# Patient Record
Sex: FEMALE | Race: BLACK OR AFRICAN AMERICAN | NOT HISPANIC OR LATINO | Employment: FULL TIME | URBAN - METROPOLITAN AREA
[De-identification: names, ages, dates, MRNs, and addresses within clinical notes are randomized per-mention and may not be internally consistent; named-entity substitution may affect disease eponyms.]

---

## 2017-01-06 ENCOUNTER — OFFICE VISIT (OUTPATIENT)
Dept: OBSTETRICS AND GYNECOLOGY | Facility: CLINIC | Age: 37
End: 2017-01-06

## 2017-01-06 VITALS
HEIGHT: 59 IN | DIASTOLIC BLOOD PRESSURE: 70 MMHG | BODY MASS INDEX: 34.27 KG/M2 | WEIGHT: 170 LBS | SYSTOLIC BLOOD PRESSURE: 120 MMHG

## 2017-01-06 DIAGNOSIS — N75.0 BARTHOLIN GLAND CYST: Primary | ICD-10-CM

## 2017-01-06 PROCEDURE — 99203 OFFICE O/P NEW LOW 30 MIN: CPT | Performed by: OBSTETRICS & GYNECOLOGY

## 2017-01-06 RX ORDER — TIZANIDINE 4 MG/1
4 TABLET ORAL NIGHTLY PRN
COMMUNITY
End: 2018-10-26

## 2017-01-06 RX ORDER — LORATADINE 10 MG/1
CAPSULE, LIQUID FILLED ORAL
COMMUNITY
End: 2017-06-16 | Stop reason: SDUPTHER

## 2017-01-06 NOTE — MR AVS SNAPSHOT
Bernardo Dodd   1/6/2017 10:10 AM   Office Visit    Dept Phone:  [phone]   Encounter #:  [id number]    Provider:  Manoj Bryan MD   Department:  [facility] OBSTETRICS AND GYNECOLOGY                Your Full Care Plan              Today's Medication Changes          These changes are accurate as of: 1/6/17 10:48 AM.  If you have any questions, ask your nurse or doctor.               Stop taking medication(s)listed here:     cephalexin 500 MG capsule   Commonly known as:  KEFLEX           HYDROcodone-acetaminophen 5-325 MG per tablet   Commonly known as:  NORCO           sulfamethoxazole-trimethoprim 800-160 MG per tablet   Commonly known as:  BACTRIM DS,SEPTRA DS                      Your Updated Medication List          This list is accurate as of: 1/6/17 10:48 AM.  Always use your most recent med list.                amitriptyline 25 MG tablet   Commonly known as:  ELAVIL       amLODIPine 5 MG tablet   Commonly known as:  NORVASC       beclomethasone 80 MCG/ACT inhaler   Commonly known as:  QVAR       DULERA 200-5 MCG/ACT inhaler   Generic drug:  mometasone-formoterol       eletriptan 40 MG tablet   Commonly known as:  RELPAX       fexofenadine 180 MG tablet   Commonly known as:  ALLEGRA       fluticasone 50 MCG/ACT nasal spray   Commonly known as:  FLONASE       ipratropium-albuterol  MCG/ACT inhaler   Commonly known as:  COMBIVENT RESPIMAT       ketotifen 0.025 % ophthalmic solution   Commonly known as:  ZADITOR       Loratadine 10 MG capsule       predniSONE 20 MG tablet   Commonly known as:  DELTASONE   Take 3 tablets by mouth Daily.       raNITIdine 300 MG tablet   Commonly known as:  ZANTAC   Take 1 tablet by mouth Daily. Take first thing in the morning on an empty stomach.  Wait at least 30 min to 1hr before eating.       SINGULAIR 10 MG tablet   Generic drug:  montelukast       tiZANidine 4 MG tablet   Commonly known as:  ZANAFLEX       topiramate 50 MG tablet   Commonly known as:  TOPAMAX       valACYclovir 1000 MG tablet   Commonly known as:  VALTREX   Take 1 tablet by mouth 3 (Three) Times a Day.       VENTOLIN  (90 BASE) MCG/ACT inhaler   Generic drug:  albuterol               We Performed the Following     External Facility Surgical / Procedural Request       You Were Diagnosed With        Codes Comments    Bartholin gland cyst    -  Primary ICD-10-CM: N75.0  ICD-9-CM: 616.2       Instructions     None    Patient Instructions History      Upcoming Appointments     Visit Type Date Time Department    NEW GYNECOLOGICAL 1/6/2017 10:10 AM [facility] [place]    OUTSIDE FACILITY 1/31/2017  1:00 PM [facility] [place]    NEW PATIENT 3/15/2017  9:20 AM [facility] [place]      MyChart Signup     Our records indicate that you have an active [employer] account.    You can view your After Visit Summary by going to [employer] and logging in with your [employer] username and password.  If you don't have a [employer] username and password but a parent or guardian has access to your record, the parent or guardian should login with their own [employer] username and password and access your record to view the After Visit Summary.    If you have questions, you can email [email or web address] or call [phone] to talk to our [employer] staff.  Remember, [employer] is NOT to be used for urgent needs.  For medical emergencies, dial 911.               Other Info from Your Visit           Your Appointments     Jan 31, 2017  1:00 PM EST   Outside Facility with Satinder Zavala MD   [facility] GASTROENTEROLOGY (--)    [address]  [place] [facility] [id number] [phone]            Mar 15, 2017  9:20 AM EDT   New Patient with Asha Hunt MD   [facility] INTERNAL MEDICINE AND ENDOCRINOLOGY (--)    [address]  [place] [facility] [address] [phone]           Bring all  "previous medical records and films, along with current medications and insurance information.              Allergies     No Known Allergies      Reason for Visit     Gynecologic Exam last pap 12/2016, neg    Bartholin's Cyst Here for Bartholin's cyst      Vital Signs     Blood Pressure Height Weight Last Menstrual Period Body Mass Index Smoking Status    120/70 59\" (149.9 cm) 170 lb (77.1 kg) 12/25/2016 34.34 kg/m2 Former Smoker      Problems and Diagnoses Noted     Bartholin gland cyst    -  Primary        "

## 2017-01-06 NOTE — PROGRESS NOTES
Britta Dodd is a 36 y.o. female.     History of Present Illness    Patient has recurred Bartholin cyst, pt wants definitive treatment  The following portions of the patient's history were reviewed and updated as appropriate: allergies, current medications, past family history, past medical history, past social history, past surgical history and problem list.    Review of Systems   Constitutional: Negative.    Eyes: Positive for visual disturbance.   Respiratory: Positive for cough, chest tightness and wheezing. Negative for apnea, choking, shortness of breath and stridor.         Long history of asthma    Cardiovascular: Negative.    Gastrointestinal: Negative for abdominal distention, abdominal pain, anal bleeding, blood in stool, constipation, diarrhea, nausea, rectal pain and vomiting.        Indigestion   Endocrine:        Pt is being worked up for thyroid problem    Genitourinary: Negative.    Musculoskeletal: Positive for back pain.   Neurological: Positive for seizures, speech difficulty, numbness and headaches. Negative for dizziness, tremors, syncope, facial asymmetry and weakness.   Hematological: Negative.    Psychiatric/Behavioral: The patient is nervous/anxious.        Objective   Physical Exam   Constitutional: She is oriented to person, place, and time. She appears well-developed and well-nourished.   HENT:   Head: Normocephalic.   Eyes: Pupils are equal, round, and reactive to light.   Neck: Normal range of motion. Neck supple.   Cardiovascular: Normal rate, regular rhythm, normal heart sounds and intact distal pulses.    Pulmonary/Chest: Effort normal and breath sounds normal. No respiratory distress. She has no wheezes. She has no rales. She exhibits no tenderness.   Abdominal: Soft. Bowel sounds are normal. She exhibits no distension and no mass. There is no tenderness. There is no rebound and no guarding. No hernia.   Genitourinary: Vagina normal and uterus normal.       Pelvic  exam was performed with patient supine. There is no rash, tenderness, lesion or injury on the right labia. There is tenderness and lesion on the left labia. There is no rash or injury on the left labia. Cervix exhibits no motion tenderness, no discharge and no friability. Right adnexum displays no mass, no tenderness and no fullness. Left adnexum displays no mass, no tenderness and no fullness.   Musculoskeletal: Normal range of motion.   Neurological: She is alert and oriented to person, place, and time. She has normal reflexes.   Skin: Skin is warm and dry.   Psychiatric: She has a normal mood and affect. Her behavior is normal. Judgment and thought content normal.   Nursing note and vitals reviewed.      Assessment/Plan   Bernardo was seen today for gynecologic exam and bartholin's cyst.    Diagnoses and all orders for this visit:    Bartholin gland cyst         Plan; schedule a marsupialization of the cyst    Manoj Bryan MD

## 2017-01-08 ENCOUNTER — HOSPITAL ENCOUNTER (EMERGENCY)
Facility: HOSPITAL | Age: 37
Discharge: HOME | End: 2017-01-08
Attending: EMERGENCY MEDICINE | Admitting: EMERGENCY MEDICINE

## 2017-01-08 VITALS
RESPIRATION RATE: 16 BRPM | OXYGEN SATURATION: 97 % | HEART RATE: 83 BPM | WEIGHT: 170 LBS | DIASTOLIC BLOOD PRESSURE: 71 MMHG | BODY MASS INDEX: 34.27 KG/M2 | HEIGHT: 59 IN | TEMPERATURE: 98.7 F | SYSTOLIC BLOOD PRESSURE: 107 MMHG

## 2017-01-08 DIAGNOSIS — G43.009 NONINTRACTABLE MIGRAINE, UNSPECIFIED MIGRAINE TYPE: Primary | ICD-10-CM

## 2017-01-08 DIAGNOSIS — M62.838 MUSCLE SPASMS OF NECK: ICD-10-CM

## 2017-01-08 LAB
AMPHET+METHAMPHET UR QL: NEGATIVE
AMPHETAMINES UR QL: NEGATIVE
B-HCG UR QL: NEGATIVE
BACTERIA UR QL AUTO: ABNORMAL /HPF
BARBITURATES UR QL SCN: NEGATIVE
BENZODIAZ UR QL SCN: NEGATIVE
BILIRUB UR QL STRIP: NEGATIVE
BUPRENORPHINE SERPL-MCNC: NEGATIVE NG/ML
CANNABINOIDS SERPL QL: NEGATIVE
CLARITY UR: ABNORMAL
COCAINE UR QL: NEGATIVE
COLOR UR: ABNORMAL
GLUCOSE UR STRIP-MCNC: NEGATIVE MG/DL
HGB UR QL STRIP.AUTO: NEGATIVE
HYALINE CASTS UR QL AUTO: ABNORMAL /LPF
INTERNAL NEGATIVE CONTROL: NEGATIVE
INTERNAL POSITIVE CONTROL: POSITIVE
KETONES UR QL STRIP: NEGATIVE
LEUKOCYTE ESTERASE UR QL STRIP.AUTO: NEGATIVE
Lab: NORMAL
METHADONE UR QL SCN: NEGATIVE
NITRITE UR QL STRIP: NEGATIVE
OPIATES UR QL: NEGATIVE
OXYCODONE UR QL SCN: NEGATIVE
PCP UR QL SCN: NEGATIVE
PH UR STRIP.AUTO: 7.5 [PH] (ref 5–8)
PROPOXYPH UR QL: NEGATIVE
PROT UR QL STRIP: NEGATIVE
RBC # UR: ABNORMAL /HPF
REF LAB TEST METHOD: ABNORMAL
SP GR UR STRIP: 1.01 (ref 1–1.03)
SQUAMOUS #/AREA URNS HPF: ABNORMAL /HPF
TRICYCLICS UR QL SCN: NEGATIVE
UROBILINOGEN UR QL STRIP: ABNORMAL
WBC UR QL AUTO: ABNORMAL /HPF

## 2017-01-08 PROCEDURE — 25010000002 DIPHENHYDRAMINE PER 50 MG: Performed by: EMERGENCY MEDICINE

## 2017-01-08 PROCEDURE — 25010000002 METOCLOPRAMIDE PER 10 MG: Performed by: EMERGENCY MEDICINE

## 2017-01-08 PROCEDURE — 99284 EMERGENCY DEPT VISIT MOD MDM: CPT

## 2017-01-08 PROCEDURE — 96375 TX/PRO/DX INJ NEW DRUG ADDON: CPT

## 2017-01-08 PROCEDURE — 80306 DRUG TEST PRSMV INSTRMNT: CPT | Performed by: EMERGENCY MEDICINE

## 2017-01-08 PROCEDURE — 25010000002 KETOROLAC TROMETHAMINE PER 15 MG: Performed by: EMERGENCY MEDICINE

## 2017-01-08 PROCEDURE — 81001 URINALYSIS AUTO W/SCOPE: CPT | Performed by: EMERGENCY MEDICINE

## 2017-01-08 PROCEDURE — 96365 THER/PROPH/DIAG IV INF INIT: CPT

## 2017-01-08 PROCEDURE — 25010000002 DEXAMETHASONE: Performed by: EMERGENCY MEDICINE

## 2017-01-08 RX ORDER — METOCLOPRAMIDE HYDROCHLORIDE 5 MG/ML
10 INJECTION INTRAMUSCULAR; INTRAVENOUS ONCE
Status: COMPLETED | OUTPATIENT
Start: 2017-01-08 | End: 2017-01-08

## 2017-01-08 RX ORDER — KETOROLAC TROMETHAMINE 30 MG/ML
30 INJECTION, SOLUTION INTRAMUSCULAR; INTRAVENOUS ONCE
Status: COMPLETED | OUTPATIENT
Start: 2017-01-08 | End: 2017-01-08

## 2017-01-08 RX ORDER — DIPHENHYDRAMINE HYDROCHLORIDE 50 MG/ML
25 INJECTION INTRAMUSCULAR; INTRAVENOUS ONCE
Status: COMPLETED | OUTPATIENT
Start: 2017-01-08 | End: 2017-01-08

## 2017-01-08 RX ORDER — SODIUM CHLORIDE 0.9 % (FLUSH) 0.9 %
10 SYRINGE (ML) INJECTION AS NEEDED
Status: DISCONTINUED | OUTPATIENT
Start: 2017-01-08 | End: 2017-01-08 | Stop reason: HOSPADM

## 2017-01-08 RX ADMIN — METOCLOPRAMIDE 10 MG: 5 INJECTION, SOLUTION INTRAMUSCULAR; INTRAVENOUS at 15:50

## 2017-01-08 RX ADMIN — KETOROLAC TROMETHAMINE 30 MG: 30 INJECTION, SOLUTION INTRAMUSCULAR at 15:49

## 2017-01-08 RX ADMIN — DIPHENHYDRAMINE HYDROCHLORIDE 25 MG: 50 INJECTION INTRAMUSCULAR; INTRAVENOUS at 15:42

## 2017-01-08 RX ADMIN — SODIUM CHLORIDE 1000 ML: 9 INJECTION, SOLUTION INTRAVENOUS at 15:41

## 2017-01-08 RX ADMIN — Medication 10 MG: at 16:11

## 2017-01-08 NOTE — ED PROVIDER NOTES
Subjective   HPI Comments: Mrs. Dodd is a 36 y.o female who presents to the ED c/o a headache starting two days ago. She complains that her headache started with soreness and pain in the back of her neck that radiated up to her head. She has taken a single dose of naratriptan at 1300 but has not helped her headache. She also complains of asthma associated chest tightness, abdominal pain, and diarrhea. She denies any N/V, fever, urinary problems, or any other acute sx at this time. She does have a hx of similar headaches and asthma.     Patient is a 36 y.o. female presenting with headaches.   History provided by:  Patient  Headache   Pain location:  Generalized  Quality:  Unable to specify  Radiates to:  Does not radiate  Severity currently:  Unable to specify  Severity at highest:  Unable to specify  Onset quality:  Sudden  Duration: Onset 2 days.  Timing:  Constant  Progression:  Unchanged  Chronicity:  Recurrent  Similar to prior headaches: yes    Relieved by:  Nothing  Worsened by:  Nothing  Ineffective treatments: Naratriptan.  Associated symptoms: abdominal pain, diarrhea, myalgias and neck pain    Associated symptoms: no congestion, no cough, no fever, no nausea and no vomiting        Review of Systems   Constitutional: Negative for chills and fever.   HENT: Negative for congestion.    Respiratory: Negative for cough.    Gastrointestinal: Positive for abdominal pain and diarrhea. Negative for nausea and vomiting.   Genitourinary: Negative for difficulty urinating and dysuria.   Musculoskeletal: Positive for myalgias and neck pain.   Neurological: Positive for headaches.   All other systems reviewed and are negative.      Past Medical History   Diagnosis Date   • Allergic rhinitis    • Asthma    • GERD (gastroesophageal reflux disease)    • History of chest x-ray 11/01/2015     no active disease   • History of echocardiogram 11/01/2015     ejection fraction of greater than 65%, mitral and pulmonic  regurgitation an physiological tricuspid regurgitation.   • History of PFTs 2015     spirometry data acceptable and reproducible; pt given 4 puffs of Ventolin; pt gave good effort; no obstruction; no Bd response; MVV reduced    • History of PFTs 2015     pt gave best effort; duoneb given prior and post study; moderate nonspecific proportional reduction of FEV1 and FVC with preserved ratio; FEV1 moderately reduced; cannot rule out restriction   • Hypertension    • Thigh shingles        No Known Allergies    Past Surgical History   Procedure Laterality Date   • Breast surgery       breast reduction   • Orif ankle fracture Right    • Laparoscopic tubal ligation     •  section         Family History   Problem Relation Age of Onset   • Pancreatic cancer Maternal Grandmother    • Heart failure Paternal Grandmother    • MARCIE disease Paternal Aunt        Social History     Social History   • Marital status: Single     Spouse name: N/A   • Number of children: N/A   • Years of education: N/A     Social History Main Topics   • Smoking status: Former Smoker     Packs/day: 1.00     Years: 15.00     Types: Cigarettes     Quit date:    • Smokeless tobacco: None   • Alcohol use Yes      Comment: socially   • Drug use: No      Comment: used recreational drugs in the past   • Sexual activity: Not Asked     Other Topics Concern   • None     Social History Narrative         Objective   Physical Exam   Constitutional: She is oriented to person, place, and time. She appears well-developed and well-nourished. No distress.   HENT:   Head: Normocephalic and atraumatic.   Eyes: Conjunctivae are normal.   Neck: Normal range of motion. Neck supple.   Pain in her head or neck when moving positions   Cardiovascular: Normal rate, regular rhythm, normal heart sounds and intact distal pulses.  Exam reveals no gallop and no friction rub.    No murmur heard.  Pulmonary/Chest: Effort normal and breath sounds normal. No  respiratory distress.   Abdominal: Soft. There is tenderness (Mild suprapubic tenderness).   Musculoskeletal: Normal range of motion.   Neurological: She is alert and oriented to person, place, and time.   Skin: Skin is warm and dry.   Psychiatric: She has a normal mood and affect. Her behavior is normal.   Nursing note and vitals reviewed.      Procedures         ED Course  ED Course       Course of Care      Lab Results (last 24 hours)     Procedure Component Value Units Date/Time    Urinalysis With / Culture If Indicated [[id number]]  (Abnormal) Collected:  01/08/17 1452    Specimen:  Urine from Urine, Clean Catch Updated:  01/08/17 1510     Color, UA Dark Yellow (A)      Appearance, UA Turbid (A)      pH, UA 7.5      Specific Gravity, UA 1.014      Glucose, UA Negative      Ketones, UA Negative      Bilirubin, UA Negative      Blood, UA Negative      Protein, UA Negative      Leuk Esterase, UA Negative      Nitrite, UA Negative      Urobilinogen, UA 0.2 E.U./dL     Urine Drug Screen [[id number]]  (Normal) Collected:  01/08/17 1452    Specimen:  Urine from Urine, Clean Catch Updated:  01/08/17 1511     THC, Screen, Urine Negative      Phencyclidine (PCP), Urine Negative      Cocaine Screen, Urine Negative      Methamphetamine, Urine Negative      Opiate Screen Negative      Amphetamine Screen, Urine Negative      Benzodiazepine Screen, Urine Negative      Tricyclic Antidepressants Screen Negative      Methadone Screen, Urine Negative      Barbiturates Screen, Urine Negative      Oxycodone Screen, Urine Negative      Propoxyphene Screen Negative      Buprenorphine, Screen, Urine Negative     Narrative:       Cutoff For Drugs Screened:    Amphetamines               500 ng/ml  Barbiturates               200 ng/ml  Benzodiazepines            150 ng/ml  Cocaine                    150 ng/ml  Methadone                  200 ng/ml  Opiates                    100 ng/ml  Phencyclidine               25 ng/ml  THC                             50 ng/ml  Methamphetamine            500 ng/ml  Tricyclic Antidepressants  300 ng/ml  Oxycodone                  100 ng/ml  Propoxyphene               300 ng/ml  Buprenorphine               10 ng/ml    The normal value for all drugs tested is negative. This report includes unconfirmed screening results, with the cutoff values listed, to be used for medical treatment purposes only.  Unconfirmed results must not be used for non-medical purposes such as employment or legal testing.  Clinical consideration should be applied to any drug of abuse test, particularly when unconfirmed results are used.      Urinalysis, Microscopic Only [[id number]] Collected:  01/08/17 1452    Specimen:  Urine from Urine, Clean Catch Updated:  01/08/17 1509    POCT Pregnancy, urine [[id number]]  (Normal) Collected:  01/08/17 1555    Specimen:  Urine Updated:  01/08/17 1556     HCG, Urine, QL Negative      Lot Number [id number]      Internal Positive Control Positive      Internal Negative Control Negative           Note: In addition to lab results from this visit, the labs listed above may include labs taken at another facility or during a different encounter within the last 24 hours. Please correlate lab times with ED admission and discharge times for further clarification of the services performed during this visit.    No orders to display       Vitals:    01/08/17 1500 01/08/17 1515 01/08/17 1540 01/08/17 1545   BP: 122/83 121/85 118/85 118/79   BP Location:       Patient Position:       Pulse:  84 80 78   Resp:       Temp:       TempSrc:       SpO2:  94% 97% 94%   Weight:       Height:           Medications   sodium chloride 0.9 % flush 10 mL (not administered)   dexamethasone (DECADRON) injection 10 mg (not administered)   sodium chloride 0.9 % bolus 1,000 mL (1,000 mL Intravenous New Bag 1/8/17 1541)   ketorolac (TORADOL) injection 30 mg (30 mg Intravenous Given 1/8/17 1549)   metoclopramide (REGLAN) injection 10 mg (10 mg  Intravenous Given 1/8/17 1550)   diphenhydrAMINE (BENADRYL) injection 25 mg (25 mg Intravenous Given 1/8/17 1542)       ECG/EMG Results (last 24 hours)     ** No results found for the last 24 hours. **                          MDM  Number of Diagnoses or Management Options  Muscle spasms of neck: new and requires workup  Nonintractable migraine, unspecified migraine type: new and requires workup  Diagnosis management comments: Headache reported to be improved, still mildly present, will give 10 mg of decadron and DC with advise to rest, apply heat to neck and stretch.     Followup with neurologist for further evaluation of chronic headache and neck muscle spasms.        Amount and/or Complexity of Data Reviewed  Clinical lab tests: ordered and reviewed  Tests in the radiology section of CPT®: ordered and reviewed  Review and summarize past medical records: yes  Independent visualization of images, tracings, or specimens: yes    Patient Progress  Patient progress: stable      Final diagnoses:   Nonintractable migraine, unspecified migraine type   Muscle spasms of neck       Documentation assistance provided by jonny VIVEROS.  Information recorded by the jonny was done at my direction and has been verified and validated by me.     Paula Viveros  01/08/17 1442       Paula Viveros  01/08/17 1602       Arya Bates MD  01/08/17 1606

## 2017-01-13 ENCOUNTER — OUTSIDE FACILITY SERVICE (OUTPATIENT)
Dept: OBSTETRICS AND GYNECOLOGY | Facility: CLINIC | Age: 37
End: 2017-01-13

## 2017-01-13 PROCEDURE — S0260 H&P FOR SURGERY: HCPCS | Performed by: OBSTETRICS & GYNECOLOGY

## 2017-01-15 ENCOUNTER — OFFICE VISIT (OUTPATIENT)
Dept: RETAIL CLINIC | Facility: CLINIC | Age: 37
End: 2017-01-15

## 2017-01-15 VITALS
HEART RATE: 68 BPM | DIASTOLIC BLOOD PRESSURE: 67 MMHG | SYSTOLIC BLOOD PRESSURE: 118 MMHG | TEMPERATURE: 98.9 F | OXYGEN SATURATION: 98 % | RESPIRATION RATE: 20 BRPM

## 2017-01-15 DIAGNOSIS — J45.41 MODERATE PERSISTENT ASTHMA WITH ACUTE EXACERBATION: ICD-10-CM

## 2017-01-15 DIAGNOSIS — H65.01 RIGHT ACUTE SEROUS OTITIS MEDIA, RECURRENCE NOT SPECIFIED: Primary | ICD-10-CM

## 2017-01-15 PROCEDURE — 99213 OFFICE O/P EST LOW 20 MIN: CPT | Performed by: NURSE PRACTITIONER

## 2017-01-15 RX ORDER — GUAIFENESIN AND DEXTROMETHORPHAN HYDROBROMIDE 600; 30 MG/1; MG/1
2 TABLET, EXTENDED RELEASE ORAL 2 TIMES DAILY PRN
Qty: 20 TABLET | Refills: 0 | Status: SHIPPED | OUTPATIENT
Start: 2017-01-15 | End: 2017-01-20

## 2017-01-15 RX ORDER — AZITHROMYCIN 250 MG/1
TABLET, FILM COATED ORAL
Qty: 6 TABLET | Refills: 0 | Status: SHIPPED | OUTPATIENT
Start: 2017-01-15 | End: 2017-02-15

## 2017-01-15 RX ORDER — PREDNISONE 20 MG/1
20 TABLET ORAL 2 TIMES DAILY
Qty: 10 TABLET | Refills: 0 | Status: SHIPPED | OUTPATIENT
Start: 2017-01-15 | End: 2017-01-20

## 2017-01-15 NOTE — PROGRESS NOTES
Subjective   Bernardo Dodd is a 36 y.o. female. Presenting with sore throat, PND, watery drainage from eyes, productive cough, SOB with exertion X 3 days.  Feels as if she is running a fever off/on.Has been using rescue inhalers, daily steroid inhalers, ha nebulizer treatment with no improvements. Unknown ill contacts.     History of Present Illness     The following portions of the patient's history were reviewed and updated as appropriate: allergies, current medications, past family history, past medical history, past social history, past surgical history and problem list.    Review of Systems   Constitutional: Positive for appetite change (decreased), fatigue and fever. Negative for chills and diaphoresis.   HENT: Positive for congestion (nasal/chest), ear pain (left), postnasal drip, rhinorrhea, sinus pressure, sneezing and sore throat.    Eyes: Positive for discharge (watery ).   Respiratory: Positive for cough, chest tightness, shortness of breath (at rest and with exertion ) and wheezing.    Gastrointestinal: Negative.        Objective   Physical Exam   Constitutional: She appears well-developed.   HENT:   Head: Normocephalic.   Right Ear: Tympanic membrane is erythematous and bulging.   Left Ear: Tympanic membrane and ear canal normal.   Nose: Mucosal edema, rhinorrhea and nose lacerations present.   Mouth/Throat: Uvula is midline and mucous membranes are normal. Posterior oropharyngeal erythema present. No tonsillar exudate.   Cardiovascular: Normal rate and regular rhythm.    Pulmonary/Chest: Effort normal and breath sounds normal.   Lymphadenopathy:        Head (right side): Tonsillar adenopathy present.        Head (left side): Tonsillar adenopathy present.     She has no cervical adenopathy.   Skin: Skin is warm, dry and intact.       Assessment/Plan   Bernardo was seen today for asthma and sinus problem.    Diagnoses and all orders for this visit:    Right acute serous otitis media, recurrence not  specified    Moderate persistent asthma with acute exacerbation    Other orders  -     azithromycin (ZITHROMAX Z-GUANAKO) 250 MG tablet; Take 2 tablets the first day, then 1 tablet daily for 4 days.  -     guaifenesin-dextromethorphan (MUCINEX DM)  MG tablet sustained-release 12 hour tablet; Take 2 tablets by mouth 2 (Two) Times a Day As Needed (cough/chest congestion) for up to 5 days.  -     predniSONE (DELTASONE) 20 MG tablet; Take 1 tablet by mouth 2 (Two) Times a Day for 5 days.        Visit information reviewed with patient, including medication prescribed and patient instructions. All questions answered and given to patient/and or caregiver.     If symptoms worsen with fever >103, please seek further evaluation in the ER. Please F/U with PCP with concerns/and questions.   Kerri Pappas, APRN

## 2017-01-31 ENCOUNTER — OUTSIDE FACILITY SERVICE (OUTPATIENT)
Dept: GASTROENTEROLOGY | Facility: CLINIC | Age: 37
End: 2017-01-31

## 2017-01-31 ENCOUNTER — LAB REQUISITION (OUTPATIENT)
Dept: LAB | Facility: HOSPITAL | Age: 37
End: 2017-01-31

## 2017-01-31 DIAGNOSIS — K20.90 ESOPHAGITIS: ICD-10-CM

## 2017-01-31 PROCEDURE — 88305 TISSUE EXAM BY PATHOLOGIST: CPT | Performed by: INTERNAL MEDICINE

## 2017-01-31 PROCEDURE — 43239 EGD BIOPSY SINGLE/MULTIPLE: CPT | Performed by: INTERNAL MEDICINE

## 2017-02-01 LAB
LAB AP CASE REPORT: NORMAL
LAB AP CLINICAL INFORMATION: NORMAL
Lab: NORMAL
PATH REPORT.FINAL DX SPEC: NORMAL
PATH REPORT.GROSS SPEC: NORMAL

## 2017-02-02 RX ORDER — FLUTICASONE PROPIONATE 50 MCG
SPRAY, SUSPENSION (ML) NASAL
Qty: 1 BOTTLE | Refills: 10 | Status: SHIPPED | OUTPATIENT
Start: 2017-02-02 | End: 2018-06-01

## 2017-02-08 ENCOUNTER — APPOINTMENT (OUTPATIENT)
Dept: GENERAL RADIOLOGY | Facility: HOSPITAL | Age: 37
End: 2017-02-08

## 2017-02-08 ENCOUNTER — HOSPITAL ENCOUNTER (EMERGENCY)
Facility: HOSPITAL | Age: 37
Discharge: HOME | End: 2017-02-09
Attending: EMERGENCY MEDICINE | Admitting: EMERGENCY MEDICINE

## 2017-02-08 VITALS
TEMPERATURE: 98.4 F | SYSTOLIC BLOOD PRESSURE: 112 MMHG | BODY MASS INDEX: 34.27 KG/M2 | RESPIRATION RATE: 18 BRPM | DIASTOLIC BLOOD PRESSURE: 73 MMHG | OXYGEN SATURATION: 98 % | HEIGHT: 59 IN | HEART RATE: 73 BPM | WEIGHT: 170 LBS

## 2017-02-08 DIAGNOSIS — R07.89 ACUTE CHEST WALL PAIN: Primary | ICD-10-CM

## 2017-02-08 LAB — TROPONIN I SERPL-MCNC: 0 NG/ML (ref 0–0.07)

## 2017-02-08 PROCEDURE — 25010000002 KETOROLAC TROMETHAMINE PER 15 MG: Performed by: PHYSICIAN ASSISTANT

## 2017-02-08 PROCEDURE — 93005 ELECTROCARDIOGRAM TRACING: CPT

## 2017-02-08 PROCEDURE — 99284 EMERGENCY DEPT VISIT MOD MDM: CPT

## 2017-02-08 PROCEDURE — 25010000002 METOCLOPRAMIDE PER 10 MG: Performed by: PHYSICIAN ASSISTANT

## 2017-02-08 PROCEDURE — 96375 TX/PRO/DX INJ NEW DRUG ADDON: CPT

## 2017-02-08 PROCEDURE — 71020 HC CHEST PA AND LATERAL: CPT

## 2017-02-08 PROCEDURE — 96365 THER/PROPH/DIAG IV INF INIT: CPT

## 2017-02-08 PROCEDURE — 25010000002 DEXAMETHASONE: Performed by: EMERGENCY MEDICINE

## 2017-02-08 PROCEDURE — 96361 HYDRATE IV INFUSION ADD-ON: CPT

## 2017-02-08 PROCEDURE — 84484 ASSAY OF TROPONIN QUANT: CPT

## 2017-02-08 PROCEDURE — 25010000002 DIPHENHYDRAMINE PER 50 MG: Performed by: PHYSICIAN ASSISTANT

## 2017-02-08 RX ORDER — METOCLOPRAMIDE HYDROCHLORIDE 5 MG/ML
10 INJECTION INTRAMUSCULAR; INTRAVENOUS ONCE
Status: COMPLETED | OUTPATIENT
Start: 2017-02-08 | End: 2017-02-08

## 2017-02-08 RX ORDER — DEXAMETHASONE SODIUM PHOSPHATE 10 MG/ML
10 INJECTION INTRAMUSCULAR; INTRAVENOUS ONCE
Status: DISCONTINUED | OUTPATIENT
Start: 2017-02-08 | End: 2017-02-08

## 2017-02-08 RX ORDER — DIPHENHYDRAMINE HYDROCHLORIDE 50 MG/ML
25 INJECTION INTRAMUSCULAR; INTRAVENOUS ONCE
Status: COMPLETED | OUTPATIENT
Start: 2017-02-08 | End: 2017-02-08

## 2017-02-08 RX ORDER — HYDROCODONE BITARTRATE AND ACETAMINOPHEN 5; 325 MG/1; MG/1
1 TABLET ORAL EVERY 6 HOURS PRN
Qty: 15 TABLET | Refills: 0 | Status: SHIPPED | OUTPATIENT
Start: 2017-02-08 | End: 2017-02-15

## 2017-02-08 RX ORDER — KETOROLAC TROMETHAMINE 15 MG/ML
15 INJECTION, SOLUTION INTRAMUSCULAR; INTRAVENOUS ONCE
Status: COMPLETED | OUTPATIENT
Start: 2017-02-08 | End: 2017-02-08

## 2017-02-08 RX ADMIN — METOCLOPRAMIDE 10 MG: 5 INJECTION, SOLUTION INTRAMUSCULAR; INTRAVENOUS at 20:44

## 2017-02-08 RX ADMIN — Medication 10 MG: at 20:49

## 2017-02-08 RX ADMIN — KETOROLAC TROMETHAMINE 15 MG: 15 INJECTION, SOLUTION INTRAMUSCULAR; INTRAVENOUS at 20:44

## 2017-02-08 RX ADMIN — SODIUM CHLORIDE 500 ML: 9 INJECTION, SOLUTION INTRAVENOUS at 20:44

## 2017-02-08 RX ADMIN — DIPHENHYDRAMINE HYDROCHLORIDE 25 MG: 50 INJECTION INTRAMUSCULAR; INTRAVENOUS at 20:44

## 2017-02-09 NOTE — ED PROVIDER NOTES
Subjective   Patient is a 36 y.o. female presenting with chest pain.   History provided by:  Patient  Chest Pain   Pain location:  L chest and L lateral chest  Pain quality: burning (Muscle spasms radiating from neck and anterior chest, history of same)    Pain radiates to:  L shoulder  Pain severity:  Moderate  Onset quality:  Gradual  Timing:  Intermittent  Progression:  Waxing and waning  Chronicity:  Recurrent  Context: breathing and movement    Relieved by:  Nothing  Worsened by:  Nothing  Ineffective treatments: Muscle relaxants.  Associated symptoms: back pain (Left rhomboid shoulder spasms per history of present illness, history of same) and cough    Associated symptoms: no abdominal pain, no anorexia, no anxiety, no dizziness, no dysphagia, no fatigue, no fever, no headache, no heartburn, no lower extremity edema and no nausea      36-year-old female with history of rhomboid and trapezius spasm presents with worsening spasms radiating into the left chest for the past 2 weeks.  Symptoms worsen with movement, no shortness of breath but pain upon deep inspiration and movement.  She has had a slight cough with some chest congestion but no fevers chills or sweats.  No sputum or hemoptysis.  She has a past history of trigger point injections for spasms in her back and shoulder, not scheduled again until March.  Prior history of valvular heart disease-mitral and pulmonic valve regurgitation with physiologic tricuspid regurgitation, she does have a history of hypertension, and asthma.  Review of Systems   Constitutional: Negative for fatigue and fever.   HENT: Negative for trouble swallowing.    Respiratory: Positive for cough.    Cardiovascular: Positive for chest pain.   Gastrointestinal: Negative for abdominal pain, anorexia, heartburn and nausea.   Musculoskeletal: Positive for back pain (Left rhomboid shoulder spasms per history of present illness, history of same).   Neurological: Negative for dizziness and  headaches.   All other systems reviewed and are negative.      Past Medical History   Diagnosis Date   • Allergic rhinitis    • Asthma    • GERD (gastroesophageal reflux disease)    • History of chest x-ray 2015     no active disease   • History of echocardiogram 2015     ejection fraction of greater than 65%, mitral and pulmonic regurgitation an physiological tricuspid regurgitation.   • History of PFTs 2015     spirometry data acceptable and reproducible; pt given 4 puffs of Ventolin; pt gave good effort; no obstruction; no Bd response; MVV reduced    • History of PFTs 2015     pt gave best effort; duoneb given prior and post study; moderate nonspecific proportional reduction of FEV1 and FVC with preserved ratio; FEV1 moderately reduced; cannot rule out restriction   • Hypertension    • Thigh shingles        No Known Allergies    Past Surgical History   Procedure Laterality Date   • Breast surgery       breast reduction   • Orif ankle fracture Right    • Laparoscopic tubal ligation     •  section         Family History   Problem Relation Age of Onset   • Pancreatic cancer Maternal Grandmother    • Heart failure Paternal Grandmother    • MARCIE disease Paternal Aunt        Social History     Social History   • Marital status: Single     Spouse name: N/A   • Number of children: N/A   • Years of education: N/A     Social History Main Topics   • Smoking status: Former Smoker     Packs/day: 1.00     Years: 15.00     Types: Cigarettes     Quit date:    • Smokeless tobacco: None   • Alcohol use Yes      Comment: socially   • Drug use: No      Comment: used recreational drugs in the past   • Sexual activity: Defer     Other Topics Concern   • None     Social History Narrative           Objective   Physical Exam   Constitutional: She is oriented to person, place, and time. She appears well-developed and well-nourished. No distress.   HENT:   Head: Normocephalic and atraumatic.   Right  Ear: External ear normal.   Left Ear: External ear normal.   Nose: Nose normal.   Mouth/Throat: Oropharynx is clear and moist. No oropharyngeal exudate.   Eyes: Conjunctivae and EOM are normal. Pupils are equal, round, and reactive to light. Right eye exhibits no discharge. Left eye exhibits no discharge. No scleral icterus.   Neck: Normal range of motion. Neck supple. No JVD present. No tracheal deviation present. No thyromegaly present.   Cardiovascular: Normal rate, regular rhythm and intact distal pulses.  Exam reveals no gallop and no friction rub.    Murmur heard.  Pulmonary/Chest: Effort normal and breath sounds normal. No stridor. No respiratory distress. She has no wheezes. She has no rales. She exhibits no tenderness.   Abdominal: Soft. Bowel sounds are normal. She exhibits no distension and no mass. There is no tenderness. There is no rebound and no guarding. No hernia.   Musculoskeletal: Normal range of motion. She exhibits no edema, tenderness or deformity.   Left chest wall tender to palpation just above the left breast, this reproduces patient's symptoms.  Thoracic expansion is normal.  No external sign of injury.  Lungs are clear to auscultation.  Cardiovascular exam regular rate rhythm.   Lymphadenopathy:     She has no cervical adenopathy.   Neurological: She is alert and oriented to person, place, and time. She has normal reflexes. She displays normal reflexes. No cranial nerve deficit. She exhibits normal muscle tone. Coordination normal.   Skin: Skin is warm and dry. No rash noted. She is not diaphoretic. No erythema. No pallor.   Psychiatric: She has a normal mood and affect. Her behavior is normal. Judgment and thought content normal.   Nursing note and vitals reviewed.      Procedures        Recent Results (from the past 24 hour(s))   POC Troponin, Rapid    Collection Time: 02/08/17  8:51 PM   Result Value Ref Range    Troponin I 0.00 0.00 - 0.07 ng/mL     Note: In addition to lab results  from this visit, the labs listed above may include labs taken at another facility or during a different encounter within the last 24 hours. Please correlate lab times with ED admission and discharge times for further clarification of the services performed during this visit.    XR Chest PA & Lateral   ED Interpretation   Prominence of the right heart border otherwise no acute findings   on 2 views of the chest.  No acute infiltrates noted.  No   pneumothorax or hemothorax.  No effusions noted.  Mediastinum is   within normal limits.        Vitals:    02/08/17 2030 02/08/17 2124 02/08/17 2130 02/08/17 2230   BP: 121/81 109/65 100/63 112/73   BP Location:       Patient Position:       Pulse: 73      Resp:       Temp:       TempSrc:       SpO2: 96%  97% 98%   Weight:       Height:         Medications   sodium chloride 0.9 % bolus 500 mL (0 mL Intravenous Stopped 2/8/17 2115)   ketorolac (TORADOL) injection 15 mg (15 mg Intravenous Given 2/8/17 2044)   metoclopramide (REGLAN) injection 10 mg (10 mg Intravenous Given 2/8/17 2044)   diphenhydrAMINE (BENADRYL) injection 25 mg (25 mg Intravenous Given 2/8/17 2044)   Dexamethasone (decadron) 10mg in NS 50ml IVPB (0 mg Intravenous Stopped 2/8/17 2115)     ECG/EMG Results (last 24 hours)     Procedure Component Value Units Date/Time    ECG 12 Lead [[id number]] Collected:  02/08/17 1951     Updated:  02/08/17 2013    Narrative:       Test Reason : CHEST PAIN  Blood Pressure : **/** mmHG  Vent. Rate : 074 BPM     Atrial Rate : 074 BPM     P-R Int : 118 ms          QRS Dur : 084 ms      QT Int : 382 ms       P-R-T Axes : 033 065 042 degrees     QTc Int : 424 ms    Normal sinus rhythm  Normal ECG  When compared with ECG of 01-AUG-2016 22:10,  No significant change was found  Confirmed by EMILIA CEBALLOS MD ([id number]) on 2/8/2017 8:13:43 PM    Referred By:  ED MD           Confirmed By:EMILIA CEBALLOS MD            ED Course  ED Course                  MDM    Final diagnoses:   Acute chest  wall pain            Christoph Gutierrez PA-C  02/09/17 0411

## 2017-02-09 NOTE — DISCHARGE INSTRUCTIONS
Warm compresses sparingly to chest wall.  Follow-up with your primary care provider for recheck in 1-2 days.  Return if any change or worsening.

## 2017-02-14 ENCOUNTER — OFFICE VISIT (OUTPATIENT)
Dept: OBSTETRICS AND GYNECOLOGY | Facility: CLINIC | Age: 37
End: 2017-02-14

## 2017-02-14 VITALS
SYSTOLIC BLOOD PRESSURE: 122 MMHG | DIASTOLIC BLOOD PRESSURE: 80 MMHG | HEIGHT: 59 IN | WEIGHT: 177 LBS | BODY MASS INDEX: 35.68 KG/M2

## 2017-02-14 DIAGNOSIS — N75.0 BARTHOLIN GLAND CYST: Primary | ICD-10-CM

## 2017-02-14 DIAGNOSIS — Z98.890 POST-OPERATIVE STATE: ICD-10-CM

## 2017-02-14 PROCEDURE — 99024 POSTOP FOLLOW-UP VISIT: CPT | Performed by: OBSTETRICS & GYNECOLOGY

## 2017-02-14 NOTE — PROGRESS NOTES
Subjective   Bernardo Dodd is a 36 y.o. female.     History of Present Illness    Post op marsupialization of left Bartholin cyst  The following portions of the patient's history were reviewed and updated as appropriate: allergies, current medications, past family history, past medical history, past social history, past surgical history and problem list.    Review of Systems   Constitutional: Negative.    Genitourinary: Negative.        Objective   Physical Exam   Genitourinary:       No labial fusion. There is no rash, tenderness, lesion or injury on the right labia. There is no rash, tenderness, lesion or injury on the left labia.       Assessment/Plan   Bernardo was seen today for post-op.    Diagnoses and all orders for this visit:    Bartholin gland cyst    Post-operative state         Return prn    Manoj Bryan MD

## 2017-02-15 ENCOUNTER — OFFICE VISIT (OUTPATIENT)
Dept: RETAIL CLINIC | Facility: CLINIC | Age: 37
End: 2017-02-15

## 2017-02-15 VITALS
TEMPERATURE: 98.6 F | HEIGHT: 60 IN | HEART RATE: 70 BPM | SYSTOLIC BLOOD PRESSURE: 120 MMHG | BODY MASS INDEX: 35.18 KG/M2 | WEIGHT: 179.2 LBS | DIASTOLIC BLOOD PRESSURE: 84 MMHG | RESPIRATION RATE: 18 BRPM | OXYGEN SATURATION: 98 %

## 2017-02-15 DIAGNOSIS — R68.89 FLU-LIKE SYMPTOMS: Primary | ICD-10-CM

## 2017-02-15 LAB
EXPIRATION DATE: NORMAL
FLUAV AG NPH QL: NEGATIVE
FLUBV AG NPH QL: NEGATIVE
INTERNAL CONTROL: NORMAL
Lab: NORMAL

## 2017-02-15 PROCEDURE — 99213 OFFICE O/P EST LOW 20 MIN: CPT | Performed by: NURSE PRACTITIONER

## 2017-02-15 PROCEDURE — 87804 INFLUENZA ASSAY W/OPTIC: CPT | Performed by: NURSE PRACTITIONER

## 2017-02-15 RX ORDER — OSELTAMIVIR PHOSPHATE 75 MG/1
75 CAPSULE ORAL 2 TIMES DAILY
Qty: 10 CAPSULE | Refills: 0 | Status: SHIPPED | OUTPATIENT
Start: 2017-02-15 | End: 2017-02-20

## 2017-02-15 NOTE — PROGRESS NOTES
Britta Dodd is a 36 y.o. female. Presenting  Sudden onset of sore throat, headaches, body aches, nasal congestion and runny nose since yesterday. Has chills. Unsure of fevers. Has taken coricidin with slight improvement. Mother was dx with flu yesterday in which she has been in close contact. See ROS.     History of Present Illness     The following portions of the patient's history were reviewed and updated as appropriate: allergies, current medications, past family history, past medical history, past social history, past surgical history and problem list.    Review of Systems   Constitutional: Positive for appetite change (decreased), chills, diaphoresis and fatigue. Negative for fever.   HENT: Positive for congestion, rhinorrhea, sinus pressure, sneezing and sore throat.    Respiratory: Positive for cough. Negative for chest tightness, shortness of breath and wheezing.    Gastrointestinal: Negative.      Results for orders placed or performed in visit on 02/15/17   POC Influenza A / B   Result Value Ref Range    Rapid Influenza A Ag negative     Rapid Influenza B Ag negative     Internal Control Passed Passed    Lot Number [id number]     Expiration Date 04/30/2017          Objective   Physical Exam   Constitutional: She appears well-developed.   HENT:   Head: Normocephalic.   Right Ear: Ear canal normal. Tympanic membrane is not bulging.   Left Ear: Ear canal normal. Tympanic membrane is bulging.   Nose: Mucosal edema present.   Mouth/Throat: Uvula is midline and mucous membranes are normal. Posterior oropharyngeal erythema present. No tonsillar exudate.   Cardiovascular: Normal rate and regular rhythm.    Pulmonary/Chest: Effort normal and breath sounds normal.   Lymphadenopathy:        Head (right side): No submandibular adenopathy present.        Head (left side): No submandibular adenopathy present.     She has no cervical adenopathy.   Neurological: She is alert.   Skin: Skin is warm and  intact.       Assessment/Plan   Bernardo was seen today for sore throat, nasal congestion, earache, dizziness, cough, headache and generalized body aches.    Diagnoses and all orders for this visit:    Flu-like symptoms    Other orders  -     oseltamivir (TAMIFLU) 75 MG capsule; Take 1 capsule by mouth 2 (Two) Times a Day for 5 days.      Will opt to treat based upon clinical presentation and recent exposure to flu.     Visit information reviewed with patient, including medication prescribed and patient instructions. All questions answered and given to patient/and or caregiver.     If symptoms worsen with fever >103, please seek further evaluation in the ER. Please F/U with PCP with concerns/and questions.     Kerri Pappas, APRN

## 2017-03-15 ENCOUNTER — OFFICE VISIT (OUTPATIENT)
Dept: ENDOCRINOLOGY | Facility: CLINIC | Age: 37
End: 2017-03-15

## 2017-03-15 VITALS
SYSTOLIC BLOOD PRESSURE: 122 MMHG | HEART RATE: 79 BPM | WEIGHT: 176 LBS | HEIGHT: 60 IN | BODY MASS INDEX: 34.55 KG/M2 | OXYGEN SATURATION: 99 % | DIASTOLIC BLOOD PRESSURE: 78 MMHG

## 2017-03-15 DIAGNOSIS — H05.20 EXOPHTHALMOS: Primary | ICD-10-CM

## 2017-03-15 PROBLEM — J30.9 ATOPIC RHINITIS: Status: ACTIVE | Noted: 2017-03-15

## 2017-03-15 PROBLEM — I10 HYPERTENSION: Status: ACTIVE | Noted: 2017-03-15

## 2017-03-15 LAB
T4 FREE SERPL-MCNC: 0.87 NG/DL (ref 0.89–1.76)
TSH SERPL DL<=0.05 MIU/L-ACNC: 1.2 MIU/ML (ref 0.35–5.35)

## 2017-03-15 PROCEDURE — 84443 ASSAY THYROID STIM HORMONE: CPT | Performed by: INTERNAL MEDICINE

## 2017-03-15 PROCEDURE — 83520 IMMUNOASSAY QUANT NOS NONAB: CPT | Performed by: INTERNAL MEDICINE

## 2017-03-15 PROCEDURE — 86376 MICROSOMAL ANTIBODY EACH: CPT | Performed by: INTERNAL MEDICINE

## 2017-03-15 PROCEDURE — 84439 ASSAY OF FREE THYROXINE: CPT | Performed by: INTERNAL MEDICINE

## 2017-03-15 PROCEDURE — 99243 OFF/OP CNSLTJ NEW/EST LOW 30: CPT | Performed by: INTERNAL MEDICINE

## 2017-03-16 LAB — TSH RECEP AB SER-ACNC: <0.5 IU/L (ref 0–1.75)

## 2017-03-17 LAB — THYROPEROXIDASE AB SERPL-ACNC: 11 IU/ML (ref 0–34)

## 2017-03-27 ENCOUNTER — HOSPITAL ENCOUNTER (EMERGENCY)
Facility: HOSPITAL | Age: 37
Discharge: HOME | End: 2017-03-27
Attending: EMERGENCY MEDICINE | Admitting: EMERGENCY MEDICINE

## 2017-03-27 ENCOUNTER — OFFICE VISIT (OUTPATIENT)
Dept: RETAIL CLINIC | Facility: CLINIC | Age: 37
End: 2017-03-27

## 2017-03-27 VITALS
TEMPERATURE: 98.3 F | DIASTOLIC BLOOD PRESSURE: 83 MMHG | RESPIRATION RATE: 18 BRPM | BODY MASS INDEX: 35.08 KG/M2 | HEART RATE: 74 BPM | SYSTOLIC BLOOD PRESSURE: 118 MMHG | HEIGHT: 59 IN | WEIGHT: 174 LBS | OXYGEN SATURATION: 93 %

## 2017-03-27 VITALS
SYSTOLIC BLOOD PRESSURE: 118 MMHG | RESPIRATION RATE: 20 BRPM | HEIGHT: 60 IN | OXYGEN SATURATION: 98 % | BODY MASS INDEX: 34.2 KG/M2 | DIASTOLIC BLOOD PRESSURE: 72 MMHG | HEART RATE: 74 BPM | WEIGHT: 174.2 LBS | TEMPERATURE: 99 F

## 2017-03-27 DIAGNOSIS — R42 DIZZINESS: ICD-10-CM

## 2017-03-27 DIAGNOSIS — B34.9 ACUTE VIRAL SYNDROME: ICD-10-CM

## 2017-03-27 DIAGNOSIS — J02.9 ACUTE VIRAL PHARYNGITIS: Primary | ICD-10-CM

## 2017-03-27 DIAGNOSIS — J06.9 UPPER RESPIRATORY TRACT INFECTION, UNSPECIFIED TYPE: ICD-10-CM

## 2017-03-27 DIAGNOSIS — R51.9 NONINTRACTABLE EPISODIC HEADACHE, UNSPECIFIED HEADACHE TYPE: ICD-10-CM

## 2017-03-27 DIAGNOSIS — R68.89 FLU-LIKE SYMPTOMS: Primary | ICD-10-CM

## 2017-03-27 LAB
EXPIRATION DATE: NORMAL
FLUAV AG NPH QL: NORMAL
FLUBV AG NPH QL: NEGATIVE
HETEROPH AB SER QL LA: NEGATIVE
INTERNAL CONTROL: NORMAL
Lab: NORMAL
S PYO AG THROAT QL: NEGATIVE

## 2017-03-27 PROCEDURE — 25010000002 DEXAMETHASONE PER 1 MG: Performed by: EMERGENCY MEDICINE

## 2017-03-27 PROCEDURE — 87081 CULTURE SCREEN ONLY: CPT | Performed by: EMERGENCY MEDICINE

## 2017-03-27 PROCEDURE — 25010000002 ONDANSETRON PER 1 MG: Performed by: EMERGENCY MEDICINE

## 2017-03-27 PROCEDURE — 99284 EMERGENCY DEPT VISIT MOD MDM: CPT

## 2017-03-27 PROCEDURE — 96375 TX/PRO/DX INJ NEW DRUG ADDON: CPT

## 2017-03-27 PROCEDURE — 25010000002 KETOROLAC TROMETHAMINE PER 15 MG: Performed by: EMERGENCY MEDICINE

## 2017-03-27 PROCEDURE — 87804 INFLUENZA ASSAY W/OPTIC: CPT | Performed by: NURSE PRACTITIONER

## 2017-03-27 PROCEDURE — 87880 STREP A ASSAY W/OPTIC: CPT | Performed by: EMERGENCY MEDICINE

## 2017-03-27 PROCEDURE — 99213 OFFICE O/P EST LOW 20 MIN: CPT | Performed by: NURSE PRACTITIONER

## 2017-03-27 PROCEDURE — 86308 HETEROPHILE ANTIBODY SCREEN: CPT | Performed by: EMERGENCY MEDICINE

## 2017-03-27 PROCEDURE — 96374 THER/PROPH/DIAG INJ IV PUSH: CPT

## 2017-03-27 RX ORDER — NAPROXEN 500 MG/1
500 TABLET ORAL 2 TIMES DAILY WITH MEALS
Qty: 14 TABLET | Refills: 0 | Status: SHIPPED | OUTPATIENT
Start: 2017-03-27 | End: 2017-09-29

## 2017-03-27 RX ORDER — DEXTROMETHORPHAN HYDROBROMIDE AND PROMETHAZINE HYDROCHLORIDE 15; 6.25 MG/5ML; MG/5ML
SYRUP ORAL 4 TIMES DAILY PRN
COMMUNITY
End: 2017-07-19

## 2017-03-27 RX ORDER — ONDANSETRON 8 MG/1
8 TABLET, ORALLY DISINTEGRATING ORAL EVERY 8 HOURS PRN
Qty: 15 TABLET | Refills: 0 | Status: SHIPPED | OUTPATIENT
Start: 2017-03-27 | End: 2017-07-19

## 2017-03-27 RX ORDER — KETOROLAC TROMETHAMINE 30 MG/ML
30 INJECTION, SOLUTION INTRAMUSCULAR; INTRAVENOUS ONCE
Status: COMPLETED | OUTPATIENT
Start: 2017-03-27 | End: 2017-03-27

## 2017-03-27 RX ORDER — SODIUM CHLORIDE 0.9 % (FLUSH) 0.9 %
10 SYRINGE (ML) INJECTION AS NEEDED
Status: DISCONTINUED | OUTPATIENT
Start: 2017-03-27 | End: 2017-03-28 | Stop reason: HOSPADM

## 2017-03-27 RX ORDER — FLUTICASONE PROPIONATE 50 MCG
2 SPRAY, SUSPENSION (ML) NASAL DAILY
Qty: 1 EACH | Refills: 0 | Status: SHIPPED | OUTPATIENT
Start: 2017-03-27 | End: 2017-04-26

## 2017-03-27 RX ORDER — ONDANSETRON 2 MG/ML
4 INJECTION INTRAMUSCULAR; INTRAVENOUS ONCE
Status: COMPLETED | OUTPATIENT
Start: 2017-03-27 | End: 2017-03-27

## 2017-03-27 RX ORDER — MECLIZINE HYDROCHLORIDE 25 MG/1
25 TABLET ORAL 3 TIMES DAILY PRN
Status: DISCONTINUED | OUTPATIENT
Start: 2017-03-27 | End: 2018-06-01

## 2017-03-27 RX ORDER — DEXAMETHASONE SODIUM PHOSPHATE 4 MG/ML
4 INJECTION, SOLUTION INTRA-ARTICULAR; INTRALESIONAL; INTRAMUSCULAR; INTRAVENOUS; SOFT TISSUE ONCE
Status: COMPLETED | OUTPATIENT
Start: 2017-03-27 | End: 2017-03-27

## 2017-03-27 RX ORDER — HYDROCODONE BITARTRATE AND ACETAMINOPHEN 5; 325 MG/1; MG/1
1 TABLET ORAL EVERY 6 HOURS PRN
COMMUNITY
End: 2017-09-29

## 2017-03-27 RX ADMIN — DEXAMETHASONE SODIUM PHOSPHATE 4 MG: 4 INJECTION, SOLUTION INTRAMUSCULAR; INTRAVENOUS at 22:29

## 2017-03-27 RX ADMIN — KETOROLAC TROMETHAMINE 30 MG: 30 INJECTION, SOLUTION INTRAMUSCULAR at 21:20

## 2017-03-27 RX ADMIN — ONDANSETRON 4 MG: 2 INJECTION INTRAMUSCULAR; INTRAVENOUS at 21:19

## 2017-03-27 NOTE — PROGRESS NOTES
Britta Dodd is a 36 y.o. female.   Chief Complaint   Patient presents with   • Flu Symptoms     History of Present Illness 2 days of feeling like might have flu. HAs headache, body aches, chill, dizziness. Has taken OTC analgesic.    The following portions of the patient's history were reviewed and updated as appropriate: allergies, current medications, past medical history, past social history, past surgical history and problem list.    Review of Systems   Constitutional: Positive for chills and fever.   HENT: Positive for congestion, rhinorrhea, sinus pressure, sneezing and sore throat. Negative for ear pain.    Respiratory: Positive for cough. Negative for shortness of breath and wheezing.    Musculoskeletal: Positive for myalgias.   Neurological: Positive for dizziness and headaches.       Objective   Vitals:    03/27/17 0852   BP: 118/72   Pulse: 74   Resp: 20   Temp: 99 °F (37.2 °C)   SpO2: 98%     Physical Exam   Constitutional: She appears well-developed and well-nourished.   HENT:   Right Ear: Tympanic membrane normal.   Left Ear: Tympanic membrane normal.   Mouth/Throat: Oropharynx is clear and moist and mucous membranes are normal.   Eyes: Conjunctivae and EOM are normal. Pupils are equal, round, and reactive to light.   Bilateral exopthalmos   Neck: Neck supple.   Cardiovascular: Normal rate and regular rhythm.    Pulmonary/Chest: Effort normal and breath sounds normal.   Lymphadenopathy:     She has no cervical adenopathy.   Neurological: She has normal strength. No cranial nerve deficit or sensory deficit. She displays a negative Romberg sign.   Reflex Scores:       Bicep reflexes are 1+ on the right side and 1+ on the left side.       Patellar reflexes are 1+ on the right side and 1+ on the left side.  Slightly asymmetrical smile but no drooping. Recites nursery rhyme. Heel toe gait is intact. Rapid finger movements intact.     Psychiatric:   Slowed affect.         Results for  orders placed or performed in visit on 03/27/17   POCT Influenza A/B   Result Value Ref Range    Rapid Influenza A Ag negative`     Rapid Influenza B Ag negative     Internal Control Passed Passed    Lot Number [id number]     Expiration Date 10/2018        Assessment/Plan   Bernardo was seen today for flu symptoms.    Diagnoses and all orders for this visit:    Flu-like symptoms  -     POCT Influenza A/B    Dizziness  -     meclizine (ANTIVERT) tablet 25 mg; Take 1 tablet by mouth 3 (Three) Times a Day As Needed for dizziness.    Nonintractable episodic headache, unspecified headache type                   Home care instruction reviewed with patient and/or caregiver who acknowledges understanding, questions answered.    Christy Viera, APRN

## 2017-03-27 NOTE — ED PROVIDER NOTES
Subjective   HPI Comments: Bernardo Dodd is a 36 y.o.female who presents to the ED with c/o flu sx. She states that for the past week, she has had a headache, chills, fatigue, sore throat, diarrhea and myalgias. Today she was seen at the [facility] clinic and had a negative flu swab and was prescribed a medication but her pharmacy did not receive it. She continued to have sx today and came to the ED for evaluation. Additionally pt is taking hydrocodone for deltal pain.     Patient is a 36 y.o. female presenting with flu symptoms.   History provided by:  Patient  Flu Symptoms   Presenting symptoms: cough, diarrhea, fatigue, headache, myalgias and sore throat    Presenting symptoms: no fever, no nausea, no shortness of breath and no vomiting    Severity:  Mild  Onset quality:  Sudden  Duration:  1 week  Progression:  Unchanged  Chronicity:  New  Relieved by:  Nothing  Worsened by:  Nothing  Associated symptoms: decreased physical activity and nasal congestion    Associated symptoms: no chills, no ear pain, no mental status change, no neck stiffness and no syncope        Review of Systems   Constitutional: Positive for fatigue. Negative for chills and fever.   HENT: Positive for congestion and sore throat. Negative for ear pain.    Respiratory: Positive for cough. Negative for shortness of breath.    Gastrointestinal: Positive for diarrhea. Negative for nausea and vomiting.   Musculoskeletal: Positive for myalgias. Negative for neck stiffness.   Neurological: Positive for headaches.   All other systems reviewed and are negative.      Past Medical History:   Diagnosis Date   • Allergic rhinitis    • Anemia    • Arthritis    • Asthma    • Asthma    • Depression    • FHx: migraine headaches    • Functional ovarian cysts    • GERD (gastroesophageal reflux disease)    • Heart murmur    • History of chest x-ray 11/01/2015    no active disease   • History of echocardiogram 11/01/2015    ejection fraction of greater than 65%,  mitral and pulmonic regurgitation an physiological tricuspid regurgitation.   • History of PFTs 2015    spirometry data acceptable and reproducible; pt given 4 puffs of Ventolin; pt gave good effort; no obstruction; no Bd response; MVV reduced    • History of PFTs 2015    pt gave best effort; duoneb given prior and post study; moderate nonspecific proportional reduction of FEV1 and FVC with preserved ratio; FEV1 moderately reduced; cannot rule out restriction   • Hypertension    • Seizures    • Thigh shingles        No Known Allergies    Past Surgical History:   Procedure Laterality Date   • BILATERAL BREAST REDUCTION     • BREAST SURGERY      breast reduction   •  SECTION     •  SECTION     • LAPAROSCOPIC TUBAL LIGATION     • ORIF ANKLE FRACTURE Right        Family History   Problem Relation Age of Onset   • Pancreatic cancer Maternal Grandmother    • Heart failure Paternal Grandmother    • MARCIE disease Paternal Aunt    • Arthritis Mother    • Cancer Mother    • Arthritis Father    • Diabetes Neg Hx    • Heart attack Neg Hx    • Hypertension Neg Hx    • Hyperlipidemia Neg Hx    • Mental illness Neg Hx    • Obesity Neg Hx    • Stroke Neg Hx        Social History     Social History   • Marital status: Single     Spouse name: N/A   • Number of children: N/A   • Years of education: N/A     Social History Main Topics   • Smoking status: Former Smoker     Packs/day: 1.00     Years: 15.00     Types: Cigarettes     Quit date:    • Smokeless tobacco: None   • Alcohol use Yes      Comment: socially   • Drug use: No      Comment: used recreational drugs in the past   • Sexual activity: Defer     Other Topics Concern   • None     Social History Narrative         Objective   Physical Exam   Constitutional: She is oriented to person, place, and time. She appears well-developed and well-nourished. No distress.   HENT:   Head: Normocephalic and atraumatic.   Posterior erythema.    Eyes:  Conjunctivae are normal. No scleral icterus.   Neck: Normal range of motion. Neck supple.   Cardiovascular: Normal rate, regular rhythm, normal heart sounds and intact distal pulses.    Pulmonary/Chest: Effort normal and breath sounds normal. No respiratory distress.   Abdominal: Soft. There is no tenderness.   Musculoskeletal: Normal range of motion. She exhibits no edema.   Lymphadenopathy:     She has no cervical adenopathy.   Neurological: She is alert and oriented to person, place, and time.   Skin: Skin is warm and dry. No rash noted. She is not diaphoretic.   Psychiatric: She has a normal mood and affect. Her behavior is normal.   Nursing note and vitals reviewed.      Procedures         ED Course  ED Course   Comment By Time   Findings consistent with viral pharyngitis.  We'll discharge the patient home symptomatically management to follow-up with her doctor for ongoing care.  The patient voices understanding and agrees. Alan Messina MD 03/27 2206                     MDM  Number of Diagnoses or Management Options  Acute viral pharyngitis:   Acute viral syndrome:   Upper respiratory tract infection, unspecified type:      Amount and/or Complexity of Data Reviewed  Clinical lab tests: reviewed        Final diagnoses:   Acute viral pharyngitis   Acute viral syndrome   Upper respiratory tract infection, unspecified type       Documentation assistance provided by jonny Ortiz.  Information recorded by the jonny was done at my direction and has been verified and validated by me.     Piter Ortiz  03/27/17 1949       Alan Messina MD  03/27/17 5580

## 2017-03-29 LAB — BACTERIA SPEC AEROBE CULT: NORMAL

## 2017-04-08 ENCOUNTER — OFFICE VISIT (OUTPATIENT)
Dept: RETAIL CLINIC | Facility: CLINIC | Age: 37
End: 2017-04-08

## 2017-04-08 VITALS
SYSTOLIC BLOOD PRESSURE: 120 MMHG | OXYGEN SATURATION: 98 % | RESPIRATION RATE: 20 BRPM | DIASTOLIC BLOOD PRESSURE: 78 MMHG | TEMPERATURE: 98.3 F | HEART RATE: 81 BPM

## 2017-04-08 DIAGNOSIS — J30.2 SEASONAL ALLERGIC RHINITIS, UNSPECIFIED ALLERGIC RHINITIS TRIGGER: ICD-10-CM

## 2017-04-08 DIAGNOSIS — J02.9 SORE THROAT: ICD-10-CM

## 2017-04-08 DIAGNOSIS — J45.901 ASTHMA EXACERBATION: Primary | ICD-10-CM

## 2017-04-08 PROCEDURE — 99213 OFFICE O/P EST LOW 20 MIN: CPT | Performed by: NURSE PRACTITIONER

## 2017-04-08 RX ORDER — PREDNISONE 20 MG/1
20 TABLET ORAL 2 TIMES DAILY
Qty: 10 TABLET | Refills: 0 | Status: SHIPPED | OUTPATIENT
Start: 2017-04-08 | End: 2017-04-13

## 2017-04-08 RX ORDER — CYCLOBENZAPRINE HCL 10 MG
5 TABLET ORAL ONCE AS NEEDED
COMMUNITY
End: 2017-12-30

## 2017-04-08 NOTE — PROGRESS NOTES
Subjective   Bernardo Dodd is a 36 y.o. female. Presenting with what she fees is an an asthma/allergy flare up in the last 2 days. Increased use of rescue inhaler and nebulizer at home with no improvement. Also has a sore throat. Believes that she has ran fevers. Unsure of ill contacts. See ROS.     History of Present Illness     The following portions of the patient's history were reviewed and updated as appropriate: allergies, current medications, past family history, past medical history, past social history, past surgical history and problem list.    Review of Systems   Constitutional: Positive for fatigue. Negative for fever.   HENT: Positive for congestion, ear pain, postnasal drip and sore throat. Negative for rhinorrhea.         Left ear congestion    Respiratory: Positive for cough, chest tightness, shortness of breath and wheezing.    Neurological: Positive for headaches.       Objective   Physical Exam   Constitutional: She appears well-developed.   HENT:   Head: Normocephalic.   Right Ear: A middle ear effusion is present.   Left Ear: A middle ear effusion is present.   Nose: Mucosal edema present.   Mouth/Throat: Uvula is midline and mucous membranes are normal. Posterior oropharyngeal erythema present. No posterior oropharyngeal edema. Tonsils are 1+ on the right. Tonsils are 1+ on the left. No tonsillar exudate.   Cardiovascular: Normal rate and regular rhythm.    Pulmonary/Chest: Effort normal and breath sounds normal.   Lymphadenopathy:        Head (right side): Tonsillar (minimal ) adenopathy present.        Head (left side): Tonsillar (minimal) adenopathy present.     She has no cervical adenopathy.   Skin: Skin is warm, dry and intact.       Assessment/Plan   Diagnoses and all orders for this visit:    Asthma exacerbation    Seasonal allergic rhinitis, unspecified allergic rhinitis trigger    Sore throat    Other orders  -     predniSONE (DELTASONE) 20 MG tablet; Take 1 tablet by mouth 2  (Two) Times a Day for 5 days.          Visit information reviewed with patient, including medication prescribed and patient instructions. All questions answered and given to patient/and or caregiver.     If symptoms worsen with fever >103, please seek further evaluation in the ER. Please F/U with PCP with concerns/and questions.     Kerri Pappas, APRN

## 2017-04-21 ENCOUNTER — HOSPITAL ENCOUNTER (EMERGENCY)
Facility: HOSPITAL | Age: 37
Discharge: HOME | End: 2017-04-21
Attending: EMERGENCY MEDICINE | Admitting: EMERGENCY MEDICINE

## 2017-04-21 ENCOUNTER — APPOINTMENT (OUTPATIENT)
Dept: GENERAL RADIOLOGY | Facility: HOSPITAL | Age: 37
End: 2017-04-21

## 2017-04-21 VITALS
WEIGHT: 168.2 LBS | TEMPERATURE: 98.3 F | BODY MASS INDEX: 33.91 KG/M2 | SYSTOLIC BLOOD PRESSURE: 112 MMHG | RESPIRATION RATE: 16 BRPM | HEART RATE: 97 BPM | OXYGEN SATURATION: 97 % | DIASTOLIC BLOOD PRESSURE: 77 MMHG | HEIGHT: 59 IN

## 2017-04-21 DIAGNOSIS — J45.902 ASTHMA WITH STATUS ASTHMATICUS, UNSPECIFIED ASTHMA SEVERITY: Primary | ICD-10-CM

## 2017-04-21 LAB — S PYO AG THROAT QL: NEGATIVE

## 2017-04-21 PROCEDURE — 71020 HC CHEST PA AND LATERAL: CPT

## 2017-04-21 PROCEDURE — 87880 STREP A ASSAY W/OPTIC: CPT | Performed by: EMERGENCY MEDICINE

## 2017-04-21 PROCEDURE — 99283 EMERGENCY DEPT VISIT LOW MDM: CPT

## 2017-04-21 PROCEDURE — 87081 CULTURE SCREEN ONLY: CPT | Performed by: EMERGENCY MEDICINE

## 2017-04-21 PROCEDURE — 94640 AIRWAY INHALATION TREATMENT: CPT

## 2017-04-21 RX ORDER — BENZONATATE 100 MG/1
100 CAPSULE ORAL 3 TIMES DAILY PRN
Qty: 12 CAPSULE | Refills: 0 | Status: SHIPPED | OUTPATIENT
Start: 2017-04-21 | End: 2017-06-16

## 2017-04-21 RX ORDER — IPRATROPIUM BROMIDE AND ALBUTEROL SULFATE 2.5; .5 MG/3ML; MG/3ML
3 SOLUTION RESPIRATORY (INHALATION) ONCE
Status: COMPLETED | OUTPATIENT
Start: 2017-04-21 | End: 2017-04-21

## 2017-04-21 RX ORDER — PREDNISONE 50 MG/1
50 TABLET ORAL DAILY
Qty: 4 TABLET | Refills: 0 | Status: SHIPPED | OUTPATIENT
Start: 2017-04-21 | End: 2017-04-25

## 2017-04-21 RX ORDER — AZITHROMYCIN 250 MG/1
TABLET, FILM COATED ORAL
Qty: 6 TABLET | Refills: 0 | Status: SHIPPED | OUTPATIENT
Start: 2017-04-21 | End: 2017-06-16

## 2017-04-21 RX ORDER — PREDNISONE 20 MG/1
40 TABLET ORAL ONCE
Status: DISCONTINUED | OUTPATIENT
Start: 2017-04-21 | End: 2017-04-21

## 2017-04-21 RX ADMIN — IPRATROPIUM BROMIDE AND ALBUTEROL SULFATE 3 ML: .5; 3 SOLUTION RESPIRATORY (INHALATION) at 20:34

## 2017-04-22 NOTE — ED PROVIDER NOTES
Subjective   HPI Comments: 36 y.o. female presents to the ED w/ c/o asthma symptoms. She said one month ago, she began having a productive cough, sore throat, and sinus pressure. She describes the sputum as yellow and brown, and that her cough is worse with deep breaths. She has been seen at the [facility] clinic and was prescribed Prednisone, Flonase, and an albuterol inhaler, which she has been using, with only slight improvement in her symptoms. She completed her Prednisone medication this morning, though she was only taking one a day instead of the prescribed two a day. She also c/o chest tightness and lower abdominal pain, exacerbated by coughing.  She denies chest pain, N/V/D, lower extremity pain, or fevers. She has a hx of smoking, though she quit 2 years ago. At work, she is around second hand smoke. Pt has a hx of anxiety, seizures, and depression.     In the ED, she was given a breathing treatment which she says improved her symptoms.    Patient is a 36 y.o. female presenting with cough.   History provided by:  Patient  Cough   Cough characteristics:  Productive  Sputum characteristics:  Brown and yellow  Severity:  Mild  Onset quality:  Sudden  Timing:  Intermittent  Progression:  Unchanged  Chronicity:  Chronic  Smoker: no    Context: smoke exposure and with activity    Relieved by:  Nothing  Worsened by:  Deep breathing  Ineffective treatments:  Cough suppressants, decongestant, home nebulizer and steroid inhaler  Associated symptoms: no chest pain        Review of Systems   Respiratory: Positive for cough.    Cardiovascular: Negative for chest pain.       Past Medical History:   Diagnosis Date   • Allergic rhinitis    • Anemia    • Arthritis    • Asthma    • Asthma    • Depression    • FHx: migraine headaches    • Functional ovarian cysts    • GERD (gastroesophageal reflux disease)    • Heart murmur    • History of chest x-ray 11/01/2015    no active disease   • History of echocardiogram 11/01/2015     ejection fraction of greater than 65%, mitral and pulmonic regurgitation an physiological tricuspid regurgitation.   • History of PFTs 2015    spirometry data acceptable and reproducible; pt given 4 puffs of Ventolin; pt gave good effort; no obstruction; no Bd response; MVV reduced    • History of PFTs 2015    pt gave best effort; duoneb given prior and post study; moderate nonspecific proportional reduction of FEV1 and FVC with preserved ratio; FEV1 moderately reduced; cannot rule out restriction   • Hypertension    • Seizures    • Thigh shingles        No Known Allergies    Past Surgical History:   Procedure Laterality Date   • BILATERAL BREAST REDUCTION     • BREAST SURGERY      breast reduction   •  SECTION     •  SECTION     • LAPAROSCOPIC TUBAL LIGATION     • ORIF ANKLE FRACTURE Right        Family History   Problem Relation Age of Onset   • Pancreatic cancer Maternal Grandmother    • Heart failure Paternal Grandmother    • MARCIE disease Paternal Aunt    • Arthritis Mother    • Cancer Mother    • Arthritis Father    • Diabetes Neg Hx    • Heart attack Neg Hx    • Hypertension Neg Hx    • Hyperlipidemia Neg Hx    • Mental illness Neg Hx    • Obesity Neg Hx    • Stroke Neg Hx        Social History     Social History   • Marital status: Single     Spouse name: N/A   • Number of children: N/A   • Years of education: N/A     Social History Main Topics   • Smoking status: Former Smoker     Packs/day: 1.00     Years: 15.00     Types: Cigarettes     Quit date: 2015   • Smokeless tobacco: None   • Alcohol use No      Comment: socially   • Drug use: No      Comment: used recreational drugs in the past   • Sexual activity: Defer     Other Topics Concern   • None     Social History Narrative   • None         Objective   Physical Exam   Constitutional: She appears well-developed and well-nourished. No distress.   HENT:   Head: Normocephalic and atraumatic.   Mild posterior pharyngeal  "erythema.    Eyes: Conjunctivae are normal. No scleral icterus.   Neck: Normal range of motion. Neck supple.   Cardiovascular: Normal rate, regular rhythm and normal heart sounds.    Pulmonary/Chest: Effort normal and breath sounds normal. No respiratory distress. She has no wheezes.   Abdominal: Soft. Bowel sounds are normal. There is tenderness (Mild lower abdominal tenderness.).   Musculoskeletal: Normal range of motion. She exhibits no edema.   Neurological: She is alert.   Skin: Skin is warm and dry.   Psychiatric: She has a normal mood and affect. Her behavior is normal.   Nursing note and vitals reviewed.      Procedures         ED Course  ED Course                 No results found for this or any previous visit (from the past 24 hour(s)).  Note: In addition to lab results from this visit, the labs listed above may include labs taken at another facility or during a different encounter within the last 24 hours. Please correlate lab times with ED admission and discharge times for further clarification of the services performed during this visit.    XR Chest 2 View    (Results Pending)     Vitals:    04/21/17 1932 04/21/17 2034   BP: 142/83    BP Location: Left arm    Patient Position: Sitting    Pulse: 105 100   Resp: 16 16   Temp: 98.3 °F (36.8 °C)    TempSrc: Oral    SpO2: 96%    Weight: 168 lb 3.2 oz (76.3 kg)    Height: 59\" (149.9 cm)      Medications   ipratropium-albuterol (DUO-NEB) nebulizer solution 3 mL (3 mL Nebulization Given 4/21/17 2034)     ECG/EMG Results (last 24 hours)     ** No results found for the last 24 hours. **            [facility]    Final diagnoses:   Asthma with status asthmaticus, unspecified asthma severity       Documentation assistance provided by jonny Harvey.  Information recorded by the scribe was done at my direction and has been verified and validated by me.     Odalis Harvey  04/21/17 3267       Jonh Olmedo DO  04/23/17 0234    "

## 2017-04-22 NOTE — DISCHARGE INSTRUCTIONS
FOLLOW UP WITH YOUR PULMONOLOGIST AS SOON AS POSSIBLE.  AS DISCUSSED, THIS IS LIKELY REQUIRE THAT YOU MISS PART OF A DAY AT WORK, BUT IS NECESSARY DUE TO THE PERSISTENT NATURE OF YOUR SYMPTOMS.        Follow up with one of the [facility] physician groups below to setup primary care. If you have trouble following up, please call Akua Hinson, our transitional care nurse, at [phone].    (Dr. oGuld, Dr. Lopez, Dr. Trevino, and Dr. Montana.)  [facility], Primary Care, [phone], [address], [place] [address]    [facility], Primary Care, [phone], [address], [facility] [address], [address] [facility], Primary Care, [phone], [address], [address] [place], [address] [facility], Primary Care, [phone], [address], [address] [place], [address]     [place] [address] [facility], Primary Care, [phone], [address], [address] [place], [address]    [place] [address] [facility], Primary Care, [phone], [address]    [place], [address] [facility], Primary Care, [phone], [address], [address] [place], [address] [facility], Primary Care, [phone], [address] [place] [address], [address] [place], [address] [facility]) [facility], Primary Care, [phone], [address], [address] [place], [address] [facility], Primary Care, [phone], [address], [address] [place], [address] [facility], Primary Care, [phone], [address], [place], [address] [place] [address], Primary Care,  [phone], [address] [address], [place] [address]

## 2017-04-23 LAB — BACTERIA SPEC AEROBE CULT: NORMAL

## 2017-06-05 ENCOUNTER — APPOINTMENT (OUTPATIENT)
Dept: ULTRASOUND IMAGING | Facility: HOSPITAL | Age: 37
End: 2017-06-05

## 2017-06-05 ENCOUNTER — HOSPITAL ENCOUNTER (EMERGENCY)
Facility: HOSPITAL | Age: 37
Discharge: HOME | End: 2017-06-06
Attending: EMERGENCY MEDICINE | Admitting: EMERGENCY MEDICINE

## 2017-06-05 DIAGNOSIS — R10.2 PELVIC PAIN: ICD-10-CM

## 2017-06-05 DIAGNOSIS — N76.0 BACTERIAL VAGINOSIS: Primary | ICD-10-CM

## 2017-06-05 DIAGNOSIS — B96.89 BACTERIAL VAGINOSIS: Primary | ICD-10-CM

## 2017-06-05 DIAGNOSIS — B00.9 HERPETIC LESION: ICD-10-CM

## 2017-06-05 LAB
B-HCG UR QL: NEGATIVE
INTERNAL NEGATIVE CONTROL: NEGATIVE
INTERNAL POSITIVE CONTROL: POSITIVE
Lab: NORMAL

## 2017-06-05 PROCEDURE — 87147 CULTURE TYPE IMMUNOLOGIC: CPT | Performed by: PHYSICIAN ASSISTANT

## 2017-06-05 PROCEDURE — 76830 TRANSVAGINAL US NON-OB: CPT

## 2017-06-05 PROCEDURE — 96372 THER/PROPH/DIAG INJ SC/IM: CPT

## 2017-06-05 PROCEDURE — 81001 URINALYSIS AUTO W/SCOPE: CPT | Performed by: PHYSICIAN ASSISTANT

## 2017-06-05 PROCEDURE — 87086 URINE CULTURE/COLONY COUNT: CPT | Performed by: PHYSICIAN ASSISTANT

## 2017-06-05 PROCEDURE — 99284 EMERGENCY DEPT VISIT MOD MDM: CPT

## 2017-06-05 RX ORDER — DULOXETIN HYDROCHLORIDE 20 MG/1
CAPSULE, DELAYED RELEASE ORAL DAILY
COMMUNITY
End: 2017-06-16

## 2017-06-06 VITALS
HEIGHT: 59 IN | WEIGHT: 159 LBS | SYSTOLIC BLOOD PRESSURE: 123 MMHG | BODY MASS INDEX: 32.05 KG/M2 | RESPIRATION RATE: 16 BRPM | TEMPERATURE: 98.8 F | HEART RATE: 80 BPM | OXYGEN SATURATION: 98 % | DIASTOLIC BLOOD PRESSURE: 90 MMHG

## 2017-06-06 LAB
BACTERIA UR QL AUTO: ABNORMAL /HPF
BILIRUB UR QL STRIP: NEGATIVE
CLARITY UR: ABNORMAL
CLUE CELLS SPEC QL WET PREP: ABNORMAL
COD CRY URNS QL: ABNORMAL /HPF
COLOR UR: YELLOW
GLUCOSE UR STRIP-MCNC: NEGATIVE MG/DL
HGB UR QL STRIP.AUTO: NEGATIVE
HYALINE CASTS UR QL AUTO: ABNORMAL /LPF
HYDATID CYST SPEC WET PREP: ABNORMAL
KETONES UR QL STRIP: NEGATIVE
KOH PREP NAIL: NORMAL
LEUKOCYTE ESTERASE UR QL STRIP.AUTO: ABNORMAL
NITRITE UR QL STRIP: NEGATIVE
PH UR STRIP.AUTO: 5.5 [PH] (ref 5–8)
PROT UR QL STRIP: NEGATIVE
RBC # UR: ABNORMAL /HPF
REF LAB TEST METHOD: ABNORMAL
SP GR UR STRIP: 1.02 (ref 1–1.03)
SQUAMOUS #/AREA URNS HPF: ABNORMAL /HPF
T VAGINALIS SPEC QL WET PREP: ABNORMAL
UROBILINOGEN UR QL STRIP: ABNORMAL
WBC SPEC QL WET PREP: ABNORMAL
WBC UR QL AUTO: ABNORMAL /HPF
YEAST GENITAL QL WET PREP: ABNORMAL

## 2017-06-06 PROCEDURE — 87220 TISSUE EXAM FOR FUNGI: CPT | Performed by: PHYSICIAN ASSISTANT

## 2017-06-06 PROCEDURE — 87255 GENET VIRUS ISOLATE HSV: CPT | Performed by: PHYSICIAN ASSISTANT

## 2017-06-06 PROCEDURE — 86696 HERPES SIMPLEX TYPE 2 TEST: CPT | Performed by: PHYSICIAN ASSISTANT

## 2017-06-06 PROCEDURE — 87491 CHLMYD TRACH DNA AMP PROBE: CPT | Performed by: PHYSICIAN ASSISTANT

## 2017-06-06 PROCEDURE — 86695 HERPES SIMPLEX TYPE 1 TEST: CPT | Performed by: PHYSICIAN ASSISTANT

## 2017-06-06 PROCEDURE — 87210 SMEAR WET MOUNT SALINE/INK: CPT | Performed by: PHYSICIAN ASSISTANT

## 2017-06-06 PROCEDURE — 87591 N.GONORRHOEAE DNA AMP PROB: CPT | Performed by: PHYSICIAN ASSISTANT

## 2017-06-06 RX ORDER — AZITHROMYCIN 250 MG/1
1000 TABLET, FILM COATED ORAL ONCE
Status: DISCONTINUED | OUTPATIENT
Start: 2017-06-06 | End: 2017-06-06 | Stop reason: HOSPADM

## 2017-06-06 RX ORDER — CEFTRIAXONE 1 G/1
1 INJECTION, POWDER, FOR SOLUTION INTRAMUSCULAR; INTRAVENOUS ONCE
Status: DISCONTINUED | OUTPATIENT
Start: 2017-06-06 | End: 2017-06-06 | Stop reason: HOSPADM

## 2017-06-06 RX ORDER — METRONIDAZOLE 500 MG/1
500 TABLET ORAL 3 TIMES DAILY
Qty: 21 TABLET | Refills: 0 | Status: SHIPPED | OUTPATIENT
Start: 2017-06-06 | End: 2017-06-16

## 2017-06-06 RX ORDER — ONDANSETRON 4 MG/1
4 TABLET, ORALLY DISINTEGRATING ORAL ONCE
Status: DISCONTINUED | OUTPATIENT
Start: 2017-06-06 | End: 2017-06-06 | Stop reason: HOSPADM

## 2017-06-06 RX ORDER — ACYCLOVIR 400 MG/1
400 TABLET ORAL 3 TIMES DAILY
Qty: 21 TABLET | Refills: 0 | Status: SHIPPED | OUTPATIENT
Start: 2017-06-06 | End: 2017-06-13

## 2017-06-06 RX ORDER — ONDANSETRON 4 MG/1
4 TABLET, FILM COATED ORAL EVERY 8 HOURS PRN
Qty: 20 TABLET | Refills: 0 | Status: SHIPPED | OUTPATIENT
Start: 2017-06-06 | End: 2017-07-19

## 2017-06-06 NOTE — DISCHARGE INSTRUCTIONS
Follow-up with Dr. Tran for establishment of OB/GYN provider and further evaluation.  You will be notified if your cultures taken today are positive.  Return to emergency department immediately if any change or worsening of symptoms.

## 2017-06-06 NOTE — ED PROVIDER NOTES
Subjective   Patient is a 36 y.o. female presenting with abdominal pain.   History provided by:  Patient   used: No    Abdominal Pain   Pain location: pelvic pain aching.  Pain radiates to:  Does not radiate  Onset quality:  Gradual  Duration:  2 days  Timing:  Intermittent  Progression:  Waxing and waning  Chronicity:  New  Context: recent sexual activity    Context: not eating, not laxative use, not recent illness, not sick contacts and not trauma    Worsened by:  Nothing  Ineffective treatments:  None tried  Associated symptoms: dysuria and vaginal discharge    Associated symptoms: no anorexia, no chest pain, no chills, no constipation, no diarrhea, no fatigue, no fever, no hematuria, no nausea, no shortness of breath and no vomiting      28-year-old female presents to emergency department with 2 day history of worsening pelvic pain with a malodorous fishy discharge after unprotected sex 2 days ago.  She also complains of a painful blistered lesion on her right buttock.  Mild dysuria no fevers chills sweats no vaginal bleeding.  She does have a previous history of trichomonas but no prior history of GC chlamydia or HSV.      Review of Systems   Constitutional: Negative for chills, fatigue and fever.   Respiratory: Negative for shortness of breath.    Cardiovascular: Negative for chest pain.   Gastrointestinal: Positive for abdominal pain. Negative for anal bleeding, anorexia, blood in stool, constipation, diarrhea, nausea and vomiting.   Genitourinary: Positive for dysuria and vaginal discharge. Negative for difficulty urinating, flank pain, frequency, hematuria and urgency.   All other systems reviewed and are negative.      Past Medical History:   Diagnosis Date   • Allergic rhinitis    • Anemia    • Arthritis    • Asthma    • Depression    • FHx: migraine headaches    • GERD (gastroesophageal reflux disease)    • Heart murmur    • History of chest x-ray 11/01/2015    no active disease   •  History of echocardiogram 2015    ejection fraction of greater than 65%, mitral and pulmonic regurgitation an physiological tricuspid regurgitation.   • History of PFTs 2015    spirometry data acceptable and reproducible; pt given 4 puffs of Ventolin; pt gave good effort; no obstruction; no Bd response; MVV reduced    • History of PFTs 2015    pt gave best effort; duoneb given prior and post study; moderate nonspecific proportional reduction of FEV1 and FVC with preserved ratio; FEV1 moderately reduced; cannot rule out restriction   • Hypertension    • Ovarian cyst    • Seizures    • Thigh shingles        No Known Allergies    Past Surgical History:   Procedure Laterality Date   • BILATERAL BREAST REDUCTION     • BREAST SURGERY      breast reduction   •  SECTION     • LAPAROSCOPIC TUBAL LIGATION     • ORIF ANKLE FRACTURE Right        Family History   Problem Relation Age of Onset   • Pancreatic cancer Maternal Grandmother    • Heart failure Paternal Grandmother    • MARCIE disease Paternal Aunt    • Arthritis Mother    • Cancer Mother    • Arthritis Father    • Diabetes Neg Hx    • Heart attack Neg Hx    • Hypertension Neg Hx    • Hyperlipidemia Neg Hx    • Mental illness Neg Hx    • Obesity Neg Hx    • Stroke Neg Hx        Social History     Social History   • Marital status: Single     Spouse name: N/A   • Number of children: N/A   • Years of education: N/A     Social History Main Topics   • Smoking status: Former Smoker     Packs/day: 1.00     Years: 15.00     Types: Cigarettes     Quit date: 2015   • Smokeless tobacco: None   • Alcohol use No      Comment: socially   • Drug use: No      Comment: used recreational drugs in the past   • Sexual activity: Defer     Other Topics Concern   • None     Social History Narrative   • None           Objective   Physical Exam   Constitutional: She is oriented to person, place, and time. She appears well-developed and well-nourished. No  distress.   HENT:   Head: Normocephalic and atraumatic.   Right Ear: External ear normal.   Left Ear: External ear normal.   Nose: Nose normal.   Mouth/Throat: Oropharynx is clear and moist. No oropharyngeal exudate.   Eyes: Conjunctivae and EOM are normal. Pupils are equal, round, and reactive to light. Right eye exhibits no discharge. Left eye exhibits no discharge. No scleral icterus.   Neck: Normal range of motion. Neck supple. No JVD present. No tracheal deviation present. No thyromegaly present.   Cardiovascular: Normal rate.    Pulmonary/Chest: Effort normal. No stridor. No respiratory distress.   Abdominal: Soft. She exhibits no distension.   Genitourinary: Uterus normal. Vaginal discharge (Small amount of thin white vaginal discharge in posterior fornix) found.   Genitourinary Comments: Normal external genitalia.  There is a small amount of thin white discharge at the introitus as well as the posterior fornix.  Cultures obtained.  No adnexal tenderness negative cervical motion tenderness.  The right buttock has a 2 cm x 1 cm clustered papulovesicular lesion on slightly erythematous base consistent with a herpetic lesion.  The vesicles were scraped and a small amount of vesicular fluid was obtained and cultures sent.   Musculoskeletal: Normal range of motion. She exhibits no edema, tenderness or deformity.   Neurological: She is alert and oriented to person, place, and time. No cranial nerve deficit. She exhibits normal muscle tone. Coordination normal.   Skin: Skin is warm and dry. No rash noted. She is not diaphoretic. No erythema. No pallor.   Psychiatric: She has a normal mood and affect. Her behavior is normal. Judgment and thought content normal.   Nursing note and vitals reviewed.      Procedures         ED Course  ED Course   Comment By Time   FINDINGS:  Uterus/cervix: Uterus is anteverted and measures 10.0 x 4.8 x 5.2 cm.   Endometrium is homogeneous in echotexture, measuring 9 mm in thickness at  the   uterine fundus. Several cervical nabothian cysts present. Heterogeneous,   slightly hypoechoic nodule within the posterior uterine myometrium, measuring   1.6 x 1.1 x 1.6 cm, most compatible with intramural or submucosal fibroid.  Right ovary: Right ovary measures 3.1 x 2.2 x 1.7 cm. Simple right ovarian   cyst measuring approximately 1.4 cm in diameter. Normal blood flow.  Left ovary: Left ovary is normal in appearance and measures 2.0 x 1.7 x 2.6   cm. Normal blood flow.  Free fluid: No free fluid.  Bladder: Empty bladder which cannot be evaluated with this probe. Christoph Gutierrez PA-C 06/06 0010   She was treated empirically with Rocephin and azithromycin.  Flagyl prescribed 500 mg 3 times a day ×7 days, Zovirax 400mg 3 times a day ×7 days, Zofran 4 mg every 8 hours when necessary nausea.  Follow-up with OB/GYN for further evaluation. Christoph Gutierrez PA-C 06/06 0311   After discharge I was notified by the patient's nurse that she was discharged before she was given Rocephin and azithromycin..  The nurse was instructed to call patient back and offer patient the ability to either come back in tonight for administration of Rocephin and azithromycin, or she may wait for culture results. Christoph Gutierrez PA-C 06/06 0312                  [facility]    Final diagnoses:   Bacterial vaginosis   Herpetic lesion   Pelvic pain            Christoph Gutierrez PA-C  06/06/17 0313

## 2017-06-08 LAB
BACTERIA SPEC AEROBE CULT: ABNORMAL
BACTERIA SPEC AEROBE CULT: ABNORMAL
C TRACH RRNA SPEC DONR QL NAA+PROBE: NEGATIVE
HSV SPEC CULT: POSITIVE
HSV1 IGG SER IA-ACNC: 1.13 INDEX (ref 0–0.9)
HSV2 IGG SER IA-ACNC: 17.1 INDEX (ref 0–0.9)
N GONORRHOEA DNA SPEC QL NAA+PROBE: NEGATIVE
STREP GROUPING: ABNORMAL

## 2017-06-16 ENCOUNTER — OFFICE VISIT (OUTPATIENT)
Dept: INTERNAL MEDICINE | Facility: CLINIC | Age: 37
End: 2017-06-16

## 2017-06-16 VITALS
BODY MASS INDEX: 31.04 KG/M2 | HEIGHT: 59 IN | WEIGHT: 154 LBS | SYSTOLIC BLOOD PRESSURE: 106 MMHG | DIASTOLIC BLOOD PRESSURE: 60 MMHG | OXYGEN SATURATION: 98 % | HEART RATE: 91 BPM

## 2017-06-16 DIAGNOSIS — N92.0 MENORRHAGIA WITH REGULAR CYCLE: ICD-10-CM

## 2017-06-16 DIAGNOSIS — K21.00 GASTROESOPHAGEAL REFLUX DISEASE WITH ESOPHAGITIS: ICD-10-CM

## 2017-06-16 DIAGNOSIS — D50.9 IRON DEFICIENCY ANEMIA, UNSPECIFIED IRON DEFICIENCY ANEMIA TYPE: ICD-10-CM

## 2017-06-16 DIAGNOSIS — R12 HEARTBURN: ICD-10-CM

## 2017-06-16 DIAGNOSIS — I10 ESSENTIAL HYPERTENSION: Primary | ICD-10-CM

## 2017-06-16 PROCEDURE — 99203 OFFICE O/P NEW LOW 30 MIN: CPT | Performed by: PHYSICIAN ASSISTANT

## 2017-06-16 RX ORDER — LORATADINE 10 MG/1
1 TABLET ORAL DAILY
COMMUNITY
Start: 2017-06-12 | End: 2017-09-29

## 2017-06-16 RX ORDER — DULOXETIN HYDROCHLORIDE 30 MG/1
2 CAPSULE, DELAYED RELEASE ORAL DAILY
COMMUNITY
Start: 2017-06-05 | End: 2017-09-29

## 2017-06-16 RX ORDER — FERROUS SULFATE 325(65) MG
1 TABLET ORAL
Qty: 30 TABLET | Refills: 5 | Status: SHIPPED | OUTPATIENT
Start: 2017-06-16 | End: 2018-06-22

## 2017-06-16 RX ORDER — RANITIDINE 300 MG/1
300 TABLET ORAL DAILY
Qty: 30 TABLET | Refills: 2 | Status: SHIPPED | OUTPATIENT
Start: 2017-06-16 | End: 2018-06-22

## 2017-06-16 RX ORDER — FERROUS SULFATE 325(65) MG
1 TABLET ORAL AS NEEDED
COMMUNITY
Start: 2017-04-03 | End: 2017-06-16 | Stop reason: SDUPTHER

## 2017-06-16 RX ORDER — TOPIRAMATE 100 MG/1
1 TABLET, FILM COATED ORAL DAILY
COMMUNITY
Start: 2017-06-02 | End: 2017-12-30

## 2017-06-16 RX ORDER — ONDANSETRON 4 MG/1
1 TABLET, ORALLY DISINTEGRATING ORAL AS NEEDED
COMMUNITY
Start: 2017-06-06 | End: 2017-06-16

## 2017-06-16 RX ORDER — AMLODIPINE BESYLATE 5 MG/1
5 TABLET ORAL DAILY
Qty: 30 TABLET | Refills: 5 | Status: SHIPPED | OUTPATIENT
Start: 2017-06-16 | End: 2018-04-23 | Stop reason: SDUPTHER

## 2017-06-16 RX ORDER — QUETIAPINE FUMARATE 25 MG/1
2 TABLET, FILM COATED ORAL NIGHTLY
COMMUNITY
Start: 2017-06-06 | End: 2018-06-01

## 2017-06-16 NOTE — PROGRESS NOTES
Chief Complaint   Patient presents with   • Establish Care       Subjective   Bernardo Dodd is a 36 y.o. female.       History of Present Illness     Pt is here to establish care. Has previously seen Salena Hare at [facility] with Dr Sexton.    This past year has also seen pulmonology for management on asthma- currently on dulera, qvar, combivent, albuterol and allegra, singulair and flonase for allergies.    Saw Dr Sandoval for treatment of bartholin cyst. Has history of anemia d/t heavy periods. Has not discussed options for menorrhagia with gyn. H/o tubal ligation.    Needs refill of zantac for treatment of GERD, keeps symptoms controlled.    Most meds are prescribed by neurologist for management of migraines.        Current Outpatient Prescriptions:   •  albuterol (VENTOLIN HFA) 108 (90 BASE) MCG/ACT inhaler, Inhale 1-2 puffs as needed. Every 4-6 hrs PRN, Disp: , Rfl:   •  amitriptyline (ELAVIL) 25 MG tablet, Take 25 mg by mouth every night., Disp: , Rfl:   •  amLODIPine (NORVASC) 5 MG tablet, Take 1 tablet by mouth Daily., Disp: 30 tablet, Rfl: 5  •  beclomethasone (QVAR) 80 MCG/ACT inhaler, Inhale 1 puff 2 (two) times a day., Disp: , Rfl:   •  cyclobenzaprine (FLEXERIL) 10 MG tablet, Take 5 mg by mouth 1 (One) Time As Needed for Muscle Spasms., Disp: , Rfl:   •  DULoxetine (CYMBALTA) 30 MG capsule, Take 2 capsules by mouth Daily., Disp: , Rfl:   •  eletriptan (RELPAX) 40 MG tablet, Take 40 mg by mouth 1 (one) time as needed for migraine. may repeat in 2 hours if necessary, Disp: , Rfl:   •  ferrous sulfate 325 (65 FE) MG tablet, Take 1 tablet by mouth Daily With Breakfast., Disp: 30 tablet, Rfl: 5  •  fexofenadine (ALLEGRA) 180 MG tablet, Take 180 mg by mouth daily., Disp: , Rfl:   •  fluticasone (FLONASE) 50 MCG/ACT nasal spray, PLACE TWO SPRAYS IN EACH NOSTRIL ONCE DAILY, Disp: 1 bottle, Rfl: 10  •  HYDROcodone-acetaminophen (NORCO) 5-325 MG per tablet, Take 1 tablet by mouth Every 6 (Six) Hours As  Needed., Disp: , Rfl:   •  ipratropium-albuterol (COMBIVENT RESPIMAT)  MCG/ACT inhaler, Inhale 1 puff 4 (four) times a day as needed for wheezing., Disp: , Rfl:   •  ketotifen (ZADITOR) 0.025 % ophthalmic solution, Administer 2 drops to both eyes 2 (two) times a day., Disp: , Rfl:   •  loratadine (CLARITIN) 10 MG tablet, Take 1 tablet by mouth Daily., Disp: , Rfl:   •  mometasone-formoterol (DULERA) 200-5 MCG/ACT inhaler, Inhale 1 puff 2 (two) times a day., Disp: , Rfl:   •  montelukast (SINGULAIR) 10 MG tablet, Take 10 mg by mouth every night., Disp: , Rfl:   •  naproxen (NAPROSYN) 500 MG tablet, Take 1 tablet by mouth 2 (Two) Times a Day With Meals., Disp: 14 tablet, Rfl: 0  •  ondansetron (ZOFRAN) 4 MG tablet, Take 1 tablet by mouth Every 8 (Eight) Hours As Needed for Nausea or Vomiting., Disp: 20 tablet, Rfl: 0  •  ondansetron ODT (ZOFRAN-ODT) 8 MG disintegrating tablet, Take 1 tablet by mouth Every 8 (Eight) Hours As Needed for Nausea or Vomiting., Disp: 15 tablet, Rfl: 0  •  promethazine-dextromethorphan (PROMETHAZINE-DM) 6.25-15 MG/5ML syrup, Take  by mouth 4 (Four) Times a Day As Needed for Cough., Disp: , Rfl:   •  QUEtiapine (SEROquel) 25 MG tablet, Take 2 tablets by mouth Every Night., Disp: , Rfl:   •  raNITIdine (ZANTAC) 300 MG tablet, Take 1 tablet by mouth Daily. Take first thing in the morning on an empty stomach.  Wait at least 30 min to 1hr before eating., Disp: 30 tablet, Rfl: 2  •  tiZANidine (ZANAFLEX) 4 MG tablet, Take 4 mg by mouth At Night As Needed for muscle spasms., Disp: , Rfl:   •  topiramate (TOPAMAX) 100 MG tablet, Take 1 tablet by mouth Daily., Disp: , Rfl:     Current Facility-Administered Medications:   •  meclizine (ANTIVERT) tablet 25 mg, 25 mg, Oral, TID PRN, Christy Viera, SHANE     [facility]  The following portions of the patient's history were reviewed and updated as appropriate: allergies, current medications, past family history, past medical history, past social history,  "past surgical history and problem list.    Review of Systems   Constitutional: Negative for activity change, appetite change and unexpected weight change.   HENT: Negative for nosebleeds and trouble swallowing.    Eyes: Negative for pain and visual disturbance.   Respiratory: Negative for chest tightness, shortness of breath and wheezing.    Cardiovascular: Negative for chest pain and palpitations.   Gastrointestinal: Negative for abdominal pain and blood in stool.   Endocrine: Negative.    Genitourinary: Negative for difficulty urinating and hematuria.   Musculoskeletal: Negative for joint swelling.   Skin: Negative for color change and rash.   Allergic/Immunologic: Negative.    Neurological: Negative for dizziness, syncope, speech difficulty and light-headedness.   Hematological: Negative for adenopathy.   Psychiatric/Behavioral: Negative for agitation and confusion.   All other systems reviewed and are negative.      Objective   /60  Pulse 91  Ht 59\" (149.9 cm)  Wt 154 lb (69.9 kg)  LMP 05/01/2017  SpO2 98%  BMI 31.1 kg/m2    Physical Exam   Constitutional: She appears well-developed and well-nourished.   HENT:   Head: Normocephalic.   Right Ear: Hearing, tympanic membrane, external ear and ear canal normal.   Left Ear: Hearing, tympanic membrane, external ear and ear canal normal.   Nose: Nose normal.   Mouth/Throat: Oropharynx is clear and moist.   Eyes: Conjunctivae are normal. Pupils are equal, round, and reactive to light.   Neck: Normal range of motion.   Cardiovascular: Normal rate, regular rhythm and normal heart sounds.    Pulmonary/Chest: Effort normal and breath sounds normal. She has no decreased breath sounds. She has no wheezes. She has no rhonchi. She has no rales.   Musculoskeletal: Normal range of motion.   Neurological: She is alert.   Skin: Skin is warm and dry.   Psychiatric: She has a normal mood and affect. Her behavior is normal.   Nursing note and vitals reviewed.       "     ASSESSMENT/PLAN    Problem List Items Addressed This Visit        Cardiovascular and Mediastinum    Hypertension - Primary     Hypertension is unchanged.  Continue current treatment regimen.  Blood pressure will be reassessed at the next regular appointment.         Relevant Medications    amLODIPine (NORVASC) 5 MG tablet       Digestive    GERD (gastroesophageal reflux disease)     Restart ranitidine.         Relevant Medications    raNITIdine (ZANTAC) 300 MG tablet       Genitourinary    Menorrhagia with regular cycle     Pt will schedule follow up with Dr Bryan to discuss.           Other Visit Diagnoses     Heartburn        Relevant Medications    raNITIdine (ZANTAC) 300 MG tablet    Iron deficiency anemia, unspecified iron deficiency anemia type        Relevant Medications    ferrous sulfate 325 (65 FE) MG tablet               Return in about 3 months (around 9/16/2017) for Recheck.

## 2017-06-19 PROBLEM — N92.0 MENORRHAGIA WITH REGULAR CYCLE: Status: ACTIVE | Noted: 2017-06-19

## 2017-07-18 ENCOUNTER — HOSPITAL ENCOUNTER (EMERGENCY)
Facility: HOSPITAL | Age: 37
Discharge: HOME | End: 2017-07-18
Attending: EMERGENCY MEDICINE | Admitting: EMERGENCY MEDICINE

## 2017-07-18 ENCOUNTER — APPOINTMENT (OUTPATIENT)
Dept: CT IMAGING | Facility: HOSPITAL | Age: 37
End: 2017-07-18

## 2017-07-18 VITALS
HEART RATE: 64 BPM | OXYGEN SATURATION: 96 % | BODY MASS INDEX: 32.25 KG/M2 | TEMPERATURE: 98.3 F | HEIGHT: 59 IN | DIASTOLIC BLOOD PRESSURE: 65 MMHG | WEIGHT: 160 LBS | RESPIRATION RATE: 16 BRPM | SYSTOLIC BLOOD PRESSURE: 100 MMHG

## 2017-07-18 DIAGNOSIS — N83.299 FUNCTIONAL OVARIAN CYSTS: ICD-10-CM

## 2017-07-18 DIAGNOSIS — R10.30 LOWER ABDOMINAL PAIN: Primary | ICD-10-CM

## 2017-07-18 LAB
ALBUMIN SERPL-MCNC: 3.9 G/DL (ref 3.2–4.8)
ALBUMIN/GLOB SERPL: 1.3 G/DL (ref 1.5–2.5)
ALP SERPL-CCNC: 52 U/L (ref 25–100)
ALT SERPL W P-5'-P-CCNC: 14 U/L (ref 7–40)
ANION GAP SERPL CALCULATED.3IONS-SCNC: 10 MMOL/L (ref 3–11)
AST SERPL-CCNC: 19 U/L (ref 0–33)
B-HCG UR QL: NEGATIVE
BASOPHILS # BLD AUTO: 0.02 10*3/MM3 (ref 0–0.2)
BASOPHILS NFR BLD AUTO: 0.2 % (ref 0–1)
BILIRUB SERPL-MCNC: 0.3 MG/DL (ref 0.3–1.2)
BILIRUB UR QL STRIP: NEGATIVE
BUN BLD-MCNC: 13 MG/DL (ref 9–23)
BUN/CREAT SERPL: 18.6 (ref 7–25)
CALCIUM SPEC-SCNC: 9.9 MG/DL (ref 8.7–10.4)
CHLORIDE SERPL-SCNC: 103 MMOL/L (ref 99–109)
CLARITY UR: CLEAR
CO2 SERPL-SCNC: 24 MMOL/L (ref 20–31)
COLOR UR: YELLOW
CREAT BLD-MCNC: 0.7 MG/DL (ref 0.6–1.3)
DEPRECATED RDW RBC AUTO: 44.4 FL (ref 37–54)
EOSINOPHIL # BLD AUTO: 0.06 10*3/MM3 (ref 0–0.3)
EOSINOPHIL NFR BLD AUTO: 0.7 % (ref 0–3)
ERYTHROCYTE [DISTWIDTH] IN BLOOD BY AUTOMATED COUNT: 13.5 % (ref 11.3–14.5)
GFR SERPL CREATININE-BSD FRML MDRD: 114 ML/MIN/1.73
GLOBULIN UR ELPH-MCNC: 3.1 GM/DL
GLUCOSE BLD-MCNC: 94 MG/DL (ref 70–100)
GLUCOSE UR STRIP-MCNC: NEGATIVE MG/DL
HCT VFR BLD AUTO: 39.5 % (ref 34.5–44)
HGB BLD-MCNC: 12.6 G/DL (ref 11.5–15.5)
HGB UR QL STRIP.AUTO: NEGATIVE
HOLD SPECIMEN: NORMAL
HOLD SPECIMEN: NORMAL
IMM GRANULOCYTES # BLD: 0.02 10*3/MM3 (ref 0–0.03)
IMM GRANULOCYTES NFR BLD: 0.2 % (ref 0–0.6)
INTERNAL NEGATIVE CONTROL: NEGATIVE
INTERNAL POSITIVE CONTROL: POSITIVE
KETONES UR QL STRIP: ABNORMAL
LEUKOCYTE ESTERASE UR QL STRIP.AUTO: NEGATIVE
LIPASE SERPL-CCNC: 79 U/L (ref 6–51)
LYMPHOCYTES # BLD AUTO: 2.52 10*3/MM3 (ref 0.6–4.8)
LYMPHOCYTES NFR BLD AUTO: 31.1 % (ref 24–44)
Lab: NORMAL
MCH RBC QN AUTO: 29 PG (ref 27–31)
MCHC RBC AUTO-ENTMCNC: 31.9 G/DL (ref 32–36)
MCV RBC AUTO: 91 FL (ref 80–99)
MONOCYTES # BLD AUTO: 0.54 10*3/MM3 (ref 0–1)
MONOCYTES NFR BLD AUTO: 6.7 % (ref 0–12)
NEUTROPHILS # BLD AUTO: 4.94 10*3/MM3 (ref 1.5–8.3)
NEUTROPHILS NFR BLD AUTO: 61.1 % (ref 41–71)
NITRITE UR QL STRIP: NEGATIVE
PH UR STRIP.AUTO: 6 [PH] (ref 5–8)
PLATELET # BLD AUTO: 243 10*3/MM3 (ref 150–450)
PMV BLD AUTO: 9.9 FL (ref 6–12)
POTASSIUM BLD-SCNC: 3 MMOL/L (ref 3.5–5.5)
PROT SERPL-MCNC: 7 G/DL (ref 5.7–8.2)
PROT UR QL STRIP: NEGATIVE
RBC # BLD AUTO: 4.34 10*6/MM3 (ref 3.89–5.14)
S PYO AG THROAT QL: NEGATIVE
SODIUM BLD-SCNC: 137 MMOL/L (ref 132–146)
SP GR UR STRIP: 1.02 (ref 1–1.03)
UROBILINOGEN UR QL STRIP: ABNORMAL
WBC NRBC COR # BLD: 8.1 10*3/MM3 (ref 3.5–10.8)
WHOLE BLOOD HOLD SPECIMEN: NORMAL
WHOLE BLOOD HOLD SPECIMEN: NORMAL

## 2017-07-18 PROCEDURE — 83690 ASSAY OF LIPASE: CPT | Performed by: EMERGENCY MEDICINE

## 2017-07-18 PROCEDURE — 85025 COMPLETE CBC W/AUTO DIFF WBC: CPT | Performed by: EMERGENCY MEDICINE

## 2017-07-18 PROCEDURE — 99283 EMERGENCY DEPT VISIT LOW MDM: CPT

## 2017-07-18 PROCEDURE — 81003 URINALYSIS AUTO W/O SCOPE: CPT | Performed by: EMERGENCY MEDICINE

## 2017-07-18 PROCEDURE — 0 IOPAMIDOL 61 % SOLUTION: Performed by: EMERGENCY MEDICINE

## 2017-07-18 PROCEDURE — 25010000002 HYDROMORPHONE PER 4 MG: Performed by: EMERGENCY MEDICINE

## 2017-07-18 PROCEDURE — 87081 CULTURE SCREEN ONLY: CPT | Performed by: EMERGENCY MEDICINE

## 2017-07-18 PROCEDURE — 87880 STREP A ASSAY W/OPTIC: CPT | Performed by: EMERGENCY MEDICINE

## 2017-07-18 PROCEDURE — 96361 HYDRATE IV INFUSION ADD-ON: CPT

## 2017-07-18 PROCEDURE — 74177 CT ABD & PELVIS W/CONTRAST: CPT

## 2017-07-18 PROCEDURE — 80053 COMPREHEN METABOLIC PANEL: CPT | Performed by: EMERGENCY MEDICINE

## 2017-07-18 PROCEDURE — 96374 THER/PROPH/DIAG INJ IV PUSH: CPT

## 2017-07-18 PROCEDURE — 25010000002 ONDANSETRON PER 1 MG: Performed by: EMERGENCY MEDICINE

## 2017-07-18 PROCEDURE — 96375 TX/PRO/DX INJ NEW DRUG ADDON: CPT

## 2017-07-18 RX ORDER — ONDANSETRON 2 MG/ML
4 INJECTION INTRAMUSCULAR; INTRAVENOUS ONCE
Status: COMPLETED | OUTPATIENT
Start: 2017-07-18 | End: 2017-07-18

## 2017-07-18 RX ORDER — SODIUM CHLORIDE 0.9 % (FLUSH) 0.9 %
10 SYRINGE (ML) INJECTION AS NEEDED
Status: DISCONTINUED | OUTPATIENT
Start: 2017-07-18 | End: 2017-07-18 | Stop reason: HOSPADM

## 2017-07-18 RX ORDER — HYDROCODONE BITARTRATE AND ACETAMINOPHEN 5; 325 MG/1; MG/1
1 TABLET ORAL EVERY 6 HOURS PRN
Qty: 10 TABLET | Refills: 0 | Status: SHIPPED | OUTPATIENT
Start: 2017-07-18 | End: 2017-09-29

## 2017-07-18 RX ORDER — HYDROMORPHONE HYDROCHLORIDE 1 MG/ML
0.5 INJECTION, SOLUTION INTRAMUSCULAR; INTRAVENOUS; SUBCUTANEOUS ONCE
Status: COMPLETED | OUTPATIENT
Start: 2017-07-18 | End: 2017-07-18

## 2017-07-18 RX ADMIN — SODIUM CHLORIDE 1000 ML: 9 INJECTION, SOLUTION INTRAVENOUS at 18:43

## 2017-07-18 RX ADMIN — IOPAMIDOL 95 ML: 612 INJECTION, SOLUTION INTRAVENOUS at 19:16

## 2017-07-18 RX ADMIN — ONDANSETRON 4 MG: 2 INJECTION INTRAMUSCULAR; INTRAVENOUS at 18:44

## 2017-07-18 RX ADMIN — HYDROMORPHONE HYDROCHLORIDE 0.5 MG: 1 INJECTION, SOLUTION INTRAMUSCULAR; INTRAVENOUS; SUBCUTANEOUS at 18:46

## 2017-07-18 NOTE — ED PROVIDER NOTES
Subjective   HPI Comments: 36-year-old black female complaining of lower abdominal pain right greater than left for the past 2 days.  Patient states that she has a history of OVARIAN CYSTS  but states this pain is worse on the right.  Patient states she's also had a sore throat for the past day.  She denies any documented fever, chills, diarrhea, or vomiting.  She states she has had a poor appetite though.  She has no other complaints.    Patient is a 36 y.o. female presenting with abdominal pain.   History provided by:  Patient  Abdominal Pain   Pain location:  RLQ  Pain quality: dull    Pain radiates to:  Does not radiate  Pain severity:  Moderate  Onset quality:  Gradual  Duration:  2 days  Timing:  Constant  Progression:  Worsening  Chronicity:  New  Relieved by:  Nothing  Worsened by:  Movement  Ineffective treatments:  None tried  Associated symptoms: anorexia    Associated symptoms: no chills, no constipation, no dysuria, no fever, no hematemesis, no hematuria, no shortness of breath and no vomiting        Review of Systems   Constitutional: Negative for chills and fever.   Respiratory: Negative for shortness of breath.    Gastrointestinal: Positive for abdominal pain and anorexia. Negative for constipation, hematemesis and vomiting.   Genitourinary: Negative for dysuria and hematuria.   All other systems reviewed and are negative.      Past Medical History:   Diagnosis Date   • Allergic rhinitis    • Anemia    • Arthritis    • Asthma    • Depression    • FHx: migraine headaches    • GERD (gastroesophageal reflux disease)    • Heart murmur    • History of chest x-ray 11/01/2015    no active disease   • History of echocardiogram 11/01/2015    ejection fraction of greater than 65%, mitral and pulmonic regurgitation an physiological tricuspid regurgitation.   • History of PFTs 12/22/2015    spirometry data acceptable and reproducible; pt given 4 puffs of Ventolin; pt gave good effort; no obstruction; no Bd  response; MVV reduced    • History of PFTs 2015    pt gave best effort; duoneb given prior and post study; moderate nonspecific proportional reduction of FEV1 and FVC with preserved ratio; FEV1 moderately reduced; cannot rule out restriction   • Hypertension    • Ovarian cyst    • Seizures    • Thigh shingles        No Known Allergies    Past Surgical History:   Procedure Laterality Date   • BILATERAL BREAST REDUCTION     • BREAST SURGERY      breast reduction   •  SECTION     • LAPAROSCOPIC TUBAL LIGATION     • ORIF ANKLE FRACTURE Right        Family History   Problem Relation Age of Onset   • Pancreatic cancer Maternal Grandmother    • Heart failure Paternal Grandmother    • MARCIE disease Paternal Aunt    • Arthritis Mother    • Cancer Mother    • Arthritis Father    • Diabetes Neg Hx    • Heart attack Neg Hx    • Hypertension Neg Hx    • Hyperlipidemia Neg Hx    • Mental illness Neg Hx    • Obesity Neg Hx    • Stroke Neg Hx        Social History     Social History   • Marital status: Single     Spouse name: N/A   • Number of children: N/A   • Years of education: N/A     Social History Main Topics   • Smoking status: Former Smoker     Packs/day: 1.00     Years: 15.00     Types: Cigarettes     Quit date: 2015   • Smokeless tobacco: None   • Alcohol use No      Comment: socially   • Drug use: No      Comment: used recreational drugs in the past   • Sexual activity: Defer     Other Topics Concern   • None     Social History Narrative           Objective   Physical Exam   Constitutional: She appears well-developed and well-nourished.   HENT:   Head: Normocephalic and atraumatic.   Eyes: Conjunctivae are normal. Pupils are equal, round, and reactive to light.   Neck: Normal range of motion.   Throat clear.   Cardiovascular: Normal rate, regular rhythm and normal heart sounds.    No murmur heard.  Pulmonary/Chest: Effort normal and breath sounds normal. No respiratory distress. She has no wheezes.    Abdominal: Soft. Bowel sounds are normal. She exhibits no distension. There is no rebound and no guarding.   Tender right lower quadrant.   Musculoskeletal: Normal range of motion.   Lymphadenopathy:     She has no cervical adenopathy.   Neurological: She is alert.   Skin: Skin is warm and dry.   Psychiatric: She has a normal mood and affect. Her behavior is normal. Judgment and thought content normal.   Nursing note and vitals reviewed.      Procedures         ED Course  ED Course   Comment By Time   Given the findings this is likely an endometriosis flareup. SUHA Amos 07/18 2124   Patient states that the pain is been constant for several days.  Of note patient is comfortable in room and was actually sleeping prior to my examination. SUHA Amos 07/18 2130                Recent Results (from the past 24 hour(s))   Comprehensive Metabolic Panel    Collection Time: 07/18/17  5:03 PM   Result Value Ref Range    Glucose 94 70 - 100 mg/dL    BUN 13 9 - 23 mg/dL    Creatinine 0.70 0.60 - 1.30 mg/dL    Sodium 137 132 - 146 mmol/L    Potassium 3.0 (L) 3.5 - 5.5 mmol/L    Chloride 103 99 - 109 mmol/L    CO2 24.0 20.0 - 31.0 mmol/L    Calcium 9.9 8.7 - 10.4 mg/dL    Total Protein 7.0 5.7 - 8.2 g/dL    Albumin 3.90 3.20 - 4.80 g/dL    ALT (SGPT) 14 7 - 40 U/L    AST (SGOT) 19 0 - 33 U/L    Alkaline Phosphatase 52 25 - 100 U/L    Total Bilirubin 0.3 0.3 - 1.2 mg/dL    eGFR  African Amer 114 >60 mL/min/1.73    Globulin 3.1 gm/dL    A/G Ratio 1.3 (L) 1.5 - 2.5 g/dL    BUN/Creatinine Ratio 18.6 7.0 - 25.0    Anion Gap 10.0 3.0 - 11.0 mmol/L   Lipase    Collection Time: 07/18/17  5:03 PM   Result Value Ref Range    Lipase 79 (H) 6 - 51 U/L   Light Blue Top    Collection Time: 07/18/17  5:03 PM   Result Value Ref Range    Extra Tube hold for add-on    Green Top (Gel)    Collection Time: 07/18/17  5:03 PM   Result Value Ref Range    Extra Tube Hold for add-ons.    Lavender Top    Collection Time: 07/18/17  5:03 PM    Result Value Ref Range    Extra Tube hold for add-on    Gold Top - SST    Collection Time: 07/18/17  5:03 PM   Result Value Ref Range    Extra Tube Hold for add-ons.    CBC Auto Differential    Collection Time: 07/18/17  5:03 PM   Result Value Ref Range    WBC 8.10 3.50 - 10.80 10*3/mm3    RBC 4.34 3.89 - 5.14 10*6/mm3    Hemoglobin 12.6 11.5 - 15.5 g/dL    Hematocrit 39.5 34.5 - 44.0 %    MCV 91.0 80.0 - 99.0 fL    MCH 29.0 27.0 - 31.0 pg    MCHC 31.9 (L) 32.0 - 36.0 g/dL    RDW 13.5 11.3 - 14.5 %    RDW-SD 44.4 37.0 - 54.0 fl    MPV 9.9 6.0 - 12.0 fL    Platelets 243 150 - 450 10*3/mm3    Neutrophil % 61.1 41.0 - 71.0 %    Lymphocyte % 31.1 24.0 - 44.0 %    Monocyte % 6.7 0.0 - 12.0 %    Eosinophil % 0.7 0.0 - 3.0 %    Basophil % 0.2 0.0 - 1.0 %    Immature Grans % 0.2 0.0 - 0.6 %    Neutrophils, Absolute 4.94 1.50 - 8.30 10*3/mm3    Lymphocytes, Absolute 2.52 0.60 - 4.80 10*3/mm3    Monocytes, Absolute 0.54 0.00 - 1.00 10*3/mm3    Eosinophils, Absolute 0.06 0.00 - 0.30 10*3/mm3    Basophils, Absolute 0.02 0.00 - 0.20 10*3/mm3    Immature Grans, Absolute 0.02 0.00 - 0.03 10*3/mm3   Urinalysis With / Culture If Indicated    Collection Time: 07/18/17  6:52 PM   Result Value Ref Range    Color, UA Yellow Yellow, Straw    Appearance, UA Clear Clear    pH, UA 6.0 5.0 - 8.0    Specific Gravity, UA 1.023 1.001 - 1.030    Glucose, UA Negative Negative    Ketones, UA Trace (A) Negative    Bilirubin, UA Negative Negative    Blood, UA Negative Negative    Protein, UA Negative Negative    Leuk Esterase, UA Negative Negative    Nitrite, UA Negative Negative    Urobilinogen, UA 1.0 E.U./dL 0.2 - 1.0 E.U./dL   Rapid Strep A Screen    Collection Time: 07/18/17  6:52 PM   Result Value Ref Range    Strep A Ag Negative Negative   POCT pregnancy, urine    Collection Time: 07/18/17  7:00 PM   Result Value Ref Range    HCG, Urine, QL Negative Negative    Lot Number [id number]     Internal Positive Control Positive     Internal  "Negative Control Negative      Note: In addition to lab results from this visit, the labs listed above may include labs taken at another facility or during a different encounter within the last 24 hours. Please correlate lab times with ED admission and discharge times for further clarification of the services performed during this visit.    CT Abdomen Pelvis With Contrast   Final Result   Abnormal      1. No acute findings.      2. Non-acute findings are described above.      THIS DOCUMENT HAS BEEN ELECTRONICALLY SIGNED BY NGUYEN GARRETT MD        Vitals:    07/18/17 1640 07/18/17 1847 07/18/17 1930 07/18/17 1945   BP: 116/78 106/68 110/80 107/68   BP Location: Left arm      Patient Position: Sitting      Pulse: 86 64     Resp: 16 16     Temp: 98.3 °F (36.8 °C)      TempSrc: Oral      SpO2: 97% 95%     Weight: 160 lb (72.6 kg)      Height: 59\" (149.9 cm)        Medications   sodium chloride 0.9 % flush 10 mL (not administered)   sodium chloride 0.9 % bolus 1,000 mL (1,000 mL Intravenous New Bag 7/18/17 1843)   HYDROmorphone (DILAUDID) injection 0.5 mg (0.5 mg Intravenous Given 7/18/17 1846)   ondansetron (ZOFRAN) injection 4 mg (4 mg Intravenous Given 7/18/17 1844)   iopamidol (ISOVUE-300) 61 % injection 100 mL (95 mL Intravenous Given 7/18/17 1916)     ECG/EMG Results (last 24 hours)     ** No results found for the last 24 hours. **          [facility]    Final diagnoses:   Lower abdominal pain   Functional ovarian cysts            SUHA Amos  07/18/17 2133    "

## 2017-07-19 ENCOUNTER — OFFICE VISIT (OUTPATIENT)
Dept: INTERNAL MEDICINE | Facility: CLINIC | Age: 37
End: 2017-07-19

## 2017-07-19 VITALS
OXYGEN SATURATION: 98 % | DIASTOLIC BLOOD PRESSURE: 60 MMHG | WEIGHT: 147 LBS | SYSTOLIC BLOOD PRESSURE: 100 MMHG | HEART RATE: 71 BPM | BODY MASS INDEX: 29.69 KG/M2

## 2017-07-19 DIAGNOSIS — H66.92 LEFT OTITIS MEDIA, UNSPECIFIED CHRONICITY, UNSPECIFIED OTITIS MEDIA TYPE: Primary | ICD-10-CM

## 2017-07-19 PROCEDURE — 99213 OFFICE O/P EST LOW 20 MIN: CPT | Performed by: NURSE PRACTITIONER

## 2017-07-19 RX ORDER — CEFDINIR 300 MG/1
300 CAPSULE ORAL 2 TIMES DAILY
Qty: 20 CAPSULE | Refills: 0 | Status: SHIPPED | OUTPATIENT
Start: 2017-07-19 | End: 2017-09-29

## 2017-07-19 NOTE — DISCHARGE INSTRUCTIONS
Follow-up with your GYN doctor.  Return if symptoms worsen.  I also recommend over-the-counter Claritin for allergies.

## 2017-07-19 NOTE — PROGRESS NOTES
Subjective  Cough (chest hurts, congestion down in chest, seen in ER last night ); Sore Throat; and Abdominal Pain (ongoing for 1 week )      Bernardo Dodd is a 36 y.o. female.   No Known Allergies  History of Present Illness      Sore throat , cough, congestion , abd pain x 1 week, thought she had swelling, went to hospital, did blood and ct, all normal  The following portions of the patient's history were reviewed and updated as appropriate: allergies, current medications, past family history, past medical history, past social history, past surgical history and problem list.    Review of Systems   Constitutional: Positive for fatigue. Negative for activity change and unexpected weight change.   HENT: Positive for congestion, postnasal drip, sore throat and voice change. Negative for ear pain.    Eyes: Negative for pain and discharge.   Respiratory: Positive for cough. Negative for chest tightness, shortness of breath and wheezing.    Cardiovascular: Negative for chest pain and palpitations.   Gastrointestinal: Negative for abdominal pain, diarrhea and vomiting.   Endocrine: Negative.    Genitourinary: Negative.    Musculoskeletal: Negative for joint swelling.   Skin: Negative for color change, rash and wound.   Allergic/Immunologic: Negative.    Neurological: Negative for seizures and syncope.   Psychiatric/Behavioral: Negative.        Objective   Physical Exam   Constitutional: She is oriented to person, place, and time. She appears well-developed and well-nourished.  Non-toxic appearance. No distress.   HENT:   Head: Normocephalic and atraumatic. Hair is normal.   Right Ear: External ear normal. There is tenderness. No drainage or swelling. Tympanic membrane is retracted.   Left Ear: No drainage, swelling or tenderness. Tympanic membrane is retracted.   Nose: Mucosal edema present. No epistaxis.   Mouth/Throat: Uvula is midline and mucous membranes are normal. No oral lesions. No uvula swelling. Posterior  oropharyngeal erythema present. No oropharyngeal exudate.   Eyes: Conjunctivae and EOM are normal. Pupils are equal, round, and reactive to light. Right eye exhibits no discharge. Left eye exhibits no discharge. No scleral icterus.   Neck: Normal range of motion. Neck supple.   Cardiovascular: Normal rate, regular rhythm and normal heart sounds.  Exam reveals no gallop.    No murmur heard.  Pulmonary/Chest: Breath sounds normal. No stridor. No respiratory distress. She has no wheezes. She has no rales. She exhibits no tenderness.   Abdominal: Soft. There is no tenderness.   Lymphadenopathy:     She has cervical adenopathy.   Neurological: She is alert and oriented to person, place, and time. She exhibits normal muscle tone.   Skin: Skin is warm and dry. No rash noted. She is not diaphoretic.   Psychiatric: She has a normal mood and affect. Her behavior is normal. Judgment and thought content normal.   Nursing note and vitals reviewed.    /60  Pulse 71  Wt 147 lb (66.7 kg)  SpO2 98%  BMI 29.69 kg/m2    Assessment/Plan     Problem List Items Addressed This Visit     None      Visit Diagnoses     Left otitis media, unspecified chronicity, unspecified otitis media type    -  Primary    Relevant Medications    cefdinir (OMNICEF) 300 MG capsule          Increase fluids. Tylenol/ibuprofen prn comfort, rtc 48h no improvement or worsening of sx.

## 2017-07-20 LAB — BACTERIA SPEC AEROBE CULT: NORMAL

## 2017-09-21 ENCOUNTER — HOSPITAL ENCOUNTER (OUTPATIENT)
Dept: GENERAL RADIOLOGY | Facility: HOSPITAL | Age: 37
Discharge: HOME | End: 2017-09-21
Admitting: NURSE PRACTITIONER

## 2017-09-21 ENCOUNTER — TRANSCRIBE ORDERS (OUTPATIENT)
Dept: ADMINISTRATIVE | Facility: HOSPITAL | Age: 37
End: 2017-09-21

## 2017-09-21 DIAGNOSIS — R05.9 COUGH: Primary | ICD-10-CM

## 2017-09-21 DIAGNOSIS — J06.9 ACUTE UPPER RESPIRATORY INFECTIONS OF OTHER MULTIPLE SITES: ICD-10-CM

## 2017-09-21 DIAGNOSIS — R05.9 COUGH: ICD-10-CM

## 2017-09-21 PROCEDURE — 71020 HC CHEST PA AND LATERAL: CPT

## 2017-09-29 ENCOUNTER — OFFICE VISIT (OUTPATIENT)
Dept: INTERNAL MEDICINE | Facility: CLINIC | Age: 37
End: 2017-09-29

## 2017-09-29 VITALS
HEART RATE: 82 BPM | OXYGEN SATURATION: 98 % | BODY MASS INDEX: 29.49 KG/M2 | WEIGHT: 146 LBS | DIASTOLIC BLOOD PRESSURE: 76 MMHG | SYSTOLIC BLOOD PRESSURE: 132 MMHG

## 2017-09-29 DIAGNOSIS — B00.9 HERPES SIMPLEX INFECTION: Primary | ICD-10-CM

## 2017-09-29 DIAGNOSIS — J02.9 SORE THROAT: ICD-10-CM

## 2017-09-29 PROCEDURE — 99213 OFFICE O/P EST LOW 20 MIN: CPT | Performed by: PHYSICIAN ASSISTANT

## 2017-09-29 PROCEDURE — 90471 IMMUNIZATION ADMIN: CPT | Performed by: PHYSICIAN ASSISTANT

## 2017-09-29 PROCEDURE — 90732 PPSV23 VACC 2 YRS+ SUBQ/IM: CPT | Performed by: PHYSICIAN ASSISTANT

## 2017-09-29 PROCEDURE — 87070 CULTURE OTHR SPECIMN AEROBIC: CPT | Performed by: PHYSICIAN ASSISTANT

## 2017-09-29 RX ORDER — VALACYCLOVIR HYDROCHLORIDE 1 G/1
1000 TABLET, FILM COATED ORAL 2 TIMES DAILY
Qty: 20 TABLET | Refills: 0 | Status: SHIPPED | OUTPATIENT
Start: 2017-09-29 | End: 2017-12-28 | Stop reason: SDUPTHER

## 2017-09-29 RX ORDER — PROMETHAZINE HYDROCHLORIDE 6.25 MG/5ML
400 SYRUP ORAL AS NEEDED
COMMUNITY
Start: 2017-08-24 | End: 2017-12-30

## 2017-09-29 RX ORDER — ALBUTEROL SULFATE 2.5 MG/3ML
2.5 SOLUTION RESPIRATORY (INHALATION) EVERY 12 HOURS SCHEDULED
COMMUNITY
Start: 2017-09-21 | End: 2019-09-28

## 2017-09-29 RX ORDER — VENLAFAXINE HYDROCHLORIDE 37.5 MG/1
1 CAPSULE, EXTENDED RELEASE ORAL 2 TIMES DAILY
COMMUNITY
Start: 2017-08-31 | End: 2018-06-01

## 2017-10-01 PROBLEM — B00.9 HERPES SIMPLEX INFECTION: Status: ACTIVE | Noted: 2017-10-01

## 2017-10-02 LAB
BACTERIA SPEC AEROBE CULT: NORMAL

## 2017-10-02 NOTE — PROGRESS NOTES
Your throat culture was negative. However, we were notified by the lab that we did not use the right swab to check for chlamydia or gonorrhea. If you are still having symptoms, I can do another swab. If your symptoms are improving, we do not need to do another one.

## 2017-10-02 NOTE — ASSESSMENT & PLAN NOTE
Treat acute infection with valtrex and will start daily preventative treatment when acute infection resolves.

## 2017-12-09 ENCOUNTER — APPOINTMENT (OUTPATIENT)
Dept: CT IMAGING | Facility: HOSPITAL | Age: 37
End: 2017-12-09

## 2017-12-09 ENCOUNTER — HOSPITAL ENCOUNTER (EMERGENCY)
Facility: HOSPITAL | Age: 37
Discharge: HOME | End: 2017-12-09
Attending: EMERGENCY MEDICINE | Admitting: EMERGENCY MEDICINE

## 2017-12-09 VITALS
BODY MASS INDEX: 30.24 KG/M2 | RESPIRATION RATE: 16 BRPM | HEART RATE: 85 BPM | TEMPERATURE: 97.7 F | DIASTOLIC BLOOD PRESSURE: 92 MMHG | HEIGHT: 59 IN | SYSTOLIC BLOOD PRESSURE: 139 MMHG | WEIGHT: 150 LBS | OXYGEN SATURATION: 96 %

## 2017-12-09 DIAGNOSIS — N23 RENAL COLIC ON LEFT SIDE: Primary | ICD-10-CM

## 2017-12-09 DIAGNOSIS — N13.2 URETERAL STONE WITH HYDRONEPHROSIS: ICD-10-CM

## 2017-12-09 DIAGNOSIS — I10 ESSENTIAL HYPERTENSION: ICD-10-CM

## 2017-12-09 LAB
ALBUMIN SERPL-MCNC: 4 G/DL (ref 3.2–4.8)
ALBUMIN/GLOB SERPL: 1.5 G/DL (ref 1.5–2.5)
ALP SERPL-CCNC: 51 U/L (ref 25–100)
ALT SERPL W P-5'-P-CCNC: 18 U/L (ref 7–40)
ANION GAP SERPL CALCULATED.3IONS-SCNC: 10 MMOL/L (ref 3–11)
AST SERPL-CCNC: 22 U/L (ref 0–33)
B-HCG UR QL: NEGATIVE
BACTERIA UR QL AUTO: ABNORMAL /HPF
BASOPHILS # BLD AUTO: 0.02 10*3/MM3 (ref 0–0.2)
BASOPHILS NFR BLD AUTO: 0.1 % (ref 0–1)
BILIRUB SERPL-MCNC: 0.3 MG/DL (ref 0.3–1.2)
BILIRUB UR QL STRIP: NEGATIVE
BUN BLD-MCNC: 10 MG/DL (ref 9–23)
BUN/CREAT SERPL: 14.3 (ref 7–25)
CALCIUM SPEC-SCNC: 7.9 MG/DL (ref 8.7–10.4)
CHLORIDE SERPL-SCNC: 110 MMOL/L (ref 99–109)
CLARITY UR: ABNORMAL
CO2 SERPL-SCNC: 23 MMOL/L (ref 20–31)
COLOR UR: YELLOW
CREAT BLD-MCNC: 0.7 MG/DL (ref 0.6–1.3)
DEPRECATED RDW RBC AUTO: 45.9 FL (ref 37–54)
EOSINOPHIL # BLD AUTO: 0 10*3/MM3 (ref 0–0.3)
EOSINOPHIL NFR BLD AUTO: 0 % (ref 0–3)
ERYTHROCYTE [DISTWIDTH] IN BLOOD BY AUTOMATED COUNT: 13.8 % (ref 11.3–14.5)
GFR SERPL CREATININE-BSD FRML MDRD: 113 ML/MIN/1.73
GLOBULIN UR ELPH-MCNC: 2.7 GM/DL
GLUCOSE BLD-MCNC: 122 MG/DL (ref 70–100)
GLUCOSE UR STRIP-MCNC: NEGATIVE MG/DL
HCT VFR BLD AUTO: 41.7 % (ref 34.5–44)
HGB BLD-MCNC: 13.6 G/DL (ref 11.5–15.5)
HGB UR QL STRIP.AUTO: ABNORMAL
HOLD SPECIMEN: NORMAL
HOLD SPECIMEN: NORMAL
HYALINE CASTS UR QL AUTO: ABNORMAL /LPF
IMM GRANULOCYTES # BLD: 0.03 10*3/MM3 (ref 0–0.03)
IMM GRANULOCYTES NFR BLD: 0.2 % (ref 0–0.6)
INTERNAL NEGATIVE CONTROL: NEGATIVE
INTERNAL POSITIVE CONTROL: POSITIVE
KETONES UR QL STRIP: NEGATIVE
LEUKOCYTE ESTERASE UR QL STRIP.AUTO: NEGATIVE
LIPASE SERPL-CCNC: 33 U/L (ref 6–51)
LYMPHOCYTES # BLD AUTO: 1.03 10*3/MM3 (ref 0.6–4.8)
LYMPHOCYTES NFR BLD AUTO: 6.6 % (ref 24–44)
Lab: NORMAL
MCH RBC QN AUTO: 29.8 PG (ref 27–31)
MCHC RBC AUTO-ENTMCNC: 32.6 G/DL (ref 32–36)
MCV RBC AUTO: 91.2 FL (ref 80–99)
MONOCYTES # BLD AUTO: 0.85 10*3/MM3 (ref 0–1)
MONOCYTES NFR BLD AUTO: 5.5 % (ref 0–12)
NEUTROPHILS # BLD AUTO: 13.61 10*3/MM3 (ref 1.5–8.3)
NEUTROPHILS NFR BLD AUTO: 87.6 % (ref 41–71)
NITRITE UR QL STRIP: NEGATIVE
PH UR STRIP.AUTO: 7.5 [PH] (ref 5–8)
PLATELET # BLD AUTO: 289 10*3/MM3 (ref 150–450)
PMV BLD AUTO: 10 FL (ref 6–12)
POTASSIUM BLD-SCNC: 3.5 MMOL/L (ref 3.5–5.5)
PROT SERPL-MCNC: 6.7 G/DL (ref 5.7–8.2)
PROT UR QL STRIP: NEGATIVE
RBC # BLD AUTO: 4.57 10*6/MM3 (ref 3.89–5.14)
RBC # UR: ABNORMAL /HPF
REF LAB TEST METHOD: ABNORMAL
SODIUM BLD-SCNC: 143 MMOL/L (ref 132–146)
SP GR UR STRIP: 1.01 (ref 1–1.03)
SQUAMOUS #/AREA URNS HPF: ABNORMAL /HPF
UROBILINOGEN UR QL STRIP: ABNORMAL
WBC NRBC COR # BLD: 15.54 10*3/MM3 (ref 3.5–10.8)
WBC UR QL AUTO: ABNORMAL /HPF
WHOLE BLOOD HOLD SPECIMEN: NORMAL
WHOLE BLOOD HOLD SPECIMEN: NORMAL

## 2017-12-09 PROCEDURE — 81001 URINALYSIS AUTO W/SCOPE: CPT | Performed by: EMERGENCY MEDICINE

## 2017-12-09 PROCEDURE — 96375 TX/PRO/DX INJ NEW DRUG ADDON: CPT

## 2017-12-09 PROCEDURE — 96376 TX/PRO/DX INJ SAME DRUG ADON: CPT

## 2017-12-09 PROCEDURE — 25010000002 PROMETHAZINE PER 50 MG: Performed by: EMERGENCY MEDICINE

## 2017-12-09 PROCEDURE — 25010000002 ONDANSETRON PER 1 MG: Performed by: EMERGENCY MEDICINE

## 2017-12-09 PROCEDURE — 85025 COMPLETE CBC W/AUTO DIFF WBC: CPT | Performed by: EMERGENCY MEDICINE

## 2017-12-09 PROCEDURE — 99283 EMERGENCY DEPT VISIT LOW MDM: CPT

## 2017-12-09 PROCEDURE — 25010000002 FENTANYL CITRATE (PF) 100 MCG/2ML SOLUTION: Performed by: EMERGENCY MEDICINE

## 2017-12-09 PROCEDURE — 96374 THER/PROPH/DIAG INJ IV PUSH: CPT

## 2017-12-09 PROCEDURE — 83690 ASSAY OF LIPASE: CPT | Performed by: EMERGENCY MEDICINE

## 2017-12-09 PROCEDURE — 80053 COMPREHEN METABOLIC PANEL: CPT | Performed by: EMERGENCY MEDICINE

## 2017-12-09 PROCEDURE — 74176 CT ABD & PELVIS W/O CONTRAST: CPT

## 2017-12-09 RX ORDER — ONDANSETRON 4 MG/1
4 TABLET, ORALLY DISINTEGRATING ORAL EVERY 4 HOURS
Qty: 12 TABLET | Refills: 0 | OUTPATIENT
Start: 2017-12-09 | End: 2017-12-30

## 2017-12-09 RX ORDER — PROMETHAZINE HYDROCHLORIDE 25 MG/ML
12.5 INJECTION, SOLUTION INTRAMUSCULAR; INTRAVENOUS ONCE
Status: COMPLETED | OUTPATIENT
Start: 2017-12-09 | End: 2017-12-09

## 2017-12-09 RX ORDER — FENTANYL CITRATE 50 UG/ML
25 INJECTION, SOLUTION INTRAMUSCULAR; INTRAVENOUS ONCE
Status: COMPLETED | OUTPATIENT
Start: 2017-12-09 | End: 2017-12-09

## 2017-12-09 RX ORDER — OXYCODONE AND ACETAMINOPHEN 7.5; 325 MG/1; MG/1
1 TABLET ORAL EVERY 6 HOURS PRN
Qty: 12 TABLET | Refills: 0 | OUTPATIENT
Start: 2017-12-09 | End: 2017-12-30

## 2017-12-09 RX ORDER — SODIUM CHLORIDE 0.9 % (FLUSH) 0.9 %
10 SYRINGE (ML) INJECTION AS NEEDED
Status: DISCONTINUED | OUTPATIENT
Start: 2017-12-09 | End: 2017-12-09 | Stop reason: HOSPADM

## 2017-12-09 RX ORDER — ONDANSETRON 2 MG/ML
4 INJECTION INTRAMUSCULAR; INTRAVENOUS ONCE
Status: COMPLETED | OUTPATIENT
Start: 2017-12-09 | End: 2017-12-09

## 2017-12-09 RX ADMIN — FENTANYL CITRATE 25 MCG: 50 INJECTION INTRAMUSCULAR; INTRAVENOUS at 11:36

## 2017-12-09 RX ADMIN — FENTANYL CITRATE 25 MCG: 50 INJECTION INTRAMUSCULAR; INTRAVENOUS at 13:24

## 2017-12-09 RX ADMIN — SODIUM CHLORIDE 1000 ML: 9 INJECTION, SOLUTION INTRAVENOUS at 11:37

## 2017-12-09 RX ADMIN — PROMETHAZINE HYDROCHLORIDE 12.5 MG: 25 INJECTION INTRAMUSCULAR; INTRAVENOUS at 11:37

## 2017-12-09 RX ADMIN — ONDANSETRON 4 MG: 2 INJECTION INTRAMUSCULAR; INTRAVENOUS at 13:23

## 2017-12-09 NOTE — DISCHARGE INSTRUCTIONS
Patient has evidence of a 3 x 4 mm left ureteral stone.  She is to follow up closely with urologist, Dr. Heredia on Monday for recheck.  Rx for Percocet 7.5 mg by mouth every 4-6 hours as needed for moderate pain, as well as Zofran 4 mg ODT 1 by mouth every 4-6 hours as needed for nausea with vomiting.  She is to return if any symptoms of intractable nausea with vomiting or uncontrolled left renal colic.

## 2017-12-09 NOTE — ED PROVIDER NOTES
Subjective   HPI Comments: This is a 37-year-old -American female that presents to the ER with sudden onset of left flank and left lower quadrant pain that started at 6:30 AM this morning.  Patient says that she ate spaghetti with meat sauce last evening, that she felt fine when she went to bed.  She says that her son has had recent influenza, but no one else has had any type of GI symptoms.  She awakened this morning with sharp, stabbing left flank pain that radiated to her left lower quadrant.  She also reports left CVA pain.  She reports pressure with urination but denies any particular burning sensation.  She denies any frequency or hematuria.  She is currently on menses.  She had a normal bowel movement at around 7 AM.  She denies any hematochezia, melena, or mucus in her stools.  She has never had a colonoscopy.  She vomited ×2.  She also reports anorexia.  She denies any vaginal discharge.  She denies any headache, dizziness, near-syncope or syncope.  She has not taken anything for the above symptoms.  She says that the abdominal pain is waxing and waning.  She denies any history of pyelonephritis or kidney stones.    Patient is a 37 y.o. female presenting with abdominal pain.   History provided by:  Patient  Abdominal Pain   Pain location:  LLQ  Pain quality: sharp and stabbing    Pain radiates to:  Back  Pain severity:  Moderate  Onset quality:  Sudden  Duration:  6 hours  Timing:  Intermittent  Progression:  Waxing and waning  Chronicity:  New  Context: previous surgery (History of BTL.)    Context: not eating, not sick contacts and not suspicious food intake    Relieved by:  Nothing  Worsened by:  Nothing  Ineffective treatments:  None tried  Associated symptoms: anorexia, nausea and vomiting (Emesis x 2 since 0630.)    Associated symptoms: no chest pain, no chills, no constipation (Normal BM this AM at 0700), no cough, no diarrhea, no dysuria (pressure with urination.), no fever, no hematochezia,  no hematuria, no melena and no shortness of breath    Risk factors: has not had multiple surgeries        Review of Systems   Constitutional: Positive for appetite change (anorexia). Negative for chills and fever.   HENT: Negative.    Respiratory: Negative for cough, chest tightness, shortness of breath and wheezing.    Cardiovascular: Negative for chest pain and palpitations.   Gastrointestinal: Positive for abdominal pain (LLQ abd pain, sharp and cramping.), anorexia, nausea and vomiting (Emesis x 2 since 0630.). Negative for abdominal distention, constipation (Normal BM this AM at 0700), diarrhea, hematochezia and melena.   Genitourinary: Positive for flank pain (left flank pain). Negative for decreased urine volume, difficulty urinating, dysuria (pressure with urination.), hematuria and urgency.   Musculoskeletal: Positive for back pain (left CVA pain).   Neurological: Negative for dizziness, syncope, weakness and light-headedness.   Psychiatric/Behavioral: Negative.        Past Medical History:   Diagnosis Date   • Allergic rhinitis    • Anemia    • Arthritis    • Asthma    • Depression    • FHx: migraine headaches    • GERD (gastroesophageal reflux disease)    • Heart murmur    • History of chest x-ray 11/01/2015    no active disease   • History of echocardiogram 11/01/2015    ejection fraction of greater than 65%, mitral and pulmonic regurgitation an physiological tricuspid regurgitation.   • History of PFTs 12/22/2015    spirometry data acceptable and reproducible; pt given 4 puffs of Ventolin; pt gave good effort; no obstruction; no Bd response; MVV reduced    • History of PFTs 11/02/2015    pt gave best effort; duoneb given prior and post study; moderate nonspecific proportional reduction of FEV1 and FVC with preserved ratio; FEV1 moderately reduced; cannot rule out restriction   • Hypertension    • Ovarian cyst    • Seizures    • Thigh shingles        No Known Allergies    Past Surgical History:    Procedure Laterality Date   • BILATERAL BREAST REDUCTION     • BREAST SURGERY      breast reduction   •  SECTION     • LAPAROSCOPIC TUBAL LIGATION     • ORIF ANKLE FRACTURE Right        Family History   Problem Relation Age of Onset   • Pancreatic cancer Maternal Grandmother    • Heart failure Paternal Grandmother    • MARCIE disease Paternal Aunt    • Arthritis Mother    • Cancer Mother    • Arthritis Father    • Diabetes Neg Hx    • Heart attack Neg Hx    • Hypertension Neg Hx    • Hyperlipidemia Neg Hx    • Mental illness Neg Hx    • Obesity Neg Hx    • Stroke Neg Hx        Social History     Social History   • Marital status: Single     Spouse name: N/A   • Number of children: N/A   • Years of education: N/A     Social History Main Topics   • Smoking status: Former Smoker     Packs/day: 1.00     Years: 15.00     Types: Cigarettes     Quit date: 2015   • Smokeless tobacco: None   • Alcohol use No      Comment: socially   • Drug use: No      Comment: used recreational drugs in the past   • Sexual activity: Defer     Other Topics Concern   • None     Social History Narrative           Objective   Physical Exam   Constitutional: She is oriented to person, place, and time. She appears well-developed and well-nourished. No distress.   Pt appears uncomfortable secondary to abdominal pain   HENT:   Head: Normocephalic and atraumatic.   Mouth/Throat: Oropharynx is clear and moist.   Eyes: Conjunctivae and EOM are normal. Pupils are equal, round, and reactive to light. No scleral icterus.   Neck: Normal range of motion. Neck supple.   Cardiovascular: Normal rate, regular rhythm and normal heart sounds.    Pulmonary/Chest: Effort normal and breath sounds normal. No respiratory distress.   Abdominal: Soft. Bowel sounds are normal. She exhibits no distension. There is no hepatosplenomegaly. There is tenderness in the left lower quadrant. There is CVA tenderness. There is no rigidity, no rebound, no guarding  and no tenderness at McBurney's point. No hernia.       Musculoskeletal: Normal range of motion. She exhibits no edema.   Lymphadenopathy:     She has no cervical adenopathy.   Neurological: She is alert and oriented to person, place, and time.   Skin: Skin is warm and dry. She is not diaphoretic.   Psychiatric: She has a normal mood and affect. Her behavior is normal.   Nursing note and vitals reviewed.      Procedures         ED Course  ED Course   Comment By Time   Awaiting CT scan results.  Pt is sleeping soundly when I just went to check on her.  Vital signs are stable. Seda Somers PA-C 12/09 1238   CT scan showed 3 mm x 4 mm stone to the left proximal ureter with severe left hydronephrosis.  Evidence of pyelocaliectasis. Creatinine is normal at 0.7.  WBC is 15.54.  Will discuss the case with Dr. Mukherjee. Seda Somers PA-C 12/09 1314   There is no evidence of bacteria or significant amount of white blood cells in the urinalysis, so we will not treat with antibiotics.  We will give patient pain medications, as well as anti-emetics.  We also recommend close follow-up with urologist on-call, Dr. Heredia.  Discussed plan of treatment with patient, as well as diagnosis, and she verbalized understanding.  She is resting comfortably.  She is stable for discharge to home at this time. Seda Somers PA-C 12/09 1359        Recent Results (from the past 24 hour(s))   Light Blue Top    Collection Time: 12/09/17 11:22 AM   Result Value Ref Range    Extra Tube hold for add-on    Lavender Top    Collection Time: 12/09/17 11:22 AM   Result Value Ref Range    Extra Tube hold for add-on    CBC Auto Differential    Collection Time: 12/09/17 11:22 AM   Result Value Ref Range    WBC 15.54 (H) 3.50 - 10.80 10*3/mm3    RBC 4.57 3.89 - 5.14 10*6/mm3    Hemoglobin 13.6 11.5 - 15.5 g/dL    Hematocrit 41.7 34.5 - 44.0 %    MCV 91.2 80.0 - 99.0 fL    MCH 29.8 27.0 - 31.0 pg    MCHC 32.6 32.0 - 36.0 g/dL    RDW 13.8 11.3 - 14.5 %     RDW-SD 45.9 37.0 - 54.0 fl    MPV 10.0 6.0 - 12.0 fL    Platelets 289 150 - 450 10*3/mm3    Neutrophil % 87.6 (H) 41.0 - 71.0 %    Lymphocyte % 6.6 (L) 24.0 - 44.0 %    Monocyte % 5.5 0.0 - 12.0 %    Eosinophil % 0.0 0.0 - 3.0 %    Basophil % 0.1 0.0 - 1.0 %    Immature Grans % 0.2 0.0 - 0.6 %    Neutrophils, Absolute 13.61 (H) 1.50 - 8.30 10*3/mm3    Lymphocytes, Absolute 1.03 0.60 - 4.80 10*3/mm3    Monocytes, Absolute 0.85 0.00 - 1.00 10*3/mm3    Eosinophils, Absolute 0.00 0.00 - 0.30 10*3/mm3    Basophils, Absolute 0.02 0.00 - 0.20 10*3/mm3    Immature Grans, Absolute 0.03 0.00 - 0.03 10*3/mm3   Comprehensive Metabolic Panel    Collection Time: 12/09/17 11:54 AM   Result Value Ref Range    Glucose 122 (H) 70 - 100 mg/dL    BUN 10 9 - 23 mg/dL    Creatinine 0.70 0.60 - 1.30 mg/dL    Sodium 143 132 - 146 mmol/L    Potassium 3.5 3.5 - 5.5 mmol/L    Chloride 110 (H) 99 - 109 mmol/L    CO2 23.0 20.0 - 31.0 mmol/L    Calcium 7.9 (L) 8.7 - 10.4 mg/dL    Total Protein 6.7 5.7 - 8.2 g/dL    Albumin 4.00 3.20 - 4.80 g/dL    ALT (SGPT) 18 7 - 40 U/L    AST (SGOT) 22 0 - 33 U/L    Alkaline Phosphatase 51 25 - 100 U/L    Total Bilirubin 0.3 0.3 - 1.2 mg/dL    eGFR  African Amer 113 >60 mL/min/1.73    Globulin 2.7 gm/dL    A/G Ratio 1.5 1.5 - 2.5 g/dL    BUN/Creatinine Ratio 14.3 7.0 - 25.0    Anion Gap 10.0 3.0 - 11.0 mmol/L   Lipase    Collection Time: 12/09/17 11:54 AM   Result Value Ref Range    Lipase 33 6 - 51 U/L   Green Top (Gel)    Collection Time: 12/09/17 11:54 AM   Result Value Ref Range    Extra Tube Hold for add-ons.    Gold Top - SST    Collection Time: 12/09/17 11:54 AM   Result Value Ref Range    Extra Tube Hold for add-ons.    Urinalysis With / Culture If Indicated - Urine, Clean Catch    Collection Time: 12/09/17 12:05 PM   Result Value Ref Range    Color, UA Yellow Yellow, Straw    Appearance, UA Turbid (A) Clear    pH, UA 7.5 5.0 - 8.0    Specific Gravity, UA 1.009 1.001 - 1.030    Glucose, UA Negative  "Negative    Ketones, UA Negative Negative    Bilirubin, UA Negative Negative    Blood, UA Moderate (2+) (A) Negative    Protein, UA Negative Negative    Leuk Esterase, UA Negative Negative    Nitrite, UA Negative Negative    Urobilinogen, UA 0.2 E.U./dL 0.2 - 1.0 E.U./dL   Urinalysis, Microscopic Only - Urine, Clean Catch    Collection Time: 12/09/17 12:05 PM   Result Value Ref Range    RBC, UA 3-6 (A) None Seen, 0-2 /HPF    WBC, UA 0-2 (A) None Seen /HPF    Bacteria, UA None Seen None Seen, Trace /HPF    Squamous Epithelial Cells, UA 0-2 None Seen, 0-2 /HPF    Hyaline Casts, UA 0-6 0 - 6 /LPF    Methodology Automated Microscopy    POCT pregnancy, urine    Collection Time: 12/09/17 12:06 PM   Result Value Ref Range    HCG, Urine, QL Negative Negative    Lot Number [id number]     Internal Positive Control Positive     Internal Negative Control Negative      Note: In addition to lab results from this visit, the labs listed above may include labs taken at another facility or during a different encounter within the last 24 hours. Please correlate lab times with ED admission and discharge times for further clarification of the services performed during this visit.    CT Abdomen Pelvis Without Contrast    (Results Pending)     Vitals:    12/09/17 1054   BP: 139/92   BP Location: Right arm   Patient Position: Sitting   Pulse: 85   Resp: 16   Temp: 97.7 °F (36.5 °C)   TempSrc: Oral   SpO2: 96%   Weight: 68 kg (150 lb)   Height: 149.9 cm (59\")     Medications   sodium chloride 0.9 % flush 10 mL (not administered)   sodium chloride 0.9 % bolus 1,000 mL (0 mL Intravenous Stopped 12/9/17 1205)   fentaNYL citrate (PF) (SUBLIMAZE) injection 25 mcg (25 mcg Intravenous Given 12/9/17 1136)   promethazine (PHENERGAN) injection 12.5 mg (12.5 mg Intravenous Given 12/9/17 1137)   fentaNYL citrate (PF) (SUBLIMAZE) injection 25 mcg (25 mcg Intravenous Given 12/9/17 1324)   ondansetron (ZOFRAN) injection 4 mg (4 mg Intravenous Given 12/9/17 " [id number])     ECG/EMG Results (last 24 hours)     ** No results found for the last 24 hours. **        AGUSTIN query complete. Treatment plan to include limited course of prescribed  controlled substance. Risks including addiction, benefits, and alternatives presented to patient.             MDM    Final diagnoses:   Renal colic on left side   Ureteral stone with hydronephrosis   Essential hypertension            Seda Somers PA-C  12/09/17 6012

## 2017-12-10 ENCOUNTER — HOSPITAL ENCOUNTER (EMERGENCY)
Facility: HOSPITAL | Age: 37
Discharge: HOME | End: 2017-12-10
Attending: EMERGENCY MEDICINE | Admitting: EMERGENCY MEDICINE

## 2017-12-10 VITALS
DIASTOLIC BLOOD PRESSURE: 79 MMHG | TEMPERATURE: 98.2 F | BODY MASS INDEX: 30.24 KG/M2 | RESPIRATION RATE: 16 BRPM | HEART RATE: 95 BPM | WEIGHT: 150 LBS | HEIGHT: 59 IN | SYSTOLIC BLOOD PRESSURE: 127 MMHG | OXYGEN SATURATION: 96 %

## 2017-12-10 DIAGNOSIS — N20.1 URETEROLITHIASIS: Primary | ICD-10-CM

## 2017-12-10 DIAGNOSIS — N23 RENAL COLIC ON LEFT SIDE: ICD-10-CM

## 2017-12-10 LAB
BACTERIA UR QL AUTO: ABNORMAL /HPF
BILIRUB UR QL STRIP: NEGATIVE
CLARITY UR: ABNORMAL
COLOR UR: YELLOW
GLUCOSE UR STRIP-MCNC: NEGATIVE MG/DL
HGB UR QL STRIP.AUTO: ABNORMAL
HOLD SPECIMEN: NORMAL
HOLD SPECIMEN: NORMAL
HYALINE CASTS UR QL AUTO: ABNORMAL /LPF
KETONES UR QL STRIP: ABNORMAL
LEUKOCYTE ESTERASE UR QL STRIP.AUTO: NEGATIVE
NITRITE UR QL STRIP: NEGATIVE
PH UR STRIP.AUTO: 6 [PH] (ref 5–8)
PROT UR QL STRIP: NEGATIVE
RBC # UR: ABNORMAL /HPF
REF LAB TEST METHOD: ABNORMAL
SP GR UR STRIP: 1.01 (ref 1–1.03)
SQUAMOUS #/AREA URNS HPF: ABNORMAL /HPF
UROBILINOGEN UR QL STRIP: ABNORMAL
WBC UR QL AUTO: ABNORMAL /HPF
WHOLE BLOOD HOLD SPECIMEN: NORMAL
WHOLE BLOOD HOLD SPECIMEN: NORMAL

## 2017-12-10 PROCEDURE — 96376 TX/PRO/DX INJ SAME DRUG ADON: CPT

## 2017-12-10 PROCEDURE — 81001 URINALYSIS AUTO W/SCOPE: CPT | Performed by: EMERGENCY MEDICINE

## 2017-12-10 PROCEDURE — 25010000002 FENTANYL CITRATE (PF) 100 MCG/2ML SOLUTION: Performed by: EMERGENCY MEDICINE

## 2017-12-10 PROCEDURE — 99284 EMERGENCY DEPT VISIT MOD MDM: CPT

## 2017-12-10 PROCEDURE — P9612 CATHETERIZE FOR URINE SPEC: HCPCS

## 2017-12-10 PROCEDURE — 25010000002 KETOROLAC TROMETHAMINE PER 15 MG: Performed by: EMERGENCY MEDICINE

## 2017-12-10 PROCEDURE — 96374 THER/PROPH/DIAG INJ IV PUSH: CPT

## 2017-12-10 PROCEDURE — 25010000002 ONDANSETRON PER 1 MG: Performed by: EMERGENCY MEDICINE

## 2017-12-10 PROCEDURE — 96375 TX/PRO/DX INJ NEW DRUG ADDON: CPT

## 2017-12-10 PROCEDURE — 96361 HYDRATE IV INFUSION ADD-ON: CPT

## 2017-12-10 RX ORDER — FENTANYL CITRATE 50 UG/ML
25 INJECTION, SOLUTION INTRAMUSCULAR; INTRAVENOUS ONCE
Status: COMPLETED | OUTPATIENT
Start: 2017-12-10 | End: 2017-12-10

## 2017-12-10 RX ORDER — ONDANSETRON 2 MG/ML
4 INJECTION INTRAMUSCULAR; INTRAVENOUS ONCE
Status: COMPLETED | OUTPATIENT
Start: 2017-12-10 | End: 2017-12-10

## 2017-12-10 RX ORDER — KETOROLAC TROMETHAMINE 10 MG/1
10 TABLET, FILM COATED ORAL EVERY 6 HOURS PRN
Qty: 15 TABLET | Refills: 0 | Status: SHIPPED | OUTPATIENT
Start: 2017-12-10 | End: 2017-12-15

## 2017-12-10 RX ORDER — TAMSULOSIN HYDROCHLORIDE 0.4 MG/1
1 CAPSULE ORAL DAILY
Qty: 30 CAPSULE | Refills: 0 | OUTPATIENT
Start: 2017-12-10 | End: 2017-12-30

## 2017-12-10 RX ORDER — KETOROLAC TROMETHAMINE 15 MG/ML
7.5 INJECTION, SOLUTION INTRAMUSCULAR; INTRAVENOUS ONCE
Status: COMPLETED | OUTPATIENT
Start: 2017-12-10 | End: 2017-12-10

## 2017-12-10 RX ORDER — SODIUM CHLORIDE 0.9 % (FLUSH) 0.9 %
10 SYRINGE (ML) INJECTION AS NEEDED
Status: DISCONTINUED | OUTPATIENT
Start: 2017-12-10 | End: 2017-12-10 | Stop reason: HOSPADM

## 2017-12-10 RX ORDER — KETOROLAC TROMETHAMINE 15 MG/ML
15 INJECTION, SOLUTION INTRAMUSCULAR; INTRAVENOUS ONCE
Status: COMPLETED | OUTPATIENT
Start: 2017-12-10 | End: 2017-12-10

## 2017-12-10 RX ADMIN — KETOROLAC TROMETHAMINE 7.5 MG: 15 INJECTION, SOLUTION INTRAMUSCULAR; INTRAVENOUS at 12:43

## 2017-12-10 RX ADMIN — KETOROLAC TROMETHAMINE 15 MG: 15 INJECTION, SOLUTION INTRAMUSCULAR; INTRAVENOUS at 10:14

## 2017-12-10 RX ADMIN — SODIUM CHLORIDE 1000 ML: 9 INJECTION, SOLUTION INTRAVENOUS at 10:12

## 2017-12-10 RX ADMIN — ONDANSETRON 4 MG: 2 INJECTION INTRAMUSCULAR; INTRAVENOUS at 10:13

## 2017-12-10 RX ADMIN — FENTANYL CITRATE 25 MCG: 50 INJECTION INTRAMUSCULAR; INTRAVENOUS at 10:15

## 2017-12-10 RX ADMIN — ONDANSETRON 4 MG: 2 INJECTION INTRAMUSCULAR; INTRAVENOUS at 12:43

## 2017-12-10 NOTE — ED PROVIDER NOTES
Subjective   HPI Comments: Bernardo Dodd is a 37 y.o.female who presents to the emergency department with c/o left sided flank pain radiating to the left lower quadrant that began yesterday morning around 0600. The patient tells us that she just finished her menstrual cycle and thought that her discomfort was due to residual abdominal cramping. When her pain did not improve after several hours, she came here for further testing.  The patient was diagnosed with renal colic and a kidney stone and was discharged home. When her pain did not get any better, however, she came here for reevaluation. The patient tells us that she tried drinking cranberry juice but was unable to keep this down secondary to nausea and vomiting. She has not tried eating or drinking anything since that time. She has also tried taking Tizanidine and Zofran in addition to Pamprin for her pain and nausea but has had minimal relief. She has no other acute complaints at this time.           Patient is a 37 y.o. female presenting with flank pain.   History provided by:  Patient  Flank Pain   Pain location:  L flank  Pain quality: aching    Pain radiates to:  LLQ  Onset quality:  Sudden  Duration:  2 days  Timing:  Constant  Progression:  Unchanged  Chronicity:  New  Relieved by:  Nothing  Worsened by:  Nothing  Ineffective treatments: Tizanidine, Zofran, Pamprin.  Associated symptoms: nausea and vomiting    Associated symptoms: no chest pain, no chills, no cough, no diarrhea, no dysuria, no fever, no hematuria, no shortness of breath and no sore throat        Review of Systems   Constitutional: Positive for appetite change. Negative for chills and fever.   HENT: Negative for congestion, rhinorrhea, sore throat and trouble swallowing.    Respiratory: Negative for cough and shortness of breath.    Cardiovascular: Negative for chest pain.   Gastrointestinal: Positive for abdominal pain (LLQ), nausea and vomiting. Negative for diarrhea.    Genitourinary: Positive for flank pain. Negative for decreased urine volume, difficulty urinating, dysuria, hematuria and urgency.   Musculoskeletal: Negative for back pain and neck pain.   Neurological: Negative for dizziness, weakness and headaches.   All other systems reviewed and are negative.      Past Medical History:   Diagnosis Date   • Allergic rhinitis    • Anemia    • Arthritis    • Asthma    • Depression    • FHx: migraine headaches    • GERD (gastroesophageal reflux disease)    • Heart murmur    • History of chest x-ray 2015    no active disease   • History of echocardiogram 2015    ejection fraction of greater than 65%, mitral and pulmonic regurgitation an physiological tricuspid regurgitation.   • History of PFTs 2015    spirometry data acceptable and reproducible; pt given 4 puffs of Ventolin; pt gave good effort; no obstruction; no Bd response; MVV reduced    • History of PFTs 2015    pt gave best effort; duoneb given prior and post study; moderate nonspecific proportional reduction of FEV1 and FVC with preserved ratio; FEV1 moderately reduced; cannot rule out restriction   • Hypertension    • Ovarian cyst    • Seizures    • Thigh shingles        No Known Allergies    Past Surgical History:   Procedure Laterality Date   • BILATERAL BREAST REDUCTION     • BREAST SURGERY      breast reduction   •  SECTION     • LAPAROSCOPIC TUBAL LIGATION     • ORIF ANKLE FRACTURE Right        Family History   Problem Relation Age of Onset   • Pancreatic cancer Maternal Grandmother    • Heart failure Paternal Grandmother    • MARCIE disease Paternal Aunt    • Arthritis Mother    • Cancer Mother    • Arthritis Father    • Diabetes Neg Hx    • Heart attack Neg Hx    • Hypertension Neg Hx    • Hyperlipidemia Neg Hx    • Mental illness Neg Hx    • Obesity Neg Hx    • Stroke Neg Hx        Social History     Social History   • Marital status: Single     Spouse name: N/A   • Number of  children: N/A   • Years of education: N/A     Social History Main Topics   • Smoking status: Former Smoker     Packs/day: 1.00     Years: 15.00     Types: Cigarettes     Quit date: 1/17/2015   • Smokeless tobacco: None   • Alcohol use No      Comment: socially   • Drug use: No      Comment: used recreational drugs in the past   • Sexual activity: Defer     Other Topics Concern   • None     Social History Narrative         Objective   Physical Exam   Constitutional: She is oriented to person, place, and time. She appears well-developed and well-nourished. No distress.   HENT:   Head: Normocephalic and atraumatic.   Nose: Nose normal.   Mouth/Throat: Oropharynx is clear and moist.   Eyes: Conjunctivae are normal. No scleral icterus.   Neck: Normal range of motion. Neck supple.   Cardiovascular: Normal rate, regular rhythm and normal heart sounds.    No murmur heard.  Pulmonary/Chest: Effort normal and breath sounds normal. No respiratory distress. She has no wheezes. She has no rales.   Abdominal: Soft. Bowel sounds are normal. She exhibits no mass. There is tenderness (moderate) in the left lower quadrant. There is no rebound and no guarding.   Abdomen is soft diffusely with moderate tenderness in the left flank and left lower quadrant. Right abdominal quadrants are non-tender. Bowel sounds are normal in all four quadrants. No guarding or rebound.   Musculoskeletal: Normal range of motion. She exhibits no edema.   Neurological: She is alert and oriented to person, place, and time.   Skin: Skin is warm and dry. No erythema.   Psychiatric: She has a normal mood and affect. Her behavior is normal.   Nursing note and vitals reviewed.      Procedures         ED Course  ED Course   Comment By Time   Dr. Bates is at the bedside reevaluating the patient. The patient is resting comfortably and feeling well enough to eat. Her pain has improved. Discussed findings and plan for discharged. The patient will be NPO after midnight  for urologic intervention tomorrow afternoon. Information for urology appointment will be in discharge instructions. All are agreeable. Cristal Leonard 12/10 1231     Recent Results (from the past 24 hour(s))   Green Top (Gel)    Collection Time: 12/10/17  9:06 AM   Result Value Ref Range    Extra Tube Hold for add-ons.    Lavender Top    Collection Time: 12/10/17  9:06 AM   Result Value Ref Range    Extra Tube hold for add-on    Light Blue Top    Collection Time: 12/10/17  9:07 AM   Result Value Ref Range    Extra Tube hold for add-on    Gold Top - SST    Collection Time: 12/10/17  9:07 AM   Result Value Ref Range    Extra Tube Hold for add-ons.    Urinalysis With / Culture If Indicated - Urine, Catheter    Collection Time: 12/10/17 12:12 PM   Result Value Ref Range    Color, UA Yellow Yellow, Straw    Appearance, UA Cloudy (A) Clear    pH, UA 6.0 5.0 - 8.0    Specific Gravity, UA 1.014 1.001 - 1.030    Glucose, UA Negative Negative    Ketones, UA Trace (A) Negative    Bilirubin, UA Negative Negative    Blood, UA Large (3+) (A) Negative    Protein, UA Negative Negative    Leuk Esterase, UA Negative Negative    Nitrite, UA Negative Negative    Urobilinogen, UA 1.0 E.U./dL 0.2 - 1.0 E.U./dL   Urinalysis, Microscopic Only - Urine, Clean Catch    Collection Time: 12/10/17 12:12 PM   Result Value Ref Range    RBC, UA 13-20 (A) None Seen, 0-2 /HPF    WBC, UA 3-5 (A) None Seen /HPF    Bacteria, UA None Seen None Seen, Trace /HPF    Squamous Epithelial Cells, UA 3-6 (A) None Seen, 0-2 /HPF    Hyaline Casts, UA 7-12 0 - 6 /LPF    Methodology Automated Microscopy      Note: In addition to lab results from this visit, the labs listed above may include labs taken at another facility or during a different encounter within the last 24 hours. Please correlate lab times with ED admission and discharge times for further clarification of the services performed during this visit.    No orders to display     Vitals:    12/10/17 1115  12/10/17 1130 12/10/17 1145 12/10/17 1200   BP:  129/85  127/79   BP Location:       Patient Position:       Pulse:       Resp:       Temp:       TempSrc:       SpO2: 97% 97% 96% 96%   Weight:       Height:         Medications   sodium chloride 0.9 % flush 10 mL (not administered)   ketorolac (TORADOL) injection 7.5 mg (not administered)   ondansetron (ZOFRAN) injection 4 mg (not administered)   sodium chloride 0.9 % bolus 1,000 mL (1,000 mL Intravenous New Bag 12/10/17 1012)   ketorolac (TORADOL) injection 15 mg (15 mg Intravenous Given 12/10/17 1014)   fentaNYL citrate (PF) (SUBLIMAZE) injection 25 mcg (25 mcg Intravenous Given 12/10/17 1015)   ondansetron (ZOFRAN) injection 4 mg (4 mg Intravenous Given 12/10/17 1013)     ECG/EMG Results (last 24 hours)     ** No results found for the last 24 hours. **                        MDM  Number of Diagnoses or Management Options  Renal colic on left side: new and requires workup  Ureterolithiasis: new and requires workup  Diagnosis management comments: I discussed the patient with the on-call urologist, Dr. Montesinos.  Dr. Montesinos agrees with outpatient follow-up, advises the patient contact his office tomorrow morning to be seen tomorrow morning.    Dr. Montesinos also advised the patient remained nothing by mouth after midnight in case surgical intervention is necessary.    She reports feeling well upon discharge.       Amount and/or Complexity of Data Reviewed  Clinical lab tests: reviewed and ordered  Tests in the radiology section of CPT®: ordered and reviewed  Decide to obtain previous medical records or to obtain history from someone other than the patient: yes  Obtain history from someone other than the patient: yes  Discuss the patient with other providers: yes  Independent visualization of images, tracings, or specimens: yes    Patient Progress  Patient progress: stable      Final diagnoses:   Ureterolithiasis   Renal colic on left side       Documentation  assistance provided by jonny Valle.  Information recorded by the jonny was done at my direction and has been verified and validated by me.     Cristal Valle  12/10/17 0906       Arya Bates MD  12/10/17 4788

## 2017-12-10 NOTE — DISCHARGE INSTRUCTIONS
Chin is advised to contact Dr. Montesinos's office tomorrow morning to be evaluated in clinic tomorrow morning.    Dr. Montesinos advised the patient to remain nothing by mouth after midnight.

## 2017-12-20 ENCOUNTER — APPOINTMENT (OUTPATIENT)
Dept: CT IMAGING | Facility: HOSPITAL | Age: 37
End: 2017-12-20

## 2017-12-20 ENCOUNTER — HOSPITAL ENCOUNTER (EMERGENCY)
Facility: HOSPITAL | Age: 37
Discharge: HOME | End: 2017-12-20
Attending: EMERGENCY MEDICINE | Admitting: EMERGENCY MEDICINE

## 2017-12-20 VITALS
HEART RATE: 100 BPM | HEIGHT: 59 IN | DIASTOLIC BLOOD PRESSURE: 85 MMHG | WEIGHT: 146 LBS | OXYGEN SATURATION: 98 % | RESPIRATION RATE: 16 BRPM | TEMPERATURE: 98.4 F | BODY MASS INDEX: 29.43 KG/M2 | SYSTOLIC BLOOD PRESSURE: 130 MMHG

## 2017-12-20 DIAGNOSIS — R10.31 RIGHT LOWER QUADRANT ABDOMINAL PAIN: Primary | ICD-10-CM

## 2017-12-20 DIAGNOSIS — Z87.448: ICD-10-CM

## 2017-12-20 LAB
ALBUMIN SERPL-MCNC: 4 G/DL (ref 3.2–4.8)
ALBUMIN/GLOB SERPL: 1.7 G/DL (ref 1.5–2.5)
ALP SERPL-CCNC: 51 U/L (ref 25–100)
ALT SERPL W P-5'-P-CCNC: 18 U/L (ref 7–40)
ANION GAP SERPL CALCULATED.3IONS-SCNC: 7 MMOL/L (ref 3–11)
AST SERPL-CCNC: 19 U/L (ref 0–33)
B-HCG UR QL: NEGATIVE
BACTERIA UR QL AUTO: ABNORMAL /HPF
BASOPHILS # BLD AUTO: 0.02 10*3/MM3 (ref 0–0.2)
BASOPHILS NFR BLD AUTO: 0.2 % (ref 0–1)
BILIRUB SERPL-MCNC: 0.2 MG/DL (ref 0.3–1.2)
BILIRUB UR QL STRIP: NEGATIVE
BUN BLD-MCNC: 16 MG/DL (ref 9–23)
BUN/CREAT SERPL: 26.7 (ref 7–25)
CALCIUM SPEC-SCNC: 8.9 MG/DL (ref 8.7–10.4)
CHLORIDE SERPL-SCNC: 103 MMOL/L (ref 99–109)
CLARITY UR: ABNORMAL
CO2 SERPL-SCNC: 29 MMOL/L (ref 20–31)
COLOR UR: YELLOW
CREAT BLD-MCNC: 0.6 MG/DL (ref 0.6–1.3)
DEPRECATED RDW RBC AUTO: 45.9 FL (ref 37–54)
EOSINOPHIL # BLD AUTO: 0.05 10*3/MM3 (ref 0–0.3)
EOSINOPHIL NFR BLD AUTO: 0.6 % (ref 0–3)
ERYTHROCYTE [DISTWIDTH] IN BLOOD BY AUTOMATED COUNT: 13.2 % (ref 11.3–14.5)
GFR SERPL CREATININE-BSD FRML MDRD: 135 ML/MIN/1.73
GLOBULIN UR ELPH-MCNC: 2.4 GM/DL
GLUCOSE BLD-MCNC: 100 MG/DL (ref 70–100)
GLUCOSE UR STRIP-MCNC: NEGATIVE MG/DL
HCT VFR BLD AUTO: 37.7 % (ref 34.5–44)
HGB BLD-MCNC: 11.8 G/DL (ref 11.5–15.5)
HGB UR QL STRIP.AUTO: NEGATIVE
HOLD SPECIMEN: NORMAL
HOLD SPECIMEN: NORMAL
HYALINE CASTS UR QL AUTO: ABNORMAL /LPF
IMM GRANULOCYTES # BLD: 0.04 10*3/MM3 (ref 0–0.03)
IMM GRANULOCYTES NFR BLD: 0.5 % (ref 0–0.6)
INTERNAL NEGATIVE CONTROL: NEGATIVE
INTERNAL POSITIVE CONTROL: POSITIVE
KETONES UR QL STRIP: NEGATIVE
LEUKOCYTE ESTERASE UR QL STRIP.AUTO: NEGATIVE
LIPASE SERPL-CCNC: 102 U/L (ref 6–51)
LYMPHOCYTES # BLD AUTO: 2.64 10*3/MM3 (ref 0.6–4.8)
LYMPHOCYTES NFR BLD AUTO: 31.2 % (ref 24–44)
Lab: NORMAL
MCH RBC QN AUTO: 29.5 PG (ref 27–31)
MCHC RBC AUTO-ENTMCNC: 31.3 G/DL (ref 32–36)
MCV RBC AUTO: 94.3 FL (ref 80–99)
MONOCYTES # BLD AUTO: 0.63 10*3/MM3 (ref 0–1)
MONOCYTES NFR BLD AUTO: 7.4 % (ref 0–12)
NEUTROPHILS # BLD AUTO: 5.08 10*3/MM3 (ref 1.5–8.3)
NEUTROPHILS NFR BLD AUTO: 60.1 % (ref 41–71)
NITRITE UR QL STRIP: NEGATIVE
PH UR STRIP.AUTO: 6 [PH] (ref 5–8)
PLATELET # BLD AUTO: 276 10*3/MM3 (ref 150–450)
PMV BLD AUTO: 10.6 FL (ref 6–12)
POTASSIUM BLD-SCNC: 3.4 MMOL/L (ref 3.5–5.5)
PROT SERPL-MCNC: 6.4 G/DL (ref 5.7–8.2)
PROT UR QL STRIP: NEGATIVE
RBC # BLD AUTO: 4 10*6/MM3 (ref 3.89–5.14)
RBC # UR: ABNORMAL /HPF
REF LAB TEST METHOD: ABNORMAL
SODIUM BLD-SCNC: 139 MMOL/L (ref 132–146)
SP GR UR STRIP: 1.02 (ref 1–1.03)
SQUAMOUS #/AREA URNS HPF: ABNORMAL /HPF
UROBILINOGEN UR QL STRIP: ABNORMAL
WBC NRBC COR # BLD: 8.46 10*3/MM3 (ref 3.5–10.8)
WBC UR QL AUTO: ABNORMAL /HPF
WHOLE BLOOD HOLD SPECIMEN: NORMAL
WHOLE BLOOD HOLD SPECIMEN: NORMAL

## 2017-12-20 PROCEDURE — 96374 THER/PROPH/DIAG INJ IV PUSH: CPT

## 2017-12-20 PROCEDURE — 80053 COMPREHEN METABOLIC PANEL: CPT | Performed by: EMERGENCY MEDICINE

## 2017-12-20 PROCEDURE — 74177 CT ABD & PELVIS W/CONTRAST: CPT

## 2017-12-20 PROCEDURE — 25010000002 KETOROLAC TROMETHAMINE PER 15 MG: Performed by: NURSE PRACTITIONER

## 2017-12-20 PROCEDURE — 99284 EMERGENCY DEPT VISIT MOD MDM: CPT

## 2017-12-20 PROCEDURE — 85025 COMPLETE CBC W/AUTO DIFF WBC: CPT | Performed by: EMERGENCY MEDICINE

## 2017-12-20 PROCEDURE — 0 IOPAMIDOL 61 % SOLUTION: Performed by: EMERGENCY MEDICINE

## 2017-12-20 PROCEDURE — 81001 URINALYSIS AUTO W/SCOPE: CPT | Performed by: EMERGENCY MEDICINE

## 2017-12-20 PROCEDURE — 96361 HYDRATE IV INFUSION ADD-ON: CPT

## 2017-12-20 PROCEDURE — 83690 ASSAY OF LIPASE: CPT | Performed by: EMERGENCY MEDICINE

## 2017-12-20 RX ORDER — SODIUM CHLORIDE 0.9 % (FLUSH) 0.9 %
10 SYRINGE (ML) INJECTION AS NEEDED
Status: DISCONTINUED | OUTPATIENT
Start: 2017-12-20 | End: 2017-12-20 | Stop reason: HOSPADM

## 2017-12-20 RX ORDER — KETOROLAC TROMETHAMINE 30 MG/ML
30 INJECTION, SOLUTION INTRAMUSCULAR; INTRAVENOUS ONCE
Status: COMPLETED | OUTPATIENT
Start: 2017-12-20 | End: 2017-12-20

## 2017-12-20 RX ADMIN — SODIUM CHLORIDE 1000 ML: 9 INJECTION, SOLUTION INTRAVENOUS at 19:53

## 2017-12-20 RX ADMIN — KETOROLAC TROMETHAMINE 30 MG: 30 INJECTION, SOLUTION INTRAMUSCULAR at 19:52

## 2017-12-20 RX ADMIN — IOPAMIDOL 95 ML: 612 INJECTION, SOLUTION INTRAVENOUS at 20:18

## 2017-12-21 NOTE — ED PROVIDER NOTES
"Subjective   HPI Comments: Patient presents with complaint of new abdominal pain, unlike the recent discomforts associated with recent kidney stones: Further history reveals that the patient presented with left flank pain on Dec 9th and was found to have two small stones on the left ureter with severe hydronephrosis.  Outpatient management was initiated and the patient returned on Dec 10th for reevaluation and pain intolerance.  She had outpatient stent placement on Monday, December 11th which she tolerated well and she removed her own left ureteral stent on Thursday as instructed.  She has been doing \"well\" and went to back to work yesterday when she began to experience a new discomfort over the suprapubic area and some mild burning with urination.  No fever or vomiting or hematuria.  Today, her discomfort hindered her ability to work effectively.      Patient is a 37 y.o. female presenting with abdominal pain.   History provided by:  Patient   used: No    Abdominal Pain   Pain location:  Suprapubic  Pain quality: aching and cramping    Pain radiates to:  Does not radiate  Pain severity:  Mild  Onset quality:  Gradual  Duration:  24 hours  Timing:  Constant  Progression:  Unchanged  Chronicity:  New  Context: previous surgery    Context: not alcohol use    Relieved by:  Nothing  Worsened by:  Nothing  Ineffective treatments:  None tried  Associated symptoms: dysuria    Associated symptoms: no chest pain, no constipation, no cough, no diarrhea, no fever, no hematuria, no melena, no nausea, no shortness of breath, no sore throat, no vaginal bleeding and no vomiting    Risk factors: recent hospitalization        Review of Systems   Constitutional: Negative.  Negative for diaphoresis and fever.   HENT: Negative for sore throat.    Eyes: Negative.  Negative for pain and visual disturbance.   Respiratory: Negative for cough, shortness of breath and stridor.    Cardiovascular: Negative.  Negative for " chest pain.   Gastrointestinal: Positive for abdominal pain. Negative for constipation, diarrhea, melena, nausea and vomiting.   Endocrine: Negative.    Genitourinary: Positive for dysuria and pelvic pain. Negative for difficulty urinating, flank pain, hematuria, urgency and vaginal bleeding.   Musculoskeletal: Negative.  Negative for back pain and neck pain.   Skin: Negative.  Negative for pallor and rash.   Allergic/Immunologic: Negative.    Neurological: Negative.  Negative for syncope and headaches.   Hematological: Negative.    Psychiatric/Behavioral: Negative.  Negative for agitation.   All other systems reviewed and are negative.      Past Medical History:   Diagnosis Date   • Allergic rhinitis    • Anemia    • Arthritis    • Asthma    • Depression    • FHx: migraine headaches    • GERD (gastroesophageal reflux disease)    • Heart murmur    • History of chest x-ray 2015    no active disease   • History of echocardiogram 2015    ejection fraction of greater than 65%, mitral and pulmonic regurgitation an physiological tricuspid regurgitation.   • History of PFTs 2015    spirometry data acceptable and reproducible; pt given 4 puffs of Ventolin; pt gave good effort; no obstruction; no Bd response; MVV reduced    • History of PFTs 2015    pt gave best effort; duoneb given prior and post study; moderate nonspecific proportional reduction of FEV1 and FVC with preserved ratio; FEV1 moderately reduced; cannot rule out restriction   • Hypertension    • Ovarian cyst    • Seizures    • Thigh shingles        No Known Allergies    Past Surgical History:   Procedure Laterality Date   • BILATERAL BREAST REDUCTION     • BREAST SURGERY      breast reduction   •  SECTION     • LAPAROSCOPIC TUBAL LIGATION     • ORIF ANKLE FRACTURE Right        Family History   Problem Relation Age of Onset   • Pancreatic cancer Maternal Grandmother    • Heart failure Paternal Grandmother    • MARCIE disease  Paternal Aunt    • Arthritis Mother    • Cancer Mother    • Arthritis Father    • Diabetes Neg Hx    • Heart attack Neg Hx    • Hypertension Neg Hx    • Hyperlipidemia Neg Hx    • Mental illness Neg Hx    • Obesity Neg Hx    • Stroke Neg Hx        Social History     Social History   • Marital status: Single     Spouse name: N/A   • Number of children: N/A   • Years of education: N/A     Social History Main Topics   • Smoking status: Former Smoker     Packs/day: 1.00     Years: 15.00     Types: Cigarettes     Quit date: 1/17/2015   • Smokeless tobacco: None   • Alcohol use No      Comment: socially   • Drug use: No      Comment: used recreational drugs in the past   • Sexual activity: Defer     Other Topics Concern   • None     Social History Narrative           Objective   Physical Exam   Constitutional: She is oriented to person, place, and time. She appears well-developed and well-nourished. No distress.   HENT:   Head: Normocephalic and atraumatic.   Mouth/Throat: Oropharynx is clear and moist.   Eyes: EOM are normal. Pupils are equal, round, and reactive to light. No scleral icterus.   Neck: Normal range of motion. Neck supple.   Cardiovascular: Normal rate and regular rhythm.    Pulmonary/Chest: Effort normal and breath sounds normal. No respiratory distress. She has no wheezes. She has no rales.   Abdominal: Soft. Bowel sounds are normal. She exhibits no distension. There is tenderness (suprapubic tenderness; mild; no guarding ). There is no rebound and no guarding.   Musculoskeletal: Normal range of motion. She exhibits no edema.   No CVA tenderness.      Lymphadenopathy:     She has no cervical adenopathy.   Neurological: She is alert and oriented to person, place, and time. She exhibits normal muscle tone. Coordination normal.   Skin: Skin is warm and dry. No rash noted. She is not diaphoretic. No erythema.   Psychiatric: She has a normal mood and affect. Her behavior is normal.   Nursing note and vitals  reviewed.      Procedures         ED Course  ED Course      Recent Results (from the past 24 hour(s))   Comprehensive Metabolic Panel    Collection Time: 12/20/17  7:07 PM   Result Value Ref Range    Glucose 100 70 - 100 mg/dL    BUN 16 9 - 23 mg/dL    Creatinine 0.60 0.60 - 1.30 mg/dL    Sodium 139 132 - 146 mmol/L    Potassium 3.4 (L) 3.5 - 5.5 mmol/L    Chloride 103 99 - 109 mmol/L    CO2 29.0 20.0 - 31.0 mmol/L    Calcium 8.9 8.7 - 10.4 mg/dL    Total Protein 6.4 5.7 - 8.2 g/dL    Albumin 4.00 3.20 - 4.80 g/dL    ALT (SGPT) 18 7 - 40 U/L    AST (SGOT) 19 0 - 33 U/L    Alkaline Phosphatase 51 25 - 100 U/L    Total Bilirubin 0.2 (L) 0.3 - 1.2 mg/dL    eGFR  African Amer 135 >60 mL/min/1.73    Globulin 2.4 gm/dL    A/G Ratio 1.7 1.5 - 2.5 g/dL    BUN/Creatinine Ratio 26.7 (H) 7.0 - 25.0    Anion Gap 7.0 3.0 - 11.0 mmol/L   Lipase    Collection Time: 12/20/17  7:07 PM   Result Value Ref Range    Lipase 102 (H) 6 - 51 U/L   Urinalysis With / Culture If Indicated - Urine, Clean Catch    Collection Time: 12/20/17  7:07 PM   Result Value Ref Range    Color, UA Yellow Yellow, Straw    Appearance, UA Cloudy (A) Clear    pH, UA 6.0 5.0 - 8.0    Specific Gravity, UA 1.022 1.001 - 1.030    Glucose, UA Negative Negative    Ketones, UA Negative Negative    Bilirubin, UA Negative Negative    Blood, UA Negative Negative    Protein, UA Negative Negative    Leuk Esterase, UA Negative Negative    Nitrite, UA Negative Negative    Urobilinogen, UA 0.2 E.U./dL 0.2 - 1.0 E.U./dL   Light Blue Top    Collection Time: 12/20/17  7:07 PM   Result Value Ref Range    Extra Tube hold for add-on    Green Top (Gel)    Collection Time: 12/20/17  7:07 PM   Result Value Ref Range    Extra Tube Hold for add-ons.    Lavender Top    Collection Time: 12/20/17  7:07 PM   Result Value Ref Range    Extra Tube hold for add-on    Gold Top - SST    Collection Time: 12/20/17  7:07 PM   Result Value Ref Range    Extra Tube Hold for add-ons.    CBC Auto  Differential    Collection Time: 12/20/17  7:07 PM   Result Value Ref Range    WBC 8.46 3.50 - 10.80 10*3/mm3    RBC 4.00 3.89 - 5.14 10*6/mm3    Hemoglobin 11.8 11.5 - 15.5 g/dL    Hematocrit 37.7 34.5 - 44.0 %    MCV 94.3 80.0 - 99.0 fL    MCH 29.5 27.0 - 31.0 pg    MCHC 31.3 (L) 32.0 - 36.0 g/dL    RDW 13.2 11.3 - 14.5 %    RDW-SD 45.9 37.0 - 54.0 fl    MPV 10.6 6.0 - 12.0 fL    Platelets 276 150 - 450 10*3/mm3    Neutrophil % 60.1 41.0 - 71.0 %    Lymphocyte % 31.2 24.0 - 44.0 %    Monocyte % 7.4 0.0 - 12.0 %    Eosinophil % 0.6 0.0 - 3.0 %    Basophil % 0.2 0.0 - 1.0 %    Immature Grans % 0.5 0.0 - 0.6 %    Neutrophils, Absolute 5.08 1.50 - 8.30 10*3/mm3    Lymphocytes, Absolute 2.64 0.60 - 4.80 10*3/mm3    Monocytes, Absolute 0.63 0.00 - 1.00 10*3/mm3    Eosinophils, Absolute 0.05 0.00 - 0.30 10*3/mm3    Basophils, Absolute 0.02 0.00 - 0.20 10*3/mm3    Immature Grans, Absolute 0.04 (H) 0.00 - 0.03 10*3/mm3   Urinalysis, Microscopic Only - Urine, Clean Catch    Collection Time: 12/20/17  7:07 PM   Result Value Ref Range    RBC, UA 0-2 None Seen, 0-2 /HPF    WBC, UA 0-2 (A) None Seen /HPF    Bacteria, UA None Seen None Seen, Trace /HPF    Squamous Epithelial Cells, UA 0-2 None Seen, 0-2 /HPF    Hyaline Casts, UA 0-6 0 - 6 /LPF    Methodology Automated Microscopy    POCT pregnancy, urine    Collection Time: 12/20/17  7:12 PM   Result Value Ref Range    HCG, Urine, QL Negative Negative    Lot Number [id number]     Internal Positive Control Positive     Internal Negative Control Negative      Note: In addition to lab results from this visit, the labs listed above may include labs taken at another facility or during a different encounter within the last 24 hours. Please correlate lab times with ED admission and discharge times for further clarification of the services performed during this visit.    CT Abdomen Pelvis With Contrast   Final Result     No acute findings.      THIS DOCUMENT HAS BEEN ELECTRONICALLY  SIGNED BY REANNA AKERS MD        Vitals:    12/20/17 1930 12/20/17 2000 12/20/17 2001 12/20/17 2030   BP: 138/82 120/82  124/79   Pulse:    106   Resp:       Temp:       TempSrc:       SpO2: 96% 94% 100%    Weight:       Height:         Medications   sodium chloride 0.9 % flush 10 mL (not administered)   sodium chloride 0.9 % bolus 1,000 mL (1,000 mL Intravenous New Bag 12/20/17 1953)   ketorolac (TORADOL) injection 30 mg (30 mg Intravenous Given 12/20/17 1952)   iopamidol (ISOVUE-300) 61 % injection 100 mL (95 mL Intravenous Given 12/20/17 2018)     ECG/EMG Results (last 24 hours)     ** No results found for the last 24 hours. **                    [facility]    Final diagnoses:   Right lower quadrant abdominal pain   Hx of renal colic            Gwen Barron, APRN  12/20/17 2111

## 2017-12-21 NOTE — DISCHARGE INSTRUCTIONS
Home to rest.  Medications as directed.  Follow up with your primary care provider to monitor your recovery.  Return to the ER as needed for worsening symptoms or concerns, including, but not limited to:  Fever, intractable pain or intractable vomiting.  Thank you and Kandi Abebe

## 2017-12-26 ENCOUNTER — HOSPITAL ENCOUNTER (EMERGENCY)
Facility: HOSPITAL | Age: 37
Discharge: HOME | End: 2017-12-26
Attending: EMERGENCY MEDICINE | Admitting: EMERGENCY MEDICINE

## 2017-12-26 VITALS
DIASTOLIC BLOOD PRESSURE: 77 MMHG | HEIGHT: 59 IN | TEMPERATURE: 98.6 F | WEIGHT: 146 LBS | RESPIRATION RATE: 18 BRPM | BODY MASS INDEX: 29.43 KG/M2 | HEART RATE: 75 BPM | OXYGEN SATURATION: 96 % | SYSTOLIC BLOOD PRESSURE: 121 MMHG

## 2017-12-26 DIAGNOSIS — M54.16 LUMBAR RADICULOPATHY, ACUTE: Primary | ICD-10-CM

## 2017-12-26 PROCEDURE — 25010000002 METHYLPREDNISOLONE PER 125 MG: Performed by: PHYSICIAN ASSISTANT

## 2017-12-26 PROCEDURE — 96372 THER/PROPH/DIAG INJ SC/IM: CPT

## 2017-12-26 PROCEDURE — 99283 EMERGENCY DEPT VISIT LOW MDM: CPT

## 2017-12-26 PROCEDURE — 25010000002 KETOROLAC TROMETHAMINE PER 15 MG: Performed by: PHYSICIAN ASSISTANT

## 2017-12-26 PROCEDURE — 63710000001 DIPHENHYDRAMINE PER 50 MG: Performed by: PHYSICIAN ASSISTANT

## 2017-12-26 RX ORDER — METHYLPREDNISOLONE SODIUM SUCCINATE 125 MG/2ML
125 INJECTION, POWDER, LYOPHILIZED, FOR SOLUTION INTRAMUSCULAR; INTRAVENOUS ONCE
Status: COMPLETED | OUTPATIENT
Start: 2017-12-26 | End: 2017-12-26

## 2017-12-26 RX ORDER — KETOROLAC TROMETHAMINE 30 MG/ML
60 INJECTION, SOLUTION INTRAMUSCULAR; INTRAVENOUS ONCE
Status: COMPLETED | OUTPATIENT
Start: 2017-12-26 | End: 2017-12-26

## 2017-12-26 RX ORDER — DIPHENHYDRAMINE HCL 25 MG
25 CAPSULE ORAL ONCE
Status: COMPLETED | OUTPATIENT
Start: 2017-12-26 | End: 2017-12-26

## 2017-12-26 RX ORDER — FAMOTIDINE 20 MG/1
20 TABLET, FILM COATED ORAL ONCE
Status: COMPLETED | OUTPATIENT
Start: 2017-12-26 | End: 2017-12-26

## 2017-12-26 RX ORDER — METHYLPREDNISOLONE 4 MG/1
TABLET ORAL
Qty: 21 TABLET | Refills: 0 | Status: SHIPPED | OUTPATIENT
Start: 2017-12-26 | End: 2018-04-11

## 2017-12-26 RX ADMIN — DIPHENHYDRAMINE HYDROCHLORIDE 25 MG: 25 CAPSULE ORAL at 22:29

## 2017-12-26 RX ADMIN — FAMOTIDINE 20 MG: 20 TABLET, FILM COATED ORAL at 22:28

## 2017-12-26 RX ADMIN — KETOROLAC TROMETHAMINE 60 MG: 30 INJECTION, SOLUTION INTRAMUSCULAR at 21:20

## 2017-12-26 RX ADMIN — METHYLPREDNISOLONE SODIUM SUCCINATE 125 MG: 125 INJECTION, POWDER, FOR SOLUTION INTRAMUSCULAR; INTRAVENOUS at 21:18

## 2017-12-28 ENCOUNTER — OFFICE VISIT (OUTPATIENT)
Dept: INTERNAL MEDICINE | Facility: CLINIC | Age: 37
End: 2017-12-28

## 2017-12-28 VITALS
BODY MASS INDEX: 31.91 KG/M2 | HEART RATE: 92 BPM | TEMPERATURE: 98.1 F | SYSTOLIC BLOOD PRESSURE: 120 MMHG | OXYGEN SATURATION: 97 % | WEIGHT: 158 LBS | DIASTOLIC BLOOD PRESSURE: 64 MMHG

## 2017-12-28 DIAGNOSIS — M79.604 RIGHT LEG PAIN: Primary | ICD-10-CM

## 2017-12-28 DIAGNOSIS — B00.9 HERPES SIMPLEX INFECTION: ICD-10-CM

## 2017-12-28 PROCEDURE — 99213 OFFICE O/P EST LOW 20 MIN: CPT | Performed by: PHYSICIAN ASSISTANT

## 2017-12-28 RX ORDER — TIOTROPIUM BROMIDE INHALATION SPRAY 3.12 UG/1
2 SPRAY, METERED RESPIRATORY (INHALATION) DAILY
COMMUNITY
Start: 2017-09-29 | End: 2022-09-23

## 2017-12-28 RX ORDER — VALACYCLOVIR HYDROCHLORIDE 1 G/1
1000 TABLET, FILM COATED ORAL DAILY
Qty: 30 TABLET | Refills: 5 | Status: SHIPPED | OUTPATIENT
Start: 2017-12-28 | End: 2018-06-01

## 2017-12-28 NOTE — PROGRESS NOTES
Chief Complaint   Patient presents with   • Right leg pain x2 week       Subjective   Bernardo Dodd is a 37 y.o. female.       History of Present Illness     Pt saw the allergist this morning and was swabbed for influenza b/c son and mother have been sick with flu. Test was negative but she was started on z-pack and tamiflu.    Notes that she had kidney stones and treated with stent and then removed by the patient. Saw urologist today and was told she was clear. Has to find another urologist to follow up with in 6 months, was not in insurance network.    The next week she developed right leg pain while she was at work. Was rescanned and told no concern for kidney stone. Treated for lumbar radiculopathy, had toradol shot and had allergic rxn, treated with benadryl and pepcid. She woke up with right leg pain on the day it happened. Pain is better when she is walking. Pain started in her groin and has moved into her thigh and now into her calf.    Notes that the herpes infection on her buttock resolved while she was on the valtrex but have since returned. The bumps itch but do not burn.      Current Outpatient Prescriptions:   •  albuterol (PROVENTIL) (2.5 MG/3ML) 0.083% nebulizer solution, , Disp: , Rfl:   •  albuterol (VENTOLIN HFA) 108 (90 BASE) MCG/ACT inhaler, Inhale 1-2 puffs as needed. Every 4-6 hrs PRN, Disp: , Rfl:   •  amitriptyline (ELAVIL) 25 MG tablet, Take 25 mg by mouth every night., Disp: , Rfl:   •  amLODIPine (NORVASC) 5 MG tablet, Take 1 tablet by mouth Daily., Disp: 30 tablet, Rfl: 5  •  Azithromycin (ZITHROMAX Z-GUANAKO PO), Take  by mouth., Disp: , Rfl:   •  beclomethasone (QVAR) 80 MCG/ACT inhaler, Inhale 1 puff 2 (two) times a day., Disp: , Rfl:   •  diclofenac (VOLTAREN) 50 MG EC tablet, Take 1 tablet by mouth 3 (Three) Times a Day for 30 doses., Disp: 30 tablet, Rfl: 0  •  eletriptan (RELPAX) 40 MG tablet, Take 40 mg by mouth 1 (one) time as needed for migraine. may repeat in 2 hours if  necessary, Disp: , Rfl:   •  ferrous sulfate 325 (65 FE) MG tablet, Take 1 tablet by mouth Daily With Breakfast., Disp: 30 tablet, Rfl: 5  •  fexofenadine (ALLEGRA) 180 MG tablet, Take 180 mg by mouth daily., Disp: , Rfl:   •  fluticasone (FLONASE) 50 MCG/ACT nasal spray, PLACE TWO SPRAYS IN EACH NOSTRIL ONCE DAILY, Disp: 1 bottle, Rfl: 10  •  ketotifen (ZADITOR) 0.025 % ophthalmic solution, Administer 2 drops to both eyes 2 (two) times a day., Disp: , Rfl:   •  MethylPREDNISolone (MEDROL, GUANAKO,) 4 MG tablet, Take as directed on package instructions., Disp: 21 tablet, Rfl: 0  •  mometasone-formoterol (DULERA) 200-5 MCG/ACT inhaler, Inhale 1 puff 2 (two) times a day., Disp: , Rfl:   •  montelukast (SINGULAIR) 10 MG tablet, Take 10 mg by mouth every night., Disp: , Rfl:   •  Oseltamivir Phosphate (TAMIFLU PO), Take  by mouth., Disp: , Rfl:   •  QUEtiapine (SEROquel) 25 MG tablet, Take 2 tablets by mouth Every Night., Disp: , Rfl:   •  raNITIdine (ZANTAC) 300 MG tablet, Take 1 tablet by mouth Daily. Take first thing in the morning on an empty stomach.  Wait at least 30 min to 1hr before eating., Disp: 30 tablet, Rfl: 2  •  SPIRIVA RESPIMAT 2.5 MCG/ACT aerosol solution, Take 2 puffs by mouth Daily., Disp: , Rfl:   •  tiZANidine (ZANAFLEX) 4 MG tablet, Take 4 mg by mouth At Night As Needed for muscle spasms., Disp: , Rfl:   •  valACYclovir (VALTREX) 1000 MG tablet, Take 1 tablet by mouth Daily., Disp: 30 tablet, Rfl: 5  •  venlafaxine XR (EFFEXOR-XR) 37.5 MG 24 hr capsule, Take 1 capsule by mouth 2 (Two) Times a Day., Disp: , Rfl:     Current Facility-Administered Medications:   •  meclizine (ANTIVERT) tablet 25 mg, 25 mg, Oral, TID PRN, SHANE Denis     PMFSH  The following portions of the patient's history were reviewed and updated as appropriate: allergies, current medications, past family history, past medical history, past social history, past surgical history and problem list.    Review of Systems    Constitutional: Positive for fatigue. Negative for activity change and unexpected weight change.   HENT: Positive for congestion, postnasal drip and sore throat. Negative for ear pain.    Eyes: Negative for pain and discharge.   Respiratory: Positive for cough. Negative for chest tightness, shortness of breath and wheezing.    Cardiovascular: Negative for chest pain and palpitations.   Gastrointestinal: Negative for abdominal pain, diarrhea and vomiting.   Endocrine: Negative.    Genitourinary: Negative.    Musculoskeletal: Positive for arthralgias and myalgias. Negative for joint swelling.   Skin: Negative for color change, rash and wound.   Allergic/Immunologic: Negative.    Neurological: Negative for seizures and syncope.   Psychiatric/Behavioral: Negative.        Objective   /64  Pulse 92  Temp 98.1 °F (36.7 °C)  Wt 71.7 kg (158 lb)  SpO2 97%  BMI 31.91 kg/m2    Physical Exam   Constitutional: She is oriented to person, place, and time. She appears well-developed and well-nourished.   HENT:   Head: Normocephalic and atraumatic.   Right Ear: External ear normal.   Left Ear: External ear normal.   Mouth/Throat: Oropharynx is clear and moist.   Neck: Normal range of motion.   Cardiovascular: Normal rate and regular rhythm.    Pulmonary/Chest: Effort normal and breath sounds normal.   Musculoskeletal:        Right hip: She exhibits normal range of motion, normal strength, no tenderness, no swelling and no deformity.        Right lower leg: She exhibits tenderness. She exhibits no bony tenderness, no swelling, no edema and no deformity.   Neurological: She is alert and oriented to person, place, and time.   Skin: Rash noted. Rash is papular (2x2 cm patch on right buttock).       Results for orders placed or performed in visit on 12/28/17   Duplex Venous Lower Extremity - Right CAR   Result Value Ref Range    BSA 1.7 m^2    BH CV ECHO STEW - BZI_BMI 30.9 kilograms/m^2    BH CV ECHO STEW - BSA(HAYCOCK) 1.8  m^2     CV ECHO STEW - BZI_METRIC_WEIGHT 71.7 kg     CV ECHO STEW - BZI_METRIC_HEIGHT 152.4 cm    Right Common Femoral Spont Y     Right Common Femoral Phasic Y     Right Common Femoral Augment Y     Right Common Femoral Compress C     Right Saphenofemoral Junction Spont Y     Right Saphenofemoral Junction Phasic Y     Right Saphenofemoral Junction Augment Y     Right Saphenofemoral Junction Compress C     Right Proximal Femoral Compress C     Right Mid Femoral Spont Y     Right Mid Femoral Phasic Y     Right Mid Femoral Augment Y     Right Mid Femoral Compress C     Right Distal Femoral Compress C     Right Popliteal Spont Y     Right Popliteal Phasic Y     Right Popliteal Augment Y     Right Popliteal Compress C     Right Posterior Tibial Compress C     Right Peroneal Compress C     Right GastronemiusSoleal Compress C     Right Greater Saph AK Compress C     Left Common Femoral Spont Y     Left Common Femoral Phasic Y     Left Common Femoral Augment Y     Left Common Femoral Compress C         ASSESSMENT/PLAN    Problem List Items Addressed This Visit        Other    Herpes simplex infection     Start preventative valtrex of 1000 mg daily.         Relevant Medications    Oseltamivir Phosphate (TAMIFLU PO)    valACYclovir (VALTREX) 1000 MG tablet      Other Visit Diagnoses     Right leg pain    -  Primary    Check venous duplex to r/o clot. If no DVT, discussed checking lumbar spine XR and PT eval and treatment for her ongoing leg pain.    Relevant Orders    XR Spine Lumbar 4+ View    Duplex Venous Lower Extremity - Right CAR (Completed)               Return for Next scheduled follow up.

## 2017-12-29 ENCOUNTER — HOSPITAL ENCOUNTER (OUTPATIENT)
Dept: CARDIOLOGY | Facility: HOSPITAL | Age: 37
Discharge: HOME | End: 2017-12-29
Admitting: PHYSICIAN ASSISTANT

## 2017-12-29 LAB
BH CV ECHO MEAS - BSA(HAYCOCK): 1.8 M^2
BH CV ECHO MEAS - BSA: 1.7 M^2
BH CV ECHO MEAS - BZI_BMI: 30.9 KILOGRAMS/M^2
BH CV ECHO MEAS - BZI_METRIC_HEIGHT: 152.4 CM
BH CV ECHO MEAS - BZI_METRIC_WEIGHT: 71.7 KG
BH CV LOWER VASCULAR LEFT COMMON FEMORAL AUGMENT: NORMAL
BH CV LOWER VASCULAR LEFT COMMON FEMORAL COMPRESS: NORMAL
BH CV LOWER VASCULAR LEFT COMMON FEMORAL PHASIC: NORMAL
BH CV LOWER VASCULAR LEFT COMMON FEMORAL SPONT: NORMAL
BH CV LOWER VASCULAR RIGHT COMMON FEMORAL AUGMENT: NORMAL
BH CV LOWER VASCULAR RIGHT COMMON FEMORAL COMPRESS: NORMAL
BH CV LOWER VASCULAR RIGHT COMMON FEMORAL PHASIC: NORMAL
BH CV LOWER VASCULAR RIGHT COMMON FEMORAL SPONT: NORMAL
BH CV LOWER VASCULAR RIGHT DISTAL FEMORAL COMPRESS: NORMAL
BH CV LOWER VASCULAR RIGHT GASTRONEMIUS COMPRESS: NORMAL
BH CV LOWER VASCULAR RIGHT GREATER SAPH AK COMPRESS: NORMAL
BH CV LOWER VASCULAR RIGHT MID FEMORAL AUGMENT: NORMAL
BH CV LOWER VASCULAR RIGHT MID FEMORAL COMPRESS: NORMAL
BH CV LOWER VASCULAR RIGHT MID FEMORAL PHASIC: NORMAL
BH CV LOWER VASCULAR RIGHT MID FEMORAL SPONT: NORMAL
BH CV LOWER VASCULAR RIGHT PERONEAL COMPRESS: NORMAL
BH CV LOWER VASCULAR RIGHT POPLITEAL AUGMENT: NORMAL
BH CV LOWER VASCULAR RIGHT POPLITEAL COMPRESS: NORMAL
BH CV LOWER VASCULAR RIGHT POPLITEAL PHASIC: NORMAL
BH CV LOWER VASCULAR RIGHT POPLITEAL SPONT: NORMAL
BH CV LOWER VASCULAR RIGHT POSTERIOR TIBIAL COMPRESS: NORMAL
BH CV LOWER VASCULAR RIGHT PROXIMAL FEMORAL COMPRESS: NORMAL
BH CV LOWER VASCULAR RIGHT SAPHENOFEMORAL JUNCTION AUGMENT: NORMAL
BH CV LOWER VASCULAR RIGHT SAPHENOFEMORAL JUNCTION COMPRESS: NORMAL
BH CV LOWER VASCULAR RIGHT SAPHENOFEMORAL JUNCTION PHASIC: NORMAL
BH CV LOWER VASCULAR RIGHT SAPHENOFEMORAL JUNCTION SPONT: NORMAL

## 2017-12-29 PROCEDURE — 93971 EXTREMITY STUDY: CPT

## 2017-12-29 PROCEDURE — 93971 EXTREMITY STUDY: CPT | Performed by: INTERNAL MEDICINE

## 2018-01-05 ENCOUNTER — OFFICE VISIT (OUTPATIENT)
Dept: INTERNAL MEDICINE | Facility: CLINIC | Age: 38
End: 2018-01-05

## 2018-01-05 VITALS
DIASTOLIC BLOOD PRESSURE: 64 MMHG | SYSTOLIC BLOOD PRESSURE: 112 MMHG | OXYGEN SATURATION: 99 % | HEART RATE: 89 BPM | WEIGHT: 153 LBS | TEMPERATURE: 98.3 F | BODY MASS INDEX: 30.9 KG/M2

## 2018-01-05 DIAGNOSIS — K21.00 GASTROESOPHAGEAL REFLUX DISEASE WITH ESOPHAGITIS: ICD-10-CM

## 2018-01-05 DIAGNOSIS — Z87.442 HISTORY OF NEPHROLITHIASIS: Primary | ICD-10-CM

## 2018-01-05 DIAGNOSIS — M54.16 ACUTE RIGHT LUMBAR RADICULOPATHY: ICD-10-CM

## 2018-01-05 PROCEDURE — 99213 OFFICE O/P EST LOW 20 MIN: CPT | Performed by: PHYSICIAN ASSISTANT

## 2018-01-05 RX ORDER — LORATADINE 10 MG/1
1 TABLET ORAL DAILY
COMMUNITY
Start: 2017-12-27 | End: 2018-06-01

## 2018-01-05 RX ORDER — LEVOCETIRIZINE DIHYDROCHLORIDE 5 MG/1
1 TABLET, FILM COATED ORAL DAILY
COMMUNITY
Start: 2017-12-27 | End: 2019-01-21

## 2018-01-05 NOTE — PROGRESS NOTES
Chief Complaint   Patient presents with   • Follow-up     removal of kidney stone, needs referral to urologist       Subjective   Bernardo Dodd is a 37 y.o. female.       History of Present Illness     Pt needs referral to urology because insurance does not cover the person she saw recently.    She continues to feel sick. Has nausea and burning in stomach, finished the z-pack and then started the tamiflu. Has had 3 days of tamiflu, increased nausea since starting it.     Right leg and back pain continues, did not get the lumbar xray yet.        Current Outpatient Prescriptions:   •  albuterol (PROVENTIL) (2.5 MG/3ML) 0.083% nebulizer solution, , Disp: , Rfl:   •  albuterol (VENTOLIN HFA) 108 (90 BASE) MCG/ACT inhaler, Inhale 1-2 puffs as needed. Every 4-6 hrs PRN, Disp: , Rfl:   •  amitriptyline (ELAVIL) 25 MG tablet, Take 25 mg by mouth every night., Disp: , Rfl:   •  amLODIPine (NORVASC) 5 MG tablet, Take 1 tablet by mouth Daily., Disp: 30 tablet, Rfl: 5  •  beclomethasone (QVAR) 80 MCG/ACT inhaler, Inhale 1 puff 2 (two) times a day., Disp: , Rfl:   •  BREO ELLIPTA 200-25 MCG/INH aerosol powder , , Disp: , Rfl:   •  diclofenac (VOLTAREN) 50 MG EC tablet, Take 1 tablet by mouth 3 (Three) Times a Day for 30 doses., Disp: 30 tablet, Rfl: 0  •  eletriptan (RELPAX) 40 MG tablet, Take 40 mg by mouth 1 (one) time as needed for migraine. may repeat in 2 hours if necessary, Disp: , Rfl:   •  ferrous sulfate 325 (65 FE) MG tablet, Take 1 tablet by mouth Daily With Breakfast., Disp: 30 tablet, Rfl: 5  •  fexofenadine (ALLEGRA) 180 MG tablet, Take 180 mg by mouth daily., Disp: , Rfl:   •  fluticasone (FLONASE) 50 MCG/ACT nasal spray, PLACE TWO SPRAYS IN EACH NOSTRIL ONCE DAILY, Disp: 1 bottle, Rfl: 10  •  ketotifen (ZADITOR) 0.025 % ophthalmic solution, Administer 2 drops to both eyes 2 (two) times a day., Disp: , Rfl:   •  levocetirizine (XYZAL) 5 MG tablet, Take 1 tablet by mouth Daily., Disp: , Rfl:   •  loratadine  (CLARITIN) 10 MG tablet, Take 1 tablet by mouth Daily., Disp: , Rfl:   •  MethylPREDNISolone (MEDROL, GUANAKO,) 4 MG tablet, Take as directed on package instructions., Disp: 21 tablet, Rfl: 0  •  mometasone-formoterol (DULERA) 200-5 MCG/ACT inhaler, Inhale 1 puff 2 (two) times a day., Disp: , Rfl:   •  montelukast (SINGULAIR) 10 MG tablet, Take 10 mg by mouth every night., Disp: , Rfl:   •  MUCINEX 600 MG 12 hr tablet, Take 2 tablets by mouth 2 (Two) Times a Day., Disp: , Rfl:   •  Oseltamivir Phosphate (TAMIFLU PO), Take  by mouth., Disp: , Rfl:   •  QUEtiapine (SEROquel) 25 MG tablet, Take 2 tablets by mouth Every Night., Disp: , Rfl:   •  raNITIdine (ZANTAC) 300 MG tablet, Take 1 tablet by mouth Daily. Take first thing in the morning on an empty stomach.  Wait at least 30 min to 1hr before eating., Disp: 30 tablet, Rfl: 2  •  SPIRIVA RESPIMAT 2.5 MCG/ACT aerosol solution, Take 2 puffs by mouth Daily., Disp: , Rfl:   •  tiZANidine (ZANAFLEX) 4 MG tablet, Take 4 mg by mouth At Night As Needed for muscle spasms., Disp: , Rfl:   •  valACYclovir (VALTREX) 1000 MG tablet, Take 1 tablet by mouth Daily., Disp: 30 tablet, Rfl: 5  •  venlafaxine XR (EFFEXOR-XR) 37.5 MG 24 hr capsule, Take 1 capsule by mouth 2 (Two) Times a Day., Disp: , Rfl:     Current Facility-Administered Medications:   •  meclizine (ANTIVERT) tablet 25 mg, 25 mg, Oral, TID PRN, SHANE Denis     [facility]  The following portions of the patient's history were reviewed and updated as appropriate: allergies, current medications, past family history, past medical history, past social history, past surgical history and problem list.    Review of Systems   Constitutional: Positive for appetite change. Negative for activity change, chills and diaphoresis.   HENT: Negative.    Respiratory: Negative for chest tightness, shortness of breath and wheezing.    Cardiovascular: Negative for chest pain.   Gastrointestinal: Positive for abdominal pain and nausea.  Negative for constipation and diarrhea.   Genitourinary: Negative for dysuria.   Musculoskeletal: Negative for arthralgias.   Neurological: Negative for dizziness, syncope and light-headedness.   Psychiatric/Behavioral: Negative.        Objective   /64  Pulse 89  Temp 98.3 °F (36.8 °C)  Wt 69.4 kg (153 lb)  SpO2 99%  BMI 30.9 kg/m2    Physical Exam   Constitutional: She appears well-developed and well-nourished.   HENT:   Head: Normocephalic.   Right Ear: Hearing, tympanic membrane, external ear and ear canal normal.   Left Ear: Hearing, tympanic membrane, external ear and ear canal normal.   Nose: Nose normal.   Mouth/Throat: Oropharynx is clear and moist.   Eyes: Conjunctivae are normal. Pupils are equal, round, and reactive to light.   Neck: Normal range of motion.   Cardiovascular: Normal rate, regular rhythm and normal heart sounds.    Pulmonary/Chest: Effort normal and breath sounds normal. She has no decreased breath sounds. She has no wheezes. She has no rhonchi. She has no rales.   Musculoskeletal: Normal range of motion.   Neurological: She is alert.   Skin: Skin is warm and dry.   Psychiatric: She has a normal mood and affect. Her behavior is normal.   Nursing note and vitals reviewed.      ASSESSMENT/PLAN    Problem List Items Addressed This Visit        Digestive    GERD (gastroesophageal reflux disease)     Start ranitidine BID. Stop tamiflu, pt started it too late for it to be effective.             Nervous and Auditory    Acute right lumbar radiculopathy     Refer for physical therapy.         Relevant Orders    Ambulatory Referral to Physical Therapy Evaluate and treat      Other Visit Diagnoses     History of nephrolithiasis    -  Primary    Relevant Orders    Ambulatory Referral to Urology               Return in about 6 weeks (around 2/16/2018) for Recheck.

## 2018-01-22 ENCOUNTER — HOSPITAL ENCOUNTER (EMERGENCY)
Facility: HOSPITAL | Age: 38
Discharge: HOME | End: 2018-01-22
Attending: EMERGENCY MEDICINE | Admitting: EMERGENCY MEDICINE

## 2018-01-22 VITALS
HEART RATE: 70 BPM | HEIGHT: 59 IN | DIASTOLIC BLOOD PRESSURE: 66 MMHG | OXYGEN SATURATION: 97 % | BODY MASS INDEX: 30.24 KG/M2 | RESPIRATION RATE: 16 BRPM | SYSTOLIC BLOOD PRESSURE: 106 MMHG | WEIGHT: 150 LBS | TEMPERATURE: 97.9 F

## 2018-01-22 DIAGNOSIS — M54.41 CHRONIC RIGHT-SIDED LOW BACK PAIN WITH RIGHT-SIDED SCIATICA: Primary | ICD-10-CM

## 2018-01-22 DIAGNOSIS — G89.29 CHRONIC RIGHT-SIDED LOW BACK PAIN WITH RIGHT-SIDED SCIATICA: Primary | ICD-10-CM

## 2018-01-22 PROCEDURE — 99283 EMERGENCY DEPT VISIT LOW MDM: CPT

## 2018-01-22 RX ORDER — NAPROXEN 500 MG/1
500 TABLET ORAL 2 TIMES DAILY PRN
Qty: 16 TABLET | Refills: 0 | Status: SHIPPED | OUTPATIENT
Start: 2018-01-22 | End: 2018-06-26

## 2018-01-22 RX ORDER — DIVALPROEX SODIUM 250 MG/1
250 TABLET, DELAYED RELEASE ORAL
COMMUNITY
End: 2018-04-11

## 2018-01-22 NOTE — DISCHARGE INSTRUCTIONS
Chronic Back Pain  When back pain lasts longer than 3 months, it is called chronic back pain. The cause of your back pain may not be known. Some common causes include:  · Wear and tear (degenerative disease) of the bones, ligaments, or disks in your back.  · Inflammation and stiffness in your back (arthritis).  People who have chronic back pain often go through certain periods in which the pain is more intense (flare-ups). Many people can learn to manage the pain with home care.  Follow these instructions at home:  Pay attention to any changes in your symptoms. Take these actions to help with your pain:  Activity  · Avoid bending and activities that make the problem worse.  · Do not sit or  one place for long periods of time.  · Take brief periods of rest throughout the day. This will reduce your pain. Resting in a lying or standing position is usually better than sitting to rest.  · When you are resting for longer periods, mix in some mild activity or stretching between periods of rest. This will help to prevent stiffness and pain.  · Get regular exercise. Ask your health care provider what activities are safe for you.  · Do not lift anything that is heavier than 10 lb (4.5 kg). Always use proper lifting technique, which includes:  ¨ Bending your knees.  ¨ Keeping the load close to your body.  ¨ Avoiding twisting.  Managing pain  · If directed, apply ice to the painful area. Your health care provider may recommend applying ice during the first 24-48 hours after a flare-up begins.  ¨ Put ice in a plastic bag.  ¨ Place a towel between your skin and the bag.  ¨ Leave the ice on for 20 minutes, 2-3 times per day.  · After icing, apply heat to the affected area as often as told by your health care provider. Use the heat source that your health care provider recommends, such as a moist heat pack or a heating pad.  ¨ Place a towel between your skin and the heat source.  ¨ Leave the heat on for 20-30  minutes.  ¨ Remove the heat if your skin turns bright red. This is especially important if you are unable to feel pain, heat, or cold. You may have a greater risk of getting burned.  · Try soaking in a warm tub.  · Take over-the-counter and prescription medicines only as told by your health care provider.  · Keep all follow-up visits as told by your health care provider. This is important.  Contact a health care provider if:  · You have pain that is not relieved with rest or medicine.  Get help right away if:  · You have weakness or numbness in one or both of your legs or feet.  · You have trouble controlling your bladder or your bowels.  · You have nausea or vomiting.  · You have pain in your abdomen.  · You have shortness of breath or you faint.  This information is not intended to replace advice given to you by your health care provider. Make sure you discuss any questions you have with your health care provider.  Document Released: 01/25/2006 Document Revised: 04/27/2017 Document Reviewed: 06/06/2016  [employer] Patient Education © 2017 [employer]  Please review the medications you are supposed to be taking according to prior physician recommendations. I have not changed your home medications during this visit. If your discharge instructions indicate that I have changed your home medications, this is not the case, and you should disregard. If you have any questions about the medication you should be taking at home, please call your physician.

## 2018-01-22 NOTE — ED PROVIDER NOTES
Subjective   History of Present Illness  This 37-year-old female presents the emergency department complaints of ongoing back pain as well as some tingling in her right lower extremity.  This is been an issue for quite some time now and she is been seen previously in the emergency department with similar complaints.  She denies acute trauma or injury this evening.  She's had no loss of bowel or bladder control.  She's had no fever or chills.  She is taking zanaflex for her pain.  She states that she has not taken any anti-inflammatories.  The patient notes discomfort that extends from her neck all the way to her lower back.  She states that at times it is sharp.  She states that it is worse with movements or rolling about in bed.  Again she denies acute trauma or injury.    Past medical history is significant for her ongoing back problems other medical issues including history of asthma, depression, gastroesophageal reflux disease and ovarian cysts as well as hypertension    Current medications are as noted on the chart    Social history she does not smoke drink or utilize drugs  Review of Systems   Constitutional: Negative for chills and fever.   Respiratory: Negative for cough and shortness of breath.    Gastrointestinal: Negative for abdominal pain, diarrhea, nausea and vomiting.   Genitourinary: Negative for dysuria, frequency and urgency.   Musculoskeletal: Positive for back pain and neck pain.   All other systems reviewed and are negative.      Past Medical History:   Diagnosis Date   • Allergic rhinitis    • Anemia    • Arthritis    • Asthma    • Depression    • FHx: migraine headaches    • GERD (gastroesophageal reflux disease)    • Heart murmur    • History of chest x-ray 11/01/2015    no active disease   • History of echocardiogram 11/01/2015    ejection fraction of greater than 65%, mitral and pulmonic regurgitation an physiological tricuspid regurgitation.   • History of PFTs 12/22/2015    spirometry  data acceptable and reproducible; pt given 4 puffs of Ventolin; pt gave good effort; no obstruction; no Bd response; MVV reduced    • History of PFTs 2015    pt gave best effort; duoneb given prior and post study; moderate nonspecific proportional reduction of FEV1 and FVC with preserved ratio; FEV1 moderately reduced; cannot rule out restriction   • Hypertension    • Ovarian cyst    • Seizures    • Thigh shingles        No Known Allergies    Past Surgical History:   Procedure Laterality Date   • BILATERAL BREAST REDUCTION     • BREAST SURGERY      breast reduction   •  SECTION     • LAPAROSCOPIC TUBAL LIGATION     • ORIF ANKLE FRACTURE Right        Family History   Problem Relation Age of Onset   • Pancreatic cancer Maternal Grandmother    • Heart failure Paternal Grandmother    • MARCIE disease Paternal Aunt    • Arthritis Mother    • Cancer Mother    • Arthritis Father    • Diabetes Neg Hx    • Heart attack Neg Hx    • Hypertension Neg Hx    • Hyperlipidemia Neg Hx    • Mental illness Neg Hx    • Obesity Neg Hx    • Stroke Neg Hx        Social History     Social History   • Marital status: Single     Spouse name: N/A   • Number of children: N/A   • Years of education: N/A     Social History Main Topics   • Smoking status: Former Smoker     Packs/day: 1.00     Years: 15.00     Types: Cigarettes     Quit date: 2015   • Smokeless tobacco: Never Used   • Alcohol use No      Comment: socially   • Drug use: No   • Sexual activity: Defer     Other Topics Concern   • None     Social History Narrative           Objective   Physical Exam   Constitutional: She is oriented to person, place, and time. She appears well-developed and well-nourished. No distress.   HENT:   Head: Normocephalic and atraumatic.   Eyes: Pupils are equal, round, and reactive to light. No scleral icterus.   Neck: Neck supple. No JVD present.   Cardiovascular: Normal rate, regular rhythm and normal heart sounds.    Pulmonary/Chest:  Effort normal and breath sounds normal. No respiratory distress.   Abdominal: Soft. Bowel sounds are normal. She exhibits no distension. There is no tenderness.   Musculoskeletal: She exhibits no edema.   Lymphadenopathy:     She has no cervical adenopathy.   Neurological: She is alert and oriented to person, place, and time. She displays normal reflexes. No cranial nerve deficit. Coordination normal.   Skin: Skin is warm and dry. No rash noted. She is not diaphoretic.   Psychiatric: She has a normal mood and affect. Her behavior is normal.   Nursing note and vitals reviewed.   the patient's deep tendon reflexes are 1+ and symmetric bilaterally.  Toes are downgoing.  There is no wasting or fasciculation of extremity musculature.  Straight leg is negative for radicular signs.  The extremities are warm pink well-perfused.    Assessment back pain, musculoskeletal  In the absence of any red flag signs such as fever acute trauma or injury or focal neurologic deficits I do not feel that acute imaging is indicated.  We will add a nonsteroidal anti-inflammatories to the patient's current medical regimen and asked her to follow up with her primary physician.  I spoke with her about sciatica and have indicated that the common treatment is physical therapy which will require her primary physician's involvement.  The patient indicates understanding and will comply.    Procedures         ED Course  ED Course                  MDM    Final diagnoses:   Chronic right-sided low back pain with right-sided sciatica            Ian Miller MD  01/23/18 0653

## 2018-02-16 ENCOUNTER — OFFICE VISIT (OUTPATIENT)
Dept: INTERNAL MEDICINE | Facility: CLINIC | Age: 38
End: 2018-02-16

## 2018-02-16 VITALS
WEIGHT: 159 LBS | DIASTOLIC BLOOD PRESSURE: 72 MMHG | BODY MASS INDEX: 32.11 KG/M2 | SYSTOLIC BLOOD PRESSURE: 128 MMHG | OXYGEN SATURATION: 98 % | HEART RATE: 78 BPM

## 2018-02-16 DIAGNOSIS — M54.16 ACUTE RIGHT LUMBAR RADICULOPATHY: Primary | ICD-10-CM

## 2018-02-16 DIAGNOSIS — N76.2 ACUTE VULVITIS: ICD-10-CM

## 2018-02-16 DIAGNOSIS — R30.0 DYSURIA: ICD-10-CM

## 2018-02-16 LAB
BILIRUB BLD-MCNC: ABNORMAL MG/DL
CLARITY, POC: ABNORMAL
COLOR UR: ABNORMAL
GLUCOSE UR STRIP-MCNC: NEGATIVE MG/DL
KETONES UR QL: NEGATIVE
LEUKOCYTE EST, POC: ABNORMAL
NITRITE UR-MCNC: NEGATIVE MG/ML
PH UR: 5 [PH] (ref 5–8)
PROT UR STRIP-MCNC: ABNORMAL MG/DL
RBC # UR STRIP: ABNORMAL /UL
SP GR UR: 1.02 (ref 1–1.03)
UROBILINOGEN UR QL: ABNORMAL

## 2018-02-16 PROCEDURE — 87591 N.GONORRHOEAE DNA AMP PROB: CPT | Performed by: PHYSICIAN ASSISTANT

## 2018-02-16 PROCEDURE — 99214 OFFICE O/P EST MOD 30 MIN: CPT | Performed by: PHYSICIAN ASSISTANT

## 2018-02-16 PROCEDURE — 87491 CHLMYD TRACH DNA AMP PROBE: CPT | Performed by: PHYSICIAN ASSISTANT

## 2018-02-16 RX ORDER — FLUCONAZOLE 150 MG/1
150 TABLET ORAL
Qty: 2 TABLET | Refills: 0 | Status: SHIPPED | OUTPATIENT
Start: 2018-02-16 | End: 2018-04-11

## 2018-02-16 RX ORDER — DIVALPROEX SODIUM 250 MG/1
TABLET, EXTENDED RELEASE ORAL
COMMUNITY
Start: 2018-02-14 | End: 2018-06-22
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
## 2018-02-17 NOTE — ASSESSMENT & PLAN NOTE
Refer for MRI of lumbar spine. Gave pt number and order to schedule physical therapy. Refer to pain management d/t ongoing pain.

## 2018-02-18 LAB
C TRACH RRNA SPEC DONR QL NAA+PROBE: NEGATIVE
N GONORRHOEA DNA SPEC QL NAA+PROBE: NEGATIVE

## 2018-04-11 ENCOUNTER — OFFICE VISIT (OUTPATIENT)
Dept: INTERNAL MEDICINE | Facility: CLINIC | Age: 38
End: 2018-04-11

## 2018-04-11 VITALS
WEIGHT: 164 LBS | OXYGEN SATURATION: 98 % | TEMPERATURE: 99 F | HEART RATE: 97 BPM | SYSTOLIC BLOOD PRESSURE: 126 MMHG | DIASTOLIC BLOOD PRESSURE: 82 MMHG | HEIGHT: 59 IN | BODY MASS INDEX: 33.06 KG/M2

## 2018-04-11 DIAGNOSIS — R68.83 CHILLS: ICD-10-CM

## 2018-04-11 DIAGNOSIS — J06.9 ACUTE URI: ICD-10-CM

## 2018-04-11 DIAGNOSIS — J02.9 SORE THROAT: Primary | ICD-10-CM

## 2018-04-11 LAB
EXPIRATION DATE: NORMAL
EXPIRATION DATE: NORMAL
FLUAV AG NPH QL: NEGATIVE
FLUBV AG NPH QL: NEGATIVE
INTERNAL CONTROL: NORMAL
INTERNAL CONTROL: NORMAL
Lab: NORMAL
Lab: NORMAL
S PYO AG THROAT QL: NEGATIVE

## 2018-04-11 PROCEDURE — 87804 INFLUENZA ASSAY W/OPTIC: CPT | Performed by: NURSE PRACTITIONER

## 2018-04-11 PROCEDURE — 99213 OFFICE O/P EST LOW 20 MIN: CPT | Performed by: NURSE PRACTITIONER

## 2018-04-11 PROCEDURE — 87880 STREP A ASSAY W/OPTIC: CPT | Performed by: NURSE PRACTITIONER

## 2018-04-11 RX ORDER — AMOXICILLIN AND CLAVULANATE POTASSIUM 500; 125 MG/1; MG/1
1 TABLET, FILM COATED ORAL 2 TIMES DAILY
Qty: 14 TABLET | Refills: 0 | Status: SHIPPED | OUTPATIENT
Start: 2018-04-11 | End: 2018-04-18

## 2018-04-11 NOTE — PROGRESS NOTES
"Britta Dodd is a 37 y.o. female.     URI    This is a new problem. The current episode started yesterday. There has been no fever. Associated symptoms include congestion, ear pain, headaches, a plugged ear sensation, rhinorrhea, sinus pain and a sore throat. Pertinent negatives include no abdominal pain, chest pain, coughing, diarrhea, dysuria, joint pain, joint swelling, nausea, neck pain, rash, sneezing, swollen glands, vomiting or wheezing. She has tried decongestant and antihistamine for the symptoms. The treatment provided mild relief.        The following portions of the patient's history were reviewed and updated as appropriate: allergies, current medications, past family history, past medical history, past social history, past surgical history and problem list.    Review of Systems   HENT: Positive for congestion, ear pain, rhinorrhea, sinus pain and sore throat. Negative for sneezing.    Respiratory: Negative for cough and wheezing.    Cardiovascular: Negative for chest pain.   Gastrointestinal: Negative for abdominal pain, diarrhea, nausea and vomiting.   Genitourinary: Negative for dysuria.   Musculoskeletal: Negative for joint pain and neck pain.   Skin: Negative for rash.   Neurological: Positive for headaches.   All other systems reviewed and are negative.    /82   Pulse 97   Temp 99 °F (37.2 °C)   Ht 149.9 cm (59.02\")   Wt 74.4 kg (164 lb)   SpO2 98%   BMI 33.11 kg/m²    Objective   Physical Exam   Constitutional: She appears well-developed and well-nourished.   HENT:   Head: Normocephalic.   Right Ear: External ear normal. Tympanic membrane is erythematous.   Left Ear: External ear normal. Tympanic membrane is erythematous.   Nose: Mucosal edema and rhinorrhea present. Right sinus exhibits maxillary sinus tenderness. Left sinus exhibits maxillary sinus tenderness.   Mouth/Throat: Posterior oropharyngeal erythema present.   Eyes: Conjunctivae are normal. Pupils are " equal, round, and reactive to light.   Neck: Normal range of motion.   Cardiovascular: Normal rate, regular rhythm and normal heart sounds.    Pulmonary/Chest: Effort normal and breath sounds normal.   Nursing note and vitals reviewed.      Results for orders placed or performed in visit on 04/11/18   POC Rapid Strep A   Result Value Ref Range    Rapid Strep A Screen Negative Negative, VALID, INVALID, Not Performed    Internal Control Passed Passed    Lot Number [id number]     Expiration Date 2019-05-31    POCT Influenza A/B   Result Value Ref Range    Rapid Influenza A Ag Negative     Rapid Influenza B Ag Negative     Internal Control Passed Passed    Lot Number [id number]     Expiration Date 2021-01-28       Assessment/Plan   Bernardo was seen today for generalized body aches, fatigue, cough, chills and sore throat.    Diagnoses and all orders for this visit:    Sore throat  -     POC Rapid Strep A  -     POCT Influenza A/B    Chills  -     POCT Influenza A/B    Acute URI    Other orders  -     amoxicillin-clavulanate (AUGMENTIN) 500-125 MG per tablet; Take 1 tablet by mouth 2 (Two) Times a Day for 7 days.        Please review visit summary for additional instructions         Nelly Ahumada, APRN

## 2018-04-11 NOTE — PATIENT INSTRUCTIONS

## 2018-04-12 NOTE — MR AVS SNAPSHOT
Bernardo Dodd   1/15/2017 3:45 PM   Office Visit    Dept Phone:  [phone]   Encounter #:  [id number]    Provider:  BOOGIE RIBERA   Department:  [facility]                Your Full Care Plan              Today's Medication Changes          These changes are accurate as of: 1/15/17  4:23 PM.  If you have any questions, ask your nurse or doctor.               New Medication(s)Ordered:     azithromycin 250 MG tablet   Commonly known as:  ZITHROMAX Z-GUANAKO   Take 2 tablets the first day, then 1 tablet daily for 4 days.       guaifenesin-dextromethorphan  MG tablet sustained-release 12 hour tablet   Take 2 tablets by mouth 2 (Two) Times a Day As Needed (cough/chest congestion) for up to 5 days.         Medication(s)that have changed:     * predniSONE 20 MG tablet   Commonly known as:  DELTASONE   Take 3 tablets by mouth Daily.   What changed:  Another medication with the same name was added. Make sure you understand how and when to take each.       * predniSONE 20 MG tablet   Commonly known as:  DELTASONE   Take 1 tablet by mouth 2 (Two) Times a Day for 5 days.   What changed:  You were already taking a medication with the same name, and this prescription was added. Make sure you understand how and when to take each.       * Notice:  This list has 2 medication(s) that are the same as other medications prescribed for you. Read the directions carefully, and ask your doctor or other care provider to review them with you.         Where to Get Your Medications      These medications were sent to [facility] [address] - [address] [place] [address] [phone] [facility] [phone] [place] [address]     Phone:  [phone]     azithromycin 250 MG tablet    guaifenesin-dextromethorphan  MG tablet sustained-release 12 hour tablet    predniSONE 20 MG tablet                  Your Updated Medication List          This list  is accurate as of: 1/15/17  4:23 PM.  Always use your most recent med list.                amitriptyline 25 MG tablet   Commonly known as:  ELAVIL       amLODIPine 5 MG tablet   Commonly known as:  NORVASC       azithromycin 250 MG tablet   Commonly known as:  ZITHROMAX Z-GUANAKO   Take 2 tablets the first day, then 1 tablet daily for 4 days.       beclomethasone 80 MCG/ACT inhaler   Commonly known as:  QVAR       DULERA 200-5 MCG/ACT inhaler   Generic drug:  mometasone-formoterol       eletriptan 40 MG tablet   Commonly known as:  RELPAX       fexofenadine 180 MG tablet   Commonly known as:  ALLEGRA       fluticasone 50 MCG/ACT nasal spray   Commonly known as:  FLONASE       guaifenesin-dextromethorphan  MG tablet sustained-release 12 hour tablet   Take 2 tablets by mouth 2 (Two) Times a Day As Needed (cough/chest congestion) for up to 5 days.       ipratropium-albuterol  MCG/ACT inhaler   Commonly known as:  COMBIVENT RESPIMAT       ketotifen 0.025 % ophthalmic solution   Commonly known as:  ZADITOR       Loratadine 10 MG capsule       * predniSONE 20 MG tablet   Commonly known as:  DELTASONE   Take 3 tablets by mouth Daily.       * predniSONE 20 MG tablet   Commonly known as:  DELTASONE   Take 1 tablet by mouth 2 (Two) Times a Day for 5 days.       raNITIdine 300 MG tablet   Commonly known as:  ZANTAC   Take 1 tablet by mouth Daily. Take first thing in the morning on an empty stomach.  Wait at least 30 min to 1hr before eating.       SINGULAIR 10 MG tablet   Generic drug:  montelukast       tiZANidine 4 MG tablet   Commonly known as:  ZANAFLEX       topiramate 50 MG tablet   Commonly known as:  TOPAMAX       valACYclovir 1000 MG tablet   Commonly known as:  VALTREX   Take 1 tablet by mouth 3 (Three) Times a Day.       VENTOLIN  (90 BASE) MCG/ACT inhaler   Generic drug:  albuterol       * Notice:  This list has 2 medication(s) that are the same as other medications prescribed for you. Read the  directions carefully, and ask your doctor or other care provider to review them with you.            You Were Diagnosed With        Codes Comments    Right acute serous otitis media, recurrence not specified    -  Primary ICD-10-CM: H65.01  ICD-9-CM: 381.01     Moderate persistent asthma with acute exacerbation     ICD-10-CM: J45.41  ICD-9-CM: 493.92       Instructions     None    Patient Instructions History      Upcoming Appointments     Visit Type Date Time Department    OFFICE VISIT 1/15/2017  3:45 PM [facility] [place]    OUTSIDE FACILITY 1/31/2017  1:00 PM [facility]STR[facility]    POST-OP 1/31/2017 11:40 AM [facility]YN [facility]    NEW PATIENT 3/15/2017  9:20 AM [facility]      [employer] Signup     Our records indicate that you have an active [employer] account.    You can view your After Visit Summary by going to [employer] and logging in with your [employer] username and password.  If you don't have a [employer] username and password but a parent or guardian has access to your record, the parent or guardian should login with their own [employer] username and password and access your record to view the After Visit Summary.    If you have questions, you can email [email or web address] or call [phone] to talk to our [employer] staff.  Remember, [employer] is NOT to be used for urgent needs.  For medical emergencies, dial 911.               Other Info from Your Visit           Your Appointments     Jan 31, 2017 11:40 AM EST   Post-Op with Manoj Bryan MD   [facility] OBSTETRICS AND GYNECOLOGY (--)    [address]  [facility] [address] [phone]            Jan 31, 2017  1:00 PM EST   Outside Facility with Satinder Zavala MD   [facility] GASTROENTEROLOGY (--)    [address]  [facility] [address]   [phone]            Mar 15, 2017  9:20 AM EDT   New Patient with Asha Hunt MD   [facility] MED[facility]INE AND ENDOCRINOLOGY (--)    [address] [address] [phone]           Bring all previous medical records and films, along with current medications and insurance information.              Allergies     No Known Allergies      Reason for Visit     Asthma     Sinus Problem           Vital Signs     Blood Pressure Pulse Temperature Respirations Last Menstrual Period Oxygen Saturation    118/67 (BP Location: Right arm) 68 98.9 °F (37.2 °C) (Oral) 20 12/25/2016 (Exact Date) 98%    Smoking Status                   Former Smoker           Problems and Diagnoses Noted     Right middle ear infection    -  Primary    Moderate persistent asthma with exacerbation             numerical 0-10

## 2018-04-23 RX ORDER — AMLODIPINE BESYLATE 5 MG/1
5 TABLET ORAL DAILY
Qty: 30 TABLET | Refills: 5 | Status: SHIPPED | OUTPATIENT
Start: 2018-04-23 | End: 2018-11-15 | Stop reason: SDUPTHER

## 2018-04-30 ENCOUNTER — OFFICE VISIT (OUTPATIENT)
Dept: INTERNAL MEDICINE | Facility: CLINIC | Age: 38
End: 2018-04-30

## 2018-04-30 VITALS
OXYGEN SATURATION: 96 % | BODY MASS INDEX: 33.71 KG/M2 | WEIGHT: 167 LBS | HEART RATE: 96 BPM | SYSTOLIC BLOOD PRESSURE: 122 MMHG | DIASTOLIC BLOOD PRESSURE: 82 MMHG

## 2018-04-30 DIAGNOSIS — M25.531 BILATERAL WRIST PAIN: ICD-10-CM

## 2018-04-30 DIAGNOSIS — M25.532 BILATERAL WRIST PAIN: ICD-10-CM

## 2018-04-30 DIAGNOSIS — N76.0 ACUTE VAGINITIS: Primary | ICD-10-CM

## 2018-04-30 PROCEDURE — 87798 DETECT AGENT NOS DNA AMP: CPT | Performed by: PHYSICIAN ASSISTANT

## 2018-04-30 PROCEDURE — 87591 N.GONORRHOEAE DNA AMP PROB: CPT | Performed by: PHYSICIAN ASSISTANT

## 2018-04-30 PROCEDURE — 87661 TRICHOMONAS VAGINALIS AMPLIF: CPT | Performed by: PHYSICIAN ASSISTANT

## 2018-04-30 PROCEDURE — 87481 CANDIDA DNA AMP PROBE: CPT | Performed by: PHYSICIAN ASSISTANT

## 2018-04-30 PROCEDURE — 87491 CHLMYD TRACH DNA AMP PROBE: CPT | Performed by: PHYSICIAN ASSISTANT

## 2018-04-30 PROCEDURE — 99213 OFFICE O/P EST LOW 20 MIN: CPT | Performed by: PHYSICIAN ASSISTANT

## 2018-04-30 RX ORDER — FLUCONAZOLE 150 MG/1
150 TABLET ORAL ONCE
Qty: 1 TABLET | Refills: 0 | Status: SHIPPED | OUTPATIENT
Start: 2018-04-30 | End: 2018-04-30

## 2018-04-30 RX ORDER — GABAPENTIN 300 MG/1
300 CAPSULE ORAL 2 TIMES DAILY
COMMUNITY
End: 2018-08-14 | Stop reason: DRUGHIGH

## 2018-05-04 ENCOUNTER — TELEPHONE (OUTPATIENT)
Dept: INTERNAL MEDICINE | Facility: CLINIC | Age: 38
End: 2018-05-04

## 2018-05-04 LAB
A VAGINAE DNA VAG QL NAA+PROBE: ABNORMAL SCORE
BVAB2 DNA VAG QL NAA+PROBE: ABNORMAL SCORE
C ALBICANS DNA VAG QL NAA+PROBE: POSITIVE
C GLABRATA DNA VAG QL NAA+PROBE: NEGATIVE
C TRACH RRNA SPEC DONR QL NAA+PROBE: NEGATIVE
MEGASPHAERA 1: ABNORMAL SCORE
N GONORRHOEA DNA SPEC QL NAA+PROBE: NEGATIVE
T VAGINALIS RRNA GENITAL QL PROBE: POSITIVE

## 2018-05-04 RX ORDER — TINIDAZOLE 500 MG/1
2 TABLET ORAL ONCE
Qty: 4 TABLET | Refills: 0 | Status: SHIPPED | OUTPATIENT
Start: 2018-05-04 | End: 2018-05-04

## 2018-05-04 RX ORDER — METRONIDAZOLE 7.5 MG/G
GEL VAGINAL
Qty: 70 G | Refills: 0 | Status: SHIPPED | OUTPATIENT
Start: 2018-05-04 | End: 2018-05-09

## 2018-05-07 ENCOUNTER — OFFICE VISIT (OUTPATIENT)
Dept: INTERNAL MEDICINE | Facility: CLINIC | Age: 38
End: 2018-05-07

## 2018-05-07 VITALS
SYSTOLIC BLOOD PRESSURE: 120 MMHG | WEIGHT: 174 LBS | OXYGEN SATURATION: 99 % | BODY MASS INDEX: 35.12 KG/M2 | DIASTOLIC BLOOD PRESSURE: 76 MMHG | HEART RATE: 71 BPM

## 2018-05-07 DIAGNOSIS — Z00.00 HEALTH CARE MAINTENANCE: Primary | ICD-10-CM

## 2018-05-07 DIAGNOSIS — Z11.3 SCREENING FOR STD (SEXUALLY TRANSMITTED DISEASE): ICD-10-CM

## 2018-05-07 PROCEDURE — 90471 IMMUNIZATION ADMIN: CPT | Performed by: PHYSICIAN ASSISTANT

## 2018-05-07 PROCEDURE — 99395 PREV VISIT EST AGE 18-39: CPT | Performed by: PHYSICIAN ASSISTANT

## 2018-05-07 PROCEDURE — 90715 TDAP VACCINE 7 YRS/> IM: CPT | Performed by: PHYSICIAN ASSISTANT

## 2018-05-07 NOTE — PROGRESS NOTES
"Chief Complaint   Patient presents with   • Annual Exam     pt started cycle       Subjective   Bernardo Dodd is a 37 y.o. female.       History of Present Illness     The patient is being seen for a health maintenance evaluation.  The last health maintenance was >5 year(s) ago.    Social History  Bernardo  does smoke cigarettes. Smokes 1-2 cigarettes a week.  She drinks frequent alcohol. 1 can of beer or cup of liquor a night.  She does use illicit drugs. Smokes marijuana. Quit cocaine and pills 7 years ago.    General History  Bernardo  does have regular dental visits.  She does complain of vision problems. Wears contacts. Last eye exam was 3/2017.  Immunizations are not up to date. The patient needs the following immunizations: needs TDaP    Lifestyle  Bernardo  consumes in general, a \"healthy\" diet  .  She exercises daily.    Reproductive Health  Bernardo  is premenopausal.  She reports irregular periods.  She is not sexually active. Her contraceptive plan is bilateral tubal ligation.    Screening  Last pap was 2015 by previous PCP.  Last mammogram was 1999 prior to breast reduction. No history of breast cancer.  Last colonoscopy was never. No family history of colon cancer.  Last DEXA was never.                      Current Outpatient Prescriptions:   •  albuterol (PROVENTIL) (2.5 MG/3ML) 0.083% nebulizer solution, , Disp: , Rfl:   •  albuterol (VENTOLIN HFA) 108 (90 BASE) MCG/ACT inhaler, Inhale 1-2 puffs as needed. Every 4-6 hrs PRN, Disp: , Rfl:   •  amitriptyline (ELAVIL) 25 MG tablet, Take 25 mg by mouth every night., Disp: , Rfl:   •  amLODIPine (NORVASC) 5 MG tablet, Take 1 tablet by mouth Daily., Disp: 30 tablet, Rfl: 5  •  beclomethasone (QVAR) 80 MCG/ACT inhaler, Inhale 1 puff 2 (two) times a day., Disp: , Rfl:   •  BREO ELLIPTA 200-25 MCG/INH aerosol powder , , Disp: , Rfl:   •  divalproex (DEPAKOTE) 250 MG 24 hr tablet, , Disp: , Rfl:   •  eletriptan (RELPAX) 40 MG tablet, Take 40 mg by mouth 1 " (one) time as needed for migraine. may repeat in 2 hours if necessary, Disp: , Rfl:   •  ferrous sulfate 325 (65 FE) MG tablet, Take 1 tablet by mouth Daily With Breakfast., Disp: 30 tablet, Rfl: 5  •  fexofenadine (ALLEGRA) 180 MG tablet, Take 180 mg by mouth daily., Disp: , Rfl:   •  fluticasone (FLONASE) 50 MCG/ACT nasal spray, PLACE TWO SPRAYS IN EACH NOSTRIL ONCE DAILY, Disp: 1 bottle, Rfl: 10  •  gabapentin (NEURONTIN) 300 MG capsule, Take 300 mg by mouth 2 (Two) Times a Day., Disp: , Rfl:   •  ketotifen (ZADITOR) 0.025 % ophthalmic solution, Administer 2 drops to both eyes 2 (two) times a day., Disp: , Rfl:   •  levocetirizine (XYZAL) 5 MG tablet, Take 1 tablet by mouth Daily., Disp: , Rfl:   •  loratadine (CLARITIN) 10 MG tablet, Take 1 tablet by mouth Daily., Disp: , Rfl:   •  metroNIDAZOLE (METROGEL VAGINAL) 0.75 % vaginal gel, Insert  into the vagina every night at bedtime for 5 days., Disp: 70 g, Rfl: 0  •  mometasone-formoterol (DULERA) 200-5 MCG/ACT inhaler, Inhale 1 puff 2 (two) times a day., Disp: , Rfl:   •  montelukast (SINGULAIR) 10 MG tablet, Take 10 mg by mouth every night., Disp: , Rfl:   •  MUCINEX 600 MG 12 hr tablet, Take 2 tablets by mouth 2 (Two) Times a Day., Disp: , Rfl:   •  naproxen (EC NAPROSYN) 500 MG EC tablet, Take 1 tablet by mouth 2 (Two) Times a Day As Needed for Mild Pain ., Disp: 16 tablet, Rfl: 0  •  QUEtiapine (SEROquel) 25 MG tablet, Take 2 tablets by mouth Every Night., Disp: , Rfl:   •  raNITIdine (ZANTAC) 300 MG tablet, Take 1 tablet by mouth Daily. Take first thing in the morning on an empty stomach.  Wait at least 30 min to 1hr before eating., Disp: 30 tablet, Rfl: 2  •  SPIRIVA RESPIMAT 2.5 MCG/ACT aerosol solution, Take 2 puffs by mouth Daily., Disp: , Rfl:   •  tiZANidine (ZANAFLEX) 4 MG tablet, Take 4 mg by mouth At Night As Needed for muscle spasms., Disp: , Rfl:   •  valACYclovir (VALTREX) 1000 MG tablet, Take 1 tablet by mouth Daily., Disp: 30 tablet, Rfl: 5  •   venlafaxine XR (EFFEXOR-XR) 37.5 MG 24 hr capsule, Take 1 capsule by mouth 2 (Two) Times a Day., Disp: , Rfl:     Current Facility-Administered Medications:   •  meclizine (ANTIVERT) tablet 25 mg, 25 mg, Oral, TID PRN, SHANE Denis     [facility]  The following portions of the patient's history were reviewed and updated as appropriate: allergies, current medications, past family history, past medical history, past social history, past surgical history and problem list.    Review of Systems   Constitutional: Negative for appetite change, fever and unexpected weight change.   HENT: Negative for ear pain, facial swelling and sore throat.    Eyes: Negative for pain and visual disturbance.   Respiratory: Negative for chest tightness, shortness of breath and wheezing.    Cardiovascular: Negative for chest pain and palpitations.   Gastrointestinal: Negative for abdominal pain and blood in stool.   Endocrine: Negative.    Genitourinary: Negative for difficulty urinating and hematuria.   Musculoskeletal: Negative for joint swelling.   Neurological: Negative for tremors, seizures and syncope.   Hematological: Negative for adenopathy.   Psychiatric/Behavioral: Negative.        Objective   /76   Pulse 71   Wt 78.9 kg (174 lb)   SpO2 99%   BMI 35.12 kg/m²     Physical Exam   Constitutional: She is oriented to person, place, and time. She appears well-developed and well-nourished. No distress.   HENT:   Head: Normocephalic and atraumatic. Hair is normal.   Right Ear: Hearing, tympanic membrane, external ear and ear canal normal. No drainage. No decreased hearing is noted.   Left Ear: Hearing, tympanic membrane, external ear and ear canal normal. No decreased hearing is noted.   Nose: No nasal deformity.   Mouth/Throat: Oropharynx is clear and moist.   Eyes: Conjunctivae, EOM and lids are normal. Pupils are equal, round, and reactive to light. Lids are everted and swept, no foreign bodies found. Right eye exhibits no  discharge. Left eye exhibits no discharge.   Fundoscopic exam:       The right eye shows red reflex.        The left eye shows red reflex.   Neck: Normal range of motion. Neck supple. No JVD present. No tracheal deviation present. No thyromegaly present.   Cardiovascular: Normal rate, regular rhythm, normal heart sounds, intact distal pulses and normal pulses.  Exam reveals no gallop and no friction rub.    No murmur heard.  Pulmonary/Chest: Effort normal and breath sounds normal. No respiratory distress. She has no wheezes. She has no rales. She exhibits no tenderness.   Abdominal: Soft. Bowel sounds are normal. She exhibits no distension and no mass. There is no tenderness. There is no rebound and no guarding. No hernia.   Musculoskeletal: Normal range of motion. She exhibits no edema, tenderness or deformity.   Lymphadenopathy:     She has no cervical adenopathy.        Right: No inguinal adenopathy present.        Left: No inguinal adenopathy present.   Neurological: She is alert and oriented to person, place, and time. She has normal reflexes. She displays normal reflexes. No cranial nerve deficit. She exhibits normal muscle tone. Coordination normal.   Skin: Skin is warm and dry. No rash noted. She is not diaphoretic. No erythema.   Psychiatric: She has a normal mood and affect. Her behavior is normal. Judgment and thought content normal.   Nursing note and vitals reviewed.      Results for orders placed or performed in visit on 04/30/18   Nuab VG+ - Swab, Vagina   Result Value Ref Range    Atopobium Vaginae High - 2 (A) Score    BVAB 2 Low - 0 Score    Megasphaera 1 High - 2 (A) Score    Tawana Albicans, TIFFANY Positive (A) Negative    Tawana Glabrata, TIFFANY Negative Negative    Trichomonas vaginosis Positive (A) Negative    Chlamydia trachomatis, TIFFANY Negative Negative    Neisseria gonorrhoeae, TIFFANY Negative Negative        ASSESSMENT/PLAN    Problem List Items Addressed This Visit        Other    Health care  maintenance - Primary     Immunizations: TDaP done today  Eye exam: esme 3/2017  Pap Smear: pt will return for pap  Mammogram: due age 38  Dexa: due postmenopausal  Colonoscopy: due age 48  Labs: fasting labs ordered               Relevant Orders    CBC & Differential    Lipid Panel    Comprehensive Metabolic Panel    TSH    Vitamin D 25 Hydroxy      Other Visit Diagnoses     Screening for STD (sexually transmitted disease)        Relevant Orders    HIV-1 / O / 2 Ag / Antibody 4th Generation    RPR    Hepatitis Panel, Acute               Return in about 3 months (around 8/7/2018) for pap smear, Annual physical.

## 2018-05-09 PROBLEM — Z00.00 HEALTH CARE MAINTENANCE: Status: ACTIVE | Noted: 2018-05-09

## 2018-05-10 NOTE — ASSESSMENT & PLAN NOTE
Immunizations: TDaP done today  Eye exam: esme 3/2017  Pap Smear: pt will return for pap  Mammogram: due age 38  Dexa: due postmenopausal  Colonoscopy: due age 48  Labs: fasting labs ordered

## 2018-06-01 ENCOUNTER — OFFICE VISIT (OUTPATIENT)
Dept: INTERNAL MEDICINE | Facility: CLINIC | Age: 38
End: 2018-06-01

## 2018-06-01 VITALS
DIASTOLIC BLOOD PRESSURE: 72 MMHG | OXYGEN SATURATION: 99 % | BODY MASS INDEX: 33.67 KG/M2 | SYSTOLIC BLOOD PRESSURE: 110 MMHG | HEIGHT: 59 IN | TEMPERATURE: 98.9 F | WEIGHT: 167 LBS | HEART RATE: 95 BPM

## 2018-06-01 DIAGNOSIS — N76.0 ACUTE VAGINITIS: ICD-10-CM

## 2018-06-01 DIAGNOSIS — R30.0 DYSURIA: Primary | ICD-10-CM

## 2018-06-01 DIAGNOSIS — J30.89 ENVIRONMENTAL AND SEASONAL ALLERGIES: ICD-10-CM

## 2018-06-01 LAB
25(OH)D3 SERPL-MCNC: 38.6 NG/ML
ALBUMIN SERPL-MCNC: 4 G/DL (ref 3.2–4.8)
ALBUMIN/GLOB SERPL: 1.6 G/DL (ref 1.5–2.5)
ALP SERPL-CCNC: 48 U/L (ref 25–100)
ALT SERPL W P-5'-P-CCNC: 17 U/L (ref 7–40)
ANION GAP SERPL CALCULATED.3IONS-SCNC: 9 MMOL/L (ref 3–11)
ARTICHOKE IGE QN: 83 MG/DL (ref 0–130)
AST SERPL-CCNC: 18 U/L (ref 0–33)
BASOPHILS # BLD AUTO: 0.02 10*3/MM3 (ref 0–0.2)
BASOPHILS NFR BLD AUTO: 0.3 % (ref 0–1)
BILIRUB BLD-MCNC: NEGATIVE MG/DL
BILIRUB SERPL-MCNC: 0.4 MG/DL (ref 0.3–1.2)
BUN BLD-MCNC: 11 MG/DL (ref 9–23)
BUN/CREAT SERPL: 15.7 (ref 7–25)
CALCIUM SPEC-SCNC: 8.6 MG/DL (ref 8.7–10.4)
CHLORIDE SERPL-SCNC: 105 MMOL/L (ref 99–109)
CHOLEST SERPL-MCNC: 155 MG/DL (ref 0–200)
CLARITY, POC: CLEAR
CO2 SERPL-SCNC: 27 MMOL/L (ref 20–31)
COLOR UR: YELLOW
CREAT BLD-MCNC: 0.7 MG/DL (ref 0.6–1.3)
DEPRECATED RDW RBC AUTO: 40.6 FL (ref 37–54)
EOSINOPHIL # BLD AUTO: 0.04 10*3/MM3 (ref 0–0.3)
EOSINOPHIL NFR BLD AUTO: 0.5 % (ref 0–3)
ERYTHROCYTE [DISTWIDTH] IN BLOOD BY AUTOMATED COUNT: 12.5 % (ref 11.3–14.5)
GFR SERPL CREATININE-BSD FRML MDRD: 113 ML/MIN/1.73
GLOBULIN UR ELPH-MCNC: 2.5 GM/DL
GLUCOSE BLD-MCNC: 81 MG/DL (ref 70–100)
GLUCOSE UR STRIP-MCNC: NEGATIVE MG/DL
HAV IGM SERPL QL IA: NORMAL
HBV CORE IGM SERPL QL IA: NORMAL
HBV SURFACE AG SERPL QL IA: NORMAL
HCT VFR BLD AUTO: 38.9 % (ref 34.5–44)
HCV AB SER DONR QL: NORMAL
HDLC SERPL-MCNC: 66 MG/DL (ref 40–60)
HGB BLD-MCNC: 12.6 G/DL (ref 11.5–15.5)
HIV1+2 AB SER QL: NORMAL
IMM GRANULOCYTES # BLD: 0.01 10*3/MM3 (ref 0–0.03)
IMM GRANULOCYTES NFR BLD: 0.1 % (ref 0–0.6)
KETONES UR QL: NEGATIVE
LEUKOCYTE EST, POC: ABNORMAL
LYMPHOCYTES # BLD AUTO: 2.33 10*3/MM3 (ref 0.6–4.8)
LYMPHOCYTES NFR BLD AUTO: 29.5 % (ref 24–44)
MCH RBC QN AUTO: 29 PG (ref 27–31)
MCHC RBC AUTO-ENTMCNC: 32.4 G/DL (ref 32–36)
MCV RBC AUTO: 89.4 FL (ref 80–99)
MONOCYTES # BLD AUTO: 0.67 10*3/MM3 (ref 0–1)
MONOCYTES NFR BLD AUTO: 8.5 % (ref 0–12)
NEUTROPHILS # BLD AUTO: 4.85 10*3/MM3 (ref 1.5–8.3)
NEUTROPHILS NFR BLD AUTO: 61.2 % (ref 41–71)
NITRITE UR-MCNC: NEGATIVE MG/ML
PH UR: 6 [PH] (ref 5–8)
PLATELET # BLD AUTO: 250 10*3/MM3 (ref 150–450)
PMV BLD AUTO: 10.2 FL (ref 6–12)
POTASSIUM BLD-SCNC: 3.6 MMOL/L (ref 3.5–5.5)
PROT SERPL-MCNC: 6.5 G/DL (ref 5.7–8.2)
PROT UR STRIP-MCNC: NEGATIVE MG/DL
RBC # BLD AUTO: 4.35 10*6/MM3 (ref 3.89–5.14)
RBC # UR STRIP: NEGATIVE /UL
SODIUM BLD-SCNC: 141 MMOL/L (ref 132–146)
SP GR UR: 1.02 (ref 1–1.03)
TRIGL SERPL-MCNC: 39 MG/DL (ref 0–150)
TSH SERPL DL<=0.05 MIU/L-ACNC: 1.01 MIU/ML (ref 0.35–5.35)
UROBILINOGEN UR QL: NORMAL
WBC NRBC COR # BLD: 7.91 10*3/MM3 (ref 3.5–10.8)

## 2018-06-01 PROCEDURE — 99213 OFFICE O/P EST LOW 20 MIN: CPT | Performed by: NURSE PRACTITIONER

## 2018-06-01 PROCEDURE — 80074 ACUTE HEPATITIS PANEL: CPT | Performed by: PHYSICIAN ASSISTANT

## 2018-06-01 PROCEDURE — 80061 LIPID PANEL: CPT | Performed by: PHYSICIAN ASSISTANT

## 2018-06-01 PROCEDURE — 85025 COMPLETE CBC W/AUTO DIFF WBC: CPT | Performed by: PHYSICIAN ASSISTANT

## 2018-06-01 PROCEDURE — G0432 EIA HIV-1/HIV-2 SCREEN: HCPCS | Performed by: PHYSICIAN ASSISTANT

## 2018-06-01 PROCEDURE — 80053 COMPREHEN METABOLIC PANEL: CPT | Performed by: PHYSICIAN ASSISTANT

## 2018-06-01 PROCEDURE — 82306 VITAMIN D 25 HYDROXY: CPT | Performed by: PHYSICIAN ASSISTANT

## 2018-06-01 PROCEDURE — 86592 SYPHILIS TEST NON-TREP QUAL: CPT | Performed by: PHYSICIAN ASSISTANT

## 2018-06-01 PROCEDURE — 84443 ASSAY THYROID STIM HORMONE: CPT | Performed by: PHYSICIAN ASSISTANT

## 2018-06-01 RX ORDER — PREDNISONE 20 MG/1
20 TABLET ORAL 2 TIMES DAILY
Qty: 10 TABLET | Refills: 0 | Status: SHIPPED | OUTPATIENT
Start: 2018-06-01 | End: 2018-06-19

## 2018-06-01 RX ORDER — SULFAMETHOXAZOLE AND TRIMETHOPRIM 800; 160 MG/1; MG/1
1 TABLET ORAL 2 TIMES DAILY
Qty: 10 TABLET | Refills: 0 | Status: SHIPPED | OUTPATIENT
Start: 2018-06-01 | End: 2018-06-19

## 2018-06-01 RX ORDER — FLUCONAZOLE 150 MG/1
150 TABLET ORAL ONCE
Qty: 1 TABLET | Refills: 0 | Status: SHIPPED | OUTPATIENT
Start: 2018-06-01 | End: 2018-06-01

## 2018-06-01 RX ORDER — METRONIDAZOLE 500 MG/1
500 TABLET ORAL 3 TIMES DAILY
Qty: 21 TABLET | Refills: 0 | Status: SHIPPED | OUTPATIENT
Start: 2018-06-01 | End: 2018-06-19

## 2018-06-01 NOTE — PATIENT INSTRUCTIONS
Medications as discussed.  Recommend treatment of partners.  Drink plenty of fluids.  Return to the clinic in one week for follow up sooner if you're not improving.

## 2018-06-01 NOTE — PROGRESS NOTES
Britta Dodd is a 37 y.o. female.   Chief Complaint   Patient presents with   • URI     cough, runny nose, chest congestion, eye swelling. Sx started yesterday.       History of Present Illness :  As above.  Has fullness in ears.  Has scratchy throat.  Has fullness in head.  Has Clear sputum with cough.  Gets SOA-uses Nebulizer with some relief.  Has Chest discomfort with cough.  No NVD.  Taking regular meds with temp relief.  Having painful urination and vag discomfort.  Having brownish vag D/C No pelvic pain  LMP around 05/16/2018.  Was treated for Trich, Yeast and BV 05/04/281  GC/Chlamydia were negative.  Didn't have  Blood work drawn     The following portions of the patient's history were reviewed and updated as appropriate: allergies, current medications, past family history, past medical history, past social history, past surgical history and problem list.    Current Outpatient Prescriptions:   •  albuterol (PROVENTIL) (2.5 MG/3ML) 0.083% nebulizer solution, , Disp: , Rfl:   •  albuterol (VENTOLIN HFA) 108 (90 BASE) MCG/ACT inhaler, Inhale 1-2 puffs as needed. Every 4-6 hrs PRN, Disp: , Rfl:   •  amitriptyline (ELAVIL) 25 MG tablet, Take 25 mg by mouth every night., Disp: , Rfl:   •  amLODIPine (NORVASC) 5 MG tablet, Take 1 tablet by mouth Daily., Disp: 30 tablet, Rfl: 5  •  BREO ELLIPTA 200-25 MCG/INH aerosol powder , , Disp: , Rfl:   •  divalproex (DEPAKOTE) 250 MG 24 hr tablet, , Disp: , Rfl:   •  ferrous sulfate 325 (65 FE) MG tablet, Take 1 tablet by mouth Daily With Breakfast., Disp: 30 tablet, Rfl: 5  •  gabapentin (NEURONTIN) 300 MG capsule, Take 300 mg by mouth 2 (Two) Times a Day., Disp: , Rfl:   •  levocetirizine (XYZAL) 5 MG tablet, Take 1 tablet by mouth Daily., Disp: , Rfl:   •  montelukast (SINGULAIR) 10 MG tablet, Take 10 mg by mouth every night., Disp: , Rfl:   •  MUCINEX 600 MG 12 hr tablet, Take 2 tablets by mouth 2 (Two) Times a Day., Disp: , Rfl:   •  naproxen (EC  "NAPROSYN) 500 MG EC tablet, Take 1 tablet by mouth 2 (Two) Times a Day As Needed for Mild Pain ., Disp: 16 tablet, Rfl: 0  •  raNITIdine (ZANTAC) 300 MG tablet, Take 1 tablet by mouth Daily. Take first thing in the morning on an empty stomach.  Wait at least 30 min to 1hr before eating., Disp: 30 tablet, Rfl: 2  •  SPIRIVA RESPIMAT 2.5 MCG/ACT aerosol solution, Take 2 puffs by mouth Daily., Disp: , Rfl:   •  tiZANidine (ZANAFLEX) 4 MG tablet, Take 4 mg by mouth At Night As Needed for muscle spasms., Disp: , Rfl:   •  fluconazole (DIFLUCAN) 150 MG tablet, Take 1 tablet by mouth 1 (One) Time for 1 dose., Disp: 1 tablet, Rfl: 0  •  metroNIDAZOLE (FLAGYL) 500 MG tablet, Take 1 tablet by mouth 3 (Three) Times a Day., Disp: 21 tablet, Rfl: 0  •  predniSONE (DELTASONE) 20 MG tablet, Take 1 tablet by mouth 2 (Two) Times a Day., Disp: 10 tablet, Rfl: 0  •  sulfamethoxazole-trimethoprim (BACTRIM DS) 800-160 MG per tablet, Take 1 tablet by mouth 2 (Two) Times a Day., Disp: 10 tablet, Rfl: 0  No current facility-administered medications for this visit.     Review of Systems: Consitutional, HEENT, Respiratory, CV, GI, , Skin, Musculoskeletal, Neuro-mental, Endocrinological, Hematological were reviewed.  Positives were discussed in the HPI, otherwise ROS was negative   /72   Pulse 95   Temp 98.9 °F (37.2 °C) (Oral)   Ht 149.9 cm (59.02\")   Wt 75.8 kg (167 lb)   SpO2 99%   BMI 33.71 kg/m²     Objective   No Known Allergies    Physical Exam   Constitutional: She is oriented to person, place, and time. She appears well-developed and well-nourished. No distress.   HENT:   Head: Normocephalic.   Right Ear: External ear normal.   Left Ear: External ear normal.   Nontender over sinuses  Throat with PND.  TM dull.  Mild protrusion of eyes   Eyes: Right eye exhibits no discharge. Left eye exhibits no discharge.   Neck: Neck supple.   Cardiovascular: Normal rate, regular rhythm, normal heart sounds and intact distal pulses.  " Exam reveals no gallop and no friction rub.    No murmur heard.  Pulmonary/Chest: Effort normal and breath sounds normal. No respiratory distress. She has no wheezes. She has no rales.   Abdominal: Soft. There is no tenderness.   No CVA/flank tenderness   Lymphadenopathy:     She has no cervical adenopathy.   Neurological: She is alert and oriented to person, place, and time.   Skin: Skin is warm and dry.   Color pink, no rash   Nursing note and vitals reviewed.      Procedures    Assessment/Plan   Bernardo was seen today for uri.    Diagnoses and all orders for this visit:    Dysuria  -     POC Urinalysis Dipstick, Automated  -     Urine Culture - Urine, Urine, Clean Catch; Future  -     sulfamethoxazole-trimethoprim (BACTRIM DS) 800-160 MG per tablet; Take 1 tablet by mouth 2 (Two) Times a Day.    Acute vaginitis  -     metroNIDAZOLE (FLAGYL) 500 MG tablet; Take 1 tablet by mouth 3 (Three) Times a Day.  -     fluconazole (DIFLUCAN) 150 MG tablet; Take 1 tablet by mouth 1 (One) Time for 1 dose.    Environmental and seasonal allergies  -     predniSONE (DELTASONE) 20 MG tablet; Take 1 tablet by mouth 2 (Two) Times a Day.          Patient Instructions   Medications as discussed.  Recommend treatment of partners.  Drink plenty of fluids.  Return to the clinic in one week for follow up sooner if you're not improving.      SHANE Maloney

## 2018-06-04 LAB — RPR SER QL: NORMAL

## 2018-06-19 ENCOUNTER — OFFICE VISIT (OUTPATIENT)
Dept: INTERNAL MEDICINE | Facility: CLINIC | Age: 38
End: 2018-06-19

## 2018-06-19 VITALS
OXYGEN SATURATION: 96 % | DIASTOLIC BLOOD PRESSURE: 72 MMHG | BODY MASS INDEX: 33.71 KG/M2 | RESPIRATION RATE: 16 BRPM | HEART RATE: 78 BPM | SYSTOLIC BLOOD PRESSURE: 112 MMHG | WEIGHT: 167 LBS

## 2018-06-19 DIAGNOSIS — J30.9 CHRONIC ALLERGIC RHINITIS, UNSPECIFIED SEASONALITY, UNSPECIFIED TRIGGER: Primary | ICD-10-CM

## 2018-06-19 PROCEDURE — 99213 OFFICE O/P EST LOW 20 MIN: CPT | Performed by: PHYSICIAN ASSISTANT

## 2018-06-19 RX ORDER — FLUCONAZOLE 150 MG/1
150 TABLET ORAL
Qty: 3 TABLET | Refills: 0 | Status: SHIPPED | OUTPATIENT
Start: 2018-06-19 | End: 2018-07-03

## 2018-06-19 RX ORDER — FLUTICASONE PROPIONATE 50 MCG
2 SPRAY, SUSPENSION (ML) NASAL DAILY
Qty: 16 G | Refills: 3 | Status: SHIPPED | OUTPATIENT
Start: 2018-06-19 | End: 2018-08-15 | Stop reason: SDUPTHER

## 2018-06-19 NOTE — PROGRESS NOTES
Chief Complaint   Patient presents with   • Hypertension     Follow Up, Pt is having sinus pressure, headaches, sore throat, shortness of breath. Pt also says her ankles have been swelling       Subjective   Bernardo Dodd is a 37 y.o. female.       History of Present Illness     Pt has had improvement in her urinary symptoms, thinks she may have passed a kidney stone a couple weeks ago. She was having lower abdominal pain and into her back, eventually resolved although she did not see a stone passed. She is having itching and irritation in her vulvar and perianal area from wiping frequently.    Says last week she had URI symptoms with swelling in her nasal passages, congestion, lost her voice. She had to miss work, did 2 breathing treatments and slept most of the day. Has appt with allergist next Monday. She has been taking loratadine and xyzal and singulair. Has tried nasal sprays before but does not like the way it feels. She has some generic flonase.         Current Outpatient Prescriptions:   •  albuterol (PROVENTIL) (2.5 MG/3ML) 0.083% nebulizer solution, , Disp: , Rfl:   •  albuterol (VENTOLIN HFA) 108 (90 BASE) MCG/ACT inhaler, Inhale 1-2 puffs as needed. Every 4-6 hrs PRN, Disp: , Rfl:   •  amitriptyline (ELAVIL) 25 MG tablet, Take 25 mg by mouth every night., Disp: , Rfl:   •  amLODIPine (NORVASC) 5 MG tablet, Take 1 tablet by mouth Daily., Disp: 30 tablet, Rfl: 5  •  BREO ELLIPTA 200-25 MCG/INH aerosol powder , , Disp: , Rfl:   •  divalproex (DEPAKOTE) 250 MG 24 hr tablet, , Disp: , Rfl:   •  ferrous sulfate 325 (65 FE) MG tablet, Take 1 tablet by mouth Daily With Breakfast., Disp: 30 tablet, Rfl: 5  •  gabapentin (NEURONTIN) 300 MG capsule, Take 300 mg by mouth 2 (Two) Times a Day., Disp: , Rfl:   •  levocetirizine (XYZAL) 5 MG tablet, Take 1 tablet by mouth Daily., Disp: , Rfl:   •  montelukast (SINGULAIR) 10 MG tablet, Take 10 mg by mouth every night., Disp: , Rfl:   •  MUCINEX 600 MG 12 hr tablet,  Take 2 tablets by mouth 2 (Two) Times a Day., Disp: , Rfl:   •  naproxen (EC NAPROSYN) 500 MG EC tablet, Take 1 tablet by mouth 2 (Two) Times a Day As Needed for Mild Pain ., Disp: 16 tablet, Rfl: 0  •  raNITIdine (ZANTAC) 300 MG tablet, Take 1 tablet by mouth Daily. Take first thing in the morning on an empty stomach.  Wait at least 30 min to 1hr before eating., Disp: 30 tablet, Rfl: 2  •  SPIRIVA RESPIMAT 2.5 MCG/ACT aerosol solution, Take 2 puffs by mouth Daily., Disp: , Rfl:   •  tiZANidine (ZANAFLEX) 4 MG tablet, Take 4 mg by mouth At Night As Needed for muscle spasms., Disp: , Rfl:   •  fluconazole (DIFLUCAN) 150 MG tablet, Take 1 tablet by mouth Every 3 (Three) Days., Disp: 3 tablet, Rfl: 0  •  fluticasone (FLONASE) 50 MCG/ACT nasal spray, 2 sprays into each nostril Daily., Disp: 16 g, Rfl: 3     PMFSH  The following portions of the patient's history were reviewed and updated as appropriate: allergies, current medications, past family history, past medical history, past social history, past surgical history and problem list.    Review of Systems   Constitutional: Positive for fatigue. Negative for activity change and unexpected weight change.   HENT: Positive for congestion, postnasal drip and sore throat. Negative for ear pain.    Eyes: Negative for pain and discharge.   Respiratory: Positive for cough. Negative for chest tightness, shortness of breath and wheezing.    Cardiovascular: Negative for chest pain and palpitations.   Gastrointestinal: Negative for abdominal pain, diarrhea and vomiting.   Endocrine: Negative.    Genitourinary: Negative.    Musculoskeletal: Negative for joint swelling.   Skin: Negative for color change, rash and wound.   Allergic/Immunologic: Negative.    Neurological: Negative for seizures and syncope.   Psychiatric/Behavioral: Negative.        Objective   /72   Pulse 78   Resp 16   Wt 75.8 kg (167 lb)   SpO2 96%   BMI 33.71 kg/m²     Physical Exam   Constitutional: She  is oriented to person, place, and time. She appears well-developed and well-nourished.  Non-toxic appearance. No distress.   HENT:   Head: Normocephalic and atraumatic. Hair is normal.   Right Ear: External ear normal. No drainage, swelling or tenderness. Tympanic membrane is retracted.   Left Ear: External ear normal. No drainage, swelling or tenderness. Tympanic membrane is retracted.   Nose: Mucosal edema present. No epistaxis.   Mouth/Throat: Uvula is midline and mucous membranes are normal. No oral lesions. No uvula swelling. Posterior oropharyngeal erythema present. No oropharyngeal exudate.   Eyes: Conjunctivae and EOM are normal. Pupils are equal, round, and reactive to light. Right eye exhibits no discharge. Left eye exhibits no discharge. No scleral icterus.   Neck: Normal range of motion. Neck supple.   Cardiovascular: Normal rate, regular rhythm and normal heart sounds.  Exam reveals no gallop.    No murmur heard.  Pulmonary/Chest: Breath sounds normal. No stridor. No respiratory distress. She has no wheezes. She has no rales. She exhibits no tenderness.   Abdominal: Soft. There is no tenderness.   Lymphadenopathy:     She has cervical adenopathy.   Neurological: She is alert and oriented to person, place, and time. She exhibits normal muscle tone.   Skin: Skin is warm and dry. No rash noted. She is not diaphoretic.   Psychiatric: She has a normal mood and affect. Her behavior is normal. Judgment and thought content normal.   Nursing note and vitals reviewed.      Results for orders placed or performed in visit on 06/01/18   POC Urinalysis Dipstick, Automated   Result Value Ref Range    Color Yellow Yellow, Straw, Dark Yellow, Andreina    Clarity, UA Clear Clear    Specific Gravity  1.020 1.005 - 1.030    pH, Urine 6.0 5.0 - 8.0    Leukocytes 25 Vishal/ul (A) Negative    Nitrite, UA Negative Negative    Protein, POC Negative Negative mg/dL    Glucose, UA Negative Negative, 1000 mg/dL (3+) mg/dL    Ketones, UA  Negative Negative    Urobilinogen, UA Normal Normal    Bilirubin Negative Negative    Blood, UA Negative Negative        ASSESSMENT/PLAN    Problem List Items Addressed This Visit        Respiratory    Allergic rhinitis - Primary     Restart flonase as directed. Can increase to BID prn.         Relevant Medications    fluticasone (FLONASE) 50 MCG/ACT nasal spray               Return for Next scheduled follow up.

## 2018-06-22 ENCOUNTER — HOSPITAL ENCOUNTER (EMERGENCY)
Facility: HOSPITAL | Age: 38
Discharge: HOME | End: 2018-06-22
Attending: EMERGENCY MEDICINE | Admitting: EMERGENCY MEDICINE

## 2018-06-22 ENCOUNTER — APPOINTMENT (OUTPATIENT)
Dept: GENERAL RADIOLOGY | Facility: HOSPITAL | Age: 38
End: 2018-06-22

## 2018-06-22 VITALS
TEMPERATURE: 98.3 F | BODY MASS INDEX: 34.07 KG/M2 | HEIGHT: 59 IN | RESPIRATION RATE: 20 BRPM | OXYGEN SATURATION: 96 % | SYSTOLIC BLOOD PRESSURE: 127 MMHG | WEIGHT: 169 LBS | HEART RATE: 90 BPM | DIASTOLIC BLOOD PRESSURE: 91 MMHG

## 2018-06-22 DIAGNOSIS — M72.2 PLANTAR FASCIITIS OF LEFT FOOT: Primary | ICD-10-CM

## 2018-06-22 PROCEDURE — 99283 EMERGENCY DEPT VISIT LOW MDM: CPT

## 2018-06-22 PROCEDURE — 73630 X-RAY EXAM OF FOOT: CPT

## 2018-06-22 RX ORDER — IBUPROFEN 800 MG/1
800 TABLET ORAL ONCE
Status: COMPLETED | OUTPATIENT
Start: 2018-06-22 | End: 2018-06-22

## 2018-06-22 RX ORDER — NAPROXEN 500 MG/1
500 TABLET ORAL 2 TIMES DAILY WITH MEALS
Qty: 20 TABLET | Refills: 0 | Status: SHIPPED | OUTPATIENT
Start: 2018-06-22 | End: 2018-06-26

## 2018-06-22 RX ORDER — LABETALOL HYDROCHLORIDE 5 MG/ML
10 INJECTION, SOLUTION INTRAVENOUS ONCE
Status: DISCONTINUED | OUTPATIENT
Start: 2018-06-22 | End: 2018-06-22

## 2018-06-22 RX ADMIN — IBUPROFEN 800 MG: 800 TABLET ORAL at 20:03

## 2018-06-26 ENCOUNTER — APPOINTMENT (OUTPATIENT)
Dept: GENERAL RADIOLOGY | Facility: HOSPITAL | Age: 38
End: 2018-06-26

## 2018-06-26 ENCOUNTER — HOSPITAL ENCOUNTER (EMERGENCY)
Facility: HOSPITAL | Age: 38
Discharge: HOME | End: 2018-06-26
Attending: EMERGENCY MEDICINE | Admitting: EMERGENCY MEDICINE

## 2018-06-26 ENCOUNTER — HOSPITAL ENCOUNTER (OUTPATIENT)
Dept: NEUROLOGY | Facility: HOSPITAL | Age: 38
Discharge: HOME | End: 2018-06-26
Admitting: PHYSICIAN ASSISTANT

## 2018-06-26 VITALS
OXYGEN SATURATION: 95 % | WEIGHT: 169 LBS | TEMPERATURE: 98.6 F | BODY MASS INDEX: 34.07 KG/M2 | SYSTOLIC BLOOD PRESSURE: 127 MMHG | DIASTOLIC BLOOD PRESSURE: 83 MMHG | HEART RATE: 83 BPM | RESPIRATION RATE: 16 BRPM | HEIGHT: 59 IN

## 2018-06-26 DIAGNOSIS — R07.9 CHEST PAIN, UNSPECIFIED TYPE: Primary | ICD-10-CM

## 2018-06-26 DIAGNOSIS — M25.531 BILATERAL WRIST PAIN: ICD-10-CM

## 2018-06-26 DIAGNOSIS — M25.532 BILATERAL WRIST PAIN: ICD-10-CM

## 2018-06-26 LAB
ALBUMIN SERPL-MCNC: 4 G/DL (ref 3.2–4.8)
ALBUMIN/GLOB SERPL: 1.4 G/DL (ref 1.5–2.5)
ALP SERPL-CCNC: 42 U/L (ref 25–100)
ALT SERPL W P-5'-P-CCNC: 17 U/L (ref 7–40)
ANION GAP SERPL CALCULATED.3IONS-SCNC: 10 MMOL/L (ref 3–11)
AST SERPL-CCNC: 20 U/L (ref 0–33)
BASOPHILS # BLD AUTO: 0.02 10*3/MM3 (ref 0–0.2)
BASOPHILS NFR BLD AUTO: 0.2 % (ref 0–1)
BILIRUB SERPL-MCNC: 0.4 MG/DL (ref 0.3–1.2)
BNP SERPL-MCNC: 13 PG/ML (ref 0–100)
BUN BLD-MCNC: 10 MG/DL (ref 9–23)
BUN/CREAT SERPL: 15.4 (ref 7–25)
CALCIUM SPEC-SCNC: 8.7 MG/DL (ref 8.7–10.4)
CHLORIDE SERPL-SCNC: 108 MMOL/L (ref 99–109)
CO2 SERPL-SCNC: 25 MMOL/L (ref 20–31)
CREAT BLD-MCNC: 0.65 MG/DL (ref 0.6–1.3)
DEPRECATED RDW RBC AUTO: 43.4 FL (ref 37–54)
EOSINOPHIL # BLD AUTO: 0 10*3/MM3 (ref 0–0.3)
EOSINOPHIL NFR BLD AUTO: 0 % (ref 0–3)
ERYTHROCYTE [DISTWIDTH] IN BLOOD BY AUTOMATED COUNT: 13.4 % (ref 11.3–14.5)
GFR SERPL CREATININE-BSD FRML MDRD: 123 ML/MIN/1.73
GLOBULIN UR ELPH-MCNC: 2.9 GM/DL
GLUCOSE BLD-MCNC: 90 MG/DL (ref 70–100)
HCT VFR BLD AUTO: 35.6 % (ref 34.5–44)
HGB BLD-MCNC: 11.6 G/DL (ref 11.5–15.5)
HOLD SPECIMEN: NORMAL
IMM GRANULOCYTES # BLD: 0.07 10*3/MM3 (ref 0–0.03)
IMM GRANULOCYTES NFR BLD: 0.6 % (ref 0–0.6)
LIPASE SERPL-CCNC: 46 U/L (ref 6–51)
LYMPHOCYTES # BLD AUTO: 1.93 10*3/MM3 (ref 0.6–4.8)
LYMPHOCYTES NFR BLD AUTO: 16.2 % (ref 24–44)
MCH RBC QN AUTO: 28.8 PG (ref 27–31)
MCHC RBC AUTO-ENTMCNC: 32.6 G/DL (ref 32–36)
MCV RBC AUTO: 88.3 FL (ref 80–99)
MONOCYTES # BLD AUTO: 0.65 10*3/MM3 (ref 0–1)
MONOCYTES NFR BLD AUTO: 5.4 % (ref 0–12)
NEUTROPHILS # BLD AUTO: 9.33 10*3/MM3 (ref 1.5–8.3)
NEUTROPHILS NFR BLD AUTO: 78.2 % (ref 41–71)
PLAT MORPH BLD: NORMAL
PLATELET # BLD AUTO: 240 10*3/MM3 (ref 150–450)
PMV BLD AUTO: 11.2 FL (ref 6–12)
POTASSIUM BLD-SCNC: 3.5 MMOL/L (ref 3.5–5.5)
PROT SERPL-MCNC: 6.9 G/DL (ref 5.7–8.2)
RBC # BLD AUTO: 4.03 10*6/MM3 (ref 3.89–5.14)
RBC MORPH BLD: NORMAL
SODIUM BLD-SCNC: 143 MMOL/L (ref 132–146)
TROPONIN I SERPL-MCNC: 0 NG/ML (ref 0–0.07)
TROPONIN I SERPL-MCNC: 0 NG/ML (ref 0–0.07)
WBC MORPH BLD: NORMAL
WBC NRBC COR # BLD: 11.93 10*3/MM3 (ref 3.5–10.8)
WHOLE BLOOD HOLD SPECIMEN: NORMAL

## 2018-06-26 PROCEDURE — 85007 BL SMEAR W/DIFF WBC COUNT: CPT | Performed by: EMERGENCY MEDICINE

## 2018-06-26 PROCEDURE — 84484 ASSAY OF TROPONIN QUANT: CPT

## 2018-06-26 PROCEDURE — 85025 COMPLETE CBC W/AUTO DIFF WBC: CPT | Performed by: EMERGENCY MEDICINE

## 2018-06-26 PROCEDURE — 95910 NRV CNDJ TEST 7-8 STUDIES: CPT

## 2018-06-26 PROCEDURE — 83690 ASSAY OF LIPASE: CPT | Performed by: EMERGENCY MEDICINE

## 2018-06-26 PROCEDURE — 80053 COMPREHEN METABOLIC PANEL: CPT | Performed by: EMERGENCY MEDICINE

## 2018-06-26 PROCEDURE — 93005 ELECTROCARDIOGRAM TRACING: CPT | Performed by: EMERGENCY MEDICINE

## 2018-06-26 PROCEDURE — 71045 X-RAY EXAM CHEST 1 VIEW: CPT

## 2018-06-26 PROCEDURE — 83880 ASSAY OF NATRIURETIC PEPTIDE: CPT | Performed by: EMERGENCY MEDICINE

## 2018-06-26 PROCEDURE — 95886 MUSC TEST DONE W/N TEST COMP: CPT

## 2018-06-26 PROCEDURE — 99284 EMERGENCY DEPT VISIT MOD MDM: CPT

## 2018-06-26 RX ORDER — ALUMINA, MAGNESIA, AND SIMETHICONE 2400; 2400; 240 MG/30ML; MG/30ML; MG/30ML
15 SUSPENSION ORAL ONCE
Status: COMPLETED | OUTPATIENT
Start: 2018-06-26 | End: 2018-06-26

## 2018-06-26 RX ORDER — SODIUM CHLORIDE 0.9 % (FLUSH) 0.9 %
10 SYRINGE (ML) INJECTION AS NEEDED
Status: DISCONTINUED | OUTPATIENT
Start: 2018-06-26 | End: 2018-06-26 | Stop reason: HOSPADM

## 2018-06-26 RX ORDER — ASPIRIN 81 MG/1
324 TABLET, CHEWABLE ORAL ONCE
Status: COMPLETED | OUTPATIENT
Start: 2018-06-26 | End: 2018-06-26

## 2018-06-26 RX ADMIN — ASPIRIN 81 MG CHEWABLE TABLET 324 MG: 81 TABLET CHEWABLE at 13:29

## 2018-06-26 RX ADMIN — ALUMINUM HYDROXIDE, MAGNESIUM HYDROXIDE, AND DIMETHICONE 15 ML: 400; 400; 40 SUSPENSION ORAL at 13:30

## 2018-06-26 RX ADMIN — LIDOCAINE HYDROCHLORIDE 15 ML: 20 SOLUTION ORAL; TOPICAL at 13:30

## 2018-06-27 ENCOUNTER — HOSPITAL ENCOUNTER (EMERGENCY)
Facility: HOSPITAL | Age: 38
Discharge: HOME | End: 2018-06-27
Attending: EMERGENCY MEDICINE | Admitting: EMERGENCY MEDICINE

## 2018-06-27 ENCOUNTER — APPOINTMENT (OUTPATIENT)
Dept: CARDIOLOGY | Facility: HOSPITAL | Age: 38
End: 2018-06-27
Attending: EMERGENCY MEDICINE

## 2018-06-27 VITALS
HEART RATE: 75 BPM | BODY MASS INDEX: 34.07 KG/M2 | TEMPERATURE: 98.4 F | HEIGHT: 59 IN | SYSTOLIC BLOOD PRESSURE: 155 MMHG | WEIGHT: 169 LBS | DIASTOLIC BLOOD PRESSURE: 92 MMHG | RESPIRATION RATE: 16 BRPM | OXYGEN SATURATION: 97 %

## 2018-06-27 DIAGNOSIS — S40.022A TRAUMATIC ECCHYMOSIS OF LEFT UPPER ARM, INITIAL ENCOUNTER: Primary | ICD-10-CM

## 2018-06-27 DIAGNOSIS — M25.522 ARTHRALGIA OF LEFT UPPER ARM: ICD-10-CM

## 2018-06-27 LAB
BH CV UPPER VENOUS LEFT AXILLARY AUGMENT: NORMAL
BH CV UPPER VENOUS LEFT AXILLARY COMPRESS: NORMAL
BH CV UPPER VENOUS LEFT AXILLARY PHASIC: NORMAL
BH CV UPPER VENOUS LEFT AXILLARY SPONT: NORMAL
BH CV UPPER VENOUS LEFT BASILIC FOREARM COMPRESS: NORMAL
BH CV UPPER VENOUS LEFT BASILIC UPPER COMPRESS: NORMAL
BH CV UPPER VENOUS LEFT BRACHIAL COMPRESS: NORMAL
BH CV UPPER VENOUS LEFT CEPHALIC FOREARM COMPRESS: NORMAL
BH CV UPPER VENOUS LEFT CEPHALIC UPPER COMPRESS: NORMAL
BH CV UPPER VENOUS LEFT INTERNAL JUGULAR AUGMENT: NORMAL
BH CV UPPER VENOUS LEFT INTERNAL JUGULAR COMPRESS: NORMAL
BH CV UPPER VENOUS LEFT INTERNAL JUGULAR PHASIC: NORMAL
BH CV UPPER VENOUS LEFT INTERNAL JUGULAR SPONT: NORMAL
BH CV UPPER VENOUS LEFT RADIAL COMPRESS: NORMAL
BH CV UPPER VENOUS LEFT SUBCLAVIAN AUGMENT: NORMAL
BH CV UPPER VENOUS LEFT SUBCLAVIAN COMPRESS: NORMAL
BH CV UPPER VENOUS LEFT SUBCLAVIAN PHASIC: NORMAL
BH CV UPPER VENOUS LEFT SUBCLAVIAN SPONT: NORMAL
BH CV UPPER VENOUS LEFT ULNAR COMPRESS: NORMAL
BH CV UPPER VENOUS RIGHT SUBCLAVIAN AUGMENT: NORMAL
BH CV UPPER VENOUS RIGHT SUBCLAVIAN COMPRESS: NORMAL
BH CV UPPER VENOUS RIGHT SUBCLAVIAN PHASIC: NORMAL
BH CV UPPER VENOUS RIGHT SUBCLAVIAN SPONT: NORMAL

## 2018-06-27 PROCEDURE — 99283 EMERGENCY DEPT VISIT LOW MDM: CPT

## 2018-06-27 PROCEDURE — 93971 EXTREMITY STUDY: CPT

## 2018-06-27 PROCEDURE — 93971 EXTREMITY STUDY: CPT | Performed by: INTERNAL MEDICINE

## 2018-06-29 ENCOUNTER — OFFICE VISIT (OUTPATIENT)
Dept: INTERNAL MEDICINE | Facility: CLINIC | Age: 38
End: 2018-06-29

## 2018-06-29 VITALS
HEART RATE: 120 BPM | SYSTOLIC BLOOD PRESSURE: 148 MMHG | OXYGEN SATURATION: 98 % | BODY MASS INDEX: 33.73 KG/M2 | DIASTOLIC BLOOD PRESSURE: 76 MMHG | WEIGHT: 167 LBS

## 2018-06-29 DIAGNOSIS — K21.00 GASTROESOPHAGEAL REFLUX DISEASE WITH ESOPHAGITIS: ICD-10-CM

## 2018-06-29 DIAGNOSIS — R07.9 CHEST PAIN, UNSPECIFIED TYPE: Primary | ICD-10-CM

## 2018-06-29 PROCEDURE — 99213 OFFICE O/P EST LOW 20 MIN: CPT | Performed by: PHYSICIAN ASSISTANT

## 2018-06-29 RX ORDER — RANITIDINE 300 MG/1
300 CAPSULE ORAL EVERY EVENING
Qty: 30 CAPSULE | Refills: 11 | Status: SHIPPED | OUTPATIENT
Start: 2018-06-29 | End: 2019-07-04 | Stop reason: HOSPADM

## 2018-07-03 ENCOUNTER — HOSPITAL ENCOUNTER (OUTPATIENT)
Dept: CARDIOLOGY | Facility: HOSPITAL | Age: 38
Discharge: HOME | End: 2018-07-03

## 2018-07-03 ENCOUNTER — OFFICE VISIT (OUTPATIENT)
Dept: CARDIOLOGY | Facility: HOSPITAL | Age: 38
End: 2018-07-03

## 2018-07-03 VITALS
HEART RATE: 87 BPM | BODY MASS INDEX: 34.47 KG/M2 | RESPIRATION RATE: 16 BRPM | TEMPERATURE: 97.4 F | HEIGHT: 59 IN | WEIGHT: 171 LBS | OXYGEN SATURATION: 97 % | DIASTOLIC BLOOD PRESSURE: 80 MMHG | SYSTOLIC BLOOD PRESSURE: 123 MMHG

## 2018-07-03 DIAGNOSIS — R53.83 OTHER FATIGUE: ICD-10-CM

## 2018-07-03 DIAGNOSIS — I10 ESSENTIAL HYPERTENSION: ICD-10-CM

## 2018-07-03 DIAGNOSIS — R07.2 PRECORDIAL PAIN: Primary | ICD-10-CM

## 2018-07-03 DIAGNOSIS — R06.02 SHORTNESS OF BREATH: ICD-10-CM

## 2018-07-03 DIAGNOSIS — R07.2 PRECORDIAL PAIN: ICD-10-CM

## 2018-07-03 LAB
BH CV STRESS BP STAGE 3: NORMAL
BH CV STRESS BP STAGE 4: NORMAL
BH CV STRESS COMMENTS STAGE 1: NORMAL
BH CV STRESS DOSE REGADENOSON STAGE 1: 0.4
BH CV STRESS DURATION MIN STAGE 1: 1
BH CV STRESS DURATION MIN STAGE 2: 1
BH CV STRESS DURATION MIN STAGE 3: 1
BH CV STRESS DURATION MIN STAGE 4: 1
BH CV STRESS DURATION SEC STAGE 1: 0
BH CV STRESS DURATION SEC STAGE 2: 0
BH CV STRESS DURATION SEC STAGE 3: 0
BH CV STRESS DURATION SEC STAGE 4: 0
BH CV STRESS HR STAGE 1: 88
BH CV STRESS HR STAGE 2: 113
BH CV STRESS HR STAGE 3: 110
BH CV STRESS HR STAGE 4: 112
BH CV STRESS PROTOCOL 1: NORMAL
BH CV STRESS RECOVERY BP: NORMAL MMHG
BH CV STRESS RECOVERY HR: 95 BPM
BH CV STRESS STAGE 1: 1
BH CV STRESS STAGE 2: 2
BH CV STRESS STAGE 3: 3
BH CV STRESS STAGE 4: 4
LV EF NUC BP: 75 %
MAXIMAL PREDICTED HEART RATE: 181 BPM
PERCENT MAX PREDICTED HR: 62.43 %
STRESS BASELINE BP: NORMAL MMHG
STRESS BASELINE HR: 68 BPM
STRESS PERCENT HR: 73 %
STRESS POST PEAK BP: NORMAL MMHG
STRESS POST PEAK HR: 113 BPM
STRESS TARGET HR: 154 BPM

## 2018-07-03 PROCEDURE — 93018 CV STRESS TEST I&R ONLY: CPT | Performed by: INTERNAL MEDICINE

## 2018-07-03 PROCEDURE — 78452 HT MUSCLE IMAGE SPECT MULT: CPT

## 2018-07-03 PROCEDURE — 0 TECHNETIUM SESTAMIBI: Performed by: NURSE PRACTITIONER

## 2018-07-03 PROCEDURE — 99214 OFFICE O/P EST MOD 30 MIN: CPT | Performed by: NURSE PRACTITIONER

## 2018-07-03 PROCEDURE — A9500 TC99M SESTAMIBI: HCPCS | Performed by: NURSE PRACTITIONER

## 2018-07-03 PROCEDURE — 25010000002 REGADENOSON 0.4 MG/5ML SOLUTION: Performed by: NURSE PRACTITIONER

## 2018-07-03 PROCEDURE — 78452 HT MUSCLE IMAGE SPECT MULT: CPT | Performed by: INTERNAL MEDICINE

## 2018-07-03 PROCEDURE — 93017 CV STRESS TEST TRACING ONLY: CPT

## 2018-07-03 RX ADMIN — TECHNETIUM TC 99M SESTAMIBI 1 DOSE: 1 INJECTION INTRAVENOUS at 12:10

## 2018-07-03 RX ADMIN — REGADENOSON 0.4 MG: 0.08 INJECTION, SOLUTION INTRAVENOUS at 14:10

## 2018-07-03 RX ADMIN — TECHNETIUM TC 99M SESTAMIBI 1 DOSE: 1 INJECTION INTRAVENOUS at 14:10

## 2018-07-03 NOTE — PROGRESS NOTES
[facility]  [facility]  Chest Pain Clinic    Encounter Date:07/03/2018     Bernardo Dodd  [address] [facility] [address]      1980    SUHA Leyva    Bernardo Dodd is a 37 y.o. female.      Subjective:     Chief Complaint:  Chest Pain       HPI patient presents to the chest pain clinic today for ongoing evaluation of her chest pain. She presented to [facility] ED on 6/26/18 with complaints of pain in right chest. She notes that the pain is also present in her left chest. She notes that the pain has been present intermittently for years but she notes that she just couldn't take it anymore. She describes the pain as a burning sensation that radiates down both arms Onset was gradual and has been intermittent.   The chest pain has been relieved by nothing and is worsened by nothing. Patient has long history of GERD.        Cardiac risk factors:   hypertension  Remote Tobacco use, sedentary lifestyle, obesity (BMI > 30)      Allergies   Allergen Reactions   • Naproxen Swelling         Current Outpatient Prescriptions:   •  albuterol (PROVENTIL) (2.5 MG/3ML) 0.083% nebulizer solution, Take 2.5 mg by nebulization Every 12 (Twelve) Hours., Disp: , Rfl:   •  albuterol (VENTOLIN HFA) 108 (90 BASE) MCG/ACT inhaler, Inhale 1-2 puffs as needed. Every 4-6 hrs PRN, Disp: , Rfl:   •  amitriptyline (ELAVIL) 25 MG tablet, Take 25 mg by mouth every night., Disp: , Rfl:   •  amLODIPine (NORVASC) 5 MG tablet, Take 1 tablet by mouth Daily., Disp: 30 tablet, Rfl: 5  •  BREO ELLIPTA 200-25 MCG/INH aerosol powder , Inhale 1 puff Daily., Disp: , Rfl:   •  fluticasone (FLONASE) 50 MCG/ACT nasal spray, 2 sprays into each nostril Daily., Disp: 16 g, Rfl: 3  •  gabapentin (NEURONTIN) 300 MG capsule, Take 300 mg by mouth 2 (Two) Times a Day., Disp: , Rfl:   •  levocetirizine (XYZAL) 5 MG tablet, Take 1 tablet by mouth Daily., Disp: , Rfl:   •  montelukast (SINGULAIR) 10 MG tablet, Take 10 mg by mouth every  night., Disp: , Rfl:   •  MUCINEX 600 MG 12 hr tablet, Take 2 tablets by mouth 2 (Two) Times a Day., Disp: , Rfl:   •  ranitidine (ZANTAC) 300 MG capsule, Take 1 capsule by mouth Every Evening., Disp: 30 capsule, Rfl: 11  •  SPIRIVA RESPIMAT 2.5 MCG/ACT aerosol solution, Take 2 puffs by mouth Daily., Disp: , Rfl:   •  tiZANidine (ZANAFLEX) 4 MG tablet, Take 4 mg by mouth At Night As Needed for muscle spasms., Disp: , Rfl:     The following portions of the patient's history were reviewed and updated as appropriate in Epic:  Problem list, allergies, current medications, past medical and surgical history, past social and family history.     Review of Systems   Constitution: Positive for weakness and malaise/fatigue. Negative for chills, decreased appetite, diaphoresis, fever, night sweats, weight gain and weight loss.   HENT: Positive for congestion. Negative for hearing loss, hoarse voice and nosebleeds.    Eyes: Negative for blurred vision, visual disturbance and visual halos.   Cardiovascular: Positive for chest pain, dyspnea on exertion and leg swelling. Negative for claudication, cyanosis, irregular heartbeat, near-syncope, orthopnea, palpitations, paroxysmal nocturnal dyspnea and syncope.   Respiratory: Positive for cough, shortness of breath, snoring and sputum production. Negative for hemoptysis, sleep disturbances due to breathing and wheezing.    Endocrine: Positive for heat intolerance, polydipsia and polyuria.   Hematologic/Lymphatic: Negative for bleeding problem. Bruises/bleeds easily.   Skin: Negative for dry skin, itching and rash.   Musculoskeletal: Positive for joint pain and muscle cramps. Negative for arthritis, joint swelling and myalgias.   Gastrointestinal: Positive for bloating, abdominal pain, diarrhea and heartburn. Negative for constipation, flatus, hematemesis, hematochezia, melena, nausea and vomiting.   Genitourinary: Positive for nocturia. Negative for dysuria, frequency, hematuria and  "urgency.   Neurological: Positive for dizziness, headaches and light-headedness. Negative for excessive daytime sleepiness and loss of balance.   Psychiatric/Behavioral: Positive for depression. The patient has insomnia and is nervous/anxious.    Allergic/Immunologic: Positive for environmental allergies.       Objective:     Vitals:    07/03/18 0921 07/03/18 0922 07/03/18 0923   BP: 126/73 121/77 123/80   BP Location: Right arm Left arm Left arm   Patient Position: Sitting Sitting Standing   Cuff Size: Adult Adult Adult   Pulse: 88 85 87   Resp: 16     Temp: 97.4 °F (36.3 °C)     TempSrc: Temporal Artery      SpO2: 97%     Weight: 77.6 kg (171 lb)     Height: 149.9 cm (59\")           Physical Exam   Constitutional: She is oriented to person, place, and time. She appears well-developed and well-nourished. She is active and cooperative. No distress.   HENT:   Head: Normocephalic and atraumatic.   Mouth/Throat: Oropharynx is clear and moist.   Eyes: Conjunctivae and EOM are normal. Pupils are equal, round, and reactive to light.   Neck: Normal range of motion. Neck supple. No JVD present. No tracheal deviation present. No thyromegaly present.   Cardiovascular: Normal rate, regular rhythm, normal heart sounds and intact distal pulses.    Pulmonary/Chest: Effort normal and breath sounds normal.   Abdominal: Soft. Bowel sounds are normal. She exhibits no distension. There is no tenderness.   Musculoskeletal: Normal range of motion.   Neurological: She is alert and oriented to person, place, and time.   Skin: Skin is warm, dry and intact.   Psychiatric: She has a normal mood and affect. Her behavior is normal.   Nursing note and vitals reviewed.      Lab and Diagnostic Review:  Lab Results   Component Value Date    GLUCOSE 90 06/26/2018    CALCIUM 8.7 06/26/2018     06/26/2018    K 3.5 06/26/2018    CO2 25.0 06/26/2018     06/26/2018    BUN 10 06/26/2018    CREATININE 0.65 06/26/2018    EGFRIFAFRI 123 " 06/26/2018    BCR 15.4 06/26/2018    ANIONGAP 10.0 06/26/2018     Lab Results   Component Value Date    WBC 11.93 (H) 06/26/2018    HGB 11.6 06/26/2018    HCT 35.6 06/26/2018    MCV 88.3 06/26/2018     06/26/2018       Assessment and Plan:     1. Precordial pain  REGGIE score: 0  - Stress Test With Myocardial Perfusion (1 Day); Future    2. Essential hypertension  Well controlled  HTN Education provided today including signs and symptoms, medication management, daily blood pressure monitoring. Patient encouraged to call the Heart and Valve center with any abnormal readings.   - Stress Test With Myocardial Perfusion (1 Day); Future    3. Shortness of breath    - Stress Test With Myocardial Perfusion (1 Day); Future    4. Other fatigue    - Stress Test With Myocardial Perfusion (1 Day); Future    It has been a pleasure to participate in the care of this patient.  Patient was instructed to call the [facility] with any questions, concerns, or worsening symptoms.    *Please note that portions of this note were completed with a voice recognition program. Efforts were made to edit the dictations, but occasionally words are mistranscribed.

## 2018-07-05 ENCOUNTER — OFFICE VISIT (OUTPATIENT)
Dept: INTERNAL MEDICINE | Facility: CLINIC | Age: 38
End: 2018-07-05

## 2018-07-05 VITALS
BODY MASS INDEX: 34.13 KG/M2 | HEART RATE: 101 BPM | WEIGHT: 169 LBS | SYSTOLIC BLOOD PRESSURE: 128 MMHG | DIASTOLIC BLOOD PRESSURE: 76 MMHG | OXYGEN SATURATION: 98 %

## 2018-07-05 DIAGNOSIS — K21.00 GASTROESOPHAGEAL REFLUX DISEASE WITH ESOPHAGITIS: ICD-10-CM

## 2018-07-05 DIAGNOSIS — R07.89 ATYPICAL CHEST PAIN: Primary | ICD-10-CM

## 2018-07-05 RX ORDER — OMEPRAZOLE 40 MG/1
40 CAPSULE, DELAYED RELEASE ORAL DAILY
Qty: 30 CAPSULE | Refills: 3 | Status: SHIPPED | OUTPATIENT
Start: 2018-07-05 | End: 2018-10-09 | Stop reason: SDUPTHER

## 2018-07-05 NOTE — PROGRESS NOTES
Chief Complaint   Patient presents with   • Stress test results     follow up   • Throat still sore       Subjective   Bernardo Dodd is a 37 y.o. female.       History of Present Illness     Pt went to chest pain clinic and had stress test that was normal. Chest pain is determined to be noncardiac. She has since tried drinking mylanta and it helps with her chest pain. She has been taking the zantac daily. Her chest pain is worse when she eats spicy food.    Allergies have been bothering her, sore throat resolved and has now returned.        Current Outpatient Prescriptions:   •  albuterol (PROVENTIL) (2.5 MG/3ML) 0.083% nebulizer solution, Take 2.5 mg by nebulization Every 12 (Twelve) Hours., Disp: , Rfl:   •  albuterol (VENTOLIN HFA) 108 (90 BASE) MCG/ACT inhaler, Inhale 1-2 puffs as needed. Every 4-6 hrs PRN, Disp: , Rfl:   •  amitriptyline (ELAVIL) 25 MG tablet, Take 25 mg by mouth every night., Disp: , Rfl:   •  amLODIPine (NORVASC) 5 MG tablet, Take 1 tablet by mouth Daily., Disp: 30 tablet, Rfl: 5  •  BREO ELLIPTA 200-25 MCG/INH aerosol powder , Inhale 1 puff Daily., Disp: , Rfl:   •  fluticasone (FLONASE) 50 MCG/ACT nasal spray, 2 sprays into each nostril Daily., Disp: 16 g, Rfl: 3  •  gabapentin (NEURONTIN) 300 MG capsule, Take 300 mg by mouth 2 (Two) Times a Day., Disp: , Rfl:   •  levocetirizine (XYZAL) 5 MG tablet, Take 1 tablet by mouth Daily., Disp: , Rfl:   •  montelukast (SINGULAIR) 10 MG tablet, Take 10 mg by mouth every night., Disp: , Rfl:   •  MUCINEX 600 MG 12 hr tablet, Take 2 tablets by mouth 2 (Two) Times a Day., Disp: , Rfl:   •  ranitidine (ZANTAC) 300 MG capsule, Take 1 capsule by mouth Every Evening., Disp: 30 capsule, Rfl: 11  •  SPIRIVA RESPIMAT 2.5 MCG/ACT aerosol solution, Take 2 puffs by mouth Daily., Disp: , Rfl:   •  tiZANidine (ZANAFLEX) 4 MG tablet, Take 4 mg by mouth At Night As Needed for muscle spasms., Disp: , Rfl:   •  omeprazole (priLOSEC) 40 MG capsule, Take 1 capsule  by mouth Daily. Take on an empty stomach and eat 30 minutes- 1 hr after eating., Disp: 30 capsule, Rfl: 3     PMFSH  The following portions of the patient's history were reviewed and updated as appropriate: allergies, current medications, past family history, past medical history, past social history, past surgical history and problem list.    Review of Systems   Constitutional: Negative for activity change, appetite change and fatigue.   HENT: Negative for congestion and rhinorrhea.    Respiratory: Negative for chest tightness and shortness of breath.    Cardiovascular: Positive for chest pain. Negative for palpitations.   Gastrointestinal: Positive for abdominal pain. Negative for constipation, diarrhea and nausea.   Genitourinary: Negative for dysuria.   Musculoskeletal: Negative for arthralgias and myalgias.   Neurological: Negative for dizziness, weakness, light-headedness and headaches.   Psychiatric/Behavioral: Negative for dysphoric mood. The patient is not nervous/anxious.        Objective   /76   Pulse 101   Wt 76.7 kg (169 lb)   SpO2 98%   BMI 34.13 kg/m²     Physical Exam   Constitutional: She appears well-developed and well-nourished.   HENT:   Head: Normocephalic.   Right Ear: Hearing, tympanic membrane, external ear and ear canal normal.   Left Ear: Hearing, tympanic membrane, external ear and ear canal normal.   Nose: Nose normal.   Mouth/Throat: Oropharynx is clear and moist.   Eyes: Conjunctivae are normal. Pupils are equal, round, and reactive to light.   Neck: Normal range of motion.   Cardiovascular: Normal rate, regular rhythm and normal heart sounds.    Pulmonary/Chest: Effort normal and breath sounds normal. She has no decreased breath sounds. She has no wheezes. She has no rhonchi. She has no rales.   Musculoskeletal: Normal range of motion.   Neurological: She is alert.   Skin: Skin is warm and dry.   Psychiatric: She has a normal mood and affect. Her behavior is normal.   Nursing  note and vitals reviewed.      Results for orders placed or performed during the hospital encounter of 07/03/18   Stress Test With Myocardial Perfusion (1 Day)   Result Value Ref Range    Target HR (85%) 154 bpm    Max. Pred. HR (100%) 181 bpm    BH CV STRESS PROTOCOL 1 Pharmacologic     Stage 1 1     Duration Min Stage 1 1     Duration Sec Stage 1 0     Stress Dose Regadenoson Stage 1 0.4     Stress Comments Stage 1 10 sec bolus injection     Stage 2 2     Duration Min Stage 2 1     Duration Sec Stage 2 0     Stage 3 3     Duration Min Stage 3 1     Duration Sec Stage 3 0     Stage 4 4     Duration Min Stage 4 1     Duration Sec Stage 4 0     Baseline HR 68 bpm    Baseline /84 mmHg    HR Stage 1 88     HR Stage 2 113     HR Stage 3 110     BP Stage 3 142/80     HR Stage 4 112     BP Stage 4 140/80     Peak  bpm    Percent Max Pred HR 62.43 %    Percent Target HR 73 %    Peak /80 mmHg    Recovery HR 95 bpm    Recovery /80 mmHg    Nuc Stress EF 75 %        ASSESSMENT/PLAN    Problem List Items Addressed This Visit        Digestive    GERD (gastroesophageal reflux disease)     Add omeprazole 40 mg daily as directed. Avoid trigger foods. Follow up as scheduled. Gave pt return to work note.         Relevant Medications    omeprazole (priLOSEC) 40 MG capsule      Other Visit Diagnoses     Atypical chest pain    -  Primary    Relevant Medications    omeprazole (priLOSEC) 40 MG capsule               Return for Next scheduled follow up.

## 2018-07-06 NOTE — ASSESSMENT & PLAN NOTE
Add omeprazole 40 mg daily as directed. Avoid trigger foods. Follow up as scheduled. Gave pt return to work note.

## 2018-07-18 ENCOUNTER — TELEPHONE (OUTPATIENT)
Dept: INTERNAL MEDICINE | Facility: CLINIC | Age: 38
End: 2018-07-18

## 2018-07-18 NOTE — TELEPHONE ENCOUNTER
MELINDA FROM [employer] WHO IS THE  FOR THE PT IN REGARDS TO LEAVE OF ABSENCE FROM HER WORK WAS CALLING TO LEAVE A MESSAGE FOR [facility]ES[facility] [employer]. MELINDA STATED PAPERWORK WAS FAXED TO HER FOR THE PT BUT THERE WAS NO TREATMENT PLAN ON THE DOCUMENTATION AND SHE WAS CALLING TO SEE IF THE TREATMENT PLAN COULD BE FAXED TO # [phone]. BEST CALL BACK # [phone] OPTION 1 AND THEN OPTION 2

## 2018-07-23 ENCOUNTER — OFFICE VISIT (OUTPATIENT)
Dept: INTERNAL MEDICINE | Facility: CLINIC | Age: 38
End: 2018-07-23

## 2018-07-23 VITALS
DIASTOLIC BLOOD PRESSURE: 74 MMHG | TEMPERATURE: 99 F | OXYGEN SATURATION: 97 % | SYSTOLIC BLOOD PRESSURE: 104 MMHG | HEIGHT: 59 IN | WEIGHT: 166 LBS | HEART RATE: 80 BPM | BODY MASS INDEX: 33.47 KG/M2

## 2018-07-23 DIAGNOSIS — J02.9 PHARYNGITIS, UNSPECIFIED ETIOLOGY: Primary | ICD-10-CM

## 2018-07-23 DIAGNOSIS — G89.29 CHRONIC MIDLINE LOW BACK PAIN WITH RIGHT-SIDED SCIATICA: ICD-10-CM

## 2018-07-23 DIAGNOSIS — H66.001 ACUTE SUPPURATIVE OTITIS MEDIA OF RIGHT EAR WITHOUT SPONTANEOUS RUPTURE OF TYMPANIC MEMBRANE, RECURRENCE NOT SPECIFIED: ICD-10-CM

## 2018-07-23 DIAGNOSIS — M54.41 CHRONIC MIDLINE LOW BACK PAIN WITH RIGHT-SIDED SCIATICA: ICD-10-CM

## 2018-07-23 LAB
EXPIRATION DATE: NORMAL
INTERNAL CONTROL: NORMAL
Lab: NORMAL
S PYO AG THROAT QL: NEGATIVE

## 2018-07-23 PROCEDURE — 99214 OFFICE O/P EST MOD 30 MIN: CPT | Performed by: NURSE PRACTITIONER

## 2018-07-23 PROCEDURE — 87880 STREP A ASSAY W/OPTIC: CPT | Performed by: NURSE PRACTITIONER

## 2018-07-23 RX ORDER — AMOXICILLIN AND CLAVULANATE POTASSIUM 875; 125 MG/1; MG/1
1 TABLET, FILM COATED ORAL EVERY 12 HOURS SCHEDULED
Qty: 14 TABLET | Refills: 0 | Status: SHIPPED | OUTPATIENT
Start: 2018-07-23 | End: 2018-07-30

## 2018-07-23 NOTE — PATIENT INSTRUCTIONS
Heat Therapy  Heat therapy can help ease sore, stiff, injured, and tight muscles and joints. Heat relaxes your muscles, which may help ease your pain. Heat therapy should only be used on old, pre-existing, or long-lasting (chronic) injuries. Do not use heat therapy unless told by your doctor.  How to use heat therapy  There are several different kinds of heat therapy, including:  · Moist heat pack.  · Warm water bath.  · Hot water bottle.  · Electric heating pad.  · Heated gel pack.  · Heated wrap.  · Electric heating pad.    General heat therapy recommendations  · Do not sleep while using heat therapy. Only use heat therapy while you are awake.  · Your skin may turn pink while using heat therapy. Do not use heat therapy if your skin turns red.  · Do not use heat therapy if you have new pain.  · High heat or long exposure to heat can cause burns. Be careful when using heat therapy to avoid burning your skin.  · Do not use heat therapy on areas of your skin that are already irritated, such as with a rash or sunburn.  Get help if:  · You have blisters, redness, swelling (puffiness), or numbness.  · You have new pain.  · Your pain is worse.  This information is not intended to replace advice given to you by your health care provider. Make sure you discuss any questions you have with your health care provider.  Document Released: 03/11/2013 Document Revised: 05/25/2017 Document Reviewed: 02/10/2015  [employer] Interactive Patient Education © 2018 [employer]    Cryotherapy  WHAT IS CRYOTHERAPY?  Cryotherapy, or cold therapy, is a treatment that uses cold temperatures to treat an injury or medical condition. It includes using cold packs or ice packs to reduce pain and swelling. Only use cryotherapy if your doctor says it is okay.  HOW DO I USE CRYOTHERAPY?  · Place a towel between the cold source and your skin.  · Apply the cold source for no more than 20 minutes at a time.  · Check your skin after 5 minutes to make sure  there are no signs of a poor response to cold or skin damage. Check for:  ? White spots on your skin. Your skin may look blotchy or mottled.  ? Skin that looks blue or pale.  ? Skin that feels waxy or hard.  · Repeat these steps as many times each day as told by your doctor.    HOW CAN I MAKE A COLD PACK?  When using a cold pack at home to reduce pain and swelling, you can use:  · A silica gel cold pack that has been left in the freezer. You can buy this online or in stores.  · A plastic bag of frozen vegetables.  · A sealable plastic bag that has been filled with crushed ice.    Always wrap the pack in a dry or damp towel to avoid direct contact with your skin.  WHEN SHOULD I CALL MY DOCTOR?  Call your doctor if:  · You start to have white spots on your skin. This may give your skin a blotchy or mottled look.  · Your skin turns blue or pale.  · Your skin becomes waxy or hard.  · Your swelling gets worse.    This information is not intended to replace advice given to you by your health care provider. Make sure you discuss any questions you have with your health care provider.  Document Released: 06/05/2009 Document Revised: 05/25/2017 Document Reviewed: 08/31/2016  [employer] Interactive Patient Education © 2017 [employer]    Otitis Media, Adult  Otitis media is redness, soreness, and puffiness (swelling) in the space just behind your eardrum (middle ear). It may be caused by allergies or infection. It often happens along with a cold.  Follow these instructions at home:  · Take your medicine as told. Finish it even if you start to feel better.  · Only take over-the-counter or prescription medicines for pain, discomfort, or fever as told by your doctor.  · Follow up with your doctor as told.  Contact a doctor if:  · You have otitis media only in one ear, or bleeding from your nose, or both.  · You notice a lump on your neck.  · You are not getting better in 3-5 days.  · You feel worse instead of better.  Get help  right away if:  · You have pain that is not helped with medicine.  · You have puffiness, redness, or pain around your ear.  · You get a stiff neck.  · You cannot move part of your face (paralysis).  · You notice that the bone behind your ear hurts when you touch it.  This information is not intended to replace advice given to you by your health care provider. Make sure you discuss any questions you have with your health care provider.  Document Released: 06/05/2009 Document Revised: 05/25/2017 Document Reviewed: 07/15/2014  [employer] Patient Education © 2017 [employer]    Pharyngitis  Pharyngitis is a sore throat (pharynx). There is redness, pain, and swelling of your throat.  Follow these instructions at home:  · Drink enough fluids to keep your pee (urine) clear or pale yellow.  · Only take medicine as told by your doctor.  ? You may get sick again if you do not take medicine as told. Finish your medicines, even if you start to feel better.  ? Do not take aspirin.  · Rest.  · Rinse your mouth (gargle) with salt water (½ tsp of salt per 1 qt of water) every 1-2 hours. This will help the pain.  · If you are not at risk for choking, you can suck on hard candy or sore throat lozenges.  Contact a doctor if:  · You have large, tender lumps on your neck.  · You have a rash.  · You cough up green, yellow-brown, or bloody spit.  Get help right away if:  · You have a stiff neck.  · You drool or cannot swallow liquids.  · You throw up (vomit) or are not able to keep medicine or liquids down.  · You have very bad pain that does not go away with medicine.  · You have problems breathing (not from a stuffy nose).  This information is not intended to replace advice given to you by your health care provider. Make sure you discuss any questions you have with your health care provider.  Document Released: 06/05/2009 Document Revised: 05/25/2017 Document Reviewed: 08/25/2014  [employer] Patient Education ©  2017 [employer]    Sciatica  Sciatica is pain, numbness, weakness, or tingling along your sciatic nerve. The sciatic nerve starts in the lower back and goes down the back of each leg. Sciatica happens when this nerve is pinched or has pressure put on it. Sciatica usually goes away on its own or with treatment. Sometimes, sciatica may keep coming back (recur).  Follow these instructions at home:  Medicines  · Take over-the-counter and prescription medicines only as told by your doctor.  · Do not drive or use heavy machinery while taking prescription pain medicine.  Managing pain  · If directed, put ice on the affected area.  ? Put ice in a plastic bag.  ? Place a towel between your skin and the bag.  ? Leave the ice on for 20 minutes, 2-3 times a day.  · After icing, apply heat to the affected area before you exercise or as often as told by your doctor. Use the heat source that your doctor tells you to use, such as a moist heat pack or a heating pad.  ? Place a towel between your skin and the heat source.  ? Leave the heat on for 20-30 minutes.  ? Remove the heat if your skin turns bright red. This is especially important if you are unable to feel pain, heat, or cold. You may have a greater risk of getting burned.  Activity  · Return to your normal activities as told by your doctor. Ask your doctor what activities are safe for you.  ? Avoid activities that make your sciatica worse.  · Take short rests during the day. Rest in a lying or standing position. This is usually better than sitting to rest.  ? When you rest for a long time, do some physical activity or stretching between periods of rest.  ? Avoid sitting for a long time without moving. Get up and move around at least one time each hour.  · Exercise and stretch regularly, as told by your doctor.  · Do not lift anything that is heavier than 10 lb (4.5 kg) while you have symptoms of sciatica.  ? Avoid lifting heavy things even when you do not have  symptoms.  ? Avoid lifting heavy things over and over.  · When you lift objects, always lift in a way that is safe for your body. To do this, you should:  ? Bend your knees.  ? Keep the object close to your body.  ? Avoid twisting.  General instructions  · Use good posture.  ? Avoid leaning forward when you are sitting.  ? Avoid hunching over when you are standing.  · Stay at a healthy weight.  · Wear comfortable shoes that support your feet. Avoid wearing high heels.  · Avoid sleeping on a mattress that is too soft or too hard. You might have less pain if you sleep on a mattress that is firm enough to support your back.  · Keep all follow-up visits as told by your doctor. This is important.  Contact a doctor if:  · You have pain that:  ? Wakes you up when you are sleeping.  ? Gets worse when you lie down.  ? Is worse than the pain you have had in the past.  ? Lasts longer than 4 weeks.  · You lose weight for without trying.  Get help right away if:  · You cannot control when you pee (urinate) or poop (have a bowel movement).  · You have weakness in any of these areas and it gets worse.  ? Lower back.  ? Lower belly (pelvis).  ? Butt (buttocks).  ? Legs.  · You have redness or swelling of your back.  · You have a burning feeling when you pee.  This information is not intended to replace advice given to you by your health care provider. Make sure you discuss any questions you have with your health care provider.  Document Released: 09/26/2009 Document Revised: 05/25/2017 Document Reviewed: 08/26/2016  [employer] Interactive Patient Education © 2018 [employer].

## 2018-07-23 NOTE — PROGRESS NOTES
"Chief Complaint   Patient presents with   • Sore Throat     Pt has had chills   • Back Pain   • Sinus Problem       History of Present Illness  37 y.o.female presents for sore throat couple days; +cough; tinnits \"like a facet in both ears\" + chills, myalgias.  + sick contacts to strep; no fever.  Has tried her regular inhalers and nasal steroids without improvement.    Increased sciatica R side 3 days.  Taking Muscles relaxers, tizinadine without improvement.    Review of Systems   Constitutional: Positive for chills and fatigue. Negative for fever.   HENT: Positive for congestion, ear pain, postnasal drip, rhinorrhea, sore throat and tinnitus. Negative for sinus pressure and sneezing.    Eyes: Negative for pain and discharge.   Respiratory: Negative for cough and shortness of breath.    Cardiovascular: Positive for chest pain.   Gastrointestinal: Negative for abdominal distention, diarrhea, nausea and vomiting.   Genitourinary: Negative for difficulty urinating and dysuria.   Musculoskeletal: Positive for back pain and myalgias.   Neurological: Negative for dizziness and headache.         [facility]  The following portions of the patient's history were reviewed and updated as appropriate: allergies, current medications, past family history, past medical history, past social history, past surgical history and problem list.     Past Medical History:   Diagnosis Date   • Allergic rhinitis    • Anemia    • Arthritis    • Asthma    • Depression    • FHx: migraine headaches    • GERD (gastroesophageal reflux disease)    • Heart murmur    • History of chest x-ray 11/01/2015    no active disease   • History of echocardiogram 11/01/2015    ejection fraction of greater than 65%, mitral and pulmonic regurgitation an physiological tricuspid regurgitation.   • History of PFTs 12/22/2015    spirometry data acceptable and reproducible; pt given 4 puffs of Ventolin; pt gave good effort; no obstruction; no Bd response; MVV reduced    • " History of PFTs 2015    pt gave best effort; duoneb given prior and post study; moderate nonspecific proportional reduction of FEV1 and FVC with preserved ratio; FEV1 moderately reduced; cannot rule out restriction   • Hypertension    • Ovarian cyst    • Seizures (CMS/HCC)    • Thigh shingles       Past Surgical History:   Procedure Laterality Date   • BILATERAL BREAST REDUCTION     • BREAST SURGERY      breast reduction   •  SECTION     • LAPAROSCOPIC TUBAL LIGATION     • ORIF ANKLE FRACTURE Right       Allergies   Allergen Reactions   • Naproxen Swelling      Family History   Problem Relation Age of Onset   • Pancreatic cancer Maternal Grandmother    • Heart failure Paternal Grandmother    • MARICE disease Paternal Aunt    • Arthritis Mother    • Cancer Mother    • Arthritis Father    • Diabetes Neg Hx    • Heart attack Neg Hx    • Hypertension Neg Hx    • Hyperlipidemia Neg Hx    • Mental illness Neg Hx    • Obesity Neg Hx    • Stroke Neg Hx       Social History     Social History   • Marital status: Single     Spouse name: N/A   • Number of children: N/A   • Years of education: N/A     Occupational History   • Not on file.     Social History Main Topics   • Smoking status: Former Smoker     Packs/day: 1.00     Years: 15.00     Types: Cigarettes     Quit date: 2015   • Smokeless tobacco: Never Used   • Alcohol use No      Comment: socially   • Drug use: No   • Sexual activity: Defer     Other Topics Concern   • Not on file     Social History Narrative    Caffeine intake: 16 oz per day          Current Outpatient Prescriptions:   •  albuterol (PROVENTIL) (2.5 MG/3ML) 0.083% nebulizer solution, Take 2.5 mg by nebulization Every 12 (Twelve) Hours., Disp: , Rfl:   •  albuterol (VENTOLIN HFA) 108 (90 BASE) MCG/ACT inhaler, Inhale 1-2 puffs as needed. Every 4-6 hrs PRN, Disp: , Rfl:   •  amitriptyline (ELAVIL) 25 MG tablet, Take 25 mg by mouth every night., Disp: , Rfl:   •  amLODIPine (NORVASC) 5  "MG tablet, Take 1 tablet by mouth Daily., Disp: 30 tablet, Rfl: 5  •  BREO ELLIPTA 200-25 MCG/INH aerosol powder , Inhale 1 puff Daily., Disp: , Rfl:   •  fluticasone (FLONASE) 50 MCG/ACT nasal spray, 2 sprays into each nostril Daily., Disp: 16 g, Rfl: 3  •  gabapentin (NEURONTIN) 300 MG capsule, Take 300 mg by mouth 2 (Two) Times a Day., Disp: , Rfl:   •  levocetirizine (XYZAL) 5 MG tablet, Take 1 tablet by mouth Daily., Disp: , Rfl:   •  montelukast (SINGULAIR) 10 MG tablet, Take 10 mg by mouth every night., Disp: , Rfl:   •  MUCINEX 600 MG 12 hr tablet, Take 2 tablets by mouth 2 (Two) Times a Day., Disp: , Rfl:   •  omeprazole (priLOSEC) 40 MG capsule, Take 1 capsule by mouth Daily. Take on an empty stomach and eat 30 minutes- 1 hr after eating., Disp: 30 capsule, Rfl: 3  •  ranitidine (ZANTAC) 300 MG capsule, Take 1 capsule by mouth Every Evening., Disp: 30 capsule, Rfl: 11  •  SPIRIVA RESPIMAT 2.5 MCG/ACT aerosol solution, Take 2 puffs by mouth Daily., Disp: , Rfl:   •  tiZANidine (ZANAFLEX) 4 MG tablet, Take 4 mg by mouth At Night As Needed for muscle spasms., Disp: , Rfl:     VITALS:  /74   Pulse 80   Temp 99 °F (37.2 °C)   Ht 149.9 cm (59\")   Wt 75.3 kg (166 lb)   SpO2 97%   Breastfeeding? No   BMI 33.53 kg/m²     Physical Exam   Constitutional: She appears well-developed and well-nourished.   HENT:   Head: Normocephalic.   Right Ear: External ear and ear canal normal. Tympanic membrane is injected, erythematous and bulging. Tympanic membrane is not perforated. A middle ear effusion is present.   Left Ear: External ear and ear canal normal. Tympanic membrane is erythematous. Tympanic membrane is not perforated and not bulging. A middle ear effusion is present.   Nose: Mucosal edema, rhinorrhea and congestion present. Right sinus exhibits no maxillary sinus tenderness and no frontal sinus tenderness. Left sinus exhibits no maxillary sinus tenderness and no frontal sinus tenderness. "   Mouth/Throat: Uvula is midline and mucous membranes are normal. Posterior oropharyngeal erythema present. No oropharyngeal exudate or tonsillar abscesses. No tonsillar exudate.   Eyes: Conjunctivae are normal. Right eye exhibits no discharge. Left eye exhibits no discharge.   Neck: Normal range of motion. Neck supple.   Cardiovascular: Normal rate, regular rhythm and normal heart sounds.    Pulmonary/Chest: Effort normal and breath sounds normal. No respiratory distress. She has no wheezes. She has no rhonchi. She has no rales.   Musculoskeletal:        Lumbar back: She exhibits tenderness. She exhibits no swelling, no edema and no spasm.        Back:    Tenderness to palpation to low back without paraspinal spasm.  + tenderness at R sciatic notch.   Lymphadenopathy:        Head (right side): Submandibular and tonsillar adenopathy present.     She has no cervical adenopathy.   Neurological: She has normal strength. She displays normal reflexes. No sensory deficit.   Negative straight leg/crossed leg raises.   Skin: Skin is warm and dry. She is not diaphoretic.   Nursing note and vitals reviewed.      LABS  No labs this visit.    ASSESSMENT/PLAN  Bernardo was seen today for sore throat, back pain and sinus problem.    Diagnoses and all orders for this visit:    Pharyngitis, unspecified etiology  -     Cancel: POC Rapid Strep A  -     POCT rapid strep A    Acute suppurative otitis media of right ear without spontaneous rupture of tympanic membrane, recurrence not specified  -     amoxicillin-clavulanate (AUGMENTIN) 875-125 MG per tablet; Take 1 tablet by mouth Every 12 (Twelve) Hours for 7 days.    Chronic midline low back pain with right-sided sciatica  Comments:  cont muscle relaxant as previously ordered.  Ice/heat therapy education sheet provided.    She will cont her inhalers and flonase as prescribed.    She asked for narcotic pain med for her back.  I do not find any major abnormalities to support narcotic  use at this time.      I discussed the patients findings and my recommendations with patient.     Patient was encouraged to keep me informed of any acute changes, lack of improvement, or any new concerning symptoms.    Patient voiced understanding of all instructions and denied further questions.      FOLLOW-UP  No Follow-up on file.    Electronically signed by:    SHANE Mueller  07/23/2018

## 2018-07-26 NOTE — TELEPHONE ENCOUNTER
Paperwork faxed and scanned in, called and informed pt of Nelly's message via detailed vm, left call back number if anymore questions or concerns.

## 2018-07-31 DIAGNOSIS — D50.9 IRON DEFICIENCY ANEMIA, UNSPECIFIED IRON DEFICIENCY ANEMIA TYPE: ICD-10-CM

## 2018-07-31 RX ORDER — FERROUS SULFATE 325(65) MG
TABLET ORAL
Qty: 30 TABLET | Refills: 4 | Status: SHIPPED | OUTPATIENT
Start: 2018-07-31 | End: 2019-01-11 | Stop reason: SDUPTHER

## 2018-07-31 NOTE — TELEPHONE ENCOUNTER
Not sure if we need to refill this medication . I dont see it on her list could you please assist?

## 2018-08-01 ENCOUNTER — HOSPITAL ENCOUNTER (EMERGENCY)
Facility: HOSPITAL | Age: 38
Discharge: HOME | End: 2018-08-01
Attending: EMERGENCY MEDICINE | Admitting: EMERGENCY MEDICINE

## 2018-08-01 VITALS
WEIGHT: 169 LBS | TEMPERATURE: 98.7 F | RESPIRATION RATE: 16 BRPM | BODY MASS INDEX: 34.07 KG/M2 | DIASTOLIC BLOOD PRESSURE: 91 MMHG | HEIGHT: 59 IN | OXYGEN SATURATION: 97 % | SYSTOLIC BLOOD PRESSURE: 137 MMHG | HEART RATE: 71 BPM

## 2018-08-01 DIAGNOSIS — J45.31 MILD PERSISTENT ASTHMA NOT DEPENDENT ON SYSTEMIC STEROIDS WITH ACUTE EXACERBATION: Primary | ICD-10-CM

## 2018-08-01 DIAGNOSIS — M72.2 PLANTAR FASCIITIS, LEFT: ICD-10-CM

## 2018-08-01 PROCEDURE — 94640 AIRWAY INHALATION TREATMENT: CPT

## 2018-08-01 PROCEDURE — 25010000002 DEXAMETHASONE PER 1 MG: Performed by: EMERGENCY MEDICINE

## 2018-08-01 PROCEDURE — 99283 EMERGENCY DEPT VISIT LOW MDM: CPT

## 2018-08-01 PROCEDURE — 94760 N-INVAS EAR/PLS OXIMETRY 1: CPT

## 2018-08-01 RX ORDER — DEXAMETHASONE SODIUM PHOSPHATE 4 MG/ML
10 VIAL (ML) INJECTION ONCE
Status: COMPLETED | OUTPATIENT
Start: 2018-08-01 | End: 2018-08-01

## 2018-08-01 RX ORDER — IPRATROPIUM BROMIDE AND ALBUTEROL SULFATE 2.5; .5 MG/3ML; MG/3ML
3 SOLUTION RESPIRATORY (INHALATION) ONCE
Status: COMPLETED | OUTPATIENT
Start: 2018-08-01 | End: 2018-08-01

## 2018-08-01 RX ADMIN — DEXAMETHASONE SODIUM PHOSPHATE 10 MG: 4 INJECTION, SOLUTION INTRAMUSCULAR; INTRAVENOUS at 16:20

## 2018-08-01 RX ADMIN — IPRATROPIUM BROMIDE AND ALBUTEROL SULFATE 3 ML: 2.5; .5 SOLUTION RESPIRATORY (INHALATION) at 15:51

## 2018-08-01 NOTE — ED PROVIDER NOTES
Subjective   Ms. Bernardo Dodd is a 37 year old female who presents to the ED after an asthma attack. Patient reports that she was at work this afternoon when she suddenly had an asthma attack. Tried using her Q-ERICK inhaler (which she believed was her rescue inhaler) almost 6 times with minimal relief. Presents to the ED complaining of shortness of breath and chest tightness.    In the emergency room, the patient also notes that the plantar aspect of her left foot has hurt for the past month, despite seeing multiple medical professionals for this and trying an ankle brace. She reports that she has been diagnosed with plantar fasciitis in the past, but laments that her work requires her to stand on her feet for up to 10 hours a day without much respite.         History provided by:  Patient  Shortness of Breath   Severity:  Severe  Onset quality:  Sudden  Timing:  Constant  Progression:  Resolved  Chronicity:  New  Context comment:  Asthma  Relieved by:  Nothing  Worsened by:  Nothing  Ineffective treatments: QVAR inhaler.  Associated symptoms: no abdominal pain, no cough, no fever, no rash, no vomiting and no wheezing        Review of Systems   Constitutional: Negative for fever.   Respiratory: Positive for chest tightness and shortness of breath. Negative for cough and wheezing.    Gastrointestinal: Negative for abdominal pain and vomiting.   Musculoskeletal: Positive for myalgias (L foot pain).   Skin: Negative for rash.   All other systems reviewed and are negative.      Past Medical History:   Diagnosis Date   • Allergic rhinitis    • Anemia    • Arthritis    • Asthma    • Depression    • FHx: migraine headaches    • GERD (gastroesophageal reflux disease)    • Heart murmur    • History of chest x-ray 11/01/2015    no active disease   • History of echocardiogram 11/01/2015    ejection fraction of greater than 65%, mitral and pulmonic regurgitation an physiological tricuspid regurgitation.   • History of PFTs  2015    spirometry data acceptable and reproducible; pt given 4 puffs of Ventolin; pt gave good effort; no obstruction; no Bd response; MVV reduced    • History of PFTs 2015    pt gave best effort; duoneb given prior and post study; moderate nonspecific proportional reduction of FEV1 and FVC with preserved ratio; FEV1 moderately reduced; cannot rule out restriction   • Hypertension    • Ovarian cyst    • Seizures (CMS/HCC)    • Thigh shingles        Allergies   Allergen Reactions   • Naproxen Swelling       Past Surgical History:   Procedure Laterality Date   • BILATERAL BREAST REDUCTION     • BREAST SURGERY      breast reduction   •  SECTION     • LAPAROSCOPIC TUBAL LIGATION     • ORIF ANKLE FRACTURE Right        Family History   Problem Relation Age of Onset   • Pancreatic cancer Maternal Grandmother    • Heart failure Paternal Grandmother    • MARCIE disease Paternal Aunt    • Arthritis Mother    • Cancer Mother    • Arthritis Father    • Diabetes Neg Hx    • Heart attack Neg Hx    • Hypertension Neg Hx    • Hyperlipidemia Neg Hx    • Mental illness Neg Hx    • Obesity Neg Hx    • Stroke Neg Hx        Social History     Social History   • Marital status: Single     Social History Main Topics   • Smoking status: Former Smoker     Packs/day: 1.00     Years: 15.00     Types: Cigarettes     Quit date: 2015   • Smokeless tobacco: Never Used   • Alcohol use No      Comment: socially   • Drug use: No   • Sexual activity: Defer     Other Topics Concern   • Not on file     Social History Narrative    Caffeine intake: 16 oz per day          Objective   Physical Exam   Constitutional: She is oriented to person, place, and time. She appears well-developed and well-nourished. No distress.   HENT:   Head: Normocephalic and atraumatic.   Eyes: Conjunctivae are normal. No scleral icterus.   Neck: Normal range of motion. Neck supple.   Cardiovascular: Normal rate, regular rhythm, normal heart sounds and  intact distal pulses.  Exam reveals no gallop and no friction rub.    No murmur heard.  Pulmonary/Chest: Effort normal and breath sounds normal. No respiratory distress. She has no wheezes. She has no rales.   Clear lungs diffusely.   Abdominal: Soft. Bowel sounds are normal. There is no tenderness. There is no guarding.   Musculoskeletal: Normal range of motion. She exhibits tenderness.   Left foot has no swelling or redness. There is tenderness at the insertion point of the plantar fascia into the calcaneus which reproduces her pain.   Neurological: She is alert and oriented to person, place, and time.   Skin: Skin is warm and dry. She is not diaphoretic.   Psychiatric: She has a normal mood and affect. Her behavior is normal.   Nursing note and vitals reviewed.      Procedures         ED Course     Pt counseled on correct inhaler.  Neb and Decadron given for asthma exacerbation.  Pt counseled on plantar fasciitis.                MDM    Final diagnoses:   Mild persistent asthma not dependent on systemic steroids with acute exacerbation   Plantar fasciitis, left       Documentation assistance provided by jonny Licea.  Information recorded by the jonny was done at my direction and has been verified and validated by me.     Otilio Licea  08/01/18 1721       Otilio Licea  08/01/18 1735       Jason Hunt MD  08/01/18 [id number]

## 2018-08-13 ENCOUNTER — HOSPITAL ENCOUNTER (EMERGENCY)
Facility: HOSPITAL | Age: 38
Discharge: HOME | End: 2018-08-13
Attending: EMERGENCY MEDICINE | Admitting: EMERGENCY MEDICINE

## 2018-08-13 ENCOUNTER — APPOINTMENT (OUTPATIENT)
Dept: GENERAL RADIOLOGY | Facility: HOSPITAL | Age: 38
End: 2018-08-13

## 2018-08-13 VITALS
HEIGHT: 59 IN | OXYGEN SATURATION: 97 % | BODY MASS INDEX: 34.27 KG/M2 | SYSTOLIC BLOOD PRESSURE: 125 MMHG | WEIGHT: 170 LBS | RESPIRATION RATE: 16 BRPM | HEART RATE: 99 BPM | DIASTOLIC BLOOD PRESSURE: 80 MMHG | TEMPERATURE: 98.4 F

## 2018-08-13 DIAGNOSIS — D72.829 LEUKOCYTOSIS, UNSPECIFIED TYPE: ICD-10-CM

## 2018-08-13 DIAGNOSIS — R07.89 ATYPICAL CHEST PAIN: Primary | ICD-10-CM

## 2018-08-13 DIAGNOSIS — M79.10 MYALGIA: ICD-10-CM

## 2018-08-13 DIAGNOSIS — L30.9 DERMATITIS: ICD-10-CM

## 2018-08-13 LAB
ALBUMIN SERPL-MCNC: 3.82 G/DL (ref 3.2–4.8)
ALBUMIN/GLOB SERPL: 1.5 G/DL (ref 1.5–2.5)
ALP SERPL-CCNC: 48 U/L (ref 25–100)
ALT SERPL W P-5'-P-CCNC: 22 U/L (ref 7–40)
ANION GAP SERPL CALCULATED.3IONS-SCNC: 8 MMOL/L (ref 3–11)
AST SERPL-CCNC: 17 U/L (ref 0–33)
BASOPHILS # BLD AUTO: 0.01 10*3/MM3 (ref 0–0.2)
BASOPHILS NFR BLD AUTO: 0.1 % (ref 0–1)
BILIRUB SERPL-MCNC: 0.4 MG/DL (ref 0.3–1.2)
BNP SERPL-MCNC: 16 PG/ML (ref 0–100)
BUN BLD-MCNC: 14 MG/DL (ref 9–23)
BUN/CREAT SERPL: 17.1 (ref 7–25)
CALCIUM SPEC-SCNC: 8.8 MG/DL (ref 8.7–10.4)
CHLORIDE SERPL-SCNC: 107 MMOL/L (ref 99–109)
CO2 SERPL-SCNC: 22 MMOL/L (ref 20–31)
CREAT BLD-MCNC: 0.82 MG/DL (ref 0.6–1.3)
DEPRECATED RDW RBC AUTO: 46.6 FL (ref 37–54)
EOSINOPHIL # BLD AUTO: 0.02 10*3/MM3 (ref 0–0.3)
EOSINOPHIL NFR BLD AUTO: 0.1 % (ref 0–3)
ERYTHROCYTE [DISTWIDTH] IN BLOOD BY AUTOMATED COUNT: 14.7 % (ref 11.3–14.5)
GFR SERPL CREATININE-BSD FRML MDRD: 94 ML/MIN/1.73
GLOBULIN UR ELPH-MCNC: 2.6 GM/DL
GLUCOSE BLD-MCNC: 116 MG/DL (ref 70–100)
HCT VFR BLD AUTO: 39.2 % (ref 34.5–44)
HGB BLD-MCNC: 12.5 G/DL (ref 11.5–15.5)
HOLD SPECIMEN: NORMAL
HOLD SPECIMEN: NORMAL
IMM GRANULOCYTES # BLD: 0.17 10*3/MM3 (ref 0–0.03)
IMM GRANULOCYTES NFR BLD: 1 % (ref 0–0.6)
LIPASE SERPL-CCNC: 76 U/L (ref 6–51)
LYMPHOCYTES # BLD AUTO: 2.3 10*3/MM3 (ref 0.6–4.8)
LYMPHOCYTES NFR BLD AUTO: 14.1 % (ref 24–44)
MCH RBC QN AUTO: 28.2 PG (ref 27–31)
MCHC RBC AUTO-ENTMCNC: 31.9 G/DL (ref 32–36)
MCV RBC AUTO: 88.5 FL (ref 80–99)
MONOCYTES # BLD AUTO: 1.3 10*3/MM3 (ref 0–1)
MONOCYTES NFR BLD AUTO: 8 % (ref 0–12)
NEUTROPHILS # BLD AUTO: 12.67 10*3/MM3 (ref 1.5–8.3)
NEUTROPHILS NFR BLD AUTO: 77.7 % (ref 41–71)
PLATELET # BLD AUTO: 257 10*3/MM3 (ref 150–450)
PMV BLD AUTO: 10 FL (ref 6–12)
POTASSIUM BLD-SCNC: 3.6 MMOL/L (ref 3.5–5.5)
PROT SERPL-MCNC: 6.4 G/DL (ref 5.7–8.2)
RBC # BLD AUTO: 4.43 10*6/MM3 (ref 3.89–5.14)
SODIUM BLD-SCNC: 137 MMOL/L (ref 132–146)
TROPONIN I SERPL-MCNC: 0 NG/ML (ref 0–0.07)
TROPONIN I SERPL-MCNC: 0 NG/ML (ref 0–0.07)
WBC NRBC COR # BLD: 16.3 10*3/MM3 (ref 3.5–10.8)
WHOLE BLOOD HOLD SPECIMEN: NORMAL
WHOLE BLOOD HOLD SPECIMEN: NORMAL

## 2018-08-13 PROCEDURE — 83690 ASSAY OF LIPASE: CPT

## 2018-08-13 PROCEDURE — 80053 COMPREHEN METABOLIC PANEL: CPT

## 2018-08-13 PROCEDURE — 83880 ASSAY OF NATRIURETIC PEPTIDE: CPT

## 2018-08-13 PROCEDURE — 84484 ASSAY OF TROPONIN QUANT: CPT

## 2018-08-13 PROCEDURE — 85025 COMPLETE CBC W/AUTO DIFF WBC: CPT

## 2018-08-13 PROCEDURE — 94799 UNLISTED PULMONARY SVC/PX: CPT

## 2018-08-13 PROCEDURE — 94640 AIRWAY INHALATION TREATMENT: CPT

## 2018-08-13 PROCEDURE — 93005 ELECTROCARDIOGRAM TRACING: CPT | Performed by: EMERGENCY MEDICINE

## 2018-08-13 PROCEDURE — 93005 ELECTROCARDIOGRAM TRACING: CPT

## 2018-08-13 PROCEDURE — 99283 EMERGENCY DEPT VISIT LOW MDM: CPT

## 2018-08-13 PROCEDURE — 71045 X-RAY EXAM CHEST 1 VIEW: CPT

## 2018-08-13 PROCEDURE — 63710000001 PREDNISONE PER 1 MG: Performed by: EMERGENCY MEDICINE

## 2018-08-13 RX ORDER — PREDNISONE 20 MG/1
60 TABLET ORAL ONCE
Status: COMPLETED | OUTPATIENT
Start: 2018-08-13 | End: 2018-08-13

## 2018-08-13 RX ORDER — BACITRACIN, NEOMYCIN, POLYMYXIN B 400; 3.5; 5 [USP'U]/G; MG/G; [USP'U]/G
1 OINTMENT TOPICAL ONCE
Status: COMPLETED | OUTPATIENT
Start: 2018-08-13 | End: 2018-08-13

## 2018-08-13 RX ORDER — IPRATROPIUM BROMIDE AND ALBUTEROL SULFATE 2.5; .5 MG/3ML; MG/3ML
3 SOLUTION RESPIRATORY (INHALATION) ONCE
Status: COMPLETED | OUTPATIENT
Start: 2018-08-13 | End: 2018-08-13

## 2018-08-13 RX ORDER — SODIUM CHLORIDE 0.9 % (FLUSH) 0.9 %
10 SYRINGE (ML) INJECTION AS NEEDED
Status: DISCONTINUED | OUTPATIENT
Start: 2018-08-13 | End: 2018-08-14 | Stop reason: HOSPADM

## 2018-08-13 RX ADMIN — BACITRACIN, NEOMYCIN, POLYMYXIN B 1 G: 400; 3.5; 5 OINTMENT TOPICAL at 22:17

## 2018-08-13 RX ADMIN — IPRATROPIUM BROMIDE AND ALBUTEROL SULFATE 3 ML: 2.5; .5 SOLUTION RESPIRATORY (INHALATION) at 23:00

## 2018-08-13 RX ADMIN — PREDNISONE 60 MG: 20 TABLET ORAL at 22:17

## 2018-08-14 ENCOUNTER — OFFICE VISIT (OUTPATIENT)
Dept: INTERNAL MEDICINE | Facility: CLINIC | Age: 38
End: 2018-08-14

## 2018-08-14 VITALS
HEART RATE: 103 BPM | BODY MASS INDEX: 34.27 KG/M2 | OXYGEN SATURATION: 97 % | DIASTOLIC BLOOD PRESSURE: 80 MMHG | HEIGHT: 59 IN | WEIGHT: 170 LBS | SYSTOLIC BLOOD PRESSURE: 138 MMHG

## 2018-08-14 DIAGNOSIS — Z11.3 SCREENING EXAMINATION FOR STD (SEXUALLY TRANSMITTED DISEASE): ICD-10-CM

## 2018-08-14 DIAGNOSIS — Z01.419 ENCOUNTER FOR GYNECOLOGICAL EXAMINATION WITH PAPANICOLAOU SMEAR OF CERVIX: Primary | ICD-10-CM

## 2018-08-14 DIAGNOSIS — B00.9 HERPES INFECTION: ICD-10-CM

## 2018-08-14 LAB
HAV IGM SERPL QL IA: NORMAL
HBV CORE IGM SERPL QL IA: NORMAL
HBV SURFACE AG SERPL QL IA: NORMAL
HCV AB SER DONR QL: NORMAL
HIV1+2 AB SER QL: NORMAL

## 2018-08-14 PROCEDURE — 99213 OFFICE O/P EST LOW 20 MIN: CPT | Performed by: PHYSICIAN ASSISTANT

## 2018-08-14 PROCEDURE — G0432 EIA HIV-1/HIV-2 SCREEN: HCPCS | Performed by: PHYSICIAN ASSISTANT

## 2018-08-14 PROCEDURE — 80074 ACUTE HEPATITIS PANEL: CPT | Performed by: PHYSICIAN ASSISTANT

## 2018-08-14 PROCEDURE — 86592 SYPHILIS TEST NON-TREP QUAL: CPT | Performed by: PHYSICIAN ASSISTANT

## 2018-08-14 RX ORDER — RANITIDINE 300 MG/1
TABLET ORAL
COMMUNITY
Start: 2018-07-31 | End: 2018-08-14 | Stop reason: SDUPTHER

## 2018-08-14 RX ORDER — GABAPENTIN 400 MG/1
400 CAPSULE ORAL
COMMUNITY
Start: 2018-07-14 | End: 2020-05-30

## 2018-08-14 RX ORDER — LAMOTRIGINE 50 MG/1
1 TABLET, EXTENDED RELEASE ORAL DAILY
COMMUNITY
Start: 2018-07-30 | End: 2019-02-19

## 2018-08-14 RX ORDER — VALACYCLOVIR HYDROCHLORIDE 1 G/1
1000 TABLET, FILM COATED ORAL DAILY
Qty: 30 TABLET | Refills: 5 | Status: SHIPPED | OUTPATIENT
Start: 2018-08-14 | End: 2018-10-09 | Stop reason: HOSPADM

## 2018-08-14 NOTE — DISCHARGE INSTRUCTIONS
Please follow-up with Dr. Sotelo within one week. Call for appointment.    Please follow-up with  [facility] V[facility] specialists as well.    Immediately return to the ED for any acute or progressive symptom presentation.

## 2018-08-14 NOTE — ED PROVIDER NOTES
Subjective   37-year-old female presents with multiple complaints.  Of note, the patient states that she has a history of asthma.  She states that for the past couple of weeks she has been experiencing intermittent chest pain and shortness of breath.  Additionally, she states that she feels fatigued with decreased energy compared to baseline and is experiencing diffuse myalgias.  She has been taking her nebs at home with mild relief of her shortness of breath that she attributes to an asthma exacerbation.  Her only cardiac risk factors are smoking and hypertension.  Additionally, she states that she has had a rash on her buttocks for the past several days that she states is painful in nature.  No drainage.  No new exposures.  No burns.  No triggers.        History provided by:  Patient  Illness   Quality:  Chest pain, diffuse myalgias, SOA, rash  Severity:  Severe (8/10 pain during examination)  Duration:  2 weeks  Timing:  Constant  Context:  Pt presents to the ED with c/o general unwell. States she has been SOA with diffuse myalgias and CP for 2 weeks. Rash on buttocks x 1 week.  Ineffective treatments:  Breathing treatments/inhalers for SOA  Associated symptoms: chest pain, myalgias (Diffuse), rash (Buttocks) and shortness of breath    Risk factors:  PMHx of asthma, HTN      Review of Systems   Respiratory: Positive for shortness of breath.    Cardiovascular: Positive for chest pain.   Musculoskeletal: Positive for myalgias (Diffuse).   Skin: Positive for rash (Buttocks).   All other systems reviewed and are negative.      Past Medical History:   Diagnosis Date   • Allergic rhinitis    • Anemia    • Arthritis    • Asthma    • Depression    • FHx: migraine headaches    • GERD (gastroesophageal reflux disease)    • Heart murmur    • History of chest x-ray 11/01/2015    no active disease   • History of echocardiogram 11/01/2015    ejection fraction of greater than 65%, mitral and pulmonic regurgitation an  physiological tricuspid regurgitation.   • History of PFTs 2015    spirometry data acceptable and reproducible; pt given 4 puffs of Ventolin; pt gave good effort; no obstruction; no Bd response; MVV reduced    • History of PFTs 2015    pt gave best effort; duoneb given prior and post study; moderate nonspecific proportional reduction of FEV1 and FVC with preserved ratio; FEV1 moderately reduced; cannot rule out restriction   • Hypertension    • Ovarian cyst    • Seizures (CMS/HCC)    • Thigh shingles        Allergies   Allergen Reactions   • Naproxen Swelling       Past Surgical History:   Procedure Laterality Date   • BILATERAL BREAST REDUCTION     • BREAST SURGERY      breast reduction   •  SECTION     • LAPAROSCOPIC TUBAL LIGATION     • ORIF ANKLE FRACTURE Right        Family History   Problem Relation Age of Onset   • Pancreatic cancer Maternal Grandmother    • Heart failure Paternal Grandmother    • MARCIE disease Paternal Aunt    • Arthritis Mother    • Cancer Mother    • Arthritis Father    • Diabetes Neg Hx    • Heart attack Neg Hx    • Hypertension Neg Hx    • Hyperlipidemia Neg Hx    • Mental illness Neg Hx    • Obesity Neg Hx    • Stroke Neg Hx        Social History     Social History   • Marital status: Single     Social History Main Topics   • Smoking status: Former Smoker     Packs/day: 1.00     Years: 15.00     Types: Cigarettes     Quit date: 2015   • Smokeless tobacco: Never Used   • Alcohol use No      Comment: socially   • Drug use: No   • Sexual activity: Defer     Other Topics Concern   • Not on file     Social History Narrative    Caffeine intake: 16 oz per day          Objective   Physical Exam   Constitutional: She is oriented to person, place, and time. She appears well-developed and well-nourished. No distress.   Well-appearing female in no acute distress   HENT:   Head: Normocephalic and atraumatic.   Mouth/Throat: Oropharynx is clear and moist.   No mucous  membrane lesions   Neck: Normal range of motion. Neck supple. No JVD present.   Cardiovascular: Normal rate, regular rhythm and normal heart sounds.  Exam reveals no gallop and no friction rub.    No murmur heard.  Pulmonary/Chest: Effort normal. No respiratory distress. She has wheezes. She has no rales.   Minimal and expiratory wheezing noted bilaterally, normal work of breathing, speaking in full sentences   Abdominal: Soft. Bowel sounds are normal. She exhibits no distension and no mass. There is no tenderness. There is no rebound and no guarding.   Musculoskeletal: Normal range of motion. She exhibits no edema.   Neurological: She is alert and oriented to person, place, and time.   Skin: Skin is warm and dry. Rash noted. She is not diaphoretic. No erythema.   Small area of excoriation noted to right buttocks, no surrounding warmth, no surrounding erythema, no drainage, no crepitus, no pain out of proportion to exam, no fluctuance, no induration, negative Nikolsky's sign   Psychiatric: She has a normal mood and affect. Judgment and thought content normal.   Nursing note and vitals reviewed.      Procedures         ED Course  ED Course as of Aug 13 2332   Mon Aug 13, 2018   2208 37-year-old female presents complaining of intermittent chest pain, shortness of breath, and rash for the past several weeks.  Of note, the patient's only cardiac risk factors are hypertension and smoking.  On arrival to the ED, patient very well-appearing.  Exam remarkable only for mild wheezing.  Rash remarkable for a small excoriated lesion to the right buttocks.  Negative Nikolsky's sign.  No accompanying cellulitis.  No mucous membrane lesions.  The patient's initial EKG revealed sinus tachycardia with a heart rate of 104.  Labs remarkable only for mild leukocytosis.  Doubt pulmonary embolism or aortic dissection based on exam, history, clinical presentation, and gestalt.  We will reassess following initial interventions.  [DD]    2320 Repeat troponin/EKG negative/unchanged.  Doubt ACS, PE, dissection, or emergent cardiothoracic process at this time based on exam, history, clinical presentation, and gestalt, and risk stratification.  Patient reassured and counseled regarding symptomatic treatment.  She'll follow-up with her primary care physician within one week.  Additionally, she was referred to our chest pain clinic and will follow-up within 72 hours.  Agreeable with plan and given appropriate strict return precautions.  [DD]      ED Course User Index  [DD] Madi Cates MD     Recent Results (from the past 24 hour(s))   Comprehensive Metabolic Panel    Collection Time: 08/13/18  7:51 PM   Result Value Ref Range    Glucose 116 (H) 70 - 100 mg/dL    BUN 14 9 - 23 mg/dL    Creatinine 0.82 0.60 - 1.30 mg/dL    Sodium 137 132 - 146 mmol/L    Potassium 3.6 3.5 - 5.5 mmol/L    Chloride 107 99 - 109 mmol/L    CO2 22.0 20.0 - 31.0 mmol/L    Calcium 8.8 8.7 - 10.4 mg/dL    Total Protein 6.4 5.7 - 8.2 g/dL    Albumin 3.82 3.20 - 4.80 g/dL    ALT (SGPT) 22 7 - 40 U/L    AST (SGOT) 17 0 - 33 U/L    Alkaline Phosphatase 48 25 - 100 U/L    Total Bilirubin 0.4 0.3 - 1.2 mg/dL    eGFR  African Amer 94 >60 mL/min/1.73    Globulin 2.6 gm/dL    A/G Ratio 1.5 1.5 - 2.5 g/dL    BUN/Creatinine Ratio 17.1 7.0 - 25.0    Anion Gap 8.0 3.0 - 11.0 mmol/L   Lipase    Collection Time: 08/13/18  7:51 PM   Result Value Ref Range    Lipase 76 (H) 6 - 51 U/L   BNP    Collection Time: 08/13/18  7:51 PM   Result Value Ref Range    BNP 16.0 0.0 - 100.0 pg/mL   Light Blue Top    Collection Time: 08/13/18  7:51 PM   Result Value Ref Range    Extra Tube hold for add-on    Green Top (Gel)    Collection Time: 08/13/18  7:51 PM   Result Value Ref Range    Extra Tube Hold for add-ons.    Lavender Top    Collection Time: 08/13/18  7:51 PM   Result Value Ref Range    Extra Tube hold for add-on    Gold Top - SST    Collection Time: 08/13/18  7:51 PM   Result Value Ref Range  "   Extra Tube Hold for add-ons.    CBC Auto Differential    Collection Time: 08/13/18  7:51 PM   Result Value Ref Range    WBC 16.30 (H) 3.50 - 10.80 10*3/mm3    RBC 4.43 3.89 - 5.14 10*6/mm3    Hemoglobin 12.5 11.5 - 15.5 g/dL    Hematocrit 39.2 34.5 - 44.0 %    MCV 88.5 80.0 - 99.0 fL    MCH 28.2 27.0 - 31.0 pg    MCHC 31.9 (L) 32.0 - 36.0 g/dL    RDW 14.7 (H) 11.3 - 14.5 %    RDW-SD 46.6 37.0 - 54.0 fl    MPV 10.0 6.0 - 12.0 fL    Platelets 257 150 - 450 10*3/mm3    Neutrophil % 77.7 (H) 41.0 - 71.0 %    Lymphocyte % 14.1 (L) 24.0 - 44.0 %    Monocyte % 8.0 0.0 - 12.0 %    Eosinophil % 0.1 0.0 - 3.0 %    Basophil % 0.1 0.0 - 1.0 %    Immature Grans % 1.0 (H) 0.0 - 0.6 %    Neutrophils, Absolute 12.67 (H) 1.50 - 8.30 10*3/mm3    Lymphocytes, Absolute 2.30 0.60 - 4.80 10*3/mm3    Monocytes, Absolute 1.30 (H) 0.00 - 1.00 10*3/mm3    Eosinophils, Absolute 0.02 0.00 - 0.30 10*3/mm3    Basophils, Absolute 0.01 0.00 - 0.20 10*3/mm3    Immature Grans, Absolute 0.17 (H) 0.00 - 0.03 10*3/mm3   POC Troponin, Rapid    Collection Time: 08/13/18  7:54 PM   Result Value Ref Range    Troponin I 0.00 0.00 - 0.07 ng/mL     Note: In addition to lab results from this visit, the labs listed above may include labs taken at another facility or during a different encounter within the last 24 hours. Please correlate lab times with ED admission and discharge times for further clarification of the services performed during this visit.    XR Chest 1 View    (Results Pending)     Vitals:    08/13/18 1936   BP: 125/80   BP Location: Left arm   Patient Position: Sitting   Pulse: 99   Resp: 18   Temp: 98.4 °F (36.9 °C)   TempSrc: Oral   SpO2: 97%   Weight: 77.1 kg (170 lb)   Height: 149.9 cm (59\")     Medications   sodium chloride 0.9 % flush 10 mL (not administered)     ECG/EMG Results (last 24 hours)     Procedure Component Value Units Date/Time    ECG 12 Lead [[id number]] Collected:  08/13/18 1948     Updated:  08/13/18 1948                "     Recent Results (from the past 24 hour(s))   Comprehensive Metabolic Panel    Collection Time: 08/13/18  7:51 PM   Result Value Ref Range    Glucose 116 (H) 70 - 100 mg/dL    BUN 14 9 - 23 mg/dL    Creatinine 0.82 0.60 - 1.30 mg/dL    Sodium 137 132 - 146 mmol/L    Potassium 3.6 3.5 - 5.5 mmol/L    Chloride 107 99 - 109 mmol/L    CO2 22.0 20.0 - 31.0 mmol/L    Calcium 8.8 8.7 - 10.4 mg/dL    Total Protein 6.4 5.7 - 8.2 g/dL    Albumin 3.82 3.20 - 4.80 g/dL    ALT (SGPT) 22 7 - 40 U/L    AST (SGOT) 17 0 - 33 U/L    Alkaline Phosphatase 48 25 - 100 U/L    Total Bilirubin 0.4 0.3 - 1.2 mg/dL    eGFR  African Amer 94 >60 mL/min/1.73    Globulin 2.6 gm/dL    A/G Ratio 1.5 1.5 - 2.5 g/dL    BUN/Creatinine Ratio 17.1 7.0 - 25.0    Anion Gap 8.0 3.0 - 11.0 mmol/L   Lipase    Collection Time: 08/13/18  7:51 PM   Result Value Ref Range    Lipase 76 (H) 6 - 51 U/L   BNP    Collection Time: 08/13/18  7:51 PM   Result Value Ref Range    BNP 16.0 0.0 - 100.0 pg/mL   Light Blue Top    Collection Time: 08/13/18  7:51 PM   Result Value Ref Range    Extra Tube hold for add-on    Green Top (Gel)    Collection Time: 08/13/18  7:51 PM   Result Value Ref Range    Extra Tube Hold for add-ons.    Lavender Top    Collection Time: 08/13/18  7:51 PM   Result Value Ref Range    Extra Tube hold for add-on    Gold Top - SST    Collection Time: 08/13/18  7:51 PM   Result Value Ref Range    Extra Tube Hold for add-ons.    CBC Auto Differential    Collection Time: 08/13/18  7:51 PM   Result Value Ref Range    WBC 16.30 (H) 3.50 - 10.80 10*3/mm3    RBC 4.43 3.89 - 5.14 10*6/mm3    Hemoglobin 12.5 11.5 - 15.5 g/dL    Hematocrit 39.2 34.5 - 44.0 %    MCV 88.5 80.0 - 99.0 fL    MCH 28.2 27.0 - 31.0 pg    MCHC 31.9 (L) 32.0 - 36.0 g/dL    RDW 14.7 (H) 11.3 - 14.5 %    RDW-SD 46.6 37.0 - 54.0 fl    MPV 10.0 6.0 - 12.0 fL    Platelets 257 150 - 450 10*3/mm3    Neutrophil % 77.7 (H) 41.0 - 71.0 %    Lymphocyte % 14.1 (L) 24.0 - 44.0 %    Monocyte % 8.0  "0.0 - 12.0 %    Eosinophil % 0.1 0.0 - 3.0 %    Basophil % 0.1 0.0 - 1.0 %    Immature Grans % 1.0 (H) 0.0 - 0.6 %    Neutrophils, Absolute 12.67 (H) 1.50 - 8.30 10*3/mm3    Lymphocytes, Absolute 2.30 0.60 - 4.80 10*3/mm3    Monocytes, Absolute 1.30 (H) 0.00 - 1.00 10*3/mm3    Eosinophils, Absolute 0.02 0.00 - 0.30 10*3/mm3    Basophils, Absolute 0.01 0.00 - 0.20 10*3/mm3    Immature Grans, Absolute 0.17 (H) 0.00 - 0.03 10*3/mm3   POC Troponin, Rapid    Collection Time: 08/13/18  7:54 PM   Result Value Ref Range    Troponin I 0.00 0.00 - 0.07 ng/mL   POC Troponin, Rapid    Collection Time: 08/13/18 10:48 PM   Result Value Ref Range    Troponin I 0.00 0.00 - 0.07 ng/mL     Note: In addition to lab results from this visit, the labs listed above may include labs taken at another facility or during a different encounter within the last 24 hours. Please correlate lab times with ED admission and discharge times for further clarification of the services performed during this visit.    XR Chest 1 View   Final Result     Unremarkable study without an active lung lesion.         THIS DOCUMENT HAS BEEN ELECTRONICALLY SIGNED BY BIB POOLE MD        Vitals:    08/13/18 1936 08/13/18 2300   BP: 125/80    BP Location: Left arm    Patient Position: Sitting    Pulse: 99    Resp: 18 16   Temp: 98.4 °F (36.9 °C)    TempSrc: Oral    SpO2: 97%    Weight: 77.1 kg (170 lb)    Height: 149.9 cm (59\")      Medications   sodium chloride 0.9 % flush 10 mL (not administered)   neomycin-bacitracin-polymyxin (NEOSPORIN) ointment 1 g (1 g Topical Given 8/13/18 2217)   ipratropium-albuterol (DUO-NEB) nebulizer solution 3 mL (3 mL Nebulization Given 8/13/18 2300)   predniSONE (DELTASONE) tablet 60 mg (60 mg Oral Given 8/13/18 2217)     ECG/EMG Results (last 24 hours)     Procedure Component Value Units Date/Time    ECG 12 Lead [[id number]] Collected:  08/13/18 1948     Updated:  08/13/18 1948    ECG 12 Lead [[id number]] Collected:  08/13/18 2214     " Updated:  08/13/18 2214            [facility]    Final diagnoses:   Atypical chest pain   Myalgia   Dermatitis   Leukocytosis, unspecified type       Documentation assistance provided by jonny Wilkinson.  Information recorded by the jonny was done at my direction and has been verified and validated by me.     Dusty Wilkinson  08/13/18 1528       Madi Cates MD  08/13/18 [id number]

## 2018-08-14 NOTE — PROGRESS NOTES
Chief Complaint   Patient presents with   • Gynecologic Exam   • Rash on buttocks     was in hospital last night for asthma episode at [facility]   Bernardo Dodd is a 37 y.o. female.       History of Present Illness     Pt was in the ER yesterday with asthma exacerbation. She was given prednisone and breathing treatment and symptoms resolved. Feeling swollen and has been eating a lot.     She is here to have her pap smear, was on her period at [facility]. She has irregular periods. She has some bloating and pelvic cramping, due to start her period soon. She currently has one sexual partner, does not use condoms, this partner is new since her last STD check.    Has recurrence of the vesicular rash she has on her buttocks. Has been told it is a herpes rash- thinks it is herpes zoster vs herpes simplex.        Current Outpatient Prescriptions:   •  ADVAIR DISKUS 500-50 MCG/DOSE DISKUS, Inhale 1 puff 2 (Two) Times a Day., Disp: , Rfl:   •  albuterol (PROVENTIL) (2.5 MG/3ML) 0.083% nebulizer solution, Take 2.5 mg by nebulization Every 12 (Twelve) Hours., Disp: , Rfl:   •  albuterol (VENTOLIN HFA) 108 (90 BASE) MCG/ACT inhaler, Inhale 1-2 puffs as needed. Every 4-6 hrs PRN, Disp: , Rfl:   •  amitriptyline (ELAVIL) 25 MG tablet, Take 25 mg by mouth every night., Disp: , Rfl:   •  amLODIPine (NORVASC) 5 MG tablet, Take 1 tablet by mouth Daily., Disp: 30 tablet, Rfl: 5  •  ferrous sulfate 325 (65 FE) MG tablet, TAKE ONE TABLET BY MOUTH DAILY WITH BREAKFAST, Disp: 30 tablet, Rfl: 4  •  gabapentin (NEURONTIN) 400 MG capsule, Take 400 mg by mouth 3 (Three) Times a Day., Disp: , Rfl:   •  LamoTRIgine ER 50 MG tablet sustained-release 24 hour, Take 1 tablet by mouth Daily., Disp: , Rfl:   •  levocetirizine (XYZAL) 5 MG tablet, Take 1 tablet by mouth Daily., Disp: , Rfl:   •  montelukast (SINGULAIR) 10 MG tablet, Take 10 mg by mouth every night., Disp: , Rfl:   •  omeprazole (priLOSEC) 40 MG capsule, Take 1  capsule by mouth Daily. Take on an empty stomach and eat 30 minutes- 1 hr after eating., Disp: 30 capsule, Rfl: 3  •  ranitidine (ZANTAC) 300 MG capsule, Take 1 capsule by mouth Every Evening., Disp: 30 capsule, Rfl: 11  •  SPIRIVA RESPIMAT 2.5 MCG/ACT aerosol solution, Take 2 puffs by mouth Daily., Disp: , Rfl:   •  tiZANidine (ZANAFLEX) 4 MG tablet, Take 4 mg by mouth At Night As Needed for muscle spasms., Disp: , Rfl:   •  fluticasone (FLONASE) 50 MCG/ACT nasal spray, 2 sprays into the nostril(s) as directed by provider Daily., Disp: 16 g, Rfl: 3  •  valACYclovir (VALTREX) 1000 MG tablet, Take 1 tablet by mouth Daily., Disp: 30 tablet, Rfl: 5     PMFSH  The following portions of the patient's history were reviewed and updated as appropriate: allergies, current medications, past family history, past medical history, past social history, past surgical history and problem list.    Review of Systems   Constitutional: Negative for activity change, appetite change, fatigue, fever and unexpected weight change.   HENT: Negative.  Negative for facial swelling, mouth sores and trouble swallowing.    Eyes: Negative for pain, discharge, itching and visual disturbance.   Respiratory: Negative for chest tightness, shortness of breath and wheezing.         No nipple discharge or breast mass   Cardiovascular: Negative for chest pain and palpitations.   Gastrointestinal: Negative for abdominal pain, blood in stool, diarrhea, nausea and vomiting.   Endocrine: Negative.    Genitourinary: Negative.  Negative for dyspareunia, dysuria, frequency, hematuria, menstrual problem, pelvic pain, vaginal discharge and vaginal pain.   Musculoskeletal: Negative for joint swelling.   Skin: Positive for rash. Negative for color change and wound.   Allergic/Immunologic: Negative.    Neurological: Negative for dizziness, seizures and syncope.   Hematological: Negative for adenopathy. Does not bruise/bleed easily.   Psychiatric/Behavioral: Negative.  "   All other systems reviewed and are negative.      Objective   /80   Pulse 103   Ht 149.9 cm (59\")   Wt 77.1 kg (170 lb)   SpO2 97%   BMI 34.34 kg/m²     Physical Exam   Constitutional: She is oriented to person, place, and time. She appears well-developed and well-nourished. No distress.   HENT:   Head: Normocephalic and atraumatic.   Right Ear: External ear normal.   Left Ear: External ear normal.   Eyes: Pupils are equal, round, and reactive to light. Conjunctivae and EOM are normal. Right eye exhibits no discharge. Left eye exhibits no discharge. No scleral icterus.   Neck: Normal range of motion. Neck supple. No thyromegaly present.   Cardiovascular: Normal rate, regular rhythm and normal heart sounds.  Exam reveals no gallop.    No murmur heard.  Pulmonary/Chest: Effort normal and breath sounds normal. She has no wheezes. She has no rales. She exhibits no tenderness. Right breast exhibits no mass, no nipple discharge and no skin change. Left breast exhibits no mass, no nipple discharge and no skin change.   Abdominal: Soft. There is no tenderness.   Genitourinary: Vagina normal and uterus normal. Pelvic exam was performed with patient supine. There is no rash or lesion on the right labia. There is no rash or lesion on the left labia. Uterus is not enlarged and not tender. Cervix exhibits no motion tenderness, no discharge and no friability. Right adnexum displays no mass, no tenderness and no fullness. Left adnexum displays no mass, no tenderness and no fullness. No erythema, tenderness or bleeding in the vagina. No vaginal discharge found.   Musculoskeletal: Normal range of motion.   Lymphadenopathy:        Right: No inguinal adenopathy present.        Left: No inguinal adenopathy present.   Neurological: She is alert and oriented to person, place, and time. She exhibits normal muscle tone.   Skin: Skin is warm and dry. Rash noted. She is not diaphoretic.        Psychiatric: She has a normal mood " and affect. Her behavior is normal. Judgment and thought content normal.   Nursing note and vitals reviewed.        ASSESSMENT/PLAN    Problem List Items Addressed This Visit     None      Visit Diagnoses     Encounter for gynecological examination with Papanicolaou smear of cervix    -  Primary    Screening examination for STD (sexually transmitted disease)        Relevant Orders    RPR (Completed)    HIV-1 / O / 2 Ag / Antibody 4th Generation (Completed)    Hepatitis Panel, Acute (Completed)    NuSwab VG+ - Swab, Vagina    Herpes infection        Swab of vesicular fluid obtained, refer for HSV and HZV culture. Treat with valtrex as directed.    Relevant Medications    valACYclovir (VALTREX) 1000 MG tablet               Return in about 1 year (around 8/14/2019), or if symptoms worsen or fail to improve, for Annual.

## 2018-08-15 ENCOUNTER — OFFICE VISIT (OUTPATIENT)
Dept: PULMONOLOGY | Facility: CLINIC | Age: 38
End: 2018-08-15

## 2018-08-15 VITALS
RESPIRATION RATE: 16 BRPM | SYSTOLIC BLOOD PRESSURE: 138 MMHG | OXYGEN SATURATION: 96 % | HEART RATE: 74 BPM | BODY MASS INDEX: 34.47 KG/M2 | HEIGHT: 59 IN | TEMPERATURE: 98.5 F | DIASTOLIC BLOOD PRESSURE: 96 MMHG | WEIGHT: 171 LBS

## 2018-08-15 DIAGNOSIS — J30.9 CHRONIC ALLERGIC RHINITIS, UNSPECIFIED SEASONALITY, UNSPECIFIED TRIGGER: ICD-10-CM

## 2018-08-15 DIAGNOSIS — R06.02 SHORTNESS OF BREATH: Primary | ICD-10-CM

## 2018-08-15 DIAGNOSIS — J45.20 MILD INTERMITTENT ASTHMA WITHOUT COMPLICATION: ICD-10-CM

## 2018-08-15 LAB — RPR SER QL: NORMAL

## 2018-08-15 PROCEDURE — 94729 DIFFUSING CAPACITY: CPT | Performed by: NURSE PRACTITIONER

## 2018-08-15 PROCEDURE — 94618 PULMONARY STRESS TESTING: CPT | Performed by: NURSE PRACTITIONER

## 2018-08-15 PROCEDURE — 94726 PLETHYSMOGRAPHY LUNG VOLUMES: CPT | Performed by: NURSE PRACTITIONER

## 2018-08-15 PROCEDURE — 99214 OFFICE O/P EST MOD 30 MIN: CPT | Performed by: NURSE PRACTITIONER

## 2018-08-15 PROCEDURE — 94375 RESPIRATORY FLOW VOLUME LOOP: CPT | Performed by: NURSE PRACTITIONER

## 2018-08-15 RX ORDER — FLUTICASONE PROPIONATE 50 MCG
2 SPRAY, SUSPENSION (ML) NASAL DAILY
Qty: 16 G | Refills: 3 | Status: SHIPPED | OUTPATIENT
Start: 2018-08-15 | End: 2019-01-11 | Stop reason: SDUPTHER

## 2018-08-15 NOTE — PROGRESS NOTES
[facility] Pulmonary Follow up    CHIEF COMPLAINT    asthma    HISTORY OF PRESENT ILLNESS    Bernardo Dodd is a 37 y.o.female here today for follow-up on her mild intermittent asthma.  She was last seen in October 2016.  She's had multiple ER visits for asthma as well as chest pain.  She can monitor frequent wheezing and chest tightness with frequent chest pain.  She feels like she has a ball in her chest.  She is on full therapy for her out asthma including Advair, Spiriva, albuterol nebulizers twice a day, Flonase Xyzal and Singulair.    She has followed up at allergy and asthma Dr. Avila wellington and received a course of IM steroids and antibiotics and is completing a course of antibiotics and steroid taper currently.      She does have a history of reflux disease with frequent symptoms and hoarseness after eating.  She also has some trouble with dysphagia-like symptoms and difficulty swallowing.during her last visit in 2016 she was recommended she follow-up with gastroenterology.  She did follow-up and now takes a PPI and Zantac at night for chronic reflux. She still has symptoms of dysphagia and feeling like things get caught in her throat.  She doesn't have reflux symptoms especially if she doesn't monitor her diet.      She is ongoing tobacco use.she said she smokes around one cigarette a month.         Patient Active Problem List   Diagnosis   • Asthma   • Obesity   • GERD (gastroesophageal reflux disease)   • Dyspnea   • Allergic rhinitis   • Hypertension   • Menorrhagia with regular cycle   • Herpes simplex infection   • Acute right lumbar radiculopathy   • Health care maintenance       Allergies   Allergen Reactions   • Naproxen Swelling       Current Outpatient Prescriptions:   •  ADVAIR DISKUS 500-50 MCG/DOSE DISKUS, Inhale 1 puff 2 (Two) Times a Day., Disp: , Rfl:   •  albuterol (PROVENTIL) (2.5 MG/3ML) 0.083% nebulizer solution, Take 2.5 mg by nebulization Every 12 (Twelve) Hours., Disp: , Rfl:   •   albuterol (VENTOLIN HFA) 108 (90 BASE) MCG/ACT inhaler, Inhale 1-2 puffs as needed. Every 4-6 hrs PRN, Disp: , Rfl:   •  amitriptyline (ELAVIL) 25 MG tablet, Take 25 mg by mouth every night., Disp: , Rfl:   •  amLODIPine (NORVASC) 5 MG tablet, Take 1 tablet by mouth Daily., Disp: 30 tablet, Rfl: 5  •  ferrous sulfate 325 (65 FE) MG tablet, TAKE ONE TABLET BY MOUTH DAILY WITH BREAKFAST, Disp: 30 tablet, Rfl: 4  •  fluticasone (FLONASE) 50 MCG/ACT nasal spray, 2 sprays into each nostril Daily., Disp: 16 g, Rfl: 3  •  gabapentin (NEURONTIN) 400 MG capsule, Take 400 mg by mouth 3 (Three) Times a Day., Disp: , Rfl:   •  LamoTRIgine ER 50 MG tablet sustained-release 24 hour, Take 1 tablet by mouth Daily., Disp: , Rfl:   •  levocetirizine (XYZAL) 5 MG tablet, Take 1 tablet by mouth Daily., Disp: , Rfl:   •  montelukast (SINGULAIR) 10 MG tablet, Take 10 mg by mouth every night., Disp: , Rfl:   •  omeprazole (priLOSEC) 40 MG capsule, Take 1 capsule by mouth Daily. Take on an empty stomach and eat 30 minutes- 1 hr after eating., Disp: 30 capsule, Rfl: 3  •  ranitidine (ZANTAC) 300 MG capsule, Take 1 capsule by mouth Every Evening., Disp: 30 capsule, Rfl: 11  •  SPIRIVA RESPIMAT 2.5 MCG/ACT aerosol solution, Take 2 puffs by mouth Daily., Disp: , Rfl:   •  tiZANidine (ZANAFLEX) 4 MG tablet, Take 4 mg by mouth At Night As Needed for muscle spasms., Disp: , Rfl:   •  valACYclovir (VALTREX) 1000 MG tablet, Take 1 tablet by mouth Daily., Disp: 30 tablet, Rfl: 5  MEDICATION LIST AND ALLERGIES REVIEWED.    Social History   Substance Use Topics   • Smoking status: Former Smoker     Packs/day: 1.00     Years: 15.00     Types: Cigarettes     Quit date: 1/17/2015   • Smokeless tobacco: Never Used   • Alcohol use No      Comment: socially       FAMILY AND SOCIAL HISTORY REVIEWED.    Review of Systems   Constitutional: Negative for chills, fatigue, fever and unexpected weight change.   HENT: Negative for congestion, nosebleeds, postnasal  "drip, rhinorrhea, sinus pressure and trouble swallowing.    Respiratory: Positive for chest tightness, shortness of breath and wheezing. Negative for cough.    Cardiovascular: Negative for chest pain, palpitations and leg swelling.   Gastrointestinal: Negative for abdominal pain, constipation, diarrhea, nausea and vomiting.   Genitourinary: Negative for dysuria, frequency, hematuria and urgency.   Musculoskeletal: Negative for myalgias.   Neurological: Negative for dizziness, weakness, numbness and headaches.   All other systems reviewed and are negative.  .    /96   Pulse 74   Temp 98.5 °F (36.9 °C)   Resp 16   Ht 149.9 cm (59\")   Wt 77.6 kg (171 lb)   SpO2 96% Comment: rest, RA  BMI 34.54 kg/m²     Immunization History   Administered Date(s) Administered   • Pneumococcal Polysaccharide (PPSV23) 09/29/2017   • Tdap 05/07/2018       Physical Exam   Constitutional: She is oriented to person, place, and time. She appears well-developed and well-nourished.   HENT:   Head: Normocephalic and atraumatic.   Eyes: Pupils are equal, round, and reactive to light. EOM are normal.   Neck: Normal range of motion. Neck supple.   Cardiovascular: Normal rate and regular rhythm.    No murmur heard.  Pulmonary/Chest: Effort normal. No respiratory distress. She has no wheezes. She has no rales.   Expiratory wheezing   Abdominal: Soft. Bowel sounds are normal. She exhibits no distension.   Musculoskeletal: Normal range of motion. She exhibits no edema.   Neurological: She is alert and oriented to person, place, and time.   Skin: Skin is warm and dry. No erythema.   Psychiatric: She has a normal mood and affect. Her behavior is normal.   Vitals reviewed.        RESULTS    PFTS in the office today, read by me:  Normal PFTs or restriction, unreliable total lung capacity.    6 minute walk test in the office today did not have any exertional hypoxemia, however she did get quite tachycardiac and had some chest pain.  She was " only able to ambulate 90 feet.       8/13/18     FINDINGS:    Normal appearing lungs and mediastinum. No effusion or pneumothorax.   Cardiomediastinal silhouette is normal.     IMPRESSION:    Unremarkable study without an active lung lesion.      Stress Test 7/2018  · Left ventricular ejection fraction is hyperdynamic (Calculated EF > 70%).  · Myocardial perfusion imaging indicates a normal myocardial perfusion study with no evidence of ischemia.  · Impressions are consistent with a low risk study.     Eosinophils, Absolute 0.00 - 0.30 10*3/mm3 0.02          PROBLEM LIST    Problem List Items Addressed This Visit        Respiratory    Asthma    Overview     No obstruction noted on PFTs from 12/22/15. FEV1 2.01 L, 93%. FVC 2.47 L, 95%. Ratio 81%. No response to bronchodilator.           Dyspnea - Primary    Relevant Orders    Pulmonary Function Test (Completed)            DISCUSSION    I'm not sure her symptoms of chest tightness and chest pain is related to her asthma.  She has very little wheeze today.  She did complain of chest pain and had some tachycardia on her 6 minute walk test.    She is on full treatment for her asthma at this time. He continues to follow-up with allergy and asthma.    Continue follow-up with cardiology for her complaints of chest pain.    Continue follow-up with gastroenterology for her reflux and difficulty swallowing.    Follow-up in 3 weeks, we discussed the importance of continued follow-up.    I spent 25 minutes with the patient. I spent > 50% percent of this time counseling and discussing diagnostic testing, evaluation, current status, treatment options and management.    Clarisa Garza, SHANE  08/15/05888:14 PM  Electronically signed     Please note that portions of this note were completed with a voice recognition program. Efforts were made to edit the dictations, but occasionally words are mistranscribed.      CC: Nelly Sotelo PA

## 2018-08-20 ENCOUNTER — OFFICE VISIT (OUTPATIENT)
Dept: INTERNAL MEDICINE | Facility: CLINIC | Age: 38
End: 2018-08-20

## 2018-08-20 VITALS
WEIGHT: 174.6 LBS | TEMPERATURE: 98.2 F | DIASTOLIC BLOOD PRESSURE: 84 MMHG | RESPIRATION RATE: 16 BRPM | HEIGHT: 59 IN | BODY MASS INDEX: 35.2 KG/M2 | HEART RATE: 96 BPM | SYSTOLIC BLOOD PRESSURE: 134 MMHG | OXYGEN SATURATION: 98 %

## 2018-08-20 DIAGNOSIS — B00.9 HERPES SIMPLEX INFECTION: ICD-10-CM

## 2018-08-20 DIAGNOSIS — R42 VERTIGO: Primary | ICD-10-CM

## 2018-08-20 PROCEDURE — 99213 OFFICE O/P EST LOW 20 MIN: CPT | Performed by: PHYSICIAN ASSISTANT

## 2018-08-20 RX ORDER — PROMETHAZINE HYDROCHLORIDE 6.25 MG/5ML
SYRUP ORAL
COMMUNITY
Start: 2018-08-15 | End: 2018-09-10

## 2018-08-20 RX ORDER — LEVOFLOXACIN 500 MG/1
TABLET, FILM COATED ORAL
COMMUNITY
Start: 2018-08-14 | End: 2018-09-10

## 2018-08-20 NOTE — ASSESSMENT & PLAN NOTE
Resolving. Continue valtrex. Discussed continuing daily dose for prevention due to recurrent outbreaks.

## 2018-08-20 NOTE — PROGRESS NOTES
Chief Complaint   Patient presents with   • Herpes Zoster     Follow up/ recheck    • Dizziness     While standing & talking - 20X times in the past week        Subjective   Bernardo Dodd is a 37 y.o. female.       History of Present Illness     Pt's rash is improving with valtrex and also using a topical cream that helps with discomfort- has hydrocortisone and lidocaine in it.    She has had continued dizziness, worse with movement and when standing. Had allergy testing a few years ago but could not tolerate the shots. Has upcoming appointment with allergist. She always has some level of dizziness. Saw neurology at [facility], Xiomara Mello, and had EEG recently. Sometimes feels like she cannot stand d/t lack of balance- if she holds on to something or sits down, symptoms will sometimes improve- no near syncope. She has an MRI of her brain scheduled at [facility] for later this month.    Pt has been off work since 8/13/18 because of her asthma attack and herpes outbreak. She is scheduled to work this Wednesday but has follow up appointments every day this week and will not be able to return until 8/29.        Current Outpatient Prescriptions:   •  ADVAIR DISKUS 500-50 MCG/DOSE DISKUS, Inhale 1 puff 2 (Two) Times a Day., Disp: , Rfl:   •  albuterol (PROVENTIL) (2.5 MG/3ML) 0.083% nebulizer solution, Take 2.5 mg by nebulization Every 12 (Twelve) Hours., Disp: , Rfl:   •  albuterol (VENTOLIN HFA) 108 (90 BASE) MCG/ACT inhaler, Inhale 1-2 puffs as needed. Every 4-6 hrs PRN, Disp: , Rfl:   •  amitriptyline (ELAVIL) 25 MG tablet, Take 25 mg by mouth every night., Disp: , Rfl:   •  amLODIPine (NORVASC) 5 MG tablet, Take 1 tablet by mouth Daily., Disp: 30 tablet, Rfl: 5  •  ferrous sulfate 325 (65 FE) MG tablet, TAKE ONE TABLET BY MOUTH DAILY WITH BREAKFAST, Disp: 30 tablet, Rfl: 4  •  fluticasone (FLONASE) 50 MCG/ACT nasal spray, 2 sprays into the nostril(s) as directed by provider Daily., Disp: 16 g, Rfl: 3  •  gabapentin  (NEURONTIN) 400 MG capsule, Take 400 mg by mouth 3 (Three) Times a Day., Disp: , Rfl:   •  LamoTRIgine ER 50 MG tablet sustained-release 24 hour, Take 1 tablet by mouth Daily., Disp: , Rfl:   •  levocetirizine (XYZAL) 5 MG tablet, Take 1 tablet by mouth Daily., Disp: , Rfl:   •  levoFLOXacin (LEVAQUIN) 500 MG tablet, , Disp: , Rfl:   •  montelukast (SINGULAIR) 10 MG tablet, Take 10 mg by mouth every night., Disp: , Rfl:   •  omeprazole (priLOSEC) 40 MG capsule, Take 1 capsule by mouth Daily. Take on an empty stomach and eat 30 minutes- 1 hr after eating., Disp: 30 capsule, Rfl: 3  •  promethazine (PHENERGAN) 6.25 MG/5ML syrup, , Disp: , Rfl:   •  ranitidine (ZANTAC) 300 MG capsule, Take 1 capsule by mouth Every Evening., Disp: 30 capsule, Rfl: 11  •  SPIRIVA RESPIMAT 2.5 MCG/ACT aerosol solution, Take 2 puffs by mouth Daily., Disp: , Rfl:   •  tiZANidine (ZANAFLEX) 4 MG tablet, Take 4 mg by mouth At Night As Needed for muscle spasms., Disp: , Rfl:   •  valACYclovir (VALTREX) 1000 MG tablet, Take 1 tablet by mouth Daily., Disp: 30 tablet, Rfl: 5     PMFSH  The following portions of the patient's history were reviewed and updated as appropriate: allergies, current medications, past family history, past medical history, past social history, past surgical history and problem list.    Review of Systems   Constitutional: Negative for activity change, appetite change, chills, fatigue, fever and unexpected weight change.   HENT: Negative for ear discharge, facial swelling, mouth sores, sinus pressure, tinnitus and trouble swallowing.    Eyes: Negative for photophobia, pain, discharge, itching and visual disturbance.   Respiratory: Negative for chest tightness, shortness of breath and wheezing.    Cardiovascular: Negative for chest pain and palpitations.   Gastrointestinal: Negative for abdominal pain, blood in stool, diarrhea, nausea and vomiting.   Endocrine: Negative.  Negative for cold intolerance, heat intolerance,  "polydipsia and polyphagia.   Genitourinary: Negative.    Musculoskeletal: Negative for joint swelling.   Skin: Positive for rash. Negative for color change and wound.   Allergic/Immunologic: Negative.    Neurological: Positive for dizziness, weakness and light-headedness. Negative for seizures, syncope, facial asymmetry and speech difficulty.   Hematological: Negative for adenopathy. Does not bruise/bleed easily.   Psychiatric/Behavioral: Negative.  Negative for confusion and hallucinations.       Objective   /84   Pulse 96   Temp 98.2 °F (36.8 °C) (Temporal Artery )   Resp 16   Ht 149.9 cm (59.02\")   Wt 79.2 kg (174 lb 9.6 oz)   SpO2 98%   BMI 35.25 kg/m²     Physical Exam   Constitutional: She is oriented to person, place, and time. She appears well-developed and well-nourished. No distress.   HENT:   Head: Normocephalic and atraumatic.   Right Ear: Hearing, tympanic membrane, external ear and ear canal normal.   Left Ear: Hearing, tympanic membrane, external ear and ear canal normal.   Nose: Nose normal.   Mouth/Throat: Oropharynx is clear and moist.   Eyes: Pupils are equal, round, and reactive to light. Conjunctivae, EOM and lids are normal. No scleral icterus.   Neck: Normal range of motion.   Cardiovascular: Normal rate, regular rhythm, normal heart sounds, intact distal pulses and normal pulses.    No murmur heard.  Pulmonary/Chest: Effort normal and breath sounds normal. No respiratory distress. She has no decreased breath sounds. She has no wheezes. She has no rhonchi. She has no rales. She exhibits no tenderness.   Abdominal: Soft.   Musculoskeletal: Normal range of motion. She exhibits no deformity.   Lymphadenopathy:     She has no cervical adenopathy.   Neurological: She is alert and oriented to person, place, and time. She has normal reflexes. She is not disoriented. She displays no atrophy and no tremor. No cranial nerve deficit or sensory deficit. She exhibits normal muscle tone. " Coordination and gait normal.   Skin: Skin is warm and dry. She is not diaphoretic.   Improving rash on left posterior thigh and buttock- no remaining vesicles, now dry and flaking   Psychiatric: She has a normal mood and affect. Her behavior is normal. Judgment and thought content normal.   Nursing note and vitals reviewed.      Results for orders placed or performed in visit on 08/14/18   RPR   Result Value Ref Range    RPR Non-Reactive Non-Reactive   HIV-1 / O / 2 Ag / Antibody 4th Generation   Result Value Ref Range    HIV-1/ HIV-2 Non-Reactive Non-Reactive   Hepatitis Panel, Acute   Result Value Ref Range    Hepatitis B Surface Ag Non-Reactive Non-Reactive    Hep A IgM Non-Reactive Non-Reactive    Hep B C IgM Non-Reactive Non-Reactive    Hepatitis C Ab Non-Reactive Non-Reactive        ASSESSMENT/PLAN    Problem List Items Addressed This Visit        Other    Herpes simplex infection     Resolving. Continue valtrex. Discussed continuing daily dose for prevention due to recurrent outbreaks.           Other Visit Diagnoses     Vertigo    -  Primary    Pending brain MRI through neurology. Refer for vestibular eval and rehab. Increase flonase to 2 sprays BID.    Relevant Orders    Ambulatory Referral to Physical Therapy Evaluate and treat, Vestibular               Return in about 6 weeks (around 10/1/2018) for Recheck.

## 2018-08-22 ENCOUNTER — TRANSCRIBE ORDERS (OUTPATIENT)
Dept: LAB | Facility: HOSPITAL | Age: 38
End: 2018-08-22

## 2018-08-22 ENCOUNTER — LAB (OUTPATIENT)
Dept: LAB | Facility: HOSPITAL | Age: 38
End: 2018-08-22

## 2018-08-22 ENCOUNTER — HOSPITAL ENCOUNTER (OUTPATIENT)
Dept: PHYSICAL THERAPY | Facility: HOSPITAL | Age: 38
Setting detail: THERAPIES SERIES
Discharge: HOME | End: 2018-08-22

## 2018-08-22 DIAGNOSIS — J45.909 ALLERGIC ASTHMA WITH STATED CAUSE: Primary | ICD-10-CM

## 2018-08-22 DIAGNOSIS — J45.909 ALLERGIC ASTHMA WITH STATED CAUSE: ICD-10-CM

## 2018-08-22 DIAGNOSIS — H83.2X1 VESTIBULAR HYPOFUNCTION OF RIGHT EAR: Primary | ICD-10-CM

## 2018-08-22 LAB
25(OH)D3 SERPL-MCNC: 11.3 NG/ML
BASOPHILS # BLD AUTO: 0.02 10*3/MM3 (ref 0–0.2)
BASOPHILS NFR BLD AUTO: 0.2 % (ref 0–1)
DEPRECATED RDW RBC AUTO: 49.8 FL (ref 37–54)
EOSINOPHIL # BLD AUTO: 0.03 10*3/MM3 (ref 0–0.3)
EOSINOPHIL NFR BLD AUTO: 0.3 % (ref 0–3)
ERYTHROCYTE [DISTWIDTH] IN BLOOD BY AUTOMATED COUNT: 15.6 % (ref 11.3–14.5)
HCT VFR BLD AUTO: 38.5 % (ref 34.5–44)
HGB BLD-MCNC: 12.3 G/DL (ref 11.5–15.5)
IMM GRANULOCYTES # BLD: 0.06 10*3/MM3 (ref 0–0.03)
IMM GRANULOCYTES NFR BLD: 0.6 % (ref 0–0.6)
LYMPHOCYTES # BLD AUTO: 2.71 10*3/MM3 (ref 0.6–4.8)
LYMPHOCYTES NFR BLD AUTO: 27.5 % (ref 24–44)
MCH RBC QN AUTO: 28.7 PG (ref 27–31)
MCHC RBC AUTO-ENTMCNC: 31.9 G/DL (ref 32–36)
MCV RBC AUTO: 89.7 FL (ref 80–99)
MONOCYTES # BLD AUTO: 0.63 10*3/MM3 (ref 0–1)
MONOCYTES NFR BLD AUTO: 6.4 % (ref 0–12)
NEUTROPHILS # BLD AUTO: 6.42 10*3/MM3 (ref 1.5–8.3)
NEUTROPHILS NFR BLD AUTO: 65 % (ref 41–71)
PLATELET # BLD AUTO: 227 10*3/MM3 (ref 150–450)
PMV BLD AUTO: 9.8 FL (ref 6–12)
RBC # BLD AUTO: 4.29 10*6/MM3 (ref 3.89–5.14)
WBC NRBC COR # BLD: 9.87 10*3/MM3 (ref 3.5–10.8)

## 2018-08-22 PROCEDURE — 82785 ASSAY OF IGE: CPT

## 2018-08-22 PROCEDURE — 36415 COLL VENOUS BLD VENIPUNCTURE: CPT

## 2018-08-22 PROCEDURE — 85025 COMPLETE CBC W/AUTO DIFF WBC: CPT

## 2018-08-22 PROCEDURE — 82306 VITAMIN D 25 HYDROXY: CPT

## 2018-08-22 PROCEDURE — 82784 ASSAY IGA/IGD/IGG/IGM EACH: CPT

## 2018-08-22 PROCEDURE — 97161 PT EVAL LOW COMPLEX 20 MIN: CPT

## 2018-08-25 LAB
IGA SERPL-MCNC: 215 MG/DL (ref 87–352)
IGG SERPL-MCNC: 898 MG/DL (ref 700–1600)
IGM SERPL-MCNC: 107 MG/DL (ref 26–217)
TOTAL IGE SMQN RAST: 21 IU/ML (ref 0–100)
TOTAL IGE SMQN RAST: 23 IU/ML (ref 0–100)

## 2018-09-04 ENCOUNTER — HOSPITAL ENCOUNTER (OUTPATIENT)
Dept: PHYSICAL THERAPY | Facility: HOSPITAL | Age: 38
Setting detail: THERAPIES SERIES
Discharge: HOME | End: 2018-09-04

## 2018-09-04 DIAGNOSIS — H83.2X1 VESTIBULAR HYPOFUNCTION OF RIGHT EAR: Primary | ICD-10-CM

## 2018-09-04 PROCEDURE — 97112 NEUROMUSCULAR REEDUCATION: CPT

## 2018-09-04 NOTE — THERAPY TREATMENT NOTE
Outpatient Physical Therapy Vestibular Treatment Note  [facility]     Patient Name: Bernardo Dodd  : 1980  MRN: [id number]  Today's Date: 2018      Visit Date: 2018    Visit Dx:     ICD-10-CM ICD-9-CM   1. Vestibular hypofunction of right ear H83.2X1 386.50       Patient Active Problem List   Diagnosis   • Asthma   • Obesity   • GERD (gastroesophageal reflux disease)   • Dyspnea   • Allergic rhinitis   • Hypertension   • Menorrhagia with regular cycle   • Herpes simplex infection   • Acute right lumbar radiculopathy   • Health care maintenance                             PT Assessment/Plan     Row Name 18 0830          PT Assessment    Assessment Comments Client continues with dizziness and needs sometime between exercises to return to baseline.  Overall exercises were tolerated very well and she seems to be improving.  -MM        PT Plan    PT Plan Comments Continue per plan of treatment with exercises to maximize strength and function.  -MM       User Key  (r) = Recorded By, (t) = Taken By, (c) = Cosigned By    Initials Name Provider Type    Otilio Monique, PT Physical Therapist                     Exercises     Row Name 18 [id number]             Subjective Comments    Subjective Comments Client reports mild dizziness overall today.  She has an working on exercises some at home.  -MM         Subjective Pain    Able to rate subjective pain? yes  -MM      Pre-Treatment Pain Level 0  -MM      Post-Treatment Pain Level 0  -MM      Subjective Pain Comment Dizziness rated 2/10.  -MM         Total Minutes    64908 -  PT Neuromuscular Reeducation Minutes 30  -MM         Exercise 1    Exercise Name 1 Vestibular rehabilitation exercises included: VOR training ×1, VOR training ×2, locating targets, heel to toe walking, head circles, standing turns for one rotation, walking with head movement-vertical/horizontal/VOR, stance on foam eyes closed, weight shift eyes closed.  Updated and  reviewed home program.  -MM      Cueing 1 Verbal  -MM      Time 1 30  -MM        User Key  (r) = Recorded By, (t) = Taken By, (c) = Cosigned By    Initials Name Provider Type    Otilio Monique, PT Physical Therapist                                         Time Calculation:   Start Time: 0830   Therapy Suggested Charges     Code   Minutes Charges    69626 (CPT®) Hc Pt Neuromusc Re Education Ea 15 Min 30 2    77399 (CPT®) Hc Pt Ther Proc Ea 15 Min      25575 (CPT®) Hc Gait Training Ea 15 Min      14591 (CPT®) Hc Pt Therapeutic Act Ea 15 Min      36498 (CPT®) Hc Pt Manual Therapy Ea 15 Min      88867 (CPT®) Hc Pt Ther Massage- Per 15 Min      43308 (CPT®) Hc Pt Iontophoresis Ea 15 Min      52269 (CPT®) Hc Pt Elec Stim Ea-Per 15 Min      84277 (CPT®) Hc Pt Ultrasound Ea 15 Min      92047 (CPT®) Hc Pt Self Care/Mgmt/Train Ea 15 Min      25254 (CPT®) Hc Pt Prosthetic (S) Train Initial Encounter, Each 15 Min      37076 (CPT®) Hc Orthotic(S) Mgmt/Train Initial Encounter, Each 15min      25223 (CPT®) Hc Pt Aquatic Therapy Ea 15 Min      14409 (CPT®) Hc Pt Orthotic(S)/Prosthetic(S) Encounter, Each 15 Min      Total  30 2        Therapy Charges for Today     Code Description Service Date Service Provider Modifiers Qty    [id number] HC PT NEUROMUSC RE EDUCATION EA 15 MIN 9/4/2018 Otilio Liu, PT GP 2                   Otilio Liu, PT  9/4/2018

## 2018-09-10 ENCOUNTER — OFFICE VISIT (OUTPATIENT)
Dept: RETAIL CLINIC | Facility: CLINIC | Age: 38
End: 2018-09-10

## 2018-09-10 VITALS
WEIGHT: 172 LBS | DIASTOLIC BLOOD PRESSURE: 80 MMHG | HEART RATE: 91 BPM | OXYGEN SATURATION: 97 % | TEMPERATURE: 98.2 F | RESPIRATION RATE: 16 BRPM | BODY MASS INDEX: 34.68 KG/M2 | SYSTOLIC BLOOD PRESSURE: 124 MMHG | HEIGHT: 59 IN

## 2018-09-10 DIAGNOSIS — J01.20 ACUTE ETHMOIDAL SINUSITIS, RECURRENCE NOT SPECIFIED: Primary | ICD-10-CM

## 2018-09-10 DIAGNOSIS — H65.03 BILATERAL ACUTE SEROUS OTITIS MEDIA, RECURRENCE NOT SPECIFIED: ICD-10-CM

## 2018-09-10 PROCEDURE — 99213 OFFICE O/P EST LOW 20 MIN: CPT | Performed by: NURSE PRACTITIONER

## 2018-09-10 RX ORDER — ECHINACEA PURPUREA EXTRACT 125 MG
TABLET ORAL
Qty: 480 ML | Refills: 1 | Status: SHIPPED | OUTPATIENT
Start: 2018-09-10 | End: 2018-10-09 | Stop reason: HOSPADM

## 2018-09-10 RX ORDER — AMOXICILLIN AND CLAVULANATE POTASSIUM 875; 125 MG/1; MG/1
1 TABLET, FILM COATED ORAL EVERY 12 HOURS SCHEDULED
Qty: 20 TABLET | Refills: 0 | Status: SHIPPED | OUTPATIENT
Start: 2018-09-10 | End: 2018-09-20

## 2018-09-10 NOTE — PROGRESS NOTES
Subjective   Sinus Problem    Bernardo Dodd is a 37 y.o. female with a history of allergies and asthma among multiple other health problems, who presents with sinus pressure/pain and bilateral earache. Patient has been under care of PT for vestibular disease, with no improvement in symptoms thus far. Patient says ear pain is progressing and she is overall not feeling well. She has been using cotton in ear canals to help with discomfort. She has been using nebulizer more frequently due to cough.      Sinus Problem   This is a new problem. The current episode started in the past 7 days. The problem has been gradually worsening since onset. There has been no fever. The pain is moderate. Associated symptoms include chills, congestion, coughing, ear pain (bilateral), headaches, shortness of breath (intermittent, improved with nebulizer), sinus pressure, sneezing and a sore throat. Pertinent negatives include no hoarse voice or neck pain. Past treatments include nothing.   Earache    There is pain in both ears. This is a recurrent problem. The current episode started in the past 7 days. The problem occurs constantly. The problem has been gradually worsening. The pain is moderate. Associated symptoms include coughing, headaches, rhinorrhea and a sore throat. Pertinent negatives include no abdominal pain, diarrhea, ear discharge, hearing loss, neck pain, rash or vomiting. Treatments tried: cotton to both ears. The treatment provided mild relief.        History Obtained from: Patient    Past Medical History:   Diagnosis Date   • Allergic rhinitis    • Anemia    • Arthritis    • Asthma    • Depression    • FHx: migraine headaches    • GERD (gastroesophageal reflux disease)    • Heart murmur    • Herpes simplex    • History of chest x-ray 11/01/2015    no active disease   • History of echocardiogram 11/01/2015    ejection fraction of greater than 65%, mitral and pulmonic regurgitation an physiological tricuspid  regurgitation.   • History of PFTs 2015    spirometry data acceptable and reproducible; pt given 4 puffs of Ventolin; pt gave good effort; no obstruction; no Bd response; MVV reduced    • History of PFTs 2015    pt gave best effort; duoneb given prior and post study; moderate nonspecific proportional reduction of FEV1 and FVC with preserved ratio; FEV1 moderately reduced; cannot rule out restriction   • Hypertension    • Ovarian cyst    • Seizures (CMS/HCC)    • Thigh shingles      Past Surgical History:   Procedure Laterality Date   • BILATERAL BREAST REDUCTION     • BREAST SURGERY      breast reduction   •  SECTION     • LAPAROSCOPIC TUBAL LIGATION     • ORIF ANKLE FRACTURE Right      Social History     Social History   • Marital status: Single     Spouse name: N/A   • Number of children: N/A   • Years of education: N/A     Occupational History   • Not on file.     Social History Main Topics   • Smoking status: Former Smoker     Packs/day: 1.00     Years: 15.00     Types: Cigarettes     Quit date: 2015   • Smokeless tobacco: Never Used   • Alcohol use No      Comment: socially   • Drug use: No   • Sexual activity: Defer     Other Topics Concern   • Not on file     Social History Narrative    Caffeine intake: 16 oz per day      Family History   Problem Relation Age of Onset   • Pancreatic cancer Maternal Grandmother    • Heart failure Paternal Grandmother    • MARCIE disease Paternal Aunt    • Arthritis Mother    • Cancer Mother    • Arthritis Father    • Diabetes Neg Hx    • Heart attack Neg Hx    • Hypertension Neg Hx    • Hyperlipidemia Neg Hx    • Mental illness Neg Hx    • Obesity Neg Hx    • Stroke Neg Hx      Allergies   Allergen Reactions   • Naproxen Swelling     Current Outpatient Prescriptions   Medication Sig Dispense Refill   • ADVAIR DISKUS 500-50 MCG/DOSE DISKUS Inhale 1 puff 2 (Two) Times a Day.     • albuterol (PROVENTIL) (2.5 MG/3ML) 0.083% nebulizer solution Take 2.5 mg  by nebulization Every 12 (Twelve) Hours.     • amitriptyline (ELAVIL) 25 MG tablet Take 25 mg by mouth every night.     • amLODIPine (NORVASC) 5 MG tablet Take 1 tablet by mouth Daily. 30 tablet 5   • ferrous sulfate 325 (65 FE) MG tablet TAKE ONE TABLET BY MOUTH DAILY WITH BREAKFAST 30 tablet 4   • fluticasone (FLONASE) 50 MCG/ACT nasal spray 2 sprays into the nostril(s) as directed by provider Daily. 16 g 3   • gabapentin (NEURONTIN) 400 MG capsule Take 400 mg by mouth 3 (Three) Times a Day.     • LamoTRIgine ER 50 MG tablet sustained-release 24 hour Take 1 tablet by mouth Daily.     • levocetirizine (XYZAL) 5 MG tablet Take 1 tablet by mouth Daily.     • montelukast (SINGULAIR) 10 MG tablet Take 10 mg by mouth every night.     • omeprazole (priLOSEC) 40 MG capsule Take 1 capsule by mouth Daily. Take on an empty stomach and eat 30 minutes- 1 hr after eating. 30 capsule 3   • ranitidine (ZANTAC) 300 MG capsule Take 1 capsule by mouth Every Evening. 30 capsule 11   • SPIRIVA RESPIMAT 2.5 MCG/ACT aerosol solution Take 2 puffs by mouth Daily.     • tiZANidine (ZANAFLEX) 4 MG tablet Take 4 mg by mouth At Night As Needed for muscle spasms.     • valACYclovir (VALTREX) 1000 MG tablet Take 1 tablet by mouth Daily. 30 tablet 5   • albuterol (VENTOLIN HFA) 108 (90 BASE) MCG/ACT inhaler Inhale 1-2 puffs as needed. Every 4-6 hrs PRN     • amoxicillin-clavulanate (AUGMENTIN) 875-125 MG per tablet Take 1 tablet by mouth Every 12 (Twelve) Hours for 10 days. 20 tablet 0   • sodium chloride (OCEAN NASAL SPRAY) 0.65 % nasal spray Use 1 spray liberally to both nostrils x 7 days 480 mL 1     No current facility-administered medications for this visit.         The following portions of the patient's history were reviewed and updated as appropriate: allergies, current medications, past family history, past medical history, past social history and past surgical history.    Review of Systems   Constitutional: Positive for chills and  "fatigue. Negative for fever.   HENT: Positive for congestion, ear pain (bilateral), postnasal drip, rhinorrhea, sinus pressure, sneezing and sore throat. Negative for ear discharge, hearing loss, hoarse voice, trouble swallowing and voice change.    Eyes: Negative for photophobia and visual disturbance.   Respiratory: Positive for cough, shortness of breath (intermittent, improved with nebulizer) and wheezing (ntermittent). Negative for chest tightness.    Cardiovascular: Negative for chest pain and palpitations.   Gastrointestinal: Negative for abdominal pain, diarrhea, nausea and vomiting.   Musculoskeletal: Negative for myalgias, neck pain and neck stiffness.   Skin: Negative for rash and wound.   Neurological: Positive for dizziness and headaches. Negative for seizures, weakness and numbness.   Hematological: Negative.    Psychiatric/Behavioral: Negative for agitation. The patient is not nervous/anxious.        Objective     VITAL SIGNS:   Vitals:    09/10/18 1150   BP: 124/80   Pulse: 91   Resp: 16   Temp: 98.2 °F (36.8 °C)   SpO2: 97%   Weight: 78 kg (172 lb)   Height: 149.9 cm (59.02\")   Body mass index is 34.72 kg/m².    Physical Exam   Constitutional:  Non-toxic appearance. No distress.   Fatigued appearing     HENT:   Head: Normocephalic and atraumatic.   Right Ear: External ear and ear canal normal. Tympanic membrane is erythematous. Tympanic membrane is not perforated. A middle ear effusion is present.   Left Ear: External ear and ear canal normal. Tympanic membrane is erythematous and bulging. Tympanic membrane is not perforated. A middle ear effusion is present.   Nose: Mucosal edema, rhinorrhea and sinus tenderness (ethmoidal) present. No nasal deformity. Right sinus exhibits frontal sinus tenderness. Right sinus exhibits no maxillary sinus tenderness. Left sinus exhibits frontal sinus tenderness. Left sinus exhibits no maxillary sinus tenderness.   Mouth/Throat: Uvula is midline, oropharynx is clear " and moist and mucous membranes are normal. No uvula swelling.   Eyes: Pupils are equal, round, and reactive to light. Conjunctivae are normal. No scleral icterus. Right eye exhibits no nystagmus. Left eye exhibits no nystagmus.   Exophthalmus, bilateral     Neck: Normal range of motion and phonation normal. Neck supple. No tracheal deviation present.   Cardiovascular: Normal rate and regular rhythm.    No murmur heard.  Pulmonary/Chest: No accessory muscle usage. No tachypnea. No respiratory distress. She has no decreased breath sounds. She has no wheezes. She has no rhonchi. She has no rales.   Lymphadenopathy:        Right: No supraclavicular adenopathy present.        Left: No supraclavicular adenopathy present.   Neurological: She is alert. Coordination and gait normal.   Skin: Skin is warm and dry. Capillary refill takes less than 2 seconds. No rash noted. No cyanosis. No pallor. Nails show no clubbing.   Psychiatric: Her behavior is normal. She is attentive.       CLINICAL QUALITY MEASURES:  Tobacco Screening & Intervention Screened & identified as tobacco non-user. Never smoker   WEIGHT SCREENING/BMI  Not eligible, overweight & managed by other physician     Assessment/Plan     Bernardo was seen today for sinus problem.    Diagnoses and all orders for this visit:    Acute ethmoidal sinusitis, recurrence not specified    Bilateral acute serous otitis media, recurrence not specified    Other orders  -     amoxicillin-clavulanate (AUGMENTIN) 875-125 MG per tablet; Take 1 tablet by mouth Every 12 (Twelve) Hours for 10 days.  -     sodium chloride (OCEAN NASAL SPRAY) 0.65 % nasal spray; Use 1 spray liberally to both nostrils x 7 days      PLAN:  Patient should follow up with primary care provider if symptoms fail to improve, worsen or for the development of new symptoms that need attention.    The patient voiced understanding and agreement to the patient treatment plan and instructions     Kaye Reyna,  APRN

## 2018-09-10 NOTE — PATIENT INSTRUCTIONS
Otitis Media, Adult  Otitis media is redness, soreness, and puffiness (swelling) in the space just behind your eardrum (middle ear). It may be caused by allergies or infection. It often happens along with a cold.  Follow these instructions at home:  · Take your medicine as told. Finish it even if you start to feel better.  · Only take over-the-counter or prescription medicines for pain, discomfort, or fever as told by your doctor.  · Follow up with your doctor as told.  Contact a doctor if:  · You have otitis media only in one ear, or bleeding from your nose, or both.  · You notice a lump on your neck.  · You are not getting better in 3-5 days.  · You feel worse instead of better.  Get help right away if:  · You have pain that is not helped with medicine.  · You have puffiness, redness, or pain around your ear.  · You get a stiff neck.  · You cannot move part of your face (paralysis).  · You notice that the bone behind your ear hurts when you touch it.  This information is not intended to replace advice given to you by your health care provider. Make sure you discuss any questions you have with your health care provider.  Document Released: 06/05/2009 Document Revised: 05/25/2017 Document Reviewed: 07/15/2014  [employer] Interactive Patient Education © 2017 [employer]  Sinusitis, Adult  Sinusitis is soreness and inflammation of your sinuses. Sinuses are hollow spaces in the bones around your face. They are located:  · Around your eyes.  · In the middle of your forehead.  · Behind your nose.  · In your cheekbones.    Your sinuses and nasal passages are lined with a stringy fluid (mucus). Mucus normally drains out of your sinuses. When your nasal tissues get inflamed or swollen, the mucus can get trapped or blocked so air cannot flow through your sinuses. This lets bacteria, viruses, and funguses grow, and that leads to infection.  Follow these instructions at home:  Medicines  · Take, use, or apply over-the-counter  and prescription medicines only as told by your doctor. These may include nasal sprays.  · If you were prescribed an antibiotic medicine, take it as told by your doctor. Do not stop taking the antibiotic even if you start to feel better.  Hydrate and Humidify  · Drink enough water to keep your pee (urine) clear or pale yellow.  · Use a cool mist humidifier to keep the humidity level in your home above 50%.  · Breathe in steam for 10-15 minutes, 3-4 times a day or as told by your doctor. You can do this in the bathroom while a hot shower is running.  · Try not to spend time in cool or dry air.  Rest  · Rest as much as possible.  · Sleep with your head raised (elevated).  · Make sure to get enough sleep each night.  General instructions  · Put a warm, moist washcloth on your face 3-4 times a day or as told by your doctor. This will help with discomfort.  · Wash your hands often with soap and water. If there is no soap and water, use hand .  · Do not smoke. Avoid being around people who are smoking (secondhand smoke).  · Keep all follow-up visits as told by your doctor. This is important.  Contact a doctor if:  · You have a fever.  · Your symptoms get worse.  · Your symptoms do not get better within 10 days.  Get help right away if:  · You have a very bad headache.  · You cannot stop throwing up (vomiting).  · You have pain or swelling around your face or eyes.  · You have trouble seeing.  · You feel confused.  · Your neck is stiff.  · You have trouble breathing.  This information is not intended to replace advice given to you by your health care provider. Make sure you discuss any questions you have with your health care provider.  Document Released: 06/05/2009 Document Revised: 08/13/2017 Document Reviewed: 10/12/2016  [employer] Interactive Patient Education © 2018 [employer]    Continue therapy for dysfunction of ears  Drink plenty of water  Use steam heat inhalations and saline nasal spray to moisten  nasal mucous membranes and loosen secretions  Take antibiotics with food, finish course even if you begin to feel better  Avoid placing foreign objects in ears  See your regular doctor if you do not improving within 3 days, you get worse or you develop new symptoms

## 2018-09-11 ENCOUNTER — HOSPITAL ENCOUNTER (OUTPATIENT)
Dept: PHYSICAL THERAPY | Facility: HOSPITAL | Age: 38
Setting detail: THERAPIES SERIES
Discharge: HOME | End: 2018-09-11

## 2018-09-11 DIAGNOSIS — H83.2X1 VESTIBULAR HYPOFUNCTION OF RIGHT EAR: Primary | ICD-10-CM

## 2018-09-11 PROCEDURE — 97112 NEUROMUSCULAR REEDUCATION: CPT

## 2018-09-11 NOTE — THERAPY TREATMENT NOTE
Outpatient Physical Therapy Vestibular Treatment Note  [facility]     Patient Name: Bernardo Dodd  : 1980  MRN: [id number]  Today's Date: 2018      Visit Date: 2018    Visit Dx:     ICD-10-CM ICD-9-CM   1. Vestibular hypofunction of right ear H83.2X1 386.50       Patient Active Problem List   Diagnosis   • Asthma   • Obesity   • GERD (gastroesophageal reflux disease)   • Dyspnea   • Allergic rhinitis   • Hypertension   • Menorrhagia with regular cycle   • Herpes simplex infection   • Acute right lumbar radiculopathy   • Health care maintenance             PT Assessment/Plan     Row Name 18 1015          PT Assessment    Assessment Comments Client tolerated progression of vestibular imbalance exercises today fairly well.  She continues with moderate dizziness and as more recently started having problems due to an ear infection.  -MM        PT Plan    PT Plan Comments Continue per plan of treatment with vestibular balance exercises as tolerated.  -MM       User Key  (r) = Recorded By, (t) = Taken By, (c) = Cosigned By    Initials Name Provider Type    Otilio Monique, PT Physical Therapist                     Exercises     Row Name 18 1015             Subjective Comments    Subjective Comments Client reports moderate dizziness at the time of treatment today.  She reports that she is struggling with an ear infection and sinus problems.  She is on amoxicillin currently due to ear infection.  Client reports she has experienced a lot of drainage from her ears and she continues to notice dizziness with loud noises.  -MM         Subjective Pain    Able to rate subjective pain? yes  -MM      Pre-Treatment Pain Level 0  -MM      Post-Treatment Pain Level 0  -MM      Subjective Pain Comment Dizziness is rated at 5-6/10.  -MM         Total Minutes    57189 -  PT Neuromuscular Reeducation Minutes 45  -MM         Exercise 1    Exercise Name 1 Included vestibular and balance exercises  in clinical setting per flow sheet.  -MM      Cueing 1 Verbal  -MM      Time 1 45  -MM      Additional Comments Neuromuscular reeducation  -MM        User Key  (r) = Recorded By, (t) = Taken By, (c) = Cosigned By    Initials Name Provider Type    Otilio Monique, PT Physical Therapist            Time Calculation:   Start Time: 1015   Therapy Suggested Charges     Code   Minutes Charges    58480 (CPT®) Hc Pt Neuromusc Re Education Ea 15 Min 45 3    87005 (CPT®) Hc Pt Ther Proc Ea 15 Min      02615 (CPT®) Hc Gait Training Ea 15 Min      74077 (CPT®) Hc Pt Therapeutic Act Ea 15 Min      44095 (CPT®) Hc Pt Manual Therapy Ea 15 Min      72791 (CPT®) Hc Pt Ther Massage- Per 15 Min      59533 (CPT®) Hc Pt Iontophoresis Ea 15 Min      95971 (CPT®) Hc Pt Elec Stim Ea-Per 15 Min      31566 (CPT®) Hc Pt Ultrasound Ea 15 Min      32618 (CPT®) Hc Pt Self Care/Mgmt/Train Ea 15 Min      14268 (CPT®) Hc Pt Prosthetic (S) Train Initial Encounter, Each 15 Min      42127 (CPT®) Hc Orthotic(S) Mgmt/Train Initial Encounter, Each 15min      79979 (CPT®) Hc Pt Aquatic Therapy Ea 15 Min      34522 (CPT®) Hc Pt Orthotic(S)/Prosthetic(S) Encounter, Each 15 Min      Total  45 3        Therapy Charges for Today     Code Description Service Date Service Provider Modifiers Qty    87774776995 HC PT NEUROMUSC RE EDUCATION EA 15 MIN 9/11/2018 Otilio Liu, PT GP 3                   Otilio Liu, PT  9/11/2018

## 2018-09-13 ENCOUNTER — TELEPHONE (OUTPATIENT)
Dept: INTERNAL MEDICINE | Facility: CLINIC | Age: 38
End: 2018-09-13

## 2018-09-13 DIAGNOSIS — R42 VERTIGO: Primary | ICD-10-CM

## 2018-09-13 NOTE — TELEPHONE ENCOUNTER
Please let her know that I received a note from her physical therapist who recommended a referral to a specialized ENT to evaluate her dizziness. I am putting in the referral.

## 2018-09-13 NOTE — TELEPHONE ENCOUNTER
Called and informed pt, pt voiced understanding and also requested an appt with Nelly tomorrow for a sinus infection, pt scheduled on 09/14/18.

## 2018-09-17 ENCOUNTER — TELEPHONE (OUTPATIENT)
Dept: INTERNAL MEDICINE | Facility: CLINIC | Age: 38
End: 2018-09-17

## 2018-09-17 NOTE — TELEPHONE ENCOUNTER
ENT CALLED TO INFORM YOU THAT PATIENT IS SCHEDULED   10/31/2018 @ 2 FOR A HEARING TEST THEN 3:00 TO SEE THE

## 2018-09-26 ENCOUNTER — OFFICE VISIT (OUTPATIENT)
Dept: INTERNAL MEDICINE | Facility: CLINIC | Age: 38
End: 2018-09-26

## 2018-09-26 ENCOUNTER — HOSPITAL ENCOUNTER (OUTPATIENT)
Dept: PHYSICAL THERAPY | Facility: HOSPITAL | Age: 38
Setting detail: THERAPIES SERIES
Discharge: HOME | End: 2018-09-26

## 2018-09-26 VITALS
DIASTOLIC BLOOD PRESSURE: 84 MMHG | RESPIRATION RATE: 18 BRPM | BODY MASS INDEX: 35.48 KG/M2 | WEIGHT: 176 LBS | SYSTOLIC BLOOD PRESSURE: 130 MMHG | OXYGEN SATURATION: 96 % | HEIGHT: 59 IN | HEART RATE: 126 BPM

## 2018-09-26 DIAGNOSIS — M79.672 LEFT FOOT PAIN: Primary | ICD-10-CM

## 2018-09-26 DIAGNOSIS — M72.2 PLANTAR FASCIITIS: ICD-10-CM

## 2018-09-26 DIAGNOSIS — H60.502 ACUTE OTITIS EXTERNA OF LEFT EAR, UNSPECIFIED TYPE: ICD-10-CM

## 2018-09-26 DIAGNOSIS — H66.002 ACUTE SUPPURATIVE OTITIS MEDIA OF LEFT EAR WITHOUT SPONTANEOUS RUPTURE OF TYMPANIC MEMBRANE, RECURRENCE NOT SPECIFIED: Primary | ICD-10-CM

## 2018-09-26 DIAGNOSIS — M72.2 PLANTAR FASCIITIS OF LEFT FOOT: ICD-10-CM

## 2018-09-26 DIAGNOSIS — R42 DIZZINESS: ICD-10-CM

## 2018-09-26 PROCEDURE — 97164 PT RE-EVAL EST PLAN CARE: CPT

## 2018-09-26 PROCEDURE — 99214 OFFICE O/P EST MOD 30 MIN: CPT | Performed by: NURSE PRACTITIONER

## 2018-09-26 PROCEDURE — 96372 THER/PROPH/DIAG INJ SC/IM: CPT | Performed by: NURSE PRACTITIONER

## 2018-09-26 RX ORDER — CIPROFLOXACIN AND DEXAMETHASONE 3; 1 MG/ML; MG/ML
4 SUSPENSION/ DROPS AURICULAR (OTIC) 2 TIMES DAILY
Qty: 7.5 ML | Refills: 0 | Status: SHIPPED | OUTPATIENT
Start: 2018-09-26 | End: 2018-10-02

## 2018-09-26 RX ORDER — CEFTRIAXONE 1 G/1
1 INJECTION, POWDER, FOR SOLUTION INTRAMUSCULAR; INTRAVENOUS ONCE
Status: COMPLETED | OUTPATIENT
Start: 2018-09-26 | End: 2018-09-26

## 2018-09-26 RX ORDER — MECLIZINE HCL 12.5 MG/1
12.5 TABLET ORAL 3 TIMES DAILY PRN
Qty: 30 TABLET | Refills: 0 | Status: SHIPPED | OUTPATIENT
Start: 2018-09-26 | End: 2018-11-20

## 2018-09-26 RX ORDER — CEFDINIR 300 MG/1
300 CAPSULE ORAL 2 TIMES DAILY
Qty: 14 CAPSULE | Refills: 0 | Status: SHIPPED | OUTPATIENT
Start: 2018-09-26 | End: 2018-10-02

## 2018-09-26 RX ADMIN — CEFTRIAXONE 1 G: 1 INJECTION, POWDER, FOR SOLUTION INTRAMUSCULAR; INTRAVENOUS at 12:17

## 2018-09-26 NOTE — THERAPY RE-EVALUATION
Outpatient Physical Therapy Ortho Re-Evaluation   [facility]     Patient Name: Bernardo Dodd  : 1980  MRN: [id number]  Today's Date: 2018      Visit Date: 2018    Patient Active Problem List   Diagnosis   • Asthma   • Obesity   • GERD (gastroesophageal reflux disease)   • Dyspnea   • Allergic rhinitis   • Hypertension   • Menorrhagia with regular cycle   • Herpes simplex infection   • Acute right lumbar radiculopathy   • Health care maintenance        Past Medical History:   Diagnosis Date   • Allergic rhinitis    • Anemia    • Arthritis    • Asthma    • Depression    • FHx: migraine headaches    • GERD (gastroesophageal reflux disease)    • Heart murmur    • Herpes simplex    • History of chest x-ray 2015    no active disease   • History of echocardiogram 2015    ejection fraction of greater than 65%, mitral and pulmonic regurgitation an physiological tricuspid regurgitation.   • History of PFTs 2015    spirometry data acceptable and reproducible; pt given 4 puffs of Ventolin; pt gave good effort; no obstruction; no Bd response; MVV reduced    • History of PFTs 2015    pt gave best effort; duoneb given prior and post study; moderate nonspecific proportional reduction of FEV1 and FVC with preserved ratio; FEV1 moderately reduced; cannot rule out restriction   • Hypertension    • Ovarian cyst    • Seizures (CMS/HCC)    • Thigh shingles         Past Surgical History:   Procedure Laterality Date   • BILATERAL BREAST REDUCTION     • BREAST SURGERY      breast reduction   •  SECTION     • LAPAROSCOPIC TUBAL LIGATION     • ORIF ANKLE FRACTURE Right        Visit Dx:     ICD-10-CM ICD-9-CM   1. Left foot pain M79.672 729.5   2. Plantar fasciitis of left foot M72.2 728.71             Patient History     Row Name 18 1300             History    Chief Complaint Pain  -AC      Type of Pain Foot pain  -AC      Date Current Problem(s) Began --   About 2 months  or more  -      Brief Description of Current Complaint Pt states she has had L foot pain for 2+ months and has felt a bump under her foot.  Worked at [employer] previously on 10 hour shifts, and would have a red bump on the inner arch of foot and swelling.  Pt has pain while walking along the arch.  Pain with stretching foot and walking around.  Currently on leave of absence. Occasionally has tingling in heels and toes.  -      Patient/Caregiver Goals Relieve pain  -      Occupation/sports/leisure activities On leave of absence at [employer].  Pt enjoys watching [employer] and taking son to the park. Has difficulty walking on uneven surfaces at the park.  -      Patient seeing anyone else for problem(s)? Nelly Sotelo  -      How has patient tried to help current problem? Stretching foot with resistance bands (helped a little)  -      What clinical tests have you had for this problem? X-ray  -      Results of Clinical Tests Negative  -      History of Previous Related Injuries None to LLE  -AC      Are you or can you be pregnant No  -AC         Pain     Pain Location Foot   Plantar surface, medial/proximal and at heel  -AC      Pain at Present 5  -AC      Pain at Best 2  -AC      Pain at Worst 10   When working at [employer]  -      Pain Frequency Intermittent  -AC      Pain Description Nagging;Sharp  -      What Performance Factors Make the Current Problem(s) WORSE? Standing in one place  -      What Performance Factors Make the Current Problem(s) BETTER? Stretching, active movement  -      Is your sleep disturbed? No  -AC      Difficulties at work? On leave of absence  -      Difficulties with ADL's? Any activity requiring standing in one place  -      Difficulties with recreational activities? Walking on uneven surfaces at the park  -         Fall Risk Assessment    Any falls in the past year: No  -AC         Daily Activities    Primary Language English  -AC      Are you able to read Yes  -AC       Are you able to write Yes  -AC      How does patient learn best? Listening;Reading;Demonstration  -AC      Barriers to learning None  -AC      Pt Participated in POC and Goals Yes  -AC         Safety    Are you being hurt, hit, or frightened by anyone at home or in your life? No  -AC      Are you being neglected by a caregiver No  -AC        User Key  (r) = Recorded By, (t) = Taken By, (c) = Cosigned By    Initials Name Provider Type    AC Caty Carnes, PT Physical Therapist              PT Ortho     Row Name 09/26/18 1300       Quarter Clearing    Quarter Clearing Lower Quarter Clearing  -AC       DTR- Lower Quarter Clearing    Patellar tendon (L2-4) Bilateral:;2- Normal response  -AC    Achilles tendon (S1-2) Bilateral:;2- Normal response  -AC       Sensory Screen for Light Touch- Lower Quarter Clearing    L2 (anterior mid thigh) Bilateral:;Intact  -AC    L3 (distal anterior thigh) Left:;Diminished;Right:;Intact  -AC    L4 (medial lower leg/foot) Bilateral:;Intact  -AC    L5 (lateral lower leg/great toe) Left:;Diminished;Right:;Intact  -AC    S1 (bottom of foot) Left:;Diminished;Right:;Intact  -AC       Myotomal Screen- Lower Quarter Clearing    Hip flexion (L2) Bilateral:;4- (Good -)   Pain in L knee  -AC    Knee extension (L3) Bilateral:;4+ (Good +)   Pain in left knee  -AC    Ankle DF (L4) Right:;4- (Good -);Left:;3+ (Fair +)  -AC    Great toe extension (L5) Right:;5 (Normal);Left:;4+ (Good +)   Pain in L MTP joint  -AC    Ankle PF (S1) Left:;3 (Fair);Right:;4 (Good)   5 reps LLE, 13 reps RLE  -AC    Knee flexion (S2) Right:;4+ (Good +);Left:;4- (Good -)   Pain in L knee  -AC       Special Tests/Palpation    Special Tests/Palpation Ankle/Foot  -AC       Foot/Ankle Palpation    Foot/Ankle Palpation? Yes   TTP along L medial arch up to 1st MPT, PFascia insertion  -AC       General ROM    RT Lower Ext Rt Ankle Dorsiflexion;Rt Ankle Plantarflexion;Rt Ankle Inversion;Rt Ankle Eversion  -AC    LT Lower Ext Lt  Ankle Dorsiflexion;Lt Ankle Plantarflexion;Lt Ankle Inversion;Lt Ankle Eversion  -AC       Right Lower Ext    Rt Ankle Dorsiflexion AROM 9  -AC    Rt Ankle Plantarflexion AROM 29  -AC    Rt Ankle Inversion AROM 16  -AC    Rt Ankle Eversion AROM 30  -AC       Left Lower Ext    Lt Ankle Dorsiflexion AROM 10  -AC    Lt Ankle Plantarflexion AROM 31  -AC    Lt Ankle Inversion AROM 15  -AC    Lt Ankle Eversion AROM 20  -AC       MMT (Manual Muscle Testing)    Rt Lower Ext Rt Ankle Subtalar Inversion;Rt Ankle Subtalar Eversion  -AC    Lt Lower Ext Lt Ankle Subtalar Inversion;Lt Ankle Subtalar Eversion  -AC       MMT Right Lower Ext    Rt Ankle Subtalar Inversion MT, Gross Movement (4/5) good  -AC    Rt Ankle Subtalar Eversion MMT, Gross Movement (4+/5) good plus  -AC       MMT Left Lower Ext    Lt Ankle Subtalar Inversion MMT, Gross Movement (4-/5) good minus  -AC    Lt Ankle Subtalar Eversion MMT, Gross Movement (3+/5) fair plus  -AC       Gait/Stairs Assessment/Training    Bilateral Gait Deviations --   Increased pronation bilaterally, R>L  -AC      User Key  (r) = Recorded By, (t) = Taken By, (c) = Cosigned By    Initials Name Provider Type    Caty Zapata, PT Physical Therapist        Therapy Education  Education Details: HEP provided including: plantar fascia rolling onto frozen water bottle,  Given: HEP  Program: New  How Provided: Verbal, Written  Provided to: Patient  Level of Understanding: Verbalized           PT OP Goals     Row Name 09/26/18 1300          PT Short Term Goals    STG Date to Achieve 10/17/18  -AC     STG 1 Client will report > or = 50% improvement in frequency/intensity of L foot pain.  -AC     STG 1 Progress New  -AC     STG 2 Pt will perform independent HEP to improve flexibility and decrease pain.  -AC     STG 2 Progress New  -AC     STG 3 Improve L plantar flexion MMT to at least 4/5 strength.  -AC     STG 3 Progress New  -AC        Long Term Goals    LTG Date to Achieve 11/07/18   -AC     LTG 1 Client will report > or = 75% improvement in frequency/intensity of symptoms.  -AC     LTG 1 Progress New  -AC     LTG 2 Client will report ability to stand at least 20 minutes before onset of pain to allow performance of ADLs.  -AC     LTG 2 Progress New  -AC     LTG 3 Client will report minimal-no pain (< or = 3/10) when walking on uneven surfaces to allow her to take her son to the park.  -AC     LTG 3 Progress New  -AC     LTG 4 Improve LEFS score to > or = 32/80 to reflect significant improvement in use of L foot for ADLs/functional mobility.  -AC     LTG 4 Progress New  -AC        Time Calculation    PT Goal Re-Cert Due Date 12/25/18  -AC       User Key  (r) = Recorded By, (t) = Taken By, (c) = Cosigned By    Initials Name Provider Type    AC Caty Carnes, PT Physical Therapist              PT Assessment/Plan     Row Name 09/26/18 1300          PT Assessment    Functional Limitations Impaired gait;Limitation in home management;Limitations in community activities;Performance in work activities;Performance in self-care ADL;Performance in leisure activities  -AC     Impairments Pain;Muscle strength;Sensation  -AC     Assessment Comments Pt presents with signs and symptoms consistent with plantar fasciitis.  Pain has limited pt from being able to perform her job duties (currently on a leave of absence from [employer]), stand for any extent of time, and walk on uneven surfaces.  Pt would benefit from PT intervention to decrease pain and improve tolerance to functional mobility for work and ADLs.  -AC     Please refer to paper survey for additional self-reported information Yes  -AC     Rehab Potential Good  -AC     Patient/caregiver participated in establishment of treatment plan and goals Yes  -AC     Patient would benefit from skilled therapy intervention Yes  -AC        PT Plan    PT Frequency 1x/week  -AC     Predicted Duration of Therapy Intervention (Therapy Eval) 12 visits  -AC     Planned  CPT's? PT RE-EVAL: 43939;PT EVAL LOW COMPLEXITY: 97155;PT THER PROC EA 15 MIN: 11380;PT GAIT TRAINING EA 15 MIN: 08746;PT THER SUPP EA 15 MIN;PT THER ACT EA 15 MIN: 92709;PT ELECTRICAL STIM UNATTEND: ;PT SELF CARE/MGMT/TRAIN 15 MIN: 64796;PT MANUAL THERAPY EA 15 MIN: 42908;PT HOT/COLD PACK WC NONMCARE: 25480;PT THER MASS EA 15 MIN: 81985;PT ULTRASOUND EA 15 MIN: 30774;PT NEUROMUSC RE-EDUCATION EA 15 MIN: 94415;PT IONTOPHORESIS EA 15 MIN: 51910  -AC     PT Plan Comments Progress L foot strengthening/flexibility exercises as tolerated. Manual therapy and modalities as needed.  -AC       User Key  (r) = Recorded By, (t) = Taken By, (c) = Cosigned By    Initials Name Provider Type    AC Caty Carnes, PT Physical Therapist        Outcome Measure Options: Lower Extremity Functional Scale (LEFS)  Lower Extremity Functional Index  Any of your usual work, housework or school activities: Extreme difficulty or unable to perform activity  Your usual hobbies, recreational or sporting activities: Extreme difficulty or unable to perform activity  Getting into or out of the bath: Quite a bit of difficulty  Walking between rooms: Quite a bit of difficulty  Putting on your shoes or socks: A little bit of difficulty  Squatting: Extreme difficulty or unable to perform activity  Lifting an object, like a bag of groceries from the floor: Extreme difficulty or unable to perform activity  Performing light activities around your home: A little bit of difficulty  Performing heavy activities around your home: Moderate difficulty  Getting into or out of a car: Extreme difficulty or unable to perform activity  Walking 2 blocks: Quite a bit of difficulty  Walking a mile: Quite a bit of difficulty  Going up or down 10 stairs (about 1 flight of stairs): Extreme difficulty or unable to perform activity  Standing for 1 hour: Extreme difficulty or unable to perform activity  Sitting for 1 hour: Moderate difficulty  Running on even ground:  Quite a bit of difficulty  Running on uneven ground: Extreme difficulty or unable to perform activity  Making sharp turns while running fast: Extreme difficulty or unable to perform activity  Hopping: Quite a bit of difficulty  Rolling over in bed: Extreme difficulty or unable to perform activity  Total: 16      Time Calculation:     Therapy Suggested Charges     Code   Minutes Charges    None             Start Time: 1300     Therapy Charges for Today     Code Description Service Date Service Provider Modifiers Qty    81321170027 HC PT RE-EVAL ESTABLISHED PLAN 2 9/26/2018 Caty Carnes, PT GP 1        PT G-Codes  Outcome Measure Options: Lower Extremity Functional Scale (LEFS)  Total: 16         Caty Carnes, PT  9/26/2018

## 2018-09-26 NOTE — PROGRESS NOTES
Chief Complaint   Patient presents with   • Foot Pain     Left, x one month   • Foreign Body in Ear     Piece of cotton in ear       History of Present Illness  38 y.o.female presents for foot pain and foreign body in left ear.    Left foot pain about a month ago; went to ED had xray told plantar faciitis.  Worked at [employer] standing in place. Has been on a leave of absense.  Using dr bartlett insert and also seeing chiropracter. Cant take NSAIDS due to allergy/swelling. Has a physical therapy appt today for something else; wants to see if PT can help with foot pain.  Gait problem rotating foot when walking due to pain.    C/o left ear pain with cotton stuck in ear.  Had been having dizziness and feeling/sounding like constant bubbling in left ear for several days/weeks.  The only thing that makes her bubbling and dizziness better is to put cotton up against ear drum. Now cotton is stuck and she can't get it out.  No fever or chills.  Seeing PT for vertigo vesibular disfx.  Has received a couple rounds of aumentin over last couple months.    Review of Systems   Constitutional: Negative for chills and fever.   HENT: Positive for ear pain. Negative for congestion, ear discharge, hearing loss, postnasal drip, rhinorrhea, sinus pressure, sneezing and sore throat.         Bubbling in left ear   Musculoskeletal: Positive for gait problem.   Neurological: Positive for dizziness. Negative for headache.         PMSFH  The following portions of the patient's history were reviewed and updated as appropriate: allergies, current medications, past family history, past medical history, past social history, past surgical history and problem list.     Past Medical History:   Diagnosis Date   • Allergic rhinitis    • Anemia    • Arthritis    • Asthma    • Depression    • FHx: migraine headaches    • GERD (gastroesophageal reflux disease)    • Heart murmur    • Herpes simplex    • History of chest x-ray 11/01/2015    no active disease    • History of echocardiogram 2015    ejection fraction of greater than 65%, mitral and pulmonic regurgitation an physiological tricuspid regurgitation.   • History of PFTs 2015    spirometry data acceptable and reproducible; pt given 4 puffs of Ventolin; pt gave good effort; no obstruction; no Bd response; MVV reduced    • History of PFTs 2015    pt gave best effort; duoneb given prior and post study; moderate nonspecific proportional reduction of FEV1 and FVC with preserved ratio; FEV1 moderately reduced; cannot rule out restriction   • Hypertension    • Ovarian cyst    • Seizures (CMS/HCC)    • Thigh shingles       Past Surgical History:   Procedure Laterality Date   • BILATERAL BREAST REDUCTION     • BREAST SURGERY      breast reduction   •  SECTION     • LAPAROSCOPIC TUBAL LIGATION     • ORIF ANKLE FRACTURE Right       Allergies   Allergen Reactions   • Naproxen Swelling      Family History   Problem Relation Age of Onset   • Pancreatic cancer Maternal Grandmother    • Heart failure Paternal Grandmother    • MARCIE disease Paternal Aunt    • Arthritis Mother    • Cancer Mother    • Arthritis Father    • Diabetes Neg Hx    • Heart attack Neg Hx    • Hypertension Neg Hx    • Hyperlipidemia Neg Hx    • Mental illness Neg Hx    • Obesity Neg Hx    • Stroke Neg Hx       Social History     Social History   • Marital status: Single     Spouse name: N/A   • Number of children: N/A   • Years of education: N/A     Occupational History   • Not on file.     Social History Main Topics   • Smoking status: Former Smoker     Packs/day: 1.00     Years: 15.00     Types: Cigarettes     Quit date: 2015   • Smokeless tobacco: Never Used   • Alcohol use No      Comment: socially   • Drug use: No   • Sexual activity: Defer     Other Topics Concern   • Not on file     Social History Narrative    Caffeine intake: 16 oz per day          Current Outpatient Prescriptions:   •  ADVAIR DISKUS 500-50 MCG/DOSE  "DISKUS, Inhale 1 puff 2 (Two) Times a Day., Disp: , Rfl:   •  albuterol (PROVENTIL) (2.5 MG/3ML) 0.083% nebulizer solution, Take 2.5 mg by nebulization Every 12 (Twelve) Hours., Disp: , Rfl:   •  albuterol (VENTOLIN HFA) 108 (90 BASE) MCG/ACT inhaler, Inhale 1-2 puffs as needed. Every 4-6 hrs PRN, Disp: , Rfl:   •  amitriptyline (ELAVIL) 25 MG tablet, Take 25 mg by mouth every night., Disp: , Rfl:   •  amLODIPine (NORVASC) 5 MG tablet, Take 1 tablet by mouth Daily., Disp: 30 tablet, Rfl: 5  •  ferrous sulfate 325 (65 FE) MG tablet, TAKE ONE TABLET BY MOUTH DAILY WITH BREAKFAST, Disp: 30 tablet, Rfl: 4  •  fluticasone (FLONASE) 50 MCG/ACT nasal spray, 2 sprays into the nostril(s) as directed by provider Daily., Disp: 16 g, Rfl: 3  •  gabapentin (NEURONTIN) 400 MG capsule, Take 400 mg by mouth 3 (Three) Times a Day., Disp: , Rfl:   •  LamoTRIgine ER 50 MG tablet sustained-release 24 hour, Take 1 tablet by mouth Daily., Disp: , Rfl:   •  levocetirizine (XYZAL) 5 MG tablet, Take 1 tablet by mouth Daily., Disp: , Rfl:   •  montelukast (SINGULAIR) 10 MG tablet, Take 10 mg by mouth every night., Disp: , Rfl:   •  omeprazole (priLOSEC) 40 MG capsule, Take 1 capsule by mouth Daily. Take on an empty stomach and eat 30 minutes- 1 hr after eating., Disp: 30 capsule, Rfl: 3  •  ranitidine (ZANTAC) 300 MG capsule, Take 1 capsule by mouth Every Evening., Disp: 30 capsule, Rfl: 11  •  sodium chloride (OCEAN NASAL SPRAY) 0.65 % nasal spray, Use 1 spray liberally to both nostrils x 7 days, Disp: 480 mL, Rfl: 1  •  SPIRIVA RESPIMAT 2.5 MCG/ACT aerosol solution, Take 2 puffs by mouth Daily., Disp: , Rfl:   •  tiZANidine (ZANAFLEX) 4 MG tablet, Take 4 mg by mouth At Night As Needed for muscle spasms., Disp: , Rfl:   •  valACYclovir (VALTREX) 1000 MG tablet, Take 1 tablet by mouth Daily., Disp: 30 tablet, Rfl: 5    VITALS:  /84   Pulse (!) 126   Resp 18   Ht 149.9 cm (59\")   Wt 79.8 kg (176 lb)   SpO2 96%   Breastfeeding? No  "  BMI 35.55 kg/m²     Physical Exam   Constitutional: She is oriented to person, place, and time. She appears well-developed and well-nourished. No distress.   HENT:   Head: Normocephalic.   Right Ear: Tympanic membrane, external ear and ear canal normal.   Left Ear: External ear normal. There is swelling and tenderness. Tympanic membrane is injected and erythematous. A middle ear effusion is present.   Left ear canal initially obstructed with cotton.  Was able to remove with currette without difficulties.  Tenderness to palpation of canal associated erythema.  C/o dizziness once cotton removed.   Eyes: Pupils are equal, round, and reactive to light. EOM are normal.   Neck: Normal range of motion. Neck supple.   Cardiovascular: Normal rate, regular rhythm, normal heart sounds and intact distal pulses.    Pulmonary/Chest: Effort normal and breath sounds normal. No respiratory distress.   Abdominal: Soft. Bowel sounds are normal. There is no tenderness.   Musculoskeletal:        Left foot: There is decreased range of motion, tenderness and swelling. There is no deformity.   Limited dorsi and plantar flexion left foot with pain/tenderness palpation of bottom left heel and plantar tendon   Neurological: She is alert and oriented to person, place, and time.   Skin: Skin is warm and dry. Capillary refill takes less than 2 seconds. No rash noted.   Psychiatric: She has a normal mood and affect. Her behavior is normal.       LABS  Results for orders placed or performed in visit on 08/22/18   IgE   Result Value Ref Range    IgE 23 0 - 100 IU/mL   Vitamin D 25 Hydroxy   Result Value Ref Range    25 Hydroxy, Vitamin D 11.3 ng/ml   Immunoglobulins Quant   Result Value Ref Range    IgG 898 700 - 1600 mg/dL    IgA 215 87 - 352 mg/dL    IgM 107 26 - 217 mg/dL    IgE 21 0 - 100 IU/mL   CBC Auto Differential   Result Value Ref Range    WBC 9.87 3.50 - 10.80 10*3/mm3    RBC 4.29 3.89 - 5.14 10*6/mm3    Hemoglobin 12.3 11.5 - 15.5  g/dL    Hematocrit 38.5 34.5 - 44.0 %    MCV 89.7 80.0 - 99.0 fL    MCH 28.7 27.0 - 31.0 pg    MCHC 31.9 (L) 32.0 - 36.0 g/dL    RDW 15.6 (H) 11.3 - 14.5 %    RDW-SD 49.8 37.0 - 54.0 fl    MPV 9.8 6.0 - 12.0 fL    Platelets 227 150 - 450 10*3/mm3    Neutrophil % 65.0 41.0 - 71.0 %    Lymphocyte % 27.5 24.0 - 44.0 %    Monocyte % 6.4 0.0 - 12.0 %    Eosinophil % 0.3 0.0 - 3.0 %    Basophil % 0.2 0.0 - 1.0 %    Immature Grans % 0.6 0.0 - 0.6 %    Neutrophils, Absolute 6.42 1.50 - 8.30 10*3/mm3    Lymphocytes, Absolute 2.71 0.60 - 4.80 10*3/mm3    Monocytes, Absolute 0.63 0.00 - 1.00 10*3/mm3    Eosinophils, Absolute 0.03 0.00 - 0.30 10*3/mm3    Basophils, Absolute 0.02 0.00 - 0.20 10*3/mm3    Immature Grans, Absolute 0.06 (H) 0.00 - 0.03 10*3/mm3       ASSESSMENT/PLAN  Bernardo was seen today for foot pain and foreign body in ear.    Diagnoses and all orders for this visit:    Acute suppurative otitis media of left ear without spontaneous rupture of tympanic membrane, recurrence not specified  -     cefdinir (OMNICEF) 300 MG capsule; Take 1 capsule by mouth 2 (Two) Times a Day for 7 days.  -     cefTRIAXone (ROCEPHIN) injection 1 g; Inject 1 g into the appropriate muscle as directed by prescriber 1 (One) Time.    Acute otitis externa of left ear, unspecified type  -     ciprofloxacin-dexamethasone (CIPRODEX) 0.3-0.1 % otic suspension; Administer 4 drops into the left ear 2 (Two) Times a Day for 7 days.    Dizziness  Comments:  continue vestibular PT  Orders:  -     meclizine (ANTIVERT) 12.5 MG tablet; Take 1 tablet by mouth 3 (Three) Times a Day As Needed for dizziness.    Plantar fasciitis  Comments:  Reviewed home exercises; will try PT; if no improvement would recomend ortho referral  Orders:  -     Ambulatory Referral to Physical Therapy Evaluate and treat (left foot plantar fasciitis)    Cautioned patient to avoid inserting cotton so far back in ear canal;  serious injury to the inner ear could occur.    I  discussed the patients findings and my recommendations with patient.     Patient was encouraged to keep me informed of any acute changes, lack of improvement, or any new concerning symptoms.    Patient voiced understanding of all instructions and denied further questions.      FOLLOW-UP  Return if symptoms worsen or fail to improve.  Keep appt with Nelly Sotelo 10-1  Electronically signed by:    SHANE Mueller  09/26/2018

## 2018-10-02 ENCOUNTER — HOSPITAL ENCOUNTER (OUTPATIENT)
Dept: GENERAL RADIOLOGY | Facility: HOSPITAL | Age: 38
Discharge: HOME | End: 2018-10-02
Admitting: PHYSICIAN ASSISTANT

## 2018-10-02 ENCOUNTER — OFFICE VISIT (OUTPATIENT)
Dept: INTERNAL MEDICINE | Facility: CLINIC | Age: 38
End: 2018-10-02

## 2018-10-02 VITALS
HEIGHT: 59 IN | SYSTOLIC BLOOD PRESSURE: 108 MMHG | DIASTOLIC BLOOD PRESSURE: 70 MMHG | BODY MASS INDEX: 36.49 KG/M2 | OXYGEN SATURATION: 98 % | WEIGHT: 181 LBS | HEART RATE: 102 BPM | TEMPERATURE: 98.6 F

## 2018-10-02 DIAGNOSIS — M54.9 MID BACK PAIN: ICD-10-CM

## 2018-10-02 DIAGNOSIS — M54.9 MID BACK PAIN: Primary | ICD-10-CM

## 2018-10-02 DIAGNOSIS — R05.9 COUGH: ICD-10-CM

## 2018-10-02 DIAGNOSIS — J30.9 ALLERGIC RHINITIS, UNSPECIFIED SEASONALITY, UNSPECIFIED TRIGGER: ICD-10-CM

## 2018-10-02 PROCEDURE — 99213 OFFICE O/P EST LOW 20 MIN: CPT | Performed by: PHYSICIAN ASSISTANT

## 2018-10-02 PROCEDURE — 71046 X-RAY EXAM CHEST 2 VIEWS: CPT

## 2018-10-02 NOTE — PROGRESS NOTES
Chief Complaint   Patient presents with   • Dizziness     follow up   • Earache   • Fatigue       Subjective   Bernardo Dodd is a 38 y.o. female.       History of Present Illness     Pt continues to have dizziness and congestion. Saw her allergist yesterday and is going to be getting monthly injection for her asthma. She will be starting as soon as possible. Was started on prednisone yesterday.    Has been having back pain and feels like it may be related to her lungs, was on the right side, now has moved to the left. She is using her nebulizer as needed, did it twice last night. Took some promethazine cough syrup and that seemed to help. Tried rubbing her chest with vicks.    Has appt with [facility] scheduled for end of October. Dizziness is about the same.      Current Outpatient Prescriptions:   •  ADVAIR DISKUS 500-50 MCG/DOSE DISKUS, Inhale 1 puff 2 (Two) Times a Day., Disp: , Rfl:   •  albuterol (PROVENTIL) (2.5 MG/3ML) 0.083% nebulizer solution, Take 2.5 mg by nebulization Every 12 (Twelve) Hours., Disp: , Rfl:   •  albuterol (VENTOLIN HFA) 108 (90 BASE) MCG/ACT inhaler, Inhale 1-2 puffs as needed. Every 4-6 hrs PRN, Disp: , Rfl:   •  amitriptyline (ELAVIL) 25 MG tablet, Take 25 mg by mouth every night., Disp: , Rfl:   •  amLODIPine (NORVASC) 5 MG tablet, Take 1 tablet by mouth Daily., Disp: 30 tablet, Rfl: 5  •  ferrous sulfate 325 (65 FE) MG tablet, TAKE ONE TABLET BY MOUTH DAILY WITH BREAKFAST, Disp: 30 tablet, Rfl: 4  •  fluticasone (FLONASE) 50 MCG/ACT nasal spray, 2 sprays into the nostril(s) as directed by provider Daily., Disp: 16 g, Rfl: 3  •  gabapentin (NEURONTIN) 400 MG capsule, Take 400 mg by mouth 3 (Three) Times a Day., Disp: , Rfl:   •  LamoTRIgine ER 50 MG tablet sustained-release 24 hour, Take 1 tablet by mouth Daily., Disp: , Rfl:   •  levocetirizine (XYZAL) 5 MG tablet, Take 1 tablet by mouth Daily., Disp: , Rfl:   •  meclizine (ANTIVERT) 12.5 MG tablet, Take 1 tablet by mouth 3 (Three)  Times a Day As Needed for dizziness., Disp: 30 tablet, Rfl: 0  •  montelukast (SINGULAIR) 10 MG tablet, Take 10 mg by mouth every night., Disp: , Rfl:   •  omeprazole (priLOSEC) 40 MG capsule, Take 1 capsule by mouth Daily. Take on an empty stomach and eat 30 minutes- 1 hr after eating., Disp: 30 capsule, Rfl: 3  •  ranitidine (ZANTAC) 300 MG capsule, Take 1 capsule by mouth Every Evening., Disp: 30 capsule, Rfl: 11  •  sodium chloride (OCEAN NASAL SPRAY) 0.65 % nasal spray, Use 1 spray liberally to both nostrils x 7 days, Disp: 480 mL, Rfl: 1  •  SPIRIVA RESPIMAT 2.5 MCG/ACT aerosol solution, Take 2 puffs by mouth Daily., Disp: , Rfl:   •  tiZANidine (ZANAFLEX) 4 MG tablet, Take 4 mg by mouth At Night As Needed for muscle spasms., Disp: , Rfl:   •  valACYclovir (VALTREX) 1000 MG tablet, Take 1 tablet by mouth Daily., Disp: 30 tablet, Rfl: 5     PMFSH  The following portions of the patient's history were reviewed and updated as appropriate: allergies, current medications, past family history, past medical history, past social history, past surgical history and problem list.    Review of Systems   Constitutional: Positive for fatigue. Negative for activity change, appetite change, fever and unexpected weight change.   HENT: Positive for congestion, postnasal drip and sore throat. Negative for ear pain.    Eyes: Negative for pain, discharge and visual disturbance.   Respiratory: Positive for cough. Negative for chest tightness, shortness of breath and wheezing.    Cardiovascular: Negative for chest pain and palpitations.   Gastrointestinal: Negative for abdominal pain, blood in stool, diarrhea, nausea and vomiting.   Endocrine: Negative.    Genitourinary: Negative.  Negative for difficulty urinating, flank pain, frequency and urgency.   Musculoskeletal: Positive for back pain. Negative for joint swelling.   Skin: Negative for color change, rash and wound.   Allergic/Immunologic: Negative.    Neurological: Negative for  "tremors, seizures, syncope and weakness.   Hematological: Negative for adenopathy.   Psychiatric/Behavioral: Negative.  Negative for confusion and decreased concentration.   All other systems reviewed and are negative.      Objective   /70   Pulse 102   Temp 98.6 °F (37 °C)   Ht 149.9 cm (59\")   Wt 82.1 kg (181 lb)   SpO2 98%   BMI 36.56 kg/m²     Physical Exam   Constitutional: She is oriented to person, place, and time. She appears well-developed and well-nourished.  Non-toxic appearance. No distress.   HENT:   Head: Normocephalic and atraumatic. Hair is normal.   Right Ear: External ear normal. No drainage, swelling or tenderness. Tympanic membrane is retracted.   Left Ear: External ear normal. No drainage, swelling or tenderness. Tympanic membrane is retracted.   Nose: Mucosal edema present. No epistaxis.   Mouth/Throat: Uvula is midline and mucous membranes are normal. No oral lesions. No uvula swelling. Posterior oropharyngeal erythema present. No oropharyngeal exudate.   Eyes: Pupils are equal, round, and reactive to light. Conjunctivae and EOM are normal. Right eye exhibits no discharge. Left eye exhibits no discharge. No scleral icterus.   Neck: Normal range of motion. Neck supple.   Cardiovascular: Normal rate, regular rhythm and normal heart sounds.  Exam reveals no gallop.    No murmur heard.  Pulmonary/Chest: Breath sounds normal. No stridor. No respiratory distress. She has no wheezes. She has no rales. She exhibits no tenderness.   Abdominal: Soft. There is no tenderness.   Musculoskeletal:        Thoracic back: She exhibits tenderness. She exhibits normal range of motion, no bony tenderness, no swelling, no edema, no deformity and no pain.   Lymphadenopathy:     She has cervical adenopathy.   Neurological: She is alert and oriented to person, place, and time. She exhibits normal muscle tone.   Skin: Skin is warm and dry. No rash noted. She is not diaphoretic.   Psychiatric: She has a " normal mood and affect. Her behavior is normal. Judgment and thought content normal.   Nursing note and vitals reviewed.      Results for orders placed or performed in visit on 08/22/18   IgE   Result Value Ref Range    IgE 23 0 - 100 IU/mL   Vitamin D 25 Hydroxy   Result Value Ref Range    25 Hydroxy, Vitamin D 11.3 ng/ml   Immunoglobulins Quant   Result Value Ref Range    IgG 898 700 - 1600 mg/dL    IgA 215 87 - 352 mg/dL    IgM 107 26 - 217 mg/dL    IgE 21 0 - 100 IU/mL   CBC Auto Differential   Result Value Ref Range    WBC 9.87 3.50 - 10.80 10*3/mm3    RBC 4.29 3.89 - 5.14 10*6/mm3    Hemoglobin 12.3 11.5 - 15.5 g/dL    Hematocrit 38.5 34.5 - 44.0 %    MCV 89.7 80.0 - 99.0 fL    MCH 28.7 27.0 - 31.0 pg    MCHC 31.9 (L) 32.0 - 36.0 g/dL    RDW 15.6 (H) 11.3 - 14.5 %    RDW-SD 49.8 37.0 - 54.0 fl    MPV 9.8 6.0 - 12.0 fL    Platelets 227 150 - 450 10*3/mm3    Neutrophil % 65.0 41.0 - 71.0 %    Lymphocyte % 27.5 24.0 - 44.0 %    Monocyte % 6.4 0.0 - 12.0 %    Eosinophil % 0.3 0.0 - 3.0 %    Basophil % 0.2 0.0 - 1.0 %    Immature Grans % 0.6 0.0 - 0.6 %    Neutrophils, Absolute 6.42 1.50 - 8.30 10*3/mm3    Lymphocytes, Absolute 2.71 0.60 - 4.80 10*3/mm3    Monocytes, Absolute 0.63 0.00 - 1.00 10*3/mm3    Eosinophils, Absolute 0.03 0.00 - 0.30 10*3/mm3    Basophils, Absolute 0.02 0.00 - 0.20 10*3/mm3    Immature Grans, Absolute 0.06 (H) 0.00 - 0.03 10*3/mm3        ASSESSMENT/PLAN    Problem List Items Addressed This Visit        Respiratory    Allergic rhinitis     Pt has severe seasonal allergies, followed by allergist. Continue maintenance meds as directed and prednisone as prescribed by allergist. Use otc symptomatic care and rtc prn.           Other Visit Diagnoses     Mid back pain    -  Primary    Check CXR to r/o pneumonia as cause of back pain. Continue prednisone as prescribed by allergist. Use gentle stretching and heat.    Relevant Orders    XR Chest PA & Lateral (Completed)               Return in about  4 weeks (around 10/30/2018) for Recheck.

## 2018-10-02 NOTE — ASSESSMENT & PLAN NOTE
Pt has severe seasonal allergies, followed by allergist. Continue maintenance meds as directed and prednisone as prescribed by allergist. Use otc symptomatic care and rtc prn.

## 2018-10-02 NOTE — PROGRESS NOTES
Your chest xray does not show any signs of pneumonia. Please continue the prednisone as prescribed by your allergist as we discussed.

## 2018-10-09 ENCOUNTER — OFFICE VISIT (OUTPATIENT)
Dept: GASTROENTEROLOGY | Facility: CLINIC | Age: 38
End: 2018-10-09

## 2018-10-09 VITALS
SYSTOLIC BLOOD PRESSURE: 132 MMHG | RESPIRATION RATE: 16 BRPM | WEIGHT: 181 LBS | DIASTOLIC BLOOD PRESSURE: 100 MMHG | TEMPERATURE: 97.8 F | HEART RATE: 92 BPM | OXYGEN SATURATION: 98 % | BODY MASS INDEX: 36.49 KG/M2 | HEIGHT: 59 IN

## 2018-10-09 DIAGNOSIS — R07.89 ATYPICAL CHEST PAIN: ICD-10-CM

## 2018-10-09 DIAGNOSIS — K21.00 GASTROESOPHAGEAL REFLUX DISEASE WITH ESOPHAGITIS: ICD-10-CM

## 2018-10-09 PROCEDURE — 99204 OFFICE O/P NEW MOD 45 MIN: CPT | Performed by: INTERNAL MEDICINE

## 2018-10-09 RX ORDER — OMEPRAZOLE 40 MG/1
40 CAPSULE, DELAYED RELEASE ORAL 2 TIMES DAILY
Qty: 90 CAPSULE | Refills: 3 | Status: SHIPPED | OUTPATIENT
Start: 2018-10-09 | End: 2018-11-20 | Stop reason: SDUPTHER

## 2018-10-09 RX ORDER — PREDNISONE 20 MG/1
20 TABLET ORAL 2 TIMES DAILY
COMMUNITY
End: 2019-02-19

## 2018-10-09 NOTE — PROGRESS NOTES
PCP: Nelly Sotelo PA    Chief Complaint   Patient presents with   • Heartburn        History of Present Illness:   Bernardo Dodd is a 38 y.o. female who presents to the GI clinic for assessment of substernal nonradiating postprandial burning. S/p egd  with irregular z line biopsy suggestive of GERD. Takes prilosec 40 mg 30 mins before breakfast and prn zantac with some alleviation. Describes chronic dyspnea related to asthma. Had a workup for thyroid disease .    Past Medical History:   Diagnosis Date   • Allergic rhinitis    • Anemia    • Arthritis    • Asthma    • Depression    • FHx: migraine headaches    • GERD (gastroesophageal reflux disease)    • Heart murmur    • Herpes simplex    • History of chest x-ray 2015    no active disease   • History of echocardiogram 2015    ejection fraction of greater than 65%, mitral and pulmonic regurgitation an physiological tricuspid regurgitation.   • History of PFTs 2015    spirometry data acceptable and reproducible; pt given 4 puffs of Ventolin; pt gave good effort; no obstruction; no Bd response; MVV reduced    • History of PFTs 2015    pt gave best effort; duoneb given prior and post study; moderate nonspecific proportional reduction of FEV1 and FVC with preserved ratio; FEV1 moderately reduced; cannot rule out restriction   • Hypertension    • Ovarian cyst    • Seizures (CMS/HCC)    • Thigh shingles        Past Surgical History:   Procedure Laterality Date   • BILATERAL BREAST REDUCTION     • BREAST SURGERY      breast reduction   •  SECTION     • LAPAROSCOPIC TUBAL LIGATION     • ORIF ANKLE FRACTURE Right          Current Outpatient Prescriptions:   •  ADVAIR DISKUS 500-50 MCG/DOSE DISKUS, Inhale 1 puff 2 (Two) Times a Day., Disp: , Rfl:   •  albuterol (PROVENTIL) (2.5 MG/3ML) 0.083% nebulizer solution, Take 2.5 mg by nebulization Every 12 (Twelve) Hours., Disp: , Rfl:   •  albuterol (VENTOLIN HFA) 108 (90 BASE)  MCG/ACT inhaler, Inhale 1-2 puffs as needed. Every 4-6 hrs PRN, Disp: , Rfl:   •  amitriptyline (ELAVIL) 25 MG tablet, Take 25 mg by mouth every night., Disp: , Rfl:   •  amLODIPine (NORVASC) 5 MG tablet, Take 1 tablet by mouth Daily., Disp: 30 tablet, Rfl: 5  •  ferrous sulfate 325 (65 FE) MG tablet, TAKE ONE TABLET BY MOUTH DAILY WITH BREAKFAST, Disp: 30 tablet, Rfl: 4  •  fluticasone (FLONASE) 50 MCG/ACT nasal spray, 2 sprays into the nostril(s) as directed by provider Daily., Disp: 16 g, Rfl: 3  •  gabapentin (NEURONTIN) 400 MG capsule, Take 400 mg by mouth 3 (Three) Times a Day., Disp: , Rfl:   •  LamoTRIgine ER 50 MG tablet sustained-release 24 hour, Take 1 tablet by mouth Daily., Disp: , Rfl:   •  levocetirizine (XYZAL) 5 MG tablet, Take 1 tablet by mouth Daily., Disp: , Rfl:   •  meclizine (ANTIVERT) 12.5 MG tablet, Take 1 tablet by mouth 3 (Three) Times a Day As Needed for dizziness., Disp: 30 tablet, Rfl: 0  •  montelukast (SINGULAIR) 10 MG tablet, Take 10 mg by mouth every night., Disp: , Rfl:   •  omeprazole (priLOSEC) 40 MG capsule, Take 1 capsule by mouth Daily. Take on an empty stomach and eat 30 minutes- 1 hr after eating., Disp: 30 capsule, Rfl: 3  •  ranitidine (ZANTAC) 300 MG capsule, Take 1 capsule by mouth Every Evening., Disp: 30 capsule, Rfl: 11  •  sodium chloride (OCEAN NASAL SPRAY) 0.65 % nasal spray, Use 1 spray liberally to both nostrils x 7 days, Disp: 480 mL, Rfl: 1  •  SPIRIVA RESPIMAT 2.5 MCG/ACT aerosol solution, Take 2 puffs by mouth Daily., Disp: , Rfl:   •  tiZANidine (ZANAFLEX) 4 MG tablet, Take 4 mg by mouth At Night As Needed for muscle spasms., Disp: , Rfl:   •  valACYclovir (VALTREX) 1000 MG tablet, Take 1 tablet by mouth Daily., Disp: 30 tablet, Rfl: 5    Allergies   Allergen Reactions   • Naproxen Swelling       Family History   Problem Relation Age of Onset   • Pancreatic cancer Maternal Grandmother    • Heart failure Paternal Grandmother    • MARCIE disease Paternal Aunt     • Arthritis Mother    • Cancer Mother    • Arthritis Father    • Diabetes Neg Hx    • Heart attack Neg Hx    • Hypertension Neg Hx    • Hyperlipidemia Neg Hx    • Mental illness Neg Hx    • Obesity Neg Hx    • Stroke Neg Hx        Social History     Social History   • Marital status: Single     Spouse name: N/A   • Number of children: N/A   • Years of education: N/A     Occupational History   • Not on file.     Social History Main Topics   • Smoking status: Former Smoker     Packs/day: 1.00     Years: 15.00     Types: Cigarettes     Quit date: 1/17/2015   • Smokeless tobacco: Never Used   • Alcohol use No      Comment: socially   • Drug use: No   • Sexual activity: Defer     Other Topics Concern   • Not on file     Social History Narrative    Caffeine intake: 16 oz per day        Review of Systems   Constitutional: Positive for appetite change and chills. Negative for fever.   HENT: Positive for ear pain, rhinorrhea, sore throat and trouble swallowing.    Respiratory: Positive for choking (Fluids), shortness of breath and wheezing.    Cardiovascular: Positive for chest pain and leg swelling.   Gastrointestinal: Positive for abdominal pain, diarrhea (Burning) and vomiting.   Musculoskeletal: Positive for neck pain.   Skin: Negative for rash.   Neurological: Positive for headaches.   All other systems reviewed and are negative.    All other systems reviewed and are negative.    Vitals:    10/09/18 0829   Resp: 16   Temp: 97.8 °F (36.6 °C)       Physical Exam  General Appearance:  Vitals as above. no acute distress  Head/face:  Normocephalic, atraumatic  Eyes:   EOMI, + white sclera on upward gaze, mild frontward globus protrusion  Nose/Sinuses:  Nares patent bilaterally without discharge or lesions  Mouth/Throat:  Posterior pharynx is pink without drainage or exudates;  dentition is in good condition and repair  Neck:  trachea is midline, no thyromegaly  Lungs:  Clear to auscultation bilaterally  Heart:  Regular  rate and rhythm without murmur, gallop or rub  Abdomen:  Soft, non-tender to palpation, no obvious masses, bowel sounds positive in four quadrants; no abdominal bruits; no guarding or rebound tenderness  Neurologic:  Alert; no focal deficits; age appropriate behavior and speech  Psychiatric: mood and affect are congruent  Vascular: extremities without edema  Skin: no rash or cyanosis.      Assessment/Plan  1.) Suspect GERD with breakthrough  2.) Asthma  3.) obesity    Recommend continued workup for metabolic syndrome by pcp with thyroid function. Advised wt loss, hob elevation and to increase ppi to bid qac.  Should her symptoms persist beyond 4 weeks, would repeat egd with mid and distal esophageal biopsies to rule out eoe.    rtc in 3 months.    Alf Lin MD  10/9/2018  Answers for HPI/ROS submitted by the patient on 10/7/2018   Shortness of breath  Chronicity: chronic  Onset: more than 1 year ago  Frequency: constantly  Progression since onset: waxing and waning  Episode duration: 24 hours  claudication: Yes  coryza: Yes  hemoptysis: No  leg pain: Yes  orthopnea: Yes  PND: Yes  sputum production: Yes  swollen glands: Yes  syncope: Yes  Aggravating factors: emotional upset, occupational exposure, smoke, animal exposure, exercise, URIs, any activity, fumes, pollens, weather changes, eating, lying flat

## 2018-10-17 ENCOUNTER — APPOINTMENT (OUTPATIENT)
Dept: CT IMAGING | Facility: HOSPITAL | Age: 38
End: 2018-10-17

## 2018-10-17 ENCOUNTER — HOSPITAL ENCOUNTER (EMERGENCY)
Facility: HOSPITAL | Age: 38
Discharge: HOME | End: 2018-10-17
Attending: EMERGENCY MEDICINE | Admitting: EMERGENCY MEDICINE

## 2018-10-17 VITALS
SYSTOLIC BLOOD PRESSURE: 119 MMHG | HEART RATE: 82 BPM | RESPIRATION RATE: 16 BRPM | DIASTOLIC BLOOD PRESSURE: 83 MMHG | BODY MASS INDEX: 36.29 KG/M2 | WEIGHT: 180 LBS | TEMPERATURE: 98.3 F | HEIGHT: 59 IN | OXYGEN SATURATION: 96 %

## 2018-10-17 DIAGNOSIS — R10.32 ABDOMINAL PAIN, LLQ (LEFT LOWER QUADRANT): Primary | ICD-10-CM

## 2018-10-17 DIAGNOSIS — K57.32 DIVERTICULITIS OF COLON: ICD-10-CM

## 2018-10-17 DIAGNOSIS — Z87.442 PERSONAL HISTORY OF KIDNEY STONES: ICD-10-CM

## 2018-10-17 LAB
ALBUMIN SERPL-MCNC: 3.93 G/DL (ref 3.2–4.8)
ALBUMIN/GLOB SERPL: 1.7 G/DL (ref 1.5–2.5)
ALP SERPL-CCNC: 52 U/L (ref 25–100)
ALT SERPL W P-5'-P-CCNC: 14 U/L (ref 7–40)
ANION GAP SERPL CALCULATED.3IONS-SCNC: 7 MMOL/L (ref 3–11)
AST SERPL-CCNC: 16 U/L (ref 0–33)
B-HCG UR QL: NEGATIVE
BASOPHILS # BLD AUTO: 0.03 10*3/MM3 (ref 0–0.2)
BASOPHILS NFR BLD AUTO: 0.3 % (ref 0–1)
BILIRUB SERPL-MCNC: 0.4 MG/DL (ref 0.3–1.2)
BILIRUB UR QL STRIP: NEGATIVE
BUN BLD-MCNC: 9 MG/DL (ref 9–23)
BUN/CREAT SERPL: 15 (ref 7–25)
CALCIUM SPEC-SCNC: 8.8 MG/DL (ref 8.7–10.4)
CHLORIDE SERPL-SCNC: 102 MMOL/L (ref 99–109)
CLARITY UR: CLEAR
CO2 SERPL-SCNC: 27 MMOL/L (ref 20–31)
COLOR UR: YELLOW
CREAT BLD-MCNC: 0.6 MG/DL (ref 0.6–1.3)
DEPRECATED RDW RBC AUTO: 46.3 FL (ref 37–54)
EOSINOPHIL # BLD AUTO: 0.05 10*3/MM3 (ref 0–0.3)
EOSINOPHIL NFR BLD AUTO: 0.6 % (ref 0–3)
ERYTHROCYTE [DISTWIDTH] IN BLOOD BY AUTOMATED COUNT: 14.7 % (ref 11.3–14.5)
GFR SERPL CREATININE-BSD FRML MDRD: 134 ML/MIN/1.73
GLOBULIN UR ELPH-MCNC: 2.3 GM/DL
GLUCOSE BLD-MCNC: 98 MG/DL (ref 70–100)
GLUCOSE UR STRIP-MCNC: NEGATIVE MG/DL
HCT VFR BLD AUTO: 40.3 % (ref 34.5–44)
HGB BLD-MCNC: 13.1 G/DL (ref 11.5–15.5)
HGB UR QL STRIP.AUTO: NEGATIVE
HOLD SPECIMEN: NORMAL
HOLD SPECIMEN: NORMAL
IMM GRANULOCYTES # BLD: 0.03 10*3/MM3 (ref 0–0.03)
IMM GRANULOCYTES NFR BLD: 0.3 % (ref 0–0.6)
INTERNAL NEGATIVE CONTROL: NEGATIVE
INTERNAL POSITIVE CONTROL: POSITIVE
KETONES UR QL STRIP: NEGATIVE
LEUKOCYTE ESTERASE UR QL STRIP.AUTO: NEGATIVE
LIPASE SERPL-CCNC: 40 U/L (ref 6–51)
LYMPHOCYTES # BLD AUTO: 2.56 10*3/MM3 (ref 0.6–4.8)
LYMPHOCYTES NFR BLD AUTO: 29.5 % (ref 24–44)
Lab: NORMAL
MCH RBC QN AUTO: 28.2 PG (ref 27–31)
MCHC RBC AUTO-ENTMCNC: 32.5 G/DL (ref 32–36)
MCV RBC AUTO: 86.7 FL (ref 80–99)
MONOCYTES # BLD AUTO: 0.67 10*3/MM3 (ref 0–1)
MONOCYTES NFR BLD AUTO: 7.7 % (ref 0–12)
NEUTROPHILS # BLD AUTO: 5.36 10*3/MM3 (ref 1.5–8.3)
NEUTROPHILS NFR BLD AUTO: 61.9 % (ref 41–71)
NITRITE UR QL STRIP: NEGATIVE
PH UR STRIP.AUTO: 7 [PH] (ref 5–8)
PLATELET # BLD AUTO: 277 10*3/MM3 (ref 150–450)
PMV BLD AUTO: 10 FL (ref 6–12)
POTASSIUM BLD-SCNC: 3.3 MMOL/L (ref 3.5–5.5)
PROT SERPL-MCNC: 6.2 G/DL (ref 5.7–8.2)
PROT UR QL STRIP: NEGATIVE
RBC # BLD AUTO: 4.65 10*6/MM3 (ref 3.89–5.14)
SODIUM BLD-SCNC: 136 MMOL/L (ref 132–146)
SP GR UR STRIP: 1.02 (ref 1–1.03)
UROBILINOGEN UR QL STRIP: NORMAL
WBC NRBC COR # BLD: 8.67 10*3/MM3 (ref 3.5–10.8)
WHOLE BLOOD HOLD SPECIMEN: NORMAL
WHOLE BLOOD HOLD SPECIMEN: NORMAL

## 2018-10-17 PROCEDURE — 74176 CT ABD & PELVIS W/O CONTRAST: CPT

## 2018-10-17 PROCEDURE — 96374 THER/PROPH/DIAG INJ IV PUSH: CPT

## 2018-10-17 PROCEDURE — 96375 TX/PRO/DX INJ NEW DRUG ADDON: CPT

## 2018-10-17 PROCEDURE — 81025 URINE PREGNANCY TEST: CPT | Performed by: EMERGENCY MEDICINE

## 2018-10-17 PROCEDURE — 80053 COMPREHEN METABOLIC PANEL: CPT | Performed by: EMERGENCY MEDICINE

## 2018-10-17 PROCEDURE — 85025 COMPLETE CBC W/AUTO DIFF WBC: CPT | Performed by: EMERGENCY MEDICINE

## 2018-10-17 PROCEDURE — 25010000002 KETOROLAC TROMETHAMINE PER 15 MG: Performed by: EMERGENCY MEDICINE

## 2018-10-17 PROCEDURE — 83690 ASSAY OF LIPASE: CPT | Performed by: EMERGENCY MEDICINE

## 2018-10-17 PROCEDURE — 99284 EMERGENCY DEPT VISIT MOD MDM: CPT

## 2018-10-17 PROCEDURE — 81003 URINALYSIS AUTO W/O SCOPE: CPT | Performed by: EMERGENCY MEDICINE

## 2018-10-17 PROCEDURE — 36415 COLL VENOUS BLD VENIPUNCTURE: CPT

## 2018-10-17 PROCEDURE — 96361 HYDRATE IV INFUSION ADD-ON: CPT

## 2018-10-17 PROCEDURE — 25010000002 ONDANSETRON PER 1 MG: Performed by: EMERGENCY MEDICINE

## 2018-10-17 RX ORDER — METRONIDAZOLE 500 MG/1
500 TABLET ORAL ONCE
Status: COMPLETED | OUTPATIENT
Start: 2018-10-17 | End: 2018-10-17

## 2018-10-17 RX ORDER — CIPROFLOXACIN 500 MG/1
500 TABLET, FILM COATED ORAL 2 TIMES DAILY
Qty: 14 TABLET | Refills: 0 | Status: SHIPPED | OUTPATIENT
Start: 2018-10-17 | End: 2018-10-26

## 2018-10-17 RX ORDER — KETOROLAC TROMETHAMINE 15 MG/ML
10 INJECTION, SOLUTION INTRAMUSCULAR; INTRAVENOUS ONCE
Status: COMPLETED | OUTPATIENT
Start: 2018-10-17 | End: 2018-10-17

## 2018-10-17 RX ORDER — LEVOFLOXACIN 500 MG/1
500 TABLET, FILM COATED ORAL ONCE
Status: COMPLETED | OUTPATIENT
Start: 2018-10-17 | End: 2018-10-17

## 2018-10-17 RX ORDER — PHENAZOPYRIDINE HYDROCHLORIDE 100 MG/1
200 TABLET, FILM COATED ORAL ONCE
Status: COMPLETED | OUTPATIENT
Start: 2018-10-17 | End: 2018-10-17

## 2018-10-17 RX ORDER — METRONIDAZOLE 500 MG/1
500 TABLET ORAL 2 TIMES DAILY
Qty: 14 TABLET | Refills: 0 | Status: SHIPPED | OUTPATIENT
Start: 2018-10-17 | End: 2018-10-26

## 2018-10-17 RX ORDER — TRAMADOL HYDROCHLORIDE 50 MG/1
50 TABLET ORAL EVERY 4 HOURS PRN
Qty: 18 TABLET | Refills: 0 | Status: SHIPPED | OUTPATIENT
Start: 2018-10-17 | End: 2018-10-26

## 2018-10-17 RX ORDER — ONDANSETRON 2 MG/ML
4 INJECTION INTRAMUSCULAR; INTRAVENOUS ONCE
Status: COMPLETED | OUTPATIENT
Start: 2018-10-17 | End: 2018-10-17

## 2018-10-17 RX ORDER — SODIUM CHLORIDE 0.9 % (FLUSH) 0.9 %
10 SYRINGE (ML) INJECTION AS NEEDED
Status: DISCONTINUED | OUTPATIENT
Start: 2018-10-17 | End: 2018-10-17 | Stop reason: HOSPADM

## 2018-10-17 RX ADMIN — ONDANSETRON 4 MG: 2 INJECTION, SOLUTION INTRAMUSCULAR; INTRAVENOUS at 10:39

## 2018-10-17 RX ADMIN — PHENAZOPYRIDINE HYDROCHLORIDE 200 MG: 100 TABLET ORAL at 10:40

## 2018-10-17 RX ADMIN — METRONIDAZOLE 500 MG: 500 TABLET ORAL at 13:05

## 2018-10-17 RX ADMIN — KETOROLAC TROMETHAMINE 10 MG: 15 INJECTION, SOLUTION INTRAMUSCULAR; INTRAVENOUS at 10:38

## 2018-10-17 RX ADMIN — LEVOFLOXACIN 500 MG: 500 TABLET, FILM COATED ORAL at 13:05

## 2018-10-17 RX ADMIN — SODIUM CHLORIDE 1000 ML: 9 INJECTION, SOLUTION INTRAVENOUS at 10:37

## 2018-10-17 NOTE — ED PROVIDER NOTES
Subjective   Ms. Bernardo Dodd is a 38 y.o. female who presents to the ED with c/o abd pain onset last night. Pt reports last night she had sudden onset of LLQ abdominal pain with an associated sharp sensation. The pain has progressively worsened since initial onset and exacerbates with position changes. She states the pain is consistent with sx experienced during last kidney stone episode 1 year ago. She took Tizanidine which did not provide relief and the nausea has resolved in ED now. She also complains of mild dysuria, however she denies fever, chills, vomiting, STD exposure, diarrhea, blood in stool, and any other acute sx at this time. Pt has hx of recurrent UTI, HTN, GERD, asthma, anemia, arthritis, depression, heart murmur, herpes simplex, ovarian cyst, tubal ligation, .         History provided by:  Patient  Flank Pain   Pain location:  LLQ  Pain quality: sharp    Pain radiates to:  Does not radiate  Pain severity:  Moderate  Onset quality:  Sudden  Timing:  Constant  Progression:  Worsening  Chronicity:  New  Relieved by:  Nothing  Worsened by:  Position changes  Ineffective treatments: Tizanidine   Associated symptoms: dysuria and nausea    Associated symptoms: no chills, no diarrhea, no fever, no hematochezia and no vomiting        Review of Systems   Constitutional: Negative for chills and fever.   Gastrointestinal: Positive for abdominal pain (LLQ) and nausea. Negative for blood in stool, diarrhea, hematochezia and vomiting.   Genitourinary: Positive for dysuria and flank pain.   All other systems reviewed and are negative.      Past Medical History:   Diagnosis Date   • Allergic rhinitis    • Anemia    • Arthritis    • Asthma    • Depression    • FHx: migraine headaches    • GERD (gastroesophageal reflux disease)    • Heart murmur    • Herpes simplex    • History of chest x-ray 2015    no active disease   • History of echocardiogram 2015    ejection fraction of greater than  65%, mitral and pulmonic regurgitation an physiological tricuspid regurgitation.   • History of PFTs 2015    spirometry data acceptable and reproducible; pt given 4 puffs of Ventolin; pt gave good effort; no obstruction; no Bd response; MVV reduced    • History of PFTs 2015    pt gave best effort; duoneb given prior and post study; moderate nonspecific proportional reduction of FEV1 and FVC with preserved ratio; FEV1 moderately reduced; cannot rule out restriction   • Hypertension    • Ovarian cyst    • Seizures (CMS/HCC)    • Thigh shingles        Allergies   Allergen Reactions   • Naproxen Swelling       Past Surgical History:   Procedure Laterality Date   • BILATERAL BREAST REDUCTION     • BREAST SURGERY      breast reduction   •  SECTION     • LAPAROSCOPIC TUBAL LIGATION     • ORIF ANKLE FRACTURE Right        Family History   Problem Relation Age of Onset   • Pancreatic cancer Maternal Grandmother    • Heart failure Paternal Grandmother    • MARCIE disease Paternal Aunt    • Arthritis Mother    • Cancer Mother    • Arthritis Father    • Diabetes Neg Hx    • Heart attack Neg Hx    • Hypertension Neg Hx    • Hyperlipidemia Neg Hx    • Mental illness Neg Hx    • Obesity Neg Hx    • Stroke Neg Hx        Social History     Social History   • Marital status: Single     Social History Main Topics   • Smoking status: Former Smoker     Packs/day: 1.00     Years: 15.00     Types: Cigarettes     Quit date: 2015   • Smokeless tobacco: Never Used   • Alcohol use No      Comment: socially   • Drug use: No   • Sexual activity: Defer     Other Topics Concern   • Not on file     Social History Narrative    Caffeine intake: 16 oz per day          Objective   Physical Exam   Constitutional: She is oriented to person, place, and time. She appears well-developed and well-nourished. No distress.   HENT:   Head: Normocephalic and atraumatic.   Right Ear: External ear normal.   Left Ear: External ear normal.    Nose: Nose normal.   Eyes: Conjunctivae are normal. No scleral icterus.   Neck: Normal range of motion.   Cardiovascular: Normal rate, regular rhythm, normal heart sounds and intact distal pulses.  Exam reveals no gallop and no friction rub.    No murmur heard.  Pulmonary/Chest: Effort normal and breath sounds normal. No respiratory distress. She has no wheezes. She has no rales.   Abdominal: Soft. Bowel sounds are normal. She exhibits no distension and no mass. There is tenderness in the left lower quadrant. There is no rigidity, no rebound, no guarding and negative Gage's sign.   Musculoskeletal: Normal range of motion.   Neurological: She is alert and oriented to person, place, and time.   Skin: Skin is warm and dry. She is not diaphoretic.   Psychiatric: She has a normal mood and affect. Her behavior is normal.   Nursing note and vitals reviewed.      Procedures         ED Course  ED Course as of Oct 17 1345   Wed Oct 17, 2018   1036 Blood, UA: Negative [RS]   1036 Leuk Esterase, UA: Negative [RS]   1036 Nitrite, UA: Negative [RS]   1245 Upon re-evaluation pt has tenderness to LLQ and L flank. There are no clear findings based on current results. Symptomatically concerning for diverticulitis and I will manage as such. Pt agrees with plan. - [place]  [AT]      ED Course User Index  [AT] Ferdinand Joe  [RS] Alan Messina MD     Recent Results (from the past 24 hour(s))   Urinalysis With Microscopic If Indicated (No Culture) - Urine, Clean Catch    Collection Time: 10/17/18 10:10 AM   Result Value Ref Range    Color, UA Yellow Yellow, Straw    Appearance, UA Clear Clear    pH, UA 7.0 5.0 - 8.0    Specific Gravity, UA 1.016 1.001 - 1.030    Glucose, UA Negative Negative    Ketones, UA Negative Negative    Bilirubin, UA Negative Negative    Blood, UA Negative Negative    Protein, UA Negative Negative    Leuk Esterase, UA Negative Negative    Nitrite, UA Negative Negative    Urobilinogen, UA 0.2  E.U./dL 0.2 - 1.0 E.U./dL   Light Blue Top    Collection Time: 10/17/18 10:10 AM   Result Value Ref Range    Extra Tube hold for add-on    Lavender Top    Collection Time: 10/17/18 10:10 AM   Result Value Ref Range    Extra Tube hold for add-on    CBC Auto Differential    Collection Time: 10/17/18 10:10 AM   Result Value Ref Range    WBC 8.67 3.50 - 10.80 10*3/mm3    RBC 4.65 3.89 - 5.14 10*6/mm3    Hemoglobin 13.1 11.5 - 15.5 g/dL    Hematocrit 40.3 34.5 - 44.0 %    MCV 86.7 80.0 - 99.0 fL    MCH 28.2 27.0 - 31.0 pg    MCHC 32.5 32.0 - 36.0 g/dL    RDW 14.7 (H) 11.3 - 14.5 %    RDW-SD 46.3 37.0 - 54.0 fl    MPV 10.0 6.0 - 12.0 fL    Platelets 277 150 - 450 10*3/mm3    Neutrophil % 61.9 41.0 - 71.0 %    Lymphocyte % 29.5 24.0 - 44.0 %    Monocyte % 7.7 0.0 - 12.0 %    Eosinophil % 0.6 0.0 - 3.0 %    Basophil % 0.3 0.0 - 1.0 %    Immature Grans % 0.3 0.0 - 0.6 %    Neutrophils, Absolute 5.36 1.50 - 8.30 10*3/mm3    Lymphocytes, Absolute 2.56 0.60 - 4.80 10*3/mm3    Monocytes, Absolute 0.67 0.00 - 1.00 10*3/mm3    Eosinophils, Absolute 0.05 0.00 - 0.30 10*3/mm3    Basophils, Absolute 0.03 0.00 - 0.20 10*3/mm3    Immature Grans, Absolute 0.03 0.00 - 0.03 10*3/mm3   Comprehensive Metabolic Panel    Collection Time: 10/17/18 10:35 AM   Result Value Ref Range    Glucose 98 70 - 100 mg/dL    BUN 9 9 - 23 mg/dL    Creatinine 0.60 0.60 - 1.30 mg/dL    Sodium 136 132 - 146 mmol/L    Potassium 3.3 (L) 3.5 - 5.5 mmol/L    Chloride 102 99 - 109 mmol/L    CO2 27.0 20.0 - 31.0 mmol/L    Calcium 8.8 8.7 - 10.4 mg/dL    Total Protein 6.2 5.7 - 8.2 g/dL    Albumin 3.93 3.20 - 4.80 g/dL    ALT (SGPT) 14 7 - 40 U/L    AST (SGOT) 16 0 - 33 U/L    Alkaline Phosphatase 52 25 - 100 U/L    Total Bilirubin 0.4 0.3 - 1.2 mg/dL    eGFR  African Amer 134 >60 mL/min/1.73    Globulin 2.3 gm/dL    A/G Ratio 1.7 1.5 - 2.5 g/dL    BUN/Creatinine Ratio 15.0 7.0 - 25.0    Anion Gap 7.0 3.0 - 11.0 mmol/L   Lipase    Collection Time: 10/17/18 10:35 AM  "  Result Value Ref Range    Lipase 40 6 - 51 U/L   Green Top (Gel)    Collection Time: 10/17/18 10:35 AM   Result Value Ref Range    Extra Tube Hold for add-ons.    Gold Top - SST    Collection Time: 10/17/18 10:35 AM   Result Value Ref Range    Extra Tube Hold for add-ons.    POCT pregnancy, urine    Collection Time: 10/17/18 10:40 AM   Result Value Ref Range    HCG, Urine, QL Negative Negative    Lot Number [id number]     Internal Positive Control Positive     Internal Negative Control Negative      Note: In addition to lab results from this visit, the labs listed above may include labs taken at another facility or during a different encounter within the last 24 hours. Please correlate lab times with ED admission and discharge times for further clarification of the services performed during this visit.    CT Abdomen Pelvis Without Contrast   Final Result   There are 2 small stones in the left kidney, the larger of   which measures 5 mm. There is no cortical scarring. There is no ureteral   stone or obstructive uropathy. There has been little change since the   previous examination of 12/20/2017.       D:  10/17/2018   E:  10/17/2018       This report was finalized on 10/17/2018 11:49 AM by Dr. Bartolo Liocna MD.            Vitals:    10/17/18 1002 10/17/18 1100 10/17/18 1200 10/17/18 1300   BP: 141/85 119/83 128/89 119/83   BP Location: Left arm      Patient Position: Sitting      Pulse: 94 97  82   Resp: 18 16  16   Temp: 98.3 °F (36.8 °C)      TempSrc: Oral      SpO2: 96% 97% 95% 96%   Weight: 81.6 kg (180 lb)      Height: 149.9 cm (59\")        Medications   sodium chloride 0.9 % flush 10 mL (not administered)   sodium chloride 0.9 % bolus 1,000 mL (0 mL Intravenous Stopped 10/17/18 1137)   ketorolac (TORADOL) injection 10 mg (10 mg Intravenous Given 10/17/18 1038)   phenazopyridine (PYRIDIUM) tablet 200 mg (200 mg Oral Given 10/17/18 1040)   ondansetron (ZOFRAN) injection 4 mg (4 mg Intravenous Given 10/17/18 " 1039)   levoFLOXacin (LEVAQUIN) tablet 500 mg (500 mg Oral Given 10/17/18 1305)   metroNIDAZOLE (FLAGYL) tablet 500 mg (500 mg Oral Given 10/17/18 1305)     ECG/EMG Results (last 24 hours)     ** No results found for the last 24 hours. **                      MDM  Number of Diagnoses or Management Options  Abdominal pain, LLQ (left lower quadrant):   Diverticulitis of colon:   Personal history of kidney stones:      Amount and/or Complexity of Data Reviewed  Clinical lab tests: reviewed  Tests in the radiology section of CPT®: reviewed  Independent visualization of images, tracings, or specimens: yes        Final diagnoses:   Abdominal pain, LLQ (left lower quadrant)   Diverticulitis of colon   Personal history of kidney stones       Documentation assistance provided by jonny Joe.  Information recorded by the scribe was done at my direction and has been verified and validated by me.     Ferdinand Joe  10/17/18 1104       Alan Messina MD  10/17/18 6333

## 2018-10-22 ENCOUNTER — OFFICE VISIT (OUTPATIENT)
Dept: INTERNAL MEDICINE | Facility: CLINIC | Age: 38
End: 2018-10-22

## 2018-10-22 VITALS
DIASTOLIC BLOOD PRESSURE: 80 MMHG | BODY MASS INDEX: 36.29 KG/M2 | HEIGHT: 59 IN | WEIGHT: 180 LBS | HEART RATE: 101 BPM | OXYGEN SATURATION: 96 % | SYSTOLIC BLOOD PRESSURE: 120 MMHG

## 2018-10-22 DIAGNOSIS — R10.2 ACUTE PELVIC PAIN: Primary | ICD-10-CM

## 2018-10-22 DIAGNOSIS — R19.7 DIARRHEA, UNSPECIFIED TYPE: ICD-10-CM

## 2018-10-22 PROCEDURE — 99213 OFFICE O/P EST LOW 20 MIN: CPT | Performed by: PHYSICIAN ASSISTANT

## 2018-10-22 RX ORDER — SACCHAROMYCES BOULARDII 250 MG
250 CAPSULE ORAL 2 TIMES DAILY
Qty: 60 CAPSULE | Refills: 3 | Status: SHIPPED | OUTPATIENT
Start: 2018-10-22 | End: 2018-11-20

## 2018-10-22 NOTE — PROGRESS NOTES
Chief Complaint   Patient presents with   • Abdominal Pain     ER follow up 10/17/18 at [facility]   Bernardo Dodd is a 38 y.o. female.       History of Present Illness     Pt was seen in ER last week with left LLQ pain. She had CT scan that showed nonobstructive stones in her left kidney, unchanged from previous exams. She was treated for diverticulitis with cipro and flagyl. Her pain is somewhat better but still hurts when she lays down. Pain is closer to her left groin. She is having loose bowel movements, almost every hour. Has been around some people with stomach virus.     She was told to stop the tizanidine by her pharmacist until she completes the antibiotics.      Current Outpatient Prescriptions:   •  ADVAIR DISKUS 500-50 MCG/DOSE DISKUS, Inhale 1 puff 2 (Two) Times a Day., Disp: , Rfl:   •  albuterol (PROVENTIL) (2.5 MG/3ML) 0.083% nebulizer solution, Take 2.5 mg by nebulization Every 12 (Twelve) Hours., Disp: , Rfl:   •  albuterol (VENTOLIN HFA) 108 (90 BASE) MCG/ACT inhaler, Inhale 1-2 puffs as needed. Every 4-6 hrs PRN, Disp: , Rfl:   •  amitriptyline (ELAVIL) 25 MG tablet, Take 25 mg by mouth every night., Disp: , Rfl:   •  amLODIPine (NORVASC) 5 MG tablet, Take 1 tablet by mouth Daily., Disp: 30 tablet, Rfl: 5  •  ciprofloxacin (CIPRO) 500 MG tablet, Take 1 tablet by mouth 2 (Two) Times a Day., Disp: 14 tablet, Rfl: 0  •  diclofenac (VOLTAREN) 50 MG EC tablet, Take 1 tablet by mouth 3 (Three) Times a Day As Needed (pain)., Disp: 15 tablet, Rfl: 0  •  ferrous sulfate 325 (65 FE) MG tablet, TAKE ONE TABLET BY MOUTH DAILY WITH BREAKFAST, Disp: 30 tablet, Rfl: 4  •  fluticasone (FLONASE) 50 MCG/ACT nasal spray, 2 sprays into the nostril(s) as directed by provider Daily., Disp: 16 g, Rfl: 3  •  gabapentin (NEURONTIN) 400 MG capsule, Take 400 mg by mouth 3 (Three) Times a Day., Disp: , Rfl:   •  LamoTRIgine ER 50 MG tablet sustained-release 24 hour, Take 1 tablet by mouth Daily., Disp: ,  Rfl:   •  levocetirizine (XYZAL) 5 MG tablet, Take 1 tablet by mouth Daily., Disp: , Rfl:   •  meclizine (ANTIVERT) 12.5 MG tablet, Take 1 tablet by mouth 3 (Three) Times a Day As Needed for dizziness., Disp: 30 tablet, Rfl: 0  •  metroNIDAZOLE (FLAGYL) 500 MG tablet, Take 1 tablet by mouth 2 (Two) Times a Day., Disp: 14 tablet, Rfl: 0  •  montelukast (SINGULAIR) 10 MG tablet, Take 10 mg by mouth every night., Disp: , Rfl:   •  omeprazole (priLOSEC) 40 MG capsule, Take 1 capsule by mouth 2 (Two) Times a Day. Take on an empty stomach and eat 30 minutes- 1 hr after eating., Disp: 90 capsule, Rfl: 3  •  predniSONE (DELTASONE) 20 MG tablet, Take 20 mg by mouth 2 (Two) Times a Day., Disp: , Rfl:   •  ranitidine (ZANTAC) 300 MG capsule, Take 1 capsule by mouth Every Evening., Disp: 30 capsule, Rfl: 11  •  SPIRIVA RESPIMAT 2.5 MCG/ACT aerosol solution, Take 2 puffs by mouth Daily., Disp: , Rfl:   •  tiZANidine (ZANAFLEX) 4 MG tablet, Take 4 mg by mouth At Night As Needed for muscle spasms., Disp: , Rfl:   •  traMADol (ULTRAM) 50 MG tablet, Take 1 tablet by mouth Every 4 (Four) Hours As Needed for Moderate Pain ., Disp: 18 tablet, Rfl: 0  •  saccharomyces boulardii (FLORASTOR) 250 MG capsule, Take 1 capsule by mouth 2 (Two) Times a Day., Disp: 60 capsule, Rfl: 3     PMFSH  The following portions of the patient's history were reviewed and updated as appropriate: allergies, current medications, past family history, past medical history, past social history, past surgical history and problem list.    Review of Systems   Constitutional: Negative for activity change, appetite change and fatigue.   HENT: Negative for congestion and rhinorrhea.    Respiratory: Negative for chest tightness and shortness of breath.    Cardiovascular: Negative for chest pain and palpitations.   Gastrointestinal: Positive for abdominal pain and diarrhea. Negative for nausea and vomiting.   Genitourinary: Negative for dysuria.   Musculoskeletal:  "Negative for arthralgias and myalgias.   Neurological: Negative for dizziness, weakness, light-headedness and headaches.   Psychiatric/Behavioral: Negative for dysphoric mood. The patient is not nervous/anxious.        Objective   /80   Pulse 101   Ht 149.9 cm (59\")   Wt 81.6 kg (180 lb)   SpO2 96%   BMI 36.36 kg/m²     Physical Exam   Constitutional: She appears well-developed and well-nourished.   HENT:   Head: Normocephalic.   Right Ear: Hearing, tympanic membrane, external ear and ear canal normal.   Left Ear: Hearing, tympanic membrane, external ear and ear canal normal.   Nose: Nose normal.   Mouth/Throat: Oropharynx is clear and moist.   Eyes: Pupils are equal, round, and reactive to light. Conjunctivae are normal.   Neck: Normal range of motion.   Cardiovascular: Normal rate, regular rhythm and normal heart sounds.    Pulmonary/Chest: Effort normal and breath sounds normal. She has no decreased breath sounds. She has no wheezes. She has no rhonchi. She has no rales.   Abdominal: Normal appearance. There is tenderness in the suprapubic area and left lower quadrant. There is no rigidity, no rebound, no guarding and no CVA tenderness.   Musculoskeletal: Normal range of motion.   Neurological: She is alert.   Skin: Skin is warm and dry.   Psychiatric: She has a normal mood and affect. Her behavior is normal.   Nursing note and vitals reviewed.      ASSESSMENT/PLAN    Problem List Items Addressed This Visit     None      Visit Diagnoses     Acute pelvic pain    -  Primary    Check transvaginal u/s to r/o ovarian etiology. Start muscle relaxer when abx is completed. Further recs based on results.    Relevant Orders    US Pelvis Complete    Diarrhea, unspecified type        Start probiotic as ordered. RTC if symptoms are not improved.    Relevant Medications    saccharomyces boulardii (FLORASTOR) 250 MG capsule               Return in about 4 weeks (around 11/19/2018) for Recheck.  "

## 2018-10-24 ENCOUNTER — HOSPITAL ENCOUNTER (OUTPATIENT)
Dept: ULTRASOUND IMAGING | Facility: HOSPITAL | Age: 38
Discharge: HOME | End: 2018-10-24
Admitting: PHYSICIAN ASSISTANT

## 2018-10-24 DIAGNOSIS — R10.2 ACUTE PELVIC PAIN: ICD-10-CM

## 2018-10-24 PROCEDURE — 76830 TRANSVAGINAL US NON-OB: CPT

## 2018-10-26 ENCOUNTER — HOSPITAL ENCOUNTER (EMERGENCY)
Facility: HOSPITAL | Age: 38
Discharge: HOME | End: 2018-10-26
Attending: EMERGENCY MEDICINE | Admitting: EMERGENCY MEDICINE

## 2018-10-26 ENCOUNTER — APPOINTMENT (OUTPATIENT)
Dept: GENERAL RADIOLOGY | Facility: HOSPITAL | Age: 38
End: 2018-10-26

## 2018-10-26 ENCOUNTER — TELEPHONE (OUTPATIENT)
Dept: INTERNAL MEDICINE | Facility: CLINIC | Age: 38
End: 2018-10-26

## 2018-10-26 VITALS
HEIGHT: 59 IN | HEART RATE: 93 BPM | TEMPERATURE: 99.2 F | DIASTOLIC BLOOD PRESSURE: 84 MMHG | WEIGHT: 180 LBS | SYSTOLIC BLOOD PRESSURE: 122 MMHG | OXYGEN SATURATION: 95 % | RESPIRATION RATE: 18 BRPM | BODY MASS INDEX: 36.29 KG/M2

## 2018-10-26 DIAGNOSIS — S76.012A HIP STRAIN, LEFT, INITIAL ENCOUNTER: ICD-10-CM

## 2018-10-26 DIAGNOSIS — N83.202 LEFT OVARIAN CYST: Primary | ICD-10-CM

## 2018-10-26 DIAGNOSIS — S86.912A KNEE STRAIN, LEFT, INITIAL ENCOUNTER: Primary | ICD-10-CM
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
## 2018-10-26 PROCEDURE — 73502 X-RAY EXAM HIP UNI 2-3 VIEWS: CPT

## 2018-10-26 PROCEDURE — 99283 EMERGENCY DEPT VISIT LOW MDM: CPT

## 2018-10-26 PROCEDURE — 73560 X-RAY EXAM OF KNEE 1 OR 2: CPT

## 2018-10-26 NOTE — TELEPHONE ENCOUNTER
----- Message from Olive WHITE MA sent at 10/26/2018  8:48 AM EDT -----  Called and informed pt, pt would like to be referred to GYN because she doesn't have one, pt also wanted you to know that she is the ED at the moment for her knees because she fell out of her bed this morning.

## 2018-10-26 NOTE — DISCHARGE INSTRUCTIONS
Elevate and apply ice bag off/on as needed.  Over the counter Motrin or Aleve as directed for pain.  Call your PCP or Dr. Farley (orthopedist) for follow up if not improving in a week.  Use knee immobilizer as needed.

## 2018-10-26 NOTE — PROGRESS NOTES
Please let her know that the pelvic ultrasound showed a possible small cyst on her left ovary. Does she have a gynecologist to follow up on this? If not, I can refer her to one.

## 2018-10-26 NOTE — ED PROVIDER NOTES
Subjective   38-year-old female presents to the emergency department with complaints of left knee and left hip pain after rolling and falling out of bed 3 days ago.  The patient has been ambulatory with some limping.  The patient states that she did not come in earlier because she had been sleeping a lot secondary to medications that she was prescribed last week for abdominal pain.  She denies any other injury.  Her abdominal pain is resolving.  Her PCP is Nelly Ponce.  Past medical history of kidney stones, asthma, left ovarian cyst.  The patient is a nonsmoker.  She occasionally drinks alcohol.  No drug use.            Review of Systems   Constitutional: Negative for chills and fever.   HENT: Negative for nosebleeds.    Eyes: Negative for visual disturbance.   Respiratory: Negative for cough and shortness of breath.    Cardiovascular: Negative for chest pain and palpitations.   Gastrointestinal: Negative for diarrhea, nausea and vomiting.   Endocrine: Negative for polydipsia, polyphagia and polyuria.   Genitourinary: Negative for dysuria, flank pain and hematuria.   Musculoskeletal: Positive for arthralgias (left knee and left hip pain). Negative for back pain.   Skin: Negative for rash.   Allergic/Immunologic: Negative for immunocompromised state.   Neurological: Negative for weakness, light-headedness and headaches.   Hematological: Negative.    Psychiatric/Behavioral: Negative.        Past Medical History:   Diagnosis Date   • Allergic rhinitis    • Anemia    • Arthritis    • Asthma    • Depression    • FHx: migraine headaches    • GERD (gastroesophageal reflux disease)    • Heart murmur    • Herpes simplex    • History of chest x-ray 11/01/2015    no active disease   • History of echocardiogram 11/01/2015    ejection fraction of greater than 65%, mitral and pulmonic regurgitation an physiological tricuspid regurgitation.   • History of PFTs 12/22/2015    spirometry data acceptable and reproducible; pt given 4  puffs of Ventolin; pt gave good effort; no obstruction; no Bd response; MVV reduced    • History of PFTs 2015    pt gave best effort; duoneb given prior and post study; moderate nonspecific proportional reduction of FEV1 and FVC with preserved ratio; FEV1 moderately reduced; cannot rule out restriction   • Hypertension    • Ovarian cyst    • Seizures (CMS/HCC)    • Thigh shingles        Allergies   Allergen Reactions   • Naproxen Swelling       Past Surgical History:   Procedure Laterality Date   • BILATERAL BREAST REDUCTION     • BREAST SURGERY      breast reduction   •  SECTION     • LAPAROSCOPIC TUBAL LIGATION     • ORIF ANKLE FRACTURE Right        Family History   Problem Relation Age of Onset   • Pancreatic cancer Maternal Grandmother    • Heart failure Paternal Grandmother    • MARCIE disease Paternal Aunt    • Arthritis Mother    • Cancer Mother    • Arthritis Father    • Diabetes Neg Hx    • Heart attack Neg Hx    • Hypertension Neg Hx    • Hyperlipidemia Neg Hx    • Mental illness Neg Hx    • Obesity Neg Hx    • Stroke Neg Hx        Social History     Social History   • Marital status: Single     Social History Main Topics   • Smoking status: Former Smoker     Packs/day: 1.00     Years: 15.00     Types: Cigarettes     Quit date: 2015   • Smokeless tobacco: Never Used   • Alcohol use No      Comment: socially   • Drug use: No   • Sexual activity: Defer     Other Topics Concern   • Not on file     Social History Narrative    Caffeine intake: 16 oz per day            Objective   Physical Exam   Constitutional: She is oriented to person, place, and time. She appears well-developed and well-nourished. No distress.   HENT:   Head: Normocephalic.   Nose: Nose normal.   Mouth/Throat: Oropharynx is clear and moist.   Eyes: Pupils are equal, round, and reactive to light. Conjunctivae are normal.   Neck: Normal range of motion. Neck supple.   Cardiovascular: Normal rate, regular rhythm, normal  "heart sounds and intact distal pulses.    Pulmonary/Chest: Effort normal and breath sounds normal.   Abdominal: Soft. Bowel sounds are normal. There is no tenderness.   Musculoskeletal:   Mild swelling and tenderness left anterior knee.  Some increased knee pain on ROM.  Mild left hip pain on ROM.  No deformity.   Neurological: She is alert and oriented to person, place, and time.   Skin: Skin is warm and dry.       Procedures           ED Course      Xrays of the left knee and left hip show no acute osseous abnormality.  There is trace effusion on the left knee.  Will d/c home with knee immobilizer and have f/u with ortho if pain persists.  No results found for this or any previous visit (from the past 24 hour(s)).  Note: In addition to lab results from this visit, the labs listed above may include labs taken at another facility or during a different encounter within the last 24 hours. Please correlate lab times with ED admission and discharge times for further clarification of the services performed during this visit.    XR Hip With or Without Pelvis 2 - 3 View Left   Preliminary Result   Unremarkable appearance of the bony pelvis and left hip.       D:  10/26/2018   E:  10/26/2018              XR Knee 1 or 2 View Left    (Results Pending)     Vitals:    10/26/18 0825 10/26/18 0900   BP: 129/78 120/89   BP Location: Left arm    Patient Position: Sitting    Pulse: 103 92   Resp: 18 18   Temp: 99.2 °F (37.3 °C)    TempSrc: Oral    SpO2: 95% 95%   Weight: 81.6 kg (180 lb)    Height: 149.9 cm (59\")      Medications - No data to display  ECG/EMG Results (last 24 hours)     ** No results found for the last 24 hours. **                    [facility]      Final diagnoses:   Knee strain, left, initial encounter   Hip strain, left, initial encounter   Fall, initial encounter            Riley Sal, PA  10/26/18 1012    "

## 2018-11-15 RX ORDER — AMLODIPINE BESYLATE 5 MG/1
TABLET ORAL
Qty: 30 TABLET | Refills: 0 | Status: SHIPPED | OUTPATIENT
Start: 2018-11-15 | End: 2018-11-20 | Stop reason: SDUPTHER

## 2018-11-20 ENCOUNTER — OFFICE VISIT (OUTPATIENT)
Dept: INTERNAL MEDICINE | Facility: CLINIC | Age: 38
End: 2018-11-20

## 2018-11-20 VITALS
BODY MASS INDEX: 37.5 KG/M2 | WEIGHT: 186 LBS | HEIGHT: 59 IN | HEART RATE: 108 BPM | DIASTOLIC BLOOD PRESSURE: 86 MMHG | SYSTOLIC BLOOD PRESSURE: 130 MMHG | OXYGEN SATURATION: 98 %

## 2018-11-20 DIAGNOSIS — N83.202 CYST OF LEFT OVARY: ICD-10-CM

## 2018-11-20 DIAGNOSIS — J30.9 ALLERGIC RHINITIS, UNSPECIFIED SEASONALITY, UNSPECIFIED TRIGGER: Primary | ICD-10-CM

## 2018-11-20 DIAGNOSIS — K21.00 GASTROESOPHAGEAL REFLUX DISEASE WITH ESOPHAGITIS: ICD-10-CM

## 2018-11-20 PROCEDURE — 99213 OFFICE O/P EST LOW 20 MIN: CPT | Performed by: PHYSICIAN ASSISTANT

## 2018-11-20 RX ORDER — AMLODIPINE BESYLATE 5 MG/1
5 TABLET ORAL DAILY
Qty: 30 TABLET | Refills: 5 | Status: SHIPPED | OUTPATIENT
Start: 2018-11-20 | End: 2019-01-11 | Stop reason: SDUPTHER

## 2018-11-20 RX ORDER — OMEPRAZOLE 40 MG/1
40 CAPSULE, DELAYED RELEASE ORAL 2 TIMES DAILY
Qty: 60 CAPSULE | Refills: 5 | Status: SHIPPED | OUTPATIENT
Start: 2018-11-20 | End: 2019-03-21 | Stop reason: SDUPTHER

## 2018-11-20 NOTE — PROGRESS NOTES
Chief Complaint   Patient presents with   • Abdominal Pain     4 week follow up       Subjective   Bernardo Dodd is a 38 y.o. female.       History of Present Illness     Pt saw the urologist and gynecologist since last visit. Was told she may have had a kidney stone but it passed. She was put on medication for ovarian cyst but has been unable to afford it. She is going to check with their office after her appointment today.    Pain in her pelvic/abdomen has resolved. She will see the urologist in about a month.    Pt saw the allergist yesterday and had allergy testing. Thinks she is going to start allergy shots. Waiting on insurance to start asthma treatments.    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 Prescribed by neurologist.    Saw ortho for her knee pain and it has resolved.      Current Outpatient Medications:   •  ADVAIR DISKUS 500-50 MCG/DOSE DISKUS, Inhale 1 puff 2 (Two) Times a Day., Disp: , Rfl:   •  albuterol (PROVENTIL) (2.5 MG/3ML) 0.083% nebulizer solution, Take 2.5 mg by nebulization Every 12 (Twelve) Hours., Disp: , Rfl:   •  albuterol (VENTOLIN HFA) 108 (90 BASE) MCG/ACT inhaler, Inhale 1-2 puffs as needed. Every 4-6 hrs PRN, Disp: , Rfl:   •  amitriptyline (ELAVIL) 25 MG tablet, Take 25 mg by mouth every night., Disp: , Rfl:   •  amLODIPine (NORVASC) 5 MG tablet, Take 1 tablet by mouth Daily., Disp: 30 tablet, Rfl: 5  •  ferrous sulfate 325 (65 FE) MG tablet, TAKE ONE TABLET BY MOUTH DAILY WITH BREAKFAST, Disp: 30 tablet, Rfl: 4  •  fluticasone (FLONASE) 50 MCG/ACT nasal spray, 2 sprays into the nostril(s) as directed by provider Daily., Disp: 16 g, Rfl: 3  •  gabapentin (NEURONTIN) 400 MG capsule, Take 400 mg by mouth 3 (Three) Times a Day., Disp: , Rfl:   •  LamoTRIgine ER 50 MG tablet sustained-release 24 hour, Take 1 tablet by mouth Daily., Disp: , Rfl:   •  levocetirizine (XYZAL) 5 MG tablet, Take 1 tablet by mouth Daily., Disp: , Rfl:   •   "montelukast (SINGULAIR) 10 MG tablet, Take 10 mg by mouth every night., Disp: , Rfl:   •  omeprazole (priLOSEC) 40 MG capsule, Take 1 capsule by mouth 2 (Two) Times a Day. Take on an empty stomach and eat 30 minutes- 1 hr after eating., Disp: 60 capsule, Rfl: 5  •  predniSONE (DELTASONE) 20 MG tablet, Take 20 mg by mouth 2 (Two) Times a Day., Disp: , Rfl:   •  ranitidine (ZANTAC) 300 MG capsule, Take 1 capsule by mouth Every Evening., Disp: 30 capsule, Rfl: 11  •  SPIRIVA RESPIMAT 2.5 MCG/ACT aerosol solution, Take 2 puffs by mouth Daily., Disp: , Rfl:      PMFSH  The following portions of the patient's history were reviewed and updated as appropriate: allergies, current medications, past family history, past medical history, past social history, past surgical history and problem list.    Review of Systems   Constitutional: Negative for activity change, appetite change and fatigue.   HENT: Negative for congestion and rhinorrhea.    Respiratory: Negative for chest tightness and shortness of breath.    Cardiovascular: Negative for chest pain and palpitations.   Gastrointestinal: Negative for abdominal pain, constipation and diarrhea.   Genitourinary: Negative for dysuria.   Musculoskeletal: Negative for arthralgias and myalgias.   Neurological: Negative for dizziness, weakness, light-headedness and headaches.   Psychiatric/Behavioral: Negative for dysphoric mood. The patient is not nervous/anxious.        Objective   /86   Pulse 108   Ht 149.9 cm (59\")   Wt 84.4 kg (186 lb)   SpO2 98%   BMI 37.57 kg/m²     Physical Exam   Constitutional: She appears well-developed and well-nourished.   HENT:   Head: Normocephalic.   Right Ear: Hearing, tympanic membrane, external ear and ear canal normal.   Left Ear: Hearing, tympanic membrane, external ear and ear canal normal.   Nose: Nose normal.   Mouth/Throat: Oropharynx is clear and moist.   Eyes: Conjunctivae are normal. Pupils are equal, round, and reactive to " light.   Neck: Normal range of motion.   Cardiovascular: Normal rate, regular rhythm and normal heart sounds.   Pulmonary/Chest: Effort normal and breath sounds normal. She has no decreased breath sounds. She has no wheezes. She has no rhonchi. She has no rales.   Musculoskeletal: Normal range of motion.   Neurological: She is alert.   Skin: Skin is warm and dry.   Psychiatric: She has a normal mood and affect. Her behavior is normal.   Nursing note and vitals reviewed.        ASSESSMENT/PLAN    Problem List Items Addressed This Visit        Respiratory    Allergic rhinitis - Primary     Pending start of allergy shots.            Digestive    GERD (gastroesophageal reflux disease)     Continue current dose of omeprazole.          Relevant Medications    omeprazole (priLOSEC) 40 MG capsule      Other Visit Diagnoses     Cyst of left ovary        Followed by gyn.               Return in about 2 months (around 1/20/2019) for Recheck.

## 2018-12-11 ENCOUNTER — PRIOR AUTHORIZATION (OUTPATIENT)
Dept: INTERNAL MEDICINE | Facility: CLINIC | Age: 38
End: 2018-12-11

## 2018-12-11 NOTE — TELEPHONE ENCOUNTER
Received PA request via fax from [facility] pharmacy for omeprazole, submitted PA via [email or web address], awaiting response.

## 2018-12-17 ENCOUNTER — OFFICE VISIT (OUTPATIENT)
Dept: INTERNAL MEDICINE | Facility: CLINIC | Age: 38
End: 2018-12-17

## 2018-12-17 VITALS
DIASTOLIC BLOOD PRESSURE: 80 MMHG | OXYGEN SATURATION: 96 % | TEMPERATURE: 99 F | BODY MASS INDEX: 36.89 KG/M2 | WEIGHT: 183 LBS | HEART RATE: 107 BPM | SYSTOLIC BLOOD PRESSURE: 140 MMHG | HEIGHT: 59 IN

## 2018-12-17 DIAGNOSIS — R68.89 FLU-LIKE SYMPTOMS: ICD-10-CM

## 2018-12-17 DIAGNOSIS — J45.21 MILD INTERMITTENT ASTHMA WITH ACUTE EXACERBATION: ICD-10-CM

## 2018-12-17 DIAGNOSIS — J01.20 ACUTE ETHMOIDAL SINUSITIS, RECURRENCE NOT SPECIFIED: Primary | ICD-10-CM

## 2018-12-17 PROCEDURE — 87804 INFLUENZA ASSAY W/OPTIC: CPT | Performed by: NURSE PRACTITIONER

## 2018-12-17 PROCEDURE — 99214 OFFICE O/P EST MOD 30 MIN: CPT | Performed by: NURSE PRACTITIONER

## 2018-12-17 RX ORDER — AMOXICILLIN AND CLAVULANATE POTASSIUM 875; 125 MG/1; MG/1
1 TABLET, FILM COATED ORAL 2 TIMES DAILY
Qty: 20 TABLET | Refills: 0 | Status: SHIPPED | OUTPATIENT
Start: 2018-12-17 | End: 2018-12-27

## 2018-12-17 RX ORDER — DEXTROMETHORPHAN HYDROBROMIDE AND PROMETHAZINE HYDROCHLORIDE 15; 6.25 MG/5ML; MG/5ML
5 SYRUP ORAL 4 TIMES DAILY PRN
Qty: 100 ML | Refills: 0 | Status: SHIPPED | OUTPATIENT
Start: 2018-12-17 | End: 2019-03-21

## 2018-12-17 NOTE — PROGRESS NOTES
CHIEF COMPLAINT  Chief Complaint   Patient presents with   • Flu Symptoms     x1 week       HPI  Bernardo Dodd is a 38 y.o. female  presents with complaint of flu-like symptoms    1 week hx of h/a, sinus pressure/pain, left ear pain, runny nose, sore throat, cough all day with brown sputum production, chest tightness, SOA, wheezing, fatigue, decreased appetite; has been taking mucinex, prometh DM, albuterol neb tx at night, spiriva and advair inhalers    Review of Systems   Denies fever, chills, body aches, n/v/d, abdominal pain, urinary symptoms    Pertinent ROS noted in HPI    Past Medical History:   Diagnosis Date   • Allergic rhinitis    • Anemia    • Arthritis    • Asthma    • Depression    • FHx: migraine headaches    • GERD (gastroesophageal reflux disease)    • Heart murmur    • Herpes simplex    • History of chest x-ray 11/01/2015    no active disease   • History of echocardiogram 11/01/2015    ejection fraction of greater than 65%, mitral and pulmonic regurgitation an physiological tricuspid regurgitation.   • History of PFTs 12/22/2015    spirometry data acceptable and reproducible; pt given 4 puffs of Ventolin; pt gave good effort; no obstruction; no Bd response; MVV reduced    • History of PFTs 11/02/2015    pt gave best effort; duoneb given prior and post study; moderate nonspecific proportional reduction of FEV1 and FVC with preserved ratio; FEV1 moderately reduced; cannot rule out restriction   • Hypertension    • Ovarian cyst    • Seizures (CMS/HCC)    • Thigh shingles        Family History   Problem Relation Age of Onset   • Pancreatic cancer Maternal Grandmother    • Heart failure Paternal Grandmother    • MARCIE disease Paternal Aunt    • Arthritis Mother    • Cancer Mother    • Arthritis Father    • Diabetes Neg Hx    • Heart attack Neg Hx    • Hypertension Neg Hx    • Hyperlipidemia Neg Hx    • Mental illness Neg Hx    • Obesity Neg Hx    • Stroke Neg Hx        Social History     Socioeconomic  "History   • Marital status: Single     Spouse name: Not on file   • Number of children: Not on file   • Years of education: Not on file   • Highest education level: Not on file   Social Needs   • Financial resource strain: Not on file   • Food insecurity - worry: Not on file   • Food insecurity - inability: Not on file   • Transportation needs - medical: Not on file   • Transportation needs - non-medical: Not on file   Occupational History   • Not on file   Tobacco Use   • Smoking status: Former Smoker     Packs/day: 1.00     Years: 15.00     Pack years: 15.00     Types: Cigarettes     Last attempt to quit: 1/17/2015     Years since quitting: 3.9   • Smokeless tobacco: Never Used   Substance and Sexual Activity   • Alcohol use: No     Comment: socially   • Drug use: No   • Sexual activity: Defer   Other Topics Concern   • Not on file   Social History Narrative    Caffeine intake: 16 oz per day          /80   Pulse 107   Temp 99 °F (37.2 °C)   Ht 149.9 cm (59\")   Wt 83 kg (183 lb)   SpO2 96%   BMI 36.96 kg/m²     PHYSICAL EXAM  Physical Exam   Constitutional: She is oriented to person, place, and time. She appears well-developed and well-nourished.   HENT:   Head: Normocephalic and atraumatic.   -bilat TMs are bulging, cloudy, dull effusions, no erythema  -Nasal mucosa is erythematous and swollen  -bilateral frontal and ethmoidal sinus tenderness  Oropharynx is erythematous, large amt of PND; no exudate   Eyes: Conjunctivae are normal.   Neck: Trachea normal and normal range of motion. Neck supple. No JVD present. No thyromegaly present.   Cardiovascular: Normal rate, regular rhythm and normal heart sounds.   No murmur heard.  No swelling in BLE   Pulmonary/Chest:   NAD, CTA in all lung fields, good air movement w/o rales, rhonchi, wheezes     Lymphadenopathy:   Left ant cervical node enlargement with tenderness   Neurological: She is alert and oriented to person, place, and time.   Skin: Skin is warm, " dry and intact.   Vitals reviewed.      Results for orders placed or performed in visit on 12/17/18   POCT Influenza A/B   Result Value Ref Range    Rapid Influenza A Ag negative Negative    Rapid Influenza B Ag negative Negative    Internal Control Passed Passed    Lot Number [id number]     Expiration Date 11-        ASSESSMENT/PLAN  1. Acute ethmoidal sinusitis, recurrence not specified  - promethazine-dextromethorphan (PROMETHAZINE-DM) 6.25-15 MG/5ML syrup; Take 5 mL by mouth 4 (Four) Times a Day As Needed for Cough.  Dispense: 100 mL; Refill: 0  - amoxicillin-clavulanate (AUGMENTIN) 875-125 MG per tablet; Take 1 tablet by mouth 2 (Two) Times a Day for 10 days.  Dispense: 20 tablet; Refill: 0    2. Flu-like symptoms  - POCT Influenza A/B    3. Mild intermittent asthma with acute exacerbation      Plan:  Neg for flu;  abx for sinus infection, saline nasal spray; flonase nasal spray daily; continue xyzal, Advair and Spiriva inhalers, neb tx nightly prn chest tightness/wheezing; rest prn, tylenol/ibuprofen prn fever/pain; refill prometh DM for cough; f/u if no improvement in 5-7 days      FOLLOW-UP  Next scheduled f/u with [facility]; sooner as needed if no improvement or worsening of symptoms    Patient verbalizes understanding of medication dosage, comfort measures, instructions for treatment and follow-up.    Penelope Rodriguez, SHANE  12/17/2018  6:20 PM

## 2019-01-04 ENCOUNTER — OFFICE VISIT (OUTPATIENT)
Dept: RETAIL CLINIC | Facility: CLINIC | Age: 39
End: 2019-01-04

## 2019-01-04 VITALS
BODY MASS INDEX: 37.09 KG/M2 | HEART RATE: 100 BPM | TEMPERATURE: 98.7 F | HEIGHT: 59 IN | SYSTOLIC BLOOD PRESSURE: 118 MMHG | WEIGHT: 184 LBS | RESPIRATION RATE: 20 BRPM | DIASTOLIC BLOOD PRESSURE: 70 MMHG

## 2019-01-04 DIAGNOSIS — J06.9 UPPER RESPIRATORY TRACT INFECTION, UNSPECIFIED TYPE: Primary | ICD-10-CM

## 2019-01-04 DIAGNOSIS — T14.8XXA SPLINTER IN SKIN: ICD-10-CM

## 2019-01-04 PROCEDURE — 99213 OFFICE O/P EST LOW 20 MIN: CPT | Performed by: NURSE PRACTITIONER

## 2019-01-04 PROCEDURE — 87804 INFLUENZA ASSAY W/OPTIC: CPT | Performed by: NURSE PRACTITIONER

## 2019-01-04 RX ORDER — CEFDINIR 300 MG/1
300 CAPSULE ORAL 2 TIMES DAILY
Qty: 20 CAPSULE | Refills: 0 | Status: SHIPPED | OUTPATIENT
Start: 2019-01-04 | End: 2019-01-15

## 2019-01-04 RX ORDER — GUAIFENESIN, PSEUDOEPHEDRINE HYDROCHLORIDE 600; 60 MG/1; MG/1
1 TABLET, EXTENDED RELEASE ORAL EVERY 12 HOURS
Qty: 14 TABLET | Refills: 0 | Status: SHIPPED | OUTPATIENT
Start: 2019-01-04 | End: 2019-01-11

## 2019-01-04 NOTE — PROGRESS NOTES
Subjective   URI and Foreign Body in Skin    Bernardo Dodd is a 38 y.o. female who presents with URI complaints.  Patient seen by PCP on 12/17/18, given Augmentin x 7 days for mild exacerbation of asthma. She followed up with allergist, now taking Prednisone by mouth. States symptoms have worsened despite treatment.  Bernardo also requests assistance with removing a splinter from her finger.     URI    This is a new problem. The current episode started 1 to 4 weeks ago. The problem has been gradually worsening. Associated symptoms include ear pain, rhinorrhea, sinus pain, sneezing and wheezing. Pertinent negatives include no diarrhea or rash. Treatments tried: antibiotics, steroids. The treatment provided no relief.   Foreign Body in Skin   This is a new problem. The current episode started today. The problem occurs constantly. The problem has been unchanged. Pertinent negatives include no numbness or rash. The symptoms are aggravated by bending. She has tried nothing for the symptoms.        History Obtained from: Patient    Past Medical History:   Diagnosis Date   • Allergic rhinitis    • Anemia    • Arthritis    • Asthma    • Depression    • FHx: migraine headaches    • GERD (gastroesophageal reflux disease)    • Heart murmur    • Herpes simplex    • History of chest x-ray 11/01/2015    no active disease   • History of echocardiogram 11/01/2015    ejection fraction of greater than 65%, mitral and pulmonic regurgitation an physiological tricuspid regurgitation.   • History of PFTs 12/22/2015    spirometry data acceptable and reproducible; pt given 4 puffs of Ventolin; pt gave good effort; no obstruction; no Bd response; MVV reduced    • History of PFTs 11/02/2015    pt gave best effort; duoneb given prior and post study; moderate nonspecific proportional reduction of FEV1 and FVC with preserved ratio; FEV1 moderately reduced; cannot rule out restriction   • Hypertension    • Ovarian cyst    • Seizures  (CMS/[facility])    • Thigh shingles      Past Surgical History:   Procedure Laterality Date   • BILATERAL BREAST REDUCTION     • BREAST SURGERY      breast reduction   •  SECTION     • LAPAROSCOPIC TUBAL LIGATION     • ORIF ANKLE FRACTURE Right      Social History     Socioeconomic History   • Marital status: Single     Spouse name: Not on file   • Number of children: Not on file   • Years of education: Not on file   • Highest education level: Not on file   Social Needs   • Financial resource strain: Not on file   • Food insecurity - worry: Not on file   • Food insecurity - inability: Not on file   • Transportation needs - medical: Not on file   • Transportation needs - non-medical: Not on file   Occupational History   • Not on file   Tobacco Use   • Smoking status: Former Smoker     Packs/day: 1.00     Years: 15.00     Pack years: 15.00     Types: Cigarettes     Last attempt to quit: 2015     Years since quitting: 3.9   • Smokeless tobacco: Never Used   Substance and Sexual Activity   • Alcohol use: No     Comment: socially   • Drug use: No   • Sexual activity: Defer   Other Topics Concern   • Not on file   Social History Narrative    Caffeine intake: 16 oz per day      Family History   Problem Relation Age of Onset   • Pancreatic cancer Maternal Grandmother    • Heart failure Paternal Grandmother    • MARCIE disease Paternal Aunt    • Arthritis Mother    • Cancer Mother    • Arthritis Father    • Diabetes Neg Hx    • Heart attack Neg Hx    • Hypertension Neg Hx    • Hyperlipidemia Neg Hx    • Mental illness Neg Hx    • Obesity Neg Hx    • Stroke Neg Hx      Allergies   Allergen Reactions   • Naproxen Swelling     Current Outpatient Medications   Medication Sig Dispense Refill   • ADVAIR DISKUS 500-50 MCG/DOSE DISKUS Inhale 1 puff 2 (Two) Times a Day.     • albuterol (PROVENTIL) (2.5 MG/3ML) 0.083% nebulizer solution Take 2.5 mg by nebulization Every 12 (Twelve) Hours.     • albuterol (VENTOLIN HFA) 108 (90  BASE) MCG/ACT inhaler Inhale 1-2 puffs as needed. Every 4-6 hrs PRN     • amitriptyline (ELAVIL) 25 MG tablet Take 25 mg by mouth every night.     • amLODIPine (NORVASC) 5 MG tablet Take 1 tablet by mouth Daily. 30 tablet 5   • ferrous sulfate 325 (65 FE) MG tablet TAKE ONE TABLET BY MOUTH DAILY WITH BREAKFAST 30 tablet 4   • fluticasone (FLONASE) 50 MCG/ACT nasal spray 2 sprays into the nostril(s) as directed by provider Daily. 16 g 3   • gabapentin (NEURONTIN) 400 MG capsule Take 400 mg by mouth 3 (Three) Times a Day.     • LamoTRIgine ER 50 MG tablet sustained-release 24 hour Take 1 tablet by mouth Daily.     • levocetirizine (XYZAL) 5 MG tablet Take 1 tablet by mouth Daily.     • montelukast (SINGULAIR) 10 MG tablet Take 10 mg by mouth every night.     • omeprazole (priLOSEC) 40 MG capsule Take 1 capsule by mouth 2 (Two) Times a Day. Take on an empty stomach and eat 30 minutes- 1 hr after eating. 60 capsule 5   • promethazine-dextromethorphan (PROMETHAZINE-DM) 6.25-15 MG/5ML syrup Take 5 mL by mouth 4 (Four) Times a Day As Needed for Cough. 100 mL 0   • ranitidine (ZANTAC) 300 MG capsule Take 1 capsule by mouth Every Evening. 30 capsule 11   • SPIRIVA RESPIMAT 2.5 MCG/ACT aerosol solution Take 2 puffs by mouth Daily.     • cefdinir (OMNICEF) 300 MG capsule Take 1 capsule by mouth 2 (Two) Times a Day for 10 days. 20 capsule 0   • predniSONE (DELTASONE) 20 MG tablet Take 20 mg by mouth 2 (Two) Times a Day.     • pseudoephedrine-guaifenesin (MUCINEX D)  MG per 12 hr tablet Take 1 tablet by mouth Every 12 (Twelve) Hours for 7 days. 14 tablet 0     No current facility-administered medications for this visit.         The following portions of the patient's history were reviewed and updated as appropriate: allergies, current medications, past family history, past medical history, past social history and past surgical history.    Review of Systems   Constitutional: Positive for appetite change (decreased ).  "  HENT: Positive for ear pain, postnasal drip, rhinorrhea, sinus pressure, sinus pain and sneezing. Negative for ear discharge and voice change. Trouble swallowing: painful swallowing.    Eyes: Negative.    Respiratory: Positive for chest tightness, shortness of breath and wheezing.    Cardiovascular: Negative for palpitations.   Gastrointestinal: Negative for diarrhea.   Genitourinary:        Menstrual irregularities   Musculoskeletal: Positive for back pain (chronic). Negative for neck stiffness.   Skin: Negative for rash and wound.   Allergic/Immunologic: Negative for immunocompromised state.   Neurological: Positive for seizures (last seizure Last week) and light-headedness. Negative for dizziness and numbness.   Hematological: Negative for adenopathy. Does not bruise/bleed easily.   Psychiatric/Behavioral: Negative.        Objective     VITAL SIGNS:   Vitals:    01/04/19 0856   BP: 118/70   Pulse: 100   Resp: 20   Temp: 98.7 °F (37.1 °C)   Weight: 83.5 kg (184 lb)   Height: 149.9 cm (59\")   Body mass index is 37.16 kg/m².    Physical Exam   Constitutional: She is cooperative.  Non-toxic appearance. She appears ill. No distress.   HENT:   Head: Normocephalic and atraumatic.   Right Ear: External ear and ear canal normal. Tympanic membrane is erythematous. Tympanic membrane is not perforated.   Left Ear: External ear and ear canal normal. Tympanic membrane is erythematous and bulging. Tympanic membrane is not perforated.   Nose: Mucosal edema (brisk erythema mucosa. Tissue is friable with some bleeding noted with nose blowing) and rhinorrhea present. Right sinus exhibits maxillary sinus tenderness and frontal sinus tenderness. Left sinus exhibits maxillary sinus tenderness and frontal sinus tenderness.   Mouth/Throat: Uvula is midline and mucous membranes are normal. Posterior oropharyngeal erythema present.   Eyes: Conjunctivae, EOM and lids are normal. Pupils are equal, round, and reactive to light. No scleral " icterus.   Bilateral exopthalamos   Neck: Normal range of motion and phonation normal. Neck supple. No JVD present. No tracheal deviation present.   Cardiovascular: Regular rhythm. Tachycardia present.   No murmur heard.  Pulmonary/Chest: No accessory muscle usage or stridor. No tachypnea. No respiratory distress. She has no decreased breath sounds. She has wheezes (expiratory wheeze noted with cough only). She has no rhonchi. She has no rales.   Lymphadenopathy:     She has no cervical adenopathy.        Right: No supraclavicular adenopathy present.        Left: No supraclavicular adenopathy present.   Neurological: She is alert. Coordination and gait normal.   Skin: Skin is warm and dry. Capillary refill takes less than 2 seconds. No cyanosis. No pallor. Nails show no clubbing.   There is a visible splinter left 4th digit   Psychiatric: Her behavior is normal. She is attentive.     PROCEDURE:  Splinter visualized with magnification lens. Superficial epidermal layer lifted with splinter tweezers. Foreign body successfully removed. Patient tolerated procedure well. Site cleaned with wound cleanser. Blood loss: 0 mL    Assessment/Plan     Bernardo was seen today for uri and foreign body in skin.    Diagnoses and all orders for this visit:    Upper respiratory tract infection, unspecified type  -     POCT Influenza A/B    Splinter in skin    Other orders  -     pseudoephedrine-guaifenesin (MUCINEX D)  MG per 12 hr tablet; Take 1 tablet by mouth Every 12 (Twelve) Hours for 7 days.  -     cefdinir (OMNICEF) 300 MG capsule; Take 1 capsule by mouth 2 (Two) Times a Day for 10 days.        PLAN: Hold Flonase due to nasal bleeding. Saline spray and steam heat advised.  Patient should follow up with primary care provider and allergist as planned. See sooner if symptoms fail to improve, worsen or for the development of new symptoms that need attention. Patient instructed to schedule ENT appt as instructed by other  physicians.   The patient voiced understanding and agreement to the patient treatment plan and instructions       SHANE Dumont

## 2019-01-04 NOTE — PATIENT INSTRUCTIONS
"Upper Respiratory Infection, Adult  Most upper respiratory infections (URIs) are caused by a virus. A URI affects the nose, throat, and upper air passages. The most common type of URI is often called \"the common cold.\"  Follow these instructions at home:  · Take medicines only as told by your doctor.  · Gargle warm saltwater or take cough drops to comfort your throat as told by your doctor.  · Use a warm mist humidifier or inhale steam from a shower to increase air moisture. This may make it easier to breathe.  · Drink enough fluid to keep your pee (urine) clear or pale yellow.  · Eat soups and other clear broths.  · Have a healthy diet.  · Rest as needed.  · Go back to work when your fever is gone or your doctor says it is okay.  ? You may need to stay home longer to avoid giving your URI to others.  ? You can also wear a face mask and wash your hands often to prevent spread of the virus.  · Use your inhaler more if you have asthma.  · Do not use any tobacco products, including cigarettes, chewing tobacco, or electronic cigarettes. If you need help quitting, ask your doctor.  Contact a doctor if:  · You are getting worse, not better.  · Your symptoms are not helped by medicine.  · You have chills.  · You are getting more short of breath.  · You have brown or red mucus.  · You have yellow or brown discharge from your nose.  · You have pain in your face, especially when you bend forward.  · You have a fever.  · You have puffy (swollen) neck glands.  · You have pain while swallowing.  · You have white areas in the back of your throat.  Get help right away if:  · You have very bad or constant:  ? Headache.  ? Ear pain.  ? Pain in your forehead, behind your eyes, and over your cheekbones (sinus pain).  ? Chest pain.  · You have long-lasting (chronic) lung disease and any of the following:  ? Wheezing.  ? Long-lasting cough.  ? Coughing up blood.  ? A change in your usual mucus.  · You have a stiff neck.  · You have " changes in your:  ? Vision.  ? Hearing.  ? Thinking.  ? Mood.  This information is not intended to replace advice given to you by your health care provider. Make sure you discuss any questions you have with your health care provider.  Document Released: 06/05/2009 Document Revised: 08/20/2017 Document Reviewed: 03/25/2015  [employer] Patient Education © 2018 [employer]    Follow up with primary care provider and allergist  Make ENT appointment as suggested  Drink plenty of water and other decaffeinated fluids  Avoid cough triggers  ER if trouble breathing, coughing blood or new acute symptoms that need urgent care

## 2019-01-09 ENCOUNTER — CLINICAL SUPPORT (OUTPATIENT)
Dept: INTERNAL MEDICINE | Facility: CLINIC | Age: 39
End: 2019-01-09

## 2019-01-09 DIAGNOSIS — Z00.00 HEALTHCARE MAINTENANCE: Primary | ICD-10-CM

## 2019-01-09 LAB
INDURATION: 0 MM (ref 0–10)
Lab: NORMAL
Lab: NORMAL
TB SKIN TEST: NEGATIVE

## 2019-01-09 PROCEDURE — 86580 TB INTRADERMAL TEST: CPT | Performed by: PHYSICIAN ASSISTANT

## 2019-01-11 ENCOUNTER — TELEPHONE (OUTPATIENT)
Dept: INTERNAL MEDICINE | Facility: CLINIC | Age: 39
End: 2019-01-11

## 2019-01-11 DIAGNOSIS — D50.9 IRON DEFICIENCY ANEMIA, UNSPECIFIED IRON DEFICIENCY ANEMIA TYPE: ICD-10-CM

## 2019-01-11 DIAGNOSIS — J30.9 CHRONIC ALLERGIC RHINITIS: ICD-10-CM

## 2019-01-11 RX ORDER — FERROUS SULFATE 325(65) MG
1 TABLET ORAL
Qty: 30 TABLET | Refills: 4 | Status: SHIPPED | OUTPATIENT
Start: 2019-01-11 | End: 2019-03-21 | Stop reason: SDUPTHER

## 2019-01-11 RX ORDER — AMLODIPINE BESYLATE 5 MG/1
5 TABLET ORAL DAILY
Qty: 30 TABLET | Refills: 5 | Status: SHIPPED | OUTPATIENT
Start: 2019-01-11 | End: 2019-03-21 | Stop reason: SDUPTHER

## 2019-01-11 RX ORDER — FLUTICASONE PROPIONATE 50 MCG
2 SPRAY, SUSPENSION (ML) NASAL DAILY
Qty: 16 G | Refills: 3 | Status: SHIPPED | OUTPATIENT
Start: 2019-01-11 | End: 2019-03-21 | Stop reason: SDUPTHER

## 2019-01-11 NOTE — TELEPHONE ENCOUNTER
Per pt's request refilled the following medications per protocol.     Pt also request refill on promethazine and vit D, no active prescription on file, pls advise on refills.

## 2019-01-15 ENCOUNTER — OFFICE VISIT (OUTPATIENT)
Dept: GASTROENTEROLOGY | Facility: CLINIC | Age: 39
End: 2019-01-15

## 2019-01-15 VITALS
WEIGHT: 183 LBS | SYSTOLIC BLOOD PRESSURE: 122 MMHG | OXYGEN SATURATION: 99 % | DIASTOLIC BLOOD PRESSURE: 80 MMHG | RESPIRATION RATE: 20 BRPM | BODY MASS INDEX: 36.89 KG/M2 | TEMPERATURE: 98.3 F | HEIGHT: 59 IN | HEART RATE: 101 BPM

## 2019-01-15 DIAGNOSIS — R10.9 ABDOMINAL PAIN, UNSPECIFIED ABDOMINAL LOCATION: Primary | ICD-10-CM

## 2019-01-15 PROCEDURE — 99213 OFFICE O/P EST LOW 20 MIN: CPT | Performed by: INTERNAL MEDICINE

## 2019-01-15 NOTE — PROGRESS NOTES
PCP: Nelly Sotelo PA    Chief Complaint   Patient presents with   • 3 month follow up     patient has hernia   • Diarrhea   • Abdominal Pain       History of Present Illness:   Bernardo Dodd is a 38 y.o. female who presents to GI clinic as a follow up for substernal burning. She feels well if she avoids certain foods. However, if she eats fast food or drinks carbonated beverages she experiences substernal nonradiating burning. She is concerned about esophageal damage. No hematochezia, hematemesis, melena.    Past Medical History:   Diagnosis Date   • Allergic rhinitis    • Anemia    • Arthritis    • Asthma    • Depression    • FHx: migraine headaches    • GERD (gastroesophageal reflux disease)    • Heart murmur    • Herpes simplex    • History of chest x-ray 2015    no active disease   • History of echocardiogram 2015    ejection fraction of greater than 65%, mitral and pulmonic regurgitation an physiological tricuspid regurgitation.   • History of PFTs 2015    spirometry data acceptable and reproducible; pt given 4 puffs of Ventolin; pt gave good effort; no obstruction; no Bd response; MVV reduced    • History of PFTs 2015    pt gave best effort; duoneb given prior and post study; moderate nonspecific proportional reduction of FEV1 and FVC with preserved ratio; FEV1 moderately reduced; cannot rule out restriction   • Hypertension    • Ovarian cyst    • Seizures (CMS/HCC)    • Thigh shingles        Past Surgical History:   Procedure Laterality Date   • BILATERAL BREAST REDUCTION     • BREAST SURGERY      breast reduction   •  SECTION     • LAPAROSCOPIC TUBAL LIGATION     • ORIF ANKLE FRACTURE Right          Current Outpatient Medications:   •  ADVAIR DISKUS 500-50 MCG/DOSE DISKUS, Inhale 1 puff 2 (Two) Times a Day., Disp: , Rfl:   •  albuterol (PROVENTIL) (2.5 MG/3ML) 0.083% nebulizer solution, Take 2.5 mg by nebulization Every 12 (Twelve) Hours., Disp: , Rfl:   •   albuterol (VENTOLIN HFA) 108 (90 BASE) MCG/ACT inhaler, Inhale 1-2 puffs as needed. Every 4-6 hrs PRN, Disp: , Rfl:   •  amitriptyline (ELAVIL) 25 MG tablet, Take 25 mg by mouth every night., Disp: , Rfl:   •  amLODIPine (NORVASC) 5 MG tablet, Take 1 tablet by mouth Daily., Disp: 30 tablet, Rfl: 5  •  ferrous sulfate 325 (65 FE) MG tablet, Take 1 tablet by mouth Daily With Breakfast., Disp: 30 tablet, Rfl: 4  •  fluticasone (FLONASE) 50 MCG/ACT nasal spray, 2 sprays into the nostril(s) as directed by provider Daily., Disp: 16 g, Rfl: 3  •  gabapentin (NEURONTIN) 400 MG capsule, Take 400 mg by mouth 3 (Three) Times a Day., Disp: , Rfl:   •  LamoTRIgine ER 50 MG tablet sustained-release 24 hour, Take 1 tablet by mouth Daily., Disp: , Rfl:   •  montelukast (SINGULAIR) 10 MG tablet, Take 10 mg by mouth every night., Disp: , Rfl:   •  omeprazole (priLOSEC) 40 MG capsule, Take 1 capsule by mouth 2 (Two) Times a Day. Take on an empty stomach and eat 30 minutes- 1 hr after eating., Disp: 60 capsule, Rfl: 5  •  predniSONE (DELTASONE) 20 MG tablet, Take 20 mg by mouth 2 (Two) Times a Day., Disp: , Rfl:   •  promethazine-dextromethorphan (PROMETHAZINE-DM) 6.25-15 MG/5ML syrup, Take 5 mL by mouth 4 (Four) Times a Day As Needed for Cough., Disp: 100 mL, Rfl: 0  •  ranitidine (ZANTAC) 300 MG capsule, Take 1 capsule by mouth Every Evening., Disp: 30 capsule, Rfl: 11  •  SPIRIVA RESPIMAT 2.5 MCG/ACT aerosol solution, Take 2 puffs by mouth Daily., Disp: , Rfl:   •  levocetirizine (XYZAL) 5 MG tablet, Take 1 tablet by mouth Daily., Disp: , Rfl:     Allergies   Allergen Reactions   • Naproxen Swelling       Family History   Problem Relation Age of Onset   • Pancreatic cancer Maternal Grandmother    • Heart failure Paternal Grandmother    • MARCIE disease Paternal Aunt    • Arthritis Mother    • Cancer Mother    • Arthritis Father    • Diabetes Neg Hx    • Heart attack Neg Hx    • Hypertension Neg Hx    • Hyperlipidemia Neg Hx    • Mental  illness Neg Hx    • Obesity Neg Hx    • Stroke Neg Hx        Social History     Socioeconomic History   • Marital status: Single     Spouse name: Not on file   • Number of children: Not on file   • Years of education: Not on file   • Highest education level: Not on file   Social Needs   • Financial resource strain: Not on file   • Food insecurity - worry: Not on file   • Food insecurity - inability: Not on file   • Transportation needs - medical: Not on file   • Transportation needs - non-medical: Not on file   Occupational History   • Not on file   Tobacco Use   • Smoking status: Former Smoker     Packs/day: 1.00     Years: 15.00     Pack years: 15.00     Types: Cigarettes     Last attempt to quit: 1/17/2015     Years since quitting: 3.9   • Smokeless tobacco: Never Used   Substance and Sexual Activity   • Alcohol use: No     Comment: socially   • Drug use: No   • Sexual activity: Defer   Other Topics Concern   • Not on file   Social History Narrative    Caffeine intake: 16 oz per day        Review of Systems      Vitals:    01/15/19 1321   BP: 122/80   Pulse: 101   Resp: 20   Temp: 98.3 °F (36.8 °C)   SpO2: 99%       Physical Exam  General Appearance:  Vitals as above. no acute distress  Head/face:  Normocephalic, atraumatic  Eyes:   EOMI, no conjunctivitis or icterus   Nose/Sinuses:  Nares patent bilaterally without discharge or lesions  Mouth/Throat:  Posterior pharynx is pink without drainage or exudates;  dentition is in good condition and repair  Neck:  trachea is midline, no thyromegaly  Neurologic:  Alert; no focal deficits; age appropriate behavior and speech  Psychiatric: mood and affect are congruent  Skin: no rash or cyanosis.  Abdomen: obese and soft/nt      Assessment/Plan  1.) GERD, uncontrolled  2.) Chronic abdominal pain  Due to patient's concern, will perform egd. Will evaluate  GES to rule out gastroparesis.     rtc in 3 months.      Alf Lin MD  1/15/2019

## 2019-01-16 ENCOUNTER — OUTSIDE FACILITY SERVICE (OUTPATIENT)
Dept: GASTROENTEROLOGY | Facility: CLINIC | Age: 39
End: 2019-01-16

## 2019-01-16 ENCOUNTER — LAB REQUISITION (OUTPATIENT)
Dept: LAB | Facility: HOSPITAL | Age: 39
End: 2019-01-16

## 2019-01-16 DIAGNOSIS — R10.10 UPPER ABDOMINAL PAIN: ICD-10-CM

## 2019-01-16 DIAGNOSIS — R10.84 GENERALIZED ABDOMINAL PAIN: ICD-10-CM

## 2019-01-16 PROCEDURE — 88342 IMHCHEM/IMCYTCHM 1ST ANTB: CPT | Performed by: INTERNAL MEDICINE

## 2019-01-16 PROCEDURE — 88305 TISSUE EXAM BY PATHOLOGIST: CPT | Performed by: INTERNAL MEDICINE

## 2019-01-16 PROCEDURE — 43239 EGD BIOPSY SINGLE/MULTIPLE: CPT | Performed by: INTERNAL MEDICINE

## 2019-01-19 LAB
CYTO UR: NORMAL
LAB AP CASE REPORT: NORMAL
LAB AP CLINICAL INFORMATION: NORMAL
PATH REPORT.FINAL DX SPEC: NORMAL
PATH REPORT.GROSS SPEC: NORMAL

## 2019-01-21 ENCOUNTER — OFFICE VISIT (OUTPATIENT)
Dept: INTERNAL MEDICINE | Facility: CLINIC | Age: 39
End: 2019-01-21

## 2019-01-21 VITALS
DIASTOLIC BLOOD PRESSURE: 82 MMHG | OXYGEN SATURATION: 99 % | HEART RATE: 88 BPM | WEIGHT: 187.2 LBS | SYSTOLIC BLOOD PRESSURE: 124 MMHG | BODY MASS INDEX: 37.81 KG/M2

## 2019-01-21 DIAGNOSIS — J30.9 ALLERGIC RHINITIS, UNSPECIFIED SEASONALITY, UNSPECIFIED TRIGGER: Primary | ICD-10-CM

## 2019-01-21 DIAGNOSIS — K21.00 GASTROESOPHAGEAL REFLUX DISEASE WITH ESOPHAGITIS: ICD-10-CM

## 2019-01-21 PROCEDURE — 99213 OFFICE O/P EST LOW 20 MIN: CPT | Performed by: PHYSICIAN ASSISTANT

## 2019-01-21 NOTE — PROGRESS NOTES
Chief Complaint   Patient presents with   • Follow-up   • Sinusitis   • Allergies       Subjective   Bernardo Dodd is a 38 y.o. female.       History of Present Illness     Pt has appt this coming Thursday to follow up on her allergy testing. She has not started injections, history of possible reaction but not sure what Dr Gramajo's recommendations are in regards to this.    She has had 2 rounds of antibiotics in the past month for treatment of sinus infection, had augmentin and cefdinir. Completed both courses of treatment. Feels like her symptoms improve with the abx but then return. She is currently on prednisone 20 mg daily, started by Dr Gramajo about a month ago. She was given 10 days of prednisone taper but has been stretching it out to take daily.     Still has pain and has pressure in her sinuses, has discomfort in her maxillary sinuses. Has productive cough from mucus build up in her throat. Not currently taking an antihistamine or decongestant. Developed nasal bleeding with nasal spray.    Pt had EGD with Dr Lin and has mild gastritis. Increased omeprazole to BID dosing with prn zantac.    She is going to see urologist and gynecologist to discuss repair of abdominal hernia and treat bladder incontinence.      Current Outpatient Medications:   •  ADVAIR DISKUS 500-50 MCG/DOSE DISKUS, Inhale 1 puff 2 (Two) Times a Day., Disp: , Rfl:   •  albuterol (PROVENTIL) (2.5 MG/3ML) 0.083% nebulizer solution, Take 2.5 mg by nebulization Every 12 (Twelve) Hours., Disp: , Rfl:   •  albuterol (VENTOLIN HFA) 108 (90 BASE) MCG/ACT inhaler, Inhale 1-2 puffs as needed. Every 4-6 hrs PRN, Disp: , Rfl:   •  amitriptyline (ELAVIL) 25 MG tablet, Take 25 mg by mouth every night., Disp: , Rfl:   •  amLODIPine (NORVASC) 5 MG tablet, Take 1 tablet by mouth Daily., Disp: 30 tablet, Rfl: 5  •  Cholecalciferol (VITAMIN D3) 5000 units capsule capsule, Take 1 capsule by mouth Daily., Disp: 30 capsule, Rfl: 3  •  ferrous sulfate  325 (65 FE) MG tablet, Take 1 tablet by mouth Daily With Breakfast., Disp: 30 tablet, Rfl: 4  •  fluticasone (FLONASE) 50 MCG/ACT nasal spray, 2 sprays into the nostril(s) as directed by provider Daily., Disp: 16 g, Rfl: 3  •  gabapentin (NEURONTIN) 400 MG capsule, Take 400 mg by mouth 3 (Three) Times a Day., Disp: , Rfl:   •  LamoTRIgine ER 50 MG tablet sustained-release 24 hour, Take 1 tablet by mouth Daily., Disp: , Rfl:   •  montelukast (SINGULAIR) 10 MG tablet, Take 10 mg by mouth every night., Disp: , Rfl:   •  omeprazole (priLOSEC) 40 MG capsule, Take 1 capsule by mouth 2 (Two) Times a Day. Take on an empty stomach and eat 30 minutes- 1 hr after eating., Disp: 60 capsule, Rfl: 5  •  predniSONE (DELTASONE) 20 MG tablet, Take 20 mg by mouth 2 (Two) Times a Day., Disp: , Rfl:   •  promethazine-dextromethorphan (PROMETHAZINE-DM) 6.25-15 MG/5ML syrup, Take 5 mL by mouth 4 (Four) Times a Day As Needed for Cough., Disp: 100 mL, Rfl: 0  •  ranitidine (ZANTAC) 300 MG capsule, Take 1 capsule by mouth Every Evening., Disp: 30 capsule, Rfl: 11  •  SPIRIVA RESPIMAT 2.5 MCG/ACT aerosol solution, Take 2 puffs by mouth Daily., Disp: , Rfl:   •  loratadine-pseudoephedrine (CLARITIN-D 12 HOUR) 5-120 MG per 12 hr tablet, Take 1 tablet by mouth 2 (Two) Times a Day., Disp: 60 tablet, Rfl: 1     PMFSH  The following portions of the patient's history were reviewed and updated as appropriate: allergies, current medications, past family history, past medical history, past social history, past surgical history and problem list.    Review of Systems   Constitutional: Negative for activity change, appetite change and fatigue.   HENT: Positive for congestion, postnasal drip, rhinorrhea and sinus pressure.    Respiratory: Positive for cough. Negative for chest tightness and shortness of breath.    Cardiovascular: Negative for chest pain and palpitations.   Gastrointestinal: Negative for abdominal pain.   Genitourinary: Negative for  dysuria.   Musculoskeletal: Negative for arthralgias and myalgias.   Neurological: Negative for dizziness, weakness, light-headedness and headaches.   Psychiatric/Behavioral: Negative for dysphoric mood. The patient is not nervous/anxious.        Objective   /82   Pulse 88   Wt 84.9 kg (187 lb 3.2 oz)   LMP 12/25/2018   SpO2 99%   Breastfeeding? No   BMI 37.81 kg/m²     Physical Exam   Constitutional: She appears well-developed and well-nourished.   HENT:   Head: Normocephalic.   Right Ear: Hearing, tympanic membrane, external ear and ear canal normal.   Left Ear: Hearing, tympanic membrane, external ear and ear canal normal.   Nose: Nose normal.   Mouth/Throat: Posterior oropharyngeal erythema present.   Eyes: Conjunctivae are normal. Pupils are equal, round, and reactive to light.   Neck: Normal range of motion.   Cardiovascular: Normal rate, regular rhythm and normal heart sounds.   Pulmonary/Chest: Effort normal and breath sounds normal. She has no decreased breath sounds. She has no wheezes. She has no rhonchi. She has no rales.   Musculoskeletal: Normal range of motion.   Neurological: She is alert.   Skin: Skin is warm and dry.   Psychiatric: She has a normal mood and affect. Her behavior is normal.   Nursing note and vitals reviewed.      Results for orders placed or performed in visit on 01/16/19   Tissue Pathology Exam   Result Value Ref Range    Case Report       Surgical Pathology Report                         Case: [id number]                                  Authorizing Provider:  Alf Lin MD    Collected:           01/16/2019 11:24 AM          Pathologist:           Manoj Dhillon DO      Received:            01/16/2019 12:48 PM          Specimens:   1) - Small Intestine                                                                                2) - Stomach                                                                                        3) - Esophagus, Distal                                                                               4) - Esophagus, Mid                                                                        Clinical Information       The working history is abdominal pain and mild erosive gastropathy.       Final Diagnosis       1. DUODENUM BIOPSY:  Duodenal mucosa with no diagnostic abnormality.   Negative for intraepithelial lymphocytosis, parasites, dysplasia, or malignancy.   2. STOMACH BIOPSY:  Antral mucosa with acute erosive gastritis and reactive gastropathy.   Negative for Helicobacter pylori by IHC.   Negative for intestinal metaplasia, dysplasia, or malignancy.   3. DISTAL ESOPHAGUS BIOPSY:  Squamous mucosa with no diagnostic abnormality.   Negative for intraepithelial eosinophils   Negative for glandular mucosa, intestinal metaplasia, dysplasia, or malignancy.  4. MID ESOPHAGUS BIOPSY:  Squamous mucosa with no diagnostic abnormality.   Negative for intraepithelial eosinophils   Negative for glandular mucosa, intestinal metaplasia, dysplasia, or malignancy.  JTA/klb       Gross Description       Specimen 1 received in formalin labeled as duodenal bulb biopsy are multiple tan soft tissue fragments aggregating 1.0 x 1.0 x 0.2 cm. Submitted in toto in cassette 1.      Specimen 2 received in formalin labeled as stomach biopsy are multiple tan/pink soft tissue fragments aggregating 1.0 x 1.0 x 0.2 cm. Submitted in toto in cassette 2.      Specimen 3 received in formalin labeled as distal esophageal biopsy is a 0.3 x 0.3 x 0.3 cm tan/pink soft tissue fragment.Submitted in toto in cassette 3.      Specimen 4 received in formalin labeled as mid esophageal biopsy are two gray/white soft tissue fragments aggregating 0.4 x 0.4 x 0.2 cm. Submitted in toto in cassette 4.  HBM/klb       Microscopic Description       The slides are reviewed and demonstrate histopathologic features supporting the above rendered diagnosis.              ASSESSMENT/PLAN    Problem  List Items Addressed This Visit        Respiratory    Allergic rhinitis - Primary     Keep upcoming follow up with allergist. Restart claritin-D.         Relevant Medications    loratadine-pseudoephedrine (CLARITIN-D 12 HOUR) 5-120 MG per 12 hr tablet       Digestive    GERD (gastroesophageal reflux disease)     Improved with BID dosing of omeprazole 40 mg.                    Return in about 2 months (around 3/21/2019) for Recheck.

## 2019-02-02 ENCOUNTER — OFFICE VISIT (OUTPATIENT)
Dept: INTERNAL MEDICINE | Facility: CLINIC | Age: 39
End: 2019-02-02

## 2019-02-02 VITALS
WEIGHT: 184 LBS | OXYGEN SATURATION: 96 % | HEART RATE: 94 BPM | HEIGHT: 59 IN | BODY MASS INDEX: 37.09 KG/M2 | TEMPERATURE: 98.5 F

## 2019-02-02 DIAGNOSIS — J01.01 ACUTE RECURRENT MAXILLARY SINUSITIS: ICD-10-CM

## 2019-02-02 DIAGNOSIS — J10.1 INFLUENZA B: ICD-10-CM

## 2019-02-02 DIAGNOSIS — R05.9 COUGH: Primary | ICD-10-CM

## 2019-02-02 LAB
EXPIRATION DATE: ABNORMAL
FLUAV AG NPH QL: NEGATIVE
FLUBV AG NPH QL: POSITIVE
INTERNAL CONTROL: ABNORMAL
Lab: ABNORMAL

## 2019-02-02 PROCEDURE — 87804 INFLUENZA ASSAY W/OPTIC: CPT | Performed by: NURSE PRACTITIONER

## 2019-02-02 PROCEDURE — 99213 OFFICE O/P EST LOW 20 MIN: CPT | Performed by: NURSE PRACTITIONER

## 2019-02-02 RX ORDER — OSELTAMIVIR PHOSPHATE 75 MG/1
75 CAPSULE ORAL 2 TIMES DAILY
Qty: 10 CAPSULE | Refills: 0 | Status: SHIPPED | OUTPATIENT
Start: 2019-02-02 | End: 2019-02-07

## 2019-02-02 RX ORDER — CEFDINIR 300 MG/1
300 CAPSULE ORAL 2 TIMES DAILY
Qty: 20 CAPSULE | Refills: 0 | Status: SHIPPED | OUTPATIENT
Start: 2019-02-02 | End: 2019-02-19

## 2019-02-02 NOTE — PATIENT INSTRUCTIONS
Positive for influenza B.  Also, I believe she has a sinus infection.  We will treat with the Tamiflu with influenza B.  We will use Omnicef for the sinus infection.  We will get a CT scan of her sinuses.  Use Sudafed PE and antihistamine of choice.  Robitussin-DM to help with cough.  Off from work a week.  Return to the clinic if not improving  p t verbalizes understanding and agreement with plan of care.

## 2019-02-02 NOTE — PROGRESS NOTES
Subjective   Bernardo Dodd is a 38 y.o. female.   Chief Complaint   Patient presents with   • Nasal Congestion     Since November   • Sore Throat   • Headache   • Earache   • Cough      History of Present Illness patient states she has been sick for months.  Symptoms include headache,  yellow sinus drainage. fever chills, facial pressure, ear pain, sore throat, cough with clear sputum, occasional wheeze, no chest pain, abdominal pain, nausea vomiting or diarrhea.  She states she is been treated with Augmentin and Claritin-D.  She does not believe she is any better.    The following portions of the patient's history were reviewed and updated as appropriate: allergies, current medications, past family history, past medical history, past social history, past surgical history and problem list.    Current Outpatient Medications:   •  ADVAIR DISKUS 500-50 MCG/DOSE DISKUS, Inhale 1 puff 2 (Two) Times a Day., Disp: , Rfl:   •  albuterol (PROVENTIL) (2.5 MG/3ML) 0.083% nebulizer solution, Take 2.5 mg by nebulization Every 12 (Twelve) Hours., Disp: , Rfl:   •  albuterol (VENTOLIN HFA) 108 (90 BASE) MCG/ACT inhaler, Inhale 1-2 puffs as needed. Every 4-6 hrs PRN, Disp: , Rfl:   •  amitriptyline (ELAVIL) 25 MG tablet, Take 25 mg by mouth every night., Disp: , Rfl:   •  amLODIPine (NORVASC) 5 MG tablet, Take 1 tablet by mouth Daily., Disp: 30 tablet, Rfl: 5  •  Cholecalciferol (VITAMIN D3) 5000 units capsule capsule, Take 1 capsule by mouth Daily., Disp: 30 capsule, Rfl: 3  •  ferrous sulfate 325 (65 FE) MG tablet, Take 1 tablet by mouth Daily With Breakfast., Disp: 30 tablet, Rfl: 4  •  fluticasone (FLONASE) 50 MCG/ACT nasal spray, 2 sprays into the nostril(s) as directed by provider Daily., Disp: 16 g, Rfl: 3  •  gabapentin (NEURONTIN) 400 MG capsule, Take 400 mg by mouth 3 (Three) Times a Day., Disp: , Rfl:   •  LamoTRIgine ER 50 MG tablet sustained-release 24 hour, Take 1 tablet by mouth Daily., Disp: , Rfl:   •   "loratadine-pseudoephedrine (CLARITIN-D 12 HOUR) 5-120 MG per 12 hr tablet, Take 1 tablet by mouth 2 (Two) Times a Day., Disp: 60 tablet, Rfl: 1  •  montelukast (SINGULAIR) 10 MG tablet, Take 10 mg by mouth every night., Disp: , Rfl:   •  omeprazole (priLOSEC) 40 MG capsule, Take 1 capsule by mouth 2 (Two) Times a Day. Take on an empty stomach and eat 30 minutes- 1 hr after eating., Disp: 60 capsule, Rfl: 5  •  predniSONE (DELTASONE) 20 MG tablet, Take 20 mg by mouth 2 (Two) Times a Day., Disp: , Rfl:   •  promethazine-dextromethorphan (PROMETHAZINE-DM) 6.25-15 MG/5ML syrup, Take 5 mL by mouth 4 (Four) Times a Day As Needed for Cough., Disp: 100 mL, Rfl: 0  •  ranitidine (ZANTAC) 300 MG capsule, Take 1 capsule by mouth Every Evening., Disp: 30 capsule, Rfl: 11  •  SPIRIVA RESPIMAT 2.5 MCG/ACT aerosol solution, Take 2 puffs by mouth Daily., Disp: , Rfl:   •  cefdinir (OMNICEF) 300 MG capsule, Take 1 capsule by mouth 2 (Two) Times a Day., Disp: 20 capsule, Rfl: 0  •  oseltamivir (TAMIFLU) 75 MG capsule, Take 1 capsule by mouth 2 (Two) Times a Day for 5 days., Disp: 10 capsule, Rfl: 0    Review of Systems Consitutional, HEENT, Respiratory, CV, GI, , Skin, Musculoskeletal, Neuro-mental, Endocrinological, Hematological were reviewed.  Positives were discussed in the HPI, otherwise ROS was negative   Pulse 94   Temp 98.5 °F (36.9 °C)   Ht 149.9 cm (59\")   Wt 83.5 kg (184 lb)   SpO2 96%   Breastfeeding? No   BMI 37.16 kg/m²     Objective   Allergies   Allergen Reactions   • Naproxen Swelling       Physical Exam   Constitutional: She is oriented to person, place, and time. She appears well-developed and well-nourished. No distress.   HENT:   Head: Normocephalic.   Right Ear: External ear normal.   Left Ear: External ear normal.   TMs are dull with fluid bubbles present.  Tender to the maxillary and ethmoid sinus area.  Throat with PND and redness.  No exudate.  Uvula midline.  Nasal mucosa red and swollen Has " preauricular nodes.   Eyes: Right eye exhibits no discharge. Left eye exhibits no discharge. No scleral icterus.   Neck: Neck supple.   Shotty nodes   Cardiovascular: Normal rate, regular rhythm, normal heart sounds and intact distal pulses. Exam reveals no gallop and no friction rub.   No murmur heard.  Pulmonary/Chest: Effort normal and breath sounds normal. No stridor. No respiratory distress. She has no wheezes. She has no rales.   Neurological: She is alert and oriented to person, place, and time.   Skin: Skin is warm and dry. Capillary refill takes less than 2 seconds.   Is pink, no rash    Nursing note and vitals reviewed.      Procedures        Assessment/Plan   Bernardo was seen today for nasal congestion, sore throat, headache, earache and cough.    Diagnoses and all orders for this visit:    Cough  -     POCT Influenza A/B    Acute recurrent maxillary sinusitis  -     CT Sinus Without Contrast; Future  -     cefdinir (OMNICEF) 300 MG capsule; Take 1 capsule by mouth 2 (Two) Times a Day.    Influenza B  -     oseltamivir (TAMIFLU) 75 MG capsule; Take 1 capsule by mouth 2 (Two) Times a Day for 5 days.          Patient Instructions   Positive for influenza B.  Also, I believe she has a sinus infection.  We will treat with the Tamiflu with influenza B.  We will use Omnicef for the sinus infection.  We will get a CT scan of her sinuses.  Use Sudafed PE and antihistamine of choice.  Robitussin-DM to help with cough.  Off from work a week.  Return to the clinic if not improving  p t verbalizes understanding and agreement with plan of care.       EMR Dragon/transcription disclaimer:  Please note that portions of this note were completed with a voice recognition program.  Electronic transcription of the voice recognition program may permit erroneous words or phrases to be inadvertently transcribed.  Although I have reviewed the note for such errors, some may still exist in this documentation   SHANE Maloney

## 2019-02-07 ENCOUNTER — DOCUMENTATION (OUTPATIENT)
Dept: PHYSICAL THERAPY | Facility: HOSPITAL | Age: 39
End: 2019-02-07

## 2019-02-07 DIAGNOSIS — M79.672 LEFT FOOT PAIN: Primary | ICD-10-CM

## 2019-02-07 DIAGNOSIS — H83.2X1 VESTIBULAR HYPOFUNCTION OF RIGHT EAR: ICD-10-CM

## 2019-02-07 DIAGNOSIS — M72.2 PLANTAR FASCIITIS OF LEFT FOOT: ICD-10-CM

## 2019-02-07 NOTE — THERAPY DISCHARGE NOTE
Outpatient Physical Therapy Discharge Summary         Patient Name: Bernardo Dodd  : 1980  MRN: [id number]    Today's Date: 2019    Visit Dx:    ICD-10-CM ICD-9-CM   1. Left foot pain M79.672 729.5   2. Plantar fasciitis of left foot M72.2 728.71   3. Vestibular hypofunction of right ear H83.2X1 386.50       PT OP Goals     Row Name 19 0946          PT Short Term Goals    STG Date to Achieve  10/17/18  -AC     STG 1  Client will report > or = 50% improvement in frequency/intensity of L foot pain.  -AC     STG 1 Progress  Not Met  -AC     STG 2  Pt will perform independent HEP to improve flexibility and decrease pain.  -AC     STG 2 Progress  Not Met  -AC     STG 3  Improve L plantar flexion MMT to at least 4/5 strength.  -AC     STG 3 Progress  Not Met  -AC        Long Term Goals    LTG Date to Achieve  18  -AC     LTG 1  Client will report > or = 75% improvement in frequency/intensity of symptoms.  -AC     LTG 1 Progress  Not Met  -AC     LTG 2  Client will report ability to stand at least 20 minutes before onset of pain to allow performance of ADLs.  -AC     LTG 2 Progress  Not Met  -AC     LTG 3  Client will report minimal-no pain (< or = 3/10) when walking on uneven surfaces to allow her to take her son to the park.  -AC     LTG 3 Progress  Not Met  -AC     LTG 4  Improve LEFS score to > or = 32/80 to reflect significant improvement in use of L foot for ADLs/functional mobility.  -AC     LTG 4 Progress  Not Met  -AC        Time Calculation    PT Goal Re-Cert Due Date  19  -AC       User Key  (r) = Recorded By, (t) = Taken By, (c) = Cosigned By    Initials Name Provider Type    Caty Zapata, PT Physical Therapist        OP PT Discharge Summary  Date of Discharge: 19  Reason for Discharge: Non-compliant  Outcomes Achieved: Unable to make functional progress toward goals at this time, Patient able to partially acheive established goals  Discharge Destination:  Home with home program  Discharge Instructions/Additional Comments: Pt was seen for three visits to address vertigo, but did not show for two consecutive appts following these visits. Pt then appeared for a re-evaluation to address L foot pain, but also did not appear for further follow-up beyond this date.  As such, pt will be discharged with home programs at this time.    Time Calculation:   Start Time: 0946    Therapy Suggested Charges     Code   Minutes Charges    None           Caty Carnes, PT  2/7/2019

## 2019-02-09 ENCOUNTER — HOSPITAL ENCOUNTER (OUTPATIENT)
Dept: CT IMAGING | Facility: HOSPITAL | Age: 39
Discharge: HOME | End: 2019-02-09
Admitting: NURSE PRACTITIONER

## 2019-02-09 DIAGNOSIS — J01.01 ACUTE RECURRENT MAXILLARY SINUSITIS: ICD-10-CM

## 2019-02-09 PROCEDURE — 70486 CT MAXILLOFACIAL W/O DYE: CPT

## 2019-02-11 ENCOUNTER — TELEPHONE (OUTPATIENT)
Dept: INTERNAL MEDICINE | Facility: CLINIC | Age: 39
End: 2019-02-11

## 2019-02-11 NOTE — TELEPHONE ENCOUNTER
----- Message from SHANE Huber sent at 2/11/2019 11:13 AM EST -----  Please call patient CT scan showed minimal mucosal thickening of the maxillary sinuses bilaterally.  May indicate chronic maxillary sinusitis but does not appear to have an acute infection.  Please follow-up with Amina to develop a plan of care

## 2019-02-19 ENCOUNTER — OFFICE VISIT (OUTPATIENT)
Dept: INTERNAL MEDICINE | Facility: CLINIC | Age: 39
End: 2019-02-19

## 2019-02-19 VITALS
SYSTOLIC BLOOD PRESSURE: 130 MMHG | WEIGHT: 190 LBS | HEIGHT: 59 IN | DIASTOLIC BLOOD PRESSURE: 80 MMHG | HEART RATE: 110 BPM | BODY MASS INDEX: 38.3 KG/M2 | OXYGEN SATURATION: 98 %

## 2019-02-19 DIAGNOSIS — J32.0 CHRONIC MAXILLARY SINUSITIS: Primary | ICD-10-CM

## 2019-02-19 DIAGNOSIS — Z98.890 STATUS POST CREATION OF URETHRAL SLING BY SUPRAPUBIC APPROACH: ICD-10-CM

## 2019-02-19 PROCEDURE — 99213 OFFICE O/P EST LOW 20 MIN: CPT | Performed by: PHYSICIAN ASSISTANT

## 2019-02-19 RX ORDER — HYDROCODONE BITARTRATE AND ACETAMINOPHEN 10; 325 MG/1; MG/1
1 TABLET ORAL EVERY 6 HOURS PRN
COMMUNITY
End: 2019-03-21

## 2019-02-19 NOTE — PROGRESS NOTES
Chief Complaint   Patient presents with   • Sinus Problem     had a ct   • Vaginal Pain     states she has surgery and now she is burning feb 14        Subjective   Bernardo Dodd is a 38 y.o. female.       History of Present Illness     Pt has bladder sling done on 2/14 and has had great results since the surgery. Still has some pelvic soreness.    She was diagnosed with influenza on 2/2 and also treated for sinus infection with Omnicef. She had CT scan that showed mild chronic sinusitis but no acute sinusitis. She is taking loratidine D and an otc sinus med. 3 days ago she developed coughing, sneezing, runny nose. Has nasal congestion and right sided maxillary sinus pressure.    She is still seeing allergist and they are working to get an injection therapy started. Saw her allergist last week.        Current Outpatient Medications:   •  ADVAIR DISKUS 500-50 MCG/DOSE DISKUS, Inhale 1 puff 2 (Two) Times a Day., Disp: , Rfl:   •  albuterol (PROVENTIL) (2.5 MG/3ML) 0.083% nebulizer solution, Take 2.5 mg by nebulization Every 12 (Twelve) Hours., Disp: , Rfl:   •  albuterol (VENTOLIN HFA) 108 (90 BASE) MCG/ACT inhaler, Inhale 1-2 puffs as needed. Every 4-6 hrs PRN, Disp: , Rfl:   •  amLODIPine (NORVASC) 5 MG tablet, Take 1 tablet by mouth Daily., Disp: 30 tablet, Rfl: 5  •  ferrous sulfate 325 (65 FE) MG tablet, Take 1 tablet by mouth Daily With Breakfast., Disp: 30 tablet, Rfl: 4  •  fluticasone (FLONASE) 50 MCG/ACT nasal spray, 2 sprays into the nostril(s) as directed by provider Daily., Disp: 16 g, Rfl: 3  •  gabapentin (NEURONTIN) 400 MG capsule, Take 400 mg by mouth 3 (Three) Times a Day., Disp: , Rfl:   •  HYDROcodone-acetaminophen (NORCO)  MG per tablet, Take 1 tablet by mouth Every 6 (Six) Hours As Needed for Moderate Pain ., Disp: , Rfl:   •  loratadine-pseudoephedrine (CLARITIN-D 12 HOUR) 5-120 MG per 12 hr tablet, Take 1 tablet by mouth 2 (Two) Times a Day., Disp: 60 tablet, Rfl: 1  •  montelukast  (SINGULAIR) 10 MG tablet, Take 10 mg by mouth every night., Disp: , Rfl:   •  omeprazole (priLOSEC) 40 MG capsule, Take 1 capsule by mouth 2 (Two) Times a Day. Take on an empty stomach and eat 30 minutes- 1 hr after eating., Disp: 60 capsule, Rfl: 5  •  promethazine-dextromethorphan (PROMETHAZINE-DM) 6.25-15 MG/5ML syrup, Take 5 mL by mouth 4 (Four) Times a Day As Needed for Cough., Disp: 100 mL, Rfl: 0  •  ranitidine (ZANTAC) 300 MG capsule, Take 1 capsule by mouth Every Evening., Disp: 30 capsule, Rfl: 11  •  SPIRIVA RESPIMAT 2.5 MCG/ACT aerosol solution, Take 2 puffs by mouth Daily., Disp: , Rfl:   •  Cholecalciferol (VITAMIN D3) 5000 units capsule capsule, Take 1 capsule by mouth Daily., Disp: 30 capsule, Rfl: 3     PMFSH  The following portions of the patient's history were reviewed and updated as appropriate: allergies, current medications, past family history, past medical history, past social history, past surgical history and problem list.    Review of Systems   Constitutional: Negative for activity change, appetite change, fatigue and unexpected weight change.   HENT: Positive for postnasal drip, sinus pressure and sore throat. Negative for congestion, ear pain and rhinorrhea.    Eyes: Negative for pain and discharge.   Respiratory: Positive for cough. Negative for chest tightness, shortness of breath and wheezing.    Cardiovascular: Negative for chest pain and palpitations.   Gastrointestinal: Negative for abdominal pain, diarrhea and vomiting.   Endocrine: Negative.    Genitourinary: Negative.  Negative for dysuria.   Musculoskeletal: Negative for arthralgias, joint swelling and myalgias.   Skin: Negative for color change, rash and wound.   Allergic/Immunologic: Negative.    Neurological: Negative for dizziness, seizures, syncope, weakness, light-headedness and headaches.   Psychiatric/Behavioral: Negative.  Negative for dysphoric mood. The patient is not nervous/anxious.        Objective   /80    "Pulse 110   Ht 149.9 cm (59\")   Wt 86.2 kg (190 lb)   SpO2 98%   BMI 38.38 kg/m²     Physical Exam   Constitutional: She appears well-developed and well-nourished.   HENT:   Head: Normocephalic.   Right Ear: Hearing, tympanic membrane, external ear and ear canal normal.   Left Ear: Hearing, tympanic membrane, external ear and ear canal normal.   Nose: Nose normal.   Mouth/Throat: Oropharynx is clear and moist.   Eyes: Conjunctivae are normal. Pupils are equal, round, and reactive to light.   Neck: Normal range of motion.   Cardiovascular: Normal rate, regular rhythm and normal heart sounds.   Pulmonary/Chest: Effort normal and breath sounds normal. She has no decreased breath sounds. She has no wheezes. She has no rhonchi. She has no rales.   Musculoskeletal: Normal range of motion.   Neurological: She is alert.   Skin: Skin is warm and dry.   Psychiatric: She has a normal mood and affect. Her behavior is normal.   Nursing note and vitals reviewed.        ASSESSMENT/PLAN    Problem List Items Addressed This Visit        Respiratory    Chronic maxillary sinusitis - Primary     Discussed CT scan results with patient and no need for another abx due to no sign of acute infection and to avoid excessive abx use. She will follow up with allergist to discuss plan for better control of allergies.           Other Visit Diagnoses     Status post creation of urethral sling by suprapubic approach        Recovering without complication.               Return if symptoms worsen or fail to improve, for Next scheduled follow up.  "

## 2019-02-21 PROBLEM — J32.0 CHRONIC MAXILLARY SINUSITIS: Status: ACTIVE | Noted: 2019-02-21

## 2019-02-21 NOTE — ASSESSMENT & PLAN NOTE
Discussed CT scan results with patient and no need for another abx due to no sign of acute infection and to avoid excessive abx use. She will follow up with allergist to discuss plan for better control of allergies.

## 2019-03-21 ENCOUNTER — OFFICE VISIT (OUTPATIENT)
Dept: INTERNAL MEDICINE | Facility: CLINIC | Age: 39
End: 2019-03-21

## 2019-03-21 VITALS
HEIGHT: 59 IN | DIASTOLIC BLOOD PRESSURE: 78 MMHG | OXYGEN SATURATION: 98 % | WEIGHT: 191 LBS | BODY MASS INDEX: 38.51 KG/M2 | SYSTOLIC BLOOD PRESSURE: 130 MMHG | TEMPERATURE: 98.7 F | HEART RATE: 113 BPM

## 2019-03-21 DIAGNOSIS — E55.9 VITAMIN D DEFICIENCY: ICD-10-CM

## 2019-03-21 DIAGNOSIS — K21.00 GASTROESOPHAGEAL REFLUX DISEASE WITH ESOPHAGITIS: ICD-10-CM

## 2019-03-21 DIAGNOSIS — J30.9 CHRONIC ALLERGIC RHINITIS: ICD-10-CM

## 2019-03-21 DIAGNOSIS — H10.13 ALLERGIC CONJUNCTIVITIS OF BOTH EYES: Primary | ICD-10-CM

## 2019-03-21 DIAGNOSIS — J30.9 ALLERGIC RHINITIS, UNSPECIFIED SEASONALITY, UNSPECIFIED TRIGGER: ICD-10-CM

## 2019-03-21 DIAGNOSIS — D50.9 IRON DEFICIENCY ANEMIA, UNSPECIFIED IRON DEFICIENCY ANEMIA TYPE: ICD-10-CM

## 2019-03-21 LAB
25(OH)D3 SERPL-MCNC: 43 NG/ML
BASOPHILS # BLD AUTO: 0.03 10*3/MM3 (ref 0–0.2)
BASOPHILS NFR BLD AUTO: 0.4 % (ref 0–1)
DEPRECATED RDW RBC AUTO: 46.2 FL (ref 37–54)
EOSINOPHIL # BLD AUTO: 0.07 10*3/MM3 (ref 0–0.3)
EOSINOPHIL NFR BLD AUTO: 0.8 % (ref 0–3)
ERYTHROCYTE [DISTWIDTH] IN BLOOD BY AUTOMATED COUNT: 14.6 % (ref 11.3–14.5)
HCT VFR BLD AUTO: 41.7 % (ref 34.5–44)
HGB BLD-MCNC: 13.5 G/DL (ref 11.5–15.5)
IMM GRANULOCYTES # BLD AUTO: 0.03 10*3/MM3 (ref 0–0.05)
IMM GRANULOCYTES NFR BLD AUTO: 0.4 % (ref 0–0.6)
IRON 24H UR-MRATE: 55 MCG/DL (ref 50–175)
IRON SATN MFR SERPL: 14 % (ref 15–50)
LYMPHOCYTES # BLD AUTO: 2.63 10*3/MM3 (ref 0.6–4.8)
LYMPHOCYTES NFR BLD AUTO: 31.1 % (ref 24–44)
MCH RBC QN AUTO: 28.1 PG (ref 27–31)
MCHC RBC AUTO-ENTMCNC: 32.4 G/DL (ref 32–36)
MCV RBC AUTO: 86.7 FL (ref 80–99)
MONOCYTES # BLD AUTO: 0.56 10*3/MM3 (ref 0–1)
MONOCYTES NFR BLD AUTO: 6.6 % (ref 0–12)
NEUTROPHILS # BLD AUTO: 5.15 10*3/MM3 (ref 1.5–8.3)
NEUTROPHILS NFR BLD AUTO: 60.7 % (ref 41–71)
PLATELET # BLD AUTO: 266 10*3/MM3 (ref 150–450)
PMV BLD AUTO: 9.6 FL (ref 6–12)
RBC # BLD AUTO: 4.81 10*6/MM3 (ref 3.89–5.14)
TIBC SERPL-MCNC: 385 MCG/DL (ref 250–450)
WBC NRBC COR # BLD: 8.47 10*3/MM3 (ref 3.5–10.8)

## 2019-03-21 PROCEDURE — 82306 VITAMIN D 25 HYDROXY: CPT | Performed by: PHYSICIAN ASSISTANT

## 2019-03-21 PROCEDURE — 85025 COMPLETE CBC W/AUTO DIFF WBC: CPT | Performed by: PHYSICIAN ASSISTANT

## 2019-03-21 PROCEDURE — 83540 ASSAY OF IRON: CPT | Performed by: PHYSICIAN ASSISTANT

## 2019-03-21 PROCEDURE — 83550 IRON BINDING TEST: CPT | Performed by: PHYSICIAN ASSISTANT

## 2019-03-21 PROCEDURE — 99214 OFFICE O/P EST MOD 30 MIN: CPT | Performed by: PHYSICIAN ASSISTANT

## 2019-03-21 RX ORDER — OMEPRAZOLE 40 MG/1
40 CAPSULE, DELAYED RELEASE ORAL DAILY
Qty: 30 CAPSULE | Refills: 5 | Status: SHIPPED | OUTPATIENT
Start: 2019-03-21 | End: 2019-04-17 | Stop reason: SDUPTHER

## 2019-03-21 RX ORDER — AZELASTINE HYDROCHLORIDE 0.5 MG/ML
1 SOLUTION/ DROPS OPHTHALMIC 2 TIMES DAILY
Qty: 6 ML | Refills: 12 | Status: SHIPPED | OUTPATIENT
Start: 2019-03-21 | End: 2021-07-23

## 2019-03-21 RX ORDER — FLUTICASONE PROPIONATE 50 MCG
2 SPRAY, SUSPENSION (ML) NASAL DAILY
Qty: 16 G | Refills: 5 | Status: SHIPPED | OUTPATIENT
Start: 2019-03-21 | End: 2019-11-07 | Stop reason: SDUPTHER

## 2019-03-21 RX ORDER — FERROUS SULFATE 325(65) MG
1 TABLET ORAL
Qty: 30 TABLET | Refills: 5 | Status: ON HOLD | OUTPATIENT
Start: 2019-03-21 | End: 2019-07-04 | Stop reason: SDUPTHER

## 2019-03-21 RX ORDER — AMLODIPINE BESYLATE 5 MG/1
5 TABLET ORAL DAILY
Qty: 30 TABLET | Refills: 5 | Status: SHIPPED | OUTPATIENT
Start: 2019-03-21 | End: 2019-11-07 | Stop reason: SDUPTHER

## 2019-03-21 NOTE — PROGRESS NOTES
Chief Complaint   Patient presents with   • Allergic Rhinitis   • Heartburn     follow up       Subjective   Bernardo Dodd is a 38 y.o. female.       History of Present Illness     Pt has continued to recover from her bladder sling and has done well. Notes that she may have passed a kidney stone yesterday, had some upper abdominal pain that moved in to back and pelvic area. She increased her water intake and used a filter to catch her urine. Did see that she passed 2 stones yesterday. No pain this morning.    Her sinuses are more clear for now. She has some throat congestion. She will be seeing Dr Gramajo next week to discuss asthma injections. Her eyes been watery.    She has switched over to ranitidine from prilosec because she ran out of it. Switched on 3/1. Feels like it is not working- her symptoms are improving.        Current Outpatient Medications:   •  ADVAIR DISKUS 500-50 MCG/DOSE DISKUS, Inhale 1 puff 2 (Two) Times a Day., Disp: , Rfl:   •  albuterol (PROVENTIL) (2.5 MG/3ML) 0.083% nebulizer solution, Take 2.5 mg by nebulization Every 12 (Twelve) Hours., Disp: , Rfl:   •  albuterol (VENTOLIN HFA) 108 (90 BASE) MCG/ACT inhaler, Inhale 1-2 puffs as needed. Every 4-6 hrs PRN, Disp: , Rfl:   •  amLODIPine (NORVASC) 5 MG tablet, Take 1 tablet by mouth Daily., Disp: 30 tablet, Rfl: 5  •  Cholecalciferol (VITAMIN D3) 5000 units capsule capsule, Take 1 capsule by mouth Daily., Disp: 30 capsule, Rfl: 3  •  ferrous sulfate 325 (65 FE) MG tablet, Take 1 tablet by mouth Daily With Breakfast., Disp: 30 tablet, Rfl: 5  •  fluticasone (FLONASE) 50 MCG/ACT nasal spray, 2 sprays into the nostril(s) as directed by provider Daily., Disp: 16 g, Rfl: 5  •  gabapentin (NEURONTIN) 400 MG capsule, Take 400 mg by mouth 3 (Three) Times a Day., Disp: , Rfl:   •  loratadine-pseudoephedrine (CLARITIN-D 12 HOUR) 5-120 MG per 12 hr tablet, Take 1 tablet by mouth 2 (Two) Times a Day., Disp: 60 tablet, Rfl: 5  •  montelukast  "(SINGULAIR) 10 MG tablet, Take 10 mg by mouth every night., Disp: , Rfl:   •  omeprazole (priLOSEC) 40 MG capsule, Take 1 capsule by mouth Daily. Take on an empty stomach and eat 30 minutes- 1 hr after eating., Disp: 30 capsule, Rfl: 5  •  ranitidine (ZANTAC) 300 MG capsule, Take 1 capsule by mouth Every Evening., Disp: 30 capsule, Rfl: 11  •  SPIRIVA RESPIMAT 2.5 MCG/ACT aerosol solution, Take 2 puffs by mouth Daily., Disp: , Rfl:   •  azelastine (OPTIVAR) 0.05 % ophthalmic solution, Administer 1 drop to both eyes 2 (Two) Times a Day., Disp: 6 mL, Rfl: 12     PMFSH  The following portions of the patient's history were reviewed and updated as appropriate: allergies, current medications, past family history, past medical history, past social history, past surgical history and problem list.    Review of Systems   Constitutional: Negative for activity change, appetite change and fatigue.   HENT: Negative for congestion and rhinorrhea.    Eyes: Positive for discharge.   Respiratory: Negative for chest tightness and shortness of breath.    Cardiovascular: Negative for chest pain and palpitations.   Gastrointestinal: Negative for abdominal pain.   Genitourinary: Negative for dysuria.   Musculoskeletal: Negative for arthralgias and myalgias.   Neurological: Negative for dizziness, weakness, light-headedness and headaches.   Psychiatric/Behavioral: Negative for dysphoric mood. The patient is not nervous/anxious.        Objective   /78   Pulse 113   Temp 98.7 °F (37.1 °C)   Ht 149.9 cm (59\")   Wt 86.6 kg (191 lb)   SpO2 98%   BMI 38.58 kg/m²     Physical Exam   Constitutional: She appears well-developed and well-nourished.   HENT:   Head: Normocephalic.   Right Ear: Hearing, tympanic membrane, external ear and ear canal normal.   Left Ear: Hearing, tympanic membrane, external ear and ear canal normal.   Nose: Nose normal.   Mouth/Throat: Oropharynx is clear and moist.   Eyes: Conjunctivae are normal. Pupils are " equal, round, and reactive to light.   Neck: Normal range of motion.   Cardiovascular: Normal rate, regular rhythm and normal heart sounds.   Pulmonary/Chest: Effort normal and breath sounds normal. She has no decreased breath sounds. She has no wheezes. She has no rhonchi. She has no rales.   Musculoskeletal: Normal range of motion.   Neurological: She is alert.   Skin: Skin is warm and dry.   Psychiatric: She has a normal mood and affect. Her behavior is normal.   Nursing note and vitals reviewed.      ASSESSMENT/PLAN    Problem List Items Addressed This Visit        Respiratory    Allergic rhinitis    Relevant Medications    loratadine-pseudoephedrine (CLARITIN-D 12 HOUR) 5-120 MG per 12 hr tablet       Digestive    GERD (gastroesophageal reflux disease)     Restart omeprazole as ordered.         Relevant Medications    omeprazole (priLOSEC) 40 MG capsule       Hematopoietic and Hemostatic    Iron deficiency anemia     Refill ferrous sulfate. Check cbc and iron levels.         Relevant Medications    ferrous sulfate 325 (65 FE) MG tablet    Other Relevant Orders    Iron Profile (Completed)    CBC & Differential (Completed)    CBC Auto Differential (Completed)      Other Visit Diagnoses     Allergic conjunctivitis of both eyes    -  Primary    Use optivar drops as directed.    Relevant Medications    azelastine (OPTIVAR) 0.05 % ophthalmic solution    Chronic allergic rhinitis        Relevant Medications    fluticasone (FLONASE) 50 MCG/ACT nasal spray    Vitamin D deficiency        Relevant Orders    Vitamin D 25 Hydroxy (Completed)               Return in about 5 months (around 8/21/2019) for Annual.

## 2019-03-24 PROBLEM — D50.9 IRON DEFICIENCY ANEMIA: Status: ACTIVE | Noted: 2019-03-24

## 2019-04-17 ENCOUNTER — OFFICE VISIT (OUTPATIENT)
Dept: GASTROENTEROLOGY | Facility: CLINIC | Age: 39
End: 2019-04-17

## 2019-04-17 VITALS
HEART RATE: 131 BPM | WEIGHT: 181 LBS | HEIGHT: 59 IN | RESPIRATION RATE: 16 BRPM | OXYGEN SATURATION: 99 % | DIASTOLIC BLOOD PRESSURE: 76 MMHG | BODY MASS INDEX: 36.49 KG/M2 | TEMPERATURE: 97.9 F | SYSTOLIC BLOOD PRESSURE: 130 MMHG

## 2019-04-17 DIAGNOSIS — K21.00 GASTROESOPHAGEAL REFLUX DISEASE WITH ESOPHAGITIS: ICD-10-CM

## 2019-04-17 PROCEDURE — 99213 OFFICE O/P EST LOW 20 MIN: CPT | Performed by: INTERNAL MEDICINE

## 2019-04-17 RX ORDER — OMEPRAZOLE 40 MG/1
40 CAPSULE, DELAYED RELEASE ORAL 2 TIMES DAILY
Qty: 90 CAPSULE | Refills: 5 | Status: SHIPPED | OUTPATIENT
Start: 2019-04-17 | End: 2019-12-12 | Stop reason: SDUPTHER

## 2019-04-17 NOTE — PROGRESS NOTES
PCP: Nelly Sotelo PA    No chief complaint on file.      History of Present Illness:   Bernardo Dodd is a 38 y.o. female who presents to GI clinic as a follow up for abdominal pain. Pain Now epigastric burning improved on bid dosing. She is on once daily dosing.  No wt loss or gib. Complains of back pain and requiring trigger point injections.   Past Medical History:   Diagnosis Date   • Allergic rhinitis    • Anemia    • Arthritis    • Asthma    • Depression    • FHx: migraine headaches    • GERD (gastroesophageal reflux disease)    • Heart murmur    • Herpes simplex    • History of chest x-ray 2015    no active disease   • History of echocardiogram 2015    ejection fraction of greater than 65%, mitral and pulmonic regurgitation an physiological tricuspid regurgitation.   • History of PFTs 2015    spirometry data acceptable and reproducible; pt given 4 puffs of Ventolin; pt gave good effort; no obstruction; no Bd response; MVV reduced    • History of PFTs 2015    pt gave best effort; duoneb given prior and post study; moderate nonspecific proportional reduction of FEV1 and FVC with preserved ratio; FEV1 moderately reduced; cannot rule out restriction   • Hypertension    • Ovarian cyst    • Seizures (CMS/HCC)    • Thigh shingles        Past Surgical History:   Procedure Laterality Date   • BILATERAL BREAST REDUCTION     • BREAST SURGERY      breast reduction   •  SECTION     • LAPAROSCOPIC TUBAL LIGATION     • ORIF ANKLE FRACTURE Right    • TENSION FREE VAGINAL TAPING WITH MINI ARC SLING           Current Outpatient Medications:   •  ADVAIR DISKUS 500-50 MCG/DOSE DISKUS, Inhale 1 puff 2 (Two) Times a Day., Disp: , Rfl:   •  albuterol (PROVENTIL) (2.5 MG/3ML) 0.083% nebulizer solution, Take 2.5 mg by nebulization Every 12 (Twelve) Hours., Disp: , Rfl:   •  albuterol (VENTOLIN HFA) 108 (90 BASE) MCG/ACT inhaler, Inhale 1-2 puffs as needed. Every 4-6 hrs PRN, Disp: , Rfl:   •   amLODIPine (NORVASC) 5 MG tablet, Take 1 tablet by mouth Daily., Disp: 30 tablet, Rfl: 5  •  azelastine (OPTIVAR) 0.05 % ophthalmic solution, Administer 1 drop to both eyes 2 (Two) Times a Day., Disp: 6 mL, Rfl: 12  •  Cholecalciferol (VITAMIN D3) 5000 units capsule capsule, Take 1 capsule by mouth Daily., Disp: 30 capsule, Rfl: 3  •  ferrous sulfate 325 (65 FE) MG tablet, Take 1 tablet by mouth Daily With Breakfast., Disp: 30 tablet, Rfl: 5  •  fluticasone (FLONASE) 50 MCG/ACT nasal spray, 2 sprays into the nostril(s) as directed by provider Daily., Disp: 16 g, Rfl: 5  •  gabapentin (NEURONTIN) 400 MG capsule, Take 400 mg by mouth 3 (Three) Times a Day., Disp: , Rfl:   •  loratadine-pseudoephedrine (CLARITIN-D 12 HOUR) 5-120 MG per 12 hr tablet, Take 1 tablet by mouth 2 (Two) Times a Day., Disp: 60 tablet, Rfl: 5  •  montelukast (SINGULAIR) 10 MG tablet, Take 10 mg by mouth every night., Disp: , Rfl:   •  omeprazole (priLOSEC) 40 MG capsule, Take 1 capsule by mouth Daily. Take on an empty stomach and eat 30 minutes- 1 hr after eating., Disp: 30 capsule, Rfl: 5  •  ranitidine (ZANTAC) 300 MG capsule, Take 1 capsule by mouth Every Evening., Disp: 30 capsule, Rfl: 11  •  SPIRIVA RESPIMAT 2.5 MCG/ACT aerosol solution, Take 2 puffs by mouth Daily., Disp: , Rfl:     Allergies   Allergen Reactions   • Naproxen Swelling       Family History   Problem Relation Age of Onset   • Pancreatic cancer Maternal Grandmother    • Heart failure Paternal Grandmother    • MARCIE disease Paternal Aunt    • Arthritis Mother    • Cancer Mother    • Arthritis Father    • Diabetes Neg Hx    • Heart attack Neg Hx    • Hypertension Neg Hx    • Hyperlipidemia Neg Hx    • Mental illness Neg Hx    • Obesity Neg Hx    • Stroke Neg Hx        Social History     Socioeconomic History   • Marital status: Single     Spouse name: Not on file   • Number of children: Not on file   • Years of education: Not on file   • Highest education level: Not on file    Tobacco Use   • Smoking status: Former Smoker     Packs/day: 1.00     Years: 15.00     Pack years: 15.00     Types: Cigarettes     Last attempt to quit: 2015     Years since quittin.2   • Smokeless tobacco: Never Used   Substance and Sexual Activity   • Alcohol use: No     Comment: socially   • Drug use: No   • Sexual activity: Defer   Social History Narrative    Caffeine intake: 16 oz per day        Review of Systems   Constitutional: Negative.    HENT: Negative.    Eyes: Negative.    Respiratory: Negative.    Cardiovascular: Negative.    Gastrointestinal: Negative.    Endocrine: Negative.    Genitourinary: Negative.    Musculoskeletal: Negative.    Allergic/Immunologic: Negative.    Neurological: Negative.    Hematological: Negative.          There were no vitals filed for this visit.    Physical Exam  General Appearance:  Vitals as above. no acute distress  Head/face:  Normocephalic, atraumatic  Eyes:   EOMI, no conjunctivitis or icterus   Nose/Sinuses:  Nares patent bilaterally without discharge or lesions  Mouth/Throat:  Posterior pharynx is pink without drainage or exudates;  dentition is in good condition and repair  Neck:  trachea is midline, no thyromegaly  Neurologic:  Alert; no focal deficits; age appropriate behavior and speech  Psychiatric: mood and affect are congruent  Skin: no rash or cyanosis.  Abdomen: obese and soft/nt      Assessment/Plan  1.) GERD  2.) Functional abdominal pain    Plan will be to increase ppi back to bid dosing with intermittent zantac usage prn. If pain persists will proceed to carafate slurry.  Provided info on both low reflux diet and low fodmap diet.    rtc in 6 months.      Alf Lin MD  2019

## 2019-04-22 ENCOUNTER — HOSPITAL ENCOUNTER (OUTPATIENT)
Dept: GENERAL RADIOLOGY | Facility: HOSPITAL | Age: 39
Discharge: HOME | End: 2019-04-22
Admitting: PHYSICIAN ASSISTANT

## 2019-04-22 ENCOUNTER — OFFICE VISIT (OUTPATIENT)
Dept: INTERNAL MEDICINE | Facility: CLINIC | Age: 39
End: 2019-04-22

## 2019-04-22 VITALS
TEMPERATURE: 98.3 F | SYSTOLIC BLOOD PRESSURE: 128 MMHG | BODY MASS INDEX: 36.89 KG/M2 | DIASTOLIC BLOOD PRESSURE: 76 MMHG | OXYGEN SATURATION: 94 % | HEIGHT: 59 IN | WEIGHT: 183 LBS | HEART RATE: 95 BPM

## 2019-04-22 DIAGNOSIS — M79.672 LEFT FOOT PAIN: ICD-10-CM

## 2019-04-22 DIAGNOSIS — B37.31 VAGINAL CANDIDIASIS: ICD-10-CM

## 2019-04-22 DIAGNOSIS — B37.2 CUTANEOUS CANDIDIASIS: ICD-10-CM

## 2019-04-22 DIAGNOSIS — M25.572 ACUTE LEFT ANKLE PAIN: Primary | ICD-10-CM

## 2019-04-22 PROCEDURE — 73630 X-RAY EXAM OF FOOT: CPT

## 2019-04-22 PROCEDURE — 87661 TRICHOMONAS VAGINALIS AMPLIF: CPT | Performed by: PHYSICIAN ASSISTANT

## 2019-04-22 PROCEDURE — 99214 OFFICE O/P EST MOD 30 MIN: CPT | Performed by: NURSE PRACTITIONER

## 2019-04-22 PROCEDURE — 87798 DETECT AGENT NOS DNA AMP: CPT | Performed by: PHYSICIAN ASSISTANT

## 2019-04-22 PROCEDURE — 87591 N.GONORRHOEAE DNA AMP PROB: CPT | Performed by: PHYSICIAN ASSISTANT

## 2019-04-22 PROCEDURE — 73610 X-RAY EXAM OF ANKLE: CPT

## 2019-04-22 PROCEDURE — 87491 CHLMYD TRACH DNA AMP PROBE: CPT | Performed by: PHYSICIAN ASSISTANT

## 2019-04-22 PROCEDURE — 87801 DETECT AGNT MULT DNA AMPLI: CPT | Performed by: PHYSICIAN ASSISTANT

## 2019-04-22 RX ORDER — FLUCONAZOLE 200 MG/1
200 TABLET ORAL
Qty: 2 TABLET | Refills: 0 | Status: SHIPPED | OUTPATIENT
Start: 2019-04-22 | End: 2019-07-04 | Stop reason: HOSPADM

## 2019-04-22 RX ORDER — CLOTRIMAZOLE AND BETAMETHASONE DIPROPIONATE 10; .64 MG/G; MG/G
CREAM TOPICAL 2 TIMES DAILY
Qty: 15 G | Refills: 0 | Status: SHIPPED | OUTPATIENT
Start: 2019-04-22 | End: 2019-11-07

## 2019-04-22 NOTE — PROGRESS NOTES
Bernardo Dodd is a 38 y.o. female who presents for vaginal itching and discharge and right ankle pain.    Chief Complaint   Patient presents with   • Vaginal Itching     discharge x1 week   • Ankle Injury     both ankles, has metal in right leg       Patient twisted her both of her ankles on Friday missing a step at home. She had previously had a compound fracture of her right ankle in  with hardware fixation and that ankle is painful and swollen. Patient ended her menses 19 and after it stopped she began having a thick brown discharge, itching, and irritation of the vagina that has continued. She has had one day of cramping but no other abdominal pain or fever. She does not use tampons.           Past Medical History:   Diagnosis Date   • Allergic rhinitis    • Anemia    • Arthritis    • Asthma    • Depression    • FHx: migraine headaches    • GERD (gastroesophageal reflux disease)    • Heart murmur    • Herpes simplex    • History of chest x-ray 2015    no active disease   • History of echocardiogram 2015    ejection fraction of greater than 65%, mitral and pulmonic regurgitation an physiological tricuspid regurgitation.   • History of PFTs 2015    spirometry data acceptable and reproducible; pt given 4 puffs of Ventolin; pt gave good effort; no obstruction; no Bd response; MVV reduced    • History of PFTs 2015    pt gave best effort; duoneb given prior and post study; moderate nonspecific proportional reduction of FEV1 and FVC with preserved ratio; FEV1 moderately reduced; cannot rule out restriction   • Hypertension    • Ovarian cyst    • Seizures (CMS/HCC)    • Thigh shingles        Past Surgical History:   Procedure Laterality Date   • BILATERAL BREAST REDUCTION     • BREAST SURGERY      breast reduction   •  SECTION     • LAPAROSCOPIC TUBAL LIGATION     • ORIF ANKLE FRACTURE Right    • TENSION FREE VAGINAL TAPING WITH MINI ARC SLING         Family History  "  Problem Relation Age of Onset   • Pancreatic cancer Maternal Grandmother    • Heart failure Paternal Grandmother    • MARCIE disease Paternal Aunt    • Arthritis Mother    • Cancer Mother    • Arthritis Father    • Diabetes Neg Hx    • Heart attack Neg Hx    • Hypertension Neg Hx    • Hyperlipidemia Neg Hx    • Mental illness Neg Hx    • Obesity Neg Hx    • Stroke Neg Hx        Social History     Socioeconomic History   • Marital status: Single     Spouse name: Not on file   • Number of children: Not on file   • Years of education: Not on file   • Highest education level: Not on file   Tobacco Use   • Smoking status: Former Smoker     Packs/day: 1.00     Years: 15.00     Pack years: 15.00     Types: Cigarettes     Last attempt to quit: 2015     Years since quittin.2   • Smokeless tobacco: Never Used   Substance and Sexual Activity   • Alcohol use: No     Comment: socially   • Drug use: No   • Sexual activity: Defer   Social History Narrative    Caffeine intake: 16 oz per day        Allergies   Allergen Reactions   • Naproxen Swelling       ROS    Review of Systems   Gastrointestinal: Negative for abdominal pain, diarrhea and nausea.   Genitourinary: Positive for difficulty urinating, dysuria and vaginal discharge.   Musculoskeletal: Positive for arthralgias, back pain, gait problem and joint swelling.   All other systems reviewed and are negative.      Vitals:    19 0917   BP: 128/76   Pulse: 95   Temp: 98.3 °F (36.8 °C)   SpO2: 94%   Weight: 83 kg (183 lb)   Height: 149.9 cm (59\")   PainSc:   6         Current Outpatient Medications:   •  ADVAIR DISKUS 500-50 MCG/DOSE DISKUS, Inhale 1 puff 2 (Two) Times a Day., Disp: , Rfl:   •  albuterol (PROVENTIL) (2.5 MG/3ML) 0.083% nebulizer solution, Take 2.5 mg by nebulization Every 12 (Twelve) Hours., Disp: , Rfl:   •  albuterol (VENTOLIN HFA) 108 (90 BASE) MCG/ACT inhaler, Inhale 1-2 puffs as needed. Every 4-6 hrs PRN, Disp: , Rfl:   •  amLODIPine (NORVASC) 5 " MG tablet, Take 1 tablet by mouth Daily., Disp: 30 tablet, Rfl: 5  •  azelastine (OPTIVAR) 0.05 % ophthalmic solution, Administer 1 drop to both eyes 2 (Two) Times a Day., Disp: 6 mL, Rfl: 12  •  Cholecalciferol (VITAMIN D3) 5000 units capsule capsule, Take 1 capsule by mouth Daily., Disp: 30 capsule, Rfl: 3  •  ferrous sulfate 325 (65 FE) MG tablet, Take 1 tablet by mouth Daily With Breakfast., Disp: 30 tablet, Rfl: 5  •  fluticasone (FLONASE) 50 MCG/ACT nasal spray, 2 sprays into the nostril(s) as directed by provider Daily., Disp: 16 g, Rfl: 5  •  gabapentin (NEURONTIN) 400 MG capsule, Take 400 mg by mouth 5 (Five) Times a Day As Needed., Disp: , Rfl:   •  loratadine-pseudoephedrine (CLARITIN-D 12 HOUR) 5-120 MG per 12 hr tablet, Take 1 tablet by mouth 2 (Two) Times a Day., Disp: 60 tablet, Rfl: 5  •  montelukast (SINGULAIR) 10 MG tablet, Take 10 mg by mouth every night., Disp: , Rfl:   •  omeprazole (priLOSEC) 40 MG capsule, Take 1 capsule by mouth 2 (Two) Times a Day. Take on an empty stomach and eat 30 minutes- 1 hr after eating., Disp: 90 capsule, Rfl: 5  •  ranitidine (ZANTAC) 300 MG capsule, Take 1 capsule by mouth Every Evening., Disp: 30 capsule, Rfl: 11  •  SPIRIVA RESPIMAT 2.5 MCG/ACT aerosol solution, Take 2 puffs by mouth Daily., Disp: , Rfl:   •  clotrimazole-betamethasone (LOTRISONE) 1-0.05 % cream, Apply  topically to the appropriate area as directed 2 (Two) Times a Day., Disp: 15 g, Rfl: 0  •  fluconazole (DIFLUCAN) 200 MG tablet, Take 1 tablet by mouth Every 3 (Three) Days., Disp: 2 tablet, Rfl: 0    PE    Physical Exam   Constitutional: She is oriented to person, place, and time. She appears well-developed and well-nourished.   HENT:   Head: Normocephalic and atraumatic.   Cardiovascular: Normal rate, regular rhythm and normal heart sounds. Exam reveals no gallop and no friction rub.   No murmur heard.  Pulmonary/Chest: Effort normal and breath sounds normal. No stridor. No respiratory distress.  She has no wheezes. She has no rales.   Abdominal: Soft. Bowel sounds are normal. There is tenderness.   Genitourinary: Pelvic exam was performed with patient supine. There is rash and tenderness on the right labia. There is rash and tenderness on the left labia. Cervix exhibits discharge and pinkness. There is tenderness in the vagina. Vaginal discharge found.   Genitourinary Comments: Grayish thin discharge present. Vaginal swab obtained in sterile fashion.   Musculoskeletal:        Right ankle: She exhibits decreased range of motion, swelling and ecchymosis. Tenderness. Lateral malleolus and medial malleolus tenderness found.   Neurological: She is alert and oriented to person, place, and time.   Skin: Skin is warm and dry. Capillary refill takes less than 2 seconds.   Psychiatric: She has a normal mood and affect. Her behavior is normal. Judgment and thought content normal.   Nursing note and vitals reviewed.       A/P    Problem List Items Addressed This Visit     None      Visit Diagnoses     Acute left ankle pain    -  Primary    Relevant Orders    XR Ankle 3+ View Right (Completed)    Left foot pain        Relevant Orders    XR Foot 3+ View Right (Completed)    Vaginal candidiasis        Relevant Medications    fluconazole (DIFLUCAN) 200 MG tablet    Cutaneous candidiasis        Relevant Medications    fluconazole (DIFLUCAN) 200 MG tablet    clotrimazole-betamethasone (LOTRISONE) 1-0.05 % cream          Plan of care reviewed with patient at the conclusion of today's visit. Education was provided regarding diagnosis, management and any prescribed or recommended OTC medications.  Patient verbalizes understanding of and agreement with management plan.    Return if symptoms worsen or fail to improve.     SHANE Rowe

## 2019-04-25 ENCOUNTER — TELEPHONE (OUTPATIENT)
Dept: FAMILY MEDICINE CLINIC | Facility: CLINIC | Age: 39
End: 2019-04-25

## 2019-04-25 NOTE — TELEPHONE ENCOUNTER
----- Message from SHANE Lee sent at 4/23/2019  6:15 PM EDT -----  Please call patient and let her know that her foot and ankle xrays are normal and her joints look good.  The pain she has is probably from sprained ligaments. Rest, ice, elevation and compression if necessary (such as an ace wrap).  Thanks  ----- Message -----  From: Patty Rad [facility] [place] [facility]  Sent: 4/22/2019  10:09 PM  To: SHANE Lee

## 2019-04-26 LAB
A VAGINAE DNA VAG QL NAA+PROBE: ABNORMAL SCORE
BVAB2 DNA VAG QL NAA+PROBE: ABNORMAL SCORE
C ALBICANS DNA VAG QL NAA+PROBE: POSITIVE
C GLABRATA DNA VAG QL NAA+PROBE: NEGATIVE
C TRACH RRNA SPEC DONR QL NAA+PROBE: NEGATIVE
MEGASPHAERA 1: ABNORMAL SCORE
N GONORRHOEA DNA SPEC QL NAA+PROBE: NEGATIVE
T VAGINALIS RRNA GENITAL QL PROBE: NEGATIVE

## 2019-06-28 ENCOUNTER — APPOINTMENT (OUTPATIENT)
Dept: GENERAL RADIOLOGY | Facility: HOSPITAL | Age: 39
End: 2019-06-28

## 2019-06-28 ENCOUNTER — HOSPITAL ENCOUNTER (INPATIENT)
Facility: HOSPITAL | Age: 39
LOS: 5 days | Discharge: HOME | End: 2019-07-04
Attending: EMERGENCY MEDICINE | Admitting: INTERNAL MEDICINE

## 2019-06-28 DIAGNOSIS — N12 PYELONEPHRITIS: ICD-10-CM

## 2019-06-28 DIAGNOSIS — D50.9 IRON DEFICIENCY ANEMIA, UNSPECIFIED IRON DEFICIENCY ANEMIA TYPE: ICD-10-CM

## 2019-06-28 DIAGNOSIS — E87.6 HYPOKALEMIA: ICD-10-CM

## 2019-06-28 DIAGNOSIS — D72.829 LEUKOCYTOSIS, UNSPECIFIED TYPE: ICD-10-CM

## 2019-06-28 DIAGNOSIS — A41.9 SEPSIS, DUE TO UNSPECIFIED ORGANISM: Primary | ICD-10-CM

## 2019-06-28 LAB
ALBUMIN SERPL-MCNC: 4 G/DL (ref 3.5–5.2)
ALBUMIN/GLOB SERPL: 1.1 G/DL
ALP SERPL-CCNC: 78 U/L (ref 39–117)
ALT SERPL W P-5'-P-CCNC: 13 U/L (ref 1–33)
ANION GAP SERPL CALCULATED.3IONS-SCNC: 16 MMOL/L (ref 5–15)
AST SERPL-CCNC: 13 U/L (ref 1–32)
BILIRUB SERPL-MCNC: 0.5 MG/DL (ref 0.2–1.2)
BUN BLD-MCNC: 11 MG/DL (ref 6–20)
BUN/CREAT SERPL: 11.8 (ref 7–25)
CALCIUM SPEC-SCNC: 9.2 MG/DL (ref 8.6–10.5)
CHLORIDE SERPL-SCNC: 94 MMOL/L (ref 98–107)
CO2 SERPL-SCNC: 27 MMOL/L (ref 22–29)
CREAT BLD-MCNC: 0.93 MG/DL (ref 0.57–1)
D-LACTATE SERPL-SCNC: 1.3 MMOL/L (ref 0.5–2)
FLUAV AG NPH QL: NEGATIVE
FLUBV AG NPH QL IA: NEGATIVE
GFR SERPL CREATININE-BSD FRML MDRD: 81 ML/MIN/1.73
GLOBULIN UR ELPH-MCNC: 3.8 GM/DL
GLUCOSE BLD-MCNC: 114 MG/DL (ref 65–99)
POTASSIUM BLD-SCNC: 3 MMOL/L (ref 3.5–5.2)
PROCALCITONIN SERPL-MCNC: 2.69 NG/ML (ref 0.1–0.25)
PROT SERPL-MCNC: 7.8 G/DL (ref 6–8.5)
SODIUM BLD-SCNC: 137 MMOL/L (ref 136–145)

## 2019-06-28 PROCEDURE — P9612 CATHETERIZE FOR URINE SPEC: HCPCS

## 2019-06-28 PROCEDURE — 84145 PROCALCITONIN (PCT): CPT | Performed by: EMERGENCY MEDICINE

## 2019-06-28 PROCEDURE — 71045 X-RAY EXAM CHEST 1 VIEW: CPT

## 2019-06-28 PROCEDURE — 83605 ASSAY OF LACTIC ACID: CPT | Performed by: EMERGENCY MEDICINE

## 2019-06-28 PROCEDURE — 80053 COMPREHEN METABOLIC PANEL: CPT | Performed by: EMERGENCY MEDICINE

## 2019-06-28 PROCEDURE — 85007 BL SMEAR W/DIFF WBC COUNT: CPT | Performed by: EMERGENCY MEDICINE

## 2019-06-28 PROCEDURE — 87804 INFLUENZA ASSAY W/OPTIC: CPT | Performed by: EMERGENCY MEDICINE

## 2019-06-28 PROCEDURE — 93005 ELECTROCARDIOGRAM TRACING: CPT | Performed by: EMERGENCY MEDICINE

## 2019-06-28 PROCEDURE — 85025 COMPLETE CBC W/AUTO DIFF WBC: CPT | Performed by: EMERGENCY MEDICINE

## 2019-06-28 PROCEDURE — 87040 BLOOD CULTURE FOR BACTERIA: CPT | Performed by: EMERGENCY MEDICINE

## 2019-06-28 PROCEDURE — 99285 EMERGENCY DEPT VISIT HI MDM: CPT

## 2019-06-28 RX ORDER — ACETAMINOPHEN 500 MG
1000 TABLET ORAL ONCE
Status: COMPLETED | OUTPATIENT
Start: 2019-06-28 | End: 2019-06-28

## 2019-06-28 RX ORDER — LAMOTRIGINE 100 MG/1
100 TABLET ORAL DAILY
COMMUNITY
End: 2019-12-13 | Stop reason: SDUPTHER

## 2019-06-28 RX ORDER — ACETAMINOPHEN 500 MG
1000 TABLET ORAL ONCE
Status: DISCONTINUED | OUTPATIENT
Start: 2019-06-28 | End: 2019-07-04 | Stop reason: HOSPADM

## 2019-06-28 RX ORDER — SODIUM CHLORIDE 0.9 % (FLUSH) 0.9 %
10 SYRINGE (ML) INJECTION AS NEEDED
Status: DISCONTINUED | OUTPATIENT
Start: 2019-06-28 | End: 2019-07-04 | Stop reason: HOSPADM

## 2019-06-28 RX ADMIN — ACETAMINOPHEN 1000 MG: 500 TABLET, FILM COATED ORAL at 23:22

## 2019-06-28 RX ADMIN — SODIUM CHLORIDE 1000 ML: 9 INJECTION, SOLUTION INTRAVENOUS at 23:53

## 2019-06-29 ENCOUNTER — APPOINTMENT (OUTPATIENT)
Dept: CT IMAGING | Facility: HOSPITAL | Age: 39
End: 2019-06-29

## 2019-06-29 ENCOUNTER — APPOINTMENT (OUTPATIENT)
Dept: ULTRASOUND IMAGING | Facility: HOSPITAL | Age: 39
End: 2019-06-29

## 2019-06-29 PROBLEM — E87.6 HYPOKALEMIA: Status: ACTIVE | Noted: 2019-06-29

## 2019-06-29 PROBLEM — A41.9 SEPSIS (HCC): Status: ACTIVE | Noted: 2019-06-29

## 2019-06-29 PROBLEM — N39.0 UTI (URINARY TRACT INFECTION): Status: ACTIVE | Noted: 2019-06-29

## 2019-06-29 PROBLEM — N12 PYELONEPHRITIS: Status: ACTIVE | Noted: 2019-06-29

## 2019-06-29 LAB
ANION GAP SERPL CALCULATED.3IONS-SCNC: 13 MMOL/L (ref 5–15)
BACTERIA UR QL AUTO: ABNORMAL /HPF
BASOPHILS # BLD AUTO: 0.03 10*3/MM3 (ref 0–0.2)
BASOPHILS NFR BLD AUTO: 0.2 % (ref 0–1.5)
BILIRUB UR QL STRIP: ABNORMAL
BUN BLD-MCNC: 10 MG/DL (ref 6–20)
BUN/CREAT SERPL: 12.8 (ref 7–25)
CALCIUM SPEC-SCNC: 7.9 MG/DL (ref 8.6–10.5)
CHLORIDE SERPL-SCNC: 100 MMOL/L (ref 98–107)
CLARITY UR: ABNORMAL
CO2 SERPL-SCNC: 22 MMOL/L (ref 22–29)
COLOR UR: ABNORMAL
CREAT BLD-MCNC: 0.78 MG/DL (ref 0.57–1)
DEPRECATED RDW RBC AUTO: 42.1 FL (ref 37–54)
DEPRECATED RDW RBC AUTO: 43.3 FL (ref 37–54)
EOSINOPHIL # BLD AUTO: 0.06 10*3/MM3 (ref 0–0.4)
EOSINOPHIL NFR BLD AUTO: 0.4 % (ref 0.3–6.2)
ERYTHROCYTE [DISTWIDTH] IN BLOOD BY AUTOMATED COUNT: 13.5 % (ref 12.3–15.4)
ERYTHROCYTE [DISTWIDTH] IN BLOOD BY AUTOMATED COUNT: 13.7 % (ref 12.3–15.4)
GFR SERPL CREATININE-BSD FRML MDRD: 99 ML/MIN/1.73
GLUCOSE BLD-MCNC: 98 MG/DL (ref 65–99)
GLUCOSE UR STRIP-MCNC: NEGATIVE MG/DL
HCT VFR BLD AUTO: 34.6 % (ref 34–46.6)
HCT VFR BLD AUTO: 40.7 % (ref 34–46.6)
HGB BLD-MCNC: 11 G/DL (ref 12–15.9)
HGB BLD-MCNC: 13.1 G/DL (ref 12–15.9)
HGB UR QL STRIP.AUTO: ABNORMAL
HOLD SPECIMEN: NORMAL
HOLD SPECIMEN: NORMAL
IMM GRANULOCYTES # BLD AUTO: 0.09 10*3/MM3 (ref 0–0.05)
IMM GRANULOCYTES NFR BLD AUTO: 0.5 % (ref 0–0.5)
KETONES UR QL STRIP: ABNORMAL
LEUKOCYTE ESTERASE UR QL STRIP.AUTO: ABNORMAL
LYMPHOCYTES # BLD AUTO: 1.34 10*3/MM3 (ref 0.7–3.1)
LYMPHOCYTES NFR BLD AUTO: 8 % (ref 19.6–45.3)
MAGNESIUM SERPL-MCNC: 2.1 MG/DL (ref 1.6–2.6)
MCH RBC QN AUTO: 27.2 PG (ref 26.6–33)
MCH RBC QN AUTO: 27.4 PG (ref 26.6–33)
MCHC RBC AUTO-ENTMCNC: 31.8 G/DL (ref 31.5–35.7)
MCHC RBC AUTO-ENTMCNC: 32.2 G/DL (ref 31.5–35.7)
MCV RBC AUTO: 84.6 FL (ref 79–97)
MCV RBC AUTO: 86.3 FL (ref 79–97)
MONOCYTES # BLD AUTO: 1.28 10*3/MM3 (ref 0.1–0.9)
MONOCYTES NFR BLD AUTO: 7.7 % (ref 5–12)
NEUTROPHILS # BLD AUTO: 13.85 10*3/MM3 (ref 1.7–7)
NEUTROPHILS NFR BLD AUTO: 83.2 % (ref 42.7–76)
NITRITE UR QL STRIP: NEGATIVE
NRBC BLD AUTO-RTO: 0 /100 WBC (ref 0–0.2)
PH UR STRIP.AUTO: <=5 [PH] (ref 5–8)
PLATELET # BLD AUTO: 229 10*3/MM3 (ref 140–450)
PLATELET # BLD AUTO: 263 10*3/MM3 (ref 140–450)
PMV BLD AUTO: 10.5 FL (ref 6–12)
PMV BLD AUTO: 10.9 FL (ref 6–12)
POTASSIUM BLD-SCNC: 3 MMOL/L (ref 3.5–5.2)
PROT UR QL STRIP: ABNORMAL
RBC # BLD AUTO: 4.01 10*6/MM3 (ref 3.77–5.28)
RBC # BLD AUTO: 4.81 10*6/MM3 (ref 3.77–5.28)
RBC # UR: ABNORMAL /HPF
REF LAB TEST METHOD: ABNORMAL
SODIUM BLD-SCNC: 135 MMOL/L (ref 136–145)
SP GR UR STRIP: 1.02 (ref 1–1.03)
SQUAMOUS #/AREA URNS HPF: ABNORMAL /HPF
UROBILINOGEN UR QL STRIP: ABNORMAL
WBC NRBC COR # BLD: 12.23 10*3/MM3 (ref 3.4–10.8)
WBC NRBC COR # BLD: 16.65 10*3/MM3 (ref 3.4–10.8)
WBC UR QL AUTO: ABNORMAL /HPF
WHOLE BLOOD HOLD SPECIMEN: NORMAL
WHOLE BLOOD HOLD SPECIMEN: NORMAL

## 2019-06-29 PROCEDURE — 81001 URINALYSIS AUTO W/SCOPE: CPT | Performed by: EMERGENCY MEDICINE

## 2019-06-29 PROCEDURE — 71275 CT ANGIOGRAPHY CHEST: CPT

## 2019-06-29 PROCEDURE — 99223 1ST HOSP IP/OBS HIGH 75: CPT | Performed by: INTERNAL MEDICINE

## 2019-06-29 PROCEDURE — 25010000002 ENOXAPARIN PER 10 MG: Performed by: INTERNAL MEDICINE

## 2019-06-29 PROCEDURE — 87086 URINE CULTURE/COLONY COUNT: CPT | Performed by: INTERNAL MEDICINE

## 2019-06-29 PROCEDURE — 76775 US EXAM ABDO BACK WALL LIM: CPT

## 2019-06-29 PROCEDURE — 85027 COMPLETE CBC AUTOMATED: CPT | Performed by: INTERNAL MEDICINE

## 2019-06-29 PROCEDURE — 87186 SC STD MICRODIL/AGAR DIL: CPT | Performed by: INTERNAL MEDICINE

## 2019-06-29 PROCEDURE — 94640 AIRWAY INHALATION TREATMENT: CPT

## 2019-06-29 PROCEDURE — 94799 UNLISTED PULMONARY SVC/PX: CPT

## 2019-06-29 PROCEDURE — 83735 ASSAY OF MAGNESIUM: CPT | Performed by: HOSPITALIST

## 2019-06-29 PROCEDURE — 80048 BASIC METABOLIC PNL TOTAL CA: CPT | Performed by: INTERNAL MEDICINE

## 2019-06-29 PROCEDURE — 25010000003 POTASSIUM CHLORIDE 10 MEQ/100ML SOLUTION: Performed by: EMERGENCY MEDICINE

## 2019-06-29 PROCEDURE — 25010000002 CEFTRIAXONE PER 250 MG: Performed by: EMERGENCY MEDICINE

## 2019-06-29 PROCEDURE — 0 IOPAMIDOL PER 1 ML: Performed by: EMERGENCY MEDICINE

## 2019-06-29 PROCEDURE — 25010000002 ONDANSETRON PER 1 MG: Performed by: INTERNAL MEDICINE

## 2019-06-29 RX ORDER — FERROUS SULFATE 325(65) MG
325 TABLET ORAL
Status: DISCONTINUED | OUTPATIENT
Start: 2019-06-29 | End: 2019-07-04 | Stop reason: HOSPADM

## 2019-06-29 RX ORDER — LAMOTRIGINE 100 MG/1
100 TABLET ORAL DAILY
Status: DISCONTINUED | OUTPATIENT
Start: 2019-06-29 | End: 2019-07-04 | Stop reason: HOSPADM

## 2019-06-29 RX ORDER — SENNA AND DOCUSATE SODIUM 50; 8.6 MG/1; MG/1
2 TABLET, FILM COATED ORAL NIGHTLY PRN
Status: DISCONTINUED | OUTPATIENT
Start: 2019-06-29 | End: 2019-07-04 | Stop reason: HOSPADM

## 2019-06-29 RX ORDER — ONDANSETRON 2 MG/ML
4 INJECTION INTRAMUSCULAR; INTRAVENOUS EVERY 6 HOURS PRN
Status: DISCONTINUED | OUTPATIENT
Start: 2019-06-29 | End: 2019-07-04 | Stop reason: HOSPADM

## 2019-06-29 RX ORDER — MAGNESIUM SULFATE HEPTAHYDRATE 40 MG/ML
4 INJECTION, SOLUTION INTRAVENOUS AS NEEDED
Status: DISCONTINUED | OUTPATIENT
Start: 2019-06-29 | End: 2019-07-02 | Stop reason: SDUPTHER

## 2019-06-29 RX ORDER — FLUTICASONE PROPIONATE 50 MCG
2 SPRAY, SUSPENSION (ML) NASAL DAILY
Status: DISCONTINUED | OUTPATIENT
Start: 2019-06-29 | End: 2019-07-04 | Stop reason: HOSPADM

## 2019-06-29 RX ORDER — AMITRIPTYLINE HYDROCHLORIDE 50 MG/1
25 TABLET, FILM COATED ORAL NIGHTLY
Status: DISCONTINUED | OUTPATIENT
Start: 2019-06-29 | End: 2019-07-04 | Stop reason: HOSPADM

## 2019-06-29 RX ORDER — ALBUTEROL SULFATE 2.5 MG/3ML
2.5 SOLUTION RESPIRATORY (INHALATION) EVERY 6 HOURS PRN
Status: DISCONTINUED | OUTPATIENT
Start: 2019-06-29 | End: 2019-07-04 | Stop reason: HOSPADM

## 2019-06-29 RX ORDER — SODIUM CHLORIDE 0.9 % (FLUSH) 0.9 %
3 SYRINGE (ML) INJECTION EVERY 12 HOURS SCHEDULED
Status: DISCONTINUED | OUTPATIENT
Start: 2019-06-29 | End: 2019-07-04 | Stop reason: HOSPADM

## 2019-06-29 RX ORDER — POTASSIUM CHLORIDE 750 MG/1
40 CAPSULE, EXTENDED RELEASE ORAL AS NEEDED
Status: DISCONTINUED | OUTPATIENT
Start: 2019-06-29 | End: 2019-07-02 | Stop reason: SDUPTHER

## 2019-06-29 RX ORDER — ONDANSETRON 4 MG/1
4 TABLET, FILM COATED ORAL EVERY 6 HOURS PRN
Status: DISCONTINUED | OUTPATIENT
Start: 2019-06-29 | End: 2019-07-04 | Stop reason: HOSPADM

## 2019-06-29 RX ORDER — GABAPENTIN 400 MG/1
400 CAPSULE ORAL EVERY 8 HOURS SCHEDULED
Status: DISCONTINUED | OUTPATIENT
Start: 2019-06-29 | End: 2019-07-04 | Stop reason: HOSPADM

## 2019-06-29 RX ORDER — CHOLECALCIFEROL (VITAMIN D3) 125 MCG
5 CAPSULE ORAL NIGHTLY PRN
Status: DISCONTINUED | OUTPATIENT
Start: 2019-06-29 | End: 2019-07-04 | Stop reason: HOSPADM

## 2019-06-29 RX ORDER — POTASSIUM CHLORIDE 7.45 MG/ML
10 INJECTION INTRAVENOUS
Status: DISCONTINUED | OUTPATIENT
Start: 2019-06-29 | End: 2019-07-02 | Stop reason: SDUPTHER

## 2019-06-29 RX ORDER — HYDROCODONE BITARTRATE AND ACETAMINOPHEN 5; 325 MG/1; MG/1
1 TABLET ORAL EVERY 6 HOURS PRN
Status: DISCONTINUED | OUTPATIENT
Start: 2019-06-29 | End: 2019-07-04 | Stop reason: HOSPADM

## 2019-06-29 RX ORDER — SODIUM CHLORIDE 9 MG/ML
200 INJECTION, SOLUTION INTRAVENOUS CONTINUOUS
Status: DISCONTINUED | OUTPATIENT
Start: 2019-06-29 | End: 2019-07-01

## 2019-06-29 RX ORDER — SODIUM CHLORIDE 0.9 % (FLUSH) 0.9 %
3-10 SYRINGE (ML) INJECTION AS NEEDED
Status: DISCONTINUED | OUTPATIENT
Start: 2019-06-29 | End: 2019-07-04 | Stop reason: HOSPADM

## 2019-06-29 RX ORDER — KETOTIFEN FUMARATE 0.35 MG/ML
1 SOLUTION/ DROPS OPHTHALMIC 2 TIMES DAILY
Status: DISCONTINUED | OUTPATIENT
Start: 2019-06-29 | End: 2019-07-04 | Stop reason: HOSPADM

## 2019-06-29 RX ORDER — PANTOPRAZOLE SODIUM 40 MG/1
40 TABLET, DELAYED RELEASE ORAL EVERY MORNING
Status: DISCONTINUED | OUTPATIENT
Start: 2019-06-29 | End: 2019-07-04 | Stop reason: HOSPADM

## 2019-06-29 RX ORDER — ACETAMINOPHEN 325 MG/1
650 TABLET ORAL EVERY 4 HOURS PRN
Status: DISCONTINUED | OUTPATIENT
Start: 2019-06-29 | End: 2019-07-04 | Stop reason: HOSPADM

## 2019-06-29 RX ORDER — MAGNESIUM SULFATE HEPTAHYDRATE 40 MG/ML
2 INJECTION, SOLUTION INTRAVENOUS AS NEEDED
Status: DISCONTINUED | OUTPATIENT
Start: 2019-06-29 | End: 2019-07-02 | Stop reason: SDUPTHER

## 2019-06-29 RX ORDER — MONTELUKAST SODIUM 10 MG/1
10 TABLET ORAL NIGHTLY
Status: DISCONTINUED | OUTPATIENT
Start: 2019-06-29 | End: 2019-07-04 | Stop reason: HOSPADM

## 2019-06-29 RX ORDER — POTASSIUM CHLORIDE 7.45 MG/ML
10 INJECTION INTRAVENOUS ONCE
Status: COMPLETED | OUTPATIENT
Start: 2019-06-29 | End: 2019-06-29

## 2019-06-29 RX ORDER — POTASSIUM CHLORIDE 1.5 G/1.77G
40 POWDER, FOR SOLUTION ORAL AS NEEDED
Status: DISCONTINUED | OUTPATIENT
Start: 2019-06-29 | End: 2019-07-02 | Stop reason: SDUPTHER

## 2019-06-29 RX ORDER — FAMOTIDINE 20 MG/1
20 TABLET, FILM COATED ORAL
Status: DISCONTINUED | OUTPATIENT
Start: 2019-06-29 | End: 2019-07-04 | Stop reason: HOSPADM

## 2019-06-29 RX ORDER — POTASSIUM CHLORIDE 750 MG/1
20 CAPSULE, EXTENDED RELEASE ORAL ONCE
Status: COMPLETED | OUTPATIENT
Start: 2019-06-29 | End: 2019-06-29

## 2019-06-29 RX ADMIN — CEFTRIAXONE SODIUM 1 G: 1 INJECTION, POWDER, FOR SOLUTION INTRAMUSCULAR; INTRAVENOUS at 23:22

## 2019-06-29 RX ADMIN — GABAPENTIN 400 MG: 400 CAPSULE ORAL at 05:30

## 2019-06-29 RX ADMIN — LAMOTRIGINE 100 MG: 100 TABLET ORAL at 08:54

## 2019-06-29 RX ADMIN — GABAPENTIN 400 MG: 400 CAPSULE ORAL at 20:36

## 2019-06-29 RX ADMIN — CEFTRIAXONE SODIUM 1 G: 1 INJECTION, POWDER, FOR SOLUTION INTRAMUSCULAR; INTRAVENOUS at 00:48

## 2019-06-29 RX ADMIN — IPRATROPIUM BROMIDE 0.5 MG: 0.5 SOLUTION RESPIRATORY (INHALATION) at 08:25

## 2019-06-29 RX ADMIN — FAMOTIDINE 20 MG: 20 TABLET ORAL at 17:44

## 2019-06-29 RX ADMIN — POTASSIUM CHLORIDE 40 MEQ: 750 CAPSULE, EXTENDED RELEASE ORAL at 15:11

## 2019-06-29 RX ADMIN — SODIUM CHLORIDE, PRESERVATIVE FREE 3 ML: 5 INJECTION INTRAVENOUS at 10:34

## 2019-06-29 RX ADMIN — PANTOPRAZOLE SODIUM 40 MG: 40 TABLET, DELAYED RELEASE ORAL at 05:30

## 2019-06-29 RX ADMIN — ACETAMINOPHEN 650 MG: 325 TABLET, FILM COATED ORAL at 23:22

## 2019-06-29 RX ADMIN — GABAPENTIN 400 MG: 400 CAPSULE ORAL at 15:11

## 2019-06-29 RX ADMIN — ENOXAPARIN SODIUM 40 MG: 40 INJECTION SUBCUTANEOUS at 05:30

## 2019-06-29 RX ADMIN — POTASSIUM CHLORIDE 40 MEQ: 750 CAPSULE, EXTENDED RELEASE ORAL at 08:54

## 2019-06-29 RX ADMIN — HYDROCODONE BITARTRATE AND ACETAMINOPHEN 1 TABLET: 5; 325 TABLET ORAL at 17:44

## 2019-06-29 RX ADMIN — POTASSIUM CHLORIDE 20 MEQ: 750 CAPSULE, EXTENDED RELEASE ORAL at 00:47

## 2019-06-29 RX ADMIN — POTASSIUM CHLORIDE 10 MEQ: 7.46 INJECTION, SOLUTION INTRAVENOUS at 01:35

## 2019-06-29 RX ADMIN — IPRATROPIUM BROMIDE 0.5 MG: 0.5 SOLUTION RESPIRATORY (INHALATION) at 19:27

## 2019-06-29 RX ADMIN — SODIUM CHLORIDE 1000 ML: 9 INJECTION, SOLUTION INTRAVENOUS at 01:40

## 2019-06-29 RX ADMIN — HYDROCODONE BITARTRATE AND ACETAMINOPHEN 1 TABLET: 5; 325 TABLET ORAL at 02:24

## 2019-06-29 RX ADMIN — IOPAMIDOL 95 ML: 755 INJECTION, SOLUTION INTRAVENOUS at 01:33

## 2019-06-29 RX ADMIN — SODIUM CHLORIDE, PRESERVATIVE FREE 3 ML: 5 INJECTION INTRAVENOUS at 20:40

## 2019-06-29 RX ADMIN — SODIUM CHLORIDE 125 ML/HR: 9 INJECTION, SOLUTION INTRAVENOUS at 12:41

## 2019-06-29 RX ADMIN — HYDROCODONE BITARTRATE AND ACETAMINOPHEN 1 TABLET: 5; 325 TABLET ORAL at 10:32

## 2019-06-29 RX ADMIN — SODIUM CHLORIDE 200 ML/HR: 9 INJECTION, SOLUTION INTRAVENOUS at 18:49

## 2019-06-29 RX ADMIN — AMITRIPTYLINE HYDROCHLORIDE 25 MG: 50 TABLET, FILM COATED ORAL at 20:37

## 2019-06-29 RX ADMIN — SODIUM CHLORIDE 125 ML/HR: 9 INJECTION, SOLUTION INTRAVENOUS at 04:06

## 2019-06-29 RX ADMIN — MONTELUKAST SODIUM 10 MG: 10 TABLET, COATED ORAL at 20:37

## 2019-06-29 RX ADMIN — IPRATROPIUM BROMIDE 0.5 MG: 0.5 SOLUTION RESPIRATORY (INHALATION) at 13:19

## 2019-06-29 RX ADMIN — FERROUS SULFATE TAB 325 MG (65 MG ELEMENTAL FE) 325 MG: 325 (65 FE) TAB at 08:54

## 2019-06-29 RX ADMIN — ONDANSETRON 4 MG: 2 INJECTION INTRAMUSCULAR; INTRAVENOUS at 10:32

## 2019-06-29 RX ADMIN — FAMOTIDINE 20 MG: 20 TABLET ORAL at 08:54

## 2019-06-29 RX ADMIN — POTASSIUM CHLORIDE 40 MEQ: 750 CAPSULE, EXTENDED RELEASE ORAL at 19:05

## 2019-06-29 RX ADMIN — MELATONIN TAB 5 MG 5 MG: 5 TAB at 20:37

## 2019-06-30 LAB
ANION GAP SERPL CALCULATED.3IONS-SCNC: 10 MMOL/L (ref 5–15)
BACTERIA UR QL AUTO: ABNORMAL /HPF
BILIRUB UR QL STRIP: NEGATIVE
BUN BLD-MCNC: 6 MG/DL (ref 6–20)
BUN/CREAT SERPL: 9.4 (ref 7–25)
CALCIUM SPEC-SCNC: 8 MG/DL (ref 8.6–10.5)
CHLORIDE SERPL-SCNC: 107 MMOL/L (ref 98–107)
CLARITY UR: ABNORMAL
CO2 SERPL-SCNC: 22 MMOL/L (ref 22–29)
COLOR UR: YELLOW
CREAT BLD-MCNC: 0.64 MG/DL (ref 0.57–1)
CRP SERPL-MCNC: 12.26 MG/DL (ref 0–0.5)
DEPRECATED RDW RBC AUTO: 45.7 FL (ref 37–54)
ERYTHROCYTE [DISTWIDTH] IN BLOOD BY AUTOMATED COUNT: 14 % (ref 12.3–15.4)
ERYTHROCYTE [SEDIMENTATION RATE] IN BLOOD: 60 MM/HR (ref 0–20)
GFR SERPL CREATININE-BSD FRML MDRD: 125 ML/MIN/1.73
GLUCOSE BLD-MCNC: 95 MG/DL (ref 65–99)
GLUCOSE UR STRIP-MCNC: NEGATIVE MG/DL
HCT VFR BLD AUTO: 32.8 % (ref 34–46.6)
HGB BLD-MCNC: 10 G/DL (ref 12–15.9)
HGB UR QL STRIP.AUTO: NEGATIVE
HYALINE CASTS UR QL AUTO: ABNORMAL /LPF
KETONES UR QL STRIP: NEGATIVE
LEUKOCYTE ESTERASE UR QL STRIP.AUTO: ABNORMAL
MAGNESIUM SERPL-MCNC: 2.1 MG/DL (ref 1.6–2.6)
MCH RBC QN AUTO: 27.1 PG (ref 26.6–33)
MCHC RBC AUTO-ENTMCNC: 30.5 G/DL (ref 31.5–35.7)
MCV RBC AUTO: 88.9 FL (ref 79–97)
NITRITE UR QL STRIP: NEGATIVE
PH UR STRIP.AUTO: 6 [PH] (ref 5–8)
PHOSPHATE SERPL-MCNC: 1.6 MG/DL (ref 2.5–4.5)
PLATELET # BLD AUTO: 205 10*3/MM3 (ref 140–450)
PMV BLD AUTO: 10.8 FL (ref 6–12)
POTASSIUM BLD-SCNC: 4.1 MMOL/L (ref 3.5–5.2)
PROCALCITONIN SERPL-MCNC: 1.39 NG/ML (ref 0.1–0.25)
PROT UR QL STRIP: NEGATIVE
RBC # BLD AUTO: 3.69 10*6/MM3 (ref 3.77–5.28)
RBC # UR: ABNORMAL /HPF
REF LAB TEST METHOD: ABNORMAL
SODIUM BLD-SCNC: 139 MMOL/L (ref 136–145)
SP GR UR STRIP: 1.01 (ref 1–1.03)
SQUAMOUS #/AREA URNS HPF: ABNORMAL /HPF
UROBILINOGEN UR QL STRIP: ABNORMAL
WBC NRBC COR # BLD: 6.44 10*3/MM3 (ref 3.4–10.8)
WBC UR QL AUTO: ABNORMAL /HPF

## 2019-06-30 PROCEDURE — 25010000002 CEFTRIAXONE PER 250 MG: Performed by: INTERNAL MEDICINE

## 2019-06-30 PROCEDURE — 94799 UNLISTED PULMONARY SVC/PX: CPT

## 2019-06-30 PROCEDURE — 25010000002 ENOXAPARIN PER 10 MG: Performed by: INTERNAL MEDICINE

## 2019-06-30 PROCEDURE — 85652 RBC SED RATE AUTOMATED: CPT | Performed by: HOSPITALIST

## 2019-06-30 PROCEDURE — 84100 ASSAY OF PHOSPHORUS: CPT | Performed by: HOSPITALIST

## 2019-06-30 PROCEDURE — 99233 SBSQ HOSP IP/OBS HIGH 50: CPT | Performed by: INTERNAL MEDICINE

## 2019-06-30 PROCEDURE — 84145 PROCALCITONIN (PCT): CPT | Performed by: HOSPITALIST

## 2019-06-30 PROCEDURE — 83735 ASSAY OF MAGNESIUM: CPT | Performed by: HOSPITALIST

## 2019-06-30 PROCEDURE — 87086 URINE CULTURE/COLONY COUNT: CPT | Performed by: INTERNAL MEDICINE

## 2019-06-30 PROCEDURE — 80048 BASIC METABOLIC PNL TOTAL CA: CPT | Performed by: HOSPITALIST

## 2019-06-30 PROCEDURE — 86140 C-REACTIVE PROTEIN: CPT | Performed by: HOSPITALIST

## 2019-06-30 PROCEDURE — 85027 COMPLETE CBC AUTOMATED: CPT | Performed by: HOSPITALIST

## 2019-06-30 PROCEDURE — 81001 URINALYSIS AUTO W/SCOPE: CPT | Performed by: INTERNAL MEDICINE

## 2019-06-30 PROCEDURE — 84100 ASSAY OF PHOSPHORUS: CPT | Performed by: INTERNAL MEDICINE

## 2019-06-30 RX ORDER — TAMSULOSIN HYDROCHLORIDE 0.4 MG/1
0.4 CAPSULE ORAL DAILY
Status: DISCONTINUED | OUTPATIENT
Start: 2019-06-30 | End: 2019-07-04 | Stop reason: HOSPADM

## 2019-06-30 RX ADMIN — LAMOTRIGINE 100 MG: 100 TABLET ORAL at 08:21

## 2019-06-30 RX ADMIN — TAMSULOSIN HYDROCHLORIDE 0.4 MG: 0.4 CAPSULE ORAL at 13:53

## 2019-06-30 RX ADMIN — SODIUM CHLORIDE 200 ML/HR: 9 INJECTION, SOLUTION INTRAVENOUS at 21:29

## 2019-06-30 RX ADMIN — IPRATROPIUM BROMIDE 0.5 MG: 0.5 SOLUTION RESPIRATORY (INHALATION) at 07:16

## 2019-06-30 RX ADMIN — ENOXAPARIN SODIUM 40 MG: 40 INJECTION SUBCUTANEOUS at 05:47

## 2019-06-30 RX ADMIN — IPRATROPIUM BROMIDE 0.5 MG: 0.5 SOLUTION RESPIRATORY (INHALATION) at 15:06

## 2019-06-30 RX ADMIN — GABAPENTIN 400 MG: 400 CAPSULE ORAL at 21:29

## 2019-06-30 RX ADMIN — GABAPENTIN 400 MG: 400 CAPSULE ORAL at 05:47

## 2019-06-30 RX ADMIN — ACETAMINOPHEN 650 MG: 325 TABLET, FILM COATED ORAL at 19:43

## 2019-06-30 RX ADMIN — PANTOPRAZOLE SODIUM 40 MG: 40 TABLET, DELAYED RELEASE ORAL at 05:47

## 2019-06-30 RX ADMIN — ACETAMINOPHEN 650 MG: 325 TABLET, FILM COATED ORAL at 10:29

## 2019-06-30 RX ADMIN — FERROUS SULFATE TAB 325 MG (65 MG ELEMENTAL FE) 325 MG: 325 (65 FE) TAB at 08:21

## 2019-06-30 RX ADMIN — CEFTRIAXONE SODIUM 1 G: 1 INJECTION, POWDER, FOR SOLUTION INTRAMUSCULAR; INTRAVENOUS at 23:13

## 2019-06-30 RX ADMIN — IPRATROPIUM BROMIDE 0.5 MG: 0.5 SOLUTION RESPIRATORY (INHALATION) at 11:12

## 2019-06-30 RX ADMIN — AMITRIPTYLINE HYDROCHLORIDE 25 MG: 50 TABLET, FILM COATED ORAL at 21:29

## 2019-06-30 RX ADMIN — FAMOTIDINE 20 MG: 20 TABLET ORAL at 17:57

## 2019-06-30 RX ADMIN — GABAPENTIN 400 MG: 400 CAPSULE ORAL at 13:53

## 2019-06-30 RX ADMIN — SODIUM CHLORIDE, PRESERVATIVE FREE 3 ML: 5 INJECTION INTRAVENOUS at 08:21

## 2019-06-30 RX ADMIN — POTASSIUM & SODIUM PHOSPHATES POWDER PACK 280-160-250 MG 2 PACKET: 280-160-250 PACK at 17:56

## 2019-06-30 RX ADMIN — MONTELUKAST SODIUM 10 MG: 10 TABLET, COATED ORAL at 21:29

## 2019-06-30 RX ADMIN — HYDROCODONE BITARTRATE AND ACETAMINOPHEN 1 TABLET: 5; 325 TABLET ORAL at 17:57

## 2019-06-30 RX ADMIN — FAMOTIDINE 20 MG: 20 TABLET ORAL at 05:47

## 2019-06-30 RX ADMIN — IPRATROPIUM BROMIDE 0.5 MG: 0.5 SOLUTION RESPIRATORY (INHALATION) at 19:46

## 2019-07-01 LAB
ANION GAP SERPL CALCULATED.3IONS-SCNC: 13 MMOL/L (ref 5–15)
BACTERIA SPEC AEROBE CULT: NO GROWTH
BUN BLD-MCNC: 4 MG/DL (ref 6–20)
BUN/CREAT SERPL: 6.8 (ref 7–25)
CALCIUM SPEC-SCNC: 8.2 MG/DL (ref 8.6–10.5)
CHLORIDE SERPL-SCNC: 104 MMOL/L (ref 98–107)
CO2 SERPL-SCNC: 23 MMOL/L (ref 22–29)
CREAT BLD-MCNC: 0.59 MG/DL (ref 0.57–1)
DEPRECATED RDW RBC AUTO: 45.9 FL (ref 37–54)
ERYTHROCYTE [DISTWIDTH] IN BLOOD BY AUTOMATED COUNT: 14.2 % (ref 12.3–15.4)
GFR SERPL CREATININE-BSD FRML MDRD: 137 ML/MIN/1.73
GLUCOSE BLD-MCNC: 76 MG/DL (ref 65–99)
HCT VFR BLD AUTO: 33.5 % (ref 34–46.6)
HGB BLD-MCNC: 10.3 G/DL (ref 12–15.9)
MCH RBC QN AUTO: 26.9 PG (ref 26.6–33)
MCHC RBC AUTO-ENTMCNC: 30.7 G/DL (ref 31.5–35.7)
MCV RBC AUTO: 87.5 FL (ref 79–97)
PHOSPHATE SERPL-MCNC: 2 MG/DL (ref 2.5–4.5)
PLATELET # BLD AUTO: 235 10*3/MM3 (ref 140–450)
PMV BLD AUTO: 11 FL (ref 6–12)
POTASSIUM BLD-SCNC: 3.3 MMOL/L (ref 3.5–5.2)
POTASSIUM BLD-SCNC: 4.3 MMOL/L (ref 3.5–5.2)
RBC # BLD AUTO: 3.83 10*6/MM3 (ref 3.77–5.28)
SODIUM BLD-SCNC: 140 MMOL/L (ref 136–145)
WBC NRBC COR # BLD: 7.06 10*3/MM3 (ref 3.4–10.8)

## 2019-07-01 PROCEDURE — 85027 COMPLETE CBC AUTOMATED: CPT | Performed by: INTERNAL MEDICINE

## 2019-07-01 PROCEDURE — 25010000002 ENOXAPARIN PER 10 MG: Performed by: INTERNAL MEDICINE

## 2019-07-01 PROCEDURE — 80048 BASIC METABOLIC PNL TOTAL CA: CPT | Performed by: INTERNAL MEDICINE

## 2019-07-01 PROCEDURE — 84132 ASSAY OF SERUM POTASSIUM: CPT | Performed by: INTERNAL MEDICINE

## 2019-07-01 PROCEDURE — 94799 UNLISTED PULMONARY SVC/PX: CPT

## 2019-07-01 PROCEDURE — 99233 SBSQ HOSP IP/OBS HIGH 50: CPT | Performed by: INTERNAL MEDICINE

## 2019-07-01 RX ORDER — HYDROCODONE BITARTRATE AND ACETAMINOPHEN 5; 325 MG/1; MG/1
1 TABLET ORAL ONCE AS NEEDED
Status: COMPLETED | OUTPATIENT
Start: 2019-07-01 | End: 2019-07-01

## 2019-07-01 RX ORDER — MAGNESIUM SULFATE HEPTAHYDRATE 40 MG/ML
2 INJECTION, SOLUTION INTRAVENOUS AS NEEDED
Status: DISCONTINUED | OUTPATIENT
Start: 2019-07-01 | End: 2019-07-04 | Stop reason: HOSPADM

## 2019-07-01 RX ORDER — POTASSIUM CHLORIDE 1.5 G/1.77G
40 POWDER, FOR SOLUTION ORAL AS NEEDED
Status: DISCONTINUED | OUTPATIENT
Start: 2019-07-01 | End: 2019-07-04 | Stop reason: HOSPADM

## 2019-07-01 RX ORDER — MAGNESIUM SULFATE HEPTAHYDRATE 40 MG/ML
4 INJECTION, SOLUTION INTRAVENOUS AS NEEDED
Status: DISCONTINUED | OUTPATIENT
Start: 2019-07-01 | End: 2019-07-04 | Stop reason: HOSPADM

## 2019-07-01 RX ORDER — ACETAMINOPHEN, ASPIRIN AND CAFFEINE 250; 250; 65 MG/1; MG/1; MG/1
2 TABLET, FILM COATED ORAL EVERY 6 HOURS PRN
Status: DISCONTINUED | OUTPATIENT
Start: 2019-07-01 | End: 2019-07-04 | Stop reason: HOSPADM

## 2019-07-01 RX ORDER — POTASSIUM CHLORIDE 7.45 MG/ML
10 INJECTION INTRAVENOUS
Status: DISCONTINUED | OUTPATIENT
Start: 2019-07-01 | End: 2019-07-04 | Stop reason: HOSPADM

## 2019-07-01 RX ORDER — POTASSIUM CHLORIDE 750 MG/1
40 CAPSULE, EXTENDED RELEASE ORAL AS NEEDED
Status: DISCONTINUED | OUTPATIENT
Start: 2019-07-01 | End: 2019-07-04 | Stop reason: HOSPADM

## 2019-07-01 RX ADMIN — FLUTICASONE PROPIONATE 2 SPRAY: 50 SPRAY, METERED NASAL at 08:15

## 2019-07-01 RX ADMIN — SODIUM CHLORIDE, PRESERVATIVE FREE 10 ML: 5 INJECTION INTRAVENOUS at 08:15

## 2019-07-01 RX ADMIN — POTASSIUM CHLORIDE 40 MEQ: 750 CAPSULE, EXTENDED RELEASE ORAL at 11:28

## 2019-07-01 RX ADMIN — IPRATROPIUM BROMIDE 0.5 MG: 0.5 SOLUTION RESPIRATORY (INHALATION) at 13:05

## 2019-07-01 RX ADMIN — SODIUM CHLORIDE 200 ML/HR: 9 INJECTION, SOLUTION INTRAVENOUS at 03:27

## 2019-07-01 RX ADMIN — HYDROCODONE BITARTRATE AND ACETAMINOPHEN 1 TABLET: 5; 325 TABLET ORAL at 09:00

## 2019-07-01 RX ADMIN — GABAPENTIN 400 MG: 400 CAPSULE ORAL at 15:03

## 2019-07-01 RX ADMIN — FERROUS SULFATE TAB 325 MG (65 MG ELEMENTAL FE) 325 MG: 325 (65 FE) TAB at 08:14

## 2019-07-01 RX ADMIN — POTASSIUM CHLORIDE 40 MEQ: 750 CAPSULE, EXTENDED RELEASE ORAL at 11:54

## 2019-07-01 RX ADMIN — IPRATROPIUM BROMIDE 0.5 MG: 0.5 SOLUTION RESPIRATORY (INHALATION) at 20:51

## 2019-07-01 RX ADMIN — KETOTIFEN FUMARATE 1 DROP: 0.35 SOLUTION/ DROPS OPHTHALMIC at 20:39

## 2019-07-01 RX ADMIN — MONTELUKAST SODIUM 10 MG: 10 TABLET, COATED ORAL at 20:36

## 2019-07-01 RX ADMIN — AMITRIPTYLINE HYDROCHLORIDE 25 MG: 50 TABLET, FILM COATED ORAL at 20:36

## 2019-07-01 RX ADMIN — IPRATROPIUM BROMIDE 0.5 MG: 0.5 SOLUTION RESPIRATORY (INHALATION) at 07:40

## 2019-07-01 RX ADMIN — ACETAMINOPHEN, ASPIRIN AND CAFFEINE 2 TABLET: 250; 250; 65 TABLET, FILM COATED ORAL at 17:53

## 2019-07-01 RX ADMIN — HYDROCODONE BITARTRATE AND ACETAMINOPHEN 1 TABLET: 5; 325 TABLET ORAL at 00:06

## 2019-07-01 RX ADMIN — ENOXAPARIN SODIUM 40 MG: 40 INJECTION SUBCUTANEOUS at 04:46

## 2019-07-01 RX ADMIN — GABAPENTIN 400 MG: 400 CAPSULE ORAL at 04:46

## 2019-07-01 RX ADMIN — IPRATROPIUM BROMIDE 0.5 MG: 0.5 SOLUTION RESPIRATORY (INHALATION) at 16:02

## 2019-07-01 RX ADMIN — POTASSIUM CHLORIDE 40 MEQ: 750 CAPSULE, EXTENDED RELEASE ORAL at 09:21

## 2019-07-01 RX ADMIN — POTASSIUM CHLORIDE 40 MEQ: 750 CAPSULE, EXTENDED RELEASE ORAL at 14:58

## 2019-07-01 RX ADMIN — PANTOPRAZOLE SODIUM 40 MG: 40 TABLET, DELAYED RELEASE ORAL at 04:46

## 2019-07-01 RX ADMIN — FAMOTIDINE 20 MG: 20 TABLET ORAL at 17:53

## 2019-07-01 RX ADMIN — HYDROCODONE BITARTRATE AND ACETAMINOPHEN 1 TABLET: 5; 325 TABLET ORAL at 15:04

## 2019-07-01 RX ADMIN — GABAPENTIN 400 MG: 400 CAPSULE ORAL at 20:36

## 2019-07-01 RX ADMIN — HYDROCODONE BITARTRATE AND ACETAMINOPHEN 1 TABLET: 5; 325 TABLET ORAL at 04:46

## 2019-07-01 RX ADMIN — FAMOTIDINE 20 MG: 20 TABLET ORAL at 08:13

## 2019-07-01 RX ADMIN — HYDROCODONE BITARTRATE AND ACETAMINOPHEN 1 TABLET: 5; 325 TABLET ORAL at 21:48

## 2019-07-01 RX ADMIN — KETOTIFEN FUMARATE 1 DROP: 0.35 SOLUTION/ DROPS OPHTHALMIC at 08:14

## 2019-07-01 RX ADMIN — MELATONIN TAB 5 MG 5 MG: 5 TAB at 00:06

## 2019-07-01 RX ADMIN — LAMOTRIGINE 100 MG: 100 TABLET ORAL at 08:13

## 2019-07-01 RX ADMIN — TAMSULOSIN HYDROCHLORIDE 0.4 MG: 0.4 CAPSULE ORAL at 08:13

## 2019-07-01 NOTE — PROGRESS NOTES
[facility] Medicine Services  PROGRESS NOTE    Patient Name: Bernardo Dodd  : 1980  MRN: [id number]    Date of Admission: 2019  Length of Stay: 2  Primary Care Physician: Nelly Sotelo PA    Subjective   Subjective     CC:  sepsis    HPI:  Doing better this am. Only complains of headache and mild flank pain.     Review of Systems  Gen-no fever, no chills  CV- No chest pain, palpitations  Resp- No cough, dyspnea  GI- No N/V/D, abd pain    Otherwise ROS is negative except as mentioned in the HPI.    Objective   Objective     Vital Signs:   Temp:  [97.7 °F (36.5 °C)-100.5 °F (38.1 °C)] 97.7 °F (36.5 °C)  Heart Rate:  [] 85  Resp:  [16-18] 16  BP: (130-140)/(84-97) 140/84        Physical Exam:  Constitutional: No acute distress, awake, alert  HENT: NCAT, mucous membranes moist  Respiratory: Clear to auscultation bilaterally, respiratory effort normal   Cardiovascular: RRR, no murmurs, rubs, or gallops, palpable pedal pulses bilaterally  Gastrointestinal: Positive bowel sounds, soft, nontender, nondistended  Musculoskeletal: No bilateral ankle edema  Psychiatric: Appropriate affect, cooperative  Neurologic: Oriented x 3, strength symmetric in all extremities, Cranial Nerves grossly intact to confrontation, speech clear  Skin: No rashes  Results Reviewed:  I have personally reviewed current lab, radiology, and data and agree.    Results from last 7 days   Lab Units 19  0406 19  0401 19  0549 19  2313   WBC 10*3/mm3 7.06 6.44 12.23* 16.65*   HEMOGLOBIN g/dL 10.3* 10.0* 11.0* 13.1   HEMATOCRIT % 33.5* 32.8* 34.6 40.7   PLATELETS 10*3/mm3 235 205 229 263   PROCALCITONIN ng/mL  --  1.39*  --  2.69*     Results from last 7 days   Lab Units 19  0406 19  0401 19  0549 19  2313   SODIUM mmol/L 140 139 135* 137   POTASSIUM mmol/L 3.3* 4.1 3.0* 3.0*   CHLORIDE mmol/L 104 107 100 94*   CO2 mmol/L 23.0 22.0 22.0 27.0   BUN mg/dL 4* 6  10 11   CREATININE mg/dL 0.59 0.64 0.78 0.93   GLUCOSE mg/dL 76 95 98 114*   CALCIUM mg/dL 8.2* 8.0* 7.9* 9.2   ALT (SGPT) U/L  --   --   --  13   AST (SGOT) U/L  --   --   --  13     Estimated Creatinine Clearance: 123.5 mL/min (by C-G formula based on SCr of 0.59 mg/dL).    Microbiology Results Abnormal     Procedure Component Value - Date/Time    Urine Culture - Urine, Urine, Clean Catch [[id number]]  (Normal) Collected:  06/30/19 0933    Lab Status:  Final result Specimen:  Urine, Clean Catch Updated:  07/01/19 1207     Urine Culture No growth    Blood Culture - Blood, Hand, Left [[id number]] Collected:  06/28/19 2311    Lab Status:  Preliminary result Specimen:  Blood from Hand, Left Updated:  07/01/19 0303     Blood Culture No growth at 2 days    Blood Culture - Blood, Hand, Right [[id number]] Collected:  06/28/19 2305    Lab Status:  Preliminary result Specimen:  Blood from Hand, Right Updated:  07/01/19 0303     Blood Culture No growth at 2 days    Influenza Antigen, Rapid - Swab, Nasopharynx [[id number]]  (Normal) Collected:  06/28/19 2324    Lab Status:  Final result Specimen:  Swab from Nasopharynx Updated:  06/28/19 2347     Influenza A Ag, EIA Negative     Influenza B Ag, EIA Negative          Imaging Results (last 24 hours)     ** No results found for the last 24 hours. **               I have reviewed the medications:  Scheduled Meds:    acetaminophen 1,000 mg Oral Once   amitriptyline 25 mg Oral Nightly   ceftriaxone 1 g Intravenous Q24H   enoxaparin 40 mg Subcutaneous Q24H   famotidine 20 mg Oral BID AC   ferrous sulfate 325 mg Oral Daily With Breakfast   fluticasone 2 spray Nasal Daily   gabapentin 400 mg Oral Q8H   ipratropium 0.5 mg Nebulization 4x Daily - RT   ketotifen 1 drop Both Eyes BID   lamoTRIgine 100 mg Oral Daily   montelukast 10 mg Oral Nightly   pantoprazole 40 mg Oral QAM   sodium chloride 3 mL Intravenous Q12H   tamsulosin 0.4 mg Oral Daily     Continuous Infusions:    sodium chloride  200 mL/hr Last Rate: 200 mL/hr (07/01/19 8918)     PRN Meds:.acetaminophen  •  albuterol  •  HYDROcodone-acetaminophen  •  magnesium sulfate **OR** magnesium sulfate **OR** magnesium sulfate  •  magnesium sulfate **OR** magnesium sulfate **OR** magnesium sulfate  •  melatonin  •  ondansetron **OR** ondansetron  •  potassium & sodium phosphates **OR** potassium & sodium phosphates  •  potassium & sodium phosphates **OR** potassium & sodium phosphates  •  potassium chloride **OR** potassium chloride **OR** potassium chloride  •  potassium chloride **OR** potassium chloride **OR** potassium chloride  •  sennosides-docusate sodium  •  sodium chloride  •  sodium chloride      Assessment/Plan   Assessment / Plan     Active Hospital Problems    Diagnosis POA   • **Sepsis (CMS/[facility]) [A41.9] Yes   • Pyelonephritis [N12] Yes   • Hypokalemia [E87.6] Yes   • Hypertension [I10] Yes   • Asthma [J45.909] Yes     No obstruction noted on PFTs from 12/22/15. FEV1 2.01 L, 93%. FVC 2.47 L, 95%. Ratio 81%. No response to bronchodilator.       • Obesity [E66.9] Yes     BMI 38.1     • GERD (gastroesophageal reflux disease) [K21.9] Yes          Brief Hospital Course to date:  Bernardo Dodd is a 38 y.o. female with PMH of asthma, GERD, HTN, seizure disorder and nephrolithiasis s/p stenting in the past ([facility]/Dr. Bimal Kwon) who presents with sepsis due to pyelonephritis.  Renal U/S also revealed nephrolithiasis with stone size of 13 mm with hydronephrosis.     Plan:    Sepsis  Pyelonephritis  Left obstructing nephrolithiasis  --continue IV Rocephin. Have added UCx to UA which was sent from ER (discussed with Micro lab 7/1). Blood Cx NGTD  --leukocytosis has resolved    --continue IVFs, pain medications and antiemetics  --consult Urology given stone size and evidence of hydronephrosis. No intervention for now.     Insomnia  --continue home dose Amitriptyline    Seizure d/o  --continue home dose Gabapentin    Anxiety  --on  Lamictal    HTN  --holding home antihypertensives for now due to sepsis on admission. Resume as tolerated    DVT Prophylaxis:  LMWH    Disposition: I expect the patient to be discharged home 1-2 days pending UCx results    CODE STATUS:   Code Status and Medical Interventions:   Ordered at: 06/29/19 0146     Code Status:    CPR     Medical Interventions (Level of Support Prior to Arrest):    Full         Electronically signed by Ayana Lin MD, 07/01/19, 12:35 PM.

## 2019-07-01 NOTE — PROGRESS NOTES
Continued Stay Note  [facility]     Patient Name: Bernardo Dodd  MRN: [id number]  Today's Date: 7/1/2019    Admit Date: 6/28/2019    Discharge Plan     Row Name 07/01/19 1417       Plan    Plan  Home    Patient/Family in Agreement with Plan  yes    Plan Comments  I spoke with patient this afternoon. She continues to anticipate discharge to home. No current discharge planning needs identified.     Final Discharge Disposition Code  01 - home or self-care        Discharge Codes    No documentation.             Nelly Nicholas RN

## 2019-07-01 NOTE — PLAN OF CARE
Problem: Patient Care Overview  Goal: Individualization and Mutuality  Outcome: Ongoing (interventions implemented as appropriate)    Goal: Discharge Needs Assessment  Outcome: Ongoing (interventions implemented as appropriate)    Goal: Interprofessional Rounds/Family Conf  Outcome: Ongoing (interventions implemented as appropriate)      Problem: Pain, Acute (Adult)  Goal: Identify Related Risk Factors and Signs and Symptoms  Outcome: Ongoing (interventions implemented as appropriate)    Goal: Acceptable Pain Control/Comfort Level  Outcome: Ongoing (interventions implemented as appropriate)      Problem: Infection, Risk/Actual (Adult)  Goal: Identify Related Risk Factors and Signs and Symptoms  Outcome: Ongoing (interventions implemented as appropriate)    Goal: Infection Prevention/Resolution  Outcome: Ongoing (interventions implemented as appropriate)

## 2019-07-02 LAB
ANION GAP SERPL CALCULATED.3IONS-SCNC: 12 MMOL/L (ref 5–15)
BUN BLD-MCNC: 6 MG/DL (ref 6–20)
BUN/CREAT SERPL: 8.2 (ref 7–25)
CALCIUM SPEC-SCNC: 9 MG/DL (ref 8.6–10.5)
CHLORIDE SERPL-SCNC: 100 MMOL/L (ref 98–107)
CO2 SERPL-SCNC: 28 MMOL/L (ref 22–29)
CREAT BLD-MCNC: 0.73 MG/DL (ref 0.57–1)
DEPRECATED RDW RBC AUTO: 43.6 FL (ref 37–54)
ERYTHROCYTE [DISTWIDTH] IN BLOOD BY AUTOMATED COUNT: 13.8 % (ref 12.3–15.4)
GFR SERPL CREATININE-BSD FRML MDRD: 107 ML/MIN/1.73
GLUCOSE BLD-MCNC: 123 MG/DL (ref 65–99)
HCT VFR BLD AUTO: 34.7 % (ref 34–46.6)
HGB BLD-MCNC: 10.8 G/DL (ref 12–15.9)
MCH RBC QN AUTO: 26.9 PG (ref 26.6–33)
MCHC RBC AUTO-ENTMCNC: 31.1 G/DL (ref 31.5–35.7)
MCV RBC AUTO: 86.5 FL (ref 79–97)
PLATELET # BLD AUTO: 263 10*3/MM3 (ref 140–450)
PMV BLD AUTO: 10.6 FL (ref 6–12)
POTASSIUM BLD-SCNC: 3.6 MMOL/L (ref 3.5–5.2)
POTASSIUM BLD-SCNC: 5 MMOL/L (ref 3.5–5.2)
RBC # BLD AUTO: 4.01 10*6/MM3 (ref 3.77–5.28)
SODIUM BLD-SCNC: 140 MMOL/L (ref 136–145)
WBC NRBC COR # BLD: 5.71 10*3/MM3 (ref 3.4–10.8)

## 2019-07-02 PROCEDURE — 94799 UNLISTED PULMONARY SVC/PX: CPT

## 2019-07-02 PROCEDURE — 80048 BASIC METABOLIC PNL TOTAL CA: CPT | Performed by: INTERNAL MEDICINE

## 2019-07-02 PROCEDURE — 25010000002 ENOXAPARIN PER 10 MG: Performed by: INTERNAL MEDICINE

## 2019-07-02 PROCEDURE — 99233 SBSQ HOSP IP/OBS HIGH 50: CPT | Performed by: INTERNAL MEDICINE

## 2019-07-02 PROCEDURE — 84132 ASSAY OF SERUM POTASSIUM: CPT | Performed by: INTERNAL MEDICINE

## 2019-07-02 PROCEDURE — 85027 COMPLETE CBC AUTOMATED: CPT | Performed by: INTERNAL MEDICINE

## 2019-07-02 PROCEDURE — 25010000002 CEFTRIAXONE PER 250 MG: Performed by: INTERNAL MEDICINE

## 2019-07-02 RX ADMIN — CEFTRIAXONE SODIUM 1 G: 1 INJECTION, POWDER, FOR SOLUTION INTRAMUSCULAR; INTRAVENOUS at 00:46

## 2019-07-02 RX ADMIN — FLUTICASONE PROPIONATE 2 SPRAY: 50 SPRAY, METERED NASAL at 09:00

## 2019-07-02 RX ADMIN — FAMOTIDINE 20 MG: 20 TABLET ORAL at 17:09

## 2019-07-02 RX ADMIN — PANTOPRAZOLE SODIUM 40 MG: 40 TABLET, DELAYED RELEASE ORAL at 06:13

## 2019-07-02 RX ADMIN — IPRATROPIUM BROMIDE 0.5 MG: 0.5 SOLUTION RESPIRATORY (INHALATION) at 12:45

## 2019-07-02 RX ADMIN — FAMOTIDINE 20 MG: 20 TABLET ORAL at 06:13

## 2019-07-02 RX ADMIN — IPRATROPIUM BROMIDE 0.5 MG: 0.5 SOLUTION RESPIRATORY (INHALATION) at 19:30

## 2019-07-02 RX ADMIN — TAMSULOSIN HYDROCHLORIDE 0.4 MG: 0.4 CAPSULE ORAL at 08:20

## 2019-07-02 RX ADMIN — IPRATROPIUM BROMIDE 0.5 MG: 0.5 SOLUTION RESPIRATORY (INHALATION) at 15:42

## 2019-07-02 RX ADMIN — IPRATROPIUM BROMIDE 0.5 MG: 0.5 SOLUTION RESPIRATORY (INHALATION) at 08:24

## 2019-07-02 RX ADMIN — MONTELUKAST SODIUM 10 MG: 10 TABLET, COATED ORAL at 21:27

## 2019-07-02 RX ADMIN — KETOTIFEN FUMARATE 1 DROP: 0.35 SOLUTION/ DROPS OPHTHALMIC at 09:00

## 2019-07-02 RX ADMIN — POTASSIUM CHLORIDE 40 MEQ: 750 CAPSULE, EXTENDED RELEASE ORAL at 11:35

## 2019-07-02 RX ADMIN — GABAPENTIN 400 MG: 400 CAPSULE ORAL at 06:13

## 2019-07-02 RX ADMIN — ENOXAPARIN SODIUM 40 MG: 40 INJECTION SUBCUTANEOUS at 06:13

## 2019-07-02 RX ADMIN — LAMOTRIGINE 100 MG: 100 TABLET ORAL at 08:20

## 2019-07-02 RX ADMIN — ACETAMINOPHEN, ASPIRIN AND CAFFEINE 2 TABLET: 250; 250; 65 TABLET, FILM COATED ORAL at 22:46

## 2019-07-02 RX ADMIN — CEFTRIAXONE SODIUM 1 G: 1 INJECTION, POWDER, FOR SOLUTION INTRAMUSCULAR; INTRAVENOUS at 23:54

## 2019-07-02 RX ADMIN — FERROUS SULFATE TAB 325 MG (65 MG ELEMENTAL FE) 325 MG: 325 (65 FE) TAB at 08:20

## 2019-07-02 RX ADMIN — AMITRIPTYLINE HYDROCHLORIDE 25 MG: 50 TABLET, FILM COATED ORAL at 21:27

## 2019-07-02 RX ADMIN — POTASSIUM CHLORIDE 40 MEQ: 750 CAPSULE, EXTENDED RELEASE ORAL at 08:21

## 2019-07-02 RX ADMIN — GABAPENTIN 400 MG: 400 CAPSULE ORAL at 13:08

## 2019-07-02 RX ADMIN — KETOTIFEN FUMARATE 1 DROP: 0.35 SOLUTION/ DROPS OPHTHALMIC at 22:16

## 2019-07-02 RX ADMIN — GABAPENTIN 400 MG: 400 CAPSULE ORAL at 21:27

## 2019-07-02 NOTE — PROGRESS NOTES
[facility] Medicine Services  PROGRESS NOTE    Patient Name: Bernardo Dodd  : 1980  MRN: [id number]    Date of Admission: 2019  Length of Stay: 3  Primary Care Physician: Nelly Sotelo PA    Subjective   Subjective     CC:  sepsis    HPI:  Headache got better after Excedrin yesterday but is back again this am. Denies F/C    Review of Systems  Gen-no fever, no chills  CV- No chest pain, palpitations  Resp- No cough, dyspnea  GI- No N/V/D, abd pain    Otherwise ROS is negative except as mentioned in the HPI.    Objective   Objective     Vital Signs:   Temp:  [97.2 °F (36.2 °C)-98.2 °F (36.8 °C)] 97.2 °F (36.2 °C)  Heart Rate:  [76-94] 94  Resp:  [16-20] 20  BP: (111-145)/(82-94) 123/90        Physical Exam:  Constitutional: No acute distress, awake, alert  HENT: NCAT, mucous membranes moist  Respiratory: Clear to auscultation bilaterally, respiratory effort normal   Cardiovascular: RRR, no murmurs, rubs, or gallops, palpable pedal pulses bilaterally  Gastrointestinal: Positive bowel sounds, soft, nontender, nondistended  Musculoskeletal: No bilateral ankle edema  Psychiatric: Appropriate affect, cooperative  Neurologic: Oriented x 3, strength symmetric in all extremities, Cranial Nerves grossly intact to confrontation, speech clear  Skin: No rashes  Results Reviewed:  I have personally reviewed current lab, radiology, and data and agree.    Results from last 7 days   Lab Units 19  04019  2313   WBC 10*3/mm3 5.71 7.06 6.44   < > 16.65*   HEMOGLOBIN g/dL 10.8* 10.3* 10.0*   < > 13.1   HEMATOCRIT % 34.7 33.5* 32.8*   < > 40.7   PLATELETS 10*3/mm3 263 235 205   < > 263   PROCALCITONIN ng/mL  --   --  1.39*  --  2.69*    < > = values in this interval not displayed.     Results from last 7 days   Lab Units 19  1943 19  0406 19  0401  19  2313   SODIUM mmol/L 140  --  140 139   < > 137   POTASSIUM  mmol/L 3.6 4.3 3.3* 4.1   < > 3.0*   CHLORIDE mmol/L 100  --  104 107   < > 94*   CO2 mmol/L 28.0  --  23.0 22.0   < > 27.0   BUN mg/dL 6  --  4* 6   < > 11   CREATININE mg/dL 0.73  --  0.59 0.64   < > 0.93   GLUCOSE mg/dL 123*  --  76 95   < > 114*   CALCIUM mg/dL 9.0  --  8.2* 8.0*   < > 9.2   ALT (SGPT) U/L  --   --   --   --   --  13   AST (SGOT) U/L  --   --   --   --   --  13    < > = values in this interval not displayed.     Estimated Creatinine Clearance: 99.8 mL/min (by C-G formula based on SCr of 0.73 mg/dL).    Microbiology Results Abnormal     Procedure Component Value - Date/Time    Urine Culture - Urine, Urine, Clean Catch [[id number]]  (Abnormal) Collected:  06/29/19 0026    Lab Status:  Preliminary result Specimen:  Urine, Clean Catch Updated:  07/02/19 1013     Urine Culture >100,000 CFU/mL Escherichia coli    Blood Culture - Blood, Hand, Left [[id number]] Collected:  06/28/19 2311    Lab Status:  Preliminary result Specimen:  Blood from Hand, Left Updated:  07/02/19 0303     Blood Culture No growth at 3 days    Blood Culture - Blood, Hand, Right [[id number]] Collected:  06/28/19 2305    Lab Status:  Preliminary result Specimen:  Blood from Hand, Right Updated:  07/02/19 0303     Blood Culture No growth at 3 days    Urine Culture - Urine, Urine, Clean Catch [[id number]]  (Normal) Collected:  06/30/19 0933    Lab Status:  Final result Specimen:  Urine, Clean Catch Updated:  07/01/19 1207     Urine Culture No growth    Influenza Antigen, Rapid - Swab, Nasopharynx [[id number]]  (Normal) Collected:  06/28/19 2324    Lab Status:  Final result Specimen:  Swab from Nasopharynx Updated:  06/28/19 2347     Influenza A Ag, EIA Negative     Influenza B Ag, EIA Negative          Imaging Results (last 24 hours)     Procedure Component Value Units Date/Time    US Renal Limited [[id number]] Collected:  06/29/19 1727     Updated:  07/01/19 1308    Narrative:       EXAMINATION: US RENAL LIMITED - 06/29/2019      INDICATION:  A41.9-Sepsis, unspecified organism; E87.6-Hypokalemia;  D72.829-Elevated white blood cell count, unspecified;  W87-Inacmu-odmtnkxouhvk nephritis, not specified as acute or chronic      TECHNIQUE: Ultrasound kidneys and urinary bladder     COMPARISON: NONE     FINDINGS:      Right kidney measures 12.6 cm in length without evidence of  hydronephrosis, contour-deforming mass or obvious calculi.  Left kidney measures 11.17 m in length containing a 1.3 cm hyperechoic  area with shadowing indicating calculus. Mild prominence of the left  renal renal pelvis suggesting potential hydronephrosis versus  pelvocaliectasis.  Urinary bladder is decompressed and unremarkable.       Impression:       Calculus within left kidney measures 1.3 cm along with mild  prominence of the left renal pelvis suggesting pelvocaliectasis versus  mild hydronephrosis.      DICTATED:   06/29/2019  EDITED/ls :   06/29/2019      This report was finalized on 7/1/2019 1:05 PM by Dr. Reilly Torrez.                  I have reviewed the medications:  Scheduled Meds:    acetaminophen 1,000 mg Oral Once   amitriptyline 25 mg Oral Nightly   ceftriaxone 1 g Intravenous Q24H   enoxaparin 40 mg Subcutaneous Q24H   famotidine 20 mg Oral BID AC   ferrous sulfate 325 mg Oral Daily With Breakfast   fluticasone 2 spray Nasal Daily   gabapentin 400 mg Oral Q8H   ipratropium 0.5 mg Nebulization 4x Daily - RT   ketotifen 1 drop Both Eyes BID   lamoTRIgine 100 mg Oral Daily   montelukast 10 mg Oral Nightly   pantoprazole 40 mg Oral QAM   sodium chloride 3 mL Intravenous Q12H   tamsulosin 0.4 mg Oral Daily     Continuous Infusions:     PRN Meds:.acetaminophen  •  albuterol  •  aspirin-acetaminophen-caffeine  •  HYDROcodone-acetaminophen  •  magnesium sulfate **OR** magnesium sulfate **OR** magnesium sulfate  •  melatonin  •  ondansetron **OR** ondansetron  •  potassium & sodium phosphates **OR** potassium & sodium phosphates  •  potassium & sodium phosphates  **OR** potassium & sodium phosphates  •  potassium chloride **OR** potassium chloride **OR** potassium chloride  •  sennosides-docusate sodium  •  sodium chloride  •  sodium chloride      Assessment/Plan   Assessment / Plan     Active Hospital Problems    Diagnosis POA   • **Sepsis (CMS/HCC) [A41.9] Yes   • Pyelonephritis [N12] Yes   • Hypokalemia [E87.6] Yes   • Hypertension [I10] Yes   • Asthma [J45.909] Yes     No obstruction noted on PFTs from 12/22/15. FEV1 2.01 L, 93%. FVC 2.47 L, 95%. Ratio 81%. No response to bronchodilator.       • Obesity [E66.9] Yes     BMI 38.1     • GERD (gastroesophageal reflux disease) [K21.9] Yes          Brief Hospital Course to date:  Bernardo Dodd is a 38 y.o. female with PMH of asthma, GERD, HTN, seizure disorder and nephrolithiasis s/p stenting in the past ([facility]/Dr. Bimal Kwon) who presents with sepsis due to pyelonephritis.  Renal U/S also revealed nephrolithiasis with stone size of 13 mm with hydronephrosis.     Plan:    Sepsis  Pyelonephritis  Left obstructing nephrolithiasis  --continue IV Rocephin. Have added UCx to UA which was sent from ER (discussed with Micro lab 7/1), culture now growing >100K E coli, awaiting susceptibilities. Blood Cx NGTD  --leukocytosis has resolved    --continue IVFs, pain medications and antiemetics  --consult Urology given stone size and evidence of hydronephrosis. No intervention for now.     Insomnia  --continue home dose Amitriptyline    Seizure d/o  --continue home dose Gabapentin    Anxiety  --on Lamictal    HTN  --holding home antihypertensives for now due to sepsis on admission. Resume as tolerated    DVT Prophylaxis:  LMWH    Disposition: I expect the patient to be discharged home tomorrow once susceptibilities are back on UCx.   CODE STATUS:   Code Status and Medical Interventions:   Ordered at: 06/29/19 0146     Code Status:    CPR     Medical Interventions (Level of Support Prior to Arrest):    Full         Electronically  signed by Ayana Lin MD, 07/02/19, 12:19 PM.

## 2019-07-02 NOTE — PROGRESS NOTES
Clinical Nutrition   Reason For Visit: Follow-up protocol    Patient Name: Bernardo Dodd  YOB: 1980  MRN: [id number]  Date of Encounter: 19 10:42 AM  Admission date: 2019        Nutrition Assessment     Admission Problem List:  Left obstructing nephrolithiasis  Sepsis  ?pyelonephritis  Hypokalemia      PMH: She  has a past medical history of Allergic rhinitis, Anemia, Arthritis, Asthma, Depression, FHx: migraine headaches, GERD (gastroesophageal reflux disease), Heart murmur, Herpes simplex, History of chest x-ray (2015), History of echocardiogram (2015), History of PFTs (2015), History of PFTs (2015), Hypertension, Ovarian cyst, Seizures (CMS/HCC), and Thigh shingles.   PSxH: She  has a past surgical history that includes Breast surgery; ORIF ankle fracture (Right, ); Laparoscopic tubal ligation;  section; Breast Reduction; and tension free vaginal taping with mini arc sling.        Reported/Observed/Food/Nutrition Related History       Anthropometrics   Height: 59 in  Weight: 178 lbs (bedscale wt  per nsg doc)  BMI: 36.1  BMI classification: Obese Class II: 35-39.9kg/m2   UBW: 187 lbs (per pt this is last wt she remembers but is unsure of when this wt was obtained)  IBW: 97 lbs      Weight change: RD unable to determine due to insufficient data      Labs reviewed   Labs reviewed: Yes    Medications reviewed   Medications reviewed: Yes  Pertinent: iron, pepcid, rocephin    Current Nutrition Prescription   PO: Diet Regular    Evaluation of Received Nutrient/Fluid Intake: 75% / 7 meals    Nutrition Diagnosis     Problem No nutrition diagnosis at this time   Etiology    Signs/Symptoms        Intervention   Intervention: Follow treatment progress, Care plan reviewed      Goal:   General: Nutrition support treatment  PO: Maintain intake      Monitoring/Evaluation:       Monitoring/Evaluation: Per protocol, PO intake, Pertinent labs, Weight  Will  Continue to follow per protocol  Sheeba Beckett RD  Time Spent: 20 min

## 2019-07-02 NOTE — PLAN OF CARE
Problem: Patient Care Overview  Goal: Plan of Care Review  Outcome: Ongoing (interventions implemented as appropriate)   07/02/19 0355   Coping/Psychosocial   Plan of Care Reviewed With patient   Plan of Care Review   Progress improving   OTHER   Outcome Summary VSS. complained of headache and left lower back pain; PRN given. no other concerns at this time. urine clear yellow and strained. will continue to monitor.        Problem: Pain, Acute (Adult)  Goal: Identify Related Risk Factors and Signs and Symptoms  Outcome: Outcome(s) achieved Date Met: 07/02/19 07/02/19 0355   Pain, Acute (Adult)   Related Risk Factors (Acute Pain) infection   Signs and Symptoms (Acute Pain) verbalization of pain descriptors     Goal: Acceptable Pain Control/Comfort Level  Outcome: Ongoing (interventions implemented as appropriate)   07/02/19 0355   Pain, Acute (Adult)   Acceptable Pain Control/Comfort Level making progress toward outcome       Problem: Infection, Risk/Actual (Adult)  Goal: Identify Related Risk Factors and Signs and Symptoms  Outcome: Outcome(s) achieved Date Met: 07/02/19 07/02/19 0355   Infection, Risk/Actual (Adult)   Related Risk Factors (Infection, Risk/Actual) tissue perfusion altered   Signs and Symptoms (Infection, Risk/Actual) pain;weakness     Goal: Infection Prevention/Resolution  Outcome: Ongoing (interventions implemented as appropriate)   07/02/19 0355   Infection, Risk/Actual (Adult)   Infection Prevention/Resolution making progress toward outcome

## 2019-07-03 LAB
ANION GAP SERPL CALCULATED.3IONS-SCNC: 12 MMOL/L (ref 5–15)
BACTERIA SPEC AEROBE CULT: ABNORMAL
BUN BLD-MCNC: 10 MG/DL (ref 6–20)
BUN/CREAT SERPL: 16.4 (ref 7–25)
CALCIUM SPEC-SCNC: 9.2 MG/DL (ref 8.6–10.5)
CHLORIDE SERPL-SCNC: 99 MMOL/L (ref 98–107)
CO2 SERPL-SCNC: 27 MMOL/L (ref 22–29)
CREAT BLD-MCNC: 0.61 MG/DL (ref 0.57–1)
DEPRECATED RDW RBC AUTO: 44.7 FL (ref 37–54)
ERYTHROCYTE [DISTWIDTH] IN BLOOD BY AUTOMATED COUNT: 13.8 % (ref 12.3–15.4)
GFR SERPL CREATININE-BSD FRML MDRD: 132 ML/MIN/1.73
GLUCOSE BLD-MCNC: 89 MG/DL (ref 65–99)
HCT VFR BLD AUTO: 39.9 % (ref 34–46.6)
HGB BLD-MCNC: 12.1 G/DL (ref 12–15.9)
MCH RBC QN AUTO: 26.9 PG (ref 26.6–33)
MCHC RBC AUTO-ENTMCNC: 30.3 G/DL (ref 31.5–35.7)
MCV RBC AUTO: 88.9 FL (ref 79–97)
PLATELET # BLD AUTO: 265 10*3/MM3 (ref 140–450)
PMV BLD AUTO: 11.3 FL (ref 6–12)
POTASSIUM BLD-SCNC: 4.4 MMOL/L (ref 3.5–5.2)
RBC # BLD AUTO: 4.49 10*6/MM3 (ref 3.77–5.28)
SODIUM BLD-SCNC: 138 MMOL/L (ref 136–145)
WBC NRBC COR # BLD: 8.49 10*3/MM3 (ref 3.4–10.8)

## 2019-07-03 PROCEDURE — 94799 UNLISTED PULMONARY SVC/PX: CPT

## 2019-07-03 PROCEDURE — 25010000002 ENOXAPARIN PER 10 MG: Performed by: INTERNAL MEDICINE

## 2019-07-03 PROCEDURE — 80048 BASIC METABOLIC PNL TOTAL CA: CPT | Performed by: INTERNAL MEDICINE

## 2019-07-03 PROCEDURE — 85027 COMPLETE CBC AUTOMATED: CPT | Performed by: INTERNAL MEDICINE

## 2019-07-03 PROCEDURE — 99232 SBSQ HOSP IP/OBS MODERATE 35: CPT | Performed by: INTERNAL MEDICINE

## 2019-07-03 RX ADMIN — FERROUS SULFATE TAB 325 MG (65 MG ELEMENTAL FE) 325 MG: 325 (65 FE) TAB at 08:49

## 2019-07-03 RX ADMIN — TAMSULOSIN HYDROCHLORIDE 0.4 MG: 0.4 CAPSULE ORAL at 08:49

## 2019-07-03 RX ADMIN — HYDROCODONE BITARTRATE AND ACETAMINOPHEN 1 TABLET: 5; 325 TABLET ORAL at 09:40

## 2019-07-03 RX ADMIN — PANTOPRAZOLE SODIUM 40 MG: 40 TABLET, DELAYED RELEASE ORAL at 05:43

## 2019-07-03 RX ADMIN — SODIUM CHLORIDE, PRESERVATIVE FREE 3 ML: 5 INJECTION INTRAVENOUS at 08:50

## 2019-07-03 RX ADMIN — IPRATROPIUM BROMIDE 0.5 MG: 0.5 SOLUTION RESPIRATORY (INHALATION) at 08:19

## 2019-07-03 RX ADMIN — FAMOTIDINE 20 MG: 20 TABLET ORAL at 05:43

## 2019-07-03 RX ADMIN — KETOTIFEN FUMARATE 1 DROP: 0.35 SOLUTION/ DROPS OPHTHALMIC at 20:16

## 2019-07-03 RX ADMIN — FLUTICASONE PROPIONATE 2 SPRAY: 50 SPRAY, METERED NASAL at 08:49

## 2019-07-03 RX ADMIN — IPRATROPIUM BROMIDE 0.5 MG: 0.5 SOLUTION RESPIRATORY (INHALATION) at 13:30

## 2019-07-03 RX ADMIN — IPRATROPIUM BROMIDE 0.5 MG: 0.5 SOLUTION RESPIRATORY (INHALATION) at 19:26

## 2019-07-03 RX ADMIN — IPRATROPIUM BROMIDE 0.5 MG: 0.5 SOLUTION RESPIRATORY (INHALATION) at 16:06

## 2019-07-03 RX ADMIN — GABAPENTIN 400 MG: 400 CAPSULE ORAL at 05:43

## 2019-07-03 RX ADMIN — KETOTIFEN FUMARATE 1 DROP: 0.35 SOLUTION/ DROPS OPHTHALMIC at 08:49

## 2019-07-03 RX ADMIN — MONTELUKAST SODIUM 10 MG: 10 TABLET, COATED ORAL at 20:14

## 2019-07-03 RX ADMIN — FAMOTIDINE 20 MG: 20 TABLET ORAL at 17:43

## 2019-07-03 RX ADMIN — ENOXAPARIN SODIUM 40 MG: 40 INJECTION SUBCUTANEOUS at 05:43

## 2019-07-03 RX ADMIN — ACETAMINOPHEN, ASPIRIN AND CAFFEINE 2 TABLET: 250; 250; 65 TABLET, FILM COATED ORAL at 08:53

## 2019-07-03 RX ADMIN — SODIUM CHLORIDE, PRESERVATIVE FREE 3 ML: 5 INJECTION INTRAVENOUS at 20:14

## 2019-07-03 RX ADMIN — GABAPENTIN 400 MG: 400 CAPSULE ORAL at 13:43

## 2019-07-03 RX ADMIN — LAMOTRIGINE 100 MG: 100 TABLET ORAL at 08:49

## 2019-07-03 RX ADMIN — GABAPENTIN 400 MG: 400 CAPSULE ORAL at 20:14

## 2019-07-03 RX ADMIN — AMITRIPTYLINE HYDROCHLORIDE 25 MG: 50 TABLET, FILM COATED ORAL at 20:13

## 2019-07-03 NOTE — PLAN OF CARE
Problem: Patient Care Overview  Goal: Plan of Care Review   07/03/19 0403   Coping/Psychosocial   Plan of Care Reviewed With patient   Plan of Care Review   Progress improving   OTHER   Outcome Summary VSS. complained of headache; PRN given. no other concerns. slept in between care. will continue to monitor.        Problem: Pain, Acute (Adult)  Goal: Acceptable Pain Control/Comfort Level  Outcome: Ongoing (interventions implemented as appropriate)   07/03/19 0403   Pain, Acute (Adult)   Acceptable Pain Control/Comfort Level making progress toward outcome       Problem: Infection, Risk/Actual (Adult)  Goal: Infection Prevention/Resolution  Outcome: Ongoing (interventions implemented as appropriate)   07/03/19 0403   Infection, Risk/Actual (Adult)   Infection Prevention/Resolution making progress toward outcome

## 2019-07-03 NOTE — DISCHARGE SUMMARY
[facility] Medicine Services  PROGRESS NOTE    Patient Name: Bernardo Dodd  : 1980  MRN: [id number]    Date of Admission: 2019  Length of Stay: 4  Primary Care Physician: Nelly Sotelo PA    Subjective   Subjective     CC:  sepsis    HPI:  Still having some headache. Left flank pain better. Still feels somewhat fatigued.    Review of Systems  Gen- No fevers, chills  CV- No chest pain, palpitations  Resp- No cough, dyspnea  GI- No N/V/D, abd pain improved    Otherwise ROS is negative except as mentioned in the HPI.    Objective   Objective     Vital Signs:   Temp:  [98 °F (36.7 °C)] 98 °F (36.7 °C)  Heart Rate:  [] 94  Resp:  [18] 18  BP: (129)/(95) 129/95        Physical Exam:  Constitutional -no acute distress, non toxic, in bed  HEENT-NCAT, mucous membranes moist  CV-mild tachycardia, regular, no murmur  Resp-CTAB, no wheezes, rhonchi or rales  Abd-soft, non-tender, non-distended, normo active bowel sounds, no cva tenderness  Ext-No lower extremity cyanosis, clubbing or edema bilaterally  Neuro-alert and oriented, speech clear, moves all extremities   Psych-normal affect   Skin- No rash on exposed UE or LE bilaterally    Results Reviewed:  I have personally reviewed current lab, radiology, and data and agree.    Results from last 7 days   Lab Units 19  0426 19  0418 19  0406 19  0401  19  2313   WBC 10*3/mm3 8.49 5.71 7.06 6.44   < > 16.65*   HEMOGLOBIN g/dL 12.1 10.8* 10.3* 10.0*   < > 13.1   HEMATOCRIT % 39.9 34.7 33.5* 32.8*   < > 40.7   PLATELETS 10*3/mm3 265 263 235 205   < > 263   PROCALCITONIN ng/mL  --   --   --  1.39*  --  2.69*    < > = values in this interval not displayed.     Results from last 7 days   Lab Units 19  1227 19  1609 19  0418  19  0406  19  4843   SODIUM mmol/L 138  --  140  --  140   < > 137   POTASSIUM mmol/L 4.4 5.0 3.6   < > 3.3*   < > 3.0*   CHLORIDE mmol/L 99  --  100  --   104   < > 94*   CO2 mmol/L 27.0  --  28.0  --  23.0   < > 27.0   BUN mg/dL 10  --  6  --  4*   < > 11   CREATININE mg/dL 0.61  --  0.73  --  0.59   < > 0.93   GLUCOSE mg/dL 89  --  123*  --  76   < > 114*   CALCIUM mg/dL 9.2  --  9.0  --  8.2*   < > 9.2   ALT (SGPT) U/L  --   --   --   --   --   --  13   AST (SGOT) U/L  --   --   --   --   --   --  13    < > = values in this interval not displayed.     Estimated Creatinine Clearance: 119.4 mL/min (by C-G formula based on SCr of 0.61 mg/dL).    Microbiology Results Abnormal     Procedure Component Value - Date/Time    Urine Culture - Urine, Urine, Clean Catch [[id number]]  (Abnormal)  (Susceptibility) Collected:  06/29/19 0026    Lab Status:  Final result Specimen:  Urine, Clean Catch Updated:  07/03/19 0330     Urine Culture >100,000 CFU/mL Escherichia coli    Susceptibility      Escherichia coli     DEVEN     Ampicillin Resistant     Ampicillin + Sulbactam Resistant     Cefazolin Susceptible     Cefepime Susceptible     Ceftazidime Susceptible     Ceftriaxone Susceptible     Gentamicin Susceptible     Levofloxacin Susceptible     Nitrofurantoin Susceptible     Piperacillin + Tazobactam Susceptible     Tetracycline Susceptible     Trimethoprim + Sulfamethoxazole Susceptible                    Blood Culture - Blood, Hand, Left [[id number]] Collected:  06/28/19 2311    Lab Status:  Preliminary result Specimen:  Blood from Hand, Left Updated:  07/03/19 0306     Blood Culture No growth at 4 days    Blood Culture - Blood, Hand, Right [[id number]] Collected:  06/28/19 2305    Lab Status:  Preliminary result Specimen:  Blood from Hand, Right Updated:  07/03/19 0306     Blood Culture No growth at 4 days    Urine Culture - Urine, Urine, Clean Catch [[id number]]  (Normal) Collected:  06/30/19 0933    Lab Status:  Final result Specimen:  Urine, Clean Catch Updated:  07/01/19 1207     Urine Culture No growth    Influenza Antigen, Rapid - Swab, Nasopharynx [[id number]]  (Normal)  Collected:  06/28/19 4467    Lab Status:  Final result Specimen:  Swab from Nasopharynx Updated:  06/28/19 0837     Influenza A Ag, EIA Negative     Influenza B Ag, EIA Negative          Imaging Results (last 24 hours)     ** No results found for the last 24 hours. **               I have reviewed the medications:  Scheduled Meds:    acetaminophen 1,000 mg Oral Once   amitriptyline 25 mg Oral Nightly   ceftriaxone 1 g Intravenous Q24H   enoxaparin 40 mg Subcutaneous Q24H   famotidine 20 mg Oral BID AC   ferrous sulfate 325 mg Oral Daily With Breakfast   fluticasone 2 spray Nasal Daily   gabapentin 400 mg Oral Q8H   ipratropium 0.5 mg Nebulization 4x Daily - RT   ketotifen 1 drop Both Eyes BID   lamoTRIgine 100 mg Oral Daily   montelukast 10 mg Oral Nightly   pantoprazole 40 mg Oral QAM   sodium chloride 3 mL Intravenous Q12H   tamsulosin 0.4 mg Oral Daily     Continuous Infusions:     PRN Meds:.acetaminophen  •  albuterol  •  aspirin-acetaminophen-caffeine  •  HYDROcodone-acetaminophen  •  magnesium sulfate **OR** magnesium sulfate **OR** magnesium sulfate  •  melatonin  •  ondansetron **OR** ondansetron  •  potassium & sodium phosphates **OR** potassium & sodium phosphates  •  potassium & sodium phosphates **OR** potassium & sodium phosphates  •  potassium chloride **OR** potassium chloride **OR** potassium chloride  •  sennosides-docusate sodium  •  sodium chloride  •  sodium chloride      Assessment/Plan   Assessment / Plan     Active Hospital Problems    Diagnosis POA   • **Sepsis (CMS/[facility]) [A41.9] Yes   • Pyelonephritis [N12] Yes   • Hypokalemia [E87.6] Yes   • Hypertension [I10] Yes   • Asthma [J45.909] Yes     No obstruction noted on PFTs from 12/22/15. FEV1 2.01 L, 93%. FVC 2.47 L, 95%. Ratio 81%. No response to bronchodilator.       • Obesity [E66.9] Yes     BMI 38.1     • GERD (gastroesophageal reflux disease) [K21.9] Yes          Brief Hospital Course to date:  Bernardo Dodd is a 38 y.o. female with  PMH of asthma, GERD, HTN, seizure disorder and nephrolithiasis s/p stenting in the past ([facility]/Dr. Bimal Kwon) who presents with sepsis due to pyelonephritis.  Renal U/S also revealed nephrolithiasis with stone size of 13 mm with hydronephrosis.     Plan:    Sepsis  Pyelonephritis  Left obstructing nephrolithiasis  --continue IV Rocephin. Have added UCx to UA which was sent from ER   -- E coli, cephalosporin sensitive  --leukocytosis has resolved    --continue IVFs, pain medications and antiemetics  --Urology consulted. No intervention for now. Follow up with primary urologists in 2 weeks  --PCP follow up early next week  --likely home in am with oral antibiotics    Insomnia  --continue home dose Amitriptyline    Seizure d/o  --continue home dose Gabapentin    Anxiety  --on Lamictal    HTN  --holding home antihypertensives for now due to sepsis on admission. Resume as tolerated    DVT Prophylaxis:  LMWH    Disposition: home am likely  CODE STATUS:   Code Status and Medical Interventions:   Ordered at: 06/29/19 0146     Code Status:    CPR     Medical Interventions (Level of Support Prior to Arrest):    Full         Electronically signed by Darnell Lancaster MD, 07/03/19, 5:37 PM.

## 2019-07-03 NOTE — PLAN OF CARE
Problem: Patient Care Overview  Goal: Plan of Care Review  Outcome: Ongoing (interventions implemented as appropriate)   07/03/19 1659   Coping/Psychosocial   Plan of Care Reviewed With patient   Plan of Care Review   Progress improving   OTHER   Outcome Summary VSS. Patient in bed/sleeping for most of day. Complained of headache, PRN given. Will continue to monitor.        Problem: Pain, Acute (Adult)  Goal: Acceptable Pain Control/Comfort Level  Outcome: Ongoing (interventions implemented as appropriate)   07/03/19 1659   Pain, Acute (Adult)   Acceptable Pain Control/Comfort Level making progress toward outcome       Problem: Infection, Risk/Actual (Adult)  Goal: Infection Prevention/Resolution  Outcome: Ongoing (interventions implemented as appropriate)   07/03/19 1659   Infection, Risk/Actual (Adult)   Infection Prevention/Resolution making progress toward outcome

## 2019-07-03 NOTE — PROGRESS NOTES
[facility] Medicine Services  PROGRESS NOTE    Patient Name: Bernardo Dodd  : 1980  MRN: [id number]    Date of Admission: 2019  Length of Stay: 4  Primary Care Physician: Nelly Sotelo PA    Subjective   Subjective     CC:  sepsis    HPI:  Still some headache, flank pain improved. Eating well    Review of Systems  Gen- No fevers, chills  CV- No chest pain, palpitations  Resp- No cough, dyspnea  GI- as above      Otherwise ROS is negative except as mentioned in the HPI.    Objective   Objective     Vital Signs:   Temp:  [98 °F (36.7 °C)] 98 °F (36.7 °C)  Heart Rate:  [] 94  Resp:  [18] 18  BP: (129)/(95) 129/95        Physical Exam:  Constitutional -no acute distress, non toxic, in bed  HEENT-NCAT, mucous membranes moist  CV-mildly tachycardic, no murmur, regular  Resp-CTAB, no wheezes, rhonchi or rales  Abd-soft, non-tender, non-distended, normo active bowel sounds, no cva tenderness, overweight  Ext-No lower extremity cyanosis, clubbing or edema bilaterally  Neuro-alert and oriented, speech clear, moves all extremities   Psych-normal affect   Skin- No rash on exposed UE or LE bilaterally    Results Reviewed:  I have personally reviewed current lab, radiology, and data and agree.    Results from last 7 days   Lab Units 19  0426 19  0418 19  0406 19  0401  19  2313   WBC 10*3/mm3 8.49 5.71 7.06 6.44   < > 16.65*   HEMOGLOBIN g/dL 12.1 10.8* 10.3* 10.0*   < > 13.1   HEMATOCRIT % 39.9 34.7 33.5* 32.8*   < > 40.7   PLATELETS 10*3/mm3 265 263 235 205   < > 263   PROCALCITONIN ng/mL  --   --   --  1.39*  --  2.69*    < > = values in this interval not displayed.     Results from last 7 days   Lab Units 19  1227 19  1609 19  0418  19  0406  19  2313   SODIUM mmol/L 138  --  140  --  140   < > 137   POTASSIUM mmol/L 4.4 5.0 3.6   < > 3.3*   < > 3.0*   CHLORIDE mmol/L 99  --  100  --  104   < > 94*   CO2 mmol/L 27.0   --  28.0  --  23.0   < > 27.0   BUN mg/dL 10  --  6  --  4*   < > 11   CREATININE mg/dL 0.61  --  0.73  --  0.59   < > 0.93   GLUCOSE mg/dL 89  --  123*  --  76   < > 114*   CALCIUM mg/dL 9.2  --  9.0  --  8.2*   < > 9.2   ALT (SGPT) U/L  --   --   --   --   --   --  13   AST (SGOT) U/L  --   --   --   --   --   --  13    < > = values in this interval not displayed.     Estimated Creatinine Clearance: 119.4 mL/min (by C-G formula based on SCr of 0.61 mg/dL).    Microbiology Results Abnormal     Procedure Component Value - Date/Time    Urine Culture - Urine, Urine, Clean Catch [[id number]]  (Abnormal)  (Susceptibility) Collected:  06/29/19 0026    Lab Status:  Final result Specimen:  Urine, Clean Catch Updated:  07/03/19 0330     Urine Culture >100,000 CFU/mL Escherichia coli    Susceptibility      Escherichia coli     DEVEN     Ampicillin Resistant     Ampicillin + Sulbactam Resistant     Cefazolin Susceptible     Cefepime Susceptible     Ceftazidime Susceptible     Ceftriaxone Susceptible     Gentamicin Susceptible     Levofloxacin Susceptible     Nitrofurantoin Susceptible     Piperacillin + Tazobactam Susceptible     Tetracycline Susceptible     Trimethoprim + Sulfamethoxazole Susceptible                    Blood Culture - Blood, Hand, Left [[id number]] Collected:  06/28/19 2311    Lab Status:  Preliminary result Specimen:  Blood from Hand, Left Updated:  07/03/19 0306     Blood Culture No growth at 4 days    Blood Culture - Blood, Hand, Right [[id number]] Collected:  06/28/19 2305    Lab Status:  Preliminary result Specimen:  Blood from Hand, Right Updated:  07/03/19 0306     Blood Culture No growth at 4 days    Urine Culture - Urine, Urine, Clean Catch [[id number]]  (Normal) Collected:  06/30/19 0933    Lab Status:  Final result Specimen:  Urine, Clean Catch Updated:  07/01/19 1207     Urine Culture No growth    Influenza Antigen, Rapid - Swab, Nasopharynx [[id number]]  (Normal) Collected:  06/28/19 1481    Lab  Status:  Final result Specimen:  Swab from Nasopharynx Updated:  06/28/19 2333     Influenza A Ag, EIA Negative     Influenza B Ag, EIA Negative          Imaging Results (last 24 hours)     ** No results found for the last 24 hours. **               I have reviewed the medications:  Scheduled Meds:    acetaminophen 1,000 mg Oral Once   amitriptyline 25 mg Oral Nightly   ceftriaxone 1 g Intravenous Q24H   enoxaparin 40 mg Subcutaneous Q24H   famotidine 20 mg Oral BID AC   ferrous sulfate 325 mg Oral Daily With Breakfast   fluticasone 2 spray Nasal Daily   gabapentin 400 mg Oral Q8H   ipratropium 0.5 mg Nebulization 4x Daily - RT   ketotifen 1 drop Both Eyes BID   lamoTRIgine 100 mg Oral Daily   montelukast 10 mg Oral Nightly   pantoprazole 40 mg Oral QAM   sodium chloride 3 mL Intravenous Q12H   tamsulosin 0.4 mg Oral Daily     Continuous Infusions:     PRN Meds:.acetaminophen  •  albuterol  •  aspirin-acetaminophen-caffeine  •  HYDROcodone-acetaminophen  •  magnesium sulfate **OR** magnesium sulfate **OR** magnesium sulfate  •  melatonin  •  ondansetron **OR** ondansetron  •  potassium & sodium phosphates **OR** potassium & sodium phosphates  •  potassium & sodium phosphates **OR** potassium & sodium phosphates  •  potassium chloride **OR** potassium chloride **OR** potassium chloride  •  sennosides-docusate sodium  •  sodium chloride  •  sodium chloride      Assessment/Plan   Assessment / Plan     Active Hospital Problems    Diagnosis POA   • **Sepsis (CMS/[facility]) [A41.9] Yes   • Pyelonephritis [N12] Yes   • Hypokalemia [E87.6] Yes   • Hypertension [I10] Yes   • Asthma [J45.909] Yes     No obstruction noted on PFTs from 12/22/15. FEV1 2.01 L, 93%. FVC 2.47 L, 95%. Ratio 81%. No response to bronchodilator.       • Obesity [E66.9] Yes     BMI 38.1     • GERD (gastroesophageal reflux disease) [K21.9] Yes          Brief Hospital Course to date:  Bernardo Dodd is a 38 y.o. female with PMH of asthma, GERD, HTN, seizure  disorder and nephrolithiasis s/p stenting in the past ([facility]/Dr. Bimal Kwon) who presents with sepsis due to pyelonephritis.  Renal U/S also revealed nephrolithiasis with stone size of 13 mm with hydronephrosis.     Plan:    Sepsis  Pyelonephritis  Left obstructing nephrolithiasis  --e coli, rocephin sensitive  --continue IV Rocephin, change to orals at discharge  --leukocytosis has resolved    --continue IVFs, pain medications and antiemetics  --Urology consult, no intervention planned  -- follow up primary care physician early next week  -- follow up primary Urologist in 2 weeks.    Insomnia  --continue home dose Amitriptyline    Seizure d/o  --continue home dose Gabapentin    Anxiety  --on Lamictal    HTN  --holding home antihypertensives for now due to sepsis on admission. Resume as tolerated    DVT Prophylaxis:  LMWH    Disposition: I expect the patient to be discharged home tomorrow     CODE STATUS:   Code Status and Medical Interventions:   Ordered at: 06/29/19 0146     Code Status:    CPR     Medical Interventions (Level of Support Prior to Arrest):    Full         Electronically signed by Darnell Lancaster MD, 07/03/19, 5:45 PM.

## 2019-07-03 NOTE — PROGRESS NOTES
Continued Stay Note  [facility]     Patient Name: Bernardo Dodd  MRN: [id number]  Today's Date: 7/3/2019    Admit Date: 6/28/2019    Discharge Plan     Row Name 07/03/19 1437       Plan    Plan  discharge plan    Patient/Family in Agreement with Plan  yes    Plan Comments  Spoke with pt in room and plan remains  home with family. Pt denies discharge needs.  CM will cont to follow        Discharge Codes    No documentation.       Expected Discharge Date and Time     Expected Discharge Date Expected Discharge Time    Jul 3, 2019             Karla Rodriguez RN

## 2019-07-04 VITALS
HEIGHT: 59 IN | HEART RATE: 94 BPM | BODY MASS INDEX: 36 KG/M2 | DIASTOLIC BLOOD PRESSURE: 80 MMHG | TEMPERATURE: 97.8 F | OXYGEN SATURATION: 99 % | WEIGHT: 178.6 LBS | SYSTOLIC BLOOD PRESSURE: 118 MMHG | RESPIRATION RATE: 16 BRPM

## 2019-07-04 PROBLEM — A41.9 SEPSIS: Status: RESOLVED | Noted: 2019-06-29 | Resolved: 2019-07-04

## 2019-07-04 PROBLEM — N12 PYELONEPHRITIS: Status: RESOLVED | Noted: 2019-06-29 | Resolved: 2019-07-04

## 2019-07-04 PROBLEM — E87.6 HYPOKALEMIA: Status: RESOLVED | Noted: 2019-06-29 | Resolved: 2019-07-04

## 2019-07-04 LAB
BACTERIA SPEC AEROBE CULT: NORMAL
BACTERIA SPEC AEROBE CULT: NORMAL

## 2019-07-04 PROCEDURE — 25010000002 CEFTRIAXONE PER 250 MG: Performed by: INTERNAL MEDICINE

## 2019-07-04 PROCEDURE — 25010000002 ENOXAPARIN PER 10 MG: Performed by: INTERNAL MEDICINE

## 2019-07-04 PROCEDURE — 99239 HOSP IP/OBS DSCHRG MGMT >30: CPT | Performed by: INTERNAL MEDICINE

## 2019-07-04 RX ORDER — LEVOFLOXACIN 750 MG/1
750 TABLET ORAL DAILY
Qty: 5 TABLET | Refills: 0 | Status: SHIPPED | OUTPATIENT
Start: 2019-07-04 | End: 2019-07-04 | Stop reason: HOSPADM

## 2019-07-04 RX ORDER — FERROUS SULFATE 325(65) MG
1 TABLET ORAL
Qty: 30 TABLET | Refills: 5 | Status: SHIPPED | OUTPATIENT
Start: 2019-07-09 | End: 2021-05-03

## 2019-07-04 RX ORDER — CEFDINIR 300 MG/1
300 CAPSULE ORAL 2 TIMES DAILY
Qty: 10 CAPSULE | Refills: 0 | Status: SHIPPED | OUTPATIENT
Start: 2019-07-04 | End: 2019-07-09

## 2019-07-04 RX ADMIN — GABAPENTIN 400 MG: 400 CAPSULE ORAL at 05:35

## 2019-07-04 RX ADMIN — CEFTRIAXONE SODIUM 1 G: 1 INJECTION, POWDER, FOR SOLUTION INTRAMUSCULAR; INTRAVENOUS at 00:22

## 2019-07-04 RX ADMIN — SODIUM CHLORIDE, PRESERVATIVE FREE 3 ML: 5 INJECTION INTRAVENOUS at 09:42

## 2019-07-04 RX ADMIN — FLUTICASONE PROPIONATE 2 SPRAY: 50 SPRAY, METERED NASAL at 09:42

## 2019-07-04 RX ADMIN — ENOXAPARIN SODIUM 40 MG: 40 INJECTION SUBCUTANEOUS at 05:35

## 2019-07-04 RX ADMIN — PANTOPRAZOLE SODIUM 40 MG: 40 TABLET, DELAYED RELEASE ORAL at 05:35

## 2019-07-04 RX ADMIN — TAMSULOSIN HYDROCHLORIDE 0.4 MG: 0.4 CAPSULE ORAL at 09:42

## 2019-07-04 RX ADMIN — KETOTIFEN FUMARATE 1 DROP: 0.35 SOLUTION/ DROPS OPHTHALMIC at 09:42

## 2019-07-04 RX ADMIN — LAMOTRIGINE 100 MG: 100 TABLET ORAL at 09:41

## 2019-07-04 RX ADMIN — FERROUS SULFATE TAB 325 MG (65 MG ELEMENTAL FE) 325 MG: 325 (65 FE) TAB at 09:41

## 2019-07-04 RX ADMIN — FAMOTIDINE 20 MG: 20 TABLET ORAL at 05:35

## 2019-07-04 NOTE — PLAN OF CARE
Problem: Patient Care Overview  Goal: Plan of Care Review  Outcome: Ongoing (interventions implemented as appropriate)   07/04/19 0337   Coping/Psychosocial   Plan of Care Reviewed With patient   Plan of Care Review   Progress improving   OTHER   Outcome Summary VSS. no complaints of pain. slept well throughout the night. has a small quarter sized rash of right gluteal, itchy. will continue to monitor.        Problem: Pain, Acute (Adult)  Goal: Acceptable Pain Control/Comfort Level  Outcome: Ongoing (interventions implemented as appropriate)   07/04/19 0337   Pain, Acute (Adult)   Acceptable Pain Control/Comfort Level making progress toward outcome       Problem: Infection, Risk/Actual (Adult)  Goal: Infection Prevention/Resolution  Outcome: Ongoing (interventions implemented as appropriate)   07/04/19 0337   Infection, Risk/Actual (Adult)   Infection Prevention/Resolution making progress toward outcome

## 2019-07-04 NOTE — PLAN OF CARE
Problem: Patient Care Overview  Goal: Plan of Care Review  Outcome: Outcome(s) achieved Date Met: 07/04/19 07/04/19 1020   Coping/Psychosocial   Plan of Care Reviewed With patient   Plan of Care Review   Progress improving   OTHER   Outcome Summary VSS. Patient slept through night. Eager to return to work and home.        Problem: Pain, Acute (Adult)  Goal: Acceptable Pain Control/Comfort Level  Outcome: Outcome(s) achieved Date Met: 07/04/19 07/04/19 1020   Pain, Acute (Adult)   Acceptable Pain Control/Comfort Level achieves outcome       Problem: Infection, Risk/Actual (Adult)  Goal: Infection Prevention/Resolution  Outcome: Outcome(s) achieved Date Met: 07/04/19 07/04/19 1020   Infection, Risk/Actual (Adult)   Infection Prevention/Resolution achieves outcome

## 2019-07-04 NOTE — DISCHARGE SUMMARY
[facility] Medicine Services  DISCHARGE SUMMARY    Patient Name: Bernardo Dodd  : 1980  MRN: [id number]    Date of Admission: 2019  Date of Discharge:  19  Primary Care Physician: Nelly Sotelo PA    Consults     Date and Time Order Name Status Description    2019 1041 Inpatient Urology Consult      2019 0122 Inpatient Urology Consult            Hospital Course     Presenting Problem:   UTI (urinary tract infection) [N39.0]    Active Hospital Problems    Diagnosis  POA   • Hypertension [I10]  Yes   • Asthma [J45.909]  Yes   • Obesity [E66.9]  Yes   • GERD (gastroesophageal reflux disease) [K21.9]  Yes      Resolved Hospital Problems    Diagnosis Date Resolved POA   • **Sepsis (CMS/HCC) [A41.9] 2019 Yes   • Pyelonephritis [N12] 2019 Yes   • Hypokalemia [E87.6] 2019 Yes          Hospital Course:  Bernardo Dodd is a 38 y.o. female with PMH of asthma, GERD, HTN, seizure disorder and nephrolithiasis s/p stenting in the past ([facility]/Dr. Bimal Kwon) who presents with sepsis due to pyelonephritis.  Renal U/S also revealed nephrolithiasis with stone size of 13 mm with hydronephrosis.      Plan:     Sepsis  Pyelonephritis  Left obstructing nephrolithiasis  --e coli, rocephin sensitive  --now s/p 6 days rocephin therapy, will change to oral antibiotics at discharge (cefdinir 300 mg BID to complete 10 days total therapy). Would have preferred levaquin, however patient's QTc is prolonged (595) and she has history of seizures and a fluoroquinolone could lower the seizure threshold.  -- leukocytosis has resolved    -- Urology consulted and evaluated patient during this admission, no intervention planned  -- follow up primary care physician early next week  -- follow up primary Urologist Dr Mckeon in 2 weeks.    Discharge Follow Up Recommendations for labs/diagnostics:      Day of Discharge     HPI:   Flank pain has resolved, denies abdominal pain,  eating well    Review of Systems  Gen- No fevers, chills  CV- No chest pain, palpitations  Resp- No cough, dyspnea  GI- No N/V/D, abd pain        Otherwise ROS is negative except as mentioned in the HPI.    Vital Signs:   Temp:  [97.8 °F (36.6 °C)-98.6 °F (37 °C)] 97.8 °F (36.6 °C)  Heart Rate:  [] 94  Resp:  [16-18] 16  BP: (118-129)/(80-83) 118/80     Physical Exam:  Constitutional -no acute distress, non toxic, in bed, sitting up  HEENT-NCAT, mucous membranes moist  CV-RRR, S1 S2 normal, no m/r/g  Resp-CTAB, no wheezes, rhonchi or rales  Abd-soft, non-tender, non-distended, normo active bowel sounds, no cva tenderness, overweight  Ext-No lower extremity cyanosis, clubbing or edema bilaterally  Neuro-alert and oriented, speech clear, moves all extremities   Psych-normal affect   Skin- No rash on exposed UE or LE bilaterally      Pertinent  and/or Most Recent Results     Results from last 7 days   Lab Units 07/03/19  1227 07/03/19  0426 07/02/19  1609 07/02/19  0418 07/01/19  1943 07/01/19  0406 06/30/19  0401 06/29/19  0549 06/28/19  2313   WBC 10*3/mm3  --  8.49  --  5.71  --  7.06 6.44 12.23* 16.65*   HEMOGLOBIN g/dL  --  12.1  --  10.8*  --  10.3* 10.0* 11.0* 13.1   HEMATOCRIT %  --  39.9  --  34.7  --  33.5* 32.8* 34.6 40.7   PLATELETS 10*3/mm3  --  265  --  263  --  235 205 229 263   SODIUM mmol/L 138  --   --  140  --  140 139 135* 137   POTASSIUM mmol/L 4.4  --  5.0 3.6 4.3 3.3* 4.1 3.0* 3.0*   CHLORIDE mmol/L 99  --   --  100  --  104 107 100 94*   CO2 mmol/L 27.0  --   --  28.0  --  23.0 22.0 22.0 27.0   BUN mg/dL 10  --   --  6  --  4* 6 10 11   CREATININE mg/dL 0.61  --   --  0.73  --  0.59 0.64 0.78 0.93   GLUCOSE mg/dL 89  --   --  123*  --  76 95 98 114*   CALCIUM mg/dL 9.2  --   --  9.0  --  8.2* 8.0* 7.9* 9.2     Results from last 7 days   Lab Units 06/28/19  2313   BILIRUBIN mg/dL 0.5   ALK PHOS U/L 78   ALT (SGPT) U/L 13   AST (SGOT) U/L 13           Invalid input(s): TG, LDLCALC,  LDLREALC  Results from last 7 days   Lab Units 06/30/19  0401 06/28/19  2313   PROCALCITONIN ng/mL 1.39* 2.69*   LACTATE mmol/L  --  1.3       Brief Urine Lab Results  (Last result in the past 365 days)      Color   Clarity   Blood   Leuk Est   Nitrite   Protein   CREAT   Urine HCG        06/30/19 0933 Yellow Cloudy Negative Small (1+) Negative Negative               Microbiology Results Abnormal     Procedure Component Value - Date/Time    Blood Culture - Blood, Hand, Left [[id number]] Collected:  06/28/19 2311    Lab Status:  Final result Specimen:  Blood from Hand, Left Updated:  07/04/19 0316     Blood Culture No growth at 5 days    Blood Culture - Blood, Hand, Right [[id number]] Collected:  06/28/19 2305    Lab Status:  Final result Specimen:  Blood from Hand, Right Updated:  07/04/19 0316     Blood Culture No growth at 5 days    Urine Culture - Urine, Urine, Clean Catch [[id number]]  (Abnormal)  (Susceptibility) Collected:  06/29/19 0026    Lab Status:  Final result Specimen:  Urine, Clean Catch Updated:  07/03/19 0330     Urine Culture >100,000 CFU/mL Escherichia coli    Susceptibility      Escherichia coli     DEVEN     Ampicillin Resistant     Ampicillin + Sulbactam Resistant     Cefazolin Susceptible     Cefepime Susceptible     Ceftazidime Susceptible     Ceftriaxone Susceptible     Gentamicin Susceptible     Levofloxacin Susceptible     Nitrofurantoin Susceptible     Piperacillin + Tazobactam Susceptible     Tetracycline Susceptible     Trimethoprim + Sulfamethoxazole Susceptible                    Urine Culture - Urine, Urine, Clean Catch [[id number]]  (Normal) Collected:  06/30/19 0933    Lab Status:  Final result Specimen:  Urine, Clean Catch Updated:  07/01/19 1207     Urine Culture No growth    Influenza Antigen, Rapid - Swab, Nasopharynx [[id number]]  (Normal) Collected:  06/28/19 2324    Lab Status:  Final result Specimen:  Swab from Nasopharynx Updated:  06/28/19 2347     Influenza A Ag, EIA Negative      Influenza B Ag, EIA Negative          Imaging Results (all)     Procedure Component Value Units Date/Time    US Renal Limited [[id number]] Collected:  06/29/19 1727     Updated:  07/01/19 1308    Narrative:       EXAMINATION: US RENAL LIMITED - 06/29/2019     INDICATION:  A41.9-Sepsis, unspecified organism; E87.6-Hypokalemia;  D72.829-Elevated white blood cell count, unspecified;  B47-Nroozx-xsgumamzzebu nephritis, not specified as acute or chronic      TECHNIQUE: Ultrasound kidneys and urinary bladder     COMPARISON: NONE     FINDINGS:      Right kidney measures 12.6 cm in length without evidence of  hydronephrosis, contour-deforming mass or obvious calculi.  Left kidney measures 11.17 m in length containing a 1.3 cm hyperechoic  area with shadowing indicating calculus. Mild prominence of the left  renal renal pelvis suggesting potential hydronephrosis versus  pelvocaliectasis.  Urinary bladder is decompressed and unremarkable.       Impression:       Calculus within left kidney measures 1.3 cm along with mild  prominence of the left renal pelvis suggesting pelvocaliectasis versus  mild hydronephrosis.      DICTATED:   06/29/2019  EDITED/ls :   06/29/2019      This report was finalized on 7/1/2019 1:05 PM by Dr. Reilly Torrez.       CT Angiogram Chest With Contrast [[id number]] Collected:  06/29/19 0144     Updated:  06/29/19 0146    Narrative:       CTA Chest    INDICATION:   Fever, chills and headache with shortness of breath for 2 days. Chest pain.    TECHNIQUE:   CT angiogram of the chest with contrast. 3-D postprocessing was performed and reviewed.   Radiation dose reduction techniques included automated exposure control or exposure modulation based on body size. Count of known CT and cardiac nuc med studies  performed in previous 12 months: 2.     COMPARISON:   None available.    FINDINGS:   Minimal lingular atelectasis. No infiltrates or effusions.    No evidence of pulmonary embolus. Heart and aorta appear  normal. Visualized upper abdomen remarkable for enlargement and abnormal enhancement left kidney highly suggestive of pyelonephritis in the appropriate clinical setting. Osseous structures and  thoracic inlet unremarkable.      Impression:       No acute intrathoracic abnormality identified. In particular no evidence of pulmonary embolus.    Abnormal left kidney demonstrating diffuse enlargement with abnormal cortical enhancement. In the appropriate setting this could reflect acute pyelonephritis. Patient does demonstrate a nonobstructing left renal stone measuring about 6 mm but there is  only minimal distention of the renal pelvis. Acute pyelonephritis favored over obstruction.    Signer Name: JOHN Rock MD   Signed: 6/29/2019 1:44 AM   Workstation Name: [email or web address]       XR Chest 1 View [[id number]] Collected:  06/29/19 0000     Updated:  06/29/19 0002    Narrative:       CR Chest 1 Vw    INDICATION:   Cough and fever for 4 days.     COMPARISON:    10/2/2018    FINDINGS:  Single portable AP view of the chest.  Low lung volumes. Lungs are clear. No effusions. Heart and mediastinum unremarkable. No pneumothorax.      Impression:       No acute cardiopulmonary findings.    Signer Name: JOHN Rock MD   Signed: 6/29/2019 12:00 AM   Workstation Name: [email or web address]             Results for orders placed during the hospital encounter of 06/27/18   Duplex Venous Upper Extremity - Left    Narrative · Normal left upper extremity venous duplex scan.          Results for orders placed during the hospital encounter of 06/27/18   Duplex Venous Upper Extremity - Left    Narrative · Normal left upper extremity venous duplex scan.                 Discharge Details        Discharge Medications      New Medications      Instructions Start Date   cefdinir 300 MG capsule  Commonly known as:  OMNICEF   300 mg, Oral, 2 Times Daily      Probiotic capsule   1 capsule, Oral, 2 Times Daily, May take any generic probiotic          Changes to Medications      Instructions Start Date   ferrous sulfate 325 (65 FE) MG tablet  What changed:    · additional instructions  · These instructions start on 7/9/2019. If you are unsure what to do until then, ask your doctor or other care provider.   1 tablet, Oral, Daily With Breakfast, Resume Iron when antibiotics finished.   Start Date:  7/9/2019        Continue These Medications      Instructions Start Date   ADVAIR DISKUS 500-50 MCG/DOSE DISKUS  Generic drug:  fluticasone-salmeterol   1 puff, Inhalation, 2 Times Daily - RT      amLODIPine 5 MG tablet  Commonly known as:  NORVASC   5 mg, Oral, Daily      azelastine 0.05 % ophthalmic solution  Commonly known as:  OPTIVAR   1 drop, Both Eyes, 2 Times Daily      clotrimazole-betamethasone 1-0.05 % cream  Commonly known as:  LOTRISONE   Topical, 2 Times Daily      fluticasone 50 MCG/ACT nasal spray  Commonly known as:  FLONASE   2 sprays, Nasal, Daily      gabapentin 400 MG capsule  Commonly known as:  NEURONTIN   400 mg, Oral, 5 Times Daily PRN      lamoTRIgine 100 MG tablet  Commonly known as:  LaMICtal   100 mg, Oral, Daily      loratadine-pseudoephedrine 5-120 MG per 12 hr tablet  Commonly known as:  CLARITIN-D 12 HOUR   1 tablet, Oral, 2 Times Daily      omeprazole 40 MG capsule  Commonly known as:  priLOSEC   40 mg, Oral, 2 Times Daily, Take on an empty stomach and eat 30 minutes- 1 hr after eating.      SINGULAIR 10 MG tablet  Generic drug:  montelukast   10 mg, Oral, Nightly      SPIRIVA RESPIMAT 2.5 MCG/ACT aerosol solution inhaler  Generic drug:  tiotropium bromide monohydrate   2 puffs, Oral, Daily      VENTOLIN  (90 Base) MCG/ACT inhaler  Generic drug:  albuterol sulfate HFA   1-2 puffs, Inhalation, As Needed, Every 4-6 hrs PRN      albuterol (2.5 MG/3ML) 0.083% nebulizer solution  Commonly known as:  PROVENTIL   2.5 mg, Nebulization, Every 12 Hours Scheduled      vitamin D3 5000 units capsule capsule   5,000 Units, Oral, Daily          Stop These Medications    fluconazole 200 MG tablet  Commonly known as:  DIFLUCAN     ranitidine 300 MG capsule  Commonly known as:  ZANTAC            Allergies   Allergen Reactions   • Naproxen Swelling         Discharge Disposition:  Home or Self Care    Discharge Diet:  Diet Order   Procedures   • Diet Regular         Discharge Activity:         CODE STATUS:    Code Status and Medical Interventions:   Ordered at: 06/29/19 0146     Code Status:    CPR     Medical Interventions (Level of Support Prior to Arrest):    Full         Future Appointments   Date Time Provider Department Center   7/9/2019  2:15 PM Roseanne Mcclendon APRN [facility] None   8/29/2019 10:45 AM Nelly Sotelo PA [facility] None   10/29/2019  2:30 PM Alf Lin MD [facility] None       Additional Instructions for the Follow-ups that You Need to Schedule     Discharge Follow-up with PCP   As directed       Currently Documented PCP:    Nelly Sotelo PA    PCP Phone Number:    [phone]     Follow Up Details:  follow up PCP next week         Discharge Follow-up with Specialty: follow up Urology Dr Mckeon in two weeks   As directed      Specialty:  follow up Urology Dr Mckeon in two weeks               Time Spent on Discharge:  40 minutes    Electronically signed by Darnell Lancaster MD, 07/04/19, 10:03 AM.

## 2019-07-04 NOTE — PAYOR COMM NOTE
"Bernardo Dodd (38 y.o. Female)     Date of Birth Social Security Number Address Home Phone MRN    1980  [address] [address] [phone] [id number]    Yazidi Marital Status          Holiness Single       Admission Date Admission Type Admitting Provider Attending Provider Department, Room/Bed    19 Emergency Darnell Lancaster MD  [address], [address]/1    Discharge Date Discharge Disposition Discharge Destination        2019 Home or Self Care              Attending Provider:  (none)   Allergies:  Naproxen    Isolation:  None   Infection:  None   Code Status:  CPR    Ht:  149.9 cm (59\")   Wt:  81 kg (178 lb 9.6 oz)    Admission Cmt:  None   Principal Problem:  Sepsis (CMS/[facility]) [A41.9]                 Active Insurance as of 2019     Primary Coverage     Payor Plan Insurance Group Employer/Plan Group    ANTHEM MEDICAID ANTHEM MEDICAID [id number]     Payor Plan Address Payor Plan Phone Number Payor Plan Fax Number Effective Dates    [facility] [address] [phone]  2014 - None Entered    [facility] [id number]       Subscriber Name Subscriber Birth Date Member ID       BERNARDO DODD 1980 [id number]                 Emergency Contacts      (Rel.) Home Phone Work Phone Mobile Phone    Keya Dodd (Mother) -- -- [phone]               Discharge Summary      Darnell Lancaster MD at 2019 10:03 AM              [facility]  DISCHARGE SUMMARY    Patient Name: Bernardo Dodd  : 1980  MRN: [id number]    Date of Admission: 2019  Date of Discharge:  19  Primary Care Physician: Nelly Sotelo PA    Consults     Date and Time Order Name Status Description    2019 1041 Inpatient Urology Consult      2019 0122 Inpatient Urology Consult            Hospital Course     Presenting Problem:   UTI (urinary tract infection) [N39.0]    Active Hospital Problems    Diagnosis  " POA   • Hypertension [I10]  Yes   • Asthma [J45.909]  Yes   • Obesity [E66.9]  Yes   • GERD (gastroesophageal reflux disease) [K21.9]  Yes      Resolved Hospital Problems    Diagnosis Date Resolved POA   • **Sepsis (CMS/HCC) [A41.9] 07/04/2019 Yes   • Pyelonephritis [N12] 07/04/2019 Yes   • Hypokalemia [E87.6] 07/04/2019 Yes          Hospital Course:  Bernardo Dodd is a 38 y.o. female with PMH of asthma, GERD, HTN, seizure disorder and nephrolithiasis s/p stenting in the past ([facility]/Dr. Bimal Kwon) who presents with sepsis due to pyelonephritis.  Renal U/S also revealed nephrolithiasis with stone size of 13 mm with hydronephrosis.      Plan:     Sepsis  Pyelonephritis  Left obstructing nephrolithiasis  --e coli, rocephin sensitive  --now s/p 6 days rocephin therapy, will change to oral antibiotics at discharge (cefdinir 300 mg BID to complete 10 days total therapy). Would have preferred levaquin, however patient's QTc is prolonged (595) and she has history of seizures and a fluoroquinolone could lower the seizure threshold.  -- leukocytosis has resolved    -- Urology consulted and evaluated patient during this admission, no intervention planned  -- follow up primary care physician early next week  -- follow up primary Urologist Dr Mckeon in 2 weeks.    Discharge Follow Up Recommendations for labs/diagnostics:      Day of Discharge     HPI:   Flank pain has resolved, denies abdominal pain, eating well    Review of Systems  Gen- No fevers, chills  CV- No chest pain, palpitations  Resp- No cough, dyspnea  GI- No N/V/D, abd pain        Otherwise ROS is negative except as mentioned in the HPI.    Vital Signs:   Temp:  [97.8 °F (36.6 °C)-98.6 °F (37 °C)] 97.8 °F (36.6 °C)  Heart Rate:  [] 94  Resp:  [16-18] 16  BP: (118-129)/(80-83) 118/80     Physical Exam:  Constitutional -no acute distress, non toxic, in bed, sitting up  HEENT-NCAT, mucous membranes moist  CV-RRR, S1 S2 normal, no m/r/g  Resp-CTAB, no  wheezes, rhonchi or rales  Abd-soft, non-tender, non-distended, normo active bowel sounds, no cva tenderness, overweight  Ext-No lower extremity cyanosis, clubbing or edema bilaterally  Neuro-alert and oriented, speech clear, moves all extremities   Psych-normal affect   Skin- No rash on exposed UE or LE bilaterally      Pertinent  and/or Most Recent Results     Results from last 7 days   Lab Units 07/03/19  1227 07/03/19  0426 07/02/19  1609 07/02/19  0418 07/01/19  1943 07/01/19  0406 06/30/19  0401 06/29/19  0549 06/28/19  2313   WBC 10*3/mm3  --  8.49  --  5.71  --  7.06 6.44 12.23* 16.65*   HEMOGLOBIN g/dL  --  12.1  --  10.8*  --  10.3* 10.0* 11.0* 13.1   HEMATOCRIT %  --  39.9  --  34.7  --  33.5* 32.8* 34.6 40.7   PLATELETS 10*3/mm3  --  265  --  263  --  235 205 229 263   SODIUM mmol/L 138  --   --  140  --  140 139 135* 137   POTASSIUM mmol/L 4.4  --  5.0 3.6 4.3 3.3* 4.1 3.0* 3.0*   CHLORIDE mmol/L 99  --   --  100  --  104 107 100 94*   CO2 mmol/L 27.0  --   --  28.0  --  23.0 22.0 22.0 27.0   BUN mg/dL 10  --   --  6  --  4* 6 10 11   CREATININE mg/dL 0.61  --   --  0.73  --  0.59 0.64 0.78 0.93   GLUCOSE mg/dL 89  --   --  123*  --  76 95 98 114*   CALCIUM mg/dL 9.2  --   --  9.0  --  8.2* 8.0* 7.9* 9.2     Results from last 7 days   Lab Units 06/28/19  2313   BILIRUBIN mg/dL 0.5   ALK PHOS U/L 78   ALT (SGPT) U/L 13   AST (SGOT) U/L 13           Invalid input(s): TG, LDLCALC, LDLREALC  Results from last 7 days   Lab Units 06/30/19  0401 06/28/19  2313   PROCALCITONIN ng/mL 1.39* 2.69*   LACTATE mmol/L  --  1.3       Brief Urine Lab Results  (Last result in the past 365 days)      Color   Clarity   Blood   Leuk Est   Nitrite   Protein   CREAT   Urine HCG        06/30/19 0933 Yellow Cloudy Negative Small (1+) Negative Negative               Microbiology Results Abnormal     Procedure Component Value - Date/Time    Blood Culture - Blood, Hand, Left [[id number]] Collected:  06/28/19 2311    Lab Status:   Final result Specimen:  Blood from Hand, Left Updated:  07/04/19 0316     Blood Culture No growth at 5 days    Blood Culture - Blood, Hand, Right [[id number]] Collected:  06/28/19 2305    Lab Status:  Final result Specimen:  Blood from Hand, Right Updated:  07/04/19 0316     Blood Culture No growth at 5 days    Urine Culture - Urine, Urine, Clean Catch [[id number]]  (Abnormal)  (Susceptibility) Collected:  06/29/19 0026    Lab Status:  Final result Specimen:  Urine, Clean Catch Updated:  07/03/19 0330     Urine Culture >100,000 CFU/mL Escherichia coli    Susceptibility      Escherichia coli     DEVEN     Ampicillin Resistant     Ampicillin + Sulbactam Resistant     Cefazolin Susceptible     Cefepime Susceptible     Ceftazidime Susceptible     Ceftriaxone Susceptible     Gentamicin Susceptible     Levofloxacin Susceptible     Nitrofurantoin Susceptible     Piperacillin + Tazobactam Susceptible     Tetracycline Susceptible     Trimethoprim + Sulfamethoxazole Susceptible                    Urine Culture - Urine, Urine, Clean Catch [[id number]]  (Normal) Collected:  06/30/19 0933    Lab Status:  Final result Specimen:  Urine, Clean Catch Updated:  07/01/19 1207     Urine Culture No growth    Influenza Antigen, Rapid - Swab, Nasopharynx [[id number]]  (Normal) Collected:  06/28/19 2324    Lab Status:  Final result Specimen:  Swab from Nasopharynx Updated:  06/28/19 2347     Influenza A Ag, EIA Negative     Influenza B Ag, EIA Negative          Imaging Results (all)     Procedure Component Value Units Date/Time     Renal Limited [[id number]] Collected:  06/29/19 1727     Updated:  07/01/19 1308    Narrative:       EXAMINATION: US RENAL LIMITED - 06/29/2019     INDICATION:  A41.9-Sepsis, unspecified organism; E87.6-Hypokalemia;  D72.829-Elevated white blood cell count, unspecified;  U46-Bprfbc-kmadynjxowvp nephritis, not specified as acute or chronic      TECHNIQUE: Ultrasound kidneys and urinary bladder     COMPARISON: NONE      FINDINGS:      Right kidney measures 12.6 cm in length without evidence of  hydronephrosis, contour-deforming mass or obvious calculi.  Left kidney measures 11.17 m in length containing a 1.3 cm hyperechoic  area with shadowing indicating calculus. Mild prominence of the left  renal renal pelvis suggesting potential hydronephrosis versus  pelvocaliectasis.  Urinary bladder is decompressed and unremarkable.       Impression:       Calculus within left kidney measures 1.3 cm along with mild  prominence of the left renal pelvis suggesting pelvocaliectasis versus  mild hydronephrosis.      DICTATED:   06/29/2019  EDITED/ls :   06/29/2019      This report was finalized on 7/1/2019 1:05 PM by Dr. Reilly Torrez.       CT Angiogram Chest With Contrast [[id number]] Collected:  06/29/19 0144     Updated:  06/29/19 0146    Narrative:       CTA Chest    INDICATION:   Fever, chills and headache with shortness of breath for 2 days. Chest pain.    TECHNIQUE:   CT angiogram of the chest with contrast. 3-D postprocessing was performed and reviewed.   Radiation dose reduction techniques included automated exposure control or exposure modulation based on body size. Count of known CT and cardiac nuc med studies  performed in previous 12 months: 2.     COMPARISON:   None available.    FINDINGS:   Minimal lingular atelectasis. No infiltrates or effusions.    No evidence of pulmonary embolus. Heart and aorta appear normal. Visualized upper abdomen remarkable for enlargement and abnormal enhancement left kidney highly suggestive of pyelonephritis in the appropriate clinical setting. Osseous structures and  thoracic inlet unremarkable.      Impression:       No acute intrathoracic abnormality identified. In particular no evidence of pulmonary embolus.    Abnormal left kidney demonstrating diffuse enlargement with abnormal cortical enhancement. In the appropriate setting this could reflect acute pyelonephritis. Patient does demonstrate a  nonobstructing left renal stone measuring about 6 mm but there is  only minimal distention of the renal pelvis. Acute pyelonephritis favored over obstruction.    Signer Name: JOHN Rock MD   Signed: 6/29/2019 1:44 AM   Workstation Name: [id number]       XR Chest 1 View [[id number]] Collected:  06/29/19 0000     Updated:  06/29/19 0002    Narrative:       CR Chest 1 Vw    INDICATION:   Cough and fever for 4 days.     COMPARISON:    10/2/2018    FINDINGS:  Single portable AP view of the chest.  Low lung volumes. Lungs are clear. No effusions. Heart and mediastinum unremarkable. No pneumothorax.      Impression:       No acute cardiopulmonary findings.    Signer Name: JOHN Rock MD   Signed: 6/29/2019 12:00 AM   Workstation Name: [id number]             Results for orders placed during the hospital encounter of 06/27/18   Duplex Venous Upper Extremity - Left    Narrative · Normal left upper extremity venous duplex scan.          Results for orders placed during the hospital encounter of 06/27/18   Duplex Venous Upper Extremity - Left    Narrative · Normal left upper extremity venous duplex scan.                 Discharge Details        Discharge Medications      New Medications      Instructions Start Date   cefdinir 300 MG capsule  Commonly known as:  OMNICEF   300 mg, Oral, 2 Times Daily      Probiotic capsule   1 capsule, Oral, 2 Times Daily, May take any generic probiotic         Changes to Medications      Instructions Start Date   ferrous sulfate 325 (65 FE) MG tablet  What changed:    · additional instructions  · These instructions start on 7/9/2019. If you are unsure what to do until then, ask your doctor or other care provider.   1 tablet, Oral, Daily With Breakfast, Resume Iron when antibiotics finished.   Start Date:  7/9/2019        Continue These Medications      Instructions Start Date   ADVAIR DISKUS 500-50 MCG/DOSE DISKUS  Generic drug:  fluticasone-salmeterol   1 puff, Inhalation, 2  Times Daily - RT      amLODIPine 5 MG tablet  Commonly known as:  NORVASC   5 mg, Oral, Daily      azelastine 0.05 % ophthalmic solution  Commonly known as:  OPTIVAR   1 drop, Both Eyes, 2 Times Daily      clotrimazole-betamethasone 1-0.05 % cream  Commonly known as:  LOTRISONE   Topical, 2 Times Daily      fluticasone 50 MCG/ACT nasal spray  Commonly known as:  FLONASE   2 sprays, Nasal, Daily      gabapentin 400 MG capsule  Commonly known as:  NEURONTIN   400 mg, Oral, 5 Times Daily PRN      lamoTRIgine 100 MG tablet  Commonly known as:  LaMICtal   100 mg, Oral, Daily      loratadine-pseudoephedrine 5-120 MG per 12 hr tablet  Commonly known as:  CLARITIN-D 12 HOUR   1 tablet, Oral, 2 Times Daily      omeprazole 40 MG capsule  Commonly known as:  priLOSEC   40 mg, Oral, 2 Times Daily, Take on an empty stomach and eat 30 minutes- 1 hr after eating.      SINGULAIR 10 MG tablet  Generic drug:  montelukast   10 mg, Oral, Nightly      SPIRIVA RESPIMAT 2.5 MCG/ACT aerosol solution inhaler  Generic drug:  tiotropium bromide monohydrate   2 puffs, Oral, Daily      VENTOLIN  (90 Base) MCG/ACT inhaler  Generic drug:  albuterol sulfate HFA   1-2 puffs, Inhalation, As Needed, Every 4-6 hrs PRN      albuterol (2.5 MG/3ML) 0.083% nebulizer solution  Commonly known as:  PROVENTIL   2.5 mg, Nebulization, Every 12 Hours Scheduled      vitamin D3 5000 units capsule capsule   5,000 Units, Oral, Daily         Stop These Medications    fluconazole 200 MG tablet  Commonly known as:  DIFLUCAN     ranitidine 300 MG capsule  Commonly known as:  ZANTAC            Allergies   Allergen Reactions   • Naproxen Swelling         Discharge Disposition:  Home or Self Care    Discharge Diet:  Diet Order   Procedures   • Diet Regular         Discharge Activity:         CODE STATUS:    Code Status and Medical Interventions:   Ordered at: 06/29/19 0146     Code Status:    CPR     Medical Interventions (Level of Support Prior to Arrest):    Full          Future Appointments   Date Time Provider Department Center   7/9/2019  2:15 PM Roseanne Mcclendon APRN [facility] None   8/29/2019 10:45 AM Nelly Sotelo PA [facility] None   10/29/2019  2:30 PM Alf Lin MD [facility] None       Additional Instructions for the Follow-ups that You Need to Schedule     Discharge Follow-up with PCP   As directed       Currently Documented PCP:    Nelly Sotelo PA    PCP Phone Number:    [phone]     Follow Up Details:  follow up PCP next week         Discharge Follow-up with Specialty: follow up Urology Dr Mckeon in two weeks   As directed      Specialty:  follow up Urology Dr Mckeon in two weeks               Time Spent on Discharge:  40 minutes    Electronically signed by Darnell Lancaster MD, 07/04/19, 10:03 AM.        Electronically signed by Darnell Lancaster MD at 7/4/2019 10:23 AM

## 2019-07-05 ENCOUNTER — READMISSION MANAGEMENT (OUTPATIENT)
Dept: CALL CENTER | Facility: HOSPITAL | Age: 39
End: 2019-07-05

## 2019-07-05 NOTE — OUTREACH NOTE
Prep Survey      Responses   Facility patient discharged from?  [facility]   Is patient eligible?  Yes   Discharge diagnosis  UTI, HTN, asthma, obesity, GERD, sepsis d/t pyelonephritis   Does the patient have one of the following disease processes/diagnoses(primary or secondary)?  Sepsis   Does the patient have Home health ordered?  No   Is there a DME ordered?  No   Comments regarding appointments  Pt to schedule follow up with PCP   Prep survey completed?  Yes          Eugenia Camejo RN

## 2019-07-09 ENCOUNTER — OFFICE VISIT (OUTPATIENT)
Dept: INTERNAL MEDICINE | Facility: CLINIC | Age: 39
End: 2019-07-09

## 2019-07-09 ENCOUNTER — READMISSION MANAGEMENT (OUTPATIENT)
Dept: CALL CENTER | Facility: HOSPITAL | Age: 39
End: 2019-07-09

## 2019-07-09 VITALS
TEMPERATURE: 98.2 F | DIASTOLIC BLOOD PRESSURE: 80 MMHG | SYSTOLIC BLOOD PRESSURE: 124 MMHG | HEART RATE: 116 BPM | WEIGHT: 175 LBS | HEIGHT: 59 IN | BODY MASS INDEX: 35.28 KG/M2 | OXYGEN SATURATION: 99 %

## 2019-07-09 DIAGNOSIS — H05.20 EXOPHTHALMOS: ICD-10-CM

## 2019-07-09 DIAGNOSIS — N12 PYELONEPHRITIS: Primary | ICD-10-CM

## 2019-07-09 DIAGNOSIS — IMO0001 TRANSITION OF CARE PERFORMED WITH SHARING OF CLINICAL SUMMARY: ICD-10-CM

## 2019-07-09 LAB
BILIRUB BLD-MCNC: NEGATIVE MG/DL
CLARITY, POC: ABNORMAL
COLOR UR: YELLOW
GLUCOSE UR STRIP-MCNC: NEGATIVE MG/DL
KETONES UR QL: NEGATIVE
LEUKOCYTE EST, POC: ABNORMAL
NITRITE UR-MCNC: NEGATIVE MG/ML
PH UR: 5 [PH] (ref 5–8)
PROT UR STRIP-MCNC: NEGATIVE MG/DL
RBC # UR STRIP: NEGATIVE /UL
SP GR UR: 1.02 (ref 1–1.03)
UROBILINOGEN UR QL: NORMAL

## 2019-07-09 PROCEDURE — 84443 ASSAY THYROID STIM HORMONE: CPT | Performed by: NURSE PRACTITIONER

## 2019-07-09 PROCEDURE — 99214 OFFICE O/P EST MOD 30 MIN: CPT | Performed by: NURSE PRACTITIONER

## 2019-07-09 PROCEDURE — 85025 COMPLETE CBC W/AUTO DIFF WBC: CPT | Performed by: NURSE PRACTITIONER

## 2019-07-09 PROCEDURE — 81003 URINALYSIS AUTO W/O SCOPE: CPT | Performed by: NURSE PRACTITIONER

## 2019-07-09 PROCEDURE — 96372 THER/PROPH/DIAG INJ SC/IM: CPT | Performed by: NURSE PRACTITIONER

## 2019-07-09 PROCEDURE — 87086 URINE CULTURE/COLONY COUNT: CPT | Performed by: NURSE PRACTITIONER

## 2019-07-09 PROCEDURE — 80048 BASIC METABOLIC PNL TOTAL CA: CPT | Performed by: NURSE PRACTITIONER

## 2019-07-09 RX ORDER — CEFTRIAXONE 1 G/1
1 INJECTION, POWDER, FOR SOLUTION INTRAMUSCULAR; INTRAVENOUS ONCE
Status: COMPLETED | OUTPATIENT
Start: 2019-07-09 | End: 2019-07-09

## 2019-07-09 RX ORDER — SULFAMETHOXAZOLE AND TRIMETHOPRIM 800; 160 MG/1; MG/1
1 TABLET ORAL 2 TIMES DAILY
Qty: 14 TABLET | Refills: 0 | Status: SHIPPED | OUTPATIENT
Start: 2019-07-09 | End: 2019-07-12 | Stop reason: HOSPADM

## 2019-07-09 RX ADMIN — CEFTRIAXONE 1 G: 1 INJECTION, POWDER, FOR SOLUTION INTRAMUSCULAR; INTRAVENOUS at 15:34

## 2019-07-09 NOTE — OUTREACH NOTE
Sepsis Week 1 Survey      Responses   Facility patient discharged from?  [facility]   Does the patient have one of the following disease processes/diagnoses(primary or secondary)?  Sepsis   Is there a successful TCM telephone encounter documented?  No   Week 1 attempt successful?  Yes   Call start time  1239   Call end time  1254   Discharge diagnosis  UTI, HTN, asthma, obesity, GERD, sepsis d/t pyelonephritis   Is patient permission given to speak with other caregiver?  Yes   Person spoke with today (if not patient) and relationship  Keya / mother   Meds reviewed with patient/caregiver?  Yes   Is the patient having any side effects they believe may be caused by any medication additions or changes?  No   Does the patient have all medications related to this admission filled (includes all antibiotics, inhalers, nebulizers,steroids,etc.)  Yes   Is the patient taking all medications as directed (includes completed medication regime)?  Yes   Does the patient have a primary care provider?   Yes   Comments regarding PCP  Amina Sotelo/ PCP- has a followup tomorrow. 07/10/2019   Does the patient have an appointment with their PCP within 7 days of discharge?  Yes   Has the patient kept scheduled appointments due by today?  Yes   Has home health visited the patient within 72 hours of discharge?  N/A   Psychosocial issues?  No   Did the patient receive a copy of their discharge instructions?  Yes   Nursing interventions  Reviewed instructions with patient   What is the patient's perception of their health status since discharge?  Improving   Nursing interventions  Nurse provided patient education   Is the patient/caregiver able to teach back Sepsis?  S - Shivering,fever or very cold, E - Extreme pain or generalized discomfort (worst ever,especially abdomen), P - Pale or discolored skin, S - Sleepy, difficult to arouse,confused, I -   I feel like I might die-a feeling of hopelessness, S - Short of breath   Nursing interventions   Nurse provided reassurance to patient, Nurse provided patient education   Is patient/caregiver able to teach back steps to recovery at home?  Set small, achievable goals for return to baseline health, Rest and regain strength, Make a list of questions for PCP appoinment   Is the patient/caregiver able to teach back signs and symptoms of worsening condition:  Hyperthermia, Fever, Rapid heart rate (>90), Shortness of breath/rapid respiratory rate, Altered mental status(confusion/coma)   Is the patient/caregiver able to teach back the hierarchy of who to call/visit for symptoms/problems? PCP, Specialist, Home health nurse, Urgent Care, ED, 911  Yes   Additional teach back comments  Encouraged family member if she develops fever, chills, nausea, vomiting, increased heart rate, confusion, or severe pain to seek medical attention immediately.  Family member verbalized understanding.   Week 1 call completed?  Yes   Revoked  No further contact(revokes)-requires comment   Graduated/Revoked comments  Declined to participate in further calls.           Jules Haywood RN

## 2019-07-09 NOTE — PROGRESS NOTES
Chief Complaint   Patient presents with   • Hospital Follow Up     kidney infection        History of Present Illness  38 y.o.female presents for hospital follow up sp hospitalization June 28 thru July 4 for pyelonephritis and sepsis. Urine with ecoli Rocephin sensitive. Rcvd 6 days rocephin and changed to oral abx omnicef at [facility] for 10 days.  Seen by urology in hospital and to fu with Urologist Dr. Mckeon in 2 wks.  Since MT still with dizziness, nausea, low flank pain bilateral, occasional dysuria; no fever. Taking abx as directed. No missed doses.  No hematuria.  Staying hydrated.      Review of Systems   Constitutional: Positive for fatigue. Negative for chills and fever.   Gastrointestinal: Positive for nausea. Negative for vomiting.   Genitourinary: Positive for dysuria and flank pain. Negative for difficulty urinating, frequency, hematuria and urgency.   Musculoskeletal: Negative for myalgias.          PMSFH  The following portions of the patient's history were reviewed and updated as appropriate: allergies, current medications, past family history, past medical history, past social history, past surgical history and problem list.       Past Medical History:   Diagnosis Date   • Allergic rhinitis    • Anemia    • Arthritis    • Asthma    • Depression    • FHx: migraine headaches    • GERD (gastroesophageal reflux disease)    • Heart murmur    • Herpes simplex    • History of chest x-ray 11/01/2015    no active disease   • History of echocardiogram 11/01/2015    ejection fraction of greater than 65%, mitral and pulmonic regurgitation an physiological tricuspid regurgitation.   • History of PFTs 12/22/2015    spirometry data acceptable and reproducible; pt given 4 puffs of Ventolin; pt gave good effort; no obstruction; no Bd response; MVV reduced    • History of PFTs 11/02/2015    pt gave best effort; duoneb given prior and post study; moderate nonspecific proportional reduction of FEV1 and FVC with preserved  ratio; FEV1 moderately reduced; cannot rule out restriction   • Hypertension    • Ovarian cyst    • Seizures (CMS/HCC)    • Thigh shingles       Past Surgical History:   Procedure Laterality Date   • BILATERAL BREAST REDUCTION     • BREAST SURGERY      breast reduction   •  SECTION     • LAPAROSCOPIC TUBAL LIGATION     • ORIF ANKLE FRACTURE Right    • TENSION FREE VAGINAL TAPING WITH MINI ARC SLING        Allergies   Allergen Reactions   • Naproxen Swelling      Family History   Problem Relation Age of Onset   • Pancreatic cancer Maternal Grandmother    • Heart failure Paternal Grandmother    • MARCIE disease Paternal Aunt    • Arthritis Mother    • Cancer Mother    • Arthritis Father    • Diabetes Neg Hx    • Heart attack Neg Hx    • Hypertension Neg Hx    • Hyperlipidemia Neg Hx    • Mental illness Neg Hx    • Obesity Neg Hx    • Stroke Neg Hx             Current Outpatient Medications:   •  ADVAIR DISKUS 500-50 MCG/DOSE DISKUS, Inhale 1 puff 2 (Two) Times a Day., Disp: , Rfl:   •  albuterol (PROVENTIL) (2.5 MG/3ML) 0.083% nebulizer solution, Take 2.5 mg by nebulization Every 12 (Twelve) Hours., Disp: , Rfl:   •  albuterol (VENTOLIN HFA) 108 (90 BASE) MCG/ACT inhaler, Inhale 1-2 puffs as needed. Every 4-6 hrs PRN, Disp: , Rfl:   •  amLODIPine (NORVASC) 5 MG tablet, Take 1 tablet by mouth Daily., Disp: 30 tablet, Rfl: 5  •  azelastine (OPTIVAR) 0.05 % ophthalmic solution, Administer 1 drop to both eyes 2 (Two) Times a Day., Disp: 6 mL, Rfl: 12  •  cefdinir (OMNICEF) 300 MG capsule, Take 1 capsule by mouth 2 (Two) Times a Day., Disp: 10 capsule, Rfl: 0  •  Cholecalciferol (VITAMIN D3) 5000 units capsule capsule, Take 1 capsule by mouth Daily., Disp: 30 capsule, Rfl: 3  •  clotrimazole-betamethasone (LOTRISONE) 1-0.05 % cream, Apply  topically to the appropriate area as directed 2 (Two) Times a Day., Disp: 15 g, Rfl: 0  •  ferrous sulfate 325 (65 FE) MG tablet, Take 1 tablet by mouth Daily With Breakfast.  "Resume Iron when antibiotics finished., Disp: 30 tablet, Rfl: 5  •  fluticasone (FLONASE) 50 MCG/ACT nasal spray, 2 sprays into the nostril(s) as directed by provider Daily., Disp: 16 g, Rfl: 5  •  gabapentin (NEURONTIN) 400 MG capsule, Take 400 mg by mouth 5 (Five) Times a Day As Needed., Disp: , Rfl:   •  lamoTRIgine (LaMICtal) 100 MG tablet, Take 100 mg by mouth Daily., Disp: , Rfl:   •  loratadine-pseudoephedrine (CLARITIN-D 12 HOUR) 5-120 MG per 12 hr tablet, Take 1 tablet by mouth 2 (Two) Times a Day., Disp: 60 tablet, Rfl: 5  •  montelukast (SINGULAIR) 10 MG tablet, Take 10 mg by mouth every night., Disp: , Rfl:   •  omeprazole (priLOSEC) 40 MG capsule, Take 1 capsule by mouth 2 (Two) Times a Day. Take on an empty stomach and eat 30 minutes- 1 hr after eating., Disp: 90 capsule, Rfl: 5  •  Probiotic capsule, Take 1 capsule by mouth 2 (Two) Times a Day. May take any generic probiotic, Disp: 14 capsule, Rfl: 0  •  SPIRIVA RESPIMAT 2.5 MCG/ACT aerosol solution, Take 2 puffs by mouth Daily., Disp: , Rfl:     VITALS:  /80   Pulse 116   Temp 98.2 °F (36.8 °C)   Ht 149.9 cm (59\")   Wt 79.4 kg (175 lb)   SpO2 99%   BMI 35.35 kg/m²     Physical Exam   Constitutional: She is oriented to person, place, and time. She appears well-developed and well-nourished. No distress.   HENT:   Head: Normocephalic.   Eyes: Pupils are equal, round, and reactive to light.   Bilateral exophthalmos   Cardiovascular: Normal rate, regular rhythm and normal heart sounds.   Pulmonary/Chest: Effort normal and breath sounds normal. No respiratory distress.   Abdominal: There is tenderness in the suprapubic area. There is CVA tenderness. There is no rigidity and no guarding.   Bilateral back flank cva tenderness   Neurological: She is alert and oriented to person, place, and time.   Skin: Skin is warm.        LABS     POCT urinalysis dipstick, automated   Result Value Ref Range    Color Yellow Yellow, Straw, Dark Yellow, Andreina    " Clarity, UA Cloudy (A) Clear    Specific Gravity  1.025 1.005 - 1.030    pH, Urine 5.0 5.0 - 8.0    Leukocytes 500 Vishal/ul (A) Negative    Nitrite, UA Negative Negative    Protein, POC Negative Negative mg/dL    Glucose, UA Negative Negative, 1000 mg/dL (3+) mg/dL    Ketones, UA Negative Negative    Urobilinogen, UA Normal Normal    Bilirubin Negative Negative    Blood, UA Negative Negative       ASSESSMENT/PLAN  Bernardo was seen today for hospital follow up.    Diagnoses and all orders for this visit:    Pyelonephritis  -     POCT urinalysis dipstick, automated  -     cefTRIAXone (ROCEPHIN) injection 1 g  -     sulfamethoxazole-trimethoprim (BACTRIM DS,SEPTRA DS) 800-160 MG per tablet; Take 1 tablet by mouth 2 (Two) Times a Day for 7 days.  -     CBC Auto Differential  -     Basic metabolic panel  -     Urine Culture - Urine, Urine, Clean Catch  With her urine in office having high urine leuks and positive cva tenderness with minimal stimuli; concern for continuing pyelo.   Gave rocephin in office and changed to bactrim with ongoing sx.  Will fu urine culture and labs  If worsening of symptoms or no improvement in symptoms or fever > 101.5 patient should contact our office for further evaluation treatment or seek emergency care.    Transition of care performed with sharing of clinical summary  Comments:  reviewed dc meds, plan of care. need to keep fu with urologist.    Exophthalmos  -     TSH      I discussed the patients findings and my recommendations with patient.  Patient was encouraged to keep me informed of any acute changes, lack of improvement, or any new concerning symptoms.    Patient voiced understanding of all instructions and denied further questions.      FOLLOW-UP  Return if symptoms worsen or fail to improve.  FU with PCP in a week.  Electronically signed by:    Roseanne Mcginnis, SHANE  07/09/2019

## 2019-07-10 ENCOUNTER — HOSPITAL ENCOUNTER (INPATIENT)
Facility: HOSPITAL | Age: 39
LOS: 2 days | Discharge: HOME | End: 2019-07-12
Attending: EMERGENCY MEDICINE | Admitting: FAMILY MEDICINE

## 2019-07-10 ENCOUNTER — APPOINTMENT (OUTPATIENT)
Dept: CT IMAGING | Facility: HOSPITAL | Age: 39
End: 2019-07-10

## 2019-07-10 DIAGNOSIS — N13.5 URETERAL OBSTRUCTION, LEFT: Primary | ICD-10-CM

## 2019-07-10 DIAGNOSIS — N39.0 URINARY TRACT INFECTION WITHOUT HEMATURIA, SITE UNSPECIFIED: ICD-10-CM

## 2019-07-10 DIAGNOSIS — N20.0 NEPHROLITHIASIS: ICD-10-CM

## 2019-07-10 PROBLEM — G40.909 SEIZURE DISORDER (HCC): Status: ACTIVE | Noted: 2019-07-10

## 2019-07-10 LAB
ALBUMIN SERPL-MCNC: 4.1 G/DL (ref 3.5–5.2)
ALBUMIN/GLOB SERPL: 1.2 G/DL
ALP SERPL-CCNC: 69 U/L (ref 39–117)
ALT SERPL W P-5'-P-CCNC: 35 U/L (ref 1–33)
ANION GAP SERPL CALCULATED.3IONS-SCNC: 15 MMOL/L (ref 5–15)
ANION GAP SERPL CALCULATED.3IONS-SCNC: 15.8 MMOL/L (ref 5–15)
AST SERPL-CCNC: 23 U/L (ref 1–32)
B-HCG UR QL: NEGATIVE
BACTERIA SPEC AEROBE CULT: NO GROWTH
BACTERIA UR QL AUTO: ABNORMAL /HPF
BASOPHILS # BLD AUTO: 0.03 10*3/MM3 (ref 0–0.2)
BASOPHILS # BLD AUTO: 0.03 10*3/MM3 (ref 0–0.2)
BASOPHILS NFR BLD AUTO: 0.3 % (ref 0–1.5)
BASOPHILS NFR BLD AUTO: 0.3 % (ref 0–1.5)
BILIRUB SERPL-MCNC: 0.4 MG/DL (ref 0.2–1.2)
BILIRUB UR QL STRIP: NEGATIVE
BUN BLD-MCNC: 12 MG/DL (ref 6–20)
BUN BLD-MCNC: 9 MG/DL (ref 6–20)
BUN/CREAT SERPL: 12 (ref 7–25)
BUN/CREAT SERPL: 13.3 (ref 7–25)
CALCIUM SPEC-SCNC: 9.4 MG/DL (ref 8.6–10.5)
CALCIUM SPEC-SCNC: 9.4 MG/DL (ref 8.6–10.5)
CHLORIDE SERPL-SCNC: 100 MMOL/L (ref 98–107)
CHLORIDE SERPL-SCNC: 97 MMOL/L (ref 98–107)
CLARITY UR: ABNORMAL
CO2 SERPL-SCNC: 21.2 MMOL/L (ref 22–29)
CO2 SERPL-SCNC: 23 MMOL/L (ref 22–29)
COLOR UR: YELLOW
CREAT BLD-MCNC: 0.75 MG/DL (ref 0.57–1)
CREAT BLD-MCNC: 0.9 MG/DL (ref 0.57–1)
DEPRECATED RDW RBC AUTO: 45.5 FL (ref 37–54)
DEPRECATED RDW RBC AUTO: 49.1 FL (ref 37–54)
EOSINOPHIL # BLD AUTO: 0.02 10*3/MM3 (ref 0–0.4)
EOSINOPHIL # BLD AUTO: 0.04 10*3/MM3 (ref 0–0.4)
EOSINOPHIL NFR BLD AUTO: 0.2 % (ref 0.3–6.2)
EOSINOPHIL NFR BLD AUTO: 0.4 % (ref 0.3–6.2)
ERYTHROCYTE [DISTWIDTH] IN BLOOD BY AUTOMATED COUNT: 14.3 % (ref 12.3–15.4)
ERYTHROCYTE [DISTWIDTH] IN BLOOD BY AUTOMATED COUNT: 14.7 % (ref 12.3–15.4)
GFR SERPL CREATININE-BSD FRML MDRD: 104 ML/MIN/1.73
GFR SERPL CREATININE-BSD FRML MDRD: 84 ML/MIN/1.73
GLOBULIN UR ELPH-MCNC: 3.3 GM/DL
GLUCOSE BLD-MCNC: 96 MG/DL (ref 65–99)
GLUCOSE BLD-MCNC: 97 MG/DL (ref 65–99)
GLUCOSE UR STRIP-MCNC: NEGATIVE MG/DL
HCT VFR BLD AUTO: 38.9 % (ref 34–46.6)
HCT VFR BLD AUTO: 40.3 % (ref 34–46.6)
HGB BLD-MCNC: 12.1 G/DL (ref 12–15.9)
HGB BLD-MCNC: 12.3 G/DL (ref 12–15.9)
HGB UR QL STRIP.AUTO: NEGATIVE
HOLD SPECIMEN: NORMAL
HOLD SPECIMEN: NORMAL
HYALINE CASTS UR QL AUTO: ABNORMAL /LPF
IMM GRANULOCYTES # BLD AUTO: 0.04 10*3/MM3 (ref 0–0.05)
IMM GRANULOCYTES # BLD AUTO: 0.07 10*3/MM3 (ref 0–0.05)
IMM GRANULOCYTES NFR BLD AUTO: 0.4 % (ref 0–0.5)
IMM GRANULOCYTES NFR BLD AUTO: 0.7 % (ref 0–0.5)
KETONES UR QL STRIP: NEGATIVE
LEUKOCYTE ESTERASE UR QL STRIP.AUTO: ABNORMAL
LIPASE SERPL-CCNC: 43 U/L (ref 13–60)
LYMPHOCYTES # BLD AUTO: 2.56 10*3/MM3 (ref 0.7–3.1)
LYMPHOCYTES # BLD AUTO: 2.83 10*3/MM3 (ref 0.7–3.1)
LYMPHOCYTES NFR BLD AUTO: 25.3 % (ref 19.6–45.3)
LYMPHOCYTES NFR BLD AUTO: 31 % (ref 19.6–45.3)
MCH RBC QN AUTO: 27.3 PG (ref 26.6–33)
MCH RBC QN AUTO: 27.4 PG (ref 26.6–33)
MCHC RBC AUTO-ENTMCNC: 30 G/DL (ref 31.5–35.7)
MCHC RBC AUTO-ENTMCNC: 31.6 G/DL (ref 31.5–35.7)
MCV RBC AUTO: 86.6 FL (ref 79–97)
MCV RBC AUTO: 90.8 FL (ref 79–97)
MONOCYTES # BLD AUTO: 0.57 10*3/MM3 (ref 0.1–0.9)
MONOCYTES # BLD AUTO: 0.59 10*3/MM3 (ref 0.1–0.9)
MONOCYTES NFR BLD AUTO: 5.8 % (ref 5–12)
MONOCYTES NFR BLD AUTO: 6.3 % (ref 5–12)
MUCOUS THREADS URNS QL MICRO: ABNORMAL /HPF
NEUTROPHILS # BLD AUTO: 5.61 10*3/MM3 (ref 1.7–7)
NEUTROPHILS # BLD AUTO: 6.85 10*3/MM3 (ref 1.7–7)
NEUTROPHILS NFR BLD AUTO: 61.6 % (ref 42.7–76)
NEUTROPHILS NFR BLD AUTO: 67.7 % (ref 42.7–76)
NITRITE UR QL STRIP: NEGATIVE
NRBC BLD AUTO-RTO: 0 /100 WBC (ref 0–0.2)
NRBC BLD AUTO-RTO: 0 /100 WBC (ref 0–0.2)
PH UR STRIP.AUTO: <=5 [PH] (ref 5–8)
PLATELET # BLD AUTO: 461 10*3/MM3 (ref 140–450)
PLATELET # BLD AUTO: 462 10*3/MM3 (ref 140–450)
PMV BLD AUTO: 9.2 FL (ref 6–12)
PMV BLD AUTO: 9.9 FL (ref 6–12)
POTASSIUM BLD-SCNC: 3.7 MMOL/L (ref 3.5–5.2)
POTASSIUM BLD-SCNC: 3.9 MMOL/L (ref 3.5–5.2)
PROT SERPL-MCNC: 7.4 G/DL (ref 6–8.5)
PROT UR QL STRIP: NEGATIVE
RBC # BLD AUTO: 4.44 10*6/MM3 (ref 3.77–5.28)
RBC # BLD AUTO: 4.49 10*6/MM3 (ref 3.77–5.28)
RBC # UR: ABNORMAL /HPF
REF LAB TEST METHOD: ABNORMAL
SODIUM BLD-SCNC: 134 MMOL/L (ref 136–145)
SODIUM BLD-SCNC: 138 MMOL/L (ref 136–145)
SP GR UR STRIP: 1.02 (ref 1–1.03)
SQUAMOUS #/AREA URNS HPF: ABNORMAL /HPF
TSH SERPL DL<=0.05 MIU/L-ACNC: 1.13 MIU/ML (ref 0.27–4.2)
UROBILINOGEN UR QL STRIP: ABNORMAL
WBC NRBC COR # BLD: 10.12 10*3/MM3 (ref 3.4–10.8)
WBC NRBC COR # BLD: 9.12 10*3/MM3 (ref 3.4–10.8)
WBC UR QL AUTO: ABNORMAL /HPF
WHOLE BLOOD HOLD SPECIMEN: NORMAL
WHOLE BLOOD HOLD SPECIMEN: NORMAL

## 2019-07-10 PROCEDURE — 81001 URINALYSIS AUTO W/SCOPE: CPT | Performed by: EMERGENCY MEDICINE

## 2019-07-10 PROCEDURE — 74176 CT ABD & PELVIS W/O CONTRAST: CPT

## 2019-07-10 PROCEDURE — 87086 URINE CULTURE/COLONY COUNT: CPT | Performed by: HOSPITALIST

## 2019-07-10 PROCEDURE — 99223 1ST HOSP IP/OBS HIGH 75: CPT | Performed by: FAMILY MEDICINE

## 2019-07-10 PROCEDURE — 99284 EMERGENCY DEPT VISIT MOD MDM: CPT

## 2019-07-10 PROCEDURE — 85025 COMPLETE CBC W/AUTO DIFF WBC: CPT

## 2019-07-10 PROCEDURE — 84145 PROCALCITONIN (PCT): CPT | Performed by: PHYSICIAN ASSISTANT

## 2019-07-10 PROCEDURE — 80053 COMPREHEN METABOLIC PANEL: CPT | Performed by: EMERGENCY MEDICINE

## 2019-07-10 PROCEDURE — 83690 ASSAY OF LIPASE: CPT | Performed by: EMERGENCY MEDICINE

## 2019-07-10 PROCEDURE — 83605 ASSAY OF LACTIC ACID: CPT | Performed by: PHYSICIAN ASSISTANT

## 2019-07-10 PROCEDURE — 25010000002 CEFTRIAXONE PER 250 MG: Performed by: PHYSICIAN ASSISTANT

## 2019-07-10 PROCEDURE — 81025 URINE PREGNANCY TEST: CPT | Performed by: EMERGENCY MEDICINE

## 2019-07-10 PROCEDURE — 87040 BLOOD CULTURE FOR BACTERIA: CPT | Performed by: PHYSICIAN ASSISTANT

## 2019-07-10 PROCEDURE — 25010000002 ONDANSETRON PER 1 MG: Performed by: PHYSICIAN ASSISTANT

## 2019-07-10 RX ORDER — SODIUM CHLORIDE 0.9 % (FLUSH) 0.9 %
10 SYRINGE (ML) INJECTION AS NEEDED
Status: DISCONTINUED | OUTPATIENT
Start: 2019-07-10 | End: 2019-07-12 | Stop reason: HOSPADM

## 2019-07-10 RX ORDER — ONDANSETRON 2 MG/ML
4 INJECTION INTRAMUSCULAR; INTRAVENOUS ONCE
Status: COMPLETED | OUTPATIENT
Start: 2019-07-10 | End: 2019-07-10

## 2019-07-10 RX ORDER — HYDROCODONE BITARTRATE AND ACETAMINOPHEN 7.5; 325 MG/1; MG/1
1 TABLET ORAL ONCE
Status: COMPLETED | OUTPATIENT
Start: 2019-07-10 | End: 2019-07-10

## 2019-07-10 RX ORDER — HYDROCODONE BITARTRATE AND ACETAMINOPHEN 5; 325 MG/1; MG/1
1 TABLET ORAL EVERY 6 HOURS PRN
Status: DISCONTINUED | OUTPATIENT
Start: 2019-07-10 | End: 2019-07-12 | Stop reason: HOSPADM

## 2019-07-10 RX ADMIN — HYDROCODONE BITARTRATE AND ACETAMINOPHEN 1 TABLET: 7.5; 325 TABLET ORAL at 20:27

## 2019-07-10 RX ADMIN — CEFTRIAXONE SODIUM 1 G: 1 INJECTION, POWDER, FOR SOLUTION INTRAMUSCULAR; INTRAVENOUS at 23:39

## 2019-07-10 RX ADMIN — ONDANSETRON 4 MG: 2 INJECTION INTRAMUSCULAR; INTRAVENOUS at 23:39

## 2019-07-10 RX ADMIN — SODIUM CHLORIDE 1000 ML: 9 INJECTION, SOLUTION INTRAVENOUS at 23:39

## 2019-07-11 LAB
ANION GAP SERPL CALCULATED.3IONS-SCNC: 11 MMOL/L (ref 5–15)
BUN BLD-MCNC: 10 MG/DL (ref 6–20)
BUN/CREAT SERPL: 16.7 (ref 7–25)
CALCIUM SPEC-SCNC: 8.4 MG/DL (ref 8.6–10.5)
CHLORIDE SERPL-SCNC: 101 MMOL/L (ref 98–107)
CO2 SERPL-SCNC: 23 MMOL/L (ref 22–29)
CREAT BLD-MCNC: 0.6 MG/DL (ref 0.57–1)
D-LACTATE SERPL-SCNC: 0.8 MMOL/L (ref 0.5–2)
D-LACTATE SERPL-SCNC: 4.2 MMOL/L (ref 0.5–2)
DEPRECATED RDW RBC AUTO: 45.3 FL (ref 37–54)
ERYTHROCYTE [DISTWIDTH] IN BLOOD BY AUTOMATED COUNT: 14.2 % (ref 12.3–15.4)
GFR SERPL CREATININE-BSD FRML MDRD: 134 ML/MIN/1.73
GLUCOSE BLD-MCNC: 88 MG/DL (ref 65–99)
HCT VFR BLD AUTO: 34.2 % (ref 34–46.6)
HGB BLD-MCNC: 10.6 G/DL (ref 12–15.9)
HOLD SPECIMEN: NORMAL
MCH RBC QN AUTO: 27.2 PG (ref 26.6–33)
MCHC RBC AUTO-ENTMCNC: 31 G/DL (ref 31.5–35.7)
MCV RBC AUTO: 87.9 FL (ref 79–97)
PLATELET # BLD AUTO: 386 10*3/MM3 (ref 140–450)
PMV BLD AUTO: 9.2 FL (ref 6–12)
POTASSIUM BLD-SCNC: 3.9 MMOL/L (ref 3.5–5.2)
PROCALCITONIN SERPL-MCNC: 0.05 NG/ML (ref 0.1–0.25)
RBC # BLD AUTO: 3.89 10*6/MM3 (ref 3.77–5.28)
SODIUM BLD-SCNC: 135 MMOL/L (ref 136–145)
TSH SERPL DL<=0.05 MIU/L-ACNC: 1.8 MIU/ML (ref 0.27–4.2)
WBC NRBC COR # BLD: 7.81 10*3/MM3 (ref 3.4–10.8)

## 2019-07-11 PROCEDURE — 83605 ASSAY OF LACTIC ACID: CPT | Performed by: PHYSICIAN ASSISTANT

## 2019-07-11 PROCEDURE — 99233 SBSQ HOSP IP/OBS HIGH 50: CPT | Performed by: HOSPITALIST

## 2019-07-11 PROCEDURE — 84443 ASSAY THYROID STIM HORMONE: CPT | Performed by: FAMILY MEDICINE

## 2019-07-11 PROCEDURE — 25010000002 CEFTRIAXONE PER 250 MG: Performed by: PHYSICIAN ASSISTANT

## 2019-07-11 PROCEDURE — 94640 AIRWAY INHALATION TREATMENT: CPT

## 2019-07-11 PROCEDURE — 80048 BASIC METABOLIC PNL TOTAL CA: CPT | Performed by: PHYSICIAN ASSISTANT

## 2019-07-11 PROCEDURE — 94799 UNLISTED PULMONARY SVC/PX: CPT

## 2019-07-11 PROCEDURE — 85027 COMPLETE CBC AUTOMATED: CPT | Performed by: PHYSICIAN ASSISTANT

## 2019-07-11 RX ORDER — SODIUM CHLORIDE 0.9 % (FLUSH) 0.9 %
3-10 SYRINGE (ML) INJECTION AS NEEDED
Status: DISCONTINUED | OUTPATIENT
Start: 2019-07-11 | End: 2019-07-12 | Stop reason: HOSPADM

## 2019-07-11 RX ORDER — SODIUM CHLORIDE 9 MG/ML
100 INJECTION, SOLUTION INTRAVENOUS CONTINUOUS
Status: DISCONTINUED | OUTPATIENT
Start: 2019-07-11 | End: 2019-07-12 | Stop reason: HOSPADM

## 2019-07-11 RX ORDER — ACETAMINOPHEN 325 MG/1
650 TABLET ORAL EVERY 4 HOURS PRN
Status: DISCONTINUED | OUTPATIENT
Start: 2019-07-11 | End: 2019-07-12 | Stop reason: HOSPADM

## 2019-07-11 RX ORDER — AMLODIPINE BESYLATE 5 MG/1
5 TABLET ORAL DAILY
Status: DISCONTINUED | OUTPATIENT
Start: 2019-07-11 | End: 2019-07-12 | Stop reason: HOSPADM

## 2019-07-11 RX ORDER — AMITRIPTYLINE HYDROCHLORIDE 50 MG/1
50 TABLET, FILM COATED ORAL NIGHTLY
Status: DISCONTINUED | OUTPATIENT
Start: 2019-07-11 | End: 2019-07-12 | Stop reason: HOSPADM

## 2019-07-11 RX ORDER — ALBUTEROL SULFATE 2.5 MG/3ML
2.5 SOLUTION RESPIRATORY (INHALATION)
Status: DISCONTINUED | OUTPATIENT
Start: 2019-07-11 | End: 2019-07-12 | Stop reason: HOSPADM

## 2019-07-11 RX ORDER — CHOLECALCIFEROL (VITAMIN D3) 125 MCG
5 CAPSULE ORAL NIGHTLY PRN
Status: DISCONTINUED | OUTPATIENT
Start: 2019-07-11 | End: 2019-07-12 | Stop reason: HOSPADM

## 2019-07-11 RX ORDER — LAMOTRIGINE 100 MG/1
100 TABLET ORAL DAILY
Status: DISCONTINUED | OUTPATIENT
Start: 2019-07-11 | End: 2019-07-12 | Stop reason: HOSPADM

## 2019-07-11 RX ORDER — SODIUM CHLORIDE 0.9 % (FLUSH) 0.9 %
3 SYRINGE (ML) INJECTION EVERY 12 HOURS SCHEDULED
Status: DISCONTINUED | OUTPATIENT
Start: 2019-07-11 | End: 2019-07-12 | Stop reason: HOSPADM

## 2019-07-11 RX ORDER — MONTELUKAST SODIUM 10 MG/1
10 TABLET ORAL NIGHTLY
Status: DISCONTINUED | OUTPATIENT
Start: 2019-07-11 | End: 2019-07-12 | Stop reason: HOSPADM

## 2019-07-11 RX ORDER — GABAPENTIN 400 MG/1
400 CAPSULE ORAL 4 TIMES DAILY PRN
Status: DISCONTINUED | OUTPATIENT
Start: 2019-07-11 | End: 2019-07-12 | Stop reason: HOSPADM

## 2019-07-11 RX ORDER — PANTOPRAZOLE SODIUM 40 MG/1
40 TABLET, DELAYED RELEASE ORAL
Status: DISCONTINUED | OUTPATIENT
Start: 2019-07-12 | End: 2019-07-12 | Stop reason: HOSPADM

## 2019-07-11 RX ORDER — L.ACID,PARA/B.BIFIDUM/S.THERM 8B CELL
1 CAPSULE ORAL 2 TIMES DAILY
Status: DISCONTINUED | OUTPATIENT
Start: 2019-07-11 | End: 2019-07-12 | Stop reason: HOSPADM

## 2019-07-11 RX ORDER — ONDANSETRON 4 MG/1
4 TABLET, FILM COATED ORAL EVERY 6 HOURS PRN
Status: DISCONTINUED | OUTPATIENT
Start: 2019-07-11 | End: 2019-07-12 | Stop reason: HOSPADM

## 2019-07-11 RX ORDER — PANTOPRAZOLE SODIUM 40 MG/1
40 TABLET, DELAYED RELEASE ORAL
Status: DISCONTINUED | OUTPATIENT
Start: 2019-07-12 | End: 2019-07-11

## 2019-07-11 RX ADMIN — AMLODIPINE BESYLATE 5 MG: 5 TABLET ORAL at 08:35

## 2019-07-11 RX ADMIN — MONTELUKAST SODIUM 10 MG: 10 TABLET, COATED ORAL at 21:58

## 2019-07-11 RX ADMIN — Medication 1 CAPSULE: at 08:35

## 2019-07-11 RX ADMIN — AMITRIPTYLINE HYDROCHLORIDE 50 MG: 50 TABLET, FILM COATED ORAL at 01:46

## 2019-07-11 RX ADMIN — PANTOPRAZOLE SODIUM 40 MG: 40 TABLET, DELAYED RELEASE ORAL at 23:34

## 2019-07-11 RX ADMIN — LAMOTRIGINE 100 MG: 100 TABLET ORAL at 08:35

## 2019-07-11 RX ADMIN — SODIUM CHLORIDE, PRESERVATIVE FREE 3 ML: 5 INJECTION INTRAVENOUS at 01:47

## 2019-07-11 RX ADMIN — SODIUM CHLORIDE 100 ML/HR: 9 INJECTION, SOLUTION INTRAVENOUS at 10:29

## 2019-07-11 RX ADMIN — Medication 1 CAPSULE: at 21:58

## 2019-07-11 RX ADMIN — SODIUM CHLORIDE 100 ML/HR: 9 INJECTION, SOLUTION INTRAVENOUS at 19:47

## 2019-07-11 RX ADMIN — SODIUM CHLORIDE, PRESERVATIVE FREE 3 ML: 5 INJECTION INTRAVENOUS at 21:58

## 2019-07-11 RX ADMIN — SODIUM CHLORIDE 100 ML/HR: 9 INJECTION, SOLUTION INTRAVENOUS at 01:46

## 2019-07-11 RX ADMIN — AMITRIPTYLINE HYDROCHLORIDE 50 MG: 50 TABLET, FILM COATED ORAL at 21:58

## 2019-07-11 RX ADMIN — CEFTRIAXONE SODIUM 1 G: 1 INJECTION, POWDER, FOR SOLUTION INTRAMUSCULAR; INTRAVENOUS at 21:58

## 2019-07-11 RX ADMIN — HYDROCODONE BITARTRATE AND ACETAMINOPHEN 1 TABLET: 5; 325 TABLET ORAL at 13:32

## 2019-07-11 RX ADMIN — ALBUTEROL SULFATE 2.5 MG: 2.5 SOLUTION RESPIRATORY (INHALATION) at 10:30

## 2019-07-11 RX ADMIN — ALBUTEROL SULFATE 2.5 MG: 2.5 SOLUTION RESPIRATORY (INHALATION) at 20:53

## 2019-07-11 NOTE — ED PROVIDER NOTES
Subjective   38-year-old female presents emergency department with worsening left flank pain today radiating into the left lower quadrant associated fever chills nausea and vomiting.  She was discharged from this facility 7/4 for pyelonephritis left obstructing nephrolithiasis sepsis hypokalemia and urinary tract infection.  She was found to have an E. coli Rocephin sensitive UTI, and was discharged after 6 days of IV Rocephin therapy to Omnicef 300 mg twice daily to complete 10 days of total therapy.  Urology was consulted and evaluated the patient.  She was to follow-up with Dr. Mckeon with urology in 2 weeks from discharge.            Review of Systems   Constitutional: Positive for activity change, appetite change and fatigue. Negative for chills and fever.   Respiratory: Negative for shortness of breath.    Cardiovascular: Negative for chest pain.   Gastrointestinal: Negative for abdominal pain, anal bleeding, blood in stool, constipation, diarrhea, nausea and vomiting.   Genitourinary: Positive for decreased urine volume, dysuria, frequency and urgency. Negative for difficulty urinating, flank pain and hematuria.   Musculoskeletal: Negative for neck stiffness.   Neurological: Negative for dizziness, seizures, syncope, speech difficulty, weakness, light-headedness, numbness and headaches.   Psychiatric/Behavioral: Negative for confusion.   All other systems reviewed and are negative.      Past Medical History:   Diagnosis Date   • Allergic rhinitis    • Anemia    • Arthritis    • Asthma    • Depression    • FHx: migraine headaches    • GERD (gastroesophageal reflux disease)    • Heart murmur    • Herpes simplex    • History of chest x-ray 11/01/2015    no active disease   • History of echocardiogram 11/01/2015    ejection fraction of greater than 65%, mitral and pulmonic regurgitation an physiological tricuspid regurgitation.   • History of PFTs 12/22/2015    spirometry data acceptable and reproducible; pt given  4 puffs of Ventolin; pt gave good effort; no obstruction; no Bd response; MVV reduced    • History of PFTs 2015    pt gave best effort; duoneb given prior and post study; moderate nonspecific proportional reduction of FEV1 and FVC with preserved ratio; FEV1 moderately reduced; cannot rule out restriction   • Hypertension    • Ovarian cyst    • Seizures (CMS/HCC)    • Thigh shingles        Allergies   Allergen Reactions   • Naproxen Swelling       Past Surgical History:   Procedure Laterality Date   • BILATERAL BREAST REDUCTION     • BREAST SURGERY      breast reduction   •  SECTION     • LAPAROSCOPIC TUBAL LIGATION     • ORIF ANKLE FRACTURE Right    • TENSION FREE VAGINAL TAPING WITH MINI ARC SLING         Family History   Problem Relation Age of Onset   • Pancreatic cancer Maternal Grandmother    • Heart failure Paternal Grandmother    • MARCIE disease Paternal Aunt    • Arthritis Mother    • Cancer Mother    • Arthritis Father    • Diabetes Neg Hx    • Heart attack Neg Hx    • Hypertension Neg Hx    • Hyperlipidemia Neg Hx    • Mental illness Neg Hx    • Obesity Neg Hx    • Stroke Neg Hx        Social History     Socioeconomic History   • Marital status: Single     Spouse name: Not on file   • Number of children: Not on file   • Years of education: Not on file   • Highest education level: Not on file   Tobacco Use   • Smoking status: Former Smoker     Packs/day: 1.00     Years: 15.00     Pack years: 15.00     Types: Cigarettes     Last attempt to quit: 2015     Years since quittin.4   • Smokeless tobacco: Never Used   Substance and Sexual Activity   • Alcohol use: No     Comment: socially   • Drug use: No   • Sexual activity: Defer   Social History Narrative    Caffeine intake: 16 oz per day            Objective   Physical Exam   Constitutional: She is oriented to person, place, and time. She appears well-developed and well-nourished. No distress.   HENT:   Head: Normocephalic and  atraumatic.   Right Ear: External ear normal.   Left Ear: External ear normal.   Nose: Nose normal.   Mouth/Throat: Oropharynx is clear and moist. No oropharyngeal exudate.   Eyes: Conjunctivae and EOM are normal. Pupils are equal, round, and reactive to light. Right eye exhibits no discharge. Left eye exhibits no discharge. No scleral icterus.   Neck: Normal range of motion. Neck supple. No JVD present. No tracheal deviation present. No thyromegaly present.   Cardiovascular: Normal rate.   Pulmonary/Chest: Effort normal. No stridor. No respiratory distress.   Abdominal: Soft. Bowel sounds are normal. She exhibits no distension. There is tenderness in the left upper quadrant and left lower quadrant. There is guarding. There is no rigidity, no CVA tenderness (Moderate left CVA tenderness), no tenderness at McBurney's point and negative Gage's sign.   Musculoskeletal: Normal range of motion. She exhibits no edema, tenderness or deformity.   Neurological: She is alert and oriented to person, place, and time. No cranial nerve deficit. She exhibits normal muscle tone. Coordination normal.   Skin: Skin is warm and dry. No rash noted. She is not diaphoretic. No erythema. No pallor.   Psychiatric: She has a normal mood and affect. Her behavior is normal. Judgment and thought content normal.   Nursing note and vitals reviewed.      Procedures       Recent Results (from the past 24 hour(s))   Comprehensive Metabolic Panel    Collection Time: 07/10/19  5:30 PM   Result Value Ref Range    Glucose 97 65 - 99 mg/dL    BUN 9 6 - 20 mg/dL    Creatinine 0.75 0.57 - 1.00 mg/dL    Sodium 138 136 - 145 mmol/L    Potassium 3.9 3.5 - 5.2 mmol/L    Chloride 100 98 - 107 mmol/L    CO2 23.0 22.0 - 29.0 mmol/L    Calcium 9.4 8.6 - 10.5 mg/dL    Total Protein 7.4 6.0 - 8.5 g/dL    Albumin 4.10 3.50 - 5.20 g/dL    ALT (SGPT) 35 (H) 1 - 33 U/L    AST (SGOT) 23 1 - 32 U/L    Alkaline Phosphatase 69 39 - 117 U/L    Total Bilirubin 0.4 0.2 - 1.2  mg/dL    eGFR  African Amer 104 >60 mL/min/1.73    Globulin 3.3 gm/dL    A/G Ratio 1.2 g/dL    BUN/Creatinine Ratio 12.0 7.0 - 25.0    Anion Gap 15.0 5.0 - 15.0 mmol/L   Lipase    Collection Time: 07/10/19  5:30 PM   Result Value Ref Range    Lipase 43 13 - 60 U/L   Light Blue Top    Collection Time: 07/10/19  5:30 PM   Result Value Ref Range    Extra Tube hold for add-on    Green Top (Gel)    Collection Time: 07/10/19  5:30 PM   Result Value Ref Range    Extra Tube Hold for add-ons.    Lavender Top    Collection Time: 07/10/19  5:30 PM   Result Value Ref Range    Extra Tube hold for add-on    Gold Top - SST    Collection Time: 07/10/19  5:30 PM   Result Value Ref Range    Extra Tube Hold for add-ons.    CBC Auto Differential    Collection Time: 07/10/19  5:30 PM   Result Value Ref Range    WBC 9.12 3.40 - 10.80 10*3/mm3    RBC 4.49 3.77 - 5.28 10*6/mm3    Hemoglobin 12.3 12.0 - 15.9 g/dL    Hematocrit 38.9 34.0 - 46.6 %    MCV 86.6 79.0 - 97.0 fL    MCH 27.4 26.6 - 33.0 pg    MCHC 31.6 31.5 - 35.7 g/dL    RDW 14.3 12.3 - 15.4 %    RDW-SD 45.5 37.0 - 54.0 fl    MPV 9.2 6.0 - 12.0 fL    Platelets 461 (H) 140 - 450 10*3/mm3    Neutrophil % 61.6 42.7 - 76.0 %    Lymphocyte % 31.0 19.6 - 45.3 %    Monocyte % 6.3 5.0 - 12.0 %    Eosinophil % 0.4 0.3 - 6.2 %    Basophil % 0.3 0.0 - 1.5 %    Immature Grans % 0.4 0.0 - 0.5 %    Neutrophils, Absolute 5.61 1.70 - 7.00 10*3/mm3    Lymphocytes, Absolute 2.83 0.70 - 3.10 10*3/mm3    Monocytes, Absolute 0.57 0.10 - 0.90 10*3/mm3    Eosinophils, Absolute 0.04 0.00 - 0.40 10*3/mm3    Basophils, Absolute 0.03 0.00 - 0.20 10*3/mm3    Immature Grans, Absolute 0.04 0.00 - 0.05 10*3/mm3    nRBC 0.0 0.0 - 0.2 /100 WBC   Urinalysis With Microscopic If Indicated (No Culture) - Urine, Catheter    Collection Time: 07/10/19  6:52 PM   Result Value Ref Range    Color, UA Yellow Yellow, Straw    Appearance, UA Cloudy (A) Clear    pH, UA <=5.0 5.0 - 8.0    Specific Gravity, UA 1.019 1.001 -  1.030    Glucose, UA Negative Negative    Ketones, UA Negative Negative    Bilirubin, UA Negative Negative    Blood, UA Negative Negative    Protein, UA Negative Negative    Leuk Esterase, UA Small (1+) (A) Negative    Nitrite, UA Negative Negative    Urobilinogen, UA 0.2 E.U./dL 0.2 - 1.0 E.U./dL   Pregnancy, Urine - Urine, Catheter    Collection Time: 07/10/19  6:52 PM   Result Value Ref Range    HCG, Urine QL Negative Negative   Urinalysis, Microscopic Only - Urine, Catheter    Collection Time: 07/10/19  6:52 PM   Result Value Ref Range    RBC, UA None Seen None Seen, 0-2 /HPF    WBC, UA 13-20 (A) None Seen, 0-2 /HPF    Bacteria, UA 3+ (A) None Seen, Trace /HPF    Squamous Epithelial Cells, UA 7-12 (A) None Seen, 0-2 /HPF    Hyaline Casts, UA 0-6 0 - 6 /LPF    Mucus, UA Small/1+ (A) None Seen, Trace /HPF    Methodology Manual Light Microscopy      Note: In addition to lab results from this visit, the labs listed above may include labs taken at another facility or during a different encounter within the last 24 hours. Please correlate lab times with ED admission and discharge times for further clarification of the services performed during this visit.    CT Abdomen Pelvis Without Contrast   ED Interpretation      1. There is an 8 to 9 mm calculus at the midpole of left kidney. On comparison to the study from October 2018 this is increased in size and moved in location and is now associated with distention of the left upper pole collecting system. The pelvis is   not distended and there is no hydroureter. This upper pole collecting system distention could reflect some intrarenal obstruction related to the large calculus, possibly related to the patient's worsening left flank pain.   2. There is a moderate amount of colon gas and stool. This is associated with mild gaseous colonic distention best appreciated at the transverse colon. There is however no evidence for bowel obstruction.            Signer Name: Sally  Kyle SMITH    Signed: 7/10/2019 9:32 PM    Workstation Name: [id number]          Final Result      1. There is an 8 to 9 mm calculus at the midpole of left kidney. On comparison to the study from October 2018 this is increased in size and moved in location and is now associated with distention of the left upper pole collecting system. The pelvis is   not distended and there is no hydroureter. This upper pole collecting system distention could reflect some intrarenal obstruction related to the large calculus, possibly related to the patient's worsening left flank pain.   2. There is a moderate amount of colon gas and stool. This is associated with mild gaseous colonic distention best appreciated at the transverse colon. There is however no evidence for bowel obstruction.            Signer Name: Sally Wright MD    Signed: 7/10/2019 9:32 PM    Workstation Name: [email or web address]            Vitals:    07/10/19 2130 07/10/19 2200 07/10/19 2253 07/10/19 2300   BP: 119/81 126/95  138/90   Patient Position:       Pulse:       Resp:       Temp:       TempSrc:       SpO2: 97% 97% 96% 96%   Weight:       Height:         Medications   sodium chloride 0.9 % flush 10 mL (not administered)   cefTRIAXone (ROCEPHIN) 1 g/100 mL 0.9% NS (MBP) (1 g Intravenous New Bag 7/10/19 2339)   sodium chloride 0.9 % bolus 1,000 mL (1,000 mL Intravenous New Bag 7/10/19 2339)   HYDROcodone-acetaminophen (NORCO) 5-325 MG per tablet 1 tablet (not administered)   HYDROcodone-acetaminophen (NORCO) 7.5-325 MG per tablet 1 tablet (1 tablet Oral Given 7/10/19 2027)   ondansetron (ZOFRAN) injection 4 mg (4 mg Intravenous Given 7/10/19 2339)     ECG/EMG Results (last 24 hours)     ** No results found for the last 24 hours. **        No orders to display           ED Course  ED Course as of Jul 10 2347   Wed Jul 10, 2019   2042 Bacteria, UA: (!) 3+ [TG]   2042 WBC, UA: (!) 13-20 [TG]   2042 Nitrite, UA: Negative [TG]   2221 1. There is an 8 to 9 mm calculus at the  midpole of left kidney. On comparison to the study from October 2018 this is increased in size and moved in location and is now associated with distention of the left upper pole collecting system. The pelvis is  not distended and there is no hydroureter. This upper pole collecting system distention could reflect some intrarenal obstruction related to the large calculus, possibly related to the patient's worsening left flank pain.  2. There is a moderate amount of colon gas and stool. This is associated with mild gaseous colonic distention best appreciated at the transverse colon. There is however no evidence for bowel obstruction.     [TG]   2250 D[facility] with Dr. [facility]      [TG]      ED Course User Index  [TG] Christoph Gutierrez PA-C                  [facility]      Final diagnoses:   Ureteral obstruction, left   Nephrolithiasis   Urinary tract infection without hematuria, site unspecified            Christoph Gutierrez PA-C  07/10/19 0610

## 2019-07-11 NOTE — PAYOR COMM NOTE
"Mony Marie, RN Utilization Review [phone]  Fax # [phone]      Notification of admission and initial clinicals.  Status is inpatient.     Facility [id number]  Sergei Wesley MD  (NPI: [id number])   Ureteral obstruction, left ICD-10-CM: N13.5     Complicated urinary tract infection ICD-10-CM: N39.0           Bernardo Willis (38 y.o. Female)     Date of Birth Social Security Number Address Home Phone MRN    1980  [address] [id number] [phone] [id number]    Mosque Marital Status          Zoroastrianism Single       Admission Date Admission Type Admitting Provider Attending Provider Department, Room/Bed    7/10/19 Emergency Krista Horton MD Reddy, Mayuri V, MD [address], [address]/1    Discharge Date Discharge Disposition Discharge Destination                       Attending Provider:  Krista Horton MD    Allergies:  Naproxen    Isolation:  None   Infection:  None   Code Status:  CPR    Ht:  149.9 cm (59\")   Wt:  81.8 kg (180 lb 6.4 oz)    Admission Cmt:  None   Principal Problem:  Ureteral obstruction, left [N13.5]                 Active Insurance as of 7/10/2019     Primary Coverage     Payor Plan Insurance Group Employer/Plan Group    ANTHEM MEDICAID ANTH MEDICAID [id number]     Payor Plan Address Payor Plan Phone Number Payor Plan Fax Number Effective Dates    [address] [address] [phone]  2014 - None Entered    [facility] [id number]       Subscriber Name Subscriber Birth Date Member ID       BERNARDO WILLIS 1980 [id number]                 Emergency Contacts      (Rel.) Home Phone Work Phone Mobile Phone    Keya Willis (Mother) -- -- [phone]               History & Physical      Amber Webster DO at 7/10/2019 11:38 PM              [facility] Medicine Services  HISTORY AND PHYSICAL    Patient Name: Bernardo Willis  : 1980  MRN: [id number]  Primary Care Physician: Deepak" SUHA Jiménez  Date of admission: 7/10/2019      Subjective   Subjective     Chief Complaint:  Left flank pain    HPI:  Bernardo Dodd is a 38 y.o. female with a past medical history significant for asthma, GERD, HTN, and seizure disorder who presents with complaints of left sided flank pain, progressively worsening since discharge. Pain is constant and radiates to right groin. There is associated nausea without vomiting, subjective fever, and dysuria. States she has been unable to urinate since early this morning and feels dehydrated. Patient was admitted to this facility 6/28 to 7/4/2019 with sepsis secondary to pyelonephritis. Urine culture isolated rocephin sensitive E. Coli. Renal U/S at this time demonstrated 13 mm stone with hydronephrosis. Non obstructive. Patient was discharged with 10 days of omnicef and scheduled to followed up with urology ( Dr. Mckeon) as outpatient. When symptoms returned about 2 days ago however, she was seen at [facility] clinic yesterday where omnicef was switched Bactrim. Patient was concerned with unchanged pain and dysuria which prompted visit to ED. Currently without complaints of cough, congestion, chest pain, or diarrhea. Will admit for further evaluation and treatment.    Emergency Department Evaluation; vitals stable. . Chemistry and hematology otherwise favorable. UA positive for acute infection. CT A/P demonstrates; an 8 to 9 mm calculus at the midpole of left kidney. This is increased in size and moved in location and is now associated with distention of the left upper pole collecting system.    Review of Systems   Constitutional: Positive for chills and fever.   HENT: Negative for congestion and trouble swallowing.    Eyes: Negative for photophobia and visual disturbance.   Respiratory: Negative for cough and shortness of breath.    Cardiovascular: Negative for chest pain and leg swelling.   Gastrointestinal: Positive for abdominal pain and nausea. Negative for  diarrhea and vomiting.   Endocrine: Negative for cold intolerance.   Genitourinary: Positive for dysuria and flank pain.   Musculoskeletal: Negative for back pain and gait problem.   Skin: Negative for pallor and rash.   Allergic/Immunologic: Negative for immunocompromised state.   Neurological: Negative for dizziness, weakness and headaches.   Hematological: Negative for adenopathy.   Psychiatric/Behavioral: Negative for agitation and confusion.          Otherwise complete ROS reviewed and is negative except as mentioned in the HPI.    Personal History     Past Medical History:   Diagnosis Date   • Allergic rhinitis    • Anemia    • Arthritis    • Asthma    • Depression    • FHx: migraine headaches    • GERD (gastroesophageal reflux disease)    • Heart murmur    • Herpes simplex    • History of chest x-ray 2015    no active disease   • History of echocardiogram 2015    ejection fraction of greater than 65%, mitral and pulmonic regurgitation an physiological tricuspid regurgitation.   • History of PFTs 2015    spirometry data acceptable and reproducible; pt given 4 puffs of Ventolin; pt gave good effort; no obstruction; no Bd response; MVV reduced    • History of PFTs 2015    pt gave best effort; duoneb given prior and post study; moderate nonspecific proportional reduction of FEV1 and FVC with preserved ratio; FEV1 moderately reduced; cannot rule out restriction   • Hypertension    • Ovarian cyst    • Seizures (CMS/HCC)    • Thigh shingles        Past Surgical History:   Procedure Laterality Date   • BILATERAL BREAST REDUCTION     • BREAST SURGERY      breast reduction   •  SECTION     • LAPAROSCOPIC TUBAL LIGATION     • ORIF ANKLE FRACTURE Right    • TENSION FREE VAGINAL TAPING WITH MINI ARC SLING         Family History: family history includes Arthritis in her father and mother; Cancer in her mother; MARCIE disease in her paternal aunt; Heart failure in her paternal grandmother;  Pancreatic cancer in her maternal grandmother. Otherwise pertinent FHx was reviewed and unremarkable.     Social History:  reports that she quit smoking about 4 years ago. Her smoking use included cigarettes. She has a 15.00 pack-year smoking history. She has never used smokeless tobacco. She reports that she does not drink alcohol or use drugs.  Social History     Social History Narrative    Caffeine intake: 16 oz per day        Medications:    Available home medication information reviewed.    (Not in a hospital admission)    Allergies   Allergen Reactions   • Naproxen Swelling       Objective   Objective     Vital Signs:   Temp:  [97.9 °F (36.6 °C)] 97.9 °F (36.6 °C)  Heart Rate:  [] 92  Resp:  [16] 16  BP: (116-138)/(70-99) 138/90        Physical Exam   Constitutional: Awake, alert  Eyes: PERRLA, sclerae anicteric, no conjunctival injection  HENT: NCAT, mucous membranes moist  Neck: Supple, no thyromegaly, no lymphadenopathy, trachea midline  Respiratory: Clear to auscultation bilaterally, nonlabored respirations   Cardiovascular: RRR, no murmurs, rubs, or gallops, palpable pedal pulses bilaterally  Gastrointestinal: Positive bowel sounds, soft, nontender, nondistended  Left CVA tenderness  Musculoskeletal: No bilateral ankle edema, no clubbing or cyanosis to extremities  Psychiatric: Appropriate affect, cooperative  Neurologic: Oriented x 3, strength symmetric in all extremities, Cranial Nerves grossly intact to confrontation, speech clear  Skin: No rashes      Results Reviewed:  I have personally reviewed current lab, radiology, and data and agree.    Results from last 7 days   Lab Units 07/10/19  1730   WBC 10*3/mm3 9.12   HEMOGLOBIN g/dL 12.3   HEMATOCRIT % 38.9   PLATELETS 10*3/mm3 461*     Results from last 7 days   Lab Units 07/10/19  1730   SODIUM mmol/L 138   POTASSIUM mmol/L 3.9   CHLORIDE mmol/L 100   CO2 mmol/L 23.0   BUN mg/dL 9   CREATININE mg/dL 0.75   GLUCOSE mg/dL 97   CALCIUM mg/dL 9.4    ALT (SGPT) U/L 35*   AST (SGOT) U/L 23     Estimated Creatinine Clearance: 97 mL/min (by C-G formula based on SCr of 0.75 mg/dL).  Brief Urine Lab Results  (Last result in the past 365 days)      Color   Clarity   Blood   Leuk Est   Nitrite   Protein   CREAT   Urine HCG        07/10/19 1852 Yellow Cloudy Negative Small (1+) Negative Negative         07/10/19 1852               Negative         Imaging Results (last 24 hours)     Procedure Component Value Units Date/Time    CT Abdomen Pelvis Without Contrast [[id number]] Collected:  07/10/19 2132     Updated:  07/10/19 2221    Narrative:       INDICATION:   Increasing left flank pain for 2 weeks. Known left kidney stone diagnosed 1 week ago.    TECHNIQUE:   CT of the abdomen and pelvis without contrast. Coronal and sagittal reconstructions were obtained.  Radiation dose reduction techniques included automated exposure control or exposure modulation based on body size. Radiation audit for number of CT and  nuclear cardiology exams performed in the last year: 3.      COMPARISON:   CT abdomen pelvis from 10/17/2018. CTA chest from 6/29/2019.    FINDINGS:  Lung bases: Clear    Abdomen: Lack of IV and oral contrast media limits evaluation for pathology other than urinary tract calculus disease.    Unenhanced liver, spleen, gallbladder, pancreas and adrenal glands are unremarkable.    There is a large  calculus interpolar left kidney about 8 mm to 9 mm dimension. This is noted on the recent CT angiogram of the chest and is unchanged. There is fullness of the left upper pole collecting system by the renal pelvis is not distended..  There is a tiny nonobstructing calculus in the upper pole left kidney. There is no hydroureter.On comparison to the CT abdomen pelvis from October 2018, the calculus at the mid pole left kidney is larger than it was previously located in the upper pole.  The fullness of the upper pole collecting system is new. The right kidney is  unremarkable.    There is no evidence for abdominal aortic aneurysm.      Pelvis: Bladder is distended. No bladder calculus is seen. Uterus is prominent. Suspect the right adnexal cyst and a small amount of physiologic free fluid in the pelvis.    There is a moderate amount of colonic gas and stool with prominence of the transverse colon. There is no bowel obstruction. Visualized appendix is unremarkable. There is no free air or drainable fluid collection suspected.      Impression:         1. There is an 8 to 9 mm calculus at the midpole of left kidney. On comparison to the study from October 2018 this is increased in size and moved in location and is now associated with distention of the left upper pole collecting system. The pelvis is  not distended and there is no hydroureter. This upper pole collecting system distention could reflect some intrarenal obstruction related to the large calculus, possibly related to the patient's worsening left flank pain.  2. There is a moderate amount of colon gas and stool. This is associated with mild gaseous colonic distention best appreciated at the transverse colon. There is however no evidence for bowel obstruction.        Signer Name: Sally Wright MD   Signed: 7/10/2019 9:32 PM   Workstation Name: [email or web address]                Assessment/Plan   Assessment / Plan     Active Hospital Problems    Diagnosis POA   • **Ureteral obstruction, left [N13.5] Yes     Priority: High   • Complicated urinary tract infection [N39.0] Yes     Priority: High   • Hypertension [I10] Yes     Priority: Medium   • Asthma [J45.909] Yes     Priority: Medium     No obstruction noted on PFTs from 12/22/15. FEV1 2.01 L, 93%. FVC 2.47 L, 95%. Ratio 81%. No response to bronchodilator.       • Seizure disorder (CMS/[facility]) [G40.909] Yes     Priority: Low   • Iron deficiency anemia [D50.9] Yes     Priority: Low   • GERD (gastroesophageal reflux disease) [K21.9] Yes     Priority: Low         1. Left ureteral  obstruction with complicated UTI:  - stone now obstructive with associated UTI  - obtain urine and blood culture. Administered rocephin in ED. Continue for now  - urology to see in am. NPO  - IVF  - pain control as needed  - am labs    2. Asthma:  - stable. Continue inhaler and antihistamines  - former smoker    3. Seizure disorder:  - on Lamictal. Continue  - seizure precautions    4. Iron deficiency anemia:  - with thrombocytopenia. Has heavy periods  - on iron supplement  - H/H stable.    DVT prophylaxis: mechanical    CODE STATUS:  Full code  Code Status and Medical Interventions:   Ordered at: 07/10/19 6608     Level Of Support Discussed With:    Patient     Code Status:    CPR     Medical Interventions (Level of Support Prior to Arrest):    Full       Admission Status:  I believe this patient meets INPATIENT status due to the need for care which can only be reasonably provided in an hospital setting.  In such, I feel patient’s risk for adverse outcomes and need for care warrant INPATIENT evaluation and predict the patient’s care encounter to likely last beyond 2 midnights.      Electronically signed by Memo Phillips PA-C, 07/10/19, 11:38 PM.      Brief Attending Admission Attestation     I have seen and examined the patient, performing an independent face-to-face diagnostic evaluation with plan of care reviewed and developed with the advanced practice clinician (APC).      Brief Summary Statement:   Bernardo Dodd is a 38 y.o. female's medical history of hypertension, GERD, asthma, seizure disorder and recent kidney stone with pyelonephritis.  Patient presented to [facility] ED with complaint of left flank tenderness.  She states that the pain has gotten progressively worse since being discharged on 7/4/2019.  Patient had been admitted with sepsis and pyelonephritis.  Urine culture was positive for E. coli.  Patient was treated with Rocephin and then discharged on Omnicef.  She was scheduled to follow-up with  her urologist Dr. Mckeon as outpatient.  Patient saw a different urologist and was changed from Omnicef to Bactrim yesterday.  She states that her symptoms have continued to worsen and she is having more left flank pain radiating into the left groin.  CT of the abdomen and pelvis noted a 8.9 mm calculus in the midpole of the left kidney.  This is increased in size and moved in location in comparison to previous CT.  Patient will be admitted treated with IV fluids and IV antibiotics and urology will be consulted in the a.m.    Remainder of detailed HPI is as noted above and has been reviewed and/or edited by me for completeness.      Attending Physical Exam:  Constitutional: No acute distress, awake, alert  HENT: NCAT, mucous membranes moist  Respiratory: Clear to auscultation bilaterally, respiratory effort normal   Cardiovascular: RRR, no murmurs, rubs, or gallops, palpable pedal pulses bilaterally  Gastrointestinal: Positive bowel sounds, soft, tender in LLQ and Left CVA tenderness, nondistended  Musculoskeletal: No bilateral ankle edema  Psychiatric: Appropriate affect, cooperative  Neurologic: Oriented x 3, strength symmetric in all extremities, Cranial Nerves grossly intact to confrontation, speech clear  Skin: No rashes        Brief Assessment/Plan :  See above for further detailed assessment and plan developed with APC which I have reviewed and/or edited for completeness.      Electronically signed by Amber Webster DO, 07/11/19, 5:00 AM.             Electronically signed by Amber Webster DO at 7/11/2019  5:05 AM          Emergency Department Notes      Christoph Gutierrez PA-C at 7/10/2019 10:23 PM          Subjective   38-year-old female presents emergency department with worsening left flank pain today radiating into the left lower quadrant associated fever chills nausea and vomiting.  She was discharged from this facility 7/4 for pyelonephritis left obstructing nephrolithiasis sepsis hypokalemia and  urinary tract infection.  She was found to have an E. coli Rocephin sensitive UTI, and was discharged after 6 days of IV Rocephin therapy to Omnicef 300 mg twice daily to complete 10 days of total therapy.  Urology was consulted and evaluated the patient.  She was to follow-up with Dr. Mckeon with urology in 2 weeks from discharge.            Review of Systems   Constitutional: Positive for activity change, appetite change and fatigue. Negative for chills and fever.   Respiratory: Negative for shortness of breath.    Cardiovascular: Negative for chest pain.   Gastrointestinal: Negative for abdominal pain, anal bleeding, blood in stool, constipation, diarrhea, nausea and vomiting.   Genitourinary: Positive for decreased urine volume, dysuria, frequency and urgency. Negative for difficulty urinating, flank pain and hematuria.   Musculoskeletal: Negative for neck stiffness.   Neurological: Negative for dizziness, seizures, syncope, speech difficulty, weakness, light-headedness, numbness and headaches.   Psychiatric/Behavioral: Negative for confusion.   All other systems reviewed and are negative.      Past Medical History:   Diagnosis Date   • Allergic rhinitis    • Anemia    • Arthritis    • Asthma    • Depression    • FHx: migraine headaches    • GERD (gastroesophageal reflux disease)    • Heart murmur    • Herpes simplex    • History of chest x-ray 11/01/2015    no active disease   • History of echocardiogram 11/01/2015    ejection fraction of greater than 65%, mitral and pulmonic regurgitation an physiological tricuspid regurgitation.   • History of PFTs 12/22/2015    spirometry data acceptable and reproducible; pt given 4 puffs of Ventolin; pt gave good effort; no obstruction; no Bd response; MVV reduced    • History of PFTs 11/02/2015    pt gave best effort; duoneb given prior and post study; moderate nonspecific proportional reduction of FEV1 and FVC with preserved ratio; FEV1 moderately reduced; cannot rule out  restriction   • Hypertension    • Ovarian cyst    • Seizures (CMS/HCC)    • Thigh shingles        Allergies   Allergen Reactions   • Naproxen Swelling       Past Surgical History:   Procedure Laterality Date   • BILATERAL BREAST REDUCTION     • BREAST SURGERY      breast reduction   •  SECTION     • LAPAROSCOPIC TUBAL LIGATION     • ORIF ANKLE FRACTURE Right    • TENSION FREE VAGINAL TAPING WITH MINI ARC SLING         Family History   Problem Relation Age of Onset   • Pancreatic cancer Maternal Grandmother    • Heart failure Paternal Grandmother    • MARCIE disease Paternal Aunt    • Arthritis Mother    • Cancer Mother    • Arthritis Father    • Diabetes Neg Hx    • Heart attack Neg Hx    • Hypertension Neg Hx    • Hyperlipidemia Neg Hx    • Mental illness Neg Hx    • Obesity Neg Hx    • Stroke Neg Hx        Social History     Socioeconomic History   • Marital status: Single     Spouse name: Not on file   • Number of children: Not on file   • Years of education: Not on file   • Highest education level: Not on file   Tobacco Use   • Smoking status: Former Smoker     Packs/day: 1.00     Years: 15.00     Pack years: 15.00     Types: Cigarettes     Last attempt to quit: 2015     Years since quittin.4   • Smokeless tobacco: Never Used   Substance and Sexual Activity   • Alcohol use: No     Comment: socially   • Drug use: No   • Sexual activity: Defer   Social History Narrative    Caffeine intake: 16 oz per day            Objective   Physical Exam   Constitutional: She is oriented to person, place, and time. She appears well-developed and well-nourished. No distress.   HENT:   Head: Normocephalic and atraumatic.   Right Ear: External ear normal.   Left Ear: External ear normal.   Nose: Nose normal.   Mouth/Throat: Oropharynx is clear and moist. No oropharyngeal exudate.   Eyes: Conjunctivae and EOM are normal. Pupils are equal, round, and reactive to light. Right eye exhibits no discharge. Left eye  exhibits no discharge. No scleral icterus.   Neck: Normal range of motion. Neck supple. No JVD present. No tracheal deviation present. No thyromegaly present.   Cardiovascular: Normal rate.   Pulmonary/Chest: Effort normal. No stridor. No respiratory distress.   Abdominal: Soft. Bowel sounds are normal. She exhibits no distension. There is tenderness in the left upper quadrant and left lower quadrant. There is guarding. There is no rigidity, no CVA tenderness (Moderate left CVA tenderness), no tenderness at McBurney's point and negative Gage's sign.   Musculoskeletal: Normal range of motion. She exhibits no edema, tenderness or deformity.   Neurological: She is alert and oriented to person, place, and time. No cranial nerve deficit. She exhibits normal muscle tone. Coordination normal.   Skin: Skin is warm and dry. No rash noted. She is not diaphoretic. No erythema. No pallor.   Psychiatric: She has a normal mood and affect. Her behavior is normal. Judgment and thought content normal.   Nursing note and vitals reviewed.      Procedures      Recent Results (from the past 24 hour(s))   Comprehensive Metabolic Panel    Collection Time: 07/10/19  5:30 PM   Result Value Ref Range    Glucose 97 65 - 99 mg/dL    BUN 9 6 - 20 mg/dL    Creatinine 0.75 0.57 - 1.00 mg/dL    Sodium 138 136 - 145 mmol/L    Potassium 3.9 3.5 - 5.2 mmol/L    Chloride 100 98 - 107 mmol/L    CO2 23.0 22.0 - 29.0 mmol/L    Calcium 9.4 8.6 - 10.5 mg/dL    Total Protein 7.4 6.0 - 8.5 g/dL    Albumin 4.10 3.50 - 5.20 g/dL    ALT (SGPT) 35 (H) 1 - 33 U/L    AST (SGOT) 23 1 - 32 U/L    Alkaline Phosphatase 69 39 - 117 U/L    Total Bilirubin 0.4 0.2 - 1.2 mg/dL    eGFR  African Amer 104 >60 mL/min/1.73    Globulin 3.3 gm/dL    A/G Ratio 1.2 g/dL    BUN/Creatinine Ratio 12.0 7.0 - 25.0    Anion Gap 15.0 5.0 - 15.0 mmol/L   Lipase    Collection Time: 07/10/19  5:30 PM   Result Value Ref Range    Lipase 43 13 - 60 U/L   Light Blue Top    Collection Time:  07/10/19  5:30 PM   Result Value Ref Range    Extra Tube hold for add-on    Green Top (Gel)    Collection Time: 07/10/19  5:30 PM   Result Value Ref Range    Extra Tube Hold for add-ons.    Lavender Top    Collection Time: 07/10/19  5:30 PM   Result Value Ref Range    Extra Tube hold for add-on    Gold Top - SST    Collection Time: 07/10/19  5:30 PM   Result Value Ref Range    Extra Tube Hold for add-ons.    CBC Auto Differential    Collection Time: 07/10/19  5:30 PM   Result Value Ref Range    WBC 9.12 3.40 - 10.80 10*3/mm3    RBC 4.49 3.77 - 5.28 10*6/mm3    Hemoglobin 12.3 12.0 - 15.9 g/dL    Hematocrit 38.9 34.0 - 46.6 %    MCV 86.6 79.0 - 97.0 fL    MCH 27.4 26.6 - 33.0 pg    MCHC 31.6 31.5 - 35.7 g/dL    RDW 14.3 12.3 - 15.4 %    RDW-SD 45.5 37.0 - 54.0 fl    MPV 9.2 6.0 - 12.0 fL    Platelets 461 (H) 140 - 450 10*3/mm3    Neutrophil % 61.6 42.7 - 76.0 %    Lymphocyte % 31.0 19.6 - 45.3 %    Monocyte % 6.3 5.0 - 12.0 %    Eosinophil % 0.4 0.3 - 6.2 %    Basophil % 0.3 0.0 - 1.5 %    Immature Grans % 0.4 0.0 - 0.5 %    Neutrophils, Absolute 5.61 1.70 - 7.00 10*3/mm3    Lymphocytes, Absolute 2.83 0.70 - 3.10 10*3/mm3    Monocytes, Absolute 0.57 0.10 - 0.90 10*3/mm3    Eosinophils, Absolute 0.04 0.00 - 0.40 10*3/mm3    Basophils, Absolute 0.03 0.00 - 0.20 10*3/mm3    Immature Grans, Absolute 0.04 0.00 - 0.05 10*3/mm3    nRBC 0.0 0.0 - 0.2 /100 WBC   Urinalysis With Microscopic If Indicated (No Culture) - Urine, Catheter    Collection Time: 07/10/19  6:52 PM   Result Value Ref Range    Color, UA Yellow Yellow, Straw    Appearance, UA Cloudy (A) Clear    pH, UA <=5.0 5.0 - 8.0    Specific Gravity, UA 1.019 1.001 - 1.030    Glucose, UA Negative Negative    Ketones, UA Negative Negative    Bilirubin, UA Negative Negative    Blood, UA Negative Negative    Protein, UA Negative Negative    Leuk Esterase, UA Small (1+) (A) Negative    Nitrite, UA Negative Negative    Urobilinogen, UA 0.2 E.U./dL 0.2 - 1.0 E.U./dL    Pregnancy, Urine - Urine, Catheter    Collection Time: 07/10/19  6:52 PM   Result Value Ref Range    HCG, Urine QL Negative Negative   Urinalysis, Microscopic Only - Urine, Catheter    Collection Time: 07/10/19  6:52 PM   Result Value Ref Range    RBC, UA None Seen None Seen, 0-2 /HPF    WBC, UA 13-20 (A) None Seen, 0-2 /HPF    Bacteria, UA 3+ (A) None Seen, Trace /HPF    Squamous Epithelial Cells, UA 7-12 (A) None Seen, 0-2 /HPF    Hyaline Casts, UA 0-6 0 - 6 /LPF    Mucus, UA Small/1+ (A) None Seen, Trace /HPF    Methodology Manual Light Microscopy      Note: In addition to lab results from this visit, the labs listed above may include labs taken at another facility or during a different encounter within the last 24 hours. Please correlate lab times with ED admission and discharge times for further clarification of the services performed during this visit.    CT Abdomen Pelvis Without Contrast   ED Interpretation      1. There is an 8 to 9 mm calculus at the midpole of left kidney. On comparison to the study from October 2018 this is increased in size and moved in location and is now associated with distention of the left upper pole collecting system. The pelvis is   not distended and there is no hydroureter. This upper pole collecting system distention could reflect some intrarenal obstruction related to the large calculus, possibly related to the patient's worsening left flank pain.   2. There is a moderate amount of colon gas and stool. This is associated with mild gaseous colonic distention best appreciated at the transverse colon. There is however no evidence for bowel obstruction.            Signer Name: Sally Wright MD    Signed: 7/10/2019 9:32 PM    Workstation Name: [email or web address]          Final Result      1. There is an 8 to 9 mm calculus at the midpole of left kidney. On comparison to the study from October 2018 this is increased in size and moved in location and is now associated with distention of the  left upper pole collecting system. The pelvis is   not distended and there is no hydroureter. This upper pole collecting system distention could reflect some intrarenal obstruction related to the large calculus, possibly related to the patient's worsening left flank pain.   2. There is a moderate amount of colon gas and stool. This is associated with mild gaseous colonic distention best appreciated at the transverse colon. There is however no evidence for bowel obstruction.            Signer Name: Sally Wright MD    Signed: 7/10/2019 9:32 PM    Workstation Name: [email or web address]            Vitals:    07/10/19 2130 07/10/19 2200 07/10/19 2253 07/10/19 2300   BP: 119/81 126/95  138/90   Patient Position:       Pulse:       Resp:       Temp:       TempSrc:       SpO2: 97% 97% 96% 96%   Weight:       Height:         Medications   sodium chloride 0.9 % flush 10 mL (not administered)   cefTRIAXone (ROCEPHIN) 1 g/100 mL 0.9% NS (MBP) (1 g Intravenous New Bag 7/10/19 2339)   sodium chloride 0.9 % bolus 1,000 mL (1,000 mL Intravenous New Bag 7/10/19 2339)   HYDROcodone-acetaminophen (NORCO) 5-325 MG per tablet 1 tablet (not administered)   HYDROcodone-acetaminophen (NORCO) 7.5-325 MG per tablet 1 tablet (1 tablet Oral Given 7/10/19 2027)   ondansetron (ZOFRAN) injection 4 mg (4 mg Intravenous Given 7/10/19 2339)     ECG/EMG Results (last 24 hours)     ** No results found for the last 24 hours. **        No orders to display           ED Course  ED Course as of Jul 10 2347   Wed Jul 10, 2019   2042 Bacteria, UA: (!) 3+ [TG]   2042 WBC, UA: (!) 13-20 [TG]   2042 Nitrite, UA: Negative [TG]   2221 1. There is an 8 to 9 mm calculus at the midpole of left kidney. On comparison to the study from October 2018 this is increased in size and moved in location and is now associated with distention of the left upper pole collecting system. The pelvis is  not distended and there is no hydroureter. This upper pole collecting system distention  "could reflect some intrarenal obstruction related to the large calculus, possibly related to the patient's worsening left flank pain.  2. There is a moderate amount of colon gas and stool. This is associated with mild gaseous colonic distention best appreciated at the transverse colon. There is however no evidence for bowel obstruction.     [TG]   7056 Discussed with Dr. Wesley, who accepts patient to [facility].      [TG]      ED Course User Index  [TG] Christoph Gutierrez PA-C                  [facility]      Final diagnoses:   Ureteral obstruction, left   Nephrolithiasis   Urinary tract infection without hematuria, site unspecified            Christoph Gutierrez PA-C  07/10/19 3587      Electronically signed by Christoph Gutierrez PA-C at 7/10/2019 11:47 PM       ICU Vital Signs     Row Name 07/11/19 0831 07/11/19 0700 07/11/19 0600 07/11/19 0400 07/11/19 0200       Vitals    Temp  98.3 °F (36.8 °C)  98.5 °F (36.9 °C)  --  --  --    Temp src  Oral  --  --  --  --    Pulse  97  97  --  --  --    Heart Rate Source  Monitor  --  --  --  --    Resp  16  18  --  --  --    Resp Rate Source  Visual  --  --  --  --    BP  114/69  126/79  --  --  --    Noninvasive MAP (mmHg)  --  95  --  --  --    BP Location  Left arm  --  --  --  --    BP Method  Automatic  --  --  --  --    Patient Position  Sitting  --  --  --  --       Oxygen Therapy    SpO2  97 %  98 %  --  --  --    Device (Oxygen Therapy)  room air  --  room air  room air  room air    Row Name 07/11/19 0130 07/11/19 0119 07/10/19 2300 07/10/19 2253 07/10/19 2200       Height and Weight    Height  --  149.9 cm (59\")  --  --  --    Height Method  --  Stated  --  --  --    Weight  --  81.8 kg (180 lb 6.4 oz)  --  --  --    Weight Method  --  Bed scale  --  --  --    Ideal Body Weight (IBW) (kg)  --  43.57  --  --  --    BSA (Calculated - sq m)  --  1.76 sq meters  --  --  --    BMI (Calculated)  --  36.4  --  --  --    Weight in (lb) to have BMI = 25  --  123.5  --  --  -- " "      Vitals    Temp  --  98.7 °F (37.1 °C)  --  --  --    Temp src  --  Oral  --  --  --    Pulse  --  82  --  --  --    Heart Rate Source  --  Monitor  --  --  --    Resp  --  16  --  --  --    Resp Rate Source  --  Visual  --  --  --    BP  --  114/72  138/90  --  126/95    Noninvasive MAP (mmHg)  --  --  106  --  101    BP Location  --  Left arm  --  --  --    BP Method  --  Automatic  --  --  --    Patient Position  --  Lying  --  --  --       Oxygen Therapy    SpO2  --  98 %  96 %  96 %  97 %    Device (Oxygen Therapy)  room air  room air  --  --  --    Row Name 07/10/19 2130 07/10/19 21:21:08 07/10/19 2100 07/10/19 20:31:08 07/10/19 2030       Vitals    Pulse  --  92  --  91  --    Resp  --  16  --  16  --    BP  119/81  --  116/70  --  124/81    Noninvasive MAP (mmHg)  100  --  90  --  93       Oxygen Therapy    SpO2  97 %  --  97 %  --  98 %    Row Name 07/10/19 2029 07/10/19 1900 07/10/19 1723             Height and Weight    Height  --  --  149.9 cm (59\")      Height Method  --  --  Stated      Weight  --  --  80.7 kg (178 lb)      Weight Method  --  --  Stated      Ideal Body Weight (IBW) (kg)  --  --  43.57      BSA (Calculated - sq m)  --  --  1.75 sq meters      BMI (Calculated)  --  --  35.9      Weight in (lb) to have BMI = 25  --  --  123.5         Vitals    Temp  --  --  97.9 °F (36.6 °C)      Temp src  --  --  Oral      Pulse  --  --  103      Heart Rate Source  --  --  Monitor      Resp  --  --  16      Resp Rate Source  --  --  Visual      BP  --  131/99  127/79      Noninvasive MAP (mmHg)  --  110  --      BP Method  --  --  Automatic      Patient Position  --  --  Sitting         Oxygen Therapy    SpO2  98 %  97 %  95 %      Device (Oxygen Therapy)  --  --  room air          Hospital Medications (active)       Dose Frequency Start End    acetaminophen (TYLENOL) tablet 650 mg 650 mg Every 4 Hours PRN 7/11/2019     Sig - Route: Take 2 tablets by mouth Every 4 (Four) Hours As Needed for Mild " Pain . - Oral    albuterol (PROVENTIL) nebulizer solution 0.083% 2.5 mg/3mL 2.5 mg 2 Times Daily - RT 7/11/2019     Sig - Route: Take 2.5 mg by nebulization 2 (Two) Times a Day. - Nebulization    amitriptyline (ELAVIL) tablet 50 mg 50 mg Nightly 7/11/2019     Sig - Route: Take 1 tablet by mouth Every Night. - Oral    amLODIPine (NORVASC) tablet 5 mg 5 mg Daily 7/11/2019     Sig - Route: Take 1 tablet by mouth Daily. - Oral    cefTRIAXone (ROCEPHIN) 1 g/100 mL 0.9% NS (MBP) 1 g Once 7/10/2019 7/11/2019    Sig - Route: Infuse 100 mL into a venous catheter 1 (One) Time. - Intravenous    cefTRIAXone (ROCEPHIN) 1 g/100 mL 0.9% NS (MBP) 1 g Every 24 Hours 7/11/2019 7/18/2019    Sig - Route: Infuse 100 mL into a venous catheter Daily. - Intravenous    gabapentin (NEURONTIN) capsule 400 mg 400 mg 4 Times Daily PRN 7/11/2019     Sig - Route: Take 1 capsule by mouth 4 (Four) Times a Day As Needed (headaches and postherpetic neuralgia). - Oral    HYDROcodone-acetaminophen (NORCO) 5-325 MG per tablet 1 tablet 1 tablet Every 6 Hours PRN 7/10/2019 7/20/2019    Sig - Route: Take 1 tablet by mouth Every 6 (Six) Hours As Needed for Moderate Pain . - Oral    HYDROcodone-acetaminophen (NORCO) 7.5-325 MG per tablet 1 tablet 1 tablet Once 7/10/2019 7/10/2019    Sig - Route: Take 1 tablet by mouth 1 (One) Time. - Oral    lactobacillus acidophilus (RISAQUAD) capsule 1 capsule 1 capsule 2 Times Daily 7/11/2019     Sig - Route: Take 1 capsule by mouth 2 (Two) Times a Day. - Oral    lamoTRIgine (LaMICtal) tablet 100 mg 100 mg Daily 7/11/2019     Sig - Route: Take 1 tablet by mouth Daily. - Oral    melatonin tablet 5 mg 5 mg Nightly PRN 7/11/2019     Sig - Route: Take 1 tablet by mouth At Night As Needed for Sleep. - Oral    montelukast (SINGULAIR) tablet 10 mg 10 mg Nightly 7/11/2019     Sig - Route: Take 1 tablet by mouth Every Night. - Oral    ondansetron (ZOFRAN) injection 4 mg 4 mg Once 7/10/2019 7/10/2019    Sig - Route: Infuse 2 mL  into a venous catheter 1 (One) Time. - Intravenous    ondansetron (ZOFRAN) tablet 4 mg 4 mg Every 6 Hours PRN 7/11/2019     Sig - Route: Take 1 tablet by mouth Every 6 (Six) Hours As Needed for Nausea or Vomiting. - Oral    sodium chloride 0.9 % bolus 1,000 mL 1,000 mL Once 7/10/2019 7/11/2019    Sig - Route: Infuse 1,000 mL into a venous catheter 1 (One) Time. - Intravenous    sodium chloride 0.9 % flush 10 mL 10 mL As Needed 7/10/2019     Sig - Route: Infuse 10 mL into a venous catheter As Needed for Line Care. - Intravenous    Cosign for Ordering: Required by Luis Edwards MD    sodium chloride 0.9 % flush 3 mL 3 mL Every 12 Hours Scheduled 7/11/2019     Sig - Route: Infuse 3 mL into a venous catheter Every 12 (Twelve) Hours. - Intravenous    sodium chloride 0.9 % flush 3-10 mL 3-10 mL As Needed 7/11/2019     Sig - Route: Infuse 3-10 mL into a venous catheter As Needed for Line Care. - Intravenous    sodium chloride 0.9 % infusion 100 mL/hr Continuous 7/11/2019     Sig - Route: Infuse 100 mL/hr into a venous catheter Continuous. - Intravenous          Orders (active)     Start     Ordered    07/11/19 2100  montelukast (SINGULAIR) tablet 10 mg  Nightly      07/11/19 0122 07/11/19 2100  cefTRIAXone (ROCEPHIN) 1 g/100 mL 0.9% NS (MBP)  Every 24 Hours      07/11/19 0122 07/11/19 0930  albuterol (PROVENTIL) nebulizer solution 0.083% 2.5 mg/3mL  2 Times Daily - RT      07/11/19 0122 07/11/19 0900  amLODIPine (NORVASC) tablet 5 mg  Daily      07/11/19 0122 07/11/19 0900  lamoTRIgine (LaMICtal) tablet 100 mg  Daily      07/11/19 0122 07/11/19 0900  lactobacillus acidophilus (RISAQUAD) capsule 1 capsule  2 Times Daily      07/11/19 0122    07/11/19 0400  Vital Signs  Every 4 Hours      07/11/19 0122 07/11/19 0215  sodium chloride 0.9 % flush 3 mL  Every 12 Hours Scheduled      07/11/19 0122    07/11/19 0215  sodium chloride 0.9 % infusion  Continuous      07/11/19 0122    07/11/19 0123  Inpatient  Urology Consult  Once     Specialty:  Urology  Provider:  Vitaly Meeks MD    07/11/19 0122    07/11/19 0122  Intake & Output  Every Shift      07/11/19 0122    07/11/19 0122  Weigh Patient  Once      07/11/19 0122    07/11/19 0122  Oxygen Therapy- Nasal Cannula; Titrate for SPO2: 90% - 95%  Continuous      07/11/19 0122    07/11/19 0122  Insert Peripheral IV  Once      07/11/19 0122    07/11/19 0122  Saline Lock & Maintain IV Access  Continuous      07/11/19 0122    07/11/19 0122  sodium chloride 0.9 % flush 3-10 mL  As Needed      07/11/19 0122    07/11/19 0122  Maintain Sequential Compression Device  Continuous      07/11/19 0122    07/11/19 0122  NPO Diet  Diet Effective Now      07/11/19 0122    07/11/19 0122  acetaminophen (TYLENOL) tablet 650 mg  Every 4 Hours PRN      07/11/19 0122    07/11/19 0122  melatonin tablet 5 mg  Nightly PRN      07/11/19 0122    07/11/19 0122  ondansetron (ZOFRAN) tablet 4 mg  Every 6 Hours PRN      07/11/19 0122    07/11/19 0122  Seizure Precautions  Continuous      07/11/19 0122    07/11/19 0106  amitriptyline (ELAVIL) tablet 50 mg  Nightly      07/11/19 0104    07/11/19 0103  gabapentin (NEURONTIN) capsule 400 mg  4 Times Daily PRN      07/11/19 0104    07/10/19 2337  HYDROcodone-acetaminophen (NORCO) 5-325 MG per tablet 1 tablet  Every 6 Hours PRN      07/10/19 2337    07/10/19 2336  Code Status and Medical Interventions:  Continuous      07/10/19 2336    07/10/19 2232  Blood Culture - Blood, Blood, Venous Line  Once      07/10/19 2232    07/10/19 2232  Blood Culture - Blood, Blood, Venous Line  Once     Comments:  30 minutes after first collection, or from a different site      07/10/19 2232    07/10/19 1731  Insert peripheral IV  Once      07/10/19 1730    07/10/19 1730  sodium chloride 0.9 % flush 10 mL  As Needed      07/10/19 1730          Operative/Procedure Notes (all)     No notes of this type exist for this encounter.        Physician Progress Notes (all)     No  notes of this type exist for this encounter.        Consult Notes (all)     No notes of this type exist for this encounter.

## 2019-07-11 NOTE — PROGRESS NOTES
Discharge Planning Assessment  [facility]     Patient Name: Bernardo Dodd  MRN: [id number]  Today's Date: 7/11/2019    Admit Date: 7/10/2019    Discharge Needs Assessment     Row Name 07/11/19 1347       Living Environment    Lives With  child(alma), dependent    Current Living Arrangements  home/apartment/condo    Primary Care Provided by  self    Provides Primary Care For  child(alma)    Family Caregiver if Needed  parent(s)    Family Caregiver Names  Keya Dodd    Quality of Family Relationships  unable to assess    Able to Return to Prior Arrangements  yes       Resource/Environmental Concerns    Resource/Environmental Concerns  none    Transportation Concerns  car, none       Transition Planning    Patient/Family Anticipates Transition to  home with family    Patient/Family Anticipated Services at Transition  none    Transportation Anticipated  family or friend will provide       Discharge Needs Assessment    Readmission Within the Last 30 Days  no previous admission in last 30 days    Concerns to be Addressed  no discharge needs identified    Equipment Currently Used at Home  nebulizer    Anticipated Changes Related to Illness  none    Equipment Needed After Discharge  none        Discharge Plan     Row Name 07/11/19 1340       Plan    Plan  home with family    Patient/Family in Agreement with Plan  yes    Plan Comments  I spoke with patient this afternoon. No discharge planning needs identified at this time.      Final Discharge Disposition Code  01 - home or self-care        Destination      No service coordination in this encounter.      Durable Medical Equipment      No service coordination in this encounter.      Dialysis/Infusion      No service coordination in this encounter.      Home Medical Care      No service coordination in this encounter.      Therapy      No service coordination in this encounter.      Community Resources      No service coordination in this encounter.          Demographic  Summary     Row Name 07/11/19 1346       General Information    Admission Type  inpatient    Referral Source  admission list    Preferred Language  English    General Information Comments  PCP is Nelly Sotelo       Contact Information    Permission Granted to Share Info With      Contact Information Comments  [phone]        Functional Status     Row Name 07/11/19 1347       Functional Status    Usual Activity Tolerance  good    Current Activity Tolerance  good       Functional Status, IADL    Medications  independent    Meal Preparation  independent    Housekeeping  independent    Laundry  independent    Shopping  independent       Employment/    Employment/ Comments  Has [place] Medicaid, no concerns with coverage        Psychosocial    No documentation.       Abuse/Neglect    No documentation.       Legal    No documentation.       Substance Abuse    No documentation.       Patient Forms    No documentation.           Nlely Nicholas RN

## 2019-07-11 NOTE — PLAN OF CARE
Problem: Patient Care Overview  Goal: Plan of Care Review  Outcome: Ongoing (interventions implemented as appropriate)   07/11/19 0340   Coping/Psychosocial   Plan of Care Reviewed With patient   Plan of Care Review   Progress no change   OTHER   Outcome Summary new admit from the er. VSS. pain controlled. UOP-WNL straining urine. Pt slept off and on during the night. will continue to monitor.      Goal: Individualization and Mutuality  Outcome: Ongoing (interventions implemented as appropriate)    Goal: Discharge Needs Assessment  Outcome: Ongoing (interventions implemented as appropriate)    Goal: Interprofessional Rounds/Family Conf  Outcome: Ongoing (interventions implemented as appropriate)      Problem: Pain, Acute (Adult)  Goal: Identify Related Risk Factors and Signs and Symptoms  Outcome: Ongoing (interventions implemented as appropriate)    Goal: Acceptable Pain Control/Comfort Level  Outcome: Ongoing (interventions implemented as appropriate)      Problem: Urinary Tract Infection (Adult)  Goal: Signs and Symptoms of Listed Potential Problems Will be Absent, Minimized or Managed (Urinary Tract Infection)  Outcome: Ongoing (interventions implemented as appropriate)

## 2019-07-11 NOTE — H&P
[facility] Medicine Services  HISTORY AND PHYSICAL    Patient Name: Bernardo Dodd  : 1980  MRN: [id number]  Primary Care Physician: Nelly Sotelo PA  Date of admission: 7/10/2019      Subjective   Subjective     Chief Complaint:  Left flank pain    HPI:  Bernardo Dodd is a 38 y.o. female with a past medical history significant for asthma, GERD, HTN, and seizure disorder who presents with complaints of left sided flank pain, progressively worsening since discharge. Pain is constant and radiates to right groin. There is associated nausea without vomiting, subjective fever, and dysuria. States she has been unable to urinate since early this morning and feels dehydrated. Patient was admitted to this facility  to 2019 with sepsis secondary to pyelonephritis. Urine culture isolated rocephin sensitive E. Coli. Renal U/S at this time demonstrated 13 mm stone with hydronephrosis. Non obstructive. Patient was discharged with 10 days of omnicef and scheduled to followed up with urology ( Dr. Mckeon) as outpatient. When symptoms returned about 2 days ago however, she was seen at [facility] yesterday where omnicef was switched Bactrim. Patient was concerned with unchanged pain and dysuria which prompted visit to ED. Currently without complaints of cough, congestion, chest pain, or diarrhea. Will admit for further evaluation and treatment.    Emergency Department Evaluation; vitals stable. . Chemistry and hematology otherwise favorable. UA positive for acute infection. CT A/P demonstrates; an 8 to 9 mm calculus at the midpole of left kidney. This is increased in size and moved in location and is now associated with distention of the left upper pole collecting system.    Review of Systems   Constitutional: Positive for chills and fever.   HENT: Negative for congestion and trouble swallowing.    Eyes: Negative for photophobia and visual disturbance.   Respiratory:  Negative for cough and shortness of breath.    Cardiovascular: Negative for chest pain and leg swelling.   Gastrointestinal: Positive for abdominal pain and nausea. Negative for diarrhea and vomiting.   Endocrine: Negative for cold intolerance.   Genitourinary: Positive for dysuria and flank pain.   Musculoskeletal: Negative for back pain and gait problem.   Skin: Negative for pallor and rash.   Allergic/Immunologic: Negative for immunocompromised state.   Neurological: Negative for dizziness, weakness and headaches.   Hematological: Negative for adenopathy.   Psychiatric/Behavioral: Negative for agitation and confusion.          Otherwise complete ROS reviewed and is negative except as mentioned in the HPI.    Personal History     Past Medical History:   Diagnosis Date   • Allergic rhinitis    • Anemia    • Arthritis    • Asthma    • Depression    • FHx: migraine headaches    • GERD (gastroesophageal reflux disease)    • Heart murmur    • Herpes simplex    • History of chest x-ray 2015    no active disease   • History of echocardiogram 2015    ejection fraction of greater than 65%, mitral and pulmonic regurgitation an physiological tricuspid regurgitation.   • History of PFTs 2015    spirometry data acceptable and reproducible; pt given 4 puffs of Ventolin; pt gave good effort; no obstruction; no Bd response; MVV reduced    • History of PFTs 2015    pt gave best effort; duoneb given prior and post study; moderate nonspecific proportional reduction of FEV1 and FVC with preserved ratio; FEV1 moderately reduced; cannot rule out restriction   • Hypertension    • Ovarian cyst    • Seizures (CMS/HCC)    • Thigh shingles        Past Surgical History:   Procedure Laterality Date   • BILATERAL BREAST REDUCTION     • BREAST SURGERY      breast reduction   •  SECTION     • LAPAROSCOPIC TUBAL LIGATION     • ORIF ANKLE FRACTURE Right    • TENSION FREE VAGINAL TAPING WITH MINI ARC SLING          Family History: family history includes Arthritis in her father and mother; Cancer in her mother; MARCIE disease in her paternal aunt; Heart failure in her paternal grandmother; Pancreatic cancer in her maternal grandmother. Otherwise pertinent FHx was reviewed and unremarkable.     Social History:  reports that she quit smoking about 4 years ago. Her smoking use included cigarettes. She has a 15.00 pack-year smoking history. She has never used smokeless tobacco. She reports that she does not drink alcohol or use drugs.  Social History     Social History Narrative    Caffeine intake: 16 oz per day        Medications:    Available home medication information reviewed.    (Not in a hospital admission)    Allergies   Allergen Reactions   • Naproxen Swelling       Objective   Objective     Vital Signs:   Temp:  [97.9 °F (36.6 °C)] 97.9 °F (36.6 °C)  Heart Rate:  [] 92  Resp:  [16] 16  BP: (116-138)/(70-99) 138/90        Physical Exam   Constitutional: Awake, alert  Eyes: PERRLA, sclerae anicteric, no conjunctival injection  HENT: NCAT, mucous membranes moist  Neck: Supple, no thyromegaly, no lymphadenopathy, trachea midline  Respiratory: Clear to auscultation bilaterally, nonlabored respirations   Cardiovascular: RRR, no murmurs, rubs, or gallops, palpable pedal pulses bilaterally  Gastrointestinal: Positive bowel sounds, soft, nontender, nondistended  Left CVA tenderness  Musculoskeletal: No bilateral ankle edema, no clubbing or cyanosis to extremities  Psychiatric: Appropriate affect, cooperative  Neurologic: Oriented x 3, strength symmetric in all extremities, Cranial Nerves grossly intact to confrontation, speech clear  Skin: No rashes      Results Reviewed:  I have personally reviewed current lab, radiology, and data and agree.    Results from last 7 days   Lab Units 07/10/19  1730   WBC 10*3/mm3 9.12   HEMOGLOBIN g/dL 12.3   HEMATOCRIT % 38.9   PLATELETS 10*3/mm3 461*     Results from last 7 days   Lab  Units 07/10/19  1730   SODIUM mmol/L 138   POTASSIUM mmol/L 3.9   CHLORIDE mmol/L 100   CO2 mmol/L 23.0   BUN mg/dL 9   CREATININE mg/dL 0.75   GLUCOSE mg/dL 97   CALCIUM mg/dL 9.4   ALT (SGPT) U/L 35*   AST (SGOT) U/L 23     Estimated Creatinine Clearance: 97 mL/min (by C-G formula based on SCr of 0.75 mg/dL).  Brief Urine Lab Results  (Last result in the past 365 days)      Color   Clarity   Blood   Leuk Est   Nitrite   Protein   CREAT   Urine HCG        07/10/19 1852 Yellow Cloudy Negative Small (1+) Negative Negative         07/10/19 1852               Negative         Imaging Results (last 24 hours)     Procedure Component Value Units Date/Time    CT Abdomen Pelvis Without Contrast [[id number]] Collected:  07/10/19 2132     Updated:  07/10/19 2221    Narrative:       INDICATION:   Increasing left flank pain for 2 weeks. Known left kidney stone diagnosed 1 week ago.    TECHNIQUE:   CT of the abdomen and pelvis without contrast. Coronal and sagittal reconstructions were obtained.  Radiation dose reduction techniques included automated exposure control or exposure modulation based on body size. Radiation audit for number of CT and  nuclear cardiology exams performed in the last year: 3.      COMPARISON:   CT abdomen pelvis from 10/17/2018. CTA chest from 6/29/2019.    FINDINGS:  Lung bases: Clear    Abdomen: Lack of IV and oral contrast media limits evaluation for pathology other than urinary tract calculus disease.    Unenhanced liver, spleen, gallbladder, pancreas and adrenal glands are unremarkable.    There is a large  calculus interpolar left kidney about 8 mm to 9 mm dimension. This is noted on the recent CT angiogram of the chest and is unchanged. There is fullness of the left upper pole collecting system by the renal pelvis is not distended..  There is a tiny nonobstructing calculus in the upper pole left kidney. There is no hydroureter.On comparison to the CT abdomen pelvis from October 2018, the calculus  at the mid pole left kidney is larger than it was previously located in the upper pole.  The fullness of the upper pole collecting system is new. The right kidney is unremarkable.    There is no evidence for abdominal aortic aneurysm.      Pelvis: Bladder is distended. No bladder calculus is seen. Uterus is prominent. Suspect the right adnexal cyst and a small amount of physiologic free fluid in the pelvis.    There is a moderate amount of colonic gas and stool with prominence of the transverse colon. There is no bowel obstruction. Visualized appendix is unremarkable. There is no free air or drainable fluid collection suspected.      Impression:         1. There is an 8 to 9 mm calculus at the midpole of left kidney. On comparison to the study from October 2018 this is increased in size and moved in location and is now associated with distention of the left upper pole collecting system. The pelvis is  not distended and there is no hydroureter. This upper pole collecting system distention could reflect some intrarenal obstruction related to the large calculus, possibly related to the patient's worsening left flank pain.  2. There is a moderate amount of colon gas and stool. This is associated with mild gaseous colonic distention best appreciated at the transverse colon. There is however no evidence for bowel obstruction.        Signer Name: Sally Wright MD   Signed: 7/10/2019 9:32 PM   Workstation Name: [email or web address]                Assessment/Plan   Assessment / Plan     Active Hospital Problems    Diagnosis POA   • **Ureteral obstruction, left [N13.5] Yes     Priority: High   • Complicated urinary tract infection [N39.0] Yes     Priority: High   • Hypertension [I10] Yes     Priority: Medium   • Asthma [J45.909] Yes     Priority: Medium     No obstruction noted on PFTs from 12/22/15. FEV1 2.01 L, 93%. FVC 2.47 L, 95%. Ratio 81%. No response to bronchodilator.       • Seizure disorder (CMS/[facility]) [G40.909] Yes     Priority:  Low   • Iron deficiency anemia [D50.9] Yes     Priority: Low   • GERD (gastroesophageal reflux disease) [K21.9] Yes     Priority: Low         1. Left ureteral obstruction with complicated UTI:  - stone now obstructive with associated UTI  - obtain urine and blood culture. Administered rocephin in ED. Continue for now  - urology to see in am. NPO  - IVF  - pain control as needed  - am labs    2. Asthma:  - stable. Continue inhaler and antihistamines  - former smoker    3. Seizure disorder:  - on Lamictal. Continue  - seizure precautions    4. Iron deficiency anemia:  - with thrombocytopenia. Has heavy periods  - on iron supplement  - H/H stable.    DVT prophylaxis: mechanical    CODE STATUS:  Full code  Code Status and Medical Interventions:   Ordered at: 07/10/19 2695     Level Of Support Discussed With:    Patient     Code Status:    CPR     Medical Interventions (Level of Support Prior to Arrest):    Full       Admission Status:  I believe this patient meets INPATIENT status due to the need for care which can only be reasonably provided in an hospital setting.  In such, I feel patient’s risk for adverse outcomes and need for care warrant INPATIENT evaluation and predict the patient’s care encounter to likely last beyond 2 midnights.      Electronically signed by Memo Phillpis PA-C, 07/10/19, 11:38 PM.      Brief Attending Admission Attestation     I have seen and examined the patient, performing an independent face-to-face diagnostic evaluation with plan of care reviewed and developed with the advanced practice clinician (APC).      Brief Summary Statement:   Bernardo Dodd is a 38 y.o. female's medical history of hypertension, GERD, asthma, seizure disorder and recent kidney stone with pyelonephritis.  Patient presented to [facility] ED with complaint of left flank tenderness.  She states that the pain has gotten progressively worse since being discharged on 7/4/2019.  Patient had been admitted with sepsis and  pyelonephritis.  Urine culture was positive for E. coli.  Patient was treated with Rocephin and then discharged on Omnicef.  She was scheduled to follow-up with her urologist Dr. Mckeon as outpatient.  Patient saw a different urologist and was changed from Omnicef to Bactrim yesterday.  She states that her symptoms have continued to worsen and she is having more left flank pain radiating into the left groin.  CT of the abdomen and pelvis noted a 8.9 mm calculus in the midpole of the left kidney.  This is increased in size and moved in location in comparison to previous CT.  Patient will be admitted treated with IV fluids and IV antibiotics and urology will be consulted in the a.m.    Remainder of detailed HPI is as noted above and has been reviewed and/or edited by me for completeness.      Attending Physical Exam:  Constitutional: No acute distress, awake, alert  HENT: NCAT, mucous membranes moist  Respiratory: Clear to auscultation bilaterally, respiratory effort normal   Cardiovascular: RRR, no murmurs, rubs, or gallops, palpable pedal pulses bilaterally  Gastrointestinal: Positive bowel sounds, soft, tender in LLQ and Left CVA tenderness, nondistended  Musculoskeletal: No bilateral ankle edema  Psychiatric: Appropriate affect, cooperative  Neurologic: Oriented x 3, strength symmetric in all extremities, Cranial Nerves grossly intact to confrontation, speech clear  Skin: No rashes        Brief Assessment/Plan :  See above for further detailed assessment and plan developed with APC which I have reviewed and/or edited for completeness.      Electronically signed by Amber Webster DO, 07/11/19, 5:00 AM.

## 2019-07-12 VITALS
RESPIRATION RATE: 16 BRPM | SYSTOLIC BLOOD PRESSURE: 104 MMHG | BODY MASS INDEX: 36.37 KG/M2 | OXYGEN SATURATION: 95 % | HEIGHT: 59 IN | DIASTOLIC BLOOD PRESSURE: 59 MMHG | WEIGHT: 180.4 LBS | HEART RATE: 80 BPM | TEMPERATURE: 98.4 F

## 2019-07-12 PROBLEM — N39.0 COMPLICATED URINARY TRACT INFECTION: Status: RESOLVED | Noted: 2019-07-10 | Resolved: 2019-07-12

## 2019-07-12 LAB
ANION GAP SERPL CALCULATED.3IONS-SCNC: 10 MMOL/L (ref 5–15)
BACTERIA SPEC AEROBE CULT: NO GROWTH
BUN BLD-MCNC: 8 MG/DL (ref 6–20)
BUN/CREAT SERPL: 12.3 (ref 7–25)
CALCIUM SPEC-SCNC: 8.6 MG/DL (ref 8.6–10.5)
CHLORIDE SERPL-SCNC: 100 MMOL/L (ref 98–107)
CO2 SERPL-SCNC: 26 MMOL/L (ref 22–29)
CREAT BLD-MCNC: 0.65 MG/DL (ref 0.57–1)
DEPRECATED RDW RBC AUTO: 45.2 FL (ref 37–54)
ERYTHROCYTE [DISTWIDTH] IN BLOOD BY AUTOMATED COUNT: 14.2 % (ref 12.3–15.4)
GFR SERPL CREATININE-BSD FRML MDRD: 122 ML/MIN/1.73
GLUCOSE BLD-MCNC: 104 MG/DL (ref 65–99)
HCT VFR BLD AUTO: 34.3 % (ref 34–46.6)
HGB BLD-MCNC: 10.8 G/DL (ref 12–15.9)
MCH RBC QN AUTO: 27.7 PG (ref 26.6–33)
MCHC RBC AUTO-ENTMCNC: 31.5 G/DL (ref 31.5–35.7)
MCV RBC AUTO: 87.9 FL (ref 79–97)
PLATELET # BLD AUTO: 366 10*3/MM3 (ref 140–450)
PMV BLD AUTO: 9.4 FL (ref 6–12)
POTASSIUM BLD-SCNC: 3.7 MMOL/L (ref 3.5–5.2)
RBC # BLD AUTO: 3.9 10*6/MM3 (ref 3.77–5.28)
SODIUM BLD-SCNC: 136 MMOL/L (ref 136–145)
WBC NRBC COR # BLD: 7 10*3/MM3 (ref 3.4–10.8)

## 2019-07-12 PROCEDURE — 94799 UNLISTED PULMONARY SVC/PX: CPT

## 2019-07-12 PROCEDURE — 85027 COMPLETE CBC AUTOMATED: CPT | Performed by: HOSPITALIST

## 2019-07-12 PROCEDURE — 80048 BASIC METABOLIC PNL TOTAL CA: CPT | Performed by: HOSPITALIST

## 2019-07-12 PROCEDURE — 99239 HOSP IP/OBS DSCHRG MGMT >30: CPT | Performed by: FAMILY MEDICINE

## 2019-07-12 RX ORDER — CEFUROXIME AXETIL 500 MG/1
500 TABLET ORAL 2 TIMES DAILY
Qty: 24 TABLET | Refills: 0 | Status: SHIPPED | OUTPATIENT
Start: 2019-07-12 | End: 2019-07-24

## 2019-07-12 RX ORDER — HYDROCODONE BITARTRATE AND ACETAMINOPHEN 5; 325 MG/1; MG/1
1 TABLET ORAL EVERY 6 HOURS PRN
Qty: 30 TABLET | Refills: 0 | Status: SHIPPED | OUTPATIENT
Start: 2019-07-12 | End: 2019-07-20

## 2019-07-12 RX ADMIN — Medication 1 CAPSULE: at 08:38

## 2019-07-12 RX ADMIN — SODIUM CHLORIDE 100 ML/HR: 9 INJECTION, SOLUTION INTRAVENOUS at 05:51

## 2019-07-12 RX ADMIN — ONDANSETRON HYDROCHLORIDE 4 MG: 4 TABLET, FILM COATED ORAL at 04:10

## 2019-07-12 RX ADMIN — HYDROCODONE BITARTRATE AND ACETAMINOPHEN 1 TABLET: 5; 325 TABLET ORAL at 04:09

## 2019-07-12 RX ADMIN — LAMOTRIGINE 100 MG: 100 TABLET ORAL at 08:38

## 2019-07-12 RX ADMIN — PANTOPRAZOLE SODIUM 40 MG: 40 TABLET, DELAYED RELEASE ORAL at 08:39

## 2019-07-12 RX ADMIN — AMLODIPINE BESYLATE 5 MG: 5 TABLET ORAL at 08:40

## 2019-07-12 RX ADMIN — ALBUTEROL SULFATE 2.5 MG: 2.5 SOLUTION RESPIRATORY (INHALATION) at 07:50

## 2019-07-12 NOTE — PROGRESS NOTES
Continued Stay Note  [facility]     Patient Name: Bernardo Dodd  MRN: [id number]  Today's Date: 7/12/2019    Admit Date: 7/10/2019    Discharge Plan     Row Name 07/12/19 0936       Plan    Plan  Home with family    Patient/Family in Agreement with Plan  yes    Plan Comments  No current discharge planning needs identified. Case management is following.     Final Discharge Disposition Code  01 - home or self-care        Discharge Codes    No documentation.             Nelly Nicholas RN

## 2019-07-12 NOTE — CONSULTS
Urology Consult Note    Bernardo Dodd  LOS: 1      ASSESSMENT:    38 y.o. female with a 9 mm L renal pelvic stone with recent L pyelonephritis and sepsis.  CT shows what may represent mild dilation of L upper calyceal system related to this stone which has increased in size from previous imaging.     DISCUSSION/RECOMMENDATIONS/PLAN:  I believe that no urgent surgical intervention is needed and that a completed and thourough course of appropriate antimicrobial treatment for her infection should be carried out.  Later ESWL therapy of this stone will be likely advisable - will arrange this in follow up office visit in 2 weeks.    HPI:  Bernardo Dodd is a 38 y.o. female who was admitted 7/10/2019  for Ureteral obstruction, left [N13.5]  Ureteral obstruction, left [N13.5]. Patient was referred by Memo Phillips PA-C for evaluation of a kidney stone with possible obstruction. Patient has had flank pain on left for 11-12 days days.  Patient has significant prior urologic history of urolithiasis. Patient denies history of  trauma and  cancer. Patient has seen a urologist in the past. She has been followed by DR Kwon at [facility] in recent weeks where she was first admitted with pyelonephritis.    Past Medical History:   Diagnosis Date   • Allergic rhinitis    • Anemia    • Arthritis    • Asthma    • Depression    • FHx: migraine headaches    • GERD (gastroesophageal reflux disease)    • Heart murmur    • Herpes simplex    • History of chest x-ray 11/01/2015    no active disease   • History of echocardiogram 11/01/2015    ejection fraction of greater than 65%, mitral and pulmonic regurgitation an physiological tricuspid regurgitation.   • History of PFTs 12/22/2015    spirometry data acceptable and reproducible; pt given 4 puffs of Ventolin; pt gave good effort; no obstruction; no Bd response; MVV reduced    • History of PFTs 11/02/2015    pt gave best effort; duoneb given prior and post study; moderate nonspecific  proportional reduction of FEV1 and FVC with preserved ratio; FEV1 moderately reduced; cannot rule out restriction   • Hypertension    • Migraine    • Ovarian cyst    • Seizures (CMS/HCC)    • Thigh shingles      Past Surgical History:   Procedure Laterality Date   • BILATERAL BREAST REDUCTION     • BREAST SURGERY      breast reduction   •  SECTION     • CYSTOSCOPY     • LAPAROSCOPIC TUBAL LIGATION     • ORIF ANKLE FRACTURE Right    • TENSION FREE VAGINAL TAPING WITH MINI ARC SLING      Dr Doyle avery      Medications Prior to Admission   Medication Sig Dispense Refill Last Dose   • ADVAIR DISKUS 500-50 MCG/DOSE DISKUS Inhale 1 puff 2 (Two) Times a Day.   7/10/2019 at Unknown time   • amLODIPine (NORVASC) 5 MG tablet Take 1 tablet by mouth Daily. 30 tablet 5 7/10/2019 at Unknown time   • azelastine (OPTIVAR) 0.05 % ophthalmic solution Administer 1 drop to both eyes 2 (Two) Times a Day. 6 mL 12 7/10/2019 at Unknown time   • Cholecalciferol (VITAMIN D3) 5000 units capsule capsule Take 1 capsule by mouth Daily. 30 capsule 3 Past Month at Unknown time   • clotrimazole-betamethasone (LOTRISONE) 1-0.05 % cream Apply  topically to the appropriate area as directed 2 (Two) Times a Day. 15 g 0 7/10/2019 at Unknown time   • ferrous sulfate 325 (65 FE) MG tablet Take 1 tablet by mouth Daily With Breakfast. Resume Iron when antibiotics finished. 30 tablet 5 7/10/2019 at Unknown time   • fluticasone (FLONASE) 50 MCG/ACT nasal spray 2 sprays into the nostril(s) as directed by provider Daily. 16 g 5 7/10/2019 at Unknown time   • gabapentin (NEURONTIN) 400 MG capsule Take 400 mg by mouth 5 (Five) Times a Day As Needed.   7/10/2019 at Unknown time   • lamoTRIgine (LaMICtal) 100 MG tablet Take 100 mg by mouth Daily.   7/10/2019 at Unknown time   • loratadine-pseudoephedrine (CLARITIN-D 12 HOUR) 5-120 MG per 12 hr tablet Take 1 tablet by mouth 2 (Two) Times a Day. 60 tablet 5 7/10/2019 at Unknown time   • montelukast  (SINGULAIR) 10 MG tablet Take 10 mg by mouth every night.   7/10/2019 at Unknown time   • omeprazole (priLOSEC) 40 MG capsule Take 1 capsule by mouth 2 (Two) Times a Day. Take on an empty stomach and eat 30 minutes- 1 hr after eating. 90 capsule 5 7/10/2019 at Unknown time   • Probiotic capsule Take 1 capsule by mouth 2 (Two) Times a Day. May take any generic probiotic 14 capsule 0 7/10/2019 at Unknown time   • SPIRIVA RESPIMAT 2.5 MCG/ACT aerosol solution Take 2 puffs by mouth Daily.   7/10/2019 at Unknown time   • sulfamethoxazole-trimethoprim (BACTRIM DS,SEPTRA DS) 800-160 MG per tablet Take 1 tablet by mouth 2 (Two) Times a Day for 7 days. 14 tablet 0 7/10/2019 at Unknown time   • albuterol (PROVENTIL) (2.5 MG/3ML) 0.083% nebulizer solution Take 2.5 mg by nebulization Every 12 (Twelve) Hours.    at Unknown time   • albuterol (VENTOLIN HFA) 108 (90 BASE) MCG/ACT inhaler Inhale 1-2 puffs as needed. Every 4-6 hrs PRN    at Unknown time     Allergies   Allergen Reactions   • Naproxen Swelling     Family History   Problem Relation Age of Onset   • Pancreatic cancer Maternal Grandmother    • Heart failure Paternal Grandmother    • MARCIE disease Paternal Aunt    • Arthritis Mother    • Cancer Mother    • Arthritis Father    • Diabetes Neg Hx    • Heart attack Neg Hx    • Hypertension Neg Hx    • Hyperlipidemia Neg Hx    • Mental illness Neg Hx    • Obesity Neg Hx    • Stroke Neg Hx      Social History     Socioeconomic History   • Marital status: Single     Spouse name: Not on file   • Number of children: Not on file   • Years of education: Not on file   • Highest education level: Not on file   Tobacco Use   • Smoking status: Former Smoker     Packs/day: 1.00     Years: 15.00     Pack years: 15.00     Types: Cigarettes     Last attempt to quit: 2015     Years since quittin.4   • Smokeless tobacco: Never Used   Substance and Sexual Activity   • Alcohol use: No     Comment: socially   • Drug use: Yes     Types:  "Marijuana     Comment: Once a month    • Sexual activity: Defer   Social History Narrative    Caffeine intake: 16 oz per day          Review of Systems  Constitutional: negative  Respiratory: negative  Cardiovascular: negative  Gastrointestinal: negative  Genitourinary:negative    Objective     Blood pressure 120/73, pulse 85, temperature 98.6 °F (37 °C), temperature source Oral, resp. rate 16, height 149.9 cm (59\"), weight 81.8 kg (180 lb 6.4 oz), SpO2 97 %, not currently breastfeeding.  /73 (BP Location: Left arm, Patient Position: Sitting)   Pulse 85   Temp 98.6 °F (37 °C) (Oral)   Resp 16   Ht 149.9 cm (59\")   Wt 81.8 kg (180 lb 6.4 oz)   SpO2 97%   BMI 36.44 kg/m²     Physicial Exam    General: WDWN female in NAD  Neck: Supple with no masses or adenopathy  Back: No CVAT, spine linear and non-tender   Abdomen: Soft and non-tender with no masses, organomegaly or guarding.  Extremities: FROM with no edema, pulses full  Neuro: Nonfocal        Imaging  CT Abdomen: 9 mm stone in L renal pelvis with very mild dilation of upper pole collecting system    Labs  Urine Microscopy:   Results from last 7 days   Lab Units 07/10/19  1852   RBC UA /HPF None Seen   WBC UA /HPF 13-20*   , Urinalysis:   Results from last 7 days   Lab Units 07/10/19  1852 07/09/19  1505   PH, URINE  <=5.0 5.0   SPECIFIC GRAVITY, URINE   --  1.025   PROTEIN UA  Negative  --    GLUCOSE UA  Negative  --    KETONES UA  Negative Negative   , BMP:   Results from last 7 days   Lab Units 07/11/19  0403 07/10/19  1730   SODIUM mmol/L 135* 138   POTASSIUM mmol/L 3.9 3.9   CALCIUM mg/dL 8.4* 9.4   CHLORIDE mmol/L 101 100   CO2 mmol/L 23.0 23.0   BUN mg/dL 10 9   CREATININE mg/dL 0.60 0.75   ALBUMIN g/dL  --  4.10   BILIRUBIN mg/dL  --  0.4   ALK PHOS U/L  --  69    and CBC:   Results from last 7 days   Lab Units 07/11/19  0403   WBC 10*3/mm3 7.81   RBC 10*6/mm3 3.89   HEMOGLOBIN g/dL 10.6*   HEMATOCRIT % 34.2   PLATELETS 10*3/mm3 386 "       Vitaly Meeks MD - 7/11/2019, 10:19 PM

## 2019-07-12 NOTE — PLAN OF CARE
Problem: Patient Care Overview  Goal: Plan of Care Review  Outcome: Ongoing (interventions implemented as appropriate)   07/12/19 0501   Coping/Psychosocial   Plan of Care Reviewed With patient   Plan of Care Review   Progress improving   OTHER   Outcome Summary VSS. Adequate I&O. Slight sediment noted in strainer, however no stone passed. Medicated for pain & nausea x1. Rested well. Continue IV Abx. Lithotripsy as OP when infection clears up.        Problem: Pain, Acute (Adult)  Goal: Identify Related Risk Factors and Signs and Symptoms  Outcome: Outcome(s) achieved Date Met: 07/12/19    Goal: Acceptable Pain Control/Comfort Level  Outcome: Ongoing (interventions implemented as appropriate)      Problem: Urinary Tract Infection (Adult)  Goal: Signs and Symptoms of Listed Potential Problems Will be Absent, Minimized or Managed (Urinary Tract Infection)  Outcome: Ongoing (interventions implemented as appropriate)

## 2019-07-12 NOTE — DISCHARGE SUMMARY
[facility]  DISCHARGE SUMMARY    Patient Name: Bernardo Dodd  : 1980  MRN: [id number]    Date of Admission: 7/10/2019  Date of Discharge:  19    Primary Care Physician: Nelly Sotelo PA    Consults     Date and Time Order Name Status Description    2019 0122 Inpatient Urology Consult            Hospital Course     Presenting Problem:   Ureteral obstruction, left [N13.5]  Ureteral obstruction, left [N13.5]    Active Hospital Problems    Diagnosis  POA   • **Ureteral obstruction, left [N13.5]  Yes   • Seizure disorder (CMS/HCC) [G40.909]  Yes   • Iron deficiency anemia [D50.9]  Yes   • Hypertension [I10]  Yes   • Asthma [J45.909]  Yes   • GERD (gastroesophageal reflux disease) [K21.9]  Yes      Resolved Hospital Problems    Diagnosis Date Resolved POA   • Complicated urinary tract infection [N39.0] 2019 Yes          Hospital Course:  Bernardo Dodd is a 38 y.o. female with hx of asthma, GERD, HTN, and seizure disorder who presents due to complaints of left sided flank pain with associated nausea, subjective fever, and dysuria. Patient was recently admitted to [facility] - due to sepsis with pyelonephritis, found to have a 13 mm stone within the left kidney. Was discharged on Omnicef x 10 days and outpatient follow up with Urology. However symptoms returned 2 days ago, and despite being switched to Bactrim by her PCP on , she continued to have symptoms prompting ER evaluation on 7/10. Found to have ongoing UTI and CT A/P showed 8-9 mm left kidney stone that had moved in location and now associated with distension of the left upper pole collecting system. Admitted to hospitalist service and Urology was consulted.  Dr. Meeks (Urology) did not believe that urgent surgical intervention was needed, reccomended completion of prolonged abx course for complicated UTI then ESWL therapy of this stone will be likely advisable.        Discharge Follow  Up Recommendations for labs/diagnostics:  Pt to see Dr. Meeks (Urology) in 10-14 days or consideration of lithotripsy once prolonged abx course completed.     Day of Discharge     HPI:   Pain reasonably controlled with PO meds.  No new complaints. Tolerating PO.     ROS:  Otherwise ROS is negative except as mentioned in the HPI.    Vital Signs:   Temp:  [98.2 °F (36.8 °C)-98.6 °F (37 °C)] 98.4 °F (36.9 °C)  Heart Rate:  [80-95] 80  Resp:  [16-18] 16  BP: (104-120)/(59-75) 104/59     Physical Exam:  Constitutional: No acute distress, awake, alert, nontoxic, overweight body habitus  Respiratory: Clear to auscultation bilaterally, good effort, nonlabored respirations   Cardiovascular: RRR, no murmur  Gastrointestinal: Mild TTP LLQ and suprapubic, mild left CVAT  Psychiatric: Appropriate affect, good insight and judgement, cooperative      Pertinent  and/or Most Recent Results     Results from last 7 days   Lab Units 07/12/19  0402 07/11/19  0403 07/10/19  1730 07/09/19  1549   WBC 10*3/mm3 7.00 7.81 9.12 10.12   HEMOGLOBIN g/dL 10.8* 10.6* 12.3 12.1   HEMATOCRIT % 34.3 34.2 38.9 40.3   PLATELETS 10*3/mm3 366 386 461* 462*   SODIUM mmol/L 136 135* 138 134*   POTASSIUM mmol/L 3.7 3.9 3.9 3.7   CHLORIDE mmol/L 100 101 100 97*   CO2 mmol/L 26.0 23.0 23.0 21.2*   BUN mg/dL 8 10 9 12   CREATININE mg/dL 0.65 0.60 0.75 0.90   GLUCOSE mg/dL 104* 88 97 96   CALCIUM mg/dL 8.6 8.4* 9.4 9.4     Results from last 7 days   Lab Units 07/10/19  1730   BILIRUBIN mg/dL 0.4   ALK PHOS U/L 69   ALT (SGPT) U/L 35*   AST (SGOT) U/L 23           Invalid input(s): TG, LDLCALC, LDLREALC  Results from last 7 days   Lab Units 07/11/19  0403 07/09/19  1549   TSH mIU/mL 1.800 1.130     Brief Urine Lab Results  (Last result in the past 365 days)      Color   Clarity   Blood   Leuk Est   Nitrite   Protein   CREAT   Urine HCG        07/10/19 1852 Yellow Cloudy Negative Small (1+) Negative Negative         07/10/19 1852               Negative            Microbiology Results Abnormal     Procedure Component Value - Date/Time    Urine Culture - Urine, Urine, Catheter [[id number]]  (Normal) Collected:  07/10/19 1852    Lab Status:  Preliminary result Specimen:  Urine, Catheter Updated:  07/12/19 1106     Urine Culture No growth    Blood Culture - Blood, Arm, Left [[id number]] Collected:  07/10/19 2310    Lab Status:  Preliminary result Specimen:  Blood from Arm, Left Updated:  07/12/19 0100     Blood Culture No growth at 24 hours    Blood Culture - Blood, Arm, Left [[id number]] Collected:  07/10/19 2330    Lab Status:  Preliminary result Specimen:  Blood from Arm, Left Updated:  07/12/19 0100     Blood Culture No growth at 24 hours          Imaging Results (all)     Procedure Component Value Units Date/Time    CT Abdomen Pelvis Without Contrast [[id number]] Collected:  07/10/19 2132     Updated:  07/10/19 2221    Narrative:       INDICATION:   Increasing left flank pain for 2 weeks. Known left kidney stone diagnosed 1 week ago.    TECHNIQUE:   CT of the abdomen and pelvis without contrast. Coronal and sagittal reconstructions were obtained.  Radiation dose reduction techniques included automated exposure control or exposure modulation based on body size. Radiation audit for number of CT and  nuclear cardiology exams performed in the last year: 3.      COMPARISON:   CT abdomen pelvis from 10/17/2018. CTA chest from 6/29/2019.    FINDINGS:  Lung bases: Clear    Abdomen: Lack of IV and oral contrast media limits evaluation for pathology other than urinary tract calculus disease.    Unenhanced liver, spleen, gallbladder, pancreas and adrenal glands are unremarkable.    There is a large  calculus interpolar left kidney about 8 mm to 9 mm dimension. This is noted on the recent CT angiogram of the chest and is unchanged. There is fullness of the left upper pole collecting system by the renal pelvis is not distended..  There is a tiny nonobstructing calculus in the upper  pole left kidney. There is no hydroureter.On comparison to the CT abdomen pelvis from October 2018, the calculus at the mid pole left kidney is larger than it was previously located in the upper pole.  The fullness of the upper pole collecting system is new. The right kidney is unremarkable.    There is no evidence for abdominal aortic aneurysm.      Pelvis: Bladder is distended. No bladder calculus is seen. Uterus is prominent. Suspect the right adnexal cyst and a small amount of physiologic free fluid in the pelvis.    There is a moderate amount of colonic gas and stool with prominence of the transverse colon. There is no bowel obstruction. Visualized appendix is unremarkable. There is no free air or drainable fluid collection suspected.      Impression:         1. There is an 8 to 9 mm calculus at the midpole of left kidney. On comparison to the study from October 2018 this is increased in size and moved in location and is now associated with distention of the left upper pole collecting system. The pelvis is  not distended and there is no hydroureter. This upper pole collecting system distention could reflect some intrarenal obstruction related to the large calculus, possibly related to the patient's worsening left flank pain.  2. There is a moderate amount of colon gas and stool. This is associated with mild gaseous colonic distention best appreciated at the transverse colon. There is however no evidence for bowel obstruction.        Signer Name: Sally Wright MD   Signed: 7/10/2019 9:32 PM   Workstation Name: [email or web address]             Results for orders placed during the hospital encounter of 06/27/18   Duplex Venous Upper Extremity - Left    Narrative · Normal left upper extremity venous duplex scan.          Results for orders placed during the hospital encounter of 06/27/18   Duplex Venous Upper Extremity - Left    Narrative · Normal left upper extremity venous duplex scan.                Order Current Status     Blood Culture - Blood, Arm, Left Preliminary result    Blood Culture - Blood, Arm, Left Preliminary result    Urine Culture - Urine, Urine, Catheter Preliminary result        Discharge Details        Discharge Medications      New Medications      Instructions Start Date   cefuroxime 500 MG tablet  Commonly known as:  CEFTIN   500 mg, Oral, 2 Times Daily      HYDROcodone-acetaminophen 5-325 MG per tablet  Commonly known as:  NORCO   1 tablet, Oral, Every 6 Hours PRN         Continue These Medications      Instructions Start Date   ADVAIR DISKUS 500-50 MCG/DOSE DISKUS  Generic drug:  fluticasone-salmeterol   1 puff, Inhalation, 2 Times Daily - RT      amLODIPine 5 MG tablet  Commonly known as:  NORVASC   5 mg, Oral, Daily      azelastine 0.05 % ophthalmic solution  Commonly known as:  OPTIVAR   1 drop, Both Eyes, 2 Times Daily      clotrimazole-betamethasone 1-0.05 % cream  Commonly known as:  LOTRISONE   Topical, 2 Times Daily      ferrous sulfate 325 (65 FE) MG tablet   1 tablet, Oral, Daily With Breakfast, Resume Iron when antibiotics finished.      fluticasone 50 MCG/ACT nasal spray  Commonly known as:  FLONASE   2 sprays, Nasal, Daily      gabapentin 400 MG capsule  Commonly known as:  NEURONTIN   400 mg, Oral, 5 Times Daily PRN      lamoTRIgine 100 MG tablet  Commonly known as:  LaMICtal   100 mg, Oral, Daily      loratadine-pseudoephedrine 5-120 MG per 12 hr tablet  Commonly known as:  CLARITIN-D 12 HOUR   1 tablet, Oral, 2 Times Daily      omeprazole 40 MG capsule  Commonly known as:  priLOSEC   40 mg, Oral, 2 Times Daily, Take on an empty stomach and eat 30 minutes- 1 hr after eating.      Probiotic capsule   1 capsule, Oral, 2 Times Daily, May take any generic probiotic      SINGULAIR 10 MG tablet  Generic drug:  montelukast   10 mg, Oral, Nightly      SPIRIVA RESPIMAT 2.5 MCG/ACT aerosol solution inhaler  Generic drug:  tiotropium bromide monohydrate   2 puffs, Oral, Daily      VENTOLIN  (90 Base)  MCG/ACT inhaler  Generic drug:  albuterol sulfate HFA   1-2 puffs, Inhalation, As Needed, Every 4-6 hrs PRN      albuterol (2.5 MG/3ML) 0.083% nebulizer solution  Commonly known as:  PROVENTIL   2.5 mg, Nebulization, Every 12 Hours Scheduled      vitamin D3 5000 units capsule capsule   5,000 Units, Oral, Daily         Stop These Medications    sulfamethoxazole-trimethoprim 800-160 MG per tablet  Commonly known as:  BACTRIM DS,SEPTRA DS            Allergies   Allergen Reactions   • Naproxen Swelling         Discharge Disposition:  Home or Self Care    Discharge Diet:  Diet Order   Procedures   • Diet Regular       Discharge Activity:   Activity Instructions     Activity as Tolerated              CODE STATUS:    Code Status and Medical Interventions:   Ordered at: 07/10/19 8534     Level Of Support Discussed With:    Patient     Code Status:    CPR     Medical Interventions (Level of Support Prior to Arrest):    Full       Future Appointments   Date Time Provider Department Center   7/30/2019 11:30 AM Nelly Sotelo PA [facility] None   8/29/2019 10:45 AM Nelly Sotelo PA [facility] None   10/29/2019  2:30 PM Alf Lin MD [facility] None       Additional Instructions for the Follow-ups that You Need to Schedule     Discharge Follow-up with Specified Provider: Please make patient appt to see Dr. Meeks (Urology) in 10-14 days prior to her d/c from hospital today   As directed      To:  Please make patient appt to see Dr. Meeks (Urology) in 10-14 days prior to her d/c from hospital today                 Time Spent on Discharge:  40 minutes    Electronically signed by Myra Estrada MD, 07/12/19, 11:54 AM.

## 2019-07-12 NOTE — PAYOR COMM NOTE
"Mony Marie, RN Utilization Review [phone]  Fax # [phone]    Notification of admission and initial clinicals faxed on 19, auth still pending.      Please note discharge date.  Please call or fax with inpatient authorization.      Bernardo Willis (38 y.o. Female)     Date of Birth Social Security Number Address Home Phone MRN    1980  [address] [address] [phone] [id number]    Druze Marital Status          Yazidism Single       Admission Date Admission Type Admitting Provider Attending Provider Department, Room/Bed    7/10/19 Emergency Myra Estrada MD  [address], [address]/1    Discharge Date Discharge Disposition Discharge Destination        2019 Home or Self Care              Attending Provider:  (none)   Allergies:  Naproxen    Isolation:  None   Infection:  None   Code Status:  CPR    Ht:  149.9 cm (59\")   Wt:  81.8 kg (180 lb 6.4 oz)    Admission Cmt:  None   Principal Problem:  Ureteral obstruction, left [N13.5]                 Active Insurance as of 7/10/2019     Primary Coverage     Payor Plan Insurance Group Employer/Plan Group    ANTHEM MEDICAID ANTHEM MEDICAID [id number]     Payor Plan Address Payor Plan Phone Number Payor Plan Fax Number Effective Dates    [address] [address] [phone]  2014 - None Entered    [facility] [id number]       Subscriber Name Subscriber Birth Date Member ID       BERNARDO WILLIS 1980 [id number]                 Emergency Contacts      (Rel.) Home Phone Work Phone Mobile Phone    Keya Willis (Mother) -- -- [phone]               Discharge Summary      Myra Estrada MD at 2019 11:54 AM              [facility] Medicine Services  DISCHARGE SUMMARY    Patient Name: Bernardo Willis  : 1980  MRN: [id number]    Date of Admission: 7/10/2019  Date of Discharge:  19    Primary Care Physician: Nelly Sotelo PA    Consults     Date " and Time Order Name Status Description    7/11/2019 0122 Inpatient Urology Consult            Hospital Course     Presenting Problem:   Ureteral obstruction, left [N13.5]  Ureteral obstruction, left [N13.5]    Active Hospital Problems    Diagnosis  POA   • **Ureteral obstruction, left [N13.5]  Yes   • Seizure disorder (CMS/HCC) [G40.909]  Yes   • Iron deficiency anemia [D50.9]  Yes   • Hypertension [I10]  Yes   • Asthma [J45.909]  Yes   • GERD (gastroesophageal reflux disease) [K21.9]  Yes      Resolved Hospital Problems    Diagnosis Date Resolved POA   • Complicated urinary tract infection [N39.0] 07/12/2019 Yes          Hospital Course:  Bernardo Dodd is a 38 y.o. female with hx of asthma, GERD, HTN, and seizure disorder who presents due to complaints of left sided flank pain with associated nausea, subjective fever, and dysuria. Patient was recently admitted to [facility] 6/28-7/4 due to sepsis with pyelonephritis, found to have a 13 mm stone within the left kidney. Was discharged on Omnicef x 10 days and outpatient follow up with Urology. However symptoms returned 2 days ago, and despite being switched to Bactrim by her PCP on 7/9, she continued to have symptoms prompting ER evaluation on 7/10. Found to have ongoing UTI and CT A/P showed 8-9 mm left kidney stone that had moved in location and now associated with distension of the left upper pole collecting system. Admitted to hospitalist service and Urology was consulted.  Dr. Meeks (Urology) did not believe that urgent surgical intervention was needed, reccomended completion of prolonged abx course for complicated UTI then ESWL therapy of this stone will be likely advisable.        Discharge Follow Up Recommendations for labs/diagnostics:  Pt to see Dr. Meeks (Urology) in 10-14 days or consideration of lithotripsy once prolonged abx course completed.     Day of Discharge     HPI:   Pain reasonably controlled with PO meds.  No new complaints. Tolerating PO.      ROS:  Otherwise ROS is negative except as mentioned in the HPI.    Vital Signs:   Temp:  [98.2 °F (36.8 °C)-98.6 °F (37 °C)] 98.4 °F (36.9 °C)  Heart Rate:  [80-95] 80  Resp:  [16-18] 16  BP: (104-120)/(59-75) 104/59     Physical Exam:  Constitutional: No acute distress, awake, alert, nontoxic, overweight body habitus  Respiratory: Clear to auscultation bilaterally, good effort, nonlabored respirations   Cardiovascular: RRR, no murmur  Gastrointestinal: Mild TTP LLQ and suprapubic, mild left CVAT  Psychiatric: Appropriate affect, good insight and judgement, cooperative      Pertinent  and/or Most Recent Results     Results from last 7 days   Lab Units 07/12/19  0402 07/11/19  0403 07/10/19  1730 07/09/19  1549   WBC 10*3/mm3 7.00 7.81 9.12 10.12   HEMOGLOBIN g/dL 10.8* 10.6* 12.3 12.1   HEMATOCRIT % 34.3 34.2 38.9 40.3   PLATELETS 10*3/mm3 366 386 461* 462*   SODIUM mmol/L 136 135* 138 134*   POTASSIUM mmol/L 3.7 3.9 3.9 3.7   CHLORIDE mmol/L 100 101 100 97*   CO2 mmol/L 26.0 23.0 23.0 21.2*   BUN mg/dL 8 10 9 12   CREATININE mg/dL 0.65 0.60 0.75 0.90   GLUCOSE mg/dL 104* 88 97 96   CALCIUM mg/dL 8.6 8.4* 9.4 9.4     Results from last 7 days   Lab Units 07/10/19  1730   BILIRUBIN mg/dL 0.4   ALK PHOS U/L 69   ALT (SGPT) U/L 35*   AST (SGOT) U/L 23           Invalid input(s): TG, LDLCALC, LDLREALC  Results from last 7 days   Lab Units 07/11/19  0403 07/09/19  1549   TSH mIU/mL 1.800 1.130     Brief Urine Lab Results  (Last result in the past 365 days)      Color   Clarity   Blood   Leuk Est   Nitrite   Protein   CREAT   Urine HCG        07/10/19 1852 Yellow Cloudy Negative Small (1+) Negative Negative         07/10/19 1852               Negative           Microbiology Results Abnormal     Procedure Component Value - Date/Time    Urine Culture - Urine, Urine, Catheter [[id number]]  (Normal) Collected:  07/10/19 1852    Lab Status:  Preliminary result Specimen:  Urine, Catheter Updated:  07/12/19 1106     Urine  Culture No growth    Blood Culture - Blood, Arm, Left [[id number]] Collected:  07/10/19 2310    Lab Status:  Preliminary result Specimen:  Blood from Arm, Left Updated:  07/12/19 0100     Blood Culture No growth at 24 hours    Blood Culture - Blood, Arm, Left [[id number]] Collected:  07/10/19 2330    Lab Status:  Preliminary result Specimen:  Blood from Arm, Left Updated:  07/12/19 0100     Blood Culture No growth at 24 hours          Imaging Results (all)     Procedure Component Value Units Date/Time    CT Abdomen Pelvis Without Contrast [[id number]] Collected:  07/10/19 2132     Updated:  07/10/19 2221    Narrative:       INDICATION:   Increasing left flank pain for 2 weeks. Known left kidney stone diagnosed 1 week ago.    TECHNIQUE:   CT of the abdomen and pelvis without contrast. Coronal and sagittal reconstructions were obtained.  Radiation dose reduction techniques included automated exposure control or exposure modulation based on body size. Radiation audit for number of CT and  nuclear cardiology exams performed in the last year: 3.      COMPARISON:   CT abdomen pelvis from 10/17/2018. CTA chest from 6/29/2019.    FINDINGS:  Lung bases: Clear    Abdomen: Lack of IV and oral contrast media limits evaluation for pathology other than urinary tract calculus disease.    Unenhanced liver, spleen, gallbladder, pancreas and adrenal glands are unremarkable.    There is a large  calculus interpolar left kidney about 8 mm to 9 mm dimension. This is noted on the recent CT angiogram of the chest and is unchanged. There is fullness of the left upper pole collecting system by the renal pelvis is not distended..  There is a tiny nonobstructing calculus in the upper pole left kidney. There is no hydroureter.On comparison to the CT abdomen pelvis from October 2018, the calculus at the mid pole left kidney is larger than it was previously located in the upper pole.  The fullness of the upper pole collecting system is new. The  right kidney is unremarkable.    There is no evidence for abdominal aortic aneurysm.      Pelvis: Bladder is distended. No bladder calculus is seen. Uterus is prominent. Suspect the right adnexal cyst and a small amount of physiologic free fluid in the pelvis.    There is a moderate amount of colonic gas and stool with prominence of the transverse colon. There is no bowel obstruction. Visualized appendix is unremarkable. There is no free air or drainable fluid collection suspected.      Impression:         1. There is an 8 to 9 mm calculus at the midpole of left kidney. On comparison to the study from October 2018 this is increased in size and moved in location and is now associated with distention of the left upper pole collecting system. The pelvis is  not distended and there is no hydroureter. This upper pole collecting system distention could reflect some intrarenal obstruction related to the large calculus, possibly related to the patient's worsening left flank pain.  2. There is a moderate amount of colon gas and stool. This is associated with mild gaseous colonic distention best appreciated at the transverse colon. There is however no evidence for bowel obstruction.        Signer Name: Sally Wright MD   Signed: 7/10/2019 9:32 PM   Workstation Name: [email or web address]             Results for orders placed during the hospital encounter of 06/27/18   Duplex Venous Upper Extremity - Left    Narrative · Normal left upper extremity venous duplex scan.          Results for orders placed during the hospital encounter of 06/27/18   Duplex Venous Upper Extremity - Left    Narrative · Normal left upper extremity venous duplex scan.                Order Current Status    Blood Culture - Blood, Arm, Left Preliminary result    Blood Culture - Blood, Arm, Left Preliminary result    Urine Culture - Urine, Urine, Catheter Preliminary result        Discharge Details        Discharge Medications      New Medications      Instructions  Start Date   cefuroxime 500 MG tablet  Commonly known as:  CEFTIN   500 mg, Oral, 2 Times Daily      HYDROcodone-acetaminophen 5-325 MG per tablet  Commonly known as:  NORCO   1 tablet, Oral, Every 6 Hours PRN         Continue These Medications      Instructions Start Date   ADVAIR DISKUS 500-50 MCG/DOSE DISKUS  Generic drug:  fluticasone-salmeterol   1 puff, Inhalation, 2 Times Daily - RT      amLODIPine 5 MG tablet  Commonly known as:  NORVASC   5 mg, Oral, Daily      azelastine 0.05 % ophthalmic solution  Commonly known as:  OPTIVAR   1 drop, Both Eyes, 2 Times Daily      clotrimazole-betamethasone 1-0.05 % cream  Commonly known as:  LOTRISONE   Topical, 2 Times Daily      ferrous sulfate 325 (65 FE) MG tablet   1 tablet, Oral, Daily With Breakfast, Resume Iron when antibiotics finished.      fluticasone 50 MCG/ACT nasal spray  Commonly known as:  FLONASE   2 sprays, Nasal, Daily      gabapentin 400 MG capsule  Commonly known as:  NEURONTIN   400 mg, Oral, 5 Times Daily PRN      lamoTRIgine 100 MG tablet  Commonly known as:  LaMICtal   100 mg, Oral, Daily      loratadine-pseudoephedrine 5-120 MG per 12 hr tablet  Commonly known as:  CLARITIN-D 12 HOUR   1 tablet, Oral, 2 Times Daily      omeprazole 40 MG capsule  Commonly known as:  priLOSEC   40 mg, Oral, 2 Times Daily, Take on an empty stomach and eat 30 minutes- 1 hr after eating.      Probiotic capsule   1 capsule, Oral, 2 Times Daily, May take any generic probiotic      SINGULAIR 10 MG tablet  Generic drug:  montelukast   10 mg, Oral, Nightly      SPIRIVA RESPIMAT 2.5 MCG/ACT aerosol solution inhaler  Generic drug:  tiotropium bromide monohydrate   2 puffs, Oral, Daily      VENTOLIN  (90 Base) MCG/ACT inhaler  Generic drug:  albuterol sulfate HFA   1-2 puffs, Inhalation, As Needed, Every 4-6 hrs PRN      albuterol (2.5 MG/3ML) 0.083% nebulizer solution  Commonly known as:  PROVENTIL   2.5 mg, Nebulization, Every 12 Hours Scheduled      vitamin D3 5000  units capsule capsule   5,000 Units, Oral, Daily         Stop These Medications    sulfamethoxazole-trimethoprim 800-160 MG per tablet  Commonly known as:  BACTRIM DS,SEPTRA DS            Allergies   Allergen Reactions   • Naproxen Swelling         Discharge Disposition:  Home or Self Care    Discharge Diet:  Diet Order   Procedures   • Diet Regular       Discharge Activity:   Activity Instructions     Activity as Tolerated              CODE STATUS:    Code Status and Medical Interventions:   Ordered at: 07/10/19 2336     Level Of Support Discussed With:    Patient     Code Status:    CPR     Medical Interventions (Level of Support Prior to Arrest):    Full       Future Appointments   Date Time Provider Department Center   7/30/2019 11:30 AM Nelly Sotelo PA [facility] None   8/29/2019 10:45 AM Nelly Sotelo PA [facility] None   10/29/2019  2:30 PM Alf Lin MD [facility] None       Additional Instructions for the Follow-ups that You Need to Schedule     Discharge Follow-up with Specified Provider: Please make patient appt to see Dr. Meeks (Urology) in 10-14 days prior to her d/c from hospital today   As directed      To:  Please make patient appt to see Dr. Meeks (Urology) in 10-14 days prior to her d/c from hospital today                 Time Spent on Discharge:  40 minutes    Electronically signed by Shira Estrada MD, 07/12/19, 11:54 AM.      Electronically signed by Shira Estrada MD at 7/12/2019 11:58 AM       Discharge Order (From admission, onward)    Start     Ordered    07/12/19 1145  Discharge patient  Once     Expected Discharge Date:  07/12/19    Discharge Disposition:  Home or Self Care    Physician of Record for Attribution - Please select from Treatment Team:  SHIRA ESTRADA [[id number]]    Review needed by CMO to determine Physician of Record:  No       Question Answer Comment   Physician of Record for Attribution - Please select from Treatment Team SHIRA ESTRADA    Review needed by CMO to determine  Physician of Record No        07/12/19 1155

## 2019-07-12 NOTE — PLAN OF CARE
Problem: Patient Care Overview  Goal: Plan of Care Review  Outcome: Ongoing (interventions implemented as appropriate)   07/12/19 1686   Coping/Psychosocial   Plan of Care Reviewed With patient   Plan of Care Review   Progress improving   OTHER   Outcome Summary Pt being discharged, all instructions reviewed.     Goal: Individualization and Mutuality  Outcome: Ongoing (interventions implemented as appropriate)    Goal: Discharge Needs Assessment  Outcome: Ongoing (interventions implemented as appropriate)    Goal: Interprofessional Rounds/Family Conf  Outcome: Ongoing (interventions implemented as appropriate)

## 2019-07-13 ENCOUNTER — READMISSION MANAGEMENT (OUTPATIENT)
Dept: CALL CENTER | Facility: HOSPITAL | Age: 39
End: 2019-07-13

## 2019-07-13 NOTE — OUTREACH NOTE
Prep Survey      Responses   Facility patient discharged from?  [place]   Is patient eligible?  Yes   Discharge diagnosis  sepsis with pyelonephritis,  ureteral obstruction   Does the patient have one of the following disease processes/diagnoses(primary or secondary)?  Sepsis   Does the patient have Home health ordered?  No   Is there a DME ordered?  No   Prep survey completed?  Yes          Rosa Eaton RN

## 2019-07-15 ENCOUNTER — READMISSION MANAGEMENT (OUTPATIENT)
Dept: CALL CENTER | Facility: HOSPITAL | Age: 39
End: 2019-07-15

## 2019-07-15 NOTE — OUTREACH NOTE
Sepsis Week 1 Survey      Responses   Facility patient discharged from?  [place]   Does the patient have one of the following disease processes/diagnoses(primary or secondary)?  Sepsis   Is there a successful TCM telephone encounter documented?  No   Week 1 attempt successful?  Yes   Call start time  1512   Call end time  1525   Discharge diagnosis  sepsis with pyelonephritis,  ureteral obstruction   Is patient permission given to speak with other caregiver?  Yes   List who call center can speak with  mother   Meds reviewed with patient/caregiver?  Yes   Is the patient having any side effects they believe may be caused by any medication additions or changes?  No   Does the patient have all medications related to this admission filled (includes all antibiotics, inhalers, nebulizers,steroids,etc.)  Yes   Is the patient taking all medications as directed (includes completed medication regime)?  Yes   Does the patient have an appointment with their PCP within 7 days of discharge?  Yes   Has the patient kept scheduled appointments due by today?  N/A   Psychosocial issues?  No   Comments  No issue voiding, urine is not dark/rooney but yellow in color. Still having HA but she has completely cut off her caffeine.    Did the patient receive a copy of their discharge instructions?  Yes   Nursing interventions  Reviewed instructions with patient   What is the patient's perception of their health status since discharge?  Improving   Nursing interventions  Nurse provided patient education   Is the patient/caregiver able to teach back Sepsis?  S - Shivering,fever or very cold, S - Sleepy, difficult to arouse,confused, P - Pale or discolored skin   Is patient/caregiver able to teach back steps to recovery at home?  Set small, achievable goals for return to baseline health, Talk about feelings with family/friends, Learn about sepsis(sepsis.org)   Is the patient/caregiver able to teach back signs and symptoms of worsening  condition:  Fever, Hyperthermia, Rapid heart rate (>90)   Is the patient/caregiver able to teach back the hierarchy of who to call/visit for symptoms/problems? PCP, Specialist, Home health nurse, Urgent Care, ED, 911  Yes   Week 1 call completed?  Yes          Sarika Diggs RN

## 2019-07-16 LAB
BACTERIA SPEC AEROBE CULT: NORMAL
BACTERIA SPEC AEROBE CULT: NORMAL

## 2019-07-22 ENCOUNTER — READMISSION MANAGEMENT (OUTPATIENT)
Dept: CALL CENTER | Facility: HOSPITAL | Age: 39
End: 2019-07-22

## 2019-07-22 NOTE — OUTREACH NOTE
Sepsis Week 2 Survey      Responses   Facility patient discharged from?  [place]   Does the patient have one of the following disease processes/diagnoses(primary or secondary)?  Sepsis   Week 2 attempt successful?  No   Unsuccessful attempts  Attempt 1          Alexandria Walsh RN

## 2019-07-23 ENCOUNTER — READMISSION MANAGEMENT (OUTPATIENT)
Dept: CALL CENTER | Facility: HOSPITAL | Age: 39
End: 2019-07-23

## 2019-07-23 NOTE — OUTREACH NOTE
Sepsis Week 2 Survey      Responses   Facility patient discharged from?  [place]   Does the patient have one of the following disease processes/diagnoses(primary or secondary)?  Sepsis   Week 2 attempt successful?  Yes   Call start time  1724   Call end time  1728   Discharge diagnosis  sepsis with pyelonephritis,  ureteral obstruction   Person spoke with today (if not patient) and relationship  Keya / mother   Meds reviewed with patient/caregiver?  Yes   Is the patient having any side effects they believe may be caused by any medication additions or changes?  No   Does the patient have all medications related to this admission filled (includes all antibiotics, inhalers, nebulizers,steroids,etc.)  Yes   Is the patient taking all medications as directed (includes completed medication regime)?  Yes   Does the patient have a primary care provider?   Yes   Does the patient have an appointment with their PCP within 7 days of discharge?  Yes   Has the patient kept scheduled appointments due by today?  Yes   Has home health visited the patient within 72 hours of discharge?  N/A   Psychosocial issues?  No   Comments  motherKeya states pt having pain in general in lower abdomen and groin, states has kidney stones that will be removed when infection has resolved   Did the patient receive a copy of their discharge instructions?  Yes   Nursing interventions  Reviewed instructions with patient   What is the patient's perception of their health status since discharge?  Improving   Nursing interventions  Nurse provided patient education   Is the patient/caregiver able to teach back Sepsis?  S - Shivering,fever or very cold, E - Extreme pain or generalized discomfort (worst ever,especially abdomen), P - Pale or discolored skin, S - Sleepy, difficult to arouse,confused, I -   I feel like I might die-a feeling of hopelessness, S - Short of breath   Nursing interventions  Nurse provided reassurance to patient, Nurse provided  patient education   Is patient/caregiver able to teach back steps to recovery at home?  Set small, achievable goals for return to baseline health, Rest and regain strength   Is the patient/caregiver able to teach back signs and symptoms of worsening condition:  Fever, Rapid heart rate (>90), Altered mental status(confusion/coma), Hyperthermia, Shortness of breath/rapid respiratory rate, Edema   Week 2 call completed?  Yes          Perla Pate RN

## 2019-07-30 ENCOUNTER — OFFICE VISIT (OUTPATIENT)
Dept: INTERNAL MEDICINE | Facility: CLINIC | Age: 39
End: 2019-07-30

## 2019-07-30 VITALS
WEIGHT: 176.4 LBS | SYSTOLIC BLOOD PRESSURE: 110 MMHG | DIASTOLIC BLOOD PRESSURE: 70 MMHG | BODY MASS INDEX: 35.56 KG/M2 | OXYGEN SATURATION: 98 % | TEMPERATURE: 98.6 F | HEART RATE: 105 BPM | HEIGHT: 59 IN

## 2019-07-30 DIAGNOSIS — N13.5 URETERAL OBSTRUCTION, LEFT: ICD-10-CM

## 2019-07-30 DIAGNOSIS — J32.0 CHRONIC MAXILLARY SINUSITIS: ICD-10-CM

## 2019-07-30 DIAGNOSIS — J01.00 ACUTE NON-RECURRENT MAXILLARY SINUSITIS: Primary | ICD-10-CM

## 2019-07-30 PROCEDURE — 96372 THER/PROPH/DIAG INJ SC/IM: CPT | Performed by: PHYSICIAN ASSISTANT

## 2019-07-30 PROCEDURE — 99213 OFFICE O/P EST LOW 20 MIN: CPT | Performed by: PHYSICIAN ASSISTANT

## 2019-07-30 RX ORDER — METHYLPREDNISOLONE ACETATE 40 MG/ML
40 INJECTION, SUSPENSION INTRA-ARTICULAR; INTRALESIONAL; INTRAMUSCULAR; SOFT TISSUE ONCE
Status: COMPLETED | OUTPATIENT
Start: 2019-07-30 | End: 2019-07-30

## 2019-07-30 RX ORDER — AMITRIPTYLINE HYDROCHLORIDE 25 MG/1
TABLET, FILM COATED ORAL
COMMUNITY
Start: 2019-07-20 | End: 2019-12-13 | Stop reason: SDUPTHER

## 2019-07-30 RX ADMIN — METHYLPREDNISOLONE ACETATE 40 MG: 40 INJECTION, SUSPENSION INTRA-ARTICULAR; INTRALESIONAL; INTRAMUSCULAR; SOFT TISSUE at 14:11

## 2019-07-31 ENCOUNTER — READMISSION MANAGEMENT (OUTPATIENT)
Dept: CALL CENTER | Facility: HOSPITAL | Age: 39
End: 2019-07-31

## 2019-07-31 NOTE — OUTREACH NOTE
Sepsis Week 3 Survey      Responses   Facility patient discharged from?  [place]   Does the patient have one of the following disease processes/diagnoses(primary or secondary)?  Sepsis   Week 3 attempt successful?  Yes   Call start time  0924   Call end time  0927   Discharge diagnosis  sepsis with pyelonephritis,  ureteral obstruction   Meds reviewed with patient/caregiver?  Yes   Is the patient taking all medications as directed (includes completed medication regime)?  Yes   Has the patient kept scheduled appointments due by today?  Yes   Comments  Pt to have stone extraction on Friday   Psychosocial issues?  No   Comments  Still has some abdominal pain but will have stone removal on Friday   What is the patient's perception of their health status since discharge?  Same   Nursing interventions  Nurse provided patient education   Is the patient/caregiver able to teach back Sepsis?  S - Shivering,fever or very cold, E - Extreme pain or generalized discomfort (worst ever,especially abdomen), P - Pale or discolored skin, S - Sleepy, difficult to arouse,confused, I -   I feel like I might die-a feeling of hopelessness, S - Short of breath   Nursing interventions  Nurse provided reassurance to patient   Is patient/caregiver able to teach back steps to recovery at home?  Set small, achievable goals for return to baseline health   Is the patient/caregiver able to teach back signs and symptoms of worsening condition:  Fever, Rapid heart rate (>90), Altered mental status(confusion/coma), Hyperthermia, Shortness of breath/rapid respiratory rate, Edema   Is the patient/caregiver able to teach back the hierarchy of who to call/visit for symptoms/problems? PCP, Specialist, Home health nurse, Urgent Care, ED, 911  Yes   Additional teach back comments  Enc fluids and pt may ask for more pain meds after procedure.   Week 3 call completed?  Yes          Bisi Jimenez RN

## 2019-08-07 ENCOUNTER — READMISSION MANAGEMENT (OUTPATIENT)
Dept: CALL CENTER | Facility: HOSPITAL | Age: 39
End: 2019-08-07

## 2019-08-07 NOTE — OUTREACH NOTE
Sepsis Week 4 Survey      Responses   Facility patient discharged from?  [place]   Does the patient have one of the following disease processes/diagnoses(primary or secondary)?  Sepsis   Week 4 attempt successful?  Yes   Call start time  1706   Call end time  1710   Discharge diagnosis  sepsis with pyelonephritis,  ureteral obstruction   Meds reviewed with patient/caregiver?  Yes   Is the patient having any side effects they believe may be caused by any medication additions or changes?  No   Is the patient taking all medications as directed (includes completed medication regime)?  Yes   Has the patient kept scheduled appointments due by today?  Yes   Is the patient still receiving Home Health Services?  N/A   Psychosocial issues?  No   Comments  Just got her stent out. Received more pain meds and antispasmotics r/t her pain. Getting enough fluids. Spoke to her about pain meds.   What is the patient's perception of their health status since discharge?  Same   Nursing interventions  Nurse provided patient education   Is the patient/caregiver able to teach back Sepsis?  S - Shivering,fever or very cold, E - Extreme pain or generalized discomfort (worst ever,especially abdomen), P - Pale or discolored skin, S - Sleepy, difficult to arouse,confused, I -   I feel like I might die-a feeling of hopelessness, S - Short of breath   Nursing interventions  Nurse provided reassurance to patient   Is patient/caregiver able to teach back steps to recovery at home?  Set small, achievable goals for return to baseline health   Is the patient/caregiver able to teach back signs and symptoms of worsening condition:  Fever, Rapid heart rate (>90), Altered mental status(confusion/coma), Hyperthermia, Shortness of breath/rapid respiratory rate, Edema   Is the patient/caregiver able to teach back the hierarchy of who to call/visit for symptoms/problems? PCP, Specialist, Home health nurse, Urgent Care, ED, 911  Yes   Week 4 call completed?   Yes   Would the patient like one additional call?  Yes   Wrap up additional comments  Would benefit from another follow up call.          Madi Martinez RN

## 2019-08-15 ENCOUNTER — READMISSION MANAGEMENT (OUTPATIENT)
Dept: CALL CENTER | Facility: HOSPITAL | Age: 39
End: 2019-08-15

## 2019-08-15 NOTE — OUTREACH NOTE
Sepsis Week 5 Survey      Responses   Facility patient discharged from?  [facility]   Does the patient have one of the following disease processes/diagnoses(primary or secondary)?  Sepsis   Week 5 attempt successful?  No          Jannet Mcfarland RN

## 2019-09-06 ENCOUNTER — OFFICE VISIT (OUTPATIENT)
Dept: INTERNAL MEDICINE | Facility: CLINIC | Age: 39
End: 2019-09-06

## 2019-09-06 VITALS
HEIGHT: 59 IN | RESPIRATION RATE: 16 BRPM | DIASTOLIC BLOOD PRESSURE: 72 MMHG | OXYGEN SATURATION: 97 % | TEMPERATURE: 98.7 F | BODY MASS INDEX: 33.38 KG/M2 | HEART RATE: 85 BPM | SYSTOLIC BLOOD PRESSURE: 116 MMHG | WEIGHT: 165.6 LBS

## 2019-09-06 DIAGNOSIS — J02.9 SORE THROAT: Primary | ICD-10-CM

## 2019-09-06 DIAGNOSIS — J30.9 ALLERGIC RHINITIS, UNSPECIFIED SEASONALITY, UNSPECIFIED TRIGGER: ICD-10-CM

## 2019-09-06 LAB
EXPIRATION DATE: NORMAL
INTERNAL CONTROL: NORMAL
Lab: NORMAL
S PYO AG THROAT QL: NEGATIVE

## 2019-09-06 PROCEDURE — 87880 STREP A ASSAY W/OPTIC: CPT | Performed by: PHYSICIAN ASSISTANT

## 2019-09-06 PROCEDURE — 99213 OFFICE O/P EST LOW 20 MIN: CPT | Performed by: PHYSICIAN ASSISTANT

## 2019-09-06 RX ORDER — PREDNISONE 20 MG/1
20 TABLET ORAL DAILY
Qty: 5 TABLET | Refills: 0 | Status: SHIPPED | OUTPATIENT
Start: 2019-09-06 | End: 2019-09-28

## 2019-09-06 NOTE — PROGRESS NOTES
Chief Complaint   Patient presents with   • Sore Throat   • Nasal Congestion       Subjective   Bernardo Dodd is a 38 y.o. female.       History of Present Illness     Pt was in her allergist's office last week, was given steroid shot and prednisone taper. Thinks she was also given an abx. Had breathing treatment. Waiting to get approved for an asthma medication, a shot through her allergist.    Now she is having worsening draining in the back of her throat. Gets dizzy at times and feels tight in her chest. Does breathing treatments 3 times a day. Feels like her symptoms worsened since she started        Current Outpatient Medications:   •  ADVAIR DISKUS 500-50 MCG/DOSE DISKUS, Inhale 1 puff 2 (Two) Times a Day., Disp: , Rfl:   •  albuterol (PROVENTIL) (2.5 MG/3ML) 0.083% nebulizer solution, Take 2.5 mg by nebulization Every 12 (Twelve) Hours., Disp: , Rfl:   •  albuterol (VENTOLIN HFA) 108 (90 BASE) MCG/ACT inhaler, Inhale 1-2 puffs as needed. Every 4-6 hrs PRN, Disp: , Rfl:   •  amitriptyline (ELAVIL) 25 MG tablet, Take 3-4 tablets PO Q HS, Disp: , Rfl:   •  amLODIPine (NORVASC) 5 MG tablet, Take 1 tablet by mouth Daily., Disp: 30 tablet, Rfl: 5  •  azelastine (OPTIVAR) 0.05 % ophthalmic solution, Administer 1 drop to both eyes 2 (Two) Times a Day., Disp: 6 mL, Rfl: 12  •  Cholecalciferol (VITAMIN D3) 5000 units capsule capsule, Take 1 capsule by mouth Daily., Disp: 30 capsule, Rfl: 3  •  clotrimazole-betamethasone (LOTRISONE) 1-0.05 % cream, Apply  topically to the appropriate area as directed 2 (Two) Times a Day., Disp: 15 g, Rfl: 0  •  ferrous sulfate 325 (65 FE) MG tablet, Take 1 tablet by mouth Daily With Breakfast. Resume Iron when antibiotics finished., Disp: 30 tablet, Rfl: 5  •  fluticasone (FLONASE) 50 MCG/ACT nasal spray, 2 sprays into the nostril(s) as directed by provider Daily., Disp: 16 g, Rfl: 5  •  gabapentin (NEURONTIN) 400 MG capsule, Take 400 mg by mouth 5 (Five) Times a Day As Needed.,  "Disp: , Rfl:   •  lamoTRIgine (LaMICtal) 100 MG tablet, Take 100 mg by mouth Daily., Disp: , Rfl:   •  loratadine-pseudoephedrine (CLARITIN-D 12 HOUR) 5-120 MG per 12 hr tablet, Take 1 tablet by mouth 2 (Two) Times a Day., Disp: 60 tablet, Rfl: 5  •  montelukast (SINGULAIR) 10 MG tablet, Take 10 mg by mouth every night., Disp: , Rfl:   •  omeprazole (priLOSEC) 40 MG capsule, Take 1 capsule by mouth 2 (Two) Times a Day. Take on an empty stomach and eat 30 minutes- 1 hr after eating., Disp: 90 capsule, Rfl: 5  •  SPIRIVA RESPIMAT 2.5 MCG/ACT aerosol solution, Take 2 puffs by mouth Daily., Disp: , Rfl:   •  predniSONE (DELTASONE) 20 MG tablet, Take 1 tablet by mouth Daily., Disp: 5 tablet, Rfl: 0     PMFSH  The following portions of the patient's history were reviewed and updated as appropriate: allergies, current medications, past family history, past medical history, past social history, past surgical history and problem list.    Review of Systems   Constitutional: Positive for fatigue. Negative for activity change and unexpected weight change.   HENT: Positive for congestion, postnasal drip and sore throat. Negative for ear pain.    Eyes: Negative for pain and discharge.   Respiratory: Positive for cough. Negative for chest tightness, shortness of breath and wheezing.    Cardiovascular: Negative for chest pain and palpitations.   Gastrointestinal: Negative for abdominal pain, diarrhea and vomiting.   Endocrine: Negative.    Genitourinary: Negative.    Musculoskeletal: Negative for joint swelling.   Skin: Negative for color change, rash and wound.   Allergic/Immunologic: Negative.    Neurological: Negative for seizures and syncope.   Psychiatric/Behavioral: Negative.        Objective   /72   Pulse 85   Temp 98.7 °F (37.1 °C) (Oral)   Resp 16   Ht 149.9 cm (59\")   Wt 75.1 kg (165 lb 9.6 oz)   SpO2 97%   BMI 33.45 kg/m²     Physical Exam   Constitutional: She is oriented to person, place, and time. She " appears well-developed and well-nourished.  Non-toxic appearance. No distress.   HENT:   Head: Normocephalic and atraumatic. Hair is normal.   Right Ear: External ear normal. No drainage, swelling or tenderness. Tympanic membrane is retracted.   Left Ear: External ear normal. No drainage, swelling or tenderness. Tympanic membrane is retracted.   Nose: Mucosal edema present. No epistaxis.   Mouth/Throat: Uvula is midline and mucous membranes are normal. No oral lesions. No uvula swelling. Posterior oropharyngeal erythema present. No oropharyngeal exudate.   Eyes: Conjunctivae and EOM are normal. Pupils are equal, round, and reactive to light. Right eye exhibits no discharge. Left eye exhibits no discharge. No scleral icterus.   Neck: Normal range of motion. Neck supple.   Cardiovascular: Normal rate, regular rhythm and normal heart sounds. Exam reveals no gallop.   No murmur heard.  Pulmonary/Chest: Breath sounds normal. No stridor. No respiratory distress. She has no wheezes. She has no rales. She exhibits no tenderness.   Abdominal: Soft. There is no tenderness.   Lymphadenopathy:     She has cervical adenopathy.   Neurological: She is alert and oriented to person, place, and time. She exhibits normal muscle tone.   Skin: Skin is warm and dry. No rash noted. She is not diaphoretic.   Psychiatric: She has a normal mood and affect. Her behavior is normal. Judgment and thought content normal.   Nursing note and vitals reviewed.           ASSESSMENT/PLAN    Problem List Items Addressed This Visit        Respiratory    Allergic rhinitis     Short course of low dose prednisone, 20 mg, for symptom relief. Follow up with allergist if symptoms are not improving.         Relevant Medications    predniSONE (DELTASONE) 20 MG tablet      Other Visit Diagnoses     Sore throat    -  Primary    Relevant Orders    POC Rapid Strep A (Completed)               Return for Next scheduled follow up.

## 2019-09-06 NOTE — ASSESSMENT & PLAN NOTE
Short course of low dose prednisone, 20 mg, for symptom relief. Follow up with allergist if symptoms are not improving.

## 2019-09-13 ENCOUNTER — OFFICE VISIT (OUTPATIENT)
Dept: INTERNAL MEDICINE | Facility: CLINIC | Age: 39
End: 2019-09-13

## 2019-09-13 VITALS
TEMPERATURE: 98.3 F | HEART RATE: 84 BPM | HEIGHT: 59 IN | BODY MASS INDEX: 34.27 KG/M2 | OXYGEN SATURATION: 93 % | WEIGHT: 170 LBS | SYSTOLIC BLOOD PRESSURE: 128 MMHG | DIASTOLIC BLOOD PRESSURE: 64 MMHG

## 2019-09-13 DIAGNOSIS — Z00.00 HEALTH CARE MAINTENANCE: Primary | ICD-10-CM

## 2019-09-13 DIAGNOSIS — E55.9 VITAMIN D DEFICIENCY: ICD-10-CM

## 2019-09-13 DIAGNOSIS — E87.6 HYPOKALEMIA: ICD-10-CM

## 2019-09-13 DIAGNOSIS — Z12.39 BREAST CANCER SCREENING: ICD-10-CM

## 2019-09-13 DIAGNOSIS — J30.9 ALLERGIC RHINITIS, UNSPECIFIED SEASONALITY, UNSPECIFIED TRIGGER: ICD-10-CM

## 2019-09-13 PROCEDURE — 90471 IMMUNIZATION ADMIN: CPT | Performed by: PHYSICIAN ASSISTANT

## 2019-09-13 PROCEDURE — 99396 PREV VISIT EST AGE 40-64: CPT | Performed by: PHYSICIAN ASSISTANT

## 2019-09-13 PROCEDURE — 90674 CCIIV4 VAC NO PRSV 0.5 ML IM: CPT | Performed by: PHYSICIAN ASSISTANT

## 2019-09-13 NOTE — PROGRESS NOTES
"Chief Complaint   Patient presents with   • Annual Exam       Subjective   Bernardo Dodd is a 39 y.o. female.       History of Present Illness     The patient is being seen for a health maintenance evaluation.  The last health maintenance was 1 year(s) ago.    Social History  Bernardo  does not smoke cigarettes.   She drinks occasional alcohol.  She does use illicit drugs. She is working to taper of marijuana, down to once a month.    General History  Bernardo  does have regular dental visits.  She does complain of vision problems. Last eye exam was 2018.  Immunizations are not up to date. The patient needs the following immunizations: needs influenza    Lifestyle  Bernardo  consumes in general, a \"healthy\" diet  .  She exercises intermittently.    Reproductive Health  Bernardo  is premenopausal.  She reports periods are regular every 28-30 days.  She is sexually active. Her contraceptive plan is condoms, bilateral tubal ligation.    Screening  Last pap was 2018 by me. History of abnormal pap smear or family history of gyn cancer: none  Last mammogram was years ago. Personal or family history of abnormal mammograms or breast cancer: none  Last colonoscopy was never. Family history of colon cancer: none  Last DEXA was never.                      Current Outpatient Medications:   •  ADVAIR DISKUS 500-50 MCG/DOSE DISKUS, Inhale 1 puff 2 (Two) Times a Day., Disp: , Rfl:   •  albuterol (PROVENTIL) (2.5 MG/3ML) 0.083% nebulizer solution, Take 2.5 mg by nebulization Every 12 (Twelve) Hours., Disp: , Rfl:   •  albuterol (VENTOLIN HFA) 108 (90 BASE) MCG/ACT inhaler, Inhale 1-2 puffs as needed. Every 4-6 hrs PRN, Disp: , Rfl:   •  amitriptyline (ELAVIL) 25 MG tablet, Take 3-4 tablets PO Q HS, Disp: , Rfl:   •  amLODIPine (NORVASC) 5 MG tablet, Take 1 tablet by mouth Daily., Disp: 30 tablet, Rfl: 5  •  azelastine (OPTIVAR) 0.05 % ophthalmic solution, Administer 1 drop to both eyes 2 (Two) Times a Day., Disp: 6 mL, Rfl: " 12  •  Cholecalciferol (VITAMIN D3) 5000 units capsule capsule, Take 1 capsule by mouth Daily., Disp: 30 capsule, Rfl: 3  •  clotrimazole-betamethasone (LOTRISONE) 1-0.05 % cream, Apply  topically to the appropriate area as directed 2 (Two) Times a Day., Disp: 15 g, Rfl: 0  •  ferrous sulfate 325 (65 FE) MG tablet, Take 1 tablet by mouth Daily With Breakfast. Resume Iron when antibiotics finished., Disp: 30 tablet, Rfl: 5  •  fluticasone (FLONASE) 50 MCG/ACT nasal spray, 2 sprays into the nostril(s) as directed by provider Daily., Disp: 16 g, Rfl: 5  •  gabapentin (NEURONTIN) 400 MG capsule, Take 400 mg by mouth 5 (Five) Times a Day As Needed., Disp: , Rfl:   •  lamoTRIgine (LaMICtal) 100 MG tablet, Take 100 mg by mouth Daily., Disp: , Rfl:   •  loratadine-pseudoephedrine (CLARITIN-D 12 HOUR) 5-120 MG per 12 hr tablet, Take 1 tablet by mouth 2 (Two) Times a Day., Disp: 60 tablet, Rfl: 5  •  montelukast (SINGULAIR) 10 MG tablet, Take 10 mg by mouth every night., Disp: , Rfl:   •  omeprazole (priLOSEC) 40 MG capsule, Take 1 capsule by mouth 2 (Two) Times a Day. Take on an empty stomach and eat 30 minutes- 1 hr after eating., Disp: 90 capsule, Rfl: 5  •  predniSONE (DELTASONE) 20 MG tablet, Take 1 tablet by mouth Daily., Disp: 5 tablet, Rfl: 0  •  SPIRIVA RESPIMAT 2.5 MCG/ACT aerosol solution, Take 2 puffs by mouth Daily., Disp: , Rfl:      PMFSH  The following portions of the patient's history were reviewed and updated as appropriate: allergies, current medications, past family history, past medical history, past social history, past surgical history and problem list.    Review of Systems   Constitutional: Negative for appetite change, fever and unexpected weight change.   HENT: Negative.  Negative for ear pain, facial swelling and sore throat.    Eyes: Negative for pain and visual disturbance.   Respiratory: Negative for chest tightness, shortness of breath and wheezing.         No nipple discharge or breast mass  "  Cardiovascular: Negative for chest pain and palpitations.   Gastrointestinal: Negative for abdominal pain and blood in stool.   Endocrine: Negative.    Genitourinary: Negative for difficulty urinating, dyspareunia, dysuria, frequency, hematuria, menstrual problem, pelvic pain, vaginal discharge and vaginal pain.   Musculoskeletal: Negative for joint swelling.   Skin: Negative for color change, rash and wound.   Allergic/Immunologic: Negative.    Neurological: Negative for dizziness, tremors, seizures and syncope.   Hematological: Negative for adenopathy.   Psychiatric/Behavioral: Negative.    All other systems reviewed and are negative.      Objective   /64   Pulse 84   Temp 98.3 °F (36.8 °C) (Oral)   Ht 149.9 cm (59.02\")   Wt 77.1 kg (170 lb)   LMP 09/04/2019   SpO2 93%   BMI 34.32 kg/m²     Physical Exam   Constitutional: She is oriented to person, place, and time. She appears well-developed and well-nourished. No distress.   HENT:   Head: Normocephalic and atraumatic. Hair is normal.   Right Ear: Hearing, tympanic membrane, external ear and ear canal normal. No drainage. No decreased hearing is noted.   Left Ear: Hearing, tympanic membrane, external ear and ear canal normal. No decreased hearing is noted.   Nose: No nasal deformity.   Mouth/Throat: Oropharynx is clear and moist.   Eyes: Conjunctivae, EOM and lids are normal. Pupils are equal, round, and reactive to light. Lids are everted and swept, no foreign bodies found. Right eye exhibits no discharge. Left eye exhibits no discharge.   Fundoscopic exam:       The right eye shows red reflex.        The left eye shows red reflex.   Neck: Normal range of motion. Neck supple. No JVD present. No tracheal deviation present. No thyromegaly present.   Cardiovascular: Normal rate, regular rhythm, normal heart sounds, intact distal pulses and normal pulses. Exam reveals no gallop and no friction rub.   No murmur heard.  Pulmonary/Chest: Effort normal and " breath sounds normal. No respiratory distress. She has no wheezes. She has no rales. She exhibits no tenderness. Right breast exhibits no inverted nipple, no mass, no nipple discharge, no skin change and no tenderness. Left breast exhibits no inverted nipple, no mass, no nipple discharge, no skin change and no tenderness.   Abdominal: Soft. Bowel sounds are normal. She exhibits no distension and no mass. There is no tenderness. There is no rebound and no guarding. No hernia.   Musculoskeletal: Normal range of motion. She exhibits no edema, tenderness or deformity.   Lymphadenopathy:     She has no cervical adenopathy.        Right: No inguinal adenopathy present.        Left: No inguinal adenopathy present.   Neurological: She is alert and oriented to person, place, and time. She has normal reflexes. She displays normal reflexes. No cranial nerve deficit. She exhibits normal muscle tone. Coordination normal.   Skin: Skin is warm and dry. No rash noted. She is not diaphoretic. No erythema.   Psychiatric: She has a normal mood and affect. Her behavior is normal. Judgment and thought content normal.   Nursing note and vitals reviewed.      ASSESSMENT/PLAN    Problem List Items Addressed This Visit        Respiratory    Allergic rhinitis    Relevant Medications    loratadine-pseudoephedrine (CLARITIN-D 12 HOUR) 5-120 MG per 12 hr tablet       Other    Health care maintenance - Primary     Immunizations: influenza vaccine done today  Eye exam: done 3/2017  Pap Smear: done 2018, repeat 2021  Mammogram: ordered  Dexa: due postmenopausal  Colonoscopy: due age 48  Labs: fasting labs ordered    Counseled patient regarding multimodal approach with healthy nutrition, healthy sleep, regular physical activity, social activities, counseling, safety measures and medications.                  Relevant Medications    loratadine-pseudoephedrine (CLARITIN-D 12 HOUR) 5-120 MG per 12 hr tablet    RESOLVED: Hypokalemia      Other Visit  Diagnoses     Breast cancer screening        Relevant Orders    Mammo Screening Digital Tomosynthesis Bilateral With CAD    Vitamin D deficiency        Check vitamin D level. Further recs based on results.               Return in about 1 year (around 9/13/2020) for Annual.

## 2019-09-16 NOTE — ASSESSMENT & PLAN NOTE
Immunizations: influenza vaccine done today  Eye exam: done 3/2017  Pap Smear: done 2018, repeat 2021  Mammogram: ordered  Dexa: due postmenopausal  Colonoscopy: due age 48  Labs: fasting labs ordered    Counseled patient regarding multimodal approach with healthy nutrition, healthy sleep, regular physical activity, social activities, counseling, safety measures and medications.

## 2019-09-28 ENCOUNTER — OFFICE VISIT (OUTPATIENT)
Dept: INTERNAL MEDICINE | Facility: CLINIC | Age: 39
End: 2019-09-28

## 2019-09-28 VITALS
OXYGEN SATURATION: 96 % | DIASTOLIC BLOOD PRESSURE: 76 MMHG | HEIGHT: 59 IN | TEMPERATURE: 98.6 F | BODY MASS INDEX: 35.08 KG/M2 | WEIGHT: 174 LBS | HEART RATE: 106 BPM | SYSTOLIC BLOOD PRESSURE: 118 MMHG

## 2019-09-28 DIAGNOSIS — J45.901 EXACERBATION OF ASTHMA, UNSPECIFIED ASTHMA SEVERITY, UNSPECIFIED WHETHER PERSISTENT: Primary | ICD-10-CM

## 2019-09-28 PROCEDURE — 99214 OFFICE O/P EST MOD 30 MIN: CPT | Performed by: NURSE PRACTITIONER

## 2019-09-28 RX ORDER — DEXTROMETHORPHAN HYDROBROMIDE AND PROMETHAZINE HYDROCHLORIDE 15; 6.25 MG/5ML; MG/5ML
5 SYRUP ORAL 4 TIMES DAILY PRN
Qty: 240 ML | Refills: 0 | Status: SHIPPED | OUTPATIENT
Start: 2019-09-28 | End: 2019-11-07

## 2019-09-28 RX ORDER — PREDNISONE 10 MG/1
30 TABLET ORAL DAILY
Qty: 12 TABLET | Refills: 0 | Status: SHIPPED | OUTPATIENT
Start: 2019-09-28 | End: 2019-10-02

## 2019-09-28 RX ORDER — ALBUTEROL SULFATE 2.5 MG/3ML
2.5 SOLUTION RESPIRATORY (INHALATION) EVERY 12 HOURS SCHEDULED
Qty: 30 VIAL | Refills: 1 | Status: SHIPPED | OUTPATIENT
Start: 2019-09-28 | End: 2022-03-10

## 2019-10-29 ENCOUNTER — OFFICE VISIT (OUTPATIENT)
Dept: INTERNAL MEDICINE | Facility: CLINIC | Age: 39
End: 2019-10-29

## 2019-10-29 VITALS
BODY MASS INDEX: 35.28 KG/M2 | TEMPERATURE: 98.6 F | DIASTOLIC BLOOD PRESSURE: 80 MMHG | OXYGEN SATURATION: 99 % | HEART RATE: 111 BPM | WEIGHT: 175 LBS | SYSTOLIC BLOOD PRESSURE: 140 MMHG | HEIGHT: 59 IN
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
## 2019-10-29 DIAGNOSIS — J06.9 UPPER RESPIRATORY TRACT INFECTION, UNSPECIFIED TYPE: ICD-10-CM

## 2019-10-29 LAB
EXPIRATION DATE: NORMAL
INTERNAL CONTROL: NORMAL
Lab: NORMAL
S PYO AG THROAT QL: NEGATIVE

## 2019-10-29 PROCEDURE — 99214 OFFICE O/P EST MOD 30 MIN: CPT | Performed by: NURSE PRACTITIONER

## 2019-10-29 PROCEDURE — 87186 SC STD MICRODIL/AGAR DIL: CPT | Performed by: NURSE PRACTITIONER

## 2019-10-29 PROCEDURE — 87880 STREP A ASSAY W/OPTIC: CPT | Performed by: NURSE PRACTITIONER

## 2019-10-29 PROCEDURE — 87070 CULTURE OTHR SPECIMN AEROBIC: CPT | Performed by: NURSE PRACTITIONER

## 2019-10-29 PROCEDURE — 87205 SMEAR GRAM STAIN: CPT | Performed by: NURSE PRACTITIONER

## 2019-10-29 RX ORDER — AMOXICILLIN AND CLAVULANATE POTASSIUM 875; 125 MG/1; MG/1
1 TABLET, FILM COATED ORAL EVERY 12 HOURS SCHEDULED
Qty: 14 TABLET | Refills: 0 | Status: SHIPPED | OUTPATIENT
Start: 2019-10-29 | End: 2019-11-05

## 2019-10-29 NOTE — PROGRESS NOTES
Chief Complaint   Patient presents with   • Animal Bite   • URI       History of Present Illness  39 y.o.female presents for animal bite and URI.  Had made appt for uri sx but her cat bit her this morning as well.  Bite is right index finger with erythema and pain small amt blood drainage. No fever. Last tetanus shot 2018.  Her cat that she has had for about 3 weeks shots current. Applied peroxide to site at home before coming in.  Hurts.    Onset about 3 days with C/o Sore throat, nasal congestion, swollen neck lymph nodes, sinus pressure multi people in household with strep. No better with cold medicine.      Review of Systems   Constitutional: Negative for chills and fever.   HENT: Positive for congestion, postnasal drip, rhinorrhea, sinus pressure and sore throat. Negative for ear pain and sneezing.    Respiratory: Positive for cough. Negative for shortness of breath.    Musculoskeletal: Positive for arthralgias. Negative for myalgias.   Skin:        bite   Neurological: Negative for headache.         [facility]  The following portions of the patient's history were reviewed and updated as appropriate: allergies, current medications, past family history, past medical history, past social history, past surgical history and problem list.       Past Medical History:   Diagnosis Date   • Allergic rhinitis    • Anemia    • Arthritis    • Asthma    • Depression    • FHx: migraine headaches    • GERD (gastroesophageal reflux disease)    • Heart murmur    • Herpes simplex    • History of chest x-ray 11/01/2015    no active disease   • History of echocardiogram 11/01/2015    ejection fraction of greater than 65%, mitral and pulmonic regurgitation an physiological tricuspid regurgitation.   • History of PFTs 12/22/2015    spirometry data acceptable and reproducible; pt given 4 puffs of Ventolin; pt gave good effort; no obstruction; no Bd response; MVV reduced    • History of PFTs 11/02/2015    pt gave best effort; duoneb given  prior and post study; moderate nonspecific proportional reduction of FEV1 and FVC with preserved ratio; FEV1 moderately reduced; cannot rule out restriction   • Hypertension    • Migraine    • Ovarian cyst    • Seizures (CMS/HCC)    • Thigh shingles       Past Surgical History:   Procedure Laterality Date   • BILATERAL BREAST REDUCTION     • BREAST SURGERY      breast reduction   •  SECTION     • CYSTOSCOPY     • LAPAROSCOPIC TUBAL LIGATION     • ORIF ANKLE FRACTURE Right    • TENSION FREE VAGINAL TAPING WITH MINI ARC SLING      Dr Doyle avery       Allergies   Allergen Reactions   • Naproxen Swelling      Family History   Problem Relation Age of Onset   • Pancreatic cancer Maternal Grandmother    • Heart failure Paternal Grandmother    • MARCIE disease Paternal Aunt    • Arthritis Mother    • Cancer Mother    • Arthritis Father    • Diabetes Neg Hx    • Heart attack Neg Hx    • Hypertension Neg Hx    • Hyperlipidemia Neg Hx    • Mental illness Neg Hx    • Obesity Neg Hx    • Stroke Neg Hx             Current Outpatient Medications:   •  ADVAIR DISKUS 500-50 MCG/DOSE DISKUS, Inhale 1 puff 2 (Two) Times a Day., Disp: , Rfl:   •  albuterol (PROVENTIL) (2.5 MG/3ML) 0.083% nebulizer solution, Take 2.5 mg by nebulization Every 12 (Twelve) Hours., Disp: 30 vial, Rfl: 1  •  amitriptyline (ELAVIL) 25 MG tablet, Take 3-4 tablets PO Q HS, Disp: , Rfl:   •  amLODIPine (NORVASC) 5 MG tablet, Take 1 tablet by mouth Daily., Disp: 30 tablet, Rfl: 5  •  azelastine (OPTIVAR) 0.05 % ophthalmic solution, Administer 1 drop to both eyes 2 (Two) Times a Day., Disp: 6 mL, Rfl: 12  •  Cholecalciferol (VITAMIN D3) 5000 units capsule capsule, Take 1 capsule by mouth Daily., Disp: 30 capsule, Rfl: 3  •  clotrimazole-betamethasone (LOTRISONE) 1-0.05 % cream, Apply  topically to the appropriate area as directed 2 (Two) Times a Day., Disp: 15 g, Rfl: 0  •  ferrous sulfate 325 (65 FE) MG tablet, Take 1 tablet by mouth Daily With  "Breakfast. Resume Iron when antibiotics finished., Disp: 30 tablet, Rfl: 5  •  fluticasone (FLONASE) 50 MCG/ACT nasal spray, 2 sprays into the nostril(s) as directed by provider Daily., Disp: 16 g, Rfl: 5  •  gabapentin (NEURONTIN) 400 MG capsule, Take 400 mg by mouth 5 (Five) Times a Day As Needed., Disp: , Rfl:   •  lamoTRIgine (LaMICtal) 100 MG tablet, Take 100 mg by mouth Daily., Disp: , Rfl:   •  loratadine-pseudoephedrine (CLARITIN-D 12 HOUR) 5-120 MG per 12 hr tablet, Take 1 tablet by mouth 2 (Two) Times a Day., Disp: 60 tablet, Rfl: 5  •  montelukast (SINGULAIR) 10 MG tablet, Take 10 mg by mouth every night., Disp: , Rfl:   •  omeprazole (priLOSEC) 40 MG capsule, Take 1 capsule by mouth 2 (Two) Times a Day. Take on an empty stomach and eat 30 minutes- 1 hr after eating., Disp: 90 capsule, Rfl: 5  •  promethazine-dextromethorphan (PROMETHAZINE-DM) 6.25-15 MG/5ML syrup, Take 5 mL by mouth 4 (Four) Times a Day As Needed for Cough., Disp: 240 mL, Rfl: 0  •  SPIRIVA RESPIMAT 2.5 MCG/ACT aerosol solution, Take 2 puffs by mouth Daily., Disp: , Rfl:     VITALS:  /80   Pulse 111   Temp 98.6 °F (37 °C)   Ht 149.9 cm (59\")   Wt 79.4 kg (175 lb)   SpO2 99%   BMI 35.35 kg/m²     Physical Exam   Constitutional: She appears well-developed and well-nourished. No distress.   HENT:   Head: Normocephalic.   Right Ear: Tympanic membrane, external ear and ear canal normal. Tympanic membrane is not erythematous.   Left Ear: Tympanic membrane, external ear and ear canal normal. Tympanic membrane is not erythematous.   Nose: Mucosal edema, rhinorrhea and congestion present. Right sinus exhibits no maxillary sinus tenderness and no frontal sinus tenderness. Left sinus exhibits no maxillary sinus tenderness and no frontal sinus tenderness.   Mouth/Throat: Mucous membranes are normal. Posterior oropharyngeal erythema present. No tonsillar exudate.   Posterior cobblestoning   Eyes: Conjunctivae are normal. Right eye " exhibits no discharge. Left eye exhibits no discharge.   Neck: Normal range of motion. Neck supple.   Cardiovascular: Normal rate, regular rhythm and normal heart sounds.   Pulmonary/Chest: Effort normal and breath sounds normal. No respiratory distress. She has no rhonchi.   Lymphadenopathy:        Head (right side): No submental, no submandibular and no tonsillar adenopathy present.        Head (left side): No submental, no submandibular and no tonsillar adenopathy present.     She has no cervical adenopathy.   Neurological: She is alert.   Skin: Skin is warm and dry.   Right index finger with bite single penetrating lesion medial side between DIP and PIP joint with scab small amt bloody drainage mild surrounding erythema and second lesion on dorsal side right index finger linear fashion non penetrating. Mild swelling of area with decreased ROM of PIP joint.   Psychiatric: She has a normal mood and affect.   Vitals reviewed.      LABS  Results for orders placed or performed in visit on 10/29/19   POCT rapid strep A   Result Value Ref Range    Rapid Strep A Screen Negative Negative, VALID, INVALID, Not Performed    Internal Control Passed Passed    Lot Number [id number]     Expiration Date 10-        ASSESSMENT/PLAN  Bernardo was seen today for animal bite and uri.    Diagnoses and all orders for this visit:    Cat bite, initial encounter  -     mupirocin (BACTROBAN) 2 % ointment; Apply  topically to the appropriate area as directed 3 (Three) Times a Day.  -     amoxicillin-clavulanate (AUGMENTIN) 875-125 MG per tablet; Take 1 tablet by mouth Every 12 (Twelve) Hours for 7 days.  -     Wound Culture - Wound, Finger    Explained importance of treatment as about 80% of cat bites become infected.  Topical abx ointment as above with nonadherent dressing. I did apply abx ointment and dress wound in office today. She had just cleaned with peroxide.  If worsening of symptoms or no improvement in symptoms or fever >  101 patient should contact our office for further evaluation treatment or seek emergency care.    Upper respiratory tract infection, unspecified type  -     POCT rapid strep A  Negative strep. Likely viral URI however the augmentin for cat bite would also cover bacterial URI.  Can try oral antihistamine and nasal steroid spray to help uri sx.  Warm salt water gargles.    I discussed the patients findings and my recommendations with patient.  Patient was encouraged to keep me informed of any acute changes, lack of improvement, or any new concerning symptoms.    Patient voiced understanding of all instructions and denied further questions.      FOLLOW-UP  Return if symptoms worsen or fail to improve.    Electronically signed by:    SHANE Mueller  10/29/2019

## 2019-11-03 LAB
BACTERIA SPEC AEROBE CULT: ABNORMAL
BACTERIA SPEC AEROBE CULT: ABNORMAL
GRAM STN SPEC: ABNORMAL

## 2019-11-04 ENCOUNTER — TELEPHONE (OUTPATIENT)
Dept: INTERNAL MEDICINE | Facility: CLINIC | Age: 39
End: 2019-11-04

## 2019-11-04 NOTE — TELEPHONE ENCOUNTER
----- Message from SHANE Fish sent at 11/4/2019  9:10 AM EST -----  Let pt know wound culture just normal cat bite bacteria.  Is her finger doing better? If not I need to see her

## 2019-11-07 ENCOUNTER — TELEPHONE (OUTPATIENT)
Dept: INTERNAL MEDICINE | Facility: CLINIC | Age: 39
End: 2019-11-07

## 2019-11-07 ENCOUNTER — OFFICE VISIT (OUTPATIENT)
Dept: INTERNAL MEDICINE | Facility: CLINIC | Age: 39
End: 2019-11-07

## 2019-11-07 VITALS
OXYGEN SATURATION: 99 % | BODY MASS INDEX: 36.03 KG/M2 | SYSTOLIC BLOOD PRESSURE: 110 MMHG | HEART RATE: 99 BPM | WEIGHT: 178.4 LBS | DIASTOLIC BLOOD PRESSURE: 70 MMHG
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
## 2019-11-07 DIAGNOSIS — J30.9 CHRONIC ALLERGIC RHINITIS: ICD-10-CM

## 2019-11-07 PROCEDURE — 99213 OFFICE O/P EST LOW 20 MIN: CPT | Performed by: PHYSICIAN ASSISTANT

## 2019-11-07 RX ORDER — AMLODIPINE BESYLATE 5 MG/1
5 TABLET ORAL DAILY
Qty: 30 TABLET | Refills: 5 | Status: SHIPPED | OUTPATIENT
Start: 2019-11-07 | End: 2019-12-12 | Stop reason: SDUPTHER

## 2019-11-07 RX ORDER — FLUTICASONE PROPIONATE 50 MCG
2 SPRAY, SUSPENSION (ML) NASAL DAILY
Qty: 16 G | Refills: 5 | Status: SHIPPED | OUTPATIENT
Start: 2019-11-07 | End: 2019-12-12 | Stop reason: SDUPTHER

## 2019-11-07 NOTE — PROGRESS NOTES
Chief Complaint   Patient presents with   • Finger red, swollen     Acute       Subjective   Tyrshawn Dodd is a 39 y.o. female.       History of Present Illness     Pt was bit by cat she was taking care over a week ago. Was seen and treated with Augmentin. She has completed the augmentin and her bite wounds have improved. No remaining swelling or pain or redness.        Current Outpatient Medications:   •  ADVAIR DISKUS 500-50 MCG/DOSE DISKUS, Inhale 1 puff 2 (Two) Times a Day., Disp: , Rfl:   •  albuterol (PROVENTIL) (2.5 MG/3ML) 0.083% nebulizer solution, Take 2.5 mg by nebulization Every 12 (Twelve) Hours., Disp: 30 vial, Rfl: 1  •  amitriptyline (ELAVIL) 25 MG tablet, Take 3-4 tablets PO Q HS, Disp: , Rfl:   •  amLODIPine (NORVASC) 5 MG tablet, Take 1 tablet by mouth Daily., Disp: 30 tablet, Rfl: 5  •  azelastine (OPTIVAR) 0.05 % ophthalmic solution, Administer 1 drop to both eyes 2 (Two) Times a Day., Disp: 6 mL, Rfl: 12  •  Cholecalciferol (VITAMIN D3) 5000 units capsule capsule, Take 1 capsule by mouth Daily., Disp: 30 capsule, Rfl: 3  •  ferrous sulfate 325 (65 FE) MG tablet, Take 1 tablet by mouth Daily With Breakfast. Resume Iron when antibiotics finished., Disp: 30 tablet, Rfl: 5  •  fluticasone (FLONASE) 50 MCG/ACT nasal spray, 2 sprays into the nostril(s) as directed by provider Daily., Disp: 16 g, Rfl: 5  •  gabapentin (NEURONTIN) 400 MG capsule, Take 400 mg by mouth 5 (Five) Times a Day As Needed., Disp: , Rfl:   •  lamoTRIgine (LaMICtal) 100 MG tablet, Take 100 mg by mouth Daily., Disp: , Rfl:   •  loratadine-pseudoephedrine (CLARITIN-D 12 HOUR) 5-120 MG per 12 hr tablet, Take 1 tablet by mouth 2 (Two) Times a Day., Disp: 60 tablet, Rfl: 5  •  montelukast (SINGULAIR) 10 MG tablet, Take 10 mg by mouth every night., Disp: , Rfl:   •  omeprazole (priLOSEC) 40 MG capsule, Take 1 capsule by mouth 2 (Two) Times a Day. Take on an empty stomach and eat 30 minutes- 1 hr after eating., Disp: 90 capsule,  Rfl: 5  •  SPIRIVA RESPIMAT 2.5 MCG/ACT aerosol solution, Take 2 puffs by mouth Daily., Disp: , Rfl:      PMFSH  The following portions of the patient's history were reviewed and updated as appropriate: allergies, current medications, past family history, past medical history, past social history, past surgical history and problem list.    Review of Systems   Constitutional: Negative for activity change, appetite change, fatigue, fever and unexpected weight change.   HENT: Negative for facial swelling, mouth sores and trouble swallowing.    Eyes: Negative for pain, discharge, itching and visual disturbance.   Respiratory: Negative for chest tightness, shortness of breath and wheezing.    Cardiovascular: Negative for chest pain and palpitations.   Gastrointestinal: Negative for abdominal pain, diarrhea, nausea and vomiting.   Endocrine: Negative.    Genitourinary: Negative.    Musculoskeletal: Negative for joint swelling.   Skin: Positive for wound.   Allergic/Immunologic: Negative.    Neurological: Negative for seizures and syncope.   Hematological: Does not bruise/bleed easily.   Psychiatric/Behavioral: Negative.        Objective   /70   Pulse 99   Wt 80.9 kg (178 lb 6.4 oz)   SpO2 99%   BMI 36.03 kg/m²     Physical Exam   Constitutional: She appears well-developed and well-nourished.   HENT:   Head: Normocephalic.   Right Ear: Hearing, tympanic membrane, external ear and ear canal normal.   Left Ear: Hearing, tympanic membrane, external ear and ear canal normal.   Nose: Nose normal.   Mouth/Throat: Oropharynx is clear and moist.   Eyes: Conjunctivae are normal. Pupils are equal, round, and reactive to light.   Neck: Normal range of motion.   Cardiovascular: Normal rate, regular rhythm and normal heart sounds.   Pulmonary/Chest: Effort normal and breath sounds normal. She has no decreased breath sounds. She has no wheezes. She has no rhonchi. She has no rales.   Musculoskeletal: Normal range of motion.    Neurological: She is alert.   Skin: Skin is warm and dry.   Right index finger with no erythema, warmth or edema; bite is healing well, minimal scabbing   Psychiatric: She has a normal mood and affect. Her behavior is normal.   Nursing note and vitals reviewed.      Results for orders placed or performed in visit on 10/29/19   Wound Culture - Wound, Finger   Result Value Ref Range    Wound Culture Rare Neisseria species (A)     Wound Culture Rare Normal Skin Farzana     Gram Stain No WBCs or organisms seen    POCT rapid strep A   Result Value Ref Range    Rapid Strep A Screen Negative Negative, VALID, INVALID, Not Performed    Internal Control Passed Passed    Lot Number [id number]     Expiration Date 10-         ASSESSMENT/PLAN    Problem List Items Addressed This Visit     None      Visit Diagnoses     Cat bite, subsequent encounter    -  Primary    Healing without complication. Complete augmentin as directed.    Chronic allergic rhinitis        Relevant Medications    fluticasone (FLONASE) 50 MCG/ACT nasal spray               Return for Next scheduled follow up.

## 2019-11-11 ENCOUNTER — OFFICE VISIT (OUTPATIENT)
Dept: GASTROENTEROLOGY | Facility: CLINIC | Age: 39
End: 2019-11-11

## 2019-11-11 VITALS
OXYGEN SATURATION: 98 % | BODY MASS INDEX: 35.95 KG/M2 | TEMPERATURE: 97.5 F | RESPIRATION RATE: 18 BRPM | DIASTOLIC BLOOD PRESSURE: 78 MMHG | SYSTOLIC BLOOD PRESSURE: 122 MMHG | HEART RATE: 106 BPM | WEIGHT: 178 LBS

## 2019-11-11 DIAGNOSIS — K21.9 GASTROESOPHAGEAL REFLUX DISEASE WITHOUT ESOPHAGITIS: Primary | ICD-10-CM

## 2019-11-11 PROCEDURE — 99214 OFFICE O/P EST MOD 30 MIN: CPT | Performed by: INTERNAL MEDICINE

## 2019-11-11 NOTE — PROGRESS NOTES
PCP: Nelly Sotelo PA    Chief Complaint   Patient presents with   • GERD 6 MONTH F/U       History of Present Illness:   Bernardo Dodd is a 39 y.o. female who presents to GI clinic as a follow up for gerd and abdominal pain.  She has had intemittent substernal burning worse with eating that does not radiate. Discomfort may last minutes or hours but is improved on prilosec bid.    Past Medical History:   Diagnosis Date   • Allergic rhinitis    • Anemia    • Arthritis    • Asthma    • Depression    • FHx: migraine headaches    • GERD (gastroesophageal reflux disease)    • Heart murmur    • Herpes simplex    • History of chest x-ray 2015    no active disease   • History of echocardiogram 2015    ejection fraction of greater than 65%, mitral and pulmonic regurgitation an physiological tricuspid regurgitation.   • History of PFTs 2015    spirometry data acceptable and reproducible; pt given 4 puffs of Ventolin; pt gave good effort; no obstruction; no Bd response; MVV reduced    • History of PFTs 2015    pt gave best effort; duoneb given prior and post study; moderate nonspecific proportional reduction of FEV1 and FVC with preserved ratio; FEV1 moderately reduced; cannot rule out restriction   • Hypertension    • Migraine    • Ovarian cyst    • Seizures (CMS/HCC)    • Thigh shingles        Past Surgical History:   Procedure Laterality Date   • BILATERAL BREAST REDUCTION     • BREAST SURGERY      breast reduction   •  SECTION     • CYSTOSCOPY     • LAPAROSCOPIC TUBAL LIGATION     • ORIF ANKLE FRACTURE Right    • TENSION FREE VAGINAL TAPING WITH MINI ARC SLING      Dr Doyle avery          Current Outpatient Medications:   •  ADVAIR DISKUS 500-50 MCG/DOSE DISKUS, Inhale 1 puff 2 (Two) Times a Day., Disp: , Rfl:   •  albuterol (PROVENTIL) (2.5 MG/3ML) 0.083% nebulizer solution, Take 2.5 mg by nebulization Every 12 (Twelve) Hours., Disp: 30 vial, Rfl: 1  •  amitriptyline (ELAVIL) 25  MG tablet, Take 3-4 tablets PO Q HS, Disp: , Rfl:   •  amLODIPine (NORVASC) 5 MG tablet, Take 1 tablet by mouth Daily., Disp: 30 tablet, Rfl: 5  •  azelastine (OPTIVAR) 0.05 % ophthalmic solution, Administer 1 drop to both eyes 2 (Two) Times a Day., Disp: 6 mL, Rfl: 12  •  Cholecalciferol (VITAMIN D3) 5000 units capsule capsule, Take 1 capsule by mouth Daily., Disp: 30 capsule, Rfl: 3  •  ferrous sulfate 325 (65 FE) MG tablet, Take 1 tablet by mouth Daily With Breakfast. Resume Iron when antibiotics finished., Disp: 30 tablet, Rfl: 5  •  fluticasone (FLONASE) 50 MCG/ACT nasal spray, 2 sprays into the nostril(s) as directed by provider Daily., Disp: 16 g, Rfl: 5  •  gabapentin (NEURONTIN) 400 MG capsule, Take 400 mg by mouth 5 (Five) Times a Day As Needed., Disp: , Rfl:   •  lamoTRIgine (LaMICtal) 100 MG tablet, Take 100 mg by mouth Daily., Disp: , Rfl:   •  loratadine-pseudoephedrine (CLARITIN-D 12 HOUR) 5-120 MG per 12 hr tablet, Take 1 tablet by mouth 2 (Two) Times a Day., Disp: 60 tablet, Rfl: 5  •  montelukast (SINGULAIR) 10 MG tablet, Take 10 mg by mouth every night., Disp: , Rfl:   •  omeprazole (priLOSEC) 40 MG capsule, Take 1 capsule by mouth 2 (Two) Times a Day. Take on an empty stomach and eat 30 minutes- 1 hr after eating., Disp: 90 capsule, Rfl: 5  •  SPIRIVA RESPIMAT 2.5 MCG/ACT aerosol solution, Take 2 puffs by mouth Daily., Disp: , Rfl:     Allergies   Allergen Reactions   • Naproxen Swelling       Family History   Problem Relation Age of Onset   • Pancreatic cancer Maternal Grandmother    • Heart failure Paternal Grandmother    • MARCIE disease Paternal Aunt    • Arthritis Mother    • Cancer Mother    • Arthritis Father    • Diabetes Neg Hx    • Heart attack Neg Hx    • Hypertension Neg Hx    • Hyperlipidemia Neg Hx    • Mental illness Neg Hx    • Obesity Neg Hx    • Stroke Neg Hx        Social History     Socioeconomic History   • Marital status: Single     Spouse name: Not on file   • Number of  children: Not on file   • Years of education: Not on file   • Highest education level: Not on file   Tobacco Use   • Smoking status: Former Smoker     Packs/day: 1.00     Years: 15.00     Pack years: 15.00     Types: Cigarettes     Last attempt to quit: 2015     Years since quittin.8   • Smokeless tobacco: Never Used   Substance and Sexual Activity   • Alcohol use: No     Comment: socially   • Drug use: Yes     Types: Marijuana     Comment: Once a month    • Sexual activity: Defer   Social History Narrative    Caffeine intake: 16 oz per day        Review of Systems   Constitutional: Negative.    HENT: Negative.    Eyes: Negative.    Respiratory: Negative.    Cardiovascular: Negative.    Gastrointestinal: Negative.    Endocrine: Negative.    Genitourinary: Negative.    Musculoskeletal: Negative.    Skin: Negative.    Allergic/Immunologic: Negative.    Neurological: Negative.    Hematological: Negative.    Psychiatric/Behavioral: Negative.          There were no vitals filed for this visit.    Physical Exam  General Appearance:  Vitals as above. no acute distress  Head/face:  Normocephalic, atraumatic  Eyes:   EOMI, Exophtlalmos, no conjunctivitis or icterus   Nose/Sinuses:  Nares patent bilaterally without discharge or lesions  Mouth/Throat:  Posterior pharynx is pink without drainage or exudates;  dentition is in good condition and repair  Neck:  trachea is midline, no thyromegaly  Neurologic:  Alert; no focal deficits; age appropriate behavior and speech  Psychiatric: mood and affect are congruent  Skin: no rash or cyanosis.  Abdomen: obese and soft/nt      Assessment/Plan  1.) Chronic abdominal pain  2.) GERD  Workup: EGD with erosive gastropathy    Plan:  Increase ppi to 40 mg po bid   Discussed wt loss plan   The risk of PPI was discussed including the low but possible risk of kidney, bone, infection, cognitive, and electrolyte abnormalities. The benefit of PPI was discussed including quality of life.   We determined periodic risk/benefit assessment and continued prescription was in the patient's best interest.     rtc 6 months        Alf Lin MD  11/11/2019

## 2019-11-18 ENCOUNTER — APPOINTMENT (OUTPATIENT)
Dept: CT IMAGING | Facility: HOSPITAL | Age: 39
End: 2019-11-18

## 2019-11-18 ENCOUNTER — HOSPITAL ENCOUNTER (EMERGENCY)
Facility: HOSPITAL | Age: 39
Discharge: HOME | End: 2019-11-18
Attending: EMERGENCY MEDICINE | Admitting: EMERGENCY MEDICINE

## 2019-11-18 ENCOUNTER — APPOINTMENT (OUTPATIENT)
Dept: ULTRASOUND IMAGING | Facility: HOSPITAL | Age: 39
End: 2019-11-18

## 2019-11-18 VITALS
HEART RATE: 74 BPM | SYSTOLIC BLOOD PRESSURE: 118 MMHG | OXYGEN SATURATION: 95 % | DIASTOLIC BLOOD PRESSURE: 88 MMHG | WEIGHT: 178 LBS | BODY MASS INDEX: 35.88 KG/M2 | HEIGHT: 59 IN | TEMPERATURE: 98.1 F | RESPIRATION RATE: 16 BRPM

## 2019-11-18 DIAGNOSIS — N39.0 URINARY TRACT INFECTION WITHOUT HEMATURIA, SITE UNSPECIFIED: Primary | ICD-10-CM

## 2019-11-18 DIAGNOSIS — R10.31 RLQ ABDOMINAL PAIN: ICD-10-CM

## 2019-11-18 LAB
ALBUMIN SERPL-MCNC: 4.3 G/DL (ref 3.5–5.2)
ALBUMIN/GLOB SERPL: 1.1 G/DL
ALP SERPL-CCNC: 81 U/L (ref 39–117)
ALT SERPL W P-5'-P-CCNC: 6 U/L (ref 1–33)
ANION GAP SERPL CALCULATED.3IONS-SCNC: 11 MMOL/L (ref 5–15)
AST SERPL-CCNC: 16 U/L (ref 1–32)
B-HCG UR QL: NEGATIVE
BACTERIA UR QL AUTO: ABNORMAL /HPF
BASOPHILS # BLD AUTO: 0.04 10*3/MM3 (ref 0–0.2)
BASOPHILS NFR BLD AUTO: 0.4 % (ref 0–1.5)
BILIRUB SERPL-MCNC: 0.4 MG/DL (ref 0.2–1.2)
BILIRUB UR QL STRIP: ABNORMAL
BUN BLD-MCNC: 8 MG/DL (ref 6–20)
BUN/CREAT SERPL: 10 (ref 7–25)
CALCIUM SPEC-SCNC: 9.6 MG/DL (ref 8.6–10.5)
CHLORIDE SERPL-SCNC: 103 MMOL/L (ref 98–107)
CLARITY UR: ABNORMAL
CO2 SERPL-SCNC: 28 MMOL/L (ref 22–29)
COLOR UR: ABNORMAL
CREAT BLD-MCNC: 0.8 MG/DL (ref 0.57–1)
DEPRECATED RDW RBC AUTO: 45.7 FL (ref 37–54)
EOSINOPHIL # BLD AUTO: 0.16 10*3/MM3 (ref 0–0.4)
EOSINOPHIL NFR BLD AUTO: 1.7 % (ref 0.3–6.2)
ERYTHROCYTE [DISTWIDTH] IN BLOOD BY AUTOMATED COUNT: 14.6 % (ref 12.3–15.4)
GFR SERPL CREATININE-BSD FRML MDRD: 96 ML/MIN/1.73
GLOBULIN UR ELPH-MCNC: 3.8 GM/DL
GLUCOSE BLD-MCNC: 107 MG/DL (ref 65–99)
GLUCOSE UR STRIP-MCNC: NEGATIVE MG/DL
HCT VFR BLD AUTO: 42.6 % (ref 34–46.6)
HGB BLD-MCNC: 13.4 G/DL (ref 12–15.9)
HGB UR QL STRIP.AUTO: ABNORMAL
HYALINE CASTS UR QL AUTO: ABNORMAL /LPF
IMM GRANULOCYTES # BLD AUTO: 0.03 10*3/MM3 (ref 0–0.05)
IMM GRANULOCYTES NFR BLD AUTO: 0.3 % (ref 0–0.5)
INTERNAL NEGATIVE CONTROL: NEGATIVE
INTERNAL POSITIVE CONTROL: POSITIVE
KETONES UR QL STRIP: NEGATIVE
LEUKOCYTE ESTERASE UR QL STRIP.AUTO: ABNORMAL
LIPASE SERPL-CCNC: 44 U/L (ref 13–60)
LYMPHOCYTES # BLD AUTO: 2.73 10*3/MM3 (ref 0.7–3.1)
LYMPHOCYTES NFR BLD AUTO: 29 % (ref 19.6–45.3)
Lab: NORMAL
MCH RBC QN AUTO: 26.9 PG (ref 26.6–33)
MCHC RBC AUTO-ENTMCNC: 31.5 G/DL (ref 31.5–35.7)
MCV RBC AUTO: 85.5 FL (ref 79–97)
MONOCYTES # BLD AUTO: 0.59 10*3/MM3 (ref 0.1–0.9)
MONOCYTES NFR BLD AUTO: 6.3 % (ref 5–12)
NEUTROPHILS # BLD AUTO: 5.87 10*3/MM3 (ref 1.7–7)
NEUTROPHILS NFR BLD AUTO: 62.3 % (ref 42.7–76)
NITRITE UR QL STRIP: POSITIVE
NRBC BLD AUTO-RTO: 0 /100 WBC (ref 0–0.2)
PH UR STRIP.AUTO: <=5 [PH] (ref 5–8)
PLATELET # BLD AUTO: 341 10*3/MM3 (ref 140–450)
PMV BLD AUTO: 9.8 FL (ref 6–12)
POTASSIUM BLD-SCNC: 4.1 MMOL/L (ref 3.5–5.2)
PROT SERPL-MCNC: 8.1 G/DL (ref 6–8.5)
PROT UR QL STRIP: ABNORMAL
RBC # BLD AUTO: 4.98 10*6/MM3 (ref 3.77–5.28)
RBC # UR: ABNORMAL /HPF
REF LAB TEST METHOD: ABNORMAL
SODIUM BLD-SCNC: 142 MMOL/L (ref 136–145)
SP GR UR STRIP: 1.03 (ref 1–1.03)
SQUAMOUS #/AREA URNS HPF: ABNORMAL /HPF
UROBILINOGEN UR QL STRIP: ABNORMAL
WBC NRBC COR # BLD: 9.42 10*3/MM3 (ref 3.4–10.8)
WBC UR QL AUTO: ABNORMAL /HPF

## 2019-11-18 PROCEDURE — 25010000002 CEFTRIAXONE PER 250 MG: Performed by: PHYSICIAN ASSISTANT

## 2019-11-18 PROCEDURE — 74177 CT ABD & PELVIS W/CONTRAST: CPT

## 2019-11-18 PROCEDURE — 25010000002 ONDANSETRON PER 1 MG: Performed by: PHYSICIAN ASSISTANT

## 2019-11-18 PROCEDURE — 99283 EMERGENCY DEPT VISIT LOW MDM: CPT

## 2019-11-18 PROCEDURE — 76830 TRANSVAGINAL US NON-OB: CPT

## 2019-11-18 PROCEDURE — 25010000002 IOPAMIDOL 61 % SOLUTION: Performed by: EMERGENCY MEDICINE

## 2019-11-18 PROCEDURE — 96365 THER/PROPH/DIAG IV INF INIT: CPT

## 2019-11-18 PROCEDURE — 93976 VASCULAR STUDY: CPT

## 2019-11-18 PROCEDURE — 96375 TX/PRO/DX INJ NEW DRUG ADDON: CPT

## 2019-11-18 PROCEDURE — 81025 URINE PREGNANCY TEST: CPT | Performed by: PHYSICIAN ASSISTANT

## 2019-11-18 PROCEDURE — 81001 URINALYSIS AUTO W/SCOPE: CPT | Performed by: PHYSICIAN ASSISTANT

## 2019-11-18 PROCEDURE — 85025 COMPLETE CBC W/AUTO DIFF WBC: CPT | Performed by: PHYSICIAN ASSISTANT

## 2019-11-18 PROCEDURE — 83690 ASSAY OF LIPASE: CPT | Performed by: PHYSICIAN ASSISTANT

## 2019-11-18 PROCEDURE — 80053 COMPREHEN METABOLIC PANEL: CPT | Performed by: PHYSICIAN ASSISTANT

## 2019-11-18 RX ORDER — ONDANSETRON 2 MG/ML
4 INJECTION INTRAMUSCULAR; INTRAVENOUS ONCE
Status: COMPLETED | OUTPATIENT
Start: 2019-11-18 | End: 2019-11-18

## 2019-11-18 RX ORDER — CEFDINIR 300 MG/1
300 CAPSULE ORAL 2 TIMES DAILY
Qty: 14 CAPSULE | Refills: 0 | Status: SHIPPED | OUTPATIENT
Start: 2019-11-18 | End: 2019-12-13

## 2019-11-18 RX ORDER — SODIUM CHLORIDE 0.9 % (FLUSH) 0.9 %
10 SYRINGE (ML) INJECTION AS NEEDED
Status: DISCONTINUED | OUTPATIENT
Start: 2019-11-18 | End: 2019-11-18 | Stop reason: HOSPADM

## 2019-11-18 RX ORDER — ONDANSETRON 4 MG/1
4 TABLET, FILM COATED ORAL EVERY 8 HOURS PRN
Qty: 20 TABLET | Refills: 0 | Status: SHIPPED | OUTPATIENT
Start: 2019-11-18 | End: 2019-12-13

## 2019-11-18 RX ORDER — TRAMADOL HYDROCHLORIDE 50 MG/1
50 TABLET ORAL EVERY 6 HOURS PRN
Qty: 15 TABLET | Refills: 0 | Status: SHIPPED | OUTPATIENT
Start: 2019-11-18 | End: 2019-12-13

## 2019-11-18 RX ADMIN — SODIUM CHLORIDE 1000 ML: 9 INJECTION, SOLUTION INTRAVENOUS at 08:52

## 2019-11-18 RX ADMIN — CEFTRIAXONE 1 G: 1 INJECTION, POWDER, FOR SOLUTION INTRAMUSCULAR; INTRAVENOUS at 11:27

## 2019-11-18 RX ADMIN — IOPAMIDOL 95 ML: 612 INJECTION, SOLUTION INTRAVENOUS at 11:50

## 2019-11-18 RX ADMIN — ONDANSETRON 4 MG: 2 INJECTION INTRAMUSCULAR; INTRAVENOUS at 08:52

## 2019-11-18 NOTE — DISCHARGE INSTRUCTIONS
Increase your fluid intake.  Recheck with your primary care provider in 2 days.  Off work for the next 3 days.  Return to the emergency department immediately if any change or worsening of symptoms

## 2019-11-18 NOTE — ED PROVIDER NOTES
Subjective   Pleasant 39-year-old female presents emergency department complaining of intermittent back pain for the past several months, worsening over the past week, with occasional chills and fatigue, but no documented fevers.  She has had some dysuria and burning with urination.  She was diagnosed with a kidney stone last week by another facility.  She was seen here in June 2019 for a kidney stone as well.  She denies of chest congestion sputum hemoptysis vomiting diarrhea melena or hematochezia.  Urine has been discolored and slightly dark with foul odor.  No flank pain or back pain.  No vaginal discharge or bleeding.    Past medical history reviewed.    Surgical history reviewed.  Social history she smokes approximately 1 pack of cigarettes a day for the past 15 years.  Admits to rare alcohol intake, admits to smoking marijuana monthly.     Medication list reviewed.  She is allergic to naproxen              Review of Systems   Constitutional: Negative for chills, diaphoresis and fever.   HENT: Negative for congestion and sore throat.    Respiratory: Negative for cough, choking, chest tightness, shortness of breath and wheezing.    Cardiovascular: Negative for chest pain and leg swelling.   Gastrointestinal: Negative for abdominal distention, abdominal pain, anal bleeding, blood in stool, constipation, diarrhea, nausea and vomiting.   Genitourinary: Negative for difficulty urinating, dysuria, flank pain, frequency, hematuria and urgency.   Musculoskeletal: Negative for neck stiffness.   Neurological: Negative for dizziness, seizures, syncope, speech difficulty, weakness, light-headedness, numbness and headaches.   Psychiatric/Behavioral: Negative for confusion.   All other systems reviewed and are negative.      Past Medical History:   Diagnosis Date   • Allergic rhinitis    • Anemia    • Arthritis    • Asthma    • Depression    • FHx: migraine headaches    • GERD (gastroesophageal reflux disease)    • Heart  murmur    • Herpes simplex    • History of chest x-ray 2015    no active disease   • History of echocardiogram 2015    ejection fraction of greater than 65%, mitral and pulmonic regurgitation an physiological tricuspid regurgitation.   • History of PFTs 2015    spirometry data acceptable and reproducible; pt given 4 puffs of Ventolin; pt gave good effort; no obstruction; no Bd response; MVV reduced    • History of PFTs 2015    pt gave best effort; duoneb given prior and post study; moderate nonspecific proportional reduction of FEV1 and FVC with preserved ratio; FEV1 moderately reduced; cannot rule out restriction   • Hypertension    • Migraine    • Ovarian cyst    • Seizures (CMS/HCC)    • Thigh shingles        Allergies   Allergen Reactions   • Naproxen Swelling       Past Surgical History:   Procedure Laterality Date   • BILATERAL BREAST REDUCTION     • BREAST SURGERY      breast reduction   •  SECTION     • CYSTOSCOPY     • LAPAROSCOPIC TUBAL LIGATION     • ORIF ANKLE FRACTURE Right    • TENSION FREE VAGINAL TAPING WITH MINI ARC SLING      Dr Doyle avery        Family History   Problem Relation Age of Onset   • Pancreatic cancer Maternal Grandmother    • Heart failure Paternal Grandmother    • MARCIE disease Paternal Aunt    • Arthritis Mother    • Cancer Mother    • Arthritis Father    • Diabetes Neg Hx    • Heart attack Neg Hx    • Hypertension Neg Hx    • Hyperlipidemia Neg Hx    • Mental illness Neg Hx    • Obesity Neg Hx    • Stroke Neg Hx        Social History     Socioeconomic History   • Marital status: Single     Spouse name: Not on file   • Number of children: Not on file   • Years of education: Not on file   • Highest education level: Not on file   Tobacco Use   • Smoking status: Former Smoker     Packs/day: 1.00     Years: 15.00     Pack years: 15.00     Types: Cigarettes     Last attempt to quit: 2015     Years since quittin.8   • Smokeless tobacco: Never  Used   Substance and Sexual Activity   • Alcohol use: No     Comment: socially   • Drug use: Yes     Types: Marijuana     Comment: Once a month    • Sexual activity: Defer   Social History Narrative    Caffeine intake: 16 oz per day            Objective   Physical Exam   Constitutional: She is oriented to person, place, and time. She appears well-developed and well-nourished. No distress.   HENT:   Head: Normocephalic and atraumatic.   Right Ear: External ear normal.   Left Ear: External ear normal.   Nose: Nose normal.   Mouth/Throat: Oropharynx is clear and moist. No oropharyngeal exudate.   Eyes: Conjunctivae and EOM are normal. Pupils are equal, round, and reactive to light. Right eye exhibits no discharge. Left eye exhibits no discharge. No scleral icterus.   Neck: Normal range of motion. Neck supple. No JVD present. No tracheal deviation present. No thyromegaly present.   Cardiovascular: Normal rate.   Pulmonary/Chest: Effort normal. No stridor. No respiratory distress.   Abdominal: Soft. Normal appearance. She exhibits no distension and no mass. There is tenderness (Diffuse lower abdominal tenderness as well as right flank tenderness with no guarding or rebound.  Bowel sounds are normal with no palpable organomegaly).   Musculoskeletal: Normal range of motion. She exhibits no edema, tenderness or deformity.   Neurological: She is alert and oriented to person, place, and time. No cranial nerve deficit. She exhibits normal muscle tone. Coordination normal.   Skin: Skin is warm and dry. No rash noted. She is not diaphoretic. No erythema. No pallor.   Psychiatric: She has a normal mood and affect. Her behavior is normal. Judgment and thought content normal.   Nursing note and vitals reviewed.      Procedures           ED Course  ED Course as of Nov 18 1747   Mon Nov 18, 2019   1000 Bacteria, UA: (!) 1+ [TG]   1001 WBC, UA: (!) Too Numerous to Count [TG]   1001 RBC, UA: (!) Too Numerous to Count [TG]      ED  Course User Index  [TG] Christoph Gutierrez PA-C      Recent Results (from the past 24 hour(s))   Comprehensive Metabolic Panel    Collection Time: 11/18/19  8:48 AM   Result Value Ref Range    Glucose 107 (H) 65 - 99 mg/dL    BUN 8 6 - 20 mg/dL    Creatinine 0.80 0.57 - 1.00 mg/dL    Sodium 142 136 - 145 mmol/L    Potassium 4.1 3.5 - 5.2 mmol/L    Chloride 103 98 - 107 mmol/L    CO2 28.0 22.0 - 29.0 mmol/L    Calcium 9.6 8.6 - 10.5 mg/dL    Total Protein 8.1 6.0 - 8.5 g/dL    Albumin 4.30 3.50 - 5.20 g/dL    ALT (SGPT) 6 1 - 33 U/L    AST (SGOT) 16 1 - 32 U/L    Alkaline Phosphatase 81 39 - 117 U/L    Total Bilirubin 0.4 0.2 - 1.2 mg/dL    eGFR  African Amer 96 >60 mL/min/1.73    Globulin 3.8 gm/dL    A/G Ratio 1.1 g/dL    BUN/Creatinine Ratio 10.0 7.0 - 25.0    Anion Gap 11.0 5.0 - 15.0 mmol/L   Lipase    Collection Time: 11/18/19  8:48 AM   Result Value Ref Range    Lipase 44 13 - 60 U/L   CBC Auto Differential    Collection Time: 11/18/19  8:48 AM   Result Value Ref Range    WBC 9.42 3.40 - 10.80 10*3/mm3    RBC 4.98 3.77 - 5.28 10*6/mm3    Hemoglobin 13.4 12.0 - 15.9 g/dL    Hematocrit 42.6 34.0 - 46.6 %    MCV 85.5 79.0 - 97.0 fL    MCH 26.9 26.6 - 33.0 pg    MCHC 31.5 31.5 - 35.7 g/dL    RDW 14.6 12.3 - 15.4 %    RDW-SD 45.7 37.0 - 54.0 fl    MPV 9.8 6.0 - 12.0 fL    Platelets 341 140 - 450 10*3/mm3    Neutrophil % 62.3 42.7 - 76.0 %    Lymphocyte % 29.0 19.6 - 45.3 %    Monocyte % 6.3 5.0 - 12.0 %    Eosinophil % 1.7 0.3 - 6.2 %    Basophil % 0.4 0.0 - 1.5 %    Immature Grans % 0.3 0.0 - 0.5 %    Neutrophils, Absolute 5.87 1.70 - 7.00 10*3/mm3    Lymphocytes, Absolute 2.73 0.70 - 3.10 10*3/mm3    Monocytes, Absolute 0.59 0.10 - 0.90 10*3/mm3    Eosinophils, Absolute 0.16 0.00 - 0.40 10*3/mm3    Basophils, Absolute 0.04 0.00 - 0.20 10*3/mm3    Immature Grans, Absolute 0.03 0.00 - 0.05 10*3/mm3    nRBC 0.0 0.0 - 0.2 /100 WBC   Urinalysis With Microscopic If Indicated (No Culture) - Urine, Clean Catch     Collection Time: 11/18/19  8:54 AM   Result Value Ref Range    Color, UA Red (A) Yellow, Straw    Appearance, UA Turbid (A) Clear    pH, UA <=5.0 5.0 - 8.0    Specific Gravity, UA 1.027 1.001 - 1.030    Glucose, UA Negative Negative    Ketones, UA Negative Negative    Bilirubin, UA Moderate (2+) (A) Negative    Blood, UA Moderate (2+) (A) Negative    Protein,  mg/dL (2+) (A) Negative    Leuk Esterase, UA Moderate (2+) (A) Negative    Nitrite, UA Positive (A) Negative    Urobilinogen, UA 0.2 E.U./dL 0.2 - 1.0 E.U./dL   Urinalysis, Microscopic Only - Urine, Clean Catch    Collection Time: 11/18/19  8:54 AM   Result Value Ref Range    RBC, UA Too Numerous to Count (A) None Seen, 0-2 /HPF    WBC, UA Too Numerous to Count (A) None Seen, 0-2 /HPF    Bacteria, UA 1+ (A) None Seen, Trace /HPF    Squamous Epithelial Cells, UA 7-12 (A) None Seen, 0-2 /HPF    Hyaline Casts, UA None Seen 0 - 6 /LPF    Methodology Manual Light Microscopy    POCT Pregnancy, Urine    Collection Time: 11/18/19  8:56 AM   Result Value Ref Range    HCG, Urine, QL Negative Negative    Lot Number [id number]     Internal Positive Control Positive     Internal Negative Control Negative      Note: In addition to lab results from this visit, the labs listed above may include labs taken at another facility or during a different encounter within the last 24 hours. Please correlate lab times with ED admission and discharge times for further clarification of the services performed during this visit.    CT Abdomen Pelvis With Contrast   Final Result   No CT evidence of acute intra-abdominal or pelvic   abnormality.       D:  11/18/2019   E:  11/18/2019           This report was finalized on 11/18/2019 3:45 PM by Dr. Eve Jhaveri MD.          US Testicular or Ovarian Vascular Limited   Final Result   Good blood flow seen within both ovaries. Several fibroids   identified within the myometrium. The remainder of the ultrasound of the   pelvis is  "unremarkable.       D:  11/18/2019   E:  11/18/2019                This report was finalized on 11/18/2019 3:42 PM by Dr. Eve Jhaveri MD.          US Non-ob Transvaginal   Final Result   Good blood flow seen within both ovaries. Several fibroids   identified within the myometrium. The remainder of the ultrasound of the   pelvis is unremarkable.       D:  11/18/2019   E:  11/18/2019                This report was finalized on 11/18/2019 3:42 PM by Dr. Eve Jhaveri MD.            Vitals:    11/18/19 0747 11/18/19 1251   BP: 130/79 118/88   BP Location: Left arm    Patient Position: Sitting    Pulse: 93 74   Resp: 16 16   Temp: 99 °F (37.2 °C) 98.1 °F (36.7 °C)   TempSrc: Oral Oral   SpO2: 97% 95%   Weight: 80.7 kg (178 lb)    Height: 149.9 cm (59\")      Medications   sodium chloride 0.9 % bolus 1,000 mL (0 mL Intravenous Stopped 11/18/19 1127)   ondansetron (ZOFRAN) injection 4 mg (4 mg Intravenous Given 11/18/19 0852)   cefTRIAXone (ROCEPHIN) 1 g/100 mL 0.9% NS (MBP) (0 g Intravenous Stopped 11/18/19 1205)   iopamidol (ISOVUE-300) 61 % injection 100 mL (95 mL Intravenous Given 11/18/19 1150)     ECG/EMG Results (last 24 hours)     ** No results found for the last 24 hours. **        No orders to display                   MDM    Final diagnoses:   Urinary tract infection without hematuria, site unspecified   RLQ abdominal pain              Christoph Gutierrez PA-C  11/18/19 7033    "

## 2019-11-20 ENCOUNTER — HOSPITAL ENCOUNTER (OUTPATIENT)
Dept: MAMMOGRAPHY | Facility: HOSPITAL | Age: 39
Discharge: HOME | End: 2019-11-20
Admitting: PHYSICIAN ASSISTANT

## 2019-11-20 ENCOUNTER — OFFICE VISIT (OUTPATIENT)
Dept: INTERNAL MEDICINE | Facility: CLINIC | Age: 39
End: 2019-11-20

## 2019-11-20 VITALS
HEART RATE: 96 BPM | OXYGEN SATURATION: 98 % | WEIGHT: 178 LBS | DIASTOLIC BLOOD PRESSURE: 70 MMHG | SYSTOLIC BLOOD PRESSURE: 122 MMHG | HEIGHT: 59 IN | TEMPERATURE: 98.8 F | BODY MASS INDEX: 35.88 KG/M2

## 2019-11-20 DIAGNOSIS — J01.91 ACUTE RECURRENT SINUSITIS, UNSPECIFIED LOCATION: Primary | ICD-10-CM

## 2019-11-20 DIAGNOSIS — Z12.39 BREAST CANCER SCREENING: ICD-10-CM

## 2019-11-20 DIAGNOSIS — R30.0 DYSURIA: ICD-10-CM

## 2019-11-20 PROCEDURE — 99213 OFFICE O/P EST LOW 20 MIN: CPT | Performed by: NURSE PRACTITIONER

## 2019-11-20 PROCEDURE — 77067 SCR MAMMO BI INCL CAD: CPT | Performed by: RADIOLOGY

## 2019-11-20 PROCEDURE — 77063 BREAST TOMOSYNTHESIS BI: CPT | Performed by: RADIOLOGY

## 2019-11-20 PROCEDURE — 77063 BREAST TOMOSYNTHESIS BI: CPT

## 2019-11-20 PROCEDURE — 77067 SCR MAMMO BI INCL CAD: CPT

## 2019-11-20 RX ORDER — AZELASTINE 1 MG/ML
SPRAY, METERED NASAL
Qty: 1 EACH | Refills: 0 | Status: SHIPPED | OUTPATIENT
Start: 2019-11-20 | End: 2019-12-12 | Stop reason: SDUPTHER

## 2019-11-20 NOTE — PROGRESS NOTES
Chief Complaint   Patient presents with   • Urinary Tract Infection     seen ED tx outpt   • Sinusitis       History of Present Illness  39 y.o.female presents for uti and sinusitis.  Patient is here for sinus problems. The patient has jose luis experiencing these symptoms for about one week. She was on Augmentin a few weeks ago for the same symptoms. The symptoms improved but then came back. Patient states she feels like her face is swollen and has yellow mucous. Has tried vics vapoinhaler. Ocean water and flonase daily. Breathing treatments. loratidine daily.  Recently startred on omnicef also with uti. abx not helping much.  ED 11-18 uti rocephin X1 and omnicef. Dysuria and painful BM, last week urologist said it had a stone but ED said it was gone. No pain today. Denies frequency and urgency. Has had runny BM since yesterday. doeesnt take any probiotic.      Review of Systems   Constitutional: Negative for chills, diaphoresis and fever.   HENT: Positive for congestion, ear pain (left), facial swelling, sinus pressure and voice change. Negative for ear discharge, hearing loss and sore throat.    Respiratory: Positive for cough. Negative for chest tightness and shortness of breath.    Cardiovascular: Negative for chest pain.   Gastrointestinal: Positive for diarrhea. Negative for constipation, nausea and vomiting.   Musculoskeletal: Negative for myalgias.   Neurological: Positive for headache.       PMSFH  The following portions of the patient's history were reviewed and updated as appropriate: allergies, current medications, past family history, past medical history, past social history, past surgical history and problem list.       Past Medical History:   Diagnosis Date   • Allergic rhinitis    • Anemia    • Arthritis    • Asthma    • Depression    • FHx: migraine headaches    • GERD (gastroesophageal reflux disease)    • Heart murmur    • Herpes simplex    • History of chest x-ray 11/01/2015    no active disease   •  History of echocardiogram 2015    ejection fraction of greater than 65%, mitral and pulmonic regurgitation an physiological tricuspid regurgitation.   • History of PFTs 2015    spirometry data acceptable and reproducible; pt given 4 puffs of Ventolin; pt gave good effort; no obstruction; no Bd response; MVV reduced    • History of PFTs 2015    pt gave best effort; duoneb given prior and post study; moderate nonspecific proportional reduction of FEV1 and FVC with preserved ratio; FEV1 moderately reduced; cannot rule out restriction   • Hypertension    • Migraine    • Ovarian cyst    • Seizures (CMS/HCC)    • Thigh shingles       Past Surgical History:   Procedure Laterality Date   • BILATERAL BREAST REDUCTION     • BREAST SURGERY      breast reduction   •  SECTION     • CYSTOSCOPY     • LAPAROSCOPIC TUBAL LIGATION     • ORIF ANKLE FRACTURE Right    • REDUCTION MAMMAPLASTY Bilateral    • TENSION FREE VAGINAL TAPING WITH MINI ARC SLING      Dr Doyle avery       Allergies   Allergen Reactions   • Naproxen Swelling      Family History   Problem Relation Age of Onset   • Pancreatic cancer Maternal Grandmother    • Heart failure Paternal Grandmother    • MARCIE disease Paternal Aunt    • Arthritis Mother    • Cancer Mother    • Arthritis Father    • Diabetes Neg Hx    • Heart attack Neg Hx    • Hypertension Neg Hx    • Hyperlipidemia Neg Hx    • Mental illness Neg Hx    • Obesity Neg Hx    • Stroke Neg Hx             Current Outpatient Medications:   •  ADVAIR DISKUS 500-50 MCG/DOSE DISKUS, Inhale 1 puff 2 (Two) Times a Day., Disp: , Rfl:   •  albuterol (PROVENTIL) (2.5 MG/3ML) 0.083% nebulizer solution, Take 2.5 mg by nebulization Every 12 (Twelve) Hours., Disp: 30 vial, Rfl: 1  •  amitriptyline (ELAVIL) 25 MG tablet, Take 3-4 tablets PO Q HS, Disp: , Rfl:   •  amLODIPine (NORVASC) 5 MG tablet, Take 1 tablet by mouth Daily., Disp: 30 tablet, Rfl: 5  •  azelastine (OPTIVAR) 0.05 %  "ophthalmic solution, Administer 1 drop to both eyes 2 (Two) Times a Day., Disp: 6 mL, Rfl: 12  •  cefdinir (OMNICEF) 300 MG capsule, Take 1 capsule by mouth 2 (Two) Times a Day., Disp: 14 capsule, Rfl: 0  •  Cholecalciferol (VITAMIN D3) 5000 units capsule capsule, Take 1 capsule by mouth Daily., Disp: 30 capsule, Rfl: 3  •  ferrous sulfate 325 (65 FE) MG tablet, Take 1 tablet by mouth Daily With Breakfast. Resume Iron when antibiotics finished., Disp: 30 tablet, Rfl: 5  •  fluticasone (FLONASE) 50 MCG/ACT nasal spray, 2 sprays into the nostril(s) as directed by provider Daily., Disp: 16 g, Rfl: 5  •  gabapentin (NEURONTIN) 400 MG capsule, Take 400 mg by mouth 5 (Five) Times a Day As Needed., Disp: , Rfl:   •  lamoTRIgine (LaMICtal) 100 MG tablet, Take 100 mg by mouth Daily., Disp: , Rfl:   •  loratadine-pseudoephedrine (CLARITIN-D 12 HOUR) 5-120 MG per 12 hr tablet, Take 1 tablet by mouth 2 (Two) Times a Day., Disp: 60 tablet, Rfl: 5  •  montelukast (SINGULAIR) 10 MG tablet, Take 10 mg by mouth every night., Disp: , Rfl:   •  omeprazole (priLOSEC) 40 MG capsule, Take 1 capsule by mouth 2 (Two) Times a Day. Take on an empty stomach and eat 30 minutes- 1 hr after eating., Disp: 90 capsule, Rfl: 5  •  ondansetron (ZOFRAN) 4 MG tablet, Take 1 tablet by mouth Every 8 (Eight) Hours As Needed for Nausea or Vomiting., Disp: 20 tablet, Rfl: 0  •  SPIRIVA RESPIMAT 2.5 MCG/ACT aerosol solution, Take 2 puffs by mouth Daily., Disp: , Rfl:   •  traMADol (ULTRAM) 50 MG tablet, Take 1 tablet by mouth Every 6 (Six) Hours As Needed for Severe Pain ., Disp: 15 tablet, Rfl: 0    VITALS:  /70   Pulse 96   Temp 98.8 °F (37.1 °C)   Ht 149.9 cm (59\")   Wt 80.7 kg (178 lb)   LMP 11/17/2019   SpO2 98%   BMI 35.95 kg/m²     Physical Exam   Constitutional: She is oriented to person, place, and time. She appears well-developed and well-nourished. No distress.   HENT:   Head: Normocephalic and atraumatic.   Right Ear: Tympanic " membrane, external ear and ear canal normal.   Left Ear: Tympanic membrane, external ear and ear canal normal.   Nose: Mucosal edema, rhinorrhea and congestion present. No sinus tenderness. Right sinus exhibits no maxillary sinus tenderness and no frontal sinus tenderness. Left sinus exhibits no maxillary sinus tenderness and no frontal sinus tenderness.   Mouth/Throat: Oropharynx is clear and moist. No oropharyngeal exudate.   Purulent yellow nasal secretions   Eyes: Conjunctivae are normal. Pupils are equal, round, and reactive to light. Right conjunctiva is not injected. Left conjunctiva is not injected.   Cardiovascular: Normal rate, regular rhythm and normal heart sounds.   Pulmonary/Chest: Effort normal and breath sounds normal. No respiratory distress.   Abdominal: There is no tenderness. There is no CVA tenderness.   Lymphadenopathy:        Head (right side): No submental, no submandibular and no tonsillar adenopathy present.        Head (left side): No submental, no submandibular and no tonsillar adenopathy present.     She has no cervical adenopathy.   Neurological: She is alert and oriented to person, place, and time.   Skin: Skin is warm and dry. No rash noted.   Nursing note and vitals reviewed.      LABS  No new labs    ASSESSMENT/PLAN  Bernardo was seen today for urinary tract infection and sinusitis.    Diagnoses and all orders for this visit:    Acute recurrent sinusitis, unspecified location  -     azelastine (ASTELIN) 0.1 % nasal spray; 1 spray each nostril two times daily  -     loratadine-pseudoephedrine (CLARITIN-D 12 HOUR) 5-120 MG per 12 hr tablet; Take 1 tablet by mouth 2 (Two) Times a Day.  Cont omnicef.  Cont flonase and antihistamine, needs the decongestant part.  Add astelin    Dysuria  Cont omnicef; drink plenty water.  improving    If worsening of symptoms or no improvement in symptoms or fever > 101.5 patient should contact our office for further evaluation treatment or seek urgent  care.    I discussed the patients findings and my recommendations with patient.  Patient was encouraged to keep me informed of any acute changes, lack of improvement, or any new concerning symptoms.    Patient voiced understanding of all instructions and denied further questions.      FOLLOW-UP  Return if symptoms worsen or fail to improve.    Electronically signed by:    SHANE Mueller  11/20/2019

## 2019-11-23 ENCOUNTER — OFFICE VISIT (OUTPATIENT)
Dept: RETAIL CLINIC | Facility: CLINIC | Age: 39
End: 2019-11-23

## 2019-11-23 VITALS
RESPIRATION RATE: 20 BRPM | TEMPERATURE: 98.5 F | DIASTOLIC BLOOD PRESSURE: 78 MMHG | SYSTOLIC BLOOD PRESSURE: 118 MMHG | OXYGEN SATURATION: 96 % | HEART RATE: 107 BPM

## 2019-11-23 DIAGNOSIS — J01.40 ACUTE NON-RECURRENT PANSINUSITIS: Primary | ICD-10-CM

## 2019-11-23 DIAGNOSIS — J45.21 INTERMITTENT ASTHMA WITH ACUTE EXACERBATION, UNSPECIFIED ASTHMA SEVERITY: ICD-10-CM

## 2019-11-23 PROCEDURE — 99213 OFFICE O/P EST LOW 20 MIN: CPT | Performed by: NURSE PRACTITIONER

## 2019-11-23 RX ORDER — METHYLPREDNISOLONE 4 MG/1
TABLET ORAL
Qty: 1 TABLET | Refills: 0 | Status: SHIPPED | OUTPATIENT
Start: 2019-11-23 | End: 2019-12-13

## 2019-11-23 NOTE — PROGRESS NOTES
Britta Dodd is a 39 y.o. female.     Is currently taking rx for sinusitis and has been using albuterol treatments up to 4 times daily      Cough   This is a new problem. The current episode started 1 to 4 weeks ago. The problem has been waxing and waning. The problem occurs constantly. Associated symptoms include ear congestion, ear pain (left ), nasal congestion, postnasal drip, rhinorrhea, a sore throat, shortness of breath and wheezing. Pertinent negatives include no chills or fever. She has tried a beta-agonist inhaler and OTC inhaler for the symptoms. The treatment provided mild relief. Her past medical history is significant for asthma.   Earache    Associated symptoms include coughing, rhinorrhea and a sore throat.        The following portions of the patient's history were reviewed and updated as appropriate: allergies, current medications, past family history, past medical history, past social history, past surgical history and problem list.    Review of Systems   Constitutional: Positive for diaphoresis. Negative for appetite change, chills, fatigue and fever.   HENT: Positive for congestion (nasal), ear pain (left ), postnasal drip, rhinorrhea, sinus pressure, sinus pain and sore throat.    Respiratory: Positive for cough, chest tightness, shortness of breath and wheezing.    All other systems reviewed and are negative.      Objective   Physical Exam   Constitutional: She appears well-developed and well-nourished.   HENT:   Head: Normocephalic.   Right Ear: Ear canal normal. Tympanic membrane is not erythematous.   Left Ear: Ear canal normal. Tympanic membrane is bulging. Tympanic membrane is not erythematous and not retracted.   Nose: Nose normal. Right sinus exhibits no maxillary sinus tenderness and no frontal sinus tenderness. Left sinus exhibits no maxillary sinus tenderness and no frontal sinus tenderness.   Mouth/Throat: Uvula is midline and mucous membranes are normal. Posterior  oropharyngeal erythema present.   Cardiovascular: Normal rate and regular rhythm.   Pulmonary/Chest: Effort normal and breath sounds normal.   Lymphadenopathy:     She has cervical adenopathy.   Skin: Skin is warm, dry and intact.       Assessment/Plan   Bernardo was seen today for cough, sore throat and sinus problem.    Diagnoses and all orders for this visit:    Acute non-recurrent pansinusitis    Intermittent asthma with acute exacerbation, unspecified asthma severity    Other orders  -     methylPREDNISolone (MEDROL, GUANAKO,) 4 MG tablet; Take as directed on package instructions.      Continue and complete Cefdinir for sinusitis. Take new medications as directed.       Visit information reviewed with patient, including medication prescribed and patient instructions. All questions answered and given to patient/and or caregiver.     If symptoms worsen with fever >103, please seek further evaluation in the ER. Please F/U with PCP with concerns/and questions.     Kerri Pappas, APRN

## 2019-12-12 DIAGNOSIS — J30.9 CHRONIC ALLERGIC RHINITIS: ICD-10-CM

## 2019-12-12 DIAGNOSIS — J01.91 ACUTE RECURRENT SINUSITIS, UNSPECIFIED LOCATION: ICD-10-CM

## 2019-12-12 DIAGNOSIS — K21.00 GASTROESOPHAGEAL REFLUX DISEASE WITH ESOPHAGITIS: ICD-10-CM

## 2019-12-12 RX ORDER — AZELASTINE 1 MG/ML
SPRAY, METERED NASAL
Qty: 1 EACH | Refills: 5 | Status: SHIPPED | OUTPATIENT
Start: 2019-12-12 | End: 2020-05-01

## 2019-12-12 RX ORDER — AMLODIPINE BESYLATE 5 MG/1
5 TABLET ORAL DAILY
Qty: 30 TABLET | Refills: 5 | Status: SHIPPED | OUTPATIENT
Start: 2019-12-12 | End: 2020-06-19 | Stop reason: SDUPTHER

## 2019-12-12 RX ORDER — FLUTICASONE PROPIONATE 50 MCG
2 SPRAY, SUSPENSION (ML) NASAL DAILY
Qty: 16 G | Refills: 5 | Status: SHIPPED | OUTPATIENT
Start: 2019-12-12 | End: 2020-06-19 | Stop reason: SDUPTHER

## 2019-12-13 ENCOUNTER — OFFICE VISIT (OUTPATIENT)
Dept: INTERNAL MEDICINE | Facility: CLINIC | Age: 39
End: 2019-12-13

## 2019-12-13 VITALS
BODY MASS INDEX: 35.28 KG/M2 | DIASTOLIC BLOOD PRESSURE: 78 MMHG | HEART RATE: 76 BPM | SYSTOLIC BLOOD PRESSURE: 124 MMHG | HEIGHT: 59 IN | WEIGHT: 175 LBS | OXYGEN SATURATION: 97 %

## 2019-12-13 DIAGNOSIS — G43.809 OTHER MIGRAINE WITHOUT STATUS MIGRAINOSUS, NOT INTRACTABLE: ICD-10-CM

## 2019-12-13 DIAGNOSIS — G40.909 SEIZURE DISORDER (HCC): Primary | ICD-10-CM

## 2019-12-13 PROCEDURE — 99213 OFFICE O/P EST LOW 20 MIN: CPT | Performed by: PHYSICIAN ASSISTANT

## 2019-12-13 RX ORDER — LAMOTRIGINE 100 MG/1
100 TABLET ORAL DAILY
Qty: 30 TABLET | Refills: 3 | Status: SHIPPED | OUTPATIENT
Start: 2019-12-13 | End: 2020-05-26

## 2019-12-13 RX ORDER — OMEPRAZOLE 40 MG/1
40 CAPSULE, DELAYED RELEASE ORAL 2 TIMES DAILY
Qty: 90 CAPSULE | Refills: 5 | OUTPATIENT
Start: 2019-12-13 | End: 2020-09-27

## 2019-12-13 RX ORDER — PREDNISONE 10 MG/1
10 TABLET ORAL 2 TIMES DAILY
COMMUNITY
End: 2020-01-13

## 2019-12-13 RX ORDER — AMITRIPTYLINE HYDROCHLORIDE 25 MG/1
50 TABLET, FILM COATED ORAL NIGHTLY
Qty: 60 TABLET | Refills: 3 | Status: SHIPPED | OUTPATIENT
Start: 2019-12-13 | End: 2021-08-25 | Stop reason: ALTCHOICE

## 2019-12-13 RX ORDER — AMOXICILLIN AND CLAVULANATE POTASSIUM 875; 125 MG/1; MG/1
1 TABLET, FILM COATED ORAL 2 TIMES DAILY
COMMUNITY
End: 2020-01-27 | Stop reason: ALTCHOICE

## 2019-12-13 NOTE — PROGRESS NOTES
Chief Complaint   Patient presents with   • Med Refill     Pt needs referral for Neuro and discuss mammo results       Subjective   Bernardo Dodd is a 39 y.o. female.       History of Present Illness     Pt is worried about her mammogram results. Has not called to schedule follow up exam.    She has been seeing a Neurology nurse practitioner and been getting her gabapentin, lamictal and tizanidine from that practice. Was also getting trigger point injection. She had cocaine in her urine drug screen- pt did do some cocaine and marijuana at that time. She has been discharged from their clinic. She is requesting referral to another Neurologist. Says she is taking 4 of the 25 mg amitriptyline qhs, lamictal 100 mg and gabapentin 1200 mg BID. She has migraines and history of seizure disorder. Meds were also for anxiety.    She is currently on amoxicillin and prednisone for her sinusitis- prescribed by Dr Gramajo, her allergist.    Notes that she had an episode of sweating while working one day recently. Has not occurred again.      Current Outpatient Medications:   •  ADVAIR DISKUS 500-50 MCG/DOSE DISKUS, Inhale 1 puff 2 (Two) Times a Day., Disp: , Rfl:   •  albuterol (PROVENTIL) (2.5 MG/3ML) 0.083% nebulizer solution, Take 2.5 mg by nebulization Every 12 (Twelve) Hours., Disp: 30 vial, Rfl: 1  •  amitriptyline (ELAVIL) 25 MG tablet, Take 2 tablets by mouth Every Night., Disp: 60 tablet, Rfl: 3  •  amLODIPine (NORVASC) 5 MG tablet, Take 1 tablet by mouth Daily., Disp: 30 tablet, Rfl: 5  •  amoxicillin-clavulanate (AUGMENTIN) 875-125 MG per tablet, Take 1 tablet by mouth 2 (Two) Times a Day., Disp: , Rfl:   •  azelastine (ASTELIN) 0.1 % nasal spray, 1 spray each nostril two times daily, Disp: 1 each, Rfl: 5  •  azelastine (OPTIVAR) 0.05 % ophthalmic solution, Administer 1 drop to both eyes 2 (Two) Times a Day., Disp: 6 mL, Rfl: 12  •  Cholecalciferol (VITAMIN D3) 125 MCG (5000 UT) capsule capsule, Take 1 capsule by  mouth Daily., Disp: 30 capsule, Rfl: 5  •  ferrous sulfate 325 (65 FE) MG tablet, Take 1 tablet by mouth Daily With Breakfast. Resume Iron when antibiotics finished., Disp: 30 tablet, Rfl: 5  •  fluticasone (FLONASE) 50 MCG/ACT nasal spray, 2 sprays into the nostril(s) as directed by provider Daily., Disp: 16 g, Rfl: 5  •  gabapentin (NEURONTIN) 400 MG capsule, Take 400 mg by mouth 5 (Five) Times a Day As Needed., Disp: , Rfl:   •  lamoTRIgine (LaMICtal) 100 MG tablet, Take 1 tablet by mouth Daily., Disp: 30 tablet, Rfl: 3  •  loratadine-pseudoephedrine (CLARITIN-D 12 HOUR) 5-120 MG per 12 hr tablet, Take 1 tablet by mouth 2 (Two) Times a Day., Disp: 60 tablet, Rfl: 5  •  montelukast (SINGULAIR) 10 MG tablet, Take 10 mg by mouth every night., Disp: , Rfl:   •  omeprazole (priLOSEC) 40 MG capsule, Take 1 capsule by mouth 2 (Two) Times a Day. Take on an empty stomach and eat 30 minutes- 1 hr after eating., Disp: 90 capsule, Rfl: 5  •  predniSONE (DELTASONE) 10 MG tablet, Take 10 mg by mouth 2 (Two) Times a Day., Disp: , Rfl:   •  SPIRIVA RESPIMAT 2.5 MCG/ACT aerosol solution, Take 2 puffs by mouth Daily., Disp: , Rfl:      PMFSH  The following portions of the patient's history were reviewed and updated as appropriate: allergies, current medications, past family history, past medical history, past social history, past surgical history and problem list.    Review of Systems   Constitutional: Negative for activity change, appetite change and fatigue.   HENT: Positive for sinus pressure. Negative for congestion and rhinorrhea.    Respiratory: Negative for chest tightness and shortness of breath.    Cardiovascular: Negative for chest pain and palpitations.   Gastrointestinal: Negative for abdominal pain.   Genitourinary: Negative for dysuria.   Musculoskeletal: Negative for arthralgias and myalgias.   Neurological: Negative for dizziness, weakness, light-headedness and headaches.   Psychiatric/Behavioral: Negative for  "dysphoric mood. The patient is not nervous/anxious.        Objective   /78   Pulse 76   Ht 149.9 cm (59\")   Wt 79.4 kg (175 lb)   LMP 11/17/2019   SpO2 97%   Breastfeeding No   BMI 35.35 kg/m²     Physical Exam   Constitutional: She appears well-developed and well-nourished.   HENT:   Head: Normocephalic.   Right Ear: Hearing, tympanic membrane, external ear and ear canal normal.   Left Ear: Hearing, tympanic membrane, external ear and ear canal normal.   Nose: Nose normal.   Mouth/Throat: Oropharynx is clear and moist.   Eyes: Pupils are equal, round, and reactive to light. Conjunctivae are normal.   Neck: Normal range of motion.   Cardiovascular: Normal rate, regular rhythm and normal heart sounds.   Pulmonary/Chest: Effort normal and breath sounds normal. She has no decreased breath sounds. She has no wheezes. She has no rhonchi. She has no rales.   Musculoskeletal: Normal range of motion.   Neurological: She is alert.   Skin: Skin is warm and dry.   Psychiatric: She has a normal mood and affect. Her behavior is normal.   Nursing note and vitals reviewed.        ASSESSMENT/PLAN    Problem List Items Addressed This Visit        Nervous and Auditory    Seizure disorder (CMS/HCC) - Primary     Refer to another Neurologist for eval since she has been dismissed from her previous practice. Discussed no guarantee that future Neurologist will continue her same medications. Refill lamictal that she is currently taking.         Relevant Medications    lamoTRIgine (LaMICtal) 100 MG tablet    Other Relevant Orders    Ambulatory Referral to Neurology      Other Visit Diagnoses     Other migraine without status migrainosus, not intractable        Relevant Medications    amitriptyline (ELAVIL) 25 MG tablet    lamoTRIgine (LaMICtal) 100 MG tablet    Other Relevant Orders    Ambulatory Referral to Neurology               Return for Next scheduled follow up.  "

## 2019-12-17 NOTE — ASSESSMENT & PLAN NOTE
Refer to another Neurologist for eval since she has been dismissed from her previous practice. Discussed no guarantee that future Neurologist will continue her same medications. Refill lamictal that she is currently taking.

## 2019-12-20 ENCOUNTER — TELEPHONE (OUTPATIENT)
Dept: INTERNAL MEDICINE | Facility: CLINIC | Age: 39
End: 2019-12-20

## 2019-12-20 NOTE — TELEPHONE ENCOUNTER
Sarahi from [facility] was calling to let us know they have been unable to contact pt for her imaging, they will now send her a certified letter

## 2019-12-20 NOTE — TELEPHONE ENCOUNTER
PLEASE RETURN CALL TO [facility] REGARDING THIS PATIENT.   [phone]

## 2020-01-13 ENCOUNTER — OFFICE VISIT (OUTPATIENT)
Dept: INTERNAL MEDICINE | Facility: CLINIC | Age: 40
End: 2020-01-13

## 2020-01-13 ENCOUNTER — HOSPITAL ENCOUNTER (OUTPATIENT)
Dept: GENERAL RADIOLOGY | Facility: HOSPITAL | Age: 40
Discharge: HOME | End: 2020-01-13
Admitting: PHYSICIAN ASSISTANT

## 2020-01-13 VITALS
SYSTOLIC BLOOD PRESSURE: 110 MMHG | HEART RATE: 88 BPM | OXYGEN SATURATION: 99 % | WEIGHT: 175.6 LBS | DIASTOLIC BLOOD PRESSURE: 84 MMHG | BODY MASS INDEX: 35.47 KG/M2 | TEMPERATURE: 99.1 F

## 2020-01-13 DIAGNOSIS — M79.671 RIGHT FOOT PAIN: ICD-10-CM

## 2020-01-13 DIAGNOSIS — J02.9 SORE THROAT: ICD-10-CM

## 2020-01-13 DIAGNOSIS — J06.9 ACUTE URI: ICD-10-CM

## 2020-01-13 DIAGNOSIS — R92.8 ABNORMAL MAMMOGRAM: ICD-10-CM

## 2020-01-13 DIAGNOSIS — M79.671 RIGHT FOOT PAIN: Primary | ICD-10-CM

## 2020-01-13 PROCEDURE — 87804 INFLUENZA ASSAY W/OPTIC: CPT | Performed by: PHYSICIAN ASSISTANT

## 2020-01-13 PROCEDURE — 87880 STREP A ASSAY W/OPTIC: CPT | Performed by: PHYSICIAN ASSISTANT

## 2020-01-13 PROCEDURE — 99213 OFFICE O/P EST LOW 20 MIN: CPT | Performed by: PHYSICIAN ASSISTANT

## 2020-01-13 PROCEDURE — 73630 X-RAY EXAM OF FOOT: CPT

## 2020-01-13 NOTE — PROGRESS NOTES
Chief Complaint   Patient presents with   • Generalized Body Aches     Acute       Subjective   Bernardo Dodd is a 39 y.o. female.       History of Present Illness     Pt has not scheduled her mammogram follow up, she says she did not have the number to call back or was confused about which number to call.    She has not scheduled with Neurologist, was not sure about the name to call back.    Started feeling bad with right ankle pain a week ago, progressed to body aches at the end of the week. She stayed in bed over the weekend. Has congestion and cough. Throat feels swollen and itchy. Not feeling any better, thinks she is getting worse. She is taking augmentin that was prescribed by ENT.        Current Outpatient Medications:   •  ADVAIR DISKUS 500-50 MCG/DOSE DISKUS, Inhale 1 puff 2 (Two) Times a Day., Disp: , Rfl:   •  albuterol (PROVENTIL) (2.5 MG/3ML) 0.083% nebulizer solution, Take 2.5 mg by nebulization Every 12 (Twelve) Hours., Disp: 30 vial, Rfl: 1  •  amitriptyline (ELAVIL) 25 MG tablet, Take 2 tablets by mouth Every Night., Disp: 60 tablet, Rfl: 3  •  amLODIPine (NORVASC) 5 MG tablet, Take 1 tablet by mouth Daily., Disp: 30 tablet, Rfl: 5  •  amoxicillin-clavulanate (AUGMENTIN) 875-125 MG per tablet, Take 1 tablet by mouth 2 (Two) Times a Day., Disp: , Rfl:   •  azelastine (ASTELIN) 0.1 % nasal spray, 1 spray each nostril two times daily, Disp: 1 each, Rfl: 5  •  azelastine (OPTIVAR) 0.05 % ophthalmic solution, Administer 1 drop to both eyes 2 (Two) Times a Day., Disp: 6 mL, Rfl: 12  •  Cholecalciferol (VITAMIN D3) 125 MCG (5000 UT) capsule capsule, Take 1 capsule by mouth Daily., Disp: 30 capsule, Rfl: 5  •  ferrous sulfate 325 (65 FE) MG tablet, Take 1 tablet by mouth Daily With Breakfast. Resume Iron when antibiotics finished., Disp: 30 tablet, Rfl: 5  •  fluticasone (FLONASE) 50 MCG/ACT nasal spray, 2 sprays into the nostril(s) as directed by provider Daily., Disp: 16 g, Rfl: 5  •  gabapentin  (NEURONTIN) 400 MG capsule, Take 400 mg by mouth 5 (Five) Times a Day As Needed., Disp: , Rfl:   •  lamoTRIgine (LaMICtal) 100 MG tablet, Take 1 tablet by mouth Daily., Disp: 30 tablet, Rfl: 3  •  loratadine-pseudoephedrine (CLARITIN-D 12 HOUR) 5-120 MG per 12 hr tablet, Take 1 tablet by mouth 2 (Two) Times a Day., Disp: 60 tablet, Rfl: 5  •  montelukast (SINGULAIR) 10 MG tablet, Take 10 mg by mouth every night., Disp: , Rfl:   •  omeprazole (priLOSEC) 40 MG capsule, Take 1 capsule by mouth 2 (Two) Times a Day. Take on an empty stomach and eat 30 minutes- 1 hr after eating., Disp: 90 capsule, Rfl: 5  •  SPIRIVA RESPIMAT 2.5 MCG/ACT aerosol solution, Take 2 puffs by mouth Daily., Disp: , Rfl:      PMFSH  The following portions of the patient's history were reviewed and updated as appropriate: allergies, current medications, past family history, past medical history, past social history, past surgical history and problem list.    Review of Systems   Constitutional: Positive for fatigue. Negative for activity change and unexpected weight change.   HENT: Positive for congestion, postnasal drip and sore throat. Negative for ear pain.    Eyes: Negative for pain and discharge.   Respiratory: Positive for cough. Negative for chest tightness, shortness of breath and wheezing.    Cardiovascular: Negative for chest pain and palpitations.   Gastrointestinal: Negative for abdominal pain, diarrhea and vomiting.   Endocrine: Negative.    Genitourinary: Negative.    Musculoskeletal: Positive for arthralgias. Negative for joint swelling.   Skin: Negative for color change, rash and wound.   Allergic/Immunologic: Negative.    Neurological: Negative for seizures and syncope.   Psychiatric/Behavioral: Negative.        Objective   /84   Pulse 88   Temp 99.1 °F (37.3 °C)   Wt 79.7 kg (175 lb 9.6 oz)   SpO2 99%   BMI 35.47 kg/m²     Physical Exam   Constitutional: She is oriented to person, place, and time. She appears  well-developed and well-nourished.  Non-toxic appearance. No distress.   HENT:   Head: Normocephalic and atraumatic. Hair is normal.   Right Ear: External ear normal. No drainage, swelling or tenderness. Tympanic membrane is retracted.   Left Ear: External ear normal. No drainage, swelling or tenderness. Tympanic membrane is retracted.   Nose: Mucosal edema present. No epistaxis.   Mouth/Throat: Uvula is midline and mucous membranes are normal. No oral lesions. No uvula swelling. Posterior oropharyngeal erythema present. No oropharyngeal exudate.   Eyes: Pupils are equal, round, and reactive to light. Conjunctivae and EOM are normal. Right eye exhibits no discharge. Left eye exhibits no discharge. No scleral icterus.   Neck: Normal range of motion. Neck supple.   Cardiovascular: Normal rate, regular rhythm and normal heart sounds. Exam reveals no gallop.   No murmur heard.  Pulmonary/Chest: Breath sounds normal. No stridor. No respiratory distress. She has no wheezes. She has no rales. She exhibits no tenderness.   Abdominal: Soft. There is no tenderness.   Musculoskeletal:        Right foot: There is tenderness. There is normal range of motion, no bony tenderness and no swelling.   Lymphadenopathy:     She has cervical adenopathy.   Neurological: She is alert and oriented to person, place, and time. She exhibits normal muscle tone.   Skin: Skin is warm and dry. No rash noted. She is not diaphoretic.   Psychiatric: She has a normal mood and affect. Her behavior is normal. Judgment and thought content normal.   Nursing note and vitals reviewed.      Results for orders placed or performed in visit on 01/13/20   POCT rapid strep A   Result Value Ref Range    Rapid Strep A Screen Negative Negative, VALID, INVALID, Not Performed    Internal Control Passed Passed    Lot Number [id number]     Expiration Date 04/30/2021    POCT Influenza A/B   Result Value Ref Range    Rapid Influenza A Ag Negative Negative    Rapid  Influenza B Ag Negative Negative    Internal Control Passed Passed    Lot Number [id number]     Expiration Date 12/11/2021         ASSESSMENT/PLAN    Problem List Items Addressed This Visit     None      Visit Diagnoses     Right foot pain    -  Primary    Check xray of foot. Further recs based on results.    Relevant Orders    XR Foot 3+ View Right (Completed)    Acute URI        Currently on Augmentin. Use otc symptomatic care. Increase rest and fluids.    Relevant Orders    POCT Influenza A/B (Completed)    Sore throat        Relevant Orders    POCT rapid strep A (Completed)    Abnormal mammogram        Gave pt contact number to call and schedule follow up imaging.               Return if symptoms worsen or fail to improve.

## 2020-01-14 ENCOUNTER — TELEPHONE (OUTPATIENT)
Dept: INTERNAL MEDICINE | Facility: CLINIC | Age: 40
End: 2020-01-14

## 2020-01-14 NOTE — PROGRESS NOTES
Your xray does not show any signs of fracture or other bony injury. Try to rest your foot as much as you can and get a soft, stretchy pull on wrap to give your ankle some support. If the pain continues, let me know and I will refer you to physical therapy.

## 2020-01-14 NOTE — TELEPHONE ENCOUNTER
lvm for pt that I have mailed the work excuse out or she can come in the office and we can print it out for her

## 2020-01-14 NOTE — TELEPHONE ENCOUNTER
PATIENT CALLED SHE WAS IN YESTERDAY FOR A 1 MONTH FOLLOW UP. SHE WANTED TO KNOW IF SHE COULD GET A DOCTORS EXCUSE FOR TODAY. I TOLD HER I WOULD NEED TO ASK HER DOCTOR TO SEE IF THAT WOULD BE OKAY.

## 2020-01-15 NOTE — TELEPHONE ENCOUNTER
PT CALLED; WOULD LIKE LETTER FAXED TO [facility] ATTN:  MARINO ADAME ([phone]).    FAXED LETTER.    NCB

## 2020-01-27 ENCOUNTER — OFFICE VISIT (OUTPATIENT)
Dept: INTERNAL MEDICINE | Facility: CLINIC | Age: 40
End: 2020-01-27

## 2020-01-27 VITALS
DIASTOLIC BLOOD PRESSURE: 80 MMHG | OXYGEN SATURATION: 99 % | SYSTOLIC BLOOD PRESSURE: 118 MMHG | BODY MASS INDEX: 35.28 KG/M2 | TEMPERATURE: 99 F | HEART RATE: 73 BPM | HEIGHT: 59 IN | WEIGHT: 175 LBS

## 2020-01-27 DIAGNOSIS — J00 ACUTE NASOPHARYNGITIS: ICD-10-CM

## 2020-01-27 DIAGNOSIS — R68.89 FLU-LIKE SYMPTOMS: Primary | ICD-10-CM

## 2020-01-27 DIAGNOSIS — R21 RASH AND NONSPECIFIC SKIN ERUPTION: ICD-10-CM

## 2020-01-27 PROCEDURE — 87804 INFLUENZA ASSAY W/OPTIC: CPT | Performed by: NURSE PRACTITIONER

## 2020-01-27 PROCEDURE — 99213 OFFICE O/P EST LOW 20 MIN: CPT | Performed by: NURSE PRACTITIONER

## 2020-01-27 RX ORDER — BROMPHENIRAMINE MALEATE, PSEUDOEPHEDRINE HYDROCHLORIDE, AND DEXTROMETHORPHAN HYDROBROMIDE 2; 30; 10 MG/5ML; MG/5ML; MG/5ML
5 SYRUP ORAL 4 TIMES DAILY PRN
Qty: 240 ML | Refills: 0 | Status: SHIPPED | OUTPATIENT
Start: 2020-01-27 | End: 2020-05-01

## 2020-01-27 NOTE — PROGRESS NOTES
Chief Complaint   Patient presents with   • URI     x2 weeks       History of Present Illness    Bernardo Dodd is a 39 y.o. female who presents today for URI.    URI    This is a new problem. Episode onset: 2 weeks. The problem has been gradually worsening. There has been no fever. Associated symptoms include coughing, ear pain, nausea, a rash (diffuse, no bumps, itching), rhinorrhea and a sore throat. Pertinent negatives include no abdominal pain, chest pain, congestion, diarrhea, headaches, joint pain, joint swelling, plugged ear sensation, sinus pain, sneezing, swollen glands, vomiting or wheezing. Treatments tried: Augmentin finished 1 week ago, started on Bactrim by ENT 7 days ago.        [facility]    The following portions of the patient's history were reviewed and updated as appropriate: allergies, current medications, past family history, past medical history, past social history, past surgical history and problem list.     Social History     Tobacco Use   • Smoking status: Former Smoker     Packs/day: 1.00     Years: 15.00     Pack years: 15.00     Types: Cigarettes     Last attempt to quit: 2015     Years since quittin.0   • Smokeless tobacco: Never Used   Substance Use Topics   • Alcohol use: No     Comment: socially       Past Medical History:   Diagnosis Date   • Allergic rhinitis    • Anemia    • Arthritis    • Asthma    • Depression    • FHx: migraine headaches    • GERD (gastroesophageal reflux disease)    • Heart murmur    • Herpes simplex    • History of chest x-ray 2015    no active disease   • History of echocardiogram 2015    ejection fraction of greater than 65%, mitral and pulmonic regurgitation an physiological tricuspid regurgitation.   • History of PFTs 2015    spirometry data acceptable and reproducible; pt given 4 puffs of Ventolin; pt gave good effort; no obstruction; no Bd response; MVV reduced    • History of PFTs 2015    pt gave best effort; duoneb  given prior and post study; moderate nonspecific proportional reduction of FEV1 and FVC with preserved ratio; FEV1 moderately reduced; cannot rule out restriction   • Hypertension    • Migraine    • Ovarian cyst    • Seizures (CMS/HCC)    • Thigh shingles        Past Surgical History:   Procedure Laterality Date   • BILATERAL BREAST REDUCTION     • BREAST SURGERY      breast reduction   •  SECTION     • CYSTOSCOPY     • LAPAROSCOPIC TUBAL LIGATION     • ORIF ANKLE FRACTURE Right    • REDUCTION MAMMAPLASTY Bilateral    • TENSION FREE VAGINAL TAPING WITH MINI ARC SLING      Dr Doyle avery        Family History   Problem Relation Age of Onset   • Pancreatic cancer Maternal Grandmother    • Heart failure Paternal Grandmother    • MARCIE disease Paternal Aunt    • Arthritis Mother    • Cancer Mother    • Arthritis Father    • Diabetes Neg Hx    • Heart attack Neg Hx    • Hypertension Neg Hx    • Hyperlipidemia Neg Hx    • Mental illness Neg Hx    • Obesity Neg Hx    • Stroke Neg Hx        Allergies   Allergen Reactions   • Naproxen Swelling         Current Outpatient Medications:   •  ADVAIR DISKUS 500-50 MCG/DOSE DISKUS, Inhale 1 puff 2 (Two) Times a Day., Disp: , Rfl:   •  albuterol (PROVENTIL) (2.5 MG/3ML) 0.083% nebulizer solution, Take 2.5 mg by nebulization Every 12 (Twelve) Hours., Disp: 30 vial, Rfl: 1  •  amitriptyline (ELAVIL) 25 MG tablet, Take 2 tablets by mouth Every Night., Disp: 60 tablet, Rfl: 3  •  amLODIPine (NORVASC) 5 MG tablet, Take 1 tablet by mouth Daily., Disp: 30 tablet, Rfl: 5  •  azelastine (ASTELIN) 0.1 % nasal spray, 1 spray each nostril two times daily, Disp: 1 each, Rfl: 5  •  azelastine (OPTIVAR) 0.05 % ophthalmic solution, Administer 1 drop to both eyes 2 (Two) Times a Day., Disp: 6 mL, Rfl: 12  •  Cholecalciferol (VITAMIN D3) 125 MCG (5000 UT) capsule capsule, Take 1 capsule by mouth Daily., Disp: 30 capsule, Rfl: 5  •  ferrous sulfate 325 (65 FE) MG tablet, Take 1 tablet  by mouth Daily With Breakfast. Resume Iron when antibiotics finished., Disp: 30 tablet, Rfl: 5  •  fluticasone (FLONASE) 50 MCG/ACT nasal spray, 2 sprays into the nostril(s) as directed by provider Daily., Disp: 16 g, Rfl: 5  •  gabapentin (NEURONTIN) 400 MG capsule, Take 400 mg by mouth 5 (Five) Times a Day As Needed., Disp: , Rfl:   •  lamoTRIgine (LaMICtal) 100 MG tablet, Take 1 tablet by mouth Daily., Disp: 30 tablet, Rfl: 3  •  loratadine-pseudoephedrine (CLARITIN-D 12 HOUR) 5-120 MG per 12 hr tablet, Take 1 tablet by mouth 2 (Two) Times a Day., Disp: 60 tablet, Rfl: 5  •  montelukast (SINGULAIR) 10 MG tablet, Take 10 mg by mouth every night., Disp: , Rfl:   •  omeprazole (priLOSEC) 40 MG capsule, Take 1 capsule by mouth 2 (Two) Times a Day. Take on an empty stomach and eat 30 minutes- 1 hr after eating., Disp: 90 capsule, Rfl: 5  •  SPIRIVA RESPIMAT 2.5 MCG/ACT aerosol solution, Take 2 puffs by mouth Daily., Disp: , Rfl:   •  brompheniramine-pseudoephedrine-DM 30-2-10 MG/5ML syrup, Take 5 mL by mouth 4 (Four) Times a Day As Needed for Congestion or Cough., Disp: 240 mL, Rfl: 0  •  triamcinolone (KENALOG) 0.1 % ointment, Apply  topically to the appropriate area as directed 2 (Two) Times a Day., Disp: 30 g, Rfl: 0    Review of Systems  Review of Systems   Constitutional: Positive for diaphoresis. Negative for chills, fatigue and fever.   HENT: Positive for ear pain, rhinorrhea, sinus pressure and sore throat. Negative for congestion, postnasal drip, sinus pain and sneezing.    Eyes: Negative for visual disturbance.   Respiratory: Positive for cough. Negative for chest tightness, shortness of breath and wheezing.    Cardiovascular: Negative for chest pain and palpitations.   Gastrointestinal: Positive for nausea. Negative for abdominal pain, constipation, diarrhea and vomiting.   Musculoskeletal: Negative for joint pain and myalgias.   Skin: Positive for rash (diffuse, no bumps, itching). Negative for color  "change.   Neurological: Negative for dizziness, light-headedness, numbness and headaches.   Psychiatric/Behavioral: Negative for sleep disturbance.       Vitals:  Vitals:    01/27/20 0751   BP: 118/80   Pulse: 73   Temp: 99 °F (37.2 °C)   SpO2: 99%   Weight: 79.4 kg (175 lb)   Height: 149.9 cm (59\")   PainSc: 0-No pain       Physical Exam  Physical Exam   Constitutional: She is oriented to person, place, and time. Vital signs are normal. She appears well-developed and well-nourished.   HENT:   Head: Normocephalic and atraumatic.   Right Ear: Tympanic membrane, external ear and ear canal normal.   Left Ear: Tympanic membrane, external ear and ear canal normal.   Nose: Mucosal edema present. No rhinorrhea, nasal deformity or congestion. Right sinus exhibits no maxillary sinus tenderness and no frontal sinus tenderness. Left sinus exhibits no maxillary sinus tenderness and no frontal sinus tenderness.   Mouth/Throat: Uvula is midline, oropharynx is clear and moist and mucous membranes are normal. No tonsillar exudate.   Eyes: Pupils are equal, round, and reactive to light. Conjunctivae are normal.   Neck: Trachea normal, normal range of motion and phonation normal. Neck supple.   Cardiovascular: Normal rate, regular rhythm and normal heart sounds.   Pulmonary/Chest: Effort normal and breath sounds normal. No respiratory distress. She has no decreased breath sounds. She has no wheezes. She has no rhonchi. She has no rales.   Lymphadenopathy:        Head (right side): No submental, no submandibular, no tonsillar, no preauricular and no posterior auricular adenopathy present.        Head (left side): No submental, no submandibular, no tonsillar, no preauricular and no posterior auricular adenopathy present.     She has no cervical adenopathy.   Neurological: She is alert and oriented to person, place, and time.   Skin: Skin is warm and dry. No rash noted.   Psychiatric: She has a normal mood and affect. Her behavior is " normal.   Nursing note and vitals reviewed.      Labs  Results for orders placed or performed in visit on 01/27/20   POCT Influenza A/B   Result Value Ref Range    Rapid Influenza A Ag Negative Negative    Rapid Influenza B Ag Negative Negative    Internal Control Passed Passed    Lot Number [id number]     Expiration Date 05-        Assessment/Plan    Bernardo was seen today for uri.    Diagnoses and all orders for this visit:    Flu-like symptoms  -     POCT Influenza A/B    Acute nasopharyngitis  -     brompheniramine-pseudoephedrine-DM 30-2-10 MG/5ML syrup; Take 5 mL by mouth 4 (Four) Times a Day As Needed for Congestion or Cough.    Rash and nonspecific skin eruption  -     triamcinolone (KENALOG) 0.1 % ointment; Apply  topically to the appropriate area as directed 2 (Two) Times a Day.    Discussed Viral vs. Bacterial etiology. Signs and symptoms consistent with viral infection at this time. Advised in symptomatic relief with recommended OTC medications or prescribed medications this visit. Encouraged rest and hydration. Salt water gargles/chloraseptic spray for sore throat prn. Tylenol/Ibuprofen prn for fevers, HA, body aches. Advised in hygiene measures and time frame of 7-10 days for resolution of symptoms. Follow-up with PCP for new, worsening, or persistent symptoms.      Plan of care reviewed with patient at conclusion of today's visit. Patient education was provided regarding diagnosis, management, and prescribed or recommended OTC medications. Patient was informed to notify office of any new, worsening, or persistent symptoms. Patient verbalized understanding and agreement with plan of care.     Follow-Up  Return if symptoms worsen or fail to improve.    Electronically Signed By:  SHANE Mancera

## 2020-01-28 ENCOUNTER — OFFICE VISIT (OUTPATIENT)
Dept: INTERNAL MEDICINE | Facility: CLINIC | Age: 40
End: 2020-01-28

## 2020-01-28 VITALS
OXYGEN SATURATION: 97 % | BODY MASS INDEX: 35.35 KG/M2 | TEMPERATURE: 98.8 F | HEART RATE: 96 BPM | SYSTOLIC BLOOD PRESSURE: 124 MMHG | DIASTOLIC BLOOD PRESSURE: 72 MMHG | WEIGHT: 175 LBS

## 2020-01-28 DIAGNOSIS — L27.0 GENERALIZED SKIN ERUPTION DUE TO DRUGS AND MEDICAMENTS TAKEN INTERNALLY: Primary | ICD-10-CM

## 2020-01-28 PROCEDURE — 96372 THER/PROPH/DIAG INJ SC/IM: CPT | Performed by: NURSE PRACTITIONER

## 2020-01-28 PROCEDURE — 99213 OFFICE O/P EST LOW 20 MIN: CPT | Performed by: NURSE PRACTITIONER

## 2020-01-28 RX ORDER — PREDNISONE 10 MG/1
30 TABLET ORAL DAILY
Qty: 12 TABLET | Refills: 0 | Status: SHIPPED | OUTPATIENT
Start: 2020-01-28 | End: 2020-03-13 | Stop reason: SDUPTHER

## 2020-01-28 RX ORDER — METHYLPREDNISOLONE ACETATE 40 MG/ML
40 INJECTION, SUSPENSION INTRA-ARTICULAR; INTRALESIONAL; INTRAMUSCULAR; SOFT TISSUE ONCE
Status: COMPLETED | OUTPATIENT
Start: 2020-01-28 | End: 2020-01-28

## 2020-01-28 RX ORDER — HYDROXYZINE HYDROCHLORIDE 25 MG/1
25 TABLET, FILM COATED ORAL EVERY 6 HOURS PRN
Qty: 12 TABLET | Refills: 0 | Status: SHIPPED | OUTPATIENT
Start: 2020-01-28 | End: 2020-05-01

## 2020-01-28 RX ADMIN — METHYLPREDNISOLONE ACETATE 40 MG: 40 INJECTION, SUSPENSION INTRA-ARTICULAR; INTRALESIONAL; INTRAMUSCULAR; SOFT TISSUE at 12:45

## 2020-01-28 NOTE — PROGRESS NOTES
Chief Complaint   Patient presents with   • Rash       History of Present Illness    Bernardo Dodd is a 39 y.o. female who presents today for Rash.    Rash   This is a new problem. The current episode started in the past 7 days ( Patient started on Augmentin on  by ENT.  Started Bactrim .  Rash and itching first noticed 6 days ago.). The problem has been gradually worsening since onset. The affected locations include the back, torso, left upper leg, left lower leg, right arm, right buttock, left buttock, left arm, right upper leg and right lower leg. The rash is characterized by burning, itchiness and redness. She was exposed to a new medication. Pertinent negatives include no anorexia, congestion, cough, diarrhea, facial edema, fatigue, fever, rhinorrhea, shortness of breath, sore throat or vomiting. Past treatments include topical steroids. The treatment provided mild relief.       [facility]    The following portions of the patient's history were reviewed and updated as appropriate: allergies, current medications, past family history, past medical history, past social history, past surgical history and problem list.     Social History     Tobacco Use   • Smoking status: Former Smoker     Packs/day: 1.00     Years: 15.00     Pack years: 15.00     Types: Cigarettes     Last attempt to quit: 2015     Years since quittin.0   • Smokeless tobacco: Never Used   Substance Use Topics   • Alcohol use: No     Comment: socially       Past Medical History:   Diagnosis Date   • Allergic rhinitis    • Anemia    • Arthritis    • Asthma    • Depression    • FHx: migraine headaches    • GERD (gastroesophageal reflux disease)    • Heart murmur    • Herpes simplex    • History of chest x-ray 2015    no active disease   • History of echocardiogram 2015    ejection fraction of greater than 65%, mitral and pulmonic regurgitation an physiological tricuspid regurgitation.   • History of PFTs 2015     spirometry data acceptable and reproducible; pt given 4 puffs of Ventolin; pt gave good effort; no obstruction; no Bd response; MVV reduced    • History of PFTs 2015    pt gave best effort; duoneb given prior and post study; moderate nonspecific proportional reduction of FEV1 and FVC with preserved ratio; FEV1 moderately reduced; cannot rule out restriction   • Hypertension    • Migraine    • Ovarian cyst    • Seizures (CMS/HCC)    • Thigh shingles        Past Surgical History:   Procedure Laterality Date   • BILATERAL BREAST REDUCTION     • BREAST SURGERY      breast reduction   •  SECTION     • CYSTOSCOPY     • LAPAROSCOPIC TUBAL LIGATION     • ORIF ANKLE FRACTURE Right    • REDUCTION MAMMAPLASTY Bilateral    • TENSION FREE VAGINAL TAPING WITH MINI ARC SLING      Dr Doyle avery        Family History   Problem Relation Age of Onset   • Pancreatic cancer Maternal Grandmother    • Heart failure Paternal Grandmother    • MARCIE disease Paternal Aunt    • Arthritis Mother    • Cancer Mother    • Arthritis Father    • Diabetes Neg Hx    • Heart attack Neg Hx    • Hypertension Neg Hx    • Hyperlipidemia Neg Hx    • Mental illness Neg Hx    • Obesity Neg Hx    • Stroke Neg Hx        Allergies   Allergen Reactions   • Naproxen Swelling         Current Outpatient Medications:   •  ADVAIR DISKUS 500-50 MCG/DOSE DISKUS, Inhale 1 puff 2 (Two) Times a Day., Disp: , Rfl:   •  albuterol (PROVENTIL) (2.5 MG/3ML) 0.083% nebulizer solution, Take 2.5 mg by nebulization Every 12 (Twelve) Hours., Disp: 30 vial, Rfl: 1  •  amitriptyline (ELAVIL) 25 MG tablet, Take 2 tablets by mouth Every Night., Disp: 60 tablet, Rfl: 3  •  amLODIPine (NORVASC) 5 MG tablet, Take 1 tablet by mouth Daily., Disp: 30 tablet, Rfl: 5  •  azelastine (ASTELIN) 0.1 % nasal spray, 1 spray each nostril two times daily, Disp: 1 each, Rfl: 5  •  azelastine (OPTIVAR) 0.05 % ophthalmic solution, Administer 1 drop to both eyes 2 (Two) Times a Day.,  Disp: 6 mL, Rfl: 12  •  brompheniramine-pseudoephedrine-DM 30-2-10 MG/5ML syrup, Take 5 mL by mouth 4 (Four) Times a Day As Needed for Congestion or Cough., Disp: 240 mL, Rfl: 0  •  Cholecalciferol (VITAMIN D3) 125 MCG (5000 UT) capsule capsule, Take 1 capsule by mouth Daily., Disp: 30 capsule, Rfl: 5  •  ferrous sulfate 325 (65 FE) MG tablet, Take 1 tablet by mouth Daily With Breakfast. Resume Iron when antibiotics finished., Disp: 30 tablet, Rfl: 5  •  fluticasone (FLONASE) 50 MCG/ACT nasal spray, 2 sprays into the nostril(s) as directed by provider Daily., Disp: 16 g, Rfl: 5  •  gabapentin (NEURONTIN) 400 MG capsule, Take 400 mg by mouth 5 (Five) Times a Day As Needed., Disp: , Rfl:   •  hydrOXYzine (ATARAX) 25 MG tablet, Take 1 tablet by mouth Every 6 (Six) Hours As Needed for Itching., Disp: 12 tablet, Rfl: 0  •  lamoTRIgine (LaMICtal) 100 MG tablet, Take 1 tablet by mouth Daily., Disp: 30 tablet, Rfl: 3  •  loratadine-pseudoephedrine (CLARITIN-D 12 HOUR) 5-120 MG per 12 hr tablet, Take 1 tablet by mouth 2 (Two) Times a Day., Disp: 60 tablet, Rfl: 5  •  montelukast (SINGULAIR) 10 MG tablet, Take 10 mg by mouth every night., Disp: , Rfl:   •  omeprazole (priLOSEC) 40 MG capsule, Take 1 capsule by mouth 2 (Two) Times a Day. Take on an empty stomach and eat 30 minutes- 1 hr after eating., Disp: 90 capsule, Rfl: 5  •  predniSONE (DELTASONE) 10 MG tablet, Take 3 tablets by mouth Daily., Disp: 12 tablet, Rfl: 0  •  SPIRIVA RESPIMAT 2.5 MCG/ACT aerosol solution, Take 2 puffs by mouth Daily., Disp: , Rfl:   •  triamcinolone (KENALOG) 0.1 % ointment, Apply  topically to the appropriate area as directed 2 (Two) Times a Day., Disp: 30 g, Rfl: 0    Current Facility-Administered Medications:   •  methylPREDNISolone acetate (DEPO-medrol) injection 40 mg, 40 mg, Intramuscular, Once, Cari White APRN    Review of Systems  Review of Systems   Constitutional: Negative for chills, diaphoresis, fatigue and fever.   HENT:  Negative for congestion, rhinorrhea and sore throat.    Respiratory: Negative for cough and shortness of breath.    Cardiovascular: Negative.  Negative for chest pain and palpitations.   Gastrointestinal: Negative for anorexia, diarrhea, nausea and vomiting.   Skin: Positive for color change and rash.   Allergic/Immunologic: Negative for environmental allergies and food allergies.   Neurological: Negative for dizziness, light-headedness and headaches.   Psychiatric/Behavioral: Negative for sleep disturbance.       Vitals:  Vitals:    01/28/20 1230   BP: 124/72   BP Location: Left arm   Patient Position: Sitting   Cuff Size: Adult   Pulse: 96   Temp: 98.8 °F (37.1 °C)   SpO2: 97%   Weight: 79.4 kg (175 lb)       Physical Exam  Physical Exam   Constitutional: She is oriented to person, place, and time. Vital signs are normal. She appears well-developed and well-nourished.   Cardiovascular: Normal rate, regular rhythm and normal heart sounds.   Pulmonary/Chest: Effort normal and breath sounds normal. She has no decreased breath sounds. She has no wheezes.   Neurological: She is alert and oriented to person, place, and time.   Skin: Skin is warm and dry. Rash noted. Rash is maculopapular.   Generalized maculopapular rash with erythema and excoriation.  No open wounds, sores, or drainage noted.  Patient scratching excessively.     Psychiatric: She has a normal mood and affect. Her behavior is normal.   Nursing note and vitals reviewed.      Labs  None.    Assessment/Plan  Bernardo was seen today for rash.    Diagnoses and all orders for this visit:    Generalized skin eruption due to drugs and medicaments taken internally  -     hydrOXYzine (ATARAX) 25 MG tablet; Take 1 tablet by mouth Every 6 (Six) Hours As Needed for Itching.  -     predniSONE (DELTASONE) 10 MG tablet; Take 3 tablets by mouth Daily.  -     methylPREDNISolone acetate (DEPO-medrol) injection 40 mg    Steroid injection in office today given severity of  symptoms. Start oral prednisone burst therapy tomorrow am. Hydroxyzine prn for itching. Advised patient to stop Bactrim and follow-up with ENT as planned 2/3/20 for further evaluation and management.     Plan of care reviewed with patient at conclusion of today's visit. Patient education was provided regarding diagnosis, management, and prescribed or recommended OTC medications. Patient was informed to notify office of any new, worsening, or persistent symptoms. Patient verbalized understanding and agreement with plan of care.     Follow-Up  Return if symptoms worsen or fail to improve.    Electronically Signed By:  SHANE Mancera

## 2020-01-29 ENCOUNTER — TELEPHONE (OUTPATIENT)
Dept: INTERNAL MEDICINE | Facility: CLINIC | Age: 40
End: 2020-01-29

## 2020-01-29 NOTE — TELEPHONE ENCOUNTER
Patient called in to request referral to ENT. Current referral conflicts with Job, needs to be seen on friday.     Callback Number confirmed     Please Advise

## 2020-01-31 ENCOUNTER — OFFICE VISIT (OUTPATIENT)
Dept: INTERNAL MEDICINE | Facility: CLINIC | Age: 40
End: 2020-01-31

## 2020-01-31 VITALS
HEART RATE: 88 BPM | SYSTOLIC BLOOD PRESSURE: 130 MMHG | DIASTOLIC BLOOD PRESSURE: 84 MMHG | WEIGHT: 175.6 LBS | OXYGEN SATURATION: 94 % | BODY MASS INDEX: 35.47 KG/M2

## 2020-01-31 DIAGNOSIS — H60.501 ACUTE OTITIS EXTERNA OF RIGHT EAR, UNSPECIFIED TYPE: Primary | ICD-10-CM

## 2020-01-31 DIAGNOSIS — H66.001 NON-RECURRENT ACUTE SUPPURATIVE OTITIS MEDIA OF RIGHT EAR WITHOUT SPONTANEOUS RUPTURE OF TYMPANIC MEMBRANE: ICD-10-CM

## 2020-01-31 PROCEDURE — 99213 OFFICE O/P EST LOW 20 MIN: CPT | Performed by: PHYSICIAN ASSISTANT

## 2020-01-31 RX ORDER — CIPROFLOXACIN AND DEXAMETHASONE 3; 1 MG/ML; MG/ML
4 SUSPENSION/ DROPS AURICULAR (OTIC) 2 TIMES DAILY
Qty: 7.5 ML | Refills: 0 | Status: SHIPPED | OUTPATIENT
Start: 2020-01-31 | End: 2020-05-01

## 2020-01-31 RX ORDER — CEFDINIR 300 MG/1
300 CAPSULE ORAL 2 TIMES DAILY
Qty: 20 CAPSULE | Refills: 0 | OUTPATIENT
Start: 2020-01-31 | End: 2020-02-10

## 2020-01-31 NOTE — PROGRESS NOTES
Chief Complaint   Patient presents with   • ENT referral     Acute       Subjective   Bernardo Dodd is a 39 y.o. female.       History of Present Illness     Pt has previously seen Dr Stanley, ENT, who only sees patients on Mondays. She is not able to go on Mondays because of her schedule. Last saw him at the beginning of January. Was started on Augmentin for treatment of infection in her left nose and left ear. Changed to Bactrim and had allergic reaction to it.    Was seen in our office and treated with steroids. Reaction has resolved.    Yesterday pt had drainage from her right ear. She put the tip of a cotton swab in her ear to absorb the drainage, the end of it came off and she has been unable to get it out.    Notes that she used to leave the tip of a cotton swab in her left ear canal- would pull it out at night. Does not do that anymore.      Current Outpatient Medications:   •  ADVAIR DISKUS 500-50 MCG/DOSE DISKUS, Inhale 1 puff 2 (Two) Times a Day., Disp: , Rfl:   •  albuterol (PROVENTIL) (2.5 MG/3ML) 0.083% nebulizer solution, Take 2.5 mg by nebulization Every 12 (Twelve) Hours., Disp: 30 vial, Rfl: 1  •  amitriptyline (ELAVIL) 25 MG tablet, Take 2 tablets by mouth Every Night., Disp: 60 tablet, Rfl: 3  •  amLODIPine (NORVASC) 5 MG tablet, Take 1 tablet by mouth Daily., Disp: 30 tablet, Rfl: 5  •  azelastine (ASTELIN) 0.1 % nasal spray, 1 spray each nostril two times daily, Disp: 1 each, Rfl: 5  •  azelastine (OPTIVAR) 0.05 % ophthalmic solution, Administer 1 drop to both eyes 2 (Two) Times a Day., Disp: 6 mL, Rfl: 12  •  brompheniramine-pseudoephedrine-DM 30-2-10 MG/5ML syrup, Take 5 mL by mouth 4 (Four) Times a Day As Needed for Congestion or Cough., Disp: 240 mL, Rfl: 0  •  Cholecalciferol (VITAMIN D3) 125 MCG (5000 UT) capsule capsule, Take 1 capsule by mouth Daily., Disp: 30 capsule, Rfl: 5  •  ferrous sulfate 325 (65 FE) MG tablet, Take 1 tablet by mouth Daily With Breakfast. Resume Iron when  antibiotics finished., Disp: 30 tablet, Rfl: 5  •  fluticasone (FLONASE) 50 MCG/ACT nasal spray, 2 sprays into the nostril(s) as directed by provider Daily., Disp: 16 g, Rfl: 5  •  gabapentin (NEURONTIN) 400 MG capsule, Take 400 mg by mouth 5 (Five) Times a Day As Needed., Disp: , Rfl:   •  hydrOXYzine (ATARAX) 25 MG tablet, Take 1 tablet by mouth Every 6 (Six) Hours As Needed for Itching., Disp: 12 tablet, Rfl: 0  •  lamoTRIgine (LaMICtal) 100 MG tablet, Take 1 tablet by mouth Daily., Disp: 30 tablet, Rfl: 3  •  loratadine-pseudoephedrine (CLARITIN-D 12 HOUR) 5-120 MG per 12 hr tablet, Take 1 tablet by mouth 2 (Two) Times a Day., Disp: 60 tablet, Rfl: 5  •  montelukast (SINGULAIR) 10 MG tablet, Take 10 mg by mouth every night., Disp: , Rfl:   •  omeprazole (priLOSEC) 40 MG capsule, Take 1 capsule by mouth 2 (Two) Times a Day. Take on an empty stomach and eat 30 minutes- 1 hr after eating., Disp: 90 capsule, Rfl: 5  •  predniSONE (DELTASONE) 10 MG tablet, Take 3 tablets by mouth Daily., Disp: 12 tablet, Rfl: 0  •  SPIRIVA RESPIMAT 2.5 MCG/ACT aerosol solution, Take 2 puffs by mouth Daily., Disp: , Rfl:   •  triamcinolone (KENALOG) 0.1 % ointment, Apply  topically to the appropriate area as directed 2 (Two) Times a Day., Disp: 30 g, Rfl: 0  •  cefdinir (OMNICEF) 300 MG capsule, Take 1 capsule by mouth 2 (Two) Times a Day., Disp: 20 capsule, Rfl: 0  •  ciprofloxacin-dexamethasone (CIPRODEX) 0.3-0.1 % otic suspension, Administer 4 drops to the right ear 2 (Two) Times a Day., Disp: 7.5 mL, Rfl: 0     PMFSH  The following portions of the patient's history were reviewed and updated as appropriate: allergies, current medications, past family history, past medical history, past social history, past surgical history and problem list.    Review of Systems   Constitutional: Negative for activity change, appetite change and fatigue.   HENT: Positive for congestion, ear discharge and rhinorrhea. Negative for ear pain, postnasal  drip and sinus pain.    Respiratory: Negative for chest tightness and shortness of breath.    Cardiovascular: Negative for chest pain and palpitations.   Gastrointestinal: Negative for abdominal pain.   Genitourinary: Negative for dysuria.   Musculoskeletal: Negative for arthralgias and myalgias.   Neurological: Negative for dizziness, weakness, light-headedness and headaches.   Psychiatric/Behavioral: Negative for dysphoric mood. The patient is not nervous/anxious.        Objective   /84   Pulse 88   Wt 79.7 kg (175 lb 9.6 oz)   SpO2 94%   BMI 35.47 kg/m²     Physical Exam   Constitutional: She appears well-developed and well-nourished.   HENT:   Head: Normocephalic.   Right Ear: Hearing, external ear and ear canal normal. There is drainage and swelling. No tenderness. Tympanic membrane is erythematous and retracted.   Left Ear: Hearing, tympanic membrane, external ear and ear canal normal.   Nose: Nose normal.   Mouth/Throat: Oropharynx is clear and moist.   Eyes: Pupils are equal, round, and reactive to light. Conjunctivae are normal.   Neck: Normal range of motion.   Cardiovascular: Normal rate, regular rhythm and normal heart sounds.   Pulmonary/Chest: Effort normal and breath sounds normal. She has no decreased breath sounds. She has no wheezes. She has no rhonchi. She has no rales.   Musculoskeletal: Normal range of motion.   Neurological: She is alert.   Skin: Skin is warm and dry.   Psychiatric: She has a normal mood and affect. Her behavior is normal.   Nursing note and vitals reviewed.        ASSESSMENT/PLAN    Problem List Items Addressed This Visit     None      Visit Diagnoses     Acute otitis externa of right ear, unspecified type    -  Primary    Treat with ciprodex as ordered. Spoke with Dr Dowd's office and they have providers that can see her on a different day. Gave pt the number to schedule appt.    Relevant Medications    ciprofloxacin-dexamethasone (CIPRODEX) 0.3-0.1 % otic  suspension    Non-recurrent acute suppurative otitis media of right ear without spontaneous rupture of tympanic membrane        Take omnicef as directed, with food and probiotic. Call ENT to schedule an appointment.    Relevant Medications    cefdinir (OMNICEF) 300 MG capsule               Return in about 3 months (around 4/30/2020) for Follow up.

## 2020-02-07 NOTE — TELEPHONE ENCOUNTER
[facility], insurance would not pay for the ciprodex

## 2020-02-09 ENCOUNTER — HOSPITAL ENCOUNTER (EMERGENCY)
Facility: HOSPITAL | Age: 40
Discharge: HOME | End: 2020-02-10
Attending: EMERGENCY MEDICINE | Admitting: EMERGENCY MEDICINE

## 2020-02-09 DIAGNOSIS — G89.29 CHRONIC ABDOMINAL PAIN: ICD-10-CM

## 2020-02-09 DIAGNOSIS — R10.9 CHRONIC ABDOMINAL PAIN: ICD-10-CM

## 2020-02-09 DIAGNOSIS — E87.6 HYPOKALEMIA: ICD-10-CM

## 2020-02-09 DIAGNOSIS — H66.91 ACUTE RIGHT OTITIS MEDIA: Primary | ICD-10-CM

## 2020-02-09 PROCEDURE — 81025 URINE PREGNANCY TEST: CPT | Performed by: EMERGENCY MEDICINE

## 2020-02-09 PROCEDURE — 36415 COLL VENOUS BLD VENIPUNCTURE: CPT

## 2020-02-09 PROCEDURE — 99283 EMERGENCY DEPT VISIT LOW MDM: CPT

## 2020-02-09 RX ORDER — DICYCLOMINE HYDROCHLORIDE 10 MG/1
20 CAPSULE ORAL ONCE
Status: COMPLETED | OUTPATIENT
Start: 2020-02-09 | End: 2020-02-10

## 2020-02-09 RX ORDER — SODIUM CHLORIDE 0.9 % (FLUSH) 0.9 %
10 SYRINGE (ML) INJECTION AS NEEDED
Status: DISCONTINUED | OUTPATIENT
Start: 2020-02-09 | End: 2020-02-10 | Stop reason: HOSPADM

## 2020-02-10 VITALS
DIASTOLIC BLOOD PRESSURE: 92 MMHG | BODY MASS INDEX: 35.48 KG/M2 | HEART RATE: 78 BPM | WEIGHT: 176 LBS | HEIGHT: 59 IN | OXYGEN SATURATION: 96 % | SYSTOLIC BLOOD PRESSURE: 136 MMHG | TEMPERATURE: 98 F | RESPIRATION RATE: 18 BRPM

## 2020-02-10 LAB
ALBUMIN SERPL-MCNC: 4 G/DL (ref 3.5–5.2)
ALBUMIN/GLOB SERPL: 1.3 G/DL
ALP SERPL-CCNC: 68 U/L (ref 39–117)
ALT SERPL W P-5'-P-CCNC: 11 U/L (ref 1–33)
ANION GAP SERPL CALCULATED.3IONS-SCNC: 14 MMOL/L (ref 5–15)
AST SERPL-CCNC: 15 U/L (ref 1–32)
B-HCG UR QL: NEGATIVE
BACTERIA UR QL AUTO: ABNORMAL /HPF
BASOPHILS # BLD AUTO: 0.04 10*3/MM3 (ref 0–0.2)
BASOPHILS NFR BLD AUTO: 0.4 % (ref 0–1.5)
BILIRUB SERPL-MCNC: 0.4 MG/DL (ref 0.2–1.2)
BILIRUB UR QL STRIP: NEGATIVE
BUN BLD-MCNC: 10 MG/DL (ref 6–20)
BUN/CREAT SERPL: 16.1 (ref 7–25)
CALCIUM SPEC-SCNC: 8.8 MG/DL (ref 8.6–10.5)
CHLORIDE SERPL-SCNC: 100 MMOL/L (ref 98–107)
CLARITY UR: ABNORMAL
CO2 SERPL-SCNC: 25 MMOL/L (ref 22–29)
COD CRY URNS QL: ABNORMAL /HPF
COLOR UR: YELLOW
CREAT BLD-MCNC: 0.62 MG/DL (ref 0.57–1)
DEPRECATED RDW RBC AUTO: 43.9 FL (ref 37–54)
EOSINOPHIL # BLD AUTO: 0.11 10*3/MM3 (ref 0–0.4)
EOSINOPHIL NFR BLD AUTO: 1 % (ref 0.3–6.2)
ERYTHROCYTE [DISTWIDTH] IN BLOOD BY AUTOMATED COUNT: 14.4 % (ref 12.3–15.4)
GFR SERPL CREATININE-BSD FRML MDRD: 129 ML/MIN/1.73
GLOBULIN UR ELPH-MCNC: 3.2 GM/DL
GLUCOSE BLD-MCNC: 109 MG/DL (ref 65–99)
GLUCOSE UR STRIP-MCNC: NEGATIVE MG/DL
HCT VFR BLD AUTO: 38.6 % (ref 34–46.6)
HGB BLD-MCNC: 12.4 G/DL (ref 12–15.9)
HGB UR QL STRIP.AUTO: NEGATIVE
HYALINE CASTS UR QL AUTO: ABNORMAL /LPF
IMM GRANULOCYTES # BLD AUTO: 0.03 10*3/MM3 (ref 0–0.05)
IMM GRANULOCYTES NFR BLD AUTO: 0.3 % (ref 0–0.5)
INTERNAL NEGATIVE CONTROL: NEGATIVE
INTERNAL POSITIVE CONTROL: POSITIVE
KETONES UR QL STRIP: ABNORMAL
LEUKOCYTE ESTERASE UR QL STRIP.AUTO: NEGATIVE
LIPASE SERPL-CCNC: 52 U/L (ref 13–60)
LYMPHOCYTES # BLD AUTO: 3.7 10*3/MM3 (ref 0.7–3.1)
LYMPHOCYTES NFR BLD AUTO: 34.2 % (ref 19.6–45.3)
Lab: NORMAL
MCH RBC QN AUTO: 27.1 PG (ref 26.6–33)
MCHC RBC AUTO-ENTMCNC: 32.1 G/DL (ref 31.5–35.7)
MCV RBC AUTO: 84.3 FL (ref 79–97)
MONOCYTES # BLD AUTO: 0.69 10*3/MM3 (ref 0.1–0.9)
MONOCYTES NFR BLD AUTO: 6.4 % (ref 5–12)
MUCOUS THREADS URNS QL MICRO: ABNORMAL /HPF
NEUTROPHILS # BLD AUTO: 6.26 10*3/MM3 (ref 1.7–7)
NEUTROPHILS NFR BLD AUTO: 57.7 % (ref 42.7–76)
NITRITE UR QL STRIP: NEGATIVE
NRBC BLD AUTO-RTO: 0 /100 WBC (ref 0–0.2)
PH UR STRIP.AUTO: <=5 [PH] (ref 5–8)
PLATELET # BLD AUTO: 305 10*3/MM3 (ref 140–450)
PMV BLD AUTO: 9.7 FL (ref 6–12)
POTASSIUM BLD-SCNC: 3.1 MMOL/L (ref 3.5–5.2)
PROT SERPL-MCNC: 7.2 G/DL (ref 6–8.5)
PROT UR QL STRIP: NEGATIVE
RBC # BLD AUTO: 4.58 10*6/MM3 (ref 3.77–5.28)
RBC # UR: ABNORMAL /HPF
REF LAB TEST METHOD: ABNORMAL
SODIUM BLD-SCNC: 139 MMOL/L (ref 136–145)
SP GR UR STRIP: 1.02 (ref 1–1.03)
SQUAMOUS #/AREA URNS HPF: ABNORMAL /HPF
UROBILINOGEN UR QL STRIP: ABNORMAL
WBC NRBC COR # BLD: 10.83 10*3/MM3 (ref 3.4–10.8)
WBC UR QL AUTO: ABNORMAL /HPF

## 2020-02-10 PROCEDURE — 80053 COMPREHEN METABOLIC PANEL: CPT | Performed by: EMERGENCY MEDICINE

## 2020-02-10 PROCEDURE — 85025 COMPLETE CBC W/AUTO DIFF WBC: CPT | Performed by: EMERGENCY MEDICINE

## 2020-02-10 PROCEDURE — 81001 URINALYSIS AUTO W/SCOPE: CPT | Performed by: EMERGENCY MEDICINE

## 2020-02-10 PROCEDURE — 83690 ASSAY OF LIPASE: CPT | Performed by: EMERGENCY MEDICINE

## 2020-02-10 PROCEDURE — 36415 COLL VENOUS BLD VENIPUNCTURE: CPT

## 2020-02-10 RX ORDER — CLINDAMYCIN HYDROCHLORIDE 300 MG/1
300 CAPSULE ORAL 3 TIMES DAILY
Qty: 21 CAPSULE | Refills: 0 | Status: SHIPPED | OUTPATIENT
Start: 2020-02-10 | End: 2020-03-23

## 2020-02-10 RX ORDER — POTASSIUM CHLORIDE 750 MG/1
20 CAPSULE, EXTENDED RELEASE ORAL ONCE
Status: COMPLETED | OUTPATIENT
Start: 2020-02-10 | End: 2020-02-10

## 2020-02-10 RX ADMIN — DICYCLOMINE HYDROCHLORIDE 20 MG: 10 CAPSULE ORAL at 02:33

## 2020-02-10 RX ADMIN — POTASSIUM CHLORIDE 20 MEQ: 750 CAPSULE, EXTENDED RELEASE ORAL at 03:44

## 2020-02-10 NOTE — ED PROVIDER NOTES
Subjective   39-year-old female presents with multiple complaints.  Of note, the patient states that she suffers from chronic otalgia as well as from chronic abdominal pain.  She states that she has been experiencing abdominal pain for months and that her current abdominal pain is no worse than her baseline abdominal pain.  The pain seems to be worse in her lower abdomen and is accompanied by nausea.  She denies any vomiting.  No sick contacts or recent diet changes.  However, the patient's main complaint tonight is right ear pain.  She states that she sees an otolaryngologist at [facility] for her chronic ear pain.  She was diagnosed with an inner ear infection last week and started on Omnicef.  However, despite compliance she states that she is continuing to experience pain in her right ear.  She believes that she has a part of a cotton swab stuck within her right ear canal.  She also notes that she has had drainage from her right ear canal over the past few days.  No fevers.  Pain is currently 10 out of 10 in severity.      History provided by:  Patient  Foreign Body in Ear   Location:  Right ear  Quality:  End of cotton swab possibly lodged inside ear. Pain and drainage to right ear.  Severity:  Moderate  Timing:  Constant  Chronicity:  New  Context:  Diagnosed with inner ear infection 6 days ago.  Ineffective treatments:  Ciprofloxicin and Cefdinir.  Associated symptoms: abdominal pain (lower abdomen), ear pain and nausea    Associated symptoms: no fever (resolved) and no vomiting        Review of Systems   Constitutional: Negative for fever (resolved).   HENT: Positive for ear discharge and ear pain.         Sensation of foreign body in right ear.   Gastrointestinal: Positive for abdominal pain (lower abdomen) and nausea. Negative for vomiting.   All other systems reviewed and are negative.      Past Medical History:   Diagnosis Date   • Allergic rhinitis    • Anemia    • Arthritis    • Asthma    • Depression     • FHx: migraine headaches    • GERD (gastroesophageal reflux disease)    • Heart murmur    • Herpes simplex    • History of chest x-ray 2015    no active disease   • History of echocardiogram 2015    ejection fraction of greater than 65%, mitral and pulmonic regurgitation an physiological tricuspid regurgitation.   • History of PFTs 2015    spirometry data acceptable and reproducible; pt given 4 puffs of Ventolin; pt gave good effort; no obstruction; no Bd response; MVV reduced    • History of PFTs 2015    pt gave best effort; duoneb given prior and post study; moderate nonspecific proportional reduction of FEV1 and FVC with preserved ratio; FEV1 moderately reduced; cannot rule out restriction   • Hypertension    • Migraine    • Ovarian cyst    • Seizures (CMS/HCC)    • Thigh shingles        Allergies   Allergen Reactions   • Naproxen Swelling   • Sulfa Antibiotics Rash       Past Surgical History:   Procedure Laterality Date   • BILATERAL BREAST REDUCTION     • BREAST SURGERY      breast reduction   •  SECTION     • CYSTOSCOPY     • LAPAROSCOPIC TUBAL LIGATION     • ORIF ANKLE FRACTURE Right    • REDUCTION MAMMAPLASTY Bilateral    • TENSION FREE VAGINAL TAPING WITH MINI ARC SLING      Dr Doyle avery        Family History   Problem Relation Age of Onset   • Pancreatic cancer Maternal Grandmother    • Heart failure Paternal Grandmother    • MARCIE disease Paternal Aunt    • Arthritis Mother    • Cancer Mother    • Arthritis Father    • Diabetes Neg Hx    • Heart attack Neg Hx    • Hypertension Neg Hx    • Hyperlipidemia Neg Hx    • Mental illness Neg Hx    • Obesity Neg Hx    • Stroke Neg Hx        Social History     Socioeconomic History   • Marital status: Single     Spouse name: Not on file   • Number of children: Not on file   • Years of education: Not on file   • Highest education level: Not on file   Tobacco Use   • Smoking status: Former Smoker     Packs/day: 1.00      Years: 15.00     Pack years: 15.00     Types: Cigarettes     Last attempt to quit: 2015     Years since quittin.0   • Smokeless tobacco: Never Used   Substance and Sexual Activity   • Alcohol use: No     Comment: socially   • Drug use: Yes     Types: Marijuana     Comment: Once a month    • Sexual activity: Defer   Social History Narrative    Caffeine intake: 16 oz per day          Objective   Physical Exam   Constitutional: She is oriented to person, place, and time. She appears well-developed and well-nourished. No distress.   Well-appearing female   HENT:   Head: Normocephalic and atraumatic.   Mouth/Throat: Oropharynx is clear and moist.   Right ear mildly erythematous and bulging when compared to the left, no otorrhea present, no mastoid tenderness noted, no visible foreign body   Eyes: Pupils are equal, round, and reactive to light. EOM are normal.   Mild proptosis noted   Neck: Normal range of motion. Neck supple.   Cardiovascular: Normal rate and normal heart sounds. Exam reveals no gallop and no friction rub.   No murmur heard.  Pulmonary/Chest: Effort normal and breath sounds normal. No respiratory distress. She has no wheezes. She has no rales.   Abdominal: Soft. Normal appearance and bowel sounds are normal. She exhibits no distension and no mass. There is tenderness. There is no rebound and no guarding.   Mild suprapubic abdominal tenderness, no peritoneal signs, no pain out of proportion to exam   Genitourinary:   Genitourinary Comments: No CVA tenderness present   Musculoskeletal: Normal range of motion.   Neurological: She is alert and oriented to person, place, and time.   Skin: Skin is warm and dry. No rash noted. She is not diaphoretic. No erythema.   Psychiatric: She has a normal mood and affect. Judgment and thought content normal.   Nursing note and vitals reviewed.      Procedures         ED Course  ED Course as of Feb 10 0500   Mon Feb 10, 2020   0207 39-year-old female presents  "complaining of acute on chronic lower abdominal pain and right ear pain.  Of note, the patient sees an otolaryngologist at [facility] and is currently taking Omnicef for a \"inner ear infection.\"  On arrival to the ED, patient nontoxic-appearing.  Vital signs reassuring.  Nonsurgical abdomen.  Exam remarkable for mild right acute otitis media.  No otorrhea.  No mastoid tenderness noted.  We will obtain labs and will change the patient's antibiotics from Omnicef to clindamycin.    [DD]   0304 Labs remarkable only for mild hypokalemia.  Potassium replaced orally in the ED.    [DD]   0424 Patient reassured.  No advanced imaging indicated at this time.  We will change her antibiotic to clindamycin and she will follow-up with her otolaryngologist as previously scheduled later this week.  She was given a referral to gastroenterology for her chronic abdominal pain and will follow-up as soon as possible.  Agreeable with plan and given appropriate strict return precautions.    [DD]      ED Course User Index  [DD] Madi Cates MD     Recent Results (from the past 24 hour(s))   Comprehensive Metabolic Panel    Collection Time: 02/10/20  2:29 AM   Result Value Ref Range    Glucose 109 (H) 65 - 99 mg/dL    BUN 10 6 - 20 mg/dL    Creatinine 0.62 0.57 - 1.00 mg/dL    Sodium 139 136 - 145 mmol/L    Potassium 3.1 (L) 3.5 - 5.2 mmol/L    Chloride 100 98 - 107 mmol/L    CO2 25.0 22.0 - 29.0 mmol/L    Calcium 8.8 8.6 - 10.5 mg/dL    Total Protein 7.2 6.0 - 8.5 g/dL    Albumin 4.00 3.50 - 5.20 g/dL    ALT (SGPT) 11 1 - 33 U/L    AST (SGOT) 15 1 - 32 U/L    Alkaline Phosphatase 68 39 - 117 U/L    Total Bilirubin 0.4 0.2 - 1.2 mg/dL    eGFR  African Amer 129 >60 mL/min/1.73    Globulin 3.2 gm/dL    A/G Ratio 1.3 g/dL    BUN/Creatinine Ratio 16.1 7.0 - 25.0    Anion Gap 14.0 5.0 - 15.0 mmol/L   Lipase    Collection Time: 02/10/20  2:29 AM   Result Value Ref Range    Lipase 52 13 - 60 U/L   CBC Auto Differential    Collection Time: " "02/10/20  2:29 AM   Result Value Ref Range    WBC 10.83 (H) 3.40 - 10.80 10*3/mm3    RBC 4.58 3.77 - 5.28 10*6/mm3    Hemoglobin 12.4 12.0 - 15.9 g/dL    Hematocrit 38.6 34.0 - 46.6 %    MCV 84.3 79.0 - 97.0 fL    MCH 27.1 26.6 - 33.0 pg    MCHC 32.1 31.5 - 35.7 g/dL    RDW 14.4 12.3 - 15.4 %    RDW-SD 43.9 37.0 - 54.0 fl    MPV 9.7 6.0 - 12.0 fL    Platelets 305 140 - 450 10*3/mm3    Neutrophil % 57.7 42.7 - 76.0 %    Lymphocyte % 34.2 19.6 - 45.3 %    Monocyte % 6.4 5.0 - 12.0 %    Eosinophil % 1.0 0.3 - 6.2 %    Basophil % 0.4 0.0 - 1.5 %    Immature Grans % 0.3 0.0 - 0.5 %    Neutrophils, Absolute 6.26 1.70 - 7.00 10*3/mm3    Lymphocytes, Absolute 3.70 (H) 0.70 - 3.10 10*3/mm3    Monocytes, Absolute 0.69 0.10 - 0.90 10*3/mm3    Eosinophils, Absolute 0.11 0.00 - 0.40 10*3/mm3    Basophils, Absolute 0.04 0.00 - 0.20 10*3/mm3    Immature Grans, Absolute 0.03 0.00 - 0.05 10*3/mm3    nRBC 0.0 0.0 - 0.2 /100 WBC     Note: In addition to lab results from this visit, the labs listed above may include labs taken at another facility or during a different encounter within the last 24 hours. Please correlate lab times with ED admission and discharge times for further clarification of the services performed during this visit.    No orders to display     Vitals:    02/09/20 2254   BP: 136/92   BP Location: Left arm   Patient Position: Sitting   Pulse: 78   Resp: 18   SpO2: 96%   Weight: 79.8 kg (176 lb)   Height: 149.9 cm (59\")     Medications   sodium chloride 0.9 % flush 10 mL (has no administration in time range)   potassium chloride (MICRO-K) CR capsule 20 mEq (has no administration in time range)   dicyclomine (BENTYL) capsule 20 mg (20 mg Oral Given 2/10/20 0233)     ECG/EMG Results (last 24 hours)     ** No results found for the last 24 hours. **        No orders to display                                               Recent Results (from the past 24 hour(s))   Comprehensive Metabolic Panel    Collection Time: " 02/10/20  2:29 AM   Result Value Ref Range    Glucose 109 (H) 65 - 99 mg/dL    BUN 10 6 - 20 mg/dL    Creatinine 0.62 0.57 - 1.00 mg/dL    Sodium 139 136 - 145 mmol/L    Potassium 3.1 (L) 3.5 - 5.2 mmol/L    Chloride 100 98 - 107 mmol/L    CO2 25.0 22.0 - 29.0 mmol/L    Calcium 8.8 8.6 - 10.5 mg/dL    Total Protein 7.2 6.0 - 8.5 g/dL    Albumin 4.00 3.50 - 5.20 g/dL    ALT (SGPT) 11 1 - 33 U/L    AST (SGOT) 15 1 - 32 U/L    Alkaline Phosphatase 68 39 - 117 U/L    Total Bilirubin 0.4 0.2 - 1.2 mg/dL    eGFR  African Amer 129 >60 mL/min/1.73    Globulin 3.2 gm/dL    A/G Ratio 1.3 g/dL    BUN/Creatinine Ratio 16.1 7.0 - 25.0    Anion Gap 14.0 5.0 - 15.0 mmol/L   Lipase    Collection Time: 02/10/20  2:29 AM   Result Value Ref Range    Lipase 52 13 - 60 U/L   CBC Auto Differential    Collection Time: 02/10/20  2:29 AM   Result Value Ref Range    WBC 10.83 (H) 3.40 - 10.80 10*3/mm3    RBC 4.58 3.77 - 5.28 10*6/mm3    Hemoglobin 12.4 12.0 - 15.9 g/dL    Hematocrit 38.6 34.0 - 46.6 %    MCV 84.3 79.0 - 97.0 fL    MCH 27.1 26.6 - 33.0 pg    MCHC 32.1 31.5 - 35.7 g/dL    RDW 14.4 12.3 - 15.4 %    RDW-SD 43.9 37.0 - 54.0 fl    MPV 9.7 6.0 - 12.0 fL    Platelets 305 140 - 450 10*3/mm3    Neutrophil % 57.7 42.7 - 76.0 %    Lymphocyte % 34.2 19.6 - 45.3 %    Monocyte % 6.4 5.0 - 12.0 %    Eosinophil % 1.0 0.3 - 6.2 %    Basophil % 0.4 0.0 - 1.5 %    Immature Grans % 0.3 0.0 - 0.5 %    Neutrophils, Absolute 6.26 1.70 - 7.00 10*3/mm3    Lymphocytes, Absolute 3.70 (H) 0.70 - 3.10 10*3/mm3    Monocytes, Absolute 0.69 0.10 - 0.90 10*3/mm3    Eosinophils, Absolute 0.11 0.00 - 0.40 10*3/mm3    Basophils, Absolute 0.04 0.00 - 0.20 10*3/mm3    Immature Grans, Absolute 0.03 0.00 - 0.05 10*3/mm3    nRBC 0.0 0.0 - 0.2 /100 WBC   Urinalysis With Microscopic If Indicated (No Culture) - Urine, Clean Catch    Collection Time: 02/10/20  3:44 AM   Result Value Ref Range    Color, UA Yellow Yellow, Straw    Appearance, UA Cloudy (A) Clear    pH, UA  "<=5.0 5.0 - 8.0    Specific Gravity, UA 1.025 1.001 - 1.030    Glucose, UA Negative Negative    Ketones, UA 15 mg/dL (1+) (A) Negative    Bilirubin, UA Negative Negative    Blood, UA Negative Negative    Protein, UA Negative Negative    Leuk Esterase, UA Negative Negative    Nitrite, UA Negative Negative    Urobilinogen, UA 0.2 E.U./dL 0.2 - 1.0 E.U./dL   Urinalysis, Microscopic Only - Urine, Clean Catch    Collection Time: 02/10/20  3:44 AM   Result Value Ref Range    RBC, UA 3-6 (A) None Seen, 0-2 /HPF    WBC, UA 0-2 None Seen, 0-2 /HPF    Bacteria, UA None Seen None Seen, Trace /HPF    Squamous Epithelial Cells, UA 3-6 (A) None Seen, 0-2 /HPF    Hyaline Casts, UA 0-6 0 - 6 /LPF    Calcium Oxalate Crystals, UA Large/3+ None Seen /HPF    Mucus, UA Small/1+ (A) None Seen, Trace /HPF    Methodology Manual Light Microscopy    POCT Pregnancy, Urine    Collection Time: 02/10/20  3:50 AM   Result Value Ref Range    HCG, Urine, QL Negative Negative    Lot Number [id number]     Internal Positive Control Positive     Internal Negative Control Negative      Note: In addition to lab results from this visit, the labs listed above may include labs taken at another facility or during a different encounter within the last 24 hours. Please correlate lab times with ED admission and discharge times for further clarification of the services performed during this visit.    No orders to display     Vitals:    02/09/20 2254 02/10/20 0350   BP: 136/92    BP Location: Left arm    Patient Position: Sitting    Pulse: 78    Resp: 18    Temp:  98 °F (36.7 °C)   TempSrc:  Oral   SpO2: 96%    Weight: 79.8 kg (176 lb)    Height: 149.9 cm (59\")      Medications   sodium chloride 0.9 % flush 10 mL (has no administration in time range)   dicyclomine (BENTYL) capsule 20 mg (20 mg Oral Given 2/10/20 6023)   potassium chloride (MICRO-K) CR capsule 20 mEq (20 mEq Oral Given 2/10/20 8724)     ECG/EMG Results (last 24 hours)     ** No results found for " the last 24 hours. **        No orders to display           MDM    Final diagnoses:   Acute right otitis media   Chronic abdominal pain   Hypokalemia       Documentation assistance provided by jonny Acosta.  Information recorded by the scribe was done at my direction and has been verified and validated by me.     Amber Acosta  02/09/20 3549       Amber Acosta  02/10/20 0876       Amber Acosta  02/10/20 5396       Madi Cates MD  02/10/20 1916

## 2020-02-10 NOTE — DISCHARGE INSTRUCTIONS
Follow up with your otolaryngologist in one week. Return to the ER with any new or worsening symptoms.

## 2020-03-03 ENCOUNTER — OFFICE VISIT (OUTPATIENT)
Dept: INTERNAL MEDICINE | Facility: CLINIC | Age: 40
End: 2020-03-03

## 2020-03-03 VITALS
OXYGEN SATURATION: 99 % | BODY MASS INDEX: 35.48 KG/M2 | HEIGHT: 59 IN | WEIGHT: 176 LBS | SYSTOLIC BLOOD PRESSURE: 128 MMHG | TEMPERATURE: 96.9 F | HEART RATE: 89 BPM | DIASTOLIC BLOOD PRESSURE: 80 MMHG

## 2020-03-03 DIAGNOSIS — J10.1 INFLUENZA B: Primary | ICD-10-CM

## 2020-03-03 DIAGNOSIS — N30.00 ACUTE CYSTITIS WITHOUT HEMATURIA: ICD-10-CM

## 2020-03-03 LAB
BILIRUB BLD-MCNC: ABNORMAL MG/DL
CLARITY, POC: ABNORMAL
COLOR UR: ABNORMAL
EXPIRATION DATE: ABNORMAL
FLUAV AG NPH QL: NEGATIVE
FLUBV AG NPH QL: POSITIVE
GLUCOSE UR STRIP-MCNC: NEGATIVE MG/DL
INTERNAL CONTROL: ABNORMAL
KETONES UR QL: ABNORMAL
LEUKOCYTE EST, POC: ABNORMAL
Lab: ABNORMAL
NITRITE UR-MCNC: NEGATIVE MG/ML
PH UR: 6 [PH] (ref 5–8)
PROT UR STRIP-MCNC: ABNORMAL MG/DL
RBC # UR STRIP: ABNORMAL /UL
SP GR UR: 1.03 (ref 1–1.03)
UROBILINOGEN UR QL: NORMAL

## 2020-03-03 PROCEDURE — 87804 INFLUENZA ASSAY W/OPTIC: CPT | Performed by: NURSE PRACTITIONER

## 2020-03-03 PROCEDURE — 99214 OFFICE O/P EST MOD 30 MIN: CPT | Performed by: NURSE PRACTITIONER

## 2020-03-03 RX ORDER — CEFUROXIME AXETIL 500 MG/1
500 TABLET ORAL 2 TIMES DAILY
Qty: 14 TABLET | Refills: 0 | Status: SHIPPED | OUTPATIENT
Start: 2020-03-03 | End: 2020-03-10

## 2020-03-03 RX ORDER — OSELTAMIVIR PHOSPHATE 75 MG/1
75 CAPSULE ORAL 2 TIMES DAILY
Qty: 10 CAPSULE | Refills: 0 | Status: SHIPPED | OUTPATIENT
Start: 2020-03-03 | End: 2020-03-08

## 2020-03-03 NOTE — PROGRESS NOTES
Chief Complaint   Patient presents with   • Flu Symptoms       History of Present Illness  39 y.o.female presents for flu sx.  Has been sick for months with recurrent sinus infection, sinus pressure, congestion. tx with clindamycin and omnicef. Had sinus surgery; still not much better with sinus sx gets frequent purulent brown nasal drainage.  Started having more URI sx on Friday; went to [facility]. Negative for flu. Sunday started having fever, myalgis diffuse, cough. Family member dx with flu.  It seems to be not improving with home care.    Co dysuria burning onset today. No hematuria or flank pain. Some frequency.    Review of Systems   Constitutional: Positive for chills and fever. Negative for fatigue.   HENT: Positive for congestion, postnasal drip, rhinorrhea, sinus pressure and sore throat. Negative for ear pain and sneezing.    Respiratory: Positive for cough. Negative for shortness of breath.    Cardiovascular: Negative for chest pain.   Gastrointestinal: Negative for abdominal pain, constipation, diarrhea, nausea and vomiting.   Genitourinary: Positive for dysuria. Negative for difficulty urinating, flank pain, frequency, hematuria and urgency.   Musculoskeletal: Positive for myalgias.   Neurological: Positive for headache.         [facility]  The following portions of the patient's history were reviewed and updated as appropriate: allergies, current medications, past family history, past medical history, past social history, past surgical history and problem list.     Past Medical History:   Diagnosis Date   • Allergic rhinitis    • Anemia    • Arthritis    • Asthma    • Depression    • FHx: migraine headaches    • GERD (gastroesophageal reflux disease)    • Heart murmur    • Herpes simplex    • History of chest x-ray 11/01/2015    no active disease   • History of echocardiogram 11/01/2015    ejection fraction of greater than 65%, mitral and pulmonic regurgitation an physiological tricuspid regurgitation.   •  History of PFTs 2015    spirometry data acceptable and reproducible; pt given 4 puffs of Ventolin; pt gave good effort; no obstruction; no Bd response; MVV reduced    • History of PFTs 2015    pt gave best effort; duoneb given prior and post study; moderate nonspecific proportional reduction of FEV1 and FVC with preserved ratio; FEV1 moderately reduced; cannot rule out restriction   • Hypertension    • Migraine    • Ovarian cyst    • Seizures (CMS/HCC)    • Thigh shingles       Past Surgical History:   Procedure Laterality Date   • BILATERAL BREAST REDUCTION     • BREAST SURGERY      breast reduction   •  SECTION     • CYSTOSCOPY     • LAPAROSCOPIC TUBAL LIGATION     • ORIF ANKLE FRACTURE Right    • REDUCTION MAMMAPLASTY Bilateral    • TENSION FREE VAGINAL TAPING WITH MINI ARC SLING      Dr Doyle avery       Allergies   Allergen Reactions   • Naproxen Swelling   • Sulfa Antibiotics Rash      Social History     Socioeconomic History   • Marital status: Single     Spouse name: Not on file   • Number of children: Not on file   • Years of education: Not on file   • Highest education level: Not on file   Tobacco Use   • Smoking status: Former Smoker     Packs/day: 1.00     Years: 15.00     Pack years: 15.00     Types: Cigarettes     Last attempt to quit: 2015     Years since quittin.1   • Smokeless tobacco: Never Used   Substance and Sexual Activity   • Alcohol use: No     Comment: socially   • Drug use: Yes     Types: Marijuana     Comment: Once a month    • Sexual activity: Defer   Social History Narrative    Caffeine intake: 16 oz per day      Family History   Problem Relation Age of Onset   • Pancreatic cancer Maternal Grandmother    • Heart failure Paternal Grandmother    • MARCIE disease Paternal Aunt    • Arthritis Mother    • Cancer Mother    • Arthritis Father    • Diabetes Neg Hx    • Heart attack Neg Hx    • Hypertension Neg Hx    • Hyperlipidemia Neg Hx    • Mental illness  Neg Hx    • Obesity Neg Hx    • Stroke Neg Hx             Current Outpatient Medications:   •  ADVAIR DISKUS 500-50 MCG/DOSE DISKUS, Inhale 1 puff 2 (Two) Times a Day., Disp: , Rfl:   •  albuterol (PROVENTIL) (2.5 MG/3ML) 0.083% nebulizer solution, Take 2.5 mg by nebulization Every 12 (Twelve) Hours., Disp: 30 vial, Rfl: 1  •  amitriptyline (ELAVIL) 25 MG tablet, Take 2 tablets by mouth Every Night., Disp: 60 tablet, Rfl: 3  •  amLODIPine (NORVASC) 5 MG tablet, Take 1 tablet by mouth Daily., Disp: 30 tablet, Rfl: 5  •  azelastine (ASTELIN) 0.1 % nasal spray, 1 spray each nostril two times daily, Disp: 1 each, Rfl: 5  •  azelastine (OPTIVAR) 0.05 % ophthalmic solution, Administer 1 drop to both eyes 2 (Two) Times a Day., Disp: 6 mL, Rfl: 12  •  brompheniramine-pseudoephedrine-DM 30-2-10 MG/5ML syrup, Take 5 mL by mouth 4 (Four) Times a Day As Needed for Congestion or Cough., Disp: 240 mL, Rfl: 0  •  Cholecalciferol (VITAMIN D3) 125 MCG (5000 UT) capsule capsule, Take 1 capsule by mouth Daily., Disp: 30 capsule, Rfl: 5  •  ciprofloxacin-dexamethasone (CIPRODEX) 0.3-0.1 % otic suspension, Administer 4 drops to the right ear 2 (Two) Times a Day., Disp: 7.5 mL, Rfl: 0  •  clindamycin (CLEOCIN) 300 MG capsule, Take 1 capsule by mouth 3 (Three) Times a Day., Disp: 21 capsule, Rfl: 0  •  ferrous sulfate 325 (65 FE) MG tablet, Take 1 tablet by mouth Daily With Breakfast. Resume Iron when antibiotics finished., Disp: 30 tablet, Rfl: 5  •  fluticasone (FLONASE) 50 MCG/ACT nasal spray, 2 sprays into the nostril(s) as directed by provider Daily., Disp: 16 g, Rfl: 5  •  gabapentin (NEURONTIN) 400 MG capsule, Take 400 mg by mouth 5 (Five) Times a Day As Needed., Disp: , Rfl:   •  hydrOXYzine (ATARAX) 25 MG tablet, Take 1 tablet by mouth Every 6 (Six) Hours As Needed for Itching., Disp: 12 tablet, Rfl: 0  •  lamoTRIgine (LaMICtal) 100 MG tablet, Take 1 tablet by mouth Daily., Disp: 30 tablet, Rfl: 3  •  loratadine-pseudoephedrine  "(CLARITIN-D 12 HOUR) 5-120 MG per 12 hr tablet, Take 1 tablet by mouth 2 (Two) Times a Day., Disp: 60 tablet, Rfl: 5  •  montelukast (SINGULAIR) 10 MG tablet, Take 10 mg by mouth every night., Disp: , Rfl:   •  neomycin-colistin-hydrocortisone-thonzonium (CORTISPORIN-TC) 3.3-3-10-0.5 MG/ML otic suspension, Administer 4 drops to the right ear 2 (Two) Times a Day., Disp: 10 mL, Rfl: 0  •  omeprazole (priLOSEC) 40 MG capsule, Take 1 capsule by mouth 2 (Two) Times a Day. Take on an empty stomach and eat 30 minutes- 1 hr after eating., Disp: 90 capsule, Rfl: 5  •  predniSONE (DELTASONE) 10 MG tablet, Take 3 tablets by mouth Daily., Disp: 12 tablet, Rfl: 0  •  SPIRIVA RESPIMAT 2.5 MCG/ACT aerosol solution, Take 2 puffs by mouth Daily., Disp: , Rfl:   •  triamcinolone (KENALOG) 0.1 % ointment, Apply  topically to the appropriate area as directed 2 (Two) Times a Day., Disp: 30 g, Rfl: 0    VITALS:  Pulse 89   Temp 96.9 °F (36.1 °C)   Ht 149.9 cm (59\")   Wt 79.8 kg (176 lb)   SpO2 99%   BMI 35.55 kg/m²     Physical Exam   Constitutional: She is oriented to person, place, and time. She appears well-developed and well-nourished.  Non-toxic appearance. She has a sickly appearance. No distress.   HENT:   Head: Normocephalic and atraumatic.   Right Ear: Tympanic membrane, external ear and ear canal normal.   Left Ear: Tympanic membrane, external ear and ear canal normal.   Nose: Mucosal edema, rhinorrhea and congestion present. Right sinus exhibits no maxillary sinus tenderness and no frontal sinus tenderness. Left sinus exhibits no maxillary sinus tenderness and no frontal sinus tenderness.   Mouth/Throat: Mucous membranes are normal. Posterior oropharyngeal erythema present. No oropharyngeal exudate, posterior oropharyngeal edema or tonsillar abscesses.   Posterior cobblestoning; clear rhinorrhea   Eyes: Conjunctivae are normal. Right conjunctiva is not injected. Left conjunctiva is not injected. No scleral icterus. "   Neck: Normal range of motion. Neck supple. Normal range of motion present.   Cardiovascular: Normal rate, regular rhythm and normal heart sounds.   Pulmonary/Chest: Effort normal and breath sounds normal.   Abdominal: Soft. There is no tenderness. There is no CVA tenderness.   Lymphadenopathy:        Head (right side): No submental, no submandibular and no tonsillar adenopathy present.        Head (left side): No submental, no submandibular and no tonsillar adenopathy present.     She has no cervical adenopathy.   Neurological: She is alert and oriented to person, place, and time.   Skin: Skin is warm and dry. No rash noted.   Psychiatric: She has a normal mood and affect.   Nursing note and vitals reviewed.      LABS  Results for orders placed or performed in visit on 03/03/20   Urine Culture - Urine, Urine, Clean Catch   Result Value Ref Range    Urine Culture >100,000 CFU/mL Escherichia coli (A)    POCT Influenza A/B   Result Value Ref Range    Rapid Influenza A Ag Negative Negative    Rapid Influenza B Ag Positive (A) Negative    Internal Control Passed Passed    Lot Number [id number]     Expiration Date 12/11/2021    POCT urinalysis dipstick, automated   Result Value Ref Range    Color Dark Yellow Yellow, Straw, Dark Yellow, Andreina    Clarity, UA Cloudy (A) Clear    Specific Gravity  1.030 1.005 - 1.030    pH, Urine 6.0 5.0 - 8.0    Leukocytes Large (3+) (A) Negative    Nitrite, UA Negative Negative    Protein, POC 30 mg/dL (A) Negative mg/dL    Glucose, UA Negative Negative, 1000 mg/dL (3+) mg/dL    Ketones, UA 3+ (A) Negative    Urobilinogen, UA Normal Normal    Bilirubin 1 mg/dL (A) Negative    Blood,  Junaid/ul (A) Negative       ASSESSMENT/PLAN  Tyrshawn was seen today for flu symptoms.    Diagnoses and all orders for this visit:    Influenza B  Advised pt may be outside 48 hr window for medication to be of benefit; hard to determine onset with her ongoing sinus issues. With positive family member with  flu will tx.  -     POCT Influenza A/B  -     oseltamivir (TAMIFLU) 75 MG capsule; Take 1 capsule by mouth 2 (Two) Times a Day for 5 days.  Minimize contact with others to help decrease spread.  Saline nasal nasal spray to help with nasal congestion  Increase fluids  Rest  Good handwashing  An over-the-counter cold and flu medicine to help with symptom control.    Acute cystitis without hematuria  -     POCT urinalysis dipstick, automated  -     Urine Culture - Urine, Urine, Clean Catch  -     cefuroxime (CEFTIN) 500 MG tablet; Take 1 tablet by mouth 2 (Two) Times a Day for 7 days.   Drink plenty water.  Will follow up on urine culture and notify patient if antibiotic prescribed does not cover identified bacteria. If patient has worsening of symptoms or no improvement of fever >101.5 or nikik flank pain, pt should notify our office for reeval or seek emergency care.    I discussed the patients findings and my recommendations with patient.  Patient was encouraged to keep me informed of any acute changes, lack of improvement, or any new concerning symptoms.  Patient voiced understanding of all instructions and denied further questions.      FOLLOW-UP  Return if symptoms worsen or fail to improve.    Electronically signed by:    SHANE Mueller  03/03/2020    EMR Dragon/Transcription Disclaimer:  Much of this encounter note is an electronic transcription/translation of spoken language to printed text.  The electronic translation of spoken language may permit erroneous, or at times, nonsensical words or phrases to be inadvertently transcribed.  Although I have reviewed the note for such errors, some may still exist

## 2020-03-04 ENCOUNTER — APPOINTMENT (OUTPATIENT)
Dept: LAB | Facility: HOSPITAL | Age: 40
End: 2020-03-04

## 2020-03-04 PROCEDURE — 87088 URINE BACTERIA CULTURE: CPT | Performed by: NURSE PRACTITIONER

## 2020-03-04 PROCEDURE — 87086 URINE CULTURE/COLONY COUNT: CPT | Performed by: NURSE PRACTITIONER

## 2020-03-04 PROCEDURE — 87186 SC STD MICRODIL/AGAR DIL: CPT | Performed by: NURSE PRACTITIONER

## 2020-03-06 LAB — BACTERIA SPEC AEROBE CULT: ABNORMAL

## 2020-03-07 ENCOUNTER — TELEPHONE (OUTPATIENT)
Dept: INTERNAL MEDICINE | Facility: CLINIC | Age: 40
End: 2020-03-07

## 2020-03-07 NOTE — TELEPHONE ENCOUNTER
----- Message from SHANE Fish sent at 3/6/2020 10:02 AM EST -----  Let pt know urine culture E coli; susceptible to the abx I gave her.

## 2020-03-09 ENCOUNTER — OFFICE VISIT (OUTPATIENT)
Dept: INTERNAL MEDICINE | Facility: CLINIC | Age: 40
End: 2020-03-09

## 2020-03-09 VITALS
WEIGHT: 176.3 LBS | HEART RATE: 84 BPM | DIASTOLIC BLOOD PRESSURE: 84 MMHG | BODY MASS INDEX: 35.61 KG/M2 | SYSTOLIC BLOOD PRESSURE: 126 MMHG | OXYGEN SATURATION: 99 %

## 2020-03-09 DIAGNOSIS — N30.00 ACUTE CYSTITIS WITHOUT HEMATURIA: Primary | ICD-10-CM

## 2020-03-09 LAB
BILIRUB BLD-MCNC: NEGATIVE MG/DL
CLARITY, POC: ABNORMAL
COLOR UR: ABNORMAL
GLUCOSE UR STRIP-MCNC: NEGATIVE MG/DL
KETONES UR QL: NEGATIVE
LEUKOCYTE EST, POC: NEGATIVE
NITRITE UR-MCNC: NEGATIVE MG/ML
PH UR: 6 [PH] (ref 5–8)
PROT UR STRIP-MCNC: ABNORMAL MG/DL
RBC # UR STRIP: NEGATIVE /UL
SP GR UR: 1.03 (ref 1–1.03)
UROBILINOGEN UR QL: NORMAL

## 2020-03-09 PROCEDURE — 99213 OFFICE O/P EST LOW 20 MIN: CPT | Performed by: NURSE PRACTITIONER

## 2020-03-09 NOTE — PROGRESS NOTES
Chief Complaint   Patient presents with   • UTI FU       History of Present Illness    Bernardo Dodd is a 39 y.o. female who presents today for UTI.      Urinary Tract Infection    This is a recurrent problem. The current episode started in the past 7 days. The problem occurs intermittently. The problem has been unchanged. The quality of the pain is described as aching. The pain is at a severity of 5/10. The pain is moderate. There has been no fever. She is sexually active. There is a history of pyelonephritis. Associated symptoms include flank pain. Pertinent negatives include no chills, frequency, hematuria, hesitancy, nausea, urgency or vomiting. She has tried antibiotics for the symptoms. The treatment provided mild relief. Her past medical history is significant for kidney stones and recurrent UTIs.          [facility]    The following portions of the patient's history were reviewed and updated as appropriate: allergies, current medications, past family history, past medical history, past social history, past surgical history and problem list.     Social History     Tobacco Use   • Smoking status: Former Smoker     Packs/day: 1.00     Years: 15.00     Pack years: 15.00     Types: Cigarettes     Last attempt to quit: 2015     Years since quittin.1   • Smokeless tobacco: Never Used   Substance Use Topics   • Alcohol use: No     Comment: socially       Past Medical History:   Diagnosis Date   • Allergic rhinitis    • Anemia    • Arthritis    • Asthma    • Depression    • FHx: migraine headaches    • GERD (gastroesophageal reflux disease)    • Heart murmur    • Herpes simplex    • History of chest x-ray 2015    no active disease   • History of echocardiogram 2015    ejection fraction of greater than 65%, mitral and pulmonic regurgitation an physiological tricuspid regurgitation.   • History of PFTs 2015    spirometry data acceptable and reproducible; pt given 4 puffs of Ventolin; pt gave  good effort; no obstruction; no Bd response; MVV reduced    • History of PFTs 2015    pt gave best effort; duoneb given prior and post study; moderate nonspecific proportional reduction of FEV1 and FVC with preserved ratio; FEV1 moderately reduced; cannot rule out restriction   • Hypertension    • Migraine    • Ovarian cyst    • Seizures (CMS/HCC)    • Thigh shingles        Past Surgical History:   Procedure Laterality Date   • BILATERAL BREAST REDUCTION     • BREAST SURGERY      breast reduction   •  SECTION     • CYSTOSCOPY     • LAPAROSCOPIC TUBAL LIGATION     • ORIF ANKLE FRACTURE Right    • REDUCTION MAMMAPLASTY Bilateral    • TENSION FREE VAGINAL TAPING WITH MINI ARC SLING      Dr Doyle avery        Family History   Problem Relation Age of Onset   • Pancreatic cancer Maternal Grandmother    • Heart failure Paternal Grandmother    • MARCIE disease Paternal Aunt    • Arthritis Mother    • Cancer Mother    • Arthritis Father    • Diabetes Neg Hx    • Heart attack Neg Hx    • Hypertension Neg Hx    • Hyperlipidemia Neg Hx    • Mental illness Neg Hx    • Obesity Neg Hx    • Stroke Neg Hx        Allergies   Allergen Reactions   • Naproxen Swelling   • Sulfa Antibiotics Rash         Current Outpatient Medications:   •  ADVAIR DISKUS 500-50 MCG/DOSE DISKUS, Inhale 1 puff 2 (Two) Times a Day., Disp: , Rfl:   •  albuterol (PROVENTIL) (2.5 MG/3ML) 0.083% nebulizer solution, Take 2.5 mg by nebulization Every 12 (Twelve) Hours., Disp: 30 vial, Rfl: 1  •  amitriptyline (ELAVIL) 25 MG tablet, Take 2 tablets by mouth Every Night., Disp: 60 tablet, Rfl: 3  •  amLODIPine (NORVASC) 5 MG tablet, Take 1 tablet by mouth Daily., Disp: 30 tablet, Rfl: 5  •  azelastine (ASTELIN) 0.1 % nasal spray, 1 spray each nostril two times daily, Disp: 1 each, Rfl: 5  •  azelastine (OPTIVAR) 0.05 % ophthalmic solution, Administer 1 drop to both eyes 2 (Two) Times a Day., Disp: 6 mL, Rfl: 12  •   brompheniramine-pseudoephedrine-DM 30-2-10 MG/5ML syrup, Take 5 mL by mouth 4 (Four) Times a Day As Needed for Congestion or Cough., Disp: 240 mL, Rfl: 0  •  cefuroxime (CEFTIN) 500 MG tablet, Take 1 tablet by mouth 2 (Two) Times a Day for 7 days., Disp: 14 tablet, Rfl: 0  •  Cholecalciferol (VITAMIN D3) 125 MCG (5000 UT) capsule capsule, Take 1 capsule by mouth Daily., Disp: 30 capsule, Rfl: 5  •  ciprofloxacin-dexamethasone (CIPRODEX) 0.3-0.1 % otic suspension, Administer 4 drops to the right ear 2 (Two) Times a Day., Disp: 7.5 mL, Rfl: 0  •  clindamycin (CLEOCIN) 300 MG capsule, Take 1 capsule by mouth 3 (Three) Times a Day., Disp: 21 capsule, Rfl: 0  •  ferrous sulfate 325 (65 FE) MG tablet, Take 1 tablet by mouth Daily With Breakfast. Resume Iron when antibiotics finished., Disp: 30 tablet, Rfl: 5  •  fluticasone (FLONASE) 50 MCG/ACT nasal spray, 2 sprays into the nostril(s) as directed by provider Daily., Disp: 16 g, Rfl: 5  •  gabapentin (NEURONTIN) 400 MG capsule, Take 400 mg by mouth 5 (Five) Times a Day As Needed., Disp: , Rfl:   •  hydrOXYzine (ATARAX) 25 MG tablet, Take 1 tablet by mouth Every 6 (Six) Hours As Needed for Itching., Disp: 12 tablet, Rfl: 0  •  lamoTRIgine (LaMICtal) 100 MG tablet, Take 1 tablet by mouth Daily., Disp: 30 tablet, Rfl: 3  •  loratadine-pseudoephedrine (CLARITIN-D 12 HOUR) 5-120 MG per 12 hr tablet, Take 1 tablet by mouth 2 (Two) Times a Day., Disp: 60 tablet, Rfl: 5  •  montelukast (SINGULAIR) 10 MG tablet, Take 10 mg by mouth every night., Disp: , Rfl:   •  neomycin-colistin-hydrocortisone-thonzonium (CORTISPORIN-TC) 3.3-3-10-0.5 MG/ML otic suspension, Administer 4 drops to the right ear 2 (Two) Times a Day., Disp: 10 mL, Rfl: 0  •  omeprazole (priLOSEC) 40 MG capsule, Take 1 capsule by mouth 2 (Two) Times a Day. Take on an empty stomach and eat 30 minutes- 1 hr after eating., Disp: 90 capsule, Rfl: 5  •  predniSONE (DELTASONE) 10 MG tablet, Take 3 tablets by mouth Daily.,  Disp: 12 tablet, Rfl: 0  •  SPIRIVA RESPIMAT 2.5 MCG/ACT aerosol solution, Take 2 puffs by mouth Daily., Disp: , Rfl:   •  triamcinolone (KENALOG) 0.1 % ointment, Apply  topically to the appropriate area as directed 2 (Two) Times a Day., Disp: 30 g, Rfl: 0    Review of Systems  Review of Systems   Constitutional: Negative for appetite change, chills, diaphoresis, fatigue and fever.   Respiratory: Negative for cough and shortness of breath.    Cardiovascular: Negative for chest pain and palpitations.   Gastrointestinal: Negative for abdominal distention, abdominal pain, diarrhea, nausea and vomiting.   Genitourinary: Positive for dysuria and flank pain. Negative for decreased urine volume, difficulty urinating, frequency, hematuria, hesitancy and urgency.   Musculoskeletal: Negative for myalgias.   Skin: Negative for color change and rash.   Neurological: Positive for dizziness and headaches.   Psychiatric/Behavioral: Negative for confusion and sleep disturbance.       Vitals:  Vitals:    03/09/20 1622   BP: 126/84   Pulse: 84   SpO2: 99%   Weight: 80 kg (176 lb 4.8 oz)   PainSc: 0-No pain       Physical Exam  Physical Exam   Constitutional: She is oriented to person, place, and time. Vital signs are normal. She appears well-developed and well-nourished.   Cardiovascular: Normal rate, regular rhythm and normal heart sounds.   Pulmonary/Chest: Effort normal and breath sounds normal. She has no decreased breath sounds. She has no wheezes. She has no rhonchi. She has no rales.   Abdominal: There is CVA tenderness (left).   Neurological: She is alert and oriented to person, place, and time.   Skin: Skin is warm and dry.   Psychiatric: She has a normal mood and affect. Her speech is normal and behavior is normal.   Nursing note and vitals reviewed.      Labs  Results for orders placed or performed in visit on 03/09/20   POC Urinalysis Dipstick, Automated   Result Value Ref Range    Color Dark Yellow Yellow, Straw, Dark  Yellow, Andreina    Clarity, UA Cloudy (A) Clear    Specific Gravity  1.030 1.005 - 1.030    pH, Urine 6.0 5.0 - 8.0    Leukocytes Negative Negative    Nitrite, UA Negative Negative    Protein, POC Trace (A) Negative mg/dL    Glucose, UA Negative Negative, 1000 mg/dL (3+) mg/dL    Ketones, UA Negative Negative    Urobilinogen, UA Normal Normal    Bilirubin Negative Negative    Blood, UA Negative Negative       Assessment/Plan    Bernardo was seen today for uti fu.    Diagnoses and all orders for this visit:    Acute cystitis without hematuria  -     POC Urinalysis Dipstick, Automated    Encouraged patient to drink plenty of water and continue antibiotic therapy until complete.   Patient advised in adequate water intake, avoidance of excessive caffeine, and use of OTC Azo and Tylenol/ibuprofen as needed.  Advised patient to follow-up for new, worsening, or persistent symptoms for repeat urinalysis.    Plan of care reviewed with patient at conclusion of today's visit. Patient education was provided regarding diagnosis, management, and prescribed or recommended OTC medications. Patient was informed to notify office of any new, worsening, or persistent symptoms. Patient verbalized understanding and agreement with plan of care.     Follow-Up  Return if symptoms worsen or fail to improve, for F/U with PCP.      Electronically Signed By:  SHANE Mancera/Transcription Disclaimer:  Much of this encounter note is an electronic transcription/translation of spoken language to printed text.  The electronic translation of spoken language may permit erroneous, or at times, nonsensical words or phrases to be inadvertently transcribed.  Although I have reviewed the note for such errors, some may still exist.

## 2020-03-13 ENCOUNTER — OFFICE VISIT (OUTPATIENT)
Dept: INTERNAL MEDICINE | Facility: CLINIC | Age: 40
End: 2020-03-13

## 2020-03-13 VITALS
OXYGEN SATURATION: 98 % | HEART RATE: 91 BPM | SYSTOLIC BLOOD PRESSURE: 126 MMHG | TEMPERATURE: 97.8 F | DIASTOLIC BLOOD PRESSURE: 92 MMHG | WEIGHT: 176 LBS | BODY MASS INDEX: 35.55 KG/M2

## 2020-03-13 DIAGNOSIS — H69.83 DYSFUNCTION OF BOTH EUSTACHIAN TUBES: ICD-10-CM

## 2020-03-13 DIAGNOSIS — H60.501 ACUTE OTITIS EXTERNA OF RIGHT EAR, UNSPECIFIED TYPE: Primary | ICD-10-CM

## 2020-03-13 PROCEDURE — 99213 OFFICE O/P EST LOW 20 MIN: CPT | Performed by: PHYSICIAN ASSISTANT

## 2020-03-13 RX ORDER — CLOTRIMAZOLE 1 G/ML
SOLUTION TOPICAL
Qty: 10 ML | Refills: 0 | Status: SHIPPED | OUTPATIENT
Start: 2020-03-13 | End: 2021-09-17 | Stop reason: SDUPTHER

## 2020-03-13 RX ORDER — PREDNISONE 10 MG/1
TABLET ORAL
Qty: 30 TABLET | Refills: 0 | Status: SHIPPED | OUTPATIENT
Start: 2020-03-13 | End: 2020-04-10 | Stop reason: SDUPTHER

## 2020-03-13 NOTE — PROGRESS NOTES
Chief Complaint   Patient presents with   • Diarrhea     Acute   • Nasal Congestion   • Shortness of Breath   • Scratchy Throat       Subjective   Bernardo Dodd is a 39 y.o. female.       History of Present Illness     Pt was diagnosed with influenza B on 3/3 and treated with tamiflu. She has completed the course of tamiflu and does feel better in her chest. Now has more sinus congestion and drainage. She has prednisone left over from a previous infection and took some 2 and 3 days ago.That did seem to help her chest tightness and congestion. No fever, she has been monitoring her temperature.        Current Outpatient Medications:   •  ADVAIR DISKUS 500-50 MCG/DOSE DISKUS, Inhale 1 puff 2 (Two) Times a Day., Disp: , Rfl:   •  albuterol (PROVENTIL) (2.5 MG/3ML) 0.083% nebulizer solution, Take 2.5 mg by nebulization Every 12 (Twelve) Hours., Disp: 30 vial, Rfl: 1  •  amitriptyline (ELAVIL) 25 MG tablet, Take 2 tablets by mouth Every Night., Disp: 60 tablet, Rfl: 3  •  amLODIPine (NORVASC) 5 MG tablet, Take 1 tablet by mouth Daily., Disp: 30 tablet, Rfl: 5  •  azelastine (ASTELIN) 0.1 % nasal spray, 1 spray each nostril two times daily, Disp: 1 each, Rfl: 5  •  azelastine (OPTIVAR) 0.05 % ophthalmic solution, Administer 1 drop to both eyes 2 (Two) Times a Day., Disp: 6 mL, Rfl: 12  •  brompheniramine-pseudoephedrine-DM 30-2-10 MG/5ML syrup, Take 5 mL by mouth 4 (Four) Times a Day As Needed for Congestion or Cough., Disp: 240 mL, Rfl: 0  •  Cholecalciferol (VITAMIN D3) 125 MCG (5000 UT) capsule capsule, Take 1 capsule by mouth Daily., Disp: 30 capsule, Rfl: 5  •  ciprofloxacin-dexamethasone (CIPRODEX) 0.3-0.1 % otic suspension, Administer 4 drops to the right ear 2 (Two) Times a Day., Disp: 7.5 mL, Rfl: 0  •  clindamycin (CLEOCIN) 300 MG capsule, Take 1 capsule by mouth 3 (Three) Times a Day., Disp: 21 capsule, Rfl: 0  •  ferrous sulfate 325 (65 FE) MG tablet, Take 1 tablet by mouth Daily With Breakfast. Resume Iron  when antibiotics finished., Disp: 30 tablet, Rfl: 5  •  fluticasone (FLONASE) 50 MCG/ACT nasal spray, 2 sprays into the nostril(s) as directed by provider Daily., Disp: 16 g, Rfl: 5  •  gabapentin (NEURONTIN) 400 MG capsule, Take 400 mg by mouth 5 (Five) Times a Day As Needed., Disp: , Rfl:   •  hydrOXYzine (ATARAX) 25 MG tablet, Take 1 tablet by mouth Every 6 (Six) Hours As Needed for Itching., Disp: 12 tablet, Rfl: 0  •  lamoTRIgine (LaMICtal) 100 MG tablet, Take 1 tablet by mouth Daily., Disp: 30 tablet, Rfl: 3  •  loratadine-pseudoephedrine (CLARITIN-D 12 HOUR) 5-120 MG per 12 hr tablet, Take 1 tablet by mouth 2 (Two) Times a Day., Disp: 60 tablet, Rfl: 5  •  montelukast (SINGULAIR) 10 MG tablet, Take 10 mg by mouth every night., Disp: , Rfl:   •  neomycin-colistin-hydrocortisone-thonzonium (CORTISPORIN-TC) 3.3-3-10-0.5 MG/ML otic suspension, Administer 4 drops to the right ear 2 (Two) Times a Day., Disp: 10 mL, Rfl: 0  •  omeprazole (priLOSEC) 40 MG capsule, Take 1 capsule by mouth 2 (Two) Times a Day. Take on an empty stomach and eat 30 minutes- 1 hr after eating., Disp: 90 capsule, Rfl: 5  •  predniSONE (DELTASONE) 10 MG tablet, Take 4 tablets for 3 days, then 3 tablets for 3 days, then 2 tablets for 3 days, then 1 tablet for 3 days, Disp: 30 tablet, Rfl: 0  •  SPIRIVA RESPIMAT 2.5 MCG/ACT aerosol solution, Take 2 puffs by mouth Daily., Disp: , Rfl:   •  triamcinolone (KENALOG) 0.1 % ointment, Apply  topically to the appropriate area as directed 2 (Two) Times a Day., Disp: 30 g, Rfl: 0  •  clotrimazole (LOTRIMIN) 1 % external solution, Apply 2-3 drops to right ear canal twice daily, Disp: 10 mL, Rfl: 0     PMFSH  The following portions of the patient's history were reviewed and updated as appropriate: allergies, current medications, past family history, past medical history, past social history, past surgical history and problem list.    Review of Systems   Constitutional: Positive for fatigue. Negative for  activity change and unexpected weight change.   HENT: Positive for congestion, postnasal drip and sore throat. Negative for ear pain.    Eyes: Negative for pain and discharge.   Respiratory: Positive for cough. Negative for chest tightness, shortness of breath and wheezing.    Cardiovascular: Negative for chest pain and palpitations.   Gastrointestinal: Negative for abdominal pain, diarrhea and vomiting.   Endocrine: Negative.    Genitourinary: Negative.    Musculoskeletal: Negative for joint swelling.   Skin: Negative for color change, rash and wound.   Allergic/Immunologic: Negative.    Neurological: Negative for seizures and syncope.   Psychiatric/Behavioral: Negative.        Objective   /92   Pulse 91   Temp 97.8 °F (36.6 °C)   Wt 79.8 kg (176 lb)   SpO2 98%   BMI 35.55 kg/m²     Physical Exam   Constitutional: She is oriented to person, place, and time. She appears well-developed and well-nourished.  Non-toxic appearance. No distress.   HENT:   Head: Normocephalic and atraumatic. Hair is normal.   Right Ear: External ear normal. No swelling or tenderness. Tympanic membrane is retracted.   Left Ear: External ear normal. No drainage, swelling or tenderness. Tympanic membrane is retracted.   Nose: Mucosal edema present. No epistaxis.   Mouth/Throat: Uvula is midline and mucous membranes are normal. No oral lesions. No uvula swelling. Posterior oropharyngeal erythema present. No oropharyngeal exudate.   Right EC- scattered white exudate with pinpoint yellow dots scattered on top   Eyes: Pupils are equal, round, and reactive to light. Conjunctivae and EOM are normal. Right eye exhibits no discharge. Left eye exhibits no discharge. No scleral icterus.   Neck: Normal range of motion. Neck supple.   Cardiovascular: Normal rate, regular rhythm and normal heart sounds. Exam reveals no gallop.   No murmur heard.  Pulmonary/Chest: Breath sounds normal. No stridor. No respiratory distress. She has no wheezes. She  has no rales. She exhibits no tenderness.   Abdominal: Soft. There is no tenderness.   Lymphadenopathy:     She has cervical adenopathy.   Neurological: She is alert and oriented to person, place, and time. She exhibits normal muscle tone.   Skin: Skin is warm and dry. No rash noted. She is not diaphoretic.   Psychiatric: She has a normal mood and affect. Her behavior is normal. Judgment and thought content normal.   Nursing note and vitals reviewed.      Results for orders placed or performed in visit on 03/09/20   POC Urinalysis Dipstick, Automated   Result Value Ref Range    Color Dark Yellow Yellow, Straw, Dark Yellow, Andreina    Clarity, UA Cloudy (A) Clear    Specific Gravity  1.030 1.005 - 1.030    pH, Urine 6.0 5.0 - 8.0    Leukocytes Negative Negative    Nitrite, UA Negative Negative    Protein, POC Trace (A) Negative mg/dL    Glucose, UA Negative Negative, 1000 mg/dL (3+) mg/dL    Ketones, UA Negative Negative    Urobilinogen, UA Normal Normal    Bilirubin Negative Negative    Blood, UA Negative Negative        ASSESSMENT/PLAN    Problem List Items Addressed This Visit     None      Visit Diagnoses     Acute otitis externa of right ear, unspecified type    -  Primary    Appears fungal, use clotrimazole drops as directed. Pt will schedule f/u with ENT.    Relevant Medications    clotrimazole (LOTRIMIN) 1 % external solution    Dysfunction of both eustachian tubes        Relevant Medications    predniSONE (DELTASONE) 10 MG tablet               Return if symptoms worsen or fail to improve.

## 2020-03-19 ENCOUNTER — NURSE TRIAGE (OUTPATIENT)
Dept: CALL CENTER | Facility: HOSPITAL | Age: 40
End: 2020-03-19

## 2020-03-19 ENCOUNTER — TELEPHONE (OUTPATIENT)
Dept: INTERNAL MEDICINE | Facility: CLINIC | Age: 40
End: 2020-03-19

## 2020-03-19 NOTE — TELEPHONE ENCOUNTER
PT has been scheduled with Deepak on Monday 03/23 via the hub.   PT having cough, runny nose, shortness of breath - nurse triage note under encounters.     Please contact PT to clarify and advise if she needs to go to [facility] or our office on Monday.

## 2020-03-19 NOTE — TELEPHONE ENCOUNTER
"Sore throat, cough and  Left ear pain, also has shortness of air. Patient has an appointment  On Monday, feels like she can not wait . Will be trying to get her an earlier appointment. Called the HUB.    Reason for Disposition  • Earache also present    Additional Information  • Negative: Severe difficulty breathing (e.g., struggling for each breath, speaks in single words, stridor)  • Negative: Sounds like a life-threatening emergency to the triager  • Negative: [1] Diagnosed strep throat AND [2] taking antibiotic AND [3] symptoms continue  • Negative: Throat culture results, call about  • Negative: Productive cough is main symptom  • Negative: Non-productive cough is main symptom  • Negative: Hoarseness is main symptom  • Negative: Runny nose is main symptom  • Negative: [1] Drooling or spitting out saliva (because can't swallow) AND [2] normal breathing  • Negative: Unable to open mouth completely  • Negative: [1] Difficulty breathing AND [2] not severe  • Negative: Fever > 104 F (40 C)  • Negative: [1] Refuses to drink anything AND [2] for > 12 hours  • Negative: [1] Drinking very little AND [2] dehydration suspected (e.g., no urine > 12 hours, very dry mouth, very lightheaded)  • Negative: Patient sounds very sick or weak to the triager  • Negative: SEVERE (e.g., excruciating) throat pain  • Negative: [1] Pus on tonsils (back of throat) AND [2]  fever AND [3] swollen neck lymph nodes (\"glands\")  • Negative: [1] Rash AND [2] widespread (especially chest and abdomen)  • Negative: Fever present > 3 days (72 hours)  • Negative: Diabetes mellitus or weak immune system (e.g., HIV positive, cancer chemo, splenectomy, organ transplant, chronic steroids)  • Negative: History of rheumatic fever  • Negative: [1] Adult is leaving on a trip AND [2] requests an antibiotic NOW  • Negative: [1] Positive throat culture or rapid strep test (according to lab, PCP, caller, etc.) AND [2] NO  standing order to call in prescription " "for antibiotic  • Negative: [1] Exposure to family member (or spouse or boyfriend/girlfriend) with test-proven strep AND [2] within last 10 days    Answer Assessment - Initial Assessment Questions  1. ONSET: \"When did the throat start hurting?\" (Hours or days ago)       Soreness off and on , recently had the flu  2. SEVERITY: \"How bad is the sore throat?\" (Scale 1-10; mild, moderate or severe)    - MILD (1-3):  doesn't interfere with eating or normal activities    - MODERATE (4-7): interferes with eating some solids and normal activities    - SEVERE (8-10):  excruciating pain, interferes with most normal activities    - SEVERE DYSPHAGIA: can't swallow liquids, drooling      moderate  3. STREP EXPOSURE: \"Has there been any exposure to strep within the past week?\" If so, ask: \"What type of contact occurred?\"      Yes, son    And son has flu  4.  VIRAL SYMPTOMS: \"Are there any symptoms of a cold, such as a runny nose, cough, hoarse voice or red eyes?\"       Cough with productive of yellow sputum  5. FEVER: \"Do you have a fever?\" If so, ask: \"What is your temperature, how was it measured, and when did it start?\"      no  6. PUS ON THE TONSILS: \"Is there pus on the tonsils in the back of your throat?\"      unsure  7. OTHER SYMPTOMS: \"Do you have any other symptoms?\" (e.g., difficulty breathing, headache, rash)      asthma  8. PREGNANCY: \"Is there any chance you are pregnant?\" \"When was your last menstrual period?\"      na    Protocols used: SORE THROAT-ADULT-AH      "

## 2020-03-20 ENCOUNTER — TELEPHONE (OUTPATIENT)
Dept: GASTROENTEROLOGY | Facility: CLINIC | Age: 40
End: 2020-03-20

## 2020-03-20 NOTE — TELEPHONE ENCOUNTER
CALLED PT TO ADVISE PER DR. JOHN, SHE WILL NEED TO RESCHEDULE APPT. NO ANSWER. [facility]   JUAN NATHANAEL WAS NOTIFIED BY DR. JOHN THAT PT WILL NEED TO BE RESCHEDULE. WE HAVE BOTH TRIED TO CALL PT AND WAS UNSUCCESSFUL.

## 2020-03-20 NOTE — TELEPHONE ENCOUNTER
CALLED PT TO ADVISE PER DR. JOHN, SHE WILL NEED TO RESCHEDULE APPT. NO ANSWER. [facility]   JUAN NATHANAEL WAS NOTIFIED BY DR. JOHN THAT PT WILL NEED TO BE RESCHEDULE. WE HAVE BOTH TRIED TO CALL PT AND WAS UNSUCCESSFUL.

## 2020-03-23 ENCOUNTER — OFFICE VISIT (OUTPATIENT)
Dept: INTERNAL MEDICINE | Facility: CLINIC | Age: 40
End: 2020-03-23

## 2020-03-23 VITALS
OXYGEN SATURATION: 98 % | SYSTOLIC BLOOD PRESSURE: 116 MMHG | DIASTOLIC BLOOD PRESSURE: 86 MMHG | TEMPERATURE: 98.9 F | HEART RATE: 99 BPM | BODY MASS INDEX: 35.55 KG/M2 | WEIGHT: 176 LBS

## 2020-03-23 DIAGNOSIS — J32.0 CHRONIC MAXILLARY SINUSITIS: Primary | ICD-10-CM

## 2020-03-23 DIAGNOSIS — R82.90 ABNORMAL URINE ODOR: ICD-10-CM

## 2020-03-23 LAB
BILIRUB BLD-MCNC: NEGATIVE MG/DL
CLARITY, POC: ABNORMAL
COLOR UR: YELLOW
GLUCOSE UR STRIP-MCNC: NEGATIVE MG/DL
KETONES UR QL: NEGATIVE
LEUKOCYTE EST, POC: NEGATIVE
NITRITE UR-MCNC: NEGATIVE MG/ML
PH UR: 1 [PH] (ref 5–8)
PROT UR STRIP-MCNC: NEGATIVE MG/DL
RBC # UR STRIP: NEGATIVE /UL
SP GR UR: 1.03 (ref 1–1.03)
UROBILINOGEN UR QL: NORMAL

## 2020-03-23 PROCEDURE — 99213 OFFICE O/P EST LOW 20 MIN: CPT | Performed by: PHYSICIAN ASSISTANT

## 2020-03-23 RX ORDER — AMOXICILLIN AND CLAVULANATE POTASSIUM 875; 125 MG/1; MG/1
1 TABLET, FILM COATED ORAL 2 TIMES DAILY
Qty: 20 TABLET | Refills: 0 | Status: SHIPPED | OUTPATIENT
Start: 2020-03-23 | End: 2020-05-01

## 2020-03-23 NOTE — ASSESSMENT & PLAN NOTE
Treat acute on chronic sinusitis with augmentin as ordered. Will also cover for any underlying strep bacteria. Cont ot [facility] care[facility] rest and fluids.

## 2020-03-23 NOTE — PROGRESS NOTES
Chief Complaint   Patient presents with   • Cough     Acute    • Runy Nose   • Sore Throat   • Fatigue   • Bump on side of neck       Britta Dodd is a 39 y.o. female.       History of Present Illness     Pt has continued to have upper respiratory symptoms with worsening sinus pressure and congestion. No fever or shortness of breath. Does have cough from post nasal drainage. Son had strep recently and her throat is hurting.     Has a small bump on the right side of her neck, squeezed some pus out of it last night. Now tender to touch.        Current Outpatient Medications:   •  ADVAIR DISKUS 500-50 MCG/DOSE DISKUS, Inhale 1 puff 2 (Two) Times a Day., Disp: , Rfl:   •  albuterol (PROVENTIL) (2.5 MG/3ML) 0.083% nebulizer solution, Take 2.5 mg by nebulization Every 12 (Twelve) Hours., Disp: 30 vial, Rfl: 1  •  amitriptyline (ELAVIL) 25 MG tablet, Take 2 tablets by mouth Every Night., Disp: 60 tablet, Rfl: 3  •  amLODIPine (NORVASC) 5 MG tablet, Take 1 tablet by mouth Daily., Disp: 30 tablet, Rfl: 5  •  azelastine (ASTELIN) 0.1 % nasal spray, 1 spray each nostril two times daily, Disp: 1 each, Rfl: 5  •  azelastine (OPTIVAR) 0.05 % ophthalmic solution, Administer 1 drop to both eyes 2 (Two) Times a Day., Disp: 6 mL, Rfl: 12  •  brompheniramine-pseudoephedrine-DM 30-2-10 MG/5ML syrup, Take 5 mL by mouth 4 (Four) Times a Day As Needed for Congestion or Cough., Disp: 240 mL, Rfl: 0  •  Cholecalciferol (VITAMIN D3) 125 MCG (5000 UT) capsule capsule, Take 1 capsule by mouth Daily., Disp: 30 capsule, Rfl: 5  •  ciprofloxacin-dexamethasone (CIPRODEX) 0.3-0.1 % otic suspension, Administer 4 drops to the right ear 2 (Two) Times a Day., Disp: 7.5 mL, Rfl: 0  •  clotrimazole (LOTRIMIN) 1 % external solution, Apply 2-3 drops to right ear canal twice daily, Disp: 10 mL, Rfl: 0  •  ferrous sulfate 325 (65 FE) MG tablet, Take 1 tablet by mouth Daily With Breakfast. Resume Iron when antibiotics finished., Disp: 30  tablet, Rfl: 5  •  fluticasone (FLONASE) 50 MCG/ACT nasal spray, 2 sprays into the nostril(s) as directed by provider Daily., Disp: 16 g, Rfl: 5  •  gabapentin (NEURONTIN) 400 MG capsule, Take 400 mg by mouth 5 (Five) Times a Day As Needed., Disp: , Rfl:   •  hydrOXYzine (ATARAX) 25 MG tablet, Take 1 tablet by mouth Every 6 (Six) Hours As Needed for Itching., Disp: 12 tablet, Rfl: 0  •  lamoTRIgine (LaMICtal) 100 MG tablet, Take 1 tablet by mouth Daily., Disp: 30 tablet, Rfl: 3  •  loratadine-pseudoephedrine (CLARITIN-D 12 HOUR) 5-120 MG per 12 hr tablet, Take 1 tablet by mouth 2 (Two) Times a Day., Disp: 60 tablet, Rfl: 5  •  montelukast (SINGULAIR) 10 MG tablet, Take 10 mg by mouth every night., Disp: , Rfl:   •  neomycin-colistin-hydrocortisone-thonzonium (CORTISPORIN-TC) 3.3-3-10-0.5 MG/ML otic suspension, Administer 4 drops to the right ear 2 (Two) Times a Day., Disp: 10 mL, Rfl: 0  •  omeprazole (priLOSEC) 40 MG capsule, Take 1 capsule by mouth 2 (Two) Times a Day. Take on an empty stomach and eat 30 minutes- 1 hr after eating., Disp: 90 capsule, Rfl: 5  •  predniSONE (DELTASONE) 10 MG tablet, Take 4 tablets for 3 days, then 3 tablets for 3 days, then 2 tablets for 3 days, then 1 tablet for 3 days, Disp: 30 tablet, Rfl: 0  •  SPIRIVA RESPIMAT 2.5 MCG/ACT aerosol solution, Take 2 puffs by mouth Daily., Disp: , Rfl:   •  triamcinolone (KENALOG) 0.1 % ointment, Apply  topically to the appropriate area as directed 2 (Two) Times a Day., Disp: 30 g, Rfl: 0  •  amoxicillin-clavulanate (Augmentin) 875-125 MG per tablet, Take 1 tablet by mouth 2 (Two) Times a Day., Disp: 20 tablet, Rfl: 0     PMFSH  The following portions of the patient's history were reviewed and updated as appropriate: allergies, current medications, past family history, past medical history, past social history, past surgical history and problem list.    Review of Systems   Constitutional: Positive for fatigue. Negative for activity change and  unexpected weight change.   HENT: Positive for congestion, ear discharge, ear pain, postnasal drip, sore throat and trouble swallowing. Negative for drooling.    Eyes: Negative for pain and discharge.   Respiratory: Positive for cough. Negative for chest tightness, shortness of breath, wheezing and stridor.    Cardiovascular: Negative for chest pain and palpitations.   Gastrointestinal: Positive for diarrhea. Negative for abdominal pain and vomiting.   Endocrine: Negative.    Genitourinary: Negative.    Musculoskeletal: Positive for neck pain. Negative for joint swelling.   Skin: Negative for color change, rash and wound.   Allergic/Immunologic: Negative.    Neurological: Positive for headaches. Negative for seizures and syncope.   Psychiatric/Behavioral: Negative.        Objective   /86   Pulse 99   Temp 98.9 °F (37.2 °C)   Wt 79.8 kg (176 lb)   SpO2 98%   BMI 35.55 kg/m²     Physical Exam   Constitutional: She is oriented to person, place, and time. She appears well-developed and well-nourished.  Non-toxic appearance. No distress.   HENT:   Head: Normocephalic and atraumatic. Hair is normal.   Right Ear: External ear normal. No drainage, swelling or tenderness. Tympanic membrane is retracted.   Left Ear: External ear normal. No drainage, swelling or tenderness. Tympanic membrane is retracted.   Nose: Mucosal edema present. No epistaxis.   Mouth/Throat: Uvula is midline and mucous membranes are normal. No oral lesions. No uvula swelling. Posterior oropharyngeal erythema present. No oropharyngeal exudate.   Eyes: Pupils are equal, round, and reactive to light. Conjunctivae and EOM are normal. Right eye exhibits no discharge. Left eye exhibits no discharge. No scleral icterus.   Neck: Normal range of motion. Neck supple.   Cardiovascular: Normal rate, regular rhythm and normal heart sounds. Exam reveals no gallop.   No murmur heard.  Pulmonary/Chest: Breath sounds normal. No stridor. No respiratory  distress. She has no wheezes. She has no rales. She exhibits no tenderness.   Abdominal: Soft. There is no tenderness.   Lymphadenopathy:     She has cervical adenopathy.   Neurological: She is alert and oriented to person, place, and time. She exhibits normal muscle tone.   Skin: Skin is warm and dry. No rash noted. She is not diaphoretic.        Psychiatric: She has a normal mood and affect. Her behavior is normal. Judgment and thought content normal.   Nursing note and vitals reviewed.      Results for orders placed or performed in visit on 03/23/20   POC Urinalysis Dipstick, Automated   Result Value Ref Range    Color Yellow Yellow, Straw, Dark Yellow, Andreina    Clarity, UA Cloudy (A) Clear    Specific Gravity  1.030 1.005 - 1.030    pH, Urine 1.0 (A) 5.0 - 8.0    Leukocytes Negative Negative    Nitrite, UA Negative Negative    Protein, POC Negative Negative mg/dL    Glucose, UA Negative Negative, 1000 mg/dL (3+) mg/dL    Ketones, UA Negative Negative    Urobilinogen, UA Normal Normal    Bilirubin Negative Negative    Blood, UA Negative Negative        ASSESSMENT/PLAN    Problem List Items Addressed This Visit        Respiratory    Chronic maxillary sinusitis - Primary     Treat acute on chronic sinusitis with augmentin as ordered. Will also cover for any underlying strep bacteria. Cont [facility] rest and fluids.         Relevant Medications    amoxicillin-clavulanate (Augmentin) 875-125 MG per tablet      Other Visit Diagnoses     Abnormal urine odor        Normal in office u/a. Increase water intake.    Relevant Orders    POC Urinalysis Dipstick, Automated (Completed)               Return if symptoms worsen or fail to improve.  Answers for HPI/ROS submitted by the patient on 3/21/2020   Sore throat  What is the primary reason for your visit?: Sore Throat

## 2020-04-08 PROCEDURE — U0003 INFECTIOUS AGENT DETECTION BY NUCLEIC ACID (DNA OR RNA); SEVERE ACUTE RESPIRATORY SYNDROME CORONAVIRUS 2 (SARS-COV-2) (CORONAVIRUS DISEASE [COVID-19]), AMPLIFIED PROBE TECHNIQUE, MAKING USE OF HIGH THROUGHPUT TECHNOLOGIES AS DESCRIBED BY CMS-2020-01-R: HCPCS | Performed by: NURSE PRACTITIONER

## 2020-04-08 PROCEDURE — 87635 SARS-COV-2 COVID-19 AMP PRB: CPT | Performed by: NURSE PRACTITIONER

## 2020-04-10 ENCOUNTER — TELEMEDICINE (OUTPATIENT)
Dept: FAMILY MEDICINE CLINIC | Facility: TELEHEALTH | Age: 40
End: 2020-04-10

## 2020-04-10 DIAGNOSIS — J01.40 ACUTE PANSINUSITIS, RECURRENCE NOT SPECIFIED: ICD-10-CM

## 2020-04-10 DIAGNOSIS — J45.51 SEVERE PERSISTENT ASTHMA WITH EXACERBATION: Primary | ICD-10-CM

## 2020-04-10 PROCEDURE — 99213 OFFICE O/P EST LOW 20 MIN: CPT | Performed by: NURSE PRACTITIONER

## 2020-04-10 RX ORDER — DOXYCYCLINE HYCLATE 100 MG/1
100 CAPSULE ORAL 2 TIMES DAILY
Qty: 20 CAPSULE | Refills: 0 | Status: SHIPPED | OUTPATIENT
Start: 2020-04-10 | End: 2020-04-20

## 2020-04-10 RX ORDER — DEXTROMETHORPHAN HYDROBROMIDE AND PROMETHAZINE HYDROCHLORIDE 15; 6.25 MG/5ML; MG/5ML
5 SYRUP ORAL 4 TIMES DAILY PRN
Qty: 120 ML | Refills: 0 | Status: SHIPPED | OUTPATIENT
Start: 2020-04-10 | End: 2020-05-01

## 2020-04-10 RX ORDER — PREDNISONE 10 MG/1
TABLET ORAL
Qty: 30 TABLET | Refills: 0 | Status: SHIPPED | OUTPATIENT
Start: 2020-04-10 | End: 2020-05-01 | Stop reason: SDUPTHER

## 2020-04-10 NOTE — PATIENT INSTRUCTIONS
Asthma, Adult    Asthma is a long-term (chronic) condition that causes recurrent episodes in which the airways become tight and narrow. The airways are the passages that lead from the nose and mouth down into the lungs. Asthma episodes, also called asthma attacks, can cause coughing, wheezing, shortness of breath, and chest pain. The airways can also fill with mucus. During an attack, it can be difficult to breathe. Asthma attacks can range from minor to life threatening.  Asthma cannot be cured, but medicines and lifestyle changes can help control it and treat acute attacks.  What are the causes?  This condition is believed to be caused by inherited (genetic) and environmental factors, but its exact cause is not known.  There are many things that can bring on an asthma attack or make asthma symptoms worse (triggers). Asthma triggers are different for each person. Common triggers include:  · Mold.  · Dust.  · Cigarette smoke.  · Cockroaches.  · Things that can cause allergy symptoms (allergens), such as animal dander or pollen from trees or grass.  · Air pollutants such as household , wood smoke, smog, or chemical odors.  · Cold air, weather changes, and winds (which increase molds and pollen in the air).  · Strong emotional expressions such as crying or laughing hard.  · Stress.  · Certain medicines (such as aspirin) or types of medicines (such as beta-blockers).  · Sulfites in foods and drinks. Foods and drinks that may contain sulfites include dried fruit, potato chips, and sparkling grape juice.  · Infections or inflammatory conditions such as the flu, a cold, or inflammation of the nasal membranes (rhinitis).  · Gastroesophageal reflux disease (GERD).  · Exercise or strenuous activity.  What are the signs or symptoms?  Symptoms of this condition may occur right after asthma is triggered or many hours later. Symptoms include:  · Wheezing. This can sound like whistling when you breathe.  · Excessive  nighttime or early morning coughing.  · Frequent or severe coughing with a common cold.  · Chest tightness.  · Shortness of breath.  · Tiredness (fatigue) with minimal activity.  How is this diagnosed?  This condition is diagnosed based on:  · Your medical history.  · A physical exam.  · Tests, which may include:  ? Lung function studies and pulmonary studies (spirometry). These tests can evaluate the flow of air in your lungs.  ? Allergy tests.  ? Imaging tests, such as X-rays.  How is this treated?  There is no cure for this condition, but treatment can help control your symptoms. Treatment for asthma usually involves:  · Identifying and avoiding your asthma triggers.  · Using medicines to control your symptoms. Generally, two types of medicines are used to treat asthma:  ? Controller medicines. These help prevent asthma symptoms from occurring. They are usually taken every day.  ? Fast-acting reliever or rescue medicines. These quickly relieve asthma symptoms by widening the narrow and tight airways. They are used as needed and provide short-term relief.  · Using supplemental oxygen. This may be needed during a severe episode.  · Using other medicines, such as:  ? Allergy medicines, such as antihistamines, if your asthma attacks are triggered by allergens.  ? Immune medicines (immunomodulators). These are medicines that help control the immune system.  · Creating an asthma action plan. An asthma action plan is a written plan for managing and treating your asthma attacks. This plan includes:  ? A list of your asthma triggers and how to avoid them.  ? Information about when medicines should be taken and when their dosage should be changed.  ? Instructions about using a device called a peak flow meter. A peak flow meter measures how well the lungs are working and the severity of your asthma. It helps you monitor your condition.  Follow these instructions at home:  Controlling your home environment  Control your home  environment in the following ways to help avoid triggers and prevent asthma attacks:  · Change your heating and air conditioning filter regularly.  · Limit your use of fireplaces and wood stoves.  · Get rid of pests (such as roaches and mice) and their droppings.  · Throw away plants if you see mold on them.  · Clean floors and dust surfaces regularly. Use unscented cleaning products.  · Try to have someone else vacuum for you regularly. Stay out of rooms while they are being vacuumed and for a short while afterward. If you vacuum, use a dust mask from a hardware store, a double-layered or microfilter vacuum  bag, or a vacuum  with a HEPA filter.  · Replace carpet with wood, tile, or vinyl avinash. Carpet can trap dander and dust.  · Use allergy-proof pillows, mattress covers, and box spring covers.  · Keep your bedroom a trigger-free room.  · Avoid pets and keep windows closed when allergens are in the air.  · Wash beddings every week in hot water and dry them in a dryer.  · Use blankets that are made of polyester or cotton.  · Clean bathrooms and selvin with bleach. If possible, have someone repaint the walls in these rooms with mold-resistant paint. Stay out of the rooms that are being cleaned and painted.  · Wash your hands often with soap and water. If soap and water are not available, use hand .  · Do not allow anyone to smoke in your home.  General instructions  · Take over-the-counter and prescription medicines only as told by your health care provider.  ? Speak with your health care provider if you have questions about how or when to take the medicines.  ? Make note if you are requiring more frequent dosages.  · Do not use any products that contain nicotine or tobacco, such as cigarettes and e-cigarettes. If you need help quitting, ask your health care provider. Also, avoid being exposed to secondhand smoke.  · Use a peak flow meter as told by your health care provider. Record and  keep track of the readings.  · Understand and use the asthma action plan to help minimize, or stop an asthma attack, without needing to seek medical care.  · Make sure you stay up to date on your yearly vaccinations as told by your health care provider. This may include vaccines for the flu and pneumonia.  · Avoid outdoor activities when allergen counts are high and when air quality is low.  · Wear a ski mask that covers your nose and mouth during outdoor winter activities. Exercise indoors on cold days if you can.  · Warm up before exercising, and take time for a cool-down period after exercise.  · Keep all follow-up visits as told by your health care provider. This is important.  Where to find more information  · For information about asthma, turn to the Centers for Disease Control and Prevention at www.cdc.gov/asthma/faqs.htm  · For air quality information, turn to [employer]Now at https://airnow.gov/  Contact a health care provider if:  · You have wheezing, shortness of breath, or a cough even while you are taking medicine to prevent attacks.  · The mucus you cough up (sputum) is thicker than usual.  · Your sputum changes from clear or white to yellow, green, gray, or bloody.  · Your medicines are causing side effects, such as a rash, itching, swelling, or trouble breathing.  · You need to use a reliever medicine more than 2-3 times a week.  · Your peak flow reading is still at 50-79% of your personal best after following your action plan for 1 hour.  · You have a fever.  Get help right away if:  · You are getting worse and do not respond to treatment during an asthma attack.  · You are short of breath when at rest or when doing very little physical activity.  · You have difficulty eating, drinking, or talking.  · You have chest pain or tightness.  · You develop a fast heartbeat or palpitations.  · You have a bluish color to your lips or fingernails.  · You are light-headed or dizzy, or you faint.  · Your peak flow  reading is less than 50% of your personal best.  · You feel too tired to breathe normally.  Summary  · Asthma is a long-term (chronic) condition that causes recurrent episodes in which the airways become tight and narrow. These episodes can cause coughing, wheezing, shortness of breath, and chest pain.  · Asthma cannot be cured, but medicines and lifestyle changes can help control it and treat acute attacks.  · Make sure you understand how to avoid triggers and how and when to use your medicines.  · Asthma attacks can range from minor to life threatening. Get help right away if you have an asthma attack and do not respond to treatment with your usual rescue medicines.  This information is not intended to replace advice given to you by your health care provider. Make sure you discuss any questions you have with your health care provider.  Document Released: 12/18/2006 Document Revised: 01/22/2018 Document Reviewed: 01/22/2018  [employer] Interactive Patient Education © 2020 [employer]    Cough, Adult    Coughing is a reflex that clears your throat and your airways. Coughing helps to heal and protect your lungs. It is normal to cough occasionally, but a cough that happens with other symptoms or lasts a long time may be a sign of a condition that needs treatment. A cough may last only 2-3 weeks (acute), or it may last longer than 8 weeks (chronic).  What are the causes?  Coughing is commonly caused by:  · Breathing in substances that irritate your lungs.  · A viral or bacterial respiratory infection.  · Allergies.  · Asthma.  · Postnasal drip.  · Smoking.  · Acid backing up from the stomach into the esophagus (gastroesophageal reflux).  · Certain medicines.  · Chronic lung problems, including COPD (or rarely, lung cancer).  · Other medical conditions such as heart failure.  Follow these instructions at home:  Pay attention to any changes in your symptoms. Take these actions to help with your discomfort:  · Take  medicines only as told by your health care provider.  ? If you were prescribed an antibiotic medicine, take it as told by your health care provider. Do not stop taking the antibiotic even if you start to feel better.  ? Talk with your health care provider before you take a cough suppressant medicine.  · Drink enough fluid to keep your urine clear or pale yellow.  · If the air is dry, use a cold steam vaporizer or humidifier in your bedroom or your home to help loosen secretions.  · Avoid anything that causes you to cough at work or at home.  · If your cough is worse at night, try sleeping in a semi-upright position.  · Avoid cigarette smoke. If you smoke, quit smoking. If you need help quitting, ask your health care provider.  · Avoid caffeine.  · Avoid alcohol.  · Rest as needed.  Contact a health care provider if:  · You have new symptoms.  · You cough up pus.  · Your cough does not get better after 2-3 weeks, or your cough gets worse.  · You cannot control your cough with suppressant medicines and you are losing sleep.  · You develop pain that is getting worse or pain that is not controlled with pain medicines.  · You have a fever.  · You have unexplained weight loss.  · You have night sweats.  Get help right away if:  · You cough up blood.  · You have difficulty breathing.  · Your heartbeat is very fast.  This information is not intended to replace advice given to you by your health care provider. Make sure you discuss any questions you have with your health care provider.  Document Released: 06/15/2012 Document Revised: 05/25/2017 Document Reviewed: 02/24/2016  [employer] Patient Education © 2019 [employer]

## 2020-04-10 NOTE — PROGRESS NOTES
CHIEF COMPLAINT  No chief complaint on file.      HPI  Bernardo Dodd is a 39 y.o. female  presents with complaint of since weekend severe asthma issues; persistent cough, shortness of breath, wheezing, no fever at this time--97.1, headache, facial pain/pressure, PND, pressure in ears, sore throat from cough and drainage; already taking Claritin D BID; dayquil, also has Advair, Qvar    Went to  for COVID-19 test--waiting for results    Review of Systems   Constitutional: Positive for activity change and fatigue. Negative for fever.   HENT: Positive for congestion, ear pain, postnasal drip, sinus pressure, sinus pain, sneezing and sore throat.    Respiratory: Positive for cough, chest tightness, shortness of breath and wheezing.    Neurological: Positive for headaches.   All other systems reviewed and are negative.      Past Medical History:   Diagnosis Date   • Allergic rhinitis    • Anemia    • Arthritis    • Asthma    • Depression    • FHx: migraine headaches    • GERD (gastroesophageal reflux disease)    • Heart murmur    • Herpes simplex    • History of chest x-ray 11/01/2015    no active disease   • History of echocardiogram 11/01/2015    ejection fraction of greater than 65%, mitral and pulmonic regurgitation an physiological tricuspid regurgitation.   • History of PFTs 12/22/2015    spirometry data acceptable and reproducible; pt given 4 puffs of Ventolin; pt gave good effort; no obstruction; no Bd response; MVV reduced    • History of PFTs 11/02/2015    pt gave best effort; duoneb given prior and post study; moderate nonspecific proportional reduction of FEV1 and FVC with preserved ratio; FEV1 moderately reduced; cannot rule out restriction   • Hypertension    • Migraine    • Ovarian cyst    • Seizures (CMS/HCC)    • Thigh shingles        Family History   Problem Relation Age of Onset   • Pancreatic cancer Maternal Grandmother    • Heart failure Paternal Grandmother    • MARCIE disease Paternal Aunt    •  Arthritis Mother    • Cancer Mother    • Arthritis Father    • Diabetes Neg Hx    • Heart attack Neg Hx    • Hypertension Neg Hx    • Hyperlipidemia Neg Hx    • Mental illness Neg Hx    • Obesity Neg Hx    • Stroke Neg Hx        Social History     Socioeconomic History   • Marital status: Single     Spouse name: Not on file   • Number of children: Not on file   • Years of education: Not on file   • Highest education level: Not on file   Tobacco Use   • Smoking status: Former Smoker     Packs/day: 1.00     Years: 15.00     Pack years: 15.00     Types: Cigarettes     Last attempt to quit: 2015     Years since quittin.2   • Smokeless tobacco: Never Used   Substance and Sexual Activity   • Alcohol use: No     Comment: socially   • Drug use: Yes     Types: Marijuana     Comment: Once a month    • Sexual activity: Defer   Social History Narrative    Caffeine intake: 16 oz per day          LMP 2020 Comment: spotting    PHYSICAL EXAM  Physical Exam   Constitutional: She is oriented to person, place, and time. She appears well-developed and well-nourished. She is cooperative.   HENT:   Head: Normocephalic and atraumatic.   Right Ear: Hearing normal.   Left Ear: Hearing normal.   Directed pt exam--bilateral maxillary sinus tenderness   Pulmonary/Chest:   Observation--persistent dry cough with audible wheezing and noted shortness of breath while talking    Neurological: She is alert and oriented to person, place, and time.       Diagnoses and all orders for this visit:    Severe persistent asthma with exacerbation  -     predniSONE (DELTASONE) 10 MG tablet; Take 4 tablets for 3 days, then 3 tablets for 3 days, then 2 tablets for 3 days, then 1 tablet for 3 days  -     promethazine-dextromethorphan (PROMETHAZINE-DM) 6.25-15 MG/5ML syrup; Take 5 mL by mouth 4 (Four) Times a Day As Needed for Cough.  --medications as prescribed  --increase intake of clear, decaffeinated fluids  --continue Claritin D, Qvar, and  Advair inhalers    Acute pansinusitis, recurrence not specified  -     doxycycline (VIBRAMYCIN) 100 MG capsule; Take 1 capsule by mouth 2 (Two) Times a Day for 10 days.  --complete antibiotic as prescribed  --increase intake of clear, decaffeinated fluids  --continue Claritin D, Qvar, and Advair    **if at any time, experiences fever AND/OR worsening cough AND/OR shortness of breath, has been advised to go to nearest urgent care or emergency department for evaluation AND/OR testing    **if no improvement, but not worsening, may schedule a f/u virtual visit or E-visit      FOLLOW-UP  As discussed with PCP if no improvement or Urgent Care/Emergency Department if worsening of symptoms    Patient verbalizes understanding of medication dosage, comfort measures, instructions for treatment and follow-up.    SHANE Garza  04/10/2020  1:59 PM    This visit was performed via Telehealth.  This patient has been instructed to follow-up with their primary care provider if their symptoms worsen or the treatment provided does not resolve their illness.

## 2020-04-12 ENCOUNTER — TELEPHONE (OUTPATIENT)
Dept: URGENT CARE | Facility: CLINIC | Age: 40
End: 2020-04-12

## 2020-05-01 ENCOUNTER — TELEMEDICINE (OUTPATIENT)
Dept: FAMILY MEDICINE CLINIC | Facility: TELEHEALTH | Age: 40
End: 2020-05-01

## 2020-05-01 DIAGNOSIS — R05.9 COUGH: Primary | ICD-10-CM

## 2020-05-01 DIAGNOSIS — J45.51 SEVERE PERSISTENT ASTHMA WITH EXACERBATION: ICD-10-CM

## 2020-05-01 PROCEDURE — 99214 OFFICE O/P EST MOD 30 MIN: CPT

## 2020-05-01 RX ORDER — DEXTROMETHORPHAN HYDROBROMIDE AND PROMETHAZINE HYDROCHLORIDE 15; 6.25 MG/5ML; MG/5ML
5 SYRUP ORAL 4 TIMES DAILY PRN
Qty: 240 ML | Refills: 0 | Status: SHIPPED | OUTPATIENT
Start: 2020-05-01 | End: 2020-05-11

## 2020-05-01 RX ORDER — PREDNISONE 10 MG/1
TABLET ORAL
Qty: 30 TABLET | Refills: 0 | OUTPATIENT
Start: 2020-05-01 | End: 2020-05-30

## 2020-05-01 RX ORDER — MONTELUKAST SODIUM 10 MG/1
10 TABLET ORAL NIGHTLY
Qty: 30 TABLET | Refills: 0 | Status: SHIPPED | OUTPATIENT
Start: 2020-05-01 | End: 2020-05-31

## 2020-05-01 NOTE — PATIENT INSTRUCTIONS
CORONAVIRUS (COVID-19) SYMPTOMS CAN MIMIC THE FLU, AND SOMETIMES THE COMMON COLD.  We cannot rule out infection or exposure without additional testing and treatment.  IF YOU ARE CONCERNED ABOUT INFECTION OR EXPOSURE TO CORONAVIRUS, please call your state's Health Department (contact information below) if you experience symptoms such as fever, cough and shortness of breath AND have a recent history of travel or contact with someone suspected of having COVID-19 please contact your state's health department.   [place] [facility] ([facility]) Epidemiology Resource Center at [phone] (after hours and weekends call [phone])   [place] [facility] ([facility]) Epidemiology Resource Center at  [phone]     ** ANY CONDITION CAN CHANGE, despite proper treatment.  ** IF YOUR SYMPTOMS WORSEN, CHANGE, or PERSIST,   then get to the nearest ER, call your doctor, or go to urgent care clinic.  ** FOLLOW-UP CARE IS YOUR RESPONSIBILITY;    Urgent Care is NOT a substitute for your doctor.    ** ALWAYS FOLLOW-UP WITH YOUR OWN DOCTOR   to review this [facility] visit, and for possible repeat blood or urine tests, x-rays, or additional testing.    ** ALL MEDICATIONS HAVE SIDE EFFECTS.    Please review these, and ask your pharmacist or contact your primary care provider for questions or concerns.     Take any prescribed medication as directed.  Rest  Hydrate  Tylenol (acetaminophen) as needed for pain/fever every 4-6 hours.  Avoid contact with infants, elderly, pregnant women, and immunocompromised.   Home quarantine as directed.   At least 3 days (72 hours) have passed since recovery defined as resolution of fever  without the use of fever-reducing medications AND   improvement in respiratory symptoms (e.g., cough, shortness of breath); AND,   At least 7 days have passed since symptoms first appeared.        Asthma, Adult    Asthma is a long-term (chronic) condition in which the airways get tight and narrow.  The airways are the breathing passages that lead from the nose and mouth down into the lungs. A person with asthma will have times when symptoms get worse. These are called asthma attacks. They can cause coughing, whistling sounds when you breathe (wheezing), shortness of breath, and chest pain. They can make it hard to breathe. There is no cure for asthma, but medicines and lifestyle changes can help control it.  There are many things that can bring on an asthma attack or make asthma symptoms worse (triggers). Common triggers include:  · Mold.  · Dust.  · Cigarette smoke.  · Cockroaches.  · Things that can cause allergy symptoms (allergens). These include animal skin flakes (dander) and pollen from trees or grass.  · Things that pollute the air. These may include household , wood smoke, smog, or chemical odors.  · Cold air, weather changes, and wind.  · Crying or laughing hard.  · Stress.  · Certain medicines or drugs.  · Certain foods such as dried fruit, potato chips, and grape juice.  · Infections, such as a cold or the flu.  · Certain medical conditions or diseases.  · Exercise or tiring activities.  Asthma may be treated with medicines and by staying away from the things that cause asthma attacks. Types of medicines may include:  · Controller medicines. These help prevent asthma symptoms. They are usually taken every day.  · Fast-acting reliever or rescue medicines. These quickly relieve asthma symptoms. They are used as needed and provide short-term relief.  · Allergy medicines if your attacks are brought on by allergens.  · Medicines to help control the body's defense (immune) system.  Follow these instructions at home:  Avoiding triggers in your home  · Change your heating and air conditioning filter often.  · Limit your use of fireplaces and wood stoves.  · Get rid of pests (such as roaches and mice) and their droppings.  · Throw away plants if you see mold on them.  · Clean your floors. Dust  regularly. Use cleaning products that do not smell.  · Have someone vacuum when you are not home. Use a vacuum  with a HEPA filter if possible.  · Replace carpet with wood, tile, or vinyl avinash. Carpet can trap animal skin flakes and dust.  · Use allergy-proof pillows, mattress covers, and box spring covers.  · Wash bed sheets and blankets every week in hot water. Dry them in a dryer.  · Keep your bedroom free of any triggers.   · Avoid pets and keep windows closed when things that cause allergy symptoms are in the air.  · Use blankets that are made of polyester or cotton.  · Clean bathrooms and selvin with bleach. If possible, have someone repaint the walls in these rooms with mold-resistant paint. Keep out of the rooms that are being cleaned and painted.  · Wash your hands often with soap and water. If soap and water are not available, use hand .  · Do not allow anyone to smoke in your home.  General instructions  · Take over-the-counter and prescription medicines only as told by your doctor.  ? Talk with your doctor if you have questions about how or when to take your medicines.  ? Make note if you need to use your medicines more often than usual.  · Do not use any products that contain nicotine or tobacco, such as cigarettes and e-cigarettes. If you need help quitting, ask your doctor.  · Stay away from secondhand smoke.  · Avoid doing things outdoors when allergen counts are high and when air quality is low.  · Wear a ski mask when doing outdoor activities in the winter. The mask should cover your nose and mouth. Exercise indoors on cold days if you can.  · Warm up before you exercise. Take time to cool down after exercise.  · Use a peak flow meter as told by your doctor. A peak flow meter is a tool that measures how well the lungs are working.  · Keep track of the peak flow meter's readings. Write them down.  · Follow your asthma action plan. This is a written plan for taking care of your  asthma and treating your attacks.  · Make sure you get all the shots (vaccines) that your doctor recommends. Ask your doctor about a flu shot and a pneumonia shot.  · Keep all follow-up visits as told by your doctor. This is important.  Contact a doctor if:  · You have wheezing, shortness of breath, or a cough even while taking medicine to prevent attacks.  · The mucus you cough up (sputum) is thicker than usual.  · The mucus you cough up changes from clear or white to yellow, green, gray, or bloody.  · You have problems from the medicine you are taking, such as:  ? A rash.  ? Itching.  ? Swelling.  ? Trouble breathing.  · You need reliever medicines more than 2-3 times a week.  · Your peak flow reading is still at 50-79% of your personal best after following the action plan for 1 hour.  · You have a fever.  Get help right away if:  · You seem to be worse and are not responding to medicine during an asthma attack.  · You are short of breath even at rest.  · You get short of breath when doing very little activity.  · You have trouble eating, drinking, or talking.  · You have chest pain or tightness.  · You have a fast heartbeat.  · Your lips or fingernails start to turn blue.  · You are light-headed or dizzy, or you faint.  · Your peak flow is less than 50% of your personal best.  · You feel too tired to breathe normally.  Summary  · Asthma is a long-term (chronic) condition in which the airways get tight and narrow. An asthma attack can make it hard to breathe.  · Asthma cannot be cured, but medicines and lifestyle changes can help control it.  · Make sure you understand how to avoid triggers and how and when to use your medicines.  This information is not intended to replace advice given to you by your health care provider. Make sure you discuss any questions you have with your health care provider.  Document Released: 06/05/2009 Document Revised: 01/22/2018 Document Reviewed: 01/22/2018  [employer]  Patient Education © 2020 [employer]  How to Quarantine at Home  Information for Patients and Families    These instructions are for people with confirmed or suspected COVID-19 who do not need to be hospitalized and those with confirmed COVID-19 who were hospitalized and discharged to care for themselves at home.    If you were tested through the Health Department  The Health Department will monitor your wellbeing.  If it is determined that you do not need to be hospitalized and can be isolated at home, you will be monitored by staff from your local or state health department.     If you were tested through a Commercial Lab  You will need to monitor yourself and report changes in your symptoms to your doctor.  See the section below called Monitor Your Symptoms.    Follow these steps until a healthcare provider or local or state health department says you can return to your normal activities.    Stay home except to get medical care  • Restrict activities outside your home, except for getting medical care.   • Do not go to work, school, or public areas.   • Avoid using public transportation, ride-sharing, or taxis.    Separate yourself from other people and animals in your home  People  As much as possible, you should stay in a specific room and away from other people in your home. Also, you should use a separate bathroom, if available.    Animals  You should restrict contact with pets and other animals while you are sick with COVID-19, just like you would around other people. When possible, have another member of your household care for your animals while you are sick. If you are sick with COVID-19, avoid contact with your pet, including petting, snuggling, being kissed or licked, and sharing food. If you must care for your pet or be around animals while you are sick, wash your hands before and after you interact with pets and wear a facemask. See COVID-19 and Animals for more information.    Call ahead before  visiting your doctor  If you have a medical appointment, call the healthcare provider and tell them that you have or may have COVID-19. This information will help the healthcare provider’s office take steps to keep other people from getting infected or exposed.    Wear a facemask  You should wear a facemask when you are around other people (e.g., sharing a room or vehicle) or pets and before you enter a healthcare provider’s office.     If you are not able to wear a facemask (for example, because it causes trouble breathing), then people who live with you should not stay in the same room with you, or they should wear a facemask if they enter your room.    Cover your coughs and sneezes  • Cover your mouth and nose with a tissue when you cough or sneeze.   • Throw used tissues in a lined trash can.   • Immediately wash your hands with soap and water for at least 20 seconds or, if soap and water are not available, clean your hands with an alcohol-based hand  that contains at least 60% alcohol.    Clean your hands often  • Wash your hands often with soap and water for at least 20 seconds, especially after blowing your nose, coughing, or sneezing; going to the bathroom; and before eating or preparing food.     • If soap and water are not readily available, use an alcohol-based hand  with at least 60% alcohol, covering all surfaces of your hands and rubbing them together until they feel dry.    • Soap and water are the best option if hands are visibly dirty. Avoid touching your eyes, nose, and mouth with unwashed hands.    Avoid sharing personal household items  • You should not share dishes, drinking glasses, cups, eating utensils, towels, or bedding with other people or pets in your home.   • After using these items, they should be washed thoroughly with soap and water.    Clean all “high-touch” surfaces everyday  • High touch surfaces include counters, tabletops, doorknobs, bathroom fixtures, toilets,  phones, keyboards, tablets, and bedside tables.   • Also, clean any surfaces that may have blood, stool, or body fluids on them.   • Use a household cleaning spray or wipe, according to the label instructions. Labels contain instructions for safe and effective use of the cleaning product, including precautions you should take when applying the product, such as wearing gloves and making sure you have good ventilation during use of the product.    Monitor your symptoms  • Seek prompt medical attention if your illness is worsening (e.g., difficulty breathing).   • Before seeking care, call your healthcare provider and tell them that you have, or are being evaluated for, COVID-19.   • Put on a facemask before you enter the facility.     • These steps will help the healthcare provider’s office to keep other people in the office or waiting room from getting infected or exposed.   • Persons who are placed under active monitoring or facilitated self-monitoring should follow instructions provided by their local health department or occupational health professionals, as appropriate.  • If you have a medical emergency and need to call 911, notify the dispatch personnel that you have, or are being evaluated for COVID-19. If possible, put on a facemask before emergency medical services arrive.    Discontinuing home isolation  Patients with confirmed COVID-19 should remain under home isolation precautions until the risk of secondary transmission to others is thought to be low. The decision to discontinue home isolation precautions should be made on a case-by-case basis, in consultation with healthcare providers and state and local health departments.    The below content are for household members, intimate partners, and caregivers of a patient with symptomatic laboratory-confirmed COVID-19 or a patient under investigation:    Household members, intimate partners, and caregivers may have close contact with a person with  symptomatic, laboratory-confirmed COVID-19 or a person under investigation.     Close contacts should monitor their health; they should call their healthcare provider right away if they develop symptoms suggestive of COVID-19 (e.g., fever, cough, shortness of breath)     Close contacts should also follow these recommendations:  • Make sure that you understand and can help the patient follow their healthcare provider’s instructions for medication(s) and care. You should help the patient with basic needs in the home and provide support for getting groceries, prescriptions, and other personal needs.  • Monitor the patient’s symptoms. If the patient is getting sicker, call his or her healthcare provider and tell them that the patient has laboratory-confirmed COVID-19. This will help the healthcare provider’s office take steps to keep other people in the office or waiting room from getting infected. Ask the healthcare provider to call the local or Counts include 234 beds at the [facility] health department for additional guidance. If the patient has a medical emergency and you need to call 911, notify the dispatch personnel that the patient has, or is being evaluated for COVID-19.  • Household members should stay in another room or be  from the patient as much as possible. Household members should use a separate bedroom and bathroom, if available.  • Prohibit visitors who do not have an essential need to be in the home.  • Household members should care for any pets in the home. Do not handle pets or other animals while sick.  For more information, see COVID-19 and Animals.  • Make sure that shared spaces in the home have good air flow, such as by an air conditioner or an opened window, weather permitting.  • Perform hand hygiene frequently. Wash your hands often with soap and water for at least 20 seconds or use an alcohol-based hand  that contains 60 to 95% alcohol, covering all surfaces of your hands and rubbing them together until they  feel dry. Soap and water should be used preferentially if hands are visibly dirty.  • Avoid touching your eyes, nose, and mouth with unwashed hands.  • The patient should wear a facemask when you are around other people. If the patient is not able to wear a facemask (for example, because it causes trouble breathing), you, as the caregiver, should wear a mask when you are in the same room as the patient.  • Wear a disposable facemask and gloves when you touch or have contact with the patient’s blood, stool, or body fluids, such as saliva, sputum, nasal mucus, vomit, or urine.   o Throw out disposable facemasks and gloves after using them. Do not reuse.  o When removing personal protective equipment, first remove and dispose of gloves. Then, immediately clean your hands with soap and water or alcohol-based hand . Next, remove and dispose of facemask, and immediately clean your hands again with soap and water or alcohol-based hand .  • Avoid sharing household items with the patient. You should not share dishes, drinking glasses, cups, eating utensils, towels, bedding, or other items. After the patient uses these items, you should wash them thoroughly (see below “Wash laundry thoroughly”).  • Clean all “high-touch” surfaces, such as counters, tabletops, doorknobs, bathroom fixtures, toilets, phones, keyboards, tablets, and bedside tables, every day. Also, clean any surfaces that may have blood, stool, or body fluids on them.   o Use a household cleaning spray or wipe, according to the label instructions. Labels contain instructions for safe and effective use of the cleaning product including precautions you should take when applying the product, such as wearing gloves and making sure you have good ventilation during use of the product.  • Wash laundry thoroughly.   o Immediately remove and wash clothes or bedding that have blood, stool, or body fluids on them.  o Wear disposable gloves while handling  soiled items and keep soiled items away from your body. Clean your hands (with soap and water or an alcohol-based hand ) immediately after removing your gloves.  o Read and follow directions on labels of laundry or clothing items and detergent. In general, using a normal laundry detergent according to washing machine instructions and dry thoroughly using the warmest temperatures recommended on the clothing label.  • Place all used disposable gloves, facemasks, and other contaminated items in a lined container before disposing of them with other household waste. Clean your hands (with soap and water or an alcohol-based hand ) immediately after handling these items. Soap and water should be used preferentially if hands are visibly dirty.  • Discuss any additional questions with your state or local health department or healthcare provider.    Adapted from information provided by the Centers for Disease Control and Prevention.  For more information, visit https://www.cdc.gov/coronavirus/2019-ncov/hcp/guidance-prevent-spread.html

## 2020-05-01 NOTE — PROGRESS NOTES
CHIEF COMPLAINT  Chief Complaint   Patient presents with   • Asthma   • Cough     HPI  Bernardo Dodd is a 39 y.o. female  presents with complaint of sinus congestion, sore throat, cough (soemtimes productive, sometimes not), feeling wheezy and SOA x 1 week. Pt has severe asthma with history of persistent flares. She tried to contact her ENT but was unable to get through and needs medication refills. She requests refill on Singulair, Prednisone and phenerganDM for flare. She states that these typically do well. Pt reports having been on multiple antibiotics over the past couple months. She has been working with an ENT on her chronic symptoms. She has only been off of antibiotic for a little over a week.      Review of Systems   Constitutional: Positive for fatigue. Negative for activity change, appetite change, chills, diaphoresis and fever.   HENT: Positive for congestion, postnasal drip, rhinorrhea and sore throat. Negative for ear discharge, ear pain, hearing loss, sinus pressure, sinus pain, sneezing, tinnitus, trouble swallowing and voice change.    Eyes: Negative.    Respiratory: Positive for cough, shortness of breath and wheezing. Negative for apnea, choking, chest tightness and stridor.    Cardiovascular: Negative.    Gastrointestinal: Negative for abdominal distention, abdominal pain, diarrhea, nausea and vomiting.   Genitourinary: Negative for decreased urine volume.   Musculoskeletal: Negative.    Skin: Negative.    Allergic/Immunologic: Positive for environmental allergies.   Neurological: Negative.    Hematological: Negative for adenopathy.       Past Medical History:   Diagnosis Date   • Allergic rhinitis    • Anemia    • Arthritis    • Asthma    • Depression    • FHx: migraine headaches    • GERD (gastroesophageal reflux disease)    • Heart murmur    • Herpes simplex    • History of chest x-ray 11/01/2015    no active disease   • History of echocardiogram 11/01/2015    ejection fraction of  greater than 65%, mitral and pulmonic regurgitation an physiological tricuspid regurgitation.   • History of PFTs 2015    spirometry data acceptable and reproducible; pt given 4 puffs of Ventolin; pt gave good effort; no obstruction; no Bd response; MVV reduced    • History of PFTs 2015    pt gave best effort; duoneb given prior and post study; moderate nonspecific proportional reduction of FEV1 and FVC with preserved ratio; FEV1 moderately reduced; cannot rule out restriction   • Hypertension    • Migraine    • Ovarian cyst    • Seizures (CMS/HCC)    • Thigh shingles        Family History   Problem Relation Age of Onset   • Pancreatic cancer Maternal Grandmother    • Heart failure Paternal Grandmother    • MARCIE disease Paternal Aunt    • Arthritis Mother    • Cancer Mother    • Arthritis Father    • Diabetes Neg Hx    • Heart attack Neg Hx    • Hypertension Neg Hx    • Hyperlipidemia Neg Hx    • Mental illness Neg Hx    • Obesity Neg Hx    • Stroke Neg Hx        Social History     Socioeconomic History   • Marital status: Single     Spouse name: Not on file   • Number of children: Not on file   • Years of education: Not on file   • Highest education level: Not on file   Tobacco Use   • Smoking status: Former Smoker     Packs/day: 1.00     Years: 15.00     Pack years: 15.00     Types: Cigarettes     Last attempt to quit: 2015     Years since quittin.2   • Smokeless tobacco: Never Used   Substance and Sexual Activity   • Alcohol use: No     Comment: socially   • Drug use: Yes     Types: Marijuana     Comment: Once a month    • Sexual activity: Defer   Social History Narrative    Caffeine intake: 16 oz per day          LMP 2020 Comment: spotting    PHYSICAL EXAM  Physical Exam   Constitutional: She appears well-developed and well-nourished. No distress.   Fatigued, coughing   HENT:   Head: Normocephalic and atraumatic.   Right Ear: External ear normal.   Left Ear: External ear normal.    Mouth/Throat: Oropharynx is clear and moist.   Eyes: Pupils are equal, round, and reactive to light. Conjunctivae are normal. No scleral icterus.   Neck: Neck normal appearance.  Pulmonary/Chest: No stridor. She is in respiratory distress. She no audible wheeze...She exhibits no tenderness.   Mild distress noted when patient attempted long winded conversation or got excited. Constant hacking cough noted.   Neurological: She is alert.   And oriented   Skin: No rash noted. No nail bed cyanosis. Nails show no clubbing.   Psychiatric: She has a normal mood and affect.          Bernardo was seen today for asthma and cough.    Diagnoses and all orders for this visit:    Cough  -     QUESTIONNAIRE SERIES    Severe persistent asthma with exacerbation  -     montelukast (Singulair) 10 MG tablet; Take 1 tablet by mouth Every Night for 30 days.  -     promethazine-dextromethorphan (PROMETHAZINE-DM) 6.25-15 MG/5ML syrup; Take 5 mL by mouth 4 (Four) Times a Day As Needed for Cough for up to 10 days.  -     predniSONE (DELTASONE) 10 MG tablet; Take 4 tablets for 3 days, then 3 tablets for 3 days, then 2 tablets for 3 days, then 1 tablet for 3 days  -     QUESTIONNAIRE SERIES          FOLLOW-UP  Follow up with your primary health care provider if no improvement or Urgent Care/Emergency Department if worsening of symptoms    Patient verbalizes understanding of medication dosage, comfort measures, instructions for treatment and follow-up.    Gali Oakes PA-C  05/01/2020  5:17 PM  Total time 30 minutes    This visit was performed via Telehealth.  This patient has been instructed to follow-up with their primary care provider if their symptoms worsen or the treatment provided does not resolve their illness.

## 2020-05-04 ENCOUNTER — TELEPHONE (OUTPATIENT)
Dept: INTERNAL MEDICINE | Facility: CLINIC | Age: 40
End: 2020-05-04

## 2020-05-04 ENCOUNTER — TELEPHONE (OUTPATIENT)
Dept: GASTROENTEROLOGY | Facility: CLINIC | Age: 40
End: 2020-05-04

## 2020-05-04 NOTE — TELEPHONE ENCOUNTER
I TRIED TO CALL PATIENT REGARDING NO SHOW APPOINTMENT ON 5/1/2020 WITH DR JOHN. NO ANSWER; OR VOICE MAIL SET UP.

## 2020-05-04 NOTE — TELEPHONE ENCOUNTER
----- Message from SHANE Holland sent at 5/4/2020  7:46 AM EDT -----  Regarding: monitoring  Ms. Dodd reported that she is still having difficulty breathing and gets short of breath with any activity and even talking.  Also beginning to have diarrhea.  Thought you might want to have Nava give her a call.    Penelope

## 2020-05-04 NOTE — TELEPHONE ENCOUNTER
Please call patient to see how she is feeling. If having increasing shortness of breath, she needs to go to Respiratory urgent care on [facility] or to ER.

## 2020-05-05 ENCOUNTER — TELEPHONE (OUTPATIENT)
Dept: GASTROENTEROLOGY | Facility: CLINIC | Age: 40
End: 2020-05-05

## 2020-05-05 NOTE — TELEPHONE ENCOUNTER
Reminded patient to get Gastric emptying study done. Gave patient Central scheduling contact information.

## 2020-05-05 NOTE — TELEPHONE ENCOUNTER
Patient states she is better and will seek care at [facility] if symptoms worsen.

## 2020-05-06 ENCOUNTER — TELEMEDICINE (OUTPATIENT)
Dept: FAMILY MEDICINE CLINIC | Facility: TELEHEALTH | Age: 40
End: 2020-05-06

## 2020-05-06 DIAGNOSIS — B00.9 HERPES SIMPLEX: Primary | ICD-10-CM

## 2020-05-06 PROCEDURE — 99213 OFFICE O/P EST LOW 20 MIN: CPT | Performed by: NURSE PRACTITIONER

## 2020-05-06 RX ORDER — ACYCLOVIR 800 MG/1
800 TABLET ORAL 2 TIMES DAILY
Qty: 10 TABLET | Refills: 0 | Status: SHIPPED | OUTPATIENT
Start: 2020-05-06 | End: 2020-05-11

## 2020-05-06 NOTE — PATIENT INSTRUCTIONS
Cold Sore    A cold sore, also called a fever blister, is a small, fluid-filled sore that forms inside of the mouth or on the lips, gums, nose, chin, or cheeks. Cold sores can spread to other parts of the body, such as the eyes or fingers.  Cold sores can spread from person to person (are contagious) until the sores crust over completely. Most cold sores go away within 2 weeks.  What are the causes?  Cold sores are caused by a virus (herpes simplex virus type 1, HSV-1). The virus can spread from person to person through close contact, such as through:  · Kissing.  · Touching the affected area.  · Sharing personal items such as lip balm, razors, a drinking glass, or eating utensils.  What increases the risk?  You are more likely to develop this condition if you:  · Are tired, stressed, or sick.  · Are having your period (menstruating).  · Are pregnant.  · Take certain medicines.  · Are out in cold weather or get too much sun.  What are the signs or symptoms?  Symptoms of a cold sore outbreak go through different stages. These are the stages of a cold sore:  · Tingling, itching, or burning is felt 1-2 days before the outbreak.  · Fluid-filled blisters appear on the lips, inside the mouth, on the nose, or on the cheeks.  · The blisters start to ooze clear fluid.  · The blisters dry up, and a yellow crust appears in their place.  · The crust falls off.  In some cases, other symptoms can develop during a cold sore outbreak. These can include:  · Fever.  · Sore throat.  · Headache.  · Muscle aches.  · Swollen neck glands.  How is this treated?  There is no cure for cold sores or the virus that causes them. There is also no vaccine to prevent the virus. Most cold sores go away on their own without treatment within 2 weeks. Your doctor may prescribe medicines to:  · Help with pain.  · Keep the virus from growing.  · Help you heal faster.  Medicines may be in the form of creams, gels, pills, or a shot.  Follow these  instructions at home:  Medicines  · Take or apply over-the-counter and prescription medicines only as told by your doctor.  · Use a cotton-tip swab to apply creams or gels to your sores.  · Ask your doctor if you can take lysine supplements. These may help with healing.  Sore care    · Do not touch the sores or pick the scabs.  · Wash your hands often. Do not touch your eyes without washing your hands first.  · Keep the sores clean and dry.  · If told, put ice on the sores:  ? Put ice in a plastic bag.  ? Place a towel between your skin and the bag.  ? Leave the ice on for 20 minutes, 2-3 times a day.  Eating and drinking  · Eat a soft, bland diet. Avoid eating hot, cold, or salty foods. These can hurt your mouth.  · Use a straw if it hurts to drink out of a glass.  · Eat foods that have a lot of lysine in them. These include meat, fish, and dairy products.  · Avoid sugary foods, chocolates, nuts, and grains. These foods have a high amount of a substance (arginine) that can cause the virus to grow.  Lifestyle  · Do not kiss, have oral sex, or share personal items until your sores heal.  · Stress, poor sleep, and being out in the sun can trigger outbreaks. Make sure you:  ? Do activities that help you relax, such as deep breathing exercises or meditation.  ? Get enough sleep.  ? Apply sunscreen on your lips before you go out in the sun.  Contact a doctor if:  · You have symptoms for more than 2 weeks.  · You have pus coming from the sores.  · You have redness that is spreading.  · You have pain or irritation in your eye.  · You get sores on your genitals.  · Your sores do not heal within 2 weeks.  · You get cold sores often.  Get help right away if:  · You have a fever and your symptoms suddenly get worse.  · You have a headache and confusion.  · You have tiredness (fatigue).  · You do not want to eat as much as normal (loss of appetite).  · You have a stiff neck or are sensitive to light.  Summary  · A cold sore is  a small, fluid-filled sore that forms inside of the mouth or on the lips, gums, nose, chin, or cheeks.  · Cold sores can spread from person to person (are contagious) until the sores crust over completely. Most cold sores go away within 2 weeks.  · Wash your hands often. Do not touch your eyes without washing your hands first.  · Do not kiss, have oral sex, or share personal items until your sores heal.  · Contact a doctor if your sores do not heal within 2 weeks.  This information is not intended to replace advice given to you by your health care provider. Make sure you discuss any questions you have with your health care provider.  Document Released: 06/18/2013 Document Revised: 05/20/2019 Document Reviewed: 05/20/2019  [employer] Interactive Patient Education © 2020 [employer]      Take medicine as prescribed. Increase fluids and rest. Re-check with primary provider is not improving over the next week or sooner for new/increasing symptoms.
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
## 2020-05-08 ENCOUNTER — TELEPHONE (OUTPATIENT)
Dept: GASTROENTEROLOGY | Facility: CLINIC | Age: 40
End: 2020-05-08

## 2020-05-08 NOTE — TELEPHONE ENCOUNTER
----- Message from Gali Jacobs sent at 5/8/2020  9:54 AM EDT -----  Regarding: FW: Two No Shows  Lisha,    When you call this patient to let her know about her no show from yesterday and ask if she wants to reschedule, will you let whoever is scheduling her know to schedule her next appointment with Riley in 6 months, or with Caroline if she doesn't want to wait that long? I put a message in the chart but it may be missed when scheduling.    ThanksGali  ----- Message -----  From: Alf Lin MD  Sent: 5/8/2020   9:35 AM EDT  To: Gali Jacobs  Subject: RE: Two No Shows                                 Yes, or schedule her with me in 6 months  ----- Message -----  From: Gali Jacobs  Sent: 5/8/2020   9:30 AM EDT  To: Alf Lin MD  Subject: Two No Shows                                     Dr. Lin,    I looked at this patient. I believe its the one you called me about yesterday. She's only no showed 2 times. She has to no show 3 before we can dismiss her. It looks like she's seen Caroline in the past, if she calls to reschedule, do you want us to set an appointment up with Caroline instead?    Gali Troy

## 2020-05-08 NOTE — TELEPHONE ENCOUNTER
I called patient regarding no show with Dr Lin on 5/7/2020 at 11:30AM. No answer; left voice message.

## 2020-05-08 NOTE — TELEPHONE ENCOUNTER
When rescheduling patient from her no show appointment on 5/7, please schedule patient with Dr. Lin is 6 months, or with Caroline if patient isn't able to wait that long.

## 2020-05-26 RX ORDER — LAMOTRIGINE 100 MG/1
TABLET ORAL
Qty: 30 TABLET | Refills: 0 | Status: SHIPPED | OUTPATIENT
Start: 2020-05-26 | End: 2020-06-22

## 2020-05-26 NOTE — TELEPHONE ENCOUNTER
Requested drug refills are authorized, however, the patient needs further evaluation and/or laboratory testing before further refills are given. Ask her to make an appointment for this.

## 2020-05-28 ENCOUNTER — TELEPHONE (OUTPATIENT)
Dept: INTERNAL MEDICINE | Facility: CLINIC | Age: 40
End: 2020-05-28

## 2020-05-28 NOTE — TELEPHONE ENCOUNTER
PT CALLED TO REQUEST AN APPT FOR TOMORROW BUT DURING SCREENING SHE STATED THAT SHE HAS SHORTNESS OF BREATH. PT STATED THAT THE SHORTNESS OF BREATH WAS DUE TO HER HAVING ASTHMA.    PLEASE ADVISE.  CALL BACK:[phone]

## 2020-05-28 NOTE — TELEPHONE ENCOUNTER
PT CALLED AND STATED THAT SHE HAS A COUGH, EARS ARE RINGING WITH EAR PAIN, STOMACH ACHE.  SHE DOES HAVE ASTHMA WITH SOB.  NO FEVER. AFTER HER VIDEO VISIT SHE WAS ASKED TO CALL HER PCP.      PT CALL BACK [phone]    IVETH OSBORNE

## 2020-05-29 ENCOUNTER — OFFICE VISIT (OUTPATIENT)
Dept: INTERNAL MEDICINE | Facility: CLINIC | Age: 40
End: 2020-05-29

## 2020-05-29 VITALS
DIASTOLIC BLOOD PRESSURE: 82 MMHG | HEART RATE: 80 BPM | WEIGHT: 170 LBS | TEMPERATURE: 96.9 F | RESPIRATION RATE: 16 BRPM | BODY MASS INDEX: 34.34 KG/M2 | SYSTOLIC BLOOD PRESSURE: 126 MMHG | OXYGEN SATURATION: 98 %

## 2020-05-29 DIAGNOSIS — S93.601A SPRAIN OF RIGHT FOOT, INITIAL ENCOUNTER: ICD-10-CM

## 2020-05-29 DIAGNOSIS — J45.21 MILD INTERMITTENT ASTHMA WITH ACUTE EXACERBATION: Primary | ICD-10-CM

## 2020-05-29 DIAGNOSIS — N76.1 SUBACUTE VAGINITIS: ICD-10-CM

## 2020-05-29 PROCEDURE — 99214 OFFICE O/P EST MOD 30 MIN: CPT | Performed by: PHYSICIAN ASSISTANT

## 2020-05-29 RX ORDER — METHYLPREDNISOLONE 4 MG/1
TABLET ORAL
Qty: 21 TABLET | Refills: 0 | Status: SHIPPED | OUTPATIENT
Start: 2020-05-29 | End: 2020-06-19

## 2020-05-29 RX ORDER — FLUCONAZOLE 150 MG/1
150 TABLET ORAL
Qty: 2 TABLET | Refills: 0 | Status: SHIPPED | OUTPATIENT
Start: 2020-05-29 | End: 2020-06-19

## 2020-05-29 RX ORDER — DOXYCYCLINE 100 MG/1
100 CAPSULE ORAL 2 TIMES DAILY
Qty: 20 CAPSULE | Refills: 0 | Status: SHIPPED | OUTPATIENT
Start: 2020-05-29 | End: 2020-06-19

## 2020-05-29 NOTE — PROGRESS NOTES
Chief Complaint   Patient presents with   • Asthma     Pt was wanting prednisone for asthma       Subjective   Bernardo Dodd is a 39 y.o. female.       History of Present Illness     Pt has been trying to stay in the house to avoid allergen exposure but she has developed a cough that has progressed to colored sputum. Having some shortness of breath. She is doing her nebulizer once a day. Using Advair, QVAR and spiriva.    Had some abdominal cramping but is improving with the start of her period.     She never scheduled with Neurologist, she is not sure what happened. Has previously seen Neuro for management of seizures, migraines and neuropathy. Was prescribed gabapentin in the past and is now running out of it.    She notes that she twisted her right ankle when walking yesterday, she fell off the sidewalk. Was hurting more last night, better today.       No current facility-administered medications for this visit.     Current Outpatient Medications:   •  ADVAIR DISKUS 500-50 MCG/DOSE DISKUS, Inhale 1 puff 2 (Two) Times a Day., Disp: , Rfl:   •  albuterol (PROVENTIL) (2.5 MG/3ML) 0.083% nebulizer solution, Take 2.5 mg by nebulization Every 12 (Twelve) Hours., Disp: 30 vial, Rfl: 1  •  amitriptyline (ELAVIL) 25 MG tablet, Take 2 tablets by mouth Every Night. (Patient taking differently: Take 100 mg by mouth Every Night.), Disp: 60 tablet, Rfl: 3  •  amLODIPine (NORVASC) 5 MG tablet, Take 1 tablet by mouth Daily., Disp: 30 tablet, Rfl: 5  •  azelastine (OPTIVAR) 0.05 % ophthalmic solution, Administer 1 drop to both eyes 2 (Two) Times a Day., Disp: 6 mL, Rfl: 12  •  beclomethasone (QVAR) 80 MCG/ACT inhaler, Inhale 1 puff 2 (Two) Times a Day., Disp: , Rfl:   •  Cholecalciferol (VITAMIN D3) 125 MCG (5000 UT) capsule capsule, Take 1 capsule by mouth Daily., Disp: 30 capsule, Rfl: 5  •  clotrimazole (LOTRIMIN) 1 % external solution, Apply 2-3 drops to right ear canal twice daily, Disp: 10 mL, Rfl: 0  •  ferrous sulfate  325 (65 FE) MG tablet, Take 1 tablet by mouth Daily With Breakfast. Resume Iron when antibiotics finished., Disp: 30 tablet, Rfl: 5  •  fluticasone (FLONASE) 50 MCG/ACT nasal spray, 2 sprays into the nostril(s) as directed by provider Daily., Disp: 16 g, Rfl: 5  •  lamoTRIgine (LaMICtal) 100 MG tablet, TAKE ONE TABLET BY MOUTH DAILY, Disp: 30 tablet, Rfl: 0  •  montelukast (Singulair) 10 MG tablet, Take 1 tablet by mouth Every Night for 30 days., Disp: 30 tablet, Rfl: 0  •  omeprazole (priLOSEC) 40 MG capsule, Take 1 capsule by mouth 2 (Two) Times a Day. Take on an empty stomach and eat 30 minutes- 1 hr after eating. (Patient taking differently: Take 40 mg by mouth Daily. Take on an empty stomach and eat 30 minutes- 1 hr after eating.), Disp: 90 capsule, Rfl: 5  •  SPIRIVA RESPIMAT 2.5 MCG/ACT aerosol solution, Take 2 puffs by mouth Daily., Disp: , Rfl:   •  doxycycline (MONODOX) 100 MG capsule, Take 1 capsule by mouth 2 (Two) Times a Day., Disp: 20 capsule, Rfl: 0  •  fluconazole (Diflucan) 150 MG tablet, Take 1 tablet by mouth Every 3 (Three) Days., Disp: 2 tablet, Rfl: 0  •  loratadine-pseudoephedrine (CLARITIN-D 12 HOUR) 5-120 MG per 12 hr tablet, Take 1 tablet by mouth 2 (Two) Times a Day., Disp: 60 tablet, Rfl: 5  •  methylPREDNISolone (Medrol) 4 MG tablet, follow package directions, Disp: 21 tablet, Rfl: 0    Facility-Administered Medications Ordered in Other Visits:   •  metroNIDAZOLE (FLAGYL) tablet 2,000 mg, 2,000 mg, Oral, Once, Van Gregg PA  •  sodium chloride 0.9 % flush 10 mL, 10 mL, Intravenous, PRN, Arya Bates MD     PMFSH  The following portions of the patient's history were reviewed and updated as appropriate: allergies, current medications, past family history, past medical history, past social history, past surgical history and problem list.    Review of Systems   Constitutional: Positive for fatigue. Negative for activity change and unexpected weight change.   HENT:  Positive for congestion, postnasal drip and sore throat. Negative for ear pain.    Eyes: Negative for pain and discharge.   Respiratory: Positive for cough. Negative for chest tightness, shortness of breath and wheezing.    Cardiovascular: Negative for chest pain and palpitations.   Gastrointestinal: Negative for abdominal pain, diarrhea and vomiting.   Endocrine: Negative.    Genitourinary: Negative.    Musculoskeletal: Negative for joint swelling.   Skin: Negative for color change, rash and wound.   Allergic/Immunologic: Negative.    Neurological: Negative for dizziness, seizures, syncope and headaches.   Psychiatric/Behavioral: Negative.        Objective   /82   Pulse 80   Temp 96.9 °F (36.1 °C)   Resp 16   Wt 77.1 kg (170 lb)   LMP 05/06/2020 Comment: spotting  SpO2 98%   Breastfeeding No   BMI 34.34 kg/m²     Physical Exam   Constitutional: She appears well-developed and well-nourished.   HENT:   Head: Normocephalic.   Right Ear: Hearing, tympanic membrane, external ear and ear canal normal.   Left Ear: Hearing, tympanic membrane, external ear and ear canal normal.   Nose: Nose normal.   Mouth/Throat: Oropharynx is clear and moist.   Eyes: Pupils are equal, round, and reactive to light. Conjunctivae are normal.   Neck: Normal range of motion.   Cardiovascular: Normal rate, regular rhythm and normal heart sounds.   Pulmonary/Chest: Effort normal and breath sounds normal. She has no decreased breath sounds. She has no wheezes. She has no rhonchi. She has no rales.   Musculoskeletal:        Right ankle: She exhibits decreased range of motion. She exhibits no swelling.        Right foot: There is tenderness. There is normal range of motion, no bony tenderness and no swelling.        Neurological: She is alert.   Skin: Skin is warm and dry.   Psychiatric: She has a normal mood and affect. Her behavior is normal.   Nursing note and vitals reviewed.      ASSESSMENT/PLAN    Problem List Items Addressed  This Visit        Respiratory    Asthma - Primary     Mild exacerbation secondary to allergies. Start medrol dose pack and doxycycline as directed.           Relevant Medications    methylPREDNISolone (Medrol) 4 MG tablet    doxycycline (MONODOX) 100 MG capsule      Other Visit Diagnoses     Subacute vaginitis        Use diflucan prn if vaginitis symptoms develop with the antibiotic.    Relevant Medications    fluconazole (Diflucan) 150 MG tablet    Sprain of right foot, initial encounter        Continue to rest and use gentle ROM exercises.               Return in about 3 months (around 8/29/2020) for Follow up.

## 2020-05-30 ENCOUNTER — HOSPITAL ENCOUNTER (EMERGENCY)
Facility: HOSPITAL | Age: 40
Discharge: HOME | End: 2020-05-30
Attending: EMERGENCY MEDICINE | Admitting: EMERGENCY MEDICINE

## 2020-05-30 ENCOUNTER — APPOINTMENT (OUTPATIENT)
Dept: CT IMAGING | Facility: HOSPITAL | Age: 40
End: 2020-05-30

## 2020-05-30 VITALS
SYSTOLIC BLOOD PRESSURE: 127 MMHG | HEART RATE: 86 BPM | TEMPERATURE: 98.2 F | RESPIRATION RATE: 18 BRPM | WEIGHT: 173 LBS | OXYGEN SATURATION: 97 % | DIASTOLIC BLOOD PRESSURE: 81 MMHG | BODY MASS INDEX: 34.88 KG/M2 | HEIGHT: 59 IN

## 2020-05-30 DIAGNOSIS — A59.01 TRICHOMONAS VAGINITIS: ICD-10-CM

## 2020-05-30 DIAGNOSIS — N72 CERVICITIS: ICD-10-CM

## 2020-05-30 DIAGNOSIS — R10.30 LOWER ABDOMINAL PAIN: Primary | ICD-10-CM

## 2020-05-30 DIAGNOSIS — N39.0 ACUTE UTI: ICD-10-CM

## 2020-05-30 LAB
ALBUMIN SERPL-MCNC: 4.1 G/DL (ref 3.5–5.2)
ALBUMIN/GLOB SERPL: 1.5 G/DL
ALP SERPL-CCNC: 61 U/L (ref 39–117)
ALT SERPL W P-5'-P-CCNC: 12 U/L (ref 1–33)
ANION GAP SERPL CALCULATED.3IONS-SCNC: 12 MMOL/L (ref 5–15)
AST SERPL-CCNC: 17 U/L (ref 1–32)
B-HCG UR QL: NEGATIVE
BACTERIA UR QL AUTO: ABNORMAL /HPF
BASOPHILS # BLD AUTO: 0.03 10*3/MM3 (ref 0–0.2)
BASOPHILS NFR BLD AUTO: 0.2 % (ref 0–1.5)
BILIRUB SERPL-MCNC: 0.3 MG/DL (ref 0.2–1.2)
BILIRUB UR QL STRIP: NEGATIVE
BUN BLD-MCNC: 12 MG/DL (ref 6–20)
BUN/CREAT SERPL: 15.2 (ref 7–25)
CALCIUM SPEC-SCNC: 9.1 MG/DL (ref 8.6–10.5)
CHLORIDE SERPL-SCNC: 100 MMOL/L (ref 98–107)
CLARITY UR: ABNORMAL
CLUE CELLS SPEC QL WET PREP: ABNORMAL
CO2 SERPL-SCNC: 28 MMOL/L (ref 22–29)
COLOR UR: YELLOW
CREAT BLD-MCNC: 0.79 MG/DL (ref 0.57–1)
DEPRECATED RDW RBC AUTO: 49.2 FL (ref 37–54)
EOSINOPHIL # BLD AUTO: 0.01 10*3/MM3 (ref 0–0.4)
EOSINOPHIL NFR BLD AUTO: 0.1 % (ref 0.3–6.2)
ERYTHROCYTE [DISTWIDTH] IN BLOOD BY AUTOMATED COUNT: 15.7 % (ref 12.3–15.4)
GFR SERPL CREATININE-BSD FRML MDRD: 97 ML/MIN/1.73
GLOBULIN UR ELPH-MCNC: 2.8 GM/DL
GLUCOSE BLD-MCNC: 210 MG/DL (ref 65–99)
GLUCOSE UR STRIP-MCNC: ABNORMAL MG/DL
HCT VFR BLD AUTO: 41.1 % (ref 34–46.6)
HGB BLD-MCNC: 13.3 G/DL (ref 12–15.9)
HGB UR QL STRIP.AUTO: ABNORMAL
HOLD SPECIMEN: NORMAL
HOLD SPECIMEN: NORMAL
HYALINE CASTS UR QL AUTO: ABNORMAL /LPF
HYDATID CYST SPEC WET PREP: ABNORMAL
IMM GRANULOCYTES # BLD AUTO: 0.06 10*3/MM3 (ref 0–0.05)
IMM GRANULOCYTES NFR BLD AUTO: 0.4 % (ref 0–0.5)
INTERNAL NEGATIVE CONTROL: NEGATIVE
INTERNAL POSITIVE CONTROL: POSITIVE
KETONES UR QL STRIP: ABNORMAL
KOH PREP NAIL: NORMAL
LEUKOCYTE ESTERASE UR QL STRIP.AUTO: ABNORMAL
LIPASE SERPL-CCNC: 54 U/L (ref 13–60)
LYMPHOCYTES # BLD AUTO: 1.84 10*3/MM3 (ref 0.7–3.1)
LYMPHOCYTES NFR BLD AUTO: 13.2 % (ref 19.6–45.3)
Lab: NORMAL
MCH RBC QN AUTO: 28 PG (ref 26.6–33)
MCHC RBC AUTO-ENTMCNC: 32.4 G/DL (ref 31.5–35.7)
MCV RBC AUTO: 86.5 FL (ref 79–97)
MONOCYTES # BLD AUTO: 0.44 10*3/MM3 (ref 0.1–0.9)
MONOCYTES NFR BLD AUTO: 3.2 % (ref 5–12)
MUCOUS THREADS URNS QL MICRO: ABNORMAL /HPF
NEUTROPHILS # BLD AUTO: 11.52 10*3/MM3 (ref 1.7–7)
NEUTROPHILS NFR BLD AUTO: 82.9 % (ref 42.7–76)
NITRITE UR QL STRIP: NEGATIVE
NRBC BLD AUTO-RTO: 0 /100 WBC (ref 0–0.2)
PH UR STRIP.AUTO: 5.5 [PH] (ref 5–8)
PLATELET # BLD AUTO: 324 10*3/MM3 (ref 140–450)
PMV BLD AUTO: 10.2 FL (ref 6–12)
POTASSIUM BLD-SCNC: 3.6 MMOL/L (ref 3.5–5.2)
PROT SERPL-MCNC: 6.9 G/DL (ref 6–8.5)
PROT UR QL STRIP: ABNORMAL
RBC # BLD AUTO: 4.75 10*6/MM3 (ref 3.77–5.28)
RBC # UR: ABNORMAL /HPF
REF LAB TEST METHOD: ABNORMAL
SODIUM BLD-SCNC: 140 MMOL/L (ref 136–145)
SP GR UR STRIP: 1.03 (ref 1–1.03)
SQUAMOUS #/AREA URNS HPF: ABNORMAL /HPF
T VAGINALIS SPEC QL WET PREP: ABNORMAL
UROBILINOGEN UR QL STRIP: ABNORMAL
WBC NRBC COR # BLD: 13.9 10*3/MM3 (ref 3.4–10.8)
WBC SPEC QL WET PREP: ABNORMAL
WBC UR QL AUTO: ABNORMAL /HPF
WHOLE BLOOD HOLD SPECIMEN: NORMAL
WHOLE BLOOD HOLD SPECIMEN: NORMAL
YEAST GENITAL QL WET PREP: ABNORMAL

## 2020-05-30 PROCEDURE — 87210 SMEAR WET MOUNT SALINE/INK: CPT | Performed by: PHYSICIAN ASSISTANT

## 2020-05-30 PROCEDURE — 87591 N.GONORRHOEAE DNA AMP PROB: CPT | Performed by: PHYSICIAN ASSISTANT

## 2020-05-30 PROCEDURE — 83690 ASSAY OF LIPASE: CPT | Performed by: EMERGENCY MEDICINE

## 2020-05-30 PROCEDURE — 74176 CT ABD & PELVIS W/O CONTRAST: CPT

## 2020-05-30 PROCEDURE — 81001 URINALYSIS AUTO W/SCOPE: CPT | Performed by: EMERGENCY MEDICINE

## 2020-05-30 PROCEDURE — 96372 THER/PROPH/DIAG INJ SC/IM: CPT

## 2020-05-30 PROCEDURE — 99284 EMERGENCY DEPT VISIT MOD MDM: CPT

## 2020-05-30 PROCEDURE — 87220 TISSUE EXAM FOR FUNGI: CPT | Performed by: PHYSICIAN ASSISTANT

## 2020-05-30 PROCEDURE — 87086 URINE CULTURE/COLONY COUNT: CPT | Performed by: PHYSICIAN ASSISTANT

## 2020-05-30 PROCEDURE — 80053 COMPREHEN METABOLIC PANEL: CPT | Performed by: EMERGENCY MEDICINE

## 2020-05-30 PROCEDURE — 85025 COMPLETE CBC W/AUTO DIFF WBC: CPT | Performed by: EMERGENCY MEDICINE

## 2020-05-30 PROCEDURE — 25010000002 CEFTRIAXONE PER 250 MG: Performed by: PHYSICIAN ASSISTANT

## 2020-05-30 PROCEDURE — 87491 CHLMYD TRACH DNA AMP PROBE: CPT | Performed by: PHYSICIAN ASSISTANT

## 2020-05-30 PROCEDURE — 81025 URINE PREGNANCY TEST: CPT | Performed by: EMERGENCY MEDICINE

## 2020-05-30 PROCEDURE — 71250 CT THORAX DX C-: CPT

## 2020-05-30 RX ORDER — METRONIDAZOLE 500 MG/1
2000 TABLET ORAL ONCE
Status: COMPLETED | OUTPATIENT
Start: 2020-05-30 | End: 2020-05-30

## 2020-05-30 RX ORDER — SODIUM CHLORIDE 0.9 % (FLUSH) 0.9 %
10 SYRINGE (ML) INJECTION AS NEEDED
Status: DISCONTINUED | OUTPATIENT
Start: 2020-05-30 | End: 2020-05-31 | Stop reason: HOSPADM

## 2020-05-30 RX ADMIN — METRONIDAZOLE 2000 MG: 500 TABLET ORAL at 23:19

## 2020-05-30 RX ADMIN — LIDOCAINE HYDROCHLORIDE 1 G: 10 INJECTION, SOLUTION EPIDURAL; INFILTRATION; INTRACAUDAL; PERINEURAL at 22:36

## 2020-06-01 LAB — BACTERIA SPEC AEROBE CULT: NORMAL

## 2020-06-02 LAB
C TRACH RRNA SPEC DONR QL NAA+PROBE: NEGATIVE
N GONORRHOEA DNA SPEC QL NAA+PROBE: NEGATIVE

## 2020-06-19 ENCOUNTER — LAB (OUTPATIENT)
Dept: LAB | Facility: HOSPITAL | Age: 40
End: 2020-06-19

## 2020-06-19 ENCOUNTER — OFFICE VISIT (OUTPATIENT)
Dept: INTERNAL MEDICINE | Facility: CLINIC | Age: 40
End: 2020-06-19

## 2020-06-19 VITALS
BODY MASS INDEX: 33.26 KG/M2 | HEIGHT: 59 IN | OXYGEN SATURATION: 98 % | DIASTOLIC BLOOD PRESSURE: 78 MMHG | HEART RATE: 92 BPM | SYSTOLIC BLOOD PRESSURE: 120 MMHG | RESPIRATION RATE: 16 BRPM | WEIGHT: 165 LBS | TEMPERATURE: 97.6 F

## 2020-06-19 DIAGNOSIS — N76.0 ACUTE VAGINITIS: Primary | ICD-10-CM

## 2020-06-19 DIAGNOSIS — J30.9 CHRONIC ALLERGIC RHINITIS: ICD-10-CM

## 2020-06-19 PROCEDURE — 99213 OFFICE O/P EST LOW 20 MIN: CPT | Performed by: PHYSICIAN ASSISTANT

## 2020-06-19 PROCEDURE — 87661 TRICHOMONAS VAGINALIS AMPLIF: CPT | Performed by: PHYSICIAN ASSISTANT

## 2020-06-19 PROCEDURE — 87798 DETECT AGENT NOS DNA AMP: CPT | Performed by: PHYSICIAN ASSISTANT

## 2020-06-19 PROCEDURE — 87591 N.GONORRHOEAE DNA AMP PROB: CPT | Performed by: PHYSICIAN ASSISTANT

## 2020-06-19 PROCEDURE — 87801 DETECT AGNT MULT DNA AMPLI: CPT | Performed by: PHYSICIAN ASSISTANT

## 2020-06-19 PROCEDURE — 87491 CHLMYD TRACH DNA AMP PROBE: CPT | Performed by: PHYSICIAN ASSISTANT

## 2020-06-19 RX ORDER — BORIC ACID
600 POWDER (GRAM) MISCELLANEOUS
COMMUNITY
End: 2021-05-03

## 2020-06-19 RX ORDER — FLUCONAZOLE 150 MG/1
150 TABLET ORAL ONCE
Qty: 1 TABLET | Refills: 0 | Status: SHIPPED | OUTPATIENT
Start: 2020-06-19 | End: 2020-06-19

## 2020-06-19 NOTE — PROGRESS NOTES
Chief Complaint   Patient presents with   • Swollen Glands   • Menstrual Problem       Subjective   Bernardo Dodd is a 39 y.o. female.       History of Present Illness     Pt has recently noticed some vaginal discharge and itchy irritation on labia. She notes that she has had 3 episodes of bleeding throughout the months of May and June. Usually has periods once a month. Had a regular period at the end of May and then 2 weeks later had another short episode of bleeding/spotting. Last week she had the same again. She has 2 sexual partners, does not know if they have other partners. Was diagnosed with trichomonas at the end of May, says one of her partners was treated, not sure if the other was treated.    Had some swollen glands in her left neck, better now. She has been putting ear drops in and feeling better.      Current Outpatient Medications:   •  ADVAIR DISKUS 500-50 MCG/DOSE DISKUS, Inhale 1 puff 2 (Two) Times a Day., Disp: , Rfl:   •  albuterol (PROVENTIL) (2.5 MG/3ML) 0.083% nebulizer solution, Take 2.5 mg by nebulization Every 12 (Twelve) Hours., Disp: 30 vial, Rfl: 1  •  amitriptyline (ELAVIL) 25 MG tablet, Take 2 tablets by mouth Every Night. (Patient taking differently: Take 100 mg by mouth Every Night.), Disp: 60 tablet, Rfl: 3  •  amLODIPine (NORVASC) 5 MG tablet, Take 1 tablet by mouth Daily., Disp: 30 tablet, Rfl: 5  •  azelastine (OPTIVAR) 0.05 % ophthalmic solution, Administer 1 drop to both eyes 2 (Two) Times a Day., Disp: 6 mL, Rfl: 12  •  beclomethasone (QVAR) 80 MCG/ACT inhaler, Inhale 1 puff 2 (Two) Times a Day., Disp: , Rfl:   •  Boric Acid suppository Suppository, Insert 600 mg into the vagina., Disp: , Rfl:   •  Cholecalciferol (VITAMIN D3) 125 MCG (5000 UT) capsule capsule, Take 1 capsule by mouth Daily., Disp: 30 capsule, Rfl: 5  •  clotrimazole (LOTRIMIN) 1 % external solution, Apply 2-3 drops to right ear canal twice daily, Disp: 10 mL, Rfl: 0  •  ferrous sulfate 325 (65 FE) MG  tablet, Take 1 tablet by mouth Daily With Breakfast. Resume Iron when antibiotics finished., Disp: 30 tablet, Rfl: 5  •  fluticasone (FLONASE) 50 MCG/ACT nasal spray, 2 sprays into the nostril(s) as directed by provider Daily., Disp: 16 g, Rfl: 5  •  lamoTRIgine (LaMICtal) 100 MG tablet, TAKE ONE TABLET BY MOUTH DAILY, Disp: 30 tablet, Rfl: 0  •  loratadine-pseudoephedrine (Claritin-D 24 Hour)  MG per 24 hr tablet, Take 1 tablet by mouth Daily., Disp: 30 tablet, Rfl: 11  •  omeprazole (priLOSEC) 40 MG capsule, Take 1 capsule by mouth 2 (Two) Times a Day. Take on an empty stomach and eat 30 minutes- 1 hr after eating. (Patient taking differently: Take 40 mg by mouth Daily. Take on an empty stomach and eat 30 minutes- 1 hr after eating.), Disp: 90 capsule, Rfl: 5  •  SPIRIVA RESPIMAT 2.5 MCG/ACT aerosol solution, Take 2 puffs by mouth Daily., Disp: , Rfl:   •  fluconazole (Diflucan) 150 MG tablet, Take 1 tablet by mouth 1 (One) Time for 1 dose., Disp: 1 tablet, Rfl: 0     PMFSH  The following portions of the patient's history were reviewed and updated as appropriate: allergies, current medications, past family history, past medical history, past social history, past surgical history and problem list.    Review of Systems   Constitutional: Negative for activity change, appetite change, chills and fatigue.   HENT: Negative for congestion, rhinorrhea and sore throat.    Respiratory: Negative for chest tightness and shortness of breath.    Cardiovascular: Negative for chest pain and palpitations.   Gastrointestinal: Positive for abdominal pain and nausea. Negative for constipation, diarrhea and vomiting.   Genitourinary: Positive for pelvic pain and vaginal discharge. Negative for dysuria, flank pain, frequency, hematuria and urgency.   Musculoskeletal: Positive for back pain. Negative for arthralgias and myalgias.   Skin: Negative for rash.   Neurological: Negative for dizziness, weakness, light-headedness and  "headaches.   Psychiatric/Behavioral: Negative for dysphoric mood. The patient is not nervous/anxious.        Objective   /78   Pulse 92   Temp 97.6 °F (36.4 °C)   Resp 16   Ht 149.9 cm (59\")   Wt 74.8 kg (165 lb)   SpO2 98%   Breastfeeding No   BMI 33.33 kg/m²     Physical Exam   Constitutional: She appears well-developed and well-nourished.   HENT:   Head: Normocephalic.   Right Ear: Hearing, tympanic membrane, external ear and ear canal normal.   Left Ear: Hearing, tympanic membrane, external ear and ear canal normal.   Nose: Nose normal.   Mouth/Throat: Oropharynx is clear and moist.   Eyes: Pupils are equal, round, and reactive to light. Conjunctivae are normal.   Neck: Normal range of motion.   Cardiovascular: Normal rate, regular rhythm and normal heart sounds.   Pulmonary/Chest: Effort normal and breath sounds normal. She has no decreased breath sounds. She has no wheezes. She has no rhonchi. She has no rales.   Genitourinary: There is no rash, tenderness, lesion or injury on the right labia. There is no rash, tenderness, lesion or injury on the left labia. No erythema, tenderness or bleeding in the vagina.   There is a foreign body (powdered substance- pt notes that it is a boric acid tablet) in the vagina. No signs of injury around the vagina. No vaginal discharge found.   Musculoskeletal: Normal range of motion.   Neurological: She is alert.   Skin: Skin is warm and dry.   Psychiatric: She has a normal mood and affect. Her behavior is normal.   Nursing note and vitals reviewed.           ASSESSMENT/PLAN    Problem List Items Addressed This Visit     None      Visit Diagnoses     Acute vaginitis    -  Primary    Send [facility] vaginosis panel. Treat with diflucan due to symptoms of itching. Further recs based on swab results.    Relevant Medications    fluconazole (Diflucan) 150 MG tablet    Other Relevant Orders    [facility]G+ - Swab, Vagina               Return if symptoms worsen or fail to " improve, for Next scheduled follow up.

## 2020-06-22 RX ORDER — FLUTICASONE PROPIONATE 50 MCG
2 SPRAY, SUSPENSION (ML) NASAL DAILY
Qty: 16 G | Refills: 2 | Status: SHIPPED | OUTPATIENT
Start: 2020-06-22 | End: 2021-09-17 | Stop reason: SDUPTHER

## 2020-06-22 RX ORDER — AMLODIPINE BESYLATE 5 MG/1
5 TABLET ORAL DAILY
Qty: 30 TABLET | Refills: 2 | Status: SHIPPED | OUTPATIENT
Start: 2020-06-22 | End: 2020-12-14

## 2020-06-22 RX ORDER — LAMOTRIGINE 100 MG/1
TABLET ORAL
Qty: 30 TABLET | Refills: 5 | Status: SHIPPED | OUTPATIENT
Start: 2020-06-22 | End: 2021-03-04

## 2020-06-24 LAB
A VAGINAE DNA VAG QL NAA+PROBE: NORMAL SCORE
BVAB2 DNA VAG QL NAA+PROBE: NORMAL SCORE
C ALBICANS DNA VAG QL NAA+PROBE: NEGATIVE
C GLABRATA DNA VAG QL NAA+PROBE: NEGATIVE
C TRACH RRNA SPEC DONR QL NAA+PROBE: NEGATIVE
MEGASPHAERA 1: NORMAL SCORE
N GONORRHOEA DNA SPEC QL NAA+PROBE: NEGATIVE
T VAGINALIS RRNA GENITAL QL PROBE: NEGATIVE

## 2020-07-13 ENCOUNTER — TELEPHONE (OUTPATIENT)
Dept: INTERNAL MEDICINE | Facility: CLINIC | Age: 40
End: 2020-07-13

## 2020-07-13 NOTE — TELEPHONE ENCOUNTER
PT CALLED AND SAID SHE FEELS VERY WEAK, BODY ACHES, VERY SORE THROAT--ALSO A BAD COUGH; SHE SAID ALSO HER VAGINA HURTS;     [facility]: [phone]

## 2020-07-16 ENCOUNTER — TELEMEDICINE (OUTPATIENT)
Dept: INTERNAL MEDICINE | Facility: CLINIC | Age: 40
End: 2020-07-16

## 2020-07-16 DIAGNOSIS — N76.0 ACUTE VAGINITIS: Primary | ICD-10-CM

## 2020-07-16 PROCEDURE — 99213 OFFICE O/P EST LOW 20 MIN: CPT | Performed by: NURSE PRACTITIONER

## 2020-07-16 RX ORDER — METRONIDAZOLE 500 MG/1
500 TABLET ORAL 2 TIMES DAILY
Qty: 14 TABLET | Refills: 0 | Status: SHIPPED | OUTPATIENT
Start: 2020-07-16 | End: 2020-07-23

## 2020-07-16 RX ORDER — FLUCONAZOLE 150 MG/1
150 TABLET ORAL ONCE
Qty: 2 TABLET | Refills: 0 | Status: SHIPPED | OUTPATIENT
Start: 2020-07-16 | End: 2020-07-16

## 2020-07-16 NOTE — PROGRESS NOTES
Subjective     Bernardo Dodd is a 39 y.o. female here today for Vaginitis (discharge, dysuria. Hx: of trich.)        I have reviewed the following portions of the patient's history and confirmed they are accurate: allergies, current medications, past family history, past medical history, past social history, past surgical history and problem list    I have personally completed the patient's review of systems.  You have chosen to receive care through a telehealth visit.  Do you consent to use a video/audio connection for your medical care today? Yes    39 female presents via video visit to discuss vaginal discharge.  She has had the symptoms in the past and reports that the discharge and smell is similar to when she has bacterial vaginosis and trichomonas.  No abnormal bleeding, slight abdominal pressure suprapubic, but no fever reported.      Review of Systems   Constitutional: Negative for chills and fever.   Eyes: Negative for blurred vision and visual disturbance.   Respiratory: Negative for cough.    Cardiovascular: Negative for chest pain, palpitations and leg swelling.   Gastrointestinal: Negative for abdominal pain and nausea.   Genitourinary: Positive for dysuria, vaginal discharge and vaginal pain.   Skin: Negative for rash.   Allergic/Immunologic: Negative for immunocompromised state.   Neurological: Negative for headache.   Hematological: Negative for adenopathy.       Current Outpatient Medications on File Prior to Visit   Medication Sig   • ADVAIR DISKUS 500-50 MCG/DOSE DISKUS Inhale 1 puff 2 (Two) Times a Day.   • albuterol (PROVENTIL) (2.5 MG/3ML) 0.083% nebulizer solution Take 2.5 mg by nebulization Every 12 (Twelve) Hours.   • amitriptyline (ELAVIL) 25 MG tablet Take 2 tablets by mouth Every Night. (Patient taking differently: Take 100 mg by mouth Every Night.)   • amLODIPine (NORVASC) 5 MG tablet Take 1 tablet by mouth Daily.   • azelastine (OPTIVAR) 0.05 % ophthalmic solution Administer 1 drop  to both eyes 2 (Two) Times a Day.   • beclomethasone (QVAR) 80 MCG/ACT inhaler Inhale 1 puff 2 (Two) Times a Day.   • Boric Acid suppository Suppository Insert 600 mg into the vagina.   • Cholecalciferol (VITAMIN D3) 125 MCG (5000 UT) capsule capsule Take 1 capsule by mouth Daily.   • clotrimazole (LOTRIMIN) 1 % external solution Apply 2-3 drops to right ear canal twice daily   • ferrous sulfate 325 (65 FE) MG tablet Take 1 tablet by mouth Daily With Breakfast. Resume Iron when antibiotics finished.   • fluticasone (Flonase) 50 MCG/ACT nasal spray 2 sprays into the nostril(s) as directed by provider Daily.   • lamoTRIgine (LaMICtal) 100 MG tablet TAKE ONE TABLET BY MOUTH DAILY   • loratadine-pseudoephedrine (Claritin-D 24 Hour)  MG per 24 hr tablet Take 1 tablet by mouth Daily.   • omeprazole (priLOSEC) 40 MG capsule Take 1 capsule by mouth 2 (Two) Times a Day. Take on an empty stomach and eat 30 minutes- 1 hr after eating. (Patient taking differently: Take 40 mg by mouth Daily. Take on an empty stomach and eat 30 minutes- 1 hr after eating.)   • SPIRIVA RESPIMAT 2.5 MCG/ACT aerosol solution Take 2 puffs by mouth Daily.     No current facility-administered medications on file prior to visit.        Objective   There were no vitals filed for this visit.  There is no height or weight on file to calculate BMI.    Physical Exam   Constitutional: She appears well-developed and well-nourished. No distress.   HENT:   Head: Normocephalic and atraumatic.   Eyes: Conjunctivae and EOM are normal.   Neck: Neck normal appearance.  Pulmonary/Chest: Effort normal.   Abdominal: There is tenderness in the suprapubic area. There is no CVA tenderness.   Musculoskeletal: Normal range of motion.   Neurological: She is alert.   Skin: She is not diaphoretic.   Psychiatric: She has a normal mood and affect. She mood appears normal. Her affect is normal. Her behavior is normal. Thought content is normal. She does not express abnormal  judgement.   Vitals reviewed.      Assessment/Plan   Problems Addressed this Visit     None      Visit Diagnoses     Acute vaginitis    -  Primary    Medications as prescribed.  Follow-up if symptoms worsen and do not improve.    Relevant Medications    metroNIDAZOLE (Flagyl) 500 MG tablet    miconazole (MICOTIN) 2 % cream             Current Outpatient Medications:   •  ADVAIR DISKUS 500-50 MCG/DOSE DISKUS, Inhale 1 puff 2 (Two) Times a Day., Disp: , Rfl:   •  albuterol (PROVENTIL) (2.5 MG/3ML) 0.083% nebulizer solution, Take 2.5 mg by nebulization Every 12 (Twelve) Hours., Disp: 30 vial, Rfl: 1  •  amitriptyline (ELAVIL) 25 MG tablet, Take 2 tablets by mouth Every Night. (Patient taking differently: Take 100 mg by mouth Every Night.), Disp: 60 tablet, Rfl: 3  •  amLODIPine (NORVASC) 5 MG tablet, Take 1 tablet by mouth Daily., Disp: 30 tablet, Rfl: 2  •  azelastine (OPTIVAR) 0.05 % ophthalmic solution, Administer 1 drop to both eyes 2 (Two) Times a Day., Disp: 6 mL, Rfl: 12  •  beclomethasone (QVAR) 80 MCG/ACT inhaler, Inhale 1 puff 2 (Two) Times a Day., Disp: , Rfl:   •  Boric Acid suppository Suppository, Insert 600 mg into the vagina., Disp: , Rfl:   •  Cholecalciferol (VITAMIN D3) 125 MCG (5000 UT) capsule capsule, Take 1 capsule by mouth Daily., Disp: 30 capsule, Rfl: 2  •  clotrimazole (LOTRIMIN) 1 % external solution, Apply 2-3 drops to right ear canal twice daily, Disp: 10 mL, Rfl: 0  •  ferrous sulfate 325 (65 FE) MG tablet, Take 1 tablet by mouth Daily With Breakfast. Resume Iron when antibiotics finished., Disp: 30 tablet, Rfl: 5  •  fluticasone (Flonase) 50 MCG/ACT nasal spray, 2 sprays into the nostril(s) as directed by provider Daily., Disp: 16 g, Rfl: 2  •  lamoTRIgine (LaMICtal) 100 MG tablet, TAKE ONE TABLET BY MOUTH DAILY, Disp: 30 tablet, Rfl: 5  •  loratadine-pseudoephedrine (Claritin-D 24 Hour)  MG per 24 hr tablet, Take 1 tablet by mouth Daily., Disp: 30 tablet, Rfl: 11  •  metroNIDAZOLE  (Flagyl) 500 MG tablet, Take 1 tablet by mouth 2 (Two) Times a Day for 7 days., Disp: 14 tablet, Rfl: 0  •  miconazole (MICOTIN) 2 % cream, Apply  topically to the appropriate area as directed 2 (Two) Times a Day., Disp: 14 g, Rfl: 0  •  omeprazole (priLOSEC) 40 MG capsule, Take 1 capsule by mouth 2 (Two) Times a Day. Take on an empty stomach and eat 30 minutes- 1 hr after eating. (Patient taking differently: Take 40 mg by mouth Daily. Take on an empty stomach and eat 30 minutes- 1 hr after eating.), Disp: 90 capsule, Rfl: 5  •  SPIRIVA RESPIMAT 2.5 MCG/ACT aerosol solution, Take 2 puffs by mouth Daily., Disp: , Rfl:        Plan of care reviewed with the patient at the conclusion of today's visit.  Education was provided regarding diagnosis, management, and any prescribed or recommended OTC medications.  Patient verbalized understanding of and agreement with management plan.    This was an audio and video enabled telemedicine encounter.     Return if symptoms worsen or fail to improve.      SHANE Alfonso

## 2020-07-22 ENCOUNTER — TELEPHONE (OUTPATIENT)
Dept: INTERNAL MEDICINE | Facility: CLINIC | Age: 40
End: 2020-07-22

## 2020-07-22 NOTE — TELEPHONE ENCOUNTER
PT CALLED STATED THAT SHE IS ON HER WAY TO [facility], TO GET TESTED FOR COVID AND WOULD LIKE A REFERRAL SENT OVER IN ORDER FOR HER TO GET TESTED, BECAUSE HER JOB IS REQUIRING HER TO GET TESTED FOR COVID.    PLEASE ADVISE.  CALL BACK:[phone]

## 2020-07-22 NOTE — TELEPHONE ENCOUNTER
Called and advised patient to go to [facility] and they can order COVID test.

## 2020-07-24 ENCOUNTER — HOSPITAL ENCOUNTER (EMERGENCY)
Facility: HOSPITAL | Age: 40
Discharge: HOME | End: 2020-07-24
Attending: EMERGENCY MEDICINE | Admitting: EMERGENCY MEDICINE

## 2020-07-24 ENCOUNTER — APPOINTMENT (OUTPATIENT)
Dept: CT IMAGING | Facility: HOSPITAL | Age: 40
End: 2020-07-24

## 2020-07-24 VITALS
DIASTOLIC BLOOD PRESSURE: 80 MMHG | BODY MASS INDEX: 34.27 KG/M2 | SYSTOLIC BLOOD PRESSURE: 127 MMHG | OXYGEN SATURATION: 96 % | RESPIRATION RATE: 18 BRPM | HEIGHT: 59 IN | HEART RATE: 96 BPM | WEIGHT: 170 LBS | TEMPERATURE: 98.2 F

## 2020-07-24 DIAGNOSIS — N39.0 ACUTE UTI: ICD-10-CM

## 2020-07-24 DIAGNOSIS — M54.32 LEFT SCIATIC NERVE PAIN: Primary | ICD-10-CM

## 2020-07-24 DIAGNOSIS — D25.9 UTERINE LEIOMYOMA, UNSPECIFIED LOCATION: ICD-10-CM

## 2020-07-24 LAB
ALBUMIN SERPL-MCNC: 3.7 G/DL (ref 3.5–5.2)
ALBUMIN/GLOB SERPL: 1.3 G/DL
ALP SERPL-CCNC: 56 U/L (ref 39–117)
ALT SERPL W P-5'-P-CCNC: 11 U/L (ref 1–33)
AMPHET+METHAMPHET UR QL: NEGATIVE
AMPHETAMINES UR QL: NEGATIVE
ANION GAP SERPL CALCULATED.3IONS-SCNC: 11 MMOL/L (ref 5–15)
AST SERPL-CCNC: 23 U/L (ref 1–32)
B-HCG UR QL: NEGATIVE
BACTERIA UR QL AUTO: ABNORMAL /HPF
BARBITURATES UR QL SCN: NEGATIVE
BASOPHILS # BLD AUTO: 0.06 10*3/MM3 (ref 0–0.2)
BASOPHILS NFR BLD AUTO: 0.5 % (ref 0–1.5)
BENZODIAZ UR QL SCN: NEGATIVE
BILIRUB SERPL-MCNC: 0.2 MG/DL (ref 0–1.2)
BILIRUB UR QL STRIP: NEGATIVE
BUN SERPL-MCNC: 12 MG/DL (ref 6–20)
BUN/CREAT SERPL: 14.6 (ref 7–25)
BUPRENORPHINE SERPL-MCNC: NEGATIVE NG/ML
CALCIUM SPEC-SCNC: 8.9 MG/DL (ref 8.6–10.5)
CANNABINOIDS SERPL QL: POSITIVE
CHLORIDE SERPL-SCNC: 101 MMOL/L (ref 98–107)
CLARITY UR: ABNORMAL
CO2 SERPL-SCNC: 26 MMOL/L (ref 22–29)
COCAINE UR QL: NEGATIVE
COLOR UR: ABNORMAL
CREAT SERPL-MCNC: 0.82 MG/DL (ref 0.57–1)
DEPRECATED RDW RBC AUTO: 44.5 FL (ref 37–54)
EOSINOPHIL # BLD AUTO: 0.07 10*3/MM3 (ref 0–0.4)
EOSINOPHIL NFR BLD AUTO: 0.6 % (ref 0.3–6.2)
ERYTHROCYTE [DISTWIDTH] IN BLOOD BY AUTOMATED COUNT: 14.2 % (ref 12.3–15.4)
GFR SERPL CREATININE-BSD FRML MDRD: 93 ML/MIN/1.73
GLOBULIN UR ELPH-MCNC: 2.8 GM/DL
GLUCOSE SERPL-MCNC: 109 MG/DL (ref 65–99)
GLUCOSE UR STRIP-MCNC: NEGATIVE MG/DL
HCT VFR BLD AUTO: 35.2 % (ref 34–46.6)
HGB BLD-MCNC: 11.1 G/DL (ref 12–15.9)
HGB UR QL STRIP.AUTO: ABNORMAL
HYALINE CASTS UR QL AUTO: ABNORMAL /LPF
IMM GRANULOCYTES # BLD AUTO: 0.02 10*3/MM3 (ref 0–0.05)
IMM GRANULOCYTES NFR BLD AUTO: 0.2 % (ref 0–0.5)
INTERNAL NEGATIVE CONTROL: NEGATIVE
INTERNAL POSITIVE CONTROL: POSITIVE
KETONES UR QL STRIP: NEGATIVE
LEUKOCYTE ESTERASE UR QL STRIP.AUTO: ABNORMAL
LYMPHOCYTES # BLD AUTO: 3.9 10*3/MM3 (ref 0.7–3.1)
LYMPHOCYTES NFR BLD AUTO: 35.3 % (ref 19.6–45.3)
Lab: NORMAL
MCH RBC QN AUTO: 27.3 PG (ref 26.6–33)
MCHC RBC AUTO-ENTMCNC: 31.5 G/DL (ref 31.5–35.7)
MCV RBC AUTO: 86.5 FL (ref 79–97)
METHADONE UR QL SCN: NEGATIVE
MONOCYTES # BLD AUTO: 0.81 10*3/MM3 (ref 0.1–0.9)
MONOCYTES NFR BLD AUTO: 7.3 % (ref 5–12)
NEUTROPHILS NFR BLD AUTO: 56.1 % (ref 42.7–76)
NEUTROPHILS NFR BLD AUTO: 6.18 10*3/MM3 (ref 1.7–7)
NITRITE UR QL STRIP: NEGATIVE
NRBC BLD AUTO-RTO: 0 /100 WBC (ref 0–0.2)
OPIATES UR QL: NEGATIVE
OXYCODONE UR QL SCN: NEGATIVE
PCP UR QL SCN: NEGATIVE
PH UR STRIP.AUTO: 5.5 [PH] (ref 5–8)
PLATELET # BLD AUTO: 280 10*3/MM3 (ref 140–450)
PMV BLD AUTO: 10.6 FL (ref 6–12)
POTASSIUM SERPL-SCNC: 3.8 MMOL/L (ref 3.5–5.2)
PROPOXYPH UR QL: NEGATIVE
PROT SERPL-MCNC: 6.5 G/DL (ref 6–8.5)
PROT UR QL STRIP: ABNORMAL
RBC # BLD AUTO: 4.07 10*6/MM3 (ref 3.77–5.28)
RBC # UR: ABNORMAL /HPF
REF LAB TEST METHOD: ABNORMAL
SODIUM SERPL-SCNC: 138 MMOL/L (ref 136–145)
SP GR UR STRIP: 1.02 (ref 1–1.03)
SQUAMOUS #/AREA URNS HPF: ABNORMAL /HPF
TRICYCLICS UR QL SCN: POSITIVE
TSH SERPL DL<=0.05 MIU/L-ACNC: 2.28 UIU/ML (ref 0.27–4.2)
UROBILINOGEN UR QL STRIP: ABNORMAL
WBC # BLD AUTO: 11.04 10*3/MM3 (ref 3.4–10.8)
WBC UR QL AUTO: ABNORMAL /HPF

## 2020-07-24 PROCEDURE — 25010000002 HYDROMORPHONE PER 4 MG: Performed by: EMERGENCY MEDICINE

## 2020-07-24 PROCEDURE — 99284 EMERGENCY DEPT VISIT MOD MDM: CPT

## 2020-07-24 PROCEDURE — 81025 URINE PREGNANCY TEST: CPT | Performed by: EMERGENCY MEDICINE

## 2020-07-24 PROCEDURE — 96375 TX/PRO/DX INJ NEW DRUG ADDON: CPT

## 2020-07-24 PROCEDURE — 80306 DRUG TEST PRSMV INSTRMNT: CPT | Performed by: EMERGENCY MEDICINE

## 2020-07-24 PROCEDURE — 74176 CT ABD & PELVIS W/O CONTRAST: CPT

## 2020-07-24 PROCEDURE — 25010000002 ONDANSETRON PER 1 MG: Performed by: EMERGENCY MEDICINE

## 2020-07-24 PROCEDURE — 81001 URINALYSIS AUTO W/SCOPE: CPT | Performed by: EMERGENCY MEDICINE

## 2020-07-24 PROCEDURE — 85025 COMPLETE CBC W/AUTO DIFF WBC: CPT | Performed by: EMERGENCY MEDICINE

## 2020-07-24 PROCEDURE — 80053 COMPREHEN METABOLIC PANEL: CPT | Performed by: EMERGENCY MEDICINE

## 2020-07-24 PROCEDURE — 96374 THER/PROPH/DIAG INJ IV PUSH: CPT

## 2020-07-24 PROCEDURE — 84443 ASSAY THYROID STIM HORMONE: CPT | Performed by: EMERGENCY MEDICINE

## 2020-07-24 RX ORDER — HYDROMORPHONE HYDROCHLORIDE 1 MG/ML
0.5 INJECTION, SOLUTION INTRAMUSCULAR; INTRAVENOUS; SUBCUTANEOUS
Status: DISCONTINUED | OUTPATIENT
Start: 2020-07-24 | End: 2020-07-24 | Stop reason: HOSPADM

## 2020-07-24 RX ORDER — SODIUM CHLORIDE 0.9 % (FLUSH) 0.9 %
10 SYRINGE (ML) INJECTION AS NEEDED
Status: DISCONTINUED | OUTPATIENT
Start: 2020-07-24 | End: 2020-07-24 | Stop reason: HOSPADM

## 2020-07-24 RX ORDER — ONDANSETRON 2 MG/ML
4 INJECTION INTRAMUSCULAR; INTRAVENOUS ONCE
Status: COMPLETED | OUTPATIENT
Start: 2020-07-24 | End: 2020-07-24

## 2020-07-24 RX ORDER — CYCLOBENZAPRINE HCL 10 MG
10 TABLET ORAL 3 TIMES DAILY PRN
Qty: 12 TABLET | Refills: 0 | OUTPATIENT
Start: 2020-07-24 | End: 2020-09-27

## 2020-07-24 RX ORDER — PREDNISONE 50 MG/1
50 TABLET ORAL DAILY
Qty: 5 TABLET | Refills: 0 | Status: SHIPPED | OUTPATIENT
Start: 2020-07-24 | End: 2020-07-29

## 2020-07-24 RX ADMIN — SODIUM CHLORIDE 1000 ML: 9 INJECTION, SOLUTION INTRAVENOUS at 04:22

## 2020-07-24 RX ADMIN — ONDANSETRON 4 MG: 2 INJECTION INTRAMUSCULAR; INTRAVENOUS at 04:22

## 2020-07-24 RX ADMIN — HYDROMORPHONE HYDROCHLORIDE 0.5 MG: 1 INJECTION, SOLUTION INTRAMUSCULAR; INTRAVENOUS; SUBCUTANEOUS at 04:22

## 2020-08-04 ENCOUNTER — PATIENT MESSAGE (OUTPATIENT)
Dept: INTERNAL MEDICINE | Facility: CLINIC | Age: 40
End: 2020-08-04

## 2020-08-04 DIAGNOSIS — B00.9 HERPES INFECTION: ICD-10-CM

## 2020-08-04 RX ORDER — VALACYCLOVIR HYDROCHLORIDE 1 G/1
1000 TABLET, FILM COATED ORAL DAILY
Qty: 10 TABLET | Refills: 1 | Status: SHIPPED | OUTPATIENT
Start: 2020-08-04 | End: 2020-08-09

## 2020-08-11 PROCEDURE — U0003 INFECTIOUS AGENT DETECTION BY NUCLEIC ACID (DNA OR RNA); SEVERE ACUTE RESPIRATORY SYNDROME CORONAVIRUS 2 (SARS-COV-2) (CORONAVIRUS DISEASE [COVID-19]), AMPLIFIED PROBE TECHNIQUE, MAKING USE OF HIGH THROUGHPUT TECHNOLOGIES AS DESCRIBED BY CMS-2020-01-R: HCPCS | Performed by: NURSE PRACTITIONER

## 2020-08-13 ENCOUNTER — TELEPHONE (OUTPATIENT)
Dept: URGENT CARE | Facility: CLINIC | Age: 40
End: 2020-08-13

## 2020-08-13 NOTE — TELEPHONE ENCOUNTER
Pt notified COVID Negative. Pt advised to do the 10 day quarantine. Pt said she still has mild symptoms she thinks is allergies. Pt said she is coming to clinic to  results. Results up front.     ----- Message from Nico Puckett MD sent at 8/13/2020  8:41 AM EDT -----  COVID is not detected. Recommend current (10day/24hr) protocol.  Please notify the patient.-Nico Puckett M.D.      ----- Message -----  From: Lab, Background User  Sent: 8/12/2020   8:08 PM EDT  To: [facility] Covid Results

## 2020-08-21 ENCOUNTER — PATIENT MESSAGE (OUTPATIENT)
Dept: INTERNAL MEDICINE | Facility: CLINIC | Age: 40
End: 2020-08-21

## 2020-08-31 ENCOUNTER — OFFICE VISIT (OUTPATIENT)
Dept: INTERNAL MEDICINE | Facility: CLINIC | Age: 40
End: 2020-08-31

## 2020-08-31 VITALS
SYSTOLIC BLOOD PRESSURE: 124 MMHG | OXYGEN SATURATION: 98 % | DIASTOLIC BLOOD PRESSURE: 78 MMHG | BODY MASS INDEX: 33.77 KG/M2 | WEIGHT: 167.2 LBS | HEART RATE: 105 BPM | TEMPERATURE: 98.7 F

## 2020-08-31 DIAGNOSIS — J45.21 MILD INTERMITTENT ASTHMA WITH ACUTE EXACERBATION: Primary | ICD-10-CM

## 2020-08-31 DIAGNOSIS — B00.9 HERPES SIMPLEX INFECTION: ICD-10-CM

## 2020-08-31 DIAGNOSIS — J20.9 ACUTE BRONCHITIS, UNSPECIFIED ORGANISM: ICD-10-CM

## 2020-08-31 PROCEDURE — 99213 OFFICE O/P EST LOW 20 MIN: CPT | Performed by: PHYSICIAN ASSISTANT

## 2020-08-31 RX ORDER — VALACYCLOVIR HYDROCHLORIDE 500 MG/1
1000 TABLET, FILM COATED ORAL DAILY
Qty: 5 TABLET | Refills: 1 | Status: SHIPPED | OUTPATIENT
Start: 2020-08-31 | End: 2020-09-05

## 2020-08-31 RX ORDER — ALBUTEROL SULFATE 90 UG/1
2 AEROSOL, METERED RESPIRATORY (INHALATION) EVERY 4 HOURS PRN
Qty: 18 G | Refills: 2 | Status: SHIPPED | OUTPATIENT
Start: 2020-08-31 | End: 2022-03-10

## 2020-08-31 RX ORDER — PREDNISONE 10 MG/1
TABLET ORAL
Qty: 30 TABLET | Refills: 0 | OUTPATIENT
Start: 2020-08-31 | End: 2020-09-27

## 2020-09-04 ENCOUNTER — HOSPITAL ENCOUNTER (EMERGENCY)
Facility: HOSPITAL | Age: 40
Discharge: HOME | End: 2020-09-04
Attending: EMERGENCY MEDICINE | Admitting: EMERGENCY MEDICINE

## 2020-09-04 VITALS
DIASTOLIC BLOOD PRESSURE: 78 MMHG | OXYGEN SATURATION: 98 % | BODY MASS INDEX: 32.2 KG/M2 | HEIGHT: 60 IN | HEART RATE: 90 BPM | RESPIRATION RATE: 20 BRPM | SYSTOLIC BLOOD PRESSURE: 127 MMHG | WEIGHT: 164 LBS | TEMPERATURE: 98 F

## 2020-09-04 DIAGNOSIS — B96.89 ACUTE BACTERIAL SINUSITIS: ICD-10-CM

## 2020-09-04 DIAGNOSIS — J01.90 ACUTE BACTERIAL SINUSITIS: ICD-10-CM

## 2020-09-04 DIAGNOSIS — J02.9 ACUTE PHARYNGITIS, UNSPECIFIED ETIOLOGY: Primary | ICD-10-CM

## 2020-09-04 LAB — SARS-COV-2 RNA NOSE QL NAA+PROBE: NOT DETECTED

## 2020-09-04 PROCEDURE — C9803 HOPD COVID-19 SPEC COLLECT: HCPCS

## 2020-09-04 PROCEDURE — U0004 COV-19 TEST NON-CDC HGH THRU: HCPCS | Performed by: NURSE PRACTITIONER

## 2020-09-04 PROCEDURE — 99283 EMERGENCY DEPT VISIT LOW MDM: CPT

## 2020-09-04 RX ORDER — FLUTICASONE PROPIONATE 50 MCG
2 SPRAY, SUSPENSION (ML) NASAL DAILY
Qty: 1 BOTTLE | Refills: 0 | Status: SHIPPED | OUTPATIENT
Start: 2020-09-04 | End: 2020-10-08

## 2020-09-04 RX ORDER — AMOXICILLIN AND CLAVULANATE POTASSIUM 875; 125 MG/1; MG/1
1 TABLET, FILM COATED ORAL 2 TIMES DAILY
Qty: 20 TABLET | Refills: 0 | OUTPATIENT
Start: 2020-09-04 | End: 2020-09-27

## 2020-09-04 NOTE — ED PROVIDER NOTES
Subjective   Patient is a pleasant 39-year-old -American female who presents to the ER today with complaints of sinus pressure, sore throat, and cough that is been present for 4 days and is worsening.  Patient indicates frontal sinus pressure, thick sinus drainage, postnasal drip, and sore throat.  Sore throat is worse with swallowing.  Cough is occasionally productive with clear mucus.  Denies fever, chills, or headache.  Patient was previously tested for COVID-19 approximately 2 weeks ago and was negative.  Patient denies any possible COVID-19 exposure.  Denies any travel.  Was seen by her primary care provider 3 days ago and was prescribed steroids, and Mucinex as well as Zyrtec.  Has been taking these medications without relief.      History provided by:  Patient  Sore Throat   Location:  Generalized  Severity:  Moderate  Onset quality:  Gradual  Duration:  4 days  Timing:  Constant  Progression:  Worsening  Chronicity:  New  Relieved by:  Nothing  Worsened by:  Nothing  Ineffective treatments: [id number], Mucinex, Claritin.  Associated symptoms: cough, postnasal drip and sinus congestion    Associated symptoms: no abdominal pain, no adenopathy, no chills, no ear pain, no fever, no rash, no rhinorrhea and no shortness of breath    Risk factors: no exposure to strep, no exposure to mono and no sick contacts        Review of Systems   Constitutional: Negative for chills and fever.   HENT: Positive for postnasal drip, sinus pressure, sinus pain and sore throat. Negative for ear pain and rhinorrhea.    Respiratory: Positive for cough. Negative for shortness of breath.    Gastrointestinal: Negative for abdominal pain.   Skin: Negative for rash.   Neurological: Negative for dizziness and light-headedness.   Hematological: Negative for adenopathy.   All other systems reviewed and are negative.      Past Medical History:   Diagnosis Date   • Allergic rhinitis    • Anemia    • Arthritis    • Asthma    • Depression     • FHx: migraine headaches    • GERD (gastroesophageal reflux disease)    • Heart murmur    • Herpes simplex    • History of chest x-ray 2015    no active disease   • History of echocardiogram 2015    ejection fraction of greater than 65%, mitral and pulmonic regurgitation an physiological tricuspid regurgitation.   • History of PFTs 2015    spirometry data acceptable and reproducible; pt given 4 puffs of Ventolin; pt gave good effort; no obstruction; no Bd response; MVV reduced    • History of PFTs 2015    pt gave best effort; duoneb given prior and post study; moderate nonspecific proportional reduction of FEV1 and FVC with preserved ratio; FEV1 moderately reduced; cannot rule out restriction   • Hypertension    • Migraine    • Ovarian cyst    • Seizures (CMS/HCC)    • Thigh shingles        Allergies   Allergen Reactions   • Naproxen Swelling   • Sulfa Antibiotics Rash       Past Surgical History:   Procedure Laterality Date   • BILATERAL BREAST REDUCTION     • BREAST SURGERY      breast reduction   •  SECTION     • CYSTOSCOPY     • LAPAROSCOPIC TUBAL LIGATION     • ORIF ANKLE FRACTURE Right    • REDUCTION MAMMAPLASTY Bilateral    • TENSION FREE VAGINAL TAPING WITH MINI ARC SLING      Dr Doyle avery        Family History   Problem Relation Age of Onset   • Pancreatic cancer Maternal Grandmother    • Heart failure Paternal Grandmother    • MARCIE disease Paternal Aunt    • Arthritis Mother    • Cancer Mother    • Arthritis Father    • Diabetes Neg Hx    • Heart attack Neg Hx    • Hypertension Neg Hx    • Hyperlipidemia Neg Hx    • Mental illness Neg Hx    • Obesity Neg Hx    • Stroke Neg Hx        Social History     Socioeconomic History   • Marital status: Single     Spouse name: Not on file   • Number of children: Not on file   • Years of education: Not on file   • Highest education level: Not on file   Tobacco Use   • Smoking status: Former Smoker     Packs/day: 1.00      Years: 15.00     Pack years: 15.00     Types: Cigarettes     Last attempt to quit: 2015     Years since quittin.6   • Smokeless tobacco: Never Used   Substance and Sexual Activity   • Alcohol use: No     Comment: socially   • Drug use: Yes     Types: Marijuana     Comment: Once a month    • Sexual activity: Defer   Social History Narrative    Caffeine intake: 16 oz per day            Objective   Physical Exam   Constitutional: She is oriented to person, place, and time. She appears well-developed and well-nourished.   HENT:   Head: Normocephalic and atraumatic.   Right Ear: Hearing normal. A middle ear effusion (Slight) is present.   Left Ear: Hearing normal. A middle ear effusion (Slight) is present.   Nose: Right sinus exhibits maxillary sinus tenderness and frontal sinus tenderness. Left sinus exhibits maxillary sinus tenderness and frontal sinus tenderness.   Mouth/Throat: Uvula is midline, oropharynx is clear and moist and mucous membranes are normal. No oropharyngeal exudate, posterior oropharyngeal edema or posterior oropharyngeal erythema.   Eyes: Pupils are equal, round, and reactive to light.   Neck: Neck supple.   Cardiovascular: Normal rate, regular rhythm and intact distal pulses.   Pulmonary/Chest: Effort normal and breath sounds normal. She has no wheezes. She has no rhonchi. She has no rales.   Lymphadenopathy:     She has no cervical adenopathy.   Neurological: She is alert and oriented to person, place, and time.   Skin: Capillary refill takes less than 2 seconds.   Psychiatric: She has a normal mood and affect. Her behavior is normal.   Vitals reviewed.      Procedures           ED Course      No results found for this or any previous visit (from the past 24 hour(s)).  Note: In addition to lab results from this visit, the labs listed above may include labs taken at another facility or during a different encounter within the last 24 hours. Please correlate lab times with ED admission and  "discharge times for further clarification of the services performed during this visit.    No orders to display     Vitals:    09/04/20 0731 09/04/20 0740   BP: 134/93 136/84   BP Location: Right arm    Patient Position: Sitting    Pulse: 84 92   Resp: 18 20   Temp: 98 °F (36.7 °C)    TempSrc: Oral    SpO2: 97% 97%   Weight: 74.4 kg (164 lb) 74.4 kg (164 lb)   Height: 149.9 cm (59\") 152.4 cm (60\")     Medications - No data to display  ECG/EMG Results (last 24 hours)     ** No results found for the last 24 hours. **        No orders to display       Discussed diagnosis, and treatment plan to include antibiotics, and corticosteroid nasal sprays.  We have COVID swabbed the patient, and advised her that we will contact her with results within 24 to 36 hours.  Until then, patient was instructed to home quarantine until she receives those results and further instructions.  Instructed to continue the Mucinex and may take over-the-counter DayQuil as directed.  Advised to follow-up with primary care provider in the next 2 to 3 days, or return to the ER with worsening of symptoms or new symptoms.  Patient verbalized understanding of all discussed.                                     MDM    Final diagnoses:   Acute pharyngitis, unspecified etiology   Acute bacterial sinusitis            Shante Dodd, SHANE  09/04/20 0811    "

## 2020-09-11 NOTE — ED PROVIDER NOTES
Subjective   Patient is a pleasant 39-year-old female who presents with low back pain.  She states yesterday evening she began experiencing right flank pain that radiated down to her right buttock and into her proximal right lower extremity.  She has a history of kidney stones and sciatica and differentiating between the 2 is difficult for her.  She rates the pain is severe and sharp.  She denies fever, chills, chest pain, shortness of breath, abdominal pain.  She admits to nausea associated with the pain.  She denies any recent fever.      Back Pain   Location:  Lumbar spine  Quality:  Stabbing  Radiates to:  R posterior upper leg  Pain severity:  Moderate  Pain is:  Unable to specify  Onset quality:  Gradual  Timing:  Constant  Progression:  Waxing and waning  Chronicity:  Recurrent  Context: not emotional stress, not falling, not jumping from heights, not lifting heavy objects, not recent illness and not recent injury    Relieved by:  Nothing  Worsened by:  Nothing  Ineffective treatments:  None tried  Associated symptoms: no abdominal pain and no abdominal swelling        Review of Systems   Gastrointestinal: Negative for abdominal pain.   Musculoskeletal: Positive for back pain.   All other systems reviewed and are negative.      Past Medical History:   Diagnosis Date   • Allergic rhinitis    • Anemia    • Arthritis    • Asthma    • Depression    • FHx: migraine headaches    • GERD (gastroesophageal reflux disease)    • Heart murmur    • Herpes simplex    • History of chest x-ray 11/01/2015    no active disease   • History of echocardiogram 11/01/2015    ejection fraction of greater than 65%, mitral and pulmonic regurgitation an physiological tricuspid regurgitation.   • History of PFTs 12/22/2015    spirometry data acceptable and reproducible; pt given 4 puffs of Ventolin; pt gave good effort; no obstruction; no Bd response; MVV reduced    • History of PFTs 11/02/2015    pt gave best effort; duoneb given prior  and post study; moderate nonspecific proportional reduction of FEV1 and FVC with preserved ratio; FEV1 moderately reduced; cannot rule out restriction   • Hypertension    • Migraine    • Ovarian cyst    • Seizures (CMS/HCC)    • Thigh shingles        Allergies   Allergen Reactions   • Naproxen Swelling   • Sulfa Antibiotics Rash       Past Surgical History:   Procedure Laterality Date   • BILATERAL BREAST REDUCTION     • BREAST SURGERY      breast reduction   •  SECTION     • CYSTOSCOPY     • LAPAROSCOPIC TUBAL LIGATION     • ORIF ANKLE FRACTURE Right    • REDUCTION MAMMAPLASTY Bilateral    • TENSION FREE VAGINAL TAPING WITH MINI ARC SLING      Dr Doyle avery        Family History   Problem Relation Age of Onset   • Pancreatic cancer Maternal Grandmother    • Heart failure Paternal Grandmother    • MARCIE disease Paternal Aunt    • Arthritis Mother    • Cancer Mother    • Arthritis Father    • Diabetes Neg Hx    • Heart attack Neg Hx    • Hypertension Neg Hx    • Hyperlipidemia Neg Hx    • Mental illness Neg Hx    • Obesity Neg Hx    • Stroke Neg Hx        Social History     Socioeconomic History   • Marital status: Single     Spouse name: Not on file   • Number of children: Not on file   • Years of education: Not on file   • Highest education level: Not on file   Tobacco Use   • Smoking status: Former Smoker     Packs/day: 1.00     Years: 15.00     Pack years: 15.00     Types: Cigarettes     Last attempt to quit: 2015     Years since quittin.5   • Smokeless tobacco: Never Used   Substance and Sexual Activity   • Alcohol use: No     Comment: socially   • Drug use: Yes     Types: Marijuana     Comment: Once a month    • Sexual activity: Defer   Social History Narrative    Caffeine intake: 16 oz per day            Objective   Physical Exam   Constitutional: She is oriented to person, place, and time. She appears well-developed and well-nourished.   HENT:   Head: Normocephalic and atraumatic.    Eyes: Pupils are equal, round, and reactive to light. EOM are normal.   Neck: Normal range of motion.   Cardiovascular: Normal rate, regular rhythm, normal heart sounds and intact distal pulses.   Pulmonary/Chest: Effort normal and breath sounds normal. No respiratory distress.   Abdominal: Soft. Bowel sounds are normal. She exhibits no distension and no mass. There is no tenderness. There is no guarding.   Musculoskeletal:   TTP right lower lumbar spine and superior right buttock   Neurological: She is alert and oriented to person, place, and time.   Nursing note and vitals reviewed.      Procedures           ED Course  ED Course as of Jul 24 2201 Fri Jul 24, 2020   0610 [employer] login is not working.      Patient states that she is currently on her period which would be the reason for the significant hematuria.    [CP]   0614 Patient is been resting comfortably for couple hours at this point.  She is understanding of the discharge instructions and comfortable with discharge at this time.    [CP]      ED Course User Index  [CP] Jonh Olmedo DO      Results for PETR WILLIS (MRN [id number]) as of 7/26/2020 22:27   Ref. Range 7/24/2020 04:18   Glucose Latest Ref Range: 65 - 99 mg/dL 109 (H)   Sodium Latest Ref Range: 136 - 145 mmol/L 138   Potassium Latest Ref Range: 3.5 - 5.2 mmol/L 3.8   CO2 Latest Ref Range: 22.0 - 29.0 mmol/L 26.0   Chloride Latest Ref Range: 98 - 107 mmol/L 101   Anion Gap Latest Ref Range: 5.0 - 15.0 mmol/L 11.0   Creatinine Latest Ref Range: 0.57 - 1.00 mg/dL 0.82   BUN Latest Ref Range: 6 - 20 mg/dL 12   BUN/Creatinine Ratio Latest Ref Range: 7.0 - 25.0  14.6   Calcium Latest Ref Range: 8.6 - 10.5 mg/dL 8.9   eGFR African Am Latest Ref Range: >60 mL/min/1.73 93   Alkaline Phosphatase Latest Ref Range: 39 - 117 U/L 56   Total Protein Latest Ref Range: 6.0 - 8.5 g/dL 6.5   ALT (SGPT) Latest Ref Range: 1 - 33 U/L 11   AST (SGOT) Latest Ref Range: 1 - 32 U/L 23   Total Bilirubin Latest  Ref Range: 0.0 - 1.2 mg/dL 0.2   Albumin Latest Ref Range: 3.50 - 5.20 g/dL 3.70   Globulin Latest Units: gm/dL 2.8   A/G Ratio Latest Units: g/dL 1.3   TSH Baseline Latest Ref Range: 0.270 - 4.200 uIU/mL 2.280   WBC Latest Ref Range: 3.40 - 10.80 10*3/mm3 11.04 (H)   RBC Latest Ref Range: 3.77 - 5.28 10*6/mm3 4.07   Hemoglobin Latest Ref Range: 12.0 - 15.9 g/dL 11.1 (L)   Hematocrit Latest Ref Range: 34.0 - 46.6 % 35.2   RDW Latest Ref Range: 12.3 - 15.4 % 14.2   MCV Latest Ref Range: 79.0 - 97.0 fL 86.5   MCH Latest Ref Range: 26.6 - 33.0 pg 27.3   MCHC Latest Ref Range: 31.5 - 35.7 g/dL 31.5   MPV Latest Ref Range: 6.0 - 12.0 fL 10.6   Platelets Latest Ref Range: 140 - 450 10*3/mm3 280   RDW-SD Latest Ref Range: 37.0 - 54.0 fl 44.5   Neutrophil Rel % Latest Ref Range: 42.7 - 76.0 % 56.1   Lymphocyte Rel % Latest Ref Range: 19.6 - 45.3 % 35.3   Monocyte Rel % Latest Ref Range: 5.0 - 12.0 % 7.3   Eosinophil Rel % Latest Ref Range: 0.3 - 6.2 % 0.6   Basophil Rel % Latest Ref Range: 0.0 - 1.5 % 0.5   Immature Granulocyte Rel % Latest Ref Range: 0.0 - 0.5 % 0.2   Neutrophils Absolute Latest Ref Range: 1.70 - 7.00 10*3/mm3 6.18   Lymphocytes Absolute Latest Ref Range: 0.70 - 3.10 10*3/mm3 3.90 (H)   Monocytes Absolute Latest Ref Range: 0.10 - 0.90 10*3/mm3 0.81   Eosinophils Absolute Latest Ref Range: 0.00 - 0.40 10*3/mm3 0.07   Basophils Absolute Latest Ref Range: 0.00 - 0.20 10*3/mm3 0.06   Immature Grans, Absolute Latest Ref Range: 0.00 - 0.05 10*3/mm3 0.02   nRBC Latest Ref Range: 0.0 - 0.2 /100 WBC 0.0     Results for PETR WILLIS (MRN [id number]) as of 7/26/2020 22:27   Ref. Range 7/24/2020 03:10   Color, UA Latest Ref Range: Yellow, Straw  Orange (A)   Appearance, UA Latest Ref Range: Clear  Cloudy (A)   Specific [place], UA Latest Ref Range: 1.001 - 1.030  1.021   pH, UA Latest Ref Range: 5.0 - 8.0  5.5   Glucose Latest Ref Range: Negative  Negative   Ketones, UA Latest Ref Range: Negative  Negative    Blood, UA Latest Ref Range: Negative  Large (3+) (A)   Nitrite, UA Latest Ref Range: Negative  Negative   Leukocytes, UA Latest Ref Range: Negative  Small (1+) (A)   Protein, UA Latest Ref Range: Negative  30 mg/dL (1+) (A)   Bilirubin, UA Latest Ref Range: Negative  Negative   Urobilinogen, UA Latest Ref Range: 0.2 - 1.0 E.U./dL  1.0 E.U./dL   RBC, UA Latest Ref Range: None Seen, 0-2 /HPF Too Numerous to Count (A)   WBC, UA Latest Ref Range: None Seen, 0-2 /HPF 6-12 (A)   Bacteria, UA Latest Ref Range: None Seen, Trace /HPF Trace   Squamous Epithelial Cells, UA Latest Ref Range: None Seen, 0-2 /HPF 0-2   Hyaline Casts, UA Latest Ref Range: 0 - 6 /LPF 0-6   Methodology: Unknown Manual Light Microscopy       Pt felt better following treatment in the ED.  She is comfortable with discharge at this time.          MDM    Final diagnoses:   Left sciatic nerve pain   Acute UTI   Uterine leiomyoma, unspecified location            Jonh Olmedo,   07/26/20 2712     no

## 2020-09-27 ENCOUNTER — HOSPITAL ENCOUNTER (EMERGENCY)
Facility: HOSPITAL | Age: 40
Discharge: HOME | End: 2020-09-27
Attending: EMERGENCY MEDICINE | Admitting: EMERGENCY MEDICINE

## 2020-09-27 VITALS
HEART RATE: 94 BPM | WEIGHT: 164 LBS | HEIGHT: 59 IN | BODY MASS INDEX: 33.06 KG/M2 | SYSTOLIC BLOOD PRESSURE: 127 MMHG | TEMPERATURE: 98 F | OXYGEN SATURATION: 96 % | RESPIRATION RATE: 18 BRPM | DIASTOLIC BLOOD PRESSURE: 82 MMHG

## 2020-09-27 DIAGNOSIS — N75.1 BARTHOLIN'S GLAND ABSCESS: Primary | ICD-10-CM

## 2020-09-27 PROCEDURE — 99282 EMERGENCY DEPT VISIT SF MDM: CPT

## 2020-09-27 RX ORDER — HYDROCODONE BITARTRATE AND ACETAMINOPHEN 5; 325 MG/1; MG/1
1 TABLET ORAL EVERY 6 HOURS PRN
Qty: 6 TABLET | Refills: 0 | Status: SHIPPED | OUTPATIENT
Start: 2020-09-27 | End: 2020-12-08

## 2020-09-27 RX ORDER — LIDOCAINE HYDROCHLORIDE AND EPINEPHRINE 10; 10 MG/ML; UG/ML
10 INJECTION, SOLUTION INFILTRATION; PERINEURAL ONCE
Status: COMPLETED | OUTPATIENT
Start: 2020-09-27 | End: 2020-09-27

## 2020-09-27 RX ORDER — DOXYCYCLINE 100 MG/1
100 CAPSULE ORAL 2 TIMES DAILY
Qty: 20 CAPSULE | Refills: 0 | OUTPATIENT
Start: 2020-09-27 | End: 2020-11-10

## 2020-09-27 RX ADMIN — LIDOCAINE HYDROCHLORIDE,EPINEPHRINE BITARTRATE 10 ML: 10; .01 INJECTION, SOLUTION INFILTRATION; PERINEURAL at 17:04

## 2020-09-28 ENCOUNTER — OFFICE VISIT (OUTPATIENT)
Dept: INTERNAL MEDICINE | Facility: CLINIC | Age: 40
End: 2020-09-28

## 2020-09-28 VITALS
OXYGEN SATURATION: 97 % | WEIGHT: 167.6 LBS | HEART RATE: 107 BPM | DIASTOLIC BLOOD PRESSURE: 90 MMHG | BODY MASS INDEX: 33.85 KG/M2 | SYSTOLIC BLOOD PRESSURE: 122 MMHG

## 2020-09-28 DIAGNOSIS — N75.1 BARTHOLIN'S GLAND ABSCESS: Primary | ICD-10-CM

## 2020-09-28 DIAGNOSIS — Z00.00 HEALTH CARE MAINTENANCE: ICD-10-CM

## 2020-09-28 DIAGNOSIS — Z87.898 HISTORY OF ABNORMAL MAMMOGRAM: ICD-10-CM

## 2020-09-28 PROCEDURE — 99396 PREV VISIT EST AGE 40-64: CPT | Performed by: PHYSICIAN ASSISTANT

## 2020-09-28 NOTE — PROGRESS NOTES
"Chief Complaint   Patient presents with   • Annual Exam       Subjective     History of Present Illness    Bernardo Dodd is a 40 y.o. female.     The patient is being seen for a health maintenance evaluation.  The last health maintenance was 1 year(s) ago.    Social History  Bernardo  does not smoke cigarettes regularly, smokes a cigarette every 2 weeks.   She drinks occasional alcohol. Beer 2-3 times a week.  She does use illicit drugs. Smokes THC once a week.    General History  Bernardo  does not have regular dental visits.  She does complain of vision problems. Last eye exam was 2018.  Immunizations are up to date. The patient needs the following immunizations: none    Lifestyle  Bernardo  consumes in general, a \"healthy\" diet  .  She exercises three times a week.    Reproductive Health  Bernardo  is premenopausal.  She reports periods are regular every 28-30 days.  She is sexually active. Her contraceptive plan is condoms and bilateral tubal ligation.    Screening  Last pap was 2018 by me, due 2021. History of abnormal pap smear or family history of gyn cancer: none  Last mammogram was  11/2019, due 11/202. Personal or family history of abnormal mammograms or breast cancer: abnormal mammogram last year, never went back to repeat  Last colonoscopy was unknown. Family history of colon cancer: none  Last DEXA was never.     Pt was in the ER with Bartholin gland abscess, she had the abscess drained, continued doxycycline. 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    She has taken all of the diflucan she keeps on hand for yeast infections. Needs to have another rx to keep on hand.                 Current Outpatient Medications:   •  ADVAIR DISKUS 500-50 MCG/DOSE DISKUS, Inhale 1 puff 2 (Two) Times a Day., Disp: , Rfl:   •  albuterol (PROVENTIL) (2.5 MG/3ML) 0.083% nebulizer solution, Take 2.5 mg by nebulization Every 12 (Twelve) Hours., Disp: 30 vial, " Rfl: 1  •  albuterol sulfate  (90 Base) MCG/ACT inhaler, Inhale 2 puffs Every 4 (Four) Hours As Needed for Wheezing or Shortness of Air., Disp: 18 g, Rfl: 2  •  amitriptyline (ELAVIL) 25 MG tablet, Take 2 tablets by mouth Every Night. (Patient taking differently: Take 100 mg by mouth At Night As Needed.), Disp: 60 tablet, Rfl: 3  •  amLODIPine (NORVASC) 5 MG tablet, Take 1 tablet by mouth Daily., Disp: 30 tablet, Rfl: 2  •  azelastine (OPTIVAR) 0.05 % ophthalmic solution, Administer 1 drop to both eyes 2 (Two) Times a Day., Disp: 6 mL, Rfl: 12  •  beclomethasone (QVAR) 80 MCG/ACT inhaler, Inhale 1 puff 2 (Two) Times a Day., Disp: , Rfl:   •  Boric Acid suppository Suppository, Insert 600 mg into the vagina., Disp: , Rfl:   •  Cholecalciferol (VITAMIN D3) 125 MCG (5000 UT) capsule capsule, Take 1 capsule by mouth Daily., Disp: 30 capsule, Rfl: 2  •  clotrimazole (LOTRIMIN) 1 % external solution, Apply 2-3 drops to right ear canal twice daily, Disp: 10 mL, Rfl: 0  •  doxycycline (MONODOX) 100 MG capsule, Take 1 capsule by mouth 2 (Two) Times a Day., Disp: 20 capsule, Rfl: 0  •  ferrous sulfate 325 (65 FE) MG tablet, Take 1 tablet by mouth Daily With Breakfast. Resume Iron when antibiotics finished., Disp: 30 tablet, Rfl: 5  •  fluticasone (Flonase) 50 MCG/ACT nasal spray, 2 sprays into the nostril(s) as directed by provider Daily., Disp: 16 g, Rfl: 2  •  fluticasone (FLONASE) 50 MCG/ACT nasal spray, 2 sprays into the nostril(s) as directed by provider Daily., Disp: 1 bottle, Rfl: 0  •  HYDROcodone-acetaminophen (NORCO) 5-325 MG per tablet, Take 1 tablet by mouth Every 6 (Six) Hours As Needed for Severe Pain ., Disp: 6 tablet, Rfl: 0  •  lamoTRIgine (LaMICtal) 100 MG tablet, TAKE ONE TABLET BY MOUTH DAILY, Disp: 30 tablet, Rfl: 5  •  loratadine-pseudoephedrine (Claritin-D 24 Hour)  MG per 24 hr tablet, Take 1 tablet by mouth Daily for 30 days., Disp: 30 tablet, Rfl: 5  •  miconazole (MICOTIN) 2 % cream,  Apply  topically to the appropriate area as directed 2 (Two) Times a Day., Disp: 14 g, Rfl: 0  •  SPIRIVA RESPIMAT 2.5 MCG/ACT aerosol solution, Take 2 puffs by mouth Daily., Disp: , Rfl:      PMFSH  The following portions of the patient's history were reviewed and updated as appropriate: allergies, current medications, past family history, past medical history, past social history, past surgical history and problem list.    Review of Systems   Constitutional: Negative for appetite change, fever and unexpected weight change.   HENT: Negative for ear pain, facial swelling and sore throat.    Eyes: Negative for pain and visual disturbance.   Respiratory: Negative for chest tightness, shortness of breath and wheezing.    Cardiovascular: Negative for chest pain and palpitations.   Gastrointestinal: Negative for abdominal pain and blood in stool.   Endocrine: Negative.    Genitourinary: Negative for difficulty urinating and hematuria.   Musculoskeletal: Negative for joint swelling.   Neurological: Negative for tremors, seizures and syncope.   Hematological: Negative for adenopathy.   Psychiatric/Behavioral: Negative.        Objective   /90   Pulse 107   Wt 76 kg (167 lb 9.6 oz)   SpO2 97%   BMI 33.85 kg/m²     Physical Exam  Vitals signs and nursing note reviewed.   Constitutional:       General: She is not in acute distress.     Appearance: She is well-developed. She is not diaphoretic.   HENT:      Head: Normocephalic and atraumatic. Hair is normal.      Right Ear: Hearing, tympanic membrane, ear canal and external ear normal. No decreased hearing noted. No drainage.      Left Ear: Hearing, tympanic membrane, ear canal and external ear normal. No decreased hearing noted.      Nose: No nasal deformity.   Eyes:      General: Lids are normal. Lids are everted, no foreign bodies appreciated.         Right eye: No discharge.         Left eye: No discharge.      Conjunctiva/sclera: Conjunctivae normal.      Pupils:  Pupils are equal, round, and reactive to light.   Neck:      Musculoskeletal: Normal range of motion and neck supple.      Thyroid: No thyromegaly.      Vascular: No JVD.      Trachea: No tracheal deviation.   Cardiovascular:      Rate and Rhythm: Normal rate and regular rhythm.      Pulses: Normal pulses.      Heart sounds: Normal heart sounds. No murmur. No friction rub. No gallop.    Pulmonary:      Effort: Pulmonary effort is normal. No respiratory distress.      Breath sounds: Normal breath sounds. No wheezing or rales.   Chest:      Chest wall: No tenderness.   Abdominal:      General: Bowel sounds are normal. There is no distension.      Palpations: Abdomen is soft. There is no mass.      Tenderness: There is no abdominal tenderness. There is no guarding or rebound.      Hernia: No hernia is present.   Musculoskeletal: Normal range of motion.         General: No tenderness or deformity.   Lymphadenopathy:      Cervical: No cervical adenopathy.   Skin:     General: Skin is warm and dry.      Findings: No erythema or rash.   Neurological:      Mental Status: She is alert and oriented to person, place, and time.      Cranial Nerves: No cranial nerve deficit.      Motor: No abnormal muscle tone.      Coordination: Coordination normal.      Deep Tendon Reflexes: Reflexes are normal and symmetric. Reflexes normal.   Psychiatric:         Behavior: Behavior normal.         Thought Content: Thought content normal.         Judgment: Judgment normal.              ASSESSMENT/PLAN    Problem List Items Addressed This Visit        Other    Health care maintenance     Immunizations: up to date  Eye exam: done 2018  Pap Smear: done 2018, repeat 2021  Mammogram: done 11/2019, due 11/2020  Dexa: due postmenopausal  Colonoscopy: due age 43  Labs: fasting labs ordered    Counseled patient regarding multimodal approach with healthy nutrition, healthy sleep, regular physical activity, social activities, counseling, safety measures  and medications.                  Relevant Orders    CBC & Differential    Comprehensive Metabolic Panel    Lipid Panel    TSH      Other Visit Diagnoses     Bartholin's gland abscess    -  Primary    Relevant Orders    Ambulatory Referral to Obstetrics / Gynecology    History of abnormal mammogram        Relevant Orders    Mammo diagnostic digital tomosynthesis bilateral w CAD               Return in about 1 year (around 9/28/2021) for Annual with fasting labs.

## 2020-09-29 ENCOUNTER — HOSPITAL ENCOUNTER (EMERGENCY)
Facility: HOSPITAL | Age: 40
Discharge: HOME | End: 2020-09-29
Attending: EMERGENCY MEDICINE | Admitting: EMERGENCY MEDICINE

## 2020-09-29 ENCOUNTER — APPOINTMENT (OUTPATIENT)
Dept: CT IMAGING | Facility: HOSPITAL | Age: 40
End: 2020-09-29

## 2020-09-29 VITALS
HEART RATE: 91 BPM | HEIGHT: 59 IN | OXYGEN SATURATION: 95 % | BODY MASS INDEX: 33.26 KG/M2 | SYSTOLIC BLOOD PRESSURE: 125 MMHG | TEMPERATURE: 98.2 F | WEIGHT: 165 LBS | RESPIRATION RATE: 18 BRPM | DIASTOLIC BLOOD PRESSURE: 86 MMHG

## 2020-09-29 DIAGNOSIS — N75.0 BARTHOLIN CYST: Primary | ICD-10-CM

## 2020-09-29 DIAGNOSIS — R10.9 RIGHT-SIDED ABDOMINAL PAIN OF UNKNOWN CAUSE: ICD-10-CM

## 2020-09-29 LAB
ALBUMIN SERPL-MCNC: 3.9 G/DL (ref 3.5–5.2)
ALBUMIN/GLOB SERPL: 1.4 G/DL
ALP SERPL-CCNC: 63 U/L (ref 39–117)
ALT SERPL W P-5'-P-CCNC: 13 U/L (ref 1–33)
ANION GAP SERPL CALCULATED.3IONS-SCNC: 16 MMOL/L (ref 5–15)
AST SERPL-CCNC: 17 U/L (ref 1–32)
BACTERIA UR QL AUTO: ABNORMAL /HPF
BASOPHILS # BLD AUTO: 0.03 10*3/MM3 (ref 0–0.2)
BASOPHILS NFR BLD AUTO: 0.4 % (ref 0–1.5)
BILIRUB SERPL-MCNC: 0.5 MG/DL (ref 0–1.2)
BILIRUB UR QL STRIP: NEGATIVE
BUN SERPL-MCNC: 8 MG/DL (ref 6–20)
BUN/CREAT SERPL: 8.9 (ref 7–25)
CALCIUM SPEC-SCNC: 8.8 MG/DL (ref 8.6–10.5)
CHLORIDE SERPL-SCNC: 101 MMOL/L (ref 98–107)
CLARITY UR: ABNORMAL
CO2 SERPL-SCNC: 23 MMOL/L (ref 22–29)
COLOR UR: YELLOW
CREAT SERPL-MCNC: 0.9 MG/DL (ref 0.57–1)
DEPRECATED RDW RBC AUTO: 48.5 FL (ref 37–54)
EOSINOPHIL # BLD AUTO: 0.07 10*3/MM3 (ref 0–0.4)
EOSINOPHIL NFR BLD AUTO: 0.9 % (ref 0.3–6.2)
ERYTHROCYTE [DISTWIDTH] IN BLOOD BY AUTOMATED COUNT: 15.8 % (ref 12.3–15.4)
GFR SERPL CREATININE-BSD FRML MDRD: 83 ML/MIN/1.73
GLOBULIN UR ELPH-MCNC: 2.8 GM/DL
GLUCOSE SERPL-MCNC: 167 MG/DL (ref 65–99)
GLUCOSE UR STRIP-MCNC: NEGATIVE MG/DL
HCT VFR BLD AUTO: 38.8 % (ref 34–46.6)
HGB BLD-MCNC: 12.3 G/DL (ref 12–15.9)
HGB UR QL STRIP.AUTO: ABNORMAL
HOLD SPECIMEN: NORMAL
HOLD SPECIMEN: NORMAL
HYALINE CASTS UR QL AUTO: ABNORMAL /LPF
IMM GRANULOCYTES # BLD AUTO: 0.01 10*3/MM3 (ref 0–0.05)
IMM GRANULOCYTES NFR BLD AUTO: 0.1 % (ref 0–0.5)
KETONES UR QL STRIP: NEGATIVE
LEUKOCYTE ESTERASE UR QL STRIP.AUTO: NEGATIVE
LIPASE SERPL-CCNC: 41 U/L (ref 13–60)
LYMPHOCYTES # BLD AUTO: 2.62 10*3/MM3 (ref 0.7–3.1)
LYMPHOCYTES NFR BLD AUTO: 32.6 % (ref 19.6–45.3)
MCH RBC QN AUTO: 26.9 PG (ref 26.6–33)
MCHC RBC AUTO-ENTMCNC: 31.7 G/DL (ref 31.5–35.7)
MCV RBC AUTO: 84.9 FL (ref 79–97)
MONOCYTES # BLD AUTO: 0.53 10*3/MM3 (ref 0.1–0.9)
MONOCYTES NFR BLD AUTO: 6.6 % (ref 5–12)
NEUTROPHILS NFR BLD AUTO: 4.77 10*3/MM3 (ref 1.7–7)
NEUTROPHILS NFR BLD AUTO: 59.4 % (ref 42.7–76)
NITRITE UR QL STRIP: NEGATIVE
NRBC BLD AUTO-RTO: 0 /100 WBC (ref 0–0.2)
PH UR STRIP.AUTO: <=5 [PH] (ref 5–8)
PLATELET # BLD AUTO: 264 10*3/MM3 (ref 140–450)
PMV BLD AUTO: 10.1 FL (ref 6–12)
POTASSIUM SERPL-SCNC: 3.2 MMOL/L (ref 3.5–5.2)
PROT SERPL-MCNC: 6.7 G/DL (ref 6–8.5)
PROT UR QL STRIP: NEGATIVE
RBC # BLD AUTO: 4.57 10*6/MM3 (ref 3.77–5.28)
RBC # UR: ABNORMAL /HPF
REF LAB TEST METHOD: ABNORMAL
SODIUM SERPL-SCNC: 140 MMOL/L (ref 136–145)
SP GR UR STRIP: 1.02 (ref 1–1.03)
SQUAMOUS #/AREA URNS HPF: ABNORMAL /HPF
UROBILINOGEN UR QL STRIP: ABNORMAL
WBC # BLD AUTO: 8.03 10*3/MM3 (ref 3.4–10.8)
WBC UR QL AUTO: ABNORMAL /HPF
WHOLE BLOOD HOLD SPECIMEN: NORMAL
WHOLE BLOOD HOLD SPECIMEN: NORMAL

## 2020-09-29 PROCEDURE — 96375 TX/PRO/DX INJ NEW DRUG ADDON: CPT

## 2020-09-29 PROCEDURE — 25010000002 ONDANSETRON PER 1 MG: Performed by: EMERGENCY MEDICINE

## 2020-09-29 PROCEDURE — 36415 COLL VENOUS BLD VENIPUNCTURE: CPT

## 2020-09-29 PROCEDURE — 83690 ASSAY OF LIPASE: CPT

## 2020-09-29 PROCEDURE — 81001 URINALYSIS AUTO W/SCOPE: CPT

## 2020-09-29 PROCEDURE — 99284 EMERGENCY DEPT VISIT MOD MDM: CPT

## 2020-09-29 PROCEDURE — 80053 COMPREHEN METABOLIC PANEL: CPT

## 2020-09-29 PROCEDURE — 96374 THER/PROPH/DIAG INJ IV PUSH: CPT

## 2020-09-29 PROCEDURE — 74176 CT ABD & PELVIS W/O CONTRAST: CPT

## 2020-09-29 PROCEDURE — 25010000002 MORPHINE PER 10 MG: Performed by: EMERGENCY MEDICINE

## 2020-09-29 PROCEDURE — 85025 COMPLETE CBC W/AUTO DIFF WBC: CPT

## 2020-09-29 RX ORDER — POTASSIUM CHLORIDE 750 MG/1
40 CAPSULE, EXTENDED RELEASE ORAL ONCE
Status: COMPLETED | OUTPATIENT
Start: 2020-09-29 | End: 2020-09-29

## 2020-09-29 RX ORDER — ONDANSETRON 2 MG/ML
4 INJECTION INTRAMUSCULAR; INTRAVENOUS ONCE
Status: COMPLETED | OUTPATIENT
Start: 2020-09-29 | End: 2020-09-29

## 2020-09-29 RX ORDER — SODIUM CHLORIDE 0.9 % (FLUSH) 0.9 %
10 SYRINGE (ML) INJECTION AS NEEDED
Status: DISCONTINUED | OUTPATIENT
Start: 2020-09-29 | End: 2020-09-29 | Stop reason: HOSPADM

## 2020-09-29 RX ORDER — MORPHINE SULFATE 2 MG/ML
2 INJECTION, SOLUTION INTRAMUSCULAR; INTRAVENOUS ONCE
Status: COMPLETED | OUTPATIENT
Start: 2020-09-29 | End: 2020-09-29

## 2020-09-29 RX ADMIN — MORPHINE SULFATE 2 MG: 2 INJECTION, SOLUTION INTRAMUSCULAR; INTRAVENOUS at 18:37

## 2020-09-29 RX ADMIN — ONDANSETRON 4 MG: 2 INJECTION INTRAMUSCULAR; INTRAVENOUS at 18:36

## 2020-09-29 RX ADMIN — SODIUM CHLORIDE 1000 ML: 9 INJECTION, SOLUTION INTRAVENOUS at 18:36

## 2020-09-29 RX ADMIN — POTASSIUM CHLORIDE 40 MEQ: 10 CAPSULE, COATED, EXTENDED RELEASE ORAL at 18:46

## 2020-10-08 ENCOUNTER — OFFICE VISIT (OUTPATIENT)
Dept: OBSTETRICS AND GYNECOLOGY | Facility: CLINIC | Age: 40
End: 2020-10-08

## 2020-10-08 VITALS
SYSTOLIC BLOOD PRESSURE: 122 MMHG | DIASTOLIC BLOOD PRESSURE: 70 MMHG | WEIGHT: 166 LBS | BODY MASS INDEX: 33.47 KG/M2 | HEIGHT: 59 IN

## 2020-10-08 DIAGNOSIS — Z01.419 PAP TEST, AS PART OF ROUTINE GYNECOLOGICAL EXAMINATION: Primary | ICD-10-CM

## 2020-10-08 PROCEDURE — 99386 PREV VISIT NEW AGE 40-64: CPT | Performed by: OBSTETRICS & GYNECOLOGY

## 2020-10-08 NOTE — PROGRESS NOTES
GYN Annual Exam     CC - Here for annual exam.        MARA  Bernardo Dodd is a 40 y.o. female, , who presents for annual well woman exam. Patient's last menstrual period was 10/03/2020..  Periods are regular every 25-35 days, lasting 7 days. .  Dysmenorrhea:none.  Patient reports problems with: none.  Partner Status: Marital Status: single.  New Partners since last visit: no.  Desires STD Screening: no.    Patient c/o pelvic pain x2 weeks. States lmp last weekend-c/o heavy bleeding and increased cramping with that period. She is c/o continued brown d/c with some itching. Pt in ER  CT scan [facility] per pt, bartholin gland cyst drained in ER. Pt taking antibiotics currently.     Pt c/o nausea r/t pelvic pain.     Additional OB/GYN History   Current contraception: contraceptive methods: Tubal ligation  Desires to: do not start contraception  Last Pap :   Last Completed Pap Smear       Status Date      PAP SMEAR Done 2018         History of abnormal Pap smear: no  Family history of uterine, colon, breast, or ovarian cancer: no  Performs monthly Self-Breast Exam: no  Last mammogram:   Last Completed Mammogram     Patient has no health maintenance due at this time         Exercises Regularly: no  Feelings of Anxiety or Depression: no  Tobacco Usage?: No   OB History        2    Para   2    Term   2            AB        Living           SAB        TAB        Ectopic        Molar        Multiple        Live Births                    Health Maintenance   Topic Date Due   • Annual Gynecologic Pelvic and Breast Exam  1980   • PAP SMEAR  2021   • ANNUAL PHYSICAL  2021   • TDAP/TD VACCINES (3 - Td) 2028   • HEPATITIS C SCREENING  Completed   • Pneumococcal Vaccine 0-64  Completed   • INFLUENZA VACCINE  Completed       The additional following portions of the patient's history were reviewed and updated as appropriate: allergies, current medications, past family history, past  "medical history, past social history, past surgical history and problem list.    Review of Systems   Constitutional: Negative.    HENT: Negative.    Respiratory: Negative.    Cardiovascular: Negative.    Gastrointestinal: Negative.    Genitourinary: Positive for menstrual problem (menorrhagia) and pelvic pain.   Musculoskeletal: Negative.    Skin: Negative.    Allergic/Immunologic: Negative.    Neurological: Negative.    Hematological: Negative.    Psychiatric/Behavioral: Negative.      All other systems reviewed and are negative.     I have reviewed and agree with the HPI, ROS, and historical information as entered above. Kylee Corona MD    Objective   /70   Ht 149.9 cm (59\")   Wt 75.3 kg (166 lb)   LMP 10/03/2020   Breastfeeding No   BMI 33.53 kg/m²     Physical Exam  Vitals signs and nursing note reviewed. Exam conducted with a chaperone present.   Constitutional:       Appearance: She is well-developed.   HENT:      Head: Normocephalic and atraumatic.   Neck:      Musculoskeletal: Normal range of motion. No muscular tenderness.      Thyroid: No thyroid mass or thyromegaly.   Cardiovascular:      Rate and Rhythm: Normal rate and regular rhythm.      Heart sounds: No murmur.   Pulmonary:      Effort: Pulmonary effort is normal. No retractions.      Breath sounds: Normal breath sounds. No wheezing, rhonchi or rales.   Chest:      Chest wall: No mass or tenderness.      Breasts:         Right: Normal. No mass, nipple discharge, skin change or tenderness.         Left: Normal. No mass, nipple discharge, skin change or tenderness.   Abdominal:      General: Bowel sounds are normal.      Palpations: Abdomen is soft. Abdomen is not rigid. There is no mass.      Tenderness: There is no abdominal tenderness. There is no guarding.      Hernia: No hernia is present. There is no hernia in the left inguinal area.   Genitourinary:     Labia:         Right: No rash, tenderness or lesion.         Left: No rash, " tenderness or lesion.       Vagina: Normal. No vaginal discharge or lesions.      Cervix: No cervical motion tenderness, discharge, lesion or cervical bleeding.      Uterus: Normal. Tender. Not enlarged and not fixed.       Adnexa:         Right: No mass or tenderness.          Left: No mass or tenderness.        Rectum: No external hemorrhoid.      Comments: Uterus is tender to motion.  Neurological:      Mental Status: She is alert and oriented to person, place, and time.   Psychiatric:         Behavior: Behavior normal.            Assessment and Plan  Patient had had a CT scan in the ER this weekend which was negative of her pelvis.  Her pain suggests endometriosis or infection.  She is already on doxycycline from the ER.  She has no fever.  I counseled her about IUD or birth control pills and she is elected Mirena IUD so we can order it.  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  Has had negative co-test 1 month ago.  Problem List Items Addressed This Visit     None      Visit Diagnoses     Pap test, as part of routine gynecological examination    -  Primary    Relevant Orders    Pap IG, Rfx HPV ASCU          1. GYN annual well woman exam.   2. Encouraged use of condoms for STD prevention.  3. Reviewed monthly self breast exams.  Instructed to call with lumps, pain, or breast discharge.    4. Other: Recommend either bur control pills or an IUD.   5. Will order a Mirena for now and she will continue antibiotics    Kylee Corona MD  10/08/2020

## 2020-10-19 ENCOUNTER — OFFICE VISIT (OUTPATIENT)
Dept: INTERNAL MEDICINE | Facility: CLINIC | Age: 40
End: 2020-10-19

## 2020-10-19 DIAGNOSIS — M79.10 MYALGIA: Primary | ICD-10-CM

## 2020-10-19 DIAGNOSIS — J06.9 UPPER RESPIRATORY TRACT INFECTION, UNSPECIFIED TYPE: ICD-10-CM

## 2020-10-19 PROCEDURE — 99213 OFFICE O/P EST LOW 20 MIN: CPT | Performed by: PHYSICIAN ASSISTANT

## 2020-10-19 PROCEDURE — U0003 INFECTIOUS AGENT DETECTION BY NUCLEIC ACID (DNA OR RNA); SEVERE ACUTE RESPIRATORY SYNDROME CORONAVIRUS 2 (SARS-COV-2) (CORONAVIRUS DISEASE [COVID-19]), AMPLIFIED PROBE TECHNIQUE, MAKING USE OF HIGH THROUGHPUT TECHNOLOGIES AS DESCRIBED BY CMS-2020-01-R: HCPCS | Performed by: PHYSICIAN ASSISTANT

## 2020-10-19 NOTE — PROGRESS NOTES
Chief Complaint   Patient presents with   • Generalized Body Aches       Subjective   Bernardo Dodd is a 40 y.o. female.       History of Present Illness     This was an audio and video enabled telemedicine encounter.    Pt has developed itchy eyes and nasal congestion, sneezing and nasal drainage. She has a sore inside her nose, was able to get the pimple to drain and it feels better. Has some body aches, no fever- has checked daily. Some mild cough, her usual asthma cough. 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 Interacts with her family.          Current Outpatient Medications:   •  ADVAIR DISKUS 500-50 MCG/DOSE DISKUS, Inhale 1 puff 2 (Two) Times a Day., Disp: , Rfl:   •  albuterol (PROVENTIL) (2.5 MG/3ML) 0.083% nebulizer solution, Take 2.5 mg by nebulization Every 12 (Twelve) Hours., Disp: 30 vial, Rfl: 1  •  albuterol sulfate  (90 Base) MCG/ACT inhaler, Inhale 2 puffs Every 4 (Four) Hours As Needed for Wheezing or Shortness of Air., Disp: 18 g, Rfl: 2  •  amitriptyline (ELAVIL) 25 MG tablet, Take 2 tablets by mouth Every Night. (Patient taking differently: Take 100 mg by mouth At Night As Needed.), Disp: 60 tablet, Rfl: 3  •  amLODIPine (NORVASC) 5 MG tablet, Take 1 tablet by mouth Daily., Disp: 30 tablet, Rfl: 2  •  azelastine (OPTIVAR) 0.05 % ophthalmic solution, Administer 1 drop to both eyes 2 (Two) Times a Day., Disp: 6 mL, Rfl: 12  •  beclomethasone (QVAR) 80 MCG/ACT inhaler, Inhale 1 puff 2 (Two) Times a Day., Disp: , Rfl:   •  Boric Acid suppository Suppository, Insert 600 mg into the vagina., Disp: , Rfl:   •  Cholecalciferol (VITAMIN D3) 125 MCG (5000 UT) capsule capsule, Take 1 capsule by mouth Daily., Disp: 30 capsule, Rfl: 2  •  clotrimazole (LOTRIMIN) 1 % external solution, Apply 2-3 drops to right ear canal twice daily, Disp: 10 mL, Rfl: 0  •  doxycycline (MONODOX) 100 MG capsule, Take 1 capsule by mouth 2 (Two) Times a Day., Disp: 20 capsule, Rfl: 0  •   ferrous sulfate 325 (65 FE) MG tablet, Take 1 tablet by mouth Daily With Breakfast. Resume Iron when antibiotics finished., Disp: 30 tablet, Rfl: 5  •  fluticasone (Flonase) 50 MCG/ACT nasal spray, 2 sprays into the nostril(s) as directed by provider Daily., Disp: 16 g, Rfl: 2  •  HYDROcodone-acetaminophen (NORCO) 5-325 MG per tablet, Take 1 tablet by mouth Every 6 (Six) Hours As Needed for Severe Pain ., Disp: 6 tablet, Rfl: 0  •  lamoTRIgine (LaMICtal) 100 MG tablet, TAKE ONE TABLET BY MOUTH DAILY, Disp: 30 tablet, Rfl: 5  •  miconazole (MICOTIN) 2 % cream, Apply  topically to the appropriate area as directed 2 (Two) Times a Day., Disp: 14 g, Rfl: 0  •  SPIRIVA RESPIMAT 2.5 MCG/ACT aerosol solution, Take 2 puffs by mouth Daily., Disp: , Rfl:      PMFSH  The following portions of the patient's history were reviewed and updated as appropriate: allergies, current medications, past family history, past medical history, past social history, past surgical history and problem list.    Review of Systems   Constitutional: Positive for fatigue. Negative for activity change and unexpected weight change.   HENT: Positive for congestion, postnasal drip and sore throat. Negative for ear pain.    Eyes: Negative for pain and discharge.   Respiratory: Positive for cough. Negative for chest tightness, shortness of breath and wheezing.    Cardiovascular: Negative for chest pain and palpitations.   Gastrointestinal: Negative for abdominal pain, diarrhea and vomiting.   Endocrine: Negative.    Genitourinary: Negative.    Musculoskeletal: Negative for joint swelling.   Skin: Negative for color change, rash and wound.   Allergic/Immunologic: Negative.    Neurological: Negative for seizures and syncope.   Psychiatric/Behavioral: Negative.        Objective   LMP 10/03/2020     Physical Exam  Constitutional:       Appearance: She is well-developed.   HENT:      Head: Normocephalic and atraumatic.      Right Ear: External ear normal.       Left Ear: External ear normal.      Nose: Nose normal.   Eyes:      Conjunctiva/sclera: Conjunctivae normal.   Neck:      Musculoskeletal: Normal range of motion.   Cardiovascular:      Rate and Rhythm: Normal rate.   Pulmonary:      Effort: Pulmonary effort is normal.   Musculoskeletal: Normal range of motion.   Neurological:      Mental Status: She is alert and oriented to person, place, and time.   Psychiatric:         Behavior: Behavior normal.         Thought Content: Thought content normal.         Judgment: Judgment normal.              ASSESSMENT/PLAN    Problems Addressed this Visit     None      Visit Diagnoses     Myalgia    -  Primary    Pt works with elderly clients. Check for Covid and work excuse until Covid testing returns.    Relevant Orders    COVID-19,[facility] ROUTINE, NP/OP SWAB IN TRANSPORT MEDIA OR ESWAB 72 HR TAT - Swab, Nasopharynx    Upper respiratory tract infection, unspecified type        Continue maintenance allergy meds and symptomatic care. Call if symptoms turn into sinus pressure and pain or bloody/brown drainage.    Relevant Orders    COVID-19,[facility] ROUTINE, NP/OP SWAB IN TRANSPORT MEDIA OR ESWAB 72 HR TAT - Swab, Nasopharynx      Diagnoses       Codes Comments    Myalgia    -  Primary ICD-10-CM: M79.10  ICD-9-CM: 729.1 Pt works with elderly clients. Check for Covid and work excuse until Covid testing returns.    Upper respiratory tract infection, unspecified type     ICD-10-CM: J06.9  ICD-9-CM: 465.9 Continue maintenance allergy meds and symptomatic care. Call if symptoms turn into sinus pressure and pain or bloody/brown drainage.               Return if symptoms worsen or fail to improve.

## 2020-11-04 NOTE — PROGRESS NOTES
Reviewed at office visit. Congestive heart failure, unspecified HF chronicity, unspecified heart failure type

## 2020-11-10 ENCOUNTER — TELEPHONE (OUTPATIENT)
Dept: INTERNAL MEDICINE | Facility: CLINIC | Age: 40
End: 2020-11-10

## 2020-11-10 PROCEDURE — U0003 INFECTIOUS AGENT DETECTION BY NUCLEIC ACID (DNA OR RNA); SEVERE ACUTE RESPIRATORY SYNDROME CORONAVIRUS 2 (SARS-COV-2) (CORONAVIRUS DISEASE [COVID-19]), AMPLIFIED PROBE TECHNIQUE, MAKING USE OF HIGH THROUGHPUT TECHNOLOGIES AS DESCRIBED BY CMS-2020-01-R: HCPCS | Performed by: NURSE PRACTITIONER

## 2020-11-10 NOTE — TELEPHONE ENCOUNTER
Called and spoke with patient and advised that we do not schedule appointments for the uc.  She can walk into any uc and tell them that she is wanting covid testing.   Patient voiced understanding

## 2020-11-10 NOTE — TELEPHONE ENCOUNTER
PATIENT WANTS APPOINTMENT AT URGENT CARE FOR A COVID TEST.  WENT OUT OF TOWN AND WORK WANTS TESTING.     BEST CALL NUMBER [phone]

## 2020-12-08 PROCEDURE — U0004 COV-19 TEST NON-CDC HGH THRU: HCPCS | Performed by: NURSE PRACTITIONER

## 2020-12-14 RX ORDER — AMLODIPINE BESYLATE 5 MG/1
TABLET ORAL
Qty: 90 TABLET | Refills: 2 | Status: SHIPPED | OUTPATIENT
Start: 2020-12-14 | End: 2021-09-17 | Stop reason: SDUPTHER

## 2020-12-22 PROCEDURE — U0004 COV-19 TEST NON-CDC HGH THRU: HCPCS | Performed by: NURSE PRACTITIONER

## 2020-12-27 ENCOUNTER — APPOINTMENT (OUTPATIENT)
Dept: CT IMAGING | Facility: HOSPITAL | Age: 40
End: 2020-12-27

## 2020-12-27 ENCOUNTER — HOSPITAL ENCOUNTER (EMERGENCY)
Facility: HOSPITAL | Age: 40
Discharge: HOME | End: 2020-12-27
Attending: EMERGENCY MEDICINE | Admitting: EMERGENCY MEDICINE

## 2020-12-27 ENCOUNTER — APPOINTMENT (OUTPATIENT)
Dept: GENERAL RADIOLOGY | Facility: HOSPITAL | Age: 40
End: 2020-12-27

## 2020-12-27 VITALS
OXYGEN SATURATION: 96 % | HEIGHT: 59 IN | TEMPERATURE: 98.4 F | RESPIRATION RATE: 18 BRPM | BODY MASS INDEX: 29.84 KG/M2 | SYSTOLIC BLOOD PRESSURE: 129 MMHG | HEART RATE: 95 BPM | DIASTOLIC BLOOD PRESSURE: 82 MMHG | WEIGHT: 148 LBS

## 2020-12-27 DIAGNOSIS — M62.838 TRAPEZIUS MUSCLE SPASM: ICD-10-CM

## 2020-12-27 DIAGNOSIS — J02.0 STREP THROAT: ICD-10-CM

## 2020-12-27 DIAGNOSIS — J45.40 MODERATE PERSISTENT ASTHMA WITHOUT COMPLICATION: ICD-10-CM

## 2020-12-27 DIAGNOSIS — N39.0 URINARY TRACT INFECTION WITHOUT HEMATURIA, SITE UNSPECIFIED: Primary | ICD-10-CM

## 2020-12-27 LAB
ALBUMIN SERPL-MCNC: 3.7 G/DL (ref 3.5–5.2)
ALBUMIN/GLOB SERPL: 1.3 G/DL
ALP SERPL-CCNC: 58 U/L (ref 39–117)
ALT SERPL W P-5'-P-CCNC: 10 U/L (ref 1–33)
ANION GAP SERPL CALCULATED.3IONS-SCNC: 12 MMOL/L (ref 5–15)
AST SERPL-CCNC: 16 U/L (ref 1–32)
B PARAPERT DNA SPEC QL NAA+PROBE: NOT DETECTED
B PERT DNA SPEC QL NAA+PROBE: NOT DETECTED
BACTERIA UR QL AUTO: ABNORMAL /HPF
BASOPHILS # BLD AUTO: 0.03 10*3/MM3 (ref 0–0.2)
BASOPHILS # BLD AUTO: 0.03 10*3/MM3 (ref 0–0.2)
BASOPHILS NFR BLD AUTO: 0.3 % (ref 0–1.5)
BASOPHILS NFR BLD AUTO: 0.4 % (ref 0–1.5)
BILIRUB SERPL-MCNC: 0.3 MG/DL (ref 0–1.2)
BILIRUB UR QL STRIP: NEGATIVE
BUN SERPL-MCNC: 5 MG/DL (ref 6–20)
BUN/CREAT SERPL: 8.3 (ref 7–25)
C PNEUM DNA NPH QL NAA+NON-PROBE: NOT DETECTED
CALCIUM SPEC-SCNC: 8.7 MG/DL (ref 8.6–10.5)
CHLORIDE SERPL-SCNC: 104 MMOL/L (ref 98–107)
CLARITY UR: ABNORMAL
CO2 SERPL-SCNC: 26 MMOL/L (ref 22–29)
COLOR UR: YELLOW
CREAT SERPL-MCNC: 0.6 MG/DL (ref 0.57–1)
DEPRECATED RDW RBC AUTO: 43.6 FL (ref 37–54)
DEPRECATED RDW RBC AUTO: 44 FL (ref 37–54)
EOSINOPHIL # BLD AUTO: 0.05 10*3/MM3 (ref 0–0.4)
EOSINOPHIL # BLD AUTO: 0.08 10*3/MM3 (ref 0–0.4)
EOSINOPHIL NFR BLD AUTO: 0.6 % (ref 0.3–6.2)
EOSINOPHIL NFR BLD AUTO: 0.9 % (ref 0.3–6.2)
ERYTHROCYTE [DISTWIDTH] IN BLOOD BY AUTOMATED COUNT: 13.8 % (ref 12.3–15.4)
ERYTHROCYTE [DISTWIDTH] IN BLOOD BY AUTOMATED COUNT: 14.1 % (ref 12.3–15.4)
FLUAV H1 2009 PAND RNA NPH QL NAA+PROBE: NOT DETECTED
FLUAV H1 HA GENE NPH QL NAA+PROBE: NOT DETECTED
FLUAV H3 RNA NPH QL NAA+PROBE: NOT DETECTED
FLUAV SUBTYP SPEC NAA+PROBE: NOT DETECTED
FLUBV RNA ISLT QL NAA+PROBE: NOT DETECTED
GFR SERPL CREATININE-BSD FRML MDRD: 133 ML/MIN/1.73
GLOBULIN UR ELPH-MCNC: 2.8 GM/DL
GLUCOSE SERPL-MCNC: 126 MG/DL (ref 65–99)
GLUCOSE UR STRIP-MCNC: NEGATIVE MG/DL
HADV DNA SPEC NAA+PROBE: NOT DETECTED
HCOV 229E RNA SPEC QL NAA+PROBE: NOT DETECTED
HCOV HKU1 RNA SPEC QL NAA+PROBE: NOT DETECTED
HCOV NL63 RNA SPEC QL NAA+PROBE: NOT DETECTED
HCOV OC43 RNA SPEC QL NAA+PROBE: NOT DETECTED
HCT VFR BLD AUTO: 39.2 % (ref 34–46.6)
HCT VFR BLD AUTO: 41.6 % (ref 34–46.6)
HGB BLD-MCNC: 12.4 G/DL (ref 12–15.9)
HGB BLD-MCNC: 12.9 G/DL (ref 12–15.9)
HGB UR QL STRIP.AUTO: NEGATIVE
HMPV RNA NPH QL NAA+NON-PROBE: NOT DETECTED
HOLD SPECIMEN: NORMAL
HOLD SPECIMEN: NORMAL
HPIV1 RNA SPEC QL NAA+PROBE: NOT DETECTED
HPIV2 RNA SPEC QL NAA+PROBE: NOT DETECTED
HPIV3 RNA NPH QL NAA+PROBE: NOT DETECTED
HPIV4 P GENE NPH QL NAA+PROBE: NOT DETECTED
HYALINE CASTS UR QL AUTO: ABNORMAL /LPF
IMM GRANULOCYTES # BLD AUTO: 0.01 10*3/MM3 (ref 0–0.05)
IMM GRANULOCYTES # BLD AUTO: 0.02 10*3/MM3 (ref 0–0.05)
IMM GRANULOCYTES NFR BLD AUTO: 0.1 % (ref 0–0.5)
IMM GRANULOCYTES NFR BLD AUTO: 0.2 % (ref 0–0.5)
KETONES UR QL STRIP: NEGATIVE
LEUKOCYTE ESTERASE UR QL STRIP.AUTO: NEGATIVE
LIPASE SERPL-CCNC: 66 U/L (ref 13–60)
LYMPHOCYTES # BLD AUTO: 2.33 10*3/MM3 (ref 0.7–3.1)
LYMPHOCYTES # BLD AUTO: 2.51 10*3/MM3 (ref 0.7–3.1)
LYMPHOCYTES NFR BLD AUTO: 27.4 % (ref 19.6–45.3)
LYMPHOCYTES NFR BLD AUTO: 29 % (ref 19.6–45.3)
M PNEUMO IGG SER IA-ACNC: NOT DETECTED
MCH RBC QN AUTO: 26.5 PG (ref 26.6–33)
MCH RBC QN AUTO: 27.3 PG (ref 26.6–33)
MCHC RBC AUTO-ENTMCNC: 31 G/DL (ref 31.5–35.7)
MCHC RBC AUTO-ENTMCNC: 31.6 G/DL (ref 31.5–35.7)
MCV RBC AUTO: 85.6 FL (ref 79–97)
MCV RBC AUTO: 86.3 FL (ref 79–97)
MONOCYTES # BLD AUTO: 0.5 10*3/MM3 (ref 0.1–0.9)
MONOCYTES # BLD AUTO: 0.55 10*3/MM3 (ref 0.1–0.9)
MONOCYTES NFR BLD AUTO: 5.8 % (ref 5–12)
MONOCYTES NFR BLD AUTO: 6.5 % (ref 5–12)
NEUTROPHILS NFR BLD AUTO: 5.52 10*3/MM3 (ref 1.7–7)
NEUTROPHILS NFR BLD AUTO: 5.52 10*3/MM3 (ref 1.7–7)
NEUTROPHILS NFR BLD AUTO: 63.9 % (ref 42.7–76)
NEUTROPHILS NFR BLD AUTO: 64.9 % (ref 42.7–76)
NITRITE UR QL STRIP: NEGATIVE
NRBC BLD AUTO-RTO: 0 /100 WBC (ref 0–0.2)
NRBC BLD AUTO-RTO: 0 /100 WBC (ref 0–0.2)
NT-PROBNP SERPL-MCNC: 14.3 PG/ML (ref 0–450)
PH UR STRIP.AUTO: 7.5 [PH] (ref 5–8)
PLATELET # BLD AUTO: 275 10*3/MM3 (ref 140–450)
PLATELET # BLD AUTO: 283 10*3/MM3 (ref 140–450)
PMV BLD AUTO: 9.5 FL (ref 6–12)
PMV BLD AUTO: 9.9 FL (ref 6–12)
POTASSIUM SERPL-SCNC: 3.7 MMOL/L (ref 3.5–5.2)
PROT SERPL-MCNC: 6.5 G/DL (ref 6–8.5)
PROT UR QL STRIP: NEGATIVE
QT INTERVAL: 388 MS
QT INTERVAL: 434 MS
QTC INTERVAL: 461 MS
QTC INTERVAL: 481 MS
RBC # BLD AUTO: 4.54 10*6/MM3 (ref 3.77–5.28)
RBC # BLD AUTO: 4.86 10*6/MM3 (ref 3.77–5.28)
RBC # UR: ABNORMAL /HPF
REF LAB TEST METHOD: ABNORMAL
RHINOVIRUS RNA SPEC NAA+PROBE: NOT DETECTED
RSV RNA NPH QL NAA+NON-PROBE: NOT DETECTED
SARS-COV-2 RNA NPH QL NAA+NON-PROBE: NOT DETECTED
SODIUM SERPL-SCNC: 142 MMOL/L (ref 136–145)
SP GR UR STRIP: 1.02 (ref 1–1.03)
SQUAMOUS #/AREA URNS HPF: ABNORMAL /HPF
TROPONIN T SERPL-MCNC: <0.01 NG/ML (ref 0–0.03)
TROPONIN T SERPL-MCNC: <0.01 NG/ML (ref 0–0.03)
UROBILINOGEN UR QL STRIP: ABNORMAL
WBC # BLD AUTO: 8.5 10*3/MM3 (ref 3.4–10.8)
WBC # BLD AUTO: 8.65 10*3/MM3 (ref 3.4–10.8)
WBC UR QL AUTO: ABNORMAL /HPF
WHOLE BLOOD HOLD SPECIMEN: NORMAL
WHOLE BLOOD HOLD SPECIMEN: NORMAL

## 2020-12-27 PROCEDURE — 94640 AIRWAY INHALATION TREATMENT: CPT

## 2020-12-27 PROCEDURE — 0202U NFCT DS 22 TRGT SARS-COV-2: CPT | Performed by: PHYSICIAN ASSISTANT

## 2020-12-27 PROCEDURE — 85025 COMPLETE CBC W/AUTO DIFF WBC: CPT | Performed by: EMERGENCY MEDICINE

## 2020-12-27 PROCEDURE — 81001 URINALYSIS AUTO W/SCOPE: CPT | Performed by: EMERGENCY MEDICINE

## 2020-12-27 PROCEDURE — 71275 CT ANGIOGRAPHY CHEST: CPT

## 2020-12-27 PROCEDURE — 83880 ASSAY OF NATRIURETIC PEPTIDE: CPT | Performed by: EMERGENCY MEDICINE

## 2020-12-27 PROCEDURE — 63710000001 PREDNISONE PER 1 MG: Performed by: PHYSICIAN ASSISTANT

## 2020-12-27 PROCEDURE — 80053 COMPREHEN METABOLIC PANEL: CPT | Performed by: EMERGENCY MEDICINE

## 2020-12-27 PROCEDURE — 99284 EMERGENCY DEPT VISIT MOD MDM: CPT

## 2020-12-27 PROCEDURE — 85025 COMPLETE CBC W/AUTO DIFF WBC: CPT | Performed by: PHYSICIAN ASSISTANT

## 2020-12-27 PROCEDURE — 71045 X-RAY EXAM CHEST 1 VIEW: CPT

## 2020-12-27 PROCEDURE — 87086 URINE CULTURE/COLONY COUNT: CPT | Performed by: PHYSICIAN ASSISTANT

## 2020-12-27 PROCEDURE — 93005 ELECTROCARDIOGRAM TRACING: CPT | Performed by: EMERGENCY MEDICINE

## 2020-12-27 PROCEDURE — 84484 ASSAY OF TROPONIN QUANT: CPT | Performed by: EMERGENCY MEDICINE

## 2020-12-27 PROCEDURE — 0 IOPAMIDOL PER 1 ML: Performed by: EMERGENCY MEDICINE

## 2020-12-27 PROCEDURE — 83690 ASSAY OF LIPASE: CPT | Performed by: EMERGENCY MEDICINE

## 2020-12-27 RX ORDER — LIDOCAINE 50 MG/G
1 PATCH TOPICAL EVERY 24 HOURS
Qty: 30 PATCH | Refills: 0 | Status: SHIPPED | OUTPATIENT
Start: 2020-12-27 | End: 2021-05-03

## 2020-12-27 RX ORDER — SODIUM CHLORIDE 0.9 % (FLUSH) 0.9 %
10 SYRINGE (ML) INJECTION AS NEEDED
Status: DISCONTINUED | OUTPATIENT
Start: 2020-12-27 | End: 2020-12-27 | Stop reason: HOSPADM

## 2020-12-27 RX ORDER — PREDNISONE 20 MG/1
60 TABLET ORAL ONCE
Status: COMPLETED | OUTPATIENT
Start: 2020-12-27 | End: 2020-12-27

## 2020-12-27 RX ORDER — CEFDINIR 300 MG/1
300 CAPSULE ORAL DAILY
Qty: 14 CAPSULE | Refills: 0 | OUTPATIENT
Start: 2020-12-27 | End: 2021-01-23

## 2020-12-27 RX ORDER — CYCLOBENZAPRINE HCL 10 MG
10 TABLET ORAL ONCE
Status: COMPLETED | OUTPATIENT
Start: 2020-12-27 | End: 2020-12-27

## 2020-12-27 RX ORDER — CYCLOBENZAPRINE HCL 10 MG
10 TABLET ORAL 3 TIMES DAILY PRN
Qty: 20 TABLET | Refills: 0 | Status: SHIPPED | OUTPATIENT
Start: 2020-12-27 | End: 2020-12-29

## 2020-12-27 RX ORDER — PREDNISONE 20 MG/1
20 TABLET ORAL 2 TIMES DAILY
Qty: 10 TABLET | Refills: 0 | OUTPATIENT
Start: 2020-12-27 | End: 2021-03-14

## 2020-12-27 RX ORDER — HYDROCODONE BITARTRATE AND ACETAMINOPHEN 5; 325 MG/1; MG/1
1 TABLET ORAL ONCE
Status: COMPLETED | OUTPATIENT
Start: 2020-12-27 | End: 2020-12-27

## 2020-12-27 RX ORDER — ALBUTEROL SULFATE 90 UG/1
2 AEROSOL, METERED RESPIRATORY (INHALATION)
Status: DISCONTINUED | OUTPATIENT
Start: 2020-12-27 | End: 2020-12-27 | Stop reason: HOSPADM

## 2020-12-27 RX ADMIN — HYDROCODONE BITARTRATE AND ACETAMINOPHEN 1 TABLET: 5; 325 TABLET ORAL at 14:22

## 2020-12-27 RX ADMIN — CYCLOBENZAPRINE HYDROCHLORIDE 10 MG: 10 TABLET, FILM COATED ORAL at 16:59

## 2020-12-27 RX ADMIN — PREDNISONE 60 MG: 20 TABLET ORAL at 14:22

## 2020-12-27 RX ADMIN — ALBUTEROL SULFATE 2 PUFF: 90 AEROSOL, METERED RESPIRATORY (INHALATION) at 20:57

## 2020-12-27 RX ADMIN — IOPAMIDOL 75 ML: 755 INJECTION, SOLUTION INTRAVENOUS at 18:16

## 2020-12-28 ENCOUNTER — TELEPHONE (OUTPATIENT)
Dept: INTERNAL MEDICINE | Facility: CLINIC | Age: 40
End: 2020-12-28

## 2020-12-28 NOTE — TELEPHONE ENCOUNTER
PATIENT STATES THAT SHE WENT TO [facility] URGENT [facility]REATMENT ON 12/22/2020 AND 12/27/2020. THE PATIENT HAD COVID TEST DONE FOR SO SHE CAN RETURN TO WORK PATIENT NEEDS THE RESULTS OF THE COVID TESTS. PLEASE CALL PATIENT TO ADVISE.    CALL [phone]

## 2020-12-29 ENCOUNTER — PATIENT OUTREACH (OUTPATIENT)
Dept: CASE MANAGEMENT | Facility: OTHER | Age: 40
End: 2020-12-29

## 2020-12-29 ENCOUNTER — TELEMEDICINE (OUTPATIENT)
Dept: GASTROENTEROLOGY | Facility: CLINIC | Age: 40
End: 2020-12-29

## 2020-12-29 DIAGNOSIS — K21.9 GASTROESOPHAGEAL REFLUX DISEASE WITHOUT ESOPHAGITIS: Primary | ICD-10-CM

## 2020-12-29 DIAGNOSIS — M54.30 SCIATICA, UNSPECIFIED LATERALITY: ICD-10-CM

## 2020-12-29 DIAGNOSIS — R10.9 ABDOMINAL PAIN, UNSPECIFIED ABDOMINAL LOCATION: ICD-10-CM

## 2020-12-29 LAB — BACTERIA SPEC AEROBE CULT: NORMAL

## 2020-12-29 PROCEDURE — 99214 OFFICE O/P EST MOD 30 MIN: CPT | Performed by: NURSE PRACTITIONER

## 2020-12-29 RX ORDER — BACLOFEN 5 MG/1
5 TABLET ORAL 3 TIMES DAILY PRN
Qty: 90 TABLET | Refills: 1 | Status: SHIPPED | OUTPATIENT
Start: 2020-12-29 | End: 2021-05-03

## 2020-12-29 RX ORDER — OMEPRAZOLE 20 MG/1
20 TABLET, DELAYED RELEASE ORAL DAILY
Qty: 90 TABLET | Refills: 1 | OUTPATIENT
Start: 2020-12-29 | End: 2021-01-23

## 2020-12-29 NOTE — OUTREACH NOTE
"Patient Outreach Note    Pt contacted regarding ED visit 12/27/2020 with chief c/o chest pain.  States she is \"a little sore, but otherwise better. I saw GI today and she is putting me on baclofen and omeprazole.  I am at work right now, so I have not picked them up.\"  States she still has scratchy throat and only has 3 more doses of Cefdinir left.  Wanted to know if she needed more.  Since active on [employer], encouraged to send [employer] message to her PCP, Amina Sotelo for her recommendation, which she says she will do. She states she is compliant with her medicines.  She lives alone at present, as her son is living with her mother right now b/c Covid, however they are in frequent communication.  She denies any transportation issues, she drives and states has reliable car.  Also, denies any food insecurities.  States she is good right now and if ever needs anything, her mother would help and also is familiar with [employer] and other community resources if needed.  Her bp was elevated at ED, which she was aware.  She does not have bp monitor to check it at home.  Encouraged to contact her [employer] to see if they could provide her a bp monitor.  Role of  explained and contact number given.  [facility] Nurse line explained and contact number provided. She denies any needs at time of call and did voice her appreciation for the call.       Roseanne Patricia RN  Ambulatory     12/29/2020, 16:21 EST      "

## 2020-12-29 NOTE — PROGRESS NOTES
Follow Up      Patient Name: Bernardo Dodd  : 1980   MRN: [id number]     Chief Complaint:    Chief Complaint   Patient presents with   • Heartburn       History of Present Illness: Bernardo Dodd is a 40 y.o. female who is here today for follow up of GERD.  Bernardo has been without PPI for greater than 6 months and has had return of symptoms.  She is having sternal burning and regurgitation.  Having symptoms about 5 days out of the week.  Sometimes lasts all day and other times only temporarily.  It is occasionally associated with nausea and hypersalivation.  She has successfully lost over 40 pounds.  She does use tobacco but only 1 cigarette socially on occasion.  There are times when her reflux worsens her asthma.  She does report control of symptoms when she was on omeprazole.  She has had EGD which showed gastritis.  She does have normal bowel movements daily.  She does take Flexeril for pain.    Subjective      Review of Systems:   Review of Systems   Constitutional: Negative for activity change, appetite change, diaphoresis, fatigue and unexpected weight loss.   HENT: Negative for dental problem, mouth sores, sore throat, trouble swallowing and voice change.    Respiratory: Positive for cough and shortness of breath. Negative for choking and wheezing.    Cardiovascular: Negative for chest pain.   Gastrointestinal: Positive for nausea, vomiting, GERD and indigestion. Negative for abdominal distention, abdominal pain, anal bleeding, blood in stool, constipation and diarrhea.   Musculoskeletal: Positive for back pain.   Skin: Negative for color change, rash and bruise.   Neurological: Negative for syncope and light-headedness.   Hematological: Negative for adenopathy. Does not bruise/bleed easily.           Medications:     Current Outpatient Medications:   •  ADVAIR DISKUS 500-50 MCG/DOSE DISKUS, Inhale 1 puff 2 (Two) Times a Day., Disp: , Rfl:   •  albuterol (PROVENTIL) (2.5 MG/3ML) 0.083%  nebulizer solution, Take 2.5 mg by nebulization Every 12 (Twelve) Hours., Disp: 30 vial, Rfl: 1  •  albuterol sulfate  (90 Base) MCG/ACT inhaler, Inhale 2 puffs Every 4 (Four) Hours As Needed for Wheezing or Shortness of Air., Disp: 18 g, Rfl: 2  •  amitriptyline (ELAVIL) 25 MG tablet, Take 2 tablets by mouth Every Night. (Patient taking differently: Take 100 mg by mouth At Night As Needed.), Disp: 60 tablet, Rfl: 3  •  amLODIPine (NORVASC) 5 MG tablet, TAKE ONE TABLET BY MOUTH DAILY, Disp: 90 tablet, Rfl: 2  •  azelastine (OPTIVAR) 0.05 % ophthalmic solution, Administer 1 drop to both eyes 2 (Two) Times a Day., Disp: 6 mL, Rfl: 12  •  Baclofen 5 MG tablet, Take 5 mg by mouth 3 (Three) Times a Day As Needed (back pain)., Disp: 90 tablet, Rfl: 1  •  beclomethasone (QVAR) 80 MCG/ACT inhaler, Inhale 1 puff 2 (Two) Times a Day., Disp: , Rfl:   •  Boric Acid suppository Suppository, Insert 600 mg into the vagina., Disp: , Rfl:   •  cefdinir (OMNICEF) 300 MG capsule, Take 1 capsule by mouth Daily., Disp: 14 capsule, Rfl: 0  •  Cholecalciferol (VITAMIN D3) 125 MCG (5000 UT) capsule capsule, Take 1 capsule by mouth Daily., Disp: 30 capsule, Rfl: 2  •  clotrimazole (LOTRIMIN) 1 % external solution, Apply 2-3 drops to right ear canal twice daily, Disp: 10 mL, Rfl: 0  •  ferrous sulfate 325 (65 FE) MG tablet, Take 1 tablet by mouth Daily With Breakfast. Resume Iron when antibiotics finished., Disp: 30 tablet, Rfl: 5  •  fluticasone (Flonase) 50 MCG/ACT nasal spray, 2 sprays into the nostril(s) as directed by provider Daily., Disp: 16 g, Rfl: 2  •  lamoTRIgine (LaMICtal) 100 MG tablet, TAKE ONE TABLET BY MOUTH DAILY, Disp: 30 tablet, Rfl: 5  •  lidocaine (Lidoderm) 5 %, Place 1 patch on the skin as directed by provider Daily. Remove & Discard patch within 12 hours or as directed by MD, Disp: 30 patch, Rfl: 0  •  miconazole (MICOTIN) 2 % cream, Apply  topically to the appropriate area as directed 2 (Two) Times a Day.,  Disp: 14 g, Rfl: 0  •  neomycin-polymyxin-hydrocortisone (CORTISPORIN) 3.5-17472-9 otic solution, Administer 3 drops into both ears 4 (Four) Times a Day for 7 days., Disp: 10 mL, Rfl: 0  •  omeprazole OTC (PrilOSEC OTC) 20 MG EC tablet, Take 1 tablet by mouth Daily., Disp: 90 tablet, Rfl: 1  •  predniSONE (DELTASONE) 20 MG tablet, Take 1 tablet by mouth 2 (Two) Times a Day., Disp: 10 tablet, Rfl: 0  •  SPIRIVA RESPIMAT 2.5 MCG/ACT aerosol solution, Take 2 puffs by mouth Daily., Disp: , Rfl:     Allergies:   Allergies   Allergen Reactions   • Naproxen Swelling   • Sulfa Antibiotics Rash       Gastro Testing:   Labs:   Labs reviewed from 12/27/2020  Radiology:   CT abd reviewed from 9/29/2020, UGI reviewed from 10/7/2016  Procedures:   EGD reviewed from 1/16/2019     Objective     Physical Exam:  Vital Signs: There were no vitals filed for this visit.  There is no height or weight on file to calculate BMI.     Physical Exam  Constitutional:       General: She is not in acute distress.     Appearance: She is well-developed.   Eyes:      General: No scleral icterus.  Pulmonary:      Effort: Pulmonary effort is normal. No accessory muscle usage or respiratory distress.   Skin:     Coloration: Skin is not pale.      Findings: No erythema.   Neurological:      Mental Status: She is alert and oriented to person, place, and time.   Psychiatric:         Speech: Speech normal.         Behavior: Behavior normal.         Thought Content: Thought content normal.         Judgment: Judgment normal.         Assessment / Plan      Assessment/Plan:   Diagnoses and all orders for this visit:    1. Gastroesophageal reflux disease without esophagitis (Primary)  -     omeprazole OTC (PrilOSEC OTC) 20 MG EC tablet; Take 1 tablet by mouth Daily.  Dispense: 90 tablet; Refill: 1  Resume omeprazole once daily. I advised the patient of the risks in continuing to use tobacco and recommended complete cessation.  Continue to work on weight loss.   Discussed foods that may trigger or worsen her reflux.  Recommend elevating the head of bed.  Taking medication 30 to 60 minutes before eating.  Recommend avoiding lying down for 60 minutes after eating.  As she already takes Flexeril for her back pain, consider trial of baclofen as this may also help reduce LES relaxation.  May return to Flexeril if this does not help control her symptoms with back pain.    2. Abdominal pain, unspecified abdominal location  -     omeprazole OTC (PrilOSEC OTC) 20 MG EC tablet; Take 1 tablet by mouth Daily.  Dispense: 90 tablet; Refill: 1    3. Sciatica, unspecified laterality  -     Baclofen 5 MG tablet; Take 5 mg by mouth 3 (Three) Times a Day As Needed (back pain).  Dispense: 90 tablet; Refill: 1         Follow Up:   Return in about 6 months (around 6/29/2021), or if symptoms worsen or fail to improve. Consider weaning PPI at that time if able    Plan of care reviewed with the patient at the conclusion of today's visit.  Education was provided regarding diagnosis, management, and any prescribed or recommended OTC medications.  Patient verbalized understanding of and agreement with management plan.     SHANE Womack  [facility]     Please note that portions of this note may have been completed with a voice recognition program. Efforts were made to edit the dictations, but occasionally words are mistranscribed.

## 2020-12-29 NOTE — PATIENT INSTRUCTIONS
Gastroesophageal Reflux Disease, Adult  Gastroesophageal reflux (MARCIE) happens when acid from the stomach flows up into the tube that connects the mouth and the stomach (esophagus). Normally, food travels down the esophagus and stays in the stomach to be digested. With MARCIE, food and stomach acid sometimes move back up into the esophagus. You may have a disease called gastroesophageal reflux disease (GERD) if the reflux:  · Happens often.  · Causes frequent or very bad symptoms.  · Causes problems such as damage to the esophagus.  When this happens, the esophagus becomes sore and swollen (inflamed). Over time, GERD can make small holes (ulcers) in the lining of the esophagus.  What are the causes?  This condition is caused by a problem with the muscle between the esophagus and the stomach. When this muscle is weak or not normal, it does not close properly to keep food and acid from coming back up from the stomach. The muscle can be weak because of:  · Tobacco use.  · Pregnancy.  · Having a certain type of hernia (hiatal hernia).  · Alcohol use.  · Certain foods and drinks, such as coffee, chocolate, onions, and peppermint.  What increases the risk?  You are more likely to develop this condition if you:  · Are overweight.  · Have a disease that affects your connective tissue.  · Use NSAID medicines.  What are the signs or symptoms?  Symptoms of this condition include:  · Heartburn.  · Difficult or painful swallowing.  · The feeling of having a lump in the throat.  · A bitter taste in the mouth.  · Bad breath.  · Having a lot of saliva.  · Having an upset or bloated stomach.  · Belching.  · Chest pain. Different conditions can cause chest pain. Make sure you see your doctor if you have chest pain.  · Shortness of breath or noisy breathing (wheezing).  · Ongoing (chronic) cough or a cough at night.  · Wearing away of the surface of teeth (tooth enamel).  · Weight loss.  How is this treated?  Treatment will depend on how  bad your symptoms are. Your doctor may suggest:  · Changes to your diet.  · Medicine.  · Surgery.  Follow these instructions at home:  Eating and drinking    · Follow a diet as told by your doctor. You may need to avoid foods and drinks such as:  ? Coffee and tea (with or without caffeine).  ? Drinks that contain alcohol.  ? Energy drinks and sports drinks.  ? Bubbly (carbonated) drinks or sodas.  ? Chocolate and cocoa.  ? Peppermint and mint flavorings.  ? Garlic and onions.  ? Horseradish.  ? Spicy and acidic foods. These include peppers, chili powder, hernandez powder, vinegar, hot sauces, and BBQ sauce.  ? Citrus fruit juices and citrus fruits, such as oranges, loco, and limes.  ? Tomato-based foods. These include red sauce, chili, salsa, and pizza with red sauce.  ? Fried and fatty foods. These include donuts, french fries, potato chips, and high-fat dressings.  ? High-fat meats. These include hot dogs, rib eye steak, sausage, ham, and gan.  ? High-fat dairy items, such as whole milk, butter, and cream cheese.  · Eat small meals often. Avoid eating large meals.  · Avoid drinking large amounts of liquid with your meals.  · Avoid eating meals during the 2-3 hours before bedtime.  · Avoid lying down right after you eat.  · Do not exercise right after you eat.  Lifestyle    · Do not use any products that contain nicotine or tobacco. These include cigarettes, e-cigarettes, and chewing tobacco. If you need help quitting, ask your doctor.  · Try to lower your stress. If you need help doing this, ask your doctor.  · If you are overweight, lose an amount of weight that is healthy for you. Ask your doctor about a safe weight loss goal.  General instructions  · Pay attention to any changes in your symptoms.  · Take over-the-counter and prescription medicines only as told by your doctor. Do not take aspirin, ibuprofen, or other NSAIDs unless your doctor says it is okay.  · Wear loose clothes. Do not wear anything tight  around your waist.  · Raise (elevate) the head of your bed about 6 inches (15 cm).  · Avoid bending over if this makes your symptoms worse.  · Keep all follow-up visits as told by your doctor. This is important.  Contact a doctor if:  · You have new symptoms.  · You lose weight and you do not know why.  · You have trouble swallowing or it hurts to swallow.  · You have wheezing or a cough that keeps happening.  · Your symptoms do not get better with treatment.  · You have a hoarse voice.  Get help right away if:  · You have pain in your arms, neck, jaw, teeth, or back.  · You feel sweaty, dizzy, or light-headed.  · You have chest pain or shortness of breath.  · You throw up (vomit) and your throw-up looks like blood or coffee grounds.  · You pass out (faint).  · Your poop (stool) is bloody or black.  · You cannot swallow, drink, or eat.  Summary  · If a person has gastroesophageal reflux disease (GERD), food and stomach acid move back up into the esophagus and cause symptoms or problems such as damage to the esophagus.  · Treatment will depend on how bad your symptoms are.  · Follow a diet as told by your doctor.  · Take all medicines only as told by your doctor.  This information is not intended to replace advice given to you by your health care provider. Make sure you discuss any questions you have with your health care provider.  Document Revised: 06/26/2019 Document Reviewed: 06/26/2019  [employer] Patient Education © 2020 [employer]

## 2021-01-05 ENCOUNTER — TELEMEDICINE (OUTPATIENT)
Dept: INTERNAL MEDICINE | Facility: CLINIC | Age: 41
End: 2021-01-05

## 2021-01-05 DIAGNOSIS — J30.9 ALLERGIC RHINITIS, UNSPECIFIED SEASONALITY, UNSPECIFIED TRIGGER: ICD-10-CM

## 2021-01-05 DIAGNOSIS — F41.9 ANXIETY: Primary | ICD-10-CM

## 2021-01-05 PROCEDURE — 99213 OFFICE O/P EST LOW 20 MIN: CPT | Performed by: PHYSICIAN ASSISTANT
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
## 2021-01-09 NOTE — ASSESSMENT & PLAN NOTE
Pt is back to her baseline of allergy symptoms. Continue current treatments. Gave her work note to allow her to return to work.

## 2021-01-23 ENCOUNTER — HOSPITAL ENCOUNTER (EMERGENCY)
Facility: HOSPITAL | Age: 41
Discharge: HOME | End: 2021-01-23
Attending: EMERGENCY MEDICINE | Admitting: EMERGENCY MEDICINE

## 2021-01-23 VITALS
TEMPERATURE: 96.9 F | RESPIRATION RATE: 18 BRPM | DIASTOLIC BLOOD PRESSURE: 91 MMHG | SYSTOLIC BLOOD PRESSURE: 139 MMHG | HEART RATE: 100 BPM | OXYGEN SATURATION: 98 % | BODY MASS INDEX: 29.23 KG/M2 | WEIGHT: 145 LBS | HEIGHT: 59 IN

## 2021-01-23 DIAGNOSIS — Z13.9 ENCOUNTER FOR MEDICAL SCREENING EXAMINATION: ICD-10-CM

## 2021-01-23 DIAGNOSIS — Z86.59 HISTORY OF ATTENTION DEFICIT DISORDER: ICD-10-CM

## 2021-01-23 DIAGNOSIS — F15.10 METHAMPHETAMINE USE (HCC): ICD-10-CM

## 2021-01-23 DIAGNOSIS — Z86.79 HISTORY OF HYPERTENSION: Primary | ICD-10-CM

## 2021-01-23 DIAGNOSIS — Z87.19 HISTORY OF GASTROESOPHAGEAL REFLUX (GERD): ICD-10-CM

## 2021-01-23 DIAGNOSIS — Z87.09 HISTORY OF ASTHMA: ICD-10-CM

## 2021-01-23 LAB
AMPHET+METHAMPHET UR QL: NEGATIVE
AMPHETAMINES UR QL: POSITIVE
BARBITURATES UR QL SCN: NEGATIVE
BENZODIAZ UR QL SCN: NEGATIVE
BUPRENORPHINE SERPL-MCNC: NEGATIVE NG/ML
CANNABINOIDS SERPL QL: NEGATIVE
COCAINE UR QL: NEGATIVE
METHADONE UR QL SCN: NEGATIVE
OPIATES UR QL: NEGATIVE
OXYCODONE UR QL SCN: NEGATIVE
PCP UR QL SCN: NEGATIVE
PROPOXYPH UR QL: NEGATIVE
TRICYCLICS UR QL SCN: NEGATIVE

## 2021-01-23 PROCEDURE — 80306 DRUG TEST PRSMV INSTRMNT: CPT | Performed by: EMERGENCY MEDICINE

## 2021-01-23 PROCEDURE — 99283 EMERGENCY DEPT VISIT LOW MDM: CPT

## 2021-01-23 PROCEDURE — 63710000001 ONDANSETRON ODT 4 MG TABLET DISPERSIBLE: Performed by: PHYSICIAN ASSISTANT

## 2021-01-23 RX ORDER — ONDANSETRON 4 MG/1
4 TABLET, ORALLY DISINTEGRATING ORAL ONCE
Status: COMPLETED | OUTPATIENT
Start: 2021-01-23 | End: 2021-01-23

## 2021-01-23 RX ADMIN — ONDANSETRON 4 MG: 4 TABLET, ORALLY DISINTEGRATING ORAL at 23:01

## 2021-01-25 ENCOUNTER — PATIENT OUTREACH (OUTPATIENT)
Dept: CASE MANAGEMENT | Facility: OTHER | Age: 41
End: 2021-01-25

## 2021-01-25 NOTE — OUTREACH NOTE
Care Coordination Assessment    Documented/Reviewed By: Roseanne Patricia RN Date/time: 1/25/2021 12:30 PM   Assessment completed with: patient  Enrolled in care management program: No  Living arrangement: alone  Support system: friends, parent  Type of residence: private residence (Comment: project based housing )  Home care services: No  Equipment used at home: oxygen/respiratory treatment  Communication device: No  Other issues:  (Comment: none)  Bed or wheelchair confined: No  Inadequate nutrition: No  Medication adherence problem: No  Experiencing side effects from current medications: No  History of fall(s) in last 6 months: No  Difficulty keeping appointments: No  Family aware of the patient's advance care planning wishes: Yes  Bahai or spiritual beliefs that impact treatment: No  Chronic pain: No
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
## 2021-03-04 RX ORDER — LAMOTRIGINE 100 MG/1
TABLET ORAL
Qty: 30 TABLET | Refills: 4 | Status: SHIPPED | OUTPATIENT
Start: 2021-03-04 | End: 2021-06-24 | Stop reason: SDUPTHER

## 2021-03-04 NOTE — TELEPHONE ENCOUNTER
Last Office Visit: 1/5/21  Next Office Visit:9/30/21    Labs completed in past 6 months? yes  Labs completed in past year? yes    Last Refill Date:6/22/20  Quantity:30  Refills:5    Pharmacy:

## 2021-03-13 ENCOUNTER — APPOINTMENT (OUTPATIENT)
Dept: CT IMAGING | Facility: HOSPITAL | Age: 41
End: 2021-03-13

## 2021-03-13 ENCOUNTER — HOSPITAL ENCOUNTER (EMERGENCY)
Facility: HOSPITAL | Age: 41
Discharge: HOME | End: 2021-03-14
Attending: EMERGENCY MEDICINE | Admitting: EMERGENCY MEDICINE

## 2021-03-13 DIAGNOSIS — N39.0 ACUTE UTI: Primary | ICD-10-CM

## 2021-03-13 DIAGNOSIS — R30.0 DYSURIA: ICD-10-CM

## 2021-03-13 DIAGNOSIS — R11.0 NAUSEA: ICD-10-CM

## 2021-03-13 DIAGNOSIS — Z87.09 HISTORY OF ASTHMA: ICD-10-CM

## 2021-03-13 DIAGNOSIS — R53.81 MALAISE: ICD-10-CM

## 2021-03-13 DIAGNOSIS — Z86.79 HISTORY OF HYPERTENSION: ICD-10-CM

## 2021-03-13 DIAGNOSIS — N23 RENAL COLIC ON LEFT SIDE: ICD-10-CM

## 2021-03-13 LAB
ALBUMIN SERPL-MCNC: 4.1 G/DL (ref 3.5–5.2)
ALBUMIN/GLOB SERPL: 1.5 G/DL
ALP SERPL-CCNC: 61 U/L (ref 39–117)
ALT SERPL W P-5'-P-CCNC: 9 U/L (ref 1–33)
ANION GAP SERPL CALCULATED.3IONS-SCNC: 9 MMOL/L (ref 5–15)
AST SERPL-CCNC: 16 U/L (ref 1–32)
B-HCG UR QL: NEGATIVE
BACTERIA UR QL AUTO: ABNORMAL /HPF
BASOPHILS # BLD AUTO: 0.04 10*3/MM3 (ref 0–0.2)
BASOPHILS NFR BLD AUTO: 0.5 % (ref 0–1.5)
BILIRUB SERPL-MCNC: 0.2 MG/DL (ref 0–1.2)
BILIRUB UR QL STRIP: NEGATIVE
BUN SERPL-MCNC: 10 MG/DL (ref 6–20)
BUN/CREAT SERPL: 15.4 (ref 7–25)
CALCIUM SPEC-SCNC: 9 MG/DL (ref 8.6–10.5)
CHLORIDE SERPL-SCNC: 101 MMOL/L (ref 98–107)
CLARITY UR: CLEAR
CO2 SERPL-SCNC: 27 MMOL/L (ref 22–29)
COLOR UR: YELLOW
CREAT SERPL-MCNC: 0.65 MG/DL (ref 0.57–1)
DEPRECATED RDW RBC AUTO: 46 FL (ref 37–54)
EOSINOPHIL # BLD AUTO: 0.06 10*3/MM3 (ref 0–0.4)
EOSINOPHIL NFR BLD AUTO: 0.7 % (ref 0.3–6.2)
ERYTHROCYTE [DISTWIDTH] IN BLOOD BY AUTOMATED COUNT: 14.5 % (ref 12.3–15.4)
GFR SERPL CREATININE-BSD FRML MDRD: 121 ML/MIN/1.73
GLOBULIN UR ELPH-MCNC: 2.8 GM/DL
GLUCOSE SERPL-MCNC: 84 MG/DL (ref 65–99)
GLUCOSE UR STRIP-MCNC: NEGATIVE MG/DL
HCT VFR BLD AUTO: 39 % (ref 34–46.6)
HGB BLD-MCNC: 12.6 G/DL (ref 12–15.9)
HGB UR QL STRIP.AUTO: ABNORMAL
HOLD SPECIMEN: NORMAL
IMM GRANULOCYTES # BLD AUTO: 0.01 10*3/MM3 (ref 0–0.05)
IMM GRANULOCYTES NFR BLD AUTO: 0.1 % (ref 0–0.5)
INTERNAL NEGATIVE CONTROL: NEGATIVE
INTERNAL POSITIVE CONTROL: POSITIVE
KETONES UR QL STRIP: NEGATIVE
LEUKOCYTE ESTERASE UR QL STRIP.AUTO: ABNORMAL
LIPASE SERPL-CCNC: 92 U/L (ref 13–60)
LYMPHOCYTES # BLD AUTO: 3.37 10*3/MM3 (ref 0.7–3.1)
LYMPHOCYTES NFR BLD AUTO: 40.5 % (ref 19.6–45.3)
Lab: NORMAL
MCH RBC QN AUTO: 27.9 PG (ref 26.6–33)
MCHC RBC AUTO-ENTMCNC: 32.3 G/DL (ref 31.5–35.7)
MCV RBC AUTO: 86.3 FL (ref 79–97)
MONOCYTES # BLD AUTO: 0.55 10*3/MM3 (ref 0.1–0.9)
MONOCYTES NFR BLD AUTO: 6.6 % (ref 5–12)
NEUTROPHILS NFR BLD AUTO: 4.3 10*3/MM3 (ref 1.7–7)
NEUTROPHILS NFR BLD AUTO: 51.6 % (ref 42.7–76)
NITRITE UR QL STRIP: POSITIVE
NRBC BLD AUTO-RTO: 0 /100 WBC (ref 0–0.2)
PH UR STRIP.AUTO: 6.5 [PH] (ref 5–8)
PLATELET # BLD AUTO: 303 10*3/MM3 (ref 140–450)
PMV BLD AUTO: 9.5 FL (ref 6–12)
POTASSIUM SERPL-SCNC: 3.9 MMOL/L (ref 3.5–5.2)
PROT SERPL-MCNC: 6.9 G/DL (ref 6–8.5)
PROT UR QL STRIP: NEGATIVE
RBC # BLD AUTO: 4.52 10*6/MM3 (ref 3.77–5.28)
RBC # UR: ABNORMAL /HPF
REF LAB TEST METHOD: ABNORMAL
SODIUM SERPL-SCNC: 137 MMOL/L (ref 136–145)
SP GR UR STRIP: 1.02 (ref 1–1.03)
SQUAMOUS #/AREA URNS HPF: ABNORMAL /HPF
UROBILINOGEN UR QL STRIP: ABNORMAL
WBC # BLD AUTO: 8.33 10*3/MM3 (ref 3.4–10.8)
WBC UR QL AUTO: ABNORMAL /HPF
WHOLE BLOOD HOLD SPECIMEN: NORMAL
WHOLE BLOOD HOLD SPECIMEN: NORMAL

## 2021-03-13 PROCEDURE — 25010000002 CEFTRIAXONE PER 250 MG: Performed by: PHYSICIAN ASSISTANT

## 2021-03-13 PROCEDURE — 87186 SC STD MICRODIL/AGAR DIL: CPT | Performed by: PHYSICIAN ASSISTANT

## 2021-03-13 PROCEDURE — 87636 SARSCOV2 & INF A&B AMP PRB: CPT | Performed by: PHYSICIAN ASSISTANT

## 2021-03-13 PROCEDURE — 99284 EMERGENCY DEPT VISIT MOD MDM: CPT

## 2021-03-13 PROCEDURE — 96375 TX/PRO/DX INJ NEW DRUG ADDON: CPT

## 2021-03-13 PROCEDURE — 96365 THER/PROPH/DIAG IV INF INIT: CPT

## 2021-03-13 PROCEDURE — 87077 CULTURE AEROBIC IDENTIFY: CPT | Performed by: PHYSICIAN ASSISTANT

## 2021-03-13 PROCEDURE — 85025 COMPLETE CBC W/AUTO DIFF WBC: CPT | Performed by: EMERGENCY MEDICINE

## 2021-03-13 PROCEDURE — 83690 ASSAY OF LIPASE: CPT | Performed by: EMERGENCY MEDICINE

## 2021-03-13 PROCEDURE — 80053 COMPREHEN METABOLIC PANEL: CPT | Performed by: EMERGENCY MEDICINE

## 2021-03-13 PROCEDURE — 81001 URINALYSIS AUTO W/SCOPE: CPT | Performed by: EMERGENCY MEDICINE

## 2021-03-13 PROCEDURE — 25010000002 IOPAMIDOL 61 % SOLUTION: Performed by: EMERGENCY MEDICINE

## 2021-03-13 PROCEDURE — 25010000002 ONDANSETRON PER 1 MG: Performed by: PHYSICIAN ASSISTANT

## 2021-03-13 PROCEDURE — 74177 CT ABD & PELVIS W/CONTRAST: CPT

## 2021-03-13 PROCEDURE — 87086 URINE CULTURE/COLONY COUNT: CPT | Performed by: PHYSICIAN ASSISTANT

## 2021-03-13 PROCEDURE — 81025 URINE PREGNANCY TEST: CPT | Performed by: EMERGENCY MEDICINE

## 2021-03-13 RX ORDER — ACETAMINOPHEN 500 MG
1000 TABLET ORAL ONCE
Status: COMPLETED | OUTPATIENT
Start: 2021-03-13 | End: 2021-03-14

## 2021-03-13 RX ORDER — ONDANSETRON 2 MG/ML
4 INJECTION INTRAMUSCULAR; INTRAVENOUS ONCE
Status: COMPLETED | OUTPATIENT
Start: 2021-03-13 | End: 2021-03-13

## 2021-03-13 RX ORDER — SODIUM CHLORIDE 9 MG/ML
10 INJECTION INTRAVENOUS AS NEEDED
Status: DISCONTINUED | OUTPATIENT
Start: 2021-03-13 | End: 2021-03-14 | Stop reason: HOSPADM

## 2021-03-13 RX ADMIN — IOPAMIDOL 85 ML: 612 INJECTION, SOLUTION INTRAVENOUS at 22:05

## 2021-03-13 RX ADMIN — SODIUM CHLORIDE 1 G: 900 INJECTION INTRAVENOUS at 23:55

## 2021-03-13 RX ADMIN — ONDANSETRON 4 MG: 2 INJECTION INTRAMUSCULAR; INTRAVENOUS at 21:41

## 2021-03-13 RX ADMIN — SODIUM CHLORIDE 1000 ML: 9 INJECTION, SOLUTION INTRAVENOUS at 21:41

## 2021-03-14 VITALS
BODY MASS INDEX: 30.24 KG/M2 | SYSTOLIC BLOOD PRESSURE: 138 MMHG | TEMPERATURE: 98.7 F | RESPIRATION RATE: 18 BRPM | WEIGHT: 150 LBS | HEART RATE: 79 BPM | DIASTOLIC BLOOD PRESSURE: 90 MMHG | OXYGEN SATURATION: 98 % | HEIGHT: 59 IN

## 2021-03-14 LAB
FLUAV RNA RESP QL NAA+PROBE: NOT DETECTED
FLUBV RNA RESP QL NAA+PROBE: NOT DETECTED
SARS-COV-2 RNA RESP QL NAA+PROBE: NOT DETECTED

## 2021-03-14 RX ORDER — CEFDINIR 300 MG/1
300 CAPSULE ORAL 2 TIMES DAILY
Qty: 14 CAPSULE | Refills: 0 | Status: SHIPPED | OUTPATIENT
Start: 2021-03-14 | End: 2021-05-03

## 2021-03-14 RX ORDER — PHENAZOPYRIDINE HYDROCHLORIDE 200 MG/1
200 TABLET, FILM COATED ORAL 3 TIMES DAILY PRN
Qty: 6 TABLET | Refills: 0 | Status: SHIPPED | OUTPATIENT
Start: 2021-03-14 | End: 2021-07-23

## 2021-03-14 RX ADMIN — ACETAMINOPHEN 1000 MG: 500 TABLET ORAL at 00:11

## 2021-03-14 NOTE — ED PROVIDER NOTES
Subjective   This is a 40-year-old female that presents to the ER with left flank pain intermittent for the last 5 days.  Patient has history of kidney stones and says that current pain feels similar, but she is also having dysuria and nausea.  Patient has history of UTI and kidney infections, as well.  She denies any hematuria.  She reports nausea but denies any vomiting.  She reports fatigue and chills as well as some body aches.  Previous abdominal surgeries include bilateral tubal ligation.  Last menstrual period was 2/20/2021.  Patient denies any irregular vaginal bleeding or vaginal discharge.  Patient is a private caregiver.  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  Patient has past medical history significant for GERD, anemia, ovarian cyst, asthma, depression, seizures, and osteoarthritis.  She denies any URI symptoms or cough, congestion.  She denies any chest pain or shortness of breath.  She denies any loss of taste or smell or known Covid exposures.      History provided by:  Patient  Flank Pain  Pain location:  L flank  Pain quality: sharp    Pain radiates to:  LLQ  Duration:  5 days  Timing:  Intermittent  Progression:  Waxing and waning  Chronicity:  New  Context: previous surgery (History of bilateral tubal ligation)    Context: not alcohol use    Context comment:  Patient reports 5-day history of intermittent left renal colic.  History of kidney stones and urinary tract infections.  Patient also reports dysuria, fatigue, and chills.  Relieved by:  Nothing  Worsened by:  Nothing  Ineffective treatments:  None tried  Associated symptoms: anorexia, chills, dysuria, fatigue and nausea    Associated symptoms: no chest pain, no constipation, no cough, no diarrhea, no fever, no hematuria, no shortness of breath, no sore throat, no vaginal bleeding, no vaginal discharge and no vomiting        Review of Systems   Constitutional: Positive for  appetite change, chills and fatigue. Negative for diaphoresis and fever.   HENT: Negative.  Negative for congestion, rhinorrhea, sinus pressure, sinus pain and sore throat.         No loss of taste or smell   Respiratory: Negative.  Negative for cough, chest tightness and shortness of breath.    Cardiovascular: Negative.  Negative for chest pain, palpitations and leg swelling.   Gastrointestinal: Positive for abdominal pain (Left flank pain radiates to left lower abdomen), anorexia and nausea. Negative for constipation, diarrhea and vomiting.   Genitourinary: Positive for dysuria and flank pain. Negative for hematuria, urgency, vaginal bleeding and vaginal discharge.   Musculoskeletal: Positive for back pain.   Neurological: Negative.    All other systems reviewed and are negative.      Past Medical History:   Diagnosis Date   • Allergic rhinitis    • Anemia    • Arthritis    • Asthma    • Bartholin gland cyst    • Chlamydia    • Depression    • FHx: migraine headaches    • GERD (gastroesophageal reflux disease)    • Heart murmur    • Herpes simplex    • History of chest x-ray 11/01/2015    no active disease   • History of echocardiogram 11/01/2015    ejection fraction of greater than 65%, mitral and pulmonic regurgitation an physiological tricuspid regurgitation.   • History of PFTs 12/22/2015    spirometry data acceptable and reproducible; pt given 4 puffs of Ventolin; pt gave good effort; no obstruction; no Bd response; MVV reduced    • History of PFTs 11/02/2015    pt gave best effort; duoneb given prior and post study; moderate nonspecific proportional reduction of FEV1 and FVC with preserved ratio; FEV1 moderately reduced; cannot rule out restriction   • Hypertension    • Migraine    • Ovarian cyst    • Pelvic pain    • Screening breast examination     self;admits   • Seizures (CMS/HCC)    • STD (female)    • Thigh shingles    • Trichomonas infection        Allergies   Allergen Reactions   • Naproxen Swelling    • Sulfa Antibiotics Rash       Past Surgical History:   Procedure Laterality Date   • BILATERAL BREAST REDUCTION     • BREAST BIOPSY     • BREAST SURGERY      breast reduction   •  SECTION     • CYSTOSCOPY     • DIAGNOSTIC LAPAROSCOPY     • INCISION AND DRAINAGE ABSCESS      bartholin's   • LAPAROSCOPIC TUBAL LIGATION     • ORIF ANKLE FRACTURE Right    • REDUCTION MAMMAPLASTY Bilateral    • TENSION FREE VAGINAL TAPING WITH MINI ARC SLING      Dr Doyle avery        Family History   Problem Relation Age of Onset   • Pancreatic cancer Maternal Grandmother    • Heart failure Paternal Grandmother    • MARCIE disease Paternal Aunt    • Arthritis Mother    • Cancer Mother    • Arthritis Father    • Pancreatic cancer Other    • Diabetes Other    • Heart disease Other    • Hypertension Other    • Other Other         RESPIRATORY DISEASE   • Heart attack Neg Hx    • Hyperlipidemia Neg Hx    • Mental illness Neg Hx    • Obesity Neg Hx    • Stroke Neg Hx        Social History     Socioeconomic History   • Marital status: Single     Spouse name: Not on file   • Number of children: Not on file   • Years of education: Not on file   • Highest education level: Not on file   Tobacco Use   • Smoking status: Former Smoker     Packs/day: 1.00     Years: 15.00     Pack years: 15.00     Types: Cigarettes     Quit date: 2015     Years since quittin.1   • Smokeless tobacco: Never Used   Substance and Sexual Activity   • Alcohol use: No     Comment: socially   • Drug use: Yes     Types: Marijuana     Comment: Once a month    • Sexual activity: Yes     Birth control/protection: Surgical           Objective   Physical Exam  Vitals and nursing note reviewed.   Constitutional:       Appearance: Normal appearance.      Comments: Patient resting comfortably.  No acute sign of pain or distress.   HENT:      Head: Normocephalic and atraumatic.      Right Ear: Tympanic membrane normal.      Left Ear: Tympanic membrane normal.       Nose: Nose normal.      Mouth/Throat:      Mouth: Mucous membranes are moist.      Pharynx: Oropharynx is clear.   Eyes:      Extraocular Movements: Extraocular movements intact.      Conjunctiva/sclera: Conjunctivae normal.      Pupils: Pupils are equal, round, and reactive to light.   Cardiovascular:      Rate and Rhythm: Normal rate and regular rhythm.      Pulses: Normal pulses.      Heart sounds: Normal heart sounds.      Comments: Regular rate and rhythm.  No ectopy.  No pedal edema to lower extremities.  Pulmonary:      Effort: Pulmonary effort is normal.      Breath sounds: Normal breath sounds.      Comments: Lungs are clear to auscultation bilaterally  Abdominal:      General: Bowel sounds are normal. There is no distension.      Palpations: Abdomen is soft.      Tenderness: There is abdominal tenderness. There is no right CVA tenderness, left CVA tenderness, guarding or rebound.      Comments: Active bowel sounds.  Soft without distention.  Mild tenderness to left flank and left lower quadrant.  No CVA tenderness.  Abdominal exam is benign and nonsurgical.   Musculoskeletal:         General: Normal range of motion.      Cervical back: Normal range of motion and neck supple.   Skin:     General: Skin is warm and dry.   Neurological:      General: No focal deficit present.      Mental Status: She is alert.         Procedures           ED Course  ED Course as of Mar 14 0111   Sat Mar 13, 2021   2259 CBC and chemistries were completely normal.  Lipase was mildly elevated at 92.  Urine hCG is negative and awaiting urinalysis results.  CT of the abdomen and pelvis with contrast reveals no acute abnormality within the abdomen or pelvis.  There was no evidence of kidney stone or ureteral stone and no evidence of pyelonephritis.  No CT evidence of acute pancreatitis.  No evidence of bowel obstruction or active inflammation.  Enlarged fibroid uterus.  Patient resting comfortably and receiving IV fluid bolus.     [FC]   2314 Urinalysis reveals moderate leukocytes, positive nitrite.  Awaiting microscopic exam.    [FC]   Sun Mar 14, 2021   0042 Urine culture from March, 2020 revealed growth of E. coli which had resistance to penicillin, Levaquin, Bactrim, and tetracycline.  There was sensitivity to all cephalosporins.    [FC]   0044 COVID-19 and influenza testing were negative.  Patient is ready for discharge to home.    [FC]      ED Course User Index  [FC] Seda Somers, SIMRAN      Recent Results (from the past 24 hour(s))   Comprehensive Metabolic Panel    Collection Time: 03/13/21  7:51 PM    Specimen: Blood   Result Value Ref Range    Glucose 84 65 - 99 mg/dL    BUN 10 6 - 20 mg/dL    Creatinine 0.65 0.57 - 1.00 mg/dL    Sodium 137 136 - 145 mmol/L    Potassium 3.9 3.5 - 5.2 mmol/L    Chloride 101 98 - 107 mmol/L    CO2 27.0 22.0 - 29.0 mmol/L    Calcium 9.0 8.6 - 10.5 mg/dL    Total Protein 6.9 6.0 - 8.5 g/dL    Albumin 4.10 3.50 - 5.20 g/dL    ALT (SGPT) 9 1 - 33 U/L    AST (SGOT) 16 1 - 32 U/L    Alkaline Phosphatase 61 39 - 117 U/L    Total Bilirubin 0.2 0.0 - 1.2 mg/dL    eGFR  African Amer 121 >60 mL/min/1.73    Globulin 2.8 gm/dL    A/G Ratio 1.5 g/dL    BUN/Creatinine Ratio 15.4 7.0 - 25.0    Anion Gap 9.0 5.0 - 15.0 mmol/L   Lipase    Collection Time: 03/13/21  7:51 PM    Specimen: Blood   Result Value Ref Range    Lipase 92 (H) 13 - 60 U/L   Light Blue Top    Collection Time: 03/13/21  7:51 PM   Result Value Ref Range    Extra Tube hold for add-on    Green Top (Gel)    Collection Time: 03/13/21  7:51 PM   Result Value Ref Range    Extra Tube Hold for add-ons.    Lavender Top    Collection Time: 03/13/21  7:51 PM   Result Value Ref Range    Extra Tube hold for add-on    Gold Top - SST    Collection Time: 03/13/21  7:51 PM   Result Value Ref Range    Extra Tube Hold for add-ons.    Gray Top - Ice    Collection Time: 03/13/21  7:51 PM   Result Value Ref Range    Extra Tube Hold for add-ons.    CBC Auto  Differential    Collection Time: 03/13/21  7:51 PM    Specimen: Blood   Result Value Ref Range    WBC 8.33 3.40 - 10.80 10*3/mm3    RBC 4.52 3.77 - 5.28 10*6/mm3    Hemoglobin 12.6 12.0 - 15.9 g/dL    Hematocrit 39.0 34.0 - 46.6 %    MCV 86.3 79.0 - 97.0 fL    MCH 27.9 26.6 - 33.0 pg    MCHC 32.3 31.5 - 35.7 g/dL    RDW 14.5 12.3 - 15.4 %    RDW-SD 46.0 37.0 - 54.0 fl    MPV 9.5 6.0 - 12.0 fL    Platelets 303 140 - 450 10*3/mm3    Neutrophil % 51.6 42.7 - 76.0 %    Lymphocyte % 40.5 19.6 - 45.3 %    Monocyte % 6.6 5.0 - 12.0 %    Eosinophil % 0.7 0.3 - 6.2 %    Basophil % 0.5 0.0 - 1.5 %    Immature Grans % 0.1 0.0 - 0.5 %    Neutrophils, Absolute 4.30 1.70 - 7.00 10*3/mm3    Lymphocytes, Absolute 3.37 (H) 0.70 - 3.10 10*3/mm3    Monocytes, Absolute 0.55 0.10 - 0.90 10*3/mm3    Eosinophils, Absolute 0.06 0.00 - 0.40 10*3/mm3    Basophils, Absolute 0.04 0.00 - 0.20 10*3/mm3    Immature Grans, Absolute 0.01 0.00 - 0.05 10*3/mm3    nRBC 0.0 0.0 - 0.2 /100 WBC   COVID-19 and FLU A/B PCR - Swab, Nasopharynx    Collection Time: 03/13/21  9:52 PM    Specimen: Nasopharynx; Swab   Result Value Ref Range    COVID19 Not Detected Not Detected - Ref. Range    Influenza A PCR Not Detected Not Detected    Influenza B PCR Not Detected Not Detected   Urinalysis With Microscopic If Indicated (No Culture) - Urine, Clean Catch    Collection Time: 03/13/21  9:59 PM    Specimen: Urine, Clean Catch   Result Value Ref Range    Color, UA Yellow Yellow, Straw    Appearance, UA Clear Clear    pH, UA 6.5 5.0 - 8.0    Specific Gravity, UA 1.020 1.005 - 1.030    Glucose, UA Negative Negative    Ketones, UA Negative Negative    Bilirubin, UA Negative Negative    Blood, UA Trace (A) Negative    Protein, UA Negative Negative    Leuk Esterase, UA Moderate (2+) (A) Negative    Nitrite, UA Positive (A) Negative    Urobilinogen, UA 0.2 E.U./dL 0.2 - 1.0 E.U./dL   Urinalysis, Microscopic Only - Urine, Clean Catch    Collection Time: 03/13/21  9:59 PM     Specimen: Urine, Clean Catch   Result Value Ref Range    RBC, UA 3-6 (A) None Seen, 0-2 /HPF    WBC, UA 21-30 (A) None Seen, 0-2 /HPF    Bacteria, UA 2+ (A) None Seen, Trace /HPF    Squamous Epithelial Cells, UA 3-6 (A) None Seen, 0-2 /HPF    Methodology Automated Microscopy    POC Pregnancy, Urine    Collection Time: 03/13/21 10:03 PM    Specimen: Urine   Result Value Ref Range    HCG, Urine, QL Negative Negative    Lot Number [id number]     Internal Positive Control Positive     Internal Negative Control Negative      Note: In addition to lab results from this visit, the labs listed above may include labs taken at another facility or during a different encounter within the last 24 hours. Please correlate lab times with ED admission and discharge times for further clarification of the services performed during this visit.    CT Abdomen Pelvis With Contrast   Final Result   1.  No acute abnormality within the abdomen or pelvis.   2.  No CT evidence of acute pancreatitis. No gallbladder distention. No bile duct or pancreatic duct dilatation.   3.  Negative kidneys. No evidence of obstruction or active inflammation.   4.  Enlarged fibroid uterus.      Signer Name: Sumanth Nelson MD    Signed: 3/13/2021 10:30 PM    Workstation Name: [id number]     [facility]        Vitals:    03/13/21 2315 03/13/21 2330 03/13/21 2345 03/14/21 0000   BP:  133/87  (!) 141/118   BP Location:       Patient Position:       Pulse: 78 78 77 84   Resp:       Temp:       TempSrc:       SpO2: 96% 100% 96% (!) 83%   Weight:       Height:         Medications   Sodium Chloride (PF) 0.9 % 10 mL (has no administration in time range)   sodium chloride 0.9 % bolus 1,000 mL (0 mL Intravenous Stopped 3/14/21 0011)   ondansetron (ZOFRAN) injection 4 mg (4 mg Intravenous Given 3/13/21 2141)   iopamidol (ISOVUE-300) 61 % injection 100 mL (85 mL Intravenous Given 3/13/21 2205)   cefTRIAXone (ROCEPHIN) 1 g/100 mL 0.9% NS (MBP)  (0 g Intravenous Stopped 3/14/21 0011)   acetaminophen (TYLENOL) tablet 1,000 mg (1,000 mg Oral Given 3/14/21 0011)     ECG/EMG Results (last 24 hours)     ** No results found for the last 24 hours. **        No orders to display                                            MDM    Final diagnoses:   Acute UTI   Renal colic on left side   Nausea   Malaise   Dysuria   History of hypertension   History of asthma            Seda Somers PA-C  03/14/21 0111

## 2021-03-14 NOTE — DISCHARGE INSTRUCTIONS
ER evaluation reveals acute urinary tract infection.  Urine culture is in process.  CT scan of the abdomen and pelvis with contrast revealed no evidence of kidney stones or pyelonephritis/infection.  Patient needs to increase water intake and avoid caffeine.  Patient given IV Rocephin and Rx for Omnicef and Pyridium on discharge.  Covid-19 and Influenza testing are negative. Recommend close PCP follow-up for recheck and follow-up on urine culture results.  Return if any worsening symptoms.

## 2021-03-15 ENCOUNTER — PATIENT OUTREACH (OUTPATIENT)
Dept: CASE MANAGEMENT | Facility: OTHER | Age: 41
End: 2021-03-15

## 2021-03-15 NOTE — OUTREACH NOTE
"  The main concerns and/or symptoms the patient would like to address are: Pt contacted regarding ED visit 3/13/21 with chief c/o flank pain and to assess for any case management needs.  Pt states \"I'm still a little sore, but I am back at work.\"  She has not picked up antibiotic yet.  States she is in [place] at work and will  after work.  Knows to complete course.  Reviewed AVS and is trying to drink plenty of water.  She usually lives by herself, however her daughter, bf, 2 dogs and a cat have been living with her and the pets have caused asthma to flare up at times, but doing OK at present.  Using her inhalers.  States they will be moving out shortly.  States she did miss appt for her asthma b/c of weather, but will call to schedule.  Also states she will call and schedule appt with PCP, (probably as soon as we are off the phone).  She drives herself and denies any transportation issues.  She also denies any food insecurities at present, however does report she is trying to apply for food stamps.  She is also aware of [employer] if needed.  She does not have living will, however states both her parents know her wishes.  She is active on "[employer].  Reminded of [facility] and role of Case Management.  She voiced her appreciation for the call.      Follow Up Outreach Due:  As needed     Roseanne Patricia RN  Ambulatory     3/15/2021, 12:32 EDT    "

## 2021-03-15 NOTE — OUTREACH NOTE
Care Coordination Assessment    Documented/Reviewed By: Roseanne Patricia RN Date/time: 3/15/2021 12:32 PM   Assessment completed with: patient  Enrolled in care management program: No  Living arrangement: alone  Support system: friends, parent  Type of residence: private residence (Comment: project based housing )  Home care services: No  Equipment used at home: oxygen/respiratory treatment  Communication device: No  Other issues:  (Comment: none)  Bed or wheelchair confined: No  Inadequate nutrition: No  Medication adherence problem: No  Experiencing side effects from current medications: No  History of fall(s) in last 6 months: No  Difficulty keeping appointments: No  Family aware of the patient's advance care planning wishes: Yes  Mormonism or spiritual beliefs that impact treatment: No  Chronic pain: No

## 2021-03-16 LAB — BACTERIA SPEC AEROBE CULT: ABNORMAL

## 2021-04-03 PROCEDURE — U0004 COV-19 TEST NON-CDC HGH THRU: HCPCS | Performed by: NURSE PRACTITIONER

## 2021-05-03 ENCOUNTER — HOSPITAL ENCOUNTER (EMERGENCY)
Facility: HOSPITAL | Age: 41
Discharge: HOME | End: 2021-05-03
Attending: EMERGENCY MEDICINE | Admitting: EMERGENCY MEDICINE

## 2021-05-03 VITALS
TEMPERATURE: 97.8 F | DIASTOLIC BLOOD PRESSURE: 82 MMHG | HEART RATE: 90 BPM | WEIGHT: 143 LBS | BODY MASS INDEX: 28.83 KG/M2 | HEIGHT: 59 IN | OXYGEN SATURATION: 99 % | SYSTOLIC BLOOD PRESSURE: 126 MMHG | RESPIRATION RATE: 18 BRPM

## 2021-05-03 DIAGNOSIS — G56.12 LEFT MEDIAN NERVE NEUROPATHY: Primary | ICD-10-CM

## 2021-05-03 PROCEDURE — 99283 EMERGENCY DEPT VISIT LOW MDM: CPT

## 2021-05-03 PROCEDURE — C9803 HOPD COVID-19 SPEC COLLECT: HCPCS

## 2021-05-03 PROCEDURE — 87636 SARSCOV2 & INF A&B AMP PRB: CPT | Performed by: PHYSICIAN ASSISTANT

## 2021-05-03 RX ORDER — LIDOCAINE 50 MG/G
1 PATCH TOPICAL EVERY 24 HOURS
Qty: 30 PATCH | Refills: 0 | Status: SHIPPED | OUTPATIENT
Start: 2021-05-03 | End: 2021-11-22 | Stop reason: SDUPTHER

## 2021-05-05 ENCOUNTER — CLINICAL SUPPORT (OUTPATIENT)
Dept: INTERNAL MEDICINE | Facility: CLINIC | Age: 41
End: 2021-05-05

## 2021-05-05 ENCOUNTER — PATIENT OUTREACH (OUTPATIENT)
Dept: CASE MANAGEMENT | Facility: OTHER | Age: 41
End: 2021-05-05

## 2021-05-05 DIAGNOSIS — Z11.1 SCREENING-PULMONARY TB: Primary | ICD-10-CM

## 2021-05-05 LAB
INDURATION: NORMAL
Lab: NORMAL
TB SKIN TEST: NORMAL

## 2021-05-05 PROCEDURE — 86580 TB INTRADERMAL TEST: CPT | Performed by: PHYSICIAN ASSISTANT

## 2021-05-05 NOTE — OUTREACH NOTE
"Patient Outreach Note    See previous outreach note    Contacted pt regarding ED visit 5/3/21 with chief c/o listed as hand pain and to assess for any case management needs.  States they did give her brace to wear, which she is doing.  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  She has not contacted the orthopedic office yet,but states she will.  She also reports that she is now having allergies at present, but states daughter, bf and the dogs have moved out and now will have a roommate.  They have purchased an air filter.  Discussed calling her PCP or [facility] prior to going to ED that may save her a trip to ED. V/u.  She reports that she is having trouble with her [employer] emerson.  She does not have pen and paper available, but offered to leave help desk number on her voicemail, which she states that would be great.  Asked about her food stamps applications, which she is having problem.  Offered number for food stamps application, which she wanted.  Will leave this number on her voicemail also.  RN-[facility] number, [employer] help desk, Food stamps application number and [facility] numbers all left on her voicemail. Also, reminded her to call Dr. Brown, orthopedic for appt regarding her hand.  Encouraged to call for any future needs.      Roseanne Patricia RN  Ambulatory     5/5/2021, 11:54 EDT      "

## 2021-05-10 ENCOUNTER — CLINICAL SUPPORT (OUTPATIENT)
Dept: INTERNAL MEDICINE | Facility: CLINIC | Age: 41
End: 2021-05-10

## 2021-05-10 DIAGNOSIS — Z11.1 SCREENING-PULMONARY TB: Primary | ICD-10-CM

## 2021-05-10 LAB
INDURATION: 0 MM (ref 0–10)
Lab: NORMAL
TB SKIN TEST: NEGATIVE

## 2021-05-10 PROCEDURE — 86580 TB INTRADERMAL TEST: CPT | Performed by: PHYSICIAN ASSISTANT

## 2021-05-11 ENCOUNTER — OFFICE VISIT (OUTPATIENT)
Dept: INTERNAL MEDICINE | Facility: CLINIC | Age: 41
End: 2021-05-11

## 2021-05-11 VITALS
WEIGHT: 143.2 LBS | DIASTOLIC BLOOD PRESSURE: 80 MMHG | HEIGHT: 59 IN | OXYGEN SATURATION: 95 % | RESPIRATION RATE: 16 BRPM | HEART RATE: 107 BPM | SYSTOLIC BLOOD PRESSURE: 130 MMHG | BODY MASS INDEX: 28.87 KG/M2 | TEMPERATURE: 97.3 F

## 2021-05-11 DIAGNOSIS — G56.12 LEFT MEDIAN NERVE NEUROPATHY: Primary | ICD-10-CM

## 2021-05-11 PROCEDURE — 99213 OFFICE O/P EST LOW 20 MIN: CPT | Performed by: NURSE PRACTITIONER

## 2021-05-11 NOTE — PROGRESS NOTES
Chief Complaint   Patient presents with   • Arm Pain     LEFT ARM AND HAND PAIN,       History of Present Illness    40 y.o.female presents for left arm pain hand pain.  Has been having Left arm pain 2 weeks thinks r/t pulling on pts; tingling numb sensation started in low neck goes down left outer arm and forearm  Hand gets inflammed red feels like fire shooting out fingertips. Has some arm/hand weakness. tx at ER 5-3; same symptoms. Started on lidoderm patch and elavil. Not helping much.    Review of Systems   Constitutional: Positive for fatigue. Negative for diaphoresis and fever.   Respiratory: Negative for shortness of breath.    Cardiovascular: Negative for chest pain and palpitations.   Gastrointestinal: Negative for abdominal pain, nausea and vomiting.   Genitourinary: Negative for urinary incontinence.   Musculoskeletal: Positive for back pain and neck pain.   Neurological: Positive for weakness and numbness. Negative for dizziness, syncope, light-headedness and confusion.       PMSFH  The following portions of the patient's history were reviewed and updated as appropriate: allergies, current medications, past family history, past medical history, past social history, past surgical history and problem list.     Past Medical History:   Diagnosis Date   • Allergic rhinitis    • Anemia    • Arthritis    • Asthma    • Bartholin gland cyst    • Chlamydia    • Depression    • FHx: migraine headaches    • GERD (gastroesophageal reflux disease)    • Heart murmur    • Herpes simplex    • History of chest x-ray 11/01/2015    no active disease   • History of echocardiogram 11/01/2015    ejection fraction of greater than 65%, mitral and pulmonic regurgitation an physiological tricuspid regurgitation.   • History of PFTs 12/22/2015    spirometry data acceptable and reproducible; pt given 4 puffs of Ventolin; pt gave good effort; no obstruction; no Bd response; MVV reduced    • History of PFTs 11/02/2015    pt gave  best effort; duoneb given prior and post study; moderate nonspecific proportional reduction of FEV1 and FVC with preserved ratio; FEV1 moderately reduced; cannot rule out restriction   • Hypertension    • Migraine    • Ovarian cyst    • Pelvic pain    • Screening breast examination     self;admits   • Seizures (CMS/HCC)    • STD (female)    • Thigh shingles    • Trichomonas infection       Allergies   Allergen Reactions   • Naproxen Swelling   • Sulfa Antibiotics Rash      Social History     Tobacco Use   • Smoking status: Former Smoker     Packs/day: 1.00     Years: 15.00     Pack years: 15.00     Types: Cigarettes     Quit date: 2015     Years since quittin.3   • Smokeless tobacco: Never Used   Vaping Use   • Vaping Use: Some days   • Devices: Disposable   • Passive vaping exposure Yes   Substance Use Topics   • Alcohol use: No     Comment: socially   • Drug use: Yes     Types: Marijuana     Comment: Once a month          Current Outpatient Medications:   •  albuterol (PROVENTIL) (2.5 MG/3ML) 0.083% nebulizer solution, Take 2.5 mg by nebulization Every 12 (Twelve) Hours., Disp: 30 vial, Rfl: 1  •  albuterol sulfate  (90 Base) MCG/ACT inhaler, Inhale 2 puffs Every 4 (Four) Hours As Needed for Wheezing or Shortness of Air., Disp: 18 g, Rfl: 2  •  amitriptyline (ELAVIL) 25 MG tablet, Take 2 tablets by mouth Every Night. (Patient taking differently: Take 100 mg by mouth At Night As Needed.), Disp: 60 tablet, Rfl: 3  •  amLODIPine (NORVASC) 5 MG tablet, TAKE ONE TABLET BY MOUTH DAILY, Disp: 90 tablet, Rfl: 2  •  azelastine (OPTIVAR) 0.05 % ophthalmic solution, Administer 1 drop to both eyes 2 (Two) Times a Day., Disp: 6 mL, Rfl: 12  •  beclomethasone (QVAR) 80 MCG/ACT inhaler, Inhale 1 puff 2 (Two) Times a Day., Disp: , Rfl:   •  clotrimazole (LOTRIMIN) 1 % external solution, Apply 2-3 drops to right ear canal twice daily, Disp: 10 mL, Rfl: 0  •  fluticasone (Flonase) 50 MCG/ACT nasal spray, 2 sprays  "into the nostril(s) as directed by provider Daily., Disp: 16 g, Rfl: 2  •  lamoTRIgine (LaMICtal) 100 MG tablet, TAKE ONE TABLET BY MOUTH DAILY, Disp: 30 tablet, Rfl: 4  •  lidocaine (LIDODERM) 5 %, Place 1 patch on the skin as directed by provider Daily. Remove & Discard patch within 12 hours or as directed by MD, Disp: 30 patch, Rfl: 0  •  phenazopyridine (PYRIDIUM) 200 MG tablet, Take 1 tablet by mouth 3 (Three) Times a Day As Needed for Bladder Spasms., Disp: 6 tablet, Rfl: 0  •  SPIRIVA RESPIMAT 2.5 MCG/ACT aerosol solution, Take 2 puffs by mouth Daily., Disp: , Rfl:   •  vitamin D3 125 MCG (5000 UT) capsule capsule, TAKE ONE CAPSULE BY MOUTH DAILY, Disp: 30 capsule, Rfl: 1  •  ADVAIR DISKUS 500-50 MCG/DOSE DISKUS, Inhale 1 puff 2 (Two) Times a Day., Disp: , Rfl:     VITALS:  /80   Pulse 107   Temp 97.3 °F (36.3 °C)   Resp 16   Ht 149.9 cm (59.02\")   Wt 65 kg (143 lb 3.2 oz)   SpO2 95%   BMI 28.91 kg/m²     Physical Exam  HENT:      Head: Normocephalic.   Musculoskeletal:      Left shoulder: No bony tenderness. Decreased range of motion. Normal strength.      Left elbow: No swelling. Decreased range of motion.      Left wrist: No bony tenderness. Normal range of motion.      Left hand: No bony tenderness. Normal range of motion.   Neurological:      General: No focal deficit present.      Mental Status: She is alert and oriented to person, place, and time.   Psychiatric:         Mood and Affect: Mood normal.         Result Review :       Data reviewed: Recent hospitalization notes 5-3-21 ER notes     Assessment and Plan    Diagnoses and all orders for this visit:    1. Left median nerve neuropathy (Primary)  -     Ambulatory Referral to Orthopedic Surgery    ortho referral noted on ER records, but pt has not scheduled. Placed new referral. Need eval with upper ext specialist if no improvement.  Could consider sleeping in brace to keep left arm extended.      Follow Up   No follow-ups on " file.      I discussed the patients findings and my recommendations with patient.  Patient was encouraged to keep me informed of any acute changes, lack of improvement, or any new concerning symptoms.  Patient voiced understanding of all instructions and denied further questions.    Electronically signed by:    SHANE Mueller  05/11/2021    EMR Dragon/Transcription Disclaimer:  Much of this encounter note is an electronic transcription/translation of spoken language to printed text.  The electronic translation of spoken language may permit erroneous, or at times, nonsensical words or phrases to be inadvertently transcribed.  Although I have reviewed the note for such errors, some may still exist

## 2021-06-17 ENCOUNTER — TELEPHONE (OUTPATIENT)
Dept: INTERNAL MEDICINE | Facility: CLINIC | Age: 41
End: 2021-06-17

## 2021-06-17 NOTE — TELEPHONE ENCOUNTER
PT ARRIVED AT 1:40 FOR HER 1:00 APPOINTMENT ON 6-.    DUE TO BEING 40 MINS LATE PROVIDER TREVOR ESCUDERO WAS NOT ABLE TO SEE HER.  PT WAS ADVISED OF NO SHOW/LATE CANCEL POLICY    PT WAS RESCHEDULED WITH HER PCP NEMESIO LONG FOR 6-

## 2021-06-24 ENCOUNTER — OFFICE VISIT (OUTPATIENT)
Dept: INTERNAL MEDICINE | Facility: CLINIC | Age: 41
End: 2021-06-24

## 2021-06-24 VITALS
HEART RATE: 122 BPM | OXYGEN SATURATION: 96 % | DIASTOLIC BLOOD PRESSURE: 90 MMHG | WEIGHT: 145.4 LBS | SYSTOLIC BLOOD PRESSURE: 138 MMHG | BODY MASS INDEX: 29.35 KG/M2

## 2021-06-24 DIAGNOSIS — F43.9 SITUATIONAL STRESS: Primary | ICD-10-CM

## 2021-06-24 DIAGNOSIS — M25.519 NECK AND SHOULDER PAIN: ICD-10-CM

## 2021-06-24 DIAGNOSIS — M54.2 NECK AND SHOULDER PAIN: ICD-10-CM

## 2021-06-24 PROCEDURE — 99213 OFFICE O/P EST LOW 20 MIN: CPT | Performed by: PHYSICIAN ASSISTANT

## 2021-06-24 RX ORDER — PREDNISONE 5 MG/1
TABLET ORAL
COMMUNITY
Start: 2021-05-23 | End: 2021-07-23

## 2021-06-24 RX ORDER — LAMOTRIGINE 100 MG/1
100 TABLET ORAL DAILY
Qty: 30 TABLET | Refills: 4 | Status: SHIPPED | OUTPATIENT
Start: 2021-06-24 | End: 2021-09-17 | Stop reason: SDUPTHER

## 2021-06-24 NOTE — PROGRESS NOTES
Chief Complaint   Patient presents with   • Sharp pain in shoulders, neck     Acute        Subjective     Bernardo Dodd is a 40 y.o. female.        History of Present Illness     Pt has had increased stress with feeling like she is being stalked by a former partner she kicked out of her house. She says she can see images of people in her car before she goes out to her car. She calls the police daily when she is going to her car but they have not witnessed anyone watching her. There was a time when she saw 3 people get out of her car and into their car. No one else has seen them. Does wonder if her mind is tricking her.    She feels this increased stress is contributing to her feelings of pain in her upper back and neck. Went to [facility] 4 days ago and was given steroid pack and muscle relaxer. Her symptoms are much better. She is tapering off the steroids currently. She is doing some PT stretching she learned previously.    She is taking amitriptyline for sleep at night. It does work for her but makes her sleepy in the morning if she has to wake up early. Otherwise it works well.     Her work has let her go temporarily so that she could straighten out her mental health. She was working at [employer] but was having a hard time getting too attached.      Current Outpatient Medications:   •  ADVAIR DISKUS 500-50 MCG/DOSE DISKUS, Inhale 1 puff 2 (Two) Times a Day., Disp: , Rfl:   •  albuterol (PROVENTIL) (2.5 MG/3ML) 0.083% nebulizer solution, Take 2.5 mg by nebulization Every 12 (Twelve) Hours., Disp: 30 vial, Rfl: 1  •  albuterol sulfate  (90 Base) MCG/ACT inhaler, Inhale 2 puffs Every 4 (Four) Hours As Needed for Wheezing or Shortness of Air., Disp: 18 g, Rfl: 2  •  amitriptyline (ELAVIL) 25 MG tablet, Take 2 tablets by mouth Every Night. (Patient taking differently: Take 100 mg by mouth At Night As Needed.), Disp: 60 tablet, Rfl: 3  •  amLODIPine (NORVASC) 5 MG tablet, TAKE ONE TABLET BY MOUTH  DAILY, Disp: 90 tablet, Rfl: 2  •  azelastine (OPTIVAR) 0.05 % ophthalmic solution, Administer 1 drop to both eyes 2 (Two) Times a Day., Disp: 6 mL, Rfl: 12  •  beclomethasone (QVAR) 80 MCG/ACT inhaler, Inhale 1 puff 2 (Two) Times a Day., Disp: , Rfl:   •  clotrimazole (LOTRIMIN) 1 % external solution, Apply 2-3 drops to right ear canal twice daily, Disp: 10 mL, Rfl: 0  •  fluticasone (Flonase) 50 MCG/ACT nasal spray, 2 sprays into the nostril(s) as directed by provider Daily., Disp: 16 g, Rfl: 2  •  lamoTRIgine (LaMICtal) 100 MG tablet, Take 1 tablet by mouth Daily., Disp: 30 tablet, Rfl: 4  •  lidocaine (LIDODERM) 5 %, Place 1 patch on the skin as directed by provider Daily. Remove & Discard patch within 12 hours or as directed by MD, Disp: 30 patch, Rfl: 0  •  phenazopyridine (PYRIDIUM) 200 MG tablet, Take 1 tablet by mouth 3 (Three) Times a Day As Needed for Bladder Spasms., Disp: 6 tablet, Rfl: 0  •  SPIRIVA RESPIMAT 2.5 MCG/ACT aerosol solution, Take 2 puffs by mouth Daily., Disp: , Rfl:   •  vitamin D3 125 MCG (5000 UT) capsule capsule, TAKE ONE CAPSULE BY MOUTH DAILY, Disp: 30 capsule, Rfl: 1  •  predniSONE 5 MG (21) tablet therapy pack dose pack, , Disp: , Rfl:      PMFSH  The following portions of the patient's history were reviewed and updated as appropriate: allergies, current medications, past family history, past medical history, past social history, past surgical history and problem list.    Review of Systems   Constitutional: Negative for activity change, appetite change and fatigue.   HENT: Negative for congestion and rhinorrhea.    Respiratory: Negative for chest tightness and shortness of breath.    Cardiovascular: Positive for chest pain. Negative for palpitations.   Gastrointestinal: Negative for abdominal pain.   Genitourinary: Negative for dysuria.   Musculoskeletal: Positive for back pain. Negative for arthralgias and myalgias.   Neurological: Positive for weakness and numbness. Negative for  dizziness, light-headedness and headaches.   Psychiatric/Behavioral: Negative for dysphoric mood. The patient is not nervous/anxious.        Objective   /90   Pulse (!) 122   Wt 66 kg (145 lb 6.4 oz)   SpO2 96%   BMI 29.35 kg/m²     Physical Exam  Vitals and nursing note reviewed.   Constitutional:       Appearance: She is well-developed.   HENT:      Head: Normocephalic and atraumatic.      Right Ear: External ear normal.      Left Ear: External ear normal.   Eyes:      Conjunctiva/sclera: Conjunctivae normal.   Cardiovascular:      Rate and Rhythm: Normal rate and regular rhythm.   Pulmonary:      Effort: Pulmonary effort is normal.      Breath sounds: Normal breath sounds.   Musculoskeletal:         General: Normal range of motion.      Cervical back: Normal range of motion.   Skin:     General: Skin is warm and dry.   Psychiatric:         Behavior: Behavior normal.              ASSESSMENT/PLAN    Diagnoses and all orders for this visit:    1. Situational stress (Primary)  Comments:  Refer to Behavioral Health to discuss recent stress and help with management.  Orders:  -     Ambulatory Referral to Behavioral Health    2. Neck and shoulder pain  Comments:  Improving with steroids and muscle relaxer. Refer to physical therapy.  Orders:  -     Ambulatory Referral to Physical Therapy Evaluate and treat    Other orders  -     lamoTRIgine (LaMICtal) 100 MG tablet; Take 1 tablet by mouth Daily.  Dispense: 30 tablet; Refill: 4             Return in about 4 weeks (around 7/22/2021) for Follow up.  Answers for HPI/ROS submitted by the patient on 6/22/2021  What is the primary reason for your visit?: Back Pain

## 2021-07-15 ENCOUNTER — TREATMENT (OUTPATIENT)
Dept: PHYSICAL THERAPY | Facility: CLINIC | Age: 41
End: 2021-07-15

## 2021-07-15 DIAGNOSIS — M79.602 PAIN IN BOTH UPPER EXTREMITIES: ICD-10-CM

## 2021-07-15 DIAGNOSIS — M54.2 PAIN, NECK: Primary | ICD-10-CM

## 2021-07-15 DIAGNOSIS — M79.601 PAIN IN BOTH UPPER EXTREMITIES: ICD-10-CM

## 2021-07-15 PROCEDURE — 97162 PT EVAL MOD COMPLEX 30 MIN: CPT | Performed by: PHYSICAL THERAPIST

## 2021-07-15 PROCEDURE — 97110 THERAPEUTIC EXERCISES: CPT | Performed by: PHYSICAL THERAPIST

## 2021-07-15 NOTE — PROGRESS NOTES
"  Physical Therapy Initial Evaluation and Plan of Care      Patient: Bernardo Dodd   : 1980  Diagnosis/ICD-10 Code:  Pain, neck [M54.2]  Referring practitioner: SUHA Gibbs  Date of Initial Visit: 7/15/2021  Today's Date: 2021  Patient seen for 1 sessions           Subjective Questionnaire: NDI:    Subjective Evaluation    History of Present Illness  Mechanism of injury: Reports B neck pn, \"pinched nerve\" of 10+ years duration. Pn radiates into shoulders and arms down to her fingertips, associated w/ N/T that is most prominent in her 1st-3rd digits but can involve all fingers. Sometimes hands turn red/purple, feel swollen. Has difficulty sensing hot/cold, has to be careful when cooking. Neck pn is constant, arm symptoms intermittent. Pn worsened w/ holding her arms up to style her hair, looking at her phone for long periods, reading/writing, driving, sitting in any position. Hand pn, N/T worse at night. Uses wrist splints, help sometimes. Has not had imaging/NCS/EMG for a few years.    Subjective comment: Neck and BUE pain  Patient Occupation: caregiver; on medical leave since 21 Quality of life: fair    Pain  Current pain ratin  At best pain rating: 3  At worst pain rating: 10  Quality: radiating and throbbing  Relieving factors: rest, medications and change in position  Aggravating factors: lifting, movement, overhead activity, prolonged positioning and repetitive movement  Progression: no change    Hand dominance: right    Diagnostic Tests  No diagnostic tests performed    Treatments  Previous treatment: medication and chiropractic  Current treatment: medication  Patient Goals  Patient goals for therapy: decreased pain, increased motion, increased strength, independence with ADLs/IADLs and return to sport/leisure activities           Treatment  Exercise 1  Exercise Name 1: review/practice HEP       Objective          Static Posture "     Head  Forward.    Shoulders  Rounded.    Scapulae  Left protracted and right protracted.    Palpation   Left   Tenderness of the cervical paraspinals, middle trapezius, pectoralis major, pectoralis minor, scalenes, suboccipitals and upper trapezius.     Right Tenderness of the cervical paraspinals, middle trapezius, pectoralis major, pectoralis minor, scalenes, suboccipitals and upper trapezius.     Tenderness   Cervical Spine   Tenderness in the spinous process, left 1st rib and right 1st rib.     Neurological Testing     Sensation   Cervical/Thoracic   Left   Diminished: light touch    Right   Intact: light touch    Comments   Left light touch: L lateral upper arm, no dermatomal pattern.     Reflexes   Left   Biceps (C5/C6): trace (1+)  Brachioradialis (C6): normal (2+)  Triceps (C7): normal (2+)    Right   Biceps (C5/C6): trace (1+)  Brachioradialis (C6): normal (2+)  Triceps (C7): normal (2+)    Active Range of Motion   Cervical/Thoracic Spine   Cervical    Flexion: 50 degrees with pain  Extension: 65 degrees with pain  Left lateral flexion: 38 degrees with pain  Right lateral flexion: 35 degrees with pain  Left rotation: 60 degrees with pain  Right rotation: 60 degrees with pain    Additional Active Range of Motion Details  pn B cervical, B shoulders w/ all cervical mobility; no reproduction of UE pn/paresthesias w/ repeated or sustained cervical movements      Strength/Myotome Testing     Left Shoulder     Planes of Motion   Flexion: 4-   Abduction: 3   External rotation at 0°: 3+     Right Shoulder     Planes of Motion   Flexion: 4-   Abduction: 3   External rotation at 0°: 3+     Left Elbow   Flexion: 4-  Extension: 4-    Right Elbow   Flexion: 4-  Extension: 4-    Left Wrist/Hand   Wrist extension: 3+  Wrist flexion: 3+     (2nd hand position)    Trial 1: 55 lbs    Right Wrist/Hand   Wrist extension: 3+  Wrist flexion: 3+     (2nd hand position)     Trial 1: 60 lbs    Additional Strength  Details  finger abd/add 3/5    Tests   Cervical     Left   Negative Spurling's sign.     Right   Negative Spurling's sign.     Left Shoulder   Negative , Oscar and Haywood's.     Right Shoulder   Negative , Oscar and Haywood's.     Left Elbow   Negative elbow flexion and Tinel's sign (cubital tunnel).     Right Elbow   Negative elbow flexion and Tinel's sign (cubital tunnel).     Left Wrist/Hand   Positive Phalen's sign and Tinel's sign (medial nerve).     Right Wrist/Hand   Positive Phalen's sign and Tinel's sign (medial nerve).     Additional Tests Details  Compression/distraction (-)  Cyriax release (-)  Cervical rotation lateral flxn test (-)  Oscar (-)  Scalene/pec minor compression (-)      Cervical Flexibility Comments:   Upper trap: mild impairment  Levator Scapula: mild impairment  Pec Major: no impairment  Pec Minor: moderate impairment            Assessment & Plan     Assessment  Impairments: abnormal or restricted ROM, activity intolerance, impaired physical strength, lacks appropriate home exercise program and pain with function  Assessment details: Pt is a 40 YOF who presents to PT w/ evolving symptoms of moderate complexity. She c/o neck and BUE pn associated w/ N/T most prominent in the 1st-3rd digits. Signs/symptoms most consistent w/ B CTS. Cervical and TOS screen grossly unremarkable. Tinels over the carpal tunnel, phalen (+) and pt reports (+) nocturnal paresthesia that responds at least somewhat to night splints. She exhibits profound weakness but is generalized, poor posture w/ rounding and depression of B shoulders, forward head posture. Her pn and deficits limit her tolerance to daily, work, and recreational activities. Pt would benefit from PT services to address her deficits, decrease pn, and maximize function.  Barriers to therapy: chronicity of condition  Prognosis: fair  Functional Limitations: carrying objects, lifting, sleeping, pulling, uncomfortable because of pain,  reaching behind back, reaching overhead and unable to perform repetitive tasks  Goals  Plan Goals: STG 4 wks  1) Pt to be compliant w/ initial HEP for ROM, strength and symptom mgmt.  2) Pt to report pn w/ daily activities to be no greater than 7/10.  3) Pt to demonstrate improved postural awareness.  4) Pt to perform single rep chin tuck/head lift w/o compensation.  5) Pt to improve NDI score to 18/50 or better to reflect improved pn and function.    LTG 8 wks  1) Pt to be independent w/ long term HEP and self mgmt.  2) Pt to report pn w/ daily activities to be no greater than 4/10.  3) Pt to improve B UE strength to 4-/5 or better.  4) Pt to improve NDI score to 13/50 or better to reflect improved pn and function.    Plan  Therapy options: will be seen for skilled physical therapy services  Planned modality interventions: cryotherapy, thermotherapy (hydrocollator packs), TENS, traction, ultrasound and dry needling  Planned therapy interventions: flexibility, functional ROM exercises, home exercise program, manual therapy, joint mobilization, neuromuscular re-education, postural training, soft tissue mobilization, spinal/joint mobilization, strengthening, stretching and therapeutic activities  Frequency: 1x week  Duration in visits: 10  Treatment plan discussed with: patient  Plan details: PT POC to include progressive cervical/scapular strengthening, stretching program, neurodynamics, manual therapy techniques, modalities as indicated for pn control        Timed:  Manual Therapy:    0     mins  57662;  Therapeutic Exercise:    8     mins  44309;     Neuromuscular Elli:    0    mins  22001;    Therapeutic Activity:     0     mins  35661;     Gait Trainin     mins  69289;     Ultrasound:     0     mins  74053;    Electrical Stimulation:    0     mins  66965 ( );    Untimed:  Electrical Stimulation:    0     mins  74385 ( );  Mechanical Traction:    0     mins  79227;     Timed Treatment:   8    mins   Total Treatment:     45   mins    PT SIGNATURE: Amber Hayward, PT   DATE TREATMENT INITIATED: 7/16/2021    Initial Certification  Certification Period: 10/14/2021  I certify that the therapy services are furnished while this patient is under my care.  The services outlined above are required by this patient, and will be reviewed every 90 days.     PHYSICIAN: Nelly Sotelo PA      DATE:     Please sign and return via fax to [phone]. Thank you, [facility].

## 2021-07-15 NOTE — PATIENT INSTRUCTIONS
Access Code: [email or web address]  URL: [email or web address]  Date: 07/15/2021  Prepared by: Amber Hayward    Exercises  Seated Scapular Retraction - 2 x daily - 15-20 reps  Shoulder External Rotation and Scapular Retraction - 2 x daily - 15-20 reps  Supine Chest Stretch with Elbows Bent - 2 x daily - 3 reps - 15 sec hold     2021 09:17

## 2021-07-23 ENCOUNTER — OFFICE VISIT (OUTPATIENT)
Dept: INTERNAL MEDICINE | Facility: CLINIC | Age: 41
End: 2021-07-23

## 2021-07-23 VITALS
HEART RATE: 92 BPM | WEIGHT: 144 LBS | DIASTOLIC BLOOD PRESSURE: 76 MMHG | OXYGEN SATURATION: 98 % | SYSTOLIC BLOOD PRESSURE: 122 MMHG | BODY MASS INDEX: 29.07 KG/M2

## 2021-07-23 DIAGNOSIS — F43.0 ACUTE REACTION TO SITUATIONAL STRESS: ICD-10-CM

## 2021-07-23 DIAGNOSIS — M54.2 NECK PAIN: Primary | ICD-10-CM

## 2021-07-23 PROCEDURE — 99213 OFFICE O/P EST LOW 20 MIN: CPT | Performed by: PHYSICIAN ASSISTANT

## 2021-07-23 NOTE — PROGRESS NOTES
Chief Complaint   Patient presents with   • Situational Stress     Follow Up   • Neck/Shoulder Pain       Subjective     Bernardo Dodd is a 40 y.o. female.        History of Present Illness     Pt has seen PT once and will be starting weekly visits in August. She has been doing exercises at home.    She thought she had scheduled an appointment with Behavioral Health but does not have one in her chart. Her job is asking that she have an appointment with behavioral health before coming back to work. Pt is staying in [place] to be away from her situation of stalking. She is working on exercising and getting regular sleep. Has some ongoing police reports.         Current Outpatient Medications:   •  albuterol (PROVENTIL) (2.5 MG/3ML) 0.083% nebulizer solution, Take 2.5 mg by nebulization Every 12 (Twelve) Hours., Disp: 30 vial, Rfl: 1  •  albuterol sulfate  (90 Base) MCG/ACT inhaler, Inhale 2 puffs Every 4 (Four) Hours As Needed for Wheezing or Shortness of Air., Disp: 18 g, Rfl: 2  •  amitriptyline (ELAVIL) 25 MG tablet, Take 2 tablets by mouth Every Night. (Patient taking differently: Take 100 mg by mouth At Night As Needed.), Disp: 60 tablet, Rfl: 3  •  amLODIPine (NORVASC) 5 MG tablet, TAKE ONE TABLET BY MOUTH DAILY, Disp: 90 tablet, Rfl: 2  •  beclomethasone (QVAR) 80 MCG/ACT inhaler, Inhale 1 puff 2 (Two) Times a Day., Disp: , Rfl:   •  clotrimazole (LOTRIMIN) 1 % external solution, Apply 2-3 drops to right ear canal twice daily, Disp: 10 mL, Rfl: 0  •  fluticasone (Flonase) 50 MCG/ACT nasal spray, 2 sprays into the nostril(s) as directed by provider Daily., Disp: 16 g, Rfl: 2  •  lamoTRIgine (LaMICtal) 100 MG tablet, Take 1 tablet by mouth Daily., Disp: 30 tablet, Rfl: 4  •  lidocaine (LIDODERM) 5 %, Place 1 patch on the skin as directed by provider Daily. Remove & Discard patch within 12 hours or as directed by MD, Disp: 30 patch, Rfl: 0  •  SPIRIVA RESPIMAT 2.5 MCG/ACT aerosol solution, Take 2  puffs by mouth Daily., Disp: , Rfl:      PMFSH  The following portions of the patient's history were reviewed and updated as appropriate: allergies, current medications, past family history, past medical history, past social history, past surgical history and problem list.    Review of Systems   Constitutional: Negative for appetite change, fever and unexpected weight change.   HENT: Negative.    Eyes: Negative for pain and visual disturbance.   Respiratory: Negative for chest tightness, shortness of breath and wheezing.    Cardiovascular: Negative for chest pain and palpitations.   Gastrointestinal: Negative for abdominal pain, blood in stool, diarrhea, nausea and vomiting.   Endocrine: Negative.    Genitourinary: Negative for difficulty urinating, flank pain, frequency and urgency.   Musculoskeletal: Positive for back pain and neck pain. Negative for joint swelling.   Skin: Negative for color change and rash.   Neurological: Negative for tremors and weakness.   Hematological: Negative for adenopathy.   Psychiatric/Behavioral: Negative for confusion and decreased concentration.   All other systems reviewed and are negative.      Objective   /76   Pulse 92   Wt 65.3 kg (144 lb)   SpO2 98%   BMI 29.07 kg/m²     Physical Exam  Vitals and nursing note reviewed.   Constitutional:       Appearance: She is well-developed.   HENT:      Head: Normocephalic and atraumatic.      Right Ear: External ear normal.      Left Ear: External ear normal.   Eyes:      Conjunctiva/sclera: Conjunctivae normal.   Cardiovascular:      Rate and Rhythm: Normal rate and regular rhythm.   Pulmonary:      Effort: Pulmonary effort is normal.      Breath sounds: Normal breath sounds.   Musculoskeletal:         General: Normal range of motion.      Cervical back: Normal range of motion.   Skin:     General: Skin is warm and dry.   Psychiatric:         Behavior: Behavior normal.              ASSESSMENT/PLAN    Diagnoses and all orders for  this visit:    1. Neck pain (Primary)  Comments:  Improving with stress relief strategies and PT. Continue current plan.    2. Acute reaction to situational stress  Comments:  Gave pt letter with contact info for behavioral health. She will call to schedule appointment.             Return for Next scheduled follow up.  Answers for HPI/ROS submitted by the patient on 7/23/2021  What is the primary reason for your visit?: Back Pain

## 2021-08-10 ENCOUNTER — TREATMENT (OUTPATIENT)
Dept: PHYSICAL THERAPY | Facility: CLINIC | Age: 41
End: 2021-08-10

## 2021-08-10 DIAGNOSIS — M54.2 PAIN, NECK: Primary | ICD-10-CM

## 2021-08-10 DIAGNOSIS — M79.601 PAIN IN BOTH UPPER EXTREMITIES: ICD-10-CM

## 2021-08-10 DIAGNOSIS — M79.602 PAIN IN BOTH UPPER EXTREMITIES: ICD-10-CM

## 2021-08-10 PROCEDURE — 97110 THERAPEUTIC EXERCISES: CPT | Performed by: PHYSICAL THERAPIST

## 2021-08-10 PROCEDURE — 97530 THERAPEUTIC ACTIVITIES: CPT | Performed by: PHYSICAL THERAPIST

## 2021-08-10 NOTE — PROGRESS NOTES
Re-Assessment / Re-Certification      Patient: Bernardo Dodd   : 1980  Diagnosis/ICD-10 Code:  Pain, neck [M54.2]  Referring practitioner: SUHA Gibbs  Date of Initial Visit: Type: THERAPY  Noted: 7/15/2021  Today's Date: 2021  Patient seen for 2 sessions      Subjective:   Subjective Questionnaire: not administered  Clinical Progress: unchanged  Home Program Compliance: No  Treatment has included: therapeutic exercise    Subjective    Bernardo Dodd reports: Pt states that numbness in her hands went away for about a week and a half but returned. Has not worked on her exercises. Lost them after her initial visit. Neck pn remains constant. N/T, burning in fingers comes and goes, still most prominent in thumb and index fingers. Worsened w/ any repetitive activity. Still off work.    Pre tx pn score: 0  Post tx pn score: 0      Treatment  Exercise 1  Exercise Name 1: reassessment  Exercise 2  Exercise Name 2: seated scap retractions  Sets/Reps 2: 20  Exercise 3  Exercise Name 3: seated chin tucks  Sets/Reps 3: 20  Exercise 4  Exercise Name 4: no money ER  Sets/Reps 4: 20  Exercise 5  Exercise Name 5: supine pec major stretch  Sets/Reps 5: 3  Time 5: 15 sec                   Objective          Static Posture     Head  Forward.    Shoulders  Rounded.    Scapulae  Left protracted and right protracted.    Postural Observations  Seated posture: poor  Standing posture: poor        Palpation   Left   Tenderness of the cervical paraspinals, middle trapezius, pectoralis major, pectoralis minor, scalenes, suboccipitals and upper trapezius.     Right Tenderness of the cervical paraspinals, middle trapezius, pectoralis major, pectoralis minor, scalenes, suboccipitals and upper trapezius.     Tenderness   Cervical Spine   Tenderness in the spinous process (all cervical/thoracic).   No tenderness in the left 1st rib and right 1st rib.     Neurological Testing     Sensation   Cervical/Thoracic   Left   Intact:  light touch    Right   Intact: light touch    Reflexes   Left   Biceps (C5/C6): trace (1+)  Brachioradialis (C6): normal (2+)  Triceps (C7): normal (2+)    Right   Biceps (C5/C6): trace (1+)  Brachioradialis (C6): normal (2+)  Triceps (C7): normal (2+)    Active Range of Motion   Cervical/Thoracic Spine   Cervical    Flexion: 30 degrees with pain  Extension: 60 degrees with pain  Left lateral flexion: 45 degrees with pain  Right lateral flexion: 40 degrees with pain  Left rotation: 60 degrees with pain  Right rotation: 60 degrees with pain    Additional Active Range of Motion Details  pn B cervical, B shoulders w/ all cervical mobility; no reproduction of UE pn/paresthesias w/ repeated or sustained cervical movements      Strength/Myotome Testing     Left Shoulder     Planes of Motion   Flexion: 4-   Abduction: 3+   External rotation at 0°: 3+     Right Shoulder     Planes of Motion   Flexion: 3+   Abduction: 3+   External rotation at 0°: 4-     Left Elbow   Flexion: 4  Extension: 4    Right Elbow   Flexion: 4  Extension: 4-    Left Wrist/Hand   Wrist extension: 4+  Wrist flexion: 4+     (2nd hand position)    Trial 1: 60 lbs    Right Wrist/Hand   Wrist extension: 4  Wrist flexion: 4     (2nd hand position)     Trial 1: 65 lbs    Additional Strength Details  finger abd/add 4-/5    Tests   Cervical     Left   Negative Spurling's sign.     Right   Negative Spurling's sign.     Left Shoulder   Negative , Oscar and Haywood's.     Right Shoulder   Negative , Oscar and Haywood's.     Left Elbow   Negative elbow flexion and Tinel's sign (cubital tunnel).     Right Elbow   Negative elbow flexion and Tinel's sign (cubital tunnel).     Left Wrist/Hand   Negative Phalen's sign and Tinel's sign (medial nerve).     Right Wrist/Hand   Positive Tinel's sign (medial nerve).   Negative Phalen's sign.     Additional Tests Details  Compression/distraction (-)  Cyriax release (-)  Cervical rotation lateral flxn test  (-)  Oscar (-)  Scalene/pec minor compression (-)      Cervical Flexibility Comments:   Upper trap: mild impairment  Levator Scapula: mild impairment  Pec Major: no impairment  Pec Minor: mild impairment            Assessment & Plan     Assessment  Assessment details: Pt arrived 20 min late for today's appt, reports non compliance w/ HEP. She denies any significant changes in her symptoms. Continues to c/o constant neck and arm pn that is worsened w/ repetitive activity, intermittent BUE paresthesias that are most often experienced upon waking in the mornings and are isolated to 1st-3rd digits. Cervical/neuro screen again grossly unremarkable, though she remains somewhat reactive to Tinel's over the R carpal tunnel. Cont to demonstrate gross UE weakness in no apparent radicular pattern, though effort w/ testing is somewhat questionable. Sitting/standing posture very poor. Pt has quite a bit of difficulty attending to tasks, requires frequent redirection. Given late arrival and non compliance w/ initial HEP, deferred exercise progression and reviewed/practiced previous exercises. Discussed importance of HEP compliance for optimal outcomes. Pt verbalized understanding.      Barriers to therapy: poor compliance    Goals  Plan Goals: STG 4 wks  1) Pt to be compliant w/ initial HEP for ROM, strength and symptom mgmt.-NOT MET  2) Pt to report pn w/ daily activities to be no greater than 7/10.-NOT MET  3) Pt to demonstrate improved postural awareness.-NOT MET  4) Pt to perform single rep chin tuck/head lift w/o compensation.-NOT MET  5) Pt to improve NDI score to 18/50 or better to reflect improved pn and function.-NOT MET    LTG 8 wks  1) Pt to be independent w/ long term HEP and self mgmt.-NOT MET  2) Pt to report pn w/ daily activities to be no greater than 4/10.-NOT MET  3) Pt to improve B UE strength to 4-/5 or better.-NOT MET  4) Pt to improve NDI score to 13/50 or better to reflect improved pn and function.-NOT  MET    Plan  Treatment plan discussed with: patient  Plan details: Cervical/scapular strengthening, neurodynamics, postural education      Progress toward previous goals: Not Met        PT Signature: Amber Hayward, PT        Timed:  Manual Therapy:    0     mins  53905;  Therapeutic Exercise:    10     mins  [id number];     Neuromuscular Elli:    0    mins  85432;    Therapeutic Activity:     20     mins  45161;     Gait Trainin     mins  44958;     Ultrasound:     0     mins  30401;    Electrical Stimulation:    0     mins  36807 ( );    Untimed:  Electrical Stimulation:    0     mins  31303 ( );  Mechanical Traction:    0     mins  99643;     Timed Treatment:   30   mins   Total Treatment:     30   mins

## 2021-08-25 ENCOUNTER — TELEMEDICINE (OUTPATIENT)
Dept: PSYCHIATRY | Facility: CLINIC | Age: 41
End: 2021-08-25

## 2021-08-25 ENCOUNTER — PRIOR AUTHORIZATION (OUTPATIENT)
Dept: PSYCHIATRY | Facility: CLINIC | Age: 41
End: 2021-08-25

## 2021-08-25 DIAGNOSIS — F19.11 HISTORY OF SUBSTANCE ABUSE (HCC): ICD-10-CM

## 2021-08-25 DIAGNOSIS — F41.1 GENERALIZED ANXIETY DISORDER: Chronic | ICD-10-CM

## 2021-08-25 DIAGNOSIS — F19.950: Primary | Chronic | ICD-10-CM

## 2021-08-25 PROCEDURE — 90792 PSYCH DIAG EVAL W/MED SRVCS: CPT | Performed by: NURSE PRACTITIONER

## 2021-08-25 RX ORDER — QUETIAPINE FUMARATE 100 MG/1
TABLET, FILM COATED ORAL
Qty: 30 TABLET | Refills: 0 | Status: SHIPPED | OUTPATIENT
Start: 2021-08-25 | End: 2021-12-20

## 2021-08-25 NOTE — TREATMENT PLAN
Multi-Disciplinary Problems (from Behavioral Health Treatment Plan)    Active Problems     Problem: Anxiety  Start Date: 08/25/21    Problem Details: The patient self-scales this problem as a 10 with 10 being the worst.        Goal Priority Start Date Expected End Date End Date    Patient will develop and implement behavioral and cognitive strategies to reduce anxiety and irrational fears. -- 08/25/21 -- --    Goal Details: Progress toward goal:  Not appropriate to rate progress toward goal since this is the initial treatment plan.        Goal Intervention Frequency Start Date End Date    Help patient explore past emotional issues in relation to present anxiety. Q Month 08/25/21 --    Intervention Details: Duration of treatment until until remission of symptoms.        Goal Intervention Frequency Start Date End Date    Help patient develop an awareness of their cognitive and physical responses to anxiety. Q Month 08/25/21 --    Intervention Details: Duration of treatment until until remission of symptoms.              Problem: Paranoia  Start Date: 08/25/21    Problem Details: The patient self-scales this problem as a 10 with 10 being the worst.        Goal Priority Start Date Expected End Date End Date    Patient will demonstrate a more balanced view of the world. -- 08/25/21 -- --    Goal Details: Progress toward goal:  Not appropriate to rate progress toward goal since this is the initial treatment plan.        Goal Intervention Frequency Start Date End Date    Assist patient in restructuring irrational beliefs by reviewing reality based evidence and misinterpretations. Q Month 08/25/21 --    Intervention Details: Duration of treatment until until remission of symptoms.                           I have discussed and reviewed this treatment plan with the patient and/or guardian.  The patient has verbally agreed with this treatment plan (no signatures are obtained at today's visit as the patient is a telehealth  patient and is unable to print and sign this document, therefore verbal agreement is obtained).  .
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
## 2021-09-17 ENCOUNTER — OFFICE VISIT (OUTPATIENT)
Dept: INTERNAL MEDICINE | Facility: CLINIC | Age: 41
End: 2021-09-17

## 2021-09-17 ENCOUNTER — HOSPITAL ENCOUNTER (OUTPATIENT)
Dept: GENERAL RADIOLOGY | Facility: HOSPITAL | Age: 41
Discharge: HOME | End: 2021-09-17

## 2021-09-17 ENCOUNTER — LAB (OUTPATIENT)
Dept: LAB | Facility: HOSPITAL | Age: 41
End: 2021-09-17

## 2021-09-17 VITALS
DIASTOLIC BLOOD PRESSURE: 90 MMHG | TEMPERATURE: 98.9 F | BODY MASS INDEX: 28.06 KG/M2 | WEIGHT: 139 LBS | OXYGEN SATURATION: 94 % | HEART RATE: 106 BPM | SYSTOLIC BLOOD PRESSURE: 118 MMHG

## 2021-09-17 DIAGNOSIS — H60.501 ACUTE OTITIS EXTERNA OF RIGHT EAR, UNSPECIFIED TYPE: ICD-10-CM

## 2021-09-17 DIAGNOSIS — S09.92XA INJURY OF NOSE, INITIAL ENCOUNTER: ICD-10-CM

## 2021-09-17 DIAGNOSIS — J30.9 CHRONIC ALLERGIC RHINITIS: ICD-10-CM

## 2021-09-17 DIAGNOSIS — R30.0 BURNING WITH URINATION: ICD-10-CM

## 2021-09-17 DIAGNOSIS — R30.0 BURNING WITH URINATION: Primary | ICD-10-CM

## 2021-09-17 LAB
BILIRUB BLD-MCNC: ABNORMAL MG/DL
CLARITY, POC: ABNORMAL
COLOR UR: YELLOW
GLUCOSE UR STRIP-MCNC: NEGATIVE MG/DL
KETONES UR QL: NEGATIVE
LEUKOCYTE EST, POC: ABNORMAL
NITRITE UR-MCNC: POSITIVE MG/ML
PH UR: 6 [PH] (ref 5–8)
PROT UR STRIP-MCNC: ABNORMAL MG/DL
RBC # UR STRIP: ABNORMAL /UL
SP GR UR: 1.03 (ref 1–1.03)
UROBILINOGEN UR QL: NORMAL

## 2021-09-17 PROCEDURE — 87086 URINE CULTURE/COLONY COUNT: CPT

## 2021-09-17 PROCEDURE — 87491 CHLMYD TRACH DNA AMP PROBE: CPT

## 2021-09-17 PROCEDURE — 87186 SC STD MICRODIL/AGAR DIL: CPT

## 2021-09-17 PROCEDURE — 87798 DETECT AGENT NOS DNA AMP: CPT

## 2021-09-17 PROCEDURE — 87661 TRICHOMONAS VAGINALIS AMPLIF: CPT

## 2021-09-17 PROCEDURE — 87591 N.GONORRHOEAE DNA AMP PROB: CPT

## 2021-09-17 PROCEDURE — 87801 DETECT AGNT MULT DNA AMPLI: CPT

## 2021-09-17 PROCEDURE — 87077 CULTURE AEROBIC IDENTIFY: CPT

## 2021-09-17 PROCEDURE — 70160 X-RAY EXAM OF NASAL BONES: CPT

## 2021-09-17 PROCEDURE — 99213 OFFICE O/P EST LOW 20 MIN: CPT | Performed by: PHYSICIAN ASSISTANT

## 2021-09-17 RX ORDER — FLUTICASONE PROPIONATE 50 MCG
2 SPRAY, SUSPENSION (ML) NASAL DAILY
Qty: 16 G | Refills: 2 | Status: SHIPPED | OUTPATIENT
Start: 2021-09-17 | End: 2022-05-04 | Stop reason: SDUPTHER

## 2021-09-17 RX ORDER — AMLODIPINE BESYLATE 5 MG/1
5 TABLET ORAL DAILY
Qty: 90 TABLET | Refills: 1 | Status: SHIPPED | OUTPATIENT
Start: 2021-09-17 | End: 2022-03-10

## 2021-09-17 RX ORDER — LORATADINE 10 MG/1
TABLET ORAL
COMMUNITY
Start: 2021-07-30 | End: 2021-12-20

## 2021-09-17 RX ORDER — FLUCONAZOLE 150 MG/1
150 TABLET ORAL
Qty: 2 TABLET | Refills: 0 | Status: SHIPPED | OUTPATIENT
Start: 2021-09-17 | End: 2021-10-02

## 2021-09-17 RX ORDER — LAMOTRIGINE 100 MG/1
100 TABLET ORAL DAILY
Qty: 30 TABLET | Refills: 4 | Status: SHIPPED | OUTPATIENT
Start: 2021-09-17 | End: 2022-01-21 | Stop reason: SDUPTHER

## 2021-09-17 RX ORDER — NITROFURANTOIN 25; 75 MG/1; MG/1
100 CAPSULE ORAL 2 TIMES DAILY
Qty: 10 CAPSULE | Refills: 0 | Status: SHIPPED | OUTPATIENT
Start: 2021-09-17 | End: 2021-10-02

## 2021-09-17 RX ORDER — IPRATROPIUM BROMIDE 42 UG/1
SPRAY, METERED NASAL
COMMUNITY
Start: 2021-07-30 | End: 2021-12-20

## 2021-09-17 RX ORDER — CLOTRIMAZOLE 1 G/ML
SOLUTION TOPICAL
Qty: 10 ML | Refills: 0 | Status: SHIPPED | OUTPATIENT
Start: 2021-09-17 | End: 2022-02-23 | Stop reason: SDUPTHER

## 2021-09-17 NOTE — PROGRESS NOTES
Chief Complaint   Patient presents with   • Burning with urination     Acute    • Back Pain       Subjective     Bernardo Dodd is a 41 y.o. female.        History of Present Illness     Pt is having some vaginal burning and some feelings of pelvic discomfort. Has some pain after urination. She has been putting yeast infection cream on for the past day or so. She is sexually active without protection. No discharge or external irritation.    Pt fell about 2 weeks ago, was jumping on a bed. Injured her nose, laceration has healed but still has discomfort on the bridge of her nose.        Current Outpatient Medications:   •  albuterol (PROVENTIL) (2.5 MG/3ML) 0.083% nebulizer solution, Take 2.5 mg by nebulization Every 12 (Twelve) Hours., Disp: 30 vial, Rfl: 1  •  albuterol sulfate  (90 Base) MCG/ACT inhaler, Inhale 2 puffs Every 4 (Four) Hours As Needed for Wheezing or Shortness of Air., Disp: 18 g, Rfl: 2  •  beclomethasone (QVAR) 80 MCG/ACT inhaler, Inhale 1 puff 2 (Two) Times a Day., Disp: , Rfl:   •  lidocaine (LIDODERM) 5 %, Place 1 patch on the skin as directed by provider Daily. Remove & Discard patch within 12 hours or as directed by MD, Disp: 30 patch, Rfl: 0  •  QUEtiapine (SEROquel) 100 MG tablet, Take 1/2-1 tablet PO QHS, Disp: 30 tablet, Rfl: 0  •  SPIRIVA RESPIMAT 2.5 MCG/ACT aerosol solution, Take 2 puffs by mouth Daily., Disp: , Rfl:   •  amLODIPine (NORVASC) 5 MG tablet, Take 1 tablet by mouth Daily., Disp: 90 tablet, Rfl: 1  •  clotrimazole (LOTRIMIN) 1 % external solution, Apply 2-3 drops to right ear canal twice daily, Disp: 10 mL, Rfl: 0  •  fluconazole (Diflucan) 150 MG tablet, Take 1 tablet by mouth Every 3 (Three) Days., Disp: 2 tablet, Rfl: 0  •  fluticasone (Flonase) 50 MCG/ACT nasal spray, 2 sprays into the nostril(s) as directed by provider Daily., Disp: 16 g, Rfl: 2  •  ipratropium (ATROVENT) 0.06 % nasal spray, , Disp: , Rfl:   •  lamoTRIgine (LaMICtal) 100 MG tablet, Take 1  tablet by mouth Daily., Disp: 30 tablet, Rfl: 4  •  loratadine (CLARITIN) 10 MG tablet, , Disp: , Rfl:   •  nitrofurantoin, macrocrystal-monohydrate, (Macrobid) 100 MG capsule, Take 1 capsule by mouth 2 (Two) Times a Day., Disp: 10 capsule, Rfl: 0     PMFSH  The following portions of the patient's history were reviewed and updated as appropriate: allergies, current medications, past family history, past medical history, past social history, past surgical history and problem list.    Review of Systems   Constitutional: Negative for chills and fever.   HENT: Negative for sore throat.    Gastrointestinal: Positive for abdominal pain and constipation. Negative for diarrhea, nausea and vomiting.   Genitourinary: Positive for dysuria, flank pain, frequency, pelvic pain, urgency and vaginal discharge. Negative for hematuria.   Musculoskeletal: Positive for back pain.   Skin: Negative for rash.       Objective   /90   Pulse 106   Temp 98.9 °F (37.2 °C)   Wt 63 kg (139 lb)   SpO2 94%   BMI 28.06 kg/m²     Physical Exam  Vitals and nursing note reviewed.   Constitutional:       Appearance: She is well-developed.   HENT:      Head: Normocephalic and atraumatic.      Right Ear: External ear normal.      Left Ear: External ear normal.   Eyes:      Conjunctiva/sclera: Conjunctivae normal.   Cardiovascular:      Rate and Rhythm: Normal rate and regular rhythm.   Pulmonary:      Effort: Pulmonary effort is normal.      Breath sounds: Normal breath sounds.   Genitourinary:     Labia:         Right: No rash, tenderness or lesion.         Left: No rash, tenderness or lesion.       Vagina: No vaginal discharge, erythema, tenderness or bleeding.      Cervix: No discharge, lesion or erythema.   Musculoskeletal:         General: Normal range of motion.      Cervical back: Normal range of motion.   Skin:     General: Skin is warm and dry.   Psychiatric:         Behavior: Behavior normal.         Results for orders placed or  performed in visit on 09/17/21   POC Urinalysis Dipstick, Automated    Specimen: Urine   Result Value Ref Range    Color Yellow Yellow, Straw, Dark Yellow, Andreina    Clarity, UA Cloudy (A) Clear    Specific Gravity  1.030 1.005 - 1.030    pH, Urine 6.0 5.0 - 8.0    Leukocytes Trace (A) Negative    Nitrite, UA Positive (A) Negative    Protein, POC 1+ (A) Negative mg/dL    Glucose, UA Negative Negative, 1000 mg/dL (3+) mg/dL    Ketones, UA Negative Negative    Urobilinogen, UA Normal Normal    Bilirubin Small (1+) (A) Negative    Blood, UA 3+ (A) Negative        ASSESSMENT/PLAN    Diagnoses and all orders for this visit:    1. Burning with urination (Primary)  Comments:  Send urine for culture and vaginal swab for testing. Treat with diflucan and macrobid as ordered. Further recs based on results.  Orders:  -     POC Urinalysis Dipstick, Automated  -     Urine Culture - Urine, Urine, Clean Catch; Future  -     fluconazole (Diflucan) 150 MG tablet; Take 1 tablet by mouth Every 3 (Three) Days.  Dispense: 2 tablet; Refill: 0  -     NuSwab VG+ - Swab, Vagina; Future  -     nitrofurantoin, macrocrystal-monohydrate, (Macrobid) 100 MG capsule; Take 1 capsule by mouth 2 (Two) Times a Day.  Dispense: 10 capsule; Refill: 0    2. Injury of nose, initial encounter  Comments:  Check xray of nasal bones.  Orders:  -     XR Nasal Bones (In Office); Future             Return if symptoms worsen or fail to improve.  Answers for HPI/ROS submitted by the patient on 9/17/2021  What is the primary reason for your visit?: Vaginal Discharge/Irritation

## 2021-09-19 LAB — BACTERIA SPEC AEROBE CULT: ABNORMAL

## 2021-09-20 PROBLEM — B49 FUNGAL INFECTION: Status: ACTIVE | Noted: 2021-09-20

## 2021-09-20 NOTE — PROGRESS NOTES
Your urine grew bacteria that is susceptible to the antibiotic, Macrobid, that was prescribed for you.

## 2021-09-21 ENCOUNTER — PRIOR AUTHORIZATION (OUTPATIENT)
Dept: INTERNAL MEDICINE | Facility: CLINIC | Age: 41
End: 2021-09-21

## 2021-09-21 LAB
A VAGINAE DNA VAG QL NAA+PROBE: ABNORMAL SCORE
BVAB2 DNA VAG QL NAA+PROBE: ABNORMAL SCORE
C ALBICANS DNA VAG QL NAA+PROBE: NEGATIVE
C GLABRATA DNA VAG QL NAA+PROBE: NEGATIVE
C TRACH DNA VAG QL NAA+PROBE: NEGATIVE
MEGA1 DNA VAG QL NAA+PROBE: ABNORMAL SCORE
N GONORRHOEA DNA VAG QL NAA+PROBE: NEGATIVE
T VAGINALIS DNA VAG QL NAA+PROBE: NEGATIVE

## 2021-09-21 RX ORDER — METRONIDAZOLE 500 MG/1
500 TABLET ORAL 2 TIMES DAILY
Qty: 14 TABLET | Refills: 0 | Status: SHIPPED | OUTPATIENT
Start: 2021-09-21 | End: 2021-10-02

## 2021-09-21 NOTE — PROGRESS NOTES
Please let her know that the xray of her nose does show evidence of a nondisplaced nasal fracture. This means the bone is still in alignment. I would like her to follow up with ENT. She has seen an ENT in the past, let me know if she needs a new referral.

## 2021-09-21 NOTE — PROGRESS NOTES
Your vaginosis panel does not show signs of a yeast infection or other STD. However, it does show bacterial vaginosis. I will send in Flagyl to treat this.

## 2021-09-30 ENCOUNTER — TELEPHONE (OUTPATIENT)
Dept: INTERNAL MEDICINE | Facility: CLINIC | Age: 41
End: 2021-09-30

## 2021-09-30 NOTE — TELEPHONE ENCOUNTER
PATIENT WAS RUNNING LATE FOR HER APPT AND SHE RESCHEDULED FOR 10/1/2021 WITH BRENDA BARDALES @ 4:30PM. NEMESIO LONG NEXT AVAILABLE FOR A PHYSICAL ISN'T UNTIL THE 1ST OF THE YEAR

## 2021-10-02 ENCOUNTER — OFFICE VISIT (OUTPATIENT)
Dept: INTERNAL MEDICINE | Facility: CLINIC | Age: 41
End: 2021-10-02

## 2021-10-02 VITALS
TEMPERATURE: 97.9 F | DIASTOLIC BLOOD PRESSURE: 66 MMHG | OXYGEN SATURATION: 97 % | WEIGHT: 133.8 LBS | HEART RATE: 103 BPM | BODY MASS INDEX: 27.01 KG/M2 | SYSTOLIC BLOOD PRESSURE: 112 MMHG

## 2021-10-02 DIAGNOSIS — R39.9 UTI SYMPTOMS: ICD-10-CM

## 2021-10-02 DIAGNOSIS — H66.002 NON-RECURRENT ACUTE SUPPURATIVE OTITIS MEDIA OF LEFT EAR WITHOUT SPONTANEOUS RUPTURE OF TYMPANIC MEMBRANE: Primary | ICD-10-CM

## 2021-10-02 DIAGNOSIS — T16.2XXA FOREIGN BODY OF LEFT EAR, INITIAL ENCOUNTER: ICD-10-CM

## 2021-10-02 DIAGNOSIS — Z23 NEED FOR INFLUENZA VACCINATION: ICD-10-CM

## 2021-10-02 DIAGNOSIS — H60.312 ACUTE DIFFUSE OTITIS EXTERNA OF LEFT EAR: ICD-10-CM

## 2021-10-02 PROCEDURE — 69200 CLEAR OUTER EAR CANAL: CPT | Performed by: NURSE PRACTITIONER

## 2021-10-02 PROCEDURE — 90686 IIV4 VACC NO PRSV 0.5 ML IM: CPT | Performed by: NURSE PRACTITIONER

## 2021-10-02 PROCEDURE — 90471 IMMUNIZATION ADMIN: CPT | Performed by: NURSE PRACTITIONER

## 2021-10-02 PROCEDURE — 99214 OFFICE O/P EST MOD 30 MIN: CPT | Performed by: NURSE PRACTITIONER

## 2021-10-02 RX ORDER — CEFUROXIME AXETIL 500 MG/1
500 TABLET ORAL 2 TIMES DAILY
Qty: 14 TABLET | Refills: 0 | Status: SHIPPED | OUTPATIENT
Start: 2021-10-02 | End: 2021-10-09

## 2021-10-02 NOTE — PROGRESS NOTES
Chief Complaint   Patient presents with   • Urinary Tract Infection   • Earache       History of Present Illness    41 y.o.female presents for uti earache.  burning after urination, more volume of urine, thinks passed a kidney stone recently, urine dark in color.  Allergies have been bothering her; taking zyrtec. Congested; left ear ache. Drains itches. Keeps cotton stuffed in ear canal or has bad tinnitus. Pushes cotton all the way in and digs it out with go pin. No fever.   Wants flu shot; reports a possible reaction in 2016. But has had flu vaccine every year since and done fine.  Review of Systems   Constitutional: Negative for chills and fever.   HENT: Positive for congestion, ear discharge and ear pain.    Gastrointestinal: Negative for nausea and vomiting.   Genitourinary: Positive for dysuria, flank pain, frequency and urgency. Negative for hematuria.       PMSFH  The following portions of the patient's history were reviewed and updated as appropriate: allergies, current medications, past family history, past medical history, past social history, past surgical history and problem list.     Past Medical History:   Diagnosis Date   • Allergic rhinitis    • Anemia    • Arthritis    • Asthma    • Bartholin gland cyst    • Chlamydia    • Depression    • FHx: migraine headaches    • GERD (gastroesophageal reflux disease)    • Heart murmur    • Herpes simplex    • History of chest x-ray 11/01/2015    no active disease   • History of echocardiogram 11/01/2015    ejection fraction of greater than 65%, mitral and pulmonic regurgitation an physiological tricuspid regurgitation.   • History of PFTs 12/22/2015    spirometry data acceptable and reproducible; pt given 4 puffs of Ventolin; pt gave good effort; no obstruction; no Bd response; MVV reduced    • History of PFTs 11/02/2015    pt gave best effort; duoneb given prior and post study; moderate nonspecific proportional reduction of FEV1 and FVC with preserved  ratio; FEV1 moderately reduced; cannot rule out restriction   • Hypertension    • Migraine    • Ovarian cyst    • Pelvic pain    • Screening breast examination     self;admits   • Seizures (HCC)    • STD (female)    • Thigh shingles    • Trichomonas infection       Allergies   Allergen Reactions   • Naproxen Swelling   • Sulfa Antibiotics Rash      Social History     Tobacco Use   • Smoking status: Current Every Day Smoker     Packs/day: 0.25     Years: 15.00     Pack years: 3.75     Types: Cigarettes     Last attempt to quit: 2015     Years since quittin.7   • Smokeless tobacco: Never Used   Vaping Use   • Vaping Use: Some days   • Devices: Disposable   • Passive vaping exposure Yes   Substance Use Topics   • Alcohol use: No   • Drug use: Yes     Types: Marijuana     Comment: Marijuana once a week. Tried cocaine and pain pills int the past     Past Surgical History:   Procedure Laterality Date   • BILATERAL BREAST REDUCTION     • BREAST BIOPSY     • BREAST SURGERY      breast reduction   •  SECTION     • CYSTOSCOPY     • DIAGNOSTIC LAPAROSCOPY     • INCISION AND DRAINAGE ABSCESS      bartholin's   • LAPAROSCOPIC TUBAL LIGATION     • ORIF ANKLE FRACTURE Right    • REDUCTION MAMMAPLASTY Bilateral    • TENSION FREE VAGINAL TAPING WITH MINI ARC SLING      Dr Doyle avery       Family History   Problem Relation Age of Onset   • Pancreatic cancer Maternal Grandmother    • Heart failure Paternal Grandmother    • MARCIE disease Paternal Aunt    • Arthritis Mother    • Cancer Mother    • Bipolar disorder Mother    • Arthritis Father    • Dementia Father    • Pancreatic cancer Other    • Diabetes Other    • Heart disease Other    • Hypertension Other    • Other Other         RESPIRATORY DISEASE   • Heart attack Neg Hx    • Hyperlipidemia Neg Hx    • Mental illness Neg Hx    • Obesity Neg Hx    • Stroke Neg Hx            Current Outpatient Medications:   •  albuterol (PROVENTIL) (2.5 MG/3ML) 0.083%  nebulizer solution, Take 2.5 mg by nebulization Every 12 (Twelve) Hours., Disp: 30 vial, Rfl: 1  •  albuterol sulfate  (90 Base) MCG/ACT inhaler, Inhale 2 puffs Every 4 (Four) Hours As Needed for Wheezing or Shortness of Air., Disp: 18 g, Rfl: 2  •  amLODIPine (NORVASC) 5 MG tablet, Take 1 tablet by mouth Daily., Disp: 90 tablet, Rfl: 1  •  beclomethasone (QVAR) 80 MCG/ACT inhaler, Inhale 1 puff 2 (Two) Times a Day., Disp: , Rfl:   •  clotrimazole (LOTRIMIN) 1 % external solution, Apply 2-3 drops to right ear canal twice daily, Disp: 10 mL, Rfl: 0  •  fluticasone (Flonase) 50 MCG/ACT nasal spray, 2 sprays into the nostril(s) as directed by provider Daily., Disp: 16 g, Rfl: 2  •  ipratropium (ATROVENT) 0.06 % nasal spray, , Disp: , Rfl:   •  lamoTRIgine (LaMICtal) 100 MG tablet, Take 1 tablet by mouth Daily., Disp: 30 tablet, Rfl: 4  •  lidocaine (LIDODERM) 5 %, Place 1 patch on the skin as directed by provider Daily. Remove & Discard patch within 12 hours or as directed by MD, Disp: 30 patch, Rfl: 0  •  loratadine (CLARITIN) 10 MG tablet, , Disp: , Rfl:   •  QUEtiapine (SEROquel) 100 MG tablet, Take 1/2-1 tablet PO QHS, Disp: 30 tablet, Rfl: 0  •  SPIRIVA RESPIMAT 2.5 MCG/ACT aerosol solution, Take 2 puffs by mouth Daily., Disp: , Rfl:     VITALS:  /66   Pulse 103   Temp 97.9 °F (36.6 °C)   Wt 60.7 kg (133 lb 12.8 oz)   SpO2 97%   BMI 27.01 kg/m²       Physical Exam  Vitals reviewed.   HENT:      Right Ear: Tympanic membrane, ear canal and external ear normal.      Left Ear: External ear normal. Swelling and tenderness present. A foreign body is present.      Ears:      Comments: 2 small pieces of cotton stuffed back in ear canal.     Nose: Congestion present.      Mouth/Throat:      Mouth: Mucous membranes are moist.   Eyes:      Pupils: Pupils are equal, round, and reactive to light.      Comments: exophthalmus brent   Pulmonary:      Effort: Pulmonary effort is normal. No respiratory distress.  "  Neurological:      Mental Status: She is alert.   Psychiatric:         Mood and Affect: Mood normal.       Foreign Body Removal    Date/Time: 10/6/2021 5:05 PM  Performed by: Roseanne Mcclendon APRN  Authorized by: Roseanne Mcclendon APRN   Risks and benefits: risks, benefits and alternatives were discussed  Consent given by: patient  Patient understanding: patient states understanding of the procedure being performed  Patient identity confirmed: verbally with patient  Time out: Immediately prior to procedure a \"time out\" was called to verify the correct patient, procedure, equipment, support staff and site/side marked as required.  Body area: ear  Location details: left ear  Localization method: ENT speculum, probed and visualized  Removal mechanism: ear scoop and forceps  Complexity: simple  2 objects recovered.  Objects recovered: small pieces of cotton  Post-procedure assessment: foreign body removed  Patient tolerance: patient tolerated the procedure well with no immediate complications      Reassess left TM dull cloudy; left ear canal red swollen.    Result Review :            Assessment and Plan    Diagnoses and all orders for this visit:    1. Non-recurrent acute suppurative otitis media of left ear without spontaneous rupture of tympanic membrane (Primary)  -     cefuroxime (CEFTIN) 500 MG tablet; Take 1 tablet by mouth 2 (Two) Times a Day for 7 days.  Dispense: 14 tablet; Refill: 0    2. Acute diffuse otitis externa of left ear  -     neomycin-polymyxin-hydrocortisone (CORTISPORIN) 3.5-93929-1 otic solution; Administer 3 drops into the left ear 4 (Four) Times a Day.  Dispense: 10 mL; Refill: 0    3. UTI symptoms  -     cefuroxime (CEFTIN) 500 MG tablet; Take 1 tablet by mouth 2 (Two) Times a Day for 7 days.  Dispense: 14 tablet; Refill: 0  Pt was unable to void; since also treating ear infection will go with an abx that will cover ears and urine.  4. Need for influenza vaccination  -     " FluLaval/Fluarix (VFC) >6 Months    5. Foreign body of left ear, initial encounter  Counseled pt do not push cotton so far back in canal; not safe to dig in canal with foreign objects could damage TM.      I discussed the patients findings and my recommendations with patient.  Patient was encouraged to keep me informed of any acute changes, lack of improvement, or any new concerning symptoms.  Patient voiced understanding of all instructions and denied further questions.      Follow Up   Return if symptoms worsen or fail to improve.      Electronically signed by:    SHANE Mueller  10/02/2021

## 2021-10-21 ENCOUNTER — OFFICE VISIT (OUTPATIENT)
Dept: INTERNAL MEDICINE | Facility: CLINIC | Age: 41
End: 2021-10-21

## 2021-10-21 VITALS
BODY MASS INDEX: 28.38 KG/M2 | DIASTOLIC BLOOD PRESSURE: 84 MMHG | OXYGEN SATURATION: 97 % | SYSTOLIC BLOOD PRESSURE: 122 MMHG | HEART RATE: 95 BPM | WEIGHT: 140.6 LBS

## 2021-10-21 DIAGNOSIS — M25.519 NECK AND SHOULDER PAIN: ICD-10-CM

## 2021-10-21 DIAGNOSIS — Z00.00 HEALTH CARE MAINTENANCE: Primary | ICD-10-CM

## 2021-10-21 DIAGNOSIS — Z12.31 ENCOUNTER FOR SCREENING MAMMOGRAM FOR MALIGNANT NEOPLASM OF BREAST: ICD-10-CM

## 2021-10-21 DIAGNOSIS — M54.2 NECK AND SHOULDER PAIN: ICD-10-CM

## 2021-10-21 PROCEDURE — 99396 PREV VISIT EST AGE 40-64: CPT | Performed by: PHYSICIAN ASSISTANT

## 2021-10-21 NOTE — ASSESSMENT & PLAN NOTE
Immunizations: up to date  Eye exam: done 2018  Pap Smear: done 2018, repeat 2021  Mammogram: done 11/2019, due 11/2020  Dexa: due postmenopausal  Colonoscopy: due age 43  Labs: fasting labs ordered    Counseled patient regarding multimodal approach with healthy nutrition, healthy sleep, regular physical activity, social activities, counseling, safety measures and medications.

## 2021-10-21 NOTE — PROGRESS NOTES
"Chief Complaint   Patient presents with   • Annual Exam   • Asthma   • Heartburn       Subjective     History of Present Illness    Bernardo Dodd is a 41 y.o. female.     The patient is being seen for a health maintenance evaluation.  The last health maintenance was 1 year(s) ago.    Social History  Bernardo  does smoke cigarettes. Smoking about 4 cigarettes a day  She drinks no alcohol.  She does use illicit drugs. Uses marijuana daily.    General History  Bernardo  does not have regular dental visits.  She does complain of vision problems. Wears glasses and contacts. Last eye exam was unknown.  Immunizations are up to date. The patient needs the following immunizations: none    Lifestyle  Bernardo  consumes in general, a \"healthy\" diet  .  She exercises intermittently.    Reproductive Health  Bernardo  is premenopausal.  She reports periods are regular every 28-30 days.  She is sexually active. Her contraceptive plan is bilateral tubal ligation.    Screening  Last pap was 2020 by Dr. Melchor. History of abnormal pap smear or family history of gyn cancer: none  Last mammogram was  2019. Personal or family history of abnormal mammograms or breast cancer: none  Last colonoscopy was age 16, repeat at age 43. Family history of colon cancer: none  Last DEXA was never.     Pt has had some right shoulder discomfort with movement. Wonders if it may have started due to sleeping on it wrong. She is using lidoderm patches with some success.                 Current Outpatient Medications:   •  albuterol (PROVENTIL) (2.5 MG/3ML) 0.083% nebulizer solution, Take 2.5 mg by nebulization Every 12 (Twelve) Hours., Disp: 30 vial, Rfl: 1  •  albuterol sulfate  (90 Base) MCG/ACT inhaler, Inhale 2 puffs Every 4 (Four) Hours As Needed for Wheezing or Shortness of Air., Disp: 18 g, Rfl: 2  •  amLODIPine (NORVASC) 5 MG tablet, Take 1 tablet by mouth Daily., Disp: 90 tablet, Rfl: 1  •  beclomethasone (QVAR) 80 MCG/ACT inhaler, " Inhale 1 puff 2 (Two) Times a Day., Disp: , Rfl:   •  clotrimazole (LOTRIMIN) 1 % external solution, Apply 2-3 drops to right ear canal twice daily, Disp: 10 mL, Rfl: 0  •  fluticasone (Flonase) 50 MCG/ACT nasal spray, 2 sprays into the nostril(s) as directed by provider Daily., Disp: 16 g, Rfl: 2  •  ipratropium (ATROVENT) 0.06 % nasal spray, , Disp: , Rfl:   •  lamoTRIgine (LaMICtal) 100 MG tablet, Take 1 tablet by mouth Daily., Disp: 30 tablet, Rfl: 4  •  lidocaine (LIDODERM) 5 %, Place 1 patch on the skin as directed by provider Daily. Remove & Discard patch within 12 hours or as directed by MD, Disp: 30 patch, Rfl: 0  •  loratadine (CLARITIN) 10 MG tablet, , Disp: , Rfl:   •  neomycin-polymyxin-hydrocortisone (CORTISPORIN) 3.5-18237-6 otic solution, Administer 3 drops into the left ear 4 (Four) Times a Day., Disp: 10 mL, Rfl: 0  •  QUEtiapine (SEROquel) 100 MG tablet, Take 1/2-1 tablet PO QHS, Disp: 30 tablet, Rfl: 0  •  SPIRIVA RESPIMAT 2.5 MCG/ACT aerosol solution, Take 2 puffs by mouth Daily., Disp: , Rfl:      PMFSH  The following portions of the patient's history were reviewed and updated as appropriate: allergies, current medications, past family history, past medical history, past social history, past surgical history and problem list.    Review of Systems   Constitutional: Negative for appetite change, fever and unexpected weight change.   HENT: Negative for ear pain, facial swelling and sore throat.    Eyes: Negative for pain and visual disturbance.   Respiratory: Negative for chest tightness, shortness of breath and wheezing.    Cardiovascular: Negative for chest pain and palpitations.   Gastrointestinal: Negative for abdominal pain and blood in stool.   Endocrine: Negative.    Genitourinary: Negative for difficulty urinating and hematuria.   Musculoskeletal: Positive for arthralgias and myalgias. Negative for joint swelling.   Neurological: Negative for dizziness, tremors, seizures, syncope and  headaches.   Hematological: Negative for adenopathy.   Psychiatric/Behavioral: Negative.        Objective   /84   Pulse 95   Wt 63.8 kg (140 lb 9.6 oz)   SpO2 97%   BMI 28.38 kg/m²     Physical Exam  Vitals and nursing note reviewed.   Constitutional:       General: She is not in acute distress.     Appearance: She is well-developed. She is not diaphoretic.   HENT:      Head: Normocephalic and atraumatic. Hair is normal.      Right Ear: Hearing, tympanic membrane, ear canal and external ear normal. No decreased hearing noted. No drainage.      Left Ear: Hearing, tympanic membrane, ear canal and external ear normal. No decreased hearing noted.      Nose: No nasal deformity.   Eyes:      General: Lids are normal. Lids are everted, no foreign bodies appreciated.         Right eye: No discharge.         Left eye: No discharge.      Conjunctiva/sclera: Conjunctivae normal.      Pupils: Pupils are equal, round, and reactive to light.   Neck:      Thyroid: No thyromegaly.      Vascular: No JVD.      Trachea: No tracheal deviation.   Cardiovascular:      Rate and Rhythm: Normal rate and regular rhythm.      Pulses: Normal pulses.      Heart sounds: Normal heart sounds. No murmur heard.  No friction rub. No gallop.    Pulmonary:      Effort: Pulmonary effort is normal. No respiratory distress.      Breath sounds: Normal breath sounds. No wheezing or rales.   Chest:      Chest wall: No tenderness.   Abdominal:      General: Bowel sounds are normal. There is no distension.      Palpations: Abdomen is soft. There is no mass.      Tenderness: There is no abdominal tenderness. There is no guarding or rebound.      Hernia: No hernia is present.   Musculoskeletal:         General: No deformity. Normal range of motion.      Right shoulder: Tenderness present. No swelling, deformity, effusion or bony tenderness.      Cervical back: Normal range of motion and neck supple.   Lymphadenopathy:      Cervical: No cervical  adenopathy.   Skin:     General: Skin is warm and dry.      Findings: No erythema or rash.   Neurological:      Mental Status: She is alert and oriented to person, place, and time.      Cranial Nerves: No cranial nerve deficit.      Motor: No abnormal muscle tone.      Coordination: Coordination normal.      Deep Tendon Reflexes: Reflexes are normal and symmetric. Reflexes normal.   Psychiatric:         Behavior: Behavior normal.         Thought Content: Thought content normal.         Judgment: Judgment normal.              ASSESSMENT/PLAN    Diagnoses and all orders for this visit:    1. Health care maintenance (Primary)  Assessment & Plan:  Immunizations: up to date  Eye exam: done 2018  Pap Smear: done 2018, repeat 2021  Mammogram: done 11/2019, due 11/2020  Dexa: due postmenopausal  Colonoscopy: due age 43  Labs: fasting labs ordered    Counseled patient regarding multimodal approach with healthy nutrition, healthy sleep, regular physical activity, social activities, counseling, safety measures and medications.             Orders:  -     CBC & Differential; Future  -     Comprehensive Metabolic Panel; Future  -     Lipid Panel; Future  -     TSH; Future    2. Neck and shoulder pain  Comments:  Pt will schedule PT at a location to her home.  Orders:  -     Ambulatory Referral to Physical Therapy Evaluate and treat    3. Encounter for screening mammogram for malignant neoplasm of breast  -     Mammo Screening Digital Tomosynthesis Bilateral With CAD; Future           Return in about 1 year (around 10/21/2022), or if symptoms worsen or fail to improve, for Annual with fasting labs.

## 2021-10-22 ENCOUNTER — LAB (OUTPATIENT)
Dept: LAB | Facility: HOSPITAL | Age: 41
End: 2021-10-22

## 2021-10-22 DIAGNOSIS — Z00.00 HEALTH CARE MAINTENANCE: ICD-10-CM

## 2021-10-22 LAB
ALBUMIN SERPL-MCNC: 4.1 G/DL (ref 3.5–5.2)
ALBUMIN/GLOB SERPL: 1.5 G/DL
ALP SERPL-CCNC: 60 U/L (ref 39–117)
ALT SERPL W P-5'-P-CCNC: 12 U/L (ref 1–33)
ANION GAP SERPL CALCULATED.3IONS-SCNC: 8 MMOL/L (ref 5–15)
AST SERPL-CCNC: 15 U/L (ref 1–32)
BASOPHILS # BLD AUTO: 0.03 10*3/MM3 (ref 0–0.2)
BASOPHILS NFR BLD AUTO: 0.3 % (ref 0–1.5)
BILIRUB SERPL-MCNC: 0.2 MG/DL (ref 0–1.2)
BUN SERPL-MCNC: 11 MG/DL (ref 6–20)
BUN/CREAT SERPL: 14.5 (ref 7–25)
CALCIUM SPEC-SCNC: 9 MG/DL (ref 8.6–10.5)
CHLORIDE SERPL-SCNC: 100 MMOL/L (ref 98–107)
CHOLEST SERPL-MCNC: 178 MG/DL (ref 0–200)
CO2 SERPL-SCNC: 30 MMOL/L (ref 22–29)
CREAT SERPL-MCNC: 0.76 MG/DL (ref 0.57–1)
DEPRECATED RDW RBC AUTO: 42.1 FL (ref 37–54)
EOSINOPHIL # BLD AUTO: 0.07 10*3/MM3 (ref 0–0.4)
EOSINOPHIL NFR BLD AUTO: 0.8 % (ref 0.3–6.2)
ERYTHROCYTE [DISTWIDTH] IN BLOOD BY AUTOMATED COUNT: 13.9 % (ref 12.3–15.4)
GFR SERPL CREATININE-BSD FRML MDRD: 101 ML/MIN/1.73
GLOBULIN UR ELPH-MCNC: 2.7 GM/DL
GLUCOSE SERPL-MCNC: 87 MG/DL (ref 65–99)
HCT VFR BLD AUTO: 36.9 % (ref 34–46.6)
HDLC SERPL-MCNC: 79 MG/DL (ref 40–60)
HGB BLD-MCNC: 11.8 G/DL (ref 12–15.9)
IMM GRANULOCYTES # BLD AUTO: 0.03 10*3/MM3 (ref 0–0.05)
IMM GRANULOCYTES NFR BLD AUTO: 0.3 % (ref 0–0.5)
LDLC SERPL CALC-MCNC: 86 MG/DL (ref 0–100)
LDLC/HDLC SERPL: 1.07 {RATIO}
LYMPHOCYTES # BLD AUTO: 3.08 10*3/MM3 (ref 0.7–3.1)
LYMPHOCYTES NFR BLD AUTO: 33.6 % (ref 19.6–45.3)
MCH RBC QN AUTO: 26.9 PG (ref 26.6–33)
MCHC RBC AUTO-ENTMCNC: 32 G/DL (ref 31.5–35.7)
MCV RBC AUTO: 84.1 FL (ref 79–97)
MONOCYTES # BLD AUTO: 0.68 10*3/MM3 (ref 0.1–0.9)
MONOCYTES NFR BLD AUTO: 7.4 % (ref 5–12)
NEUTROPHILS NFR BLD AUTO: 5.29 10*3/MM3 (ref 1.7–7)
NEUTROPHILS NFR BLD AUTO: 57.6 % (ref 42.7–76)
NRBC BLD AUTO-RTO: 0 /100 WBC (ref 0–0.2)
PLATELET # BLD AUTO: 349 10*3/MM3 (ref 140–450)
PMV BLD AUTO: 10 FL (ref 6–12)
POTASSIUM SERPL-SCNC: 4 MMOL/L (ref 3.5–5.2)
PROT SERPL-MCNC: 6.8 G/DL (ref 6–8.5)
RBC # BLD AUTO: 4.39 10*6/MM3 (ref 3.77–5.28)
SODIUM SERPL-SCNC: 138 MMOL/L (ref 136–145)
TRIGL SERPL-MCNC: 72 MG/DL (ref 0–150)
VLDLC SERPL-MCNC: 13 MG/DL (ref 5–40)
WBC # BLD AUTO: 9.18 10*3/MM3 (ref 3.4–10.8)

## 2021-10-22 PROCEDURE — 80050 GENERAL HEALTH PANEL: CPT

## 2021-10-22 PROCEDURE — 80061 LIPID PANEL: CPT

## 2021-10-23 LAB — TSH SERPL DL<=0.05 MIU/L-ACNC: 1.08 UIU/ML (ref 0.27–4.2)

## 2021-10-24 NOTE — PROGRESS NOTES
Your labs are all normal except for some mild anemia. This is not far off from your baseline so it is not worrisome. We will continue to monitor these levels in the future.

## 2021-11-22 RX ORDER — LIDOCAINE 50 MG/G
1 PATCH TOPICAL EVERY 24 HOURS
Qty: 30 PATCH | Refills: 0 | Status: SHIPPED | OUTPATIENT
Start: 2021-11-22 | End: 2021-12-20

## 2021-11-22 NOTE — TELEPHONE ENCOUNTER
Caller: Bernardo Dodd    Relationship: Self    Best call back number:[phone]    Requested Prescriptions:   Requested Prescriptions     Pending Prescriptions Disp Refills   • lidocaine (LIDODERM) 5 % 30 patch 0     Sig: Place 1 patch on the skin as directed by provider Daily. Remove & Discard patch within 12 hours or as directed by MD        [facility] [place] [place] [facility] [address] [facility] [facility] [address] - [phone]  - [phone] FX      Additional details provided by patient: PATIENT STATED SHE IS IN A LOT OF PAIN.  STATED THE LIDOCAINE PATCHES WORK.    Does the patient have less than a 3 day supply:  [x] Yes  [] No    Laron Mcdonald Rep   11/22/21 14:56 EST

## 2021-12-13 ENCOUNTER — TREATMENT (OUTPATIENT)
Dept: PHYSICAL THERAPY | Facility: CLINIC | Age: 41
End: 2021-12-13

## 2021-12-13 DIAGNOSIS — M79.602 PAIN IN BOTH UPPER EXTREMITIES: ICD-10-CM

## 2021-12-13 DIAGNOSIS — M54.2 PAIN, NECK: Primary | ICD-10-CM

## 2021-12-13 DIAGNOSIS — M79.601 PAIN IN BOTH UPPER EXTREMITIES: ICD-10-CM

## 2021-12-13 PROCEDURE — 97162 PT EVAL MOD COMPLEX 30 MIN: CPT | Performed by: PHYSICAL THERAPIST

## 2021-12-13 NOTE — PROGRESS NOTES
Physical Therapy Initial Evaluation and Plan of Care      Subjective Evaluation    History of Present Illness  Mechanism of injury: Pt is a 41 year old female presenting to the clinic with neck and shoulder pain, with bilateral hand paraesthesias. This began years ago, but she has been treating it on her own with over the counter pain relief creams and medications. She has carpal tunnel braces that she wears occasionally, but usually she just wraps them in ace bandages. She gets swelling, redness, and burning in the hands (spefically her thumb index and middle finger). She previously went to PT for this but was noncompliant with HEP and coming to her appointments. She has a lot of neck pain with looking down. Her arms feel heavy. She reports she drops things in her left hand a lot. She used to go to a chiropractor which really helped but she has not been in the last year. 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 The pain in her hands is constant. The pain in her neck is intermittent. She takes muscle relaxers. She wants to get stronger and avoid having surgery.      Patient Occupation: Caregiver Quality of life: good    Pain  Current pain ratin  At best pain ratin  At worst pain rating: 10  Quality: burning and sharp  Relieving factors: medications and ice  Aggravating factors: repetitive movement, movement, lifting and sleeping    Hand dominance: left    Diagnostic Tests  No diagnostic tests performed    Patient Goals  Patient goals for therapy: decreased pain, increased motion, increased strength, independence with ADLs/IADLs and return to sport/leisure activities             Objective          Postural Observations  Seated posture: poor  Standing posture: poor        Palpation   Left   Tenderness of the levator scapulae, scalenes, suboccipitals and upper trapezius.     Right Tenderness of the levator scapulae, scalenes, suboccipitals and upper trapezius.     Additional Palpation  Details  Pt demonstrates normal mobility with cervical side glides and reports they felt good.    Neurological Testing     Sensation   Cervical/Thoracic   Left   Diminished: light touch    Right   Intact: light touch    Comments   Left light touch: C5, C6, C7.     Reflexes   Left   Johnson's reflex: negative    Right   Johnson's reflex: negative    Active Range of Motion   Cervical/Thoracic Spine   Cervical    Flexion: 50 degrees with pain  Extension: 28 degrees with pain  Left rotation: 45 degrees with pain  Right rotation: 60 degrees with pain    Additional Active Range of Motion Details  Cervical ROM reproduces pain in the neck and shoulders, but not the hands.    Strength/Myotome Testing     Left Shoulder     Planes of Motion   Flexion: 3+   Abduction: 3+   External rotation at 0°: 4   Internal rotation at 0°: 4-     Right Shoulder     Planes of Motion   Flexion: 4   Abduction: 4   External rotation at 0°: 4+   Internal rotation at 0°: 4+     Left Elbow   Flexion: 4-  Extension: 4-    Right Elbow   Flexion: 4+  Extension: 4    Left Wrist/Hand      (2nd hand position)   lbs: 4    Right Wrist/Hand      (2nd hand position)   lbs: 20    Tests     Left Wrist/Hand   Positive Tinel's sign (medial nerve).   Negative Phalen's sign.     Right Wrist/Hand   Positive Tinel's sign (medial nerve).   Negative Phalen's sign.     Additional Tests Details  Unable to get into position for Spurlings due to pain.          Assessment & Plan     Assessment  Impairments: abnormal coordination, abnormal muscle tone, abnormal or restricted ROM, activity intolerance, impaired physical strength, lacks appropriate home exercise program and pain with function  Functional Limitations: carrying objects, lifting, sleeping, uncomfortable because of pain, moving in bed and unable to perform repetitive tasks  Assessment details: Pt is a 41 year old female presenting to the clinic with neck pain and pain in both upper extremities, with  paraesthesias in the hand. No movements in the neck appear to reproduce the symptoms in her hands. She likely has bilateral carpal tunnel syndrome. Her pain was so irritable that her neck pain cannot be diagnosed specifically at this time. She has decreased cervical ROM, decreased strength, diffuse tenderness of the neck and upper extremities, and positive Tinels of the carpal tunnel. Her impairments are limiting her ability to lift patients at work. Pt would benefit from skilled PT to address her impairments so she can reach her long term goals.   Prognosis: fair  Prognosis details: Previous noncompliance with PT may hinder progress.    Goals  Plan Goals: SHORT TERM GOALS:     2 weeks  1. Pt independent with HEP  2. Pt to demonstrate cervical AROM 25-50% of expected norms to allow for improved ability to perform ADL's  3. Pt to demonstrate  strength >= 30 lbs bilaterally in order to improve function at work.    LONG TERM GOALS:   6 weeks  1. Pt to demonstrate cervical AROM 50-75% of expected norms to allow for improved safety when driving  2. Pt to demonstrate ability to lift 10# [place] with bilateral arm(s) without increase in pain in the neck   3. Pt to report being able to work full shift or work in the home without increase in pain in the neck    Plan  Therapy options: will be seen for skilled therapy services  Planned modality interventions: cryotherapy, electrical stimulation/Russian stimulation, high voltage pulsed current (pain management), TENS, thermotherapy (hydrocollator packs) and microcurrent electrical stimulation  Planned therapy interventions: ADL retraining, body mechanics training, fine motor coordination training, flexibility, functional ROM exercises, home exercise program, joint mobilization, manual therapy, neuromuscular re-education, postural training, soft tissue mobilization, spinal/joint mobilization, strengthening, stretching and therapeutic activities  Duration in visits: 2  Duration in  weeks: 8  Treatment plan discussed with: patient        Evaluation only    PT SIGNATURE: Gali Latif, PT   DATE TREATMENT INITIATED: 12/13/2021    Initial Certification  Certification Period: 12/13/2021 thru 3/12/2022  I certify that the therapy services are furnished while this patient is under my care.  The services outlined above are required by this patient, and will be reviewed every 90 days.     PHYSICIAN: Nelly Sotelo PA      DATE:     Please sign and return via fax to [phone].. Thank you, [facility].

## 2021-12-20 ENCOUNTER — TREATMENT (OUTPATIENT)
Dept: PHYSICAL THERAPY | Facility: CLINIC | Age: 41
End: 2021-12-20

## 2021-12-20 ENCOUNTER — OFFICE VISIT (OUTPATIENT)
Dept: INTERNAL MEDICINE | Facility: CLINIC | Age: 41
End: 2021-12-20

## 2021-12-20 ENCOUNTER — LAB (OUTPATIENT)
Dept: LAB | Facility: HOSPITAL | Age: 41
End: 2021-12-20

## 2021-12-20 VITALS
HEIGHT: 63 IN | SYSTOLIC BLOOD PRESSURE: 130 MMHG | DIASTOLIC BLOOD PRESSURE: 74 MMHG | WEIGHT: 155 LBS | OXYGEN SATURATION: 99 % | HEART RATE: 82 BPM | BODY MASS INDEX: 27.46 KG/M2

## 2021-12-20 DIAGNOSIS — M54.2 PAIN, NECK: Primary | ICD-10-CM

## 2021-12-20 DIAGNOSIS — R39.9 UTI SYMPTOMS: ICD-10-CM

## 2021-12-20 DIAGNOSIS — L24.89 IRRITANT CONTACT DERMATITIS DUE TO OTHER AGENTS: Primary | ICD-10-CM

## 2021-12-20 DIAGNOSIS — M54.16 ACUTE RIGHT LUMBAR RADICULOPATHY: ICD-10-CM

## 2021-12-20 DIAGNOSIS — I10 PRIMARY HYPERTENSION: ICD-10-CM

## 2021-12-20 LAB
BILIRUB BLD-MCNC: NEGATIVE MG/DL
CLARITY, POC: CLEAR
COLOR UR: YELLOW
EXPIRATION DATE: NORMAL
GLUCOSE UR STRIP-MCNC: NEGATIVE MG/DL
KETONES UR QL: NEGATIVE
LEUKOCYTE EST, POC: NEGATIVE
Lab: NORMAL
NITRITE UR-MCNC: NEGATIVE MG/ML
PH UR: 6 [PH] (ref 5–8)
PROT UR STRIP-MCNC: NEGATIVE MG/DL
RBC # UR STRIP: NEGATIVE /UL
SP GR UR: 1.03 (ref 1–1.03)
UROBILINOGEN UR QL: NORMAL

## 2021-12-20 PROCEDURE — 87086 URINE CULTURE/COLONY COUNT: CPT | Performed by: STUDENT IN AN ORGANIZED HEALTH CARE EDUCATION/TRAINING PROGRAM

## 2021-12-20 PROCEDURE — 97140 MANUAL THERAPY 1/> REGIONS: CPT | Performed by: PHYSICAL THERAPIST

## 2021-12-20 PROCEDURE — 97110 THERAPEUTIC EXERCISES: CPT | Performed by: PHYSICAL THERAPIST

## 2021-12-20 PROCEDURE — 99214 OFFICE O/P EST MOD 30 MIN: CPT | Performed by: STUDENT IN AN ORGANIZED HEALTH CARE EDUCATION/TRAINING PROGRAM

## 2021-12-20 RX ORDER — TIOTROPIUM BROMIDE INHALATION SPRAY 3.12 UG/1
2 SPRAY, METERED RESPIRATORY (INHALATION) DAILY
Status: CANCELLED | OUTPATIENT
Start: 2021-12-20

## 2021-12-20 NOTE — PROGRESS NOTES
"  Internal Medicine Acute Visit     Patient Name: Bernardo Dodd  : 1980   MRN: [id number]     Chief Complaint:    Chief Complaint   Patient presents with   • Dysuria   • Urinary Tract Infection   • Back Pain       History of Present Illness: Bernardo Dodd is a 41 y.o. female who presents for dysuria.  She reports about one month of pain and burning AFTER urination. Pain does not occur during urination. She denies increased frequency and urgency of urination and hematuria.  She denies fevers and chills. She has chronic back pain but no flank pain.  She follows with physical therapy for this but notes that it has not helped. She denies vaginal discharge. She reports vulvar pruritis. She notes that everything \"looks the same down there.\" She has recently changed soap from dial to Dove. No painful intercourse.     Subjective     I have reviewed and the following portions of the patient's history were updated as appropriate: past family history, past medical history, past social history, past surgical history and problem list.    Allergies:   Allergies   Allergen Reactions   • Naproxen Swelling   • Sulfa Antibiotics Rash       Objective     Physical Exam:  Vital Signs:   Vitals:    21 1009   BP: 130/74   Pulse: 82   SpO2: 99%   Weight: 70.3 kg (155 lb)   Height: 160 cm (63\")   PainSc:   4     Body mass index is 27.46 kg/m².    Physical Exam  Vitals and nursing note reviewed.   Constitutional:       General: She is not in acute distress.     Appearance: Normal appearance. She is not ill-appearing or toxic-appearing.   HENT:      Head: Normocephalic and atraumatic.   Cardiovascular:      Rate and Rhythm: Normal rate.   Pulmonary:      Effort: Pulmonary effort is normal.   Genitourinary:     Comments: Patient declines pelvic exam.   Neurological:      Mental Status: She is alert.   Psychiatric:         Mood and Affect: Mood normal.         Behavior: Behavior normal.       Assessment / Plan  "     Assessment/Plan:   Diagnoses and all orders for this visit:    1. Irritant contact dermatitis due to other agents (Primary)  May be due to new soap (changed from Dial to Dove). Symptoms more consistent with this diagnosis rather than UTI. Burning occurs after uriantion (likely when urine comes in contact with irritated skin).  Recommended pelvic exam which patient declines.  Recommended OTC hydrocortisone cream. Apply a small amount just to the areas of irritation BID for no more than 2 weeks. Counseled patient to stop medication when symptoms resolve if sooner than 2 weeks. Discussed that patient should not use for longer than 2 weeks due to side effects of skin atrophy, particularly in a sensitive area.     2. UTI symptoms  POC Urinalysis Dipstick - no concerning findings for UTI.  Will send for Urine Culture.     3. HTN   Chronic, stable. Continue amlodipine 5 mg daily    4. Right Lumbar Radiculopathy    Not improving.  Continue physical therapy. Follow up with Nelly Sotelo at next scheduled appointment.     Follow Up:   Return for Next scheduled follow up.    Time:   I spent approximately 15 minutes providing clinical care for this patient; including review of patient's chart and provider documentation, face to face time spent with patient in examination room (obtaining history, performing physical exam, discussing diagnosis and management options), placing orders, and completing patient documentation.     Indu Montes MD  [facility] [place]

## 2021-12-20 NOTE — PROGRESS NOTES
"   Physical Therapy Daily Progress Note        Patient: Bernardo Dodd   : 1980  Diagnosis/ICD-10 Code:  Pain, neck [M54.2]  Referring practitioner: SUHA Gibbs  Date of Initial Visit: Type: THERAPY  Noted: 7/15/2021  Today's Date: 2021  Patient seen for 4 sessions         Bernardo Dodd       Subjective pt curled up in front office crying c/o mm spasms.  Asked pt if she need to see MD and not PT this date based on c/o pain and crying. \"My Dr sent me here for help.  I am having spams in my back and I feel it in my shoulders. I get pain in my hands too. 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 I have trouble grabbing the sheets at times b/c of my  problems.\"    Objective   See Exercise, Manual, and Modality Logs for complete treatment.       Assessment/Plan gentle palpation of cervical spine and gentle manual traction, decreased cervical pain and c/o lower thoracic pain with cervical traction.  No TPs noted upon palpation of cervical region. Supine and sitting exs working on posture and  strength.  Putty provided for HEP.      Progress per Plan of Care           Manual Therapy:    12     mins  33312;  Therapeutic Exercise:    18     mins  81985;     Neuromuscular Elli:        mins  27771;    Therapeutic Activity:          mins  08192;     Gait Training:           mins  81879;     Ultrasound:          mins  98115;    Electrical Stimulation:         mins  97422 ( );  Dry Needling          mins self-pay    Timed Treatment:   30   mins   Total Treatment:     30   mins    Eve Mackey PTA  Physical Therapist Assistant                    "

## 2021-12-22 ENCOUNTER — TREATMENT (OUTPATIENT)
Dept: PHYSICAL THERAPY | Facility: CLINIC | Age: 41
End: 2021-12-22

## 2021-12-22 DIAGNOSIS — M79.601 PAIN IN BOTH UPPER EXTREMITIES: ICD-10-CM

## 2021-12-22 DIAGNOSIS — M54.2 PAIN, NECK: Primary | ICD-10-CM

## 2021-12-22 DIAGNOSIS — M79.602 PAIN IN BOTH UPPER EXTREMITIES: ICD-10-CM

## 2021-12-22 PROCEDURE — 97140 MANUAL THERAPY 1/> REGIONS: CPT | Performed by: PHYSICAL THERAPIST

## 2021-12-22 PROCEDURE — 97112 NEUROMUSCULAR REEDUCATION: CPT | Performed by: PHYSICAL THERAPIST

## 2021-12-22 PROCEDURE — 97110 THERAPEUTIC EXERCISES: CPT | Performed by: PHYSICAL THERAPIST

## 2021-12-22 NOTE — PROGRESS NOTES
Physical Therapy Daily Progress Note    Subjective   Bernardo Dodd reports: she feels about the same overall since starting PT.      Objective   See Exercise, Manual, and Modality Logs for complete treatment.       Assessment/Plan   Pt tolerated treatment well. She complains of pain with every exercise. Traction relieves symptoms. Pt would benefit from continued skilled PT.    Progress per Plan of Care           Manual Therapy:    10     mins  57999;  Therapeutic Exercise:    18     mins  38552;     Neuromuscular Elli:    10    mins  03813;    Therapeutic Activity:          mins  53675;     Gait Training:           mins  54563;     Ultrasound:          mins  69231;    Electrical Stimulation:         mins  37104 ( );  E-Stim Attended:         mins  20179  Iontophoresis          mins 25232   Traction          mins  84166  Fluidotherapy          mins  08808  Dry Needling         mins self-pay - No Charge  Paraffin          mins  67670    Timed Treatment:   38   mins   Total Treatment:     38   mins    Gali Latif, PT, DPT  Physical Therapist

## 2021-12-23 LAB
BACTERIA UR CULT: NO GROWTH
BACTERIA UR CULT: NORMAL

## 2021-12-29 ENCOUNTER — CLINICAL SUPPORT (OUTPATIENT)
Dept: INTERNAL MEDICINE | Facility: CLINIC | Age: 41
End: 2021-12-29

## 2021-12-29 DIAGNOSIS — Z11.52 ENCOUNTER FOR SCREENING FOR COVID-19: Primary | ICD-10-CM

## 2021-12-29 PROCEDURE — U0004 COV-19 TEST NON-CDC HGH THRU: HCPCS | Performed by: PHYSICIAN ASSISTANT

## 2021-12-30 LAB — SARS-COV-2 RNA NOSE QL NAA+PROBE: NOT DETECTED

## 2022-01-03 ENCOUNTER — HOSPITAL ENCOUNTER (OUTPATIENT)
Dept: MAMMOGRAPHY | Facility: HOSPITAL | Age: 42
Discharge: HOME | End: 2022-01-03
Admitting: PHYSICIAN ASSISTANT

## 2022-01-03 DIAGNOSIS — Z12.31 ENCOUNTER FOR SCREENING MAMMOGRAM FOR MALIGNANT NEOPLASM OF BREAST: ICD-10-CM

## 2022-01-03 PROCEDURE — 77067 SCR MAMMO BI INCL CAD: CPT

## 2022-01-03 PROCEDURE — 77063 BREAST TOMOSYNTHESIS BI: CPT | Performed by: RADIOLOGY

## 2022-01-03 PROCEDURE — 77063 BREAST TOMOSYNTHESIS BI: CPT

## 2022-01-03 PROCEDURE — 77067 SCR MAMMO BI INCL CAD: CPT | Performed by: RADIOLOGY

## 2022-01-10 ENCOUNTER — TREATMENT (OUTPATIENT)
Dept: PHYSICAL THERAPY | Facility: CLINIC | Age: 42
End: 2022-01-10

## 2022-01-10 DIAGNOSIS — M54.2 PAIN, NECK: Primary | ICD-10-CM

## 2022-01-10 DIAGNOSIS — M79.601 PAIN IN BOTH UPPER EXTREMITIES: ICD-10-CM

## 2022-01-10 DIAGNOSIS — M79.602 PAIN IN BOTH UPPER EXTREMITIES: ICD-10-CM

## 2022-01-10 PROCEDURE — 97110 THERAPEUTIC EXERCISES: CPT | Performed by: PHYSICAL THERAPIST

## 2022-01-10 PROCEDURE — 97140 MANUAL THERAPY 1/> REGIONS: CPT | Performed by: PHYSICAL THERAPIST

## 2022-01-10 NOTE — PROGRESS NOTES
Physical Therapy Daily Progress Note    Subjective   Bernardo Dodd reports: she has been taping her left hand and wearing a soft brace overtop of it in order to support the hand and decrease tingling. She she also puts a lidocaine patch on the inside to decrease pain. She is 15 min late today.        Objective   See Exercise, Manual, and Modality Logs for complete treatment.       Assessment/Plan  Pt tolerated treatment well. She was instructed that it is fine to wear the tape and brace at night if it helps her sleep, but to try and use her hand more throughout the day so she does not lose more function. Pt would benefit from continued skilled PT.    Progress per Plan of Care           Manual Therapy:    14     mins  10218;  Therapeutic Exercise:    10     mins  97634;     Neuromuscular Elli:        mins  68266;    Therapeutic Activity:          mins  72283;     Gait Training:           mins  11634;     Ultrasound:         mins  63498;    Electrical Stimulation:         mins  11498 ( );  E-Stim Attended:         mins  37818  Iontophoresis          mins 57177   Traction          mins  20442  Fluidotherapy          mins  80185  Dry Needling          mins self-pay - No Charge  Paraffin          mins  50095    Timed Treatment:   24   mins   Total Treatment:     45   mins    Gali Latif, PT, DPT  Physical Therapist

## 2022-01-11 ENCOUNTER — TELEMEDICINE (OUTPATIENT)
Dept: PSYCHIATRY | Facility: CLINIC | Age: 42
End: 2022-01-11

## 2022-01-11 DIAGNOSIS — G47.9 SLEEP DIFFICULTIES: Chronic | ICD-10-CM

## 2022-01-11 DIAGNOSIS — F19.950: Primary | Chronic | ICD-10-CM

## 2022-01-11 DIAGNOSIS — F19.11 HISTORY OF SUBSTANCE ABUSE: ICD-10-CM

## 2022-01-11 DIAGNOSIS — M25.519 NECK AND SHOULDER PAIN: Primary | ICD-10-CM

## 2022-01-11 DIAGNOSIS — M54.2 NECK AND SHOULDER PAIN: Primary | ICD-10-CM

## 2022-01-11 PROCEDURE — 99214 OFFICE O/P EST MOD 30 MIN: CPT | Performed by: NURSE PRACTITIONER

## 2022-01-11 RX ORDER — AMITRIPTYLINE HYDROCHLORIDE 25 MG/1
25 TABLET, FILM COATED ORAL NIGHTLY PRN
COMMUNITY
End: 2022-01-11 | Stop reason: SDUPTHER

## 2022-01-11 RX ORDER — AMITRIPTYLINE HYDROCHLORIDE 25 MG/1
TABLET, FILM COATED ORAL
Qty: 180 TABLET | Refills: 0 | Status: SHIPPED | OUTPATIENT
Start: 2022-01-11 | End: 2022-08-23

## 2022-01-11 NOTE — PROGRESS NOTES
"This provider is located at the [facility] (through [facility]), [address], [facility], [address] using a secure [employer] Video Visit through [employer]. Patient is being seen remotely via telehealth at their home address in [place], and stated they are in a secure environment for this session. The patient's condition being diagnosed/treated is appropriate for telemedicine. The provider identified herself as well as her credentials.   The patient, and/or patients guardian, consent to be seen remotely, and when consent is given they understand that the consent allows for patient identifiable information to be sent to a third party as needed.   They may refuse to be seen remotely at any time. The electronic data is encrypted and password protected, and the patient and/or guardian has been advised of the potential risks to privacy not withstanding such measures.    You have chosen to receive care through a telehealth visit.  Do you consent to use a video/audio connection for your medical care today? Yes        Subjective   Bernardo Dodd is a 41 y.o. female who presents today for follow up    Chief Complaint:  Psychosis, anxiety, sleep disruption    Accompanied by:Pt was alone for duration of appointment    History of Present Illness:   Pt was last seen by this APRN on 8/25/21.  Pt states that she has been staying to herself and has been busy working \"under the tablet\". 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 Pt continues to be in a relationship with her boyfriend. Her 12 YO is still staying with her mother; pt would like to see her son home soon. Pt and her mother are trying their best to get along. The delusions continue with her stalker. She reports she is about to lose her job over it. She reports no one around her believes it and she is \"very stressed out\". 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 Pt denies " depressed. Pt has been sleeping with an old prescription of Elavil. Pt would like for this APRN to send in a prescription for it. Pt made today's appointment because [facility] wants a statement stating pt is stable and is no longer delusional. This APRN explained to pt that she is not able to make such a statement because psychosis is still present. Pt doesn't wish to be on medication for it at this time. The patient denies any new medical problems or changes in medications since last appointment with this facility. The patient denies any abnormal muscle movements or tics. The patient denies any suicidal or homicidal ideations, plans, or intent at today's encounter and is convincing. The patient does not endorse any significant symptoms consistent with roque at today's encounter.          Prior Psychiatric Medications:  Elavil  Lamictal - for seizures  Zoloft  Celexa  Wellbutrin   Seroquel - agitated            The following portions of the patient's history were reviewed and updated as appropriate: allergies, current medications, past family history, past medical history, past social history, past surgical history and problem list.          Past Medical History:  Past Medical History:   Diagnosis Date   • Allergic rhinitis    • Anemia    • Arthritis    • Asthma    • Bartholin gland cyst    • Chlamydia    • Depression    • FHx: migraine headaches    • GERD (gastroesophageal reflux disease)    • Heart murmur    • Herpes simplex    • History of chest x-ray 11/01/2015    no active disease   • History of echocardiogram 11/01/2015    ejection fraction of greater than 65%, mitral and pulmonic regurgitation an physiological tricuspid regurgitation.   • History of PFTs 12/22/2015    spirometry data acceptable and reproducible; pt given 4 puffs of Ventolin; pt gave good effort; no obstruction; no Bd response; MVV reduced    • History of PFTs 11/02/2015    pt gave best effort; duoneb given prior and post study;  moderate nonspecific proportional reduction of FEV1 and FVC with preserved ratio; FEV1 moderately reduced; cannot rule out restriction   • Hypertension    • Migraine    • Ovarian cyst    • Pelvic pain    • Screening breast examination     self;admits   • Seizures (HCC)    • STD (female)    • Thigh shingles    • Trichomonas infection        Social History:  Social History     Socioeconomic History   • Marital status: Single   Tobacco Use   • Smoking status: Current Every Day Smoker     Packs/day: 0.25     Years: 15.00     Pack years: 3.75     Types: Cigarettes     Last attempt to quit: 2015     Years since quittin.9   • Smokeless tobacco: Never Used   Vaping Use   • Vaping Use: Some days   • Devices: Disposable   • Passive vaping exposure: Yes   Substance and Sexual Activity   • Alcohol use: No   • Drug use: Yes     Types: Marijuana     Comment: Marijuana once a week. Tried cocaine and pain pills int the past   • Sexual activity: Yes     Birth control/protection: Surgical       Family History:  Family History   Problem Relation Age of Onset   • Pancreatic cancer Maternal Grandmother    • Heart failure Paternal Grandmother    • MARCIE disease Paternal Aunt    • Arthritis Mother    • Cancer Mother    • Bipolar disorder Mother    • Arthritis Father    • Dementia Father    • Pancreatic cancer Other    • Diabetes Other    • Heart disease Other    • Hypertension Other    • Other Other         RESPIRATORY DISEASE   • Heart attack Neg Hx    • Hyperlipidemia Neg Hx    • Mental illness Neg Hx    • Obesity Neg Hx    • Stroke Neg Hx    • Breast cancer Neg Hx    • Ovarian cancer Neg Hx        Past Surgical History:  Past Surgical History:   Procedure Laterality Date   • BILATERAL BREAST REDUCTION     • BREAST BIOPSY     • BREAST SURGERY      breast reduction   •  SECTION     • CYSTOSCOPY     • DIAGNOSTIC LAPAROSCOPY     • INCISION AND DRAINAGE ABSCESS      bartholin's   • LAPAROSCOPIC TUBAL LIGATION     • ORIF ANKLE  FRACTURE Right 2005   • REDUCTION MAMMAPLASTY Bilateral 1998   • TENSION FREE VAGINAL TAPING WITH MINI ARC SLING      Dr Doyle avrey        Problem List:  Patient Active Problem List   Diagnosis   • Asthma   • Obesity   • GERD (gastroesophageal reflux disease)   • Dyspnea   • Allergic rhinitis   • Hypertension   • Menorrhagia with regular cycle   • Herpes simplex infection   • Acute right lumbar radiculopathy   • Health care maintenance   • Chronic maxillary sinusitis   • Iron deficiency anemia   • Ureteral obstruction, left   • Seizure disorder (HCC)   • Fungal infection       Allergy:   Allergies   Allergen Reactions   • Naproxen Swelling   • Sulfa Antibiotics Rash        Current Medications:   Current Outpatient Medications   Medication Sig Dispense Refill   • amitriptyline (ELAVIL) 25 MG tablet Take 1-2 tablets PO QHS PRN for sleep 180 tablet 0   • albuterol (PROVENTIL) (2.5 MG/3ML) 0.083% nebulizer solution Take 2.5 mg by nebulization Every 12 (Twelve) Hours. 30 vial 1   • albuterol sulfate  (90 Base) MCG/ACT inhaler Inhale 2 puffs Every 4 (Four) Hours As Needed for Wheezing or Shortness of Air. 18 g 2   • amLODIPine (NORVASC) 5 MG tablet Take 1 tablet by mouth Daily. 90 tablet 1   • clotrimazole (LOTRIMIN) 1 % external solution Apply 2-3 drops to right ear canal twice daily 10 mL 0   • fluticasone (Flonase) 50 MCG/ACT nasal spray 2 sprays into the nostril(s) as directed by provider Daily. 16 g 2   • lamoTRIgine (LaMICtal) 100 MG tablet Take 1 tablet by mouth Daily. 30 tablet 4   • neomycin-polymyxin-hydrocortisone (CORTISPORIN) 3.5-36798-7 otic solution Administer 3 drops into the left ear 4 (Four) Times a Day. 10 mL 0   • SPIRIVA RESPIMAT 2.5 MCG/ACT aerosol solution Take 2 puffs by mouth Daily.       No current facility-administered medications for this visit.       Review of Symptoms:    Review of Systems   Constitutional: Positive for activity change.   Psychiatric/Behavioral: Positive for  hallucinations, sleep disturbance and stress.         Physical Exam:   Due to the remote nature of this encounter (virtual encounter), vitals were unable to be obtained.  Height stated at 59 inches.  Weight stated at 155 pounds.      Physical Exam  Neurological:      Mental Status: She is alert.   Psychiatric:         Attention and Perception: Attention normal.         Mood and Affect: Mood is anxious.         Speech: Speech normal.         Behavior: Behavior is cooperative.         Thought Content: Thought content is paranoid and delusional. Thought content does not include homicidal or suicidal ideation. Thought content does not include homicidal or suicidal plan.         Cognition and Memory: Cognition and memory normal.         Judgment: Judgment is impulsive.           Mental Status Exam:   Hygiene:   good  Cooperation:  Cooperative  Eye Contact:  Fair  Psychomotor Behavior:  Restless  Affect:  Appropriate  Mood: normal  Speech:  Normal  Thought Process:  Goal directed  Thought Content:  Bizarre  Suicidal:  None  Homicidal:  None  Hallucinations:  None  Delusion:  Paranoid  Memory:  Intact  Orientation:  Person, Place, Time and Situation  Reliability:  poor  Insight:  Poor  Judgement:  Poor  Impulse Control:  Poor        PHQ-9 Depression Screening  Little interest or pleasure in doing things? 3   Feeling down, depressed, or hopeless? 3   Trouble falling or staying asleep, or sleeping too much? 3   Feeling tired or having little energy? 1   Poor appetite or overeating? 3   Feeling bad about yourself - or that you are a failure or have let yourself or your family down? 3   Trouble concentrating on things, such as reading the newspaper or watching television? 0   Moving or speaking so slowly that other people could have noticed? Or the opposite - being so fidgety or restless that you have been moving around a lot more than usual? 1   Thoughts that you would be better off dead, or of hurting yourself in some way? 0    PHQ-9 Total Score 17   If you checked off any problems, how difficult have these problems made it for you to do your work, take care of things at home, or get along with other people? Not difficult at all     PHQ-9 Total Score: 17        RADHAMES 7 anxiety screening tool that patient filled out virtually reviewed by this APRN at today's encounter.    PROMIS scale screening tool that patient filled out virtually reviewed by this APRN at today's encounter.    Previous Provider notes and available records reviewed by this APRN at today's encounter.          Lab Results:   Clinical Support on 12/29/2021   Component Date Value Ref Range Status   • SARS-CoV-2 TIFFANY 12/29/2021 Not Detected  Not Detected Final   Office Visit on 12/20/2021   Component Date Value Ref Range Status   • Color 12/20/2021 Yellow  Yellow, Straw, Dark Yellow, Andreina Final   • Clarity, UA 12/20/2021 Clear  Clear Final   • Specific Gravity  12/20/2021 1.030  1.005 - 1.030 Final   • pH, Urine 12/20/2021 6.0  5.0 - 8.0 Final   • Leukocytes 12/20/2021 Negative  Negative Final   • Nitrite, UA 12/20/2021 Negative  Negative Final   • Protein, POC 12/20/2021 Negative  Negative mg/dL Final   • Glucose, UA 12/20/2021 Negative  Negative, 1000 mg/dL (3+) mg/dL Final   • Ketones, UA 12/20/2021 Negative  Negative Final   • Urobilinogen, UA 12/20/2021 Normal  Normal Final   • Bilirubin 12/20/2021 Negative  Negative Final   • Blood, UA 12/20/2021 Negative  Negative Final   • Lot Number 12/20/2021 [id number]   Final   • Expiration Date 12/20/2021 07/25/23   Final   • Urine Culture 12/20/2021 Final report   Final   • Result 1 12/20/2021 No growth   Final         Assessment/Plan   Problems Addressed this Visit     None      Visit Diagnoses     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 Substance or medication-induced psychotic disorder with delusions (HCC)  F19.950 292.11     305.90   2. Sleep difficulties  G47.9 780.50   3. History of substance abuse (HCC)  F19.11 305.93          GOALS:  Short Term Goals: Patient will be compliant with medication, and patient will have no significant medication related side effects.  Patient will be engaged in psychotherapy as indicated.  Patient will report subjective improvement of symptoms.  Long term goals: To stabilize mood and treat/improve subjective symptoms, the patient will stay out of the hospital, the patient will be at an optimal level of functioning, and the patient will take all medications as prescribed.  The patient verbalized understanding and agreement with goals that were mutually set.      TREATMENT PLAN:   Continue supportive psychotherapy efforts and medications as indicated.   -Discontinue Seroquel  -Continue Elavil 25-50 mg PO QHS PRN for sleep (pt has been taking an old prescription)  -Pt made an appointment today to obtain a statement from this APRN that she can give to work stating she is no longer experiencing psychosis. This APRN explained that she is not able to give a statement because the delusions are still present. Pt declined antipsychotics at this time.     Medication and treatment options, both pharmacological and non-pharmacological treatment options, discussed during today's visit, including any off label use of medication. Patient acknowledged and verbally consented with current treatment  plan and was educated on the importance of compliance with treatment and follow-up appointments.        MEDICATION ISSUES:    Discussed treatment plan and medication options of prescribed medication as well as the risks, benefits, any black box warnings, and side effects including potential falls, possible impaired driving, and metabolic adversities among others, including any off label use of medication. Patient is agreeable to call the office with any worsening of symptoms or onset of side effects, or if any concerns or questions arise.  The contact information for the office is made available to the patient. Patient is agreeable to call 911 or go to the nearest ER should they begin having any SI/HI, or if any urgent concerns arise. No medication side effects or related complaints today.       MEDS ORDERED DURING VISIT:  New Medications Ordered This Visit   Medications   • amitriptyline (ELAVIL) 25 MG tablet     Sig: Take 1-2 tablets PO QHS PRN for sleep     Dispense:  180 tablet     Refill:  0       Return in about 12 weeks (around 4/5/2022), or if symptoms worsen or fail to improve, for Recheck.     Treatment plan completed: 8/25/21    Progress toward goal: Not at goal    Functional Status: Severe impairment    Prognosis: Fair with Ongoing Treatment         This document has been electronically signed by SHANE Lopez  January 11, 2022 10:02 EST     Some of the data in this electronic note has been brought forward from a previous encounter, any necessary changes have been made, it has been reviewed by this APRN, and it is accurate.    Please note that portions of this note were completed with a voice recognition program. Efforts were made to edit dictation, but occasionally words are mistranscribed.

## 2022-01-12 ENCOUNTER — TREATMENT (OUTPATIENT)
Dept: PHYSICAL THERAPY | Facility: CLINIC | Age: 42
End: 2022-01-12

## 2022-01-12 DIAGNOSIS — M79.601 PAIN IN BOTH UPPER EXTREMITIES: ICD-10-CM

## 2022-01-12 DIAGNOSIS — M79.602 PAIN IN BOTH UPPER EXTREMITIES: ICD-10-CM

## 2022-01-12 DIAGNOSIS — M54.2 PAIN, NECK: Primary | ICD-10-CM

## 2022-01-12 PROCEDURE — 97110 THERAPEUTIC EXERCISES: CPT | Performed by: PHYSICAL THERAPIST

## 2022-01-12 PROCEDURE — 97140 MANUAL THERAPY 1/> REGIONS: CPT | Performed by: PHYSICAL THERAPIST

## 2022-01-12 NOTE — PROGRESS NOTES
Physical Therapy Daily Progress Note    Subjective   Bernardo Dodd reports: she feels like her hand has more function today than it did on Monday.      Objective   See Exercise, Manual, and Modality Logs for complete treatment.       Assessment/Plan   Pt tolerated treatment well. She required cues to squeeze her shoulders down and back with rows. Pt would benefit from continued skilled PT.    Progress per Plan of Care           Manual Therapy:    15     mins  63682;  Therapeutic Exercise:    12     mins  16939;     Neuromuscular Elli:        mins  85157;    Therapeutic Activity:          mins  40127;     Gait Training:           mins  60524;     Ultrasound:          mins  25053;    Electrical Stimulation:         mins  74868 ( );  E-Stim Attended:         mins  98072  Iontophoresis          mins 23087   Traction          mins  91610  Fluidotherapy          mins  50038  Dry Needling          mins self-pay - No Charge  Paraffin          mins  42516    Timed Treatment:   27   mins   Total Treatment:     54   mins    Gali Latif, PT, DPT  Physical Therapist

## 2022-01-14 PROCEDURE — U0004 COV-19 TEST NON-CDC HGH THRU: HCPCS | Performed by: NURSE PRACTITIONER

## 2022-01-18 ENCOUNTER — TREATMENT (OUTPATIENT)
Dept: PHYSICAL THERAPY | Facility: CLINIC | Age: 42
End: 2022-01-18

## 2022-01-18 DIAGNOSIS — M54.2 PAIN, NECK: Primary | ICD-10-CM

## 2022-01-18 DIAGNOSIS — M79.602 PAIN IN BOTH UPPER EXTREMITIES: ICD-10-CM

## 2022-01-18 DIAGNOSIS — M79.601 PAIN IN BOTH UPPER EXTREMITIES: ICD-10-CM

## 2022-01-18 PROCEDURE — 97110 THERAPEUTIC EXERCISES: CPT | Performed by: PHYSICAL THERAPIST

## 2022-01-18 PROCEDURE — 97140 MANUAL THERAPY 1/> REGIONS: CPT | Performed by: PHYSICAL THERAPIST

## 2022-01-18 NOTE — PROGRESS NOTES
Physical Therapy Daily Progress Note    Subjective   Bernardo Dodd reports: her right hand started hurting when she was sleeping. The pain started shooting up the arm, and has hurt every since. She usually has more pain in the left arm, so she is unsure of why this is happening.      Objective   See Exercise, Manual, and Modality Logs for complete treatment.       Assessment/Plan   Pt tolerated treatment with mod complaints of pain. She had increased tightness in the scalenes on the right side of her neck. She will be re assessed next visit.     Progress per Plan of Care           Manual Therapy:    13     mins  19598;  Therapeutic Exercise:    10     mins  43891;     Neuromuscular Elli:        mins  45991;    Therapeutic Activity:          mins  52851;     Gait Training:           mins  22232;     Ultrasound:          mins  15137;    Electrical Stimulation:         mins  42743 ( );  E-Stim Attended:         mins  55144  Iontophoresis         mins 24097   Traction          mins  72375  Fluidotherapy          mins  11356  Dry Needling          mins self-pay - No Charge  Paraffin          mins  81826    Timed Treatment:   23   mins   Total Treatment:     42   mins    Gali Latif, PT, DPT  Physical Therapist

## 2022-01-20 ENCOUNTER — TREATMENT (OUTPATIENT)
Dept: PHYSICAL THERAPY | Facility: CLINIC | Age: 42
End: 2022-01-20

## 2022-01-20 DIAGNOSIS — M79.602 PAIN IN BOTH UPPER EXTREMITIES: ICD-10-CM

## 2022-01-20 DIAGNOSIS — M54.2 PAIN, NECK: Primary | ICD-10-CM

## 2022-01-20 DIAGNOSIS — M79.601 PAIN IN BOTH UPPER EXTREMITIES: ICD-10-CM

## 2022-01-20 PROCEDURE — 97110 THERAPEUTIC EXERCISES: CPT | Performed by: PHYSICAL THERAPIST

## 2022-01-20 PROCEDURE — 97140 MANUAL THERAPY 1/> REGIONS: CPT | Performed by: PHYSICAL THERAPIST

## 2022-01-20 NOTE — PROGRESS NOTES
Physical Therapy Daily Progress Note    Subjective   Bernardo Dodd reports: she is still in a lot of pain. Overall, PT has not changed her symptoms. She reports she has a lot of pain in her left hand, and sometimes has it in the right as well. Her neck pain also remains unchanged. She cannot feel her fingertips and has a hard time picking up small objects like go pins.     Today's Pain ratin/10  Worst: 8/10    Objective          Active Range of Motion   Cervical/Thoracic Spine   Cervical    Flexion: 53 degrees   Extension: 60 degrees   Left rotation: 60 degrees   Right rotation: 54 degrees     Strength/Myotome Testing     Left Shoulder     Planes of Motion   Flexion: 3+   Abduction: 3+   External rotation at 0°: 4+   Internal rotation at 0°: 4+     Right Shoulder     Planes of Motion   Flexion: 4-   Abduction: 3+   External rotation at 0°: 4+   Internal rotation at 0°: 4+     Left Elbow   Flexion: 5  Extension: 4+    Right Elbow   Flexion: 5  Extension: 4+    Left Wrist/Hand   Wrist extension: 4+  Wrist flexion: 4+     (2nd hand position)   lbs: 15    Right Wrist/Hand   Wrist extension: 4+  Wrist flexion: 4+     (2nd hand position)   lbs: 15      See Exercise, Manual, and Modality Logs for complete treatment.       Assessment/Plan   Pt is a 41 year old female who has been coming to PT for neck and bilateral upper extremity pain. Her overall strength has improved since starting PT. Her cervical extension has improved. However her right  strength and cervical rotation have gotten worse. She was encouraged to discuss her concerns with her PCP. We will continue PT to keep building strength and managing symptoms.     Progress per Plan of Care           Manual Therapy:    15     mins  93862;  Therapeutic Exercise:    10     mins  52967;     Neuromuscular Elli:        mins  18039;    Therapeutic Activity:          mins  66049;     Gait Training:           mins  43534;     Ultrasound:          mins   80324;    Electrical Stimulation:         mins  07632 ( );  E-Stim Attended:         mins  15259  Iontophoresis          mins 27340   Traction          mins  50129  Fluidotherapy          mins  17905  Dry Needling         mins self-pay - No Charge  Paraffin          mins  38072    Timed Treatment:   25   mins   Total Treatment:     54   mins    Gali Latif, PT, DPT  Physical Therapist

## 2022-01-21 ENCOUNTER — HOSPITAL ENCOUNTER (OUTPATIENT)
Dept: GENERAL RADIOLOGY | Facility: HOSPITAL | Age: 42
Discharge: HOME | End: 2022-01-21
Admitting: PHYSICIAN ASSISTANT

## 2022-01-21 ENCOUNTER — TELEMEDICINE (OUTPATIENT)
Dept: INTERNAL MEDICINE | Facility: CLINIC | Age: 42
End: 2022-01-21

## 2022-01-21 DIAGNOSIS — G56.03 BILATERAL CARPAL TUNNEL SYNDROME: ICD-10-CM

## 2022-01-21 DIAGNOSIS — M54.2 NECK PAIN: Primary | ICD-10-CM

## 2022-01-21 DIAGNOSIS — F22 DELUSION: ICD-10-CM

## 2022-01-21 DIAGNOSIS — F41.9 ANXIETY: ICD-10-CM

## 2022-01-21 DIAGNOSIS — M54.2 NECK PAIN: ICD-10-CM

## 2022-01-21 PROCEDURE — 99214 OFFICE O/P EST MOD 30 MIN: CPT | Performed by: PHYSICIAN ASSISTANT

## 2022-01-21 PROCEDURE — 72040 X-RAY EXAM NECK SPINE 2-3 VW: CPT

## 2022-01-21 RX ORDER — LAMOTRIGINE 100 MG/1
100 TABLET ORAL DAILY
Qty: 30 TABLET | Refills: 4 | Status: SHIPPED | OUTPATIENT
Start: 2022-01-21 | End: 2022-05-04 | Stop reason: SDUPTHER

## 2022-01-21 NOTE — PROGRESS NOTES
Chief Complaint   Patient presents with   • Back Pain       Subjective   Bernardo Dodd is a 41 y.o. female.       History of Present Illness     This was an audio and video enabled telemedicine encounter.    Pt has been doing PT for neck, shoulder and arm pain. She is having pain and numbness in her left hand. She has burning  In her hands when it flares up. 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     Her neck and shoulder are also worse when she is working and have not improved with PT.     Pt has been seeing psychiatry to manage her delusions and ongoing stress.           Current Outpatient Medications:   •  albuterol (PROVENTIL) (2.5 MG/3ML) 0.083% nebulizer solution, Take 2.5 mg by nebulization Every 12 (Twelve) Hours., Disp: 30 vial, Rfl: 1  •  albuterol sulfate  (90 Base) MCG/ACT inhaler, Inhale 2 puffs Every 4 (Four) Hours As Needed for Wheezing or Shortness of Air., Disp: 18 g, Rfl: 2  •  amitriptyline (ELAVIL) 25 MG tablet, Take 1-2 tablets PO QHS PRN for sleep, Disp: 180 tablet, Rfl: 0  •  amLODIPine (NORVASC) 5 MG tablet, Take 1 tablet by mouth Daily., Disp: 90 tablet, Rfl: 1  •  clotrimazole (LOTRIMIN) 1 % external solution, Apply 2-3 drops to right ear canal twice daily, Disp: 10 mL, Rfl: 0  •  fluticasone (Flonase) 50 MCG/ACT nasal spray, 2 sprays into the nostril(s) as directed by provider Daily., Disp: 16 g, Rfl: 2  •  lamoTRIgine (LaMICtal) 100 MG tablet, Take 1 tablet by mouth Daily., Disp: 30 tablet, Rfl: 4  •  neomycin-polymyxin-hydrocortisone (CORTISPORIN) 3.5-26237-5 otic solution, Administer 3 drops into the left ear 4 (Four) Times a Day., Disp: 10 mL, Rfl: 0  •  SPIRIVA RESPIMAT 2.5 MCG/ACT aerosol solution, Take 2 puffs by mouth Daily., Disp: , Rfl:      PMFSH  The following portions of the patient's history were reviewed and updated as appropriate: allergies, current medications, past family history, past medical history, past social history, past surgical  history and problem list.    Review of Systems   Constitutional: Negative for activity change, appetite change and fatigue.   HENT: Negative for congestion and rhinorrhea.    Respiratory: Negative for chest tightness and shortness of breath.    Cardiovascular: Negative for chest pain and palpitations.   Gastrointestinal: Negative for abdominal pain.   Genitourinary: Negative for dysuria.   Musculoskeletal: Negative for arthralgias and myalgias.   Neurological: Negative for dizziness, weakness, light-headedness and headaches.   Psychiatric/Behavioral: Negative for dysphoric mood. The patient is not nervous/anxious.        Objective   There were no vitals taken for this visit.    Physical Exam  Vitals and nursing note reviewed.   Constitutional:       General: She is not in acute distress.     Appearance: She is well-developed. She is not diaphoretic.   HENT:      Head: Normocephalic and atraumatic.   Eyes:      General: No scleral icterus.     Conjunctiva/sclera: Conjunctivae normal.      Pupils: Pupils are equal, round, and reactive to light.   Cardiovascular:      Rate and Rhythm: Normal rate and regular rhythm.      Heart sounds: Normal heart sounds. No murmur heard.  No gallop.    Pulmonary:      Effort: Pulmonary effort is normal. No respiratory distress.      Breath sounds: Normal breath sounds. No wheezing or rales.   Chest:      Chest wall: No tenderness.   Abdominal:      Palpations: Abdomen is soft.      Tenderness: There is no abdominal tenderness.   Musculoskeletal:         General: Tenderness present. No deformity.      Cervical back: Normal range of motion and neck supple.   Skin:     General: Skin is warm and dry.      Findings: No rash.   Neurological:      Mental Status: She is alert and oriented to person, place, and time.      Sensory: No sensory deficit.      Motor: No tremor, atrophy or abnormal muscle tone.      Coordination: Coordination normal.      Deep Tendon Reflexes: Reflexes are normal  and symmetric. Reflexes normal.      Reflex Scores:       Patellar reflexes are 2+ on the right side and 2+ on the left side.       Achilles reflexes are 2+ on the right side and 2+ on the left side.  Psychiatric:         Behavior: Behavior normal.         Thought Content: Thought content normal.         Judgment: Judgment normal.              ASSESSMENT/PLAN    Diagnoses and all orders for this visit:    1. Neck pain (Primary)  Comments:  Pt has tried and failed PT. Check cervical spine xray. If nondiagnostic, will refer for MRI.  Orders:  -     XR Spine Cervical 2 or 3 View; Future    2. Bilateral carpal tunnel syndrome  Comments:  Persistent numbness and pain even with PT. NCS in 2018 showed carpal tunnel. Refer to hand specialist for eval.  Orders:  -     Ambulatory Referral to Hand Surgery    3. Delusion (HCC)  Comments:  Refer to therapist at patient request.  Orders:  -     Ambulatory Referral to Behavioral Health    4. Anxiety  -     Ambulatory Referral to Behavioral Health    Other orders  -     lamoTRIgine (LaMICtal) 100 MG tablet; Take 1 tablet by mouth Daily.  Dispense: 30 tablet; Refill: 4             No follow-ups on file.

## 2022-01-23 DIAGNOSIS — M54.2 NECK AND SHOULDER PAIN: Primary | ICD-10-CM

## 2022-01-23 DIAGNOSIS — M25.519 NECK AND SHOULDER PAIN: Primary | ICD-10-CM

## 2022-01-24 NOTE — PROGRESS NOTES
Your xray does not show any abnormalities other than some small bone spurs. I will order the neck MRI as we discussed.

## 2022-01-28 ENCOUNTER — TELEPHONE (OUTPATIENT)
Dept: PHYSICAL THERAPY | Facility: CLINIC | Age: 42
End: 2022-01-28

## 2022-02-04 ENCOUNTER — APPOINTMENT (OUTPATIENT)
Dept: MRI IMAGING | Facility: HOSPITAL | Age: 42
End: 2022-02-04

## 2022-02-08 ENCOUNTER — TREATMENT (OUTPATIENT)
Dept: PHYSICAL THERAPY | Facility: CLINIC | Age: 42
End: 2022-02-08

## 2022-02-08 DIAGNOSIS — M54.2 PAIN, NECK: Primary | ICD-10-CM

## 2022-02-08 DIAGNOSIS — M79.601 PAIN IN BOTH UPPER EXTREMITIES: ICD-10-CM

## 2022-02-08 DIAGNOSIS — M79.602 PAIN IN BOTH UPPER EXTREMITIES: ICD-10-CM

## 2022-02-08 PROCEDURE — 97140 MANUAL THERAPY 1/> REGIONS: CPT | Performed by: PHYSICAL THERAPIST

## 2022-02-08 PROCEDURE — 97112 NEUROMUSCULAR REEDUCATION: CPT | Performed by: PHYSICAL THERAPIST

## 2022-02-08 PROCEDURE — 97110 THERAPEUTIC EXERCISES: CPT | Performed by: PHYSICAL THERAPIST

## 2022-02-08 NOTE — PROGRESS NOTES
Physical Therapy Daily Progress Note    Subjective   Bernardo Dodd reports: she woke up with her right wrist really hurting and swollen. She does not remember doing anything specific to it.       Objective          Tests     Right Wrist/Hand   Positive Finkelstein's.       See Exercise, Manual, and Modality Logs for complete treatment.       Assessment/Plan   Pt tolerated treatment well. Soft tissue work to the radial side of the wrist caused min relief of pain. She is going to a hand specialist tomorrow. Exercises mostly focused on the neck and mid back today. Pt would benefit from continued skilled PT.    Progress per Plan of Care           Manual Therapy:    23     mins  09750;  Therapeutic Exercise:    10     mins  58573;     Neuromuscular Elli:    10    mins  48858;    Therapeutic Activity:          mins  66807;     Gait Training:           mins  50848;     Ultrasound:          mins  69469;    Electrical Stimulation:         mins  82589 ( );  E-Stim Attended:         mins  75032  Iontophoresis          mins 22086   Traction          mins  16871  Fluidotherapy          mins  73899  Dry Needling          mins self-pay - No Charge  Paraffin          mins  55845    Timed Treatment:   43   mins   Total Treatment:     43   mins    Gali Latif, PT, DPT  Physical Therapist

## 2022-02-14 ENCOUNTER — HOSPITAL ENCOUNTER (OUTPATIENT)
Dept: ULTRASOUND IMAGING | Facility: HOSPITAL | Age: 42
Discharge: HOME | End: 2022-02-14

## 2022-02-14 ENCOUNTER — HOSPITAL ENCOUNTER (OUTPATIENT)
Dept: MAMMOGRAPHY | Facility: HOSPITAL | Age: 42
Discharge: HOME | End: 2022-02-14

## 2022-02-14 DIAGNOSIS — R92.8 ABNORMAL MAMMOGRAM: ICD-10-CM

## 2022-02-14 PROCEDURE — 77061 BREAST TOMOSYNTHESIS UNI: CPT | Performed by: RADIOLOGY

## 2022-02-14 PROCEDURE — 77065 DX MAMMO INCL CAD UNI: CPT

## 2022-02-14 PROCEDURE — 77065 DX MAMMO INCL CAD UNI: CPT | Performed by: RADIOLOGY

## 2022-02-14 PROCEDURE — G0279 TOMOSYNTHESIS, MAMMO: HCPCS

## 2022-02-14 PROCEDURE — 76642 ULTRASOUND BREAST LIMITED: CPT | Performed by: RADIOLOGY

## 2022-02-14 PROCEDURE — 76642 ULTRASOUND BREAST LIMITED: CPT

## 2022-02-15 ENCOUNTER — OFFICE VISIT (OUTPATIENT)
Dept: BEHAVIORAL HEALTH | Facility: CLINIC | Age: 42
End: 2022-02-15

## 2022-02-15 DIAGNOSIS — F19.10 SUBSTANCE ABUSE: Primary | ICD-10-CM

## 2022-02-15 PROCEDURE — 90791 PSYCH DIAGNOSTIC EVALUATION: CPT | Performed by: COUNSELOR
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
## 2022-02-20 ENCOUNTER — HOSPITAL ENCOUNTER (OUTPATIENT)
Dept: MRI IMAGING | Facility: HOSPITAL | Age: 42
Discharge: HOME | End: 2022-02-20
Admitting: PHYSICIAN ASSISTANT

## 2022-02-20 ENCOUNTER — APPOINTMENT (OUTPATIENT)
Dept: GENERAL RADIOLOGY | Facility: HOSPITAL | Age: 42
End: 2022-02-20

## 2022-02-20 ENCOUNTER — HOSPITAL ENCOUNTER (EMERGENCY)
Facility: HOSPITAL | Age: 42
Discharge: HOME | End: 2022-02-20
Attending: EMERGENCY MEDICINE | Admitting: EMERGENCY MEDICINE

## 2022-02-20 VITALS
DIASTOLIC BLOOD PRESSURE: 94 MMHG | TEMPERATURE: 98 F | HEART RATE: 81 BPM | SYSTOLIC BLOOD PRESSURE: 155 MMHG | RESPIRATION RATE: 20 BRPM | BODY MASS INDEX: 32.59 KG/M2 | OXYGEN SATURATION: 98 % | HEIGHT: 60 IN | WEIGHT: 166 LBS

## 2022-02-20 DIAGNOSIS — M54.2 NECK AND SHOULDER PAIN: ICD-10-CM

## 2022-02-20 DIAGNOSIS — S63.91XA SPRAIN OF HAND, RIGHT, INITIAL ENCOUNTER: Primary | ICD-10-CM

## 2022-02-20 DIAGNOSIS — S46.911A ELBOW STRAIN, RIGHT, INITIAL ENCOUNTER: ICD-10-CM

## 2022-02-20 DIAGNOSIS — M25.519 NECK AND SHOULDER PAIN: ICD-10-CM

## 2022-02-20 PROCEDURE — 73090 X-RAY EXAM OF FOREARM: CPT

## 2022-02-20 PROCEDURE — 99283 EMERGENCY DEPT VISIT LOW MDM: CPT

## 2022-02-20 PROCEDURE — 73130 X-RAY EXAM OF HAND: CPT

## 2022-02-20 PROCEDURE — 72141 MRI NECK SPINE W/O DYE: CPT

## 2022-02-20 RX ORDER — TRAMADOL HYDROCHLORIDE 50 MG/1
50 TABLET ORAL ONCE
Status: COMPLETED | OUTPATIENT
Start: 2022-02-20 | End: 2022-02-20

## 2022-02-20 RX ORDER — HYDROCODONE BITARTRATE AND ACETAMINOPHEN 5; 325 MG/1; MG/1
1 TABLET ORAL EVERY 6 HOURS PRN
Qty: 12 TABLET | Refills: 0 | Status: SHIPPED | OUTPATIENT
Start: 2022-02-20 | End: 2022-03-10

## 2022-02-20 RX ADMIN — TRAMADOL HYDROCHLORIDE 50 MG: 50 TABLET, COATED ORAL at 09:47

## 2022-02-20 NOTE — ED PROVIDER NOTES
Subjective   41-year-old female presents with complaint of right elbow pain, right wrist pain, and right hand pain.  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  She reports that for the least the last 3 weeks she has had pain in her right wrist.  She has a history of carpal tunnel and had contributed this pain to carpal tunnel.  Yesterday she was attempting to roll individual felt a pop in her right hand.  Since that time she has had increase swelling and increased pain that radiates from the right hand into the right elbow.  She is still able to move those joints.  She denies direct trauma to that area.  No pain to the shoulder neck or back.  No chest pain or abdominal pain.  No fever or systemic symptoms of infection.  She denies cough or shortness of breath.  She has been vaccinated against COVID-19.          Review of Systems   Constitutional: Negative for chills, fatigue and fever.   HENT: Negative for congestion, ear pain, postnasal drip, sinus pressure and sore throat.    Eyes: Negative for pain, redness and visual disturbance.   Respiratory: Negative for cough, chest tightness and shortness of breath.    Cardiovascular: Negative for chest pain, palpitations and leg swelling.   Gastrointestinal: Negative for abdominal pain, anal bleeding, blood in stool, diarrhea, nausea and vomiting.   Endocrine: Negative for polydipsia and polyuria.   Genitourinary: Negative for difficulty urinating, dysuria, frequency and urgency.   Musculoskeletal: Positive for arthralgias and joint swelling. Negative for back pain and neck pain.   Skin: Negative for pallor and rash.   Allergic/Immunologic: Negative for environmental allergies and immunocompromised state.   Neurological: Negative for dizziness, weakness and headaches.   Hematological: Negative for adenopathy.   Psychiatric/Behavioral: Negative for confusion, self-injury and suicidal ideas. The patient is not  nervous/anxious.    All other systems reviewed and are negative.      Past Medical History:   Diagnosis Date   • Allergic rhinitis    • Anemia    • Arthritis    • Asthma    • Bartholin gland cyst    • Chlamydia    • Depression    • FHx: migraine headaches    • GERD (gastroesophageal reflux disease)    • Heart murmur    • Herpes simplex    • History of chest x-ray 2015    no active disease   • History of echocardiogram 2015    ejection fraction of greater than 65%, mitral and pulmonic regurgitation an physiological tricuspid regurgitation.   • History of PFTs 2015    spirometry data acceptable and reproducible; pt given 4 puffs of Ventolin; pt gave good effort; no obstruction; no Bd response; MVV reduced    • History of PFTs 2015    pt gave best effort; duoneb given prior and post study; moderate nonspecific proportional reduction of FEV1 and FVC with preserved ratio; FEV1 moderately reduced; cannot rule out restriction   • Hypertension    • Migraine    • Ovarian cyst    • Pelvic pain    • Screening breast examination     self;admits   • Seizures (HCC)    • STD (female)    • Thigh shingles    • Trichomonas infection        Allergies   Allergen Reactions   • Naproxen Swelling   • Sulfa Antibiotics Rash       Past Surgical History:   Procedure Laterality Date   • BILATERAL BREAST REDUCTION     • BREAST BIOPSY     • BREAST SURGERY      breast reduction   •  SECTION     • CYSTOSCOPY     • DIAGNOSTIC LAPAROSCOPY     • INCISION AND DRAINAGE ABSCESS      bartholin's   • LAPAROSCOPIC TUBAL LIGATION     • ORIF ANKLE FRACTURE Right    • REDUCTION MAMMAPLASTY Bilateral    • TENSION FREE VAGINAL TAPING WITH MINI ARC SLING      Dr Doyle avery        Family History   Problem Relation Age of Onset   • Pancreatic cancer Maternal Grandmother    • Heart failure Paternal Grandmother    • MARCIE disease Paternal Aunt    • Arthritis Mother    • Cancer Mother    • Bipolar disorder Mother    •  Arthritis Father    • Dementia Father    • Pancreatic cancer Other    • Diabetes Other    • Heart disease Other    • Hypertension Other    • Other Other         RESPIRATORY DISEASE   • Heart attack Neg Hx    • Hyperlipidemia Neg Hx    • Mental illness Neg Hx    • Obesity Neg Hx    • Stroke Neg Hx    • Breast cancer Neg Hx    • Ovarian cancer Neg Hx        Social History     Socioeconomic History   • Marital status: Single   Tobacco Use   • Smoking status: Current Every Day Smoker     Packs/day: 0.25     Years: 15.00     Pack years: 3.75     Types: Cigarettes     Last attempt to quit: 2015     Years since quittin.0   • Smokeless tobacco: Never Used   Vaping Use   • Vaping Use: Some days   • Devices: Disposable   • Passive vaping exposure: Yes   Substance and Sexual Activity   • Alcohol use: No   • Drug use: Yes     Types: Marijuana     Comment: Marijuana once a week. Tried cocaine and pain pills int the past   • Sexual activity: Yes     Birth control/protection: Surgical           Objective   Physical Exam  Vitals and nursing note reviewed.   Constitutional:       General: She is not in acute distress.     Appearance: Normal appearance. She is well-developed. She is not toxic-appearing or diaphoretic.   HENT:      Head: Normocephalic and atraumatic.      Right Ear: External ear normal.      Left Ear: External ear normal.      Nose: Nose normal.   Eyes:      General: Lids are normal.      Pupils: Pupils are equal, round, and reactive to light.   Neck:      Trachea: No tracheal deviation.   Cardiovascular:      Rate and Rhythm: Normal rate and regular rhythm.      Pulses: No decreased pulses.      Heart sounds: Normal heart sounds. No murmur heard.  No friction rub. No gallop.    Pulmonary:      Effort: Pulmonary effort is normal. No respiratory distress.      Breath sounds: Normal breath sounds. No decreased breath sounds, wheezing, rhonchi or rales.   Abdominal:      General: Bowel sounds are normal.       Palpations: Abdomen is soft.      Tenderness: There is no abdominal tenderness. There is no guarding or rebound.   Musculoskeletal:         General: No deformity. Normal range of motion.      Right upper arm: Swelling, tenderness and bony tenderness present.        Arms:       Cervical back: Normal range of motion and neck supple.   Lymphadenopathy:      Cervical: No cervical adenopathy.   Skin:     General: Skin is warm and dry.      Findings: No rash.   Neurological:      Mental Status: She is alert and oriented to person, place, and time.      Cranial Nerves: No cranial nerve deficit.      Sensory: No sensory deficit.   Psychiatric:         Speech: Speech normal.         Behavior: Behavior normal.         Thought Content: Thought content normal.         Judgment: Judgment normal.         Procedures           ED Course                                   AGUSTIN reviewed by Arya Bates MD             MDM  Number of Diagnoses or Management Options  Elbow strain, right, initial encounter: new and requires workup  Sprain of hand, right, initial encounter: new and requires workup  Diagnosis management comments: No acute imaging abnormalities identified on x-ray of the right hand, forearm, and elbow.    There is swelling identified over the knuckles of the right hand.    I suspect patient has a sprain of the right hand and strain of the tendons throughout the forearm and elbow.    The patient will be advised to rest the right extremity.  Discharged with Lortab to help with pain not controlled by Tylenol or ibuprofen.       Amount and/or Complexity of Data Reviewed  Tests in the radiology section of CPT®: ordered and reviewed  Review and summarize past medical records: yes  Independent visualization of images, tracings, or specimens: yes        Final diagnoses:   Sprain of hand, right, initial encounter   Elbow strain, right, initial encounter       ED Disposition  ED Disposition     ED Disposition Condition  Comment    Discharge Stable           No follow-up provider specified.       Medication List      New Prescriptions    HYDROcodone-acetaminophen 5-325 MG per tablet  Commonly known as: NORCO  Take 1 tablet by mouth Every 6 (Six) Hours As Needed for Severe Pain  for up to 12 doses.           Where to Get Your Medications      These medications were sent to [facility] - [place], [place] - [address] - [phone]  - [phone]   [address] [address]    Phone: [phone]   · HYDROcodone-acetaminophen 5-325 MG per tablet          Arya Bates MD  02/20/22 0964

## 2022-02-21 ENCOUNTER — PATIENT OUTREACH (OUTPATIENT)
Dept: CASE MANAGEMENT | Facility: OTHER | Age: 42
End: 2022-02-21

## 2022-02-21 NOTE — OUTREACH NOTE
"Ambulatory Case Management Note    Patient Outreach    Contacted pt regarding [facility] ED visit 2/20/22 with chief c/o listed as wrist pain.  Role of Ambulatory Nurse  explained and number provided.  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  Has to have clearance from  [facility] before can get job back, which she has upcoming apt.  She does have a car, but was unable to afford insurance for her car.  She also is unable to go to grocery store for food. States at present she has 4 nieces and their mother living with her.  Med adherence discussed; , pt states \"I do not have any of my medicines.\"  Asked if she had thoughts of hurting herself or others, which she stated \"no.\"  Educated pt about [employer] and food stamps resources.  Pt declined assistance regarding med adherence and SDOH, declined ACM program and assistance.  Discussed referral to  multiple times; pt also declined.  Encouraged to contact RN-ACM for any needs.      General & Health Literacy Assessment    Questions/Answers      Most Recent Value   Assessment Completed With Patient   Living Arrangement Family Members  [sates 4 neices and their mother living with her at present 2/21/22]   Type of Residence Private Residence   Bed or Wheelchair Confined No   Difficulty Keeping Appointments Yes        SDOH updated and reviewed with the patient during this program:     Financial Resource Strain: High Risk   • Difficulty of Paying Living Expenses: Hard       Food Insecurity: Food Insecurity Present   • Worried About Running Out of Food in the Last Year: Sometimes true   • Ran Out of Food in the Last Year: Sometimes true       Transportation Needs: Unmet Transportation Needs   • Lack of Transportation (Medical): Yes   • Lack of Transportation (Non-Medical): Yes        Roseanne Patricia RN  Ambulatory Case Management    2/21/2022, 14:46 " EST

## 2022-02-22 NOTE — PROGRESS NOTES
The MRI of your neck was incomplete due to you not being able to tolerate the procedure.    It shows degenerative changes but no evidence of spinal narrowing or nerve compression.    If you would like to, we can go ahead with a referral to an orthopedist spinal specialist. Let me know if you would like to do this!

## 2022-02-23 ENCOUNTER — TREATMENT (OUTPATIENT)
Dept: PHYSICAL THERAPY | Facility: CLINIC | Age: 42
End: 2022-02-23

## 2022-02-23 ENCOUNTER — OFFICE VISIT (OUTPATIENT)
Dept: INTERNAL MEDICINE | Facility: CLINIC | Age: 42
End: 2022-02-23

## 2022-02-23 VITALS
HEART RATE: 75 BPM | BODY MASS INDEX: 31.64 KG/M2 | RESPIRATION RATE: 14 BRPM | OXYGEN SATURATION: 97 % | TEMPERATURE: 98 F | SYSTOLIC BLOOD PRESSURE: 130 MMHG | DIASTOLIC BLOOD PRESSURE: 80 MMHG | WEIGHT: 162 LBS

## 2022-02-23 DIAGNOSIS — M79.602 PAIN IN BOTH UPPER EXTREMITIES: ICD-10-CM

## 2022-02-23 DIAGNOSIS — M79.601 PAIN IN BOTH UPPER EXTREMITIES: ICD-10-CM

## 2022-02-23 DIAGNOSIS — M54.2 NECK AND SHOULDER PAIN: ICD-10-CM

## 2022-02-23 DIAGNOSIS — G89.29 CHRONIC BILATERAL LOW BACK PAIN WITH BILATERAL SCIATICA: Primary | ICD-10-CM

## 2022-02-23 DIAGNOSIS — M54.42 CHRONIC BILATERAL LOW BACK PAIN WITH BILATERAL SCIATICA: Primary | ICD-10-CM

## 2022-02-23 DIAGNOSIS — M54.2 PAIN, NECK: Primary | ICD-10-CM

## 2022-02-23 DIAGNOSIS — M25.519 NECK AND SHOULDER PAIN: ICD-10-CM

## 2022-02-23 DIAGNOSIS — M54.41 CHRONIC BILATERAL LOW BACK PAIN WITH BILATERAL SCIATICA: Primary | ICD-10-CM

## 2022-02-23 DIAGNOSIS — H60.501 ACUTE OTITIS EXTERNA OF RIGHT EAR, UNSPECIFIED TYPE: ICD-10-CM

## 2022-02-23 PROCEDURE — 99214 OFFICE O/P EST MOD 30 MIN: CPT | Performed by: PHYSICIAN ASSISTANT

## 2022-02-23 PROCEDURE — 97110 THERAPEUTIC EXERCISES: CPT | Performed by: PHYSICAL THERAPIST

## 2022-02-23 PROCEDURE — 97140 MANUAL THERAPY 1/> REGIONS: CPT | Performed by: PHYSICAL THERAPIST

## 2022-02-23 RX ORDER — BACLOFEN 10 MG/1
10 TABLET ORAL 3 TIMES DAILY
Qty: 30 TABLET | Refills: 2 | Status: SHIPPED | OUTPATIENT
Start: 2022-02-23 | End: 2022-03-10

## 2022-02-23 RX ORDER — AMLODIPINE BESYLATE 10 MG/1
TABLET ORAL
COMMUNITY
End: 2022-02-23

## 2022-02-23 RX ORDER — CLOTRIMAZOLE 1 G/ML
SOLUTION TOPICAL
Qty: 10 ML | Refills: 0 | Status: SHIPPED | OUTPATIENT
Start: 2022-02-23 | End: 2022-03-10

## 2022-02-23 NOTE — PROGRESS NOTES
Physical Therapy Daily Progress Note    Subjective   Bernardo Dodd reports: she had an appointment with a hand specialist and they are wanting her to have an EMG done. She is wearing a brace on her right hand today that they gave her at urgent care. They did an X ray at urgent care and told her her wrist was likely sprained. She was supposed to have an MRI on her neck done, but could not stay in the position to complete it due to back pain.      Objective   See Exercise, Manual, and Modality Logs for complete treatment.       Assessment/Plan   Pt tolerated treatment well. She gets relief from manual therapy and gentle exercises and was encouraged to do more movement at home. She will be re assessed next visit. Pt would benefit from continued skilled PT.    Progress per Plan of Care           Manual Therapy:    15     mins  10387;  Therapeutic Exercise:    10     mins  39839;     Neuromuscular Elli:        mins  68797;    Therapeutic Activity:          mins  99795;     Gait Training:           mins  73959;     Ultrasound:          mins  55844;    Electrical Stimulation:         mins  89400 ( );  E-Stim Attended:         mins  67067  Iontophoresis          mins 12571   Traction          mins  24654  Fluidotherapy          mins  87638  Dry Needling          mins self-pay - No Charge  Paraffin          mins  86529    Timed Treatment:   25   mins   Total Treatment:     50   mins    Gali Latif, PT, DPT  Physical Therapist

## 2022-02-23 NOTE — PROGRESS NOTES
Chief Complaint   Patient presents with   • Pain     having a lot of pain in her head neck and back        Subjective     Bernardo Dodd is a 41 y.o. female.        History of Present Illness     Pt had cervical MRI 3 days ago but struggled to lay still for the exam. Unable to get complete images but no nerve compression identified. She developed some low back pain while getting the MRI done.    Pt has been doing physical therapy for her neck and hands. Still will be continuing her treatment there once a week. Pain has continued without much improvement.    She has seen counselor for evaluation of psychosis so that she can return to her regular job. 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    Saw Dr. Judd for her carpal tunnel eval. She has ordered the nerve conduction study. They are supposed to call her about this. She was instructed to keep wearing the wrist brace that she was given by the ER when she went on Sunday for wrist pain.          Current Outpatient Medications:   •  albuterol (PROVENTIL) (2.5 MG/3ML) 0.083% nebulizer solution, Take 2.5 mg by nebulization Every 12 (Twelve) Hours., Disp: 30 vial, Rfl: 1  •  albuterol sulfate  (90 Base) MCG/ACT inhaler, Inhale 2 puffs Every 4 (Four) Hours As Needed for Wheezing or Shortness of Air., Disp: 18 g, Rfl: 2  •  amitriptyline (ELAVIL) 25 MG tablet, Take 1-2 tablets PO QHS PRN for sleep, Disp: 180 tablet, Rfl: 0  •  amLODIPine (NORVASC) 5 MG tablet, Take 1 tablet by mouth Daily., Disp: 90 tablet, Rfl: 1  •  clotrimazole (LOTRIMIN) 1 % external solution, Apply 2-3 drops to right ear canal twice daily, Disp: 10 mL, Rfl: 0  •  fluticasone (Flonase) 50 MCG/ACT nasal spray, 2 sprays into the nostril(s) as directed by provider Daily., Disp: 16 g, Rfl: 2  •  HYDROcodone-acetaminophen (NORCO) 5-325 MG per tablet, Take 1 tablet by mouth Every 6 (Six) Hours As Needed for Severe Pain  for up to 12  doses., Disp: 12 tablet, Rfl: 0  •  lamoTRIgine (LaMICtal) 100 MG tablet, Take 1 tablet by mouth Daily., Disp: 30 tablet, Rfl: 4  •  SPIRIVA RESPIMAT 2.5 MCG/ACT aerosol solution, Take 2 puffs by mouth Daily., Disp: , Rfl:   •  baclofen (LIORESAL) 10 MG tablet, Take 1 tablet by mouth 3 (Three) Times a Day., Disp: 30 tablet, Rfl: 2     PMFSH  The following portions of the patient's history were reviewed and updated as appropriate: allergies, current medications, past family history, past medical history, past social history, past surgical history and problem list.    Review of Systems   Constitutional: Negative for appetite change, fever and unexpected weight change.   Eyes: Negative for pain and visual disturbance.   Respiratory: Negative for chest tightness, shortness of breath and wheezing.    Cardiovascular: Negative for chest pain and palpitations.   Gastrointestinal: Negative for abdominal pain, blood in stool, diarrhea, nausea and vomiting.   Endocrine: Negative.    Genitourinary: Positive for pelvic pain. Negative for difficulty urinating, dysuria, flank pain, frequency and urgency.   Musculoskeletal: Positive for back pain and neck pain. Negative for joint swelling.   Skin: Negative for color change and rash.   Neurological: Positive for weakness, numbness and headaches. Negative for tremors.   Hematological: Negative for adenopathy.   Psychiatric/Behavioral: Negative for confusion and decreased concentration.   All other systems reviewed and are negative.      Objective   /80 (BP Location: Left arm, Patient Position: Sitting, Cuff Size: Adult)   Pulse 75   Temp 98 °F (36.7 °C) (Tympanic)   Resp 14   Wt 73.5 kg (162 lb)   SpO2 97%   BMI 31.64 kg/m²     Physical Exam  Vitals and nursing note reviewed.   Constitutional:       Appearance: She is well-developed.   HENT:      Head: Normocephalic and atraumatic.      Right Ear: External ear normal.      Left Ear: External ear normal.   Eyes:       Conjunctiva/sclera: Conjunctivae normal.   Cardiovascular:      Rate and Rhythm: Normal rate and regular rhythm.   Pulmonary:      Effort: Pulmonary effort is normal.      Breath sounds: Normal breath sounds.   Musculoskeletal:         General: Normal range of motion.      Cervical back: Normal range of motion. Pain with movement and muscular tenderness present.      Lumbar back: Spasms and tenderness present. No swelling or edema. Normal range of motion.   Skin:     General: Skin is warm and dry.   Psychiatric:         Behavior: Behavior normal.         Results for orders placed or performed during the hospital encounter of 01/14/22   COVID-19 PCR, [facility], NP SWAB IN [facility]SP[facility]/ORAL SWISH 24-30 HR TAT - Swab, Nasopharynx    Specimen: Nasopharynx; Swab   Result Value Ref Range    SARS-CoV-2 TIFFANY Not Detected Not Detected   POCT Rapid Strep A    Specimen: Swab   Result Value Ref Range    Rapid Strep A Screen Negative Negative, VALID, INVALID, Not Performed    Internal Control Passed Passed    Lot Number [id number]     Expiration Date 04/20/2023         ASSESSMENT/PLAN    Diagnoses and all orders for this visit:    1. Chronic bilateral low back pain with bilateral sciatica (Primary)  Comments:  Continue prn use of Baclofen. Refer to pain management.  Orders:  -     Cancel: Ambulatory Referral to Pain Management  -     Ambulatory Referral to Pain Management  -     baclofen (LIORESAL) 10 MG tablet; Take 1 tablet by mouth 3 (Three) Times a Day.  Dispense: 30 tablet; Refill: 2    2. Neck and shoulder pain  Comments:  Continue prn use of baclofen. Refer to pain management d/t persistent neck and shoulder pain, nondiagnostic MRI.  Orders:  -     Cancel: Ambulatory Referral to Pain Management  -     Ambulatory Referral to Pain Management  -     baclofen (LIORESAL) 10 MG tablet; Take 1 tablet by mouth 3 (Three) Times a Day.  Dispense: 30 tablet; Refill: 2    3. Acute otitis externa of right ear,  unspecified type  Comments:  Appears fungal, use clotrimazole drops as directed. Pt will schedule f/u with ENT.  Orders:  -     clotrimazole (LOTRIMIN) 1 % external solution; Apply 2-3 drops to right ear canal twice daily  Dispense: 10 mL; Refill: 0             Return if symptoms worsen or fail to improve, for Next scheduled follow up.  Answers for HPI/ROS submitted by the patient on 2/23/2022  What is the primary reason for your visit?: Back Pain

## 2022-03-02 ENCOUNTER — TREATMENT (OUTPATIENT)
Dept: PHYSICAL THERAPY | Facility: CLINIC | Age: 42
End: 2022-03-02

## 2022-03-02 ENCOUNTER — OFFICE VISIT (OUTPATIENT)
Dept: BEHAVIORAL HEALTH | Facility: CLINIC | Age: 42
End: 2022-03-02

## 2022-03-02 DIAGNOSIS — M79.601 PAIN IN BOTH UPPER EXTREMITIES: ICD-10-CM

## 2022-03-02 DIAGNOSIS — F41.1 GENERALIZED ANXIETY DISORDER: ICD-10-CM

## 2022-03-02 DIAGNOSIS — M79.602 PAIN IN BOTH UPPER EXTREMITIES: ICD-10-CM

## 2022-03-02 DIAGNOSIS — F19.10 SUBSTANCE ABUSE: Primary | ICD-10-CM

## 2022-03-02 DIAGNOSIS — M54.2 PAIN, NECK: Primary | ICD-10-CM

## 2022-03-02 PROCEDURE — 97110 THERAPEUTIC EXERCISES: CPT | Performed by: PHYSICAL THERAPIST

## 2022-03-02 PROCEDURE — 90832 PSYTX W PT 30 MINUTES: CPT | Performed by: COUNSELOR

## 2022-03-02 PROCEDURE — 97140 MANUAL THERAPY 1/> REGIONS: CPT | Performed by: PHYSICAL THERAPIST

## 2022-03-02 NOTE — PROGRESS NOTES
[facility] [place]                 Follow Up Adult      Follow Up Adult Note     Date:2022   Patient Name: Bernardo Dodd  : 1980   MRN: [id number]   Time IN: 1:43pm    Time OUT: 2:18pm     Referring Provider: Nelly Sotelo PA    Chief Complaint:      ICD-10-CM ICD-9-CM   1. Substance abuse (HCC)  F19.10 305.90   2. Generalized anxiety disorder  F41.1 300.02        History of Present Illness:   Bernardo Dodd is a 41 y.o. female who is being seen today for follow up counseling for anxiety symptoms      Subjective     PHQ-9 Score Total:  PHQ-9 Total Score: 2    Patient's Support Network Includes:  significant other, mother and Friend    Functional Status: Mild impairment     Medications:     Current Outpatient Medications:   •  albuterol (PROVENTIL) (2.5 MG/3ML) 0.083% nebulizer solution, Take 2.5 mg by nebulization Every 12 (Twelve) Hours., Disp: 30 vial, Rfl: 1  •  albuterol sulfate  (90 Base) MCG/ACT inhaler, Inhale 2 puffs Every 4 (Four) Hours As Needed for Wheezing or Shortness of Air., Disp: 18 g, Rfl: 2  •  amitriptyline (ELAVIL) 25 MG tablet, Take 1-2 tablets PO QHS PRN for sleep, Disp: 180 tablet, Rfl: 0  •  amLODIPine (NORVASC) 5 MG tablet, Take 1 tablet by mouth Daily., Disp: 90 tablet, Rfl: 1  •  baclofen (LIORESAL) 10 MG tablet, Take 1 tablet by mouth 3 (Three) Times a Day., Disp: 30 tablet, Rfl: 2  •  clotrimazole (LOTRIMIN) 1 % external solution, Apply 2-3 drops to right ear canal twice daily, Disp: 10 mL, Rfl: 0  •  fluticasone (Flonase) 50 MCG/ACT nasal spray, 2 sprays into the nostril(s) as directed by provider Daily., Disp: 16 g, Rfl: 2  •  HYDROcodone-acetaminophen (NORCO) 5-325 MG per tablet, Take 1 tablet by mouth Every 6 (Six) Hours As Needed for Severe Pain  for up to 12 doses., Disp: 12 tablet, Rfl: 0  •  lamoTRIgine (LaMICtal) 100 MG tablet, Take 1 tablet by mouth Daily., Disp: 30 tablet, Rfl: 4  •  SPIRIVA RESPIMAT 2.5  "MCG/ACT aerosol solution, Take 2 puffs by mouth Daily., Disp: , Rfl:     Allergies:   Allergies   Allergen Reactions   • Naproxen Swelling   • Sulfa Antibiotics Rash       Objective     Physical Exam:  Vital Signs: There were no vitals filed for this visit.  There is no height or weight on file to calculate BMI.     Mental Status Exam:   Hygiene:   good  Cooperation:  Cooperative  Eye Contact:  Good  Psychomotor Behavior:  Appropriate  Affect:  Full range  Mood: euphoric  Speech:  Normal  Thought Process:  Linear  Thought Content:  Mood congruent  Suicidal:  None  Homicidal:  None  Hallucinations:  None  Delusion:  None  Memory:  Intact  Orientation:  Person, Place, Time and Situation  Reliability:  good  Insight:  Good  Judgement:  Good  Impulse Control:  Good  Physical/Medical Issues:  No      Assessment / Plan      Visit Diagnosis/Orders Placed This Visit:    ICD-10-CM ICD-9-CM   1. Substance abuse (HCC)  F19.10 305.90   2. Generalized anxiety disorder  F41.1 300.02      Patient presents in office for follow-up.  Patient reports that she is doing well and reports that she is making all of her medical appointments and was able to pay insurance on her car.  Patient continues to report minimal symptoms, but did discuss anxiety and her mind \"spinning\" at night.  Patient denied a referral to APRN.  Patient reports that she greatly desires to return to work.  Patient reports that she is doing better now then when she was working previously.  Patient reports that she is high on life.  Patient reports no current drug use and asked if I would need to see a drug test.  Patient reports that she took a drug test recently.  Patient discussed missing her son who she has not spent time with since the beginning of the pandemic.  Patient reports that her mother does not allow her into home due to COVID.  Patient reports that when son returns to in person schooling, patient would like him to live with her.  Patient and I continue " to build rapport.  I assisted patient in discussing and processing current life stressors.  I provided empathy and support.    PLAN:  1. Safety: No acute safety concerns  2. Risk Assessment: Risk of self-harm acutely is low. Risk of self-harm chronically is also low, but could be further elevated in the event of treatment noncompliance and/or AODA.    Treatment Plan/Goals: Continue supportive psychotherapy efforts and medications as indicated. Treatment and medication options discussed during today's visit. Patient ackowledged and verbally consented to continue with current treatment plan and was educated on the importance of compliance with treatment and follow-up appointments. Patient seems reasonably able to adhere to treatment plan.      Assisted Patient in processing above session content; acknowledged and normalized patient’s thoughts, feelings, and concerns.  Rationalized patient thought process regarding anxiety symptoms and current stressors..      Allowed Patient to freely discuss issues  without interruption or judgement with unconditional positive regard, active listening skills, and empathy. Therapist provided a safe, confidential environment to facilitate the development of a positive therapeutic relationship and encouraged open, honest communication. Assisted Patient in identifying risk factors which would indicate the need for higher level of care including thoughts to harm self or others and/or self-harming behavior and encouraged Patient to contact this office, call 911, or present to the nearest emergency room should any of these events occur. Discussed crisis intervention services and means to access. Patient adamantly and convincingly denies current suicidal or homicidal ideation or perceptual disturbance. Assisted Patient in processing session content; acknowledged and normalized Patient’s thoughts, feelings, and concerns by utilizing a person-centered approach in efforts to build appropriate  rapport and a positive therapeutic relationship with open and honest communication. .     Quality Measures:     TOBACCO USE:  Current every day smoker less than 3 minutes spent counseling .    I advised Tyralita of the risks of tobacco use.     Follow Up:   Return in about 2 weeks (around 3/16/2022) for Counseling.      Blank Garcia, KUNW

## 2022-03-02 NOTE — PROGRESS NOTES
Physical Therapy Daily Progress Note        Patient: Bernardo Dodd   : 1980  Diagnosis/ICD-10 Code:  Pain, neck [M54.2]  Referring practitioner: SUHA Gibbs  Date of Initial Visit: Type: THERAPY  Noted: 7/15/2021  Today's Date: 3/2/2022  Patient seen for 12 sessions             Subjective   Bernardo Dodd reports: She will be getting the EMG and neck injections within the next week. She feels that she has gotten to a place where therapy isn't doing much for her anymore. She notes she does her exercises once a day. She does seem to get more pain when looking up. She has been walking a lot more now that it is nice out but that seems to have made her more stiff.     Objective          Palpation   Left   Tenderness of the suboccipitals.   Trigger point to suboccipitals.     Right Tenderness of the suboccipitals.   Trigger point to suboccipitals.         See Exercise, Manual, and Modality Logs for complete treatment.       Assessment/Plan  Able to complete band exercises pain free in the hand with her brace on. She does have a hard time lying supine for extended periods of time due to low back. Hopeful injections will further her needs as she has seems to have plateau in her progress.   Progress per Plan of Care and Progress strengthening /stabilization /functional activity           Timed:  Manual Therapy:    25     mins  02330;  Therapeutic Exercise:   24      mins  70540;     Neuromuscular Elli:        mins  44749;    Therapeutic Activity:          mins  90585;     Gait Training:           mins  02478;     Ultrasound:          mins  56106;    Electrical Stimulation:         mins  01992;  Iontophoresis          mins  39186    Untimed:  Electrical Stimulation:         mins  79721 ( );  Mechanical Traction:         mins  15662;   Fluidotherapy          mins  96633    Timed Treatment:   49   mins   Total Treatment:     49   mins        Amber Ybarra PTA  Physical Therapist Assistant

## 2022-03-09 ENCOUNTER — TREATMENT (OUTPATIENT)
Dept: PHYSICAL THERAPY | Facility: CLINIC | Age: 42
End: 2022-03-09

## 2022-03-09 DIAGNOSIS — M54.2 PAIN, NECK: Primary | ICD-10-CM

## 2022-03-09 DIAGNOSIS — M79.601 PAIN IN BOTH UPPER EXTREMITIES: ICD-10-CM

## 2022-03-09 DIAGNOSIS — M79.602 PAIN IN BOTH UPPER EXTREMITIES: ICD-10-CM

## 2022-03-09 PROCEDURE — 97110 THERAPEUTIC EXERCISES: CPT | Performed by: PHYSICAL THERAPIST

## 2022-03-09 PROCEDURE — 97140 MANUAL THERAPY 1/> REGIONS: CPT | Performed by: PHYSICAL THERAPIST

## 2022-03-09 PROCEDURE — 97530 THERAPEUTIC ACTIVITIES: CPT | Performed by: PHYSICAL THERAPIST

## 2022-03-09 NOTE — PROGRESS NOTES
Physical Therapy Daily Progress Note    Subjective   Kylahshawn Dodd reports: she can't tell if PT is helping or not. Sometimes she feels better than others. She is scheduled to have an injection in her neck and an MRI within the next week. She is still awaiting her EMG results.    Today's Pain ratin/10  Worst Pain: 10/10    Objective          Active Range of Motion   Cervical/Thoracic Spine   Cervical    Flexion: 40 degrees with pain  Extension: 35 degrees   Left rotation: 60 degrees   Right rotation: 53 degrees with pain    Strength/Myotome Testing     Left Shoulder     Planes of Motion   Flexion: 4-   Abduction: 3+   External rotation at 0°: 4   Internal rotation at 0°: 4+     Right Shoulder     Planes of Motion   Flexion: 4-   Abduction: 3+   External rotation at 0°: 4   Internal rotation at 0°: 4+     Left Elbow   Flexion: 4+  Extension: 4+    Right Elbow   Flexion: 4+  Extension: 4+    Left Wrist/Hand      (2nd hand position)   Left  strength (2nd hand position) 14 lbs    Right Wrist/Hand      (2nd hand position)   Right  strength (2nd hand position) 12 lbs      See Exercise, Manual, and Modality Logs for complete treatment.       Assessment/Plan  Pt is a 41 year old female who has been coming to PT for neck pain and bilateral UE pain. Her cervical ROM has worsened and her strength is mostly unchanged. At this time, she has reached a plateau in progress. She was educated on exercises to continue in order to maintain her mobility and strength. She is safe to be discharged.     Other  DC due to plateau in progress         Manual Therapy:    15     mins  84797;  Therapeutic Exercise:    15     mins  97376;     Neuromuscular Elli:        mins  00409;    Therapeutic Activity:     23     mins  00996;     Gait Training:           mins  87868;     Ultrasound:          mins  88898;    Electrical Stimulation:         mins  71564 ( );  E-Stim Attended:         mins  84909  Iontophoresis           mins 92350   Traction          mins  67079  Fluidotherapy          mins  88804  Dry Needling          mins self-pay - No Charge  Paraffin          mins  27992    Timed Treatment:   53   mins   Total Treatment:     53   mins    Gali Latif, PT, DPT  Physical Therapist

## 2022-03-10 ENCOUNTER — APPOINTMENT (OUTPATIENT)
Dept: GENERAL RADIOLOGY | Facility: HOSPITAL | Age: 42
End: 2022-03-10

## 2022-03-10 ENCOUNTER — HOSPITAL ENCOUNTER (EMERGENCY)
Facility: HOSPITAL | Age: 42
Discharge: HOME | End: 2022-03-10
Attending: EMERGENCY MEDICINE | Admitting: EMERGENCY MEDICINE

## 2022-03-10 ENCOUNTER — APPOINTMENT (OUTPATIENT)
Dept: CT IMAGING | Facility: HOSPITAL | Age: 42
End: 2022-03-10

## 2022-03-10 VITALS
TEMPERATURE: 98.2 F | WEIGHT: 166 LBS | RESPIRATION RATE: 20 BRPM | OXYGEN SATURATION: 98 % | HEIGHT: 59 IN | SYSTOLIC BLOOD PRESSURE: 131 MMHG | DIASTOLIC BLOOD PRESSURE: 88 MMHG | BODY MASS INDEX: 33.47 KG/M2 | HEART RATE: 93 BPM

## 2022-03-10 DIAGNOSIS — K52.9 ENTERITIS: ICD-10-CM

## 2022-03-10 DIAGNOSIS — N39.0 ACUTE UTI: Primary | ICD-10-CM

## 2022-03-10 LAB
ALBUMIN SERPL-MCNC: 4.8 G/DL (ref 3.5–5.2)
ALBUMIN/GLOB SERPL: 1.4 G/DL
ALP SERPL-CCNC: 72 U/L (ref 39–117)
ALT SERPL W P-5'-P-CCNC: 22 U/L (ref 1–33)
ANION GAP SERPL CALCULATED.3IONS-SCNC: 11 MMOL/L (ref 5–15)
AST SERPL-CCNC: 25 U/L (ref 1–32)
B-HCG UR QL: NEGATIVE
BACTERIA UR QL AUTO: ABNORMAL /HPF
BASOPHILS # BLD AUTO: 0.03 10*3/MM3 (ref 0–0.2)
BASOPHILS NFR BLD AUTO: 0.3 % (ref 0–1.5)
BILIRUB SERPL-MCNC: 0.4 MG/DL (ref 0–1.2)
BILIRUB UR QL STRIP: NEGATIVE
BUN SERPL-MCNC: 10 MG/DL (ref 6–20)
BUN/CREAT SERPL: 13.7 (ref 7–25)
CALCIUM SPEC-SCNC: 9.1 MG/DL (ref 8.6–10.5)
CHLORIDE SERPL-SCNC: 103 MMOL/L (ref 98–107)
CLARITY UR: ABNORMAL
CO2 SERPL-SCNC: 23 MMOL/L (ref 22–29)
COLOR UR: ABNORMAL
CREAT SERPL-MCNC: 0.73 MG/DL (ref 0.57–1)
DEPRECATED RDW RBC AUTO: 48.4 FL (ref 37–54)
EGFRCR SERPLBLD CKD-EPI 2021: 104.8 ML/MIN/1.73
EOSINOPHIL # BLD AUTO: 0.03 10*3/MM3 (ref 0–0.4)
EOSINOPHIL NFR BLD AUTO: 0.3 % (ref 0.3–6.2)
ERYTHROCYTE [DISTWIDTH] IN BLOOD BY AUTOMATED COUNT: 18.5 % (ref 12.3–15.4)
EXPIRATION DATE: NORMAL
GLOBULIN UR ELPH-MCNC: 3.5 GM/DL
GLUCOSE SERPL-MCNC: 114 MG/DL (ref 65–99)
GLUCOSE UR STRIP-MCNC: NEGATIVE MG/DL
HCT VFR BLD AUTO: 38.7 % (ref 34–46.6)
HGB BLD-MCNC: 12.4 G/DL (ref 12–15.9)
HGB UR QL STRIP.AUTO: NEGATIVE
HOLD SPECIMEN: NORMAL
HYALINE CASTS UR QL AUTO: ABNORMAL /LPF
IMM GRANULOCYTES # BLD AUTO: 0.02 10*3/MM3 (ref 0–0.05)
IMM GRANULOCYTES NFR BLD AUTO: 0.2 % (ref 0–0.5)
INTERNAL NEGATIVE CONTROL: NEGATIVE
INTERNAL POSITIVE CONTROL: POSITIVE
KETONES UR QL STRIP: NEGATIVE
LEUKOCYTE ESTERASE UR QL STRIP.AUTO: ABNORMAL
LIPASE SERPL-CCNC: 37 U/L (ref 13–60)
LYMPHOCYTES # BLD AUTO: 1.65 10*3/MM3 (ref 0.7–3.1)
LYMPHOCYTES NFR BLD AUTO: 18 % (ref 19.6–45.3)
Lab: NORMAL
MCH RBC QN AUTO: 24.1 PG (ref 26.6–33)
MCHC RBC AUTO-ENTMCNC: 32 G/DL (ref 31.5–35.7)
MCV RBC AUTO: 75.3 FL (ref 79–97)
MONOCYTES # BLD AUTO: 0.49 10*3/MM3 (ref 0.1–0.9)
MONOCYTES NFR BLD AUTO: 5.3 % (ref 5–12)
NEUTROPHILS NFR BLD AUTO: 6.96 10*3/MM3 (ref 1.7–7)
NEUTROPHILS NFR BLD AUTO: 75.9 % (ref 42.7–76)
NITRITE UR QL STRIP: NEGATIVE
NRBC BLD AUTO-RTO: 0 /100 WBC (ref 0–0.2)
PH UR STRIP.AUTO: <=5 [PH] (ref 5–8)
PLATELET # BLD AUTO: 374 10*3/MM3 (ref 140–450)
PMV BLD AUTO: 10.2 FL (ref 6–12)
POTASSIUM SERPL-SCNC: 3.6 MMOL/L (ref 3.5–5.2)
PROT SERPL-MCNC: 8.3 G/DL (ref 6–8.5)
PROT UR QL STRIP: NEGATIVE
RBC # BLD AUTO: 5.14 10*6/MM3 (ref 3.77–5.28)
RBC # UR STRIP: ABNORMAL /HPF
REF LAB TEST METHOD: ABNORMAL
SODIUM SERPL-SCNC: 137 MMOL/L (ref 136–145)
SP GR UR STRIP: 1.02 (ref 1–1.03)
SQUAMOUS #/AREA URNS HPF: ABNORMAL /HPF
UROBILINOGEN UR QL STRIP: ABNORMAL
WBC # UR STRIP: ABNORMAL /HPF
WBC NRBC COR # BLD: 9.18 10*3/MM3 (ref 3.4–10.8)
WHOLE BLOOD HOLD SPECIMEN: NORMAL
WHOLE BLOOD HOLD SPECIMEN: NORMAL

## 2022-03-10 PROCEDURE — 99284 EMERGENCY DEPT VISIT MOD MDM: CPT

## 2022-03-10 PROCEDURE — 81025 URINE PREGNANCY TEST: CPT | Performed by: EMERGENCY MEDICINE

## 2022-03-10 PROCEDURE — 80053 COMPREHEN METABOLIC PANEL: CPT | Performed by: EMERGENCY MEDICINE

## 2022-03-10 PROCEDURE — 81001 URINALYSIS AUTO W/SCOPE: CPT | Performed by: EMERGENCY MEDICINE

## 2022-03-10 PROCEDURE — 25010000002 IOPAMIDOL 61 % SOLUTION: Performed by: EMERGENCY MEDICINE

## 2022-03-10 PROCEDURE — 83690 ASSAY OF LIPASE: CPT | Performed by: EMERGENCY MEDICINE

## 2022-03-10 PROCEDURE — 73110 X-RAY EXAM OF WRIST: CPT

## 2022-03-10 PROCEDURE — 85025 COMPLETE CBC W/AUTO DIFF WBC: CPT | Performed by: EMERGENCY MEDICINE

## 2022-03-10 PROCEDURE — 99283 EMERGENCY DEPT VISIT LOW MDM: CPT

## 2022-03-10 PROCEDURE — 87086 URINE CULTURE/COLONY COUNT: CPT | Performed by: NURSE PRACTITIONER

## 2022-03-10 PROCEDURE — 74177 CT ABD & PELVIS W/CONTRAST: CPT

## 2022-03-10 RX ORDER — HYDROCODONE BITARTRATE AND ACETAMINOPHEN 7.5; 325 MG/1; MG/1
1 TABLET ORAL ONCE
Status: COMPLETED | OUTPATIENT
Start: 2022-03-10 | End: 2022-03-10

## 2022-03-10 RX ORDER — AMOXICILLIN AND CLAVULANATE POTASSIUM 875; 125 MG/1; MG/1
1 TABLET, FILM COATED ORAL EVERY 12 HOURS
Qty: 20 TABLET | Refills: 0 | Status: SHIPPED | OUTPATIENT
Start: 2022-03-10 | End: 2022-04-05

## 2022-03-10 RX ORDER — SODIUM CHLORIDE 9 MG/ML
10 INJECTION INTRAVENOUS AS NEEDED
Status: DISCONTINUED | OUTPATIENT
Start: 2022-03-10 | End: 2022-03-10 | Stop reason: HOSPADM

## 2022-03-10 RX ADMIN — HYDROCODONE BITARTRATE AND ACETAMINOPHEN 1 TABLET: 7.5; 325 TABLET ORAL at 14:57

## 2022-03-10 RX ADMIN — IOPAMIDOL 100 ML: 612 INJECTION, SOLUTION INTRAVENOUS at 13:05

## 2022-03-10 NOTE — ED PROVIDER NOTES
Subjective   Patient presents the ER with right lower quadrant abdominal pain.  Started shortly before arrival.  She denies any chest pain difficulty breathing fever.  She tells me she does have a history of kidney stones and this feels similar.  She is on her way to  for an appointment for possible carpal tunnel.  She would also like an x-ray of her right wrist.  She denies any trauma.      Abdominal Pain  Pain location:  RLQ and R flank  Pain quality: aching and cramping    Pain radiates to:  Does not radiate  Pain severity:  Moderate  Onset quality:  Sudden  Duration:  3 hours  Timing:  Constant  Progression:  Waxing and waning  Chronicity:  New  Relieved by:  Nothing  Worsened by:  Movement  Associated symptoms: nausea    Associated symptoms: no chest pain, no chills, no constipation, no cough, no diarrhea, no dysuria, no fever, no hematuria, no shortness of breath, no sore throat and no vomiting        Review of Systems   Constitutional: Negative for chills, diaphoresis and fever.   HENT: Negative for congestion and sore throat.    Respiratory: Negative for cough, choking, chest tightness, shortness of breath and wheezing.    Cardiovascular: Negative for chest pain and leg swelling.   Gastrointestinal: Positive for abdominal pain and nausea. Negative for abdominal distention, anal bleeding, blood in stool, constipation, diarrhea and vomiting.   Genitourinary: Negative for difficulty urinating, dysuria, flank pain, frequency, hematuria and urgency.   All other systems reviewed and are negative.      Past Medical History:   Diagnosis Date   • Allergic rhinitis    • Anemia    • Arthritis    • Asthma    • Bartholin gland cyst    • Chlamydia    • Depression    • FHx: migraine headaches    • GERD (gastroesophageal reflux disease)    • Heart murmur    • Herpes simplex    • History of chest x-ray 11/01/2015    no active disease   • History of echocardiogram 11/01/2015    ejection fraction of greater than 65%, mitral  and pulmonic regurgitation an physiological tricuspid regurgitation.   • History of PFTs 2015    spirometry data acceptable and reproducible; pt given 4 puffs of Ventolin; pt gave good effort; no obstruction; no Bd response; MVV reduced    • History of PFTs 2015    pt gave best effort; duoneb given prior and post study; moderate nonspecific proportional reduction of FEV1 and FVC with preserved ratio; FEV1 moderately reduced; cannot rule out restriction   • Hypertension    • Kidney stones    • Migraine    • Ovarian cyst    • Pelvic pain    • Screening breast examination     self;admits   • Seizures (HCC)    • STD (female)    • Thigh shingles    • Trichomonas infection        Allergies   Allergen Reactions   • Naproxen Swelling   • Sulfa Antibiotics Rash       Past Surgical History:   Procedure Laterality Date   • BILATERAL BREAST REDUCTION     • BREAST BIOPSY     • BREAST SURGERY      breast reduction   •  SECTION     • CYSTOSCOPY     • DIAGNOSTIC LAPAROSCOPY     • INCISION AND DRAINAGE ABSCESS      bartholin's   • LAPAROSCOPIC TUBAL LIGATION     • ORIF ANKLE FRACTURE Right    • REDUCTION MAMMAPLASTY Bilateral    • TENSION FREE VAGINAL TAPING WITH MINI ARC SLING      Dr Doyle avery        Family History   Problem Relation Age of Onset   • Pancreatic cancer Maternal Grandmother    • Heart failure Paternal Grandmother    • MARCIE disease Paternal Aunt    • Arthritis Mother    • Cancer Mother    • Bipolar disorder Mother    • Arthritis Father    • Dementia Father    • Pancreatic cancer Other    • Diabetes Other    • Heart disease Other    • Hypertension Other    • Other Other         RESPIRATORY DISEASE   • Heart attack Neg Hx    • Hyperlipidemia Neg Hx    • Mental illness Neg Hx    • Obesity Neg Hx    • Stroke Neg Hx    • Breast cancer Neg Hx    • Ovarian cancer Neg Hx        Social History     Socioeconomic History   • Marital status: Single   Tobacco Use   • Smoking status: Current Every  Day Smoker     Packs/day: 0.25     Years: 15.00     Pack years: 3.75     Types: Cigarettes     Last attempt to quit: 2015     Years since quittin.1   • Smokeless tobacco: Never Used   Vaping Use   • Vaping Use: Some days   • Devices: Disposable   • Passive vaping exposure: Yes   Substance and Sexual Activity   • Alcohol use: No   • Drug use: Yes     Types: Marijuana     Comment: Marijuana once a week. Tried cocaine and pain pills int the past   • Sexual activity: Yes     Birth control/protection: Surgical           Objective   Physical Exam  Constitutional:       Appearance: She is well-developed.   HENT:      Head: Normocephalic and atraumatic.      Right Ear: External ear normal.      Left Ear: External ear normal.      Nose: Nose normal.   Eyes:      Conjunctiva/sclera: Conjunctivae normal.      Pupils: Pupils are equal, round, and reactive to light.   Cardiovascular:      Rate and Rhythm: Normal rate and regular rhythm.      Heart sounds: Normal heart sounds.   Pulmonary:      Effort: Pulmonary effort is normal.      Breath sounds: Normal breath sounds.   Abdominal:      General: Bowel sounds are normal.      Palpations: Abdomen is soft.      Tenderness: There is abdominal tenderness in the right upper quadrant and right lower quadrant.   Musculoskeletal:         General: Normal range of motion.      Cervical back: Normal range of motion and neck supple.   Skin:     General: Skin is warm and dry.   Neurological:      Mental Status: She is alert and oriented to person, place, and time.   Psychiatric:         Behavior: Behavior normal.         Thought Content: Thought content normal.         Judgment: Judgment normal.         Procedures       CT Abdomen Pelvis With Contrast   Final Result   Diffuse wall thickening and prominent enhancement of small bowel in the   majority of the colon as well as portions of the stomach suggests an   infectious or inflammatory enteritis. Mild enlargement of the appendix    is favored to be related to this generalized process.       Enlarged fibroid uterus.               This report was finalized on 3/10/2022 1:48 PM by Niles Herring.          XR Wrist 3+ View Right   Final Result   No visible fracture. Consider follow-up imaging if the patient's   symptoms persist or worsen..       This report was finalized on 3/10/2022 12:45 PM by Dr. Charlie Foss MD.                ED Course  ED Course as of 03/10/22 1454   Thu Mar 10, 2022   1435 POC with Dr. Suarez. Augmentin for UTI and mandatory fu with us or pcp for continued or worsening RLQ pain. Pt thankful and agreeable. [JM]   1453 Case DW Dr. Edwards.  Had a very long conversation with the patient.  I did discuss with her the findings on the CAT scan.  Mandatory follow-up with the ER or primary care doctor tomorrow for worsening or continued pain and obviously if her pain worsens after that as well.  Particular if she gets a fever.  She does have enteritis on the CT scan which is thought that is making her appendix mildly enlarged.  She does not have a fever here.  There is no elevated white count.  There is no shift.  However I did give her strict warning signs symptoms particularly worsening right lower quadrant pain fever etc. and to return at once.  Particularly mandatory follow-up tomorrow for continued or worsening symptoms.  Patient thankful and agreeable. [JM]      ED Course User Index  [JM] Van Dodd APRN KASPER reviewed by Luis Edwards MD             [facility]    Final diagnoses:   Acute UTI   Enteritis       ED Disposition  ED Disposition     ED Disposition   Discharge    Condition   Stable    Comment   --             Nelly Sotelo PA  [address] [place] [id number] [phone]    Schedule an appointment as soon as possible for a visit   You must see your doctor tomorrow or return to ER for continued or worsening right lower abdominal pain.    [facility]  Emergency Department  [address] [facility] [id number] [phone]    Return to the ER or see your regular doctor if you are continuing or have worsening pain in your right lower abdomen tomorrow or anytime after.         Medication List      New Prescriptions    amoxicillin-clavulanate 875-125 MG per tablet  Commonly known as: AUGMENTIN  Take 1 tablet by mouth Every 12 (Twelve) Hours.        Changed    amitriptyline 25 MG tablet  Commonly known as: ELAVIL  Take 1-2 tablets PO QHS PRN for sleep  What changed:   · when to take this  · reasons to take this           Where to Get Your Medications      These medications were sent to [facility] - [address] [facility] - [phone]  - [phone]   [address] [place] [address]    Phone: [phone]   · amoxicillin-clavulanate 875-125 MG per tablet          Van Dodd APRN  03/10/22 1440       Van Dodd APRN  03/10/22 1458

## 2022-03-11 LAB — BACTERIA SPEC AEROBE CULT: NORMAL

## 2022-03-16 ENCOUNTER — TELEMEDICINE (OUTPATIENT)
Dept: INTERNAL MEDICINE | Facility: CLINIC | Age: 42
End: 2022-03-16

## 2022-03-16 DIAGNOSIS — R19.7 DIARRHEA, UNSPECIFIED TYPE: Primary | ICD-10-CM

## 2022-03-16 PROCEDURE — 99214 OFFICE O/P EST MOD 30 MIN: CPT | Performed by: PHYSICIAN ASSISTANT

## 2022-03-16 RX ORDER — SACCHAROMYCES BOULARDII 250 MG
250 CAPSULE ORAL 2 TIMES DAILY
Qty: 30 CAPSULE | Refills: 0 | Status: SHIPPED | OUTPATIENT
Start: 2022-03-16 | End: 2022-04-05

## 2022-03-16 NOTE — PROGRESS NOTES
Chief Complaint   Patient presents with   • Diarrhea   • Abdominal Pain       Subjective   Bernardo Dodd is a 41 y.o. female.       History of Present Illness     This was an audio and video enabled telemedicine encounter.    Pt was seen in ER on 3/10/22 with RLL pain and diarrhea. She had CT of abdomen and pelvis that showed enteritis. She was prescribed Augmentin. Commerce like her symptoms improved slightly but then abdominal pain returned 2-3 days ago. Having liquid BMs every 3-4 hours. She is drinking water and herbal tea. Eating junk food- chips, oatmeal pie, pork rind.       Current Outpatient Medications:   •  amitriptyline (ELAVIL) 25 MG tablet, Take 1-2 tablets PO QHS PRN for sleep (Patient taking differently: At Night As Needed. Take 1-2 tablets PO QHS PRN for sleep), Disp: 180 tablet, Rfl: 0  •  amoxicillin-clavulanate (AUGMENTIN) 875-125 MG per tablet, Take 1 tablet by mouth Every 12 (Twelve) Hours., Disp: 20 tablet, Rfl: 0  •  fluticasone (Flonase) 50 MCG/ACT nasal spray, 2 sprays into the nostril(s) as directed by provider Daily., Disp: 16 g, Rfl: 2  •  lamoTRIgine (LaMICtal) 100 MG tablet, Take 1 tablet by mouth Daily., Disp: 30 tablet, Rfl: 4  •  saccharomyces boulardii (Florastor) 250 MG capsule, Take 1 capsule by mouth 2 (Two) Times a Day., Disp: 30 capsule, Rfl: 0  •  SPIRIVA RESPIMAT 2.5 MCG/ACT aerosol solution, Take 2 puffs by mouth Daily., Disp: , Rfl:      PMFSH  The following portions of the patient's history were reviewed and updated as appropriate: allergies, current medications, past family history, past medical history, past social history, past surgical history and problem list.    Review of Systems   Constitutional: Negative for activity change, appetite change and fatigue.   HENT: Negative for congestion and rhinorrhea.    Respiratory: Negative for chest tightness and shortness of breath.    Cardiovascular: Negative for chest pain and palpitations.   Gastrointestinal: Positive for  diarrhea. Negative for abdominal pain, nausea and vomiting.   Genitourinary: Negative for dysuria.   Musculoskeletal: Negative for arthralgias and myalgias.   Neurological: Negative for dizziness, weakness, light-headedness and headaches.   Psychiatric/Behavioral: Negative for dysphoric mood. The patient is not nervous/anxious.        Objective   There were no vitals taken for this visit.    Physical Exam  Constitutional:       Appearance: She is well-developed.   HENT:      Head: Normocephalic and atraumatic.      Right Ear: External ear normal.      Left Ear: External ear normal.      Nose: Nose normal.   Eyes:      Conjunctiva/sclera: Conjunctivae normal.   Cardiovascular:      Rate and Rhythm: Normal rate.   Pulmonary:      Effort: Pulmonary effort is normal.   Musculoskeletal:         General: Normal range of motion.      Cervical back: Normal range of motion.   Neurological:      Mental Status: She is alert and oriented to person, place, and time.   Psychiatric:         Behavior: Behavior normal.         Thought Content: Thought content normal.         Judgment: Judgment normal.              ASSESSMENT/PLAN    Diagnoses and all orders for this visit:    1. Diarrhea, unspecified type (Primary)  Comments:  Check labs and stool studies as ordered. Further recs based on results.  Orders:  -     Clostridium Difficile Toxin - , Per Rectum; Future  -     Ova & Parasite Examination - , Per Rectum; Future  -     Fecal Leukocytes - , Per Rectum; Future  -     Giardia Antigen - , Per Rectum; Future  -     stool culture with yersinia; Future  -     saccharomyces boulardii (Florastor) 250 MG capsule; Take 1 capsule by mouth 2 (Two) Times a Day.  Dispense: 30 capsule; Refill: 0  -     Comprehensive Metabolic Panel; Future  -     CBC & Differential; Future             Return if symptoms worsen or fail to improve, for Next scheduled follow up.

## 2022-03-17 ENCOUNTER — LAB (OUTPATIENT)
Dept: LAB | Facility: HOSPITAL | Age: 42
End: 2022-03-17

## 2022-03-17 DIAGNOSIS — R19.7 DIARRHEA, UNSPECIFIED TYPE: ICD-10-CM

## 2022-03-17 LAB
ALBUMIN SERPL-MCNC: 4.5 G/DL (ref 3.5–5.2)
ALBUMIN/GLOB SERPL: 1.5 G/DL
ALP SERPL-CCNC: 66 U/L (ref 39–117)
ALT SERPL W P-5'-P-CCNC: 12 U/L (ref 1–33)
ANION GAP SERPL CALCULATED.3IONS-SCNC: 10.1 MMOL/L (ref 5–15)
AST SERPL-CCNC: 17 U/L (ref 1–32)
BASOPHILS # BLD AUTO: 0.03 10*3/MM3 (ref 0–0.2)
BASOPHILS NFR BLD AUTO: 0.5 % (ref 0–1.5)
BILIRUB SERPL-MCNC: 0.3 MG/DL (ref 0–1.2)
BUN SERPL-MCNC: 7 MG/DL (ref 6–20)
BUN/CREAT SERPL: 7.3 (ref 7–25)
CALCIUM SPEC-SCNC: 9.4 MG/DL (ref 8.6–10.5)
CHLORIDE SERPL-SCNC: 105 MMOL/L (ref 98–107)
CO2 SERPL-SCNC: 25.9 MMOL/L (ref 22–29)
CREAT SERPL-MCNC: 0.96 MG/DL (ref 0.57–1)
DEPRECATED RDW RBC AUTO: 50.2 FL (ref 37–54)
EGFRCR SERPLBLD CKD-EPI 2021: 75.4 ML/MIN/1.73
EOSINOPHIL # BLD AUTO: 0.13 10*3/MM3 (ref 0–0.4)
EOSINOPHIL NFR BLD AUTO: 2 % (ref 0.3–6.2)
ERYTHROCYTE [DISTWIDTH] IN BLOOD BY AUTOMATED COUNT: 17.5 % (ref 12.3–15.4)
GLOBULIN UR ELPH-MCNC: 3 GM/DL
GLUCOSE SERPL-MCNC: 80 MG/DL (ref 65–99)
HCT VFR BLD AUTO: 35.2 % (ref 34–46.6)
HGB BLD-MCNC: 10.8 G/DL (ref 12–15.9)
IMM GRANULOCYTES # BLD AUTO: 0.01 10*3/MM3 (ref 0–0.05)
IMM GRANULOCYTES NFR BLD AUTO: 0.2 % (ref 0–0.5)
LYMPHOCYTES # BLD AUTO: 2.57 10*3/MM3 (ref 0.7–3.1)
LYMPHOCYTES NFR BLD AUTO: 40.2 % (ref 19.6–45.3)
MCH RBC QN AUTO: 24.2 PG (ref 26.6–33)
MCHC RBC AUTO-ENTMCNC: 30.7 G/DL (ref 31.5–35.7)
MCV RBC AUTO: 78.7 FL (ref 79–97)
MONOCYTES # BLD AUTO: 0.42 10*3/MM3 (ref 0.1–0.9)
MONOCYTES NFR BLD AUTO: 6.6 % (ref 5–12)
NEUTROPHILS NFR BLD AUTO: 3.23 10*3/MM3 (ref 1.7–7)
NEUTROPHILS NFR BLD AUTO: 50.5 % (ref 42.7–76)
NRBC BLD AUTO-RTO: 0 /100 WBC (ref 0–0.2)
PLATELET # BLD AUTO: 388 10*3/MM3 (ref 140–450)
PMV BLD AUTO: 10.3 FL (ref 6–12)
POTASSIUM SERPL-SCNC: 4.2 MMOL/L (ref 3.5–5.2)
PROT SERPL-MCNC: 7.5 G/DL (ref 6–8.5)
RBC # BLD AUTO: 4.47 10*6/MM3 (ref 3.77–5.28)
SODIUM SERPL-SCNC: 141 MMOL/L (ref 136–145)
WBC NRBC COR # BLD: 6.39 10*3/MM3 (ref 3.4–10.8)

## 2022-03-17 PROCEDURE — 80053 COMPREHEN METABOLIC PANEL: CPT

## 2022-03-17 PROCEDURE — 85025 COMPLETE CBC W/AUTO DIFF WBC: CPT

## 2022-03-18 ENCOUNTER — LAB (OUTPATIENT)
Dept: LAB | Facility: HOSPITAL | Age: 42
End: 2022-03-18

## 2022-03-18 DIAGNOSIS — R19.7 DIARRHEA, UNSPECIFIED TYPE: ICD-10-CM

## 2022-03-18 LAB
C DIFF TOX GENS STL QL NAA+PROBE: NOT DETECTED
WBC STL QL MICRO: NORMAL

## 2022-03-18 PROCEDURE — 87329 GIARDIA AG IA: CPT

## 2022-03-18 PROCEDURE — 87045 FECES CULTURE AEROBIC BACT: CPT

## 2022-03-18 PROCEDURE — 87493 C DIFF AMPLIFIED PROBE: CPT

## 2022-03-18 PROCEDURE — 87427 SHIGA-LIKE TOXIN AG IA: CPT

## 2022-03-18 PROCEDURE — 87177 OVA AND PARASITES SMEARS: CPT

## 2022-03-18 PROCEDURE — 87046 STOOL CULTR AEROBIC BACT EA: CPT

## 2022-03-18 PROCEDURE — 87209 SMEAR COMPLEX STAIN: CPT

## 2022-03-18 PROCEDURE — 87205 SMEAR GRAM STAIN: CPT

## 2022-03-21 LAB — G LAMBLIA AG STL QL IA: NEGATIVE

## 2022-03-21 NOTE — PROGRESS NOTES
Your blood work shows some new anemia. Let me know if you are having heavy periods that would explain the anemia.    Also, please follow up in 4 weeks so we can recheck your levels.

## 2022-03-23 ENCOUNTER — PATIENT OUTREACH (OUTPATIENT)
Dept: CASE MANAGEMENT | Facility: OTHER | Age: 42
End: 2022-03-23

## 2022-03-23 LAB
BACTERIA SPEC CULT: NORMAL
CAMPYLOBACTER STL CULT: NORMAL
E COLI SXT STL QL IA: NEGATIVE
O+P SPEC MICRO: NORMAL
O+P STL TRI STN: NORMAL
SALM + SHIG STL CULT: NORMAL
YERSINIA SPEC CULT: NORMAL

## 2022-03-23 NOTE — OUTREACH NOTE
AMBULATORY CASE MANAGEMENT NOTE    Name and Relationship of Patient/Support Person: Bernardo Dodd O - Self    Patient Outreach    F/u with pt.  States she is not able to talk at present and states she will call RN-ACM back.      3:15   Pt returned call, however RN-ACM was unable to answer.  Pt left message.  Attempted to call her back, but went to her voicemail.  Will try again in 4-6 days, however knows to contact RN-ACM for needs in the interim.     BRENDA CLIFFORD  Ambulatory Case Management    3/23/2022, 10:25 EDT

## 2022-03-29 ENCOUNTER — OFFICE VISIT (OUTPATIENT)
Dept: BEHAVIORAL HEALTH | Facility: CLINIC | Age: 42
End: 2022-03-29

## 2022-03-29 DIAGNOSIS — F41.1 GENERALIZED ANXIETY DISORDER: ICD-10-CM

## 2022-03-29 DIAGNOSIS — F19.10 SUBSTANCE ABUSE: Primary | ICD-10-CM

## 2022-03-29 PROCEDURE — 90837 PSYTX W PT 60 MINUTES: CPT | Performed by: COUNSELOR

## 2022-03-29 NOTE — PROGRESS NOTES
[facility]                  Follow Up Adult      Follow Up Adult Note     Date:2022   Patient Name: Bernardo Dodd  : 1980   MRN: [id number]   Time IN: 10:05am    Time OUT: 11:06am     Referring Provider: Nelly Sotelo PA    Chief Complaint:      ICD-10-CM ICD-9-CM   1. Substance abuse (HCC)  F19.10 305.90   2. Generalized anxiety disorder  F41.1 300.02        History of Present Illness:   Bernardo Dodd is a 41 y.o. female who is being seen today for follow up counseling for  Anxiety symptoms and past substance use.        Subjective     PHQ-9 Score Total:  PHQ-9 Total Score: 2    Patient's Support Network Includes:  significant other and parents    Functional Status: Mild impairment     Medications:     Current Outpatient Medications:   •  amitriptyline (ELAVIL) 25 MG tablet, Take 1-2 tablets PO QHS PRN for sleep (Patient taking differently: At Night As Needed. Take 1-2 tablets PO QHS PRN for sleep), Disp: 180 tablet, Rfl: 0  •  amoxicillin-clavulanate (AUGMENTIN) 875-125 MG per tablet, Take 1 tablet by mouth Every 12 (Twelve) Hours., Disp: 20 tablet, Rfl: 0  •  fluticasone (Flonase) 50 MCG/ACT nasal spray, 2 sprays into the nostril(s) as directed by provider Daily., Disp: 16 g, Rfl: 2  •  lamoTRIgine (LaMICtal) 100 MG tablet, Take 1 tablet by mouth Daily., Disp: 30 tablet, Rfl: 4  •  saccharomyces boulardii (Florastor) 250 MG capsule, Take 1 capsule by mouth 2 (Two) Times a Day., Disp: 30 capsule, Rfl: 0  •  SPIRIVA RESPIMAT 2.5 MCG/ACT aerosol solution, Take 2 puffs by mouth Daily., Disp: , Rfl:     Allergies:   Allergies   Allergen Reactions   • Naproxen Swelling   • Sulfa Antibiotics Rash       Objective     Physical Exam:  Vital Signs: There were no vitals filed for this visit.  There is no height or weight on file to calculate BMI.     Mental Status Exam:   Hygiene:   good  Cooperation:  Cooperative  Eye Contact:  Good  Psychomotor  Behavior:  Appropriate  Affect:  Full range  Mood: normal  Speech:  Normal  Thought Process:  slightly tangential   Thought Content:  Mood congruent  Suicidal:  None  Homicidal:  None  Hallucinations:  None  Delusion:  None  Memory:  Intact  Orientation:  Person, Place, Time and Situation  Reliability:  good  Insight:  Good  Judgement:  Good  Impulse Control:  Good  Physical/Medical Issues:  No      Assessment / Plan      Visit Diagnosis/Orders Placed This Visit:    ICD-10-CM ICD-9-CM   1. Substance abuse (HCC)  F19.10 305.90   2. Generalized anxiety disorder  F41.1 300.02      Patient presents in office for follow-up.  Patient reports a continued desire to return to work.  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  Patient reports that it is a 16-week program.  Patient reports no drug use outside of marijuana.  Patient reports multiple stressors including being behind on rent and her [employer] bill as well as needing to pay for car insurance, obtain a license, and complete an assessment due to completion of DUI classes.  Patient reports that if she does not do these things she may go to longterm.  Additionally, if patient does not pay rent by the end of April, she will likely be evicted.  I will make a referral to case management for possible assistance.  Patient reports current coping skills include music, talking to her boyfriend, and petting her cat.  I assisted patient in processing current stressors and related emotions.  I provided empathy and support    PLAN:  1. Safety: No acute safety concerns  2. Risk Assessment: Risk of self-harm acutely is low. Risk of self-harm chronically is also low, but could be further elevated in the event of treatment noncompliance and/or AODA.    Treatment Plan/Goals: Continue supportive psychotherapy efforts and medications as indicated. Treatment and medication options discussed during today's visit. Patient ackowledged and verbally consented to  continue with current treatment plan and was educated on the importance of compliance with treatment and follow-up appointments. Patient seems reasonably able to adhere to treatment plan.      Assisted Patient in processing above session content; acknowledged and normalized patient’s thoughts, feelings, and concerns.  Rationalized patient thought process regarding current symptoms and stressors.      Allowed Patient to freely discuss issues  without interruption or judgement with unconditional positive regard, active listening skills, and empathy. Therapist provided a safe, confidential environment to facilitate the development of a positive therapeutic relationship and encouraged open, honest communication. Assisted Patient in identifying risk factors which would indicate the need for higher level of care including thoughts to harm self or others and/or self-harming behavior and encouraged Patient to contact this office, call 911, or present to the nearest emergency room should any of these events occur. Discussed crisis intervention services and means to access. Patient adamantly and convincingly denies current suicidal or homicidal ideation or perceptual disturbance. Assisted Patient in processing session content; acknowledged and normalized Patient’s thoughts, feelings, and concerns by utilizing a person-centered approach in efforts to build appropriate rapport and a positive therapeutic relationship with open and honest communication. .     Quality Measures:     TOBACCO USE:  Current every day smoker less than 3 minutes spent counseling .    I advised Tyralita of the risks of tobacco use.     Follow Up:   Return in about 2 weeks (around 4/12/2022) for Counseling.      Blank Garcia LCSW

## 2022-04-01 ENCOUNTER — REFERRAL TRIAGE (OUTPATIENT)
Dept: CASE MANAGEMENT | Facility: OTHER | Age: 42
End: 2022-04-01

## 2022-04-05 ENCOUNTER — TELEMEDICINE (OUTPATIENT)
Dept: PSYCHIATRY | Facility: CLINIC | Age: 42
End: 2022-04-05

## 2022-04-05 ENCOUNTER — OFFICE VISIT (OUTPATIENT)
Dept: BEHAVIORAL HEALTH | Facility: CLINIC | Age: 42
End: 2022-04-05

## 2022-04-05 DIAGNOSIS — F19.11 HISTORY OF SUBSTANCE ABUSE: ICD-10-CM

## 2022-04-05 DIAGNOSIS — F19.10 SUBSTANCE ABUSE: Primary | ICD-10-CM

## 2022-04-05 DIAGNOSIS — F41.1 GENERALIZED ANXIETY DISORDER: ICD-10-CM

## 2022-04-05 DIAGNOSIS — G47.9 SLEEP DIFFICULTIES: Primary | Chronic | ICD-10-CM

## 2022-04-05 PROCEDURE — 90834 PSYTX W PT 45 MINUTES: CPT | Performed by: COUNSELOR

## 2022-04-05 PROCEDURE — 99213 OFFICE O/P EST LOW 20 MIN: CPT | Performed by: NURSE PRACTITIONER

## 2022-04-05 NOTE — PROGRESS NOTES
[facility]                  Follow Up Adult      Follow Up Adult Note     Date:2022   Patient Name: Bernardo Dodd  : 1980   MRN: [id number]   Time IN: 2:00pm    Time OUT: 2:40pm     Referring Provider: Nelly Sotelo PA    Chief Complaint:      ICD-10-CM ICD-9-CM   1. Substance abuse (HCC)  F19.10 305.90   2. Generalized anxiety disorder  F41.1 300.02        History of Present Illness:   Bernardo Dodd is a 41 y.o. female who is being seen today for follow up counseling for issues related to substance use and a request to return to work letter.        Subjective     PHQ-9 Score Total:  PHQ-9 Total Score:      Patient's Support Network Includes:  mother    Functional Status: Mild impairment     Medications:     Current Outpatient Medications:   •  amitriptyline (ELAVIL) 25 MG tablet, Take 1-2 tablets PO QHS PRN for sleep (Patient taking differently: At Night As Needed. Take 1-2 tablets PO QHS PRN for sleep), Disp: 180 tablet, Rfl: 0  •  fluticasone (Flonase) 50 MCG/ACT nasal spray, 2 sprays into the nostril(s) as directed by provider Daily., Disp: 16 g, Rfl: 2  •  lamoTRIgine (LaMICtal) 100 MG tablet, Take 1 tablet by mouth Daily., Disp: 30 tablet, Rfl: 4  •  SPIRIVA RESPIMAT 2.5 MCG/ACT aerosol solution, Take 2 puffs by mouth Daily., Disp: , Rfl:     Allergies:   Allergies   Allergen Reactions   • Naproxen Swelling   • Sulfa Antibiotics Rash       Objective     Physical Exam:  Vital Signs: There were no vitals filed for this visit.  There is no height or weight on file to calculate BMI.     Mental Status Exam:   Hygiene:   good  Cooperation:  Cooperative  Eye Contact:  Good  Psychomotor Behavior:  Appropriate  Affect:  Full range  Mood: normal  Speech:  Normal  Thought Process:  Goal directed  Thought Content:  Mood congruent  Suicidal:  None  Homicidal:  None  Hallucinations:  None  Delusion:  None  Memory:  Intact  Orientation:  Person,  Place, Time and Situation  Reliability:  good  Insight:  Good  Judgement:  Good  Impulse Control:  Good  Physical/Medical Issues:  No      Assessment / Plan      Visit Diagnosis/Orders Placed This Visit:    ICD-10-CM ICD-9-CM   1. Substance abuse (HCC)  F19.10 305.90   2. Generalized anxiety disorder  F41.1 300.02      Patient presents in office for follow-up.  Patient reports that she continues to do well, but reports a desire to return to previous job.  Patient again requests letter to employer.  Patient and I discussed the possibility of APRN writing letter due to APRN knowing patient for a longer period of time.  I will reach out to APRN to discuss this and will follow-up with patient.  Patient reports that she was able to complete assessment for DUI classes, but reports that she still needs to obtain her license.  Patient reports that her friend remains in her home due to recently having surgery.  Patient reports that she needs to go back to work in order to pay rent and bills.  Patient also reports a desire for son to return to her care sometime this summer.  Patient reports continued marijuana use, but reports that it is not daily and reports that she does not use marijuana before working or going to doctors appointments.    PLAN:  1. Safety: No acute safety concerns  2. Risk Assessment: Risk of self-harm acutely is low. Risk of self-harm chronically is also low, but could be further elevated in the event of treatment noncompliance and/or AODA.    Treatment Plan/Goals: Continue supportive psychotherapy efforts and medications as indicated. Treatment and medication options discussed during today's visit. Patient ackowledged and verbally consented to continue with current treatment plan and was educated on the importance of compliance with treatment and follow-up appointments. Patient seems reasonably able to adhere to treatment plan.      Assisted Patient in processing above session content; acknowledged and  normalized patient’s thoughts, feelings, and concerns.  Rationalized patient thought process regarding current symptoms and stressors.      Allowed Patient to freely discuss issues  without interruption or judgement with unconditional positive regard, active listening skills, and empathy. Therapist provided a safe, confidential environment to facilitate the development of a positive therapeutic relationship and encouraged open, honest communication. Assisted Patient in identifying risk factors which would indicate the need for higher level of care including thoughts to harm self or others and/or self-harming behavior and encouraged Patient to contact this office, call 911, or present to the nearest emergency room should any of these events occur. Discussed crisis intervention services and means to access. Patient adamantly and convincingly denies current suicidal or homicidal ideation or perceptual disturbance. Assisted Patient in processing session content; acknowledged and normalized Patient’s thoughts, feelings, and concerns by utilizing a person-centered approach in efforts to build appropriate rapport and a positive therapeutic relationship with open and honest communication. .     Quality Measures:     TOBACCO USE:  Current every day smoker less than 3 minutes spent counseling Not agreeable to stopping    I advised Tyralita of the risks of tobacco use.     Follow Up:   Return in about 1 week (around 4/12/2022) for Counseling.      Blank Garcia LCSW

## 2022-04-05 NOTE — PROGRESS NOTES
This provider is located at the [facility] (through [facility]), [address], [place], [address] using a secure paymiohart Video Visit through [employer]. Patient is being seen remotely via telehealth at their home address in [place], and stated they are in a secure environment for this session. The patient's condition being diagnosed/treated is appropriate for telemedicine. The provider identified herself as well as her credentials.   The patient, and/or patients guardian, consent to be seen remotely, and when consent is given they understand that the consent allows for patient identifiable information to be sent to a third party as needed.   They may refuse to be seen remotely at any time. The electronic data is encrypted and password protected, and the patient and/or guardian has been advised of the potential risks to privacy not withstanding such measures.    You have chosen to receive care through a telehealth visit.  Do you consent to use a video/audio connection for your medical care today? Yes        Subjective   Bernardo Dodd is a 41 y.o. female who presents today for follow up    Chief Complaint:  Psychosis, anxiety, sleep disruption    Accompanied by:Pt was alone for duration of appointment    History of Present Illness:   Pt states that she has been doing well. She has only taken the amitriptyline a few times. She tends to oversleep when she takes it. Pt stays up late at night watching television. She is averaging 6-8 hours. Pt would like to keep the amitriptyline on as needed basis. 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 Pt has been seeing a therapist. Pt is going to a pain clinic for injections in her neck and back. Pt has an upcoming surgery on her right hand due to carpal tunnel. Pt has been getting along with others. Pt reports mood is stable. Pt denies hallucinations and delusions. Pt no longer feels as if anyone is watching or following  her. Denies paranoid delusions. Pt's son is still staying at her parent's house. Pt is smoking marijuana for pain. Denies all other recreational drug use. The patient reports compliance with current medication regimen. The patient denies any current side effects from her current medication regimen. The patient denies any abnormal muscle movements or tics. The patient would like to not adjust or change their medications at this visit. The patient denies any suicidal or homicidal ideations, plans, or intent at today's encounter and is convincing.  The patient denies any auditory hallucinations or visual hallucinations. The patient does not endorse any significant symptoms consistent with roque or psychosis at today's encounter.            Prior Psychiatric Medications:  Elavil  Lamictal - for seizures  Zoloft  Celexa  Wellbutrin   Seroquel - agitated            The following portions of the patient's history were reviewed and updated as appropriate: allergies, current medications, past family history, past medical history, past social history, past surgical history and problem list.          Past Medical History:  Past Medical History:   Diagnosis Date   • Allergic rhinitis    • Anemia    • Arthritis    • Asthma    • Bartholin gland cyst    • Chlamydia    • Depression    • FHx: migraine headaches    • GERD (gastroesophageal reflux disease)    • Heart murmur    • Herpes simplex    • History of chest x-ray 11/01/2015    no active disease   • History of echocardiogram 11/01/2015    ejection fraction of greater than 65%, mitral and pulmonic regurgitation an physiological tricuspid regurgitation.   • History of PFTs 12/22/2015    spirometry data acceptable and reproducible; pt given 4 puffs of Ventolin; pt gave good effort; no obstruction; no Bd response; MVV reduced    • History of PFTs 11/02/2015    pt gave best effort; duoneb given prior and post study; moderate nonspecific proportional reduction of FEV1 and FVC with  preserved ratio; FEV1 moderately reduced; cannot rule out restriction   • Hypertension    • Kidney stones    • Migraine    • Ovarian cyst    • Pelvic pain    • Screening breast examination     self;admits   • Seizures (HCC)    • STD (female)    • Thigh shingles    • Trichomonas infection        Social History:  Social History     Socioeconomic History   • Marital status: Single   Tobacco Use   • Smoking status: Current Every Day Smoker     Packs/day: 0.25     Years: 15.00     Pack years: 3.75     Types: Cigarettes     Last attempt to quit: 2015     Years since quittin.2   • Smokeless tobacco: Never Used   Vaping Use   • Vaping Use: Some days   • Devices: Disposable   • Passive vaping exposure: Yes   Substance and Sexual Activity   • Alcohol use: No   • Drug use: Yes     Types: Marijuana     Comment: Marijuana once a week. Tried cocaine, meth, pain pills in the past   • Sexual activity: Yes     Birth control/protection: Surgical       Family History:  Family History   Problem Relation Age of Onset   • Pancreatic cancer Maternal Grandmother    • Heart failure Paternal Grandmother    • MARCIE disease Paternal Aunt    • Arthritis Mother    • Cancer Mother    • Bipolar disorder Mother    • Arthritis Father    • Dementia Father    • Pancreatic cancer Other    • Diabetes Other    • Heart disease Other    • Hypertension Other    • Other Other         RESPIRATORY DISEASE   • Heart attack Neg Hx    • Hyperlipidemia Neg Hx    • Mental illness Neg Hx    • Obesity Neg Hx    • Stroke Neg Hx    • Breast cancer Neg Hx    • Ovarian cancer Neg Hx        Past Surgical History:  Past Surgical History:   Procedure Laterality Date   • BILATERAL BREAST REDUCTION     • BREAST BIOPSY     • BREAST SURGERY      breast reduction   •  SECTION     • CYSTOSCOPY     • DIAGNOSTIC LAPAROSCOPY     • INCISION AND DRAINAGE ABSCESS      bartholin's   • LAPAROSCOPIC TUBAL LIGATION     • ORIF ANKLE FRACTURE Right    • REDUCTION  MAMMAPLASTY Bilateral 1998   • TENSION FREE VAGINAL TAPING WITH MINI ARC SLING      Dr Doyle avery        Problem List:  Patient Active Problem List   Diagnosis   • Asthma   • Obesity   • GERD (gastroesophageal reflux disease)   • Dyspnea   • Allergic rhinitis   • Hypertension   • Menorrhagia with regular cycle   • Herpes simplex infection   • Acute right lumbar radiculopathy   • Health care maintenance   • Chronic maxillary sinusitis   • Iron deficiency anemia   • Ureteral obstruction, left   • Seizure disorder (HCC)   • Fungal infection       Allergy:   Allergies   Allergen Reactions   • Naproxen Swelling   • Sulfa Antibiotics Rash        Current Medications:   Current Outpatient Medications   Medication Sig Dispense Refill   • amitriptyline (ELAVIL) 25 MG tablet Take 1-2 tablets PO QHS PRN for sleep (Patient taking differently: At Night As Needed. Take 1-2 tablets PO QHS PRN for sleep) 180 tablet 0   • fluticasone (Flonase) 50 MCG/ACT nasal spray 2 sprays into the nostril(s) as directed by provider Daily. 16 g 2   • lamoTRIgine (LaMICtal) 100 MG tablet Take 1 tablet by mouth Daily. 30 tablet 4   • SPIRIVA RESPIMAT 2.5 MCG/ACT aerosol solution Take 2 puffs by mouth Daily.       No current facility-administered medications for this visit.       Review of Symptoms:    Review of Systems   Constitutional: Negative.    Psychiatric/Behavioral: Negative.          Physical Exam:   Due to the remote nature of this encounter (virtual encounter), vitals were unable to be obtained.  Height stated at 59 inches.  Weight stated at 166 pounds.      Physical Exam  Neurological:      Mental Status: She is alert.   Psychiatric:         Attention and Perception: Attention and perception normal. She does not perceive auditory or visual hallucinations.         Mood and Affect: Mood and affect normal.         Speech: Speech normal.         Behavior: Behavior normal. Behavior is cooperative.         Thought Content: Thought content  normal. Thought content is not paranoid or delusional. Thought content does not include homicidal or suicidal ideation. Thought content does not include homicidal or suicidal plan.         Cognition and Memory: Cognition and memory normal.           Mental Status Exam:   Hygiene:   good  Cooperation:  Cooperative  Eye Contact:  Good  Psychomotor Behavior:  Appropriate  Affect:  Appropriate  Mood: normal  Speech:  Normal  Thought Process:  Goal directed  Thought Content:  Normal  Suicidal:  None  Homicidal:  None  Hallucinations:  None  Delusion:  None  Memory:  Intact  Orientation:  Person, Place, Time and Situation  Reliability:  fair  Insight:  Fair  Judgement:  Fair  Impulse Control:  Fair        PHQ-9 Depression Screening  Little interest or pleasure in doing things? (P) 0   Feeling down, depressed, or hopeless? (P) 0   Trouble falling or staying asleep, or sleeping too much? (P) 0   Feeling tired or having little energy? (P) 0   Poor appetite or overeating? (P) 0   Feeling bad about yourself - or that you are a failure or have let yourself or your family down? (P) 0   Trouble concentrating on things, such as reading the newspaper or watching television? (P) 0   Moving or speaking so slowly that other people could have noticed? Or the opposite - being so fidgety or restless that you have been moving around a lot more than usual? (P) 0   Thoughts that you would be better off dead, or of hurting yourself in some way? (P) 0   PHQ-9 Total Score (P) 0   If you checked off any problems, how difficult have these problems made it for you to do your work, take care of things at home, or get along with other people? (P) Not difficult at all     PHQ-9 Total Score: (P) 0        RADHAMES 7 anxiety screening tool that patient filled out virtually reviewed by this APRN at today's encounter.    PROMIS scale screening tool that patient filled out virtually reviewed by this APRN at today's encounter.    Previous Provider notes and  available records reviewed by this APRN at today's encounter.          Lab Results:   Lab on 03/18/2022   Component Date Value Ref Range Status   • Ova + Parasite Exam 03/18/2022 No ova or parasites seen on concentrated smear  No Ova or Parasites Seen Final   • Trichrome Stain 03/18/2022 No ova or parasites seen on trichrome stain   Final   • Fecal Leukocytes 03/18/2022 No WBC's Seen  No WBC's Seen Final   • Giardia Ag, Stl 03/18/2022 Negative  Negative Final   • C. Difficile Toxins by PCR 03/18/2022 Not Detected  Not Detected Final   • Salmonella/Shigella Screen 03/18/2022 Final report   Final   • Result 1 03/18/2022 Comment   Final    No Salmonella or Shigella recovered.   • Campylobacter Culture 03/18/2022 Final report   Final   • Result 1 03/18/2022 Comment   Final    No Campylobacter species isolated.   • E coli, Shiga toxin Assay 03/18/2022 Negative  Negative Final   • Yersinia enterocolitica 03/18/2022 Final report   Final   • Yersinia Stool, Result 1 03/18/2022 No Yersinia isolated   Final   Lab on 03/17/2022   Component Date Value Ref Range Status   • Glucose 03/17/2022 80  65 - 99 mg/dL Final   • BUN 03/17/2022 7  6 - 20 mg/dL Final   • Creatinine 03/17/2022 0.96  0.57 - 1.00 mg/dL Final   • Sodium 03/17/2022 141  136 - 145 mmol/L Final   • Potassium 03/17/2022 4.2  3.5 - 5.2 mmol/L Final   • Chloride 03/17/2022 105  98 - 107 mmol/L Final   • CO2 03/17/2022 25.9  22.0 - 29.0 mmol/L Final   • Calcium 03/17/2022 9.4  8.6 - 10.5 mg/dL Final   • Total Protein 03/17/2022 7.5  6.0 - 8.5 g/dL Final   • Albumin 03/17/2022 4.50  3.50 - 5.20 g/dL Final   • ALT (SGPT) 03/17/2022 12  1 - 33 U/L Final   • AST (SGOT) 03/17/2022 17  1 - 32 U/L Final   • Alkaline Phosphatase 03/17/2022 66  39 - 117 U/L Final   • Total Bilirubin 03/17/2022 0.3  0.0 - 1.2 mg/dL Final   • Globulin 03/17/2022 3.0  gm/dL Final   • A/G Ratio 03/17/2022 1.5  g/dL Final   • BUN/Creatinine Ratio 03/17/2022 7.3  7.0 - 25.0 Final   • Anion Gap  03/17/2022 10.1  5.0 - 15.0 mmol/L Final   • eGFR 03/17/2022 75.4  >60.0 mL/min/1.73 Final    National Kidney Foundation and American Society of Nephrology (ASN) Task Force recommended calculation based on the Chronic Kidney Disease Epidemiology Collaboration (CKD-EPI) equation refit without adjustment for race.   • WBC 03/17/2022 6.39  3.40 - 10.80 10*3/mm3 Final   • RBC 03/17/2022 4.47  3.77 - 5.28 10*6/mm3 Final   • Hemoglobin 03/17/2022 10.8 (A) 12.0 - 15.9 g/dL Final   • Hematocrit 03/17/2022 35.2  34.0 - 46.6 % Final   • MCV 03/17/2022 78.7 (A) 79.0 - 97.0 fL Final   • MCH 03/17/2022 24.2 (A) 26.6 - 33.0 pg Final   • MCHC 03/17/2022 30.7 (A) 31.5 - 35.7 g/dL Final   • RDW 03/17/2022 17.5 (A) 12.3 - 15.4 % Final   • RDW-SD 03/17/2022 50.2  37.0 - 54.0 fl Final   • MPV 03/17/2022 10.3  6.0 - 12.0 fL Final   • Platelets 03/17/2022 388  140 - 450 10*3/mm3 Final   • Neutrophil % 03/17/2022 50.5  42.7 - 76.0 % Final   • Lymphocyte % 03/17/2022 40.2  19.6 - 45.3 % Final   • Monocyte % 03/17/2022 6.6  5.0 - 12.0 % Final   • Eosinophil % 03/17/2022 2.0  0.3 - 6.2 % Final   • Basophil % 03/17/2022 0.5  0.0 - 1.5 % Final   • Immature Grans % 03/17/2022 0.2  0.0 - 0.5 % Final   • Neutrophils, Absolute 03/17/2022 3.23  1.70 - 7.00 10*3/mm3 Final   • Lymphocytes, Absolute 03/17/2022 2.57  0.70 - 3.10 10*3/mm3 Final   • Monocytes, Absolute 03/17/2022 0.42  0.10 - 0.90 10*3/mm3 Final   • Eosinophils, Absolute 03/17/2022 0.13  0.00 - 0.40 10*3/mm3 Final   • Basophils, Absolute 03/17/2022 0.03  0.00 - 0.20 10*3/mm3 Final   • Immature Grans, Absolute 03/17/2022 0.01  0.00 - 0.05 10*3/mm3 Final   • nRBC 03/17/2022 0.0  0.0 - 0.2 /100 WBC Final   Admission on 03/10/2022, Discharged on 03/10/2022   Component Date Value Ref Range Status   • Glucose 03/10/2022 114 (A) 65 - 99 mg/dL Final   • BUN 03/10/2022 10  6 - 20 mg/dL Final   • Creatinine 03/10/2022 0.73  0.57 - 1.00 mg/dL Final   • Sodium 03/10/2022 137  136 - 145 mmol/L Final    • Potassium 03/10/2022 3.6  3.5 - 5.2 mmol/L Final   • Chloride 03/10/2022 103  98 - 107 mmol/L Final   • CO2 03/10/2022 23.0  22.0 - 29.0 mmol/L Final   • Calcium 03/10/2022 9.1  8.6 - 10.5 mg/dL Final   • Total Protein 03/10/2022 8.3  6.0 - 8.5 g/dL Final   • Albumin 03/10/2022 4.80  3.50 - 5.20 g/dL Final   • ALT (SGPT) 03/10/2022 22  1 - 33 U/L Final   • AST (SGOT) 03/10/2022 25  1 - 32 U/L Final   • Alkaline Phosphatase 03/10/2022 72  39 - 117 U/L Final   • Total Bilirubin 03/10/2022 0.4  0.0 - 1.2 mg/dL Final   • Globulin 03/10/2022 3.5  gm/dL Final    Calculated Result   • A/G Ratio 03/10/2022 1.4  g/dL Final   • BUN/Creatinine Ratio 03/10/2022 13.7  7.0 - 25.0 Final   • Anion Gap 03/10/2022 11.0  5.0 - 15.0 mmol/L Final   • eGFR 03/10/2022 104.8  >60.0 mL/min/1.73 Final    National Kidney Foundation and American Society of Nephrology (ASN) Task Force recommended calculation based on the Chronic Kidney Disease Epidemiology Collaboration (CKD-EPI) equation refit without adjustment for race.   • Lipase 03/10/2022 37  13 - 60 U/L Final   • Color, UA 03/10/2022 Dark Yellow (A) Yellow, Straw Final   • Appearance, UA 03/10/2022 Cloudy (A) Clear Final   • pH, UA 03/10/2022 <=5.0  5.0 - 8.0 Final   • Specific Gravity, UA 03/10/2022 1.025  1.001 - 1.030 Final   • Glucose, UA 03/10/2022 Negative  Negative Final   • Ketones, UA 03/10/2022 Negative  Negative Final   • Bilirubin, UA 03/10/2022 Negative  Negative Final   • Blood, UA 03/10/2022 Negative  Negative Final   • Protein, UA 03/10/2022 Negative  Negative Final   • Leuk Esterase, UA 03/10/2022 Small (1+) (A) Negative Final   • Nitrite, UA 03/10/2022 Negative  Negative Final   • Urobilinogen, UA 03/10/2022 0.2 E.U./dL  0.2 - 1.0 E.U./dL Final   • HCG, Urine, QL 03/10/2022 Negative  Negative Final   • Lot Number 03/10/2022 [id number]   Final   • Internal Positive Control 03/10/2022 Positive  Positive, Passed Final   • Internal Negative Control 03/10/2022 Negative   Negative, Passed Final   • Expiration Date 03/10/2022 2023-03-31   Final   • Extra Tube 03/10/2022 Hold for add-ons.   Final    Auto resulted.   • Extra Tube 03/10/2022 hold for add-on   Final    Auto resulted   • Extra Tube 03/10/2022 Hold for add-ons.   Final    Auto resulted.   • Extra Tube 03/10/2022 Hold for add-ons.   Final    Auto resulted.   • Extra Tube 03/10/2022 hold for add-on   Final    Auto resulted   • WBC 03/10/2022 9.18  3.40 - 10.80 10*3/mm3 Final   • RBC 03/10/2022 5.14  3.77 - 5.28 10*6/mm3 Final   • Hemoglobin 03/10/2022 12.4  12.0 - 15.9 g/dL Final   • Hematocrit 03/10/2022 38.7  34.0 - 46.6 % Final   • MCV 03/10/2022 75.3 (A) 79.0 - 97.0 fL Final   • MCH 03/10/2022 24.1 (A) 26.6 - 33.0 pg Final   • MCHC 03/10/2022 32.0  31.5 - 35.7 g/dL Final   • RDW 03/10/2022 18.5 (A) 12.3 - 15.4 % Final   • RDW-SD 03/10/2022 48.4  37.0 - 54.0 fl Final   • MPV 03/10/2022 10.2  6.0 - 12.0 fL Final   • Platelets 03/10/2022 374  140 - 450 10*3/mm3 Final   • Neutrophil % 03/10/2022 75.9  42.7 - 76.0 % Final   • Lymphocyte % 03/10/2022 18.0 (A) 19.6 - 45.3 % Final   • Monocyte % 03/10/2022 5.3  5.0 - 12.0 % Final   • Eosinophil % 03/10/2022 0.3  0.3 - 6.2 % Final   • Basophil % 03/10/2022 0.3  0.0 - 1.5 % Final   • Immature Grans % 03/10/2022 0.2  0.0 - 0.5 % Final   • Neutrophils, Absolute 03/10/2022 6.96  1.70 - 7.00 10*3/mm3 Final   • Lymphocytes, Absolute 03/10/2022 1.65  0.70 - 3.10 10*3/mm3 Final   • Monocytes, Absolute 03/10/2022 0.49  0.10 - 0.90 10*3/mm3 Final   • Eosinophils, Absolute 03/10/2022 0.03  0.00 - 0.40 10*3/mm3 Final   • Basophils, Absolute 03/10/2022 0.03  0.00 - 0.20 10*3/mm3 Final   • Immature Grans, Absolute 03/10/2022 0.02  0.00 - 0.05 10*3/mm3 Final   • nRBC 03/10/2022 0.0  0.0 - 0.2 /100 WBC Final   • RBC, UA 03/10/2022 0-2  None Seen, 0-2 /HPF Final   • WBC, UA 03/10/2022 6-12 (A) None Seen, 0-2 /HPF Final   • Bacteria, UA 03/10/2022 Trace  None Seen, Trace /HPF Final   • Squamous  Epithelial Cells, UA 03/10/2022 7-12 (A) None Seen, 0-2 /HPF Final   • Hyaline Casts, UA 03/10/2022 None Seen  0 - 6 /LPF Final   • Methodology 03/10/2022 Manual Light Microscopy   Final   • Urine Culture 03/10/2022 <25,000 CFU/mL Mixed Farzana Isolated   Final         Assessment/Plan   Problems Addressed this Visit    None     Visit Diagnoses     Sleep difficulties  (Chronic)   -  Primary    History of substance abuse (HCC)          Diagnoses       Codes Comments    Sleep difficulties    -  Primary ICD-10-CM: G47.9  ICD-9-CM: 780.50     History of substance abuse (HCC)     ICD-10-CM: F19.11  ICD-9-CM: 305.93           Visit Diagnoses:    ICD-10-CM ICD-9-CM   1. Sleep difficulties  G47.9 780.50   2. History of substance abuse (HCC)  F19.11 305.93          GOALS:  Short Term Goals: Patient will be compliant with medication, and patient will have no significant medication related side effects.  Patient will be engaged in psychotherapy as indicated.  Patient will report subjective improvement of symptoms.  Long term goals: To stabilize mood and treat/improve subjective symptoms, the patient will stay out of the hospital, the patient will be at an optimal level of functioning, and the patient will take all medications as prescribed.  The patient verbalized understanding and agreement with goals that were mutually set.      TREATMENT PLAN:   Continue supportive psychotherapy efforts and medications as indicated.   -Continue Elavil 25-50 mg PO QHS PRN for sleep     Medication and treatment options, both pharmacological and non-pharmacological treatment options, discussed during today's visit, including any off label use of medication. Patient acknowledged and verbally consented with current treatment plan and was educated on the importance of compliance with treatment and follow-up appointments.        MEDICATION ISSUES:    Discussed treatment plan and medication options of prescribed medication as well as the risks,  benefits, any black box warnings, and side effects including potential falls, possible impaired driving, and metabolic adversities among others, including any off label use of medication. Patient is agreeable to call the office with any worsening of symptoms or onset of side effects, or if any concerns or questions arise.  The contact information for the office is made available to the patient. Patient is agreeable to call 911 or go to the nearest ER should they begin having any SI/HI, or if any urgent concerns arise. No medication side effects or related complaints today.       MEDS ORDERED DURING VISIT:  No orders of the defined types were placed in this encounter.      Return in about 12 weeks (around 6/28/2022), or if symptoms worsen or fail to improve, for Recheck.     Treatment plan completed: 8/25/21    Progress toward goal: Not at goal    Functional Status: Severe impairment    Prognosis: Fair with Ongoing Treatment         This document has been electronically signed by SHANE Lopez  April 5, 2022 09:55 EDT     Some of the data in this electronic note has been brought forward from a previous encounter, any necessary changes have been made, it has been reviewed by this APRN, and it is accurate.    Please note that portions of this note were completed with a voice recognition program. Efforts were made to edit dictation, but occasionally words are mistranscribed.

## 2022-04-07 ENCOUNTER — TELEPHONE (OUTPATIENT)
Dept: BEHAVIORAL HEALTH | Facility: CLINIC | Age: 42
End: 2022-04-07

## 2022-04-12 ENCOUNTER — OFFICE VISIT (OUTPATIENT)
Dept: INTERNAL MEDICINE | Facility: CLINIC | Age: 42
End: 2022-04-12

## 2022-04-12 ENCOUNTER — OFFICE VISIT (OUTPATIENT)
Dept: BEHAVIORAL HEALTH | Facility: CLINIC | Age: 42
End: 2022-04-12

## 2022-04-12 VITALS
SYSTOLIC BLOOD PRESSURE: 138 MMHG | BODY MASS INDEX: 32.66 KG/M2 | OXYGEN SATURATION: 97 % | HEART RATE: 95 BPM | HEIGHT: 59 IN | WEIGHT: 162 LBS | DIASTOLIC BLOOD PRESSURE: 80 MMHG | RESPIRATION RATE: 18 BRPM

## 2022-04-12 DIAGNOSIS — F41.1 GENERALIZED ANXIETY DISORDER: ICD-10-CM

## 2022-04-12 DIAGNOSIS — S69.91XA INJURY OF FINGER OF RIGHT HAND, INITIAL ENCOUNTER: Primary | ICD-10-CM

## 2022-04-12 DIAGNOSIS — F19.10 SUBSTANCE ABUSE: Primary | ICD-10-CM

## 2022-04-12 PROBLEM — G56.01 CARPAL TUNNEL SYNDROME, RIGHT: Status: ACTIVE | Noted: 2022-03-17

## 2022-04-12 PROBLEM — M47.816 LUMBAR SPONDYLOSIS: Status: ACTIVE | Noted: 2018-01-05

## 2022-04-12 PROCEDURE — 99213 OFFICE O/P EST LOW 20 MIN: CPT | Performed by: NURSE PRACTITIONER

## 2022-04-12 PROCEDURE — 90834 PSYTX W PT 45 MINUTES: CPT | Performed by: COUNSELOR

## 2022-04-12 NOTE — PROGRESS NOTES
[facility] [place]                 Follow Up Adult      Follow Up Adult Note     Date:2022   Patient Name: Bernardo Dodd  : 1980   MRN: [id number]   Time IN: 2:03pm    Time OUT: 2:50pm     Referring Provider: Nelly Sotelo PA    Chief Complaint:      ICD-10-CM ICD-9-CM   1. Substance abuse (HCC)  F19.10 305.90   2. Generalized anxiety disorder  F41.1 300.02        History of Present Illness:   Bernardo Dodd is a 41 y.o. female who is being seen today for follow up counseling for  Anxiety symptoms.        Subjective     Patient's Support Network Includes:  mother    Functional Status: Moderate impairment     Medications:     Current Outpatient Medications:   •  amitriptyline (ELAVIL) 25 MG tablet, Take 1-2 tablets PO QHS PRN for sleep (Patient taking differently: At Night As Needed. Take 1-2 tablets PO QHS PRN for sleep), Disp: 180 tablet, Rfl: 0  •  fluticasone (Flonase) 50 MCG/ACT nasal spray, 2 sprays into the nostril(s) as directed by provider Daily., Disp: 16 g, Rfl: 2  •  lamoTRIgine (LaMICtal) 100 MG tablet, Take 1 tablet by mouth Daily., Disp: 30 tablet, Rfl: 4  •  SPIRIVA RESPIMAT 2.5 MCG/ACT aerosol solution, Take 2 puffs by mouth Daily., Disp: , Rfl:     Allergies:   Allergies   Allergen Reactions   • Naproxen Swelling   • Sulfa Antibiotics Rash       Objective     Physical Exam:  Vital Signs: There were no vitals filed for this visit.  There is no height or weight on file to calculate BMI.     Mental Status Exam:   Hygiene:   good  Cooperation:  Cooperative  Eye Contact:  Good  Psychomotor Behavior:  Appropriate  Affect:  Full range  Mood: normal  Speech:  Normal  Thought Process:  Goal directed  Thought Content:  Mood congruent  Suicidal:  None  Homicidal:  None  Hallucinations:  None  Delusion:  None  Memory:  Intact  Orientation:  Person, Place, Time and Situation  Reliability:  good  Insight:  Good  Judgement:  Good  Impulse Control:   Good  Physical/Medical Issues:  No      Assessment / Plan      Visit Diagnosis/Orders Placed This Visit:    ICD-10-CM ICD-9-CM   1. Substance abuse (HCC)  F19.10 305.90   2. Generalized anxiety disorder  F41.1 300.02      Patient presents in office for follow-up.  Patient reports she has not heard from case management and reports continued concerns related to her rent and other bills.  Patient and I discussed APRN being willing to see patient and patient was agreeable.  Later on in the appointment, patient called previous employer and found out that she is not eligible for rehire.  Because of this, patient reports that she will no longer need to see APRN.  Patient reports that she does not want to take medication.  I assisted patient in processing thoughts related to lack of opportunity for rehire.  Patient was able to alter negative thoughts and shift focus.  Patient reports that she would like to continue therapy.  Patient reports having surgery scheduled for next week.    PLAN:  1. Safety: No acute safety concerns  2. Risk Assessment: Risk of self-harm acutely is low. Risk of self-harm chronically is also low, but could be further elevated in the event of treatment noncompliance and/or AODA.    Treatment Plan/Goals: Continue supportive psychotherapy efforts and medications as indicated. Treatment and medication options discussed during today's visit. Patient ackowledged and verbally consented to continue with current treatment plan and was educated on the importance of compliance with treatment and follow-up appointments. Patient seems reasonably able to adhere to treatment plan.      Assisted Patient in processing above session content; acknowledged and normalized patient’s thoughts, feelings, and concerns.  Rationalized patient thought process regarding current symptoms and stressors.      Allowed Patient to freely discuss issues  without interruption or judgement with unconditional positive regard, active  listening skills, and empathy. Therapist provided a safe, confidential environment to facilitate the development of a positive therapeutic relationship and encouraged open, honest communication. Assisted Patient in identifying risk factors which would indicate the need for higher level of care including thoughts to harm self or others and/or self-harming behavior and encouraged Patient to contact this office, call 911, or present to the nearest emergency room should any of these events occur. Discussed crisis intervention services and means to access. Patient adamantly and convincingly denies current suicidal or homicidal ideation or perceptual disturbance. Assisted Patient in processing session content; acknowledged and normalized Patient’s thoughts, feelings, and concerns by utilizing a person-centered approach in efforts to build appropriate rapport and a positive therapeutic relationship with open and honest communication. .     Quality Measures:     TOBACCO USE:  Current everyday smoker     I advised Tyralita of the risks of tobacco use.     Follow Up:   Return in about 2 weeks (around 4/26/2022) for Counseling.      Blank Garcia LCSW

## 2022-04-12 NOTE — PROGRESS NOTES
Chief Complaint   Patient presents with   • Finger Injury       History of Present Illness    41 y.o.female presents for finger injury  She was at work today and got finger stuck inbetween bed rail. Has abrasion. Hurts some but able to move finger ok.   Also had a spinal injection Friday, Saturday morning was itching at area and has a scab there that she wanted looked at.  No fever.    Review of Systems   Constitutional: Negative for chills and fever.   Musculoskeletal: Positive for arthralgias.   Skin: Positive for wound. Negative for color change.         PMSFH  The following portions of the patient's history were reviewed and updated as appropriate: allergies, current medications, past family history, past medical history, past social history, past surgical history and problem list.     Past Medical History:   Diagnosis Date   • Allergic rhinitis    • Anemia    • Arthritis    • Asthma    • Bartholin gland cyst    • Chlamydia    • Depression    • FHx: migraine headaches    • GERD (gastroesophageal reflux disease)    • Heart murmur    • Herpes simplex    • History of chest x-ray 11/01/2015    no active disease   • History of echocardiogram 11/01/2015    ejection fraction of greater than 65%, mitral and pulmonic regurgitation an physiological tricuspid regurgitation.   • History of PFTs 12/22/2015    spirometry data acceptable and reproducible; pt given 4 puffs of Ventolin; pt gave good effort; no obstruction; no Bd response; MVV reduced    • History of PFTs 11/02/2015    pt gave best effort; duoneb given prior and post study; moderate nonspecific proportional reduction of FEV1 and FVC with preserved ratio; FEV1 moderately reduced; cannot rule out restriction   • Hypertension    • Kidney stones    • Migraine    • Ovarian cyst    • Pelvic pain    • Screening breast examination     self;admits   • Seizures (HCC)    • STD (female)    • Thigh shingles    • Trichomonas infection       Allergies   Allergen Reactions   •  "Naproxen Swelling   • Sulfa Antibiotics Rash      Social History     Tobacco Use   • Smoking status: Current Every Day Smoker     Packs/day: 0.25     Years: 15.00     Pack years: 3.75     Types: Cigarettes     Last attempt to quit: 2015     Years since quittin.2   • Smokeless tobacco: Never Used   Vaping Use   • Vaping Use: Some days   • Devices: Disposable   • Passive vaping exposure: Yes   Substance Use Topics   • Alcohol use: No   • Drug use: Yes     Types: Marijuana     Comment: Marijuana once a week. Tried cocaine, meth, pain pills in the past         Current Outpatient Medications:   •  amitriptyline (ELAVIL) 25 MG tablet, Take 1-2 tablets PO QHS PRN for sleep (Patient taking differently: At Night As Needed. Take 1-2 tablets PO QHS PRN for sleep), Disp: 180 tablet, Rfl: 0  •  fluticasone (Flonase) 50 MCG/ACT nasal spray, 2 sprays into the nostril(s) as directed by provider Daily., Disp: 16 g, Rfl: 2  •  lamoTRIgine (LaMICtal) 100 MG tablet, Take 1 tablet by mouth Daily., Disp: 30 tablet, Rfl: 4  •  SPIRIVA RESPIMAT 2.5 MCG/ACT aerosol solution, Take 2 puffs by mouth Daily., Disp: , Rfl:     VITALS:  /80   Pulse 95   Resp 18   Ht 149.9 cm (59\")   Wt 73.5 kg (162 lb)   SpO2 97%   Breastfeeding No   BMI 32.72 kg/m²     Physical Exam  Vitals reviewed.   Pulmonary:      Effort: Pulmonary effort is normal. No respiratory distress.   Musculoskeletal:      Right hand: Tenderness present. No bony tenderness. Normal range of motion. Normal capillary refill. Normal pulse.        Hands:    Skin:     General: Skin is warm and dry.          Neurological:      General: No focal deficit present.      Mental Status: She is alert and oriented to person, place, and time.      Motor: No weakness.      Coordination: Coordination normal.      Gait: Gait normal.   Psychiatric:         Mood and Affect: Mood normal.         Result Review :            Assessment and Plan    Diagnoses and all orders for this " visit:    1. Injury of finger of right hand, initial encounter (Primary)  -     mupirocin (BACTROBAN) 2 % ointment; Apply 1 application topically to the appropriate area as directed 2 (Two) Times a Day for 7 days.  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 Can keep covered; apply bactroban cream. Can also apply cream to her abrasion on back. Tetanus is current 2018.    I discussed the patients findings and my recommendations with patient.  Patient was encouraged to keep me informed of any acute changes, lack of improvement, or any new concerning symptoms.  Patient voiced understanding of all instructions and denied further questions.      Follow Up   Return if symptoms worsen or fail to improve.      Electronically signed by:    SHANE Mueller  04/12/2022

## 2022-04-18 ENCOUNTER — OFFICE VISIT (OUTPATIENT)
Dept: BEHAVIORAL HEALTH | Facility: CLINIC | Age: 42
End: 2022-04-18

## 2022-04-18 VITALS
SYSTOLIC BLOOD PRESSURE: 142 MMHG | DIASTOLIC BLOOD PRESSURE: 82 MMHG | BODY MASS INDEX: 32.86 KG/M2 | HEIGHT: 59 IN | WEIGHT: 163 LBS | RESPIRATION RATE: 16 BRPM | OXYGEN SATURATION: 98 % | HEART RATE: 72 BPM

## 2022-04-18 DIAGNOSIS — F15.21 AMPHETAMINE SUBSTANCE USE DISORDER, MODERATE, IN EARLY REMISSION: ICD-10-CM

## 2022-04-18 DIAGNOSIS — F39 UNSPECIFIED MOOD (AFFECTIVE) DISORDER: ICD-10-CM

## 2022-04-18 DIAGNOSIS — F12.90 MARIJUANA USE: ICD-10-CM

## 2022-04-18 DIAGNOSIS — F15.21 AMPHETAMINE SUBSTANCE USE DISORDER, MODERATE, IN EARLY REMISSION: Primary | ICD-10-CM

## 2022-04-18 DIAGNOSIS — F39 UNSPECIFIED MOOD (AFFECTIVE) DISORDER: Primary | ICD-10-CM

## 2022-04-18 PROCEDURE — 90792 PSYCH DIAG EVAL W/MED SRVCS: CPT

## 2022-04-18 NOTE — PROGRESS NOTES
"     New Patient Office Visit      Patient Name: Bernardo Dodd  : 1980   MRN: [id number]     Referring Provider: Nelly Sotelo PA    Chief Complaint:  Psychiatric evaluation related to:     ICD-10-CM ICD-9-CM   1. Amphetamine substance use disorder, moderate, in early remission (HCC)  F15.21 304.43   2. Unspecified mood (affective) disorder (HCC)  F39 296.90   3. Marijuana use  F12.90 305.20        History of Present Illness:   Bernardo Dodd is a 41 y.o. female who is here today for psychiatric evaluation on referral from psychotherapist.  The patient reports that she is coming today because her therapist recommended it, she also states that she is \"on the verge of losing everything\".  The patient reports a history of substance use disorder, beginning approximately 17 years ago.  She reports she was sober from substances for several years but began abusing marijuana, alcohol and methamphetamine in .  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  She stopped using methamphetamine in 2021, and alcohol in January of this year.  She is still using marijuana approximately 3-4 times weekly. She reports a history of paranoia/hallucinations while intoxicated with methamphetamine.  She has a history of depression, years ago when her grandparents .  She has a history of suicidal ideation and 1 suicide attempt in .  She has a history of outpatient psychiatric treatment on and off for years. She was trialed on Celexa, Wellbutrin and Seroquel.  She reports with each of these medications she became \"not in her right mind\" and reports she was not sleeping and felt like she was in a trance.  She reports she has limited memory of these events, but people around her explained these incidents to her.  She reports 1 incident in which her neighbor found her in her home after the patient had not left for several days.    Currently, the patient is abstinent from both methamphetamine and " "alcohol.  She is currently using marijuana 3-4 times weekly.  She denies cravings for methamphetamine or alcohol.  She denies current symptoms of depression.  No evidence of roque or hypomania.  Denies suicidal ideation.    Subjective      Review of Systems:   Review of Systems   Psychiatric/Behavioral: Negative for hallucinations, self-injury, suicidal ideas and depressed mood.       PHQ-9 Depression Screening Score:       Past Psychiatric History:   Denies history of inpatient admissions.  1 suicide attempt in 2007, cut her wrists intending to die.  Boyfriend at the time called EMS and she was taken to the hospital.  Denies current suicidal ideation.  History of outpatient psychiatric treatment \"on and off\" for \"years\".  Previous medications: Celexa, Zoloft, Wellbutrin, Seroquel.  Patient reports negative interactions with antidepressants and Seroquel stating she was \"not in my right mind\", was not sleeping and was not caring for herself.    Social History:   Living in [place] alone however she may be evicted from her apartment this month.  She may move home with her mother.  Her mother currently cares for her 11-year-old son.  She also has a 20-year-old son in college.  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  For support she endorses having her parents.  History of substance use disorder, opioids, methamphetamine, marijuana, alcohol.  Current marijuana use 3-4 times weekly.  Recent DUI.    Past Medical History:   Past Medical History:   Diagnosis Date   • Allergic rhinitis    • Anemia    • Arthritis    • Asthma    • Bartholin gland cyst    • Chlamydia    • Depression    • FHx: migraine headaches    • GERD (gastroesophageal reflux disease)    • Heart murmur    • Herpes simplex    • History of chest x-ray 11/01/2015    no active disease   • History of echocardiogram 11/01/2015    ejection fraction of greater than 65%, mitral and pulmonic regurgitation an physiological tricuspid regurgitation. " "  • History of PFTs 2015    spirometry data acceptable and reproducible; pt given 4 puffs of Ventolin; pt gave good effort; no obstruction; no Bd response; MVV reduced    • History of PFTs 2015    pt gave best effort; duoneb given prior and post study; moderate nonspecific proportional reduction of FEV1 and FVC with preserved ratio; FEV1 moderately reduced; cannot rule out restriction   • Hypertension    • Kidney stones    • Migraine    • Ovarian cyst    • Pelvic pain    • Screening breast examination     self;admits   • Seizures (HCC)    • STD (female)    • Thigh shingles    • Trichomonas infection        Past Surgical History:   Past Surgical History:   Procedure Laterality Date   • BILATERAL BREAST REDUCTION     • BREAST BIOPSY     • BREAST SURGERY      breast reduction   •  SECTION     • CYSTOSCOPY     • DIAGNOSTIC LAPAROSCOPY     • INCISION AND DRAINAGE ABSCESS      bartholin's   • LAPAROSCOPIC TUBAL LIGATION     • ORIF ANKLE FRACTURE Right    • REDUCTION MAMMAPLASTY Bilateral    • TENSION FREE VAGINAL TAPING WITH MINI ARC SLING      Dr Doyle avery        Family History:   Family History   Problem Relation Age of Onset   • Pancreatic cancer Maternal Grandmother    • Heart failure Paternal Grandmother    • MARCIE disease Paternal Aunt    • Arthritis Mother    • Cancer Mother    • Bipolar disorder Mother    • Arthritis Father    • Dementia Father    • Pancreatic cancer Other    • Diabetes Other    • Heart disease Other    • Hypertension Other    • Other Other         RESPIRATORY DISEASE   • Heart attack Neg Hx    • Hyperlipidemia Neg Hx    • Mental illness Neg Hx    • Obesity Neg Hx    • Stroke Neg Hx    • Breast cancer Neg Hx    • Ovarian cancer Neg Hx        Family Psychiatric History:  Father: Alzheimer's disease  Brother: Autism spectrum disorder, Tourette's  \"Mother's side\", unknown relatives, bipolar disorder, schizophrenia    Medications:     Current Outpatient Medications:   •  " "amitriptyline (ELAVIL) 25 MG tablet, Take 1-2 tablets PO QHS PRN for sleep (Patient taking differently: At Night As Needed. Take 1-2 tablets PO QHS PRN for sleep), Disp: 180 tablet, Rfl: 0  •  fluticasone (Flonase) 50 MCG/ACT nasal spray, 2 sprays into the nostril(s) as directed by provider Daily., Disp: 16 g, Rfl: 2  •  lamoTRIgine (LaMICtal) 100 MG tablet, Take 1 tablet by mouth Daily., Disp: 30 tablet, Rfl: 4  •  SPIRIVA RESPIMAT 2.5 MCG/ACT aerosol solution, Take 2 puffs by mouth Daily., Disp: , Rfl:   •  mupirocin (BACTROBAN) 2 % ointment, Apply 1 application topically to the appropriate area as directed 2 (Two) Times a Day for 7 days., Disp: 14 g, Rfl: 0    Allergies:   Allergies   Allergen Reactions   • Naproxen Swelling   • Sulfa Antibiotics Rash         Objective     Physical Exam:  Vital Signs:   Vitals:    04/18/22 1045   BP: 142/82   Pulse: 72   Resp: 16   SpO2: 98%   Weight: 73.9 kg (163 lb)   Height: 149.9 cm (59.02\")     Body mass index is 32.9 kg/m².     Physical Exam    Mental Status Exam:   Appearance: Overweight middle-aged -American female, appears stated age, dressed appropriately to situation and weather  Hygiene: Good  Cooperation: Good  Eye Contact: Fair  Psychomotor Behavior: Within normal limits  Mood: Euthymic  Affect: Congruent  Speech: Normal rate, tone and prosody  Thought Process: Linear, goal-directed  Thought Content: Within normal limits  Suicidal: Denies  Homicidal: Denies  Hallucinations: Denies  Delusion: Denies  Memory: Intact  Orientation: X4  Reliability: Good  Insight: Good  Judgement: Poor, recent history of substance use, DUI  Impulse Control: Concern for recent substance use    Assessment / Plan      Visit Diagnosis/Orders Placed This Visit:  Diagnoses and all orders for this visit:    1. Amphetamine substance use disorder, moderate, in early remission (HCC) (Primary)    2. Unspecified mood (affective) disorder (HCC)    3. Marijuana use         Differential:   Rule " out bipolar disorder: History of depression, suicide attempts, family history    Plan:   1. Continue lamotrigine 100 mg daily  2. Continue Elavil 25 to 50 mg nightly as needed for sleep      Patient declines pharmacological management of alcohol use disorder including naltrexone.  Patient declines resources for peer to peer support groups stating that she does not want to get involved with the wrong people and pulled back into use.      Continue supportive psychotherapy efforts and medications as indicated. Treatment and medication options discussed during today's visit. Patient ackowledged and verbally consented to continue with current treatment plan and was educated on the importance of compliance with treatment and follow-up appointments. Patient seems reasonably able to adhere to treatment plan.      Medication Considerations:  Discussed medication options and treatment plan of prescribed medication(s) as well as the risks, benefits, and potential side effects. Patient is agreeable to call the office with any worsening of symptoms or onset of side effects. Patient is agreeable to call 911 or go to the nearest ER should he/she begin having SI/HI.    Treatment Goals:    Amphetamine use disorder, in early remission: Patient will remain abstinent from methamphetamine  Mood disorder, unspecified: Patient will continue to be free of depressive symptoms with lamotrigine  Marijuana use: Discussed risk of marijuana use including mood lability, patient declines to stop marijuana use at this time    Quality Measures:   Current every day smoker less than 3 minutes spent counseling Not agreeable to stopping    I advised Bernardo Dodd of the risks of tobacco use.     Follow Up:   Return in about 3 months (around 7/18/2022) for Medication Mangement Follow Up.      SHANE Au, PMHNP-BC

## 2022-04-19 ENCOUNTER — PATIENT OUTREACH (OUTPATIENT)
Dept: CASE MANAGEMENT | Facility: OTHER | Age: 42
End: 2022-04-19

## 2022-04-19 ENCOUNTER — REFERRAL TRIAGE (OUTPATIENT)
Dept: CASE MANAGEMENT | Facility: OTHER | Age: 42
End: 2022-04-19

## 2022-04-19 NOTE — OUTREACH NOTE
AMBULATORY CASE MANAGEMENT NOTE    Name and Relationship of Patient/Support Person: Bernardo Dodd O - Self    Patient Outreach    Attempted to reach patient as a follow up to a provider referral. Left VM for return call.     JERALD JURADO  Ambulatory Case Management    4/19/2022, 11:58 EDT

## 2022-04-21 ENCOUNTER — PATIENT OUTREACH (OUTPATIENT)
Dept: CASE MANAGEMENT | Facility: OTHER | Age: 42
End: 2022-04-21

## 2022-04-21 NOTE — OUTREACH NOTE
AMBULATORY CASE MANAGEMENT NOTE    Name and Relationship of Patient/Support Person: Bernardo Dodd    Spoke with patient over the phone as a follow up to a provider referral. Patient stated she has a court date on 4/28 regarding eviction. She has lived at her current residence provided by the [employer] authority since 2012, but is behind $1,200 in rent. This spans from Oct. 2021 through today. She is actively seeking employment. Patient reports she receives $150/month in child support for her 11 year old son. Patient reports her son is staying with her parents and is home schooled due to Covid.     Patient stated she is able to obtain food and medications, she has her own transportation. Her friend has offered to allow her to stay with them if needed. Otherwise, patient does not have a plan as to where she will go if evicted. She wants to try to stay in her current residence. She reports she is in drug recovery, her last use was Oct 2021. Explained I would reach out to our SW and one of us will follow up with her next week. Discussed with ACM manager who will review case with SW. Outreach planned for next week.       Adult Patient Profile  Questions/Answers    Flowsheet Row Most Recent Value   Techniques to [place] with Loss/Stress/Change exercise, counseling   People in Home alone   Family Caregiver if Needed parent(s)   Family Caregiver Names Keya Dodd   Current Living Arrangements home        Social Work Assessment  Questions/Answers    Flowsheet Row Most Recent Value   People in Home alone   Current Living Arrangements home   Duration at Residence Since 2012   Primary Care Provided by self   Provides Primary Care For no one   Family Caregiver if Needed parent(s)   Family Caregiver Names Keya Dodd   Quality of Family Relationships helpful, involved   Employment Status employment seeking   Source of Income other (see comments)  [Child support $150 per month]   Financial/Environmental Concerns eviction  pending, unable to afford rent/mortgage, unemployed   Substance Use Status past street drug/inhalant/medication abuse   Last Street Drug/Medication/Inhalant Use 10/21/21   Major Change/Loss/Stressor employment concerns, financial, housing concerns, job change/loss, legal concerns   Patient Personal Strengths gregg/spirituality   Sources of Support parent(s)   Techniques to [place] with Loss/Stress/Change exercise, counseling          JERALD JURADO  Ambulatory Case Management    4/21/2022, 14:20 EDT

## 2022-04-25 ENCOUNTER — PATIENT OUTREACH (OUTPATIENT)
Dept: CASE MANAGEMENT | Facility: OTHER | Age: 42
End: 2022-04-25

## 2022-04-25 NOTE — OUTREACH NOTE
AMBULATORY CASE MANAGEMENT NOTE    Name and Relationship of Patient/Support Person:  -     Patient Outreach    Attempted to speak with patient regarding housing resources. Left VM for a return call.     JERALD JURADO  Ambulatory Case Management    4/25/2022, 15:20 EDT

## 2022-04-26 ENCOUNTER — PATIENT OUTREACH (OUTPATIENT)
Dept: CASE MANAGEMENT | Facility: OTHER | Age: 42
End: 2022-04-26

## 2022-04-26 ENCOUNTER — TELEMEDICINE (OUTPATIENT)
Dept: BEHAVIORAL HEALTH | Facility: CLINIC | Age: 42
End: 2022-04-26

## 2022-04-26 DIAGNOSIS — F39 UNSPECIFIED MOOD (AFFECTIVE) DISORDER: Primary | ICD-10-CM

## 2022-04-26 DIAGNOSIS — F15.21 AMPHETAMINE SUBSTANCE USE DISORDER, MODERATE, IN EARLY REMISSION: ICD-10-CM

## 2022-04-26 DIAGNOSIS — F12.90 MARIJUANA USE: ICD-10-CM

## 2022-04-26 PROCEDURE — 90832 PSYTX W PT 30 MINUTES: CPT | Performed by: COUNSELOR

## 2022-04-26 NOTE — OUTREACH NOTE
"AMBULATORY CASE MANAGEMENT NOTE    Name and Relationship of Patient/Support Person: Bernardo Dodd O - Self    Patient Outreach    Spoke with patient as a follow up to housing resources. She has been approved by [employer] for $800 towards the $1250 she owes for rent. Patient needs $450 to remain in her apartment. Provided her with contact info for the [employer], [employer]  and [employer]. She is concerned and tearful as she may be evicted, her court date is Thursday. She plans to make an appointment to speak with our behavioral health APRN.     Provided patient with encouragement. She denied drug/alcohol relapse, stating \"not yet\". Provided encouragement to stay sober and helped her find reasons to focus on to help her sobriety. She is agreeable for continued outreach. Next outreach planned for Tuesday at 1030.    JERALD JURADO  Ambulatory Case Management    4/26/2022, 10:50 EDT  "

## 2022-04-26 NOTE — PROGRESS NOTES
[facility]                Telehealth (Video) Visit     Telehealth  (Video) Visit      Patient Name: Bernardo Dodd  : 1980   MRN: [id number]   Time In: 1:14pm      Time Out: 1:42pm     Referring Physician: Nelly Sotelo PA    Chief Complaint:      ICD-10-CM ICD-9-CM   1. Unspecified mood (affective) disorder (HCC)  F39 296.90   2. Marijuana use  F12.90 305.20   3. Amphetamine substance use disorder, moderate, in early remission (HCC)  F15.21 304.43        This provider is located at home address on file. This visit is being done on behalf of [facility] using a [employer] platform for a video visit in a secure and private environment. The Patient is seen remotely at their home address in [place], using a private computer, phone or tablet.  The patient is able to be seen through a MyChart Video Visit through [employer] at today's encounter. Patient is being evaluated/treated via telehealth by Zoom, and stated they are in a secure environment for this session. The patient's condition being diagnosed/treated is appropriate for telemedicine. The provider identified herself as well as her credentials.   The patient, and/or patient's guardian, consent to being seen remotely, and when consent is given they understand that the consent allows for patient identifiable information to be sent to a third party as needed.   They may refuse to be seen remotely at any time. The electronic data is encrypted and password protected, and the patient and/or guardian has been advised of the potential risks to privacy not withstanding such measures.    You have chosen to receive care through a video visit today. Do you consent to use a video visit for your medical care today? yes  This visit has been scheduled as a video visit to comply with patient safety concerns in accordance with CDC recommendations.    History of Present Illness: Bernardo Dodd is a  41 y.o. female who I saw today thru a video visit for anxiety and depressive symptoms.        Subjective      Review of Systems:   The following portions of the patient's history were reviewed and updated as appropriate: allergies, current medications, past family history, past medical history, past social history, past surgical history and problem list.     Interval History:   Deteriorated    Medications:     Current Outpatient Medications:   •  amitriptyline (ELAVIL) 25 MG tablet, Take 1-2 tablets PO QHS PRN for sleep (Patient taking differently: At Night As Needed. Take 1-2 tablets PO QHS PRN for sleep), Disp: 180 tablet, Rfl: 0  •  fluticasone (Flonase) 50 MCG/ACT nasal spray, 2 sprays into the nostril(s) as directed by provider Daily., Disp: 16 g, Rfl: 2  •  lamoTRIgine (LaMICtal) 100 MG tablet, Take 1 tablet by mouth Daily., Disp: 30 tablet, Rfl: 4  •  SPIRIVA RESPIMAT 2.5 MCG/ACT aerosol solution, Take 2 puffs by mouth Daily., Disp: , Rfl:         Objective     Physical Exam:  Vital Signs:   Due to extenuating circumstances and possible current health risks associated with the patient being present in a clinical setting (with current health restrictions in place in regards to possible COVID 19 transmission/exposure), the patient was seen remotely today via a video visit. Unable to obtain vital signs due to nature of remote visit.  Height stated at 59 inches.  Weight stated at 163 pounds.     Mental Status Exam:   Hygiene:   good  Cooperation:  Cooperative  Eye Contact:  Good  Psychomotor Behavior:  Appropriate  Affect:  Full range  Mood: normal and panicky  Speech:  Normal  Thought Process:  Goal directed  Thought Content:  Mood congruent  Suicidal:  None  Homicidal:  None  Hallucinations:  None  Delusion:  None  Memory:  Intact  Orientation:  Person, Place, Time and Situation  Reliability:  good  Insight:  Good  Judgement:  Good  Impulse Control:  Good  Physical/Medical Issues:  No      Assessment / Plan       Assessment/Plan:   Diagnoses and all orders for this visit:    1. Unspecified mood (affective) disorder (HCC) (Primary)    2. Marijuana use    3. Amphetamine substance use disorder, moderate, in early remission (HCC)       Patient presents via telehealth for follow-up.  Patient was scheduled for a last minute appointment due to calling the office and crying.  Patient reports a great deal of stress related to her living situation.  Patient reports that she is able to obtain $800 of assistance for rent, but reports that she needs $450 in order to obtain this $800.  Patient reports she has contacted different resources and will continue to do so.  Patient reports that her friend continues to cause issues in the home.  Patient reports that she is giving friend 24 hours to leave the home and reports that friend was agreeable.  Patient reports that she did have a job interview and was hired for part-time work.  Patient reports that she begins orientation tomorrow.  Patient also reports an additional interview today.  Patient reports she decided to not move forward with carpal tunnel surgery due to fear that I will cause increased issues.  I assisted patient in processing current stressors and related emotions.  I provided support, empathy, and encouragement.  Patient will follow-up in regard to resources.  TREATMENT PLAN/GOALS: Continue supportive psychotherapy efforts and medications as indicated. Treatment and medication options discussed during today's visit. Patient ackowledged and verbally consented to continue with current treatment plan and was educated on the importance of compliance with treatment and follow-up appointments. Patient seems reasonably able to adhere to treatment plan.      Allowed Patient to freely discuss issues  without interruption or judgement with unconditional positive regard, active listening skills, and empathy. Therapist provided a safe, confidential environment to facilitate the  development of a positive therapeutic relationship and encouraged open, honest communication. Assisted Patient in identifying risk factors which would indicate the need for higher level of care including thoughts to harm self or others and/or self-harming behavior and encouraged Patient to contact this office, call 911, or present to the nearest emergency room should any of these events occur. Discussed crisis intervention services and means to access. Patient adamantly and convincingly denies current suicidal or homicidal ideation or perceptual disturbance. Assisted Patient in processing session content; acknowledged and normalized Patient’s thoughts, feelings, and concerns by utilizing a person-centered approach in efforts to build appropriate rapport and a positive therapeutic relationship with open and honest communication.     Quality Measures:     TOBACCO USE:  Current every day smoker less than 3 minutes spent counseling Not agreeable to stopping    I advised Bernardo of the risks of tobacco use    Follow Up:   Return in about 2 weeks (around 5/10/2022) for Counseling.    Blank Garcia LCSW

## 2022-05-03 ENCOUNTER — PATIENT OUTREACH (OUTPATIENT)
Dept: CASE MANAGEMENT | Facility: OTHER | Age: 42
End: 2022-05-03

## 2022-05-03 NOTE — OUTREACH NOTE
AMBULATORY CASE MANAGEMENT NOTE    Name and Relationship of Patient/Support Person: Bernardo Ddod O - Self    Patient Outreach    Attempted to contact patient as a follow up call regarding housing. Left VM for return call.     JERALD JURADO  Ambulatory Case Management    5/3/2022, 10:35 EDT

## 2022-05-04 ENCOUNTER — OFFICE VISIT (OUTPATIENT)
Dept: INTERNAL MEDICINE | Facility: CLINIC | Age: 42
End: 2022-05-04

## 2022-05-04 VITALS
BODY MASS INDEX: 32.86 KG/M2 | SYSTOLIC BLOOD PRESSURE: 152 MMHG | OXYGEN SATURATION: 97 % | HEART RATE: 65 BPM | TEMPERATURE: 98 F | WEIGHT: 163 LBS | DIASTOLIC BLOOD PRESSURE: 90 MMHG | HEIGHT: 59 IN

## 2022-05-04 DIAGNOSIS — J30.9 CHRONIC ALLERGIC RHINITIS: ICD-10-CM

## 2022-05-04 DIAGNOSIS — M25.511 ACUTE PAIN OF RIGHT SHOULDER: ICD-10-CM

## 2022-05-04 DIAGNOSIS — B00.9 HERPES SIMPLEX INFECTION: ICD-10-CM

## 2022-05-04 DIAGNOSIS — I10 PRIMARY HYPERTENSION: Primary | ICD-10-CM

## 2022-05-04 DIAGNOSIS — R30.0 DYSURIA: ICD-10-CM

## 2022-05-04 DIAGNOSIS — G40.909 SEIZURE DISORDER: ICD-10-CM

## 2022-05-04 LAB
BILIRUB BLD-MCNC: ABNORMAL MG/DL
CLARITY, POC: ABNORMAL
COLOR UR: ABNORMAL
EXPIRATION DATE: ABNORMAL
GLUCOSE UR STRIP-MCNC: NEGATIVE MG/DL
KETONES UR QL: NEGATIVE
LEUKOCYTE EST, POC: ABNORMAL
Lab: ABNORMAL
NITRITE UR-MCNC: NEGATIVE MG/ML
PH UR: 6 [PH] (ref 5–8)
PROT UR STRIP-MCNC: ABNORMAL MG/DL
RBC # UR STRIP: ABNORMAL /UL
SP GR UR: 1.03 (ref 1–1.03)
UROBILINOGEN UR QL: NORMAL

## 2022-05-04 PROCEDURE — 87086 URINE CULTURE/COLONY COUNT: CPT | Performed by: PHYSICIAN ASSISTANT

## 2022-05-04 PROCEDURE — 99214 OFFICE O/P EST MOD 30 MIN: CPT | Performed by: PHYSICIAN ASSISTANT

## 2022-05-04 RX ORDER — METHYLPREDNISOLONE 4 MG/1
TABLET ORAL
Qty: 21 TABLET | Refills: 0 | Status: SHIPPED | OUTPATIENT
Start: 2022-05-04 | End: 2022-05-17

## 2022-05-04 RX ORDER — AMLODIPINE BESYLATE 5 MG/1
5 TABLET ORAL DAILY
Qty: 90 TABLET | Refills: 1 | Status: SHIPPED | OUTPATIENT
Start: 2022-05-04 | End: 2022-08-24 | Stop reason: SDUPTHER

## 2022-05-04 RX ORDER — LAMOTRIGINE 200 MG/1
200 TABLET ORAL DAILY
Qty: 30 TABLET | Refills: 2 | Status: SHIPPED | OUTPATIENT
Start: 2022-05-04 | End: 2022-08-24 | Stop reason: SDUPTHER

## 2022-05-04 RX ORDER — CETIRIZINE HYDROCHLORIDE 10 MG/1
10 TABLET ORAL DAILY
COMMUNITY

## 2022-05-04 RX ORDER — NITROFURANTOIN 25; 75 MG/1; MG/1
100 CAPSULE ORAL 2 TIMES DAILY
Qty: 10 CAPSULE | Refills: 0 | Status: SHIPPED | OUTPATIENT
Start: 2022-05-04 | End: 2022-05-17

## 2022-05-04 RX ORDER — FLUTICASONE PROPIONATE 50 MCG
2 SPRAY, SUSPENSION (ML) NASAL DAILY
Qty: 16 G | Refills: 5 | Status: SHIPPED | OUTPATIENT
Start: 2022-05-04

## 2022-05-04 RX ORDER — RIBOFLAVIN (VITAMIN B2) 100 MG
2 TABLET ORAL DAILY
COMMUNITY
End: 2022-09-23

## 2022-05-04 NOTE — PROGRESS NOTES
Chief Complaint   Patient presents with   • Abdominal Pain     Shoulder pain       • Nasal Congestion   • Cough       Subjective     Bernardo Dodd is a 41 y.o. female.        History of Present Illness     Pt has had increased situational stress from dealing with housing issues. She developed an outbreak of her herpes in the last week. Has been more emotional.     Also thinks she was having some absence seizures so she doubled her dose of lamictal. Her seizures have improved.     Has not checked her BP at home. Has been having some flushing that usually goes along with elevated BP. Was previously on amlodipine 5 mg.    Pt is having pain in her right shoulder, feels like a pulling pain. Has been moving things in the house.    Having progression of nasal congestion and sinus pressure and pain.      Current Outpatient Medications:   •  amitriptyline (ELAVIL) 25 MG tablet, Take 1-2 tablets PO QHS PRN for sleep (Patient taking differently: At Night As Needed. Take 1-2 tablets PO QHS PRN for sleep), Disp: 180 tablet, Rfl: 0  •  amLODIPine (NORVASC) 5 MG tablet, Take 1 tablet by mouth Daily., Disp: 90 tablet, Rfl: 1  •  ascorbic acid (VITAMIN C) 100 MG tablet, Take 2 tablets by mouth Daily., Disp: , Rfl:   •  cetirizine (zyrTEC) 10 MG tablet, Take 10 mg by mouth Daily., Disp: , Rfl:   •  fluticasone (Flonase) 50 MCG/ACT nasal spray, 2 sprays into the nostril(s) as directed by provider Daily., Disp: 16 g, Rfl: 5  •  lamoTRIgine (LaMICtal) 200 MG tablet, Take 1 tablet by mouth Daily., Disp: 30 tablet, Rfl: 2  •  SPIRIVA RESPIMAT 2.5 MCG/ACT aerosol solution, Take 2 puffs by mouth Daily., Disp: , Rfl:   •  methylPREDNISolone (Medrol) 4 MG dose pack, follow package directions, Disp: 21 tablet, Rfl: 0  •  nitrofurantoin, macrocrystal-monohydrate, (Macrobid) 100 MG capsule, Take 1 capsule by mouth 2 (Two) Times a Day., Disp: 10 capsule, Rfl: 0     PMFSH  The following portions of the patient's history were reviewed and  "updated as appropriate: allergies, current medications, past family history, past medical history, past social history, past surgical history and problem list.    Review of Systems   Constitutional: Positive for fatigue. Negative for activity change, fever and unexpected weight change.   HENT: Positive for congestion, postnasal drip and sore throat. Negative for ear pain.    Eyes: Negative.  Negative for pain and discharge.   Respiratory: Positive for cough. Negative for chest tightness, shortness of breath and wheezing.    Cardiovascular: Negative.  Negative for chest pain and palpitations.   Gastrointestinal: Negative for abdominal pain, diarrhea and vomiting.   Endocrine: Negative.    Genitourinary: Negative.    Musculoskeletal: Positive for arthralgias. Negative for joint swelling.   Skin: Negative for color change, rash and wound.   Allergic/Immunologic: Negative.    Neurological: Positive for numbness. Negative for seizures and syncope.   Hematological: Negative for adenopathy. Does not bruise/bleed easily.   Psychiatric/Behavioral: Positive for dysphoric mood. Negative for confusion. The patient is nervous/anxious.        Objective   /90   Pulse 65   Temp 98 °F (36.7 °C)   Ht 149.9 cm (59\")   Wt 73.9 kg (163 lb)   SpO2 97%   BMI 32.92 kg/m²     Physical Exam  Vitals and nursing note reviewed.   Constitutional:       Appearance: She is well-developed.   HENT:      Head: Normocephalic.      Right Ear: Hearing, ear canal and external ear normal. Tympanic membrane is bulging.      Left Ear: Hearing, ear canal and external ear normal. Tympanic membrane is bulging.      Nose: Nose normal.   Eyes:      Conjunctiva/sclera: Conjunctivae normal.      Pupils: Pupils are equal, round, and reactive to light.   Cardiovascular:      Rate and Rhythm: Normal rate and regular rhythm.      Heart sounds: Normal heart sounds.   Pulmonary:      Effort: Pulmonary effort is normal.      Breath sounds: Normal breath " sounds. No decreased breath sounds, wheezing, rhonchi or rales.   Musculoskeletal:         General: Normal range of motion.      Right shoulder: Tenderness present. No swelling, deformity or bony tenderness. Normal range of motion. Normal strength.      Left shoulder: Normal.      Cervical back: Normal range of motion.   Skin:     General: Skin is warm and dry.   Neurological:      Mental Status: She is alert.   Psychiatric:         Behavior: Behavior normal.         Results for orders placed or performed in visit on 05/04/22   Urine Culture - Urine, Urine, Clean Catch    Specimen: Urine, Clean Catch   Result Value Ref Range    Urine Culture No growth    POCT urinalysis dipstick, automated    Specimen: Urine   Result Value Ref Range    Color Red (A) Yellow, Straw, Dark Yellow, Andreina    Clarity, UA Turbid (A) Clear    Specific Gravity  1.030 1.005 - 1.030    pH, Urine 6.0 5.0 - 8.0    Leukocytes Small (1+) (A) Negative    Nitrite, UA Negative Negative    Protein, POC 1+ (A) Negative mg/dL    Glucose, UA Negative Negative, 1000 mg/dL (3+) mg/dL    Ketones, UA Negative Negative    Urobilinogen, UA Normal Normal    Bilirubin Small (1+) (A) Negative    Blood, UA 3+ (A) Negative    Lot Number [id number]     Expiration Date 7,252,023         ASSESSMENT/PLAN    Diagnoses and all orders for this visit:    1. Primary hypertension (Primary)  Assessment & Plan:  Hypertension is worsening.  Dietary sodium restriction.  Weight loss.  Regular aerobic exercise.  Medication changes per orders.  Ambulatory blood pressure monitoring. Restart amlodipine 5 mg daily.  Blood pressure will be reassessed at the next regular appointment.    Orders:  -     amLODIPine (NORVASC) 5 MG tablet; Take 1 tablet by mouth Daily.  Dispense: 90 tablet; Refill: 1    2. Seizure disorder (HCC)  -     lamoTRIgine (LaMICtal) 200 MG tablet; Take 1 tablet by mouth Daily.  Dispense: 30 tablet; Refill: 2    3. Chronic allergic rhinitis  -     fluticasone  (Flonase) 50 MCG/ACT nasal spray; 2 sprays into the nostril(s) as directed by provider Daily.  Dispense: 16 g; Refill: 5    4. Acute pain of right shoulder  Comments:  Avoid overuse. Start medrol dose pack as ordered.  Orders:  -     methylPREDNISolone (Medrol) 4 MG dose pack; follow package directions  Dispense: 21 tablet; Refill: 0    5. Dysuria  Comments:  Send urine for culture. Treat with macrobid as ordered.  Orders:  -     nitrofurantoin, macrocrystal-monohydrate, (Macrobid) 100 MG capsule; Take 1 capsule by mouth 2 (Two) Times a Day.  Dispense: 10 capsule; Refill: 0  -     Urine Culture - Urine, Urine, Clean Catch  -     POCT urinalysis dipstick, automated    6. Herpes simplex infection  Assessment & Plan:  Outbreak has been ongoing for over a week, decided not to start antiviral because outside of window of treatment.             Return in about 8 weeks (around 6/29/2022) for Follow up.

## 2022-05-05 ENCOUNTER — PATIENT OUTREACH (OUTPATIENT)
Dept: CASE MANAGEMENT | Facility: OTHER | Age: 42
End: 2022-05-05

## 2022-05-05 LAB — BACTERIA SPEC AEROBE CULT: NO GROWTH

## 2022-05-05 NOTE — OUTREACH NOTE
AMBULATORY CASE MANAGEMENT NOTE    Name and Relationship of Patient/Support Person: Bernardo Dodd O - Self    Patient Outreach    Attempted to contact patient as a follow up regarding housing. Left  for a return call.     JERALD JURADO  Ambulatory Case Management    5/5/2022, 10:14 EDT

## 2022-05-06 NOTE — PROGRESS NOTES
Call immediately to report any decreased vision or visual distortion. Your urine did not grow any bacteria. You can stop the antibiotic.

## 2022-05-08 NOTE — ASSESSMENT & PLAN NOTE
Hypertension is worsening.  Dietary sodium restriction.  Weight loss.  Regular aerobic exercise.  Medication changes per orders.  Ambulatory blood pressure monitoring. Restart amlodipine 5 mg daily.  Blood pressure will be reassessed at the next regular appointment.

## 2022-05-08 NOTE — ASSESSMENT & PLAN NOTE
Outbreak has been ongoing for over a week, decided not to start antiviral because outside of window of treatment.

## 2022-05-09 ENCOUNTER — PATIENT OUTREACH (OUTPATIENT)
Dept: CASE MANAGEMENT | Facility: OTHER | Age: 42
End: 2022-05-09

## 2022-05-09 NOTE — OUTREACH NOTE
AMBULATORY CASE MANAGEMENT NOTE    Name and Relationship of Patient/Support Person:  -     Patient Outreach    Attempted to contact patient as a follow up regarding housing. Received , 3rd attempt.     JERALD JURADO  Ambulatory Case Management    5/9/2022, 16:31 EDT

## 2022-05-17 ENCOUNTER — OFFICE VISIT (OUTPATIENT)
Dept: INTERNAL MEDICINE | Facility: CLINIC | Age: 42
End: 2022-05-17

## 2022-05-17 VITALS
DIASTOLIC BLOOD PRESSURE: 88 MMHG | RESPIRATION RATE: 16 BRPM | OXYGEN SATURATION: 95 % | TEMPERATURE: 98.3 F | SYSTOLIC BLOOD PRESSURE: 120 MMHG | HEIGHT: 59 IN | WEIGHT: 162.6 LBS | BODY MASS INDEX: 32.78 KG/M2 | HEART RATE: 71 BPM

## 2022-05-17 DIAGNOSIS — M25.511 ACUTE PAIN OF BOTH SHOULDERS: Primary | ICD-10-CM

## 2022-05-17 DIAGNOSIS — M25.512 ACUTE PAIN OF BOTH SHOULDERS: Primary | ICD-10-CM

## 2022-05-17 PROCEDURE — 99213 OFFICE O/P EST LOW 20 MIN: CPT | Performed by: PHYSICIAN ASSISTANT

## 2022-05-23 ENCOUNTER — PATIENT OUTREACH (OUTPATIENT)
Dept: CASE MANAGEMENT | Facility: OTHER | Age: 42
End: 2022-05-23

## 2022-05-23 NOTE — OUTREACH NOTE
AMBULATORY CASE MANAGEMENT NOTE    Name and Relationship of Patient/Support Person: Bernardo Dodd O - Self    Patient Outreach    Unable to reach patient today. [facility] has left s to follow up with patient regarding housing. Unable to reach patient and she has not returned my calls. AC available at [phone] should patient need assistance.     JERALD JURADO  Ambulatory Case Management    5/23/2022, 10:37 EDT

## 2022-06-02 ENCOUNTER — HOSPITAL ENCOUNTER (OUTPATIENT)
Dept: PHYSICAL THERAPY | Facility: HOSPITAL | Age: 42
Setting detail: THERAPIES SERIES
Discharge: HOME | End: 2022-06-02

## 2022-06-02 DIAGNOSIS — M25.511 ACUTE PAIN OF BOTH SHOULDERS: Primary | ICD-10-CM

## 2022-06-02 DIAGNOSIS — M25.512 ACUTE PAIN OF BOTH SHOULDERS: Primary | ICD-10-CM

## 2022-06-02 PROCEDURE — 97161 PT EVAL LOW COMPLEX 20 MIN: CPT

## 2022-06-02 NOTE — THERAPY EVALUATION
Outpatient Physical Therapy Ortho Initial Evaluation  [facility]     Patient Name: Bernardo Dodd  : 1980  MRN: [id number]  Today's Date: 2022      Visit Date: 2022    Patient Active Problem List   Diagnosis   • Asthma   • Obesity   • GERD (gastroesophageal reflux disease)   • Dyspnea   • Allergic rhinitis   • Hypertension   • Menorrhagia with regular cycle   • Herpes simplex infection   • Lumbar spondylosis   • Health care maintenance   • Chronic maxillary sinusitis   • Iron deficiency anemia   • Ureteral obstruction, left   • Seizure disorder (HCC)   • Fungal infection   • Carpal tunnel syndrome, right        Past Medical History:   Diagnosis Date   • Allergic rhinitis    • Anemia    • Arthritis    • Asthma    • Bartholin gland cyst    • Chlamydia    • Depression    • FHx: migraine headaches    • GERD (gastroesophageal reflux disease)    • Heart murmur    • Herpes simplex    • History of chest x-ray 2015    no active disease   • History of echocardiogram 2015    ejection fraction of greater than 65%, mitral and pulmonic regurgitation an physiological tricuspid regurgitation.   • History of PFTs 2015    spirometry data acceptable and reproducible; pt given 4 puffs of Ventolin; pt gave good effort; no obstruction; no Bd response; MVV reduced    • History of PFTs 2015    pt gave best effort; duoneb given prior and post study; moderate nonspecific proportional reduction of FEV1 and FVC with preserved ratio; FEV1 moderately reduced; cannot rule out restriction   • Hypertension    • Kidney stones    • Migraine    • Ovarian cyst    • Pelvic pain    • Screening breast examination     self;admits   • Seizures (HCC)    • STD (female)    • Thigh shingles    • Trichomonas infection         Past Surgical History:   Procedure Laterality Date   • BILATERAL BREAST REDUCTION     • BREAST BIOPSY     • BREAST SURGERY      breast reduction   •  SECTION     • CYSTOSCOPY     •  DIAGNOSTIC LAPAROSCOPY     • INCISION AND DRAINAGE ABSCESS      bartholin's   • LAPAROSCOPIC TUBAL LIGATION     • ORIF ANKLE FRACTURE Right 2005   • REDUCTION MAMMAPLASTY Bilateral 1998   • TENSION FREE VAGINAL TAPING WITH MINI ARC SLING      Dr Doyle avery        Visit Dx:     ICD-10-CM ICD-9-CM   1. Acute pain of both shoulders  M25.511 719.41    M25.512           Patient History     Row Name 06/02/22 1600 06/01/22 0215          History    Chief Complaint Difficulty with daily activities;Pain;Muscle weakness;Impaired sensation;Numbness  -HP (r) DM (t) HP (c) Difficulty Walking;Difficulty with daily activities;Headache;Joint stiffness;Muscle tenderness;Pain  -HP (r) patient (t)     Type of Pain Shoulder pain;Neck pain;Upper Extremity / Arm  -HP (r) DM (t) HP (c) Back pain;Lower Extremity / Leg;Neck pain;Shoulder pain;Wrist pain  -HP (r) patient (t)     Date Current Problem(s) Began --  stating she has had this pain for years, however it increased in intensity approximately 1 month ago  -HP (r) DM (t) HP (c) --     Brief Description of Current Complaint 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 Has had a years long history of neck and shoulder pain that has recently insidiously increased in intensity.  -HP (r) DM (t) HP (c) --     Patient/Caregiver Goals Relieve pain;Improve mobility;Improve strength;Know what to do to help the symptoms  -HP (r) DM (t) HP (c) Relieve pain;Improve mobility;Improve strength;Know what to do to help the symptoms  -HP (r) patient (t)     Hand Dominance ambidextrous  -HP (r) DM (t) HP (c) ambidextrous  -HP (r) patient (t)     Occupation/sports/leisure activities Caregiver  -HP (r) DM (t) HP (c) Caregiver  -HP (r) patient (t)     What clinical tests have you had for this problem? -- X-ray;MRI  -HP (r) patient (t)     Are you or can you be pregnant -- No  -HP (r) patient (t)            Pain     Pain Location Shoulder  -HP (r) DM (t) HP  (c) --     Pain at Present 7  -HP (r) DM (t) HP (c) --     Pain at Best 3  -HP (r) DM (t) HP (c) --     Pain at Worst 10  -HP (r) DM (t) HP (c) --     Pain Frequency Constant/continuous  -HP --     Pain Description Discomfort;Sharp;Shooting;Throbbing;Tingling  -HP (r) DM (t) HP (c) --     What Performance Factors Make the Current Problem(s) WORSE? housework  -HP (r) DM (t) HP (c) --     What Performance Factors Make the Current Problem(s) BETTER? stretching  -HP (r) DM (t) HP (c) --     Pain Comments Pain is noted along the bilateral cervical spine and radiates down both UEs.  -HP --     Is your sleep disturbed? Yes  -HP (r) DM (t) HP (c) --     Difficulties at work? unable to be as active in her caregiver role, patient states seekign disability  -HP (r) DM (t) HP (c) --            Fall Risk Assessment    Any falls in the past year: No  -HP No  -HP (r) patient (t)            Services    Prior Rehab/Home Health Experiences -- Yes  -HP (r) patient (t)     Are you currently receiving Home Health services -- No  -HP (r) patient (t)     Do you plan to receive Home Health services in the near future -- No  -HP (r) patient (t)            Daily Activities    Primary Language English  -HP (r) DM (t) HP (c) English  -HP (r) patient (t)     Are you able to read -- Yes  -HP (r) patient (t)     Are you able to write -- Yes  -HP (r) patient (t)     How does patient learn best? -- Listening;Reading;Demonstration;Pictures/Video  -HP (r) patient (t)     Pt Participated in POC and Goals Yes  -HP (r) DM (t) HP (c) --            Safety    Are you being hurt, hit, or frightened by anyone at home or in your life? -- No  -HP (r) patient (t)     Are you being neglected by a caregiver -- No  -HP (r) patient (t)     Have you had any of the following issues with -- N/A  -HP (r) patient (t)           User Key  (r) = Recorded By, (t) = Taken By, (c) = Cosigned By    Initials Name Provider Type    Tod Mccray, PT Physical Therapist    DM  Jena Palomino, PT Student PT Student    patient Bernardo Dodd --                 PT Ortho     Row Name 06/02/22 1600       Precautions and Contraindications    Precautions/Limitations no known precautions/limitations  -HP (r) DM (t) HP (c)       Subjective Pain    Able to rate subjective pain? yes  -HP (r) DM (t) HP (c)       Posture/Observations    Alignment Options Rounded shoulders;Forward head  -HP    Forward Head Moderate  -HP    Rounded Shoulders Moderate  -HP       Quarter Clearing    Quarter Clearing Upper Quarter Clearing  -HP (r) DM (t) HP (c)       General ROM    Head/Neck/Trunk Neck Flexion;Neck Extension;Neck Lt Rotation;Neck Rt Rotation  -HP (r) DM (t) HP (c)    RT Upper Ext Rt Shoulder Flexion;Rt Shoulder ABduction  -HP (r) DM (t) HP (c)    LT Upper Ext Lt Shoulder ABduction  -HP (r) DM (t) HP (c)    GENERAL ROM COMMENTS Patient able to achieve full active bilateral shoulder flexion but unable to achieve greater than 95 degrees of shoulder abduction.  -HP (r) DM (t) HP (c)       Head/Neck/Trunk    Neck Extension AROM 35  -HP (r) DM (t) HP (c)    Neck Extension PROM 30  -HP (r) DM (t) HP (c)    Neck Lt Rotation AROM 49  painful  -HP (r) DM (t) HP (c)    Neck Rt Rotation AROM 55  painful  -HP (r) DM (t) HP (c)    Head/Neck/Trunk Comments each with pain  -HP       Right Upper Ext    Rt Shoulder Abduction AROM 90 (pain)  -HP       Left Upper Ext    Lt Shoulder Abduction AROM 94 (pain)  -HP       MMT (Manual Muscle Testing)    General MMT Comments Deferred at this time due to pt's complaints of pain with AROM  -HP          User Key  (r) = Recorded By, (t) = Taken By, (c) = Cosigned By    Initials Name Provider Type    HP Tod Jorgensen, PT Physical Therapist    Jena Zuniga, PT Student PT Student                            Therapy Education  Education Details: HEP provided including cervical extension with towel, wall slides in abduction, shoulder external rotation with scapular  retraction, and shoulder horizontal abduction with a theraband.  Given: HEP, Symptoms/condition management  Program: New  How Provided: Verbal, Demonstration, Written  Provided to: Patient, Caregiver  Level of Understanding: Verbalized, Teach back education performed, Demonstrated      PT OP Goals     Row Name 06/02/22 1600          PT Short Term Goals    STG Date to Achieve 06/23/22  -HP (r) DM (t) HP (c)     STG 1 Pt to self report a 50% improvement in her symptoms.  -HP (r) DM (t) HP (c)     STG 1 Progress New  -HP (r) DM (t) HP (c)     STG 2 Pt to report adherence to HEP.  -HP (r) DM (t) HP (c)     STG 2 Progress New  -HP (r) DM (t) HP (c)     STG 3 Pt to self report improvement on QuickDASH to less than or equal to 50.  -HP (r) DM (t) HP (c)     STG 3 Progress New  -HP (r) DM (t) HP (c)            Long Term Goals    LTG Date to Achieve 06/23/22  -HP (r) DM (t) HP (c)     LTG 1 Pt to self report a 75% improvement in her symptoms.  -HP (r) DM (t) HP (c)     LTG 1 Progress New  -HP (r) DM (t) HP (c)     LTG 2 Pt to self report independence with HEP.  -HP (r) DM (t) HP (c)     LTG 2 Progress New  -HP (r) DM (t) HP (c)     LTG 3 Pt to self report improvement on QuickDASH to less than or equal to 35.  -HP (r) DM (t) HP (c)     LTG 3 Progress New  -HP (r) DM (t) HP (c)     LTG 4 Pt to demonstrate WNL AROM of the B shoulder in abduction.  -HP     LTG 4 Progress New  -HP (r) DM (t) HP (c)            Time Calculation    PT Goal Re-Cert Due Date 08/31/22  -HP (r) DM (t) HP (c)           User Key  (r) = Recorded By, (t) = Taken By, (c) = Cosigned By    Initials Name Provider Type    HP Tod Jorgensen, PT Physical Therapist    Jena Zuniga, PT Student PT Student                 PT Assessment/Plan     Row Name 06/02/22 1600          PT Assessment    Functional Limitations Limitation in home management;Limitations in community activities;Performance in self-care ADL;Performance in leisure activities;Performance in  work activities  -HP (r) DM (t) HP (c)     Impairments Impaired flexibility;Impaired postural alignment;Joint mobility;Motor function;Muscle strength;Pain;Poor body mechanics;Range of motion;Sensation  -HP (r) DM (t) HP (c)     Assessment Comments Patient is a 42 YO female who presents with a low complexity and evolving case of chronic neck and shoulder pain bilaterally. Patient presents with inconsistent signs and symptoms throughout examination at this time. Shoulder ROM was severely limited bilaterally in abduction but patient was able to achieve full active shoulder flexion. The cervical spine was examined due to the possible relationship of her symptoms and ROM here was limited and painful both in left and right rotation. Pt notes a long history of caregiving, which she attributes to the contribution of her current symptoms. Pt also notes that she is being seen for low back pain, which is being treated by pain management. She also states that she was previously treated for these similar symptoms, that she presented with today, with mild improvement.  -HP     Please refer to paper survey for additional self-reported information Yes  -HP (r) DM (t) HP (c)     Rehab Potential Fair  -HP (r) DM (t) HP (c)     Patient/caregiver participated in establishment of treatment plan and goals Yes  -HP (r) DM (t) HP (c)     Patient would benefit from skilled therapy intervention Yes  -HP (r) DM (t) HP (c)            PT Plan    PT Frequency 1x/week;2x/week  -HP (r) DM (t) HP (c)     Predicted Duration of Therapy Intervention (PT) 8-10 visits  -HP (r) DM (t) HP (c)     Planned CPT's? PT EVAL LOW COMPLEXITY: 78152;PT RE-EVAL: 24446;PT THER PROC EA 15 MIN: 00611;PT THER ACT EA 15 MIN: 01267;PT MANUAL THERAPY EA 15 MIN: 03463;PT NEUROMUSC RE-EDUCATION EA 15 MIN: 87830  -HP (r) DM (t) HP (c)     Physical Therapy Interventions (Optional Details) fine motor skills;home exercise program;joint mobilization;manual therapy  techniques;modalities;neuromuscular re-education;patient/family education;postural re-education;ROM (Range of Motion);strengthening;stretching  -HP (r) DM (t) HP (c)     PT Plan Comments The patient would benefit from skilled therapy services addressing the deficits found including poor strength and decreased ROM in the shoulders and cervical spine.  -HP (r) DM (t) HP (c)           User Key  (r) = Recorded By, (t) = Taken By, (c) = Cosigned By    Initials Name Provider Type    Tod Mccray, PT Physical Therapist    Jena Zuniga, PT Student PT Student                   OP Exercises     Row Name 06/02/22 1600             Subjective Pain    Able to rate subjective pain? yes  -HP (r) DM (t) HP (c)            User Key  (r) = Recorded By, (t) = Taken By, (c) = Cosigned By    Initials Name Provider Type    Tod Mccray, PT Physical Therapist    Jena Zuniga, PT Student PT Student                              Outcome Measure Options: Quick DASH  Quick DASH  Open a tight or new jar.: Moderate Difficulty  Do heavy household chores (e.g., wash walls, wash floors): Severe Difficulty  Carry a shopping bag or briefcase: Moderate Difficulty  Wash your back: Severe Difficulty  Use a knife to cut food: Moderate Difficulty  Recreational activities in which you take some force or impact through your arm, should or hand (e.g. golf, hammering, tennis, etc.): Unable  During the past week, to what extent has your arm, shoulder, or hand problem interfered with your normal social activites with family, friends, neighbors or groups?: Quite a bit  During the past week, were you limited in your work or other regular daily activities as a result of your arm, shoulder or hand problem?: Very limited  Arm, Shoulder, or hand pain: Severe  Tingling (pins and needles) in your arm, shoulder, or hand: Severe  During the past week, how much difficulty have you had sleeping because of the pain in your arm, shoulder or hand?:  So much Difficulty that I can't sleep  Number of Questions Answered: 11  Quick DASH Score: 72.73         Time Calculation:     Start Time: 1600  Untimed Charges  PT Eval/Re-eval Minutes: 60  Total Minutes  Untimed Charges Total Minutes: 60   Total Minutes: 60         PT G-Codes  Outcome Measure Options: Quick DASH  Quick DASH Score: 72.73         Tod Jorgensen, PT  6/2/2022

## 2022-06-02 NOTE — THERAPY EVALUATION
Outpatient Physical Therapy Ortho Initial Evaluation  [facility]     Patient Name: Bernardo Dodd  : 1980  MRN: [id number]  Today's Date: 2022      Visit Date: 2022    Patient Active Problem List   Diagnosis   • Asthma   • Obesity   • GERD (gastroesophageal reflux disease)   • Dyspnea   • Allergic rhinitis   • Hypertension   • Menorrhagia with regular cycle   • Herpes simplex infection   • Lumbar spondylosis   • Health care maintenance   • Chronic maxillary sinusitis   • Iron deficiency anemia   • Ureteral obstruction, left   • Seizure disorder (HCC)   • Fungal infection   • Carpal tunnel syndrome, right        Past Medical History:   Diagnosis Date   • Allergic rhinitis    • Anemia    • Arthritis    • Asthma    • Bartholin gland cyst    • Chlamydia    • Depression    • FHx: migraine headaches    • GERD (gastroesophageal reflux disease)    • Heart murmur    • Herpes simplex    • History of chest x-ray 2015    no active disease   • History of echocardiogram 2015    ejection fraction of greater than 65%, mitral and pulmonic regurgitation an physiological tricuspid regurgitation.   • History of PFTs 2015    spirometry data acceptable and reproducible; pt given 4 puffs of Ventolin; pt gave good effort; no obstruction; no Bd response; MVV reduced    • History of PFTs 2015    pt gave best effort; duoneb given prior and post study; moderate nonspecific proportional reduction of FEV1 and FVC with preserved ratio; FEV1 moderately reduced; cannot rule out restriction   • Hypertension    • Kidney stones    • Migraine    • Ovarian cyst    • Pelvic pain    • Screening breast examination     self;admits   • Seizures (HCC)    • STD (female)    • Thigh shingles    • Trichomonas infection         Past Surgical History:   Procedure Laterality Date   • BILATERAL BREAST REDUCTION     • BREAST BIOPSY     • BREAST SURGERY      breast reduction   •  SECTION     • CYSTOSCOPY     •  DIAGNOSTIC LAPAROSCOPY     • INCISION AND DRAINAGE ABSCESS      bartholin's   • LAPAROSCOPIC TUBAL LIGATION     • ORIF ANKLE FRACTURE Right 2005   • REDUCTION MAMMAPLASTY Bilateral 1998   • TENSION FREE VAGINAL TAPING WITH MINI ARC SLING      Dr Doyle avery        Visit Dx:     ICD-10-CM ICD-9-CM   1. Acute pain of both shoulders  M25.511 719.41    M25.512           Patient History     Row Name 06/02/22 1600 06/01/22 0215          History    Chief Complaint Difficulty with daily activities;Pain;Muscle weakness;Impaired sensation;Numbness  -HP (r) DM (t) HP (c) Difficulty Walking;Difficulty with daily activities;Headache;Joint stiffness;Muscle tenderness;Pain  -HP (r) patient (t)     Type of Pain Shoulder pain;Neck pain;Upper Extremity / Arm  -HP (r) DM (t) HP (c) Back pain;Lower Extremity / Leg;Neck pain;Shoulder pain;Wrist pain  -HP (r) patient (t)     Date Current Problem(s) Began --  stating she has had this pain for years, however it increased in intensity approximately 1 month ago  -HP (r) DM (t) HP (c) --     Brief Description of Current Complaint 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 Has had a years long history of neck and shoulder pain that has recently insidiously increased in intensity.  -HP (r) DM (t) HP (c) --     Patient/Caregiver Goals Relieve pain;Improve mobility;Improve strength;Know what to do to help the symptoms  -HP (r) DM (t) HP (c) Relieve pain;Improve mobility;Improve strength;Know what to do to help the symptoms  -HP (r) patient (t)     Hand Dominance ambidextrous  -HP (r) DM (t) HP (c) ambidextrous  -HP (r) patient (t)     Occupation/sports/leisure activities Caregiver  -HP (r) DM (t) HP (c) Caregiver  -HP (r) patient (t)     What clinical tests have you had for this problem? -- X-ray;MRI  -HP (r) patient (t)     Are you or can you be pregnant -- No  -HP (r) patient (t)            Pain     Pain Location Shoulder  -HP (r) DM (t) HP  (c) --     Pain at Present 7  -HP (r) DM (t) HP (c) --     Pain at Best 3  -HP (r) DM (t) HP (c) --     Pain at Worst 10  -HP (r) DM (t) HP (c) --     Pain Frequency Constant/continuous  -HP --     Pain Description Discomfort;Sharp;Shooting;Throbbing;Tingling  -HP (r) DM (t) HP (c) --     What Performance Factors Make the Current Problem(s) WORSE? housework  -HP (r) DM (t) HP (c) --     What Performance Factors Make the Current Problem(s) BETTER? stretching  -HP (r) DM (t) HP (c) --     Pain Comments Pain is noted along the bilateral cervical spine and radiates down both UEs.  -HP --     Is your sleep disturbed? Yes  -HP (r) DM (t) HP (c) --     Difficulties at work? unable to be as active in her caregiver role, patient states seekign disability  -HP (r) DM (t) HP (c) --            Fall Risk Assessment    Any falls in the past year: No  -HP No  -HP (r) patient (t)            Services    Prior Rehab/Home Health Experiences -- Yes  -HP (r) patient (t)     Are you currently receiving Home Health services -- No  -HP (r) patient (t)     Do you plan to receive Home Health services in the near future -- No  -HP (r) patient (t)            Daily Activities    Primary Language English  -HP (r) DM (t) HP (c) English  -HP (r) patient (t)     Are you able to read -- Yes  -HP (r) patient (t)     Are you able to write -- Yes  -HP (r) patient (t)     How does patient learn best? -- Listening;Reading;Demonstration;Pictures/Video  -HP (r) patient (t)     Pt Participated in POC and Goals Yes  -HP (r) DM (t) HP (c) --            Safety    Are you being hurt, hit, or frightened by anyone at home or in your life? -- No  -HP (r) patient (t)     Are you being neglected by a caregiver -- No  -HP (r) patient (t)     Have you had any of the following issues with -- N/A  -HP (r) patient (t)           User Key  (r) = Recorded By, (t) = Taken By, (c) = Cosigned By    Initials Name Provider Type    Tod Mccray, PT Physical Therapist    DM  Jena Palomino, PT Student PT Student    patient Bernardo Dodd --                 PT Ortho     Row Name 06/02/22 1600       Precautions and Contraindications    Precautions/Limitations no known precautions/limitations  -HP (r) DM (t) HP (c)       Subjective Pain    Able to rate subjective pain? yes  -HP (r) DM (t) HP (c)       Posture/Observations    Alignment Options Rounded shoulders;Forward head  -HP    Forward Head Moderate  -HP    Rounded Shoulders Moderate  -HP       Quarter Clearing    Quarter Clearing Upper Quarter Clearing  -HP (r) DM (t) HP (c)       General ROM    Head/Neck/Trunk Neck Flexion;Neck Extension;Neck Lt Rotation;Neck Rt Rotation  -HP (r) DM (t) HP (c)    RT Upper Ext Rt Shoulder Flexion;Rt Shoulder ABduction  -HP (r) DM (t) HP (c)    LT Upper Ext Lt Shoulder ABduction  -HP (r) DM (t) HP (c)    GENERAL ROM COMMENTS Patient able to achieve full active bilateral shoulder flexion but unable to achieve greater than 95 degrees of shoulder abduction.  -HP (r) DM (t) HP (c)       Head/Neck/Trunk    Neck Extension AROM 35  -HP (r) DM (t) HP (c)    Neck Extension PROM 30  -HP (r) DM (t) HP (c)    Neck Lt Rotation AROM 49  painful  -HP (r) DM (t) HP (c)    Neck Rt Rotation AROM 55  painful  -HP (r) DM (t) HP (c)    Head/Neck/Trunk Comments each with pain  -HP       Right Upper Ext    Rt Shoulder Abduction AROM 90 (pain)  -HP       Left Upper Ext    Lt Shoulder Abduction AROM 94 (pain)  -HP       MMT (Manual Muscle Testing)    General MMT Comments Deferred at this time due to pt's complaints of pain with AROM  -HP          User Key  (r) = Recorded By, (t) = Taken By, (c) = Cosigned By    Initials Name Provider Type    HP Tod Jorgensen, PT Physical Therapist    Jena Zuniga, PT Student PT Student                            Therapy Education  Education Details: HEP provided including cervical extension with towel, wall slides in abduction, shoulder external rotation with scapular  retraction, and shoulder horizontal abduction with a theraband.  Given: HEP, Symptoms/condition management  Program: New  How Provided: Verbal, Demonstration, Written  Provided to: Patient, Caregiver  Level of Understanding: Verbalized, Teach back education performed, Demonstrated      PT OP Goals     Row Name 06/02/22 1600          PT Short Term Goals    STG Date to Achieve 06/23/22  -HP (r) DM (t) HP (c)     STG 1 Pt to self report a 50% improvement in her symptoms.  -HP (r) DM (t) HP (c)     STG 1 Progress New  -HP (r) DM (t) HP (c)     STG 2 Pt to report adherence to HEP.  -HP (r) DM (t) HP (c)     STG 2 Progress New  -HP (r) DM (t) HP (c)     STG 3 Pt to self report improvement on QuickDASH to less than or equal to 50.  -HP (r) DM (t) HP (c)     STG 3 Progress New  -HP (r) DM (t) HP (c)            Long Term Goals    LTG Date to Achieve 06/23/22  -HP (r) DM (t) HP (c)     LTG 1 Pt to self report a 75% improvement in her symptoms.  -HP (r) DM (t) HP (c)     LTG 1 Progress New  -HP (r) DM (t) HP (c)     LTG 2 Pt to self report independence with HEP.  -HP (r) DM (t) HP (c)     LTG 2 Progress New  -HP (r) DM (t) HP (c)     LTG 3 Pt to self report improvement on QuickDASH to less than or equal to 35.  -HP (r) DM (t) HP (c)     LTG 3 Progress New  -HP (r) DM (t) HP (c)     LTG 4 Pt to demonstrate WNL AROM of the B shoulder in abduction.  -HP     LTG 4 Progress New  -HP (r) DM (t) HP (c)            Time Calculation    PT Goal Re-Cert Due Date 08/31/22  -HP (r) DM (t) HP (c)           User Key  (r) = Recorded By, (t) = Taken By, (c) = Cosigned By    Initials Name Provider Type    HP Tod Jorgensen, PT Physical Therapist    Jena Zuniga, PT Student PT Student                 PT Assessment/Plan     Row Name 06/02/22 1600          PT Assessment    Functional Limitations Limitation in home management;Limitations in community activities;Performance in self-care ADL;Performance in leisure activities;Performance in  work activities  -HP (r) DM (t) HP (c)     Impairments Impaired flexibility;Impaired postural alignment;Joint mobility;Motor function;Muscle strength;Pain;Poor body mechanics;Range of motion;Sensation  -HP (r) DM (t) HP (c)     Assessment Comments Patient is a 42 YO female who presents with a low complexity and evolving case of chronic neck and shoulder pain bilaterally. Patient presents with inconsistent signs and symptoms throughout examination at this time. Shoulder ROM was severely limited bilaterally in abduction but patient was able to achieve full active shoulder flexion. The cervical spine was examined due to the possible relationship of her symptoms and ROM here was limited and painful both in left and right rotation. Pt notes a long history of caregiving, which she attributes to the contribution of her current symptoms. Pt also notes that she is being seen for low back pain, which is being treated by pain management. She also states that she was previously treated for these similar symptoms, that she presented with today, with mild improvement.  -HP     Please refer to paper survey for additional self-reported information Yes  -HP (r) DM (t) HP (c)     Rehab Potential Fair  -HP (r) DM (t) HP (c)     Patient/caregiver participated in establishment of treatment plan and goals Yes  -HP (r) DM (t) HP (c)     Patient would benefit from skilled therapy intervention Yes  -HP (r) DM (t) HP (c)            PT Plan    PT Frequency 1x/week;2x/week  -HP (r) DM (t) HP (c)     Predicted Duration of Therapy Intervention (PT) 8-10 visits  -HP (r) DM (t) HP (c)     Planned CPT's? PT EVAL LOW COMPLEXITY: 19928;PT RE-EVAL: 06761;PT THER PROC EA 15 MIN: 56823;PT THER ACT EA 15 MIN: 16179;PT MANUAL THERAPY EA 15 MIN: 44682;PT NEUROMUSC RE-EDUCATION EA 15 MIN: 61714  -HP (r) DM (t) HP (c)     Physical Therapy Interventions (Optional Details) fine motor skills;home exercise program;joint mobilization;manual therapy  techniques;modalities;neuromuscular re-education;patient/family education;postural re-education;ROM (Range of Motion);strengthening;stretching  -HP (r) DM (t) HP (c)     PT Plan Comments The patient would benefit from skilled therapy services addressing the deficits found including poor strength and decreased ROM in the shoulders and cervical spine.  -HP (r) DM (t) HP (c)           User Key  (r) = Recorded By, (t) = Taken By, (c) = Cosigned By    Initials Name Provider Type    Tod Mccray, PT Physical Therapist    Jena Zuniga, PT Student PT Student                   OP Exercises     Row Name 06/02/22 1600             Subjective Pain    Able to rate subjective pain? yes  -HP (r) DM (t) HP (c)            User Key  (r) = Recorded By, (t) = Taken By, (c) = Cosigned By    Initials Name Provider Type    Tod Mccray, PT Physical Therapist    Jena Zuniga, PT Student PT Student                              Outcome Measure Options: Quick DASH  Quick DASH  Open a tight or new jar.: Moderate Difficulty  Do heavy household chores (e.g., wash walls, wash floors): Severe Difficulty  Carry a shopping bag or briefcase: Moderate Difficulty  Wash your back: Severe Difficulty  Use a knife to cut food: Moderate Difficulty  Recreational activities in which you take some force or impact through your arm, should or hand (e.g. golf, hammering, tennis, etc.): Unable  During the past week, to what extent has your arm, shoulder, or hand problem interfered with your normal social activites with family, friends, neighbors or groups?: Quite a bit  During the past week, were you limited in your work or other regular daily activities as a result of your arm, shoulder or hand problem?: Very limited  Arm, Shoulder, or hand pain: Severe  Tingling (pins and needles) in your arm, shoulder, or hand: Severe  During the past week, how much difficulty have you had sleeping because of the pain in your arm, shoulder or hand?:  So much Difficulty that I can't sleep  Number of Questions Answered: 11  Quick DASH Score: 72.73         Time Calculation:     Start Time: 1600  Untimed Charges  PT Eval/Re-eval Minutes: 60  Total Minutes  Untimed Charges Total Minutes: 60   Total Minutes: 60     Therapy Charges for Today     Code Description Service Date Service Provider Modifiers Qty    58214600373 HC PT EVAL LOW COMPLEXITY 4 6/2/2022 Danielle Palomino, PT Student GP 1          PT G-Codes  Outcome Measure Options: Quick DASH  Quick DASH Score: 72.73         DANIELLE PALOMINO, PT Student  6/2/2022

## 2022-06-13 ENCOUNTER — OFFICE VISIT (OUTPATIENT)
Dept: BEHAVIORAL HEALTH | Facility: CLINIC | Age: 42
End: 2022-06-13

## 2022-06-13 DIAGNOSIS — F41.1 GENERALIZED ANXIETY DISORDER: ICD-10-CM

## 2022-06-13 DIAGNOSIS — F12.90 MARIJUANA USE: ICD-10-CM

## 2022-06-13 DIAGNOSIS — F39 UNSPECIFIED MOOD (AFFECTIVE) DISORDER: Primary | ICD-10-CM

## 2022-06-13 PROCEDURE — 90834 PSYTX W PT 45 MINUTES: CPT | Performed by: COUNSELOR

## 2022-06-13 NOTE — PROGRESS NOTES
[facility] [place]                                         Follow Up Adult                 Follow Up Adult Note      Date:2022   Patient Name: Bernardo Dodd  : 1980   MRN: [id number]   Time IN: 10:05am    Time OUT: 10:50am      Referring Provider: Nelly Sotelo PA     Chief Complaint:    Visit Diagnosis       ICD-10-CM ICD-9-CM   1. Unspecified mood (affective) disorder (HCC)  F39 296.90   2. Marijuana use  F12.90 305.20   3. Generalized anxiety disorder  F41.1 300.02            History of Present Illness:   Bernardo Dodd is a 41 y.o. female who is being seen today for follow up counseling for  Anxiety symptoms.          Subjective         Patient's Support Network Includes:  mother     Functional Status: Moderate impairment      Medications:      Current Outpatient Medications:   •  amitriptyline (ELAVIL) 25 MG tablet, Take 1-2 tablets PO QHS PRN for sleep (Patient taking differently: At Night As Needed. Take 1-2 tablets PO QHS PRN for sleep), Disp: 180 tablet, Rfl: 0  •  amLODIPine (NORVASC) 5 MG tablet, Take 1 tablet by mouth Daily., Disp: 90 tablet, Rfl: 1  •  ascorbic acid (VITAMIN C) 100 MG tablet, Take 2 tablets by mouth Daily., Disp: , Rfl:   •  cetirizine (zyrTEC) 10 MG tablet, Take 10 mg by mouth Daily., Disp: , Rfl:   •  fluticasone (Flonase) 50 MCG/ACT nasal spray, 2 sprays into the nostril(s) as directed by provider Daily., Disp: 16 g, Rfl: 5  •  lamoTRIgine (LaMICtal) 200 MG tablet, Take 1 tablet by mouth Daily., Disp: 30 tablet, Rfl: 2  •  SPIRIVA RESPIMAT 2.5 MCG/ACT aerosol solution, Take 2 puffs by mouth Daily., Disp: , Rfl:      Allergies:        Allergies   Allergen Reactions   • Naproxen Swelling   • Sulfa Antibiotics Rash         Objective      Physical Exam:  Vital Signs:   Vitals   There were no vitals filed for this visit.     There is no height or weight on file to calculate BMI.      Mental Status Exam:   Hygiene:    good  Cooperation:  Cooperative  Eye Contact:  Good  Psychomotor Behavior:  fidgety  Affect:  Full range  Mood: normal  Speech:  Normal  Thought Process:  Goal directed  Thought Content:  Mood congruent  Suicidal:  None  Homicidal:  None  Hallucinations:  None  Delusion:  None  Memory:  Intact  Orientation:  Person, Place, Time and Situation  Reliability:  good  Insight:  Good  Judgement:  Good  Impulse Control:  Good  Physical/Medical Issues:  No       Assessment / Plan       Visit Diagnosis/Orders Placed This Visit:  Visit Diagnosis       ICD-10-CM ICD-9-CM   1. Unspecified mood (affective) disorder ([facility])  F39 296.90   2. Marijuana use  F12.90 305.20   3. Generalized anxiety disorder  F41.1 300.02         Patient presents in office for follow-up.  Patient reports continued stress related to finances and reports that she is not gotten a job.  Patient reports that her mother did assist with some finances and reports that she was not evicted from her home.  Patient reports that she is no longer caregiving.  Patient reports that she has applied for some jobs but is worried about drug tests.  Patient and I discussed halting marijuana use in order to obtain employment.  Patient reports other issues in the home including bedbugs and needing to get rid of her mattress.  I wrote down  telephone number for client and client was agreeable to calling the .  Patient reports that she believes she has ADHD due to excessive talking and fidgeting.  I administered ADHD screening total.     PLAN:  1. Safety: No acute safety concerns  2. Risk Assessment: Risk of self-harm acutely is low. Risk of self-harm chronically is also low, but could be further elevated in the event of treatment noncompliance and/or AODA.     Treatment Plan/Goals: Continue supportive psychotherapy efforts and medications as indicated. Treatment and medication options discussed during today's visit. Patient ackowledged and verbally  consented to continue with current treatment plan and was educated on the importance of compliance with treatment and follow-up appointments. Patient seems reasonably able to adhere to treatment plan.       Assisted Patient in processing above session content; acknowledged and normalized patient’s thoughts, feelings, and concerns.  Rationalized patient thought process regarding current symptoms and stressors.       Allowed Patient to freely discuss issues  without interruption or judgement with unconditional positive regard, active listening skills, and empathy. Therapist provided a safe, confidential environment to facilitate the development of a positive therapeutic relationship and encouraged open, honest communication. Assisted Patient in identifying risk factors which would indicate the need for higher level of care including thoughts to harm self or others and/or self-harming behavior and encouraged Patient to contact this office, call 911, or present to the nearest emergency room should any of these events occur. Discussed crisis intervention services and means to access. Patient adamantly and convincingly denies current suicidal or homicidal ideation or perceptual disturbance. Assisted Patient in processing session content; acknowledged and normalized Patient’s thoughts, feelings, and concerns by utilizing a person-centered approach in efforts to build appropriate rapport and a positive therapeutic relationship with open and honest communication. .      Quality Measures:      TOBACCO USE:  Current every day smoker less than 3 minutes spent counseling Not agreeable to stopping     I advised Tyralita of the risks of tobacco use.      Follow Up:   Return in about 2 weeks (around 6/27/2022) for Counseling.        Blank Garcia LCSW

## 2022-06-14 ENCOUNTER — PATIENT OUTREACH (OUTPATIENT)
Dept: CASE MANAGEMENT | Facility: OTHER | Age: 42
End: 2022-06-14

## 2022-06-14 NOTE — OUTREACH NOTE
AMBULATORY CASE MANAGEMENT NOTE    Name and Relationship of Patient/Support Person: Bernardo Dodd O - Self    Patient Outreach    Attempted to reach patient as discussed with LCSW. Left VM for return call. SW referral placed for assistance with resources.     JERALD JURADO  Ambulatory Case Management    6/14/2022, 15:01 EDT

## 2022-06-15 ENCOUNTER — PATIENT OUTREACH (OUTPATIENT)
Dept: CASE MANAGEMENT | Facility: OTHER | Age: 42
End: 2022-06-15

## 2022-06-15 NOTE — OUTREACH NOTE
Patient Outreach    LEANDRO contacted pt regarding community resources. Pt has bed bugs in her home. It is a housing authority home and they are supposed to be coming to inspect to see if they need to spray it. She reports that she has had to get rid of much of her furniture. SW provided contact information for [facility] furniture program and suggested pt try to contact them after she finds out if her home will need to be sprayed again. Pt says her car is not working, but she has been using public transport. Other bills are caught up and she is aware of community resources if needed.     JUMANA AREVALO -   Ambulatory Case Management    6/15/2022, 15:38 EDT

## 2022-06-21 NOTE — THERAPY EVALUATION
Outpatient Physical Therapy Vestibular Initial Evaluation  [facility]     Patient Name: Bernardo Dodd  : 1980  MRN: [id number]  Today's Date: 2018      Visit Date: 2018    Patient Active Problem List   Diagnosis   • Asthma   • Obesity   • GERD (gastroesophageal reflux disease)   • Dyspnea   • Allergic rhinitis   • Hypertension   • Menorrhagia with regular cycle   • Herpes simplex infection   • Acute right lumbar radiculopathy   • Health care maintenance        Past Medical History:   Diagnosis Date   • Allergic rhinitis    • Anemia    • Arthritis    • Asthma    • Depression    • FHx: migraine headaches    • GERD (gastroesophageal reflux disease)    • Heart murmur    • History of chest x-ray 2015    no active disease   • History of echocardiogram 2015    ejection fraction of greater than 65%, mitral and pulmonic regurgitation an physiological tricuspid regurgitation.   • History of PFTs 2015    spirometry data acceptable and reproducible; pt given 4 puffs of Ventolin; pt gave good effort; no obstruction; no Bd response; MVV reduced    • History of PFTs 2015    pt gave best effort; duoneb given prior and post study; moderate nonspecific proportional reduction of FEV1 and FVC with preserved ratio; FEV1 moderately reduced; cannot rule out restriction   • Hypertension    • Ovarian cyst    • Seizures (CMS/HCC)    • Thigh shingles         Past Surgical History:   Procedure Laterality Date   • BILATERAL BREAST REDUCTION     • BREAST SURGERY      breast reduction   •  SECTION     • LAPAROSCOPIC TUBAL LIGATION     • ORIF ANKLE FRACTURE Right          Visit Dx:     ICD-10-CM ICD-9-CM   1. Vestibular hypofunction of right ear H83.2X1 386.50             Patient History     Row Name 18 0830             History    Chief Complaint Dizziness;Difficulty with daily activities  -MM      Date Current Problem(s) Began --   couple years ago  -MM      Brief Description  of Current Complaint Client presents with frequent dizziness. She reports frequent room spinning dizziness that lasts less than 1 min. She reports dizziness at times when talking to people, walking, transfers sit to stand, transfers supine to sit. She reports a frequent sensation that she is moving. She reports increased dizziness with head turns. She reports increased dizziness with high pitch and loud sounds. She reports that it often feels as though she has running water in her left ear.   -MM      Patient/Caregiver Goals Relief from dizziness  -MM      Occupation/sports/leisure activities employed at [employer]  -MM      History of Previous Related Injuries previous compound fracture of right lower leg 2005  -MM      Are you or can you be pregnant No  -MM         Fall Risk Assessment    Any falls in the past year: No  -MM         Daily Activities    Primary Language English  -MM      Are you able to read Yes  -MM      Are you able to write Yes  -MM      How does patient learn best? Listening;Reading;Demonstration  -MM      Barriers to learning None  -MM      Pt Participated in POC and Goals Yes  -MM         Safety    Are you being hurt, hit, or frightened by anyone at home or in your life? No  -MM      Are you being neglected by a caregiver No  -MM        User Key  (r) = Recorded By, (t) = Taken By, (c) = Cosigned By    Initials Name Provider Type    MM Otilio Liu, PT Physical Therapist                Blessing [place]chola     Row Name 08/22/18 9599             Subjective Comments    Subjective Comments Client reports she feels like she may be having seizures due to spacing out at times.  -MM         Symptom Behavior    Type of Dizziness Blurred Vision;Imbalance;Funny feeling in head;Spinning;Unsteady with head/body turns;Sensation of motion at rest  -MM      Frequency of Dizziness Several Times a Day  -MM      Duration of Dizziness Seconds  -MM      VAS Intensity (0-10) 5  -MM      Aggravating Factors Spontaneous  onset;Activity in general;Looking up to the ceiling;Moving eyes;Sitting with head tilted back;Turning body quickly;Turning head quickly;Turning head sideways;Walking in a crowd;Supine to sit;Sit to stand;Forward bending  -MM      Relieving Factors Head stationary;Rest;Slow movements  -MM         Occulomotor Exam Fixation Present    Spontaneous Nystagmus Absent  -MM      Gaze-induced Nystagmus Absent  -MM      Smooth Pursuit Saccadic;Symptom Provoking  -MM      Saccades Undershoots  -MM         Vestibulo-Occular Reflex (VOR)    VOR to Slow Head Movement --   symptom provoking  -MM      VOR to Fast Head Movement/Head Thrust Test Positive right   mild impairment Right  -MM         VOR with Occulomotor Exam Fixation Absent     Head-Shaking Nystagmus Horizontal Absent  -MM         Positional Testing    Tristan-Hallpike Right No nystagmus  -MM      Tristan-Hallpike Left No nystagmus  -MM      Horizontal Roll Test Right No nystagmus  -MM      Horizontal Roll Test Left No nystagmus  -MM         General ROM    GENERAL ROM COMMENTS Standing turns: 1 turn left: Mild dizziness 3/10; 1 turn right: Dizziness 5/10.  -MM         High-level Balance    High-level Balance Other [facility] sensory organization test: Double leg stance eyes open: Normal; double leg stance eyes closed: Normal; tandem stance eyes open: Normal with sway; tandem stance eyes closed: 2-3 seconds max; stance on foam eyes open: Normal; stance on foam eyes closed: 13 seconds/30 seconds; single-leg stance: Left:> 10 sec; R: 10 sec  -MM        User Key  (r) = Recorded By, (t) = Taken By, (c) = Cosigned By    Initials Name Provider Type    MM Otilio Liu, PT Physical Therapist        Therapy Education  Education Details: Provided and reviewed home program.  Home exercises included: Locating targets, standing turns, VOR training, and head circles.            Exercises     Row Name 08/22/18 5709             Subjective Comments    Subjective Comments Client reports she feels  like she may be having seizures due to spacing out at times.  -MM        User Key  (r) = Recorded By, (t) = Taken By, (c) = Cosigned By    Initials Name Provider Type    Otilio Monique, PT Physical Therapist                PT OP Goals     Row Name 08/22/18 0830          PT Short Term Goals    STG Date to Achieve 09/12/18  -MM     STG 1 Client will be independent with home program to minimize dizziness with daily activity.   -MM     STG 1 Progress New  -MM     STG 2 Client will report 50% improvement in dizziness with daily activity.   -MM     STG 2 Progress New  -MM     STG 3 Client will complete a full rotation to the right in standing with dizziness < 3/10.   -MM     STG 3 Progress New  -MM     STG 4 Client will improve to transferring sit to stand without difficulty.   -MM     STG 4 Progress New  -MM        Long Term Goals    LTG Date to Achieve 09/19/18  -MM     LTG 1 Dizziness Handicap score will improve to 30% or better.   -MM     LTG 1 Progress New  -MM        Time Calculation    PT Goal Re-Cert Due Date 11/20/18  -MM       User Key  (r) = Recorded By, (t) = Taken By, (c) = Cosigned By    Initials Name Provider Type    Otilio Monique, PT Physical Therapist                PT Assessment/Plan     Row Name 08/22/18 0830          PT Assessment    Functional Limitations Performance in leisure activities;Performance in self-care ADL;Limitations in community activities;Limitation in home management  -MM     Impairments Coordination;Other (comment)   Dizziness  -MM     Assessment Comments Client presents with evolving symptoms of low complexity.  Signs and symptoms are consistent with dizziness in part related to right vestibular hypofunction.  Vestibular tests were consistent with client having mild right vestibular weakness.  Vestibular exercises should be helpful to minimize dizziness.  Mild right vestibular weakness does not account for the significant frequent symptoms she has been experiencing.   Client feels that she may be having brief seizures and she reports she is scheduled for an MRI.  Tests for BPPV were negative.  The sensation of feeling as though she is moving is more consistent with a central impairment. Though not observed, the report of spontaneous is also often more central related. It is also unclear as to why high pitch or loud sounds would cause dizziness.  Client may also benefit from referral to an ENT.  -MM     Please refer to paper survey for additional self-reported information Yes  -MM     Rehab Potential Fair  -MM     Patient/caregiver participated in establishment of treatment plan and goals Yes  -MM     Patient would benefit from skilled therapy intervention Yes  -MM        PT Plan    PT Frequency 1x/week  -MM     Predicted Duration of Therapy Intervention (Therapy Eval) 4-6 visits  -MM     Planned CPT's? PT EVAL LOW COMPLEXITY: 43155;PT NEUROMUSC RE-EDUCATION EA 15 MIN: 90812  -MM     PT Plan Comments Continue per plan of treatment with vestibular coordination exercises as needed.  -MM       User Key  (r) = Recorded By, (t) = Taken By, (c) = Cosigned By    Initials Name Provider Type    MM Otilio Liu, PT Physical Therapist           Outcome Measure Options: Dizziness Handicap Inventory (68%)         Time Calculation:   Start Time: 0830   Therapy Suggested Charges     Code   Minutes Charges    None           Therapy Charges for Today     Code Description Service Date Service Provider Modifiers Qty    82925208213  PT EVAL LOW COMPLEXITY 4 8/22/2018 Otilio Liu, PT GP 1          PT G-Codes  Outcome Measure Options: Dizziness Handicap Inventory (68%)         Otilio Liu, PT  8/22/2018      22-Dec-2021

## 2022-06-22 ENCOUNTER — HOSPITAL ENCOUNTER (OUTPATIENT)
Dept: PHYSICAL THERAPY | Facility: HOSPITAL | Age: 42
Setting detail: THERAPIES SERIES
Discharge: HOME | End: 2022-06-22

## 2022-06-22 DIAGNOSIS — M79.602 PAIN IN BOTH UPPER EXTREMITIES: ICD-10-CM

## 2022-06-22 DIAGNOSIS — M25.511 ACUTE PAIN OF BOTH SHOULDERS: Primary | ICD-10-CM

## 2022-06-22 DIAGNOSIS — M79.601 PAIN IN BOTH UPPER EXTREMITIES: ICD-10-CM

## 2022-06-22 DIAGNOSIS — M54.2 PAIN, NECK: ICD-10-CM

## 2022-06-22 DIAGNOSIS — M25.512 ACUTE PAIN OF BOTH SHOULDERS: Primary | ICD-10-CM

## 2022-06-22 PROCEDURE — 97110 THERAPEUTIC EXERCISES: CPT

## 2022-06-22 NOTE — THERAPY TREATMENT NOTE
Outpatient Physical Therapy Ortho Treatment Note  [facility]     Patient Name: Bernardo Dodd  : 1980  MRN: [id number]  Today's Date: 2022      Visit Date: 2022    Visit Dx:    ICD-10-CM ICD-9-CM   1. Acute pain of both shoulders  M25.511 719.41    M25.512    2. Pain, neck  M54.2 723.1   3. Pain in both upper extremities  M79.601 729.5    M79.602        Patient Active Problem List   Diagnosis   • Asthma   • Obesity   • GERD (gastroesophageal reflux disease)   • Dyspnea   • Allergic rhinitis   • Hypertension   • Menorrhagia with regular cycle   • Herpes simplex infection   • Lumbar spondylosis   • Health care maintenance   • Chronic maxillary sinusitis   • Iron deficiency anemia   • Ureteral obstruction, left   • Seizure disorder (HCC)   • Fungal infection   • Carpal tunnel syndrome, right        Past Medical History:   Diagnosis Date   • Allergic rhinitis    • Anemia    • Arthritis    • Asthma    • Bartholin gland cyst    • Chlamydia    • Depression    • FHx: migraine headaches    • GERD (gastroesophageal reflux disease)    • Heart murmur    • Herpes simplex    • History of chest x-ray 2015    no active disease   • History of echocardiogram 2015    ejection fraction of greater than 65%, mitral and pulmonic regurgitation an physiological tricuspid regurgitation.   • History of PFTs 2015    spirometry data acceptable and reproducible; pt given 4 puffs of Ventolin; pt gave good effort; no obstruction; no Bd response; MVV reduced    • History of PFTs 2015    pt gave best effort; duoneb given prior and post study; moderate nonspecific proportional reduction of FEV1 and FVC with preserved ratio; FEV1 moderately reduced; cannot rule out restriction   • Hypertension    • Kidney stones    • Migraine    • Ovarian cyst    • Pelvic pain    • Screening breast examination     self;admits   • Seizures (HCC)    • STD (female)    • Thigh shingles    • Trichomonas infection          Past Surgical History:   Procedure Laterality Date   • BILATERAL BREAST REDUCTION     • BREAST BIOPSY     • BREAST SURGERY      breast reduction   •  SECTION     • CYSTOSCOPY     • DIAGNOSTIC LAPAROSCOPY     • INCISION AND DRAINAGE ABSCESS      bartholin's   • LAPAROSCOPIC TUBAL LIGATION     • ORIF ANKLE FRACTURE Right    • REDUCTION MAMMAPLASTY Bilateral    • TENSION FREE VAGINAL TAPING WITH MINI ARC SLING      Dr Doyle avery                         PT Assessment/Plan     Row Name 22 8443          PT Assessment    Assessment Comments Patient reporting that she has had increased symptoms in her arms and neck. Patient is able to achieve full shoulder flexion bilaterally today but does note some pain in the midranges of motion. She has noticeable tightness in the suboccipital musculature and upper trap, however she does not tolerate manual suboccipital release or upper trap stretch well, stating she feels dizziness and pain in her stomach. Manual therapy discontinued on this date and feelings of dizziness subsided once technique was done. (P)   -DM            PT Plan    PT Plan Comments Continue with current POC to address noted deficits and progress as patient tolerates. (P)   -DM           User Key  (r) = Recorded By, (t) = Taken By, (c) = Cosigned By    Initials Name Provider Type    Jena Zuniga, PT Student PT Student                   OP Exercises     Row Name 22 2579             Subjective Comments    Subjective Comments Patient stating her shoulders and neck have been hurting, and that the exercises make her sore so she doesnt do tehm very frequently. (P)   -DM              Subjective Pain    Able to rate subjective pain? yes (P)   -DM      Pre-Treatment Pain Level 5 (P)   -DM      Post-Treatment Pain Level 5 (P)   -DM              Total Minutes    56317 - PT Therapeutic Exercise Minutes 25 (P)   -DM      76737 - PT Manual Therapy Minutes 8 (P)   -DM               Exercise 1    Exercise Name 1 UBE (P)   -DM      Time 1 4 min (P)   -DM              Exercise 2    Exercise Name 2 Cervical ext with towel, wobble with towel (P)   -DM      Sets 2 2 (P)   -DM      Reps 2 10 (P)   -DM              Exercise 3    Exercise Name 3 Seated scap squeeze (P)   -DM      Reps 3 20 (P)   -DM              Exercise 4    Exercise Name 4 Seated chin tuck (P)   -DM      Reps 4 20 (P)   -DM              Exercise 5    Exercise Name 5 Upper trap stretch (P)   -DM      Reps 5 1 (P)   -DM      Time 5 30sec each (P)   -DM              Exercise 6    Exercise Name 6 Band pull aparts (P)   -DM      Sets 6 1 (P)   -DM      Reps 6 10 (P)   -DM      Additional Comments yellow tb (P)   -DM              Exercise 7    Exercise Name 7 Cane shoulder flexion (P)   -DM      Reps 7 10 (P)   -DM              Exercise 8    Exercise Name 8 Yellow tb banded shoulder extension (P)   -DM      Sets 8 2 (P)   -DM      Reps 8 10 (P)   -DM            User Key  (r) = Recorded By, (t) = Taken By, (c) = Cosigned By    Initials Name Provider Type    Jena Zuniga, PT Student PT Student                         Manual Rx (last 36 hours)     Manual Treatments     Row Name 06/22/22 1315             Total Minutes    53120 - PT Manual Therapy Minutes 8 (P)   -DM              Manual Rx 1    Manual Rx 1 Location Cervical spine (P)   -DM      Manual Rx 1 Type Suboccipital release, supine PA pressures, upper trap stretch (P)   -DM      Manual Rx 1 Grade Grade 2 and 3 for PA (P)   -DM      Manual Rx 1 Duration 8 (P)   -DM            User Key  (r) = Recorded By, (t) = Taken By, (c) = Cosigned By    Initials Name Provider Type    ABNER MclaintracyJena, PT Student PT Student                                   Time Calculation:   Start Time: (P) 1315  Timed Charges  33151 - PT Therapeutic Exercise Minutes: (P) 25  29943 - PT Manual Therapy Minutes: (P) 8  Total Minutes  Timed Charges Total Minutes: (P) 33   Total Minutes: (P) 33  Therapy  Charges for Today     Code Description Service Date Service Provider Modifiers Qty    64887343814 HC PT THER PROC EA 15 MIN 6/22/2022 Danielle Palomino, PT Student GP 2                    DANIELLE PALOMINO, PT Student  6/22/2022

## 2022-07-14 ENCOUNTER — OFFICE VISIT (OUTPATIENT)
Dept: INTERNAL MEDICINE | Facility: CLINIC | Age: 42
End: 2022-07-14

## 2022-07-14 VITALS
HEIGHT: 59 IN | OXYGEN SATURATION: 97 % | RESPIRATION RATE: 18 BRPM | DIASTOLIC BLOOD PRESSURE: 66 MMHG | WEIGHT: 160 LBS | TEMPERATURE: 98.4 F | BODY MASS INDEX: 32.25 KG/M2 | HEART RATE: 94 BPM | SYSTOLIC BLOOD PRESSURE: 120 MMHG

## 2022-07-14 DIAGNOSIS — R05.9 COUGH: Primary | ICD-10-CM

## 2022-07-14 DIAGNOSIS — H66.90 ACUTE OTITIS MEDIA, UNSPECIFIED OTITIS MEDIA TYPE: ICD-10-CM

## 2022-07-14 PROCEDURE — U0004 COV-19 TEST NON-CDC HGH THRU: HCPCS | Performed by: NURSE PRACTITIONER

## 2022-07-14 PROCEDURE — 99213 OFFICE O/P EST LOW 20 MIN: CPT | Performed by: NURSE PRACTITIONER

## 2022-07-14 RX ORDER — AMOXICILLIN AND CLAVULANATE POTASSIUM 875; 125 MG/1; MG/1
1 TABLET, FILM COATED ORAL 2 TIMES DAILY
Qty: 14 TABLET | Refills: 0 | Status: SHIPPED | OUTPATIENT
Start: 2022-07-14 | End: 2022-07-21

## 2022-07-14 RX ORDER — CELECOXIB 200 MG/1
CAPSULE ORAL
COMMUNITY
Start: 2022-06-29 | End: 2022-08-26 | Stop reason: SDUPTHER

## 2022-07-14 NOTE — PROGRESS NOTES
Answers for HPI/ROS submitted by the patient on 2022  What is the primary reason for your visit?: Ear Pain  Affected ear: both  Chronicity: new  Onset: yesterday  Progression since onset: gradually worsening  Frequency: constantly  Fever: no fever  Fever duration: less than 1 day  Pain - numeric: 9/10  headaches: Yes         Follow Up Office Visit      Date: 2022   Patient Name: Bernardo Dodd  : 1980   MRN: [id number]     Chief Complaint:    Chief Complaint   Patient presents with   • Earache   • Cough       History of Present Illness: Bernardo Dodd is a 41 y.o. female who is here today to follow up with upper respiratory symptoms. She started feeling poorly Saturday/ noting back pain. This progressed into ear pain which she used an ear vacuum in her left ear. She also c/o cough, ear drainage/pain bilaterally, congestion, runny nose, sore throat, and dry cough.       Subjective      Review of Systems:   Review of Systems   Constitutional: Positive for fatigue. Negative for chills and fever.   HENT: Positive for ear discharge, ear pain, hearing loss, rhinorrhea and sore throat.    Respiratory: Positive for cough and shortness of breath. Negative for wheezing.    Cardiovascular: Negative for chest pain.   Gastrointestinal: Negative for abdominal pain, constipation, diarrhea and nausea.   Musculoskeletal: Positive for neck pain and neck stiffness (seeing  and [facility] pain clinic for chronic issues ).   Skin: Negative for rash.       I have reviewed the patients family history, social history, past medical history, past surgical history and have updated it as appropriate.     Medications:     Current Outpatient Medications:   •  amitriptyline (ELAVIL) 25 MG tablet, Take 1-2 tablets PO QHS PRN for sleep (Patient taking differently: At Night As Needed. Take 1-2 tablets PO QHS PRN for sleep), Disp: 180 tablet, Rfl: 0  •  amLODIPine (NORVASC) 5 MG tablet, Take 1 tablet by mouth  "Daily., Disp: 90 tablet, Rfl: 1  •  ascorbic acid (VITAMIN C) 100 MG tablet, Take 2 tablets by mouth Daily., Disp: , Rfl:   •  celecoxib (CeleBREX) 200 MG capsule, , Disp: , Rfl:   •  cetirizine (zyrTEC) 10 MG tablet, Take 10 mg by mouth Daily., Disp: , Rfl:   •  fluticasone (Flonase) 50 MCG/ACT nasal spray, 2 sprays into the nostril(s) as directed by provider Daily., Disp: 16 g, Rfl: 5  •  lamoTRIgine (LaMICtal) 200 MG tablet, Take 1 tablet by mouth Daily., Disp: 30 tablet, Rfl: 2  •  SPIRIVA RESPIMAT 2.5 MCG/ACT aerosol solution, Take 2 puffs by mouth Daily., Disp: , Rfl:   •  amoxicillin-clavulanate (Augmentin) 875-125 MG per tablet, Take 1 tablet by mouth 2 (Two) Times a Day for 7 days., Disp: 14 tablet, Rfl: 0    Allergies:   Allergies   Allergen Reactions   • Naproxen Swelling   • Sulfa Antibiotics Rash       Objective     Physical Exam: Please see above  Vital Signs:   Vitals:    07/14/22 1437   BP: 120/66   Pulse: 94   Resp: 18   Temp: 98.4 °F (36.9 °C)   SpO2: 97%   Weight: 72.6 kg (160 lb)   Height: 149.9 cm (59\")   PainSc: 0-No pain     Body mass index is 32.32 kg/m².    Physical Exam  Vitals and nursing note reviewed.   Constitutional:       General: She is not in acute distress.     Appearance: Normal appearance. She is normal weight. She is not ill-appearing.   HENT:      Head: Normocephalic and atraumatic.      Right Ear: Tympanic membrane and ear canal normal. Decreased hearing noted. Tenderness present.      Left Ear: Decreased hearing noted. Drainage, swelling and tenderness present. A middle ear effusion is present. Tympanic membrane is erythematous.      Mouth/Throat:      Mouth: Mucous membranes are moist.      Pharynx: Oropharynx is clear. No oropharyngeal exudate.   Eyes:      Pupils: Pupils are equal, round, and reactive to light.   Cardiovascular:      Rate and Rhythm: Normal rate and regular rhythm.      Heart sounds: Normal heart sounds.   Pulmonary:      Effort: Pulmonary effort is " normal.      Breath sounds: Normal breath sounds.   Musculoskeletal:      Cervical back: No rigidity.   Lymphadenopathy:      Cervical: No cervical adenopathy.   Neurological:      Mental Status: She is alert.           Results:   Imaging:     Labs:       Assessment / Plan      Assessment/Plan:   Diagnoses and all orders for this visit:    1. Cough (Primary)  -     COVID-19 PCR, [facility], NP SWAB IN [facility] VIRAL TRANSPORT MEDIA/ORAL SWISH 24-30 HR TAT - Swab, Nasopharynx; Future  -     COVID-19 PCR, [facility], NP SWAB IN [facility] VIRAL TRANSPORT MEDIA/ORAL SWISH 24-30 HR TAT - Swab, Nasopharynx  -Note given for work  -Call/return if no improvement or worsening of symptoms  2. Acute otitis media, unspecified otitis media type  -     amoxicillin-clavulanate (Augmentin) 875-125 MG per tablet; Take 1 tablet by mouth 2 (Two) Times a Day for 7 days.  Dispense: 14 tablet; Refill: 0  -Avoid sticking things into your ear         Follow Up:   Return if symptoms worsen or fail to improve.    SHANE Atkins  [facility] [place]

## 2022-07-15 LAB — SARS-COV-2 RNA NOSE QL NAA+PROBE: NOT DETECTED

## 2022-07-26 ENCOUNTER — TELEMEDICINE (OUTPATIENT)
Dept: BEHAVIORAL HEALTH | Facility: CLINIC | Age: 42
End: 2022-07-26

## 2022-07-26 DIAGNOSIS — F39 UNSPECIFIED MOOD (AFFECTIVE) DISORDER: Primary | ICD-10-CM

## 2022-07-26 DIAGNOSIS — F12.90 MARIJUANA USE: ICD-10-CM

## 2022-07-26 PROCEDURE — 90832 PSYTX W PT 30 MINUTES: CPT | Performed by: COUNSELOR

## 2022-07-26 NOTE — PROGRESS NOTES
[facility]               Telehealth (Video) Visit     Telehealth  (Video) Visit      Date: 2022   Patient Name: Bernardo Dodd  : 1980   MRN: [id number]   Time In: 12:58pm      Time Out: 1:18pm     Referring Physician: Nelly Sotelo PA    Chief Complaint:      ICD-10-CM ICD-9-CM   1. Unspecified mood (affective) disorder (HCC)  F39 296.90   2. Marijuana use  F12.90 305.20        This provider is located at [address] [place] [address]. [facility]. This visit is being done on behalf of [facility] using a Zoom platform for a video visit in a secure and private environment. The Patient is seen remotely at their home address in [place], using a private computer, phone or tablet.  The patient is able to be seen through a MyChart Video Visit through [employer] at today's encounter. Patient is being evaluated/treated via telehealth by Zoom, and stated they are in a secure environment for this session. The patient's condition being diagnosed/treated is appropriate for telemedicine. The provider identified herself as well as her credentials.   The patient, and/or patient's guardian, consent to being seen remotely, and when consent is given they understand that the consent allows for patient identifiable information to be sent to a third party as needed.   They may refuse to be seen remotely at any time. The electronic data is encrypted and password protected, and the patient and/or guardian has been advised of the potential risks to privacy not withstanding such measures.    You have chosen to receive care through a video visit today. Do you consent to use a video visit for your medical care today? yes  This visit has been scheduled as a video visit to comply with patient safety concerns in accordance with CDC recommendations.    History of Present Illness: Bernardo Dodd is a 41 y.o. female who I saw today thru a video  visit for mood disorder symptoms.        Subjective      Review of Systems:   The following portions of the patient's history were reviewed and updated as appropriate: allergies, current medications, past family history, past medical history, past social history, past surgical history and problem list.     Interval History:   Improved    Medications:     Current Outpatient Medications:   •  amitriptyline (ELAVIL) 25 MG tablet, Take 1-2 tablets PO QHS PRN for sleep (Patient taking differently: At Night As Needed. Take 1-2 tablets PO QHS PRN for sleep), Disp: 180 tablet, Rfl: 0  •  amLODIPine (NORVASC) 5 MG tablet, Take 1 tablet by mouth Daily., Disp: 90 tablet, Rfl: 1  •  ascorbic acid (VITAMIN C) 100 MG tablet, Take 2 tablets by mouth Daily., Disp: , Rfl:   •  celecoxib (CeleBREX) 200 MG capsule, , Disp: , Rfl:   •  cetirizine (zyrTEC) 10 MG tablet, Take 10 mg by mouth Daily., Disp: , Rfl:   •  clotrimazole (LOTRIMIN) 1 % external solution, Apply  topically to the appropriate area as directed 2 (Two) Times a Day., Disp: , Rfl:   •  fluticasone (Flonase) 50 MCG/ACT nasal spray, 2 sprays into the nostril(s) as directed by provider Daily., Disp: 16 g, Rfl: 5  •  lamoTRIgine (LaMICtal) 200 MG tablet, Take 1 tablet by mouth Daily., Disp: 30 tablet, Rfl: 2  •  ofloxacin (Ocuflox) 0.3 % ophthalmic solution, Administer 1 drop to the right eye 4 (Four) Times a Day. And both ears., Disp: 5 mL, Rfl: 0  •  SPIRIVA RESPIMAT 2.5 MCG/ACT aerosol solution, Take 2 puffs by mouth Daily., Disp: , Rfl:         Objective     Physical Exam:  Vital Signs:   Due to extenuating circumstances and possible current health risks associated with the patient being present in a clinical setting (with current health restrictions in place in regards to possible COVID 19 transmission/exposure), the patient was seen remotely today via a video visit. Unable to obtain vital signs due to nature of remote visit.  Height stated at 59 inches.  Weight stated  at 160 pounds.     Mental Status Exam:   Hygiene:   good  Cooperation:  Cooperative  Eye Contact:  Good  Psychomotor Behavior:  Appropriate  Affect:  Full range  Mood: normal  Speech:  Normal  Thought Process:  Linear  Thought Content:  Mood congruent  Suicidal:  None  Homicidal:  None  Hallucinations:  None  Delusion:  None  Memory:  Intact  Orientation:  Person, Place, Time and Situation  Reliability:  good  Insight:  Good  Judgement:  Good  Impulse Control:  Good  Physical/Medical Issues:  Yes patient is ill     Quality Measures:     TOBACCO USE:  Current some day smoker less than 3 minutes spent counseling .    I advised Kylahalita of the risks of tobacco use.     Assessment / Plan      Assessment/Plan:   Diagnoses and all orders for this visit:    1. Unspecified mood (affective) disorder (HCC) (Primary)    2. Marijuana use       Patient presents via telehealth for follow-up.  Appointment was shortened due to patient being ill.  Patient reports that she did start a new job and reports being able to pay off her bills.  Patient had difficulty identifying goals moving forward in areas that she would like to work on.  I provided possible ideas to patient and requested patient think about this before next appointment.  Patient was agreeable.      TREATMENT PLAN/GOALS: Continue supportive psychotherapy efforts and medications as indicated. Treatment and medication options discussed during today's visit. Patient ackowledged and verbally consented to continue with current treatment plan and was educated on the importance of compliance with treatment and follow-up appointments. Patient seems reasonably able to adhere to treatment plan.      Allowed Patient to freely discuss issues  without interruption or judgement with unconditional positive regard, active listening skills, and empathy. Therapist provided a safe, confidential environment to facilitate the development of a positive therapeutic relationship and encouraged  open, honest communication. Assisted Patient in identifying risk factors which would indicate the need for higher level of care including thoughts to harm self or others and/or self-harming behavior and encouraged Patient to contact this office, call 911, or present to the nearest emergency room should any of these events occur. Discussed crisis intervention services and means to access. Patient adamantly and convincingly denies current suicidal or homicidal ideation or perceptual disturbance. Assisted Patient in processing session content; acknowledged and normalized Patient’s thoughts, feelings, and concerns by utilizing a person-centered approach in efforts to build appropriate rapport and a positive therapeutic relationship with open and honest communication.     Follow Up:   No follow-ups on file.    Blank Garcia LCSW

## 2022-08-23 ENCOUNTER — TELEMEDICINE (OUTPATIENT)
Dept: BEHAVIORAL HEALTH | Facility: CLINIC | Age: 42
End: 2022-08-23

## 2022-08-23 DIAGNOSIS — F39 UNSPECIFIED MOOD (AFFECTIVE) DISORDER: Primary | ICD-10-CM

## 2022-08-23 DIAGNOSIS — F41.1 GENERALIZED ANXIETY DISORDER: ICD-10-CM

## 2022-08-23 DIAGNOSIS — F12.90 MARIJUANA USE: ICD-10-CM

## 2022-08-23 DIAGNOSIS — G47.00 INSOMNIA, UNSPECIFIED TYPE: ICD-10-CM

## 2022-08-23 PROCEDURE — 90832 PSYTX W PT 30 MINUTES: CPT | Performed by: COUNSELOR

## 2022-08-23 RX ORDER — TRAZODONE HYDROCHLORIDE 50 MG/1
50-100 TABLET ORAL NIGHTLY
Qty: 60 TABLET | Refills: 1 | Status: SHIPPED | OUTPATIENT
Start: 2022-08-23 | End: 2022-09-07
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
## 2022-08-24 DIAGNOSIS — H01.9 INFECTION OF EYELID: ICD-10-CM

## 2022-08-24 DIAGNOSIS — I10 PRIMARY HYPERTENSION: ICD-10-CM

## 2022-08-24 DIAGNOSIS — G40.909 SEIZURE DISORDER: ICD-10-CM

## 2022-08-24 RX ORDER — AMLODIPINE BESYLATE 5 MG/1
5 TABLET ORAL DAILY
Qty: 90 TABLET | Refills: 0 | Status: SHIPPED | OUTPATIENT
Start: 2022-08-24 | End: 2023-01-17 | Stop reason: SDUPTHER

## 2022-08-24 RX ORDER — ERYTHROMYCIN 5 MG/G
OINTMENT OPHTHALMIC
Qty: 3.5 G | Refills: 0 | OUTPATIENT
Start: 2022-08-24

## 2022-08-24 RX ORDER — OFLOXACIN 3 MG/ML
1 SOLUTION/ DROPS OPHTHALMIC 4 TIMES DAILY
Qty: 5 ML | Refills: 0 | OUTPATIENT
Start: 2022-08-24

## 2022-08-24 RX ORDER — LAMOTRIGINE 200 MG/1
200 TABLET ORAL DAILY
Qty: 30 TABLET | Refills: 1 | Status: SHIPPED | OUTPATIENT
Start: 2022-08-24 | End: 2023-01-18

## 2022-08-24 NOTE — TELEPHONE ENCOUNTER
Caller: [place] Doddarmen JUDD    Relationship: Self    Best call back number: [phone]    Requested Prescriptions:   Requested Prescriptions     Pending Prescriptions Disp Refills   • erythromycin (ROMYCIN) 5 MG/GM ophthalmic ointment 3.5 g 0     Sig: Administer  to the right eye Every 4 (Four) Hours While Awake.   • ofloxacin (Ocuflox) 0.3 % ophthalmic solution 5 mL 0     Sig: Administer 1 drop to the right eye 4 (Four) Times a Day. And both ears.        Pharmacy where request should be sent: [facility] [address] [place], [place] [address]E - [phone]  - [phone]     Additional details provided by patient: PT IS OUT OF THESE MEDICATIONS.     PT'S EYES ARE STARTING TO ITCH AGAIN AND WOULD LIKE A REFILL ON THESE MEDICATIONS    Does the patient have less than a 3 day supply:  [x] Yes  [] No    Laron Massey   08/24/22 11:56 EDT

## 2022-08-25 ENCOUNTER — TELEPHONE (OUTPATIENT)
Dept: INTERNAL MEDICINE | Facility: CLINIC | Age: 42
End: 2022-08-25

## 2022-08-26 ENCOUNTER — OFFICE VISIT (OUTPATIENT)
Dept: INTERNAL MEDICINE | Facility: CLINIC | Age: 42
End: 2022-08-26

## 2022-08-26 VITALS
OXYGEN SATURATION: 99 % | TEMPERATURE: 97 F | HEART RATE: 73 BPM | DIASTOLIC BLOOD PRESSURE: 70 MMHG | SYSTOLIC BLOOD PRESSURE: 110 MMHG

## 2022-08-26 DIAGNOSIS — M25.562 ACUTE PAIN OF LEFT KNEE: ICD-10-CM

## 2022-08-26 DIAGNOSIS — J02.9 SORE THROAT: Primary | ICD-10-CM

## 2022-08-26 LAB
EXPIRATION DATE: NORMAL
EXPIRATION DATE: NORMAL
FLUAV AG NPH QL: NEGATIVE
FLUBV AG NPH QL: NEGATIVE
INTERNAL CONTROL: NORMAL
INTERNAL CONTROL: NORMAL
Lab: NORMAL
Lab: NORMAL
SARS-COV-2 AG UPPER RESP QL IA.RAPID: NOT DETECTED

## 2022-08-26 PROCEDURE — 99214 OFFICE O/P EST MOD 30 MIN: CPT | Performed by: PHYSICIAN ASSISTANT

## 2022-08-26 PROCEDURE — U0004 COV-19 TEST NON-CDC HGH THRU: HCPCS | Performed by: PHYSICIAN ASSISTANT

## 2022-08-26 PROCEDURE — 87426 SARSCOV CORONAVIRUS AG IA: CPT | Performed by: PHYSICIAN ASSISTANT

## 2022-08-26 PROCEDURE — 87804 INFLUENZA ASSAY W/OPTIC: CPT | Performed by: PHYSICIAN ASSISTANT

## 2022-08-26 RX ORDER — CELECOXIB 200 MG/1
200 CAPSULE ORAL DAILY
Qty: 30 CAPSULE | Refills: 0 | Status: SHIPPED | OUTPATIENT
Start: 2022-08-26 | End: 2022-09-26 | Stop reason: SDUPTHER

## 2022-08-26 NOTE — PROGRESS NOTES
Chief Complaint   Patient presents with   • Sore Throat   • Fatigue   • Cough     Started four days ago       Subjective     Bernardo Dodd is a 41 y.o. female.        History of Present Illness   The patient is a 41-year-old female who present today for an acute visit for an upper respiratory infection.    The patient is currently being seen by psychiatry and a therapist. Last visit with her therapist, Blank, was on 08/23/2022 via telehealth. She spoke with her psychiatrist over the phone after her telehealth visit with Blank. She last saw her psychiatrist as a patient about 1 month ago. Her amitriptyline was discontinued, and she was prescribed trazodone. She picked up the trazadone prescription 2 days ago and has not started taking it yet. She indicates that she plans to wait until 08/29/2022 to start taking trazodone because she does not want to oversleep for work. She brought in her remaining amitriptyline prescription today because she is worried about taking the trazadone along with her original prescription of amitriptyline and wishes to surrender the amitriptyline.      The patient reports having weakness and fatigue over the last several days. Reports having a cough the last 2 days. Rapid tests done today were negative for COVID-19 and the flu. Reports her symptoms are improving however she still has fatigue and soreness in her neck, arms, hands, and legs with minimal coughing. Has maintained some relief with over-the-counter medicine and staying hydrated. Reports inflammation and itching in her right eye. Denies coming in contact with COVID-19 personnel at her employment.     Reports having pain in her left knee with a popping sensation. The pain began on 08/22/2022 upon returning home from her work shift. Denies any known injury. Patient is employed as a resident aide on the overnight shift. She is following with pain management and is prescribed Celebrex 200 mg daily for inflammation and joint pain  relief. Reports benefit from Celebrex. She missed her last pain management appointment and is out of her Celebrex prescription. Her next appointment is on 08/30/2022.     Reports the reemergence of a ganglion cyst in her right wrist. Cyst presents on the right radial wrist of approximately 1 centimeter in diameter. She was last seen by a hand specialist at the [facility], Dr. Judd, 3 months ago and was given an injection for cyst reduction. She has participated in physical therapy for her right wrist in the past.          Current Outpatient Medications:   •  amLODIPine (NORVASC) 5 MG tablet, Take 1 tablet by mouth Daily., Disp: 90 tablet, Rfl: 0  •  ascorbic acid (VITAMIN C) 100 MG tablet, Take 2 tablets by mouth Daily., Disp: , Rfl:   •  celecoxib (CeleBREX) 200 MG capsule, Take 1 capsule by mouth Daily., Disp: 30 capsule, Rfl: 0  •  cetirizine (zyrTEC) 10 MG tablet, Take 10 mg by mouth Daily., Disp: , Rfl:   •  clotrimazole (LOTRIMIN) 1 % external solution, Apply  topically to the appropriate area as directed 2 (Two) Times a Day., Disp: , Rfl:   •  erythromycin (ROMYCIN) 5 MG/GM ophthalmic ointment, Administer  to the right eye Every 4 (Four) Hours While Awake., Disp: 3.5 g, Rfl: 0  •  fluticasone (Flonase) 50 MCG/ACT nasal spray, 2 sprays into the nostril(s) as directed by provider Daily., Disp: 16 g, Rfl: 5  •  lamoTRIgine (LaMICtal) 200 MG tablet, Take 1 tablet by mouth Daily., Disp: 30 tablet, Rfl: 1  •  ofloxacin (Ocuflox) 0.3 % ophthalmic solution, Administer 1 drop to the right eye 4 (Four) Times a Day. And both ears., Disp: 5 mL, Rfl: 0  •  SPIRIVA RESPIMAT 2.5 MCG/ACT aerosol solution, Take 2 puffs by mouth Daily., Disp: , Rfl:   •  traZODone (DESYREL) 50 MG tablet, Take 1-2 tablets by mouth Every Night., Disp: 60 tablet, Rfl: 1     PMFSH  The following portions of the patient's history were reviewed and updated as appropriate: allergies, current medications, past family history, past  medical history, past social history, past surgical history and problem list.    Review of Systems   Constitutional: Positive for fatigue. Negative for activity change and unexpected weight change.   HENT: Positive for congestion, postnasal drip and sore throat. Negative for ear pain.    Eyes: Negative for pain and discharge.   Respiratory: Positive for cough. Negative for chest tightness, shortness of breath and wheezing.    Cardiovascular: Negative for chest pain and palpitations.   Gastrointestinal: Negative for abdominal pain, diarrhea and vomiting.   Endocrine: Negative.    Genitourinary: Negative.    Musculoskeletal: Positive for joint swelling.   Skin: Negative for color change, rash and wound.   Allergic/Immunologic: Negative.    Neurological: Negative for seizures and syncope.   Psychiatric/Behavioral: Negative.        Objective   /70   Pulse 73   Temp 97 °F (36.1 °C)   SpO2 99%   Breastfeeding No     Physical Exam  Vitals and nursing note reviewed.   Constitutional:       Appearance: She is well-developed.   HENT:      Head: Normocephalic.      Right Ear: Hearing, tympanic membrane, ear canal and external ear normal.      Left Ear: Hearing, tympanic membrane, ear canal and external ear normal.      Nose: Nose normal.   Eyes:      Conjunctiva/sclera: Conjunctivae normal.      Pupils: Pupils are equal, round, and reactive to light.   Cardiovascular:      Rate and Rhythm: Normal rate and regular rhythm.      Heart sounds: Normal heart sounds.   Pulmonary:      Effort: Pulmonary effort is normal.      Breath sounds: Normal breath sounds. No decreased breath sounds, wheezing, rhonchi or rales.   Musculoskeletal:         General: Normal range of motion.      Cervical back: Normal range of motion.      Comments: Pain to palpation of the patellar tendon of the left knee.   Skin:     General: Skin is warm and dry.   Neurological:      Mental Status: She is alert.   Psychiatric:         Behavior: Behavior  normal.         Results for orders placed or performed in visit on 08/26/22   COVID-19 PCR, [facility], NP SWAB IN [facility] VIRAL TRANSPORT MEDIA/ORAL SWISH 24-30 HR TAT - Swab, Nasopharynx    Specimen: Nasopharynx; Swab   Result Value Ref Range    SARS-CoV-2 TIFFANY Not Detected Not Detected   POCT SARS-CoV-2 Antigen PRADEEP    Specimen: Swab   Result Value Ref Range    SARS Antigen Not Detected Not Detected, Presumptive Negative    Internal Control Passed Passed    Lot Number [id number]     Expiration Date 2022-12-20    POCT Influenza A/B    Specimen: Swab   Result Value Ref Range    Rapid Influenza A Ag Negative Negative    Rapid Influenza B Ag Negative Negative    Internal Control Passed Passed    Lot Number [id number]     Expiration Date 2023-10-21         ASSESSMENT/PLAN    Diagnoses and all orders for this visit:    1. Sore throat (Primary)  Comments:  Ordered PCR test for COVID-19. No further treatment at this time.  Orders:  -     POCT SARS-CoV-2 Antigen PRADEEP  -     POCT Influenza A/B  -     COVID-19 PCR, [facility], NP SWAB IN [facility] VIRAL TRANSPORT MEDIA/ORAL SWISH 24-30 HR TAT - Swab, Nasopharynx; Future  -     COVID-19 PCR, [facility], NP SWAB IN [facility] VIRAL TRANSPORT MEDIA/ORAL SWISH 24-30 HR TAT - Swab, Nasopharynx    2. Acute pain of left knee  Comments:  Refilled prescription of Celebrex 200 mg daily. Recommended use of an ACE wrap. Follow-up with pain management as scheduled.   Orders:  -     celecoxib (CeleBREX) 200 MG capsule; Take 1 capsule by mouth Daily.  Dispense: 30 capsule; Refill: 0    3. Right radial wrist ganglion cyst.      - Patient was advised to consult with her hand specialist, Dr. Judd, for additional treatment.    Will return for follow-up in 10/2022.          Return for Next scheduled follow up.  Answers for HPI/ROS submitted by the patient on 8/25/2022  Please describe your symptoms.: Stuffy nose, pain in left leg.  Have you had these symptoms before?: No  How long have you been having  these symptoms?: 1-4 days  What is the primary reason for your visit?: Other      Transcribed from ambient dictation for SUHA Leyva by DAVID ERVIN.  08/26/22   15:39 EDT    Patient verbalized consent to the visit recording.  I have personally performed the services described in this document as transcribed by the above individual, and it is both accurate and complete.  SUHA Leyva  8/27/2022  21:43 EDT

## 2022-08-27 LAB — SARS-COV-2 RNA NOSE QL NAA+PROBE: NOT DETECTED

## 2022-08-29 ENCOUNTER — TELEMEDICINE (OUTPATIENT)
Dept: BEHAVIORAL HEALTH | Facility: CLINIC | Age: 42
End: 2022-08-29

## 2022-08-29 ENCOUNTER — TELEPHONE (OUTPATIENT)
Dept: BEHAVIORAL HEALTH | Facility: CLINIC | Age: 42
End: 2022-08-29

## 2022-08-29 NOTE — TELEPHONE ENCOUNTER
Patient and I had session scheduled, but patient was having problems with my chart. I attempted to call patient 2x to conduct telephone visit, but it went to voicemail both times. I left message for patient to call office to reschedule or I am available to attempt to call again if needed.

## 2022-09-05 ENCOUNTER — APPOINTMENT (OUTPATIENT)
Dept: GENERAL RADIOLOGY | Facility: HOSPITAL | Age: 42
End: 2022-09-05

## 2022-09-05 ENCOUNTER — HOSPITAL ENCOUNTER (EMERGENCY)
Facility: HOSPITAL | Age: 42
Discharge: HOME | End: 2022-09-05
Attending: EMERGENCY MEDICINE | Admitting: EMERGENCY MEDICINE

## 2022-09-05 ENCOUNTER — APPOINTMENT (OUTPATIENT)
Dept: CARDIOLOGY | Facility: HOSPITAL | Age: 42
End: 2022-09-05

## 2022-09-05 VITALS
SYSTOLIC BLOOD PRESSURE: 143 MMHG | BODY MASS INDEX: 27.46 KG/M2 | OXYGEN SATURATION: 97 % | RESPIRATION RATE: 18 BRPM | TEMPERATURE: 98.2 F | DIASTOLIC BLOOD PRESSURE: 102 MMHG | HEART RATE: 64 BPM | WEIGHT: 155 LBS | HEIGHT: 63 IN

## 2022-09-05 DIAGNOSIS — R94.31 ABNORMAL EKG: ICD-10-CM

## 2022-09-05 DIAGNOSIS — R60.0 LOWER EXTREMITY EDEMA: ICD-10-CM

## 2022-09-05 DIAGNOSIS — R07.9 CHEST PAIN, UNSPECIFIED TYPE: Primary | ICD-10-CM

## 2022-09-05 LAB
ALBUMIN SERPL-MCNC: 4.3 G/DL (ref 3.5–5.2)
ALBUMIN/GLOB SERPL: 1.3 G/DL
ALP SERPL-CCNC: 69 U/L (ref 39–117)
ALT SERPL W P-5'-P-CCNC: 12 U/L (ref 1–33)
ANION GAP SERPL CALCULATED.3IONS-SCNC: 12 MMOL/L (ref 5–15)
AST SERPL-CCNC: 20 U/L (ref 1–32)
BACTERIA UR QL AUTO: ABNORMAL /HPF
BASOPHILS # BLD AUTO: 0.05 10*3/MM3 (ref 0–0.2)
BASOPHILS NFR BLD AUTO: 0.5 % (ref 0–1.5)
BH CV LOWER VASCULAR LEFT COMMON FEMORAL AUGMENT: NORMAL
BH CV LOWER VASCULAR LEFT COMMON FEMORAL COMPRESS: NORMAL
BH CV LOWER VASCULAR LEFT COMMON FEMORAL PHASIC: NORMAL
BH CV LOWER VASCULAR LEFT COMMON FEMORAL SPONT: NORMAL
BH CV LOWER VASCULAR LEFT DISTAL FEMORAL AUGMENT: NORMAL
BH CV LOWER VASCULAR LEFT DISTAL FEMORAL COMPRESS: NORMAL
BH CV LOWER VASCULAR LEFT DISTAL FEMORAL PHASIC: NORMAL
BH CV LOWER VASCULAR LEFT DISTAL FEMORAL SPONT: NORMAL
BH CV LOWER VASCULAR LEFT GASTRONEMIUS COMPRESS: NORMAL
BH CV LOWER VASCULAR LEFT GREATER SAPH AK COMPRESS: NORMAL
BH CV LOWER VASCULAR LEFT GREATER SAPH BK COMPRESS: NORMAL
BH CV LOWER VASCULAR LEFT LESSER SAPH COMPRESS: NORMAL
BH CV LOWER VASCULAR LEFT MID FEMORAL AUGMENT: NORMAL
BH CV LOWER VASCULAR LEFT MID FEMORAL COMPRESS: NORMAL
BH CV LOWER VASCULAR LEFT MID FEMORAL PHASIC: NORMAL
BH CV LOWER VASCULAR LEFT MID FEMORAL SPONT: NORMAL
BH CV LOWER VASCULAR LEFT PERONEAL COMPRESS: NORMAL
BH CV LOWER VASCULAR LEFT POPLITEAL AUGMENT: NORMAL
BH CV LOWER VASCULAR LEFT POPLITEAL COMPRESS: NORMAL
BH CV LOWER VASCULAR LEFT POPLITEAL PHASIC: NORMAL
BH CV LOWER VASCULAR LEFT POPLITEAL SPONT: NORMAL
BH CV LOWER VASCULAR LEFT POSTERIOR TIBIAL COMPRESS: NORMAL
BH CV LOWER VASCULAR LEFT PROFUNDA FEMORAL AUGMENT: NORMAL
BH CV LOWER VASCULAR LEFT PROFUNDA FEMORAL COMPRESS: NORMAL
BH CV LOWER VASCULAR LEFT PROFUNDA FEMORAL PHASIC: NORMAL
BH CV LOWER VASCULAR LEFT PROFUNDA FEMORAL SPONT: NORMAL
BH CV LOWER VASCULAR LEFT PROXIMAL FEMORAL AUGMENT: NORMAL
BH CV LOWER VASCULAR LEFT PROXIMAL FEMORAL COMPRESS: NORMAL
BH CV LOWER VASCULAR LEFT PROXIMAL FEMORAL PHASIC: NORMAL
BH CV LOWER VASCULAR LEFT PROXIMAL FEMORAL SPONT: NORMAL
BH CV LOWER VASCULAR LEFT SAPHENOFEMORAL JUNCTION AUGMENT: NORMAL
BH CV LOWER VASCULAR LEFT SAPHENOFEMORAL JUNCTION COMPRESS: NORMAL
BH CV LOWER VASCULAR LEFT SAPHENOFEMORAL JUNCTION PHASIC: NORMAL
BH CV LOWER VASCULAR LEFT SAPHENOFEMORAL JUNCTION SPONT: NORMAL
BH CV LOWER VASCULAR LEFT SOLEAL COMPRESS: NORMAL
BH CV LOWER VASCULAR RIGHT COMMON FEMORAL AUGMENT: NORMAL
BH CV LOWER VASCULAR RIGHT COMMON FEMORAL COMPRESS: NORMAL
BH CV LOWER VASCULAR RIGHT COMMON FEMORAL PHASIC: NORMAL
BH CV LOWER VASCULAR RIGHT COMMON FEMORAL SPONT: NORMAL
BILIRUB SERPL-MCNC: 0.2 MG/DL (ref 0–1.2)
BILIRUB UR QL STRIP: NEGATIVE
BUN SERPL-MCNC: 9 MG/DL (ref 6–20)
BUN/CREAT SERPL: 14.5 (ref 7–25)
CALCIUM SPEC-SCNC: 9.2 MG/DL (ref 8.6–10.5)
CHLORIDE SERPL-SCNC: 102 MMOL/L (ref 98–107)
CLARITY UR: ABNORMAL
CO2 SERPL-SCNC: 24 MMOL/L (ref 22–29)
COLOR UR: ABNORMAL
CREAT SERPL-MCNC: 0.62 MG/DL (ref 0.57–1)
DEPRECATED RDW RBC AUTO: 48.5 FL (ref 37–54)
EGFRCR SERPLBLD CKD-EPI 2021: 113.5 ML/MIN/1.73
EOSINOPHIL # BLD AUTO: 0.06 10*3/MM3 (ref 0–0.4)
EOSINOPHIL NFR BLD AUTO: 0.6 % (ref 0.3–6.2)
ERYTHROCYTE [DISTWIDTH] IN BLOOD BY AUTOMATED COUNT: 17 % (ref 12.3–15.4)
GLOBULIN UR ELPH-MCNC: 3.2 GM/DL
GLUCOSE SERPL-MCNC: 92 MG/DL (ref 65–99)
GLUCOSE UR STRIP-MCNC: NEGATIVE MG/DL
HCT VFR BLD AUTO: 35.5 % (ref 34–46.6)
HGB BLD-MCNC: 11.4 G/DL (ref 12–15.9)
HGB UR QL STRIP.AUTO: ABNORMAL
HOLD SPECIMEN: NORMAL
HYALINE CASTS UR QL AUTO: ABNORMAL /LPF
IMM GRANULOCYTES # BLD AUTO: 0.02 10*3/MM3 (ref 0–0.05)
IMM GRANULOCYTES NFR BLD AUTO: 0.2 % (ref 0–0.5)
KETONES UR QL STRIP: NEGATIVE
LEUKOCYTE ESTERASE UR QL STRIP.AUTO: ABNORMAL
LIPASE SERPL-CCNC: 63 U/L (ref 13–60)
LYMPHOCYTES # BLD AUTO: 3.72 10*3/MM3 (ref 0.7–3.1)
LYMPHOCYTES NFR BLD AUTO: 38.8 % (ref 19.6–45.3)
MAXIMAL PREDICTED HEART RATE: 177 BPM
MCH RBC QN AUTO: 25.3 PG (ref 26.6–33)
MCHC RBC AUTO-ENTMCNC: 32.1 G/DL (ref 31.5–35.7)
MCV RBC AUTO: 78.7 FL (ref 79–97)
MONOCYTES # BLD AUTO: 0.68 10*3/MM3 (ref 0.1–0.9)
MONOCYTES NFR BLD AUTO: 7.1 % (ref 5–12)
NEUTROPHILS NFR BLD AUTO: 5.05 10*3/MM3 (ref 1.7–7)
NEUTROPHILS NFR BLD AUTO: 52.8 % (ref 42.7–76)
NITRITE UR QL STRIP: NEGATIVE
NRBC BLD AUTO-RTO: 0 /100 WBC (ref 0–0.2)
NT-PROBNP SERPL-MCNC: 22.8 PG/ML (ref 0–450)
PH UR STRIP.AUTO: 7 [PH] (ref 5–8)
PLATELET # BLD AUTO: 320 10*3/MM3 (ref 140–450)
PMV BLD AUTO: 10.3 FL (ref 6–12)
POTASSIUM SERPL-SCNC: 3.7 MMOL/L (ref 3.5–5.2)
PROT SERPL-MCNC: 7.5 G/DL (ref 6–8.5)
PROT UR QL STRIP: ABNORMAL
RBC # BLD AUTO: 4.51 10*6/MM3 (ref 3.77–5.28)
RBC # UR STRIP: ABNORMAL /HPF
REF LAB TEST METHOD: ABNORMAL
SODIUM SERPL-SCNC: 138 MMOL/L (ref 136–145)
SP GR UR STRIP: 1.01 (ref 1–1.03)
SQUAMOUS #/AREA URNS HPF: ABNORMAL /HPF
STRESS TARGET HR: 150 BPM
TROPONIN T SERPL-MCNC: <0.01 NG/ML (ref 0–0.03)
TROPONIN T SERPL-MCNC: <0.01 NG/ML (ref 0–0.03)
UROBILINOGEN UR QL STRIP: ABNORMAL
WBC # UR STRIP: ABNORMAL /HPF
WBC NRBC COR # BLD: 9.58 10*3/MM3 (ref 3.4–10.8)
WHOLE BLOOD HOLD COAG: NORMAL
WHOLE BLOOD HOLD SPECIMEN: NORMAL

## 2022-09-05 PROCEDURE — 25010000002 MORPHINE PER 10 MG: Performed by: EMERGENCY MEDICINE

## 2022-09-05 PROCEDURE — 93005 ELECTROCARDIOGRAM TRACING: CPT | Performed by: EMERGENCY MEDICINE

## 2022-09-05 PROCEDURE — 93971 EXTREMITY STUDY: CPT

## 2022-09-05 PROCEDURE — 84484 ASSAY OF TROPONIN QUANT: CPT

## 2022-09-05 PROCEDURE — 36415 COLL VENOUS BLD VENIPUNCTURE: CPT

## 2022-09-05 PROCEDURE — 80053 COMPREHEN METABOLIC PANEL: CPT

## 2022-09-05 PROCEDURE — 99283 EMERGENCY DEPT VISIT LOW MDM: CPT

## 2022-09-05 PROCEDURE — 71045 X-RAY EXAM CHEST 1 VIEW: CPT

## 2022-09-05 PROCEDURE — 83880 ASSAY OF NATRIURETIC PEPTIDE: CPT

## 2022-09-05 PROCEDURE — 93005 ELECTROCARDIOGRAM TRACING: CPT

## 2022-09-05 PROCEDURE — 83690 ASSAY OF LIPASE: CPT

## 2022-09-05 PROCEDURE — 96375 TX/PRO/DX INJ NEW DRUG ADDON: CPT

## 2022-09-05 PROCEDURE — 81001 URINALYSIS AUTO W/SCOPE: CPT

## 2022-09-05 PROCEDURE — 96374 THER/PROPH/DIAG INJ IV PUSH: CPT

## 2022-09-05 PROCEDURE — 25010000002 ONDANSETRON PER 1 MG: Performed by: PHYSICIAN ASSISTANT

## 2022-09-05 PROCEDURE — 84484 ASSAY OF TROPONIN QUANT: CPT | Performed by: EMERGENCY MEDICINE

## 2022-09-05 PROCEDURE — 85025 COMPLETE CBC W/AUTO DIFF WBC: CPT

## 2022-09-05 RX ORDER — ONDANSETRON 2 MG/ML
4 INJECTION INTRAMUSCULAR; INTRAVENOUS ONCE
Status: COMPLETED | OUTPATIENT
Start: 2022-09-05 | End: 2022-09-05

## 2022-09-05 RX ORDER — MORPHINE SULFATE 2 MG/ML
2 INJECTION, SOLUTION INTRAMUSCULAR; INTRAVENOUS ONCE
Status: COMPLETED | OUTPATIENT
Start: 2022-09-05 | End: 2022-09-05

## 2022-09-05 RX ORDER — SODIUM CHLORIDE 0.9 % (FLUSH) 0.9 %
10 SYRINGE (ML) INJECTION AS NEEDED
Status: DISCONTINUED | OUTPATIENT
Start: 2022-09-05 | End: 2022-09-05 | Stop reason: HOSPADM

## 2022-09-05 RX ORDER — FUROSEMIDE 20 MG/1
20 TABLET ORAL DAILY
Qty: 6 TABLET | Refills: 0 | Status: SHIPPED | OUTPATIENT
Start: 2022-09-05

## 2022-09-05 RX ADMIN — MORPHINE SULFATE 2 MG: 2 INJECTION, SOLUTION INTRAMUSCULAR; INTRAVENOUS at 09:54

## 2022-09-05 RX ADMIN — ONDANSETRON 4 MG: 2 INJECTION INTRAMUSCULAR; INTRAVENOUS at 09:53

## 2022-09-05 NOTE — DISCHARGE INSTRUCTIONS
Rest.  No heavy exertion.  Lasix as prescribed for lower leg swelling.  Wear support socks (knee high) as able.  [facility] up with [facility] (they should call for follow up).  Return to the ER if worse.

## 2022-09-05 NOTE — ED PROVIDER NOTES
Subjective   Ms. Dodd is a 41-year-old female who presents to the emergency department with a 4-day history of right upper chest pain, fatigue, bilateral lower leg swelling (left greater than right), and mild nausea without vomiting.  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  She has had no associated diaphoresis.  No cough or shortness of breath.  No fever.  No abdominal pain, nausea, vomiting or diarrhea.  She notes some occasional mild increased urinary frequency and mild dysuria.  She has a history of hypertension and asthma.  She smokes cigars on occasion.  She smokes marijuana on a regular basis.  No other drug use.  No alcohol use.          Review of Systems   Constitutional: Positive for fatigue. Negative for chills and fever.   HENT: Negative for sore throat.    Respiratory: Positive for chest tightness. Negative for cough and shortness of breath.    Cardiovascular: Positive for chest pain and leg swelling (Left greater than right). Negative for palpitations.   Gastrointestinal: Negative for constipation, diarrhea, nausea and vomiting.   Genitourinary: Positive for dysuria and frequency.   Musculoskeletal: Negative for back pain.   Skin: Negative for rash.   Allergic/Immunologic: Negative for immunocompromised state.   Neurological: Positive for weakness (Generalized). Negative for dizziness, light-headedness and headaches.   Hematological: Negative.    Psychiatric/Behavioral: Negative.        Past Medical History:   Diagnosis Date   • Allergic rhinitis    • Anemia    • Arthritis    • Asthma    • Bartholin gland cyst    • Chlamydia    • Depression    • FHx: migraine headaches    • GERD (gastroesophageal reflux disease)    • Heart murmur    • Herpes simplex    • History of chest x-ray 11/01/2015    no active disease   • History of echocardiogram 11/01/2015    ejection fraction of greater than 65%, mitral and pulmonic regurgitation  an physiological tricuspid regurgitation.   • History of PFTs 2015    spirometry data acceptable and reproducible; pt given 4 puffs of Ventolin; pt gave good effort; no obstruction; no Bd response; MVV reduced    • History of PFTs 2015    pt gave best effort; duoneb given prior and post study; moderate nonspecific proportional reduction of FEV1 and FVC with preserved ratio; FEV1 moderately reduced; cannot rule out restriction   • Hypertension    • Kidney stones    • Migraine    • Ovarian cyst    • Pelvic pain    • Screening breast examination     self;admits   • Seizures (HCC)    • STD (female)    • Thigh shingles    • Trichomonas infection        Allergies   Allergen Reactions   • Naproxen Swelling   • Sulfa Antibiotics Rash       Past Surgical History:   Procedure Laterality Date   • BILATERAL BREAST REDUCTION     • BREAST BIOPSY     • BREAST SURGERY      breast reduction   •  SECTION     • CYSTOSCOPY     • DIAGNOSTIC LAPAROSCOPY     • INCISION AND DRAINAGE ABSCESS      bartholin's   • LAPAROSCOPIC TUBAL LIGATION     • ORIF ANKLE FRACTURE Right    • REDUCTION MAMMAPLASTY Bilateral    • TENSION FREE VAGINAL TAPING WITH MINI ARC SLING      Dr Doyle avery        Family History   Problem Relation Age of Onset   • Pancreatic cancer Maternal Grandmother    • Heart failure Paternal Grandmother    • MARCIE disease Paternal Aunt    • Arthritis Mother    • Cancer Mother    • Bipolar disorder Mother    • Arthritis Father    • Dementia Father    • Pancreatic cancer Other    • Diabetes Other    • Heart disease Other    • Hypertension Other    • Other Other         RESPIRATORY DISEASE   • Heart attack Neg Hx    • Hyperlipidemia Neg Hx    • Mental illness Neg Hx    • Obesity Neg Hx    • Stroke Neg Hx    • Breast cancer Neg Hx    • Ovarian cancer Neg Hx        Social History     Socioeconomic History   • Marital status: Single   Tobacco Use   • Smoking status: Light Tobacco Smoker     Packs/day: 0.25      Years: 15.00     Pack years: 3.75     Types: Cigarettes, Cigars     Last attempt to quit: 2015     Years since quittin.6   • Smokeless tobacco: Never Used   • Tobacco comment: I quit every so often. Then i start again.   Vaping Use   • Vaping Use: Some days   • Devices: Disposable   • Passive vaping exposure: Yes   Substance and Sexual Activity   • Alcohol use: No   • Drug use: Not Currently     Types: Marijuana     Comment: Marijuana once a week. Tried cocaine, meth, pain pills in the past   • Sexual activity: Yes     Birth control/protection: Surgical           Objective   Physical Exam  Constitutional:       General: She is not in acute distress.     Appearance: She is well-developed. She is not diaphoretic.   HENT:      Head: Normocephalic.      Nose: Nose normal.      Mouth/Throat:      Mouth: Mucous membranes are moist.   Eyes:      General: No scleral icterus.     Conjunctiva/sclera: Conjunctivae normal.      Pupils: Pupils are equal, round, and reactive to light.   Cardiovascular:      Rate and Rhythm: Normal rate and regular rhythm.      Pulses: Normal pulses.      Heart sounds: Normal heart sounds.   Pulmonary:      Effort: Pulmonary effort is normal. No respiratory distress.      Breath sounds: Normal breath sounds. No wheezing, rhonchi or rales.   Chest:      Chest wall: No tenderness.   Abdominal:      General: Bowel sounds are normal.      Tenderness: There is no abdominal tenderness. There is no guarding.   Musculoskeletal:         General: Normal range of motion.      Cervical back: Normal range of motion and neck supple.      Comments: Moderate bilateral lower extremity edema, left greater than right.  Moderate left calf tenderness.   Skin:     General: Skin is dry.      Findings: No rash.   Neurological:      General: No focal deficit present.      Mental Status: She is alert and oriented to person, place, and time.   Psychiatric:         Mood and Affect: Mood normal.          Procedures           ED Course      The pt appears in no distress.  Labs, EKG, and CXR were obtained.  The pt was sent for doppler u/s of the left leg to r/o DVT.    EKG was read independently by me and shows some T-inversion in the anteror  leads with no change from old EKG on file.  Rate is 71.    Two sets of troponins are negative.      Second EKG was also read independently by me and shows sinus rhythm with no acute change with rate of 59.    Chest xray shows no acute process.    Doppler is negative for DVT.    I spoke with the pt about her workup.  She states her chest pain has resolved.  I will plan to d/c on low dose of Lasix for few days and refer to chest pain clinic.                                                 MDM    Final diagnoses:   Chest pain, unspecified type   Lower extremity edema   Abnormal EKG       ED Disposition  ED Disposition     ED Disposition   Discharge    Condition   Stable    Comment   --             Nelly Sotelo PA  [address] [id number] [phone]          [facility] CARDIOLOGY  [address]  [address]  [facility] [address] [phone]    they should call for follow up appointment    [facility] Emergency Department  [address]  [facility] [address] [phone]    If symptoms worsen         Medication List      New Prescriptions    furosemide 20 MG tablet  Commonly known as: LASIX  Take 1 tablet by mouth Daily.        Stop    clotrimazole 1 % external solution  Commonly known as: LOTRIMIN     erythromycin 5 MG/GM ophthalmic ointment  Commonly known as: ROMYCIN     ofloxacin 0.3 % ophthalmic solution  Commonly known as: Ocuflox           Where to Get Your Medications      These medications were sent to [facility] - [place], [place] - [address] - [phone]  - [phone] FX  [address], [facility] [address]    Phone: [phone]   · furosemide 20 MG tablet           Riley Sal, PA  09/05/22 1143

## 2022-09-06 LAB
QT INTERVAL: 430 MS
QT INTERVAL: 464 MS
QTC INTERVAL: 459 MS
QTC INTERVAL: 467 MS

## 2022-09-07 ENCOUNTER — TELEMEDICINE (OUTPATIENT)
Dept: BEHAVIORAL HEALTH | Facility: CLINIC | Age: 42
End: 2022-09-07

## 2022-09-07 DIAGNOSIS — F15.21 AMPHETAMINE SUBSTANCE USE DISORDER, MODERATE, IN EARLY REMISSION: ICD-10-CM

## 2022-09-07 DIAGNOSIS — F12.10 MARIJUANA ABUSE, CONTINUOUS: ICD-10-CM

## 2022-09-07 DIAGNOSIS — R44.1 VISUAL HALLUCINATIONS: ICD-10-CM

## 2022-09-07 DIAGNOSIS — F39 UNSPECIFIED MOOD (AFFECTIVE) DISORDER: Primary | ICD-10-CM

## 2022-09-07 DIAGNOSIS — G47.00 INSOMNIA, UNSPECIFIED TYPE: ICD-10-CM

## 2022-09-07 PROCEDURE — 99214 OFFICE O/P EST MOD 30 MIN: CPT

## 2022-09-07 RX ORDER — OLANZAPINE 10 MG/1
TABLET ORAL
Qty: 30 TABLET | Refills: 2 | Status: SHIPPED | OUTPATIENT
Start: 2022-09-07 | End: 2023-09-08

## 2022-09-07 NOTE — PROGRESS NOTES
Video Visit      Patient Name: Bernardo Dodd  : 1980   MRN: [id number]     Referring Physician: Nelly Sotelo PA    Chief Complaint:      ICD-10-CM ICD-9-CM   1. Unspecified mood (affective) disorder (HCC)  F39 296.90   2. Visual hallucinations  R44.1 368.16   3. Insomnia, unspecified type  G47.00 780.52   4. Amphetamine substance use disorder, moderate, in early remission (HCC)  F15.21 304.43   5. Marijuana abuse, continuous  F12.10 305.21        This provider is located at [facility], using a secure [email or web address] Video Visit through [employer]. Bernardo Dodd is being seen remotely via telehealth at their home address in [place], and stated they are in a secure environment for this session. The patient's condition being diagnosed/treated is appropriate for telemedicine. I SHANE Toth identified myself as well as my credentials.   The patient, and/or patients guardian, consent to be seen remotely, and when consent is given they understand that the consent allows for patient identifiable information to be sent to a third party as needed.   They may refuse to be seen remotely at any time. The electronic data is encrypted and password protected, and the patient and/or guardian has been advised of the potential risks to privacy not withstanding such measures.     You have chosen to receive care through a telehealth visit.  Do you consent to use a video/audio connection for your medical care today?  No  This visit complies with patient safety concerns in accordance with CDC recommendations.    History of Present Illness: Bernardo Dodd is a 41 y.o. female who I spoke with on video visit today for follow-up related to mood disorder, marijuana abuse, paranoia, hallucinations and insomnia.  Patient reports she has been experiencing paranoia (thinking people are watching her) and visual hallucinations (seeing shadow figures and objects) several days per week,  "most commonly on nights that she does not work.  She reports on these nights she feels increasingly energetic and has racing thoughts.  Is still using marijuana 2-3 times per week, is trying to cut down.  Is sleeping 2 to 3 hours nightly.  Has not used trazodone more than a couple of times due to making her feel \"like a zombie\" (patient reports this is similar to the way she felt on Zoloft).       Subjective   Review of Systems:   Review of Systems   Psychiatric/Behavioral: Positive for hallucinations, sleep disturbance and depressed mood. Negative for self-injury and suicidal ideas. The patient is nervous/anxious.        PHQ-9 Depression Screening Score:       Patient History:   The following portions of the patient's history were reviewed and updated as appropriate: allergies, current medications, past family history, past medical history, past social history, past surgical history and problem list.     Social:  Is working full-time at [employer] and leaves town.  Working 12-hour shifts, 3-4 nights or more per week.    Medications:     Current Outpatient Medications:   •  amLODIPine (NORVASC) 5 MG tablet, Take 1 tablet by mouth Daily., Disp: 90 tablet, Rfl: 0  •  ascorbic acid (VITAMIN C) 100 MG tablet, Take 2 tablets by mouth Daily., Disp: , Rfl:   •  celecoxib (CeleBREX) 200 MG capsule, Take 1 capsule by mouth Daily., Disp: 30 capsule, Rfl: 0  •  cetirizine (zyrTEC) 10 MG tablet, Take 10 mg by mouth Daily., Disp: , Rfl:   •  fluticasone (Flonase) 50 MCG/ACT nasal spray, 2 sprays into the nostril(s) as directed by provider Daily., Disp: 16 g, Rfl: 5  •  furosemide (LASIX) 20 MG tablet, Take 1 tablet by mouth Daily., Disp: 6 tablet, Rfl: 0  •  lamoTRIgine (LaMICtal) 200 MG tablet, Take 1 tablet by mouth Daily., Disp: 30 tablet, Rfl: 1  •  OLANZapine (ZyPREXA) 10 MG tablet, Take 0.5 tablets by mouth Daily for 1 day, THEN 1 tablet Daily., Disp: 30 tablet, Rfl: 2  •  SPIRIVA RESPIMAT 2.5 MCG/ACT aerosol " solution, Take 2 puffs by mouth Daily., Disp: , Rfl:     Objective     Physical Exam:  Vital Signs: There were no vitals filed for this visit.  There is no height or weight on file to calculate BMI.     Mental Status Exam:   Appearance: This is an overweight middle-aged -American female, appears stated age, dressed appropriately to situation and weather, reported patient  Hygiene: Fair  Cooperation: Cooperative  Eye Contact: Within normal limits  Psychomotor Behavior: Increased,  Mood: Down at times, particularly when alone  Affect: Exaggerated  Speech: Normal rate, tone and prosody  Thought Process: Linear, goal directed  Thought Content: Paranoia, ego-dystonic  Suicidal: Denies  Homicidal: Denies  Hallucinations: Visual hallucinations  Delusion: Paranoia  Memory: Intact  Orientation: Times  Reliability: Fair  Insight: Fair  Judgement: Concern for marijuana use  Impulse Control: No current concerns    Assessment / Plan      Visit Diagnosis/Orders Placed This Visit:  Diagnoses and all orders for this visit:    1. Unspecified mood (affective) disorder (HCC) (Primary)  -     OLANZapine (ZyPREXA) 10 MG tablet; Take 0.5 tablets by mouth Daily for 1 day, THEN 1 tablet Daily.  Dispense: 30 tablet; Refill: 2    2. Visual hallucinations    3. Insomnia, unspecified type    4. Amphetamine substance use disorder, moderate, in early remission (HCC)    5. Marijuana abuse, continuous         Differential:   Rule out schizoaffective disorder  Rule out marijuana induced mood disorder  Rule out bipolar disorder    Plan:   1. Start olanzapine 10 mg tablets, 1/2 tablet nightly for 1 night then increase to 1 tablet nightly  2. Continue lamotrigine 200 mg daily  3. Discontinue trazodone     Continue supportive psychotherapy efforts and medications as indicated. Treatment and medication options discussed during today's visit. Patient ackowledged and verbally consented to continue with current treatment plan and was educated on the  importance of compliance with treatment and follow-up appointments. Patient seems reasonably able to adhere to treatment plan.      Medication Considerations:  Discussed medication options and treatment plan of prescribed medication(s) as well as the risks, benefits, and potential side effects. Patient is agreeable to call the office with any worsening of symptoms or onset of side effects. Patient is agreeable to call 911 or go to the nearest ER should he/she begin having SI/HI.    Quality Measures:   Current every day smoker less than 3 minutes spent counseling Has reduced Tobbacos use    I advised Bernardo Dodd of the risks of tobacco use.     Follow Up:   Return in about 2 weeks (around 9/21/2022) for Follow Up.      SHANE Au, PMHNP-BC

## 2022-09-08 ENCOUNTER — OFFICE VISIT (OUTPATIENT)
Dept: CARDIOLOGY | Facility: HOSPITAL | Age: 42
End: 2022-09-08

## 2022-09-08 VITALS
BODY MASS INDEX: 27.46 KG/M2 | RESPIRATION RATE: 19 BRPM | TEMPERATURE: 97 F | SYSTOLIC BLOOD PRESSURE: 129 MMHG | WEIGHT: 155 LBS | HEIGHT: 63 IN | HEART RATE: 86 BPM | OXYGEN SATURATION: 97 % | DIASTOLIC BLOOD PRESSURE: 69 MMHG

## 2022-09-08 DIAGNOSIS — R07.9 CHEST PAIN, UNSPECIFIED TYPE: Primary | ICD-10-CM

## 2022-09-08 DIAGNOSIS — R60.0 BILATERAL LOWER EXTREMITY EDEMA: ICD-10-CM

## 2022-09-08 DIAGNOSIS — R06.09 DOE (DYSPNEA ON EXERTION): ICD-10-CM

## 2022-09-08 DIAGNOSIS — R94.31 ABNORMAL EKG: ICD-10-CM

## 2022-09-08 PROCEDURE — 99214 OFFICE O/P EST MOD 30 MIN: CPT | Performed by: NURSE PRACTITIONER

## 2022-09-08 NOTE — PROGRESS NOTES
"[facility]  [facility]    Chief Complaint  Establish Care, Shortness of Breath, Edema, Dizziness, and Chest Pain    Subjective    History of Present Illness {CC  Problem List  Visit  Diagnosis   Encounters  Notes  Medications  Labs  Result Review Imaging  Media :23}     Bernardo Dodd is a 41 y.o. female with hypertension, seizure disorder, asthma, GERD who presents today for evaluation of chest pain at the request of Riley Sal PA-C.  Patient presented to the ED on 9/5 with a 4-day history of right upper and midsternal pain , fatigue, bilateral leg swelling (left greater than right) and mild nausea.  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  Venous Doppler negative for DVT.  She was discharged on a low-dose of Lasix for a few days.  Troponins negative.  EKG showed some T wave inversion in anterior leads (stable from prior EKGs). She reports some strain on her neck and currently is under the care of PT and pain medicine. Has generalized discomfort of her right neck, shoulder.She does get some numbness down her left arm. Reports that she continues to have intermittent chest pain that is worse when she walks but also at rest    Normal MPI in 2018. Reports chest discomfort has been intermittent since then      Smokes occasional marijuana and cigars     Cardiac risks: HTN    Objective     Vital Signs:   Vitals:    09/08/22 1533 09/08/22 1534 09/08/22 1535   BP: 125/76 132/94 129/69   BP Location: Right arm Left arm Left arm   Patient Position: Sitting Standing Sitting   Cuff Size: Adult Adult Adult   Pulse: 81 81 86   Resp: 16 16 19   Temp: 97 °F (36.1 °C) 97 °F (36.1 °C) 97 °F (36.1 °C)   TempSrc: Temporal Temporal Temporal   SpO2: 97% 97% 97%   Weight:   70.3 kg (155 lb)   Height:   160 cm (62.99\")     Body mass index is 27.47 kg/m².  Physical Exam  Vitals reviewed.   Constitutional:       Appearance: Normal appearance. "   HENT:      Head: Normocephalic.   Neck:      Vascular: No carotid bruit.   Cardiovascular:      Rate and Rhythm: Normal rate and regular rhythm.      Pulses: Normal pulses.      Heart sounds: Normal heart sounds, S1 normal and S2 normal. No murmur heard.  Pulmonary:      Effort: Pulmonary effort is normal. No respiratory distress.      Breath sounds: Normal breath sounds.   Chest:      Chest wall: Tenderness present.   Abdominal:      General: Abdomen is flat.      Palpations: Abdomen is soft.   Musculoskeletal:      Cervical back: Neck supple.      Right lower leg: No edema.      Left lower leg: No edema.   Skin:     General: Skin is warm and dry.   Neurological:      General: No focal deficit present.      Mental Status: She is alert and oriented to person, place, and time. Mental status is at baseline.   Psychiatric:         Mood and Affect: Mood normal.         Behavior: Behavior normal.         Thought Content: Thought content normal.              Result Review  Data Reviewed:{ Labs  Result Review  Imaging  Med Tab  Media :23}   Troponin (09/05/2022 10:38)  Urinalysis, Microscopic Only - Urine, Clean Catch (09/05/2022 08:43)  Urinalysis With Microscopic If Indicated (No Culture) - Urine, Clean Catch (09/05/2022 08:43)  BNP (09/05/2022 08:29)  Lipase (09/05/2022 08:29)  Comprehensive Metabolic Panel (09/05/2022 08:29)  CBC & Differential (09/05/2022 08:29)  Troponin (09/05/2022 08:29)  Duplex Venous Lower Extremity - Left CAR (09/05/2022 10:35)  XR Chest 1 View (09/05/2022 08:36)  ECG 12 Lead (08/13/2018 22:14)  Duplex Venous Lower Extremity - Left CAR (09/05/2022 10:35)  ECG 12 Lead (09/05/2022 10:31)  Stress Test With Myocardial Perfusion (1 Day) (07/03/2018 14:06)  Lipid Panel (10/22/2021 14:12)    Consultant notes Riley Sal PA-C            Assessment and Plan {CC Problem List  Visit Diagnosis  ROS  Review (Popup)  Health Maintenance  Quality  BestPractice  Medications  SmartSets   SnapShot Encounters  Media :23}   1. Chest pain, unspecified type  She has some palpable tenderness to her chest wall and symptoms have been going on intermittently for years.  However, due to exertional dyspnea and worsening symptoms with physical activity will proceed with stress echo  - Adult Stress Echo W/ Cont or Stress Agent if Necessary Per Protocol; Future    2. MOTT (dyspnea on exertion)  Possible anginal equivalent  - Adult Stress Echo W/ Cont or Stress Agent if Necessary Per Protocol; Future    3. Bilateral lower extremity edema  No significant edema on today's exam.  Likely chronic venous insufficiency.  Encouraged moderate strength compression stockings.  We will also check echo for any structural heart abnormalities  - Adult Stress Echo W/ Cont or Stress Agent if Necessary Per Protocol; Future    4. Abnormal EKG  Nonspecific. Stable from prior EKGs  - Adult Stress Echo W/ Cont or Stress Agent if Necessary Per Protocol; Future          Follow Up {Instructions Charge Capture  Follow-up Communications :23}   Return if symptoms worsen or fail to improve.    Patient was given instructions and counseling regarding her condition or for health maintenance advice. Please see specific information pulled into the AVS if appropriate.  Advised to call the [facility] Center with any questions, concerns, or worsening symptoms.

## 2022-09-13 ENCOUNTER — PATIENT OUTREACH (OUTPATIENT)
Dept: CASE MANAGEMENT | Facility: OTHER | Age: 42
End: 2022-09-13

## 2022-09-13 ENCOUNTER — HOSPITAL ENCOUNTER (OUTPATIENT)
Dept: CARDIOLOGY | Facility: HOSPITAL | Age: 42
Discharge: HOME | End: 2022-09-13
Admitting: NURSE PRACTITIONER

## 2022-09-13 VITALS
HEART RATE: 83 BPM | SYSTOLIC BLOOD PRESSURE: 120 MMHG | HEIGHT: 63 IN | WEIGHT: 154.98 LBS | BODY MASS INDEX: 27.46 KG/M2 | DIASTOLIC BLOOD PRESSURE: 88 MMHG

## 2022-09-13 DIAGNOSIS — R07.9 CHEST PAIN, UNSPECIFIED TYPE: ICD-10-CM

## 2022-09-13 DIAGNOSIS — R94.31 ABNORMAL EKG: ICD-10-CM

## 2022-09-13 DIAGNOSIS — R06.09 DOE (DYSPNEA ON EXERTION): ICD-10-CM

## 2022-09-13 DIAGNOSIS — R60.0 BILATERAL LOWER EXTREMITY EDEMA: ICD-10-CM

## 2022-09-13 PROCEDURE — 93325 DOPPLER ECHO COLOR FLOW MAPG: CPT | Performed by: INTERNAL MEDICINE

## 2022-09-13 PROCEDURE — 93018 CV STRESS TEST I&R ONLY: CPT | Performed by: INTERNAL MEDICINE

## 2022-09-13 PROCEDURE — 93320 DOPPLER ECHO COMPLETE: CPT | Performed by: INTERNAL MEDICINE

## 2022-09-13 PROCEDURE — 93320 DOPPLER ECHO COMPLETE: CPT

## 2022-09-13 PROCEDURE — 93017 CV STRESS TEST TRACING ONLY: CPT

## 2022-09-13 PROCEDURE — 93352 ADMIN ECG CONTRAST AGENT: CPT | Performed by: INTERNAL MEDICINE

## 2022-09-13 PROCEDURE — 93350 STRESS TTE ONLY: CPT

## 2022-09-13 PROCEDURE — 93350 STRESS TTE ONLY: CPT | Performed by: INTERNAL MEDICINE

## 2022-09-13 PROCEDURE — 93325 DOPPLER ECHO COLOR FLOW MAPG: CPT

## 2022-09-13 PROCEDURE — 25010000002 SULFUR HEXAFLUORIDE MICROSPH 60.7-25 MG RECONSTITUTED SUSPENSION: Performed by: NURSE PRACTITIONER

## 2022-09-13 RX ADMIN — SULFUR HEXAFLUORIDE 5 ML: KIT at 14:40

## 2022-09-13 NOTE — OUTREACH NOTE
Patient Outreach  Social Work Assessment  Questions/Answers    Flowsheet Row Most Recent Value   Referral Source nursing   Reason for Consult community resources   Preferred Language English   Advance Care Planning Reviewed no concerns identified   People in Home alone   Current Living Arrangements apartment   Primary Care Provided by self   Provides Primary Care For no one   Employment Status employed full-time   Source of Income salary/wages   Medications independent   Meal Preparation independent   Housekeeping independent   Laundry independent   Shopping independent      SDOH updated and reviewed with the patient during this program:  Financial Resource Strain: High Risk   • Difficulty of Paying Living Expenses: Hard      Food Insecurity: Food Insecurity Present   • Worried About Running Out of Food in the Last Year: Sometimes true   • Ran Out of Food in the Last Year: Sometimes true      Transportation Needs: No Transportation Needs   • Lack of Transportation (Medical): No   • Lack of Transportation (Non-Medical): No      Housing Stability: High Risk   • Unable to Pay for Housing in the Last Year: Yes   • Number of Places Lived in the Last Year: 1   • Unstable Housing in the Last Year: No      Patient Outreach    SW contacted pt to discuss community resources. Pt reports finances are tight, but she is doing okay. She is using public transport and denies food insecurity. She applied for food stamps months ago but reports that she has not heard anything back. SW offered to provide phone number to check in on her application, but pt states she can find it. Denies any other resources needed at this time. SW will close referral.     JUMANA AREVALO -   Ambulatory Case Management    9/13/2022, 11:26 EDT

## 2022-09-14 LAB
ASCENDING AORTA: 2.8 CM
BH CV ECHO MEAS - AO MAX PG: 6.9 MMHG
BH CV ECHO MEAS - AO MEAN PG: 3.7 MMHG
BH CV ECHO MEAS - AO ROOT DIAM: 2.8 CM
BH CV ECHO MEAS - AO V2 MAX: 131.7 CM/SEC
BH CV ECHO MEAS - AO V2 VTI: 28.6 CM
BH CV ECHO MEAS - AVA(I,D): 2.7 CM2
BH CV ECHO MEAS - EDV(CUBED): 78.8 ML
BH CV ECHO MEAS - EDV(MOD-SP2): 95.3 ML
BH CV ECHO MEAS - EDV(MOD-SP4): 80.3 ML
BH CV ECHO MEAS - EF(MOD-BP): 73.8 %
BH CV ECHO MEAS - EF(MOD-SP2): 76.4 %
BH CV ECHO MEAS - EF(MOD-SP4): 69.5 %
BH CV ECHO MEAS - ESV(CUBED): 13.6 ML
BH CV ECHO MEAS - ESV(MOD-SP2): 22.5 ML
BH CV ECHO MEAS - ESV(MOD-SP4): 24.5 ML
BH CV ECHO MEAS - FS: 44.3 %
BH CV ECHO MEAS - IVS/LVPW: 1.06 CM
BH CV ECHO MEAS - IVSD: 1.13 CM
BH CV ECHO MEAS - LA DIMENSION: 3.3 CM
BH CV ECHO MEAS - LAT PEAK E' VEL: 7.5 CM/SEC
BH CV ECHO MEAS - LV DIASTOLIC VOL/BSA (35-75): 46.9 CM2
BH CV ECHO MEAS - LV MASS(C)D: 161.2 GRAMS
BH CV ECHO MEAS - LV MAX PG: 5.5 MMHG
BH CV ECHO MEAS - LV MEAN PG: 2.6 MMHG
BH CV ECHO MEAS - LV SYSTOLIC VOL/BSA (12-30): 14.3 CM2
BH CV ECHO MEAS - LV V1 MAX: 116.9 CM/SEC
BH CV ECHO MEAS - LV V1 VTI: 24.9 CM
BH CV ECHO MEAS - LVIDD: 4.3 CM
BH CV ECHO MEAS - LVIDS: 2.39 CM
BH CV ECHO MEAS - LVOT AREA: 3.1 CM2
BH CV ECHO MEAS - LVOT DIAM: 1.97 CM
BH CV ECHO MEAS - LVPWD: 1.06 CM
BH CV ECHO MEAS - MED PEAK E' VEL: 7.9 CM/SEC
BH CV ECHO MEAS - MV A MAX VEL: 93.8 CM/SEC
BH CV ECHO MEAS - MV DEC SLOPE: 324.9 CM/SEC2
BH CV ECHO MEAS - MV DEC TIME: 0.25 MSEC
BH CV ECHO MEAS - MV E MAX VEL: 98.1 CM/SEC
BH CV ECHO MEAS - MV E/A: 1.05
BH CV ECHO MEAS - MV MAX PG: 4.4 MMHG
BH CV ECHO MEAS - MV MEAN PG: 2.14 MMHG
BH CV ECHO MEAS - MV P1/2T: 97.9 MSEC
BH CV ECHO MEAS - MV V2 VTI: 33.2 CM
BH CV ECHO MEAS - MVA(P1/2T): 2.25 CM2
BH CV ECHO MEAS - MVA(VTI): 2.29 CM2
BH CV ECHO MEAS - PA ACC TIME: 0.16 SEC
BH CV ECHO MEAS - PA PR(ACCEL): 6.1 MMHG
BH CV ECHO MEAS - PA V2 MAX: 88.7 CM/SEC
BH CV ECHO MEAS - RAP SYSTOLE: 3 MMHG
BH CV ECHO MEAS - RVSP: 20.9 MMHG
BH CV ECHO MEAS - SI(MOD-SP2): 42.6 ML/M2
BH CV ECHO MEAS - SI(MOD-SP4): 32.6 ML/M2
BH CV ECHO MEAS - SV(LVOT): 76 ML
BH CV ECHO MEAS - SV(MOD-SP2): 72.8 ML
BH CV ECHO MEAS - SV(MOD-SP4): 55.8 ML
BH CV ECHO MEAS - TAPSE (>1.6): 2.6 CM
BH CV ECHO MEAS - TR MAX PG: 17.9 MMHG
BH CV ECHO MEAS - TR MAX VEL: 211.7 CM/SEC
BH CV ECHO MEASUREMENTS AVERAGE E/E' RATIO: 12.74
BH CV STRESS BP STAGE 1: NORMAL
BH CV STRESS BP STAGE 2: NORMAL
BH CV STRESS DURATION MIN STAGE 1: 3
BH CV STRESS DURATION MIN STAGE 2: 3
BH CV STRESS DURATION MIN STAGE 3: 1
BH CV STRESS DURATION SEC STAGE 1: 0
BH CV STRESS DURATION SEC STAGE 2: 0
BH CV STRESS DURATION SEC STAGE 3: 50
BH CV STRESS ECHO POST STRESS EJECTION FRACTION EF: 75 %
BH CV STRESS GRADE STAGE 1: 10
BH CV STRESS GRADE STAGE 2: 12
BH CV STRESS GRADE STAGE 3: 14
BH CV STRESS HR STAGE 1: 106
BH CV STRESS HR STAGE 2: 121
BH CV STRESS HR STAGE 3: 151
BH CV STRESS METS STAGE 1: 5
BH CV STRESS METS STAGE 2: 7.5
BH CV STRESS METS STAGE 3: 10
BH CV STRESS O2 STAGE 1: 97
BH CV STRESS O2 STAGE 2: 97
BH CV STRESS O2 STAGE 3: 96
BH CV STRESS PROTOCOL 1: NORMAL
BH CV STRESS RECOVERY BP: NORMAL MMHG
BH CV STRESS RECOVERY HR: 82 BPM
BH CV STRESS RECOVERY O2: 98 %
BH CV STRESS SPEED STAGE 1: 1.7
BH CV STRESS SPEED STAGE 2: 2.5
BH CV STRESS SPEED STAGE 3: 3.4
BH CV STRESS STAGE 1: 1
BH CV STRESS STAGE 2: 2
BH CV STRESS STAGE 3: 3
BH CV VAS BP LEFT ARM: NORMAL MMHG
BH CV XLRA - RV BASE: 3.3 CM
BH CV XLRA - RV LENGTH: 5.7 CM
BH CV XLRA - RV MID: 3 CM
BH CV XLRA - TDI S': 11.4 CM/SEC
IVRT: 90 MSEC
LEFT ATRIUM VOLUME INDEX: 23.3 ML/M2
LV EF 2D ECHO EST: 65 %
MAXIMAL PREDICTED HEART RATE: 177 BPM
PERCENT MAX PREDICTED HR: 85.31 %
STRESS BASELINE BP: NORMAL MMHG
STRESS BASELINE HR: 72 BPM
STRESS O2 SAT REST: 97 %
STRESS PERCENT HR: 100 %
STRESS POST ESTIMATED WORKLOAD: 9.8 METS
STRESS POST EXERCISE DUR MIN: 7 MIN
STRESS POST EXERCISE DUR SEC: 50 SEC
STRESS POST O2 SAT PEAK: 96 %
STRESS POST PEAK BP: NORMAL MMHG
STRESS POST PEAK HR: 151 BPM
STRESS TARGET HR: 150 BPM

## 2022-09-14 NOTE — PROGRESS NOTES
Your stress test did not show any evidence of significant heart blockage.Your heart echo was normal and shows that your heart squeezes normally with no significant valve abnormalities.  Please let me know if you have any further questions or concerns.  Would recommend that she follow-up with your PCP for evaluation of noncardiac causes for chest pain.  However, if chest pain persists without an identifiable cause please let me know

## 2022-09-21 ENCOUNTER — TELEMEDICINE (OUTPATIENT)
Dept: BEHAVIORAL HEALTH | Facility: CLINIC | Age: 42
End: 2022-09-21

## 2022-09-21 DIAGNOSIS — F12.90 MARIJUANA USE: ICD-10-CM

## 2022-09-21 DIAGNOSIS — F39 UNSPECIFIED MOOD (AFFECTIVE) DISORDER: Primary | ICD-10-CM

## 2022-09-21 PROCEDURE — 90832 PSYTX W PT 30 MINUTES: CPT | Performed by: COUNSELOR

## 2022-09-21 NOTE — PROGRESS NOTES
[facility]               Telehealth (Video) Visit     Telehealth  (Video) Visit      Date: 2022   Patient Name: Bernardo Dodd  : 1980   MRN: [id number]   Time In: 3:46pm      Time Out: 4:21pm      Referring Physician: Nelly Sotelo PA    Chief Complaint:      ICD-10-CM ICD-9-CM   1. Unspecified mood (affective) disorder (HCC)  F39 296.90   2. Marijuana use  F12.90 305.20        This provider is located at [address] [place] [address]. [facility]. This visit is being done on behalf of [facility] using a Zoom platform for a video visit in a secure and private environment. The Patient is seen remotely at their home address in [place], using a private computer, phone or tablet.  The patient is able to be seen through a MyChart Video Visit through [employer] at today's encounter. Patient is being evaluated/treated via telehealth by Zoom, and stated they are in a secure environment for this session. The patient's condition being diagnosed/treated is appropriate for telemedicine. The provider identified herself as well as her credentials.   The patient, and/or patient's guardian, consent to being seen remotely, and when consent is given they understand that the consent allows for patient identifiable information to be sent to a third party as needed.   They may refuse to be seen remotely at any time. The electronic data is encrypted and password protected, and the patient and/or guardian has been advised of the potential risks to privacy not withstanding such measures.    You have chosen to receive care through a video visit today. Do you consent to use a video visit for your medical care today? yes  This visit has been scheduled as a video visit to comply with patient safety concerns in accordance with CDC recommendations.    History of Present Illness: Bernardo Dodd is a 42 y.o. female who I saw today thru a video  visit for mood disorder symptoms.        Subjective      Review of Systems:   The following portions of the patient's history were reviewed and updated as appropriate: allergies, current medications, past family history, past medical history, past social history, past surgical history and problem list.     Interval History:   Improved    Medications:     Current Outpatient Medications:   •  amLODIPine (NORVASC) 5 MG tablet, Take 1 tablet by mouth Daily., Disp: 90 tablet, Rfl: 0  •  ascorbic acid (VITAMIN C) 100 MG tablet, Take 2 tablets by mouth Daily., Disp: , Rfl:   •  celecoxib (CeleBREX) 200 MG capsule, Take 1 capsule by mouth Daily., Disp: 30 capsule, Rfl: 0  •  cetirizine (zyrTEC) 10 MG tablet, Take 10 mg by mouth Daily., Disp: , Rfl:   •  fluticasone (Flonase) 50 MCG/ACT nasal spray, 2 sprays into the nostril(s) as directed by provider Daily., Disp: 16 g, Rfl: 5  •  furosemide (LASIX) 20 MG tablet, Take 1 tablet by mouth Daily., Disp: 6 tablet, Rfl: 0  •  lamoTRIgine (LaMICtal) 200 MG tablet, Take 1 tablet by mouth Daily., Disp: 30 tablet, Rfl: 1  •  OLANZapine (ZyPREXA) 10 MG tablet, Take 0.5 tablets by mouth Daily for 1 day, THEN 1 tablet Daily., Disp: 30 tablet, Rfl: 2  •  SPIRIVA RESPIMAT 2.5 MCG/ACT aerosol solution, Take 2 puffs by mouth Daily., Disp: , Rfl:     Objective     Physical Exam:  Vital Signs:   Due to extenuating circumstances and possible current health risks associated with the patient being present in a clinical setting (with current health restrictions in place in regards to possible COVID 19 transmission/exposure), the patient was seen remotely today via a video visit. Unable to obtain vital signs due to nature of remote visit.  Height stated at 63 inches.  Weight stated at 154 pounds.     Mental Status Exam:   Hygiene:   good  Cooperation:  Cooperative  Eye Contact:  Good  Psychomotor Behavior:  Appropriate  Affect:  Full range  Mood: normal  Speech:  Normal  Thought Process:  Goal  "directed  Thought Content:  Mood congruent  Suicidal:  None  Homicidal:  None  Hallucinations:  None  Delusion:  None  Memory:  Intact  Orientation:  Person, Place, Time and Situation  Reliability:  good  Insight:  Good  Judgement:  Good  Impulse Control:  Good  Physical/Medical Issues:  No      Quality Measures:     TOBACCO USE:  Light Smoker less than 3 minutes spent counseling .    I advised Kylahalita of the risks of tobacco use.     Assessment / Plan      Assessment/Plan:   Diagnoses and all orders for this visit:    1. Unspecified mood (affective) disorder (HCC) (Primary)    2. Marijuana use    Patient presents via telehealth for follow-up.  Patient reports she is no longer having visual hallucinations.  Patient reports that she is ordering a new security system so that it can alert her if anyone breaks into the home.  Patient reports that her job is going well.  Patient reports that she did consume alcohol on her birthday, but reports no consumption or cravings since.  Patient also reports continued marijuana use.  Patient reports that she did not take medication from [facility] because she wants to do things \"naturally.\"  Patient and I discussed aspects related to her son and other family members.  I provided support and empathy.    TREATMENT PLAN/GOALS: Continue supportive psychotherapy efforts and medications as indicated. Treatment and medication options discussed during today's visit. Patient ackowledged and verbally consented to continue with current treatment plan and was educated on the importance of compliance with treatment and follow-up appointments. Patient seems reasonably able to adhere to treatment plan.      Allowed Patient to freely discuss issues  without interruption or judgement with unconditional positive regard, active listening skills, and empathy. Therapist provided a safe, confidential environment to facilitate the development of a positive therapeutic relationship and encouraged open, honest " communication. Assisted Patient in identifying risk factors which would indicate the need for higher level of care including thoughts to harm self or others and/or self-harming behavior and encouraged Patient to contact this office, call 911, or present to the nearest emergency room should any of these events occur. Discussed crisis intervention services and means to access. Patient adamantly and convincingly denies current suicidal or homicidal ideation or perceptual disturbance. Assisted Patient in processing session content; acknowledged and normalized Patient’s thoughts, feelings, and concerns by utilizing a person-centered approach in efforts to build appropriate rapport and a positive therapeutic relationship with open and honest communication.     Follow Up:   No follow-ups on file.    Blank Garcia LCSW

## 2022-09-23 ENCOUNTER — OFFICE VISIT (OUTPATIENT)
Dept: INTERNAL MEDICINE | Facility: CLINIC | Age: 42
End: 2022-09-23

## 2022-09-23 VITALS
DIASTOLIC BLOOD PRESSURE: 84 MMHG | SYSTOLIC BLOOD PRESSURE: 124 MMHG | WEIGHT: 155 LBS | OXYGEN SATURATION: 98 % | HEIGHT: 63 IN | BODY MASS INDEX: 27.46 KG/M2

## 2022-09-23 DIAGNOSIS — J30.9 ALLERGIC RHINITIS, UNSPECIFIED SEASONALITY, UNSPECIFIED TRIGGER: ICD-10-CM

## 2022-09-23 DIAGNOSIS — J02.9 SORE THROAT: Primary | ICD-10-CM

## 2022-09-23 LAB
EXPIRATION DATE: NORMAL
EXPIRATION DATE: NORMAL
FLUAV AG UPPER RESP QL IA.RAPID: NOT DETECTED
FLUBV AG UPPER RESP QL IA.RAPID: NOT DETECTED
INTERNAL CONTROL: NORMAL
INTERNAL CONTROL: NORMAL
Lab: NORMAL
Lab: NORMAL
S PYO AG THROAT QL: NEGATIVE
SARS-COV-2 AG UPPER RESP QL IA.RAPID: NOT DETECTED

## 2022-09-23 PROCEDURE — 87880 STREP A ASSAY W/OPTIC: CPT | Performed by: PHYSICIAN ASSISTANT

## 2022-09-23 PROCEDURE — U0004 COV-19 TEST NON-CDC HGH THRU: HCPCS | Performed by: PHYSICIAN ASSISTANT

## 2022-09-23 PROCEDURE — 99213 OFFICE O/P EST LOW 20 MIN: CPT | Performed by: PHYSICIAN ASSISTANT

## 2022-09-23 PROCEDURE — 87428 SARSCOV & INF VIR A&B AG IA: CPT | Performed by: PHYSICIAN ASSISTANT

## 2022-09-23 RX ORDER — ALBUTEROL SULFATE 90 UG/1
2 AEROSOL, METERED RESPIRATORY (INHALATION) EVERY 4 HOURS PRN
Qty: 18 G | Refills: 2 | Status: SHIPPED | OUTPATIENT
Start: 2022-09-23

## 2022-09-23 NOTE — PROGRESS NOTES
"Chief Complaint   Patient presents with   • Sore Throat   • Cough   • Nasal Congestion       Subjective     Tyralita O Yousif is a 42 y.o. female.        History of Present Illness     Tyralita \"Gricel\" Yousif is a 42-year-old female who presents today for an acute visit.     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 One resident, she knows that tested positive for COVID-19, was approximately 2 weeks ago. Her child who has been staying at her parent's house, has also tested positive for COVID-19. She has not come in contact directly with anyone recently that has tested positive. She has symptoms of itchy eyes, her left ear feels like it has fluid inside, and the drainage she is experiencing from her left ear is causing her throat to hurt as well as having a cough. She is concerned the cough will develop into something that will get into her chest for an infection. She is currently taking Zyrtec daily as well as nasal spray when needed for her allergies. She bought a ear vacuum to see if that would help get the fluid off her ear; however, she now feels like it is itchy and feels like \"something is moving around in her ear.\" Saline solution has not been used to help keep her nostrils moisturized, as she states her nose is \"burning.\"     She has experienced edema in her bilateral legs and ankles while working a 12 hour shift. The edema does not cause pain and she currently does not wear compression socks; however, she does wear her knee brace.     A stress test was performed on 09/13/22 and showed normal results for her heart; however, she states she did have a lot of wheezing while on the treadmill for the procedure. She does not have a rescue inhaler, Spiriva was last prescribed in 2017 by her allergy specialist and has not used it in awhile.         Current Outpatient Medications:   •  albuterol sulfate  (90 Base) MCG/ACT inhaler, " "Inhale 2 puffs Every 4 (Four) Hours As Needed for Wheezing or Shortness of Air., Disp: 18 g, Rfl: 2  •  amLODIPine (NORVASC) 5 MG tablet, Take 1 tablet by mouth Daily., Disp: 90 tablet, Rfl: 0  •  celecoxib (CeleBREX) 200 MG capsule, Take 1 capsule by mouth Daily., Disp: 30 capsule, Rfl: 0  •  cetirizine (zyrTEC) 10 MG tablet, Take 10 mg by mouth Daily., Disp: , Rfl:   •  fluticasone (Flonase) 50 MCG/ACT nasal spray, 2 sprays into the nostril(s) as directed by provider Daily., Disp: 16 g, Rfl: 5  •  furosemide (LASIX) 20 MG tablet, Take 1 tablet by mouth Daily., Disp: 6 tablet, Rfl: 0  •  lamoTRIgine (LaMICtal) 200 MG tablet, Take 1 tablet by mouth Daily., Disp: 30 tablet, Rfl: 1  •  OLANZapine (ZyPREXA) 10 MG tablet, Take 0.5 tablets by mouth Daily for 1 day, THEN 1 tablet Daily., Disp: 30 tablet, Rfl: 2     PMFSH  The following portions of the patient's history were reviewed and updated as appropriate: allergies, current medications, past family history, past medical history, past social history, past surgical history and problem list.    Review of Systems   Constitutional: Positive for fatigue. Negative for activity change and unexpected weight change.   HENT: Positive for congestion, postnasal drip and sore throat. Negative for ear pain.    Eyes: Negative for pain and discharge.   Respiratory: Positive for cough. Negative for chest tightness, shortness of breath and wheezing.    Cardiovascular: Negative for chest pain and palpitations.   Gastrointestinal: Negative for abdominal pain, diarrhea and vomiting.   Endocrine: Negative.    Genitourinary: Negative.    Musculoskeletal: Negative for joint swelling.   Skin: Negative for color change, rash and wound.   Allergic/Immunologic: Negative.    Neurological: Negative for seizures and syncope.   Psychiatric/Behavioral: Negative.        Objective   /84   Ht 160 cm (63\")   Wt 70.3 kg (155 lb)   LMP 09/05/2022 (Exact Date)   SpO2 98%   BMI 27.46 kg/m² "     Physical Exam  Vitals and nursing note reviewed.   Constitutional:       General: She is not in acute distress.     Appearance: She is well-developed. She is not toxic-appearing or diaphoretic.   HENT:      Head: Normocephalic and atraumatic. Hair is normal.      Right Ear: External ear normal. No drainage, swelling or tenderness. Tympanic membrane is retracted.      Left Ear: External ear normal. No drainage, swelling or tenderness. Tympanic membrane is retracted.      Nose: Mucosal edema present.      Mouth/Throat:      Mouth: No oral lesions.      Pharynx: Uvula midline. Posterior oropharyngeal erythema present. No oropharyngeal exudate or uvula swelling.   Eyes:      General: No scleral icterus.        Right eye: No discharge.         Left eye: No discharge.      Conjunctiva/sclera: Conjunctivae normal.      Pupils: Pupils are equal, round, and reactive to light.   Cardiovascular:      Rate and Rhythm: Normal rate and regular rhythm.      Heart sounds: Normal heart sounds. No murmur heard.    No gallop.   Pulmonary:      Effort: No respiratory distress.      Breath sounds: Normal breath sounds. No stridor. No wheezing or rales.   Chest:      Chest wall: No tenderness.   Abdominal:      Palpations: Abdomen is soft.      Tenderness: There is no abdominal tenderness.   Musculoskeletal:      Cervical back: Normal range of motion and neck supple.   Lymphadenopathy:      Cervical: Cervical adenopathy present.   Skin:     General: Skin is warm and dry.      Findings: No rash.   Neurological:      Mental Status: She is alert and oriented to person, place, and time.      Motor: No abnormal muscle tone.   Psychiatric:         Behavior: Behavior normal.         Thought Content: Thought content normal.         Judgment: Judgment normal.         Results for orders placed or performed in visit on 09/23/22   POCT SARS-CoV-2 Antigen PRADEEP + Flu    Specimen: Swab   Result Value Ref Range    SARS Antigen Not Detected Not  Detected, Presumptive Negative    Influenza A Antigen PRADEEP Not Detected Not Detected    Influenza B Antigen PRADEEP Not Detected Not Detected    Internal Control Passed Passed    Lot Number [id number]     Expiration Date 01/02/23    POCT rapid strep A    Specimen: Swab   Result Value Ref Range    Rapid Strep A Screen Negative Negative, VALID, INVALID, Not Performed    Internal Control Passed Passed    Lot Number [id number]     Expiration Date 12/15/24         ASSESSMENT/PLAN    Diagnoses and all orders for this visit:    1. Sore throat (Primary)  Comments:  If PCR test is negative, patient can return back to work.   Orders:  -     POCT SARS-CoV-2 Antigen PRADEEP + Flu  -     POCT rapid strep A  -     COVID-19 PCR, [facility], NP SWAB IN [facility]AL TRANSPORT MEDIA/ORAL SWISH 24-30 HR TAT - Swab, Nasopharynx; Future  -     COVID-19 PCR, [facility], NP SWAB IN [facility] V[facility] TRANSPORT MEDIA/ORAL SWISH 24-30 HR TAT - Swab, Nasopharynx    2. Allergic rhinitis, unspecified seasonality, unspecified trigger  Comments:  Zyrtec to be taken daily with nasal spray.   Normal saline used for moisture in nostrils.  Over-the-counter medications suggested to help relieve symptoms.  Orders:  -     albuterol sulfate  (90 Base) MCG/ACT inhaler; Inhale 2 puffs Every 4 (Four) Hours As Needed for Wheezing or Shortness of Air.  Dispense: 18 g; Refill: 2             Return if symptoms worsen or fail to improve, for Next scheduled follow up.  Answers for HPI/ROS submitted by the patient on 9/22/2022  Please describe your symptoms.: Itchy swollen painful eyes. Running nose. Fliud in ears.  Have you had these symptoms before?: Yes  How long have you been having these symptoms?: 1-4 days  Please list any medications you are currently taking for this condition.: Allergy meds  Please describe any probable cause for these symptoms. 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  09/23/22   14:49 EDT    Patient verbalized consent to the visit recording.  I have personally performed the services described in this document as transcribed by the above individual, and it is both accurate and complete.  SUHA Leyva  9/25/2022  21:33 EDT

## 2022-09-24 LAB — SARS-COV-2 RNA NOSE QL NAA+PROBE: NOT DETECTED

## 2022-09-26 DIAGNOSIS — M25.562 ACUTE PAIN OF LEFT KNEE: ICD-10-CM

## 2022-09-26 DIAGNOSIS — F39 UNSPECIFIED MOOD (AFFECTIVE) DISORDER: ICD-10-CM

## 2022-09-26 DIAGNOSIS — G40.909 SEIZURE DISORDER: ICD-10-CM

## 2022-09-26 DIAGNOSIS — I10 PRIMARY HYPERTENSION: ICD-10-CM

## 2022-09-26 DIAGNOSIS — J30.9 CHRONIC ALLERGIC RHINITIS: ICD-10-CM

## 2022-09-26 RX ORDER — AMLODIPINE BESYLATE 5 MG/1
5 TABLET ORAL DAILY
Qty: 90 TABLET | Refills: 0 | OUTPATIENT
Start: 2022-09-26

## 2022-09-26 RX ORDER — FLUTICASONE PROPIONATE 50 MCG
2 SPRAY, SUSPENSION (ML) NASAL DAILY
Qty: 16 G | Refills: 5 | OUTPATIENT
Start: 2022-09-26

## 2022-09-26 RX ORDER — OLANZAPINE 10 MG/1
TABLET ORAL
Qty: 30 TABLET | Refills: 2 | OUTPATIENT
Start: 2022-09-26 | End: 2023-09-27

## 2022-09-26 RX ORDER — LAMOTRIGINE 200 MG/1
200 TABLET ORAL DAILY
Qty: 30 TABLET | Refills: 1 | OUTPATIENT
Start: 2022-09-26

## 2022-09-26 RX ORDER — CELECOXIB 200 MG/1
200 CAPSULE ORAL DAILY
Qty: 30 CAPSULE | Refills: 0 | Status: SHIPPED | OUTPATIENT
Start: 2022-09-26

## 2022-09-28 ENCOUNTER — HOSPITAL ENCOUNTER (EMERGENCY)
Facility: HOSPITAL | Age: 42
Discharge: HOME | End: 2022-09-28
Attending: EMERGENCY MEDICINE | Admitting: EMERGENCY MEDICINE

## 2022-09-28 ENCOUNTER — APPOINTMENT (OUTPATIENT)
Dept: GENERAL RADIOLOGY | Facility: HOSPITAL | Age: 42
End: 2022-09-28

## 2022-09-28 VITALS
WEIGHT: 170 LBS | OXYGEN SATURATION: 98 % | TEMPERATURE: 97.9 F | SYSTOLIC BLOOD PRESSURE: 126 MMHG | BODY MASS INDEX: 34.27 KG/M2 | HEIGHT: 59 IN | HEART RATE: 95 BPM | DIASTOLIC BLOOD PRESSURE: 101 MMHG | RESPIRATION RATE: 18 BRPM

## 2022-09-28 DIAGNOSIS — S93.492A SPRAIN OF ANTERIOR TALOFIBULAR LIGAMENT OF LEFT ANKLE, INITIAL ENCOUNTER: Primary | ICD-10-CM

## 2022-09-28 DIAGNOSIS — M25.572 ARTHRALGIA OF LEFT ANKLE: ICD-10-CM

## 2022-09-28 PROCEDURE — 73610 X-RAY EXAM OF ANKLE: CPT

## 2022-09-28 PROCEDURE — 99283 EMERGENCY DEPT VISIT LOW MDM: CPT

## 2022-09-28 RX ORDER — HYDROCODONE BITARTRATE AND ACETAMINOPHEN 5; 325 MG/1; MG/1
1 TABLET ORAL ONCE
Status: COMPLETED | OUTPATIENT
Start: 2022-09-28 | End: 2022-09-28

## 2022-09-28 RX ORDER — HYDROCODONE BITARTRATE AND ACETAMINOPHEN 5; 325 MG/1; MG/1
1 TABLET ORAL EVERY 6 HOURS PRN
Qty: 10 TABLET | Refills: 0 | Status: SHIPPED | OUTPATIENT
Start: 2022-09-28 | End: 2022-10-14

## 2022-09-28 RX ADMIN — HYDROCODONE BITARTATE AND ACETAMINOPHEN 1 TABLET: 5; 325 TABLET ORAL at 22:34

## 2022-09-30 ENCOUNTER — TELEPHONE (OUTPATIENT)
Dept: INTERNAL MEDICINE | Facility: CLINIC | Age: 42
End: 2022-09-30

## 2022-09-30 DIAGNOSIS — H92.03 OTALGIA OF BOTH EARS: Primary | ICD-10-CM

## 2022-09-30 RX ORDER — DOXYCYCLINE 100 MG/1
100 CAPSULE ORAL 2 TIMES DAILY
Qty: 20 CAPSULE | Refills: 0 | Status: SHIPPED | OUTPATIENT
Start: 2022-09-30 | End: 2022-10-14

## 2022-09-30 RX ORDER — CIPROFLOXACIN AND DEXAMETHASONE 3; 1 MG/ML; MG/ML
4 SUSPENSION/ DROPS AURICULAR (OTIC) 2 TIMES DAILY
Qty: 7.5 ML | Refills: 0 | Status: SHIPPED | OUTPATIENT
Start: 2022-09-30 | End: 2022-10-26

## 2022-09-30 NOTE — TELEPHONE ENCOUNTER
Patient advised that doxycycline and ear drops have been sent in to her pharmacy, also referral to ENT has been ordered.

## 2022-09-30 NOTE — TELEPHONE ENCOUNTER
Pt called stating she was in last week.  However, she is still experiencing earaches, drainage, pt feels like there is something in the ear & it also has an odor to it.  Pt also stated she knows she isn't supposed to to do this but she used a cotton swab & saw there is a yellow/brownish tint there.  Pt didn't know if she could just get an ENT referral.  Please advise.

## 2022-10-02 ENCOUNTER — HOSPITAL ENCOUNTER (EMERGENCY)
Facility: HOSPITAL | Age: 42
Discharge: HOME | End: 2022-10-02
Attending: EMERGENCY MEDICINE | Admitting: EMERGENCY MEDICINE

## 2022-10-02 ENCOUNTER — APPOINTMENT (OUTPATIENT)
Dept: CT IMAGING | Facility: HOSPITAL | Age: 42
End: 2022-10-02

## 2022-10-02 VITALS
DIASTOLIC BLOOD PRESSURE: 85 MMHG | WEIGHT: 178 LBS | HEIGHT: 59 IN | OXYGEN SATURATION: 100 % | RESPIRATION RATE: 18 BRPM | SYSTOLIC BLOOD PRESSURE: 124 MMHG | HEART RATE: 63 BPM | TEMPERATURE: 98.5 F | BODY MASS INDEX: 35.88 KG/M2

## 2022-10-02 DIAGNOSIS — R10.9 LT FLANK PAIN: Primary | ICD-10-CM

## 2022-10-02 DIAGNOSIS — D25.9 UTERINE LEIOMYOMA, UNSPECIFIED LOCATION: ICD-10-CM

## 2022-10-02 DIAGNOSIS — N20.0 KIDNEY STONE ON LEFT SIDE: ICD-10-CM

## 2022-10-02 LAB
ALBUMIN SERPL-MCNC: 4.1 G/DL (ref 3.5–5.2)
ALBUMIN/GLOB SERPL: 1.4 G/DL
ALP SERPL-CCNC: 72 U/L (ref 39–117)
ALT SERPL W P-5'-P-CCNC: 8 U/L (ref 1–33)
ANION GAP SERPL CALCULATED.3IONS-SCNC: 12 MMOL/L (ref 5–15)
AST SERPL-CCNC: 15 U/L (ref 1–32)
B-HCG UR QL: NEGATIVE
BASOPHILS # BLD AUTO: 0.04 10*3/MM3 (ref 0–0.2)
BASOPHILS NFR BLD AUTO: 0.5 % (ref 0–1.5)
BILIRUB SERPL-MCNC: <0.2 MG/DL (ref 0–1.2)
BILIRUB UR QL STRIP: NEGATIVE
BUN SERPL-MCNC: 12 MG/DL (ref 6–20)
BUN/CREAT SERPL: 17.9 (ref 7–25)
CALCIUM SPEC-SCNC: 8.8 MG/DL (ref 8.6–10.5)
CHLORIDE SERPL-SCNC: 103 MMOL/L (ref 98–107)
CLARITY UR: CLEAR
CO2 SERPL-SCNC: 24 MMOL/L (ref 22–29)
COLOR UR: YELLOW
CREAT SERPL-MCNC: 0.67 MG/DL (ref 0.57–1)
DEPRECATED RDW RBC AUTO: 48.7 FL (ref 37–54)
EGFRCR SERPLBLD CKD-EPI 2021: 110.7 ML/MIN/1.73
EOSINOPHIL # BLD AUTO: 0.05 10*3/MM3 (ref 0–0.4)
EOSINOPHIL NFR BLD AUTO: 0.6 % (ref 0.3–6.2)
ERYTHROCYTE [DISTWIDTH] IN BLOOD BY AUTOMATED COUNT: 16.5 % (ref 12.3–15.4)
EXPIRATION DATE: NORMAL
GLOBULIN UR ELPH-MCNC: 3 GM/DL
GLUCOSE SERPL-MCNC: 98 MG/DL (ref 65–99)
GLUCOSE UR STRIP-MCNC: NEGATIVE MG/DL
HCT VFR BLD AUTO: 34.7 % (ref 34–46.6)
HGB BLD-MCNC: 10.7 G/DL (ref 12–15.9)
HGB UR QL STRIP.AUTO: NEGATIVE
HOLD SPECIMEN: NORMAL
IMM GRANULOCYTES # BLD AUTO: 0.01 10*3/MM3 (ref 0–0.05)
IMM GRANULOCYTES NFR BLD AUTO: 0.1 % (ref 0–0.5)
INTERNAL NEGATIVE CONTROL: NORMAL
INTERNAL POSITIVE CONTROL: NORMAL
KETONES UR QL STRIP: NEGATIVE
LEUKOCYTE ESTERASE UR QL STRIP.AUTO: NEGATIVE
LIPASE SERPL-CCNC: 67 U/L (ref 13–60)
LYMPHOCYTES # BLD AUTO: 3.56 10*3/MM3 (ref 0.7–3.1)
LYMPHOCYTES NFR BLD AUTO: 40.7 % (ref 19.6–45.3)
Lab: NORMAL
MCH RBC QN AUTO: 25.1 PG (ref 26.6–33)
MCHC RBC AUTO-ENTMCNC: 30.8 G/DL (ref 31.5–35.7)
MCV RBC AUTO: 81.3 FL (ref 79–97)
MONOCYTES # BLD AUTO: 0.75 10*3/MM3 (ref 0.1–0.9)
MONOCYTES NFR BLD AUTO: 8.6 % (ref 5–12)
NEUTROPHILS NFR BLD AUTO: 4.33 10*3/MM3 (ref 1.7–7)
NEUTROPHILS NFR BLD AUTO: 49.5 % (ref 42.7–76)
NITRITE UR QL STRIP: NEGATIVE
NRBC BLD AUTO-RTO: 0 /100 WBC (ref 0–0.2)
PH UR STRIP.AUTO: 6 [PH] (ref 5–8)
PLATELET # BLD AUTO: 319 10*3/MM3 (ref 140–450)
PMV BLD AUTO: 10.9 FL (ref 6–12)
POTASSIUM SERPL-SCNC: 3.8 MMOL/L (ref 3.5–5.2)
PROT SERPL-MCNC: 7.1 G/DL (ref 6–8.5)
PROT UR QL STRIP: NEGATIVE
RBC # BLD AUTO: 4.27 10*6/MM3 (ref 3.77–5.28)
SODIUM SERPL-SCNC: 139 MMOL/L (ref 136–145)
SP GR UR STRIP: 1.02 (ref 1–1.03)
UROBILINOGEN UR QL STRIP: NORMAL
WBC NRBC COR # BLD: 8.74 10*3/MM3 (ref 3.4–10.8)
WHOLE BLOOD HOLD COAG: NORMAL
WHOLE BLOOD HOLD SPECIMEN: NORMAL

## 2022-10-02 PROCEDURE — 25010000002 MORPHINE PER 10 MG: Performed by: EMERGENCY MEDICINE

## 2022-10-02 PROCEDURE — 96375 TX/PRO/DX INJ NEW DRUG ADDON: CPT

## 2022-10-02 PROCEDURE — 96374 THER/PROPH/DIAG INJ IV PUSH: CPT

## 2022-10-02 PROCEDURE — 99283 EMERGENCY DEPT VISIT LOW MDM: CPT

## 2022-10-02 PROCEDURE — 81025 URINE PREGNANCY TEST: CPT | Performed by: EMERGENCY MEDICINE

## 2022-10-02 PROCEDURE — 85025 COMPLETE CBC W/AUTO DIFF WBC: CPT | Performed by: EMERGENCY MEDICINE

## 2022-10-02 PROCEDURE — 74176 CT ABD & PELVIS W/O CONTRAST: CPT

## 2022-10-02 PROCEDURE — 81003 URINALYSIS AUTO W/O SCOPE: CPT | Performed by: EMERGENCY MEDICINE

## 2022-10-02 PROCEDURE — 83690 ASSAY OF LIPASE: CPT | Performed by: EMERGENCY MEDICINE

## 2022-10-02 PROCEDURE — 25010000002 ONDANSETRON PER 1 MG: Performed by: EMERGENCY MEDICINE

## 2022-10-02 PROCEDURE — 80053 COMPREHEN METABOLIC PANEL: CPT | Performed by: EMERGENCY MEDICINE

## 2022-10-02 RX ORDER — SODIUM CHLORIDE 9 MG/ML
10 INJECTION INTRAVENOUS AS NEEDED
Status: DISCONTINUED | OUTPATIENT
Start: 2022-10-02 | End: 2022-10-02 | Stop reason: HOSPADM

## 2022-10-02 RX ORDER — ONDANSETRON 2 MG/ML
4 INJECTION INTRAMUSCULAR; INTRAVENOUS ONCE
Status: COMPLETED | OUTPATIENT
Start: 2022-10-02 | End: 2022-10-02

## 2022-10-02 RX ORDER — MORPHINE SULFATE 4 MG/ML
4 INJECTION, SOLUTION INTRAMUSCULAR; INTRAVENOUS ONCE
Status: COMPLETED | OUTPATIENT
Start: 2022-10-02 | End: 2022-10-02

## 2022-10-02 RX ORDER — SODIUM CHLORIDE 0.9 % (FLUSH) 0.9 %
10 SYRINGE (ML) INJECTION AS NEEDED
Status: DISCONTINUED | OUTPATIENT
Start: 2022-10-02 | End: 2022-10-02 | Stop reason: HOSPADM

## 2022-10-02 RX ADMIN — MORPHINE SULFATE 4 MG: 4 INJECTION, SOLUTION INTRAMUSCULAR; INTRAVENOUS at 01:28

## 2022-10-02 RX ADMIN — SODIUM CHLORIDE 1000 ML: 9 INJECTION, SOLUTION INTRAVENOUS at 01:27

## 2022-10-02 RX ADMIN — ONDANSETRON 4 MG: 2 INJECTION INTRAMUSCULAR; INTRAVENOUS at 01:28

## 2022-10-02 NOTE — ED PROVIDER NOTES
Subjective   History of Present Illness  Bernardo Dodd is a 42 yr old female presents emergency department for complaints of left flank pain.  Patient reports that the pain radiates into her abdomen and into her back.  Started 2 days ago.  She complains of chills.  She reports urinary frequency, urgency.  She complains of nausea, vomiting and diarrhea.  She reports weakness.  Patient has a history of kidney stones patient advises that this feels similar.    History provided by:  Patient   used: No    Flank Pain  Pain location:  L flank  Pain quality: sharp    Pain radiates to:  LLQ and back  Pain severity:  Moderate  Timing:  Constant  Progression:  Worsening  Worsened by:  Nothing  Associated symptoms: chills, diarrhea, dysuria, fever, nausea and vomiting    Associated symptoms: no chest pain and no shortness of breath        Review of Systems   Constitutional: Positive for chills and fever.   Respiratory: Negative for shortness of breath.    Cardiovascular: Negative for chest pain.   Gastrointestinal: Positive for abdominal pain, diarrhea, nausea and vomiting.   Genitourinary: Positive for dysuria, flank pain, frequency and urgency.   All other systems reviewed and are negative.      Past Medical History:   Diagnosis Date   • Allergic rhinitis    • Anemia    • Arthritis    • Asthma    • Bartholin gland cyst    • Chlamydia    • Depression    • FHx: migraine headaches    • GERD (gastroesophageal reflux disease)    • Heart murmur    • Herpes simplex    • History of chest x-ray 11/01/2015    no active disease   • History of echocardiogram 11/01/2015    ejection fraction of greater than 65%, mitral and pulmonic regurgitation an physiological tricuspid regurgitation.   • History of PFTs 12/22/2015    spirometry data acceptable and reproducible; pt given 4 puffs of Ventolin; pt gave good effort; no obstruction; no Bd response; MVV reduced    • History of PFTs 11/02/2015    pt gave best effort;  duoneb given prior and post study; moderate nonspecific proportional reduction of FEV1 and FVC with preserved ratio; FEV1 moderately reduced; cannot rule out restriction   • Hypertension    • Kidney stones    • Migraine    • Ovarian cyst    • Pelvic pain    • Screening breast examination     self;admits   • Seizures (HCC)    • STD (female)    • Thigh shingles    • Trichomonas infection        Allergies   Allergen Reactions   • Naproxen Swelling   • Sulfa Antibiotics Rash       Past Surgical History:   Procedure Laterality Date   • BILATERAL BREAST REDUCTION     • BREAST BIOPSY     • BREAST SURGERY      breast reduction   •  SECTION     • CYSTOSCOPY     • DIAGNOSTIC LAPAROSCOPY     • INCISION AND DRAINAGE ABSCESS      bartholin's   • LAPAROSCOPIC TUBAL LIGATION     • ORIF ANKLE FRACTURE Right    • REDUCTION MAMMAPLASTY Bilateral    • TENSION FREE VAGINAL TAPING WITH MINI ARC SLING      Dr Doyle avery        Family History   Problem Relation Age of Onset   • Pancreatic cancer Maternal Grandmother    • Heart failure Paternal Grandmother    • MARCIE disease Paternal Aunt    • Arthritis Mother    • Cancer Mother    • Bipolar disorder Mother    • Arthritis Father    • Dementia Father    • Pancreatic cancer Other    • Diabetes Other    • Heart disease Other    • Hypertension Other    • Other Other         RESPIRATORY DISEASE   • Heart attack Neg Hx    • Hyperlipidemia Neg Hx    • Mental illness Neg Hx    • Obesity Neg Hx    • Stroke Neg Hx    • Breast cancer Neg Hx    • Ovarian cancer Neg Hx        Social History     Socioeconomic History   • Marital status: Single   Tobacco Use   • Smoking status: Light Tobacco Smoker     Packs/day: 0.25     Years: 15.00     Pack years: 3.75     Types: Cigarettes, Cigars     Last attempt to quit: 2015     Years since quittin.7   • Smokeless tobacco: Never Used   • Tobacco comment: I quit every so often. Then i start again.   Vaping Use   • Vaping Use: Some days   •  Devices: Disposable   • Passive vaping exposure: Yes   Substance and Sexual Activity   • Alcohol use: No   • Drug use: Not Currently     Types: Marijuana     Comment: Marijuana once a week. Tried cocaine, meth, pain pills in the past   • Sexual activity: Yes     Birth control/protection: Surgical           Objective   Physical Exam  Vitals and nursing note reviewed.   Constitutional:       Appearance: Normal appearance. She is well-developed. She is not toxic-appearing.   HENT:      Head: Normocephalic and atraumatic.      Nose: Nose normal.      Mouth/Throat:      Mouth: Mucous membranes are moist.   Eyes:      General: Lids are normal.      Extraocular Movements: Extraocular movements intact.      Conjunctiva/sclera: Conjunctivae normal.      Pupils: Pupils are equal, round, and reactive to light.   Neck:      Trachea: Trachea normal.   Cardiovascular:      Rate and Rhythm: Regular rhythm.      Pulses: Normal pulses.      Heart sounds: Normal heart sounds.   Pulmonary:      Effort: Pulmonary effort is normal. No respiratory distress.      Breath sounds: Normal breath sounds. No decreased breath sounds, wheezing, rhonchi or rales.   Abdominal:      General: Bowel sounds are normal.      Palpations: Abdomen is soft.      Tenderness: There is abdominal tenderness (LLQ, Lt flank pain).   Musculoskeletal:         General: Normal range of motion.      Cervical back: Full passive range of motion without pain and normal range of motion.   Skin:     General: Skin is warm and dry.      Findings: No rash.   Neurological:      Mental Status: She is alert and oriented to person, place, and time.      Cranial Nerves: No cranial nerve deficit.   Psychiatric:         Speech: Speech normal.         Behavior: Behavior normal. Behavior is cooperative.         Procedures           ED Course  ED Course as of 10/02/22 0537   Sun Oct 02, 2022   0249 Lipase(!): 67 [KG]      ED Course User Index  [KG] Teagan Murrell, APRN            Recent Results (from the past 24 hour(s))   Urinalysis With Microscopic If Indicated (No Culture) - Urine, Clean Catch    Collection Time: 10/02/22  1:18 AM    Specimen: Urine, Clean Catch   Result Value Ref Range    Color, UA Yellow Yellow, Straw    Appearance, UA Clear Clear    pH, UA 6.0 5.0 - 8.0    Specific Gravity, UA 1.022 1.001 - 1.030    Glucose, UA Negative Negative    Ketones, UA Negative Negative    Bilirubin, UA Negative Negative    Blood, UA Negative Negative    Protein, UA Negative Negative    Leuk Esterase, UA Negative Negative    Nitrite, UA Negative Negative    Urobilinogen, UA 1.0 E.U./dL 0.2 - 1.0 E.U./dL   POC Urine Pregnancy    Collection Time: 10/02/22  1:22 AM    Specimen: Urine   Result Value Ref Range    HCG, Urine, QL Negative Negative    Lot Number [id number]     Internal Positive Control Passed Positive, Passed    Internal Negative Control Passed Negative, Passed    Expiration Date 11/30/2023    Comprehensive Metabolic Panel    Collection Time: 10/02/22  1:26 AM    Specimen: Blood   Result Value Ref Range    Glucose 98 65 - 99 mg/dL    BUN 12 6 - 20 mg/dL    Creatinine 0.67 0.57 - 1.00 mg/dL    Sodium 139 136 - 145 mmol/L    Potassium 3.8 3.5 - 5.2 mmol/L    Chloride 103 98 - 107 mmol/L    CO2 24.0 22.0 - 29.0 mmol/L    Calcium 8.8 8.6 - 10.5 mg/dL    Total Protein 7.1 6.0 - 8.5 g/dL    Albumin 4.10 3.50 - 5.20 g/dL    ALT (SGPT) 8 1 - 33 U/L    AST (SGOT) 15 1 - 32 U/L    Alkaline Phosphatase 72 39 - 117 U/L    Total Bilirubin <0.2 0.0 - 1.2 mg/dL    Globulin 3.0 gm/dL    A/G Ratio 1.4 g/dL    BUN/Creatinine Ratio 17.9 7.0 - 25.0    Anion Gap 12.0 5.0 - 15.0 mmol/L    eGFR 110.7 >60.0 mL/min/1.73   Lipase    Collection Time: 10/02/22  1:26 AM    Specimen: Blood   Result Value Ref Range    Lipase 67 (H) 13 - 60 U/L   Green Top (Gel)    Collection Time: 10/02/22  1:26 AM   Result Value Ref Range    Extra Tube Hold for add-ons.    Lavender Top    Collection Time: 10/02/22  1:26 AM    Result Value Ref Range    Extra Tube hold for add-on    Gold Top - SST    Collection Time: 10/02/22  1:26 AM   Result Value Ref Range    Extra Tube Hold for add-ons.    Gray Top    Collection Time: 10/02/22  1:26 AM   Result Value Ref Range    Extra Tube Hold for add-ons.    Light Blue Top    Collection Time: 10/02/22  1:26 AM   Result Value Ref Range    Extra Tube Hold for add-ons.    CBC Auto Differential    Collection Time: 10/02/22  1:26 AM    Specimen: Blood   Result Value Ref Range    WBC 8.74 3.40 - 10.80 10*3/mm3    RBC 4.27 3.77 - 5.28 10*6/mm3    Hemoglobin 10.7 (L) 12.0 - 15.9 g/dL    Hematocrit 34.7 34.0 - 46.6 %    MCV 81.3 79.0 - 97.0 fL    MCH 25.1 (L) 26.6 - 33.0 pg    MCHC 30.8 (L) 31.5 - 35.7 g/dL    RDW 16.5 (H) 12.3 - 15.4 %    RDW-SD 48.7 37.0 - 54.0 fl    MPV 10.9 6.0 - 12.0 fL    Platelets 319 140 - 450 10*3/mm3    Neutrophil % 49.5 42.7 - 76.0 %    Lymphocyte % 40.7 19.6 - 45.3 %    Monocyte % 8.6 5.0 - 12.0 %    Eosinophil % 0.6 0.3 - 6.2 %    Basophil % 0.5 0.0 - 1.5 %    Immature Grans % 0.1 0.0 - 0.5 %    Neutrophils, Absolute 4.33 1.70 - 7.00 10*3/mm3    Lymphocytes, Absolute 3.56 (H) 0.70 - 3.10 10*3/mm3    Monocytes, Absolute 0.75 0.10 - 0.90 10*3/mm3    Eosinophils, Absolute 0.05 0.00 - 0.40 10*3/mm3    Basophils, Absolute 0.04 0.00 - 0.20 10*3/mm3    Immature Grans, Absolute 0.01 0.00 - 0.05 10*3/mm3    nRBC 0.0 0.0 - 0.2 /100 WBC     Note: In addition to lab results from this visit, the labs listed above may include labs taken at another facility or during a different encounter within the last 24 hours. Please correlate lab times with ED admission and discharge times for further clarification of the services performed during this visit.    CT Abdomen Pelvis Without Contrast   Final Result      1. No evidence of acute disease in the abdomen or pelvis, accounting for the limitations of the noncontrast technique.   2. Punctate nonobstructing stone in the left kidney.   3. Uterine  "fibroid.   4. Fat-containing umbilical hernia.      Electronically signed by:  Curry Hernandez M.D.     10/2/2022 12:43 AM Mountain Time        Vitals:    10/02/22 0045 10/02/22 0135 10/02/22 0230 10/02/22 0300   BP: 142/94 136/90 131/86 124/85   BP Location: Left arm      Patient Position: Sitting      Pulse: 83 78 75 63   Resp: 16 18 17 18   Temp: 98.5 °F (36.9 °C)      TempSrc: Oral      SpO2: 99% 99% 100% 100%   Weight: 80.7 kg (178 lb)      Height: 149.9 cm (59\")        Medications   Sodium Chloride (PF) 0.9 % 10 mL (has no administration in time range)   sodium chloride 0.9 % flush 10 mL (has no administration in time range)   sodium chloride 0.9 % bolus 1,000 mL (0 mL Intravenous Stopped 10/2/22 0333)   ondansetron (ZOFRAN) injection 4 mg (4 mg Intravenous Given 10/2/22 0128)   Morphine sulfate (PF) injection 4 mg (4 mg Intravenous Given 10/2/22 0128)     ECG/EMG Results (last 24 hours)     ** No results found for the last 24 hours. **        No orders to display                                       MDM    Final diagnoses:   Lt flank pain   Uterine leiomyoma, unspecified location   Kidney stone on left side       ED Disposition  ED Disposition     ED Disposition   Discharge    Condition   Stable    Comment   --             Nelly Sotelo, PA  [address] [place] [address]  [phone]               Medication List      No changes were made to your prescriptions during this visit.          Teagan Murrell, APRN  10/02/22 0537    "

## 2022-10-04 ENCOUNTER — DOCUMENTATION (OUTPATIENT)
Dept: PHYSICAL THERAPY | Facility: HOSPITAL | Age: 42
End: 2022-10-04

## 2022-10-04 DIAGNOSIS — M25.511 ACUTE PAIN OF BOTH SHOULDERS: Primary | ICD-10-CM

## 2022-10-04 DIAGNOSIS — M25.512 ACUTE PAIN OF BOTH SHOULDERS: Primary | ICD-10-CM

## 2022-10-04 NOTE — THERAPY DISCHARGE NOTE
Outpatient Physical Therapy Discharge Summary         Patient Name: Bernardo Dodd  : 1980  MRN: [id number]    Today's Date: 10/4/2022    Visit Dx:    ICD-10-CM ICD-9-CM   1. Acute pain of both shoulders  M25.511 719.41    M25.512            OP PT Discharge Summary  Date of Discharge: 10/04/22  Discharge Instructions/Additional Comments: Prognosis at time of discharge is fair based off of patient status at her last visit.  Patient was seen for her initial evaluation and 1 subsequent follow-up visit.      Time Calculation:                    Tod Jorgensen, PT  10/4/2022

## 2022-10-05 ENCOUNTER — TELEPHONE (OUTPATIENT)
Dept: INTERNAL MEDICINE | Facility: CLINIC | Age: 42
End: 2022-10-05

## 2022-10-07 ENCOUNTER — OFFICE VISIT (OUTPATIENT)
Dept: INTERNAL MEDICINE | Facility: CLINIC | Age: 42
End: 2022-10-07

## 2022-10-07 VITALS
OXYGEN SATURATION: 96 % | TEMPERATURE: 97.7 F | HEART RATE: 84 BPM | DIASTOLIC BLOOD PRESSURE: 80 MMHG | RESPIRATION RATE: 16 BRPM | SYSTOLIC BLOOD PRESSURE: 132 MMHG | WEIGHT: 178 LBS | BODY MASS INDEX: 35.88 KG/M2 | HEIGHT: 59 IN

## 2022-10-07 DIAGNOSIS — J20.9 ACUTE BRONCHITIS, UNSPECIFIED ORGANISM: Primary | ICD-10-CM

## 2022-10-07 LAB
EXPIRATION DATE: NORMAL
FLUAV AG UPPER RESP QL IA.RAPID: NOT DETECTED
FLUBV AG UPPER RESP QL IA.RAPID: NOT DETECTED
INTERNAL CONTROL: NORMAL
Lab: NORMAL
SARS-COV-2 AG UPPER RESP QL IA.RAPID: NOT DETECTED

## 2022-10-07 PROCEDURE — 87428 SARSCOV & INF VIR A&B AG IA: CPT | Performed by: NURSE PRACTITIONER

## 2022-10-07 PROCEDURE — 99213 OFFICE O/P EST LOW 20 MIN: CPT | Performed by: NURSE PRACTITIONER

## 2022-10-07 RX ORDER — METHYLPREDNISOLONE 4 MG/1
TABLET ORAL
Qty: 21 EACH | Refills: 0 | Status: SHIPPED | OUTPATIENT
Start: 2022-10-07 | End: 2022-10-14

## 2022-10-07 RX ORDER — BENZONATATE 200 MG/1
200 CAPSULE ORAL 3 TIMES DAILY PRN
Qty: 30 CAPSULE | Refills: 0 | Status: SHIPPED | OUTPATIENT
Start: 2022-10-07 | End: 2022-10-26

## 2022-10-10 ENCOUNTER — TELEPHONE (OUTPATIENT)
Dept: INTERNAL MEDICINE | Facility: CLINIC | Age: 42
End: 2022-10-10

## 2022-10-10 NOTE — TELEPHONE ENCOUNTER
Caller: Bernardo Dodd    Relationship: Self    Best call back number: [phone]     What is the best time to reach you: ANYTIME     Who are you requesting to speak with (clinical staff, provider,  specific staff member):CLINICAL STAFF    What was the call regarding: PATIENT STATES THAT SHE WAS RECENTLY PRESCRIBED DOXYCYCLINE AND THE METHYLPREDNISONE. THEY ARE NOW MAKING HER VERY NAUSEOUS. THIS IS THE FIRST DAY THAT THIS HAS HAPPENED. SHE WANTS TO KNOW IF SHE IS TAKING TOO MUCH MEDICINE OR IF IS THIS A NORMAL SIDE EFFECT WITH THE MEDICATION    Do you require a callback: YES

## 2022-10-11 NOTE — TELEPHONE ENCOUNTER
The doxycycline could cause reflux and nausea. She can try stopping that to see if her symptoms improve. She should be almost done with it since it was prescribed 10 days ago.

## 2022-10-13 NOTE — TELEPHONE ENCOUNTER
Left voice message stating the doxy could cause the nausea and reflux and she can try stopping it to see if her symptoms improve but should be almost done with it since it was prescribed 10 days ago

## 2022-10-14 ENCOUNTER — OFFICE VISIT (OUTPATIENT)
Dept: ORTHOPEDIC SURGERY | Facility: CLINIC | Age: 42
End: 2022-10-14

## 2022-10-14 VITALS
WEIGHT: 177.91 LBS | DIASTOLIC BLOOD PRESSURE: 98 MMHG | HEIGHT: 59 IN | SYSTOLIC BLOOD PRESSURE: 138 MMHG | BODY MASS INDEX: 35.87 KG/M2

## 2022-10-14 DIAGNOSIS — S99.912A INJURY OF LEFT ANKLE, INITIAL ENCOUNTER: Primary | ICD-10-CM

## 2022-10-14 DIAGNOSIS — S93.492A SPRAIN OF ANTERIOR TALOFIBULAR LIGAMENT OF LEFT ANKLE, INITIAL ENCOUNTER: ICD-10-CM

## 2022-10-14 PROCEDURE — 99214 OFFICE O/P EST MOD 30 MIN: CPT
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
## 2022-10-17 NOTE — PROGRESS NOTES
Chief Complaint   Patient presents with   • Cough   • Diarrhea   • Fatigue       History of Present Illness    42 y.o.female presents for cough, fatigue. Has also had diarrhea loose stools.  Onset about a week. Positive covid exposure. Cough is mostly dry recurrent. Tired fatigued. Diarrhea loose stools. Some nausea No vomiting. Has had some abd pain upper; radiating to back. No short of breath. No fever. Has been taking doxycycline recently that she was prescribed back end of sept with sore throat.    Review of Systems   Constitutional: Negative for fever and unexpected weight loss.   Gastrointestinal: Positive for abdominal pain, diarrhea and nausea. Negative for constipation and vomiting.   Genitourinary: Positive for frequency. Negative for dysuria and hematuria.   Musculoskeletal: Positive for arthralgias and myalgias.   Neurological: Negative for headache.       [facility]  The following portions of the patient's history were reviewed and updated as appropriate: allergies, current medications, past family history, past medical history, past social history, past surgical history and problem list.     Past Medical History:   Diagnosis Date   • Allergic rhinitis    • Anemia    • Arthritis    • Asthma    • Bartholin gland cyst    • Chlamydia    • Depression    • FHx: migraine headaches    • GERD (gastroesophageal reflux disease)    • Heart murmur    • Herpes simplex    • History of chest x-ray 11/01/2015    no active disease   • History of echocardiogram 11/01/2015    ejection fraction of greater than 65%, mitral and pulmonic regurgitation an physiological tricuspid regurgitation.   • History of PFTs 12/22/2015    spirometry data acceptable and reproducible; pt given 4 puffs of Ventolin; pt gave good effort; no obstruction; no Bd response; MVV reduced    • History of PFTs 11/02/2015    pt gave best effort; duoneb given prior and post study; moderate nonspecific proportional reduction of FEV1 and FVC with preserved  ratio; FEV1 moderately reduced; cannot rule out restriction   • Hypertension    • Kidney stones    • Migraine    • Ovarian cyst    • Pelvic pain    • Screening breast examination     self;admits   • Seizures (HCC)    • STD (female)    • Thigh shingles    • Trichomonas infection       Allergies   Allergen Reactions   • Naproxen Swelling   • Sulfa Antibiotics Rash      Social History     Tobacco Use   • Smoking status: Light Smoker     Packs/day: 0.25     Years: 15.00     Pack years: 3.75     Types: Cigarettes, Cigars     Last attempt to quit: 2015     Years since quittin.7   • Smokeless tobacco: Never   • Tobacco comments:     I quit every so often. Then i start again.   Vaping Use   • Vaping Use: Some days   • Devices: Disposable   • Passive vaping exposure: Yes   Substance Use Topics   • Alcohol use: No   • Drug use: Not Currently     Types: Marijuana     Comment: Marijuana once a week. Tried cocaine, meth, pain pills in the past             Current Outpatient Medications:   •  albuterol sulfate  (90 Base) MCG/ACT inhaler, Inhale 2 puffs Every 4 (Four) Hours As Needed for Wheezing or Shortness of Air., Disp: 18 g, Rfl: 2  •  amLODIPine (NORVASC) 5 MG tablet, Take 1 tablet by mouth Daily., Disp: 90 tablet, Rfl: 0  •  celecoxib (CeleBREX) 200 MG capsule, Take 1 capsule by mouth Daily., Disp: 30 capsule, Rfl: 0  •  cetirizine (zyrTEC) 10 MG tablet, Take 10 mg by mouth Daily., Disp: , Rfl:   •  ciprofloxacin-dexamethasone (Ciprodex) 0.3-0.1 % otic suspension, Administer 4 drops into the left ear 2 (Two) Times a Day., Disp: 7.5 mL, Rfl: 0  •  fluticasone (Flonase) 50 MCG/ACT nasal spray, 2 sprays into the nostril(s) as directed by provider Daily., Disp: 16 g, Rfl: 5  •  furosemide (LASIX) 20 MG tablet, Take 1 tablet by mouth Daily., Disp: 6 tablet, Rfl: 0  •  lamoTRIgine (LaMICtal) 200 MG tablet, Take 1 tablet by mouth Daily., Disp: 30 tablet, Rfl: 1  •  OLANZapine (ZyPREXA) 10 MG tablet, Take 0.5  "tablets by mouth Daily for 1 day, THEN 1 tablet Daily., Disp: 30 tablet, Rfl: 2  •  benzonatate (TESSALON) 200 MG capsule, Take 1 capsule by mouth 3 (Three) Times a Day As Needed for Cough., Disp: 30 capsule, Rfl: 0  •  Diclofenac Sodium (VOLTAREN) 1 % gel gel, Apply 4 g topically to the appropriate area as directed 3 (Three) Times a Day As Needed (As needed to ankle for pain and swelling) for up to 30 days., Disp: 350 g, Rfl: 3    VITALS:  /80   Pulse 84   Temp 97.7 °F (36.5 °C)   Resp 16   Ht 149.9 cm (59\")   Wt 80.7 kg (178 lb)   SpO2 96%   Breastfeeding No   BMI 35.95 kg/m²     Physical Exam  Vitals reviewed.   HENT:      Right Ear: Tympanic membrane, ear canal and external ear normal.      Left Ear: Tympanic membrane, ear canal and external ear normal.      Nose: Congestion and rhinorrhea present. Rhinorrhea is clear.      Right Turbinates: Swollen.      Left Turbinates: Swollen.      Right Sinus: No maxillary sinus tenderness or frontal sinus tenderness.      Left Sinus: No maxillary sinus tenderness or frontal sinus tenderness.      Mouth/Throat:      Lips: Pink.      Mouth: Mucous membranes are moist.      Pharynx: Oropharynx is clear. Uvula midline. No pharyngeal swelling, oropharyngeal exudate, posterior oropharyngeal erythema or uvula swelling.      Tonsils: No tonsillar exudate.   Eyes:      Conjunctiva/sclera: Conjunctivae normal.      Pupils: Pupils are equal, round, and reactive to light.   Cardiovascular:      Rate and Rhythm: Normal rate and regular rhythm.      Heart sounds: Normal heart sounds.   Pulmonary:      Effort: Pulmonary effort is normal. No respiratory distress.      Breath sounds: Normal breath sounds.   Abdominal:      General: Bowel sounds are normal.      Palpations: Abdomen is soft.      Tenderness: There is no abdominal tenderness.   Skin:     General: Skin is warm.      Findings: No rash.   Neurological:      General: No focal deficit present.      Mental Status: She " is alert and oriented to person, place, and time.      Cranial Nerves: No cranial nerve deficit.      Motor: No weakness.      Gait: Gait normal.   Psychiatric:         Mood and Affect: Mood normal.         Result Review :            Assessment and Plan    Diagnoses and all orders for this visit:    1. Acute bronchitis, unspecified organism (Primary)  -     POCT SARS-CoV-2 Antigen PRADEEP + Flu  -     benzonatate (TESSALON) 200 MG capsule; Take 1 capsule by mouth 3 (Three) Times a Day As Needed for Cough.  Dispense: 30 capsule; Refill: 0  -     methylPREDNISolone (MEDROL) 4 MG dose pack; Take as directed on package instructions.  Dispense: 21 each; Refill: 0    Likely viral URI with bronchitis component.    I discussed the patients findings and my recommendations with patient.  Patient was encouraged to keep me informed of any acute changes, lack of improvement, or any new concerning symptoms.  Patient voiced understanding of all instructions and denied further questions.      Follow Up   Return if symptoms worsen or fail to improve.      Electronically signed by:    SHANE Mueller  10/07/2022

## 2022-10-20 ENCOUNTER — TELEPHONE (OUTPATIENT)
Dept: INTERNAL MEDICINE | Facility: CLINIC | Age: 42
End: 2022-10-20

## 2022-10-20 NOTE — TELEPHONE ENCOUNTER
MASHA CALLED ON 10-20-22 @ 5PM.  SHE SAW MAIRNO BARDALES LAST WEEK FOR CONGESTION, SORE THROAT AND A FEW OTHER SYMPTOMS, AND IS NOT IMPROVING.  CAN SOMEONE REACH OUT TO HER AND POSSIBLY CALL SOMETHING IN FOR HER? [phone]

## 2022-10-21 RX ORDER — AZITHROMYCIN 250 MG/1
TABLET, FILM COATED ORAL
Qty: 6 TABLET | Refills: 0 | Status: SHIPPED | OUTPATIENT
Start: 2022-10-21 | End: 2022-10-26

## 2022-10-21 NOTE — TELEPHONE ENCOUNTER
Patient advised to follow up with her Allergy specialist and that a Zpack has been sent in to her pharmacy

## 2022-10-21 NOTE — TELEPHONE ENCOUNTER
Has she seen her Allergist in a while? This is a bad season for her and she needs to make sure she is following closely with Allergy specialist.    I will send in a z-marty for her.

## 2022-10-26 ENCOUNTER — OFFICE VISIT (OUTPATIENT)
Dept: INTERNAL MEDICINE | Facility: CLINIC | Age: 42
End: 2022-10-26

## 2022-10-26 VITALS
TEMPERATURE: 97.4 F | WEIGHT: 166.6 LBS | BODY MASS INDEX: 33.63 KG/M2 | HEART RATE: 74 BPM | DIASTOLIC BLOOD PRESSURE: 88 MMHG | SYSTOLIC BLOOD PRESSURE: 116 MMHG | OXYGEN SATURATION: 95 %

## 2022-10-26 DIAGNOSIS — Z23 NEED FOR VACCINATION: ICD-10-CM

## 2022-10-26 DIAGNOSIS — Z23 NEED FOR INFLUENZA VACCINATION: ICD-10-CM

## 2022-10-26 DIAGNOSIS — Z00.00 HEALTH CARE MAINTENANCE: Primary | ICD-10-CM

## 2022-10-26 DIAGNOSIS — N94.6 DYSMENORRHEA: ICD-10-CM

## 2022-10-26 PROCEDURE — 0124A PR ADM SARSCOV2 30MCG/0.3ML BST: CPT | Performed by: PHYSICIAN ASSISTANT

## 2022-10-26 PROCEDURE — 90686 IIV4 VACC NO PRSV 0.5 ML IM: CPT | Performed by: PHYSICIAN ASSISTANT

## 2022-10-26 PROCEDURE — 90471 IMMUNIZATION ADMIN: CPT | Performed by: PHYSICIAN ASSISTANT

## 2022-10-26 PROCEDURE — 99396 PREV VISIT EST AGE 40-64: CPT | Performed by: PHYSICIAN ASSISTANT

## 2022-10-26 PROCEDURE — 91312 COVID-19 (PFIZER) BIVALENT BOOSTER 12+YRS: CPT | Performed by: PHYSICIAN ASSISTANT

## 2022-10-26 PROCEDURE — 90677 PCV20 VACCINE IM: CPT | Performed by: PHYSICIAN ASSISTANT

## 2022-10-26 NOTE — PROGRESS NOTES
Immunization  Immunization performed in left and  right deltoid by Nava Meeks MA. Patient tolerated the procedure well without complications.  10/26/22   Nava Meeks MA

## 2022-10-26 NOTE — PROGRESS NOTES
"Chief Complaint   Patient presents with   • Annual Exam   • Asthma   • Heartburn   • Hypertension       Subjective     History of Present Illness    Bernardo Dodd is a 42 y.o. female. The patient presents to the clinic for an annual exam.    Has a Z-Demond in the event she gets sick. ENT, Dr. Dowd informed her that ears look fine other than built up ear wax and cotton from using cotton swabs. ENT instructed on proper ear cleaning. Reports when she used the medication for the ear infection, she felt a swelling on one side in the front of her ear. Reports that ENT ordered an ultrasound of jaw and neck.     Saw orthopedist due to sustaining an ankle injury. Was prescribed a topical gel, which she mixes with Aspercreme with good benefit. Only wearing compression braces now. Discontinued using the knee brace.     Follows with Blank Garcia LCSW, psychiatry, and has an appointment scheduled in 12/2022. Would like to see her earlier in 11/2022 due to grief around the holiday season.    Describes right-sided axilla and below with a painful \"bubble.\"    Takes Celebrex once per day for back pain, and started out taking 2 tablets daily. Still experiencing back pain.        The patient is being seen for a health maintenance evaluation.  The last health maintenance was 1 year(s) ago.    Social History  Bernardo  does not smoke cigarettes. Quit smoking cigarettes 2 years ago, smoked 1 ppd since age 16, 22 years; smoked cigars for the last 2 years but stopped in the last few months  She drinks occasional alcohol.  She does use illicit drugs. Smokes marijuana 3-4 times a week.    General History  Bernardo  does not have regular dental visits.  She does complain of vision problems. Wears glasses and contacts.  Last eye exam was 4/2022.  Immunizations are not up to date. The patient needs the following immunizations: prevnar 20 and Covid booster today    Lifestyle  Bernardo  consumes in general, a \"healthy\" diet  .  She exercises " daily. On her feet at work.    Reproductive Health  Bernardo  is premenopausal.  She reports periods are regular every 28-30 days.  She is sexually active. Her contraceptive plan is bilateral tubal ligation.    Screening  Last pap was 2020 by Dr. Melchor. History of abnormal pap smear or family history of gyn cancer: none  Last mammogram was  2/2022. Personal or family history of abnormal mammograms or breast cancer: none  Last colonoscopy was never. Family history of colon cancer: none  Last DEXA was never.                 Current Outpatient Medications:   •  albuterol sulfate  (90 Base) MCG/ACT inhaler, Inhale 2 puffs Every 4 (Four) Hours As Needed for Wheezing or Shortness of Air., Disp: 18 g, Rfl: 2  •  amLODIPine (NORVASC) 5 MG tablet, Take 1 tablet by mouth Daily., Disp: 90 tablet, Rfl: 0  •  celecoxib (CeleBREX) 200 MG capsule, Take 1 capsule by mouth Daily., Disp: 30 capsule, Rfl: 0  •  cetirizine (zyrTEC) 10 MG tablet, Take 10 mg by mouth Daily., Disp: , Rfl:   •  Diclofenac Sodium (VOLTAREN) 1 % gel gel, Apply 4 g topically to the appropriate area as directed 3 (Three) Times a Day As Needed (As needed to ankle for pain and swelling) for up to 30 days., Disp: 350 g, Rfl: 3  •  fluticasone (Flonase) 50 MCG/ACT nasal spray, 2 sprays into the nostril(s) as directed by provider Daily., Disp: 16 g, Rfl: 5  •  furosemide (LASIX) 20 MG tablet, Take 1 tablet by mouth Daily., Disp: 6 tablet, Rfl: 0  •  lamoTRIgine (LaMICtal) 200 MG tablet, Take 1 tablet by mouth Daily., Disp: 30 tablet, Rfl: 1  •  OLANZapine (ZyPREXA) 10 MG tablet, Take 0.5 tablets by mouth Daily for 1 day, THEN 1 tablet Daily., Disp: 30 tablet, Rfl: 2     PMFSH  The following portions of the patient's history were reviewed and updated as appropriate: allergies, current medications, past family history, past medical history, past social history, past surgical history and problem list.    Review of Systems   Constitutional: Negative for  appetite change, fever and unexpected weight change.   HENT: Negative.  Negative for ear pain, facial swelling and sore throat.    Eyes: Negative for pain and visual disturbance.   Respiratory: Negative for chest tightness, shortness of breath and wheezing.         No nipple discharge or breast mass   Cardiovascular: Negative for chest pain and palpitations.   Gastrointestinal: Negative for abdominal pain and blood in stool.   Endocrine: Negative.    Genitourinary: Positive for menstrual problem. Negative for difficulty urinating, dyspareunia, dysuria, frequency, hematuria, pelvic pain, vaginal discharge and vaginal pain.   Musculoskeletal: Negative for joint swelling.   Skin: Negative for color change, rash and wound.   Allergic/Immunologic: Negative.    Neurological: Negative for dizziness, tremors, seizures and syncope.   Hematological: Negative for adenopathy.   Psychiatric/Behavioral: Negative.    All other systems reviewed and are negative.      Objective   /88   Pulse 74   Temp 97.4 °F (36.3 °C)   Wt 75.6 kg (166 lb 9.6 oz)   SpO2 95%   BMI 33.63 kg/m²     Physical Exam  Vitals and nursing note reviewed.   Constitutional:       General: She is not in acute distress.     Appearance: She is well-developed. She is not diaphoretic.   HENT:      Head: Normocephalic and atraumatic. Hair is normal.      Right Ear: Hearing, tympanic membrane, ear canal and external ear normal. No decreased hearing noted. No drainage.      Left Ear: Hearing, tympanic membrane, ear canal and external ear normal. No decreased hearing noted.      Nose: No nasal deformity.   Eyes:      General: Lids are normal. Lids are everted, no foreign bodies appreciated.         Right eye: No discharge.         Left eye: No discharge.      Conjunctiva/sclera: Conjunctivae normal.      Pupils: Pupils are equal, round, and reactive to light.   Neck:      Thyroid: No thyromegaly.      Vascular: No JVD.      Trachea: No tracheal deviation.    Cardiovascular:      Rate and Rhythm: Normal rate and regular rhythm.      Pulses: Normal pulses.      Heart sounds: Normal heart sounds. No murmur heard.    No friction rub. No gallop.   Pulmonary:      Effort: Pulmonary effort is normal. No respiratory distress.      Breath sounds: Normal breath sounds. No wheezing or rales.   Chest:      Chest wall: No tenderness.   Abdominal:      General: Bowel sounds are normal. There is no distension.      Palpations: Abdomen is soft. There is no mass.      Tenderness: There is no abdominal tenderness. There is no guarding or rebound.      Hernia: No hernia is present.   Musculoskeletal:         General: No tenderness or deformity. Normal range of motion.      Cervical back: Normal range of motion and neck supple.   Lymphadenopathy:      Cervical: No cervical adenopathy.   Skin:     General: Skin is warm and dry.      Findings: No erythema or rash.   Neurological:      Mental Status: She is alert and oriented to person, place, and time.      Cranial Nerves: No cranial nerve deficit.      Motor: No abnormal muscle tone.      Coordination: Coordination normal.      Deep Tendon Reflexes: Reflexes are normal and symmetric. Reflexes normal.   Psychiatric:         Behavior: Behavior normal.         Thought Content: Thought content normal.         Judgment: Judgment normal.         Results for orders placed or performed in visit on 10/07/22   POCT SARS-CoV-2 Antigen PRADEEP + Flu    Specimen: Swab   Result Value Ref Range    SARS Antigen Not Detected Not Detected, Presumptive Negative    Influenza A Antigen PRADEEP Not Detected Not Detected    Influenza B Antigen PRADEEP Not Detected Not Detected    Internal Control Passed Passed    Lot Number [id number]     Expiration Date 3/15/23         ASSESSMENT/PLAN    Diagnoses and all orders for this visit:    1. Health care maintenance (Primary)  Comments:  Return for fasting labs. Order placed for mammogram. Continue to exercise and eat healthy.  Advised ordering compression socks.  Assessment & Plan:  Immunizations: up to date  Eye exam: done 2018  Pap Smear: done 2018, repeat 2021  Mammogram: done 11/2019, due 11/2020  Dexa: due postmenopausal  Colonoscopy: due age 43  Labs: fasting labs ordered    Counseled patient regarding multimodal approach with healthy nutrition, healthy sleep, regular physical activity, social activities, counseling, safety measures and medications.             Orders:  -     Lipid Panel; Future  -     TSH; Future  -     Comprehensive Metabolic Panel; Future  -     CBC & Differential; Future    2. Need for influenza vaccination  -     FluLaval/Fluarix/Fluzone >6 Months    3. Dysmenorrhea  Comments:  Referral placed to GYN for Pap test. She will be called to schedule an appointment.  Orders:  -     Ambulatory Referral to Obstetrics / Gynecology    4. Need for vaccination  -     COVID-19 Bivalent Booster (Pfizer) 12+yrs  -     Pneumococcal Conjugate Vaccine 20-Valent (PCV20)    Ankle injury left  Continue Celebrex. Keep follow-up appointment with orthopedist in 1 month.         Return in about 1 year (around 10/26/2023) for Annual with fasting labs.   Transcribed from ambient dictation for SUHA Leyva by Farhana Vick.  10/26/22   14:17 EDT    Patient or patient representative verbalized consent to the visit recording.  I have personally performed the services described in this document as transcribed by the above individual, and it is both accurate and complete.  SUHA Leyva  10/27/2022  12:45 EDT

## 2022-12-02 ENCOUNTER — OFFICE VISIT (OUTPATIENT)
Dept: ORTHOPEDIC SURGERY | Facility: CLINIC | Age: 42
End: 2022-12-02

## 2022-12-02 VITALS
BODY MASS INDEX: 34.56 KG/M2 | SYSTOLIC BLOOD PRESSURE: 134 MMHG | WEIGHT: 171.4 LBS | HEIGHT: 59 IN | DIASTOLIC BLOOD PRESSURE: 76 MMHG

## 2022-12-02 DIAGNOSIS — M25.572 ACUTE LEFT ANKLE PAIN: ICD-10-CM

## 2022-12-02 DIAGNOSIS — S93.402D SPRAIN OF LEFT ANKLE, UNSPECIFIED LIGAMENT, SUBSEQUENT ENCOUNTER: Primary | ICD-10-CM

## 2022-12-02 DIAGNOSIS — S99.912D INJURY OF LEFT ANKLE, SUBSEQUENT ENCOUNTER: ICD-10-CM

## 2022-12-02 PROCEDURE — 99214 OFFICE O/P EST MOD 30 MIN: CPT

## 2022-12-02 NOTE — PROGRESS NOTES
[facility] Follow Up       Office Follow Up Visit       Patient Name: Bernardo Dodd    Chief Complaint:   Chief Complaint   Patient presents with   • Left Ankle - Follow-up     6 week f/u, sprain of unspecified ligament of left ankle   • Left Knee - Initial Evaluation       Referring Physician: No ref. provider found    History of Present Illness:   Bernardo Dodd returns to clinic today for left ankle pain. She says her ankle continues to cause pain. It is unchanged from last visit on 10/14/2022. She says she has been doing home exercises. Wearing compression socks but notes continued swelling. She takes Celebrex for pain.      Subjective     Review of Systems     I have reviewed and updated the following portions of the patient's history and review of systems: allergies, current medications, past family history, past medical history, past social history, past surgical history and problem list.    Medications:   Current Outpatient Medications:   •  albuterol sulfate  (90 Base) MCG/ACT inhaler, Inhale 2 puffs Every 4 (Four) Hours As Needed for Wheezing or Shortness of Air., Disp: 18 g, Rfl: 2  •  amLODIPine (NORVASC) 5 MG tablet, Take 1 tablet by mouth Daily., Disp: 90 tablet, Rfl: 0  •  celecoxib (CeleBREX) 200 MG capsule, Take 1 capsule by mouth Daily., Disp: 30 capsule, Rfl: 0  •  cetirizine (zyrTEC) 10 MG tablet, Take 10 mg by mouth Daily., Disp: , Rfl:   •  Diclofenac Sodium (VOLTAREN) 1 % gel gel, diclofenac 1 % topical gel, Disp: , Rfl:   •  fluticasone (Flonase) 50 MCG/ACT nasal spray, 2 sprays into the nostril(s) as directed by provider Daily., Disp: 16 g, Rfl: 5  •  furosemide (LASIX) 20 MG tablet, Take 1 tablet by mouth Daily., Disp: 6 tablet, Rfl: 0  •  lamoTRIgine (LaMICtal) 200 MG tablet, Take 1 tablet by mouth Daily., Disp: 30 tablet, Rfl: 1  •  OLANZapine (ZyPREXA) 10 MG tablet, Take 0.5 tablets by  "mouth Daily for 1 day, THEN 1 tablet Daily., Disp: 30 tablet, Rfl: 2    Allergies:   Allergies   Allergen Reactions   • Naproxen Swelling   • Sulfa Antibiotics Rash         Objective      Vital Signs:   Vitals:    12/02/22 1034   BP: 134/76   Weight: 77.7 kg (171 lb 6.4 oz)   Height: 149.9 cm (59.02\")       Ortho Exam:  Peripheral Vascular:  Lower Extremity:  Inspection:  Left--rapid capillary refill  Palpation:  Dorsalis pedis pulse:  Left--normal    Neurologic  Sensory:    Light Touch:     Left foot:  Dorsal intact and plantar intact    Overall Assessment of Muscle Strength and Tone:  Lower Extremities:       Left:  Tibialis anterior--5/5    Gastroc soleus--5/5    EHL--5/5    FHL--5/5    Musculoskeletal  Lower Extremity  Ankle/Foot:  Inspection and Palpation:        Left:  Tenderness: Tender to palpation on lateral aspect of ankle    Swelling:Mildly swollen    Effusion:  None    Crepitus:  None     ROM:     Left: Plantarflexion--50    Dorsiflexion--20    Inversion--10    Eversion--10    Instability:          Left: Anterior drawer test--negative    Squeeze test--negative      Results Review:    XR Ankle 3+ View Left    Result Date: 9/28/2022  No acute fracture or traumatic malalignment identified.  This report was finalized on 9/28/2022 9:43 PM by Madi Santos MD.          Assessment / Plan      Assessment:   Diagnoses and all orders for this visit:    1. Sprain of left ankle, unspecified ligament, subsequent encounter (Primary)  -     MRI Ankle Left Without Contrast; Future    2. Acute left ankle pain  -     MRI Ankle Left Without Contrast; Future    3. Injury of left ankle, subsequent encounter          Plan:  1. Given walking boot to wear at all times. May come out for stretching and showering.   2. MRI of ankle ordered to better assess ankle ligaments.  3. Encouraged formal PT to regain proprioception of ankle. She says she is doing ankle exercises at  Home.  4. May continue with Celebrex or " over-the-counter anti-inflammatories for pain.    Follow Up:   No follow-ups on file.        Adrienne Hawthorne PA-C  [facility]    Dictated using Dragon Speech Recognition.

## 2022-12-04 ENCOUNTER — HOSPITAL ENCOUNTER (EMERGENCY)
Facility: HOSPITAL | Age: 42
Discharge: HOME | End: 2022-12-04
Attending: EMERGENCY MEDICINE | Admitting: EMERGENCY MEDICINE

## 2022-12-04 ENCOUNTER — APPOINTMENT (OUTPATIENT)
Dept: GENERAL RADIOLOGY | Facility: HOSPITAL | Age: 42
End: 2022-12-04

## 2022-12-04 VITALS
RESPIRATION RATE: 16 BRPM | HEIGHT: 59 IN | WEIGHT: 175 LBS | BODY MASS INDEX: 35.28 KG/M2 | OXYGEN SATURATION: 100 % | HEART RATE: 91 BPM | DIASTOLIC BLOOD PRESSURE: 96 MMHG | TEMPERATURE: 97.8 F | SYSTOLIC BLOOD PRESSURE: 144 MMHG

## 2022-12-04 DIAGNOSIS — M79.605 ACUTE PAIN OF LEFT LOWER EXTREMITY: Primary | ICD-10-CM

## 2022-12-04 DIAGNOSIS — R60.0 EDEMA OF LEFT LOWER EXTREMITY: ICD-10-CM

## 2022-12-04 PROCEDURE — 99283 EMERGENCY DEPT VISIT LOW MDM: CPT

## 2022-12-04 PROCEDURE — 73590 X-RAY EXAM OF LOWER LEG: CPT

## 2022-12-04 RX ADMIN — APIXABAN 10 MG: 5 TABLET, FILM COATED ORAL at 20:07

## 2022-12-05 ENCOUNTER — HOSPITAL ENCOUNTER (OUTPATIENT)
Dept: CARDIOLOGY | Facility: HOSPITAL | Age: 42
Discharge: HOME | End: 2022-12-05
Admitting: PHYSICIAN ASSISTANT

## 2022-12-05 ENCOUNTER — TELEPHONE (OUTPATIENT)
Dept: ORTHOPEDIC SURGERY | Facility: CLINIC | Age: 42
End: 2022-12-05

## 2022-12-05 VITALS — BODY MASS INDEX: 34.47 KG/M2 | WEIGHT: 171 LBS | HEIGHT: 59 IN

## 2022-12-05 DIAGNOSIS — M79.605 ACUTE PAIN OF LEFT LOWER EXTREMITY: ICD-10-CM

## 2022-12-05 DIAGNOSIS — R60.0 EDEMA OF LEFT LOWER EXTREMITY: ICD-10-CM

## 2022-12-05 LAB
BH CV LOWER VASCULAR LEFT COMMON FEMORAL AUGMENT: NORMAL
BH CV LOWER VASCULAR LEFT COMMON FEMORAL COMPETENT: NORMAL
BH CV LOWER VASCULAR LEFT COMMON FEMORAL COMPRESS: NORMAL
BH CV LOWER VASCULAR LEFT COMMON FEMORAL PHASIC: NORMAL
BH CV LOWER VASCULAR LEFT COMMON FEMORAL SPONT: NORMAL
BH CV LOWER VASCULAR LEFT DISTAL FEMORAL COMPRESS: NORMAL
BH CV LOWER VASCULAR LEFT GASTRONEMIUS COMPRESS: NORMAL
BH CV LOWER VASCULAR LEFT GREATER SAPH AK COMPRESS: NORMAL
BH CV LOWER VASCULAR LEFT GREATER SAPH BK COMPRESS: NORMAL
BH CV LOWER VASCULAR LEFT LESSER SAPH COMPRESS: NORMAL
BH CV LOWER VASCULAR LEFT MID FEMORAL AUGMENT: NORMAL
BH CV LOWER VASCULAR LEFT MID FEMORAL COMPETENT: NORMAL
BH CV LOWER VASCULAR LEFT MID FEMORAL COMPRESS: NORMAL
BH CV LOWER VASCULAR LEFT MID FEMORAL PHASIC: NORMAL
BH CV LOWER VASCULAR LEFT MID FEMORAL SPONT: NORMAL
BH CV LOWER VASCULAR LEFT PERONEAL COMPRESS: NORMAL
BH CV LOWER VASCULAR LEFT POPLITEAL AUGMENT: NORMAL
BH CV LOWER VASCULAR LEFT POPLITEAL COMPETENT: NORMAL
BH CV LOWER VASCULAR LEFT POPLITEAL COMPRESS: NORMAL
BH CV LOWER VASCULAR LEFT POPLITEAL PHASIC: NORMAL
BH CV LOWER VASCULAR LEFT POPLITEAL SPONT: NORMAL
BH CV LOWER VASCULAR LEFT POSTERIOR TIBIAL COMPRESS: NORMAL
BH CV LOWER VASCULAR LEFT PROFUNDA FEMORAL COMPRESS: NORMAL
BH CV LOWER VASCULAR LEFT PROXIMAL FEMORAL COMPRESS: NORMAL
BH CV LOWER VASCULAR LEFT SAPHENOFEMORAL JUNCTION COMPRESS: NORMAL
BH CV LOWER VASCULAR RIGHT COMMON FEMORAL AUGMENT: NORMAL
BH CV LOWER VASCULAR RIGHT COMMON FEMORAL COMPETENT: NORMAL
BH CV LOWER VASCULAR RIGHT COMMON FEMORAL COMPRESS: NORMAL
BH CV LOWER VASCULAR RIGHT COMMON FEMORAL PHASIC: NORMAL
BH CV LOWER VASCULAR RIGHT COMMON FEMORAL SPONT: NORMAL
MAXIMAL PREDICTED HEART RATE: 176 BPM
STRESS TARGET HR: 150 BPM

## 2022-12-05 PROCEDURE — 93971 EXTREMITY STUDY: CPT | Performed by: INTERNAL MEDICINE

## 2022-12-05 PROCEDURE — 93971 EXTREMITY STUDY: CPT

## 2022-12-05 NOTE — ED PROVIDER NOTES
Subjective   History of Present Illness  42-year-old female presents emergency department today with pain in the left calf.  Patient reports she had an injury sometime back in October that she been having some swelling in the left calf.  She also was put in a walking boot which states has been causing more pain and now she having pain over the anterior tibia.  She states that she is never had a blood clot in the past she is not any chest pain or shortness of breath.  The calf seems swollen.  She has not been evaluated for DVT in the past.  She had no fevers no chills.  She had no red streaks no redness of the calf.  She has no other complaints.    History provided by:  Patient   used: No    Leg Pain  Location:  Leg  Injury: yes    Mechanism of injury: fall    Fall:     Fall occurred:  Walking    Impact surface:  [place]  Leg location:  L lower leg  Pain details:     Quality:  Aching and dull    Radiates to:  Does not radiate    Onset quality:  Gradual    Timing:  Constant    Progression:  Worsening  Chronicity:  Recurrent  Dislocation: no    Foreign body present:  No foreign bodies  Tetanus status:  Up to date  Prior injury to area:  Yes  Relieved by:  Rest  Worsened by:  Nothing  Ineffective treatments:  None tried  Associated symptoms: no back pain, no decreased ROM, no fever, no itching, no numbness and no tingling    Risk factors: no concern for non-accidental trauma, no known bone disorder, no obesity and no recent illness        Review of Systems   Constitutional: Negative for chills and fever.   Respiratory: Negative for chest tightness, shortness of breath and wheezing.    Cardiovascular: Negative for chest pain and palpitations.   Genitourinary: Negative for decreased urine volume, dysuria and frequency.   Musculoskeletal: Negative for back pain.   Skin: Negative for itching, pallor and rash.   Psychiatric/Behavioral: Negative.    All other systems reviewed and are negative.      Past  Medical History:   Diagnosis Date   • Allergic rhinitis    • Anemia    • Arthritis    • Asthma    • Bartholin gland cyst    • Chlamydia    • Depression    • FHx: migraine headaches    • GERD (gastroesophageal reflux disease)    • Heart murmur    • Herpes simplex    • History of chest x-ray 2015    no active disease   • History of echocardiogram 2015    ejection fraction of greater than 65%, mitral and pulmonic regurgitation an physiological tricuspid regurgitation.   • History of PFTs 2015    spirometry data acceptable and reproducible; pt given 4 puffs of Ventolin; pt gave good effort; no obstruction; no Bd response; MVV reduced    • History of PFTs 2015    pt gave best effort; duoneb given prior and post study; moderate nonspecific proportional reduction of FEV1 and FVC with preserved ratio; FEV1 moderately reduced; cannot rule out restriction   • Hypertension    • Kidney stones    • Migraine    • Ovarian cyst    • Pelvic pain    • Screening breast examination     self;admits   • Seizures (HCC)    • STD (female)    • Thigh shingles    • Trichomonas infection        Allergies   Allergen Reactions   • Naproxen Swelling   • Sulfa Antibiotics Rash       Past Surgical History:   Procedure Laterality Date   • BILATERAL BREAST REDUCTION     • BREAST BIOPSY     • BREAST SURGERY      breast reduction   •  SECTION     • CYSTOSCOPY     • DIAGNOSTIC LAPAROSCOPY     • INCISION AND DRAINAGE ABSCESS      bartholin's   • LAPAROSCOPIC TUBAL LIGATION     • ORIF ANKLE FRACTURE Right    • REDUCTION MAMMAPLASTY Bilateral    • TENSION FREE VAGINAL TAPING WITH MINI ARC SLING      Dr Doyle avery        Family History   Problem Relation Age of Onset   • Pancreatic cancer Maternal Grandmother    • Cancer Maternal Grandmother    • Heart failure Paternal Grandmother    • MARCIE disease Paternal Aunt    • Arthritis Mother    • Cancer Mother    • Bipolar disorder Mother    • Arthritis Father    • Dementia  Father    • Pancreatic cancer Other    • Diabetes Other    • Heart disease Other    • Hypertension Other    • Other Other         RESPIRATORY DISEASE   • Heart attack Neg Hx    • Hyperlipidemia Neg Hx    • Mental illness Neg Hx    • Obesity Neg Hx    • Stroke Neg Hx    • Breast cancer Neg Hx    • Ovarian cancer Neg Hx        Social History     Socioeconomic History   • Marital status: Single   Tobacco Use   • Smoking status: Former     Packs/day: 0.00     Years: 15.00     Pack years: 0.00     Types: Cigarettes, Cigars     Quit date: 2015     Years since quittin.8   • Smokeless tobacco: Never   • Tobacco comments:     I quit every so often. Then i start again.   Vaping Use   • Vaping Use: Some days   • Devices: Disposable   • Passive vaping exposure: Yes   Substance and Sexual Activity   • Alcohol use: No   • Drug use: Not Currently     Types: Marijuana     Comment: Marijuana once a week. Tried cocaine, meth, pain pills in the past   • Sexual activity: Yes     Partners: Male     Birth control/protection: Tubal ligation           Objective   Physical Exam  Vitals and nursing note reviewed.   Constitutional:       General: She is not in acute distress.     Appearance: She is well-developed. She is not diaphoretic.   HENT:      Head: Normocephalic and atraumatic.      Nose: Nose normal.   Eyes:      General: No scleral icterus.     Conjunctiva/sclera: Conjunctivae normal.   Cardiovascular:      Rate and Rhythm: Normal rate and regular rhythm.      Heart sounds: Normal heart sounds. No murmur heard.  Pulmonary:      Effort: Pulmonary effort is normal. No respiratory distress.      Breath sounds: Normal breath sounds.   Abdominal:      General: Bowel sounds are normal.      Palpations: Abdomen is soft.      Tenderness: There is no abdominal tenderness.   Musculoskeletal:      Cervical back: Normal range of motion and neck supple.      Comments: Left calf is edematous tender.  There is no redness no induration  "distal pulses strong and equal it is more edematous than the right.   Skin:     General: Skin is warm and dry.   Neurological:      Mental Status: She is alert and oriented to person, place, and time.   Psychiatric:         Behavior: Behavior normal.         Procedures           ED Course                No results found for this or any previous visit (from the past 24 hour(s)).  Note: In addition to lab results from this visit, the labs listed above may include labs taken at another facility or during a different encounter within the last 24 hours. Please correlate lab times with ED admission and discharge times for further clarification of the services performed during this visit.    XR Tibia Fibula 2 View Left   Final Result   1.  Nonspecific diffuse soft tissue edema.    2.  No radiographic findings of acute osseous tibia or fibula   abnormality.       This report was finalized on 12/4/2022 8:13 PM by Otilio Smart MD.            Vitals:    12/04/22 1922   BP: 144/96   BP Location: Left arm   Patient Position: Sitting   Pulse: 91   Resp: 16   Temp: 97.8 °F (36.6 °C)   TempSrc: Oral   SpO2: 100%   Weight: 79.4 kg (175 lb)   Height: 149.9 cm (59\")     Medications   apixaban (ELIQUIS) tablet 10 mg (10 mg Oral Given 12/4/22 2007)     ECG/EMG Results (last 24 hours)     ** No results found for the last 24 hours. **        No orders to display                                  MDM  Number of Diagnoses or Management Options  Acute pain of left lower extremity: new and requires workup  Edema of left lower extremity: new and requires workup     Amount and/or Complexity of Data Reviewed  Tests in the radiology section of CPT®: reviewed and ordered  Discuss the patient with other providers: yes    Patient Progress  Patient progress: stable      Final diagnoses:   Acute pain of left lower extremity   Edema of left lower extremity       ED Disposition  ED Disposition     ED Disposition   Discharge    Condition   Stable    " Comment   --             Nelly Sotelo PA  [address] [place] [id number]  [phone]               Medication List      No changes were made to your prescriptions during this visit.          Van Gregg PA  12/05/22 0738

## 2022-12-05 NOTE — TELEPHONE ENCOUNTER
Caller: PATIENT    Relationship to patient: SELF     Best call back number: [phone]    Patient is needing: PATIENT WAS CALLING TO SEE MS. SUHA JENNINGS COULD PUT IN AN ORDER FOR HER TO GET A SCOOTER. PATIENT STATED SHE WAS PUT IN AN AIR CAST FOR HER LEFT LEG AND THE CRUTCHES SHE IS USING ARE HURTING HER. PATIENT WOULD LIKE A CALL BACK TO DISCUSS THIS ASAP. THANK YOU!

## 2022-12-06 DIAGNOSIS — S93.402D SPRAIN OF LEFT ANKLE, UNSPECIFIED LIGAMENT, SUBSEQUENT ENCOUNTER: Primary | ICD-10-CM

## 2022-12-06 DIAGNOSIS — M25.572 ACUTE LEFT ANKLE PAIN: ICD-10-CM

## 2022-12-06 DIAGNOSIS — S99.912A INJURY OF LEFT ANKLE, INITIAL ENCOUNTER: ICD-10-CM

## 2022-12-12 ENCOUNTER — TELEPHONE (OUTPATIENT)
Dept: SURGERY | Facility: CLINIC | Age: 42
End: 2022-12-12

## 2022-12-12 NOTE — TELEPHONE ENCOUNTER
Caller: Bernardo Dodd    Relationship: Self    Best call back number:     What orders are you requesting (i.e. lab or imaging): MRI OF LEFT KNEE AS WELL AS LEFT ANKLE    In what timeframe would the patient need to come in: ASAP    Where will you receive your lab/imaging services: [facility]

## 2022-12-12 NOTE — TELEPHONE ENCOUNTER
I called patient and advised her that we would need to see her first before ordering the knee MRI because we have not evaluated her for knee pain, only ankle pain. I made her an appointment with Adrienne to get the knee looked at.  Martita Mae RT (R), ROT

## 2022-12-13 ENCOUNTER — OFFICE VISIT (OUTPATIENT)
Dept: ORTHOPEDIC SURGERY | Facility: CLINIC | Age: 42
End: 2022-12-13

## 2022-12-13 VITALS
DIASTOLIC BLOOD PRESSURE: 92 MMHG | SYSTOLIC BLOOD PRESSURE: 159 MMHG | HEIGHT: 59 IN | BODY MASS INDEX: 34.49 KG/M2 | WEIGHT: 171.08 LBS

## 2022-12-13 DIAGNOSIS — M25.562 LEFT KNEE PAIN, UNSPECIFIED CHRONICITY: Primary | ICD-10-CM

## 2022-12-13 DIAGNOSIS — M17.12 PRIMARY OSTEOARTHRITIS OF LEFT KNEE: ICD-10-CM

## 2022-12-13 PROCEDURE — 99213 OFFICE O/P EST LOW 20 MIN: CPT

## 2022-12-13 NOTE — PROGRESS NOTES
[facility] Office Visit - Adrienne Hawthorne PA-C    Office Visit       Patient Name: Bernardo Dodd    Chief Complaint:   Chief Complaint   Patient presents with   • Left Knee - Pain       Referring Physician: No ref. provider found    History of Present Illness:   Bernardo Dodd is a 42 y.o. female who presents with left knee pain.  Started approximately 2 months ago.  She says it began around the same time as her ankle injury.  Rates pain an 8/10.  Described as aching and throbbing.  Associated with swelling and stiffness.  She says it worsens with working and coming stairs.  She has been using heat and Biofreeze for the pain.  Takes Cymbalta.  She went to the emergency department 12/4/2022 for the pain.  She was given a knee brace and referred here for follow-up.      Subjective     Review of Systems   Constitutional: Negative.  Negative for chills, fatigue and fever.   HENT: Negative.  Negative for congestion and dental problem.    Eyes: Negative.  Negative for blurred vision.   Respiratory: Negative.  Negative for shortness of breath.    Cardiovascular: Negative.  Negative for leg swelling.   Gastrointestinal: Negative.  Negative for abdominal pain.   Endocrine: Negative.  Negative for polyuria.   Genitourinary: Negative.  Negative for difficulty urinating.   Musculoskeletal: Positive for arthralgias.   Skin: Negative.    Allergic/Immunologic: Negative.    Neurological: Negative.    Hematological: Negative.  Negative for adenopathy.   Psychiatric/Behavioral: Negative.  Negative for behavioral problems.        Past Medical History:   Past Medical History:   Diagnosis Date   • Allergic rhinitis    • Anemia    • Arthritis    • Asthma    • Bartholin gland cyst    • Chlamydia    • Depression    • FHx: migraine headaches    • GERD (gastroesophageal reflux disease)    • Heart murmur    • Herpes simplex    • History of chest x-ray  2015    no active disease   • History of echocardiogram 2015    ejection fraction of greater than 65%, mitral and pulmonic regurgitation an physiological tricuspid regurgitation.   • History of PFTs 2015    spirometry data acceptable and reproducible; pt given 4 puffs of Ventolin; pt gave good effort; no obstruction; no Bd response; MVV reduced    • History of PFTs 2015    pt gave best effort; duoneb given prior and post study; moderate nonspecific proportional reduction of FEV1 and FVC with preserved ratio; FEV1 moderately reduced; cannot rule out restriction   • Hypertension    • Kidney stones    • Migraine    • Ovarian cyst    • Pelvic pain    • Screening breast examination     self;admits   • Seizures (HCC)    • STD (female)    • Thigh shingles    • Trichomonas infection        Past Surgical History:   Past Surgical History:   Procedure Laterality Date   • BILATERAL BREAST REDUCTION     • BREAST BIOPSY     • BREAST SURGERY      breast reduction   •  SECTION     • CYSTOSCOPY     • DIAGNOSTIC LAPAROSCOPY     • INCISION AND DRAINAGE ABSCESS      bartholin's   • LAPAROSCOPIC TUBAL LIGATION     • ORIF ANKLE FRACTURE Right    • REDUCTION MAMMAPLASTY Bilateral    • TENSION FREE VAGINAL TAPING WITH MINI ARC SLING      Dr Doyle avery        Family History:   Family History   Problem Relation Age of Onset   • Pancreatic cancer Maternal Grandmother    • Cancer Maternal Grandmother    • Heart failure Paternal Grandmother    • MARCIE disease Paternal Aunt    • Arthritis Mother    • Cancer Mother    • Bipolar disorder Mother    • Arthritis Father    • Dementia Father    • Pancreatic cancer Other    • Diabetes Other    • Heart disease Other    • Hypertension Other    • Other Other         RESPIRATORY DISEASE   • Heart attack Neg Hx    • Hyperlipidemia Neg Hx    • Mental illness Neg Hx    • Obesity Neg Hx    • Stroke Neg Hx    • Breast cancer Neg Hx    • Ovarian cancer Neg Hx        Social  History:   Social History     Socioeconomic History   • Marital status: Single   Tobacco Use   • Smoking status: Former     Packs/day: 0.00     Years: 15.00     Pack years: 0.00     Types: Cigarettes, Cigars     Quit date: 2015     Years since quittin.9   • Smokeless tobacco: Never   • Tobacco comments:     I quit every so often. Then i start again.   Vaping Use   • Vaping Use: Some days   • Devices: Disposable   • Passive vaping exposure: Yes   Substance and Sexual Activity   • Alcohol use: No   • Drug use: Not Currently     Types: Marijuana     Comment: Marijuana once a week. Tried cocaine, meth, pain pills in the past   • Sexual activity: Yes     Partners: Male     Birth control/protection: Tubal ligation       Medications:   Current Outpatient Medications:   •  albuterol sulfate  (90 Base) MCG/ACT inhaler, Inhale 2 puffs Every 4 (Four) Hours As Needed for Wheezing or Shortness of Air., Disp: 18 g, Rfl: 2  •  amLODIPine (NORVASC) 5 MG tablet, Take 1 tablet by mouth Daily., Disp: 90 tablet, Rfl: 0  •  celecoxib (CeleBREX) 200 MG capsule, Take 1 capsule by mouth Daily., Disp: 30 capsule, Rfl: 0  •  cetirizine (zyrTEC) 10 MG tablet, Take 10 mg by mouth Daily., Disp: , Rfl:   •  Diclofenac Sodium (VOLTAREN) 1 % gel gel, diclofenac 1 % topical gel, Disp: , Rfl:   •  fluticasone (Flonase) 50 MCG/ACT nasal spray, 2 sprays into the nostril(s) as directed by provider Daily., Disp: 16 g, Rfl: 5  •  furosemide (LASIX) 20 MG tablet, Take 1 tablet by mouth Daily., Disp: 6 tablet, Rfl: 0  •  lamoTRIgine (LaMICtal) 200 MG tablet, Take 1 tablet by mouth Daily., Disp: 30 tablet, Rfl: 1  •  OLANZapine (ZyPREXA) 10 MG tablet, Take 0.5 tablets by mouth Daily for 1 day, THEN 1 tablet Daily., Disp: 30 tablet, Rfl: 2    Allergies:   Allergies   Allergen Reactions   • Naproxen Swelling   • Sulfa Antibiotics Rash       I have reviewed and updated the following portions of the patient's history and review of systems:  "allergies, current medications, past family history, past medical history, past social history, past surgical history and problem list.    Objective      Vital Signs:   Vitals:    12/13/22 0814   BP: 159/92   Weight: 77.6 kg (171 lb 1.2 oz)   Height: 149.9 cm (59.02\")       Ortho Exam:  Knee Exam: LEFT  ----------  ALIGNMENT: neutral, no varus or valgus deformity     RANGE OF MOTION:  Normal (0-120 degrees) with no extensor lag or flexion contracture  LIGAMENTOUS STABILITY:   stable to varus and valgus stress at terminal extension and 30 degrees without any evidence of laxity     STRENGTH:  5/5 knee flexion, extension. 5/5 ankle dorsiflexion and plantarflexion.     PAIN WITH PALPATION: denies tenderness to palpation about the knee, denies medial or lateral joint line pain  KNEE EFFUSION: no  PAIN WITH KNEE ROM: no    STRAIGHT LEG TEST:   negative    SENSATION TO LIGHT TOUCH:  DEEP PERONEAL/SUPERFICIAL PERONEAL/SURAL/SAPHENOUS/TIBIAL:   intact    EDEMA:  no  ERYTHEMA:  no  WOUNDS/INCISIONS: no overlying skin problems.        Results Review:   XR Knee 4+ View Left  Imaging: knee x-rays 4 views    Side: LEFT KNEE    Indication for x-rays: knee pain    Comparison: None    Findings:   Left knee 4 views shows mild baseline medial joint space narrowing   bilaterally minimal arthritic findings noted.  No obvious knee effusion.    I personally reviewed the above x-rays.         Assessment / Plan      Assessment:  Diagnoses and all orders for this visit:    1. Left knee pain, unspecified chronicity (Primary)  -     Cancel: XR Knee 4+ View Left    2. Primary osteoarthritis of left knee      Reviewed emergency department notes.  Reviewed x-rays from today.      No evidence of acute injury.  Mild chronic changes shown on x-ray.     Plan:  1. Continue with heat, elevation, Biofreeze as needed.  2. May continue with brace for stability.    May be associated with ankle injury.  We have ordered ankle MRI due to persistent pain with " ankle sprain.  We will wait for results before further investigating knee pain.     History, diagnosis and treatment plan discussed with Dr. Villalobos.      Follow Up:   FOLLOWING LEFT ANKLE MRI RESULTS        Adrienne Hawthorne PA-C  [facility]       Dictated using Dragon Speech Recognition.

## 2022-12-14 ENCOUNTER — TELEPHONE (OUTPATIENT)
Dept: INTERNAL MEDICINE | Facility: CLINIC | Age: 42
End: 2022-12-14

## 2022-12-14 NOTE — TELEPHONE ENCOUNTER
Caller: Bernardo Dodd    Relationship: Self    Best call back number: [phone]    Requested Prescriptions:   -Z-GUANAKO 250MG    Pharmacy where request should be sent: [facility] [id number] - [facility] [address] - [phone]  - [phone] FX     Additional details provided by patient: PATIENT STATES THAT NEMESIO PRESCRIBED THIS MEDICATION ON 10/21/22 FOR A SIMILAR ISSUE. SHE IS CURRENTLY EXPERIENCING HEAD ACHES, BODY ACHES, SORE THROAT, CONGESTION     PLEASE ADVISE PATIENT IF AN APPOINTMENT IS REQUIRED FOR THIS REFILL.     Does the patient have less than a 3 day supply:  [x] Yes  [] No    Would you like a call back once the refill request has been completed: [x] Yes [] No    If the office needs to give you a call back, can they leave a voicemail: [x] Yes [] No    Laron Peace Rep   12/14/22 14:03 EST

## 2022-12-15 ENCOUNTER — OFFICE VISIT (OUTPATIENT)
Dept: INTERNAL MEDICINE | Facility: CLINIC | Age: 42
End: 2022-12-15

## 2022-12-15 VITALS
BODY MASS INDEX: 35.08 KG/M2 | WEIGHT: 173.8 LBS | DIASTOLIC BLOOD PRESSURE: 76 MMHG | SYSTOLIC BLOOD PRESSURE: 130 MMHG | OXYGEN SATURATION: 97 % | TEMPERATURE: 97.9 F | HEART RATE: 82 BPM

## 2022-12-15 DIAGNOSIS — B34.9 VIRAL ILLNESS: ICD-10-CM

## 2022-12-15 DIAGNOSIS — R52 BODY ACHES: Primary | ICD-10-CM

## 2022-12-15 DIAGNOSIS — R51.9 NONINTRACTABLE HEADACHE, UNSPECIFIED CHRONICITY PATTERN, UNSPECIFIED HEADACHE TYPE: ICD-10-CM

## 2022-12-15 PROCEDURE — 99213 OFFICE O/P EST LOW 20 MIN: CPT | Performed by: NURSE PRACTITIONER

## 2022-12-15 PROCEDURE — 87428 SARSCOV & INF VIR A&B AG IA: CPT | Performed by: NURSE PRACTITIONER

## 2022-12-15 NOTE — PROGRESS NOTES
Answers for HPI/ROS submitted by the patient on 12/15/2022  Please describe your symptoms.: Everything hurts.stuffy nose. Headache.chills. body aches. Coughing sneezing. Blowing and coughing up out green mucus. 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 : Working in a assisted living facility  What is the primary reason for your visit?: Other        Office Note     Name: Bernardo Dodd    : 1980     MRN: [id number]     Chief Complaint  Generalized Body Aches, Sore Throat, and Headache    Subjective     History of Present Illness:  Bernardo oDdd is a 42 y.o. female who presents today for evaluation of symptoms.  She has had about 5 to 7 days of symptoms.  They are flulike symptoms with generalized body aches and sore throat.  No particular cough that she notes.  She does report her coworkers were sick with the flu.  She did take a course of a Z-Demond and completed the full course.  She is still having some residual symptoms but denies needing any additional medication sent to the pharmacy      Review of Systems   Constitutional: Negative for chills, fatigue and fever.   HENT: Positive for sore throat.    Eyes: Negative for visual disturbance.   Respiratory: Negative for cough and shortness of breath.    Cardiovascular: Negative for chest pain.   Gastrointestinal: Negative for abdominal pain.   Musculoskeletal: Positive for myalgias.   Skin: Negative for color change.   Allergic/Immunologic: Negative for immunocompromised state.   Neurological: Negative for headaches.   Psychiatric/Behavioral: Negative for behavioral problems.       Past Medical History:   Diagnosis Date   • Allergic rhinitis    • Anemia    • Arthritis    • Asthma    • Bartholin gland cyst    • Chlamydia    • Depression    • FHx:  migraine headaches    • GERD (gastroesophageal reflux disease)    • Heart murmur    • Herpes simplex    • History of chest x-ray 2015    no active disease   • History of echocardiogram 2015    ejection fraction of greater than 65%, mitral and pulmonic regurgitation an physiological tricuspid regurgitation.   • History of PFTs 2015    spirometry data acceptable and reproducible; pt given 4 puffs of Ventolin; pt gave good effort; no obstruction; no Bd response; MVV reduced    • History of PFTs 2015    pt gave best effort; duoneb given prior and post study; moderate nonspecific proportional reduction of FEV1 and FVC with preserved ratio; FEV1 moderately reduced; cannot rule out restriction   • Hypertension    • Kidney stones    • Migraine    • Ovarian cyst    • Pelvic pain    • Screening breast examination     self;admits   • Seizures (HCC)    • STD (female)    • Thigh shingles    • Trichomonas infection        Past Surgical History:   Procedure Laterality Date   • BILATERAL BREAST REDUCTION     • BREAST BIOPSY     • BREAST SURGERY      breast reduction   •  SECTION     • CYSTOSCOPY     • DIAGNOSTIC LAPAROSCOPY     • INCISION AND DRAINAGE ABSCESS      bartholin's   • LAPAROSCOPIC TUBAL LIGATION     • ORIF ANKLE FRACTURE Right    • REDUCTION MAMMAPLASTY Bilateral    • TENSION FREE VAGINAL TAPING WITH MINI ARC SLING      Dr Doyle avery        Social History     Socioeconomic History   • Marital status: Single   Tobacco Use   • Smoking status: Former     Packs/day: 0.00     Years: 15.00     Pack years: 0.00     Types: Cigarettes, Cigars     Quit date: 2015     Years since quittin.9   • Smokeless tobacco: Never   • Tobacco comments:     I quit every so often. Then i start again.   Vaping Use   • Vaping Use: Some days   • Devices: Disposable   • Passive vaping exposure: Yes   Substance and Sexual Activity   • Alcohol use: No   • Drug use: Not Currently     Types: Marijuana  "    Comment: Marijuana once a week. Tried cocaine, meth, pain pills in the past   • Sexual activity: Yes     Partners: Male     Birth control/protection: Tubal ligation         Current Outpatient Medications:   •  albuterol sulfate  (90 Base) MCG/ACT inhaler, Inhale 2 puffs Every 4 (Four) Hours As Needed for Wheezing or Shortness of Air., Disp: 18 g, Rfl: 2  •  amLODIPine (NORVASC) 5 MG tablet, Take 1 tablet by mouth Daily., Disp: 90 tablet, Rfl: 0  •  celecoxib (CeleBREX) 200 MG capsule, Take 1 capsule by mouth Daily., Disp: 30 capsule, Rfl: 0  •  cetirizine (zyrTEC) 10 MG tablet, Take 10 mg by mouth Daily., Disp: , Rfl:   •  Diclofenac Sodium (VOLTAREN) 1 % gel gel, diclofenac 1 % topical gel, Disp: , Rfl:   •  fluticasone (Flonase) 50 MCG/ACT nasal spray, 2 sprays into the nostril(s) as directed by provider Daily., Disp: 16 g, Rfl: 5  •  furosemide (LASIX) 20 MG tablet, Take 1 tablet by mouth Daily., Disp: 6 tablet, Rfl: 0  •  lamoTRIgine (LaMICtal) 200 MG tablet, Take 1 tablet by mouth Daily., Disp: 30 tablet, Rfl: 1  •  OLANZapine (ZyPREXA) 10 MG tablet, Take 0.5 tablets by mouth Daily for 1 day, THEN 1 tablet Daily., Disp: 30 tablet, Rfl: 2    Objective     Vital Signs  /76   Pulse 82   Temp 97.9 °F (36.6 °C)   Wt 78.8 kg (173 lb 12.8 oz)   SpO2 97%   BMI 35.08 kg/m²   Estimated body mass index is 35.08 kg/m² as calculated from the following:    Height as of 12/13/22: 149.9 cm (59.02\").    Weight as of this encounter: 78.8 kg (173 lb 12.8 oz).    Class 2 Severe Obesity (BMI >=35 and <=39.9). Obesity-related health conditions include the following: Will address at next visit. Obesity is will address at next visit. BMI is will address at next visit. We discussed portion control, increasing exercise and will address at next visit.           Physical Exam  Vitals and nursing note reviewed.   Constitutional:       Appearance: Normal appearance.   HENT:      Head: Normocephalic and atraumatic. "   Eyes:      Extraocular Movements: Extraocular movements intact.      Pupils: Pupils are equal, round, and reactive to light.   Cardiovascular:      Rate and Rhythm: Normal rate and regular rhythm.      Pulses: Normal pulses.      Heart sounds: Normal heart sounds.   Pulmonary:      Effort: Pulmonary effort is normal.      Breath sounds: Normal breath sounds.   Musculoskeletal:         General: Normal range of motion.   Skin:     General: Skin is warm and dry.   Neurological:      Mental Status: She is alert and oriented to person, place, and time.   Psychiatric:         Mood and Affect: Mood normal.         Behavior: Behavior normal.          Lab Review:   Latest Reference Range & Units 12/15/22 16:42   SARS Antigen Not Detected, Presumptive Negative  Not Detected   Expiration Date  11/12/2023   Lot Number  [id number]   Influenza A Antigen PRADEEP Not Detected  Not Detected   Influenza B Antigen PRADEEP Not Detected  Not Detected            Assessment and Plan     Diagnoses and all orders for this visit:    1. Body aches (Primary)  -     POCT SARS-CoV-2 Antigen PRADEEP + Flu    2. Nonintractable headache, unspecified chronicity pattern, unspecified headache type  -     POCT SARS-CoV-2 Antigen PRADEEP + Flu    3. Viral illness    plan  Discussed lab results with patient. Glad to know they are negative. Continue to stay hydrated and do coughing and deep breathing exercises. Continue with treatment with OTC medication. Go to er if any condition worsens. Keep upcoming appt with ann-marie as scheduled    Follow Up  Return for as scheduled with ann-marie.    SHANE Perez    Part of this note may be an electronic transcription/translation of spoken language to printed text using the Dragon Dictation System.

## 2022-12-16 ENCOUNTER — OFFICE VISIT (OUTPATIENT)
Dept: OBSTETRICS AND GYNECOLOGY | Facility: CLINIC | Age: 42
End: 2022-12-16

## 2022-12-16 VITALS
WEIGHT: 176.8 LBS | HEIGHT: 59 IN | BODY MASS INDEX: 35.64 KG/M2 | DIASTOLIC BLOOD PRESSURE: 84 MMHG | SYSTOLIC BLOOD PRESSURE: 124 MMHG

## 2022-12-16 DIAGNOSIS — Z01.419 WELL WOMAN EXAM WITH ROUTINE GYNECOLOGICAL EXAM: Primary | ICD-10-CM

## 2022-12-16 DIAGNOSIS — N89.8 VAGINAL IRRITATION: ICD-10-CM

## 2022-12-16 DIAGNOSIS — Z87.898 HISTORY OF ABNORMAL MAMMOGRAM: ICD-10-CM

## 2022-12-16 DIAGNOSIS — N92.0 MENORRHAGIA WITH REGULAR CYCLE: ICD-10-CM

## 2022-12-16 PROCEDURE — 3008F BODY MASS INDEX DOCD: CPT | Performed by: NURSE PRACTITIONER

## 2022-12-16 PROCEDURE — 99396 PREV VISIT EST AGE 40-64: CPT | Performed by: NURSE PRACTITIONER

## 2022-12-16 NOTE — PROGRESS NOTES
Subjective   Chief Complaint   Patient presents with   • Menstrual Problem     Pt here to discuss her having dysmenorrhea that has been going for years. Her last physical was in 10/26/2022.      Bernardo Dodd is a 42 y.o. year old  presenting to be seen for her annual exam.   Her last Pap was 2020 with normal cytology.  She had a BI-RADS 3 mammogram in February of this year with recommendation for 6-month follow-up however she has not return to care.  She states she has had heavy painful periods for years.  She had an abdominal CT in the ER in March which showed fibroids noted.  She has not followed up for GYN care since that time.  She also has a known umbilical hernia.  SEXUAL Hx:  She is currently sexually active.  In the past year there there has been NO new sexual partners.    Condoms are never used.  She would like to be screened for STD's at today's exam.  Current birth control method: tubal ligation.  She is happy with her current method of contraception and does not want to discuss alternative methods of contraception.  MENSTRUAL Hx:  Patient's last menstrual period was 2022 (exact date).  In the past 6 months her cycles have been regular, predictable and occur monthly.  Her menstrual flow is typically excessive.   Each month on average there are roughly 3 day(s) of very heavy flow.    Intermenstrual bleeding is absent.    Post-coital bleeding is absent.  Dysmenorrhea: severe and affecting her activities of daily living  PMS: moderate and affecting her activities of daily living  Her cycles ARE a source of concern for her that she wishes to discuss today.  HEALTH Hx:  She exercises regularly: yes.  She wears her seat belt: yes.  She has concerns about domestic violence: no.  OTHER THINGS SHE WANTS TO DISCUSS TODAY:  Nothing else    The following portions of the patient's history were reviewed and updated as appropriate:problem list, current medications, allergies, past family  "history, past medical history, past social history and past surgical history.    Social History    Tobacco Use      Smoking status: Former        Packs/day: 0.00        Years: 15.00        Pack years: 0        Types: Cigarettes, Cigars        Quit date: 2015        Years since quittin.9      Smokeless tobacco: Never      Tobacco comments: I quit every so often. Then i start again.    Review of Systems  Constitutional POS: nothing reported    NEG: anorexia or night sweats   Genitourinary POS: nothing reported has had sling surgery    NEG: dysuria or hematuria      Gastointestinal POS: nothing reported    NEG: bloating, change in bowel habits, melena or reflux symptoms   Integument POS: nothing reported    NEG: moles that are changing in size, shape, color or rashes   Breast POS: see HPI    NEG: persistent breast lump, skin dimpling or nipple discharge        Objective   /84 (BP Location: Left arm, Patient Position: Sitting, Cuff Size: Adult)   Ht 150.4 cm (59.2\")   Wt 80.2 kg (176 lb 12.8 oz)   LMP 2022 (Exact Date)   BMI 35.47 kg/m²     General:  well developed; well nourished  no acute distress   Skin:  No suspicious lesions seen   Thyroid: normal to inspection and palpation   Breasts:  Examined in supine position  Symmetric without masses or skin dimpling  Nipples normal without inversion, lesions or discharge  There are no palpable axillary nodes  has had breast reduction with surgical scars noted   Abdomen: soft, non-tender; no masses  no umbilical or inguinal hernias are present  no hepato-splenomegaly   Tenderness noted with palpation of abdomen no incarcerated hernia noted.   Pelvis: Clinical staff was present for exam  :  urethral meatus normal;  Vaginal:  normal pink mucosa without prolapse or lesions.  Cervix:  normal appearance.  Uterus:  anteverted; symmetrically enlarged, consisent with 12 weeks size;  Adnexa:  normal bimanual exam of the adnexa.  Rectal:  digital rectal exam " not performed; anus visually normal appearing.        Assessment   1. Well woman gynecological exam  2. Menorrhagia with regular cycle  3. History of uterine fibroids seen on CT     Plan     1. Pap was not done today.  I explained to Bernardo that the recommendations for Pap smear interval in a low risk patient has lengthened to 3 years time.  I told Bernardo she still needs to be seen in our office yearly for a full physical including breast and pelvic exam.  2. One swab sent for sti screening  3. She was encouraged to get mammograms at the interval determined by the mammography center until cleared for annual mammogrpahy.  She should report any palpable breast lump(s) or skin changes regardless of mammographic findings.  I explained to Bernardo that notification regarding her mammogram results will come from the center performing the study.  Our office will not be routinely calling with mammogram results.  It is her responsibility to make sure that the results from the mammogram are communicated to her by the breast center.  If she has any questions about the results, she is welcome to call our office anytime.  She is currently overdue for her 6-month screening follow-up a new order for a bilateral diagnostic mammogram was sent  4. No prescription was given or electronically sent at today's visit  5. The importance of keeping all planned follow-up and taking all medications as prescribed was emphasized.  6. Today I discussed with Bernardo the total recommended calcium intake for a premenopausal female is 1000 mg.  Ideally this should be from dietary sources.  I reviewed calcium content in various foods including milk, fortified orange juice and yogurt.  If she cannot get sufficient calcium through dietary means, it is recommended to supplement with either a multivitamin or calcium to reach her daily goal.  I also reviewed the difference in the bioavailability of calcium carbonate and calcium citrate containing  supplements and the importance of taking calcium carbonate containing products with food.  Finally, vitamin D's role in calcium absorption was reviewed and a total daily vitamin D intake of 800 units was recommended.  7. Follow up for annual exam 1 year    No orders of the defined types were placed in this encounter.         This note was electronically signed.    Elisha Bermudez, SHANE  December 16, 2022

## 2022-12-17 ENCOUNTER — APPOINTMENT (OUTPATIENT)
Dept: MRI IMAGING | Facility: HOSPITAL | Age: 42
End: 2022-12-17

## 2022-12-27 ENCOUNTER — TELEPHONE (OUTPATIENT)
Dept: ORTHOPEDIC SURGERY | Facility: CLINIC | Age: 42
End: 2022-12-27

## 2022-12-27 NOTE — TELEPHONE ENCOUNTER
Patient called and wanted to know if she could go into work and be able to work on her left knee. You can call her at [phone]

## 2022-12-27 NOTE — TELEPHONE ENCOUNTER
Adrienne-please see message below and advise. Pt is scheduled for MRI of ankle on 1/27/23. Thanks!    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 I told her I would have to send the message to Adrienne to see what her thoughts were on this and that I could get back with her after that. She verbalized understanding.    Meg FRANCO CMA ([facility]), ROT

## 2022-12-28 ENCOUNTER — TELEPHONE (OUTPATIENT)
Dept: BEHAVIORAL HEALTH | Facility: CLINIC | Age: 42
End: 2022-12-28

## 2022-12-28 NOTE — TELEPHONE ENCOUNTER
Adrienne's response:    Last visit I did not find any acute reason she was having significant knee pain. I can see how it would be difficult walking so far in the boot with the ice. If she needs to walk without the boot that is fine. She can also try the show balancers for the other foot. I do not see a reason to keep her off work for now. If the knee pain persists we can order knee MRI after the ankle MRI.

## 2022-12-28 NOTE — TELEPHONE ENCOUNTER
Spoke to pt regarding Adrienne's message; She is going to try to come in today for an even up or some time this week; I explained she can come by any time before 4:30pm. She also mentioned having an appt for next week and I explained we may need ot re-schedule that if her MRI is not going to be done by then; She asked if there was anyway we could get her in sooner for that; I told her I would check with Adrienne and get back with her. She understood.    Meg FRANCO CMA ([facility]), ROT

## 2022-12-28 NOTE — TELEPHONE ENCOUNTER
I called patient to confirm video visit for today and patient reports that she was getting ready for work.  Patient reports that she does have a new job but also reports medical concerns due to a fall.  Patient also reports some conflict in the work setting.  I confirmed at next appointment with patient and patient requests that appointment be changed to in person.  I will see patient in 2 weeks for in person appointment.

## 2022-12-29 NOTE — TELEPHONE ENCOUNTER
Called pt to let her know she can call [facility] (number provided) to see if they have any cancellations or if they are able to move her MRI appt up; I told her I was going to cancel her follow up with Adrienne since her MRI will not be done before 1/3/23 most likely but told her to let us know if it does get changed and then we can make her a follow up to see Adrienne. She understood.    Meg FRANCO CMA ([facility]), ROT

## 2023-01-01 NOTE — TELEPHONE ENCOUNTER
Caller: Bernardo Dodd    Relationship: Self    Best call back number: [phone]    What medication are you requesting: REFILL ON ANTIBIOTIC    What are your current symptoms: SORE THROAT, COUGH, CONGESTION IN CHEST, EYES WATERING    How long have you been experiencing symptoms: NA    Have you had these symptoms before:    [x] Yes  [] No    Have you been treated for these symptoms before:   [x] Yes  [] No    If a prescription is needed, what is your preferred pharmacy and phone number: [facility] [address] [facility] - [phone] [facility] [phone]      Additional notes:PATIENT DID NOT THINK SHE NEEDED AN APPT          Statement Selected

## 2023-01-17 ENCOUNTER — OFFICE VISIT (OUTPATIENT)
Dept: INTERNAL MEDICINE | Facility: CLINIC | Age: 43
End: 2023-01-17
Payer: MEDICAID

## 2023-01-17 VITALS
BODY MASS INDEX: 35.11 KG/M2 | TEMPERATURE: 97.7 F | SYSTOLIC BLOOD PRESSURE: 140 MMHG | HEART RATE: 88 BPM | DIASTOLIC BLOOD PRESSURE: 88 MMHG | WEIGHT: 175 LBS | OXYGEN SATURATION: 95 %

## 2023-01-17 DIAGNOSIS — I10 PRIMARY HYPERTENSION: ICD-10-CM

## 2023-01-17 DIAGNOSIS — R05.9 COUGH, UNSPECIFIED TYPE: ICD-10-CM

## 2023-01-17 DIAGNOSIS — J30.9 ALLERGIC RHINITIS, UNSPECIFIED SEASONALITY, UNSPECIFIED TRIGGER: ICD-10-CM

## 2023-01-17 DIAGNOSIS — J02.9 SORE THROAT: Primary | ICD-10-CM

## 2023-01-17 DIAGNOSIS — G40.909 SEIZURE DISORDER: ICD-10-CM

## 2023-01-17 PROCEDURE — 87880 STREP A ASSAY W/OPTIC: CPT | Performed by: PHYSICIAN ASSISTANT

## 2023-01-17 PROCEDURE — 87428 SARSCOV & INF VIR A&B AG IA: CPT | Performed by: PHYSICIAN ASSISTANT

## 2023-01-17 PROCEDURE — 99213 OFFICE O/P EST LOW 20 MIN: CPT | Performed by: PHYSICIAN ASSISTANT

## 2023-01-17 RX ORDER — MONTELUKAST SODIUM 10 MG/1
10 TABLET ORAL NIGHTLY
Qty: 90 TABLET | Refills: 1 | Status: SHIPPED | OUTPATIENT
Start: 2023-01-17

## 2023-01-17 RX ORDER — AMLODIPINE BESYLATE 5 MG/1
5 TABLET ORAL DAILY
Qty: 90 TABLET | Refills: 2 | Status: SHIPPED | OUTPATIENT
Start: 2023-01-17

## 2023-01-17 NOTE — PROGRESS NOTES
"Chief Complaint   Patient presents with   • Cough   • Sore Throat   • Headache   • Fatigue     Acute       Subjective     Bernardo Dodd is a 42 y.o. female.        History of Present Illness     Symptoms include nasal congestion, headaches, weakness, left ear pain, and tinnitus. Denies using cotton swabs in her ears, following with ear, nose and throat specialist, Dr. Mcdonald, and following with allergist Dr. Gramajo since 2019. She was on generic Singulair in the past, worked well for her. Allergic to PET DANDER, she has a cat and is around a cat and dog at night when at work. 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 Same job description and duties as in the past. Denies receiving allergy injections when she was seeing the allergist. Confirms multiple infections in a row. Eyes are itchy and watery. She takes 2 generic Zyrtec. Headaches and nasal congestion have been ongoing, but have gotten worse. Concerned when her coworker tested positive for COVID-19 on 01/15/2023 and she had been in contact with her. Felt worse on 01/15/2023 she tested negative for COVID-19. Had lethargy, achiness, and \"kept running to bathroom,\" left work at 04:30 a.m. om 01/16/2023. Her next scheduled work day is 01:00 a.m. 01/19/2023. Denies having a fever.  Feels like her allergy symptoms. Taking Halls for sore throat and Vicks vapor rub at present. Unable to take ibuprofen. Prescribed antibiotics in 12/2022.    Currently taking amlodipine. Requests refills.    She has an MRI of her left ankle scheduled on 01/27/2023.      Current Outpatient Medications:   •  albuterol sulfate  (90 Base) MCG/ACT inhaler, Inhale 2 puffs Every 4 (Four) Hours As Needed for Wheezing or Shortness of Air., Disp: 18 g, Rfl: 2  •  amLODIPine (NORVASC) 5 MG tablet, Take 1 tablet by mouth Daily., Disp: 90 tablet, Rfl: 2  •  celecoxib (CeleBREX) 200 MG capsule, Take 1 capsule by mouth Daily., Disp: 30 capsule, Rfl: " 0  •  cetirizine (zyrTEC) 10 MG tablet, Take 10 mg by mouth Daily., Disp: , Rfl:   •  Diclofenac Sodium (VOLTAREN) 1 % gel gel, diclofenac 1 % topical gel, Disp: , Rfl:   •  fluticasone (Flonase) 50 MCG/ACT nasal spray, 2 sprays into the nostril(s) as directed by provider Daily., Disp: 16 g, Rfl: 5  •  furosemide (LASIX) 20 MG tablet, Take 1 tablet by mouth Daily., Disp: 6 tablet, Rfl: 0  •  OLANZapine (ZyPREXA) 10 MG tablet, Take 0.5 tablets by mouth Daily for 1 day, THEN 1 tablet Daily., Disp: 30 tablet, Rfl: 2  •  lamoTRIgine (LaMICtal) 200 MG tablet, TAKE ONE TABLET BY MOUTH DAILY, Disp: 30 tablet, Rfl: 1  •  montelukast (Singulair) 10 MG tablet, Take 1 tablet by mouth Every Night., Disp: 90 tablet, Rfl: 1     PMFSH  The following portions of the patient's history were reviewed and updated as appropriate: allergies, current medications, past family history, past medical history, past social history, past surgical history and problem list.    Review of Systems   Constitutional: Positive for fatigue. Negative for activity change and unexpected weight change.   HENT: Positive for congestion, ear discharge, ear pain, postnasal drip and sore throat.    Eyes: Negative for pain and discharge.   Respiratory: Positive for cough, shortness of breath and stridor. Negative for chest tightness and wheezing.    Cardiovascular: Negative for chest pain and palpitations.   Gastrointestinal: Positive for diarrhea. Negative for abdominal pain and vomiting.   Endocrine: Negative.    Genitourinary: Negative.    Musculoskeletal: Positive for neck pain. Negative for joint swelling.   Skin: Negative for color change, rash and wound.   Allergic/Immunologic: Negative.    Neurological: Positive for headaches. Negative for seizures and syncope.   Psychiatric/Behavioral: Negative.        Objective   /88   Pulse 88   Temp 97.7 °F (36.5 °C)   Wt 79.4 kg (175 lb)   SpO2 95%   BMI 35.11 kg/m²     Physical Exam  Vitals and nursing  note reviewed.   Constitutional:       General: She is not in acute distress.     Appearance: She is well-developed. She is not toxic-appearing or diaphoretic.   HENT:      Head: Normocephalic and atraumatic. Hair is normal.      Right Ear: External ear normal. No drainage, swelling or tenderness. Tympanic membrane is retracted.      Left Ear: External ear normal. No drainage, swelling or tenderness. Tympanic membrane is retracted.      Nose: Mucosal edema present.      Mouth/Throat:      Mouth: No oral lesions.      Pharynx: Uvula midline. Posterior oropharyngeal erythema present. No oropharyngeal exudate or uvula swelling.   Eyes:      General: No scleral icterus.        Right eye: No discharge.         Left eye: No discharge.      Conjunctiva/sclera: Conjunctivae normal.      Pupils: Pupils are equal, round, and reactive to light.   Cardiovascular:      Rate and Rhythm: Normal rate and regular rhythm.      Heart sounds: Normal heart sounds. No murmur heard.    No gallop.   Pulmonary:      Effort: No respiratory distress.      Breath sounds: Normal breath sounds. No stridor. No wheezing or rales.   Chest:      Chest wall: No tenderness.   Abdominal:      Palpations: Abdomen is soft.      Tenderness: There is no abdominal tenderness.   Musculoskeletal:      Cervical back: Normal range of motion and neck supple.   Lymphadenopathy:      Cervical: Cervical adenopathy present.   Skin:     General: Skin is warm and dry.      Findings: No rash.   Neurological:      Mental Status: She is alert and oriented to person, place, and time.      Motor: No abnormal muscle tone.   Psychiatric:         Behavior: Behavior normal.         Thought Content: Thought content normal.         Judgment: Judgment normal.         Results for orders placed or performed in visit on 01/17/23   POCT rapid strep A    Specimen: Swab   Result Value Ref Range    Rapid Strep A Screen Negative Negative, VALID, INVALID, Not Performed    Internal Control  Passed Passed    Lot Number [id number]     Expiration Date 07/20/2024    POCT SARS-CoV-2 Antigen PRADEEP + Flu    Specimen: Swab   Result Value Ref Range    SARS Antigen Not Detected Not Detected, Presumptive Negative    Influenza A Antigen PRADEEP Not Detected Not Detected    Influenza B Antigen PRADEEP Not Detected Not Detected    Internal Control Passed Passed    Lot Number [id number]     Expiration Date 03/07/2024       Results:    Today, negative for group A Streptococcus and COVID-19.    ASSESSMENT/PLAN    Diagnoses and all orders for this visit:    1. Sore throat (Primary)  Comments:  - Recommended taking Tylenol.   Orders:  -     POCT rapid strep A  -     POCT SARS-CoV-2 Antigen PRADEEP + Flu    2. Cough, unspecified type  -     POCT SARS-CoV-2 Antigen PRADEEP + Flu    3. Allergic rhinitis, unspecified seasonality, unspecified trigger  Comments:  - Referral back to Dr. Gramajo.  - Prescribed Singulair.  - Continue Zyrtec and Flonase.  Orders:  -     montelukast (Singulair) 10 MG tablet; Take 1 tablet by mouth Every Night.  Dispense: 90 tablet; Refill: 1  -     Ambulatory Referral to Allergy    4. Primary hypertension  Comments:  - Refilled amlodipine.  Orders:  -     amLODIPine (NORVASC) 5 MG tablet; Take 1 tablet by mouth Daily.  Dispense: 90 tablet; Refill: 2           Return if symptoms worsen or fail to improve, for Next scheduled follow up.  Answers for HPI/ROS submitted by the patient on 1/16/2023  What is the primary reason for your visit?: Sore Throat    Transcribed from ambient dictation for SUHA Leyva by Jacqueline Lawrence.  01/17/23   14:44 EST    Patient or patient representative verbalized consent to the visit recording.  I have personally performed the services described in this document as transcribed by the above individual, and it is both accurate and complete.

## 2023-01-18 RX ORDER — LAMOTRIGINE 200 MG/1
TABLET ORAL
Qty: 30 TABLET | Refills: 1 | Status: SHIPPED | OUTPATIENT
Start: 2023-01-18

## 2023-01-19 ENCOUNTER — TELEPHONE (OUTPATIENT)
Dept: ORTHOPEDIC SURGERY | Facility: CLINIC | Age: 43
End: 2023-01-19
Payer: MEDICAID

## 2023-01-19 ENCOUNTER — HOSPITAL ENCOUNTER (OUTPATIENT)
Dept: MAMMOGRAPHY | Facility: HOSPITAL | Age: 43
Discharge: HOME | End: 2023-01-19
Admitting: NURSE PRACTITIONER
Payer: MEDICAID

## 2023-01-19 DIAGNOSIS — Z87.898 HISTORY OF ABNORMAL MAMMOGRAM: ICD-10-CM

## 2023-01-19 PROCEDURE — 77062 BREAST TOMOSYNTHESIS BI: CPT | Performed by: RADIOLOGY

## 2023-01-19 PROCEDURE — G0279 TOMOSYNTHESIS, MAMMO: HCPCS

## 2023-01-19 PROCEDURE — 77066 DX MAMMO INCL CAD BI: CPT | Performed by: RADIOLOGY

## 2023-01-19 PROCEDURE — 77066 DX MAMMO INCL CAD BI: CPT

## 2023-01-19 NOTE — TELEPHONE ENCOUNTER
Pt called office saying her leg was red, hot, and swollen. I called her back at approximately 3:05 pm. Pt stated she had twisted/ bowed her leg and it hurt to walk, but didn't show signs of infection. She was advised if it did she could go to the ER. I advised we could make her a appointment for Monday since the schedule had many openings and that was ronnie's next day in office. Pt stated she had to work and her next day off was 1/26/2023. Her MRI was scheduled the next day and she just decided she would wait till her appointment after the MRI. Pt was advised to ice, elevate the leg and take tylenol if needed and to go to urgent care if she needed to. Pt was told to call us back if her leg worsened and she wanted to be seen sooner or had any questions. Pt understood.

## 2023-02-05 NOTE — PROGRESS NOTES
[facility] Medicine Services  PROGRESS NOTE    Patient Name: Bernardo Dodd  : 1980  MRN: [id number]    Date of Admission: 7/10/2019  Length of Stay: 1  Primary Care Physician: Nelly Sotelo PA    Subjective   Subjective     CC:  F/U left flank pain    HPI:  Patient seen this morning. Still with some flank pain and nausea.     Review of Systems  Gen-no fevers, no chills  CV-no chest pain, no palpitations  Resp-no cough, no dyspnea  GI-+nausea, no vomiting, no abd pain    Otherwise ROS is negative except as mentioned in the HPI.    Objective   Objective     Vital Signs:   Temp:  [97.9 °F (36.6 °C)-98.7 °F (37.1 °C)] 98.3 °F (36.8 °C)  Heart Rate:  [] 97  Resp:  [16-18] 16  BP: (114-138)/(69-99) 114/69        Physical Exam:  Gen-no acute distress  HENT-NCAT, mucous membranes moist  CV-RRR, S1 S2 normal, no m/r/g  Resp-CTAB, no wheezes or rales  Abd-soft, NT, ND, +BS  Ext-no edema  Neuro-A&Ox3, no focal deficits  Skin-no rashes  Psych-appropriate mood      Results Reviewed:  I have personally reviewed current lab, radiology, and data and agree.    Results from last 7 days   Lab Units 19  0403 07/10/19  2337 07/10/19  17319  1549   WBC 10*3/mm3 7.81  --  9.12 10.12   HEMOGLOBIN g/dL 10.6*  --  12.3 12.1   HEMATOCRIT % 34.2  --  38.9 40.3   PLATELETS 10*3/mm3 386  --  461* 462*   PROCALCITONIN ng/mL  --  0.05*  --   --      Results from last 7 days   Lab Units 19  0403 07/10/19  17319  1549   SODIUM mmol/L 135* 138 134*   POTASSIUM mmol/L 3.9 3.9 3.7   CHLORIDE mmol/L 101 100 97*   CO2 mmol/L 23.0 23.0 21.2*   BUN mg/dL 10 9 12   CREATININE mg/dL 0.60 0.75 0.90   GLUCOSE mg/dL 88 97 96   CALCIUM mg/dL 8.4* 9.4 9.4   ALT (SGPT) U/L  --  35*  --    AST (SGOT) U/L  --  23  --      Estimated Creatinine Clearance: 122 mL/min (by C-G formula based on SCr of 0.6 mg/dL).    Microbiology Results Abnormal     None          Imaging Results (last 24 hours)      Procedure Component Value Units Date/Time    CT Abdomen Pelvis Without Contrast [[id number]] Collected:  07/10/19 2132     Updated:  07/10/19 2221    Narrative:       INDICATION:   Increasing left flank pain for 2 weeks. Known left kidney stone diagnosed 1 week ago.    TECHNIQUE:   CT of the abdomen and pelvis without contrast. Coronal and sagittal reconstructions were obtained.  Radiation dose reduction techniques included automated exposure control or exposure modulation based on body size. Radiation audit for number of CT and  nuclear cardiology exams performed in the last year: 3.      COMPARISON:   CT abdomen pelvis from 10/17/2018. CTA chest from 6/29/2019.    FINDINGS:  Lung bases: Clear    Abdomen: Lack of IV and oral contrast media limits evaluation for pathology other than urinary tract calculus disease.    Unenhanced liver, spleen, gallbladder, pancreas and adrenal glands are unremarkable.    There is a large  calculus interpolar left kidney about 8 mm to 9 mm dimension. This is noted on the recent CT angiogram of the chest and is unchanged. There is fullness of the left upper pole collecting system by the renal pelvis is not distended..  There is a tiny nonobstructing calculus in the upper pole left kidney. There is no hydroureter.On comparison to the CT abdomen pelvis from October 2018, the calculus at the mid pole left kidney is larger than it was previously located in the upper pole.  The fullness of the upper pole collecting system is new. The right kidney is unremarkable.    There is no evidence for abdominal aortic aneurysm.      Pelvis: Bladder is distended. No bladder calculus is seen. Uterus is prominent. Suspect the right adnexal cyst and a small amount of physiologic free fluid in the pelvis.    There is a moderate amount of colonic gas and stool with prominence of the transverse colon. There is no bowel obstruction. Visualized appendix is unremarkable. There is no free air or drainable fluid  collection suspected.      Impression:         1. There is an 8 to 9 mm calculus at the midpole of left kidney. On comparison to the study from October 2018 this is increased in size and moved in location and is now associated with distention of the left upper pole collecting system. The pelvis is  not distended and there is no hydroureter. This upper pole collecting system distention could reflect some intrarenal obstruction related to the large calculus, possibly related to the patient's worsening left flank pain.  2. There is a moderate amount of colon gas and stool. This is associated with mild gaseous colonic distention best appreciated at the transverse colon. There is however no evidence for bowel obstruction.        Signer Name: Sally Wright MD   Signed: 7/10/2019 9:32 PM   Workstation Name: [email or web address]                  I have reviewed the medications:  Scheduled Meds:  albuterol 2.5 mg Nebulization BID - RT   amitriptyline 50 mg Oral Nightly   amLODIPine 5 mg Oral Daily   ceftriaxone 1 g Intravenous Q24H   lactobacillus acidophilus 1 capsule Oral BID   lamoTRIgine 100 mg Oral Daily   montelukast 10 mg Oral Nightly   sodium chloride 3 mL Intravenous Q12H     Continuous Infusions:  sodium chloride 100 mL/hr Last Rate: 100 mL/hr (07/11/19 1029)     PRN Meds:.•  acetaminophen  •  gabapentin  •  HYDROcodone-acetaminophen  •  melatonin  •  ondansetron  •  sodium chloride  •  sodium chloride      Assessment/Plan   Assessment / Plan     Active Hospital Problems    Diagnosis  POA   • **Ureteral obstruction, left [N13.5]  Yes   • Complicated urinary tract infection [N39.0]  Yes   • Seizure disorder (CMS/HCC) [G40.909]  Yes   • Iron deficiency anemia [D50.9]  Yes   • Hypertension [I10]  Yes   • Asthma [J45.909]  Yes   • GERD (gastroesophageal reflux disease) [K21.9]  Yes      Resolved Hospital Problems   No resolved problems to display.        Brief Hospital Course to date:  Bernardo Dodd is a 38 y.o. female with hx  of asthma, GERD, HTN, and seizure disorder who presents due to complaints of left sided flank pain with associated nausea, subjective fever, and dysuria. Patient was recently admitted to [facility] 6/28-7/4 due to sepsis with pyelonephritis, found to have a 13 mm stone within the left kidney. Was discharged on Omnicef x 10 days and outpatient follow up with Urology. However symptoms returned 2 days ago, and despite being switched to Bactrim by her PCP on 7/9, she continued to have symptoms prompting ER evaluation on 7/10. Found to have ongoing UTI and CT A/P showed 8-9 mm left kidney stone that had moved in location and now associated with distension of the left upper pole collecting system. Admitted to hospitalist service and Urology was consulted.    PLAN:  --Continue Rocephin. Follow up urine culture.   --Urology to see. Keep NPO in case of intervention.  --Continue supportive care.    DVT Prophylaxis:  mechanical    Disposition: I expect the patient to be discharged home in ~1-2 days    CODE STATUS:   Code Status and Medical Interventions:   Ordered at: 07/10/19 3718     Level Of Support Discussed With:    Patient     Code Status:    CPR     Medical Interventions (Level of Support Prior to Arrest):    Full         Electronically signed by Krista Horton MD, 07/11/19, 11:38 AM.       DVT ppx: Pt on apixaban  GI : PPI    Tylenol prn right hip discomfort 2/2 osteoarthritis. No indication for xray at this time.

## 2023-04-09 ENCOUNTER — HOSPITAL ENCOUNTER (EMERGENCY)
Facility: HOSPITAL | Age: 43
Discharge: HOME | End: 2023-04-09
Attending: EMERGENCY MEDICINE | Admitting: EMERGENCY MEDICINE
Payer: MEDICAID

## 2023-04-09 VITALS
WEIGHT: 170 LBS | HEART RATE: 70 BPM | BODY MASS INDEX: 34.27 KG/M2 | TEMPERATURE: 98.6 F | SYSTOLIC BLOOD PRESSURE: 128 MMHG | DIASTOLIC BLOOD PRESSURE: 88 MMHG | OXYGEN SATURATION: 98 % | HEIGHT: 59 IN | RESPIRATION RATE: 16 BRPM

## 2023-04-09 DIAGNOSIS — S05.02XA ABRASION OF LEFT CORNEA, INITIAL ENCOUNTER: Primary | ICD-10-CM

## 2023-04-09 PROCEDURE — 99283 EMERGENCY DEPT VISIT LOW MDM: CPT

## 2023-04-09 RX ORDER — ERYTHROMYCIN 5 MG/G
1 OINTMENT OPHTHALMIC ONCE
Status: COMPLETED | OUTPATIENT
Start: 2023-04-09 | End: 2023-04-09

## 2023-04-09 RX ORDER — ERYTHROMYCIN 5 MG/G
OINTMENT OPHTHALMIC
Qty: 3.5 G | Refills: 0 | Status: SHIPPED | OUTPATIENT
Start: 2023-04-09

## 2023-04-09 RX ORDER — TETRACAINE HYDROCHLORIDE 5 MG/ML
2 SOLUTION OPHTHALMIC ONCE
Status: COMPLETED | OUTPATIENT
Start: 2023-04-09 | End: 2023-04-09

## 2023-04-09 RX ADMIN — ERYTHROMYCIN 1 APPLICATION: 5 OINTMENT OPHTHALMIC at 17:31

## 2023-04-09 RX ADMIN — TETRACAINE HYDROCHLORIDE 2 DROP: 5 SOLUTION OPHTHALMIC at 16:40

## 2023-04-09 NOTE — ED PROVIDER NOTES
EMERGENCY DEPARTMENT ENCOUNTER    Pt Name: Bernardo Dodd  MRN: [id number]  Pt :   1980  Room Number:  RW1/R1  Date of encounter:  2023  PCP: Nelly Sotelo PA  ED Provider: SHANE Hunt    Historian: patient     HPI:  Chief Complaint: Lt eye irritation.     Context: Bernardo Dodd is a 42 y.o. female who presents to the ED c/o Lt eye irritation x 4 days.  Patient reports redness and drainage to her left eye.  She advises it started about 4 days ago.  Patient reports that it is getting worse.  Patient advises her mother believes it is related to her cat.  Patient has been rubbing her eyes.  HPI     REVIEW OF SYSTEMS  A chief complaint appropriate review of systems was completed and is negative except as noted in the HPI.     PAST MEDICAL HISTORY  Past Medical History:   Diagnosis Date   • Allergic rhinitis    • Anemia    • Anxiety    • Arthritis    • Asthma    • Bartholin gland cyst    • Bipolar disorder 2014   • Chlamydia    • Depression    • Endometriosis    • FHx: migraine headaches    • GERD (gastroesophageal reflux disease)    • Gonorrhea    • Heart murmur    • Herpes simplex    • History of chest x-ray 2015    no active disease   • History of echocardiogram 2015    ejection fraction of greater than 65%, mitral and pulmonic regurgitation an physiological tricuspid regurgitation.   • History of PFTs 2015    spirometry data acceptable and reproducible; pt given 4 puffs of Ventolin; pt gave good effort; no obstruction; no Bd response; MVV reduced    • History of PFTs 2015    pt gave best effort; duoneb given prior and post study; moderate nonspecific proportional reduction of FEV1 and FVC with preserved ratio; FEV1 moderately reduced; cannot rule out restriction   • Hypertension    • Kidney stones    • Migraine    • Ovarian cyst    • Pelvic pain    • PMS (premenstrual syndrome)    • Preeclampsia    • Screening breast examination     self;admits   •  Seizures    • STD (female)    • Substance abuse    • Thigh shingles    • Trauma    • Trichomonas infection    • Urinary tract infection    • Urogenital trichomoniasis    • Varicella        PAST SURGICAL HISTORY  Past Surgical History:   Procedure Laterality Date   • BILATERAL BREAST REDUCTION     • BREAST BIOPSY     • BREAST SURGERY      breast reduction   •  SECTION     •  SECTION WITH TUBAL  2010   • CYSTOSCOPY     • DIAGNOSTIC LAPAROSCOPY     • INCISION AND DRAINAGE ABSCESS      bartholin's   • LAPAROSCOPIC TUBAL LIGATION     • ORIF ANKLE FRACTURE Right    • REDUCTION MAMMAPLASTY Bilateral    • TENSION FREE VAGINAL TAPING WITH MINI ARC SLING      Dr Doyle avery        FAMILY HISTORY  Family History   Problem Relation Age of Onset   • Pancreatic cancer Maternal Grandmother    • Cancer Maternal Grandmother    • Heart failure Paternal Grandmother    • MARCIE disease Paternal Aunt    • Arthritis Mother    • Cancer Mother    • Bipolar disorder Mother    • Arthritis Father    • Dementia Father    • Hypertension Father    • Pancreatic cancer Other    • Diabetes Other    • Heart disease Other    • Hypertension Other    • Other Other         RESPIRATORY DISEASE   • Diabetes Paternal Uncle    • Heart attack Neg Hx    • Hyperlipidemia Neg Hx    • Mental illness Neg Hx    • Obesity Neg Hx    • Stroke Neg Hx    • Breast cancer Neg Hx    • Ovarian cancer Neg Hx        SOCIAL HISTORY  Social History     Socioeconomic History   • Marital status: Single   • Number of children: 2   • Highest education level: Some college, no degree   Tobacco Use   • Smoking status: Former     Packs/day: 0.00     Years: 15.00     Pack years: 0.00     Types: Cigarettes, Cigars     Quit date: 2015     Years since quittin.2   • Smokeless tobacco: Never   • Tobacco comments:     I quit every so often. Then i start again.   Vaping Use   • Vaping Use: Some days   • Devices: Disposable   • Passive vaping exposure: Yes    Substance and Sexual Activity   • Alcohol use: No   • Drug use: Not Currently     Types: Marijuana     Comment: Marijuana once a week. Tried cocaine, meth, pain pills in the past   • Sexual activity: Yes     Partners: Male     Birth control/protection: Tubal ligation       ALLERGIES  Naproxen and Sulfa antibiotics    PHYSICAL EXAM  Physical Exam  GENERAL:   Appears in no acute distress.   HENT: Nares patent.  EYES: No scleral icterus. LT eye: Fluorescein uptake at the 3 o'clock position across in a wedge format.  CV: Regular rhythm, regular rate.  RESPIRATORY: Normal effort.  No audible wheezes, rales or rhonchi.  ABDOMEN: Soft, nontender  MUSCULOSKELETAL: No deformities.   NEURO: Alert, moves all extremities, follows commands.  SKIN: Warm, dry, no rash visualized.  PSYCH:   I have reviewed the triage vital signs and nursing notes.    Physical Exam     LAB RESULTS  Results for orders placed or performed in visit on 01/17/23   POCT rapid strep A    Specimen: Swab   Result Value Ref Range    Rapid Strep A Screen Negative Negative, VALID, INVALID, Not Performed    Internal Control Passed Passed    Lot Number [id number]     Expiration Date 07/20/2024    POCT SARS-CoV-2 Antigen PRADEEP + Flu    Specimen: Swab   Result Value Ref Range    SARS Antigen Not Detected Not Detected, Presumptive Negative    Influenza A Antigen PRADEEP Not Detected Not Detected    Influenza B Antigen PRADEEP Not Detected Not Detected    Internal Control Passed Passed    Lot Number [id number]     Expiration Date 03/07/2024        If labs were ordered, I independently reviewed the results and considered them in treating the patient.    RADIOLOGY  No orders to display     [] Radiologist's Report Reviewed:  I ordered and independently reviewed the above noted radiographic studies.  See radiologist's dictation for official interpretation.      PROCEDURES    Procedures    No orders to display       MEDICATIONS GIVEN IN ER    Medications   erythromycin (ROMYCIN)  ophthalmic ointment 1 application (has no administration in time range)   tetracaine (ALTACAINE) 0.5 % ophthalmic solution 2 drop (2 drops Left Eye Given by Other 4/9/23 1640)       MEDICAL DECISION MAKING, PROGRESS, and CONSULTS   Medical Decision Making  Bernardo Dodd is a 42 y.o. female who presents to the ED c/o Lt eye irritation x 4 days.  Patient reports redness and drainage to her left eye.  She advises it started about 4 days ago.  Patient reports that it is getting worse.  Patient advises her mother believes it is related to her cat.  Patient has been rubbing her eyes.      Abrasion of left cornea, initial encounter: complicated acute illness or injury     Details: Patient has a large corneal abrasion at 3 o'clock position.  Her left eye was stained with fluoroscene there was a large wedged portion with uptake.  We will use erythromycin ointment while patient is in the ED.  Patient be discharged home with a prescription.  Patient advised that she needs to follow-up with ophthalmology tomorrow morning.  Risk  Prescription drug management.          All labs have been independently reviewed by me.  All radiology studies have been reviewed by me and the radiologist dictating the report.  All EKG's have been independently viewed by me.    [] Discussed with radiology regarding test interpretation:    Discussion below represents my analysis of pertinent findings related to patient's condition, differential diagnosis, treatment plan and final disposition.    Differential diagnosis:  The differential diagnosis associated with the patient's presentation includes: Bacterial conjunctivitis, allergic conjunctivitis, corneal abrasion.    Additional sources  Discussed/ obtained information from independent historians:   [] Spouse  [] Parent  [] Family member  [] Friend  [] EMS   [] Other:  External (non-ED) record review:   [] Inpatient record:   [] Office record:   [] Outpatient record:   [] Prior Outpatient  labs:   [] Prior Outpatient radiology:   [] Primary Care record:   [] Outside ED record:   [] Other:   Patient's care impacted by:   [] Diabetes  [] Hypertension  [] Hyperlipidemia  [] Hypothyroidism   [] Coronary Artery Disease   [] COPD   [] Cancer   [] Obesity   [] Tobacco Abuse   [] Substance Abuse    [] Anxiety   [] Depression   [] Other:   Care significantly affected by Social Determinants of Health (housing and economic circumstances, unemployment)    [] Yes     [x] No   If yes, Patient's care significantly limited by  Social Determinants of Health including:   [] Inadequate housing   [] Low income   [] Alcoholism and drug addiction in family   [] Problems related to primary support group   [] Unemployment   [] Problems related to employment   [] Other Social Determinants of Health:     Shared decision making:      Orders placed during this visit:  Orders Placed This Encounter   Procedures   • Supplies To Bedside - Notify MD When Ready- Fluorescein, [place]       I considered prescription management  with:   [] Pain medication  [] Antiviral  [] Antibiotic   [x] Other: Allergy eyedrop   Rationale:  Additional orders considered but not ordered:  The following testing was considered but ultimately not selected after discussion with patient/family: I will hold off on allergy eyedrop at this time.  I feel that the patient will benefit most from the antibiotic ointment.    ED Course:              DIAGNOSIS  Final diagnoses:   Abrasion of left cornea, initial encounter       DISPOSITION    ED Disposition     ED Disposition   Discharge    Condition   Stable    Comment   --             Please note that portions of this document were completed with voice recognition software.        Teagan Murrell, APRN  04/09/23 [id number]

## 2023-04-14 ENCOUNTER — OFFICE VISIT (OUTPATIENT)
Dept: INTERNAL MEDICINE | Facility: CLINIC | Age: 43
End: 2023-04-14
Payer: MEDICAID

## 2023-04-14 VITALS
SYSTOLIC BLOOD PRESSURE: 112 MMHG | RESPIRATION RATE: 16 BRPM | WEIGHT: 165 LBS | HEIGHT: 59 IN | DIASTOLIC BLOOD PRESSURE: 80 MMHG | HEART RATE: 104 BPM | OXYGEN SATURATION: 95 % | BODY MASS INDEX: 33.26 KG/M2 | TEMPERATURE: 97.7 F

## 2023-04-14 DIAGNOSIS — R05.9 COUGH, UNSPECIFIED TYPE: ICD-10-CM

## 2023-04-14 DIAGNOSIS — J06.9 UPPER RESPIRATORY TRACT INFECTION, UNSPECIFIED TYPE: ICD-10-CM

## 2023-04-14 DIAGNOSIS — H66.001 NON-RECURRENT ACUTE SUPPURATIVE OTITIS MEDIA OF RIGHT EAR WITHOUT SPONTANEOUS RUPTURE OF TYMPANIC MEMBRANE: Primary | ICD-10-CM

## 2023-04-14 PROCEDURE — 87428 SARSCOV & INF VIR A&B AG IA: CPT | Performed by: NURSE PRACTITIONER

## 2023-04-14 PROCEDURE — 3079F DIAST BP 80-89 MM HG: CPT | Performed by: NURSE PRACTITIONER

## 2023-04-14 PROCEDURE — 99213 OFFICE O/P EST LOW 20 MIN: CPT | Performed by: NURSE PRACTITIONER

## 2023-04-14 PROCEDURE — 3074F SYST BP LT 130 MM HG: CPT | Performed by: NURSE PRACTITIONER

## 2023-04-14 RX ORDER — AMOXICILLIN AND CLAVULANATE POTASSIUM 875; 125 MG/1; MG/1
1 TABLET, FILM COATED ORAL 2 TIMES DAILY
Qty: 14 TABLET | Refills: 0 | Status: SHIPPED | OUTPATIENT
Start: 2023-04-14 | End: 2023-04-21

## 2023-04-14 RX ORDER — BENZONATATE 100 MG/1
100 CAPSULE ORAL 3 TIMES DAILY PRN
Qty: 21 CAPSULE | Refills: 0 | Status: SHIPPED | OUTPATIENT
Start: 2023-04-14

## 2023-04-14 RX ORDER — GUAIFENESIN AND DEXTROMETHORPHAN HYDROBROMIDE 600; 30 MG/1; MG/1
TABLET, EXTENDED RELEASE ORAL
Qty: 30 TABLET | Refills: 0 | Status: SHIPPED | OUTPATIENT
Start: 2023-04-14 | End: 2023-04-18

## 2023-04-14 NOTE — PROGRESS NOTES
Follow Up Office Visit      Date: 2023   Patient Name: Bernardo Dodd  : 1980   MRN: [id number]     Chief Complaint:    Chief Complaint   Patient presents with   • Cough   • Nasal Congestion       History of Present Illness:    Gricel Dodd is present today for a same-day sick visit.     The patient reports having a runny nose, sore throat, itching eyes, swollen eyes and coughing for 1-1/2 weeks. She states she has tried Tylenol sinus, Thera Flu, and Vicks vapor rub. She notes seeing the eye doctor this morning regarding her swollen and itching eyes and informed she had scarring from previous scratching. She notes her nose gets clogged up and can not breath. She reports her nasal drainage is yellowish, brownish in color. She states she has mucus and phlegm. She denies fever. The patient reports she takes Zyrtec and Flonase daily.        Subjective      Review of Systems:   Review of Systems   Constitutional: Negative for fever.   HENT: Positive for congestion, postnasal drip, rhinorrhea and sinus pressure. Negative for ear pain and sore throat.    Respiratory: Positive for cough. Negative for shortness of breath and wheezing.    Neurological: Negative for headache.       I have reviewed the patients family history, social history, past medical history, past surgical history and have updated it as appropriate.     Medications:     Current Outpatient Medications:   •  albuterol sulfate  (90 Base) MCG/ACT inhaler, Inhale 2 puffs Every 4 (Four) Hours As Needed for Wheezing or Shortness of Air., Disp: 18 g, Rfl: 2  •  amLODIPine (NORVASC) 5 MG tablet, Take 1 tablet by mouth Daily., Disp: 90 tablet, Rfl: 2  •  celecoxib (CeleBREX) 200 MG capsule, Take 1 capsule by mouth Daily., Disp: 30 capsule, Rfl: 0  •  cetirizine (zyrTEC) 10 MG tablet, Take 1 tablet by mouth Daily., Disp: , Rfl:   •  Diclofenac Sodium (VOLTAREN) 1 % gel gel, diclofenac 1 % topical gel, Disp: , Rfl:   •  erythromycin  "(ROMYCIN) 5 MG/GM ophthalmic ointment, Administer  to the right eye Every 4 (Four) Hours While Awake., Disp: 3.5 g, Rfl: 0  •  fluticasone (Flonase) 50 MCG/ACT nasal spray, 2 sprays into the nostril(s) as directed by provider Daily., Disp: 16 g, Rfl: 5  •  furosemide (LASIX) 20 MG tablet, Take 1 tablet by mouth Daily., Disp: 6 tablet, Rfl: 0  •  montelukast (Singulair) 10 MG tablet, Take 1 tablet by mouth Every Night., Disp: 90 tablet, Rfl: 1  •  OLANZapine (ZyPREXA) 10 MG tablet, Take 0.5 tablets by mouth Daily for 1 day, THEN 1 tablet Daily., Disp: 30 tablet, Rfl: 2  •  amoxicillin-clavulanate (Augmentin) 875-125 MG per tablet, Take 1 tablet by mouth 2 (Two) Times a Day for 7 days., Disp: 14 tablet, Rfl: 0  •  benzonatate (Tessalon Perles) 100 MG capsule, Take 1 capsule by mouth 3 (Three) Times a Day As Needed for Cough., Disp: 21 capsule, Rfl: 0  •  brompheniramine-pseudoephedrine-DM 30-2-10 MG/5ML syrup, Take 5 mL by mouth 3 (Three) Times a Day As Needed for Congestion or Cough., Disp: 118 mL, Rfl: 0  •  lamoTRIgine (LaMICtal) 200 MG tablet, TAKE ONE TABLET BY MOUTH DAILY, Disp: 60 tablet, Rfl: 2    Allergies:   Allergies   Allergen Reactions   • Naproxen Swelling   • Sulfa Antibiotics Rash       Objective     Physical Exam: Please see above  Vital Signs:   Vitals:    04/14/23 1600   BP: 112/80   Pulse: 104   Resp: 16   Temp: 97.7 °F (36.5 °C)   SpO2: 95%   Weight: 74.8 kg (165 lb)   Height: 149.9 cm (59\")   PainSc: 0-No pain     Body mass index is 33.33 kg/m².    Physical Exam  Vitals and nursing note reviewed.   Constitutional:       General: She is not in acute distress.     Appearance: Normal appearance. She is normal weight. She is ill-appearing.   HENT:      Head: Normocephalic and atraumatic.      Right Ear: Tympanic membrane is erythematous.      Left Ear: Tympanic membrane, ear canal and external ear normal. Tympanic membrane is not erythematous or retracted.      Nose:      Right Sinus: Maxillary sinus " tenderness present. No frontal sinus tenderness.      Left Sinus: Maxillary sinus tenderness present. No frontal sinus tenderness.      Mouth/Throat:      Mouth: Mucous membranes are moist.      Pharynx: Oropharynx is clear. Posterior oropharyngeal erythema present. No oropharyngeal exudate.   Cardiovascular:      Rate and Rhythm: Normal rate and regular rhythm.      Heart sounds: Normal heart sounds.   Pulmonary:      Effort: Pulmonary effort is normal. No respiratory distress.      Breath sounds: Normal breath sounds. No wheezing.   Skin:     General: Skin is warm and dry.   Neurological:      Mental Status: She is alert and oriented to person, place, and time. Mental status is at baseline.           Results:   Imaging:     Labs:       Assessment / Plan      Assessment/Plan:   Diagnoses and all orders for this visit:    1. Non-recurrent acute suppurative otitis media of right ear without spontaneous rupture of tympanic membrane (Primary)  2. Upper respiratory tract infection, unspecified type  -     amoxicillin-clavulanate (Augmentin) 875-125 MG per tablet; Take 1 tablet by mouth 2 (Two) Times a Day for 7 days.  Dispense: 14 tablet; Refill: 0    3. Cough, unspecified type  -     POCT SARS-CoV-2 Antigen PRADEEP + Flu: negative   -     guaifenesin-dextromethorphan (MUCINEX DM)  MG tablet sustained-release 12 hour tablet; 1-2 tablets twice daily if needed for cough or congestion  Dispense: 30 tablet; Refill: 0  -     benzonatate (Tessalon Perles) 100 MG capsule; Take 1 capsule by mouth 3 (Three) Times a Day As Needed for Cough.  Dispense: 21 capsule; Refill: 0     -continue zyrtec and flonase   -rec saline nasal spray   -increase fluid and rest     Follow Up:   Return if symptoms worsen or fail to improve, for Next scheduled follow up.    SHANE Atkins  [facility] [place]     Transcribed from ambient dictation for SHANE Hallman by Luh Cervantes.  04/14/23   17:51 EDT    Patient or  patient representative verbalized consent to the visit recording.  I have personally performed the services described in this document as transcribed by the above individual, and it is both accurate and complete.

## 2023-04-17 DIAGNOSIS — G40.909 SEIZURE DISORDER: ICD-10-CM

## 2023-04-17 RX ORDER — LAMOTRIGINE 200 MG/1
TABLET ORAL
Qty: 60 TABLET | Refills: 2 | Status: SHIPPED | OUTPATIENT
Start: 2023-04-17

## 2023-04-18 ENCOUNTER — OFFICE VISIT (OUTPATIENT)
Dept: INTERNAL MEDICINE | Facility: CLINIC | Age: 43
End: 2023-04-18
Payer: MEDICAID

## 2023-04-18 VITALS
BODY MASS INDEX: 33.47 KG/M2 | HEART RATE: 61 BPM | DIASTOLIC BLOOD PRESSURE: 84 MMHG | SYSTOLIC BLOOD PRESSURE: 128 MMHG | OXYGEN SATURATION: 96 % | HEIGHT: 59 IN | TEMPERATURE: 97.6 F | WEIGHT: 166 LBS

## 2023-04-18 DIAGNOSIS — J02.9 SORE THROAT: ICD-10-CM

## 2023-04-18 DIAGNOSIS — J01.90 ACUTE SINUSITIS, RECURRENCE NOT SPECIFIED, UNSPECIFIED LOCATION: Primary | ICD-10-CM

## 2023-04-18 DIAGNOSIS — R05.9 COUGH IN ADULT: ICD-10-CM

## 2023-04-18 DIAGNOSIS — H92.03 OTALGIA OF BOTH EARS: ICD-10-CM

## 2023-04-18 RX ORDER — BROMPHENIRAMINE MALEATE, PSEUDOEPHEDRINE HYDROCHLORIDE, AND DEXTROMETHORPHAN HYDROBROMIDE 2; 30; 10 MG/5ML; MG/5ML; MG/5ML
5 SYRUP ORAL 3 TIMES DAILY PRN
Qty: 118 ML | Refills: 0 | Status: SHIPPED | OUTPATIENT
Start: 2023-04-18

## 2023-04-18 NOTE — PROGRESS NOTES
Office Note     Name: Bernardo Dodd    : 1980     MRN: [id number]     Chief Complaint  Sore Throat (Started Friday), Cough, and Chills    Subjective     History of Present Illness:  Bernardo Dodd is a 42 y.o. female who presents today for ongoing complaints of cough, sore throat, and now chills.  Patient was seen in this office on  by Yari and treated for an upper respiratory tract infection and acute otitis media.  Patient was given Augmentin twice daily for 7 days.  She started this on Friday.  She was also given Tessalon Perles to use as needed which she picked up from the pharmacy but has not been taking.  She was also prescribed Robitussin to use as needed and reports this was not given to her by the pharmacy.  She reports she went out of town over the weekend and felt that her symptoms worsened.  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  She denies fever but reports that she felt cold.  She has not taken anything in addition to the medications prescribed at the previous visit.  She denies further complaints or concerns at this time.  She normally follows with Nelly for chronic conditions.    Review of Systems   Constitutional: Positive for chills. Negative for fatigue.   HENT: Positive for sore throat.    Respiratory: Positive for cough.    Neurological: Positive for headaches.       Past Medical History:   Diagnosis Date   • Allergic rhinitis    • Anemia    • Anxiety    • Arthritis    • Asthma    • Bartholin gland cyst    • Bipolar disorder 2014   • Chlamydia    • Depression    • Endometriosis    • FHx: migraine headaches    • GERD (gastroesophageal reflux disease)    • Gonorrhea    • Heart murmur    • Herpes simplex    • History of chest x-ray 2015    no active disease   • History of echocardiogram 2015    ejection fraction of greater than 65%, mitral and pulmonic regurgitation an  physiological tricuspid regurgitation.   • History of PFTs 2015    spirometry data acceptable and reproducible; pt given 4 puffs of Ventolin; pt gave good effort; no obstruction; no Bd response; MVV reduced    • History of PFTs 2015    pt gave best effort; duoneb given prior and post study; moderate nonspecific proportional reduction of FEV1 and FVC with preserved ratio; FEV1 moderately reduced; cannot rule out restriction   • Hypertension    • Kidney stones    • Migraine    • Ovarian cyst    • Pelvic pain    • PMS (premenstrual syndrome)    • Preeclampsia    • Screening breast examination     self;admits   • Seizures    • STD (female)    • Substance abuse    • Thigh shingles    • Trauma    • Trichomonas infection    • Urinary tract infection    • Urogenital trichomoniasis    • Varicella        Past Surgical History:   Procedure Laterality Date   • BILATERAL BREAST REDUCTION     • BREAST BIOPSY     • BREAST SURGERY      breast reduction   •  SECTION     •  SECTION WITH TUBAL  2010   • CYSTOSCOPY     • DIAGNOSTIC LAPAROSCOPY     • INCISION AND DRAINAGE ABSCESS      bartholin's   • LAPAROSCOPIC TUBAL LIGATION     • ORIF ANKLE FRACTURE Right    • REDUCTION MAMMAPLASTY Bilateral    • TENSION FREE VAGINAL TAPING WITH MINI ARC SLING      Dr Doyle avery        Social History     Socioeconomic History   • Marital status: Single   • Number of children: 2   • Highest education level: Some college, no degree   Tobacco Use   • Smoking status: Former     Packs/day: 0.00     Years: 15.00     Pack years: 0.00     Types: Cigarettes, Cigars     Quit date: 2015     Years since quittin.2   • Smokeless tobacco: Never   • Tobacco comments:     I quit every so often. Then i start again.   Vaping Use   • Vaping Use: Some days   • Devices: Disposable   • Passive vaping exposure: Yes   Substance and Sexual Activity   • Alcohol use: No   • Drug use: Not Currently     Types: Marijuana      Comment: Marijuana once a week. Tried cocaine, meth, pain pills in the past   • Sexual activity: Yes     Partners: Male     Birth control/protection: Tubal ligation         Current Outpatient Medications:   •  albuterol sulfate  (90 Base) MCG/ACT inhaler, Inhale 2 puffs Every 4 (Four) Hours As Needed for Wheezing or Shortness of Air., Disp: 18 g, Rfl: 2  •  amLODIPine (NORVASC) 5 MG tablet, Take 1 tablet by mouth Daily., Disp: 90 tablet, Rfl: 2  •  amoxicillin-clavulanate (Augmentin) 875-125 MG per tablet, Take 1 tablet by mouth 2 (Two) Times a Day for 7 days., Disp: 14 tablet, Rfl: 0  •  benzonatate (Tessalon Perles) 100 MG capsule, Take 1 capsule by mouth 3 (Three) Times a Day As Needed for Cough., Disp: 21 capsule, Rfl: 0  •  celecoxib (CeleBREX) 200 MG capsule, Take 1 capsule by mouth Daily., Disp: 30 capsule, Rfl: 0  •  cetirizine (zyrTEC) 10 MG tablet, Take 1 tablet by mouth Daily., Disp: , Rfl:   •  Diclofenac Sodium (VOLTAREN) 1 % gel gel, diclofenac 1 % topical gel, Disp: , Rfl:   •  erythromycin (ROMYCIN) 5 MG/GM ophthalmic ointment, Administer  to the right eye Every 4 (Four) Hours While Awake., Disp: 3.5 g, Rfl: 0  •  fluticasone (Flonase) 50 MCG/ACT nasal spray, 2 sprays into the nostril(s) as directed by provider Daily., Disp: 16 g, Rfl: 5  •  furosemide (LASIX) 20 MG tablet, Take 1 tablet by mouth Daily., Disp: 6 tablet, Rfl: 0  •  lamoTRIgine (LaMICtal) 200 MG tablet, TAKE ONE TABLET BY MOUTH DAILY, Disp: 60 tablet, Rfl: 2  •  montelukast (Singulair) 10 MG tablet, Take 1 tablet by mouth Every Night., Disp: 90 tablet, Rfl: 1  •  OLANZapine (ZyPREXA) 10 MG tablet, Take 0.5 tablets by mouth Daily for 1 day, THEN 1 tablet Daily., Disp: 30 tablet, Rfl: 2  •  brompheniramine-pseudoephedrine-DM 30-2-10 MG/5ML syrup, Take 5 mL by mouth 3 (Three) Times a Day As Needed for Congestion or Cough., Disp: 118 mL, Rfl: 0  •  guaifenesin-dextromethorphan (MUCINEX DM)  MG tablet sustained-release 12 hour  "tablet, 1-2 tablets twice daily if needed for cough or congestion, Disp: 30 tablet, Rfl: 0    Objective     Vital Signs  /84   Pulse 61   Temp 97.6 °F (36.4 °C)   Ht 149.9 cm (59.02\")   Wt 75.3 kg (166 lb)   SpO2 96%   BMI 33.51 kg/m²   Estimated body mass index is 33.51 kg/m² as calculated from the following:    Height as of this encounter: 149.9 cm (59.02\").    Weight as of this encounter: 75.3 kg (166 lb).    BMI is >= 30 and <35. (Class 1 Obesity). The following options were offered after discussion;: Not addressed at this visit      Physical Exam  Constitutional:       Appearance: Normal appearance.   HENT:      Head: Normocephalic and atraumatic.      Right Ear: Ear canal and external ear normal. Tympanic membrane is erythematous.      Left Ear: Ear canal and external ear normal. Tympanic membrane is erythematous.      Nose: Nose normal.   Eyes:      Extraocular Movements: Extraocular movements intact.      Conjunctiva/sclera: Conjunctivae normal.      Pupils: Pupils are equal, round, and reactive to light.   Cardiovascular:      Rate and Rhythm: Normal rate and regular rhythm.      Heart sounds: Normal heart sounds.   Pulmonary:      Effort: Pulmonary effort is normal. No respiratory distress.      Breath sounds: Normal breath sounds.   Musculoskeletal:         General: Normal range of motion.      Cervical back: Neck supple.   Skin:     General: Skin is warm and dry.   Neurological:      General: No focal deficit present.      Mental Status: She is alert and oriented to person, place, and time. Mental status is at baseline.   Psychiatric:         Mood and Affect: Mood normal.         Behavior: Behavior normal.         Thought Content: Thought content normal.         Judgment: Judgment normal.          Assessment and Plan     Diagnoses and all orders for this visit:    1. Acute sinusitis, recurrence not specified, unspecified location (Primary)    2. Sore throat  -     POCT SARS-CoV-2 Antigen PRADEEP " + Flu  -     POCT rapid strep A    3. Cough in adult  -     POCT SARS-CoV-2 Antigen PRADEEP + Flu  -     POCT rapid strep A  -     brompheniramine-pseudoephedrine-DM 30-2-10 MG/5ML syrup; Take 5 mL by mouth 3 (Three) Times a Day As Needed for Congestion or Cough.  Dispense: 118 mL; Refill: 0    4. Otalgia of both ears    Plan:  COVID and flu swab in the office today negative.  Rapid strep swab in the office today negative.  Please be sure to take the entire 7-day course of Augmentin.  Encouraged to use Tessalon Perles as needed for cough.  Will send in Bromfed-DM to use as needed for symptoms.  Continue with adequate oral hydration.  Return to clinic if symptoms worsen or fail to improve with current plan of care.  Go to ED for further evaluation if any symptom worsens.    Follow Up  Return if symptoms worsen or fail to improve.    SHANE Ang    Part of this note may be an electronic transcription/translation of spoken language to printed text using the Dragon Dictation System.

## 2023-04-28 ENCOUNTER — OFFICE VISIT (OUTPATIENT)
Dept: INTERNAL MEDICINE | Facility: CLINIC | Age: 43
End: 2023-04-28
Payer: MEDICAID

## 2023-04-28 VITALS
DIASTOLIC BLOOD PRESSURE: 80 MMHG | WEIGHT: 168.2 LBS | BODY MASS INDEX: 33.95 KG/M2 | HEART RATE: 76 BPM | OXYGEN SATURATION: 96 % | SYSTOLIC BLOOD PRESSURE: 134 MMHG | TEMPERATURE: 98.4 F

## 2023-04-28 DIAGNOSIS — J32.9 RECURRENT SINUSITIS: Primary | ICD-10-CM

## 2023-04-28 DIAGNOSIS — N76.0 ACUTE VAGINITIS: ICD-10-CM

## 2023-04-28 PROCEDURE — 1160F RVW MEDS BY RX/DR IN RCRD: CPT | Performed by: PHYSICIAN ASSISTANT

## 2023-04-28 PROCEDURE — 1159F MED LIST DOCD IN RCRD: CPT | Performed by: PHYSICIAN ASSISTANT

## 2023-04-28 PROCEDURE — 99213 OFFICE O/P EST LOW 20 MIN: CPT | Performed by: PHYSICIAN ASSISTANT

## 2023-04-28 PROCEDURE — 3079F DIAST BP 80-89 MM HG: CPT | Performed by: PHYSICIAN ASSISTANT

## 2023-04-28 PROCEDURE — 3075F SYST BP GE 130 - 139MM HG: CPT | Performed by: PHYSICIAN ASSISTANT

## 2023-04-28 RX ORDER — FLUCONAZOLE 150 MG/1
150 TABLET ORAL
Qty: 2 TABLET | Refills: 0 | Status: SHIPPED | OUTPATIENT
Start: 2023-04-28

## 2023-04-28 RX ORDER — METHYLPREDNISOLONE 4 MG/1
TABLET ORAL
Qty: 21 TABLET | Refills: 0 | Status: SHIPPED | OUTPATIENT
Start: 2023-04-28

## 2023-05-03 ENCOUNTER — APPOINTMENT (OUTPATIENT)
Dept: GENERAL RADIOLOGY | Facility: HOSPITAL | Age: 43
DRG: 854 | End: 2023-05-03
Payer: MEDICAID

## 2023-05-03 ENCOUNTER — HOSPITAL ENCOUNTER (INPATIENT)
Facility: HOSPITAL | Age: 43
LOS: 4 days | Discharge: HOME | DRG: 854 | End: 2023-05-07
Attending: EMERGENCY MEDICINE | Admitting: INTERNAL MEDICINE
Payer: MEDICAID

## 2023-05-03 DIAGNOSIS — N39.0 ACUTE UTI: ICD-10-CM

## 2023-05-03 DIAGNOSIS — J06.9 VIRAL UPPER RESPIRATORY INFECTION: ICD-10-CM

## 2023-05-03 DIAGNOSIS — D72.829 LEUKOCYTOSIS, UNSPECIFIED TYPE: ICD-10-CM

## 2023-05-03 DIAGNOSIS — R50.9 ACUTE FEBRILE ILLNESS: Primary | ICD-10-CM

## 2023-05-03 DIAGNOSIS — E87.6 HYPOKALEMIA: ICD-10-CM

## 2023-05-03 PROBLEM — R19.7 DIARRHEA: Status: ACTIVE | Noted: 2023-05-03

## 2023-05-03 LAB
ALBUMIN SERPL-MCNC: 3.3 G/DL (ref 3.5–5.2)
ALBUMIN/GLOB SERPL: 1.1 G/DL
ALP SERPL-CCNC: 62 U/L (ref 39–117)
ALT SERPL W P-5'-P-CCNC: 7 U/L (ref 1–33)
ANION GAP SERPL CALCULATED.3IONS-SCNC: 11 MMOL/L (ref 5–15)
AST SERPL-CCNC: 11 U/L (ref 1–32)
BACTERIA UR QL AUTO: ABNORMAL /HPF
BASOPHILS # BLD AUTO: 0.03 10*3/MM3 (ref 0–0.2)
BASOPHILS NFR BLD AUTO: 0.2 % (ref 0–1.5)
BILIRUB SERPL-MCNC: 0.5 MG/DL (ref 0–1.2)
BILIRUB UR QL STRIP: NEGATIVE
BUN SERPL-MCNC: 14 MG/DL (ref 6–20)
BUN/CREAT SERPL: 19.2 (ref 7–25)
CALCIUM SPEC-SCNC: 8.1 MG/DL (ref 8.6–10.5)
CHLORIDE SERPL-SCNC: 101 MMOL/L (ref 98–107)
CLARITY UR: ABNORMAL
CO2 SERPL-SCNC: 22 MMOL/L (ref 22–29)
COLOR UR: YELLOW
CREAT SERPL-MCNC: 0.73 MG/DL (ref 0.57–1)
D-LACTATE SERPL-SCNC: 2.8 MMOL/L (ref 0.5–2)
D-LACTATE SERPL-SCNC: 3.8 MMOL/L (ref 0.5–2)
D-LACTATE SERPL-SCNC: 3.9 MMOL/L (ref 0.5–2)
DEPRECATED RDW RBC AUTO: 47.4 FL (ref 37–54)
EGFRCR SERPLBLD CKD-EPI 2021: 104.1 ML/MIN/1.73
EOSINOPHIL # BLD AUTO: 0.01 10*3/MM3 (ref 0–0.4)
EOSINOPHIL NFR BLD AUTO: 0.1 % (ref 0.3–6.2)
ERYTHROCYTE [DISTWIDTH] IN BLOOD BY AUTOMATED COUNT: 16.7 % (ref 12.3–15.4)
FLUAV RNA RESP QL NAA+PROBE: NOT DETECTED
FLUBV RNA RESP QL NAA+PROBE: NOT DETECTED
GLOBULIN UR ELPH-MCNC: 2.9 GM/DL
GLUCOSE SERPL-MCNC: 112 MG/DL (ref 65–99)
GLUCOSE UR STRIP-MCNC: NEGATIVE MG/DL
HCT VFR BLD AUTO: 32 % (ref 34–46.6)
HGB BLD-MCNC: 10.4 G/DL (ref 12–15.9)
HGB UR QL STRIP.AUTO: ABNORMAL
HYALINE CASTS UR QL AUTO: ABNORMAL /LPF
IMM GRANULOCYTES # BLD AUTO: 0.1 10*3/MM3 (ref 0–0.05)
IMM GRANULOCYTES NFR BLD AUTO: 0.5 % (ref 0–0.5)
KETONES UR QL STRIP: ABNORMAL
LEUKOCYTE ESTERASE UR QL STRIP.AUTO: ABNORMAL
LYMPHOCYTES # BLD AUTO: 1.52 10*3/MM3 (ref 0.7–3.1)
LYMPHOCYTES NFR BLD AUTO: 7.7 % (ref 19.6–45.3)
MAGNESIUM SERPL-MCNC: 1.5 MG/DL (ref 1.6–2.6)
MCH RBC QN AUTO: 25.4 PG (ref 26.6–33)
MCHC RBC AUTO-ENTMCNC: 32.5 G/DL (ref 31.5–35.7)
MCV RBC AUTO: 78 FL (ref 79–97)
MONOCYTES # BLD AUTO: 1.01 10*3/MM3 (ref 0.1–0.9)
MONOCYTES NFR BLD AUTO: 5.1 % (ref 5–12)
NEUTROPHILS NFR BLD AUTO: 17.06 10*3/MM3 (ref 1.7–7)
NEUTROPHILS NFR BLD AUTO: 86.4 % (ref 42.7–76)
NITRITE UR QL STRIP: POSITIVE
NRBC BLD AUTO-RTO: 0 /100 WBC (ref 0–0.2)
PH UR STRIP.AUTO: 5.5 [PH] (ref 5–8)
PLATELET # BLD AUTO: 268 10*3/MM3 (ref 140–450)
PMV BLD AUTO: 9.9 FL (ref 6–12)
POTASSIUM SERPL-SCNC: 3.1 MMOL/L (ref 3.5–5.2)
PROCALCITONIN SERPL-MCNC: 1.23 NG/ML (ref 0–0.25)
PROT SERPL-MCNC: 6.2 G/DL (ref 6–8.5)
PROT UR QL STRIP: ABNORMAL
RBC # BLD AUTO: 4.1 10*6/MM3 (ref 3.77–5.28)
RBC # UR STRIP: ABNORMAL /HPF
REF LAB TEST METHOD: ABNORMAL
SARS-COV-2 RNA RESP QL NAA+PROBE: NOT DETECTED
SODIUM SERPL-SCNC: 134 MMOL/L (ref 136–145)
SP GR UR STRIP: 1.02 (ref 1–1.03)
SQUAMOUS #/AREA URNS HPF: ABNORMAL /HPF
UROBILINOGEN UR QL STRIP: ABNORMAL
WBC # UR STRIP: ABNORMAL /HPF
WBC NRBC COR # BLD: 19.73 10*3/MM3 (ref 3.4–10.8)

## 2023-05-03 PROCEDURE — 25010000002 CEFTRIAXONE PER 250 MG: Performed by: EMERGENCY MEDICINE

## 2023-05-03 PROCEDURE — 63710000001 ONDANSETRON PER 8 MG: Performed by: EMERGENCY MEDICINE

## 2023-05-03 PROCEDURE — 83735 ASSAY OF MAGNESIUM: CPT | Performed by: HOSPITALIST

## 2023-05-03 PROCEDURE — 84145 PROCALCITONIN (PCT): CPT | Performed by: HOSPITALIST

## 2023-05-03 PROCEDURE — 87077 CULTURE AEROBIC IDENTIFY: CPT | Performed by: EMERGENCY MEDICINE

## 2023-05-03 PROCEDURE — 85025 COMPLETE CBC W/AUTO DIFF WBC: CPT | Performed by: EMERGENCY MEDICINE

## 2023-05-03 PROCEDURE — 87040 BLOOD CULTURE FOR BACTERIA: CPT | Performed by: HOSPITALIST

## 2023-05-03 PROCEDURE — G0378 HOSPITAL OBSERVATION PER HR: HCPCS

## 2023-05-03 PROCEDURE — 99285 EMERGENCY DEPT VISIT HI MDM: CPT

## 2023-05-03 PROCEDURE — 83605 ASSAY OF LACTIC ACID: CPT | Performed by: HOSPITALIST

## 2023-05-03 PROCEDURE — 87186 SC STD MICRODIL/AGAR DIL: CPT | Performed by: EMERGENCY MEDICINE

## 2023-05-03 PROCEDURE — 25010000002 HEPARIN (PORCINE) PER 1000 UNITS: Performed by: HOSPITALIST

## 2023-05-03 PROCEDURE — 36415 COLL VENOUS BLD VENIPUNCTURE: CPT

## 2023-05-03 PROCEDURE — 87086 URINE CULTURE/COLONY COUNT: CPT | Performed by: EMERGENCY MEDICINE

## 2023-05-03 PROCEDURE — 99223 1ST HOSP IP/OBS HIGH 75: CPT | Performed by: HOSPITALIST

## 2023-05-03 PROCEDURE — 71046 X-RAY EXAM CHEST 2 VIEWS: CPT

## 2023-05-03 PROCEDURE — 81001 URINALYSIS AUTO W/SCOPE: CPT | Performed by: EMERGENCY MEDICINE

## 2023-05-03 PROCEDURE — 87636 SARSCOV2 & INF A&B AMP PRB: CPT | Performed by: EMERGENCY MEDICINE

## 2023-05-03 PROCEDURE — 80053 COMPREHEN METABOLIC PANEL: CPT | Performed by: EMERGENCY MEDICINE

## 2023-05-03 PROCEDURE — 25010000002 MAGNESIUM SULFATE 2 GM/50ML SOLUTION: Performed by: HOSPITALIST

## 2023-05-03 RX ORDER — FAMOTIDINE 10 MG/ML
20 INJECTION, SOLUTION INTRAVENOUS ONCE
Status: COMPLETED | OUTPATIENT
Start: 2023-05-03 | End: 2023-05-03

## 2023-05-03 RX ORDER — SODIUM CHLORIDE 0.9 % (FLUSH) 0.9 %
10 SYRINGE (ML) INJECTION EVERY 12 HOURS SCHEDULED
Status: DISCONTINUED | OUTPATIENT
Start: 2023-05-03 | End: 2023-05-07 | Stop reason: HOSPADM

## 2023-05-03 RX ORDER — HEPARIN SODIUM 5000 [USP'U]/ML
5000 INJECTION, SOLUTION INTRAVENOUS; SUBCUTANEOUS EVERY 12 HOURS SCHEDULED
Status: DISCONTINUED | OUTPATIENT
Start: 2023-05-03 | End: 2023-05-04

## 2023-05-03 RX ORDER — MAGNESIUM SULFATE HEPTAHYDRATE 40 MG/ML
2 INJECTION, SOLUTION INTRAVENOUS
Status: COMPLETED | OUTPATIENT
Start: 2023-05-03 | End: 2023-05-03

## 2023-05-03 RX ORDER — SODIUM CHLORIDE 9 MG/ML
40 INJECTION, SOLUTION INTRAVENOUS AS NEEDED
Status: DISCONTINUED | OUTPATIENT
Start: 2023-05-03 | End: 2023-05-07 | Stop reason: HOSPADM

## 2023-05-03 RX ORDER — IPRATROPIUM BROMIDE AND ALBUTEROL SULFATE 2.5; .5 MG/3ML; MG/3ML
3 SOLUTION RESPIRATORY (INHALATION) EVERY 6 HOURS PRN
Status: DISCONTINUED | OUTPATIENT
Start: 2023-05-03 | End: 2023-05-05 | Stop reason: SDUPTHER

## 2023-05-03 RX ORDER — SODIUM CHLORIDE 0.9 % (FLUSH) 0.9 %
10 SYRINGE (ML) INJECTION AS NEEDED
Status: DISCONTINUED | OUTPATIENT
Start: 2023-05-03 | End: 2023-05-07 | Stop reason: HOSPADM

## 2023-05-03 RX ORDER — ACETAMINOPHEN 500 MG
1000 TABLET ORAL ONCE
Status: COMPLETED | OUTPATIENT
Start: 2023-05-03 | End: 2023-05-03

## 2023-05-03 RX ORDER — SODIUM CHLORIDE 9 MG/ML
100 INJECTION, SOLUTION INTRAVENOUS CONTINUOUS
Status: DISCONTINUED | OUTPATIENT
Start: 2023-05-03 | End: 2023-05-04

## 2023-05-03 RX ORDER — ONDANSETRON 2 MG/ML
4 INJECTION INTRAMUSCULAR; INTRAVENOUS EVERY 6 HOURS PRN
Status: DISCONTINUED | OUTPATIENT
Start: 2023-05-03 | End: 2023-05-07 | Stop reason: HOSPADM

## 2023-05-03 RX ORDER — ONDANSETRON 4 MG/1
4 TABLET, FILM COATED ORAL EVERY 6 HOURS PRN
Status: DISCONTINUED | OUTPATIENT
Start: 2023-05-03 | End: 2023-05-07 | Stop reason: HOSPADM

## 2023-05-03 RX ORDER — TRAMADOL HYDROCHLORIDE 50 MG/1
50 TABLET ORAL EVERY 6 HOURS PRN
Status: DISCONTINUED | OUTPATIENT
Start: 2023-05-03 | End: 2023-05-07 | Stop reason: HOSPADM

## 2023-05-03 RX ORDER — POTASSIUM CHLORIDE 750 MG/1
40 CAPSULE, EXTENDED RELEASE ORAL EVERY 4 HOURS
Status: COMPLETED | OUTPATIENT
Start: 2023-05-03 | End: 2023-05-03

## 2023-05-03 RX ORDER — ALUMINA, MAGNESIA, AND SIMETHICONE 2400; 2400; 240 MG/30ML; MG/30ML; MG/30ML
15 SUSPENSION ORAL EVERY 6 HOURS PRN
Status: DISCONTINUED | OUTPATIENT
Start: 2023-05-03 | End: 2023-05-07 | Stop reason: HOSPADM

## 2023-05-03 RX ORDER — SACCHAROMYCES BOULARDII 250 MG
250 CAPSULE ORAL 2 TIMES DAILY
Status: DISCONTINUED | OUTPATIENT
Start: 2023-05-03 | End: 2023-05-07 | Stop reason: HOSPADM

## 2023-05-03 RX ORDER — CHOLECALCIFEROL (VITAMIN D3) 125 MCG
5 CAPSULE ORAL NIGHTLY PRN
Status: DISCONTINUED | OUTPATIENT
Start: 2023-05-03 | End: 2023-05-07 | Stop reason: HOSPADM

## 2023-05-03 RX ORDER — FAMOTIDINE 20 MG/1
20 TABLET, FILM COATED ORAL
Status: DISCONTINUED | OUTPATIENT
Start: 2023-05-03 | End: 2023-05-07 | Stop reason: HOSPADM

## 2023-05-03 RX ORDER — ONDANSETRON 4 MG/1
4 TABLET, FILM COATED ORAL EVERY 6 HOURS PRN
Status: DISCONTINUED | OUTPATIENT
Start: 2023-05-03 | End: 2023-05-03 | Stop reason: SDUPTHER

## 2023-05-03 RX ORDER — ACETAMINOPHEN 325 MG/1
650 TABLET ORAL EVERY 4 HOURS PRN
Status: DISCONTINUED | OUTPATIENT
Start: 2023-05-03 | End: 2023-05-07 | Stop reason: HOSPADM

## 2023-05-03 RX ADMIN — SODIUM CHLORIDE 100 ML/HR: 0.9 INJECTION, SOLUTION INTRAVENOUS at 17:02

## 2023-05-03 RX ADMIN — HEPARIN SODIUM 5000 UNITS: 5000 INJECTION INTRAVENOUS; SUBCUTANEOUS at 20:30

## 2023-05-03 RX ADMIN — ONDANSETRON 4 MG: 4 TABLET ORAL at 08:09

## 2023-05-03 RX ADMIN — POTASSIUM CHLORIDE 40 MEQ: 10 CAPSULE, COATED, EXTENDED RELEASE ORAL at 15:59

## 2023-05-03 RX ADMIN — FAMOTIDINE 20 MG: 20 TABLET, FILM COATED ORAL at 17:01

## 2023-05-03 RX ADMIN — SODIUM CHLORIDE 500 ML: 0.9 INJECTION, SOLUTION INTRAVENOUS at 22:57

## 2023-05-03 RX ADMIN — MAGNESIUM SULFATE HEPTAHYDRATE 2 G: 2 INJECTION, SOLUTION INTRAVENOUS at 20:30

## 2023-05-03 RX ADMIN — SODIUM CHLORIDE 1000 ML: 9 INJECTION, SOLUTION INTRAVENOUS at 11:34

## 2023-05-03 RX ADMIN — POTASSIUM CHLORIDE 40 MEQ: 10 CAPSULE, COATED, EXTENDED RELEASE ORAL at 20:30

## 2023-05-03 RX ADMIN — POTASSIUM CHLORIDE 40 MEQ: 10 CAPSULE, COATED, EXTENDED RELEASE ORAL at 23:55

## 2023-05-03 RX ADMIN — Medication 250 MG: at 16:00

## 2023-05-03 RX ADMIN — ACETAMINOPHEN 1000 MG: 500 TABLET ORAL at 08:09

## 2023-05-03 RX ADMIN — ACETAMINOPHEN 325MG 650 MG: 325 TABLET ORAL at 22:10

## 2023-05-03 RX ADMIN — ACETAMINOPHEN 325MG 650 MG: 325 TABLET ORAL at 17:01

## 2023-05-03 RX ADMIN — MAGNESIUM SULFATE HEPTAHYDRATE 2 G: 2 INJECTION, SOLUTION INTRAVENOUS at 16:01

## 2023-05-03 RX ADMIN — SODIUM CHLORIDE 1000 ML: 9 INJECTION, SOLUTION INTRAVENOUS at 09:18

## 2023-05-03 RX ADMIN — FAMOTIDINE 20 MG: 10 INJECTION INTRAVENOUS at 10:34

## 2023-05-03 RX ADMIN — MAGNESIUM SULFATE HEPTAHYDRATE 2 G: 2 INJECTION, SOLUTION INTRAVENOUS at 18:13

## 2023-05-03 RX ADMIN — CEFTRIAXONE 1 G: 1 INJECTION, POWDER, FOR SOLUTION INTRAMUSCULAR; INTRAVENOUS at 11:34

## 2023-05-03 NOTE — H&P
[facility]  HISTORY AND PHYSICAL    Patient Name: Bernardo Dodd  : 1980  MRN: [id number]  Primary Care Physician: Nelly Sotelo PA  Date of admission: 5/3/2023      Subjective   Subjective     Chief Complaint:  F/U fever, chills, abdominal pain    HPI:  Bernardo Dodd is a 42 y.o. female with hx of HTN, migraines, GERD, bipolar disorder, anxiety, and kidney stones who presents due to fevers, chills, abdominal pain for the past 24 hours. She has had URI symptoms for the past 1 month at least, reports completed a course of Augmentin for sinus infection and ear infection, most recently was started on a Medrol dose pack for ongoing sinus symptoms, and referred to ENT. She reports fever started last night at work and so she presented to the ER this morning after her shift was over. She reports diarrhea started this morning as well. 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     On arrival to the ER she is febrile to 103.1 and tachycardic in the 120's. Labs showed WBC 19.73, K 3.4. Lactic acid and procal are pending. UA showed positive nitrites, large leukocyte esterase, TNTC WBC, 4+ bacteria, 13-20 squamous cells. COVID-19 and Flu PCR were negative. CXR no acute findings. She received two 1 liter fluid boluses and IV Rocephin, admitted for further management.       Review of Systems   Gen-+fevers, +chills  CV-no chest pain, no palpitations  Resp-+cough, no dyspnea  GI-no N/V, +diarrhea, +abd pain      Personal History     Past Medical History:   Diagnosis Date   • Allergic rhinitis    • Anemia    • Anxiety    • Arthritis    • Asthma    • Bartholin gland cyst    • Bipolar disorder 2014   • Chlamydia    • Depression    • Endometriosis    • FHx: migraine headaches    • GERD (gastroesophageal reflux disease)    • Gonorrhea    • Heart murmur    • Herpes simplex    • History of chest x-ray 2015    no active disease   • History of  echocardiogram 2015    ejection fraction of greater than 65%, mitral and pulmonic regurgitation an physiological tricuspid regurgitation.   • History of PFTs 2015    spirometry data acceptable and reproducible; pt given 4 puffs of Ventolin; pt gave good effort; no obstruction; no Bd response; MVV reduced    • History of PFTs 2015    pt gave best effort; duoneb given prior and post study; moderate nonspecific proportional reduction of FEV1 and FVC with preserved ratio; FEV1 moderately reduced; cannot rule out restriction   • Hypertension    • Kidney stones    • Migraine    • Ovarian cyst    • Pelvic pain    • PMS (premenstrual syndrome)    • Preeclampsia    • Screening breast examination     self;admits   • Seizures    • STD (female)    • Substance abuse    • Thigh shingles    • Trauma    • Trichomonas infection    • Urinary tract infection    • Urogenital trichomoniasis    • Varicella              Past Surgical History:   Procedure Laterality Date   • BILATERAL BREAST REDUCTION     • BREAST BIOPSY     • BREAST SURGERY      breast reduction   •  SECTION     •  SECTION WITH TUBAL  2010   • CYSTOSCOPY     • DIAGNOSTIC LAPAROSCOPY     • INCISION AND DRAINAGE ABSCESS      bartholin's   • LAPAROSCOPIC TUBAL LIGATION     • ORIF ANKLE FRACTURE Right    • REDUCTION MAMMAPLASTY Bilateral    • TENSION FREE VAGINAL TAPING WITH MINI ARC SLING      Dr Doyle avery        Family History: family history includes Arthritis in her father and mother; Bipolar disorder in her mother; Cancer in her maternal grandmother and mother; Dementia in her father; Diabetes in her paternal uncle and another family member; MARCIE disease in her paternal aunt; Heart disease in an other family member; Heart failure in her paternal grandmother; Hypertension in her father and another family member; Other in an other family member; Pancreatic cancer in her maternal grandmother and another family member.      Social History:  reports that she quit smoking about 8 years ago. Her smoking use included cigarettes and cigars. She has never used smokeless tobacco. She reports that she does not currently use drugs after having used the following drugs: Marijuana. She reports that she does not drink alcohol.  Social History     Social History Narrative    Caffeine intake: 16 oz per day        Medications:  Available home medication information reviewed.  (Not in a hospital admission)      Allergies   Allergen Reactions   • Naproxen Swelling   • Sulfa Antibiotics Rash       Objective   Objective     Vital Signs:   Temp:  [98.9 °F (37.2 °C)-102.4 °F (39.1 °C)] 98.9 °F (37.2 °C)  Heart Rate:  [] 98  Resp:  [18-20] 18  BP: (104-160)/(71-89) 107/81       Physical Exam   Constitutional: Awake, alert  Eyes: PERRLA, sclerae anicteric, no conjunctival injection  HENT: NCAT, mucous membranes dry  Neck: Supple, no thyromegaly, no lymphadenopathy, trachea midline  Respiratory: Clear to auscultation bilaterally, nonlabored respirations   Cardiovascular: tachycardic, no murmurs, rubs, or gallops, palpable pedal pulses bilaterally  Gastrointestinal: Positive bowel sounds, soft, mildly tender across upper and mid abdomen, slightly distended  Musculoskeletal: No bilateral ankle edema, no clubbing or cyanosis to extremities  Psychiatric: Appropriate affect, cooperative  Neurologic: Oriented x 3, strength symmetric in all extremities, Cranial Nerves grossly intact to confrontation, speech clear  Skin: No rashes    Result Review:  I have personally reviewed the results from the time of this admission to 5/3/2023 13:23 EDT and agree with these findings:  [x]  Laboratory list / accordion  []  Microbiology  [x]  Radiology  []  EKG/Telemetry   []  Cardiology/Vascular   []  Pathology  [x]  Old records  []  Other:      LAB RESULTS:      Lab 05/03/23  1122   WBC 19.73*   HEMOGLOBIN 10.4*   HEMATOCRIT 32.0*   PLATELETS 268   NEUTROS ABS 17.06*    IMMATURE GRANS (ABS) 0.10*   LYMPHS ABS 1.52   MONOS ABS 1.01*   EOS ABS 0.01   MCV 78.0*         Lab 05/03/23  1122   SODIUM 134*   POTASSIUM 3.1*   CHLORIDE 101   CO2 22.0   ANION GAP 11.0   BUN 14   CREATININE 0.73   EGFR 104.1   GLUCOSE 112*   CALCIUM 8.1*         Lab 05/03/23  1122   TOTAL PROTEIN 6.2   ALBUMIN 3.3*   GLOBULIN 2.9   ALT (SGPT) 7   AST (SGOT) 11   BILIRUBIN 0.5   ALK PHOS 62                     UA        9/5/2022    08:43 10/2/2022    01:18 5/3/2023    10:31   Urinalysis   Squamous Epithelial Cells, UA 3-6    13-20     Specific Gravity, UA 1.012   1.022   1.023     Ketones, UA Negative   Negative   Trace     Blood, UA Large (3+)   Negative   Small (1+)     Leukocytes, UA Small (1+)   Negative   Large (3+)     Nitrite, UA Negative   Negative   Positive     RBC, UA Too Numerous to Count    3-6     WBC, UA 13-20    Too Numerous to Count     Bacteria, UA Trace    4+         Microbiology Results (last 10 days)     Procedure Component Value - Date/Time    COVID PRE-OP / PRE-PROCEDURE SCREENING ORDER (NO ISOLATION) - Swab, Nasopharynx [[id number]]  (Normal) Collected: 05/03/23 0808    Lab Status: Final result Specimen: Swab from Nasopharynx Updated: 05/03/23 0907    Narrative:      The following orders were created for panel order COVID PRE-OP / PRE-PROCEDURE SCREENING ORDER (NO ISOLATION) - Swab, Nasopharynx.  Procedure                               Abnormality         Status                     ---------                               -----------         ------                     COVID-19 and FLU A/B PCR...[[id number]]  Normal              Final result                 Please view results for these tests on the individual orders.    COVID-19 and FLU A/B PCR - Swab, Nasopharynx [[id number]]  (Normal) Collected: 05/03/23 0808    Lab Status: Final result Specimen: Swab from Nasopharynx Updated: 05/03/23 0907     COVID19 Not Detected     Influenza A PCR Not Detected     Influenza B PCR Not Detected     Narrative:      Fact sheet for providers: https://www.fda.gov/media/095929/download    Fact sheet for patients: https://www.fda.gov/media/395666/download    Test performed by PCR.          XR Chest 2 View    Result Date: 5/3/2023  XR CHEST 2 VW Date of Exam: 5/3/2023 7:56 AM EDT Indication: fever / chills Comparison: Chest x-ray 9/5/2022 Findings: Normal cardiomediastinal silhouette. The lungs are clear. No pleural effusion or pneumothorax. No acute osseous findings.     Impression: Impression: No acute cardiopulmonary findings. Electronically Signed: Alan Telles  5/3/2023 8:14 AM EDT  Workstation ID: [id number]      Results for orders placed during the hospital encounter of 09/13/22    Adult Stress Echo W/ Cont or Stress Agent if Necessary Per Protocol    Interpretation Summary  · The patient completed 7: 50 minutes of treadmill exercise on standard Randy protocol achieving 9.8 METs of workload. The test was stopped due to fatigue after achieving the target heart rate. She reported mild chest tightness and heaviness at peak exercise which resolved in recovery.  · Expected exercise duration 8:45, actual 7:50; AVIS (+11).  · Resting heart rate 82, blood pressure 120/88. Peak heart rate 151, blood pressure 168/100. 85% of age-predicted maximal heart rate achieved.  · Average exercise capacity, completed for this protocol.  · Resting ECG showed sinus rhythm with nonspecific ST segment changes. Stress ECG is nondiagnostic due to baseline abnormalities however no significant ST abnormalities were noted. No exercise-induced arrhythmias were seen.  · Average exercise capacity with appropriate hemodynamic response to exercise.  · Negative treadmill stress test for ischemic ST segment abnormalities or exercise-induced arrhythmias. Chest tightness and heaviness was reported which resolved in recovery.  · Resting echocardiogram showed normal left ventricular systolic function with estimated ejection fraction 65%. Trace mitral  regurgitation, trace tricuspid regurgitation and trace to mild pulmonic insufficiency was noted.  · Normal stress echo with no significant echocardiographic evidence for myocardial ischemia. Post-rest ejection fraction 75%.      Assessment & Plan   Assessment & Plan     Active Hospital Problems    Diagnosis  POA   • **Sepsis due to urinary tract infection [A41.9, N39.0]  Yes   • UTI (urinary tract infection) [N39.0]  Yes   • Hypokalemia [E87.6]  Yes   • Diarrhea [R19.7]  Yes   • Hypertension [I10]  Yes     43 yo F with hx of HTN, migraines, GERD, bipolar disorder, anxiety, and kidney stones who presents due to fevers, chills, abdominal pain. She is found to have sepsis due to a UTI.    Sepsis secondary to acute UTI, POA  --Fever, tachycardia, leukocytosis, and source (UTI)  --s/p IV fluid boluses in ER (2 liters), continue IV fluids  --Continue on IV Rocephin  --F/U blood and urine cultures  --Patient does have hx of kidney stones, had a left ureteral stent placed in August 2019 by Dr. Vitaly Meeks; low threshold to check CT A/P if she has ongoing symptoms to evaluate for obstructing nephrolithiasis    Diarrhea  --Patient also reports diarrhea that started this morning, given that she has had recent antibiotic use (Augmentin for a sinus and ear infection) and works with patients in an assisted living facility, prudent to rule out C.diff  --Check C.diff and GI PCR panel  --Add probiotic    Hypokalemia  --Replace per protocol  --Check Mg    HTN  --BP running on the lower side, will hold home BP meds for now    GERD  --Not on any home meds, will add Pepcid BID    Bipolar disorder  Anxiety  --Continue Lamictal, ?Zyprexa -- need to confirm home meds      DVT prophylaxis: [facility]      CODE STATUS:    Code Status and Medical Interventions:   Ordered at: 05/03/23 1321     Code Status (Patient has no pulse and is not breathing):    CPR (Attempt to Resuscitate)     Medical Interventions (Patient has pulse or is breathing):    Full  Support       Expected Discharge   Expected Discharge Date: 5/5/2023; Expected Discharge Time:      Krista Horton MD  05/03/23

## 2023-05-03 NOTE — DISCHARGE INSTRUCTIONS
Tylenol Motrin as we discussed.  Drink plenty of fluids.    Continue your medications as prescribed.    Follow-up with your PCP in the next week.  Call for an appointment.    Return to the emergency department immediately for new or change concerns.    Please review the medications you are supposed to be taking according to prior physician recommendations. I have not changed your home medications during this visit. If your discharge instructions indicate that I have changed your home medications, this is not the case, and you should disregard. If you have any questions about the medication you should be taking at home, please call your physician.

## 2023-05-03 NOTE — PLAN OF CARE
Goal Outcome Evaluation:   Patient on RA. Patient up ad estephania. Complains of abdominal pain and headache. Sinus-tach on the monitor. Bed at lowest level and call light within reach.

## 2023-05-03 NOTE — ED PROVIDER NOTES
Subjective   History of Present Illness  This patient is a pleasant 42-year-old female with a history of migraine headaches, GERD, hypertension, bipolar disease, anxiety and endometriosis who comes in with fever, chills, cough, upper respiratory congestion and mild headache over the last 24 hours or so.  Patient has been taking steroids and tells me that she thinks she might have the flu.  She has no sick contacts.  She tells me she was at work last night and checked her temperature several times and found her temperature to be greater than 102 °F.  She started wearing a mask because she did not want to infect anybody else and came to the emergency department after her shift was over for evaluation.  Her primary care is a SUHA Ponce.  Patient tells me she has had no syncope, no chest pain, no hemoptysis.  No hematemesis.  No diarrhea or vomiting.  Mild nausea.  Denies any productive cough but has had a mild nonproductive cough.  In summary, we have a 42-year-old female with fevers, chills, nonproductive cough who comes in for evaluation.    Past medical history  Anxiety, endometriosis, bipolar disease, patient tells me she has no history of thyroid disease    Family history  Bipolar disease, mother        Review of Systems   Constitutional: Positive for chills and fever. Negative for fatigue and unexpected weight change.   HENT: Positive for congestion and rhinorrhea. Negative for dental problem, ear pain, hearing loss and sinus pressure.    Eyes: Negative for pain and visual disturbance.   Respiratory: Positive for cough. Negative for chest tightness and shortness of breath.    Cardiovascular: Negative for chest pain, palpitations and leg swelling.   Gastrointestinal: Negative for blood in stool, diarrhea, nausea and vomiting.   Genitourinary: Negative for difficulty urinating, dysuria, frequency, hematuria and urgency.   Musculoskeletal: Positive for myalgias. Negative for neck pain and neck stiffness.    Neurological: Negative for seizures, syncope, speech difficulty, light-headedness and headaches.   Psychiatric/Behavioral: Negative for confusion.   All other systems reviewed and are negative.      Past Medical History:   Diagnosis Date   • Allergic rhinitis    • Anemia    • Anxiety    • Arthritis    • Asthma    • Bartholin gland cyst    • Bipolar disorder 2014   • Chlamydia    • Depression    • Endometriosis    • FHx: migraine headaches    • GERD (gastroesophageal reflux disease)    • Gonorrhea    • Heart murmur    • Herpes simplex    • History of chest x-ray 2015    no active disease   • History of echocardiogram 2015    ejection fraction of greater than 65%, mitral and pulmonic regurgitation an physiological tricuspid regurgitation.   • History of PFTs 2015    spirometry data acceptable and reproducible; pt given 4 puffs of Ventolin; pt gave good effort; no obstruction; no Bd response; MVV reduced    • History of PFTs 2015    pt gave best effort; duoneb given prior and post study; moderate nonspecific proportional reduction of FEV1 and FVC with preserved ratio; FEV1 moderately reduced; cannot rule out restriction   • Hypertension    • Kidney stones    • Migraine    • Ovarian cyst    • Pelvic pain    • PMS (premenstrual syndrome)    • Preeclampsia    • Screening breast examination     self;admits   • Seizures    • STD (female)    • Substance abuse    • Thigh shingles    • Trauma    • Trichomonas infection    • Urinary tract infection    • Urogenital trichomoniasis    • Varicella        Allergies   Allergen Reactions   • Naproxen Swelling   • Sulfa Antibiotics Rash       Past Surgical History:   Procedure Laterality Date   • BILATERAL BREAST REDUCTION     • BREAST BIOPSY     • BREAST SURGERY      breast reduction   •  SECTION     •  SECTION WITH TUBAL  2010   • CYSTOSCOPY     • DIAGNOSTIC LAPAROSCOPY     • INCISION AND DRAINAGE ABSCESS      bartholin's   •  LAPAROSCOPIC TUBAL LIGATION     • ORIF ANKLE FRACTURE Right    • REDUCTION MAMMAPLASTY Bilateral    • TENSION FREE VAGINAL TAPING WITH MINI ARC SLING      Dr Doyle avery        Family History   Problem Relation Age of Onset   • Pancreatic cancer Maternal Grandmother    • Cancer Maternal Grandmother    • Heart failure Paternal Grandmother    • MARCIE disease Paternal Aunt    • Arthritis Mother    • Cancer Mother    • Bipolar disorder Mother    • Arthritis Father    • Dementia Father    • Hypertension Father    • Pancreatic cancer Other    • Diabetes Other    • Heart disease Other    • Hypertension Other    • Other Other         RESPIRATORY DISEASE   • Diabetes Paternal Uncle    • Heart attack Neg Hx    • Hyperlipidemia Neg Hx    • Mental illness Neg Hx    • Obesity Neg Hx    • Stroke Neg Hx    • Breast cancer Neg Hx    • Ovarian cancer Neg Hx        Social History     Socioeconomic History   • Marital status: Single   • Number of children: 2   • Highest education level: Some college, no degree   Tobacco Use   • Smoking status: Former     Packs/day: 0.00     Years: 15.00     Pack years: 0.00     Types: Cigarettes, Cigars     Quit date: 2015     Years since quittin.2   • Smokeless tobacco: Never   • Tobacco comments:     I quit every so often. Then i start again.   Vaping Use   • Vaping Use: Some days   • Devices: Disposable   • Passive vaping exposure: Yes   Substance and Sexual Activity   • Alcohol use: No   • Drug use: Not Currently     Types: Marijuana     Comment: Marijuana once a week. Tried cocaine, meth, pain pills in the past   • Sexual activity: Yes     Partners: Male     Birth control/protection: Tubal ligation           Objective   Physical Exam  Vitals and nursing note reviewed.   Constitutional:       General: She is not in acute distress.     Appearance: Normal appearance. She is normal weight. She is not ill-appearing or toxic-appearing.   HENT:      Head: Normocephalic and atraumatic.       Right Ear: Tympanic membrane and external ear normal.      Left Ear: Tympanic membrane and external ear normal.      Nose: Nose normal.      Mouth/Throat:      Mouth: Mucous membranes are dry.      Pharynx: Oropharynx is clear.   Eyes:      General:         Right eye: No discharge.         Left eye: No discharge.      Extraocular Movements: Extraocular movements intact.      Conjunctiva/sclera: Conjunctivae normal.      Pupils: Pupils are equal, round, and reactive to light.      Comments: Proptosis bilaterally   Cardiovascular:      Rate and Rhythm: Normal rate and regular rhythm.      Pulses: Normal pulses.      Heart sounds: No murmur heard.  Pulmonary:      Effort: Pulmonary effort is normal. No respiratory distress.      Breath sounds: Normal breath sounds. No wheezing or rales.   Abdominal:      General: Abdomen is flat. Bowel sounds are normal. There is no distension.      Palpations: Abdomen is soft. There is no mass.      Tenderness: There is no abdominal tenderness.      Hernia: No hernia is present.   Musculoskeletal:         General: No swelling or deformity. Normal range of motion.      Cervical back: Normal range of motion. No rigidity.      Right lower leg: No edema.      Left lower leg: No edema.   Lymphadenopathy:      Cervical: No cervical adenopathy.   Skin:     General: Skin is warm and dry.      Capillary Refill: Capillary refill takes less than 2 seconds.      Findings: No lesion or rash.   Neurological:      General: No focal deficit present.      Mental Status: She is alert and oriented to person, place, and time. Mental status is at baseline.      Motor: No weakness.   Psychiatric:         Mood and Affect: Mood normal.         Behavior: Behavior normal.         Thought Content: Thought content normal.         Procedures           ED Course  ED Course as of 05/03/23 1517   Wed May 03, 2023   5659 I had a nice conversation with the patient.  She has upper respiratory congestion, fever of  102.4, and cough.  We have talked about viral etiologies, pneumonia, migraine headaches, subarachnoid hemorrhage and multiple other etiologies.  Given the patient's fever, chills, and viral etiologies coupled with headache, I certainly do favor headache as result of a different primary diagnosis.  Patient did not have a thunderclap component or sudden onset of the headache.  She does have a history of migraines.  Patient is resting comfortably here and is alert and oriented.  I am going to give her IV fluid rehydration, Tylenol and Zofran.  We will get a chest x-ray and viral studies.  I do anticipate discharge home.  Patient is appreciative for care and without question or complaint.  Final impression and plan following completion of work-up. [ELFEGO]   0859 Chest x-ray has been completed and I have reviewed individually/independently.  I do not see any evidence of acute abnormality.  Dr. Alan Telles, reading radiologist, has agreed on the official read with no evidence of acute disease process noted. [ELFEGO]   0859 Nursing staff is having trouble getting an IV.  Tylenol and Zofran have been given.  We will attempt p.o. challenge and attempt to rehydrate the patient. [ELFEGO]   0923 IV was obtained and IV fluids have started. [ELFEGO]   0936 Heart rate down to 114 which is encouraging.  Temperature down to 100.3.  I think we are moving in the right direction.  We will give the patient the rest of the liter and recheck [ELFEGO]   1023 Heart rate is down to 99 and patient is no longer tachycardic.  She feels much better which is encouraging as well.  Temperature is down to 98.9.  Patient is resting comfortably with a most recent blood pressure of 123/72 which is encouraging.  COVID, influenza studies both negative.  Chest x-ray shows no evidence of acute disease process.  I talked the patient about the fact that she likely has some viral etiology and that fluids, Tylenol and Motrin would be a reasonable outpatient plan.  We will  have the patient follow-up with her PCP and she has been instructed to call for an appointment.  She is feeling much better, she has been very appreciative for care and patient will be discharged home accordingly. [ELFEGO]   1038 I reexamined the patient at the time of discharge.  Although she told me she was feeling much better, she started complaining of right mid abdominal pain and told me that she had collected a urine for us.  She thinks she might be having some reflux or perhaps a UTI.  She has no pain at McBurney's point.  She has a completely benign abdominal exam.  She does have a complaint of some upper GI discomfort in this right mid abdomen pain.  Her heart rate is great and her blood pressure is stable.  She is afebrile.  We talked about her viral studies and chest x-ray.  I do not believe CT imaging is warranted at this time given the completely benign exam but I have added on some lab studies including a CMP, CBC and urinalysis.  I have also ordered Pepcid for the patient and we will give her a little bit more fluid. [ELFEGO]   1109 CMP and CBC pending, urinalysis shows too numerous to count white blood cells in the setting of 4+ bacteria.  We will give a dose of Rocephin accordingly. [ELFEGO]   1122 Another liter of fluid has been ordered.  Patient is resting comfortably and feels much better.  She is receiving Rocephin, which is just been ordered.  Urine culture has been added.  CBC and CMP pending. [ELFEGO]   1216 Patient's labs have been completed and I am concerned about her elevated white count at 19,000.  Potassium low at 3.1 creatinine 0.73.  Although the patient does feel better, she had 1 episode of blood pressure was slightly lower than I had like.  Blood pressure is 107/81.  Although I do not believe that she is actively septic, I do believe she would benefit from observation, IV fluids and continued IV antibiotics.  Patient is agreeable to this plan and will be brought in to the hospitalist, Dr. Santiago  [ELFEGO]      ED Course User Index  [ELFEGO] Alejandro Skaggs MD      Recent Results (from the past 24 hour(s))   COVID-19 and FLU A/B PCR - Swab, Nasopharynx    Collection Time: 05/03/23  8:08 AM    Specimen: Nasopharynx; Swab   Result Value Ref Range    COVID19 Not Detected Not Detected - Ref. Range    Influenza A PCR Not Detected Not Detected    Influenza B PCR Not Detected Not Detected   Urinalysis With Microscopic If Indicated (No Culture) - Urine, Clean Catch    Collection Time: 05/03/23 10:31 AM    Specimen: Urine, Clean Catch   Result Value Ref Range    Color, UA Yellow Yellow, Straw    Appearance, UA Turbid (A) Clear    pH, UA 5.5 5.0 - 8.0    Specific Gravity, UA 1.023 1.001 - 1.030    Glucose, UA Negative Negative    Ketones, UA Trace (A) Negative    Bilirubin, UA Negative Negative    Blood, UA Small (1+) (A) Negative    Protein,  mg/dL (2+) (A) Negative    Leuk Esterase, UA Large (3+) (A) Negative    Nitrite, UA Positive (A) Negative    Urobilinogen, UA 1.0 E.U./dL 0.2 - 1.0 E.U./dL   Urinalysis, Microscopic Only - Urine, Clean Catch    Collection Time: 05/03/23 10:31 AM    Specimen: Urine, Clean Catch   Result Value Ref Range    RBC, UA 3-6 (A) None Seen, 0-2 /HPF    WBC, UA Too Numerous to Count (A) None Seen, 0-2 /HPF    Bacteria, UA 4+ (A) None Seen, Trace /HPF    Squamous Epithelial Cells, UA 13-20 (A) None Seen, 0-2 /HPF    Hyaline Casts, UA 0-6 0 - 6 /LPF    Methodology Automated Microscopy    Comprehensive Metabolic Panel    Collection Time: 05/03/23 11:22 AM    Specimen: Blood   Result Value Ref Range    Glucose 112 (H) 65 - 99 mg/dL    BUN 14 6 - 20 mg/dL    Creatinine 0.73 0.57 - 1.00 mg/dL    Sodium 134 (L) 136 - 145 mmol/L    Potassium 3.1 (L) 3.5 - 5.2 mmol/L    Chloride 101 98 - 107 mmol/L    CO2 22.0 22.0 - 29.0 mmol/L    Calcium 8.1 (L) 8.6 - 10.5 mg/dL    Total Protein 6.2 6.0 - 8.5 g/dL    Albumin 3.3 (L) 3.5 - 5.2 g/dL    ALT (SGPT) 7 1 - 33 U/L    AST (SGOT) 11 1 - 32 U/L    Alkaline  Phosphatase 62 39 - 117 U/L    Total Bilirubin 0.5 0.0 - 1.2 mg/dL    Globulin 2.9 gm/dL    A/G Ratio 1.1 g/dL    BUN/Creatinine Ratio 19.2 7.0 - 25.0    Anion Gap 11.0 5.0 - 15.0 mmol/L    eGFR 104.1 >60.0 mL/min/1.73   CBC Auto Differential    Collection Time: 05/03/23 11:22 AM    Specimen: Blood   Result Value Ref Range    WBC 19.73 (H) 3.40 - 10.80 10*3/mm3    RBC 4.10 3.77 - 5.28 10*6/mm3    Hemoglobin 10.4 (L) 12.0 - 15.9 g/dL    Hematocrit 32.0 (L) 34.0 - 46.6 %    MCV 78.0 (L) 79.0 - 97.0 fL    MCH 25.4 (L) 26.6 - 33.0 pg    MCHC 32.5 31.5 - 35.7 g/dL    RDW 16.7 (H) 12.3 - 15.4 %    RDW-SD 47.4 37.0 - 54.0 fl    MPV 9.9 6.0 - 12.0 fL    Platelets 268 140 - 450 10*3/mm3    Neutrophil % 86.4 (H) 42.7 - 76.0 %    Lymphocyte % 7.7 (L) 19.6 - 45.3 %    Monocyte % 5.1 5.0 - 12.0 %    Eosinophil % 0.1 (L) 0.3 - 6.2 %    Basophil % 0.2 0.0 - 1.5 %    Immature Grans % 0.5 0.0 - 0.5 %    Neutrophils, Absolute 17.06 (H) 1.70 - 7.00 10*3/mm3    Lymphocytes, Absolute 1.52 0.70 - 3.10 10*3/mm3    Monocytes, Absolute 1.01 (H) 0.10 - 0.90 10*3/mm3    Eosinophils, Absolute 0.01 0.00 - 0.40 10*3/mm3    Basophils, Absolute 0.03 0.00 - 0.20 10*3/mm3    Immature Grans, Absolute 0.10 (H) 0.00 - 0.05 10*3/mm3    nRBC 0.0 0.0 - 0.2 /100 WBC   Procalcitonin    Collection Time: 05/03/23 11:22 AM    Specimen: Blood   Result Value Ref Range    Procalcitonin 1.23 (H) 0.00 - 0.25 ng/mL   Magnesium    Collection Time: 05/03/23 11:22 AM    Specimen: Blood   Result Value Ref Range    Magnesium 1.5 (L) 1.6 - 2.6 mg/dL     Note: In addition to lab results from this visit, the labs listed above may include labs taken at another facility or during a different encounter within the last 24 hours. Please correlate lab times with ED admission and discharge times for further clarification of the services performed during this visit.    XR Chest 2 View   Final Result   Impression:   No acute cardiopulmonary findings.            Electronically  Signed: Alan Telles     5/3/2023 8:14 AM EDT     Workstation ID: [id number]        Vitals:    05/03/23 1030 05/03/23 1130 05/03/23 1200 05/03/23 1330   BP: 113/71 104/82 107/81 109/86   BP Location:       Patient Position:       Pulse: 95 97 98 111   Resp:       Temp:       TempSrc:       SpO2: 97% 95% 98% 99%   Weight:       Height:         Medications   saccharomyces boulardii (FLORASTOR) capsule 250 mg (has no administration in time range)   sodium chloride 0.9 % flush 10 mL (has no administration in time range)   sodium chloride 0.9 % flush 10 mL (has no administration in time range)   sodium chloride 0.9 % infusion 40 mL (has no administration in time range)   heparin (porcine) 5000 UNIT/ML injection 5,000 Units (has no administration in time range)   sodium chloride 0.9 % infusion (has no administration in time range)   acetaminophen (TYLENOL) tablet 650 mg (has no administration in time range)   traMADol (ULTRAM) tablet 50 mg (has no administration in time range)   melatonin tablet 5 mg (has no administration in time range)   ondansetron (ZOFRAN) tablet 4 mg (has no administration in time range)     Or   ondansetron (ZOFRAN) injection 4 mg (has no administration in time range)   famotidine (PEPCID) tablet 20 mg (has no administration in time range)   aluminum-magnesium hydroxide-simethicone (MAALOX MAX) 400-400-40 MG/5ML suspension 15 mL (has no administration in time range)   ipratropium-albuterol (DUO-NEB) nebulizer solution 3 mL (has no administration in time range)   Potassium Replacement - Follow Nurse / BPA Driven Protocol (has no administration in time range)   Magnesium Standard Dose Replacement - Follow Nurse / BPA Driven Protocol (has no administration in time range)   cefTRIAXone (ROCEPHIN) 1 g/100 mL 0.9% NS (MBP) (has no administration in time range)   magnesium sulfate 2g/50 mL (PREMIX) infusion (has no administration in time range)   potassium chloride (MICRO-K) CR capsule 40 mEq (has no  administration in time range)   sodium chloride 0.9 % bolus 1,000 mL (0 mL Intravenous Stopped 5/3/23 1022)   acetaminophen (TYLENOL) tablet 1,000 mg (1,000 mg Oral Given 5/3/23 0809)   famotidine (PEPCID) injection 20 mg (20 mg Intravenous Given 5/3/23 1034)   cefTRIAXone (ROCEPHIN) 1 g/100 mL 0.9% NS (MBP) (0 g Intravenous Stopped 5/3/23 1209)   sodium chloride 0.9 % bolus 1,000 mL (1,000 mL Intravenous New Bag 5/3/23 1134)     ECG/EMG Results (last 24 hours)     ** No results found for the last 24 hours. **        No orders to display                                            Medical Decision Making  Have considered RSV, influenza, COVID, pneumonia, sepsis, exacerbation of migraine headache, sinus infection including sinusitis and allergic disease pattern.  Have considered subarachnoid hemorrhage as well.  Have discussed likelihood of each of the above.  We will treat patient's symptoms with fluids as she appears dehydrated as well as with Tylenol.    Acute febrile illness: complicated acute illness or injury  Viral upper respiratory infection: complicated acute illness or injury  Amount and/or Complexity of Data Reviewed  Labs:  Decision-making details documented in ED Course.  Radiology: ordered. Decision-making details documented in ED Course.      Risk  OTC drugs.  Prescription drug management.          Final diagnoses:   Acute febrile illness   Viral upper respiratory infection   Acute UTI   Leukocytosis, unspecified type   Hypokalemia       ED Disposition  ED Disposition     ED Disposition   Decision to Admit    Condition   --    Comment   Level of Care: Telemetry [5]   Diagnosis: UTI (urinary tract infection) [485251]   Admitting Physician: JOHN COTTER [[id number]]               Nelly Sotelo, PA  [address] [place] [address]  [phone]    In 1 week           Medication List      No changes were made to your prescriptions during this visit.          Alejandro Skaggs MD  05/03/23 1191

## 2023-05-04 ENCOUNTER — ANESTHESIA EVENT (OUTPATIENT)
Dept: PERIOP | Facility: HOSPITAL | Age: 43
DRG: 854 | End: 2023-05-04
Payer: MEDICAID

## 2023-05-04 ENCOUNTER — APPOINTMENT (OUTPATIENT)
Dept: GENERAL RADIOLOGY | Facility: HOSPITAL | Age: 43
DRG: 854 | End: 2023-05-04
Payer: MEDICAID

## 2023-05-04 ENCOUNTER — APPOINTMENT (OUTPATIENT)
Dept: CT IMAGING | Facility: HOSPITAL | Age: 43
DRG: 854 | End: 2023-05-04
Payer: MEDICAID

## 2023-05-04 ENCOUNTER — ANESTHESIA (OUTPATIENT)
Dept: PERIOP | Facility: HOSPITAL | Age: 43
DRG: 854 | End: 2023-05-04
Payer: MEDICAID

## 2023-05-04 LAB
ADV 40+41 DNA STL QL NAA+NON-PROBE: NOT DETECTED
ANION GAP SERPL CALCULATED.3IONS-SCNC: 11 MMOL/L (ref 5–15)
ASTRO TYP 1-8 RNA STL QL NAA+NON-PROBE: NOT DETECTED
BUN SERPL-MCNC: 11 MG/DL (ref 6–20)
BUN/CREAT SERPL: 16.2 (ref 7–25)
C CAYETANENSIS DNA STL QL NAA+NON-PROBE: NOT DETECTED
C COLI+JEJ+UPSA DNA STL QL NAA+NON-PROBE: NOT DETECTED
C DIFF TOX GENS STL QL NAA+PROBE: NOT DETECTED
CALCIUM SPEC-SCNC: 7.9 MG/DL (ref 8.6–10.5)
CHLORIDE SERPL-SCNC: 104 MMOL/L (ref 98–107)
CO2 SERPL-SCNC: 20 MMOL/L (ref 22–29)
CREAT SERPL-MCNC: 0.68 MG/DL (ref 0.57–1)
CRYPTOSP DNA STL QL NAA+NON-PROBE: NOT DETECTED
D-LACTATE SERPL-SCNC: 3.1 MMOL/L (ref 0.5–2)
DEPRECATED RDW RBC AUTO: 50.5 FL (ref 37–54)
E HISTOLYT DNA STL QL NAA+NON-PROBE: NOT DETECTED
EAEC PAA PLAS AGGR+AATA ST NAA+NON-PRB: NOT DETECTED
EC STX1+STX2 GENES STL QL NAA+NON-PROBE: NOT DETECTED
EGFRCR SERPLBLD CKD-EPI 2021: 110.3 ML/MIN/1.73
EPEC EAE GENE STL QL NAA+NON-PROBE: NOT DETECTED
ERYTHROCYTE [DISTWIDTH] IN BLOOD BY AUTOMATED COUNT: 17.2 % (ref 12.3–15.4)
ETEC LTA+ST1A+ST1B TOX ST NAA+NON-PROBE: NOT DETECTED
G LAMBLIA DNA STL QL NAA+NON-PROBE: NOT DETECTED
GLUCOSE SERPL-MCNC: 135 MG/DL (ref 65–99)
HCT VFR BLD AUTO: 32.1 % (ref 34–46.6)
HGB BLD-MCNC: 10.2 G/DL (ref 12–15.9)
MAGNESIUM SERPL-MCNC: 2.6 MG/DL (ref 1.6–2.6)
MCH RBC QN AUTO: 25.7 PG (ref 26.6–33)
MCHC RBC AUTO-ENTMCNC: 31.8 G/DL (ref 31.5–35.7)
MCV RBC AUTO: 80.9 FL (ref 79–97)
NOROVIRUS GI+II RNA STL QL NAA+NON-PROBE: NOT DETECTED
P SHIGELLOIDES DNA STL QL NAA+NON-PROBE: NOT DETECTED
PLATELET # BLD AUTO: 243 10*3/MM3 (ref 140–450)
PMV BLD AUTO: 10.4 FL (ref 6–12)
POTASSIUM SERPL-SCNC: 4.3 MMOL/L (ref 3.5–5.2)
RBC # BLD AUTO: 3.97 10*6/MM3 (ref 3.77–5.28)
RVA RNA STL QL NAA+NON-PROBE: NOT DETECTED
S ENT+BONG DNA STL QL NAA+NON-PROBE: NOT DETECTED
SAPO I+II+IV+V RNA STL QL NAA+NON-PROBE: NOT DETECTED
SHIGELLA SP+EIEC IPAH ST NAA+NON-PROBE: NOT DETECTED
SODIUM SERPL-SCNC: 135 MMOL/L (ref 136–145)
V CHOL+PARA+VUL DNA STL QL NAA+NON-PROBE: NOT DETECTED
V CHOLERAE DNA STL QL NAA+NON-PROBE: NOT DETECTED
WBC NRBC COR # BLD: 24.81 10*3/MM3 (ref 3.4–10.8)
Y ENTEROCOL DNA STL QL NAA+NON-PROBE: NOT DETECTED

## 2023-05-04 PROCEDURE — 25010000002 KETOROLAC TROMETHAMINE PER 15 MG

## 2023-05-04 PROCEDURE — 25010000002 PROPOFOL 10 MG/ML EMULSION: Performed by: ANESTHESIOLOGY

## 2023-05-04 PROCEDURE — G0378 HOSPITAL OBSERVATION PER HR: HCPCS

## 2023-05-04 PROCEDURE — 0T778DZ DILATION OF LEFT URETER WITH INTRALUMINAL DEVICE, VIA NATURAL OR ARTIFICIAL OPENING ENDOSCOPIC: ICD-10-PCS | Performed by: UROLOGY

## 2023-05-04 PROCEDURE — BT141ZZ FLUOROSCOPY OF KIDNEYS, URETERS AND BLADDER USING LOW OSMOLAR CONTRAST: ICD-10-PCS | Performed by: UROLOGY

## 2023-05-04 PROCEDURE — 52351 CYSTOURETERO & OR PYELOSCOPE: CPT | Performed by: UROLOGY

## 2023-05-04 PROCEDURE — C1758 CATHETER, URETERAL: HCPCS | Performed by: UROLOGY

## 2023-05-04 PROCEDURE — 74176 CT ABD & PELVIS W/O CONTRAST: CPT

## 2023-05-04 PROCEDURE — 52332 CYSTOSCOPY AND TREATMENT: CPT | Performed by: UROLOGY

## 2023-05-04 PROCEDURE — 85027 COMPLETE CBC AUTOMATED: CPT | Performed by: HOSPITALIST

## 2023-05-04 PROCEDURE — 25010000002 KETOROLAC TROMETHAMINE PER 15 MG: Performed by: UROLOGY

## 2023-05-04 PROCEDURE — 74420 UROGRAPHY RTRGR +-KUB: CPT | Performed by: UROLOGY

## 2023-05-04 PROCEDURE — 83605 ASSAY OF LACTIC ACID: CPT | Performed by: HOSPITALIST

## 2023-05-04 PROCEDURE — 25010000002 PIPERACILLIN SOD-TAZOBACTAM PER 1 G: Performed by: UROLOGY

## 2023-05-04 PROCEDURE — 74420 UROGRAPHY RTRGR +-KUB: CPT

## 2023-05-04 PROCEDURE — 99232 SBSQ HOSP IP/OBS MODERATE 35: CPT | Performed by: INTERNAL MEDICINE

## 2023-05-04 PROCEDURE — 25010000002 PIPERACILLIN SOD-TAZOBACTAM PER 1 G

## 2023-05-04 PROCEDURE — 25010000002 SUGAMMADEX 200 MG/2ML SOLUTION: Performed by: ANESTHESIOLOGY

## 2023-05-04 PROCEDURE — 94799 UNLISTED PULMONARY SVC/PX: CPT

## 2023-05-04 PROCEDURE — 25010000002 HEPARIN (PORCINE) PER 1000 UNITS: Performed by: UROLOGY

## 2023-05-04 PROCEDURE — C1769 GUIDE WIRE: HCPCS | Performed by: UROLOGY

## 2023-05-04 PROCEDURE — 25010000002 FENTANYL CITRATE (PF) 50 MCG/ML SOLUTION

## 2023-05-04 PROCEDURE — 83735 ASSAY OF MAGNESIUM: CPT | Performed by: HOSPITALIST

## 2023-05-04 PROCEDURE — C2617 STENT, NON-COR, TEM W/O DEL: HCPCS | Performed by: UROLOGY

## 2023-05-04 PROCEDURE — 25510000001 IOPAMIDOL 61 % SOLUTION: Performed by: UROLOGY

## 2023-05-04 PROCEDURE — 99222 1ST HOSP IP/OBS MODERATE 55: CPT | Performed by: UROLOGY

## 2023-05-04 PROCEDURE — 80048 BASIC METABOLIC PNL TOTAL CA: CPT | Performed by: HOSPITALIST

## 2023-05-04 PROCEDURE — 87507 IADNA-DNA/RNA PROBE TQ 12-25: CPT | Performed by: UROLOGY

## 2023-05-04 PROCEDURE — 87493 C DIFF AMPLIFIED PROBE: CPT | Performed by: HOSPITALIST

## 2023-05-04 DEVICE — URETERAL STENT
Type: IMPLANTABLE DEVICE | Site: URETER | Status: FUNCTIONAL
Brand: PERCUFLEX™ PLUS

## 2023-05-04 RX ORDER — MULTIVIT WITH MINERALS/LUTEIN
250 TABLET ORAL DAILY
COMMUNITY

## 2023-05-04 RX ORDER — FAMOTIDINE 10 MG/ML
20 INJECTION, SOLUTION INTRAVENOUS ONCE
Status: DISCONTINUED | OUTPATIENT
Start: 2023-05-04 | End: 2023-05-04 | Stop reason: HOSPADM

## 2023-05-04 RX ORDER — FENTANYL CITRATE 50 UG/ML
50 INJECTION, SOLUTION INTRAMUSCULAR; INTRAVENOUS
Status: DISCONTINUED | OUTPATIENT
Start: 2023-05-04 | End: 2023-05-04 | Stop reason: HOSPADM

## 2023-05-04 RX ORDER — SODIUM CHLORIDE 9 MG/ML
40 INJECTION, SOLUTION INTRAVENOUS AS NEEDED
Status: DISCONTINUED | OUTPATIENT
Start: 2023-05-04 | End: 2023-05-04 | Stop reason: HOSPADM

## 2023-05-04 RX ORDER — ACETAMINOPHEN 325 MG/1
650 TABLET ORAL ONCE
Status: COMPLETED | OUTPATIENT
Start: 2023-05-04 | End: 2023-05-04

## 2023-05-04 RX ORDER — IPRATROPIUM BROMIDE AND ALBUTEROL SULFATE 2.5; .5 MG/3ML; MG/3ML
SOLUTION RESPIRATORY (INHALATION)
Status: COMPLETED
Start: 2023-05-04 | End: 2023-05-05

## 2023-05-04 RX ORDER — DROPERIDOL 2.5 MG/ML
0.62 INJECTION, SOLUTION INTRAMUSCULAR; INTRAVENOUS ONCE AS NEEDED
Status: DISCONTINUED | OUTPATIENT
Start: 2023-05-04 | End: 2023-05-04 | Stop reason: HOSPADM

## 2023-05-04 RX ORDER — SODIUM CHLORIDE, SODIUM LACTATE, POTASSIUM CHLORIDE, CALCIUM CHLORIDE 600; 310; 30; 20 MG/100ML; MG/100ML; MG/100ML; MG/100ML
100 INJECTION, SOLUTION INTRAVENOUS CONTINUOUS
Status: DISCONTINUED | OUTPATIENT
Start: 2023-05-04 | End: 2023-05-04

## 2023-05-04 RX ORDER — LIDOCAINE HYDROCHLORIDE 20 MG/ML
JELLY TOPICAL AS NEEDED
Status: DISCONTINUED | OUTPATIENT
Start: 2023-05-04 | End: 2023-05-04 | Stop reason: HOSPADM

## 2023-05-04 RX ORDER — SUCCINYLCHOLINE/SOD CL,ISO/PF 200MG/10ML
SYRINGE (ML) INTRAVENOUS AS NEEDED
Status: DISCONTINUED | OUTPATIENT
Start: 2023-05-04 | End: 2023-05-04 | Stop reason: SURG

## 2023-05-04 RX ORDER — IPRATROPIUM BROMIDE AND ALBUTEROL SULFATE 2.5; .5 MG/3ML; MG/3ML
3 SOLUTION RESPIRATORY (INHALATION) ONCE AS NEEDED
Status: DISCONTINUED | OUTPATIENT
Start: 2023-05-04 | End: 2023-05-04 | Stop reason: HOSPADM

## 2023-05-04 RX ORDER — ROCURONIUM BROMIDE 10 MG/ML
INJECTION, SOLUTION INTRAVENOUS AS NEEDED
Status: DISCONTINUED | OUTPATIENT
Start: 2023-05-04 | End: 2023-05-04 | Stop reason: SURG

## 2023-05-04 RX ORDER — SODIUM CHLORIDE, SODIUM LACTATE, POTASSIUM CHLORIDE, CALCIUM CHLORIDE 600; 310; 30; 20 MG/100ML; MG/100ML; MG/100ML; MG/100ML
INJECTION, SOLUTION INTRAVENOUS CONTINUOUS PRN
Status: DISCONTINUED | OUTPATIENT
Start: 2023-05-04 | End: 2023-05-04 | Stop reason: SURG

## 2023-05-04 RX ORDER — HEPARIN SODIUM 5000 [USP'U]/ML
5000 INJECTION, SOLUTION INTRAVENOUS; SUBCUTANEOUS EVERY 12 HOURS SCHEDULED
Status: DISCONTINUED | OUTPATIENT
Start: 2023-05-04 | End: 2023-05-07 | Stop reason: HOSPADM

## 2023-05-04 RX ORDER — ONDANSETRON 2 MG/ML
4 INJECTION INTRAMUSCULAR; INTRAVENOUS ONCE AS NEEDED
Status: DISCONTINUED | OUTPATIENT
Start: 2023-05-04 | End: 2023-05-04 | Stop reason: HOSPADM

## 2023-05-04 RX ORDER — FENTANYL CITRATE 50 UG/ML
INJECTION, SOLUTION INTRAMUSCULAR; INTRAVENOUS
Status: COMPLETED
Start: 2023-05-04 | End: 2023-05-04

## 2023-05-04 RX ORDER — MAGNESIUM HYDROXIDE 1200 MG/15ML
LIQUID ORAL AS NEEDED
Status: DISCONTINUED | OUTPATIENT
Start: 2023-05-04 | End: 2023-05-04 | Stop reason: HOSPADM

## 2023-05-04 RX ORDER — SODIUM CHLORIDE, SODIUM LACTATE, POTASSIUM CHLORIDE, CALCIUM CHLORIDE 600; 310; 30; 20 MG/100ML; MG/100ML; MG/100ML; MG/100ML
9 INJECTION, SOLUTION INTRAVENOUS CONTINUOUS
Status: DISCONTINUED | OUTPATIENT
Start: 2023-05-04 | End: 2023-05-07 | Stop reason: HOSPADM

## 2023-05-04 RX ORDER — POTASSIUM CHLORIDE 1.5 G/1.77G
20 POWDER, FOR SOLUTION ORAL DAILY
COMMUNITY

## 2023-05-04 RX ORDER — LIDOCAINE HYDROCHLORIDE 10 MG/ML
INJECTION, SOLUTION EPIDURAL; INFILTRATION; INTRACAUDAL; PERINEURAL AS NEEDED
Status: DISCONTINUED | OUTPATIENT
Start: 2023-05-04 | End: 2023-05-04 | Stop reason: SURG

## 2023-05-04 RX ORDER — FAMOTIDINE 20 MG/1
20 TABLET, FILM COATED ORAL ONCE
Status: DISCONTINUED | OUTPATIENT
Start: 2023-05-04 | End: 2023-05-04 | Stop reason: HOSPADM

## 2023-05-04 RX ORDER — PROPOFOL 10 MG/ML
VIAL (ML) INTRAVENOUS AS NEEDED
Status: DISCONTINUED | OUTPATIENT
Start: 2023-05-04 | End: 2023-05-04 | Stop reason: SURG

## 2023-05-04 RX ORDER — SODIUM CHLORIDE 0.9 % (FLUSH) 0.9 %
10 SYRINGE (ML) INJECTION AS NEEDED
Status: DISCONTINUED | OUTPATIENT
Start: 2023-05-04 | End: 2023-05-04 | Stop reason: HOSPADM

## 2023-05-04 RX ORDER — KETOROLAC TROMETHAMINE 15 MG/ML
15 INJECTION, SOLUTION INTRAMUSCULAR; INTRAVENOUS EVERY 6 HOURS PRN
Status: DISCONTINUED | OUTPATIENT
Start: 2023-05-04 | End: 2023-05-07 | Stop reason: HOSPADM

## 2023-05-04 RX ORDER — ECHINACEA PURPUREA EXTRACT 125 MG
2 TABLET ORAL AS NEEDED
Status: DISCONTINUED | OUTPATIENT
Start: 2023-05-04 | End: 2023-05-07 | Stop reason: HOSPADM

## 2023-05-04 RX ORDER — MIDAZOLAM HYDROCHLORIDE 1 MG/ML
1 INJECTION INTRAMUSCULAR; INTRAVENOUS
Status: DISCONTINUED | OUTPATIENT
Start: 2023-05-04 | End: 2023-05-04 | Stop reason: HOSPADM

## 2023-05-04 RX ORDER — SODIUM CHLORIDE 0.9 % (FLUSH) 0.9 %
10 SYRINGE (ML) INJECTION EVERY 12 HOURS SCHEDULED
Status: DISCONTINUED | OUTPATIENT
Start: 2023-05-04 | End: 2023-05-04 | Stop reason: HOSPADM

## 2023-05-04 RX ORDER — LIDOCAINE HYDROCHLORIDE 10 MG/ML
0.5 INJECTION, SOLUTION EPIDURAL; INFILTRATION; INTRACAUDAL; PERINEURAL ONCE AS NEEDED
Status: DISCONTINUED | OUTPATIENT
Start: 2023-05-04 | End: 2023-05-04 | Stop reason: HOSPADM

## 2023-05-04 RX ADMIN — FENTANYL CITRATE 50 MCG: 50 INJECTION, SOLUTION INTRAMUSCULAR; INTRAVENOUS at 07:54

## 2023-05-04 RX ADMIN — IPRATROPIUM BROMIDE AND ALBUTEROL SULFATE 3 ML: 2.5; .5 SOLUTION RESPIRATORY (INHALATION) at 07:10

## 2023-05-04 RX ADMIN — KETOROLAC TROMETHAMINE 15 MG: 15 INJECTION, SOLUTION INTRAMUSCULAR; INTRAVENOUS at 10:20

## 2023-05-04 RX ADMIN — ACETAMINOPHEN 325MG 650 MG: 325 TABLET ORAL at 09:32

## 2023-05-04 RX ADMIN — SODIUM CHLORIDE, POTASSIUM CHLORIDE, SODIUM LACTATE AND CALCIUM CHLORIDE 100 ML/HR: 600; 310; 30; 20 INJECTION, SOLUTION INTRAVENOUS at 03:04

## 2023-05-04 RX ADMIN — FAMOTIDINE 20 MG: 20 TABLET, FILM COATED ORAL at 09:09

## 2023-05-04 RX ADMIN — PROPOFOL 160 MG: 10 INJECTION, EMULSION INTRAVENOUS at 06:25

## 2023-05-04 RX ADMIN — SODIUM CHLORIDE, POTASSIUM CHLORIDE, SODIUM LACTATE AND CALCIUM CHLORIDE 1000 ML: 600; 310; 30; 20 INJECTION, SOLUTION INTRAVENOUS at 03:04

## 2023-05-04 RX ADMIN — TAZOBACTAM SODIUM AND PIPERACILLIN SODIUM 3.38 G: 375; 3 INJECTION, SOLUTION INTRAVENOUS at 17:25

## 2023-05-04 RX ADMIN — TAZOBACTAM SODIUM AND PIPERACILLIN SODIUM 3.38 G: 375; 3 INJECTION, SOLUTION INTRAVENOUS at 09:09

## 2023-05-04 RX ADMIN — FAMOTIDINE 20 MG: 20 TABLET, FILM COATED ORAL at 17:25

## 2023-05-04 RX ADMIN — SODIUM CHLORIDE, POTASSIUM CHLORIDE, SODIUM LACTATE AND CALCIUM CHLORIDE: 600; 310; 30; 20 INJECTION, SOLUTION INTRAVENOUS at 06:26

## 2023-05-04 RX ADMIN — ACETAMINOPHEN 325MG 650 MG: 325 TABLET ORAL at 00:59

## 2023-05-04 RX ADMIN — ROCURONIUM BROMIDE 5 MG: 10 SOLUTION INTRAVENOUS at 06:25

## 2023-05-04 RX ADMIN — HEPARIN SODIUM 5000 UNITS: 5000 INJECTION INTRAVENOUS; SUBCUTANEOUS at 20:55

## 2023-05-04 RX ADMIN — ACETAMINOPHEN 325MG 650 MG: 325 TABLET ORAL at 19:53

## 2023-05-04 RX ADMIN — TAZOBACTAM SODIUM AND PIPERACILLIN SODIUM 3.38 G: 375; 3 INJECTION, SOLUTION INTRAVENOUS at 02:26

## 2023-05-04 RX ADMIN — KETOROLAC TROMETHAMINE 15 MG: 15 INJECTION, SOLUTION INTRAMUSCULAR; INTRAVENOUS at 17:25

## 2023-05-04 RX ADMIN — LIDOCAINE HYDROCHLORIDE 80 MG: 10 INJECTION, SOLUTION EPIDURAL; INFILTRATION; INTRACAUDAL; PERINEURAL at 06:25

## 2023-05-04 RX ADMIN — Medication 160 MG: at 06:25

## 2023-05-04 RX ADMIN — Medication 250 MG: at 20:55

## 2023-05-04 RX ADMIN — SUGAMMADEX 200 MG: 100 INJECTION, SOLUTION INTRAVENOUS at 06:43

## 2023-05-04 RX ADMIN — TRAMADOL HYDROCHLORIDE 50 MG: 50 TABLET, FILM COATED ORAL at 12:29

## 2023-05-04 RX ADMIN — KETOROLAC TROMETHAMINE 15 MG: 15 INJECTION, SOLUTION INTRAMUSCULAR; INTRAVENOUS at 03:03

## 2023-05-04 RX ADMIN — Medication 5 MG: at 01:50

## 2023-05-04 NOTE — ANESTHESIA PROCEDURE NOTES
Airway  Urgency: elective    Date/Time: 5/4/2023 6:28 AM  Airway not difficult    General Information and Staff    Patient location during procedure: OR    Indications and Patient Condition  Indications for airway management: airway protection    Preoxygenated: yes  MILS not maintained throughout  Mask difficulty assessment: 1 - vent by mask    Final Airway Details  Final airway type: endotracheal airway      Successful airway: ETT  Cuffed: yes   Successful intubation technique: direct laryngoscopy  Facilitating devices/methods: cricoid pressure and intubating stylet  Endotracheal tube insertion site: oral  Blade: Amado  Blade size: 3  ETT size (mm): 7.0  Cormack-Lehane Classification: grade I - full view of glottis  Placement verified by: chest auscultation and capnometry   Measured from: lips  ETT/EBT  to lips (cm): 20  Number of attempts at approach: 1  Assessment: lips, teeth, and gum same as pre-op and atraumatic intubation    Additional Comments  Negative epigastric sounds, Breath sound equal bilaterally with symmetric chest rise and fall

## 2023-05-04 NOTE — PROGRESS NOTES
[facility] Medicine Services  PROGRESS NOTE    Patient Name: Bernardo Dodd  : 1980  MRN: [id number]    Date of Admission: 5/3/2023  Primary Care Physician: Nelly Sotelo PA    Subjective   Subjective     CC:  UTI    HPI:  Having some R sided flank pain, otherwise ok, a little lethargic after anesthesia    ROS:  Gen- No fevers, chills  CV- No chest pain, palpitations  Resp- No cough, dyspnea  GI- No N/V/D, abd pain        Objective   Objective     Vital Signs:   Temp:  [97 °F (36.1 °C)-102.6 °F (39.2 °C)] 98.2 °F (36.8 °C)  Heart Rate:  [] 91  Resp:  [12-26] 18  BP: ()/(50-86) 95/67  Flow (L/min):  [2-4] 2     Physical Exam:  Constitutional: No acute distress, awake, alert  HENT: NCAT, mucous membranes moist  Respiratory: Respiratory effort normal   Cardiovascular: RRR, no murmurs  Gastrointestinal: Soft, nontender, nondistended  Musculoskeletal: No bilateral ankle edema  Psychiatric: Appropriate affect, cooperative  Neurologic: Oriented x 2-3, speech slow but clear  Skin: No rashes      Results Reviewed:  LAB RESULTS:      Lab 23  0344 23  0057 23  2210 23  1607 23  1444 23  1122   WBC 24.81*  --   --   --   --  19.73*   HEMOGLOBIN 10.2*  --   --   --   --  10.4*   HEMATOCRIT 32.1*  --   --   --   --  32.0*   PLATELETS 243  --   --   --   --  268   NEUTROS ABS  --   --   --   --   --  17.06*   IMMATURE GRANS (ABS)  --   --   --   --   --  0.10*   LYMPHS ABS  --   --   --   --   --  1.52   MONOS ABS  --   --   --   --   --  1.01*   EOS ABS  --   --   --   --   --  0.01   MCV 80.9  --   --   --   --  78.0*   PROCALCITONIN  --   --   --   --   --  1.23*   LACTATE  --  3.1* 3.8* 2.8* 3.9*  --          Lab 23  0344 23  1122   SODIUM 135* 134*   POTASSIUM 4.3 3.1*   CHLORIDE 104 101   CO2 20.0* 22.0   ANION GAP 11.0 11.0   BUN 11 14   CREATININE 0.68 0.73   EGFR 110.3 104.1   GLUCOSE 135* 112*   CALCIUM 7.9* 8.1*    MAGNESIUM 2.6 1.5*         Lab 05/03/23  1122   TOTAL PROTEIN 6.2   ALBUMIN 3.3*   GLOBULIN 2.9   ALT (SGPT) 7   AST (SGOT) 11   BILIRUBIN 0.5   ALK PHOS 62                     Brief Urine Lab Results  (Last result in the past 365 days)      Color   Clarity   Blood   Leuk Est   Nitrite   Protein   CREAT   Urine HCG        05/03/23 1031 Yellow   Turbid   Small (1+)   Large (3+)   Positive   100 mg/dL (2+)                 Microbiology Results Abnormal     Procedure Component Value - Date/Time    COVID PRE-OP / PRE-PROCEDURE SCREENING ORDER (NO ISOLATION) - Swab, Nasopharynx [[id number]]  (Normal) Collected: 05/03/23 0808    Lab Status: Final result Specimen: Swab from Nasopharynx Updated: 05/03/23 0907    Narrative:      The following orders were created for panel order COVID PRE-OP / PRE-PROCEDURE SCREENING ORDER (NO ISOLATION) - Swab, Nasopharynx.  Procedure                               Abnormality         Status                     ---------                               -----------         ------                     COVID-19 and FLU A/B PCR...[[id number]]  Normal              Final result                 Please view results for these tests on the individual orders.    COVID-19 and FLU A/B PCR - Swab, Nasopharynx [[id number]]  (Normal) Collected: 05/03/23 0808    Lab Status: Final result Specimen: Swab from Nasopharynx Updated: 05/03/23 0907     COVID19 Not Detected     Influenza A PCR Not Detected     Influenza B PCR Not Detected    Narrative:      Fact sheet for providers: https://www.fda.gov/media/623150/download    Fact sheet for patients: https://www.fda.gov/media/317724/download    Test performed by PCR.          CT Abdomen Pelvis Without Contrast    Result Date: 5/4/2023  EXAMINATION: CT ABDOMEN AND PELVIS WITHOUT IV CONTRAST   DATE OF EXAMINATION: 5/4/2023. COMPARISON: 10/2/2022.. INDICATION: Sepsis and fever. PROCEDURE:   Axial CT of the abdomen and pelvis was performed with sagittal and coronal  reformatted images without contrast enhancement. CT dose lowering techniques were used, to include: automated exposure control, adjustment for patient size, and/or use of iterative reconstruction. The exam is limited because some types of pathology may not be adequately demonstrated due to lack of contrast enhancement. FINDINGS: LOWER CHEST :  The visualized lung bases are clear.  There are no pleural or pericardial effusions. ABDOMEN: Liver and Biliary system:  Normal. Adrenal glands:  Normal. Kidneys and ureters:  There is a 4.3 mm stone in the left ureterovesical junction with mild left-sided hydronephrosis and mild to moderate perinephric stranding. There is a 2 mm nonobstructing stone also seen in the upper pole the right kidney and ureter  appear unremarkable. Spleen:  Normal. Pancreas:  Normal. Gallbladder:  Normal. Lymph nodes, Peritoneum and mesentery:  There is no mesenteric or retroperitoneal lymphadenopathy. Gastrointestinal tract:  There are no dilated loops of bowel or free intraperitoneal air.  . The appendix is normal. Aorta/IVC:   No aortic aneurysm..  IVC normal. Abdominal wall:  Normal. PELVIS: Fluid: There is no free fluid in the pelvis. Lymph Nodes:  There is no pelvic or inguinal lymphadenopathy.. Urinary bladder:  Normal. BONES:  There are no osseous destructive lesions.. ADDITIONAL  SIGNIFICANT FINDINGS:  Anterior subserosal fibroid measures 8.0 x 8.8 cm in diameter and is not significantly changed.     Impression: 1. Mild left-sided hydronephrosis and mild to moderate perinephric stranding with a 4.3 mm stone in the left ureterovesical junction.. 2. No bowel obstruction or appendicitis. 3. Uterine fibroid. Electronically signed by:  Jason Sheridan D.O.  5/4/2023 1:24 AM Mountain Time    XR Chest 2 View    Result Date: 5/3/2023  XR CHEST 2 VW Date of Exam: 5/3/2023 7:56 AM EDT Indication: fever / chills Comparison: Chest x-ray 9/5/2022 Findings: Normal cardiomediastinal silhouette. The lungs  are clear. No pleural effusion or pneumothorax. No acute osseous findings.     Impression: Impression: No acute cardiopulmonary findings. Electronically Signed: Alan Telles  5/3/2023 8:14 AM EDT  Workstation ID: [id number]    XR Pyelogram Retrograde    Result Date: 5/4/2023  XR PYELOGRAM RETROGRADE Date of Exam: 5/4/2023 6:03 AM EDT Indication: stent Comparison: CT 5/4/2023 Findings: Total fluoroscopy time 24 seconds, 8 images obtained Initial image demonstrates multiple calcifications overlying the pelvis. There is an irregular shaped 2-3 mm size calcification within the left lower pelvis likely distal left ureteral stone. Subsequent images demonstrate bilateral retrograde pyelograms.  Effect seen within the ureters likely air bubbles. Correlation with real-time fluoroscopy would be recommended. Last image demonstrates successful deployment of left-sided ureteral stent.     Impression: Impression: 1. Successful deployment of left-sided ureteral stent. Electronically Signed: Brennen Hopkins  5/4/2023 8:20 AM EDT  Workstation ID: [id number]      Results for orders placed during the hospital encounter of 09/13/22    Adult Stress Echo W/ Cont or Stress Agent if Necessary Per Protocol    Interpretation Summary  · The patient completed 7: 50 minutes of treadmill exercise on standard Randy protocol achieving 9.8 METs of workload. The test was stopped due to fatigue after achieving the target heart rate. She reported mild chest tightness and heaviness at peak exercise which resolved in recovery.  · Expected exercise duration 8:45, actual 7:50; AVIS (+11).  · Resting heart rate 82, blood pressure 120/88. Peak heart rate 151, blood pressure 168/100. 85% of age-predicted maximal heart rate achieved.  · Average exercise capacity, completed for this protocol.  · Resting ECG showed sinus rhythm with nonspecific ST segment changes. Stress ECG is nondiagnostic due to baseline abnormalities however no significant ST abnormalities were  noted. No exercise-induced arrhythmias were seen.  · Average exercise capacity with appropriate hemodynamic response to exercise.  · Negative treadmill stress test for ischemic ST segment abnormalities or exercise-induced arrhythmias. Chest tightness and heaviness was reported which resolved in recovery.  · Resting echocardiogram showed normal left ventricular systolic function with estimated ejection fraction 65%. Trace mitral regurgitation, trace tricuspid regurgitation and trace to mild pulmonic insufficiency was noted.  · Normal stress echo with no significant echocardiographic evidence for myocardial ischemia. Post-rest ejection fraction 75%.      Current medications:  Scheduled Meds:famotidine, 20 mg, Oral, BID AC  heparin (porcine), 5,000 Units, Subcutaneous, Q12H  piperacillin-tazobactam, 3.375 g, Intravenous, Q8H  saccharomyces boulardii, 250 mg, Oral, BID  sodium chloride, 10 mL, Intravenous, Q12H      Continuous Infusions:lactated ringers, 9 mL/hr, Last Rate: 9 mL/hr (05/04/23 0911)      PRN Meds:.•  acetaminophen  •  aluminum-magnesium hydroxide-simethicone  •  ipratropium-albuterol  •  ketorolac  •  Magnesium Standard Dose Replacement - Follow Nurse / BPA Driven Protocol  •  melatonin  •  ondansetron **OR** ondansetron  •  Potassium Replacement - Follow Nurse / BPA Driven Protocol  •  sodium chloride  •  sodium chloride  •  traMADol    Assessment & Plan   Assessment & Plan     Active Hospital Problems    Diagnosis  POA   • **Sepsis due to urinary tract infection [A41.9, N39.0]  Yes   • UTI (urinary tract infection) [N39.0]  Yes   • Hypokalemia [E87.6]  Yes   • Diarrhea [R19.7]  Yes   • Hypertension [I10]  Yes      Resolved Hospital Problems   No resolved problems to display.        Brief Hospital Course to date:  Bernardo Dodd is a 42 y.o. female with hx of HTN, migraines, GERD, bipolar disorder, anxiety, and kidney stones who presents due to fevers, chills, abdominal pain.  CT scan revealed left  ureteral stone and she was taken to the OR for cystoscopy by Dr. Child on 5/4.    Sepsis secondary to acute UTI, POA  --Rocephin changed to Zosyn  --F/U blood and urine cultures  --Patient does have hx of kidney stones, had a left ureteral stent placed in August 2019 by Dr. Vitaly Meeks     Diarrhea  --Patient also reports diarrhea that started this morning, given that she has had recent antibiotic use (Augmentin for a sinus and ear infection) and works with patients in an assisted living facility, prudent to rule out C.diff  --Check C.diff and GI PCR panel - pending  -- Continue probiotic     Hypokalemia  --Replace per protocol    HTN  --BP running on the lower side, will hold home BP meds for now, restart if needed       GERD  --Continue Pepcid twice daily     Bipolar disorder  Anxiety      Expected Discharge Location and Transportation: home  Expected Discharge pending cultures  Expected Discharge Date: 5/5/2023; Expected Discharge Time:      DVT prophylaxis:  Medical DVT prophylaxis orders are present.     AM-PAC 6 Clicks Score (PT): 19 (05/04/23 0810)    CODE STATUS:   Code Status and Medical Interventions:   Ordered at: 05/03/23 1321     Code Status (Patient has no pulse and is not breathing):    CPR (Attempt to Resuscitate)     Medical Interventions (Patient has pulse or is breathing):    Full Support       Florinda Islas, DO  05/04/23

## 2023-05-04 NOTE — ANESTHESIA PREPROCEDURE EVALUATION
Anesthesia Evaluation     Patient summary reviewed and Nursing notes reviewed   NPO Solid Status: Waived due to emergency  NPO Liquid Status: Waived due to emergency           Airway   Mallampati: I  TM distance: >3 FB  Neck ROM: full  No difficulty expected  Dental          Pulmonary     breath sounds clear to auscultation  (+) asthma,shortness of breath,   Cardiovascular     ECG reviewed  Rhythm: regular  Rate: abnormal    (+) hypertension,       Neuro/Psych  GI/Hepatic/Renal/Endo    (+) obesity,   renal disease stones,     Musculoskeletal     Abdominal    Substance History   (+) drug use (hx marijuana, cocaine, meth last use Feb)     OB/GYN          Other   arthritis,      ROS/Med Hx Other: Calculated left ventricular EF = 73.8% Estimated left ventricular EF = 65% Left ventricular systolic function is normal.   Normal left ventricular cavity size and wall thickness noted. All left ventricular wall segments contract normally.  Right Ventricle Normal right ventricular cavity size and wall thickness noted.  Left Atrium Normal left atrial size and volume noted.  Right Atrium Normal right atrial cavity size noted. The inferior vena cava is normally sized. Normal IVC inspiratory collapse of greater than 50% noted.  Aortic Valve No aortic valve regurgitation or stenosis is present. The aortic valve is grossly normal in structure. The aortic valve appears trileaflet.  Mitral Valve The mitral valve is grossly normal in structure. Trace mitral valve regurgitation is present.  Tricuspid Valve Trace tricuspid valve regurgitation is present. Estimated right ventricular systolic pressure from tricuspid regurgitation is normal (<35 mmHg). No tricuspid valve stenosis is present.  Pulmonic Valve The pulmonic valve is grossly normal in structure. There is trace to mild pulmonic valve regurgitation present.  Greater Vessels No dilation of the aortic root is present. No dilation of the sinuses of Valsalva is present. The inferior  vena cava is normally sized. Normal IVC inspiratory collapse of greater than 50% noted.  Pericardium The pericardium is normal. There is no evidence of pericardial effusion. .    Stress Procedure                      Anesthesia Plan    ASA 2 - emergent     general     intravenous induction     Anesthetic plan, risks, benefits, and alternatives have been provided, discussed and informed consent has been obtained with: patient.        CODE STATUS:    Code Status (Patient has no pulse and is not breathing): CPR (Attempt to Resuscitate)  Medical Interventions (Patient has pulse or is breathing): Full Support

## 2023-05-04 NOTE — H&P (VIEW-ONLY)
[facility]   HISTORY AND PHYSICAL    Patient Name: Bernardo Dodd  : 1980  MRN: [id number]  Primary Care Physician:  Nelly Sotelo PA  Date of admission: 5/3/2023    Subjective   Subjective     Chief Complaint: Fever    HPI:    Bernardo Dodd is a 42 y.o. female is currently admitted to [facility] after presentation to ED 5/3/2023 due to fever, chills, abdominal pain over 24 hours.  She reports increasing abdominal discomfort, primarily left lower quadrant pain.     Patient is a past medical history significant for hypertension, migraine, GERD, bipolar disorder, anxiety.  She has a history of recurrent kidney stones.  She has required surgical intervention for kidney stone in 2019.    On arrival to the ER the patient was febrile to 103, tachycardic in the 120s.  WBC 19.7, lactate 3.  Urinalysis was positive for nitrates, LE, 4+ bacteria.    Patient continued to have tachycardia, remained febrile after initiation of antibiotic, treating presumed UTI.  CT scan was obtained a.m. 2023 revealing 3 to 4 mm right ureteral stone with hydronephrosis, additional nonobstructing right renal stone.    Current WBC 24.8, lactate 3.1, creatinine 0.68  Urine culture and blood cultures are pending.        Review of Systems   The following systems were reviewed and negative;  constitution, respiratory, cardiovascular, genitourinary, integument, hematologic / lymphatic, musculoskeletal, neurological and behavioral/psych.   GI: Abdominal pain    Personal History     Past Medical History:   Diagnosis Date   • Allergic rhinitis    • Anemia    • Anxiety    • Arthritis    • Asthma    • Bartholin gland cyst    • Bipolar disorder 2014   • Chlamydia    • Depression    • Endometriosis    • FHx: migraine headaches    • GERD (gastroesophageal reflux disease)    • Gonorrhea    • Heart murmur    • Herpes simplex    • History of chest x-ray 2015    no active disease   • History of echocardiogram 2015     ejection fraction of greater than 65%, mitral and pulmonic regurgitation an physiological tricuspid regurgitation.   • History of PFTs 2015    spirometry data acceptable and reproducible; pt given 4 puffs of Ventolin; pt gave good effort; no obstruction; no Bd response; MVV reduced    • History of PFTs 2015    pt gave best effort; duoneb given prior and post study; moderate nonspecific proportional reduction of FEV1 and FVC with preserved ratio; FEV1 moderately reduced; cannot rule out restriction   • Hypertension    • Kidney stones    • Migraine    • Ovarian cyst    • Pelvic pain    • PMS (premenstrual syndrome)    • Preeclampsia    • Screening breast examination     self;admits   • Seizures    • STD (female)    • Substance abuse    • Thigh shingles    • Trauma    • Trichomonas infection    • Urinary tract infection    • Urogenital trichomoniasis    • Varicella        Past Surgical History:   Procedure Laterality Date   • BILATERAL BREAST REDUCTION     • BREAST BIOPSY     • BREAST SURGERY      breast reduction   •  SECTION     •  SECTION WITH TUBAL  2010   • CYSTOSCOPY     • DIAGNOSTIC LAPAROSCOPY     • INCISION AND DRAINAGE ABSCESS      bartholin's   • LAPAROSCOPIC TUBAL LIGATION     • ORIF ANKLE FRACTURE Right    • REDUCTION MAMMAPLASTY Bilateral    • TENSION FREE VAGINAL TAPING WITH MINI ARC SLING      Dr Doyle avery        Family History: family history includes Arthritis in her father and mother; Bipolar disorder in her mother; Cancer in her maternal grandmother and mother; Dementia in her father; Diabetes in her paternal uncle and another family member; MARCIE disease in her paternal aunt; Heart disease in an other family member; Heart failure in her paternal grandmother; Hypertension in her father and another family member; Other in an other family member; Pancreatic cancer in her maternal grandmother and another family member. Otherwise pertinent FHx was reviewed  and not pertinent to current issue.    Social History:  reports that she quit smoking about 8 years ago. Her smoking use included cigarettes and cigars. She has never used smokeless tobacco. She reports that she does not currently use drugs after having used the following drugs: Marijuana. She reports that she does not drink alcohol.    Home Medications:  Diclofenac Sodium, OLANZapine, albuterol sulfate HFA, amLODIPine, benzonatate, celecoxib, cetirizine, erythromycin, fluconazole, fluticasone, furosemide, lamoTRIgine, methylPREDNISolone, and montelukast      Allergies:  Allergies   Allergen Reactions   • Naproxen Swelling   • Sulfa Antibiotics Rash       Objective   Objective     Vitals:   Temp:  [97 °F (36.1 °C)-102.6 °F (39.2 °C)] 97 °F (36.1 °C)  Heart Rate:  [] 85  Resp:  [12-26] 12  BP: ()/(50-89) 105/72  Physical Exam    Constitutional: Awake in bed, alert    Eyes: PERRLA, sclerae anicteric, no conjunctival injection   HENT: Normocephalic, atraumatic, mucous membranes moist   Neck: Supple, trachea midline   Respiratory:Equal chest rise, non-labored respirations    Cardiovascular: Perfused, no edema   Gastrointestinal: Soft, nontender, non-distended,    Musculoskeletal: No bilateral ankle edema, no clubbing or cyanosis to extremities   Psychiatric: Appropriate affect, cooperative   Neurologic: Oriented x 3, Cranial Nerves grossly intact, speech clear   Skin: No rashes     Result Review    Result Review:  I have personally reviewed the results from the time of this admission to 5/4/2023 06:11 EDT and agree with these findings:  [x]  Laboratory  [x]  Microbiology  [x]  Radiology  []  EKG/Telemetry   []  Cardiology/Vascular   []  Pathology  [x]  Old records  []  Other:    Most notable findings include:     5/3: WBC 19.7, lactate 3.  Urinalysis was positive for nitrates, LE, 4+ bacteria.    Patient continued to have tachycardia, remained febrile after initiation of antibiotic, treating presumed UTI.   CT scan was obtained a.m. 5/4/2023 revealing 3 to 4 mm right ureteral stone with hydronephrosis, additional nonobstructing right renal stone.    5/4: WBC 24.8, lactate 3.1, creatinine 0.68  Urine culture and blood cultures are pending.    Assessment & Plan   Assessment / Plan     Brief Patient Summary:  Bernardo Dodd is a 42 y.o. female who is currently admitted to [facility] after presentation to ED 5/3/2023 due to fever, chills, abdominal pain over 24 hours.  She reports increasing abdominal discomfort, primarily left lower quadrant pain.     On arrival to the ER the patient was febrile to 103, tachycardic in the 120s.  WBC 19.7, lactate 3.  Urinalysis was positive for nitrates, LE, 4+ bacteria.    Patient continued to have tachycardia, remained febrile after initiation of antibiotic, treating presumed UTI.  CT scan was obtained a.m. 5/4/2023 revealing 3 to 4 mm right ureteral stone with hydronephrosis, additional nonobstructing right renal stone.    Current WBC 24, lactate 3.    Urology was contacted for evaluation.  Given the patient's continued febrile status, tachycardia requiring fluid support the indication for ureteral stent was discussed.  The risk benefits alternatives procedure were discussed the patient she elects to proceed    We have discussed that we will be unable to primarily treat stone at this time.  A ureteral stent is required to provide urinary drainage, allow antibiotic therapy to be completed.  She require follow-up as an outpatient in 1 to 2 weeks for definitive stone treatment after antibiotic course is completed.  She is understanding and agreeable.  She is understanding ureteral since her not permanent she must follow-up for definitive stone management.    Active Hospital Problems:  Active Hospital Problems    Diagnosis    • **Sepsis due to urinary tract infection    • UTI (urinary tract infection)    • Hypokalemia    • Diarrhea    • Hypertension      Plan:   - TO OR for  cystoscopy, left retrograde pyelography, left ureteral stent placement  -Will require outpatient follow-up for definitive stone treatment in 1 to 2 weeks, this will be coordinated at time of discharge.    DVT prophylaxis:  Medical DVT prophylaxis orders are present.    CODE STATUS:    Code Status (Patient has no pulse and is not breathing): CPR (Attempt to Resuscitate)  Medical Interventions (Patient has pulse or is breathing): Full Support    Admission Status: Per primary team    Electronically signed by Micah Child MD, 05/04/23, 6:11 AM EDT.

## 2023-05-04 NOTE — CASE MANAGEMENT/SOCIAL WORK
Discharge Planning Assessment  [facility]     Patient Name: Bernardo Dodd  MRN: [id number]  Today's Date: 5/4/2023    Admit Date: 5/3/2023    Plan:  update   Discharge Needs Assessment     Row Name 05/04/23 1513       Living Environment    People in Home alone    Current Living Arrangements home    Primary Care Provided by self    Provides Primary Care For no one    Family Caregiver if Needed parent(s)    Quality of Family Relationships involved;helpful       Resource/Environmental Concerns    Resource/Environmental Concerns none    Transportation Concerns no car       Transition Planning    Patient/Family Anticipates Transition to home    Patient/Family Anticipated Services at Transition     Transportation Anticipated family or friend will provide       Discharge Needs Assessment    Readmission Within the Last 30 Days no previous admission in last 30 days    Equipment Currently Used at Home none    Concerns to be Addressed discharge planning    Anticipated Changes Related to Illness none    Equipment Needed After Discharge none               Discharge Plan     Row Name 05/04/23 1513       Plan    Plan CM update    Plan Comments Confirmed patient lives alone in [facility]. She is independent of ADLs and does not use DME at home. Goal is to return home upon discharge- May need transportation at discharge. CM will continue to follow- phill [phone]    Final Discharge Disposition Code 01 - home or self-care              Continued Care and Services - Admitted Since 5/3/2023    Coordination has not been started for this encounter.       Expected Discharge Date and Time     Expected Discharge Date Expected Discharge Time    May 5, 2023          Demographic Summary     Row Name 05/04/23 1512       General Information    Admission Type observation    Arrived From home    Referral Source admission list    Reason for Consult discharge planning    Preferred Language English       Contact Information     Permission Granted to Share Info With                Functional Status     Row Name 05/04/23 1513       Functional Status    Usual Activity Tolerance good    Current Activity Tolerance good       Functional Status, IADL    Medications independent    Meal Preparation independent    Housekeeping independent    Laundry independent    Shopping independent               Psychosocial    No documentation.                Abuse/Neglect    No documentation.                Legal    No documentation.                Substance Abuse    No documentation.                Patient Forms    No documentation.                   Xiomara Walker RN

## 2023-05-04 NOTE — SIGNIFICANT NOTE
Persistent fever overnight despite fluid, Tylenol, Toradol.  CT pending to look for lack of source control.  Patient found to have stone with hydronephrosis.  Discussed case with Dr. Child who will see the patient today.  Unfortunately, patient ate chicken noodle soup around 2 AM.  N.p.o.

## 2023-05-04 NOTE — CONSULTS
[facility]   HISTORY AND PHYSICAL    Patient Name: Bernardo Dodd  : 1980  MRN: [id number]  Primary Care Physician:  Nelly Sotelo PA  Date of admission: 5/3/2023    Subjective   Subjective     Chief Complaint: Fever    HPI:    Bernardo Dodd is a 42 y.o. female is currently admitted to [facility] after presentation to ED 5/3/2023 due to fever, chills, abdominal pain over 24 hours.  She reports increasing abdominal discomfort, primarily left lower quadrant pain.     Patient is a past medical history significant for hypertension, migraine, GERD, bipolar disorder, anxiety.  She has a history of recurrent kidney stones.  She has required surgical intervention for kidney stone in 2019.    On arrival to the ER the patient was febrile to 103, tachycardic in the 120s.  WBC 19.7, lactate 3.  Urinalysis was positive for nitrates, LE, 4+ bacteria.    Patient continued to have tachycardia, remained febrile after initiation of antibiotic, treating presumed UTI.  CT scan was obtained a.m. 2023 revealing 3 to 4 mm right ureteral stone with hydronephrosis, additional nonobstructing right renal stone.    Current WBC 24.8, lactate 3.1, creatinine 0.68  Urine culture and blood cultures are pending.        Review of Systems   The following systems were reviewed and negative;  constitution, respiratory, cardiovascular, genitourinary, integument, hematologic / lymphatic, musculoskeletal, neurological and behavioral/psych.   GI: Abdominal pain    Personal History     Past Medical History:   Diagnosis Date   • Allergic rhinitis    • Anemia    • Anxiety    • Arthritis    • Asthma    • Bartholin gland cyst    • Bipolar disorder 2014   • Chlamydia    • Depression    • Endometriosis    • FHx: migraine headaches    • GERD (gastroesophageal reflux disease)    • Gonorrhea    • Heart murmur    • Herpes simplex    • History of chest x-ray 2015    no active disease   • History of echocardiogram 2015     ejection fraction of greater than 65%, mitral and pulmonic regurgitation an physiological tricuspid regurgitation.   • History of PFTs 2015    spirometry data acceptable and reproducible; pt given 4 puffs of Ventolin; pt gave good effort; no obstruction; no Bd response; MVV reduced    • History of PFTs 2015    pt gave best effort; duoneb given prior and post study; moderate nonspecific proportional reduction of FEV1 and FVC with preserved ratio; FEV1 moderately reduced; cannot rule out restriction   • Hypertension    • Kidney stones    • Migraine    • Ovarian cyst    • Pelvic pain    • PMS (premenstrual syndrome)    • Preeclampsia    • Screening breast examination     self;admits   • Seizures    • STD (female)    • Substance abuse    • Thigh shingles    • Trauma    • Trichomonas infection    • Urinary tract infection    • Urogenital trichomoniasis    • Varicella        Past Surgical History:   Procedure Laterality Date   • BILATERAL BREAST REDUCTION     • BREAST BIOPSY     • BREAST SURGERY      breast reduction   •  SECTION     •  SECTION WITH TUBAL  2010   • CYSTOSCOPY     • DIAGNOSTIC LAPAROSCOPY     • INCISION AND DRAINAGE ABSCESS      bartholin's   • LAPAROSCOPIC TUBAL LIGATION     • ORIF ANKLE FRACTURE Right    • REDUCTION MAMMAPLASTY Bilateral    • TENSION FREE VAGINAL TAPING WITH MINI ARC SLING      Dr Doyle avery        Family History: family history includes Arthritis in her father and mother; Bipolar disorder in her mother; Cancer in her maternal grandmother and mother; Dementia in her father; Diabetes in her paternal uncle and another family member; MARCIE disease in her paternal aunt; Heart disease in an other family member; Heart failure in her paternal grandmother; Hypertension in her father and another family member; Other in an other family member; Pancreatic cancer in her maternal grandmother and another family member. Otherwise pertinent FHx was reviewed  and not pertinent to current issue.    Social History:  reports that she quit smoking about 8 years ago. Her smoking use included cigarettes and cigars. She has never used smokeless tobacco. She reports that she does not currently use drugs after having used the following drugs: Marijuana. She reports that she does not drink alcohol.    Home Medications:  Diclofenac Sodium, OLANZapine, albuterol sulfate HFA, amLODIPine, benzonatate, celecoxib, cetirizine, erythromycin, fluconazole, fluticasone, furosemide, lamoTRIgine, methylPREDNISolone, and montelukast      Allergies:  Allergies   Allergen Reactions   • Naproxen Swelling   • Sulfa Antibiotics Rash       Objective   Objective     Vitals:   Temp:  [97 °F (36.1 °C)-102.6 °F (39.2 °C)] 97 °F (36.1 °C)  Heart Rate:  [] 85  Resp:  [12-26] 12  BP: ()/(50-89) 105/72  Physical Exam    Constitutional: Awake in bed, alert    Eyes: PERRLA, sclerae anicteric, no conjunctival injection   HENT: Normocephalic, atraumatic, mucous membranes moist   Neck: Supple, trachea midline   Respiratory:Equal chest rise, non-labored respirations    Cardiovascular: Perfused, no edema   Gastrointestinal: Soft, nontender, non-distended,    Musculoskeletal: No bilateral ankle edema, no clubbing or cyanosis to extremities   Psychiatric: Appropriate affect, cooperative   Neurologic: Oriented x 3, Cranial Nerves grossly intact, speech clear   Skin: No rashes     Result Review    Result Review:  I have personally reviewed the results from the time of this admission to 5/4/2023 06:11 EDT and agree with these findings:  [x]  Laboratory  [x]  Microbiology  [x]  Radiology  []  EKG/Telemetry   []  Cardiology/Vascular   []  Pathology  [x]  Old records  []  Other:    Most notable findings include:     5/3: WBC 19.7, lactate 3.  Urinalysis was positive for nitrates, LE, 4+ bacteria.    Patient continued to have tachycardia, remained febrile after initiation of antibiotic, treating presumed UTI.   CT scan was obtained a.m. 5/4/2023 revealing 3 to 4 mm right ureteral stone with hydronephrosis, additional nonobstructing right renal stone.    5/4: WBC 24.8, lactate 3.1, creatinine 0.68  Urine culture and blood cultures are pending.    Assessment & Plan   Assessment / Plan     Brief Patient Summary:  Bernardo Dodd is a 42 y.o. female who is currently admitted to [facility] after presentation to ED 5/3/2023 due to fever, chills, abdominal pain over 24 hours.  She reports increasing abdominal discomfort, primarily left lower quadrant pain.     On arrival to the ER the patient was febrile to 103, tachycardic in the 120s.  WBC 19.7, lactate 3.  Urinalysis was positive for nitrates, LE, 4+ bacteria.    Patient continued to have tachycardia, remained febrile after initiation of antibiotic, treating presumed UTI.  CT scan was obtained a.m. 5/4/2023 revealing 3 to 4 mm right ureteral stone with hydronephrosis, additional nonobstructing right renal stone.    Current WBC 24, lactate 3.    Urology was contacted for evaluation.  Given the patient's continued febrile status, tachycardia requiring fluid support the indication for ureteral stent was discussed.  The risk benefits alternatives procedure were discussed the patient she elects to proceed    We have discussed that we will be unable to primarily treat stone at this time.  A ureteral stent is required to provide urinary drainage, allow antibiotic therapy to be completed.  She require follow-up as an outpatient in 1 to 2 weeks for definitive stone treatment after antibiotic course is completed.  She is understanding and agreeable.  She is understanding ureteral since her not permanent she must follow-up for definitive stone management.    Active Hospital Problems:  Active Hospital Problems    Diagnosis    • **Sepsis due to urinary tract infection    • UTI (urinary tract infection)    • Hypokalemia    • Diarrhea    • Hypertension      Plan:   - TO OR for  cystoscopy, left retrograde pyelography, left ureteral stent placement  -Will require outpatient follow-up for definitive stone treatment in 1 to 2 weeks, this will be coordinated at time of discharge.    DVT prophylaxis:  Medical DVT prophylaxis orders are present.    CODE STATUS:    Code Status (Patient has no pulse and is not breathing): CPR (Attempt to Resuscitate)  Medical Interventions (Patient has pulse or is breathing): Full Support    Admission Status: Per primary team    Electronically signed by Micah Child MD, 05/04/23, 6:11 AM EDT.

## 2023-05-04 NOTE — PLAN OF CARE
Goal Outcome Evaluation:  Plan of Care Reviewed With: patient      Patient had stent placed this morning but stone remains and will be removed outpatient. Abdominal pain continues and patient requested  pain meds several times and was finally able to sleep this afternoon. Vitals stable.

## 2023-05-04 NOTE — PLAN OF CARE
Goal Outcome Evaluation:  Plan of Care Reviewed With: patient   Pt tachycardic, RA, pyrexic throughout the night. A&Ox4. Pt complained of abdominal pain, nausea, and headache. PRNs given for pain and fever. NS bolus administered. LR bolus administered. Pt went for abdomen CT. Pt NPO for surgery with Dr. Child today. Mag replaced. Potassium replaced.

## 2023-05-04 NOTE — OP NOTE
CYSTOSCOPY URETERAL CATHETER/STENT INSERTION  Procedure Report    Patient Name:  Bernardo Dodd  YOB: 1980    Date of Surgery:  5/4/2023     Indications: 42-year-old female with obstructing 4 mm distal left ureteral stone.  Patient has elevated white blood cell count, is febrile with tachycardia.  Given concern for infection and obstructing stone the indications for emergent ureteral stent placement were discussed with the patient and she elected proceed.  Risk benefits and alternatives discussed, patient agreeable.  Patient is understanding that we are unable to treat stone today, she require outpatient follow-up for definitive stone treatment after infection has been cleared    Pre-op Diagnosis:   Left ureteral stone       Post-Op Diagnosis Codes:  Left Ureteral stone    Procedure(s):  CYSTOSCOPY,   LEFT RETROGRADE PYELOGRAM  LEFT URETERAL STENT PLACEMENT  RIGHT RETROGRADE PYELOGRAM    MODIFIER 59- BILATERAL PROCEDURE    Staff:  Surgeon(s):  Micah Child MD         Anesthesia: General    Estimated Blood Loss: none    Implants:    Implant Name Type Inv. Item Serial No.  Lot No. LRB No. Used Action   STNT PERCUFLX NO GW [id number] - [id number] Stent STNT PERCUFLX NO GW [id number]  [employer]  Left 1 Implanted       Specimen:          None        Findings:   1.  Normal urinary bladder without evidence of tumor stone or foreign body.   2.   fluoroscopy with multiple calcifications in the pelvis.  3.  Left retrograde pyelography with mild dilation of the mid proximal and renal collecting system noted.  3.  Left ureteral stent placement  4.  Right retrograde pyelography demonstrating no evidence of dilation of the distal mid proximal ureter as well as renal collecting system.    Complications: None immediate    Description of Procedure:   The patient was identified in the preoperative holding area where informed consent was reviewed and signed. The patient was transported the  operating room per anesthesia and placed supine on the operating table. Smooth endotracheal intubation was performed without issue after administration of general anesthesia. The patient was then placed in the dorsal lithotomy position where genitals were prepped and draped in the usual sterile fashion. A brief timeout was performed identifying the correct patient procedure and laterality. Perioperative antibiotics were administered. All pressure points were padded.     Procedure began by inserting a 22 Lebanese cystoscope atraumatically per the patient's urethra, entering into the bladder were pan cystoscopy was performed identifying bilateral orthotopic ureteral orifices and no evidence of bladder masses or other lesions. Attention was then turned to the left ureteral orifice. A 5 Fr open ended ureteral catheter was inserted into the distal ureter and contrast dye was injected up the ureter, a retrograde pyelogram was performed identifying mild dilation of the mid proximal and renal collecting system, and the ureter and renal pelvis locations were identified under fluoroscopy.     A Sensor wire was inserted through the working channel of the scope and advanced up the left ureteral orifice to the level of the renal pelvis on fluoroscopy. A 4.8 x 24 Lebanese double J ureteral stent was then advanced over the wire to the renal pelvis confirmed on fluoroscopy. The Sensor wire was then removed and a good proximal stent curl was visualized within the collecting system on fluoroscopy, and a distal stent curl was observed within the bladder on direct visualization.     Given multiple calcifications seen in the pelvis on fluoroscopy indication for right retrograde pyelography to ensure no dilation given the setting of patient infection.  Pollick catheter was inserted into the right ureteral orifice, contrast was instilled with no dilation of the distal mid proximal ureter or renal collecting system noted, therefore no ureteral  stent was placed    The patient was awoken from general anesthesia and transported to the PACU in stable condition.    PLAN  -Patient will continue admission with hospitalist service.  She require antibiotic therapy for urinary tract infection.  She will require definitive outpatient follow-up in 1 to 2 weeks for stone treatment.  She will be contacted to set up outpatient follow-up at time of discharge.  She is understanding ureteral stents are not permanent and she must follow-up for definitive stone treatment.            Micah Child MD     Date: 5/4/2023  Time: 07:30 EDT

## 2023-05-05 ENCOUNTER — APPOINTMENT (OUTPATIENT)
Dept: GENERAL RADIOLOGY | Facility: HOSPITAL | Age: 43
DRG: 854 | End: 2023-05-05
Payer: MEDICAID

## 2023-05-05 PROBLEM — R50.9 ACUTE FEBRILE ILLNESS: Status: ACTIVE | Noted: 2023-05-05

## 2023-05-05 LAB
ALBUMIN SERPL-MCNC: 3.1 G/DL (ref 3.5–5.2)
ALBUMIN/GLOB SERPL: 1 G/DL
ALP SERPL-CCNC: 71 U/L (ref 39–117)
ALT SERPL W P-5'-P-CCNC: 13 U/L (ref 1–33)
ANION GAP SERPL CALCULATED.3IONS-SCNC: 10 MMOL/L (ref 5–15)
AST SERPL-CCNC: 18 U/L (ref 1–32)
BACTERIA SPEC AEROBE CULT: ABNORMAL
BASOPHILS # BLD AUTO: 0.03 10*3/MM3 (ref 0–0.2)
BASOPHILS NFR BLD AUTO: 0.1 % (ref 0–1.5)
BILIRUB SERPL-MCNC: 0.3 MG/DL (ref 0–1.2)
BUN SERPL-MCNC: 9 MG/DL (ref 6–20)
BUN/CREAT SERPL: 12.5 (ref 7–25)
CALCIUM SPEC-SCNC: 8.3 MG/DL (ref 8.6–10.5)
CHLORIDE SERPL-SCNC: 103 MMOL/L (ref 98–107)
CO2 SERPL-SCNC: 21 MMOL/L (ref 22–29)
CREAT SERPL-MCNC: 0.72 MG/DL (ref 0.57–1)
D-LACTATE SERPL-SCNC: 2 MMOL/L (ref 0.5–2)
DEPRECATED RDW RBC AUTO: 47.8 FL (ref 37–54)
EGFRCR SERPLBLD CKD-EPI 2021: 105.9 ML/MIN/1.73
EOSINOPHIL # BLD AUTO: 0.03 10*3/MM3 (ref 0–0.4)
EOSINOPHIL NFR BLD AUTO: 0.1 % (ref 0.3–6.2)
ERYTHROCYTE [DISTWIDTH] IN BLOOD BY AUTOMATED COUNT: 16.9 % (ref 12.3–15.4)
GEN 5 2HR TROPONIN T REFLEX: <6 NG/L
GLOBULIN UR ELPH-MCNC: 3 GM/DL
GLUCOSE BLDC GLUCOMTR-MCNC: 159 MG/DL (ref 70–130)
GLUCOSE SERPL-MCNC: 195 MG/DL (ref 65–99)
HCT VFR BLD AUTO: 30.3 % (ref 34–46.6)
HGB BLD-MCNC: 9.8 G/DL (ref 12–15.9)
IMM GRANULOCYTES # BLD AUTO: 0.44 10*3/MM3 (ref 0–0.05)
IMM GRANULOCYTES NFR BLD AUTO: 1.6 % (ref 0–0.5)
LYMPHOCYTES # BLD AUTO: 2.07 10*3/MM3 (ref 0.7–3.1)
LYMPHOCYTES NFR BLD AUTO: 7.7 % (ref 19.6–45.3)
MCH RBC QN AUTO: 25.1 PG (ref 26.6–33)
MCHC RBC AUTO-ENTMCNC: 32.3 G/DL (ref 31.5–35.7)
MCV RBC AUTO: 77.7 FL (ref 79–97)
MONOCYTES # BLD AUTO: 1.66 10*3/MM3 (ref 0.1–0.9)
MONOCYTES NFR BLD AUTO: 6.2 % (ref 5–12)
NEUTROPHILS NFR BLD AUTO: 22.74 10*3/MM3 (ref 1.7–7)
NEUTROPHILS NFR BLD AUTO: 84.3 % (ref 42.7–76)
NRBC BLD AUTO-RTO: 0 /100 WBC (ref 0–0.2)
PLATELET # BLD AUTO: 253 10*3/MM3 (ref 140–450)
PMV BLD AUTO: 10 FL (ref 6–12)
POTASSIUM SERPL-SCNC: 3.8 MMOL/L (ref 3.5–5.2)
PROT SERPL-MCNC: 6.1 G/DL (ref 6–8.5)
RBC # BLD AUTO: 3.9 10*6/MM3 (ref 3.77–5.28)
SODIUM SERPL-SCNC: 134 MMOL/L (ref 136–145)
TROPONIN T DELTA: NORMAL
TROPONIN T SERPL HS-MCNC: <6 NG/L
WBC NRBC COR # BLD: 26.97 10*3/MM3 (ref 3.4–10.8)

## 2023-05-05 PROCEDURE — 84484 ASSAY OF TROPONIN QUANT: CPT | Performed by: NURSE PRACTITIONER

## 2023-05-05 PROCEDURE — 25010000002 KETOROLAC TROMETHAMINE PER 15 MG: Performed by: UROLOGY

## 2023-05-05 PROCEDURE — 25010000002 HEPARIN (PORCINE) PER 1000 UNITS: Performed by: UROLOGY

## 2023-05-05 PROCEDURE — 94664 DEMO&/EVAL PT USE INHALER: CPT

## 2023-05-05 PROCEDURE — 93005 ELECTROCARDIOGRAM TRACING: CPT | Performed by: INTERNAL MEDICINE

## 2023-05-05 PROCEDURE — 85025 COMPLETE CBC W/AUTO DIFF WBC: CPT | Performed by: NURSE PRACTITIONER

## 2023-05-05 PROCEDURE — 83605 ASSAY OF LACTIC ACID: CPT | Performed by: NURSE PRACTITIONER

## 2023-05-05 PROCEDURE — 94799 UNLISTED PULMONARY SVC/PX: CPT

## 2023-05-05 PROCEDURE — 99232 SBSQ HOSP IP/OBS MODERATE 35: CPT | Performed by: NURSE PRACTITIONER

## 2023-05-05 PROCEDURE — 80053 COMPREHEN METABOLIC PANEL: CPT | Performed by: NURSE PRACTITIONER

## 2023-05-05 PROCEDURE — 71045 X-RAY EXAM CHEST 1 VIEW: CPT

## 2023-05-05 PROCEDURE — 25010000002 PIPERACILLIN SOD-TAZOBACTAM PER 1 G: Performed by: UROLOGY

## 2023-05-05 PROCEDURE — 82948 REAGENT STRIP/BLOOD GLUCOSE: CPT

## 2023-05-05 RX ORDER — CETIRIZINE HYDROCHLORIDE 10 MG/1
10 TABLET ORAL DAILY
Status: DISCONTINUED | OUTPATIENT
Start: 2023-05-05 | End: 2023-05-07 | Stop reason: HOSPADM

## 2023-05-05 RX ORDER — LAMOTRIGINE 100 MG/1
200 TABLET ORAL DAILY
Status: DISCONTINUED | OUTPATIENT
Start: 2023-05-05 | End: 2023-05-07 | Stop reason: HOSPADM

## 2023-05-05 RX ORDER — MONTELUKAST SODIUM 5 MG/1
5 TABLET, CHEWABLE ORAL EVERY EVENING
Status: DISCONTINUED | OUTPATIENT
Start: 2023-05-05 | End: 2023-05-07 | Stop reason: HOSPADM

## 2023-05-05 RX ORDER — PHENAZOPYRIDINE HYDROCHLORIDE 100 MG/1
200 TABLET, FILM COATED ORAL 2 TIMES DAILY WITH MEALS
Status: DISCONTINUED | OUTPATIENT
Start: 2023-05-05 | End: 2023-05-07 | Stop reason: HOSPADM

## 2023-05-05 RX ORDER — IPRATROPIUM BROMIDE AND ALBUTEROL SULFATE 2.5; .5 MG/3ML; MG/3ML
3 SOLUTION RESPIRATORY (INHALATION) EVERY 4 HOURS PRN
Status: DISCONTINUED | OUTPATIENT
Start: 2023-05-05 | End: 2023-05-07 | Stop reason: HOSPADM

## 2023-05-05 RX ORDER — CETIRIZINE HYDROCHLORIDE 10 MG/1
10 TABLET ORAL NIGHTLY
Status: DISCONTINUED | OUTPATIENT
Start: 2023-05-05 | End: 2023-05-05

## 2023-05-05 RX ORDER — FLUTICASONE PROPIONATE 50 MCG
2 SPRAY, SUSPENSION (ML) NASAL DAILY
Status: DISCONTINUED | OUTPATIENT
Start: 2023-05-05 | End: 2023-05-07 | Stop reason: HOSPADM

## 2023-05-05 RX ADMIN — Medication 250 MG: at 20:34

## 2023-05-05 RX ADMIN — Medication 5 MG: at 02:52

## 2023-05-05 RX ADMIN — KETOROLAC TROMETHAMINE 15 MG: 15 INJECTION, SOLUTION INTRAMUSCULAR; INTRAVENOUS at 20:34

## 2023-05-05 RX ADMIN — HEPARIN SODIUM 5000 UNITS: 5000 INJECTION INTRAVENOUS; SUBCUTANEOUS at 09:37

## 2023-05-05 RX ADMIN — ACETAMINOPHEN 325MG 650 MG: 325 TABLET ORAL at 08:06

## 2023-05-05 RX ADMIN — LAMOTRIGINE 200 MG: 100 TABLET ORAL at 09:37

## 2023-05-05 RX ADMIN — TAZOBACTAM SODIUM AND PIPERACILLIN SODIUM 3.38 G: 375; 3 INJECTION, SOLUTION INTRAVENOUS at 17:46

## 2023-05-05 RX ADMIN — IPRATROPIUM BROMIDE AND ALBUTEROL SULFATE 3 ML: 2.5; .5 SOLUTION RESPIRATORY (INHALATION) at 01:02

## 2023-05-05 RX ADMIN — CETIRIZINE HYDROCHLORIDE 10 MG: 10 TABLET, FILM COATED ORAL at 10:01

## 2023-05-05 RX ADMIN — KETOROLAC TROMETHAMINE 15 MG: 15 INJECTION, SOLUTION INTRAMUSCULAR; INTRAVENOUS at 09:44

## 2023-05-05 RX ADMIN — HEPARIN SODIUM 5000 UNITS: 5000 INJECTION INTRAVENOUS; SUBCUTANEOUS at 20:34

## 2023-05-05 RX ADMIN — TAZOBACTAM SODIUM AND PIPERACILLIN SODIUM 3.38 G: 375; 3 INJECTION, SOLUTION INTRAVENOUS at 09:37

## 2023-05-05 RX ADMIN — MONTELUKAST SODIUM 5 MG: 5 TABLET, CHEWABLE ORAL at 10:01

## 2023-05-05 RX ADMIN — SODIUM CHLORIDE, POTASSIUM CHLORIDE, SODIUM LACTATE AND CALCIUM CHLORIDE 9 ML/HR: 600; 310; 30; 20 INJECTION, SOLUTION INTRAVENOUS at 17:32

## 2023-05-05 RX ADMIN — PHENAZOPYRIDINE 200 MG: 100 TABLET ORAL at 10:01

## 2023-05-05 RX ADMIN — FAMOTIDINE 20 MG: 20 TABLET, FILM COATED ORAL at 17:45

## 2023-05-05 RX ADMIN — Medication 10 ML: at 09:37

## 2023-05-05 RX ADMIN — FAMOTIDINE 20 MG: 20 TABLET, FILM COATED ORAL at 08:06

## 2023-05-05 RX ADMIN — TAZOBACTAM SODIUM AND PIPERACILLIN SODIUM 3.38 G: 375; 3 INJECTION, SOLUTION INTRAVENOUS at 02:52

## 2023-05-05 RX ADMIN — KETOROLAC TROMETHAMINE 15 MG: 15 INJECTION, SOLUTION INTRAMUSCULAR; INTRAVENOUS at 02:52

## 2023-05-05 RX ADMIN — PHENAZOPYRIDINE 200 MG: 100 TABLET ORAL at 17:45

## 2023-05-05 RX ADMIN — Medication 250 MG: at 09:37

## 2023-05-05 NOTE — PROGRESS NOTES
.[facility]  PROGRESS NOTE    Patient Name: Bernardo Dodd  : 1980  MRN: [id number]    Date of Admission: 5/3/2023  Primary Care Physician: Nelly Sotelo PA    Subjective   Subjective     CC:  UTI    HPI:  Remains febrile to Tmax 100.9 overnight . Complaining of sig temp fluctuations. Pain in eye, nose and ear are main complaints. We have not resumed her home lamictal - will add back today   ROS:  Gen- + fevers, chills  CV- No chest pain, palpitations  Resp- No cough, dyspnea  GI- No N/V/D, abd pain    +congestion/allergies    Objective   Objective     Vital Signs:   Temp:  [97.9 °F (36.6 °C)-101.5 °F (38.6 °C)] 100.9 °F (38.3 °C)  Heart Rate:  [] 106  Resp:  [16-18] 18  BP: ()/(60-85) 128/75  Flow (L/min):  [2] 2     Physical Exam:  Constitutional: sitting in chair , odd affect,   HENT: NCAT, mucous membranes moist  Respiratory: Respiratory effort normal   Cardiovascular: RRR, no murmurs  Gastrointestinal: Soft, nontender, nondistended  Musculoskeletal: No bilateral ankle edema  Psychiatric: Appropriate affect, cooperative  Neurologic: Oriented x 3, speech slow but clear  Skin: No rashes      Results Reviewed:  LAB RESULTS:      Lab 23  0125 23  0124 23  0344 23  0057 23  2210 23  1607 23  1444 23  1122   WBC 26.97*  --  24.81*  --   --   --   --  19.73*   HEMOGLOBIN 9.8*  --  10.2*  --   --   --   --  10.4*   HEMATOCRIT 30.3*  --  32.1*  --   --   --   --  32.0*   PLATELETS 253  --  243  --   --   --   --  268   NEUTROS ABS 22.74*  --   --   --   --   --   --  17.06*   IMMATURE GRANS (ABS) 0.44*  --   --   --   --   --   --  0.10*   LYMPHS ABS 2.07  --   --   --   --   --   --  1.52   MONOS ABS 1.66*  --   --   --   --   --   --  1.01*   EOS ABS 0.03  --   --   --   --   --   --  0.01   MCV 77.7*  --  80.9  --   --   --   --  78.0*   PROCALCITONIN  --   --   --   --   --   --   --  1.23*    LACTATE  --  2.0  --  3.1* 3.8* 2.8* 3.9*  --          Lab 05/05/23  0124 05/04/23  0344 05/03/23  1122   SODIUM 134* 135* 134*   POTASSIUM 3.8 4.3 3.1*   CHLORIDE 103 104 101   CO2 21.0* 20.0* 22.0   ANION GAP 10.0 11.0 11.0   BUN 9 11 14   CREATININE 0.72 0.68 0.73   EGFR 105.9 110.3 104.1   GLUCOSE 195* 135* 112*   CALCIUM 8.3* 7.9* 8.1*   MAGNESIUM  --  2.6 1.5*         Lab 05/05/23  0124 05/03/23  1122   TOTAL PROTEIN 6.1 6.2   ALBUMIN 3.1* 3.3*   GLOBULIN 3.0 2.9   ALT (SGPT) 13 7   AST (SGOT) 18 11   BILIRUBIN 0.3 0.5   ALK PHOS 71 62         Lab 05/05/23  0311 05/05/23  0124   HSTROP T <6 <6                 Brief Urine Lab Results  (Last result in the past 365 days)      Color   Clarity   Blood   Leuk Est   Nitrite   Protein   CREAT   Urine HCG        05/03/23 1031 Yellow   Turbid   Small (1+)   Large (3+)   Positive   100 mg/dL (2+)                 Microbiology Results Abnormal     Procedure Component Value - Date/Time    Blood Culture - Blood, Arm, Right [[id number]]  (Normal) Collected: 05/03/23 1607    Lab Status: Preliminary result Specimen: Blood from Arm, Right Updated: 05/04/23 1631     Blood Culture No growth at 24 hours    Blood Culture - Blood, Hand, Right [[id number]]  (Normal) Collected: 05/03/23 1444    Lab Status: Preliminary result Specimen: Blood from Hand, Right Updated: 05/04/23 1531     Blood Culture No growth at 24 hours    Gastrointestinal Panel, PCR - Stool, Per Rectum [[id number]]  (Normal) Collected: 05/04/23 1108    Lab Status: Final result Specimen: Stool from Per Rectum Updated: 05/04/23 1307     Campylobacter Not Detected     Plesiomonas shigelloides Not Detected     Salmonella Not Detected     Vibrio Not Detected     Vibrio cholerae Not Detected     Yersinia enterocolitica Not Detected     Enteroaggregative E. coli (EAEC) Not Detected     Enteropathogenic E. coli (EPEC) Not Detected     Enterotoxigenic E. coli (ETEC) lt/st Not Detected     Shiga-like toxin-producing E.  coli (STEC) stx1/stx2 Not Detected     Shigella/Enteroinvasive E. coli (EIEC) Not Detected     Cryptosporidium Not Detected     Cyclospora cayetanensis Not Detected     Entamoeba histolytica Not Detected     Giardia lamblia Not Detected     Adenovirus F40/41 Not Detected     Astrovirus Not Detected     Norovirus GI/GII Not Detected     Rotavirus A Not Detected     Sapovirus (I, II, IV or V) Not Detected    Clostridioides difficile Toxin - Stool, Per Rectum [[id number]]  (Normal) Collected: 05/04/23 1108    Lab Status: Final result Specimen: Stool from Per Rectum Updated: 05/04/23 1238    Narrative:      The following orders were created for panel order Clostridioides difficile Toxin - Stool, Per Rectum.  Procedure                               Abnormality         Status                     ---------                               -----------         ------                     Clostridioides difficile...[[id number]]  Normal              Final result                 Please view results for these tests on the individual orders.    Clostridioides difficile Toxin, PCR - Stool, Per Rectum [[id number]]  (Normal) Collected: 05/04/23 1108    Lab Status: Final result Specimen: Stool from Per Rectum Updated: 05/04/23 1238     C. Difficile Toxins by PCR Not Detected    Narrative:      The result indicates the absence of toxigenic C. difficile from stool specimen.     COVID PRE-OP / PRE-PROCEDURE SCREENING ORDER (NO ISOLATION) - Swab, Nasopharynx [[id number]]  (Normal) Collected: 05/03/23 0808    Lab Status: Final result Specimen: Swab from Nasopharynx Updated: 05/03/23 0907    Narrative:      The following orders were created for panel order COVID PRE-OP / PRE-PROCEDURE SCREENING ORDER (NO ISOLATION) - Swab, Nasopharynx.  Procedure                               Abnormality         Status                     ---------                               -----------         ------                     COVID-19 and FLU A/B PCR...[[id number]]   Normal              Final result                 Please view results for these tests on the individual orders.    COVID-19 and FLU A/B PCR - Swab, Nasopharynx [[id number]]  (Normal) Collected: 05/03/23 0808    Lab Status: Final result Specimen: Swab from Nasopharynx Updated: 05/03/23 0907     COVID19 Not Detected     Influenza A PCR Not Detected     Influenza B PCR Not Detected    Narrative:      Fact sheet for providers: https://www.fda.gov/media/602329/download    Fact sheet for patients: https://www.fda.gov/media/409377/download    Test performed by PCR.          CT Abdomen Pelvis Without Contrast    Result Date: 5/4/2023  EXAMINATION: CT ABDOMEN AND PELVIS WITHOUT IV CONTRAST   DATE OF EXAMINATION: 5/4/2023. COMPARISON: 10/2/2022.. INDICATION: Sepsis and fever. PROCEDURE:   Axial CT of the abdomen and pelvis was performed with sagittal and coronal reformatted images without contrast enhancement. CT dose lowering techniques were used, to include: automated exposure control, adjustment for patient size, and/or use of iterative reconstruction. The exam is limited because some types of pathology may not be adequately demonstrated due to lack of contrast enhancement. FINDINGS: LOWER CHEST :  The visualized lung bases are clear.  There are no pleural or pericardial effusions. ABDOMEN: Liver and Biliary system:  Normal. Adrenal glands:  Normal. Kidneys and ureters:  There is a 4.3 mm stone in the left ureterovesical junction with mild left-sided hydronephrosis and mild to moderate perinephric stranding. There is a 2 mm nonobstructing stone also seen in the upper pole the right kidney and ureter  appear unremarkable. Spleen:  Normal. Pancreas:  Normal. Gallbladder:  Normal. Lymph nodes, Peritoneum and mesentery:  There is no mesenteric or retroperitoneal lymphadenopathy. Gastrointestinal tract:  There are no dilated loops of bowel or free intraperitoneal air.  . The appendix is normal. Aorta/IVC:   No aortic aneurysm..   IVC normal. Abdominal wall:  Normal. PELVIS: Fluid: There is no free fluid in the pelvis. Lymph Nodes:  There is no pelvic or inguinal lymphadenopathy.. Urinary bladder:  Normal. BONES:  There are no osseous destructive lesions.. ADDITIONAL  SIGNIFICANT FINDINGS:  Anterior subserosal fibroid measures 8.0 x 8.8 cm in diameter and is not significantly changed.     Impression: 1. Mild left-sided hydronephrosis and mild to moderate perinephric stranding with a 4.3 mm stone in the left ureterovesical junction.. 2. No bowel obstruction or appendicitis. 3. Uterine fibroid. Electronically signed by:  Jason Sheridan D.O.  5/4/2023 1:24 AM Mountain Time    XR Chest 2 View    Result Date: 5/3/2023  XR CHEST 2 VW Date of Exam: 5/3/2023 7:56 AM EDT Indication: fever / chills Comparison: Chest x-ray 9/5/2022 Findings: Normal cardiomediastinal silhouette. The lungs are clear. No pleural effusion or pneumothorax. No acute osseous findings.     Impression: Impression: No acute cardiopulmonary findings. Electronically Signed: Alan Telles  5/3/2023 8:14 AM EDT  Workstation ID: [id number]    XR Chest 1 View    Result Date: 5/5/2023  EXAMINATION: Chest one view. DATE: 5/5/2023. COMPARISON: 5/3/2023. CLINICAL HISTORY: Dyspnea. FINDINGS: The lungs and pleural spaces are clear. The cardiomediastinal silhouette and the pulmonary vessels are within normal limits.     Impression: No acute cardiopulmonary process. Electronically signed by:  Jason Sheridan D.O.  5/5/2023 12:55 AM Mountain Time    XR Pyelogram Retrograde    Result Date: 5/4/2023  XR PYELOGRAM RETROGRADE Date of Exam: 5/4/2023 6:03 AM EDT Indication: stent Comparison: CT 5/4/2023 Findings: Total fluoroscopy time 24 seconds, 8 images obtained Initial image demonstrates multiple calcifications overlying the pelvis. There is an irregular shaped 2-3 mm size calcification within the left lower pelvis likely distal left ureteral stone. Subsequent images demonstrate bilateral  retrograde pyelograms.  Effect seen within the ureters likely air bubbles. Correlation with real-time fluoroscopy would be recommended. Last image demonstrates successful deployment of left-sided ureteral stent.     Impression: Impression: 1. Successful deployment of left-sided ureteral stent. Electronically Signed: Brennen Hopkins  5/4/2023 8:20 AM EDT  Workstation ID: [id number]      Results for orders placed during the hospital encounter of 09/13/22    Adult Stress Echo W/ Cont or Stress Agent if Necessary Per Protocol    Interpretation Summary  · The patient completed 7: 50 minutes of treadmill exercise on standard Randy protocol achieving 9.8 METs of workload. The test was stopped due to fatigue after achieving the target heart rate. She reported mild chest tightness and heaviness at peak exercise which resolved in recovery.  · Expected exercise duration 8:45, actual 7:50; AVIS (+11).  · Resting heart rate 82, blood pressure 120/88. Peak heart rate 151, blood pressure 168/100. 85% of age-predicted maximal heart rate achieved.  · Average exercise capacity, completed for this protocol.  · Resting ECG showed sinus rhythm with nonspecific ST segment changes. Stress ECG is nondiagnostic due to baseline abnormalities however no significant ST abnormalities were noted. No exercise-induced arrhythmias were seen.  · Average exercise capacity with appropriate hemodynamic response to exercise.  · Negative treadmill stress test for ischemic ST segment abnormalities or exercise-induced arrhythmias. Chest tightness and heaviness was reported which resolved in recovery.  · Resting echocardiogram showed normal left ventricular systolic function with estimated ejection fraction 65%. Trace mitral regurgitation, trace tricuspid regurgitation and trace to mild pulmonic insufficiency was noted.  · Normal stress echo with no significant echocardiographic evidence for myocardial ischemia. Post-rest ejection fraction 75%.      Current  medications:  Scheduled Meds:famotidine, 20 mg, Oral, BID AC  fluticasone, 2 spray, Each Nare, Daily  heparin (porcine), 5,000 Units, Subcutaneous, Q12H  piperacillin-tazobactam, 3.375 g, Intravenous, Q8H  saccharomyces boulardii, 250 mg, Oral, BID  sodium chloride, 10 mL, Intravenous, Q12H      Continuous Infusions:lactated ringers, 9 mL/hr, Last Rate: 9 mL/hr (05/04/23 1216)      PRN Meds:.•  acetaminophen  •  aluminum-magnesium hydroxide-simethicone  •  ipratropium-albuterol  •  ketorolac  •  Magnesium Standard Dose Replacement - Follow Nurse / BPA Driven Protocol  •  melatonin  •  ondansetron **OR** ondansetron  •  Potassium Replacement - Follow Nurse / BPA Driven Protocol  •  sodium chloride  •  sodium chloride  •  sodium chloride  •  traMADol    Assessment & Plan   Assessment & Plan     Active Hospital Problems    Diagnosis  POA   • **Sepsis due to urinary tract infection [A41.9, N39.0]  Yes   • UTI (urinary tract infection) [N39.0]  Yes   • Hypokalemia [E87.6]  Yes   • Diarrhea [R19.7]  Yes   • Hypertension [I10]  Yes      Resolved Hospital Problems   No resolved problems to display.        Brief Hospital Course to date:  Bernardo Dodd is a 42 y.o. female with hx of HTN, migraines, GERD, bipolar disorder, anxiety, and kidney stones who presents due to fevers, chills, abdominal pain.  CT scan revealed left ureteral stone and she was taken to the OR for cystoscopy by Dr. Child on 5/4.    Sepsis secondary to acute Ecoli UTI, POA  --Rocephin changed to Zosyn, culture results and susceptibilities reviewed   --given leukocytosis worsening slightly today continue IV abx- consider deescalation if improvement   --urology is following, s/p cystoscopy 5/4 with left ureteral stent placement      Diarrhea  --Patient also reports diarrhea that started this morning, given that she has had recent antibiotic use (Augmentin for a sinus and ear infection) and works with patients in an assisted living facility, prudent to  rule out C.diff  --Check C.diff and GI PCR panel -negative  -- Continue probiotic     Hypokalemia  --Replace per protocol    HTN  --BP running on the lower side, will hold home BP meds for now, restart if needed       GERD  --Continue Pepcid twice daily     Bipolar disorder  Anxiety  --resumed home lamictal today    Allergies/asthma  --have resume patient schedule of singulair/zyrtec     Expected Discharge Location and Transportation: home  Expected Discharge pending cultures  Expected Discharge Date: 5/7/2023; Expected Discharge Time:      DVT prophylaxis:  Medical DVT prophylaxis orders are present.     AM-PAC 6 Clicks Score (PT): 24 (05/04/23 2000)    CODE STATUS:   Code Status and Medical Interventions:   Ordered at: 05/03/23 1321     Code Status (Patient has no pulse and is not breathing):    CPR (Attempt to Resuscitate)     Medical Interventions (Patient has pulse or is breathing):    Full Support       Nelly Andrade MD  05/05/23

## 2023-05-05 NOTE — PROGRESS NOTES
[facility]   Urology Progress Note    Patient Name: Bernardo Dodd  : 1980  MRN: [id number]  Primary Care Physician:  Nelly Sotelo PA  Date of admission: 5/3/2023    Subjective   Subjective     Chief Complaint: Fever    HPI:  Patient resting in bed. Feels chilled. RN reports rigors this AM. She denies flank pain, she is voiding with mild dysuria and mild hematuria that is clearing.     TMAX overnight 101.2.  Urine Culture >100,000 E.coli.    Review of Systems   All systems were reviewed and negative except for: hematuria, dysuria    Objective   Objective     Vitals:   Temp:  [97.9 °F (36.6 °C)-101.5 °F (38.6 °C)] 100.9 °F (38.3 °C)  Heart Rate:  [] 121  Resp:  [16-18] 18  BP: ()/(60-85) 128/75  Flow (L/min):  [2] 2  Physical Exam    Constitutional: Awake in bed, alert, chills   Eyes: PERRLA, sclerae anicteric, no conjunctival injection   HENT: Normocephalic, atraumatic, mucous membranes moist   Neck: Supple, trachea midline   Respiratory: Equal chest rise, non-labored respirations    Cardiovascular: RRR   Gastrointestinal: Soft, nontender, non-distended   Genitourinary: voiding, dysuria, hematuria.   Musculoskeletal: No lower extremity edema bilaterally, no clubbing or cyanosis to extremities   Psychiatric: Appropriate affect, cooperative   Neurologic: Oriented x 3,  Cranial Nerves grossly intact, speech clear   Skin: No rashes     Result Review    Result Review:  I have personally reviewed the results from the time of this admission to 2023 08:28 EDT and agree with these findings:  [x]  Laboratory  [x]  Microbiology  []  Radiology  []  EKG/Telemetry   []  Cardiology/Vascular   []  Pathology  []  Old records  [x]  Other: Vital signs    Most notable findings include: Urine culture >100,000 E.Coli.     Assessment & Plan   Assessment / Plan     Brief Patient Summary:  Bernardo Dodd is a 42 y.o. female who is POD 1 s/p Cystoscopy, Left ureteral stent placement for 4mm left distal  ureteral calculus and UTI.     Active Hospital Problems:  Active Hospital Problems    Diagnosis    • **Sepsis due to urinary tract infection    • UTI (urinary tract infection)    • Hypokalemia    • Diarrhea    • Hypertension        Plan:   -Pyridium for dysuria.  -Plan for definitive outpatient follow up in 1-2 weeks with Dr. Child for stone treatment.    will be available, please call if any questions.  D/w Dr. Child.        DVT prophylaxis:  Medical DVT prophylaxis orders are present.    CODE STATUS:   Code Status (Patient has no pulse and is not breathing): CPR (Attempt to Resuscitate)  Medical Interventions (Patient has pulse or is breathing): Full Support    Disposition:  I expect patient to remain inpatient.     Electronically signed by SHANE Flor, 05/05/23, 8:28 AM EDT.

## 2023-05-05 NOTE — PAYOR COMM NOTE
"Radha Tolentino RN  Utilization Management  P:[phone]  F:[phone]    Ref# [id number]  Bernardo Willis \"Gricel\" (42 y.o. Female)     Date of Birth   1980    Social Security Number       Address   [address] [address]    Home Phone   [phone]    MRN   [id number]       [place]    [employer]    Marital Status   Single                            Admission Date   5/3/23    Admission Type   Emergency    Admitting Provider   Nelly Andrade MD    Attending Provider   Nelly Andrade MD    Department, Room/Bed   [facility] 2F, S202/       Discharge Date       Discharge Disposition       Discharge Destination                               Attending Provider: Nelly Andrade MD    Allergies: Naproxen, Sulfa Antibiotics    Isolation: None   Infection: None   Code Status: CPR    Ht: 149.9 cm (59\")   Wt: 77.1 kg (170 lb)    Admission Cmt: None   Principal Problem: Sepsis due to urinary tract infection [A41.9,N39.0]                 Active Insurance as of 5/3/2023     Primary Coverage     Payor Plan Insurance Group Employer/Plan Group    ANTHEM MEDICAID [place] MEDICAID [id number]     Payor Plan Address Payor Plan Phone Number Payor Plan Fax Number Effective Dates    [address] [address] [phone]  2014 - None Entered    [facility] [id number]       Subscriber Name Subscriber Birth Date Member ID       BERNARDO WILLIS 1980 [id number]                 Emergency Contacts      (Rel.) Home Phone Work Phone Mobile Phone    Keya Willis (Mother) -- -- [phone]               History & Physical      Krista Horton MD at [address] [place] [address] Medicine Services  HISTORY AND PHYSICAL    Patient Name: Bernardo Willis  : 1980  MRN: [id number]  Primary Care Physician: Nelly Sotelo PA  Date of admission: 5/3/2023      Subjective    Subjective     Chief Complaint:  F/U fever, chills, abdominal " pain    HPI:  Bernardo Dodd is a 42 y.o. female with hx of HTN, migraines, GERD, bipolar disorder, anxiety, and kidney stones who presents due to fevers, chills, abdominal pain for the past 24 hours. She has had URI symptoms for the past 1 month at least, reports completed a course of Augmentin for sinus infection and ear infection, most recently was started on a Medrol dose pack for ongoing sinus symptoms, and referred to ENT. She reports fever started last night at work and so she presented to the ER this morning after her shift was over. She reports diarrhea started this morning as well. 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     On arrival to the ER she is febrile to 103.1 and tachycardic in the 120's. Labs showed WBC 19.73, K 3.4. Lactic acid and procal are pending. UA showed positive nitrites, large leukocyte esterase, TNTC WBC, 4+ bacteria, 13-20 squamous cells. COVID-19 and Flu PCR were negative. CXR no acute findings. She received two 1 liter fluid boluses and IV Rocephin, admitted for further management.       Review of Systems   Gen-+fevers, +chills  CV-no chest pain, no palpitations  Resp-+cough, no dyspnea  GI-no N/V, +diarrhea, +abd pain      Personal History     Past Medical History:   Diagnosis Date   • Allergic rhinitis    • Anemia    • Anxiety    • Arthritis    • Asthma    • Bartholin gland cyst    • Bipolar disorder October 2014   • Chlamydia    • Depression    • Endometriosis    • FHx: migraine headaches    • GERD (gastroesophageal reflux disease)    • Gonorrhea    • Heart murmur    • Herpes simplex    • History of chest x-ray 11/01/2015    no active disease   • History of echocardiogram 11/01/2015    ejection fraction of greater than 65%, mitral and pulmonic regurgitation an physiological tricuspid regurgitation.   • History of PFTs 12/22/2015    spirometry data acceptable and reproducible; pt given 4 puffs of Ventolin; pt gave good effort; no obstruction; no Bd  response; MVV reduced    • History of PFTs 2015    pt gave best effort; duoneb given prior and post study; moderate nonspecific proportional reduction of FEV1 and FVC with preserved ratio; FEV1 moderately reduced; cannot rule out restriction   • Hypertension    • Kidney stones    • Migraine    • Ovarian cyst    • Pelvic pain    • PMS (premenstrual syndrome)    • Preeclampsia    • Screening breast examination     self;admits   • Seizures    • STD (female)    • Substance abuse    • Thigh shingles    • Trauma    • Trichomonas infection    • Urinary tract infection    • Urogenital trichomoniasis    • Varicella              Past Surgical History:   Procedure Laterality Date   • BILATERAL BREAST REDUCTION     • BREAST BIOPSY     • BREAST SURGERY      breast reduction   •  SECTION     •  SECTION WITH TUBAL  2010   • CYSTOSCOPY     • DIAGNOSTIC LAPAROSCOPY     • INCISION AND DRAINAGE ABSCESS      bartholin's   • LAPAROSCOPIC TUBAL LIGATION     • ORIF ANKLE FRACTURE Right    • REDUCTION MAMMAPLASTY Bilateral    • TENSION FREE VAGINAL TAPING WITH MINI ARC SLING      Dr Doyle avery        Family History: family history includes Arthritis in her father and mother; Bipolar disorder in her mother; Cancer in her maternal grandmother and mother; Dementia in her father; Diabetes in her paternal uncle and another family member; MARCIE disease in her paternal aunt; Heart disease in an other family member; Heart failure in her paternal grandmother; Hypertension in her father and another family member; Other in an other family member; Pancreatic cancer in her maternal grandmother and another family member.     Social History:  reports that she quit smoking about 8 years ago. Her smoking use included cigarettes and cigars. She has never used smokeless tobacco. She reports that she does not currently use drugs after having used the following drugs: Marijuana. She reports that she does not drink  alcohol.  Social History     Social History Narrative    Caffeine intake: 16 oz per day        Medications:  Available home medication information reviewed.  (Not in a hospital admission)      Allergies   Allergen Reactions   • Naproxen Swelling   • Sulfa Antibiotics Rash       Objective    Objective     Vital Signs:   Temp:  [98.9 °F (37.2 °C)-102.4 °F (39.1 °C)] 98.9 °F (37.2 °C)  Heart Rate:  [] 98  Resp:  [18-20] 18  BP: (104-160)/(71-89) 107/81       Physical Exam   Constitutional: Awake, alert  Eyes: PERRLA, sclerae anicteric, no conjunctival injection  HENT: NCAT, mucous membranes dry  Neck: Supple, no thyromegaly, no lymphadenopathy, trachea midline  Respiratory: Clear to auscultation bilaterally, nonlabored respirations   Cardiovascular: tachycardic, no murmurs, rubs, or gallops, palpable pedal pulses bilaterally  Gastrointestinal: Positive bowel sounds, soft, mildly tender across upper and mid abdomen, slightly distended  Musculoskeletal: No bilateral ankle edema, no clubbing or cyanosis to extremities  Psychiatric: Appropriate affect, cooperative  Neurologic: Oriented x 3, strength symmetric in all extremities, Cranial Nerves grossly intact to confrontation, speech clear  Skin: No rashes    Result Review:  I have personally reviewed the results from the time of this admission to 5/3/2023 13:23 EDT and agree with these findings:  [x]  Laboratory list / accordion  []  Microbiology  [x]  Radiology  []  EKG/Telemetry   []  Cardiology/Vascular   []  Pathology  [x]  Old records  []  Other:      LAB RESULTS:      Lab 05/03/23  1122   WBC 19.73*   HEMOGLOBIN 10.4*   HEMATOCRIT 32.0*   PLATELETS 268   NEUTROS ABS 17.06*   IMMATURE GRANS (ABS) 0.10*   LYMPHS ABS 1.52   MONOS ABS 1.01*   EOS ABS 0.01   MCV 78.0*         Lab 05/03/23  1122   SODIUM 134*   POTASSIUM 3.1*   CHLORIDE 101   CO2 22.0   ANION GAP 11.0   BUN 14   CREATININE 0.73   EGFR 104.1   GLUCOSE 112*   CALCIUM 8.1*         Lab 05/03/23  1122    TOTAL PROTEIN 6.2   ALBUMIN 3.3*   GLOBULIN 2.9   ALT (SGPT) 7   AST (SGOT) 11   BILIRUBIN 0.5   ALK PHOS 62                     UA        9/5/2022    08:43 10/2/2022    01:18 5/3/2023    10:31   Urinalysis   Squamous Epithelial Cells, UA 3-6    13-20     Specific Gravity, UA 1.012   1.022   1.023     Ketones, UA Negative   Negative   Trace     Blood, UA Large (3+)   Negative   Small (1+)     Leukocytes, UA Small (1+)   Negative   Large (3+)     Nitrite, UA Negative   Negative   Positive     RBC, UA Too Numerous to Count    3-6     WBC, UA 13-20    Too Numerous to Count     Bacteria, UA Trace    4+         Microbiology Results (last 10 days)     Procedure Component Value - Date/Time    COVID PRE-OP / PRE-PROCEDURE SCREENING ORDER (NO ISOLATION) - Swab, Nasopharynx [[id number]]  (Normal) Collected: 05/03/23 0808    Lab Status: Final result Specimen: Swab from Nasopharynx Updated: 05/03/23 0907    Narrative:      The following orders were created for panel order COVID PRE-OP / PRE-PROCEDURE SCREENING ORDER (NO ISOLATION) - Swab, Nasopharynx.  Procedure                               Abnormality         Status                     ---------                               -----------         ------                     COVID-19 and FLU A/B PCR...[[id number]]  Normal              Final result                 Please view results for these tests on the individual orders.    COVID-19 and FLU A/B PCR - Swab, Nasopharynx [[id number]]  (Normal) Collected: 05/03/23 0808    Lab Status: Final result Specimen: Swab from Nasopharynx Updated: 05/03/23 0907     COVID19 Not Detected     Influenza A PCR Not Detected     Influenza B PCR Not Detected    Narrative:      Fact sheet for providers: https://www.fda.gov/media/431526/download    Fact sheet for patients: https://www.fda.gov/media/284475/download    Test performed by PCR.          XR Chest 2 View    Result Date: 5/3/2023  XR CHEST 2 VW Date of Exam: 5/3/2023 7:56 AM EDT Indication:  fever / chills Comparison: Chest x-ray 9/5/2022 Findings: Normal cardiomediastinal silhouette. The lungs are clear. No pleural effusion or pneumothorax. No acute osseous findings.     Impression: Impression: No acute cardiopulmonary findings. Electronically Signed: Alan Telles  5/3/2023 8:14 AM EDT  Workstation ID: [id number]      Results for orders placed during the hospital encounter of 09/13/22    Adult Stress Echo W/ Cont or Stress Agent if Necessary Per Protocol    Interpretation Summary  · The patient completed 7: 50 minutes of treadmill exercise on standard Randy protocol achieving 9.8 METs of workload. The test was stopped due to fatigue after achieving the target heart rate. She reported mild chest tightness and heaviness at peak exercise which resolved in recovery.  · Expected exercise duration 8:45, actual 7:50; AVIS (+11).  · Resting heart rate 82, blood pressure 120/88. Peak heart rate 151, blood pressure 168/100. 85% of age-predicted maximal heart rate achieved.  · Average exercise capacity, completed for this protocol.  · Resting ECG showed sinus rhythm with nonspecific ST segment changes. Stress ECG is nondiagnostic due to baseline abnormalities however no significant ST abnormalities were noted. No exercise-induced arrhythmias were seen.  · Average exercise capacity with appropriate hemodynamic response to exercise.  · Negative treadmill stress test for ischemic ST segment abnormalities or exercise-induced arrhythmias. Chest tightness and heaviness was reported which resolved in recovery.  · Resting echocardiogram showed normal left ventricular systolic function with estimated ejection fraction 65%. Trace mitral regurgitation, trace tricuspid regurgitation and trace to mild pulmonic insufficiency was noted.  · Normal stress echo with no significant echocardiographic evidence for myocardial ischemia. Post-rest ejection fraction 75%.      Assessment & Plan   Assessment & Plan     Active [facility]  Problems    Diagnosis  POA   • **Sepsis due to urinary tract infection [A41.9, N39.0]  Yes   • UTI (urinary tract infection) [N39.0]  Yes   • Hypokalemia [E87.6]  Yes   • Diarrhea [R19.7]  Yes   • Hypertension [I10]  Yes     43 yo F with hx of HTN, migraines, GERD, bipolar disorder, anxiety, and kidney stones who presents due to fevers, chills, abdominal pain. She is found to have sepsis due to a UTI.    Sepsis secondary to acute UTI, POA  --Fever, tachycardia, leukocytosis, and source (UTI)  --s/p IV fluid boluses in ER (2 liters), continue IV fluids  --Continue on IV Rocephin  --F/U blood and urine cultures  --Patient does have hx of kidney stones, had a left ureteral stent placed in August 2019 by Dr. Vitaly Meeks; low threshold to check CT A/P if she has ongoing symptoms to evaluate for obstructing nephrolithiasis    Diarrhea  --Patient also reports diarrhea that started this morning, given that she has had recent antibiotic use (Augmentin for a sinus and ear infection) and works with patients in an assisted living facility, prudent to rule out C.diff  --Check C.diff and GI PCR panel  --Add probiotic    Hypokalemia  --Replace per protocol  --Check Mg    HTN  --BP running on the lower side, will hold home BP meds for now    GERD  --Not on any home meds, will add Pepcid BID    Bipolar disorder  Anxiety  --Continue Lamictal, ?Zyprexa -- need to confirm home meds      DVT prophylaxis: [facility]      CODE STATUS:    Code Status and Medical Interventions:   Ordered at: 05/03/23 1321     Code Status (Patient has no pulse and is not breathing):    CPR (Attempt to Resuscitate)     Medical Interventions (Patient has pulse or is breathing):    Full Support       Expected Discharge   Expected Discharge Date: 5/5/2023; Expected Discharge Time:      Krista Horton MD  05/03/23      Electronically signed by Krista Horton MD at 05/03/23 1323          Emergency Department Notes      Alejandro Skaggs MD at 05/03/23 0801           Subjective   History of Present Illness  This patient is a pleasant 42-year-old female with a history of migraine headaches, GERD, hypertension, bipolar disease, anxiety and endometriosis who comes in with fever, chills, cough, upper respiratory congestion and mild headache over the last 24 hours or so.  Patient has been taking steroids and tells me that she thinks she might have the flu.  She has no sick contacts.  She tells me she was at work last night and checked her temperature several times and found her temperature to be greater than 102 °F.  She started wearing a mask because she did not want to infect anybody else and came to the emergency department after her shift was over for evaluation.  Her primary care is a SUHA Ponce.  Patient tells me she has had no syncope, no chest pain, no hemoptysis.  No hematemesis.  No diarrhea or vomiting.  Mild nausea.  Denies any productive cough but has had a mild nonproductive cough.  In summary, we have a 42-year-old female with fevers, chills, nonproductive cough who comes in for evaluation.    Past medical history  Anxiety, endometriosis, bipolar disease, patient tells me she has no history of thyroid disease    Family history  Bipolar disease, mother        Review of Systems   Constitutional: Positive for chills and fever. Negative for fatigue and unexpected weight change.   HENT: Positive for congestion and rhinorrhea. Negative for dental problem, ear pain, hearing loss and sinus pressure.    Eyes: Negative for pain and visual disturbance.   Respiratory: Positive for cough. Negative for chest tightness and shortness of breath.    Cardiovascular: Negative for chest pain, palpitations and leg swelling.   Gastrointestinal: Negative for blood in stool, diarrhea, nausea and vomiting.   Genitourinary: Negative for difficulty urinating, dysuria, frequency, hematuria and urgency.   Musculoskeletal: Positive for myalgias. Negative for neck pain and neck stiffness.    Neurological: Negative for seizures, syncope, speech difficulty, light-headedness and headaches.   Psychiatric/Behavioral: Negative for confusion.   All other systems reviewed and are negative.      Past Medical History:   Diagnosis Date   • Allergic rhinitis    • Anemia    • Anxiety    • Arthritis    • Asthma    • Bartholin gland cyst    • Bipolar disorder 2014   • Chlamydia    • Depression    • Endometriosis    • FHx: migraine headaches    • GERD (gastroesophageal reflux disease)    • Gonorrhea    • Heart murmur    • Herpes simplex    • History of chest x-ray 2015    no active disease   • History of echocardiogram 2015    ejection fraction of greater than 65%, mitral and pulmonic regurgitation an physiological tricuspid regurgitation.   • History of PFTs 2015    spirometry data acceptable and reproducible; pt given 4 puffs of Ventolin; pt gave good effort; no obstruction; no Bd response; MVV reduced    • History of PFTs 2015    pt gave best effort; duoneb given prior and post study; moderate nonspecific proportional reduction of FEV1 and FVC with preserved ratio; FEV1 moderately reduced; cannot rule out restriction   • Hypertension    • Kidney stones    • Migraine    • Ovarian cyst    • Pelvic pain    • PMS (premenstrual syndrome)    • Preeclampsia    • Screening breast examination     self;admits   • Seizures    • STD (female)    • Substance abuse    • Thigh shingles    • Trauma    • Trichomonas infection    • Urinary tract infection    • Urogenital trichomoniasis    • Varicella        Allergies   Allergen Reactions   • Naproxen Swelling   • Sulfa Antibiotics Rash       Past Surgical History:   Procedure Laterality Date   • BILATERAL BREAST REDUCTION     • BREAST BIOPSY     • BREAST SURGERY      breast reduction   •  SECTION     •  SECTION WITH TUBAL  2010   • CYSTOSCOPY     • DIAGNOSTIC LAPAROSCOPY     • INCISION AND DRAINAGE ABSCESS      bartholin's   •  LAPAROSCOPIC TUBAL LIGATION     • ORIF ANKLE FRACTURE Right    • REDUCTION MAMMAPLASTY Bilateral    • TENSION FREE VAGINAL TAPING WITH MINI ARC SLING      Dr Doyle avery        Family History   Problem Relation Age of Onset   • Pancreatic cancer Maternal Grandmother    • Cancer Maternal Grandmother    • Heart failure Paternal Grandmother    • MARCIE disease Paternal Aunt    • Arthritis Mother    • Cancer Mother    • Bipolar disorder Mother    • Arthritis Father    • Dementia Father    • Hypertension Father    • Pancreatic cancer Other    • Diabetes Other    • Heart disease Other    • Hypertension Other    • Other Other         RESPIRATORY DISEASE   • Diabetes Paternal Uncle    • Heart attack Neg Hx    • Hyperlipidemia Neg Hx    • Mental illness Neg Hx    • Obesity Neg Hx    • Stroke Neg Hx    • Breast cancer Neg Hx    • Ovarian cancer Neg Hx        Social History     Socioeconomic History   • Marital status: Single   • Number of children: 2   • Highest education level: Some college, no degree   Tobacco Use   • Smoking status: Former     Packs/day: 0.00     Years: 15.00     Pack years: 0.00     Types: Cigarettes, Cigars     Quit date: 2015     Years since quittin.2   • Smokeless tobacco: Never   • Tobacco comments:     I quit every so often. Then i start again.   Vaping Use   • Vaping Use: Some days   • Devices: Disposable   • Passive vaping exposure: Yes   Substance and Sexual Activity   • Alcohol use: No   • Drug use: Not Currently     Types: Marijuana     Comment: Marijuana once a week. Tried cocaine, meth, pain pills in the past   • Sexual activity: Yes     Partners: Male     Birth control/protection: Tubal ligation           Objective   Physical Exam  Vitals and nursing note reviewed.   Constitutional:       General: She is not in acute distress.     Appearance: Normal appearance. She is normal weight. She is not ill-appearing or toxic-appearing.   HENT:      Head: Normocephalic and atraumatic.       Right Ear: Tympanic membrane and external ear normal.      Left Ear: Tympanic membrane and external ear normal.      Nose: Nose normal.      Mouth/Throat:      Mouth: Mucous membranes are dry.      Pharynx: Oropharynx is clear.   Eyes:      General:         Right eye: No discharge.         Left eye: No discharge.      Extraocular Movements: Extraocular movements intact.      Conjunctiva/sclera: Conjunctivae normal.      Pupils: Pupils are equal, round, and reactive to light.      Comments: Proptosis bilaterally   Cardiovascular:      Rate and Rhythm: Normal rate and regular rhythm.      Pulses: Normal pulses.      Heart sounds: No murmur heard.  Pulmonary:      Effort: Pulmonary effort is normal. No respiratory distress.      Breath sounds: Normal breath sounds. No wheezing or rales.   Abdominal:      General: Abdomen is flat. Bowel sounds are normal. There is no distension.      Palpations: Abdomen is soft. There is no mass.      Tenderness: There is no abdominal tenderness.      Hernia: No hernia is present.   Musculoskeletal:         General: No swelling or deformity. Normal range of motion.      Cervical back: Normal range of motion. No rigidity.      Right lower leg: No edema.      Left lower leg: No edema.   Lymphadenopathy:      Cervical: No cervical adenopathy.   Skin:     General: Skin is warm and dry.      Capillary Refill: Capillary refill takes less than 2 seconds.      Findings: No lesion or rash.   Neurological:      General: No focal deficit present.      Mental Status: She is alert and oriented to person, place, and time. Mental status is at baseline.      Motor: No weakness.   Psychiatric:         Mood and Affect: Mood normal.         Behavior: Behavior normal.         Thought Content: Thought content normal.         Procedures          ED Course  ED Course as of 05/03/23 1517   Wed May 03, 2023   6063 I had a nice conversation with the patient.  She has upper respiratory congestion, fever of  102.4, and cough.  We have talked about viral etiologies, pneumonia, migraine headaches, subarachnoid hemorrhage and multiple other etiologies.  Given the patient's fever, chills, and viral etiologies coupled with headache, I certainly do favor headache as result of a different primary diagnosis.  Patient did not have a thunderclap component or sudden onset of the headache.  She does have a history of migraines.  Patient is resting comfortably here and is alert and oriented.  I am going to give her IV fluid rehydration, Tylenol and Zofran.  We will get a chest x-ray and viral studies.  I do anticipate discharge home.  Patient is appreciative for care and without question or complaint.  Final impression and plan following completion of work-up. [ELFEGO]   0859 Chest x-ray has been completed and I have reviewed individually/independently.  I do not see any evidence of acute abnormality.  Dr. Alan Telles, reading radiologist, has agreed on the official read with no evidence of acute disease process noted. [ELFEGO]   0859 Nursing staff is having trouble getting an IV.  Tylenol and Zofran have been given.  We will attempt p.o. challenge and attempt to rehydrate the patient. [ELFEGO]   0923 IV was obtained and IV fluids have started. [ELFEGO]   0936 Heart rate down to 114 which is encouraging.  Temperature down to 100.3.  I think we are moving in the right direction.  We will give the patient the rest of the liter and recheck [ELFEGO]   1023 Heart rate is down to 99 and patient is no longer tachycardic.  She feels much better which is encouraging as well.  Temperature is down to 98.9.  Patient is resting comfortably with a most recent blood pressure of 123/72 which is encouraging.  COVID, influenza studies both negative.  Chest x-ray shows no evidence of acute disease process.  I talked the patient about the fact that she likely has some viral etiology and that fluids, Tylenol and Motrin would be a reasonable outpatient plan.  We will  have the patient follow-up with her PCP and she has been instructed to call for an appointment.  She is feeling much better, she has been very appreciative for care and patient will be discharged home accordingly. [ELFEGO]   1038 I reexamined the patient at the time of discharge.  Although she told me she was feeling much better, she started complaining of right mid abdominal pain and told me that she had collected a urine for us.  She thinks she might be having some reflux or perhaps a UTI.  She has no pain at McBurney's point.  She has a completely benign abdominal exam.  She does have a complaint of some upper GI discomfort in this right mid abdomen pain.  Her heart rate is great and her blood pressure is stable.  She is afebrile.  We talked about her viral studies and chest x-ray.  I do not believe CT imaging is warranted at this time given the completely benign exam but I have added on some lab studies including a CMP, CBC and urinalysis.  I have also ordered Pepcid for the patient and we will give her a little bit more fluid. [ELFEGO]   1109 CMP and CBC pending, urinalysis shows too numerous to count white blood cells in the setting of 4+ bacteria.  We will give a dose of Rocephin accordingly. [ELFEGO]   1122 Another liter of fluid has been ordered.  Patient is resting comfortably and feels much better.  She is receiving Rocephin, which is just been ordered.  Urine culture has been added.  CBC and CMP pending. [ELFEGO]   1216 Patient's labs have been completed and I am concerned about her elevated white count at 19,000.  Potassium low at 3.1 creatinine 0.73.  Although the patient does feel better, she had 1 episode of blood pressure was slightly lower than I had like.  Blood pressure is 107/81.  Although I do not believe that she is actively septic, I do believe she would benefit from observation, IV fluids and continued IV antibiotics.  Patient is agreeable to this plan and will be brought in to the hospitalist, Dr. Santiago  [ELFEGO]      ED Course User Index  [ELFEGO] Alejandro Skaggs MD      Recent Results (from the past 24 hour(s))   COVID-19 and FLU A/B PCR - Swab, Nasopharynx    Collection Time: 05/03/23  8:08 AM    Specimen: Nasopharynx; Swab   Result Value Ref Range    COVID19 Not Detected Not Detected - Ref. Range    Influenza A PCR Not Detected Not Detected    Influenza B PCR Not Detected Not Detected   Urinalysis With Microscopic If Indicated (No Culture) - Urine, Clean Catch    Collection Time: 05/03/23 10:31 AM    Specimen: Urine, Clean Catch   Result Value Ref Range    Color, UA Yellow Yellow, Straw    Appearance, UA Turbid (A) Clear    pH, UA 5.5 5.0 - 8.0    Specific Gravity, UA 1.023 1.001 - 1.030    Glucose, UA Negative Negative    Ketones, UA Trace (A) Negative    Bilirubin, UA Negative Negative    Blood, UA Small (1+) (A) Negative    Protein,  mg/dL (2+) (A) Negative    Leuk Esterase, UA Large (3+) (A) Negative    Nitrite, UA Positive (A) Negative    Urobilinogen, UA 1.0 E.U./dL 0.2 - 1.0 E.U./dL   Urinalysis, Microscopic Only - Urine, Clean Catch    Collection Time: 05/03/23 10:31 AM    Specimen: Urine, Clean Catch   Result Value Ref Range    RBC, UA 3-6 (A) None Seen, 0-2 /HPF    WBC, UA Too Numerous to Count (A) None Seen, 0-2 /HPF    Bacteria, UA 4+ (A) None Seen, Trace /HPF    Squamous Epithelial Cells, UA 13-20 (A) None Seen, 0-2 /HPF    Hyaline Casts, UA 0-6 0 - 6 /LPF    Methodology Automated Microscopy    Comprehensive Metabolic Panel    Collection Time: 05/03/23 11:22 AM    Specimen: Blood   Result Value Ref Range    Glucose 112 (H) 65 - 99 mg/dL    BUN 14 6 - 20 mg/dL    Creatinine 0.73 0.57 - 1.00 mg/dL    Sodium 134 (L) 136 - 145 mmol/L    Potassium 3.1 (L) 3.5 - 5.2 mmol/L    Chloride 101 98 - 107 mmol/L    CO2 22.0 22.0 - 29.0 mmol/L    Calcium 8.1 (L) 8.6 - 10.5 mg/dL    Total Protein 6.2 6.0 - 8.5 g/dL    Albumin 3.3 (L) 3.5 - 5.2 g/dL    ALT (SGPT) 7 1 - 33 U/L    AST (SGOT) 11 1 - 32 U/L    Alkaline  Phosphatase 62 39 - 117 U/L    Total Bilirubin 0.5 0.0 - 1.2 mg/dL    Globulin 2.9 gm/dL    A/G Ratio 1.1 g/dL    BUN/Creatinine Ratio 19.2 7.0 - 25.0    Anion Gap 11.0 5.0 - 15.0 mmol/L    eGFR 104.1 >60.0 mL/min/1.73   CBC Auto Differential    Collection Time: 05/03/23 11:22 AM    Specimen: Blood   Result Value Ref Range    WBC 19.73 (H) 3.40 - 10.80 10*3/mm3    RBC 4.10 3.77 - 5.28 10*6/mm3    Hemoglobin 10.4 (L) 12.0 - 15.9 g/dL    Hematocrit 32.0 (L) 34.0 - 46.6 %    MCV 78.0 (L) 79.0 - 97.0 fL    MCH 25.4 (L) 26.6 - 33.0 pg    MCHC 32.5 31.5 - 35.7 g/dL    RDW 16.7 (H) 12.3 - 15.4 %    RDW-SD 47.4 37.0 - 54.0 fl    MPV 9.9 6.0 - 12.0 fL    Platelets 268 140 - 450 10*3/mm3    Neutrophil % 86.4 (H) 42.7 - 76.0 %    Lymphocyte % 7.7 (L) 19.6 - 45.3 %    Monocyte % 5.1 5.0 - 12.0 %    Eosinophil % 0.1 (L) 0.3 - 6.2 %    Basophil % 0.2 0.0 - 1.5 %    Immature Grans % 0.5 0.0 - 0.5 %    Neutrophils, Absolute 17.06 (H) 1.70 - 7.00 10*3/mm3    Lymphocytes, Absolute 1.52 0.70 - 3.10 10*3/mm3    Monocytes, Absolute 1.01 (H) 0.10 - 0.90 10*3/mm3    Eosinophils, Absolute 0.01 0.00 - 0.40 10*3/mm3    Basophils, Absolute 0.03 0.00 - 0.20 10*3/mm3    Immature Grans, Absolute 0.10 (H) 0.00 - 0.05 10*3/mm3    nRBC 0.0 0.0 - 0.2 /100 WBC   Procalcitonin    Collection Time: 05/03/23 11:22 AM    Specimen: Blood   Result Value Ref Range    Procalcitonin 1.23 (H) 0.00 - 0.25 ng/mL   Magnesium    Collection Time: 05/03/23 11:22 AM    Specimen: Blood   Result Value Ref Range    Magnesium 1.5 (L) 1.6 - 2.6 mg/dL     Note: In addition to lab results from this visit, the labs listed above may include labs taken at another facility or during a different encounter within the last 24 hours. Please correlate lab times with ED admission and discharge times for further clarification of the services performed during this visit.    XR Chest 2 View   Final Result   Impression:   No acute cardiopulmonary findings.            Electronically  Signed: Alan Telles     5/3/2023 8:14 AM EDT     Workstation ID: [id number]        Vitals:    05/03/23 1030 05/03/23 1130 05/03/23 1200 05/03/23 1330   BP: 113/71 104/82 107/81 109/86   BP Location:       Patient Position:       Pulse: 95 97 98 111   Resp:       Temp:       TempSrc:       SpO2: 97% 95% 98% 99%   Weight:       Height:         Medications   saccharomyces boulardii (FLORASTOR) capsule 250 mg (has no administration in time range)   sodium chloride 0.9 % flush 10 mL (has no administration in time range)   sodium chloride 0.9 % flush 10 mL (has no administration in time range)   sodium chloride 0.9 % infusion 40 mL (has no administration in time range)   heparin (porcine) 5000 UNIT/ML injection 5,000 Units (has no administration in time range)   sodium chloride 0.9 % infusion (has no administration in time range)   acetaminophen (TYLENOL) tablet 650 mg (has no administration in time range)   traMADol (ULTRAM) tablet 50 mg (has no administration in time range)   melatonin tablet 5 mg (has no administration in time range)   ondansetron (ZOFRAN) tablet 4 mg (has no administration in time range)     Or   ondansetron (ZOFRAN) injection 4 mg (has no administration in time range)   famotidine (PEPCID) tablet 20 mg (has no administration in time range)   aluminum-magnesium hydroxide-simethicone (MAALOX MAX) 400-400-40 MG/5ML suspension 15 mL (has no administration in time range)   ipratropium-albuterol (DUO-NEB) nebulizer solution 3 mL (has no administration in time range)   Potassium Replacement - Follow Nurse / BPA Driven Protocol (has no administration in time range)   Magnesium Standard Dose Replacement - Follow Nurse / BPA Driven Protocol (has no administration in time range)   cefTRIAXone (ROCEPHIN) 1 g/100 mL 0.9% NS (MBP) (has no administration in time range)   magnesium sulfate 2g/50 mL (PREMIX) infusion (has no administration in time range)   potassium chloride (MICRO-K) CR capsule 40 mEq (has no  administration in time range)   sodium chloride 0.9 % bolus 1,000 mL (0 mL Intravenous Stopped 5/3/23 1022)   acetaminophen (TYLENOL) tablet 1,000 mg (1,000 mg Oral Given 5/3/23 0809)   famotidine (PEPCID) injection 20 mg (20 mg Intravenous Given 5/3/23 1034)   cefTRIAXone (ROCEPHIN) 1 g/100 mL 0.9% NS (MBP) (0 g Intravenous Stopped 5/3/23 1209)   sodium chloride 0.9 % bolus 1,000 mL (1,000 mL Intravenous New Bag 5/3/23 1134)     ECG/EMG Results (last 24 hours)     ** No results found for the last 24 hours. **        No orders to display                                            Medical Decision Making  Have considered RSV, influenza, COVID, pneumonia, sepsis, exacerbation of migraine headache, sinus infection including sinusitis and allergic disease pattern.  Have considered subarachnoid hemorrhage as well.  Have discussed likelihood of each of the above.  We will treat patient's symptoms with fluids as she appears dehydrated as well as with Tylenol.    Acute febrile illness: complicated acute illness or injury  Viral upper respiratory infection: complicated acute illness or injury  Amount and/or Complexity of Data Reviewed  Labs:  Decision-making details documented in ED Course.  Radiology: ordered. Decision-making details documented in ED Course.      Risk  OTC drugs.  Prescription drug management.          Final diagnoses:   Acute febrile illness   Viral upper respiratory infection   Acute UTI   Leukocytosis, unspecified type   Hypokalemia       ED Disposition  ED Disposition     ED Disposition   Decision to Admit    Condition   --    Comment   Level of Care: Telemetry [5]   Diagnosis: UTI (urinary tract infection) [280547]   Admitting Physician: JOHN COTTER [[id number]]               Nelly Sotelo, PA  [address] [place] [address]  [phone]    In 1 week           Medication List      No changes were made to your prescriptions during this visit.          Alejandro Skaggs MD  05/03/23  1517      Electronically signed by Alejandro Skaggs MD at 05/03/23 1517       Vital Signs (last 2 days)     Date/Time Temp Temp src Pulse Resp BP Patient Position SpO2    05/05/23 0800 -- -- 121 -- -- -- --    05/05/23 0722 100.9 (38.3) Oral 106 18 128/75 -- --    05/05/23 0700 -- -- 100 -- -- -- 93    05/05/23 0600 -- -- 97 -- -- -- 96    05/05/23 0400 -- -- 106 -- -- -- --    05/05/23 0300 -- -- 109 -- -- -- --    05/05/23 0249 99.3 (37.4) Axillary 106 16 127/78 Lying --    05/05/23 0200 -- -- 112 -- -- -- 97    05/05/23 0102 -- -- 99 16 -- -- 96    05/05/23 0100 -- -- 115 -- -- -- 96    05/05/23 0039 101.2 (38.4) Oral 119 -- -- -- 93    05/05/23 0000 -- -- 117 -- -- -- 84    05/04/23 2321 99.2 (37.3) Oral 111 16 138/85 Lying --    05/04/23 2300 -- -- 102 -- -- -- --    05/04/23 2200 -- -- 101 -- -- -- 95    05/04/23 2100 -- -- 98 -- -- -- 97    05/04/23 2038 99.4 (37.4) Oral 100 -- -- -- 91    05/04/23 2000 -- -- 105 -- -- -- 94    05/04/23 1859 101.5 (38.6) Oral 119 16 136/81 Lying 92    05/04/23 1800 -- -- 121 -- -- -- 93    05/04/23 1526 98.4 (36.9) Oral -- 18 99/64 -- --    05/04/23 1105 97.9 (36.6) Oral 93 18 103/60 -- 94    05/04/23 1000 -- -- 92 -- -- -- 96    05/04/23 0900 -- -- 94 -- -- -- 94    05/04/23 0815 98.2 (36.8) Oral 91 18 95/67 -- 92    05/04/23 0800 98.5 (36.9) Temporal 90 20 99/65 Lying 92    05/04/23 0753 -- -- 95 -- 114/69 -- 92    05/04/23 0745 97.4 (36.3) Temporal 95 20 -- Lying 94    05/04/23 0730 97.4 (36.3) Temporal 99 20 109/56 Lying 100    05/04/23 0715 -- -- 82 20 109/61 -- 96    05/04/23 0710 -- -- 89 22 109/74 -- 96    05/04/23 0705 -- -- 97 22 97/66 -- 98    05/04/23 0700 -- -- 92 20 128/78 -- 91    05/04/23 0655 97 (36.1) Temporal 93 16 118/73 Lying 93    05/04/23 0552 97 (36.1) Temporal 85 12 105/72 Lying 94    05/04/23 0402 -- -- 100 -- -- -- 92    05/04/23 0400 -- -- 102 -- -- -- --    05/04/23 0310 -- -- 105 20 113/70 -- 95    05/04/23 0305 100.6 (38.1) Axillary -- -- --  -- --    05/04/23 0200 -- -- 114 -- -- -- --    05/04/23 0151 102.2 (39) Axillary -- -- -- -- --    05/04/23 0042 101.6 (38.7) Oral -- -- -- -- --    05/04/23 0000 98.9 (37.2) -- 111 -- -- -- --    05/03/23 2329 99.6 (37.6) Oral 107 -- -- -- 99    05/03/23 2300 -- -- 108 -- -- -- 96    05/03/23 2252 102.6 (39.2) Oral 109 26 117/69 -- 97    05/03/23 2200 -- -- 106 -- -- -- 96    05/03/23 2100 -- -- 95 -- -- -- 95    05/03/23 2000 -- -- 91 -- -- -- 94    05/03/23 1915 99.2 (37.3) Oral 98 18 97/50 Lying 96    05/03/23 1512 99 (37.2) Oral 124 18 115/62 Lying 95    05/03/23 1330 -- -- 111 -- 109/86 -- 99    05/03/23 1200 -- -- 98 -- 107/81 -- 98    05/03/23 1130 -- -- 97 -- 104/82 -- 95    05/03/23 1030 -- -- 95 -- 113/71 -- 97    05/03/23 10:25:43 -- -- 94 -- -- -- --    05/03/23 10:13:57 98.9 (37.2) -- 99 -- -- -- --    05/03/23 1000 -- -- 98 -- 123/72 -- 95    05/03/23 09:28:25 100.3 (37.9) Oral -- -- -- -- --    05/03/23 0900 -- -- 114 -- 127/74 -- --    05/03/23 0858 -- -- -- -- -- -- 96    05/03/23 0830 -- -- 124 18 133/80 Lying 96    05/03/23 0731 102.4 (39.1) Oral -- -- -- -- --    05/03/23 0729 -- -- 125 20 160/89 -- 96          Current Facility-Administered Medications   Medication Dose Route Frequency Provider Last Rate Last Admin   • acetaminophen (TYLENOL) tablet 650 mg  650 mg Oral Q4H PRN Micah Child MD   650 mg at 05/05/23 0806   • aluminum-magnesium hydroxide-simethicone (MAALOX MAX) 400-400-40 MG/5ML suspension 15 mL  15 mL Oral Q6H PRN Micah Child MD       • cetirizine (zyrTEC) tablet 10 mg  10 mg Oral Daily Nelly Andrade MD   10 mg at 05/05/23 1001   • famotidine (PEPCID) tablet 20 mg  20 mg Oral BID AC Micah Child MD   20 mg at 05/05/23 0806   • fluticasone (FLONASE) 50 MCG/ACT nasal spray 2 spray  2 spray Each Nare Daily Xiomara Torres APRN       • heparin (porcine) 5000 UNIT/ML injection 5,000 Units  5,000 Units Subcutaneous Q12H Micah Child MD   5,000 Units at 05/05/23  0937   • ipratropium-albuterol (DUO-NEB) nebulizer solution 3 mL  3 mL Nebulization Q4H PRN Xiomara Torres APRN       • ketorolac (TORADOL) injection 15 mg  15 mg Intravenous Q6H PRN Micah Child MD   15 mg at 05/05/23 0944   • lactated ringers infusion  9 mL/hr Intravenous Continuous Micah Child MD 9 mL/hr at 05/04/23 1655 9 mL/hr at 05/04/23 1655   • lamoTRIgine (LaMICtal) tablet 200 mg  200 mg Oral Daily Nelly Andrade MD   200 mg at 05/05/23 0937   • Magnesium Standard Dose Replacement - Follow Nurse / BPA Driven Protocol   Does not apply PRN Micah Child MD       • melatonin tablet 5 mg  5 mg Oral Nightly PRN Micah Child MD   5 mg at 05/05/23 0252   • montelukast (SINGULAIR) chewable tablet 5 mg  5 mg Oral Q PM Nelly Andrade MD   5 mg at 05/05/23 1001   • ondansetron (ZOFRAN) tablet 4 mg  4 mg Oral Q6H PRN Micah Child MD        Or   • ondansetron (ZOFRAN) injection 4 mg  4 mg Intravenous Q6H PRN Micah Child MD       • phenazopyridine (PYRIDIUM) tablet 200 mg  200 mg Oral BID With Meals Khushboo Denson APRN   200 mg at 05/05/23 1001   • piperacillin-tazobactam (ZOSYN) 3.375 g in iso-osmotic dextrose 50 ml (premix)  3.375 g Intravenous Q8H Micah Child MD 0 mL/hr at 05/05/23 0800 3.375 g at 05/05/23 0937   • Potassium Replacement - Follow Nurse / BPA Driven Protocol   Does not apply PRN Micah Child MD       • saccharomyces boulardii (FLORASTOR) capsule 250 mg  250 mg Oral BID Micah Child MD   250 mg at 05/05/23 0937   • sodium chloride 0.9 % flush 10 mL  10 mL Intravenous Q12H Micah Child MD   10 mL at 05/05/23 0937   • sodium chloride 0.9 % flush 10 mL  10 mL Intravenous PRN Micah Child MD       • sodium chloride 0.9 % infusion 40 mL  40 mL Intravenous PRN Micah Child MD       • sodium chloride nasal spray 2 spray  2 spray Each Nare PRN Florinda Islas DO       • traMADol (ULTRAM) tablet 50 mg  50 mg Oral Q6H PRN Micah Child MD   50 mg at  05/04/23 1229     Lab Results (last 48 hours)     Procedure Component Value Units Date/Time    Urine Culture - Urine, Urine, Clean Catch [[id number]]  (Abnormal)  (Susceptibility) Collected: 05/03/23 1031    Specimen: Urine, Clean Catch Updated: 05/05/23 0529     Urine Culture >100,000 CFU/mL Escherichia coli    Narrative:      Colonization of the urinary tract without infection is common. Treatment is discouraged unless the patient is symptomatic, pregnant, or undergoing an invasive urologic procedure.    Susceptibility      Escherichia coli      DEVEN      Amikacin Susceptible      Ampicillin Resistant     Ampicillin + Sulbactam Resistant     Cefazolin Susceptible      Cefepime Susceptible      Ceftazidime Susceptible      Ceftriaxone Susceptible      Gentamicin Resistant     Levofloxacin Susceptible      Nitrofurantoin Susceptible      Piperacillin + Tazobactam Susceptible      Tobramycin Intermediate      Trimethoprim + Sulfamethoxazole Resistant                          High Sensitivity Troponin T 2Hr [[id number]] Collected: 05/05/23 0311    Specimen: Blood Updated: 05/05/23 0349     HS Troponin T <6 ng/L      Troponin T Delta --     Comment: Unable to calculate.       Narrative:      High Sensitive Troponin T Reference Range:  <10.0 ng/L- Negative Female for AMI  <15.0 ng/L- Negative Male for AMI  >=10 - Abnormal Female indicating possible myocardial injury.  >=15 - Abnormal Male indicating possible myocardial injury.   Clinicians would have to utilize clinical acumen, EKG, Troponin, and serial changes to determine if it is an Acute Myocardial Infarction or myocardial injury due to an underlying chronic condition.         High Sensitivity Troponin T [[id number]]  (Normal) Collected: 05/05/23 0124    Specimen: Blood Updated: 05/05/23 0207     HS Troponin T <6 ng/L     Narrative:      High Sensitive Troponin T Reference Range:  <10.0 ng/L- Negative Female for AMI  <15.0 ng/L- Negative Male for AMI  >=10 - Abnormal  Female indicating possible myocardial injury.  >=15 - Abnormal Male indicating possible myocardial injury.   Clinicians would have to utilize clinical acumen, EKG, Troponin, and serial changes to determine if it is an Acute Myocardial Infarction or myocardial injury due to an underlying chronic condition.         Comprehensive Metabolic Panel [[id number]]  (Abnormal) Collected: 05/05/23 0124    Specimen: Blood Updated: 05/05/23 0204     Glucose 195 mg/dL      BUN 9 mg/dL      Creatinine 0.72 mg/dL      Sodium 134 mmol/L      Potassium 3.8 mmol/L      Chloride 103 mmol/L      CO2 21.0 mmol/L      Calcium 8.3 mg/dL      Total Protein 6.1 g/dL      Albumin 3.1 g/dL      ALT (SGPT) 13 U/L      AST (SGOT) 18 U/L      Alkaline Phosphatase 71 U/L      Total Bilirubin 0.3 mg/dL      Globulin 3.0 gm/dL      Comment: Calculated Result        A/G Ratio 1.0 g/dL      BUN/Creatinine Ratio 12.5     Anion Gap 10.0 mmol/L      eGFR 105.9 mL/min/1.73     Narrative:      GFR Normal >60  Chronic Kidney Disease <60  Kidney Failure <15      Lactic Acid, Plasma [[id number]]  (Normal) Collected: 05/05/23 0124    Specimen: Blood Updated: 05/05/23 0200     Lactate 2.0 mmol/L      Comment: Falsely depressed results may occur on samples drawn from patients receiving N-Acetylcysteine (NAC) or Metamizole.       CBC & Differential [[id number]]  (Abnormal) Collected: 05/05/23 0125    Specimen: Blood Updated: 05/05/23 0135    Narrative:      The following orders were created for panel order CBC & Differential.  Procedure                               Abnormality         Status                     ---------                               -----------         ------                     CBC Auto Differential[[id number]]        Abnormal            Final result                 Please view results for these tests on the individual orders.    CBC Auto Differential [[id number]]  (Abnormal) Collected: 05/05/23 0125    Specimen: Blood Updated: 05/05/23 0135      WBC 26.97 10*3/mm3      RBC 3.90 10*6/mm3      Hemoglobin 9.8 g/dL      Hematocrit 30.3 %      MCV 77.7 fL      MCH 25.1 pg      MCHC 32.3 g/dL      RDW 16.9 %      RDW-SD 47.8 fl      MPV 10.0 fL      Platelets 253 10*3/mm3      Neutrophil % 84.3 %      Lymphocyte % 7.7 %      Monocyte % 6.2 %      Eosinophil % 0.1 %      Basophil % 0.1 %      Immature Grans % 1.6 %      Neutrophils, Absolute 22.74 10*3/mm3      Lymphocytes, Absolute 2.07 10*3/mm3      Monocytes, Absolute 1.66 10*3/mm3      Eosinophils, Absolute 0.03 10*3/mm3      Basophils, Absolute 0.03 10*3/mm3      Immature Grans, Absolute 0.44 10*3/mm3      nRBC 0.0 /100 WBC     POC Glucose Once [[id number]]  (Abnormal) Collected: 05/05/23 0041    Specimen: Blood Updated: 05/05/23 0042     Glucose 159 mg/dL      Comment: Meter: [id number] : [id number] Eyal Costello       Blood Culture - Blood, Arm, Right [[id number]]  (Normal) Collected: 05/03/23 1607    Specimen: Blood from Arm, Right Updated: 05/04/23 1631     Blood Culture No growth at 24 hours    Blood Culture - Blood, Hand, Right [[id number]]  (Normal) Collected: 05/03/23 1444    Specimen: Blood from Hand, Right Updated: 05/04/23 1531     Blood Culture No growth at 24 hours    Gastrointestinal Panel, PCR - Stool, Per Rectum [[id number]]  (Normal) Collected: 05/04/23 1108    Specimen: Stool from Per Rectum Updated: 05/04/23 1307     Campylobacter Not Detected     Plesiomonas shigelloides Not Detected     Salmonella Not Detected     Vibrio Not Detected     Vibrio cholerae Not Detected     Yersinia enterocolitica Not Detected     Enteroaggregative E. coli (EAEC) Not Detected     Enteropathogenic E. coli (EPEC) Not Detected     Enterotoxigenic E. coli (ETEC) lt/st Not Detected     Shiga-like toxin-producing E. coli (STEC) stx1/stx2 Not Detected     Shigella/Enteroinvasive E. coli (EIEC) Not Detected     Cryptosporidium Not Detected     Cyclospora cayetanensis Not Detected     Entamoeba histolytica Not  Detected     Giardia lamblia Not Detected     Adenovirus F40/41 Not Detected     Astrovirus Not Detected     Norovirus GI/GII Not Detected     Rotavirus A Not Detected     Sapovirus (I, II, IV or V) Not Detected    Clostridioides difficile Toxin - Stool, Per Rectum [[id number]]  (Normal) Collected: 05/04/23 1108    Specimen: Stool from Per Rectum Updated: 05/04/23 1238    Narrative:      The following orders were created for panel order Clostridioides difficile Toxin - Stool, Per Rectum.  Procedure                               Abnormality         Status                     ---------                               -----------         ------                     Clostridioides difficile...[[id number]]  Normal              Final result                 Please view results for these tests on the individual orders.    Clostridioides difficile Toxin, PCR - Stool, Per Rectum [[id number]]  (Normal) Collected: 05/04/23 1108    Specimen: Stool from Per Rectum Updated: 05/04/23 1238     C. Difficile Toxins by PCR Not Detected    Narrative:      The result indicates the absence of toxigenic C. difficile from stool specimen.     Basic Metabolic Panel [[id number]]  (Abnormal) Collected: 05/04/23 0344    Specimen: Blood Updated: 05/04/23 0427     Glucose 135 mg/dL      BUN 11 mg/dL      Creatinine 0.68 mg/dL      Sodium 135 mmol/L      Potassium 4.3 mmol/L      Chloride 104 mmol/L      CO2 20.0 mmol/L      Calcium 7.9 mg/dL      BUN/Creatinine Ratio 16.2     Anion Gap 11.0 mmol/L      eGFR 110.3 mL/min/1.73     Narrative:      GFR Normal >60  Chronic Kidney Disease <60  Kidney Failure <15      Magnesium [[id number]]  (Normal) Collected: 05/04/23 0344    Specimen: Blood Updated: 05/04/23 0427     Magnesium 2.6 mg/dL     CBC (No Diff) [[id number]]  (Abnormal) Collected: 05/04/23 0344    Specimen: Blood Updated: 05/04/23 0423     WBC 24.81 10*3/mm3      RBC 3.97 10*6/mm3      Hemoglobin 10.2 g/dL      Hematocrit 32.1 %      MCV 80.9 fL       MCH 25.7 pg      MCHC 31.8 g/dL      RDW 17.2 %      RDW-SD 50.5 fl      MPV 10.4 fL      Platelets 243 10*3/mm3     STAT Lactic Acid, Reflex [[id number]]  (Abnormal) Collected: 05/04/23 0057    Specimen: Blood Updated: 05/04/23 0133     Lactate 3.1 mmol/L      Comment: Falsely depressed results may occur on samples drawn from patients receiving N-Acetylcysteine (NAC) or Metamizole.       STAT Lactic Acid, Reflex [[id number]]  (Abnormal) Collected: 05/03/23 2210    Specimen: Blood Updated: 05/03/23 2237     Lactate 3.8 mmol/L      Comment: Falsely depressed results may occur on samples drawn from patients receiving N-Acetylcysteine (NAC) or Metamizole.       STAT Lactic Acid, Reflex [[id number]]  (Abnormal) Collected: 05/03/23 1607    Specimen: Blood Updated: 05/03/23 1651     Lactate 2.8 mmol/L      Comment: Falsely depressed results may occur on samples drawn from patients receiving N-Acetylcysteine (NAC) or Metamizole.       Lactic Acid, Plasma [[id number]]  (Abnormal) Collected: 05/03/23 1444    Specimen: Blood from Hand, Right Updated: 05/03/23 1527     Lactate 3.9 mmol/L      Comment: Falsely depressed results may occur on samples drawn from patients receiving N-Acetylcysteine (NAC) or Metamizole.       Procalcitonin [[id number]]  (Abnormal) Collected: 05/03/23 1122    Specimen: Blood Updated: 05/03/23 1336     Procalcitonin 1.23 ng/mL     Narrative:      As a Marker for Sepsis (Non-Neonates):    1. <0.5 ng/mL represents a low risk of severe sepsis and/or septic shock.  2. >2 ng/mL represents a high risk of severe sepsis and/or septic shock.    As a Marker for Lower Respiratory Tract Infections that require antibiotic therapy:    PCT on Admission    Antibiotic Therapy       6-12 Hrs later    >0.5                Strongly Recommended  >0.25 - <0.5        Recommended   0.1 - 0.25          Discouraged              Remeasure/reassess PCT  <0.1                Strongly Discouraged     Remeasure/reassess PCT    As 28 day  "mortality risk marker: \"Change in Procalcitonin Result\" (>80% or <=80%) if Day 0 (or Day 1) and Day 4 values are available. Refer to http://www.[facility] #[email or web address]    Change in PCT <=80%  A decrease of PCT levels below or equal to 80% defines a positive change in PCT test result representing a higher risk for 28-day all-cause mortality of patients diagnosed with severe sepsis for septic shock.    Change in PCT >80%  A decrease of PCT levels of more than 80% defines a negative change in PCT result representing a lower risk for 28-day all-cause mortality of patients diagnosed with severe sepsis or septic shock.       Magnesium [[id number]]  (Abnormal) Collected: 05/03/23 1122    Specimen: Blood Updated: 05/03/23 1332     Magnesium 1.5 mg/dL     Comprehensive Metabolic Panel [[id number]]  (Abnormal) Collected: 05/03/23 1122    Specimen: Blood Updated: 05/03/23 1158     Glucose 112 mg/dL      BUN 14 mg/dL      Creatinine 0.73 mg/dL      Sodium 134 mmol/L      Potassium 3.1 mmol/L      Chloride 101 mmol/L      CO2 22.0 mmol/L      Calcium 8.1 mg/dL      Total Protein 6.2 g/dL      Albumin 3.3 g/dL      ALT (SGPT) 7 U/L      AST (SGOT) 11 U/L      Alkaline Phosphatase 62 U/L      Total Bilirubin 0.5 mg/dL      Globulin 2.9 gm/dL      Comment: Calculated Result        A/G Ratio 1.1 g/dL      BUN/Creatinine Ratio 19.2     Anion Gap 11.0 mmol/L      eGFR 104.1 mL/min/1.73     Narrative:      GFR Normal >60  Chronic Kidney Disease <60  Kidney Failure <15      CBC & Differential [[id number]]  (Abnormal) Collected: 05/03/23 1122    Specimen: Blood Updated: 05/03/23 1132    Narrative:      The following orders were created for panel order CBC & Differential.  Procedure                               Abnormality         Status                     ---------                               -----------         ------                     CBC Auto Differential[[id number]]        Abnormal            Final result                 Please " view results for these tests on the individual orders.    CBC Auto Differential [[id number]]  (Abnormal) Collected: 05/03/23 1122    Specimen: Blood Updated: 05/03/23 1132     WBC 19.73 10*3/mm3      RBC 4.10 10*6/mm3      Hemoglobin 10.4 g/dL      Hematocrit 32.0 %      MCV 78.0 fL      MCH 25.4 pg      MCHC 32.5 g/dL      RDW 16.7 %      RDW-SD 47.4 fl      MPV 9.9 fL      Platelets 268 10*3/mm3      Neutrophil % 86.4 %      Lymphocyte % 7.7 %      Monocyte % 5.1 %      Eosinophil % 0.1 %      Basophil % 0.2 %      Immature Grans % 0.5 %      Neutrophils, Absolute 17.06 10*3/mm3      Lymphocytes, Absolute 1.52 10*3/mm3      Monocytes, Absolute 1.01 10*3/mm3      Eosinophils, Absolute 0.01 10*3/mm3      Basophils, Absolute 0.03 10*3/mm3      Immature Grans, Absolute 0.10 10*3/mm3      nRBC 0.0 /100 WBC           Imaging Results (Last 48 Hours)     Procedure Component Value Units Date/Time    XR Chest 1 View [[id number]] Collected: 05/05/23 0055     Updated: 05/05/23 0256    Narrative:      EXAMINATION: Chest one view.    DATE: 5/5/2023.    COMPARISON: 5/3/2023.    CLINICAL HISTORY: Dyspnea.    FINDINGS:    The lungs and pleural spaces are clear.    The cardiomediastinal silhouette and the pulmonary vessels are within normal limits.      Impression:        No acute cardiopulmonary process.    Electronically signed by:  Jason Sheridan D.O.    5/5/2023 12:55 AM Mountain Time    XR Pyelogram Retrograde [[id number]] Collected: 05/04/23 0818     Updated: 05/04/23 0823    Narrative:      XR PYELOGRAM RETROGRADE    Date of Exam: 5/4/2023 6:03 AM EDT    Indication: stent    Comparison: CT 5/4/2023    Findings:  Total fluoroscopy time 24 seconds, 8 images obtained    Initial image demonstrates multiple calcifications overlying the pelvis. There is an irregular shaped 2-3 mm size calcification within the left lower pelvis likely distal left ureteral stone. Subsequent images demonstrate bilateral retrograde pyelograms.   Effect  seen within the ureters likely air bubbles. Correlation with real-time fluoroscopy would be recommended. Last image demonstrates successful deployment of left-sided ureteral stent.      Impression:      Impression:    1. Successful deployment of left-sided ureteral stent.      Electronically Signed: Brennen Hopkins    5/4/2023 8:20 AM EDT    Workstation ID: [id number]    CT Abdomen Pelvis Without Contrast [[id number]] Collected: 05/04/23 0120     Updated: 05/04/23 0325    Narrative:      EXAMINATION: CT ABDOMEN AND PELVIS WITHOUT IV CONTRAST       DATE OF EXAMINATION: 5/4/2023.    COMPARISON: 10/2/2022..    INDICATION: Sepsis and fever.    PROCEDURE:   Axial CT of the abdomen and pelvis was performed with sagittal and coronal reformatted images without contrast enhancement. CT dose lowering techniques were used, to include: automated exposure control, adjustment for patient size, and/or   use of iterative reconstruction. The exam is limited because some types of pathology may not be adequately demonstrated due to lack of contrast enhancement.    FINDINGS:    LOWER CHEST :  The visualized lung bases are clear.  There are no pleural or pericardial effusions.    ABDOMEN:    Liver and Biliary system:  Normal.    Adrenal glands:  Normal.    Kidneys and ureters:  There is a 4.3 mm stone in the left ureterovesical junction with mild left-sided hydronephrosis and mild to moderate perinephric stranding. There is a 2 mm nonobstructing stone also seen in the upper pole the right kidney and ureter   appear unremarkable.    Spleen:  Normal.    Pancreas:  Normal.    Gallbladder:  Normal.    Lymph nodes, Peritoneum and mesentery:  There is no mesenteric or retroperitoneal lymphadenopathy.    Gastrointestinal tract:  There are no dilated loops of bowel or free intraperitoneal air.  . The appendix is normal.     Aorta/IVC:   No aortic aneurysm..  IVC normal.    Abdominal wall:  Normal.    PELVIS:    Fluid: There is no free fluid in the  pelvis.    Lymph Nodes:  There is no pelvic or inguinal lymphadenopathy..    Urinary bladder:  Normal.    BONES:  There are no osseous destructive lesions..    ADDITIONAL  SIGNIFICANT FINDINGS:  Anterior subserosal fibroid measures 8.0 x 8.8 cm in diameter and is not significantly changed.      Impression:      1. Mild left-sided hydronephrosis and mild to moderate perinephric stranding with a 4.3 mm stone in the left ureterovesical junction..  2. No bowel obstruction or appendicitis.  3. Uterine fibroid.        Electronically signed by:  Jason Sheridan D.O.    5/4/2023 1:24 AM Mountain Time           Operative/Procedure Notes (all)      Micah Child MD at 05/04/23 0631  Version 1 of 1    Summary:Urology Operative Note               CYSTOSCOPY URETERAL CATHETER/STENT INSERTION  Procedure Report    Patient Name:  Bernardo Dodd  YOB: 1980    Date of Surgery:  5/4/2023     Indications: 42-year-old female with obstructing 4 mm distal left ureteral stone.  Patient has elevated white blood cell count, is febrile with tachycardia.  Given concern for infection and obstructing stone the indications for emergent ureteral stent placement were discussed with the patient and she elected proceed.  Risk benefits and alternatives discussed, patient agreeable.  Patient is understanding that we are unable to treat stone today, she require outpatient follow-up for definitive stone treatment after infection has been cleared    Pre-op Diagnosis:   Left ureteral stone       Post-Op Diagnosis Codes:  Left Ureteral stone    Procedure(s):  CYSTOSCOPY,   LEFT RETROGRADE PYELOGRAM  LEFT URETERAL STENT PLACEMENT  RIGHT RETROGRADE PYELOGRAM    MODIFIER 59- BILATERAL PROCEDURE    Staff:  Surgeon(s):  Micah Child MD         Anesthesia: General    Estimated Blood Loss: none    Implants:    Implant Name Type Inv. Item Serial No.  Lot No. LRB No. Used Action   STNT PERCUFLX NO GW 4.8X24 - [id number] Stent STNT  PERCUFLX NO GW 4.8X24  [employer]  Left 1 Implanted       Specimen:          None        Findings:   1.  Normal urinary bladder without evidence of tumor stone or foreign body.   2.   fluoroscopy with multiple calcifications in the pelvis.  3.  Left retrograde pyelography with mild dilation of the mid proximal and renal collecting system noted.  3.  Left ureteral stent placement  4.  Right retrograde pyelography demonstrating no evidence of dilation of the distal mid proximal ureter as well as renal collecting system.    Complications: None immediate    Description of Procedure:   The patient was identified in the preoperative holding area where informed consent was reviewed and signed. The patient was transported the operating room per anesthesia and placed supine on the operating table. Smooth endotracheal intubation was performed without issue after administration of general anesthesia. The patient was then placed in the dorsal lithotomy position where genitals were prepped and draped in the usual sterile fashion. A brief timeout was performed identifying the correct patient procedure and laterality. Perioperative antibiotics were administered. All pressure points were padded.     Procedure began by inserting a 22 [place] cystoscope atraumatically per the patient's urethra, entering into the bladder were pan cystoscopy was performed identifying bilateral orthotopic ureteral orifices and no evidence of bladder masses or other lesions. Attention was then turned to the left ureteral orifice. A 5 Fr open ended ureteral catheter was inserted into the distal ureter and contrast dye was injected up the ureter, a retrograde pyelogram was performed identifying mild dilation of the mid proximal and renal collecting system, and the ureter and renal pelvis locations were identified under fluoroscopy.     A Sensor wire was inserted through the working channel of the scope and advanced up the left ureteral  orifice to the level of the renal pelvis on fluoroscopy. A 4.8 x 24 [place] double J ureteral stent was then advanced over the wire to the renal pelvis confirmed on fluoroscopy. The Sensor wire was then removed and a good proximal stent curl was visualized within the collecting system on fluoroscopy, and a distal stent curl was observed within the bladder on direct visualization.     Given multiple calcifications seen in the pelvis on fluoroscopy indication for right retrograde pyelography to ensure no dilation given the setting of patient infection.  Pollick catheter was inserted into the right ureteral orifice, contrast was instilled with no dilation of the distal mid proximal ureter or renal collecting system noted, therefore no ureteral stent was placed    The patient was awoken from general anesthesia and transported to the PACU in stable condition.    PLAN  -Patient will continue admission with hospitalist service.  She require antibiotic therapy for urinary tract infection.  She will require definitive outpatient follow-up in 1 to 2 weeks for stone treatment.  She will be contacted to set up outpatient follow-up at time of discharge.  She is understanding ureteral stents are not permanent and she must follow-up for definitive stone treatment.            Micah Child MD     Date: 2023  Time: 07:30 EDT      Electronically signed by Micah Child MD at 23 0758          Physician Progress Notes (last 48 hours)      Khushboo Denson APRN at 23 0890     Summary:Urology Progress                [facility]   Urology Progress Note    Patient Name: Bernardo Dodd  : 1980  MRN: [id number]  Primary Care Physician:  Nelly Sotelo PA  Date of admission: 5/3/2023    Subjective   Subjective     Chief Complaint: Fever    HPI:  Patient resting in bed. Feels chilled. RN reports rigors this AM. She denies flank pain, she is voiding with mild dysuria and mild hematuria that is clearing.     TMAX  overnight 101.2.  Urine Culture >100,000 E.coli.    Review of Systems   All systems were reviewed and negative except for: hematuria, dysuria    Objective   Objective     Vitals:   Temp:  [97.9 °F (36.6 °C)-101.5 °F (38.6 °C)] 100.9 °F (38.3 °C)  Heart Rate:  [] 121  Resp:  [16-18] 18  BP: ()/(60-85) 128/75  Flow (L/min):  [2] 2  Physical Exam    Constitutional: Awake in bed, alert, chills   Eyes: PERRLA, sclerae anicteric, no conjunctival injection   HENT: Normocephalic, atraumatic, mucous membranes moist   Neck: Supple, trachea midline   Respiratory: Equal chest rise, non-labored respirations    Cardiovascular: RRR   Gastrointestinal: Soft, nontender, non-distended   Genitourinary: voiding, dysuria, hematuria.   Musculoskeletal: No lower extremity edema bilaterally, no clubbing or cyanosis to extremities   Psychiatric: Appropriate affect, cooperative   Neurologic: Oriented x 3,  Cranial Nerves grossly intact, speech clear   Skin: No rashes     Result Review    Result Review:  I have personally reviewed the results from the time of this admission to 5/5/2023 08:28 EDT and agree with these findings:  [x]  Laboratory  [x]  Microbiology  []  Radiology  []  EKG/Telemetry   []  Cardiology/Vascular   []  Pathology  []  Old records  [x]  Other: Vital signs    Most notable findings include: Urine culture >100,000 E.Coli.     Assessment & Plan   Assessment / Plan     Brief Patient Summary:  Bernardo Dodd is a 42 y.o. female who is POD 1 s/p Cystoscopy, Left ureteral stent placement for 4mm left distal ureteral calculus and UTI.     Active Hospital Problems:  Active Hospital Problems    Diagnosis    • **Sepsis due to urinary tract infection    • UTI (urinary tract infection)    • Hypokalemia    • Diarrhea    • Hypertension        Plan:   -Pyridium for dysuria.  -Plan for definitive outpatient follow up in 1-2 weeks with Dr. Child for stone treatment.    will be available, please call if any questions.  D/w  Dr. Child.        DVT prophylaxis:  Medical DVT prophylaxis orders are present.    CODE STATUS:   Code Status (Patient has no pulse and is not breathing): CPR (Attempt to Resuscitate)  Medical Interventions (Patient has pulse or is breathing): Full Support    Disposition:  I expect patient to remain inpatient.     Electronically signed by SHANE Flor, 23, 8:28 AM EDT.             Electronically signed by Khushboo Denson APRN at 23 0849     Nelly Andrade MD at 23 0759          .[facility] Medicine Services  PROGRESS NOTE    Patient Name: Bernardo Dodd  : 1980  MRN: [id number]    Date of Admission: 5/3/2023  Primary Care Physician: Nelly Sotelo PA    Subjective   Subjective     CC:  UTI    HPI:  Remains febrile to Tmax 100.9 overnight . Complaining of sig temp fluctuations. Pain in eye, nose and ear are main complaints. We have not resumed her home lamictal - will add back today   ROS:  Gen- + fevers, chills  CV- No chest pain, palpitations  Resp- No cough, dyspnea  GI- No N/V/D, abd pain    +congestion/allergies    Objective   Objective     Vital Signs:   Temp:  [97.9 °F (36.6 °C)-101.5 °F (38.6 °C)] 100.9 °F (38.3 °C)  Heart Rate:  [] 106  Resp:  [16-18] 18  BP: ()/(60-85) 128/75  Flow (L/min):  [2] 2     Physical Exam:  Constitutional: sitting in chair , odd affect,   HENT: NCAT, mucous membranes moist  Respiratory: Respiratory effort normal   Cardiovascular: RRR, no murmurs  Gastrointestinal: Soft, nontender, nondistended  Musculoskeletal: No bilateral ankle edema  Psychiatric: Appropriate affect, cooperative  Neurologic: Oriented x 3, speech slow but clear  Skin: No rashes      Results Reviewed:  LAB RESULTS:      Lab 23  0125 23  0124 23  0344 23  0057 23  2210 23  1607 23  1444 23  1122   WBC 26.97*  --  24.81*  --   --   --   --  19.73*   HEMOGLOBIN 9.8*  --  10.2*  --   --   --    --  10.4*   HEMATOCRIT 30.3*  --  32.1*  --   --   --   --  32.0*   PLATELETS 253  --  243  --   --   --   --  268   NEUTROS ABS 22.74*  --   --   --   --   --   --  17.06*   IMMATURE GRANS (ABS) 0.44*  --   --   --   --   --   --  0.10*   LYMPHS ABS 2.07  --   --   --   --   --   --  1.52   MONOS ABS 1.66*  --   --   --   --   --   --  1.01*   EOS ABS 0.03  --   --   --   --   --   --  0.01   MCV 77.7*  --  80.9  --   --   --   --  78.0*   PROCALCITONIN  --   --   --   --   --   --   --  1.23*   LACTATE  --  2.0  --  3.1* 3.8* 2.8* 3.9*  --          Lab 05/05/23  0124 05/04/23  0344 05/03/23  1122   SODIUM 134* 135* 134*   POTASSIUM 3.8 4.3 3.1*   CHLORIDE 103 104 101   CO2 21.0* 20.0* 22.0   ANION GAP 10.0 11.0 11.0   BUN 9 11 14   CREATININE 0.72 0.68 0.73   EGFR 105.9 110.3 104.1   GLUCOSE 195* 135* 112*   CALCIUM 8.3* 7.9* 8.1*   MAGNESIUM  --  2.6 1.5*         Lab 05/05/23  0124 05/03/23  1122   TOTAL PROTEIN 6.1 6.2   ALBUMIN 3.1* 3.3*   GLOBULIN 3.0 2.9   ALT (SGPT) 13 7   AST (SGOT) 18 11   BILIRUBIN 0.3 0.5   ALK PHOS 71 62         Lab 05/05/23  0311 05/05/23  0124   HSTROP T <6 <6                 Brief Urine Lab Results  (Last result in the past 365 days)      Color   Clarity   Blood   Leuk Est   Nitrite   Protein   CREAT   Urine HCG        05/03/23 1031 Yellow   Turbid   Small (1+)   Large (3+)   Positive   100 mg/dL (2+)                 Microbiology Results Abnormal     Procedure Component Value - Date/Time    Blood Culture - Blood, Arm, Right [[id number]]  (Normal) Collected: 05/03/23 1607    Lab Status: Preliminary result Specimen: Blood from Arm, Right Updated: 05/04/23 1631     Blood Culture No growth at 24 hours    Blood Culture - Blood, Hand, Right [[id number]]  (Normal) Collected: 05/03/23 1444    Lab Status: Preliminary result Specimen: Blood from Hand, Right Updated: 05/04/23 1531     Blood Culture No growth at 24 hours    Gastrointestinal Panel, PCR - Stool, Per Rectum [[id number]]  (Normal)  Collected: 05/04/23 1108    Lab Status: Final result Specimen: Stool from Per Rectum Updated: 05/04/23 1307     Campylobacter Not Detected     Plesiomonas shigelloides Not Detected     Salmonella Not Detected     Vibrio Not Detected     Vibrio cholerae Not Detected     Yersinia enterocolitica Not Detected     Enteroaggregative E. coli (EAEC) Not Detected     Enteropathogenic E. coli (EPEC) Not Detected     Enterotoxigenic E. coli (ETEC) lt/st Not Detected     Shiga-like toxin-producing E. coli (STEC) stx1/stx2 Not Detected     Shigella/Enteroinvasive E. coli (EIEC) Not Detected     Cryptosporidium Not Detected     Cyclospora cayetanensis Not Detected     Entamoeba histolytica Not Detected     Giardia lamblia Not Detected     Adenovirus F40/41 Not Detected     Astrovirus Not Detected     Norovirus GI/GII Not Detected     Rotavirus A Not Detected     Sapovirus (I, II, IV or V) Not Detected    Clostridioides difficile Toxin - Stool, Per Rectum [[id number]]  (Normal) Collected: 05/04/23 1108    Lab Status: Final result Specimen: Stool from Per Rectum Updated: 05/04/23 1238    Narrative:      The following orders were created for panel order Clostridioides difficile Toxin - Stool, Per Rectum.  Procedure                               Abnormality         Status                     ---------                               -----------         ------                     Clostridioides difficile...[[id number]]  Normal              Final result                 Please view results for these tests on the individual orders.    Clostridioides difficile Toxin, PCR - Stool, Per Rectum [[id number]]  (Normal) Collected: 05/04/23 1108    Lab Status: Final result Specimen: Stool from Per Rectum Updated: 05/04/23 1238     C. Difficile Toxins by PCR Not Detected    Narrative:      The result indicates the absence of toxigenic C. difficile from stool specimen.     COVID PRE-OP / PRE-PROCEDURE SCREENING ORDER (NO ISOLATION) - Swab, Nasopharynx  [[id number]]  (Normal) Collected: 05/03/23 0808    Lab Status: Final result Specimen: Swab from Nasopharynx Updated: 05/03/23 0907    Narrative:      The following orders were created for panel order COVID PRE-OP / PRE-PROCEDURE SCREENING ORDER (NO ISOLATION) - Swab, Nasopharynx.  Procedure                               Abnormality         Status                     ---------                               -----------         ------                     COVID-19 and FLU A/B PCR...[[id number]]  Normal              Final result                 Please view results for these tests on the individual orders.    COVID-19 and FLU A/B PCR - Swab, Nasopharynx [[id number]]  (Normal) Collected: 05/03/23 0808    Lab Status: Final result Specimen: Swab from Nasopharynx Updated: 05/03/23 0907     COVID19 Not Detected     Influenza A PCR Not Detected     Influenza B PCR Not Detected    Narrative:      Fact sheet for providers: https://www.fda.gov/media/036054/download    Fact sheet for patients: https://www.fda.gov/media/748434/download    Test performed by PCR.          CT Abdomen Pelvis Without Contrast    Result Date: 5/4/2023  EXAMINATION: CT ABDOMEN AND PELVIS WITHOUT IV CONTRAST   DATE OF EXAMINATION: 5/4/2023. COMPARISON: 10/2/2022.. INDICATION: Sepsis and fever. PROCEDURE:   Axial CT of the abdomen and pelvis was performed with sagittal and coronal reformatted images without contrast enhancement. CT dose lowering techniques were used, to include: automated exposure control, adjustment for patient size, and/or use of iterative reconstruction. The exam is limited because some types of pathology may not be adequately demonstrated due to lack of contrast enhancement. FINDINGS: LOWER CHEST :  The visualized lung bases are clear.  There are no pleural or pericardial effusions. ABDOMEN: Liver and Biliary system:  Normal. Adrenal glands:  Normal. Kidneys and ureters:  There is a 4.3 mm stone in the left ureterovesical junction with mild  left-sided hydronephrosis and mild to moderate perinephric stranding. There is a 2 mm nonobstructing stone also seen in the upper pole the right kidney and ureter  appear unremarkable. Spleen:  Normal. Pancreas:  Normal. Gallbladder:  Normal. Lymph nodes, Peritoneum and mesentery:  There is no mesenteric or retroperitoneal lymphadenopathy. Gastrointestinal tract:  There are no dilated loops of bowel or free intraperitoneal air.  . The appendix is normal. Aorta/IVC:   No aortic aneurysm..  IVC normal. Abdominal wall:  Normal. PELVIS: Fluid: There is no free fluid in the pelvis. Lymph Nodes:  There is no pelvic or inguinal lymphadenopathy.. Urinary bladder:  Normal. BONES:  There are no osseous destructive lesions.. ADDITIONAL  SIGNIFICANT FINDINGS:  Anterior subserosal fibroid measures 8.0 x 8.8 cm in diameter and is not significantly changed.     Impression: 1. Mild left-sided hydronephrosis and mild to moderate perinephric stranding with a 4.3 mm stone in the left ureterovesical junction.. 2. No bowel obstruction or appendicitis. 3. Uterine fibroid. Electronically signed by:  Jason Sheridan D.O.  5/4/2023 1:24 AM Mountain Time    XR Chest 2 View    Result Date: 5/3/2023  XR CHEST 2 VW Date of Exam: 5/3/2023 7:56 AM EDT Indication: fever / chills Comparison: Chest x-ray 9/5/2022 Findings: Normal cardiomediastinal silhouette. The lungs are clear. No pleural effusion or pneumothorax. No acute osseous findings.     Impression: Impression: No acute cardiopulmonary findings. Electronically Signed: Alan Telles  5/3/2023 8:14 AM EDT  Workstation ID: [id number]    XR Chest 1 View    Result Date: 5/5/2023  EXAMINATION: Chest one view. DATE: 5/5/2023. COMPARISON: 5/3/2023. CLINICAL HISTORY: Dyspnea. FINDINGS: The lungs and pleural spaces are clear. The cardiomediastinal silhouette and the pulmonary vessels are within normal limits.     Impression: No acute cardiopulmonary process. Electronically signed by:  Jason Sheridan  D.O.  5/5/2023 12:55 AM Mountain Time    XR Pyelogram Retrograde    Result Date: 5/4/2023  XR PYELOGRAM RETROGRADE Date of Exam: 5/4/2023 6:03 AM EDT Indication: stent Comparison: CT 5/4/2023 Findings: Total fluoroscopy time 24 seconds, 8 images obtained Initial image demonstrates multiple calcifications overlying the pelvis. There is an irregular shaped 2-3 mm size calcification within the left lower pelvis likely distal left ureteral stone. Subsequent images demonstrate bilateral retrograde pyelograms.  Effect seen within the ureters likely air bubbles. Correlation with real-time fluoroscopy would be recommended. Last image demonstrates successful deployment of left-sided ureteral stent.     Impression: Impression: 1. Successful deployment of left-sided ureteral stent. Electronically Signed: Brennen Hopkins  5/4/2023 8:20 AM EDT  Workstation ID: [id number]      Results for orders placed during the hospital encounter of 09/13/22    Adult Stress Echo W/ Cont or Stress Agent if Necessary Per Protocol    Interpretation Summary  · The patient completed 7: 50 minutes of treadmill exercise on standard Randy protocol achieving 9.8 METs of workload. The test was stopped due to fatigue after achieving the target heart rate. She reported mild chest tightness and heaviness at peak exercise which resolved in recovery.  · Expected exercise duration 8:45, actual 7:50; AVIS (+11).  · Resting heart rate 82, blood pressure 120/88. Peak heart rate 151, blood pressure 168/100. 85% of age-predicted maximal heart rate achieved.  · Average exercise capacity, completed for this protocol.  · Resting ECG showed sinus rhythm with nonspecific ST segment changes. Stress ECG is nondiagnostic due to baseline abnormalities however no significant ST abnormalities were noted. No exercise-induced arrhythmias were seen.  · Average exercise capacity with appropriate hemodynamic response to exercise.  · Negative treadmill stress test for ischemic ST  segment abnormalities or exercise-induced arrhythmias. Chest tightness and heaviness was reported which resolved in recovery.  · Resting echocardiogram showed normal left ventricular systolic function with estimated ejection fraction 65%. Trace mitral regurgitation, trace tricuspid regurgitation and trace to mild pulmonic insufficiency was noted.  · Normal stress echo with no significant echocardiographic evidence for myocardial ischemia. Post-rest ejection fraction 75%.      Current medications:  Scheduled Meds:famotidine, 20 mg, Oral, BID AC  fluticasone, 2 spray, Each Nare, Daily  heparin (porcine), 5,000 Units, Subcutaneous, Q12H  piperacillin-tazobactam, 3.375 g, Intravenous, Q8H  saccharomyces boulardii, 250 mg, Oral, BID  sodium chloride, 10 mL, Intravenous, Q12H      Continuous Infusions:lactated ringers, 9 mL/hr, Last Rate: 9 mL/hr (05/04/23 0409)      PRN Meds:.•  acetaminophen  •  aluminum-magnesium hydroxide-simethicone  •  ipratropium-albuterol  •  ketorolac  •  Magnesium Standard Dose Replacement - Follow Nurse / BPA Driven Protocol  •  melatonin  •  ondansetron **OR** ondansetron  •  Potassium Replacement - Follow Nurse / BPA Driven Protocol  •  sodium chloride  •  sodium chloride  •  sodium chloride  •  traMADol    Assessment & Plan   Assessment & Plan     Active Hospital Problems    Diagnosis  POA   • **Sepsis due to urinary tract infection [A41.9, N39.0]  Yes   • UTI (urinary tract infection) [N39.0]  Yes   • Hypokalemia [E87.6]  Yes   • Diarrhea [R19.7]  Yes   • Hypertension [I10]  Yes      Resolved Hospital Problems   No resolved problems to display.        Brief Hospital Course to date:  Bernardo Dodd is a 42 y.o. female with hx of HTN, migraines, GERD, bipolar disorder, anxiety, and kidney stones who presents due to fevers, chills, abdominal pain.  CT scan revealed left ureteral stone and she was taken to the OR for cystoscopy by Dr. Child on 5/4.    Sepsis secondary to acute Ecoli UTI,  POA  --Rocephin changed to Zosyn, culture results and susceptibilities reviewed   --given leukocytosis worsening slightly today continue IV abx- consider deescalation if improvement   --urology is following, s/p cystoscopy  with left ureteral stent placement      Diarrhea  --Patient also reports diarrhea that started this morning, given that she has had recent antibiotic use (Augmentin for a sinus and ear infection) and works with patients in an assisted living facility, prudent to rule out C.diff  --Check C.diff and GI PCR panel - pending  -- Continue probiotic     Hypokalemia  --Replace per protocol    HTN  --BP running on the lower side, will hold home BP meds for now, restart if needed       GERD  --Continue Pepcid twice daily     Bipolar disorder  Anxiety  --resumed home lamictal today    Allergies/asthma  --have resume patient schedule of singulair/zyrtec     Expected Discharge Location and Transportation: home  Expected Discharge pending cultures  Expected Discharge Date: 2023; Expected Discharge Time:      DVT prophylaxis:  Medical DVT prophylaxis orders are present.     AM-PAC 6 Clicks Score (PT): 24 (23)    CODE STATUS:   Code Status and Medical Interventions:   Ordered at: 23 1321     Code Status (Patient has no pulse and is not breathing):    CPR (Attempt to Resuscitate)     Medical Interventions (Patient has pulse or is breathing):    Full Support       Nelly Andrade MD  23        Electronically signed by Nelly Andrade MD at 23 1040     Florinda Islas DO at 23 0708              [facility] Medicine Services  PROGRESS NOTE    Patient Name: Bernardo Dodd  : 1980  MRN: [id number]    Date of Admission: 5/3/2023  Primary Care Physician: Nelly Sotelo PA    Subjective   Subjective     CC:  UTI    HPI:  Having some R sided flank pain, otherwise ok, a little lethargic after anesthesia    ROS:  Gen- No fevers,  chills  CV- No chest pain, palpitations  Resp- No cough, dyspnea  GI- No N/V/D, abd pain        Objective   Objective     Vital Signs:   Temp:  [97 °F (36.1 °C)-102.6 °F (39.2 °C)] 98.2 °F (36.8 °C)  Heart Rate:  [] 91  Resp:  [12-26] 18  BP: ()/(50-86) 95/67  Flow (L/min):  [2-4] 2     Physical Exam:  Constitutional: No acute distress, awake, alert  HENT: NCAT, mucous membranes moist  Respiratory: Respiratory effort normal   Cardiovascular: RRR, no murmurs  Gastrointestinal: Soft, nontender, nondistended  Musculoskeletal: No bilateral ankle edema  Psychiatric: Appropriate affect, cooperative  Neurologic: Oriented x 2-3, speech slow but clear  Skin: No rashes      Results Reviewed:  LAB RESULTS:      Lab 05/04/23  0344 05/04/23  0057 05/03/23  2210 05/03/23  1607 05/03/23  1444 05/03/23  1122   WBC 24.81*  --   --   --   --  19.73*   HEMOGLOBIN 10.2*  --   --   --   --  10.4*   HEMATOCRIT 32.1*  --   --   --   --  32.0*   PLATELETS 243  --   --   --   --  268   NEUTROS ABS  --   --   --   --   --  17.06*   IMMATURE GRANS (ABS)  --   --   --   --   --  0.10*   LYMPHS ABS  --   --   --   --   --  1.52   MONOS ABS  --   --   --   --   --  1.01*   EOS ABS  --   --   --   --   --  0.01   MCV 80.9  --   --   --   --  78.0*   PROCALCITONIN  --   --   --   --   --  1.23*   LACTATE  --  3.1* 3.8* 2.8* 3.9*  --          Lab 05/04/23  0344 05/03/23  1122   SODIUM 135* 134*   POTASSIUM 4.3 3.1*   CHLORIDE 104 101   CO2 20.0* 22.0   ANION GAP 11.0 11.0   BUN 11 14   CREATININE 0.68 0.73   EGFR 110.3 104.1   GLUCOSE 135* 112*   CALCIUM 7.9* 8.1*   MAGNESIUM 2.6 1.5*         Lab 05/03/23  1122   TOTAL PROTEIN 6.2   ALBUMIN 3.3*   GLOBULIN 2.9   ALT (SGPT) 7   AST (SGOT) 11   BILIRUBIN 0.5   ALK PHOS 62                     Brief Urine Lab Results  (Last result in the past 365 days)      Color   Clarity   Blood   Leuk Est   Nitrite   Protein   CREAT   Urine HCG        05/03/23 1031 Yellow   Turbid   Small (1+)   Large  (3+)   Positive   100 mg/dL (2+)                 Microbiology Results Abnormal     Procedure Component Value - Date/Time    COVID PRE-OP / PRE-PROCEDURE SCREENING ORDER (NO ISOLATION) - Swab, Nasopharynx [[id number]]  (Normal) Collected: 05/03/23 0808    Lab Status: Final result Specimen: Swab from Nasopharynx Updated: 05/03/23 0907    Narrative:      The following orders were created for panel order COVID PRE-OP / PRE-PROCEDURE SCREENING ORDER (NO ISOLATION) - Swab, Nasopharynx.  Procedure                               Abnormality         Status                     ---------                               -----------         ------                     COVID-19 and FLU A/B PCR...[[id number]]  Normal              Final result                 Please view results for these tests on the individual orders.    COVID-19 and FLU A/B PCR - Swab, Nasopharynx [[id number]]  (Normal) Collected: 05/03/23 0808    Lab Status: Final result Specimen: Swab from Nasopharynx Updated: 05/03/23 0907     COVID19 Not Detected     Influenza A PCR Not Detected     Influenza B PCR Not Detected    Narrative:      Fact sheet for providers: https://www.fda.gov/media/108399/download    Fact sheet for patients: https://www.fda.gov/media/435444/download    Test performed by PCR.          CT Abdomen Pelvis Without Contrast    Result Date: 5/4/2023  EXAMINATION: CT ABDOMEN AND PELVIS WITHOUT IV CONTRAST   DATE OF EXAMINATION: 5/4/2023. COMPARISON: 10/2/2022.. INDICATION: Sepsis and fever. PROCEDURE:   Axial CT of the abdomen and pelvis was performed with sagittal and coronal reformatted images without contrast enhancement. CT dose lowering techniques were used, to include: automated exposure control, adjustment for patient size, and/or use of iterative reconstruction. The exam is limited because some types of pathology may not be adequately demonstrated due to lack of contrast enhancement. FINDINGS: LOWER CHEST :  The visualized lung bases are clear.   There are no pleural or pericardial effusions. ABDOMEN: Liver and Biliary system:  Normal. Adrenal glands:  Normal. Kidneys and ureters:  There is a 4.3 mm stone in the left ureterovesical junction with mild left-sided hydronephrosis and mild to moderate perinephric stranding. There is a 2 mm nonobstructing stone also seen in the upper pole the right kidney and ureter  appear unremarkable. Spleen:  Normal. Pancreas:  Normal. Gallbladder:  Normal. Lymph nodes, Peritoneum and mesentery:  There is no mesenteric or retroperitoneal lymphadenopathy. Gastrointestinal tract:  There are no dilated loops of bowel or free intraperitoneal air.  . The appendix is normal. Aorta/IVC:   No aortic aneurysm..  IVC normal. Abdominal wall:  Normal. PELVIS: Fluid: There is no free fluid in the pelvis. Lymph Nodes:  There is no pelvic or inguinal lymphadenopathy.. Urinary bladder:  Normal. BONES:  There are no osseous destructive lesions.. ADDITIONAL  SIGNIFICANT FINDINGS:  Anterior subserosal fibroid measures 8.0 x 8.8 cm in diameter and is not significantly changed.     Impression: 1. Mild left-sided hydronephrosis and mild to moderate perinephric stranding with a 4.3 mm stone in the left ureterovesical junction.. 2. No bowel obstruction or appendicitis. 3. Uterine fibroid. Electronically signed by:  Jason Sheridan D.O.  5/4/2023 1:24 AM Mountain Time    XR Chest 2 View    Result Date: 5/3/2023  XR CHEST 2 VW Date of Exam: 5/3/2023 7:56 AM EDT Indication: fever / chills Comparison: Chest x-ray 9/5/2022 Findings: Normal cardiomediastinal silhouette. The lungs are clear. No pleural effusion or pneumothorax. No acute osseous findings.     Impression: Impression: No acute cardiopulmonary findings. Electronically Signed: Alan Telles  5/3/2023 8:14 AM EDT  Workstation ID: [id number]    XR Pyelogram Retrograde    Result Date: 5/4/2023  XR PYELOGRAM RETROGRADE Date of Exam: 5/4/2023 6:03 AM EDT Indication: stent Comparison: CT 5/4/2023  Findings: Total fluoroscopy time 24 seconds, 8 images obtained Initial image demonstrates multiple calcifications overlying the pelvis. There is an irregular shaped 2-3 mm size calcification within the left lower pelvis likely distal left ureteral stone. Subsequent images demonstrate bilateral retrograde pyelograms.  Effect seen within the ureters likely air bubbles. Correlation with real-time fluoroscopy would be recommended. Last image demonstrates successful deployment of left-sided ureteral stent.     Impression: Impression: 1. Successful deployment of left-sided ureteral stent. Electronically Signed: Brennen Gomezens  5/4/2023 8:20 AM EDT  Workstation ID: [id number]      Results for orders placed during the hospital encounter of 09/13/22    Adult Stress Echo W/ Cont or Stress Agent if Necessary Per Protocol    Interpretation Summary  · The patient completed 7: 50 minutes of treadmill exercise on standard Arndy protocol achieving 9.8 METs of workload. The test was stopped due to fatigue after achieving the target heart rate. She reported mild chest tightness and heaviness at peak exercise which resolved in recovery.  · Expected exercise duration 8:45, actual 7:50; AVIS (+11).  · Resting heart rate 82, blood pressure 120/88. Peak heart rate 151, blood pressure 168/100. 85% of age-predicted maximal heart rate achieved.  · Average exercise capacity, completed for this protocol.  · Resting ECG showed sinus rhythm with nonspecific ST segment changes. Stress ECG is nondiagnostic due to baseline abnormalities however no significant ST abnormalities were noted. No exercise-induced arrhythmias were seen.  · Average exercise capacity with appropriate hemodynamic response to exercise.  · Negative treadmill stress test for ischemic ST segment abnormalities or exercise-induced arrhythmias. Chest tightness and heaviness was reported which resolved in recovery.  · Resting echocardiogram showed normal left ventricular systolic  function with estimated ejection fraction 65%. Trace mitral regurgitation, trace tricuspid regurgitation and trace to mild pulmonic insufficiency was noted.  · Normal stress echo with no significant echocardiographic evidence for myocardial ischemia. Post-rest ejection fraction 75%.      Current medications:  Scheduled Meds:famotidine, 20 mg, Oral, BID AC  heparin (porcine), 5,000 Units, Subcutaneous, Q12H  piperacillin-tazobactam, 3.375 g, Intravenous, Q8H  saccharomyces boulardii, 250 mg, Oral, BID  sodium chloride, 10 mL, Intravenous, Q12H      Continuous Infusions:lactated ringers, 9 mL/hr, Last Rate: 9 mL/hr (05/04/23 0911)      PRN Meds:.•  acetaminophen  •  aluminum-magnesium hydroxide-simethicone  •  ipratropium-albuterol  •  ketorolac  •  Magnesium Standard Dose Replacement - Follow Nurse / BPA Driven Protocol  •  melatonin  •  ondansetron **OR** ondansetron  •  Potassium Replacement - Follow Nurse / BPA Driven Protocol  •  sodium chloride  •  sodium chloride  •  traMADol    Assessment & Plan   Assessment & Plan     Active Hospital Problems    Diagnosis  POA   • **Sepsis due to urinary tract infection [A41.9, N39.0]  Yes   • UTI (urinary tract infection) [N39.0]  Yes   • Hypokalemia [E87.6]  Yes   • Diarrhea [R19.7]  Yes   • Hypertension [I10]  Yes      Resolved Hospital Problems   No resolved problems to display.        Brief Hospital Course to date:  Bernardo Dodd is a 42 y.o. female with hx of HTN, migraines, GERD, bipolar disorder, anxiety, and kidney stones who presents due to fevers, chills, abdominal pain.  CT scan revealed left ureteral stone and she was taken to the OR for cystoscopy by Dr. Child on 5/4.    Sepsis secondary to acute UTI, POA  --Rocephin changed to Zosyn  --F/U blood and urine cultures  --Patient does have hx of kidney stones, had a left ureteral stent placed in August 2019 by Dr. 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     AM-PAC 6 Clicks Score (PT): 19 (23 0810)    CODE STATUS:   Code Status and Medical Interventions:   Ordered at: 23 1321     Code Status (Patient has no pulse and is not breathing):    CPR (Attempt to Resuscitate)     Medical Interventions (Patient has pulse or is breathing):    Full Support       Florinda Islas DO  23        Electronically signed by Florinda Islas DO at 23 1124          Consult Notes (last 48 hours)      Micah Child MD at 23 0611      Consult Orders    1. Inpatient Urology Consult [[id number]] ordered by Dave Montiel DO at 23 0413          Summary:Urology Consult Note                [facility]   HISTORY AND PHYSICAL    Patient Name: Bernardo Dodd  : 1980  MRN: [id number]  Primary Care Physician:  Nelly Sotelo PA  Date of admission: 5/3/2023    Subjective   Subjective     Chief Complaint: Fever    HPI:    Bernardo Dodd is a 42 y.o. female is currently admitted to [facility] after presentation to ED 5/3/2023 due to fever, chills, abdominal pain over 24 hours.  She reports increasing abdominal discomfort, primarily left lower quadrant pain.     Patient is a past medical history significant for hypertension, migraine, GERD, bipolar disorder, anxiety.  She has a history of  recurrent kidney stones.  She has required surgical intervention for kidney stone in 2019.    On arrival to the ER the patient was febrile to 103, tachycardic in the 120s.  WBC 19.7, lactate 3.  Urinalysis was positive for nitrates, LE, 4+ bacteria.    Patient continued to have tachycardia, remained febrile after initiation of antibiotic, treating presumed UTI.  CT scan was obtained a.m. 5/4/2023 revealing 3 to 4 mm right ureteral stone with hydronephrosis, additional nonobstructing right renal stone.    Current WBC 24.8, lactate 3.1, creatinine 0.68  Urine culture and blood cultures are pending.        Review of Systems   The following systems were reviewed and negative;  constitution, respiratory, cardiovascular, genitourinary, integument, hematologic / lymphatic, musculoskeletal, neurological and behavioral/psych.   GI: Abdominal pain    Personal History     Past Medical History:   Diagnosis Date   • Allergic rhinitis    • Anemia    • Anxiety    • Arthritis    • Asthma    • Bartholin gland cyst    • Bipolar disorder October 2014   • Chlamydia    • Depression    • Endometriosis    • FHx: migraine headaches    • GERD (gastroesophageal reflux disease)    • Gonorrhea    • Heart murmur    • Herpes simplex    • History of chest x-ray 11/01/2015    no active disease   • History of echocardiogram 11/01/2015    ejection fraction of greater than 65%, mitral and pulmonic regurgitation an physiological tricuspid regurgitation.   • History of PFTs 12/22/2015    spirometry data acceptable and reproducible; pt given 4 puffs of Ventolin; pt gave good effort; no obstruction; no Bd response; MVV reduced    • History of PFTs 11/02/2015    pt gave best effort; duoneb given prior and post study; moderate nonspecific proportional reduction of FEV1 and FVC with preserved ratio; FEV1 moderately reduced; cannot rule out restriction   • Hypertension    • Kidney stones    • Migraine    • Ovarian cyst    • Pelvic pain    • PMS (premenstrual  syndrome)    • Preeclampsia    • Screening breast examination     self;admits   • Seizures    • STD (female)    • Substance abuse    • Thigh shingles    • Trauma    • Trichomonas infection    • Urinary tract infection    • Urogenital trichomoniasis    • Varicella        Past Surgical History:   Procedure Laterality Date   • BILATERAL BREAST REDUCTION     • BREAST BIOPSY     • BREAST SURGERY      breast reduction   •  SECTION     •  SECTION WITH TUBAL  2010   • CYSTOSCOPY     • DIAGNOSTIC LAPAROSCOPY     • INCISION AND DRAINAGE ABSCESS      bartholin's   • LAPAROSCOPIC TUBAL LIGATION     • ORIF ANKLE FRACTURE Right    • REDUCTION MAMMAPLASTY Bilateral    • TENSION FREE VAGINAL TAPING WITH MINI ARC SLING      Dr Doyle avery        Family History: family history includes Arthritis in her father and mother; Bipolar disorder in her mother; Cancer in her maternal grandmother and mother; Dementia in her father; Diabetes in her paternal uncle and another family member; MARCIE disease in her paternal aunt; Heart disease in an other family member; Heart failure in her paternal grandmother; Hypertension in her father and another family member; Other in an other family member; Pancreatic cancer in her maternal grandmother and another family member. Otherwise pertinent FHx was reviewed and not pertinent to current issue.    Social History:  reports that she quit smoking about 8 years ago. Her smoking use included cigarettes and cigars. She has never used smokeless tobacco. She reports that she does not currently use drugs after having used the following drugs: Marijuana. She reports that she does not drink alcohol.    Home Medications:  Diclofenac Sodium, OLANZapine, albuterol sulfate HFA, amLODIPine, benzonatate, celecoxib, cetirizine, erythromycin, fluconazole, fluticasone, furosemide, lamoTRIgine, methylPREDNISolone, and montelukast      Allergies:  Allergies   Allergen Reactions   • Naproxen  Swelling   • Sulfa Antibiotics Rash       Objective   Objective     Vitals:   Temp:  [97 °F (36.1 °C)-102.6 °F (39.2 °C)] 97 °F (36.1 °C)  Heart Rate:  [] 85  Resp:  [12-26] 12  BP: ()/(50-89) 105/72  Physical Exam    Constitutional: Awake in bed, alert    Eyes: PERRLA, sclerae anicteric, no conjunctival injection   HENT: Normocephalic, atraumatic, mucous membranes moist   Neck: Supple, trachea midline   Respiratory:Equal chest rise, non-labored respirations    Cardiovascular: Perfused, no edema   Gastrointestinal: Soft, nontender, non-distended,    Musculoskeletal: No bilateral ankle edema, no clubbing or cyanosis to extremities   Psychiatric: Appropriate affect, cooperative   Neurologic: Oriented x 3, Cranial Nerves grossly intact, speech clear   Skin: No rashes     Result Review    Result Review:  I have personally reviewed the results from the time of this admission to 5/4/2023 06:11 EDT and agree with these findings:  [x]  Laboratory  [x]  Microbiology  [x]  Radiology  []  EKG/Telemetry   []  Cardiology/Vascular   []  Pathology  [x]  Old records  []  Other:    Most notable findings include:     5/3: WBC 19.7, lactate 3.  Urinalysis was positive for nitrates, LE, 4+ bacteria.    Patient continued to have tachycardia, remained febrile after initiation of antibiotic, treating presumed UTI.  CT scan was obtained a.m. 5/4/2023 revealing 3 to 4 mm right ureteral stone with hydronephrosis, additional nonobstructing right renal stone.    5/4: WBC 24.8, lactate 3.1, creatinine 0.68  Urine culture and blood cultures are pending.    Assessment & Plan   Assessment / Plan     Brief Patient Summary:  Bernardo Dodd is a 42 y.o. female who is currently admitted to [facility] after presentation to ED 5/3/2023 due to fever, chills, abdominal pain over 24 hours.  She reports increasing abdominal discomfort, primarily left lower quadrant pain.     On arrival to the ER the patient was febrile to 103, tachycardic  in the 120s.  WBC 19.7, lactate 3.  Urinalysis was positive for nitrates, LE, 4+ bacteria.    Patient continued to have tachycardia, remained febrile after initiation of antibiotic, treating presumed UTI.  CT scan was obtained a.m. 5/4/2023 revealing 3 to 4 mm right ureteral stone with hydronephrosis, additional nonobstructing right renal stone.    Current WBC 24, lactate 3.    Urology was contacted for evaluation.  Given the patient's continued febrile status, tachycardia requiring fluid support the indication for ureteral stent was discussed.  The risk benefits alternatives procedure were discussed the patient she elects to proceed    We have discussed that we will be unable to primarily treat stone at this time.  A ureteral stent is required to provide urinary drainage, allow antibiotic therapy to be completed.  She require follow-up as an outpatient in 1 to 2 weeks for definitive stone treatment after antibiotic course is completed.  She is understanding and agreeable.  She is understanding ureteral since her not permanent she must follow-up for definitive stone management.    [facility] Problems:  Active Hospital Problems    Diagnosis    • **Sepsis due to urinary tract infection    • UTI (urinary tract infection)    • Hypokalemia    • Diarrhea    • Hypertension      Plan:   - TO OR for cystoscopy, left retrograde pyelography, left ureteral stent placement  -Will require outpatient follow-up for definitive stone treatment in 1 to 2 weeks, this will be coordinated at time of discharge.    DVT prophylaxis:  Medical DVT prophylaxis orders are present.    CODE STATUS:    Code Status (Patient has no pulse and is not breathing): CPR (Attempt to Resuscitate)  Medical Interventions (Patient has pulse or is breathing): Full Support    Admission Status: Per primary team    Electronically signed by Micah Child MD, 05/04/23, 6:11 AM EDT.             Electronically signed by Micah Child MD at 05/04/23 0619

## 2023-05-05 NOTE — PLAN OF CARE
Goal Outcome Evaluation:  Plan of Care Reviewed With: patient        Progress: improving  Outcome Evaluation: Patient doing better today. No rigors and less pain. Patient up walking in room and bathing self. will continue to monitor. VS stable.

## 2023-05-05 NOTE — PLAN OF CARE
Goal Outcome Evaluation:  Plan of Care Reviewed With: patient   Pt tachycardic, RA/2L NC, A&Ox4. Pt febrile throughout the night, tylenol and toradol given. Pt reported chest pain and SOA. 12 lead EKG done and respiratory administered a breathing treatment. Pt has been anxious and restless. Melatonin given for sleep. Pt has been sneezing and repeatedly blowing her nose and has complained of congestion. [place] spray and Flonase offered, pt stated that they didn't help. IV antibiotics administered.

## 2023-05-06 LAB
ALBUMIN SERPL-MCNC: 3.2 G/DL (ref 3.5–5.2)
ALBUMIN/GLOB SERPL: 0.9 G/DL
ALP SERPL-CCNC: 73 U/L (ref 39–117)
ALT SERPL W P-5'-P-CCNC: 11 U/L (ref 1–33)
ANION GAP SERPL CALCULATED.3IONS-SCNC: 10 MMOL/L (ref 5–15)
AST SERPL-CCNC: 11 U/L (ref 1–32)
BASOPHILS # BLD AUTO: 0.04 10*3/MM3 (ref 0–0.2)
BASOPHILS NFR BLD AUTO: 0.3 % (ref 0–1.5)
BILIRUB SERPL-MCNC: 0.3 MG/DL (ref 0–1.2)
BUN SERPL-MCNC: 6 MG/DL (ref 6–20)
BUN/CREAT SERPL: 9.4 (ref 7–25)
CALCIUM SPEC-SCNC: 9 MG/DL (ref 8.6–10.5)
CHLORIDE SERPL-SCNC: 101 MMOL/L (ref 98–107)
CO2 SERPL-SCNC: 24 MMOL/L (ref 22–29)
CREAT SERPL-MCNC: 0.64 MG/DL (ref 0.57–1)
DEPRECATED RDW RBC AUTO: 48.1 FL (ref 37–54)
EGFRCR SERPLBLD CKD-EPI 2021: 111.9 ML/MIN/1.73
EOSINOPHIL # BLD AUTO: 0.07 10*3/MM3 (ref 0–0.4)
EOSINOPHIL NFR BLD AUTO: 0.5 % (ref 0.3–6.2)
ERYTHROCYTE [DISTWIDTH] IN BLOOD BY AUTOMATED COUNT: 17 % (ref 12.3–15.4)
GLOBULIN UR ELPH-MCNC: 3.5 GM/DL
GLUCOSE SERPL-MCNC: 101 MG/DL (ref 65–99)
HCT VFR BLD AUTO: 31.3 % (ref 34–46.6)
HGB BLD-MCNC: 10 G/DL (ref 12–15.9)
IMM GRANULOCYTES # BLD AUTO: 0.19 10*3/MM3 (ref 0–0.05)
IMM GRANULOCYTES NFR BLD AUTO: 1.4 % (ref 0–0.5)
LYMPHOCYTES # BLD AUTO: 2.86 10*3/MM3 (ref 0.7–3.1)
LYMPHOCYTES NFR BLD AUTO: 21 % (ref 19.6–45.3)
MCH RBC QN AUTO: 24.9 PG (ref 26.6–33)
MCHC RBC AUTO-ENTMCNC: 31.9 G/DL (ref 31.5–35.7)
MCV RBC AUTO: 77.9 FL (ref 79–97)
MONOCYTES # BLD AUTO: 1.31 10*3/MM3 (ref 0.1–0.9)
MONOCYTES NFR BLD AUTO: 9.6 % (ref 5–12)
NEUTROPHILS NFR BLD AUTO: 67.2 % (ref 42.7–76)
NEUTROPHILS NFR BLD AUTO: 9.17 10*3/MM3 (ref 1.7–7)
NRBC BLD AUTO-RTO: 0 /100 WBC (ref 0–0.2)
PLATELET # BLD AUTO: 299 10*3/MM3 (ref 140–450)
PMV BLD AUTO: 10.3 FL (ref 6–12)
POTASSIUM SERPL-SCNC: 3.9 MMOL/L (ref 3.5–5.2)
PROT SERPL-MCNC: 6.7 G/DL (ref 6–8.5)
RBC # BLD AUTO: 4.02 10*6/MM3 (ref 3.77–5.28)
SODIUM SERPL-SCNC: 135 MMOL/L (ref 136–145)
WBC NRBC COR # BLD: 13.64 10*3/MM3 (ref 3.4–10.8)

## 2023-05-06 PROCEDURE — 25010000002 PIPERACILLIN SOD-TAZOBACTAM PER 1 G: Performed by: UROLOGY

## 2023-05-06 PROCEDURE — 25010000002 HEPARIN (PORCINE) PER 1000 UNITS: Performed by: UROLOGY

## 2023-05-06 PROCEDURE — 85025 COMPLETE CBC W/AUTO DIFF WBC: CPT | Performed by: INTERNAL MEDICINE

## 2023-05-06 PROCEDURE — 80053 COMPREHEN METABOLIC PANEL: CPT | Performed by: INTERNAL MEDICINE

## 2023-05-06 RX ORDER — AMOXICILLIN 250 MG
2 CAPSULE ORAL 2 TIMES DAILY PRN
Status: DISCONTINUED | OUTPATIENT
Start: 2023-05-06 | End: 2023-05-07 | Stop reason: HOSPADM

## 2023-05-06 RX ADMIN — FLUTICASONE PROPIONATE 2 SPRAY: 50 SPRAY, METERED NASAL at 08:58

## 2023-05-06 RX ADMIN — FAMOTIDINE 20 MG: 20 TABLET, FILM COATED ORAL at 08:57

## 2023-05-06 RX ADMIN — Medication 10 ML: at 21:05

## 2023-05-06 RX ADMIN — ACETAMINOPHEN 325MG 650 MG: 325 TABLET ORAL at 09:12

## 2023-05-06 RX ADMIN — Medication 10 ML: at 08:58

## 2023-05-06 RX ADMIN — MONTELUKAST SODIUM 5 MG: 5 TABLET, CHEWABLE ORAL at 17:38

## 2023-05-06 RX ADMIN — HEPARIN SODIUM 5000 UNITS: 5000 INJECTION INTRAVENOUS; SUBCUTANEOUS at 21:05

## 2023-05-06 RX ADMIN — PHENAZOPYRIDINE 200 MG: 100 TABLET ORAL at 08:57

## 2023-05-06 RX ADMIN — TAZOBACTAM SODIUM AND PIPERACILLIN SODIUM 3.38 G: 375; 3 INJECTION, SOLUTION INTRAVENOUS at 09:02

## 2023-05-06 RX ADMIN — TAZOBACTAM SODIUM AND PIPERACILLIN SODIUM 3.38 G: 375; 3 INJECTION, SOLUTION INTRAVENOUS at 03:14

## 2023-05-06 RX ADMIN — HEPARIN SODIUM 5000 UNITS: 5000 INJECTION INTRAVENOUS; SUBCUTANEOUS at 08:57

## 2023-05-06 RX ADMIN — ACETAMINOPHEN 325MG 650 MG: 325 TABLET ORAL at 16:41

## 2023-05-06 RX ADMIN — TAZOBACTAM SODIUM AND PIPERACILLIN SODIUM 3.38 G: 375; 3 INJECTION, SOLUTION INTRAVENOUS at 17:38

## 2023-05-06 RX ADMIN — Medication 250 MG: at 21:05

## 2023-05-06 RX ADMIN — Medication 250 MG: at 08:57

## 2023-05-06 RX ADMIN — LAMOTRIGINE 200 MG: 100 TABLET ORAL at 08:57

## 2023-05-06 RX ADMIN — FAMOTIDINE 20 MG: 20 TABLET, FILM COATED ORAL at 16:41

## 2023-05-06 RX ADMIN — CETIRIZINE HYDROCHLORIDE 10 MG: 10 TABLET, FILM COATED ORAL at 08:57

## 2023-05-06 RX ADMIN — PHENAZOPYRIDINE 200 MG: 100 TABLET ORAL at 16:45

## 2023-05-06 NOTE — PROGRESS NOTES
".[facility] Medicine Services  PROGRESS NOTE    Patient Name: Bernardo Dodd  : 1980  MRN: [id number]    Date of Admission: 5/3/2023  Primary Care Physician: Nelly Sotelo PA    Subjective   Subjective     CC:  UTI    HPI:  Afebrile throughout night however is febrile this morning upon n ursing assessment to 100.6 and \"felt cold\"  Nursing reports less pain less temp fluctuations.   ROS:  Gen-- +fevers, chills  CV- No chest pain, palpitations  Resp- No cough, dyspnea  GI- No N/V/D, abd pain        Objective   Objective     Vital Signs:   Temp:  [98.5 °F (36.9 °C)-99.4 °F (37.4 °C)] 98.5 °F (36.9 °C)  Heart Rate:  [] 109  Resp:  [18] 18  BP: (115-140)/(76-93) 139/93     Physical Exam:  Constitutional: sitting in bed, looks improved   HENT: NCAT, mucous membranes moist  Respiratory: Respiratory effort normal   Cardiovascular: RRR, no murmurs  Gastrointestinal: Soft, nontender, nondistended  Musculoskeletal: No bilateral ankle edema  Psychiatric: Appropriate affect, cooperative  Neurologic: Oriented x 3, speech slow but clear  Skin: No rashes      Results Reviewed:  LAB RESULTS:      Lab 23  0125 23  0124 23  0344 23  0057 23  2210 23  1607 23  1444 23  1122   WBC 26.97*  --  24.81*  --   --   --   --  19.73*   HEMOGLOBIN 9.8*  --  10.2*  --   --   --   --  10.4*   HEMATOCRIT 30.3*  --  32.1*  --   --   --   --  32.0*   PLATELETS 253  --  243  --   --   --   --  268   NEUTROS ABS 22.74*  --   --   --   --   --   --  17.06*   IMMATURE GRANS (ABS) 0.44*  --   --   --   --   --   --  0.10*   LYMPHS ABS 2.07  --   --   --   --   --   --  1.52   MONOS ABS 1.66*  --   --   --   --   --   --  1.01*   EOS ABS 0.03  --   --   --   --   --   --  0.01   MCV 77.7*  --  80.9  --   --   --   --  78.0*   PROCALCITONIN  --   --   --   --   --   --   --  1.23*   LACTATE  --  2.0  --  3.1* 3.8* 2.8* 3.9*  --          Lab 23  0124 " 05/04/23  0344 05/03/23  1122   SODIUM 134* 135* 134*   POTASSIUM 3.8 4.3 3.1*   CHLORIDE 103 104 101   CO2 21.0* 20.0* 22.0   ANION GAP 10.0 11.0 11.0   BUN 9 11 14   CREATININE 0.72 0.68 0.73   EGFR 105.9 110.3 104.1   GLUCOSE 195* 135* 112*   CALCIUM 8.3* 7.9* 8.1*   MAGNESIUM  --  2.6 1.5*         Lab 05/05/23  0124 05/03/23  1122   TOTAL PROTEIN 6.1 6.2   ALBUMIN 3.1* 3.3*   GLOBULIN 3.0 2.9   ALT (SGPT) 13 7   AST (SGOT) 18 11   BILIRUBIN 0.3 0.5   ALK PHOS 71 62         Lab 05/05/23  0311 05/05/23  0124   HSTROP T <6 <6                 Brief Urine Lab Results  (Last result in the past 365 days)      Color   Clarity   Blood   Leuk Est   Nitrite   Protein   CREAT   Urine HCG        05/03/23 1031 Yellow   Turbid   Small (1+)   Large (3+)   Positive   100 mg/dL (2+)                 Microbiology Results Abnormal     Procedure Component Value - Date/Time    Blood Culture - Blood, Arm, Right [[id number]]  (Normal) Collected: 05/03/23 1607    Lab Status: Preliminary result Specimen: Blood from Arm, Right Updated: 05/05/23 1630     Blood Culture No growth at 2 days    Blood Culture - Blood, Hand, Right [[id number]]  (Normal) Collected: 05/03/23 1444    Lab Status: Preliminary result Specimen: Blood from Hand, Right Updated: 05/05/23 1530     Blood Culture No growth at 2 days    Gastrointestinal Panel, PCR - Stool, Per Rectum [[id number]]  (Normal) Collected: 05/04/23 1108    Lab Status: Final result Specimen: Stool from Per Rectum Updated: 05/04/23 1307     Campylobacter Not Detected     Plesiomonas shigelloides Not Detected     Salmonella Not Detected     Vibrio Not Detected     Vibrio cholerae Not Detected     Yersinia enterocolitica Not Detected     Enteroaggregative E. coli (EAEC) Not Detected     Enteropathogenic E. coli (EPEC) Not Detected     Enterotoxigenic E. coli (ETEC) lt/st Not Detected     Shiga-like toxin-producing E. coli (STEC) stx1/stx2 Not Detected     Shigella/Enteroinvasive E. coli (EIEC) Not Detected      Cryptosporidium Not Detected     Cyclospora cayetanensis Not Detected     Entamoeba histolytica Not Detected     Giardia lamblia Not Detected     Adenovirus F40/41 Not Detected     Astrovirus Not Detected     Norovirus GI/GII Not Detected     Rotavirus A Not Detected     Sapovirus (I, II, IV or V) Not Detected    Clostridioides difficile Toxin - Stool, Per Rectum [[id number]]  (Normal) Collected: 05/04/23 1108    Lab Status: Final result Specimen: Stool from Per Rectum Updated: 05/04/23 1238    Narrative:      The following orders were created for panel order Clostridioides difficile Toxin - Stool, Per Rectum.  Procedure                               Abnormality         Status                     ---------                               -----------         ------                     Clostridioides difficile...[[id number]]  Normal              Final result                 Please view results for these tests on the individual orders.    Clostridioides difficile Toxin, PCR - Stool, Per Rectum [[id number]]  (Normal) Collected: 05/04/23 1108    Lab Status: Final result Specimen: Stool from Per Rectum Updated: 05/04/23 1238     C. Difficile Toxins by PCR Not Detected    Narrative:      The result indicates the absence of toxigenic C. difficile from stool specimen.     COVID PRE-OP / PRE-PROCEDURE SCREENING ORDER (NO ISOLATION) - Swab, Nasopharynx [[id number]]  (Normal) Collected: 05/03/23 0808    Lab Status: Final result Specimen: Swab from Nasopharynx Updated: 05/03/23 0907    Narrative:      The following orders were created for panel order COVID PRE-OP / PRE-PROCEDURE SCREENING ORDER (NO ISOLATION) - Swab, Nasopharynx.  Procedure                               Abnormality         Status                     ---------                               -----------         ------                     COVID-19 and FLU A/B PCR...[[id number]]  Normal              Final result                 Please view results for these tests on the  individual orders.    COVID-19 and FLU A/B PCR - Swab, Nasopharynx [[id number]]  (Normal) Collected: 05/03/23 0808    Lab Status: Final result Specimen: Swab from Nasopharynx Updated: 05/03/23 0907     COVID19 Not Detected     Influenza A PCR Not Detected     Influenza B PCR Not Detected    Narrative:      Fact sheet for providers: https://www.fda.gov/media/554308/download    Fact sheet for patients: https://www.fda.gov/media/002595/download    Test performed by PCR.          XR Chest 1 View    Result Date: 5/5/2023  EXAMINATION: Chest one view. DATE: 5/5/2023. COMPARISON: 5/3/2023. CLINICAL HISTORY: Dyspnea. FINDINGS: The lungs and pleural spaces are clear. The cardiomediastinal silhouette and the pulmonary vessels are within normal limits.     Impression: No acute cardiopulmonary process. Electronically signed by:  Jason Sheridan D.O.  5/5/2023 12:55 AM Mountain Time      Results for orders placed during the hospital encounter of 09/13/22    Adult Stress Echo W/ Cont or Stress Agent if Necessary Per Protocol    Interpretation Summary  · The patient completed 7: 50 minutes of treadmill exercise on standard Randy protocol achieving 9.8 METs of workload. The test was stopped due to fatigue after achieving the target heart rate. She reported mild chest tightness and heaviness at peak exercise which resolved in recovery.  · Expected exercise duration 8:45, actual 7:50; AVIS (+11).  · Resting heart rate 82, blood pressure 120/88. Peak heart rate 151, blood pressure 168/100. 85% of age-predicted maximal heart rate achieved.  · Average exercise capacity, completed for this protocol.  · Resting ECG showed sinus rhythm with nonspecific ST segment changes. Stress ECG is nondiagnostic due to baseline abnormalities however no significant ST abnormalities were noted. No exercise-induced arrhythmias were seen.  · Average exercise capacity with appropriate hemodynamic response to exercise.  · Negative treadmill stress test for  ischemic ST segment abnormalities or exercise-induced arrhythmias. Chest tightness and heaviness was reported which resolved in recovery.  · Resting echocardiogram showed normal left ventricular systolic function with estimated ejection fraction 65%. Trace mitral regurgitation, trace tricuspid regurgitation and trace to mild pulmonic insufficiency was noted.  · Normal stress echo with no significant echocardiographic evidence for myocardial ischemia. Post-rest ejection fraction 75%.      Current medications:  Scheduled Meds:cetirizine, 10 mg, Oral, Daily  famotidine, 20 mg, Oral, BID AC  fluticasone, 2 spray, Each Nare, Daily  heparin (porcine), 5,000 Units, Subcutaneous, Q12H  lamoTRIgine, 200 mg, Oral, Daily  montelukast, 5 mg, Oral, Q PM  phenazopyridine, 200 mg, Oral, BID With Meals  piperacillin-tazobactam, 3.375 g, Intravenous, Q8H  saccharomyces boulardii, 250 mg, Oral, BID  sodium chloride, 10 mL, Intravenous, Q12H      Continuous Infusions:lactated ringers, 9 mL/hr, Last Rate: 9 mL/hr (05/05/23 1732)      PRN Meds:.•  acetaminophen  •  aluminum-magnesium hydroxide-simethicone  •  ipratropium-albuterol  •  ketorolac  •  Magnesium Standard Dose Replacement - Follow Nurse / BPA Driven Protocol  •  melatonin  •  ondansetron **OR** ondansetron  •  Potassium Replacement - Follow Nurse / BPA Driven Protocol  •  sodium chloride  •  sodium chloride  •  sodium chloride  •  traMADol    Assessment & Plan   Assessment & Plan     Active Hospital Problems    Diagnosis  POA   • **Sepsis due to urinary tract infection [A41.9, N39.0]  Yes   • Acute febrile illness [R50.9]  Yes   • UTI (urinary tract infection) [N39.0]  Yes   • Hypokalemia [E87.6]  Yes   • Diarrhea [R19.7]  Yes   • Hypertension [I10]  Yes      Resolved Hospital Problems   No resolved problems to display.        Brief Hospital Course to date:  Bernardo Dodd is a 42 y.o. female with hx of HTN, migraines, GERD, bipolar disorder, anxiety, and kidney stones  who presents due to fevers, chills, abdominal pain.  CT scan revealed left ureteral stone and she was taken to the OR for cystoscopy by Dr. Child on 5/4.    Sepsis secondary to acute Ecoli UTI, POA  --Rocephin changed to Zosyn 5/4 , culture results and susceptibilities reviewed   --labs today pending-was going to consider discharge with transition to oral agent however patient febrile this AM to 100.6 with temp fluctuations- monitor another 24h  --urology is following, s/p cystoscopy 5/4 with left ureteral stent placement      Diarrhea  --Patient also reports diarrhea that started this morning, given that she has had recent antibiotic use (Augmentin for a sinus and ear infection) and works with patients in an assisted living facility, prudent to rule out C.diff  --Check C.diff and GI PCR panel -negative  -- Continue probiotic     Hypokalemia  --Replace per protocol    HTN  --BP running on the lower side, will hold home BP meds for now, restart if needed       GERD  --Continue Pepcid twice daily     Bipolar disorder  Anxiety  --resumed home lamictal     Allergies/asthma  --have resume patient schedule of singulair/zyrtec     Expected Discharge Location and Transportation: home  Expected Discharge pending cultures  Expected Discharge Date: 5/7/2023; Expected Discharge Time:      DVT prophylaxis:  Medical DVT prophylaxis orders are present.     AM-PAC 6 Clicks Score (PT): 24 (05/05/23 2000)    CODE STATUS:   Code Status and Medical Interventions:   Ordered at: 05/03/23 1321     Code Status (Patient has no pulse and is not breathing):    CPR (Attempt to Resuscitate)     Medical Interventions (Patient has pulse or is breathing):    Full Support       Nelly Andrade MD  05/06/23

## 2023-05-06 NOTE — PLAN OF CARE
Goal Outcome Evaluation:  Plan of Care Reviewed With: patient        Progress: no change  Outcome Evaluation: PT with fever up to 100.6 today, improved with tylenol. No c/o pain. PT hopeful to go home tomorrow.

## 2023-05-06 NOTE — PLAN OF CARE
Goal Outcome Evaluation:  Plan of Care Reviewed With: patient        Progress: improving   VSS, RA, A&Ox4. Toradol and tylenol given for fever. IV antibiotics administered. No further concerns at this time, will continue plan of care.

## 2023-05-07 ENCOUNTER — READMISSION MANAGEMENT (OUTPATIENT)
Dept: CALL CENTER | Facility: HOSPITAL | Age: 43
End: 2023-05-07
Payer: MEDICAID

## 2023-05-07 VITALS
WEIGHT: 170 LBS | BODY MASS INDEX: 34.27 KG/M2 | HEART RATE: 87 BPM | HEIGHT: 59 IN | RESPIRATION RATE: 18 BRPM | OXYGEN SATURATION: 94 % | DIASTOLIC BLOOD PRESSURE: 96 MMHG | SYSTOLIC BLOOD PRESSURE: 133 MMHG | TEMPERATURE: 98.4 F

## 2023-05-07 LAB
ALBUMIN SERPL-MCNC: 3.3 G/DL (ref 3.5–5.2)
ALBUMIN/GLOB SERPL: 1.2 G/DL
ALP SERPL-CCNC: 66 U/L (ref 39–117)
ALT SERPL W P-5'-P-CCNC: 11 U/L (ref 1–33)
ANION GAP SERPL CALCULATED.3IONS-SCNC: 11 MMOL/L (ref 5–15)
AST SERPL-CCNC: 11 U/L (ref 1–32)
BASOPHILS # BLD AUTO: 0.05 10*3/MM3 (ref 0–0.2)
BASOPHILS NFR BLD AUTO: 0.5 % (ref 0–1.5)
BILIRUB SERPL-MCNC: 0.4 MG/DL (ref 0–1.2)
BUN SERPL-MCNC: 5 MG/DL (ref 6–20)
BUN/CREAT SERPL: 7 (ref 7–25)
CALCIUM SPEC-SCNC: 8.7 MG/DL (ref 8.6–10.5)
CHLORIDE SERPL-SCNC: 99 MMOL/L (ref 98–107)
CO2 SERPL-SCNC: 26 MMOL/L (ref 22–29)
CREAT SERPL-MCNC: 0.71 MG/DL (ref 0.57–1)
DEPRECATED RDW RBC AUTO: 45.3 FL (ref 37–54)
EGFRCR SERPLBLD CKD-EPI 2021: 107.7 ML/MIN/1.73
EOSINOPHIL # BLD AUTO: 0.07 10*3/MM3 (ref 0–0.4)
EOSINOPHIL NFR BLD AUTO: 0.7 % (ref 0.3–6.2)
ERYTHROCYTE [DISTWIDTH] IN BLOOD BY AUTOMATED COUNT: 16.4 % (ref 12.3–15.4)
GLOBULIN UR ELPH-MCNC: 2.7 GM/DL
GLUCOSE SERPL-MCNC: 99 MG/DL (ref 65–99)
HCT VFR BLD AUTO: 30.4 % (ref 34–46.6)
HGB BLD-MCNC: 9.7 G/DL (ref 12–15.9)
IMM GRANULOCYTES # BLD AUTO: 0.07 10*3/MM3 (ref 0–0.05)
IMM GRANULOCYTES NFR BLD AUTO: 0.7 % (ref 0–0.5)
LYMPHOCYTES # BLD AUTO: 3.25 10*3/MM3 (ref 0.7–3.1)
LYMPHOCYTES NFR BLD AUTO: 32.4 % (ref 19.6–45.3)
MCH RBC QN AUTO: 24.3 PG (ref 26.6–33)
MCHC RBC AUTO-ENTMCNC: 31.9 G/DL (ref 31.5–35.7)
MCV RBC AUTO: 76.2 FL (ref 79–97)
MONOCYTES # BLD AUTO: 1.25 10*3/MM3 (ref 0.1–0.9)
MONOCYTES NFR BLD AUTO: 12.5 % (ref 5–12)
NEUTROPHILS NFR BLD AUTO: 5.34 10*3/MM3 (ref 1.7–7)
NEUTROPHILS NFR BLD AUTO: 53.2 % (ref 42.7–76)
NRBC BLD AUTO-RTO: 0 /100 WBC (ref 0–0.2)
PLATELET # BLD AUTO: 302 10*3/MM3 (ref 140–450)
PMV BLD AUTO: 9.9 FL (ref 6–12)
POTASSIUM SERPL-SCNC: 3.6 MMOL/L (ref 3.5–5.2)
PROT SERPL-MCNC: 6 G/DL (ref 6–8.5)
RBC # BLD AUTO: 3.99 10*6/MM3 (ref 3.77–5.28)
SODIUM SERPL-SCNC: 136 MMOL/L (ref 136–145)
WBC NRBC COR # BLD: 10.03 10*3/MM3 (ref 3.4–10.8)

## 2023-05-07 PROCEDURE — 63710000001 ONDANSETRON PER 8 MG: Performed by: UROLOGY

## 2023-05-07 PROCEDURE — 25010000002 PIPERACILLIN SOD-TAZOBACTAM PER 1 G: Performed by: UROLOGY

## 2023-05-07 PROCEDURE — 25010000002 HEPARIN (PORCINE) PER 1000 UNITS: Performed by: UROLOGY

## 2023-05-07 PROCEDURE — 80053 COMPREHEN METABOLIC PANEL: CPT | Performed by: INTERNAL MEDICINE

## 2023-05-07 PROCEDURE — 85025 COMPLETE CBC W/AUTO DIFF WBC: CPT | Performed by: INTERNAL MEDICINE

## 2023-05-07 RX ORDER — TRAMADOL HYDROCHLORIDE 50 MG/1
50 TABLET ORAL EVERY 6 HOURS PRN
Qty: 12 TABLET | Refills: 0 | Status: SHIPPED | OUTPATIENT
Start: 2023-05-07 | End: 2023-05-10

## 2023-05-07 RX ORDER — CEFUROXIME AXETIL 500 MG/1
500 TABLET ORAL EVERY 12 HOURS SCHEDULED
Qty: 6 TABLET | Refills: 0 | Status: SHIPPED | OUTPATIENT
Start: 2023-05-07 | End: 2023-05-10

## 2023-05-07 RX ORDER — PHENAZOPYRIDINE HYDROCHLORIDE 200 MG/1
200 TABLET, FILM COATED ORAL 2 TIMES DAILY WITH MEALS
Qty: 30 TABLET | Refills: 0 | Status: SHIPPED | OUTPATIENT
Start: 2023-05-07

## 2023-05-07 RX ORDER — CEFUROXIME AXETIL 250 MG/1
500 TABLET ORAL EVERY 12 HOURS SCHEDULED
Status: DISCONTINUED | OUTPATIENT
Start: 2023-05-07 | End: 2023-05-07 | Stop reason: HOSPADM

## 2023-05-07 RX ORDER — CEFUROXIME AXETIL 500 MG/1
500 TABLET ORAL EVERY 12 HOURS SCHEDULED
Qty: 6 TABLET | Refills: 0 | Status: CANCELLED | OUTPATIENT
Start: 2023-05-07 | End: 2023-05-10

## 2023-05-07 RX ORDER — FLUCONAZOLE 150 MG/1
150 TABLET ORAL ONCE
Qty: 1 TABLET | Refills: 0 | Status: SHIPPED | OUTPATIENT
Start: 2023-05-07 | End: 2023-05-07

## 2023-05-07 RX ADMIN — Medication 10 ML: at 08:21

## 2023-05-07 RX ADMIN — LAMOTRIGINE 200 MG: 100 TABLET ORAL at 08:20

## 2023-05-07 RX ADMIN — TRAMADOL HYDROCHLORIDE 50 MG: 50 TABLET, FILM COATED ORAL at 11:56

## 2023-05-07 RX ADMIN — ONDANSETRON HYDROCHLORIDE 4 MG: 4 TABLET, FILM COATED ORAL at 09:14

## 2023-05-07 RX ADMIN — HEPARIN SODIUM 5000 UNITS: 5000 INJECTION INTRAVENOUS; SUBCUTANEOUS at 08:20

## 2023-05-07 RX ADMIN — CEFUROXIME AXETIL 500 MG: 250 TABLET, FILM COATED ORAL at 08:19

## 2023-05-07 RX ADMIN — TRAMADOL HYDROCHLORIDE 50 MG: 50 TABLET, FILM COATED ORAL at 04:20

## 2023-05-07 RX ADMIN — TAZOBACTAM SODIUM AND PIPERACILLIN SODIUM 3.38 G: 375; 3 INJECTION, SOLUTION INTRAVENOUS at 03:03

## 2023-05-07 RX ADMIN — PHENAZOPYRIDINE 200 MG: 100 TABLET ORAL at 08:20

## 2023-05-07 RX ADMIN — CETIRIZINE HYDROCHLORIDE 10 MG: 10 TABLET, FILM COATED ORAL at 08:19

## 2023-05-07 RX ADMIN — FAMOTIDINE 20 MG: 20 TABLET, FILM COATED ORAL at 08:19

## 2023-05-07 RX ADMIN — Medication 250 MG: at 08:19

## 2023-05-07 NOTE — PLAN OF CARE
Goal Outcome Evaluation:  Plan of Care Reviewed With: patient           Outcome Evaluation: Patient slept few hours overnight, complain of pain once because it said she was passing a stone. Patient alert and oriented, patient said she is ready to go home today.

## 2023-05-07 NOTE — OUTREACH NOTE
Prep Survey    Flowsheet Row Responses   [facility]thod[facility] facility patient discharged from? [facility]   Is LACE score < 7 ? No   Eligibility [facility]   Date of Admission 05/03/23   Date of Discharge 05/07/23   Discharge Disposition Home or Self Care   Discharge diagnosis Sepsis due to urinary tract infection   Does the patient have one of the following disease processes/diagnoses(primary or secondary)? Sepsis   Does the patient have Home health ordered? No   Is there a DME ordered? No   Prep survey completed? Yes          Marta MAC - Registered Nurse

## 2023-05-07 NOTE — DISCHARGE SUMMARY
[facility] Medicine Services  DISCHARGE SUMMARY    Patient Name: Bernardo Dodd  : 1980  MRN: [id number]    Date of Admission: 5/3/2023  7:32 AM  Date of Discharge: 23  Primary Care Physician: Nelly Sotelo PA    Consults     Date and Time Order Name Status Description    2023  4:13 AM Inpatient Urology Consult Completed           Hospital Course     Presenting Problem:   UTI (urinary tract infection) [N39.0]  Acute febrile illness [R50.9]    Active Hospital Problems    Diagnosis  POA   • **Sepsis due to urinary tract infection [A41.9, N39.0]  Yes   • Acute febrile illness [R50.9]  Yes   • UTI (urinary tract infection) [N39.0]  Yes   • Hypokalemia [E87.6]  Yes   • Diarrhea [R19.7]  Yes   • Hypertension [I10]  Yes      Resolved Hospital Problems   No resolved problems to display.          Hospital Course:  Bernardo Dodd is a 42 y.o. female with hx of HTN, migraines, GERD, bipolar disorder, anxiety, and kidney stones who presents due to fevers, chills, abdominal pain.  CT scan revealed left ureteral stone and she was taken to the OR for cystoscopy by Dr. Child on .     Sepsis secondary to acute Ecoli UTI, POA  --Rocephin changed to Zosyn  , culture results and susceptibilities reviewed . She was ultimately changed to oral abx to complete 7 total days. Given her h/o yeast infections with abx, diflucan x1 was ordered upon discharge   --patient was monitored and noted to be afebrile for 24h prior to discharge, she was also noted to have leukocytosis that was improving   --urology is following, s/p cystoscopy  with left ureteral stent placement , she will have close f/u with Dr Child      Diarrhea  --Check C.diff and GI PCR panel -negative  -- Continue probiotic     Hypokalemia  --Replace per protocol     HTN  --ok to resume home meds at discharge         GERD  --Continue Pepcid twice daily     Bipolar disorder  Anxiety  --resumed home lamictal       Allergies/asthma  --have resume patient schedule of singulair/zte          Discharge Follow Up Recommendations for outpatient labs/diagnostics:  PCP Nelly Sotelo has appointment Tuesday 5/9  Urology as they schedule, recommended 1-2 weeks     Day of Discharge     HPI:     Doing well. Reports she had some dysuria through night and feels she passed a kidney stone, but feels well now and wishes to return home. No other issues. Afebrile    Review of Systems  Gen- No fevers, chills  CV- No chest pain, palpitations  Resp- No cough, dyspnea  GI- No N/V/D, abd pain    +dysuria    Vital Signs:   Temp:  [98.4 °F (36.9 °C)-99.7 °F (37.6 °C)] 99.2 °F (37.3 °C)  Heart Rate:  [] 96  Resp:  [18] 18  BP: (126-144)/(80-94) 126/80  Flow (L/min):  [2] 2      Physical Exam:  GEN: NAD, resting in bed, awake  HEENT: on room air, atraumatic, normocephalic  NECK: supple, no masses  RESP: on room air, normal effort  CV: on tele, sinus rhythm  PSYCH: normal affect, appropriate  NEURO: awake, alert, no focal deficits noted  MSK: no edema noted  SKIN: no rashes noted       Pertinent  and/or Most Recent Results     LAB RESULTS:      Lab 05/06/23  0847 05/05/23  0125 05/05/23  0124 05/04/23  0344 05/04/23  0057 05/03/23  2210 05/03/23  1607 05/03/23  1444 05/03/23  1122   WBC 13.64* 26.97*  --  24.81*  --   --   --   --  19.73*   HEMOGLOBIN 10.0* 9.8*  --  10.2*  --   --   --   --  10.4*   HEMATOCRIT 31.3* 30.3*  --  32.1*  --   --   --   --  32.0*   PLATELETS 299 253  --  243  --   --   --   --  268   NEUTROS ABS 9.17* 22.74*  --   --   --   --   --   --  17.06*   IMMATURE GRANS (ABS) 0.19* 0.44*  --   --   --   --   --   --  0.10*   LYMPHS ABS 2.86 2.07  --   --   --   --   --   --  1.52   MONOS ABS 1.31* 1.66*  --   --   --   --   --   --  1.01*   EOS ABS 0.07 0.03  --   --   --   --   --   --  0.01   MCV 77.9* 77.7*  --  80.9  --   --   --   --  78.0*   PROCALCITONIN  --   --   --   --   --   --   --   --  1.23*   LACTATE  --    --  2.0  --  3.1* 3.8* 2.8* 3.9*  --          Lab 05/06/23  0847 05/05/23  0124 05/04/23  0344 05/03/23  1122   SODIUM 135* 134* 135* 134*   POTASSIUM 3.9 3.8 4.3 3.1*   CHLORIDE 101 103 104 101   CO2 24.0 21.0* 20.0* 22.0   ANION GAP 10.0 10.0 11.0 11.0   BUN 6 9 11 14   CREATININE 0.64 0.72 0.68 0.73   EGFR 111.9 105.9 110.3 104.1   GLUCOSE 101* 195* 135* 112*   CALCIUM 9.0 8.3* 7.9* 8.1*   MAGNESIUM  --   --  2.6 1.5*         Lab 05/06/23  0847 05/05/23  0124 05/03/23  1122   TOTAL PROTEIN 6.7 6.1 6.2   ALBUMIN 3.2* 3.1* 3.3*   GLOBULIN 3.5 3.0 2.9   ALT (SGPT) 11 13 7   AST (SGOT) 11 18 11   BILIRUBIN 0.3 0.3 0.5   ALK PHOS 73 71 62         Lab 05/05/23  0311 05/05/23  0124   HSTROP T <6 <6                 Brief Urine Lab Results  (Last result in the past 365 days)      Color   Clarity   Blood   Leuk Est   Nitrite   Protein   CREAT   Urine HCG        05/03/23 1031 Yellow   Turbid   Small (1+)   Large (3+)   Positive   100 mg/dL (2+)               Microbiology Results (last 10 days)     Procedure Component Value - Date/Time    Clostridioides difficile Toxin - Stool, Per Rectum [[id number]]  (Normal) Collected: 05/04/23 1108    Lab Status: Final result Specimen: Stool from Per Rectum Updated: 05/04/23 1238    Narrative:      The following orders were created for panel order Clostridioides difficile Toxin - Stool, Per Rectum.  Procedure                               Abnormality         Status                     ---------                               -----------         ------                     Clostridioides difficile...[[id number]]  Normal              Final result                 Please view results for these tests on the individual orders.    Gastrointestinal Panel, PCR - Stool, Per Rectum [[id number]]  (Normal) Collected: 05/04/23 1108    Lab Status: Final result Specimen: Stool from Per Rectum Updated: 05/04/23 1307     Campylobacter Not Detected     Plesiomonas shigelloides Not Detected     Salmonella Not  Detected     Vibrio Not Detected     Vibrio cholerae Not Detected     Yersinia enterocolitica Not Detected     Enteroaggregative E. coli (EAEC) Not Detected     Enteropathogenic E. coli (EPEC) Not Detected     Enterotoxigenic E. coli (ETEC) lt/st Not Detected     Shiga-like toxin-producing E. coli (STEC) stx1/stx2 Not Detected     Shigella/Enteroinvasive E. coli (EIEC) Not Detected     Cryptosporidium Not Detected     Cyclospora cayetanensis Not Detected     Entamoeba histolytica Not Detected     Giardia lamblia Not Detected     Adenovirus F40/41 Not Detected     Astrovirus Not Detected     Norovirus GI/GII Not Detected     Rotavirus A Not Detected     Sapovirus (I, II, IV or V) Not Detected    Clostridioides difficile Toxin, PCR - Stool, Per Rectum [[id number]]  (Normal) Collected: 05/04/23 1108    Lab Status: Final result Specimen: Stool from Per Rectum Updated: 05/04/23 1238     C. Difficile Toxins by PCR Not Detected    Narrative:      The result indicates the absence of toxigenic C. difficile from stool specimen.     Blood Culture - Blood, Arm, Right [[id number]]  (Normal) Collected: 05/03/23 1607    Lab Status: Preliminary result Specimen: Blood from Arm, Right Updated: 05/06/23 1631     Blood Culture No growth at 3 days    Blood Culture - Blood, Hand, Right [[id number]]  (Normal) Collected: 05/03/23 1444    Lab Status: Preliminary result Specimen: Blood from Hand, Right Updated: 05/06/23 1531     Blood Culture No growth at 3 days    Urine Culture - Urine, Urine, Clean Catch [[id number]]  (Abnormal)  (Susceptibility) Collected: 05/03/23 1031    Lab Status: Final result Specimen: Urine, Clean Catch Updated: 05/05/23 0529     Urine Culture >100,000 CFU/mL Escherichia coli    Narrative:      Colonization of the urinary tract without infection is common. Treatment is discouraged unless the patient is symptomatic, pregnant, or undergoing an invasive urologic procedure.    Susceptibility      Escherichia coli      DEVEN       Amikacin Susceptible      Ampicillin Resistant     Ampicillin + Sulbactam Resistant     Cefazolin Susceptible      Cefepime Susceptible      Ceftazidime Susceptible      Ceftriaxone Susceptible      Gentamicin Resistant     Levofloxacin Susceptible      Nitrofurantoin Susceptible      Piperacillin + Tazobactam Susceptible      Tobramycin Intermediate      Trimethoprim + Sulfamethoxazole Resistant                          COVID PRE-OP / PRE-PROCEDURE SCREENING ORDER (NO ISOLATION) - Swab, Nasopharynx [[id number]]  (Normal) Collected: 05/03/23 0808    Lab Status: Final result Specimen: Swab from Nasopharynx Updated: 05/03/23 0907    Narrative:      The following orders were created for panel order COVID PRE-OP / PRE-PROCEDURE SCREENING ORDER (NO ISOLATION) - Swab, Nasopharynx.  Procedure                               Abnormality         Status                     ---------                               -----------         ------                     COVID-19 and FLU A/B PCR...[[id number]]  Normal              Final result                 Please view results for these tests on the individual orders.    COVID-19 and FLU A/B PCR - Swab, Nasopharynx [[id number]]  (Normal) Collected: 05/03/23 0808    Lab Status: Final result Specimen: Swab from Nasopharynx Updated: 05/03/23 0907     COVID19 Not Detected     Influenza A PCR Not Detected     Influenza B PCR Not Detected    Narrative:      Fact sheet for providers: https://www.fda.gov/media/528291/download    Fact sheet for patients: https://www.fda.gov/media/153523/download    Test performed by PCR.          CT Abdomen Pelvis Without Contrast    Result Date: 5/4/2023  EXAMINATION: CT ABDOMEN AND PELVIS WITHOUT IV CONTRAST   DATE OF EXAMINATION: 5/4/2023. COMPARISON: 10/2/2022.. INDICATION: Sepsis and fever. PROCEDURE:   Axial CT of the abdomen and pelvis was performed with sagittal and coronal reformatted images without contrast enhancement. CT dose lowering techniques were  used, to include: automated exposure control, adjustment for patient size, and/or use of iterative reconstruction. The exam is limited because some types of pathology may not be adequately demonstrated due to lack of contrast enhancement. FINDINGS: LOWER CHEST :  The visualized lung bases are clear.  There are no pleural or pericardial effusions. ABDOMEN: Liver and Biliary system:  Normal. Adrenal glands:  Normal. Kidneys and ureters:  There is a 4.3 mm stone in the left ureterovesical junction with mild left-sided hydronephrosis and mild to moderate perinephric stranding. There is a 2 mm nonobstructing stone also seen in the upper pole the right kidney and ureter  appear unremarkable. Spleen:  Normal. Pancreas:  Normal. Gallbladder:  Normal. Lymph nodes, Peritoneum and mesentery:  There is no mesenteric or retroperitoneal lymphadenopathy. Gastrointestinal tract:  There are no dilated loops of bowel or free intraperitoneal air.  . The appendix is normal. Aorta/IVC:   No aortic aneurysm..  IVC normal. Abdominal wall:  Normal. PELVIS: Fluid: There is no free fluid in the pelvis. Lymph Nodes:  There is no pelvic or inguinal lymphadenopathy.. Urinary bladder:  Normal. BONES:  There are no osseous destructive lesions.. ADDITIONAL  SIGNIFICANT FINDINGS:  Anterior subserosal fibroid measures 8.0 x 8.8 cm in diameter and is not significantly changed.     1. Mild left-sided hydronephrosis and mild to moderate perinephric stranding with a 4.3 mm stone in the left ureterovesical junction.. 2. No bowel obstruction or appendicitis. 3. Uterine fibroid. Electronically signed by:  Jason Sheridan D.O.  5/4/2023 1:24 AM Mountain Time    XR Chest 2 View    Result Date: 5/3/2023  XR CHEST 2 VW Date of Exam: 5/3/2023 7:56 AM EDT Indication: fever / chills Comparison: Chest x-ray 9/5/2022 Findings: Normal cardiomediastinal silhouette. The lungs are clear. No pleural effusion or pneumothorax. No acute osseous findings.     Impression: No  acute cardiopulmonary findings. Electronically Signed: Alan Telles  5/3/2023 8:14 AM EDT  Workstation ID: [id number]    XR Chest 1 View    Result Date: 5/5/2023  EXAMINATION: Chest one view. DATE: 5/5/2023. COMPARISON: 5/3/2023. CLINICAL HISTORY: Dyspnea. FINDINGS: The lungs and pleural spaces are clear. The cardiomediastinal silhouette and the pulmonary vessels are within normal limits.     No acute cardiopulmonary process. Electronically signed by:  Jason Sheridan D.O.  5/5/2023 12:55 AM Mountain Time    XR Pyelogram Retrograde    Result Date: 5/4/2023  XR PYELOGRAM RETROGRADE Date of Exam: 5/4/2023 6:03 AM EDT Indication: stent Comparison: CT 5/4/2023 Findings: Total fluoroscopy time 24 seconds, 8 images obtained Initial image demonstrates multiple calcifications overlying the pelvis. There is an irregular shaped 2-3 mm size calcification within the left lower pelvis likely distal left ureteral stone. Subsequent images demonstrate bilateral retrograde pyelograms.  Effect seen within the ureters likely air bubbles. Correlation with real-time fluoroscopy would be recommended. Last image demonstrates successful deployment of left-sided ureteral stent.     Impression: 1. Successful deployment of left-sided ureteral stent. Electronically Signed: Brennen Hopkins  5/4/2023 8:20 AM EDT  Workstation ID: [id number]      Results for orders placed during the hospital encounter of 12/05/22    Duplex Venous Lower Extremity - Left CAR    Interpretation Summary  •  Normal left lower extremity venous duplex scan.      Results for orders placed during the hospital encounter of 12/05/22    Duplex Venous Lower Extremity - Left CAR    Interpretation Summary  •  Normal left lower extremity venous duplex scan.      Results for orders placed during the hospital encounter of 09/13/22    Adult Stress Echo W/ Cont or Stress Agent if Necessary Per Protocol    Interpretation Summary  · The patient completed 7: 50 minutes of treadmill exercise  on standard Randy protocol achieving 9.8 METs of workload. The test was stopped due to fatigue after achieving the target heart rate. She reported mild chest tightness and heaviness at peak exercise which resolved in recovery.  · Expected exercise duration 8:45, actual 7:50; AVIS (+11).  · Resting heart rate 82, blood pressure 120/88. Peak heart rate 151, blood pressure 168/100. 85% of age-predicted maximal heart rate achieved.  · Average exercise capacity, completed for this protocol.  · Resting ECG showed sinus rhythm with nonspecific ST segment changes. Stress ECG is nondiagnostic due to baseline abnormalities however no significant ST abnormalities were noted. No exercise-induced arrhythmias were seen.  · Average exercise capacity with appropriate hemodynamic response to exercise.  · Negative treadmill stress test for ischemic ST segment abnormalities or exercise-induced arrhythmias. Chest tightness and heaviness was reported which resolved in recovery.  · Resting echocardiogram showed normal left ventricular systolic function with estimated ejection fraction 65%. Trace mitral regurgitation, trace tricuspid regurgitation and trace to mild pulmonic insufficiency was noted.  · Normal stress echo with no significant echocardiographic evidence for myocardial ischemia. Post-rest ejection fraction 75%.      Plan for Follow-up of Pending Labs/Results:will be called with any abnormal results which require f/u    Pending Labs     Order Current Status    Blood Culture - Blood, Arm, Right Preliminary result    Blood Culture - Blood, Hand, Right Preliminary result        Discharge Details        Discharge Medications      New Medications      Instructions Start Date   cefuroxime 500 MG tablet  Commonly known as: CEFTIN   500 mg, Oral, Every 12 Hours Scheduled      fluconazole 150 MG tablet  Commonly known as: Diflucan   150 mg, Oral, Once      phenazopyridine 200 MG tablet  Commonly known as: PYRIDIUM   200 mg, Oral, 2 Times  Daily With Meals         Changes to Medications      Instructions Start Date   OLANZapine 10 MG tablet  Commonly known as: ZyPREXA  What changed: See the new instructions.   Take 0.5 tablets by mouth Daily for 1 day, THEN 1 tablet Daily.   Start Date: September 7, 2022        Continue These Medications      Instructions Start Date   albuterol sulfate  (90 Base) MCG/ACT inhaler  Commonly known as: PROVENTIL HFA;VENTOLIN HFA;PROAIR HFA   2 puffs, Inhalation, Every 4 Hours PRN      amLODIPine 5 MG tablet  Commonly known as: NORVASC   5 mg, Oral, Daily      benzonatate 100 MG capsule  Commonly known as: Tessalon Perles   100 mg, Oral, 3 Times Daily PRN      celecoxib 200 MG capsule  Commonly known as: CeleBREX   200 mg, Oral, Daily      cetirizine 10 MG tablet  Commonly known as: zyrTEC   10 mg, Oral, Daily      ELDERBERRY PO   1 capsule, Oral, Daily      erythromycin 5 MG/GM ophthalmic ointment  Commonly known as: ROMYCIN   Right Eye, Every 4 Hours While Awake      fluticasone 50 MCG/ACT nasal spray  Commonly known as: Flonase   2 sprays, Nasal, Daily      lamoTRIgine 200 MG tablet  Commonly known as: LaMICtal   TAKE ONE TABLET BY MOUTH DAILY      montelukast 10 MG tablet  Commonly known as: Singulair   10 mg, Oral, Nightly      potassium chloride 20 MEQ packet  Commonly known as: KLOR-CON   20 mEq, Oral, Daily      vitamin C 250 MG tablet  Commonly known as: ASCORBIC ACID   250 mg, Oral, Daily             Allergies   Allergen Reactions   • Naproxen Swelling   • Sulfa Antibiotics Rash         Discharge Disposition:  Home or Self Care    Diet:  Hospital:  Diet Order   Procedures   • Diet: Regular/House Diet; Texture: Regular Texture (IDDSI 7); Fluid Consistency: Thin (IDDSI 0)       Activity:  as tolerated    Restrictions or Other Recommendations:  As tolerated       CODE STATUS:    Code Status and Medical Interventions:   Ordered at: 05/03/23 1321     Code Status (Patient has no pulse and is not breathing):     CPR (Attempt to Resuscitate)     Medical Interventions (Patient has pulse or is breathing):    Full Support       Future Appointments   Date Time Provider Department Center   5/10/2023  1:30 PM Nelly Sotelo PA [facility]   6/10/2023  2:30 PM LOCO CT 1 [facility] LOCO CT [facility]   10/31/2023  1:00 PM Nelly Sotelo PA [facility]       Additional Instructions for the Follow-ups that You Need to Schedule     Discharge Follow-up with PCP   As directed       Currently Documented PCP:    Nelly Sotelo PA    PCP Phone Number:    [phone]     Follow Up Details: 1-2 weeks         Discharge Follow-up with Specified Provider: Urology 1-2 weeks, Dr Child   As directed      To: Urology 1-2 weeks, Dr Mateusz Andrade MD  05/07/23      Time Spent on Discharge:  I spent  35  minutes on this discharge activity which included: face-to-face encounter with the patient, reviewing the data in the system, coordination of the care with the nursing staff as well as consultants, documentation, and entering orders.

## 2023-05-07 NOTE — CASE MANAGEMENT/SOCIAL WORK
Case Management Discharge Note      Final Note: Plan is home. Family will transport. Patient denies any discharge needs.         Selected Continued Care - Admitted Since 5/3/2023     Destination    No services have been selected for the patient.              Durable Medical Equipment    No services have been selected for the patient.              Dialysis/Infusion    No services have been selected for the patient.              Home Medical Care    No services have been selected for the patient.              Therapy    No services have been selected for the patient.              Community Resources    No services have been selected for the patient.              Community & DME    No services have been selected for the patient.                       Final Discharge Disposition Code: 01 - home or self-care

## 2023-05-08 ENCOUNTER — TRANSITIONAL CARE MANAGEMENT TELEPHONE ENCOUNTER (OUTPATIENT)
Dept: CALL CENTER | Facility: HOSPITAL | Age: 43
End: 2023-05-08
Payer: MEDICAID

## 2023-05-08 LAB
BACTERIA SPEC AEROBE CULT: NORMAL
BACTERIA SPEC AEROBE CULT: NORMAL

## 2023-05-08 NOTE — PAYOR COMM NOTE
"Radha Tolentino RN  Utilization Management  P:[phone]  F:[phone]    Auth# [id number]  Bernardo Willis \"Gricel\" (42 y.o. Female)     Date of Birth   1980    Social Security Number       Address   [address] [address]    Home Phone   [phone]    MRN   [id number]       [employer]    [employer]    Marital Status   Single                            Admission Date   5/3/23    Admission Type   Emergency    Admitting Provider   Nelly Andrade MD    Attending Provider       Department, Room/Bed   [facility] 2F, S202/       Discharge Date   2023    Discharge Disposition   Home or Self Care    Discharge Destination                               Attending Provider: (none)   Allergies: Naproxen, Sulfa Antibiotics    Isolation: None   Infection: None   Code Status: Prior    Ht: 149.9 cm (59\")   Wt: 77.1 kg (170 lb)    Admission Cmt: None   Principal Problem: Sepsis due to urinary tract infection [A41.9,N39.0]                 Active Insurance as of 5/3/2023     Primary Coverage     Payor Plan Insurance Group Employer/Plan Group    ANTHEM MEDICAID ANTHEM MEDICAID [id number]     Payor Plan Address Payor Plan Phone Number Payor Plan Fax Number Effective Dates    [address] [address] [phone]  2014 - None Entered    [facility] [id number]       Subscriber Name Subscriber Birth Date Member ID       BERNARDO WILLIS 1980 [id number]                 Emergency Contacts      (Rel.) Home Phone Work Phone Mobile Phone    Keya Willis (Mother) -- -- [phone]               Discharge Summary      Nelly Andrade MD at 23 0922              [facility] Medicine Services  DISCHARGE SUMMARY    Patient Name: Bernardo Willis  : 1980  MRN: [id number]    Date of Admission: 5/3/2023  7:32 AM  Date of Discharge: 23  Primary Care Physician: Nelly Sotelo PA    Consults     Date and Time Order Name Status " Description    5/4/2023  4:13 AM Inpatient Urology Consult Completed           Hospital Course     Presenting Problem:   UTI (urinary tract infection) [N39.0]  Acute febrile illness [R50.9]    Active Hospital Problems    Diagnosis  POA   • **Sepsis due to urinary tract infection [A41.9, N39.0]  Yes   • Acute febrile illness [R50.9]  Yes   • UTI (urinary tract infection) [N39.0]  Yes   • Hypokalemia [E87.6]  Yes   • Diarrhea [R19.7]  Yes   • Hypertension [I10]  Yes      Resolved Hospital Problems   No resolved problems to display.          Hospital Course:  Bernardo Dodd is a 42 y.o. female with hx of HTN, migraines, GERD, bipolar disorder, anxiety, and kidney stones who presents due to fevers, chills, abdominal pain.  CT scan revealed left ureteral stone and she was taken to the OR for cystoscopy by Dr. Child on 5/4.     Sepsis secondary to acute Ecoli UTI, POA  --Rocephin changed to Zosyn 5/4 , culture results and susceptibilities reviewed . She was ultimately changed to oral abx to complete 7 total days. Given her h/o yeast infections with abx, diflucan x1 was ordered upon discharge   --patient was monitored and noted to be afebrile for 24h prior to discharge, she was also noted to have leukocytosis that was improving   --urology is following, s/p cystoscopy 5/4 with left ureteral stent placement , she will have close f/u with Dr Child      Diarrhea  --Check C.diff and GI PCR panel -negative  -- Continue probiotic     Hypokalemia  --Replace per protocol     HTN  --ok to resume home meds at discharge         GERD  --Continue Pepcid twice daily     Bipolar disorder  Anxiety  --resumed home lamictal      Allergies/asthma  --have resume patient schedule of singulair/zyrtec          Discharge Follow Up Recommendations for outpatient labs/diagnostics:  PCP Nelly Sotelo has appointment Tuesday 5/9  Urology as they schedule, recommended 1-2 weeks     Day of Discharge     HPI:     Doing well. Reports she had some  dysuria through night and feels she passed a kidney stone, but feels well now and wishes to return home. No other issues. Afebrile    Review of Systems  Gen- No fevers, chills  CV- No chest pain, palpitations  Resp- No cough, dyspnea  GI- No N/V/D, abd pain    +dysuria    Vital Signs:   Temp:  [98.4 °F (36.9 °C)-99.7 °F (37.6 °C)] 99.2 °F (37.3 °C)  Heart Rate:  [] 96  Resp:  [18] 18  BP: (126-144)/(80-94) 126/80  Flow (L/min):  [2] 2      Physical Exam:  GEN: NAD, resting in bed, awake  HEENT: on room air, atraumatic, normocephalic  NECK: supple, no masses  RESP: on room air, normal effort  CV: on tele, sinus rhythm  PSYCH: normal affect, appropriate  NEURO: awake, alert, no focal deficits noted  MSK: no edema noted  SKIN: no rashes noted       Pertinent  and/or Most Recent Results     LAB RESULTS:      Lab 05/06/23  0847 05/05/23  0125 05/05/23  0124 05/04/23  0344 05/04/23  0057 05/03/23  2210 05/03/23  1607 05/03/23  1444 05/03/23  1122   WBC 13.64* 26.97*  --  24.81*  --   --   --   --  19.73*   HEMOGLOBIN 10.0* 9.8*  --  10.2*  --   --   --   --  10.4*   HEMATOCRIT 31.3* 30.3*  --  32.1*  --   --   --   --  32.0*   PLATELETS 299 253  --  243  --   --   --   --  268   NEUTROS ABS 9.17* 22.74*  --   --   --   --   --   --  17.06*   IMMATURE GRANS (ABS) 0.19* 0.44*  --   --   --   --   --   --  0.10*   LYMPHS ABS 2.86 2.07  --   --   --   --   --   --  1.52   MONOS ABS 1.31* 1.66*  --   --   --   --   --   --  1.01*   EOS ABS 0.07 0.03  --   --   --   --   --   --  0.01   MCV 77.9* 77.7*  --  80.9  --   --   --   --  78.0*   PROCALCITONIN  --   --   --   --   --   --   --   --  1.23*   LACTATE  --   --  2.0  --  3.1* 3.8* 2.8* 3.9*  --          Lab 05/06/23  0847 05/05/23  0124 05/04/23  0344 05/03/23  1122   SODIUM 135* 134* 135* 134*   POTASSIUM 3.9 3.8 4.3 3.1*   CHLORIDE 101 103 104 101   CO2 24.0 21.0* 20.0* 22.0   ANION GAP 10.0 10.0 11.0 11.0   BUN 6 9 11 14   CREATININE 0.64 0.72 0.68 0.73   EGFR  111.9 105.9 110.3 104.1   GLUCOSE 101* 195* 135* 112*   CALCIUM 9.0 8.3* 7.9* 8.1*   MAGNESIUM  --   --  2.6 1.5*         Lab 05/06/23  0847 05/05/23  0124 05/03/23  1122   TOTAL PROTEIN 6.7 6.1 6.2   ALBUMIN 3.2* 3.1* 3.3*   GLOBULIN 3.5 3.0 2.9   ALT (SGPT) 11 13 7   AST (SGOT) 11 18 11   BILIRUBIN 0.3 0.3 0.5   ALK PHOS 73 71 62         Lab 05/05/23  0311 05/05/23  0124   HSTROP T <6 <6                 Brief Urine Lab Results  (Last result in the past 365 days)      Color   Clarity   Blood   Leuk Est   Nitrite   Protein   CREAT   Urine HCG        05/03/23 1031 Yellow   Turbid   Small (1+)   Large (3+)   Positive   100 mg/dL (2+)               Microbiology Results (last 10 days)     Procedure Component Value - Date/Time    Clostridioides difficile Toxin - Stool, Per Rectum [[id number]]  (Normal) Collected: 05/04/23 1108    Lab Status: Final result Specimen: Stool from Per Rectum Updated: 05/04/23 1238    Narrative:      The following orders were created for panel order Clostridioides difficile Toxin - Stool, Per Rectum.  Procedure                               Abnormality         Status                     ---------                               -----------         ------                     Clostridioides difficile...[[id number]]  Normal              Final result                 Please view results for these tests on the individual orders.    Gastrointestinal Panel, PCR - Stool, Per Rectum [[id number]]  (Normal) Collected: 05/04/23 1108    Lab Status: Final result Specimen: Stool from Per Rectum Updated: 05/04/23 1307     Campylobacter Not Detected     Plesiomonas shigelloides Not Detected     Salmonella Not Detected     Vibrio Not Detected     Vibrio cholerae Not Detected     Yersinia enterocolitica Not Detected     Enteroaggregative E. coli (EAEC) Not Detected     Enteropathogenic E. coli (EPEC) Not Detected     Enterotoxigenic E. coli (ETEC) lt/st Not Detected     Shiga-like toxin-producing E.  coli (STEC) stx1/stx2 Not Detected     Shigella/Enteroinvasive E. coli (EIEC) Not Detected     Cryptosporidium Not Detected     Cyclospora cayetanensis Not Detected     Entamoeba histolytica Not Detected     Giardia lamblia Not Detected     Adenovirus F40/41 Not Detected     Astrovirus Not Detected     Norovirus GI/GII Not Detected     Rotavirus A Not Detected     Sapovirus (I, II, IV or V) Not Detected    Clostridioides difficile Toxin, PCR - Stool, Per Rectum [[id number]]  (Normal) Collected: 05/04/23 1108    Lab Status: Final result Specimen: Stool from Per Rectum Updated: 05/04/23 1238     C. Difficile Toxins by PCR Not Detected    Narrative:      The result indicates the absence of toxigenic C. difficile from stool specimen.     Blood Culture - Blood, Arm, Right [[id number]]  (Normal) Collected: 05/03/23 1607    Lab Status: Preliminary result Specimen: Blood from Arm, Right Updated: 05/06/23 1631     Blood Culture No growth at 3 days    Blood Culture - Blood, Hand, Right [[id number]]  (Normal) Collected: 05/03/23 1444    Lab Status: Preliminary result Specimen: Blood from Hand, Right Updated: 05/06/23 1531     Blood Culture No growth at 3 days    Urine Culture - Urine, Urine, Clean Catch [[id number]]  (Abnormal)  (Susceptibility) Collected: 05/03/23 1031    Lab Status: Final result Specimen: Urine, Clean Catch Updated: 05/05/23 0529     Urine Culture >100,000 CFU/mL Escherichia coli    Narrative:      Colonization of the urinary tract without infection is common. Treatment is discouraged unless the patient is symptomatic, pregnant, or undergoing an invasive urologic procedure.    Susceptibility      Escherichia coli      DEVEN      Amikacin Susceptible      Ampicillin Resistant     Ampicillin + Sulbactam Resistant     Cefazolin Susceptible      Cefepime Susceptible      Ceftazidime Susceptible      Ceftriaxone Susceptible      Gentamicin Resistant     Levofloxacin Susceptible      Nitrofurantoin Susceptible       Piperacillin + Tazobactam Susceptible      Tobramycin Intermediate      Trimethoprim + Sulfamethoxazole Resistant                          COVID PRE-OP / PRE-PROCEDURE SCREENING ORDER (NO ISOLATION) - Swab, Nasopharynx [[id number]]  (Normal) Collected: 05/03/23 0808    Lab Status: Final result Specimen: Swab from Nasopharynx Updated: 05/03/23 0907    Narrative:      The following orders were created for panel order COVID PRE-OP / PRE-PROCEDURE SCREENING ORDER (NO ISOLATION) - Swab, Nasopharynx.  Procedure                               Abnormality         Status                     ---------                               -----------         ------                     COVID-19 and FLU A/B PCR...[[id number]]  Normal              Final result                 Please view results for these tests on the individual orders.    COVID-19 and FLU A/B PCR - Swab, Nasopharynx [[id number]]  (Normal) Collected: 05/03/23 0808    Lab Status: Final result Specimen: Swab from Nasopharynx Updated: 05/03/23 0907     COVID19 Not Detected     Influenza A PCR Not Detected     Influenza B PCR Not Detected    Narrative:      Fact sheet for providers: https://www.fda.gov/media/451796/download    Fact sheet for patients: https://www.fda.gov/media/065939/download    Test performed by PCR.          CT Abdomen Pelvis Without Contrast    Result Date: 5/4/2023  EXAMINATION: CT ABDOMEN AND PELVIS WITHOUT IV CONTRAST   DATE OF EXAMINATION: 5/4/2023. COMPARISON: 10/2/2022.. INDICATION: Sepsis and fever. PROCEDURE:   Axial CT of the abdomen and pelvis was performed with sagittal and coronal reformatted images without contrast enhancement. CT dose lowering techniques were used, to include: automated exposure control, adjustment for patient size, and/or use of iterative reconstruction. The exam is limited because some types of pathology may not be adequately demonstrated due to lack of contrast enhancement. FINDINGS: LOWER CHEST :  The visualized lung bases  are clear.  There are no pleural or pericardial effusions. ABDOMEN: Liver and Biliary system:  Normal. Adrenal glands:  Normal. Kidneys and ureters:  There is a 4.3 mm stone in the left ureterovesical junction with mild left-sided hydronephrosis and mild to moderate perinephric stranding. There is a 2 mm nonobstructing stone also seen in the upper pole the right kidney and ureter  appear unremarkable. Spleen:  Normal. Pancreas:  Normal. Gallbladder:  Normal. Lymph nodes, Peritoneum and mesentery:  There is no mesenteric or retroperitoneal lymphadenopathy. Gastrointestinal tract:  There are no dilated loops of bowel or free intraperitoneal air.  . The appendix is normal. Aorta/IVC:   No aortic aneurysm..  IVC normal. Abdominal wall:  Normal. PELVIS: Fluid: There is no free fluid in the pelvis. Lymph Nodes:  There is no pelvic or inguinal lymphadenopathy.. Urinary bladder:  Normal. BONES:  There are no osseous destructive lesions.. ADDITIONAL  SIGNIFICANT FINDINGS:  Anterior subserosal fibroid measures 8.0 x 8.8 cm in diameter and is not significantly changed.     1. Mild left-sided hydronephrosis and mild to moderate perinephric stranding with a 4.3 mm stone in the left ureterovesical junction.. 2. No bowel obstruction or appendicitis. 3. Uterine fibroid. Electronically signed by:  Jason Sheridan D.O.  5/4/2023 1:24 AM Mountain Time    XR Chest 2 View    Result Date: 5/3/2023  XR CHEST 2 VW Date of Exam: 5/3/2023 7:56 AM EDT Indication: fever / chills Comparison: Chest x-ray 9/5/2022 Findings: Normal cardiomediastinal silhouette. The lungs are clear. No pleural effusion or pneumothorax. No acute osseous findings.     Impression: No acute cardiopulmonary findings. Electronically Signed: Alan Telles  5/3/2023 8:14 AM EDT  Workstation ID: [id number]    XR Chest 1 View    Result Date: 5/5/2023  EXAMINATION: Chest one view. DATE: 5/5/2023. COMPARISON: 5/3/2023. CLINICAL HISTORY: Dyspnea. FINDINGS: The lungs and pleural  spaces are clear. The cardiomediastinal silhouette and the pulmonary vessels are within normal limits.     No acute cardiopulmonary process. Electronically signed by:  Jason Sheridan D.O.  5/5/2023 12:55 AM Mountain Time    XR Pyelogram Retrograde    Result Date: 5/4/2023  XR PYELOGRAM RETROGRADE Date of Exam: 5/4/2023 6:03 AM EDT Indication: stent Comparison: CT 5/4/2023 Findings: Total fluoroscopy time 24 seconds, 8 images obtained Initial image demonstrates multiple calcifications overlying the pelvis. There is an irregular shaped 2-3 mm size calcification within the left lower pelvis likely distal left ureteral stone. Subsequent images demonstrate bilateral retrograde pyelograms.  Effect seen within the ureters likely air bubbles. Correlation with real-time fluoroscopy would be recommended. Last image demonstrates successful deployment of left-sided ureteral stent.     Impression: 1. Successful deployment of left-sided ureteral stent. Electronically Signed: Brennen Hopkins  5/4/2023 8:20 AM EDT  Workstation ID: [id number]      Results for orders placed during the hospital encounter of 12/05/22    Duplex Venous Lower Extremity - Left CAR    Interpretation Summary  •  Normal left lower extremity venous duplex scan.      Results for orders placed during the hospital encounter of 12/05/22    Duplex Venous Lower Extremity - Left CAR    Interpretation Summary  •  Normal left lower extremity venous duplex scan.      Results for orders placed during the hospital encounter of 09/13/22    Adult Stress Echo W/ Cont or Stress Agent if Necessary Per Protocol    Interpretation Summary  · The patient completed 7: 50 minutes of treadmill exercise on standard Randy protocol achieving 9.8 METs of workload. The test was stopped due to fatigue after achieving the target heart rate. She reported mild chest tightness and heaviness at peak exercise which resolved in recovery.  · Expected exercise duration 8:45, actual 7:50; AVIS (+11).  ·  Resting heart rate 82, blood pressure 120/88. Peak heart rate 151, blood pressure 168/100. 85% of age-predicted maximal heart rate achieved.  · Average exercise capacity, completed for this protocol.  · Resting ECG showed sinus rhythm with nonspecific ST segment changes. Stress ECG is nondiagnostic due to baseline abnormalities however no significant ST abnormalities were noted. No exercise-induced arrhythmias were seen.  · Average exercise capacity with appropriate hemodynamic response to exercise.  · Negative treadmill stress test for ischemic ST segment abnormalities or exercise-induced arrhythmias. Chest tightness and heaviness was reported which resolved in recovery.  · Resting echocardiogram showed normal left ventricular systolic function with estimated ejection fraction 65%. Trace mitral regurgitation, trace tricuspid regurgitation and trace to mild pulmonic insufficiency was noted.  · Normal stress echo with no significant echocardiographic evidence for myocardial ischemia. Post-rest ejection fraction 75%.      Plan for Follow-up of Pending Labs/Results:will be called with any abnormal results which require f/u    Pending Labs     Order Current Status    Blood Culture - Blood, Arm, Right Preliminary result    Blood Culture - Blood, Hand, Right Preliminary result        Discharge Details        Discharge Medications      New Medications      Instructions Start Date   cefuroxime 500 MG tablet  Commonly known as: CEFTIN   500 mg, Oral, Every 12 Hours Scheduled      fluconazole 150 MG tablet  Commonly known as: Diflucan   150 mg, Oral, Once      phenazopyridine 200 MG tablet  Commonly known as: PYRIDIUM   200 mg, Oral, 2 Times Daily With Meals         Changes to Medications      Instructions Start Date   OLANZapine 10 MG tablet  Commonly known as: ZyPREXA  What changed: See the new instructions.   Take 0.5 tablets by mouth Daily for 1 day, THEN 1 tablet Daily.   Start Date: September 7, 2022        Continue  These Medications      Instructions Start Date   albuterol sulfate  (90 Base) MCG/ACT inhaler  Commonly known as: PROVENTIL HFA;VENTOLIN HFA;PROAIR HFA   2 puffs, Inhalation, Every 4 Hours PRN      amLODIPine 5 MG tablet  Commonly known as: NORVASC   5 mg, Oral, Daily      benzonatate 100 MG capsule  Commonly known as: Tessalon Perles   100 mg, Oral, 3 Times Daily PRN      celecoxib 200 MG capsule  Commonly known as: CeleBREX   200 mg, Oral, Daily      cetirizine 10 MG tablet  Commonly known as: zyrTEC   10 mg, Oral, Daily      ELDERBERRY PO   1 capsule, Oral, Daily      erythromycin 5 MG/GM ophthalmic ointment  Commonly known as: ROMYCIN   Right Eye, Every 4 Hours While Awake      fluticasone 50 MCG/ACT nasal spray  Commonly known as: Flonase   2 sprays, Nasal, Daily      lamoTRIgine 200 MG tablet  Commonly known as: LaMICtal   TAKE ONE TABLET BY MOUTH DAILY      montelukast 10 MG tablet  Commonly known as: Singulair   10 mg, Oral, Nightly      potassium chloride 20 MEQ packet  Commonly known as: KLOR-CON   20 mEq, Oral, Daily      vitamin C 250 MG tablet  Commonly known as: ASCORBIC ACID   250 mg, Oral, Daily             Allergies   Allergen Reactions   • Naproxen Swelling   • Sulfa Antibiotics Rash         Discharge Disposition:  Home or Self Care    Diet:  Hospital:  Diet Order   Procedures   • Diet: Regular/House Diet; Texture: Regular Texture (IDDSI 7); Fluid Consistency: Thin (IDDSI 0)       Activity:  as tolerated    Restrictions or Other Recommendations:  As tolerated       CODE STATUS:    Code Status and Medical Interventions:   Ordered at: 05/03/23 1321     Code Status (Patient has no pulse and is not breathing):    CPR (Attempt to Resuscitate)     Medical Interventions (Patient has pulse or is breathing):    Full Support       Future Appointments   Date Time Provider Department Center   5/10/2023  1:30 PM Nelly Sotelo PA [facility]   6/10/2023  2:30 PM LOCO CT 1  LOCO CT LOCO   10/31/2023   1:00 PM Nelly Sotelo PA [facility]       Additional Instructions for the Follow-ups that You Need to Schedule     Discharge Follow-up with PCP   As directed       Currently Documented PCP:    Nelly Sotelo PA    PCP Phone Number:    [phone]     Follow Up Details: 1-2 weeks         Discharge Follow-up with Specified Provider: Urology 1-2 weeks, Dr Child   As directed      To: Urology 1-2 weeks, Dr Mateusz Andrade MD  05/07/23      Time Spent on Discharge:  I spent  35  minutes on this discharge activity which included: face-to-face encounter with the patient, reviewing the data in the system, coordination of the care with the nursing staff as well as consultants, documentation, and entering orders.            Electronically signed by Nelly Andrade MD at 05/07/23 0945

## 2023-05-08 NOTE — OUTREACH NOTE
Call Center TCM Note    Flowsheet Row Responses   [facility] patient discharged from? [place]   Does the patient have one of the following disease processes/diagnoses(primary or secondary)? Sepsis   TCM attempt successful? Yes   Call start time 1232   Call end time 1242   Discharge diagnosis Sepsis due to urinary tract infection   Person spoke with today (if not patient) and relationship patient   Meds reviewed with patient/caregiver? Yes   Is the patient having any side effects they believe may be caused by any medication additions or changes? No   Does the patient have all medications related to this admission filled (includes all antibiotics, inhalers, nebulizers,steroids,etc.) Yes   Is the patient taking all medications as directed (includes completed medication regime)? Yes   Comments Patient has a previosuly scheduled appt on May 10 with Nelly Sotelo.    Does the patient have an appointment with their PCP within 7 days of discharge? Yes   Psychosocial issues? No   Did the patient receive a copy of their discharge instructions? Yes   Nursing interventions Reviewed instructions with patient   What is the patient's perception of their health status since discharge? Improving   Nursing interventions Nurse provided patient education   Is the patient/caregiver able to teach back TIME? T emperature - higher or lower than normal, I nfection - may have signs and symptoms of an infection, M ental Decline - confused, sleepy, difficult to arouse, E xtremely Ill - severe pain, discomfort, shortness of breath   Nursing interventions Nurse provided reassurance to patient   Is patient/caregiver able to teach back steps to recovery at home? Set small, achievable goals for return to baseline health, Rest and regain strength   Is the patient/caregiver able to teach back signs and symptoms of worsening condition: Fever   If the patient is a current smoker, are they able to teach back resources for cessation? Not a smoker    Is the patient/caregiver able to teach back the hierarchy of who to call/visit for symptoms/problems? PCP, Specialist, Home health nurse, Urgent Care, ED, 911 Yes   Additional teach back comments Patient is drinking lots of water and crystal light   TCM call completed? Yes   Call end time 1242   Would this patient benefit from a Referral to [facility] Social Work? No   Is the patient interested in additional calls from an ambulatory ?  NOTE:  applies to high risk patients requiring additional follow-up. No          Jules Haywood RN    5/8/2023, 12:42 EDT

## 2023-05-09 ENCOUNTER — PREP FOR SURGERY (OUTPATIENT)
Dept: OTHER | Facility: HOSPITAL | Age: 43
End: 2023-05-09
Payer: MEDICAID

## 2023-05-09 DIAGNOSIS — N20.1 LEFT URETERAL STONE: Primary | ICD-10-CM

## 2023-05-09 RX ORDER — ACETAMINOPHEN 500 MG
1000 TABLET ORAL ONCE
OUTPATIENT
Start: 2023-05-09 | End: 2023-05-09

## 2023-05-09 RX ORDER — SCOLOPAMINE TRANSDERMAL SYSTEM 1 MG/1
1 PATCH, EXTENDED RELEASE TRANSDERMAL CONTINUOUS
OUTPATIENT
Start: 2023-05-09 | End: 2023-05-12

## 2023-05-09 RX ORDER — GABAPENTIN 300 MG/1
600 CAPSULE ORAL ONCE
OUTPATIENT
Start: 2023-05-09 | End: 2023-05-09

## 2023-05-09 RX ORDER — CEFAZOLIN SODIUM 2 G/100ML
2 INJECTION, SOLUTION INTRAVENOUS ONCE
OUTPATIENT
Start: 2023-05-09 | End: 2023-05-09

## 2023-05-10 ENCOUNTER — LAB (OUTPATIENT)
Dept: LAB | Facility: HOSPITAL | Age: 43
End: 2023-05-10
Payer: MEDICAID

## 2023-05-10 ENCOUNTER — OFFICE VISIT (OUTPATIENT)
Dept: INTERNAL MEDICINE | Facility: CLINIC | Age: 43
End: 2023-05-10
Payer: MEDICAID

## 2023-05-10 VITALS
TEMPERATURE: 98.4 F | DIASTOLIC BLOOD PRESSURE: 76 MMHG | BODY MASS INDEX: 33.41 KG/M2 | SYSTOLIC BLOOD PRESSURE: 118 MMHG | OXYGEN SATURATION: 94 % | WEIGHT: 165.4 LBS | HEART RATE: 81 BPM

## 2023-05-10 DIAGNOSIS — Z00.00 HEALTH CARE MAINTENANCE: ICD-10-CM

## 2023-05-10 DIAGNOSIS — N39.0 SEPSIS DUE TO URINARY TRACT INFECTION: ICD-10-CM

## 2023-05-10 DIAGNOSIS — A41.9 SEPSIS DUE TO URINARY TRACT INFECTION: ICD-10-CM

## 2023-05-10 DIAGNOSIS — N20.1 LEFT URETERAL STONE: Primary | ICD-10-CM

## 2023-05-10 DIAGNOSIS — N76.4 VULVAR ABSCESS: ICD-10-CM

## 2023-05-10 LAB
ALBUMIN SERPL-MCNC: 3.7 G/DL (ref 3.5–5.2)
ALBUMIN/GLOB SERPL: 1.1 G/DL
ALP SERPL-CCNC: 62 U/L (ref 39–117)
ALT SERPL W P-5'-P-CCNC: 11 U/L (ref 1–33)
ANION GAP SERPL CALCULATED.3IONS-SCNC: 8.8 MMOL/L (ref 5–15)
AST SERPL-CCNC: 13 U/L (ref 1–32)
BASOPHILS # BLD AUTO: 0.04 10*3/MM3 (ref 0–0.2)
BASOPHILS NFR BLD AUTO: 0.5 % (ref 0–1.5)
BILIRUB SERPL-MCNC: <0.2 MG/DL (ref 0–1.2)
BUN SERPL-MCNC: 16 MG/DL (ref 6–20)
BUN/CREAT SERPL: 18.8 (ref 7–25)
CALCIUM SPEC-SCNC: 9.5 MG/DL (ref 8.6–10.5)
CHLORIDE SERPL-SCNC: 104 MMOL/L (ref 98–107)
CHOLEST SERPL-MCNC: 157 MG/DL (ref 0–200)
CO2 SERPL-SCNC: 27.2 MMOL/L (ref 22–29)
CREAT SERPL-MCNC: 0.85 MG/DL (ref 0.57–1)
DEPRECATED RDW RBC AUTO: 45.7 FL (ref 37–54)
EGFRCR SERPLBLD CKD-EPI 2021: 86.8 ML/MIN/1.73
EOSINOPHIL # BLD AUTO: 0.17 10*3/MM3 (ref 0–0.4)
EOSINOPHIL NFR BLD AUTO: 2 % (ref 0.3–6.2)
ERYTHROCYTE [DISTWIDTH] IN BLOOD BY AUTOMATED COUNT: 16.1 % (ref 12.3–15.4)
GLOBULIN UR ELPH-MCNC: 3.3 GM/DL
GLUCOSE SERPL-MCNC: 104 MG/DL (ref 65–99)
HCT VFR BLD AUTO: 32.3 % (ref 34–46.6)
HDLC SERPL-MCNC: 40 MG/DL (ref 40–60)
HGB BLD-MCNC: 10.2 G/DL (ref 12–15.9)
IMM GRANULOCYTES # BLD AUTO: 0.15 10*3/MM3 (ref 0–0.05)
IMM GRANULOCYTES NFR BLD AUTO: 1.7 % (ref 0–0.5)
LDLC SERPL CALC-MCNC: 94 MG/DL (ref 0–100)
LDLC/HDLC SERPL: 2.28 {RATIO}
LYMPHOCYTES # BLD AUTO: 2.71 10*3/MM3 (ref 0.7–3.1)
LYMPHOCYTES NFR BLD AUTO: 31.3 % (ref 19.6–45.3)
MCH RBC QN AUTO: 24.5 PG (ref 26.6–33)
MCHC RBC AUTO-ENTMCNC: 31.6 G/DL (ref 31.5–35.7)
MCV RBC AUTO: 77.6 FL (ref 79–97)
MONOCYTES # BLD AUTO: 0.55 10*3/MM3 (ref 0.1–0.9)
MONOCYTES NFR BLD AUTO: 6.3 % (ref 5–12)
NEUTROPHILS NFR BLD AUTO: 5.05 10*3/MM3 (ref 1.7–7)
NEUTROPHILS NFR BLD AUTO: 58.2 % (ref 42.7–76)
NRBC BLD AUTO-RTO: 0 /100 WBC (ref 0–0.2)
PLATELET # BLD AUTO: 425 10*3/MM3 (ref 140–450)
PMV BLD AUTO: 10.6 FL (ref 6–12)
POTASSIUM SERPL-SCNC: 4.1 MMOL/L (ref 3.5–5.2)
PROT SERPL-MCNC: 7 G/DL (ref 6–8.5)
QT INTERVAL: 314 MS
QTC INTERVAL: 440 MS
RBC # BLD AUTO: 4.16 10*6/MM3 (ref 3.77–5.28)
SODIUM SERPL-SCNC: 140 MMOL/L (ref 136–145)
TRIGL SERPL-MCNC: 129 MG/DL (ref 0–150)
TSH SERPL DL<=0.05 MIU/L-ACNC: 1.11 UIU/ML (ref 0.27–4.2)
VLDLC SERPL-MCNC: 23 MG/DL (ref 5–40)
WBC NRBC COR # BLD: 8.67 10*3/MM3 (ref 3.4–10.8)

## 2023-05-10 PROCEDURE — 80061 LIPID PANEL: CPT

## 2023-05-10 PROCEDURE — 80050 GENERAL HEALTH PANEL: CPT

## 2023-05-10 NOTE — LETTER
May 10, 2023     Patient: Bernardo Ddod   YOB: 1980   Date of Visit: 5/10/2023       To Whom it May Concern:    Bernardo Dodd was seen in my clinic on 5/10/2023. She may return to work on 5/12/2023 .    If you have any questions or concerns, please don't hesitate to call.         Sincerely,          SUHA Leyva        CC:   No Recipients

## 2023-05-10 NOTE — PROGRESS NOTES
Transitional Care Follow Up Visit  Subjective     Bernardo Dodd is a 42 y.o. female who presents for a transitional care management visit.    Within 48 business hours after discharge our office contacted her via telephone to coordinate her care and needs.      I reviewed and discussed the details of that call along with the discharge summary, hospital problems, inpatient lab results, inpatient diagnostic studies, and consultation reports with Bernardo.     Current outpatient and discharge medications have been reconciled for the patient.  Reviewed by: SUHA Gibbs          5/7/2023     1:22 PM   Date of TCM Phone Call   [facility]   Date of Admission 5/3/2023   Date of Discharge 5/7/2023   Discharge Disposition Home or Self Care     Risk for Readmission (LACE) Score: 11 (5/7/2023  6:00 AM)      History of Present Illness   Course During Hospital Stay:     The patient presents to the clinic today for a hospital transitional care management visit.    She went to the emergency room on 05/03/2023 and was discharged on 05/07/2023. She was diagnosed with a UTI and was septic. Her white blood cell count was elevated. She was on a steroid for her sinuses. Her symptoms started on the night of 05/03/2023. She started getting hot and shaky. Her temperature was 101 degrees Fahrenheit. At 1:00 PM, it was 103 degrees Fahrenheit. Around 2:00 PM, it lingered from 101 to 102 degrees Fahrenheit. Then, it was 103 to 104 degrees Fahrenheit. She stopped checking her temperature. Before the first shift came in, she was climbed up in a fetal position in the chair crying. They told her to go somewhere and go home. She decided to go to the hospital. She was put on IV antibiotics. She was then switched to oral antibiotics. She finished the cefuroxime twice a day.   She is now having some vulvar pain. Felt an abscess in faith mitali where she had a bartholin cyst or abscess before. She started squeezing where the open wound is  and she could feel drainage come out.  Her leg swelling went back down. It was hurting to sit down. She has been putting Desitin cream on it.      Her urinary symptoms are better. She has a stent in her ureter. She goes back on 05/19/2023. She sees spots of draining. She has been having regular bowel movements. She is supposed to see the urologist on 05/19/2023 to talk about surgical removal of stent. She assumes she passed the stone the morning of 05/06/2023. They have not scanned it to make sure.     She needs a work note to go back to work on 05/12/2023.     The following portions of the patient's history were reviewed and updated as appropriate: allergies, current medications, past family history, past medical history, past social history, past surgical history and problem list.    Review of Systems   Constitutional: Negative for activity change, appetite change and fatigue.   HENT: Negative for congestion and rhinorrhea.    Respiratory: Negative for chest tightness and shortness of breath.    Cardiovascular: Negative for chest pain and palpitations.   Gastrointestinal: Negative for abdominal pain.   Genitourinary: Negative for dysuria and vaginal bleeding.   Musculoskeletal: Negative for arthralgias and myalgias.   Neurological: Negative for dizziness, weakness, light-headedness and headaches.   Psychiatric/Behavioral: Negative for dysphoric mood. The patient is not nervous/anxious.           Objective      /76 (BP Location: Left arm)   Pulse 81   Temp 98.4 °F (36.9 °C) (Temporal)   Wt 75 kg (165 lb 6.4 oz)   LMP 04/26/2023 (Approximate) Comment: Regular periods- 7 days long  SpO2 94%   BMI 33.41 kg/m²     Physical Exam  Vitals and nursing note reviewed.   Constitutional:       Appearance: She is well-developed.   HENT:      Head: Normocephalic and atraumatic.      Right Ear: External ear normal.      Left Ear: External ear normal.   Eyes:      Conjunctiva/sclera: Conjunctivae normal.    Cardiovascular:      Rate and Rhythm: Normal rate and regular rhythm.   Pulmonary:      Effort: Pulmonary effort is normal.      Breath sounds: Normal breath sounds.   Genitourinary:     Comments: No palpable abnormality in area of discomfort; vulva covered with desitin cream  Musculoskeletal:         General: Normal range of motion.      Cervical back: Normal range of motion.   Skin:     General: Skin is warm and dry.   Psychiatric:         Behavior: Behavior normal.         Assessment & Plan   Diagnoses and all orders for this visit:    1. Left ureteral stone (Primary)  Comments:  Passed during hospitalization. Pending f/u with Urology.    2. Sepsis due to urinary tract infection  Comments:  Completed abx as directed. Recheck labs as ordered.  Orders:  -     Comprehensive Metabolic Panel; Future  -     CBC & Differential; Future    3. Vulvar abscess  Comments:  Resolving after drainage. Keep area clean and dry. Do warm sitz baths for discomfort if needed.                 Answers for HPI/ROS submitted by the patient on 5/8/2023  Please describe your symptoms.: Follow up from hospital  Have you had these symptoms before?: Yes  How long have you been having these symptoms?: 1-4 days  Please list any medications you are currently taking for this condition.: Probiotics  Please describe any probable cause for these symptoms. : Kidney stones  What is the primary reason for your visit?: Other    Transcribed from ambient dictation for SUHA Leyva by Dinorah Erickson.  05/10/23   14:43 EDT    Patient or patient representative verbalized consent to the visit recording.  I have personally performed the services described in this document as transcribed by the above individual, and it is both accurate and complete.

## 2023-05-12 ENCOUNTER — TELEPHONE (OUTPATIENT)
Dept: UROLOGY | Facility: CLINIC | Age: 43
End: 2023-05-12
Payer: MEDICAID

## 2023-05-12 NOTE — TELEPHONE ENCOUNTER
Provider: DR. KEARNS    Caller: PETR WILLIS    Phone Number: [phone]    Reason for Call: RESCHEDULE SURGERY    When was the patient last seen: 05/04/23    Notes: PATIENT CALLED TO RESCHEDULE HER SURGERY WITH DR. KEARNS (05/19/23). PLEASE CALL PATIENT TO RESCHEDULE.     PATIENT PREFERS HAVING THE SURGERY DONE DURING THE WEEK AS SHE WORKS WEEKENDS.

## 2023-05-13 ENCOUNTER — APPOINTMENT (OUTPATIENT)
Dept: CT IMAGING | Facility: HOSPITAL | Age: 43
End: 2023-05-13
Payer: MEDICAID

## 2023-05-13 ENCOUNTER — HOSPITAL ENCOUNTER (EMERGENCY)
Facility: HOSPITAL | Age: 43
Discharge: HOME | End: 2023-05-13
Attending: EMERGENCY MEDICINE
Payer: MEDICAID

## 2023-05-13 VITALS
WEIGHT: 165 LBS | BODY MASS INDEX: 33.26 KG/M2 | DIASTOLIC BLOOD PRESSURE: 96 MMHG | HEIGHT: 59 IN | SYSTOLIC BLOOD PRESSURE: 129 MMHG | OXYGEN SATURATION: 93 % | HEART RATE: 87 BPM | RESPIRATION RATE: 16 BRPM

## 2023-05-13 DIAGNOSIS — R35.0 URINARY FREQUENCY: ICD-10-CM

## 2023-05-13 DIAGNOSIS — Z96.0 URETERAL STENT PRESENT: ICD-10-CM

## 2023-05-13 DIAGNOSIS — N35.919 URETHRAL SPASM: Primary | ICD-10-CM

## 2023-05-13 DIAGNOSIS — F19.90 SUBSTANCE USE: ICD-10-CM

## 2023-05-13 DIAGNOSIS — R10.2 VULVAR PAIN: ICD-10-CM

## 2023-05-13 LAB
ALBUMIN SERPL-MCNC: 3.8 G/DL (ref 3.5–5.2)
ALBUMIN/GLOB SERPL: 1.3 G/DL
ALP SERPL-CCNC: 62 U/L (ref 39–117)
ALT SERPL W P-5'-P-CCNC: 10 U/L (ref 1–33)
AMPHET+METHAMPHET UR QL: NEGATIVE
AMPHETAMINES UR QL: NEGATIVE
ANION GAP SERPL CALCULATED.3IONS-SCNC: 13 MMOL/L (ref 5–15)
AST SERPL-CCNC: 14 U/L (ref 1–32)
B-HCG UR QL: NEGATIVE
BACTERIA UR QL AUTO: ABNORMAL /HPF
BARBITURATES UR QL SCN: NEGATIVE
BASOPHILS # BLD AUTO: 0.05 10*3/MM3 (ref 0–0.2)
BASOPHILS NFR BLD AUTO: 0.4 % (ref 0–1.5)
BENZODIAZ UR QL SCN: NEGATIVE
BILIRUB SERPL-MCNC: 0.2 MG/DL (ref 0–1.2)
BILIRUB UR QL STRIP: NEGATIVE
BUN SERPL-MCNC: 19 MG/DL (ref 6–20)
BUN/CREAT SERPL: 23.8 (ref 7–25)
BUPRENORPHINE SERPL-MCNC: NEGATIVE NG/ML
CALCIUM SPEC-SCNC: 9.3 MG/DL (ref 8.6–10.5)
CANNABINOIDS SERPL QL: POSITIVE
CHLORIDE SERPL-SCNC: 105 MMOL/L (ref 98–107)
CLARITY UR: ABNORMAL
CO2 SERPL-SCNC: 24 MMOL/L (ref 22–29)
COCAINE UR QL: POSITIVE
COD CRY URNS QL: ABNORMAL /HPF
COLOR UR: ABNORMAL
CREAT SERPL-MCNC: 0.8 MG/DL (ref 0.57–1)
D-LACTATE SERPL-SCNC: 1.5 MMOL/L (ref 0.5–2)
DEPRECATED RDW RBC AUTO: 48 FL (ref 37–54)
EGFRCR SERPLBLD CKD-EPI 2021: 93.3 ML/MIN/1.73
EOSINOPHIL # BLD AUTO: 0.1 10*3/MM3 (ref 0–0.4)
EOSINOPHIL NFR BLD AUTO: 0.7 % (ref 0.3–6.2)
ERYTHROCYTE [DISTWIDTH] IN BLOOD BY AUTOMATED COUNT: 17.3 % (ref 12.3–15.4)
EXPIRATION DATE: NORMAL
GLOBULIN UR ELPH-MCNC: 3 GM/DL
GLUCOSE SERPL-MCNC: 106 MG/DL (ref 65–99)
GLUCOSE UR STRIP-MCNC: NEGATIVE MG/DL
HCT VFR BLD AUTO: 31.6 % (ref 34–46.6)
HGB BLD-MCNC: 10.1 G/DL (ref 12–15.9)
HGB UR QL STRIP.AUTO: ABNORMAL
HOLD SPECIMEN: NORMAL
HYALINE CASTS UR QL AUTO: ABNORMAL /LPF
IMM GRANULOCYTES # BLD AUTO: 0.12 10*3/MM3 (ref 0–0.05)
IMM GRANULOCYTES NFR BLD AUTO: 0.8 % (ref 0–0.5)
INTERNAL NEGATIVE CONTROL: NEGATIVE
INTERNAL POSITIVE CONTROL: POSITIVE
KETONES UR QL STRIP: NEGATIVE
LEUKOCYTE ESTERASE UR QL STRIP.AUTO: ABNORMAL
LIPASE SERPL-CCNC: 99 U/L (ref 13–60)
LYMPHOCYTES # BLD AUTO: 4.54 10*3/MM3 (ref 0.7–3.1)
LYMPHOCYTES NFR BLD AUTO: 31.9 % (ref 19.6–45.3)
Lab: NORMAL
MCH RBC QN AUTO: 25.2 PG (ref 26.6–33)
MCHC RBC AUTO-ENTMCNC: 32 G/DL (ref 31.5–35.7)
MCV RBC AUTO: 78.8 FL (ref 79–97)
METHADONE UR QL SCN: NEGATIVE
MONOCYTES # BLD AUTO: 0.65 10*3/MM3 (ref 0.1–0.9)
MONOCYTES NFR BLD AUTO: 4.6 % (ref 5–12)
NEUTROPHILS NFR BLD AUTO: 61.6 % (ref 42.7–76)
NEUTROPHILS NFR BLD AUTO: 8.75 10*3/MM3 (ref 1.7–7)
NITRITE UR QL STRIP: NEGATIVE
NRBC BLD AUTO-RTO: 0 /100 WBC (ref 0–0.2)
OPIATES UR QL: NEGATIVE
OXYCODONE UR QL SCN: NEGATIVE
PCP UR QL SCN: NEGATIVE
PH UR STRIP.AUTO: 5.5 [PH] (ref 5–8)
PLATELET # BLD AUTO: 500 10*3/MM3 (ref 140–450)
PMV BLD AUTO: 9.4 FL (ref 6–12)
POTASSIUM SERPL-SCNC: 4.1 MMOL/L (ref 3.5–5.2)
PROCALCITONIN SERPL-MCNC: 0.1 NG/ML (ref 0–0.25)
PROPOXYPH UR QL: NEGATIVE
PROT SERPL-MCNC: 6.8 G/DL (ref 6–8.5)
PROT UR QL STRIP: ABNORMAL
QT INTERVAL: 408 MS
QTC INTERVAL: 473 MS
RBC # BLD AUTO: 4.01 10*6/MM3 (ref 3.77–5.28)
RBC # UR STRIP: ABNORMAL /HPF
REF LAB TEST METHOD: ABNORMAL
SODIUM SERPL-SCNC: 142 MMOL/L (ref 136–145)
SP GR UR STRIP: 1.03 (ref 1–1.03)
SQUAMOUS #/AREA URNS HPF: ABNORMAL /HPF
TRICYCLICS UR QL SCN: NEGATIVE
UROBILINOGEN UR QL STRIP: ABNORMAL
WBC # UR STRIP: ABNORMAL /HPF
WBC NRBC COR # BLD: 14.21 10*3/MM3 (ref 3.4–10.8)
WHOLE BLOOD HOLD COAG: NORMAL
WHOLE BLOOD HOLD SPECIMEN: NORMAL

## 2023-05-13 PROCEDURE — 80053 COMPREHEN METABOLIC PANEL: CPT | Performed by: EMERGENCY MEDICINE

## 2023-05-13 PROCEDURE — 81025 URINE PREGNANCY TEST: CPT | Performed by: EMERGENCY MEDICINE

## 2023-05-13 PROCEDURE — 80306 DRUG TEST PRSMV INSTRMNT: CPT | Performed by: PHYSICIAN ASSISTANT

## 2023-05-13 PROCEDURE — 85025 COMPLETE CBC W/AUTO DIFF WBC: CPT | Performed by: EMERGENCY MEDICINE

## 2023-05-13 PROCEDURE — 83690 ASSAY OF LIPASE: CPT | Performed by: EMERGENCY MEDICINE

## 2023-05-13 PROCEDURE — 99284 EMERGENCY DEPT VISIT MOD MDM: CPT

## 2023-05-13 PROCEDURE — 84145 PROCALCITONIN (PCT): CPT | Performed by: PHYSICIAN ASSISTANT

## 2023-05-13 PROCEDURE — 99283 EMERGENCY DEPT VISIT LOW MDM: CPT

## 2023-05-13 PROCEDURE — 81001 URINALYSIS AUTO W/SCOPE: CPT | Performed by: EMERGENCY MEDICINE

## 2023-05-13 PROCEDURE — 83605 ASSAY OF LACTIC ACID: CPT | Performed by: PHYSICIAN ASSISTANT

## 2023-05-13 PROCEDURE — 93005 ELECTROCARDIOGRAM TRACING: CPT | Performed by: PHYSICIAN ASSISTANT

## 2023-05-13 PROCEDURE — 74176 CT ABD & PELVIS W/O CONTRAST: CPT

## 2023-05-13 RX ORDER — TRAMADOL HYDROCHLORIDE 50 MG/1
50 TABLET ORAL EVERY 6 HOURS PRN
Qty: 12 TABLET | Refills: 0 | Status: SHIPPED | OUTPATIENT
Start: 2023-05-13

## 2023-05-13 RX ORDER — ONDANSETRON 4 MG/1
4 TABLET, FILM COATED ORAL EVERY 8 HOURS PRN
Qty: 30 TABLET | Refills: 0 | Status: SHIPPED | OUTPATIENT
Start: 2023-05-13

## 2023-05-13 RX ORDER — SODIUM CHLORIDE 9 MG/ML
10 INJECTION INTRAVENOUS AS NEEDED
Status: DISCONTINUED | OUTPATIENT
Start: 2023-05-13 | End: 2023-05-13 | Stop reason: HOSPADM

## 2023-05-13 RX ORDER — LIDOCAINE HYDROCHLORIDE 20 MG/ML
JELLY TOPICAL AS NEEDED
Status: DISCONTINUED | OUTPATIENT
Start: 2023-05-13 | End: 2023-05-13 | Stop reason: HOSPADM

## 2023-05-13 RX ORDER — LIDOCAINE HYDROCHLORIDE 20 MG/ML
JELLY TOPICAL ONCE
Status: DISCONTINUED | OUTPATIENT
Start: 2023-05-13 | End: 2023-05-13

## 2023-05-13 RX ADMIN — LIDOCAINE HYDROCHLORIDE: 20 JELLY TOPICAL at 14:55

## 2023-05-13 NOTE — DISCHARGE INSTRUCTIONS
Apply lidocaine jelly every 3 hours as needed.  Increase your fluid intake.  Follow-up with Dr. Child as scheduled for stone removal and stent removal.  Have a low threshold to return to the emergency department if any change or worsening of symptoms

## 2023-05-13 NOTE — Clinical Note
[facility] EMERGENCY DEPARTMENT  [address] [address]  Phone: [phone]    Bernardo Dodd was seen and treated in our emergency department on 5/13/2023.  She may return to work on 05/16/2023.         Thank you for choosing [facility].    Christoph Gutierrez PA-C

## 2023-05-16 ENCOUNTER — OFFICE VISIT (OUTPATIENT)
Dept: INTERNAL MEDICINE | Facility: CLINIC | Age: 43
End: 2023-05-16
Payer: MEDICAID

## 2023-05-16 ENCOUNTER — OFFICE VISIT (OUTPATIENT)
Dept: BEHAVIORAL HEALTH | Facility: CLINIC | Age: 43
End: 2023-05-16
Payer: MEDICAID

## 2023-05-16 VITALS
HEIGHT: 59 IN | WEIGHT: 165 LBS | OXYGEN SATURATION: 96 % | BODY MASS INDEX: 33.26 KG/M2 | DIASTOLIC BLOOD PRESSURE: 84 MMHG | HEART RATE: 101 BPM | SYSTOLIC BLOOD PRESSURE: 142 MMHG

## 2023-05-16 DIAGNOSIS — R10.2 VULVAR PAIN: ICD-10-CM

## 2023-05-16 DIAGNOSIS — F19.10 SUBSTANCE ABUSE: ICD-10-CM

## 2023-05-16 DIAGNOSIS — N35.919 URETHRAL SPASM: Primary | ICD-10-CM

## 2023-05-16 DIAGNOSIS — F39 UNSPECIFIED MOOD (AFFECTIVE) DISORDER: Primary | ICD-10-CM

## 2023-05-16 RX ORDER — LIDOCAINE 50 MG/G
1 OINTMENT TOPICAL
Qty: 240 G | Refills: 0 | Status: SHIPPED | OUTPATIENT
Start: 2023-05-16 | End: 2023-05-23

## 2023-05-16 NOTE — PROGRESS NOTES
"    Office Note     Name: Bernardo Dodd    : 1980     MRN: [id number]     Chief Complaint  Groin Pain (Left side)    Subjective     History of Present Illness:  Bernardo Dodd is a 42 y.o. female who presents today for acute concerns    Patient is also being seen today by Marivel via behavioral health    Patient regularly sees Nelly for chronic conditions    Patient recently went to the ER 3 days ago on May 15 with urethral spasm and vulvar pain.  She presents today with similar symptoms.  She is describing some left-sided groin pain.  She states that everything in her perineal region hurts.  When she pushes on it/provides pressure it hurts worse.  She feels and describes it as a pit bull clamping down on her perineal area.  It feels like a hot poker and feels like her \"female parts\" are large and swollen.  She has tried a few interventions such as creams and patches with limited relief.  She states that she was prescribed tramadol before leaving the ER.  She does need to go to [facility] to pick this up.  They did recommend that she consider Epsom salt baths and try to relax.  She does need a work note for today.      Review of Systems   Constitutional: Negative for chills, fatigue and fever.   HENT: Negative for sore throat.    Eyes: Negative for visual disturbance.   Respiratory: Negative for cough and shortness of breath.    Cardiovascular: Negative for chest pain.   Gastrointestinal: Negative for abdominal pain.   Musculoskeletal:        Groin pain   Skin: Negative for color change.   Allergic/Immunologic: Negative for immunocompromised state.   Neurological: Negative for headaches.   Psychiatric/Behavioral: Negative for behavioral problems.       Past Medical History:   Diagnosis Date   • Allergic    • Allergic rhinitis    • Anemia    • Anxiety    • Arthritis    • Asthma    • Bartholin gland cyst    • Bipolar disorder 2014   • Chlamydia    • Depression    • Endometriosis    • FHx: migraine " headaches    • GERD (gastroesophageal reflux disease)    • Gonorrhea    • Heart murmur    • Herpes simplex    • History of chest x-ray 2015    no active disease   • History of echocardiogram 2015    ejection fraction of greater than 65%, mitral and pulmonic regurgitation an physiological tricuspid regurgitation.   • History of PFTs 2015    spirometry data acceptable and reproducible; pt given 4 puffs of Ventolin; pt gave good effort; no obstruction; no Bd response; MVV reduced    • History of PFTs 2015    pt gave best effort; duoneb given prior and post study; moderate nonspecific proportional reduction of FEV1 and FVC with preserved ratio; FEV1 moderately reduced; cannot rule out restriction   • Hypertension    • Kidney stones    • Low back pain    • Migraine    • Obesity    • Ovarian cyst    • Pelvic pain    • PMS (premenstrual syndrome)    • Preeclampsia    • Screening breast examination     self;admits   • Seizures    • STD (female)    • Substance abuse    • Thigh shingles    • Trauma    • Trichomonas infection    • Urinary tract infection    • Urogenital trichomoniasis    • Varicella        Past Surgical History:   Procedure Laterality Date   • BILATERAL BREAST REDUCTION     • BREAST BIOPSY     • BREAST SURGERY      breast reduction   •  SECTION     •  SECTION WITH TUBAL  2010   • CYSTOSCOPY     • CYSTOSCOPY, URETEROSCOPY, RETROGRADE PYELOGRAM, STONE EXTRACTION, STENT INSERTION Bilateral 2023    Procedure: CYSTOSCOPY, BILATERAL RETROGRADE PYELOGRAM, URETEROSCOPY, AND STENT PLACEMENT;  Surgeon: Micah Child MD;  Location: [facility];  Service: Urology;  Laterality: Bilateral;   • DIAGNOSTIC LAPAROSCOPY     • INCISION AND DRAINAGE ABSCESS      bartholin's   • KIDNEY STONE SURGERY Bilateral 2023    Pt got stents   • LAPAROSCOPIC TUBAL LIGATION     • ORIF ANKLE FRACTURE Right    • REDUCTION MAMMAPLASTY Bilateral    • TENSION FREE VAGINAL TAPING WITH  [facility]      Dr Doyle avery        Social History     Socioeconomic History   • Marital status: Single   • Number of children: 2   • Highest education level: Some college, no degree   Tobacco Use   • Smoking status: Former     Packs/day: 1.00     Years: 16.00     Pack years: 16.00     Types: Cigarettes     Start date:      Quit date:      Years since quittin.3   • Smokeless tobacco: Never   • Tobacco comments:     I quit every so often. Then i start again.   Vaping Use   • Vaping Use: Some days   • Devices: Disposable   • Passive vaping exposure: Yes   Substance and Sexual Activity   • Alcohol use: No   • Drug use: Not Currently     Types: Marijuana     Comment: Marijuana once a week. Tried cocaine, meth, pain pills in the past   • Sexual activity: Yes     Partners: Male     Birth control/protection: Surgical         Current Outpatient Medications:   •  albuterol sulfate  (90 Base) MCG/ACT inhaler, Inhale 2 puffs Every 4 (Four) Hours As Needed for Wheezing or Shortness of Air., Disp: 18 g, Rfl: 2  •  amLODIPine (NORVASC) 5 MG tablet, Take 1 tablet by mouth Daily., Disp: 90 tablet, Rfl: 2  •  benzonatate (Tessalon Perles) 100 MG capsule, Take 1 capsule by mouth 3 (Three) Times a Day As Needed for Cough., Disp: 21 capsule, Rfl: 0  •  celecoxib (CeleBREX) 200 MG capsule, Take 1 capsule by mouth Daily., Disp: 30 capsule, Rfl: 0  •  cetirizine (zyrTEC) 10 MG tablet, Take 1 tablet by mouth Daily., Disp: , Rfl:   •  ELDERBERRY PO, Take 1 capsule by mouth Daily., Disp: , Rfl:   •  erythromycin (ROMYCIN) 5 MG/GM ophthalmic ointment, Administer  to the right eye Every 4 (Four) Hours While Awake., Disp: 3.5 g, Rfl: 0  •  fluticasone (Flonase) 50 MCG/ACT nasal spray, 2 sprays into the nostril(s) as directed by provider Daily., Disp: 16 g, Rfl: 5  •  hyoscyamine (LEVSIN) 0.125 MG SL tablet, Take 1 tablet by mouth Every 4 (Four) Hours As Needed (spasm)., Disp: 20 tablet, Rfl: 0  •  lamoTRIgine  "(LaMICtal) 200 MG tablet, TAKE ONE TABLET BY MOUTH DAILY, Disp: 60 tablet, Rfl: 2  •  montelukast (Singulair) 10 MG tablet, Take 1 tablet by mouth Every Night., Disp: 90 tablet, Rfl: 1  •  OLANZapine (ZyPREXA) 10 MG tablet, Take 0.5 tablets by mouth Daily for 1 day, THEN 1 tablet Daily. (Patient taking differently: One tab daily as needed for sleep- PT NEEDS REFILL 5/10/23), Disp: 30 tablet, Rfl: 2  •  ondansetron (Zofran) 4 MG tablet, Take 1 tablet by mouth Every 8 (Eight) Hours As Needed for Nausea or Vomiting., Disp: 30 tablet, Rfl: 0  •  phenazopyridine (PYRIDIUM) 200 MG tablet, Take 1 tablet by mouth 2 (Two) Times a Day With Meals., Disp: 30 tablet, Rfl: 0  •  potassium chloride (KLOR-CON) 20 MEQ packet, Take 20 mEq by mouth Daily., Disp: , Rfl:   •  traMADol (ULTRAM) 50 MG tablet, Take 1 tablet by mouth Every 6 (Six) Hours As Needed for Severe Pain., Disp: 12 tablet, Rfl: 0  •  vitamin C (ASCORBIC ACID) 250 MG tablet, Take 1 tablet by mouth Daily., Disp: , Rfl:   •  lidocaine (XYLOCAINE) 5 % ointment, Apply 1 application topically to the appropriate area as directed Every 2 (Two) Hours As Needed for Mild Pain for up to 7 days., Disp: 240 g, Rfl: 0    Objective     Vital Signs  /84   Pulse 101   Ht 150 cm (59.06\")   Wt 74.8 kg (165 lb)   SpO2 96%   BMI 33.26 kg/m²   Estimated body mass index is 33.26 kg/m² as calculated from the following:    Height as of this encounter: 150 cm (59.06\").    Weight as of this encounter: 74.8 kg (165 lb).    BMI is >= 30 and <35. (Class 1 Obesity). The following options were offered after discussion;: Not addressed today           Physical Exam  Vitals and nursing note reviewed.   Constitutional:       Appearance: Normal appearance.      Comments: Patient was asleep when I entered the exam room today   HENT:      Head: Normocephalic and atraumatic.   Eyes:      Extraocular Movements: Extraocular movements intact.      Pupils: Pupils are equal, round, and reactive to " light.   Cardiovascular:      Rate and Rhythm: Normal rate and regular rhythm.   Pulmonary:      Effort: Pulmonary effort is normal.   Genitourinary:     Comments: Assessment deferred today  Musculoskeletal:         General: Normal range of motion.   Skin:     General: Skin is warm and dry.   Neurological:      Mental Status: She is alert and oriented to person, place, and time.   Psychiatric:         Mood and Affect: Mood normal.         Behavior: Behavior normal.             Assessment and Plan     Diagnoses and all orders for this visit:    1. Urethral spasm (Primary)  -     lidocaine (XYLOCAINE) 5 % ointment; Apply 1 application topically to the appropriate area as directed Every 2 (Two) Hours As Needed for Mild Pain for up to 7 days.  Dispense: 240 g; Refill: 0    2. Vulvar pain    Plan  Discussed with patient that I would recommend she go ahead and go to [facility] to fill her tramadol as she reported was prescribed to her from a recent ER visit  I will also send in some lidocaine cream to be applied to the area  Continue with recommendations as from the ER which were Epsom salt soaks along with relaxation  Patient did need a work note for today  Go to ER if any condition worsens or severe  Patient has an upcoming surgical procedure on May 19    Follow Up  Return for Upcoming surgery.    SHANE Perez    Part of this note may be an electronic transcription/translation of spoken language to printed text using the Dragon Dictation System.

## 2023-05-16 NOTE — LETTER
May 16, 2023     Patient: Bernardo Dodd   YOB: 1980   Date of Visit: 5/16/2023       To Whom It May Concern:    It is my medical opinion that Bernardo Dodd may return to work in one day.            Sincerely,        SHANE Perez    CC: No Recipients

## 2023-05-16 NOTE — PROGRESS NOTES
[facility] [place]                 Follow Up Adult      Follow Up Adult Note     Date:2023   Patient Name: Bernardo Dodd  : 1980   MRN: [id number]   Time IN: 2:50pm    Time OUT: 3:33pm      Referring Provider: Nelly Sotelo PA    Chief Complaint:      ICD-10-CM ICD-9-CM   1. Unspecified mood (affective) disorder  F39 296.90   2. Substance abuse  F19.10 305.90        History of Present Illness:   Bernardo Dodd is a 42 y.o. female who is being seen today for follow up counseling for  Mood disorder symptoms and substance use       Subjective       Patient's Support Network Includes:  mother    Functional Status: Moderate impairment     Medications:     Current Outpatient Medications:   •  albuterol sulfate  (90 Base) MCG/ACT inhaler, Inhale 2 puffs Every 4 (Four) Hours As Needed for Wheezing or Shortness of Air., Disp: 18 g, Rfl: 2  •  amLODIPine (NORVASC) 5 MG tablet, Take 1 tablet by mouth Daily., Disp: 90 tablet, Rfl: 2  •  benzonatate (Tessalon Perles) 100 MG capsule, Take 1 capsule by mouth 3 (Three) Times a Day As Needed for Cough., Disp: 21 capsule, Rfl: 0  •  celecoxib (CeleBREX) 200 MG capsule, Take 1 capsule by mouth Daily., Disp: 30 capsule, Rfl: 0  •  cetirizine (zyrTEC) 10 MG tablet, Take 1 tablet by mouth Daily., Disp: , Rfl:   •  ELDERBERRY PO, Take 1 capsule by mouth Daily., Disp: , Rfl:   •  erythromycin (ROMYCIN) 5 MG/GM ophthalmic ointment, Administer  to the right eye Every 4 (Four) Hours While Awake., Disp: 3.5 g, Rfl: 0  •  fluticasone (Flonase) 50 MCG/ACT nasal spray, 2 sprays into the nostril(s) as directed by provider Daily., Disp: 16 g, Rfl: 5  •  hyoscyamine (LEVSIN) 0.125 MG SL tablet, Take 1 tablet by mouth Every 4 (Four) Hours As Needed (spasm)., Disp: 20 tablet, Rfl: 0  •  lamoTRIgine (LaMICtal) 200 MG tablet, TAKE ONE TABLET BY MOUTH DAILY, Disp: 60 tablet, Rfl: 2  •  lidocaine (XYLOCAINE) 5 % ointment, Apply 1  application topically to the appropriate area as directed Every 2 (Two) Hours As Needed for Mild Pain for up to 7 days., Disp: 240 g, Rfl: 0  •  montelukast (Singulair) 10 MG tablet, Take 1 tablet by mouth Every Night., Disp: 90 tablet, Rfl: 1  •  OLANZapine (ZyPREXA) 10 MG tablet, Take 0.5 tablets by mouth Daily for 1 day, THEN 1 tablet Daily. (Patient taking differently: One tab daily as needed for sleep- PT NEEDS REFILL 5/10/23), Disp: 30 tablet, Rfl: 2  •  ondansetron (Zofran) 4 MG tablet, Take 1 tablet by mouth Every 8 (Eight) Hours As Needed for Nausea or Vomiting., Disp: 30 tablet, Rfl: 0  •  phenazopyridine (PYRIDIUM) 200 MG tablet, Take 1 tablet by mouth 2 (Two) Times a Day With Meals., Disp: 30 tablet, Rfl: 0  •  potassium chloride (KLOR-CON) 20 MEQ packet, Take 20 mEq by mouth Daily., Disp: , Rfl:   •  traMADol (ULTRAM) 50 MG tablet, Take 1 tablet by mouth Every 6 (Six) Hours As Needed for Severe Pain., Disp: 12 tablet, Rfl: 0  •  vitamin C (ASCORBIC ACID) 250 MG tablet, Take 1 tablet by mouth Daily., Disp: , Rfl:     Allergies:   Allergies   Allergen Reactions   • Naproxen Swelling   • Sulfa Antibiotics Rash       Objective     Physical Exam:  Vital Signs: There were no vitals filed for this visit.  There is no height or weight on file to calculate BMI.     Mental Status Exam:   Hygiene:   good  Cooperation:  Cooperative  Eye Contact:  Good  Psychomotor Behavior:  Appropriate  Affect:  Full range  Mood: normal  Speech:  Normal  Thought Process:  Goal directed  Thought Content:  Mood congruent  Suicidal:  None  Homicidal:  None  Hallucinations:  None  Delusion:  None  Memory:  Intact  Orientation:  Person, Place, Time and Situation  Reliability:  good  Insight:  Good  Judgement:  Good  Impulse Control:  Good  Physical/Medical Issues:  No      Assessment / Plan      Visit Diagnosis/Orders Placed This Visit:    ICD-10-CM ICD-9-CM   1. Unspecified mood (affective) disorder  F39 296.90   2. Substance abuse   F19.10 305.90      Patient presents in office for follow-up.  Patient reports some recent difficulties including losing her job, going to halfway due to a previous DUI, and using cocaine.  Patient reports that she is no longer using cocaine and reports that she did get a new job with a previous employer.  Patient reports that this has been a positive, but reports ongoing medical issues.  Patient reports she would like to resume therapy to remain off of drugs and to get life back on track.    PLAN:  1. Safety: No acute safety concerns  2. Risk Assessment: Risk of self-harm acutely is low. Risk of self-harm chronically is also low, but could be further elevated in the event of treatment noncompliance and/or AODA.    Treatment Plan/Goals: Continue supportive psychotherapy efforts and medications as indicated. Treatment and medication options discussed during today's visit. Patient ackowledged and verbally consented to continue with current treatment plan and was educated on the importance of compliance with treatment and follow-up appointments. Patient seems reasonably able to adhere to treatment plan.      Assisted Patient in processing above session content; acknowledged and normalized patient’s thoughts, feelings, and concerns.  Rationalized patient thought process regarding current symptoms and stressors.      Allowed Patient to freely discuss issues  without interruption or judgement with unconditional positive regard, active listening skills, and empathy. Therapist provided a safe, confidential environment to facilitate the development of a positive therapeutic relationship and encouraged open, honest communication. Assisted Patient in identifying risk factors which would indicate the need for higher level of care including thoughts to harm self or others and/or self-harming behavior and encouraged Patient to contact this office, call 911, or present to the nearest emergency room should any of these events occur.  Discussed crisis intervention services and means to access. Patient adamantly and convincingly denies current suicidal or homicidal ideation or perceptual disturbance. Assisted Patient in processing session content; acknowledged and normalized Patient’s thoughts, feelings, and concerns by utilizing a person-centered approach in efforts to build appropriate rapport and a positive therapeutic relationship with open and honest communication. .     Quality Measures:     TOBACCO USE:  Former smoker    I advised Tyralita of the risks of tobacco use.     Follow Up:   Return in about 2 weeks (around 5/30/2023) for Video visit.      Blank Garcia LCSW

## 2023-05-17 ENCOUNTER — READMISSION MANAGEMENT (OUTPATIENT)
Dept: CALL CENTER | Facility: HOSPITAL | Age: 43
End: 2023-05-17
Payer: MEDICAID

## 2023-05-17 NOTE — OUTREACH NOTE
Sepsis Week 2 Survey    Flowsheet Row Responses   [facility] facility patient discharged from? [place]   Does the patient have one of the following disease processes/diagnoses(primary or secondary)? Sepsis   Week 2 attempt successful? No   Unsuccessful attempts Attempt 1  [attempted pt and mother]          MARY JURADO - Registered Nurse

## 2023-05-18 ENCOUNTER — ANESTHESIA EVENT (OUTPATIENT)
Dept: PERIOP | Facility: HOSPITAL | Age: 43
End: 2023-05-18
Payer: MEDICAID

## 2023-05-18 RX ORDER — SODIUM CHLORIDE 0.9 % (FLUSH) 0.9 %
10 SYRINGE (ML) INJECTION AS NEEDED
Status: CANCELLED | OUTPATIENT
Start: 2023-05-18

## 2023-05-18 RX ORDER — FAMOTIDINE 10 MG/ML
20 INJECTION, SOLUTION INTRAVENOUS ONCE
Status: CANCELLED | OUTPATIENT
Start: 2023-05-18 | End: 2023-05-18

## 2023-05-18 RX ORDER — SODIUM CHLORIDE 9 MG/ML
40 INJECTION, SOLUTION INTRAVENOUS AS NEEDED
Status: CANCELLED | OUTPATIENT
Start: 2023-05-18

## 2023-05-18 RX ORDER — SODIUM CHLORIDE 0.9 % (FLUSH) 0.9 %
10 SYRINGE (ML) INJECTION EVERY 12 HOURS SCHEDULED
Status: CANCELLED | OUTPATIENT
Start: 2023-05-18

## 2023-05-19 ENCOUNTER — APPOINTMENT (OUTPATIENT)
Dept: GENERAL RADIOLOGY | Facility: HOSPITAL | Age: 43
End: 2023-05-19
Payer: MEDICAID

## 2023-05-19 ENCOUNTER — HOSPITAL ENCOUNTER (OUTPATIENT)
Facility: HOSPITAL | Age: 43
Setting detail: HOSPITAL OUTPATIENT SURGERY
Discharge: HOME | End: 2023-05-19
Attending: UROLOGY | Admitting: UROLOGY
Payer: MEDICAID

## 2023-05-19 ENCOUNTER — ANESTHESIA (OUTPATIENT)
Dept: PERIOP | Facility: HOSPITAL | Age: 43
End: 2023-05-19
Payer: MEDICAID

## 2023-05-19 VITALS
DIASTOLIC BLOOD PRESSURE: 73 MMHG | RESPIRATION RATE: 16 BRPM | HEIGHT: 59 IN | OXYGEN SATURATION: 97 % | BODY MASS INDEX: 33.26 KG/M2 | SYSTOLIC BLOOD PRESSURE: 111 MMHG | WEIGHT: 165 LBS | HEART RATE: 84 BPM | TEMPERATURE: 98.1 F

## 2023-05-19 DIAGNOSIS — N20.1 LEFT URETERAL STONE: ICD-10-CM

## 2023-05-19 LAB
B-HCG UR QL: NEGATIVE
EXPIRATION DATE: NORMAL
INTERNAL NEGATIVE CONTROL: NORMAL
INTERNAL POSITIVE CONTROL: NORMAL
Lab: NORMAL

## 2023-05-19 PROCEDURE — C2617 STENT, NON-COR, TEM W/O DEL: HCPCS | Performed by: UROLOGY

## 2023-05-19 PROCEDURE — 74420 UROGRAPHY RTRGR +-KUB: CPT | Performed by: UROLOGY

## 2023-05-19 PROCEDURE — 52356 CYSTO/URETERO W/LITHOTRIPSY: CPT | Performed by: UROLOGY

## 2023-05-19 PROCEDURE — 74420 UROGRAPHY RTRGR +-KUB: CPT

## 2023-05-19 PROCEDURE — 25010000002 ONDANSETRON PER 1 MG: Performed by: NURSE ANESTHETIST, CERTIFIED REGISTERED

## 2023-05-19 PROCEDURE — 25010000002 PROPOFOL 10 MG/ML EMULSION: Performed by: NURSE ANESTHETIST, CERTIFIED REGISTERED

## 2023-05-19 PROCEDURE — 25010000002 CEFAZOLIN IN DEXTROSE 2-4 GM/100ML-% SOLUTION: Performed by: UROLOGY

## 2023-05-19 PROCEDURE — C1769 GUIDE WIRE: HCPCS | Performed by: UROLOGY

## 2023-05-19 PROCEDURE — 25010000002 DEXAMETHASONE PER 1 MG: Performed by: NURSE ANESTHETIST, CERTIFIED REGISTERED

## 2023-05-19 PROCEDURE — 81025 URINE PREGNANCY TEST: CPT | Performed by: UROLOGY

## 2023-05-19 DEVICE — URETERAL STENT
Type: IMPLANTABLE DEVICE | Site: URETER | Status: FUNCTIONAL
Brand: PERCUFLEX™ PLUS

## 2023-05-19 RX ORDER — PROMETHAZINE HYDROCHLORIDE 25 MG/1
25 SUPPOSITORY RECTAL ONCE AS NEEDED
Status: DISCONTINUED | OUTPATIENT
Start: 2023-05-19 | End: 2023-05-19 | Stop reason: HOSPADM

## 2023-05-19 RX ORDER — GABAPENTIN 300 MG/1
600 CAPSULE ORAL ONCE
Status: COMPLETED | OUTPATIENT
Start: 2023-05-19 | End: 2023-05-19

## 2023-05-19 RX ORDER — IPRATROPIUM BROMIDE AND ALBUTEROL SULFATE 2.5; .5 MG/3ML; MG/3ML
3 SOLUTION RESPIRATORY (INHALATION) ONCE AS NEEDED
Status: DISCONTINUED | OUTPATIENT
Start: 2023-05-19 | End: 2023-05-19 | Stop reason: HOSPADM

## 2023-05-19 RX ORDER — MAGNESIUM HYDROXIDE 1200 MG/15ML
LIQUID ORAL AS NEEDED
Status: DISCONTINUED | OUTPATIENT
Start: 2023-05-19 | End: 2023-05-19 | Stop reason: HOSPADM

## 2023-05-19 RX ORDER — HYDROCODONE BITARTRATE AND ACETAMINOPHEN 5; 325 MG/1; MG/1
1 TABLET ORAL ONCE AS NEEDED
Status: DISCONTINUED | OUTPATIENT
Start: 2023-05-19 | End: 2023-05-19 | Stop reason: HOSPADM

## 2023-05-19 RX ORDER — PROPOFOL 10 MG/ML
VIAL (ML) INTRAVENOUS AS NEEDED
Status: DISCONTINUED | OUTPATIENT
Start: 2023-05-19 | End: 2023-05-19 | Stop reason: SURG

## 2023-05-19 RX ORDER — ACETAMINOPHEN 500 MG
1000 TABLET ORAL ONCE
Status: COMPLETED | OUTPATIENT
Start: 2023-05-19 | End: 2023-05-19

## 2023-05-19 RX ORDER — SCOLOPAMINE TRANSDERMAL SYSTEM 1 MG/1
1 PATCH, EXTENDED RELEASE TRANSDERMAL CONTINUOUS
Status: DISCONTINUED | OUTPATIENT
Start: 2023-05-19 | End: 2023-05-19 | Stop reason: HOSPADM

## 2023-05-19 RX ORDER — DROPERIDOL 2.5 MG/ML
0.62 INJECTION, SOLUTION INTRAMUSCULAR; INTRAVENOUS ONCE AS NEEDED
Status: DISCONTINUED | OUTPATIENT
Start: 2023-05-19 | End: 2023-05-19 | Stop reason: HOSPADM

## 2023-05-19 RX ORDER — TAMSULOSIN HYDROCHLORIDE 0.4 MG/1
1 CAPSULE ORAL DAILY
Qty: 14 CAPSULE | Refills: 0 | Status: SHIPPED | OUTPATIENT
Start: 2023-05-19 | End: 2023-06-02

## 2023-05-19 RX ORDER — HYDRALAZINE HYDROCHLORIDE 20 MG/ML
5 INJECTION INTRAMUSCULAR; INTRAVENOUS
Status: DISCONTINUED | OUTPATIENT
Start: 2023-05-19 | End: 2023-05-19 | Stop reason: HOSPADM

## 2023-05-19 RX ORDER — LIDOCAINE HYDROCHLORIDE 10 MG/ML
0.5 INJECTION, SOLUTION EPIDURAL; INFILTRATION; INTRACAUDAL; PERINEURAL ONCE AS NEEDED
Status: COMPLETED | OUTPATIENT
Start: 2023-05-19 | End: 2023-05-19

## 2023-05-19 RX ORDER — ONDANSETRON 2 MG/ML
4 INJECTION INTRAMUSCULAR; INTRAVENOUS ONCE AS NEEDED
Status: DISCONTINUED | OUTPATIENT
Start: 2023-05-19 | End: 2023-05-19 | Stop reason: HOSPADM

## 2023-05-19 RX ORDER — DROPERIDOL 2.5 MG/ML
0.62 INJECTION, SOLUTION INTRAMUSCULAR; INTRAVENOUS
Status: DISCONTINUED | OUTPATIENT
Start: 2023-05-19 | End: 2023-05-19 | Stop reason: HOSPADM

## 2023-05-19 RX ORDER — PHENAZOPYRIDINE HYDROCHLORIDE 200 MG/1
200 TABLET, FILM COATED ORAL 3 TIMES DAILY PRN
Qty: 20 TABLET | Refills: 0 | Status: SHIPPED | OUTPATIENT
Start: 2023-05-19

## 2023-05-19 RX ORDER — LABETALOL HYDROCHLORIDE 5 MG/ML
5 INJECTION, SOLUTION INTRAVENOUS
Status: DISCONTINUED | OUTPATIENT
Start: 2023-05-19 | End: 2023-05-19 | Stop reason: HOSPADM

## 2023-05-19 RX ORDER — CEFAZOLIN SODIUM 2 G/100ML
2 INJECTION, SOLUTION INTRAVENOUS ONCE
Status: COMPLETED | OUTPATIENT
Start: 2023-05-19 | End: 2023-05-19

## 2023-05-19 RX ORDER — ONDANSETRON 2 MG/ML
INJECTION INTRAMUSCULAR; INTRAVENOUS AS NEEDED
Status: DISCONTINUED | OUTPATIENT
Start: 2023-05-19 | End: 2023-05-19 | Stop reason: SURG

## 2023-05-19 RX ORDER — SODIUM CHLORIDE 0.9 % (FLUSH) 0.9 %
3 SYRINGE (ML) INJECTION EVERY 12 HOURS SCHEDULED
Status: DISCONTINUED | OUTPATIENT
Start: 2023-05-19 | End: 2023-05-19 | Stop reason: HOSPADM

## 2023-05-19 RX ORDER — SODIUM CHLORIDE 0.9 % (FLUSH) 0.9 %
3-10 SYRINGE (ML) INJECTION AS NEEDED
Status: DISCONTINUED | OUTPATIENT
Start: 2023-05-19 | End: 2023-05-19 | Stop reason: HOSPADM

## 2023-05-19 RX ORDER — SODIUM CHLORIDE, SODIUM LACTATE, POTASSIUM CHLORIDE, CALCIUM CHLORIDE 600; 310; 30; 20 MG/100ML; MG/100ML; MG/100ML; MG/100ML
9 INJECTION, SOLUTION INTRAVENOUS CONTINUOUS
Status: DISCONTINUED | OUTPATIENT
Start: 2023-05-19 | End: 2023-05-19 | Stop reason: HOSPADM

## 2023-05-19 RX ORDER — FAMOTIDINE 20 MG/1
20 TABLET, FILM COATED ORAL ONCE
Status: COMPLETED | OUTPATIENT
Start: 2023-05-19 | End: 2023-05-19

## 2023-05-19 RX ORDER — SODIUM CHLORIDE 9 MG/ML
40 INJECTION, SOLUTION INTRAVENOUS AS NEEDED
Status: DISCONTINUED | OUTPATIENT
Start: 2023-05-19 | End: 2023-05-19 | Stop reason: HOSPADM

## 2023-05-19 RX ORDER — FENTANYL CITRATE 50 UG/ML
50 INJECTION, SOLUTION INTRAMUSCULAR; INTRAVENOUS
Status: DISCONTINUED | OUTPATIENT
Start: 2023-05-19 | End: 2023-05-19 | Stop reason: HOSPADM

## 2023-05-19 RX ORDER — LIDOCAINE HYDROCHLORIDE 10 MG/ML
INJECTION, SOLUTION EPIDURAL; INFILTRATION; INTRACAUDAL; PERINEURAL AS NEEDED
Status: DISCONTINUED | OUTPATIENT
Start: 2023-05-19 | End: 2023-05-19 | Stop reason: SURG

## 2023-05-19 RX ORDER — NALOXONE HCL 0.4 MG/ML
0.4 VIAL (ML) INJECTION AS NEEDED
Status: DISCONTINUED | OUTPATIENT
Start: 2023-05-19 | End: 2023-05-19 | Stop reason: HOSPADM

## 2023-05-19 RX ORDER — MEPERIDINE HYDROCHLORIDE 25 MG/ML
12.5 INJECTION INTRAMUSCULAR; INTRAVENOUS; SUBCUTANEOUS
Status: DISCONTINUED | OUTPATIENT
Start: 2023-05-19 | End: 2023-05-19 | Stop reason: HOSPADM

## 2023-05-19 RX ORDER — LIDOCAINE HYDROCHLORIDE 20 MG/ML
JELLY TOPICAL AS NEEDED
Status: DISCONTINUED | OUTPATIENT
Start: 2023-05-19 | End: 2023-05-19 | Stop reason: HOSPADM

## 2023-05-19 RX ORDER — DEXMEDETOMIDINE HYDROCHLORIDE 100 UG/ML
INJECTION, SOLUTION INTRAVENOUS AS NEEDED
Status: DISCONTINUED | OUTPATIENT
Start: 2023-05-19 | End: 2023-05-19 | Stop reason: SURG

## 2023-05-19 RX ORDER — NITROFURANTOIN 25; 75 MG/1; MG/1
100 CAPSULE ORAL 2 TIMES DAILY
Qty: 14 CAPSULE | Refills: 0 | Status: SHIPPED | OUTPATIENT
Start: 2023-05-19

## 2023-05-19 RX ORDER — MIDAZOLAM HYDROCHLORIDE 1 MG/ML
1 INJECTION INTRAMUSCULAR; INTRAVENOUS
Status: DISCONTINUED | OUTPATIENT
Start: 2023-05-19 | End: 2023-05-19 | Stop reason: HOSPADM

## 2023-05-19 RX ORDER — DEXAMETHASONE SODIUM PHOSPHATE 4 MG/ML
INJECTION, SOLUTION INTRA-ARTICULAR; INTRALESIONAL; INTRAMUSCULAR; INTRAVENOUS; SOFT TISSUE AS NEEDED
Status: DISCONTINUED | OUTPATIENT
Start: 2023-05-19 | End: 2023-05-19 | Stop reason: SURG

## 2023-05-19 RX ORDER — PROMETHAZINE HYDROCHLORIDE 25 MG/1
25 TABLET ORAL ONCE AS NEEDED
Status: DISCONTINUED | OUTPATIENT
Start: 2023-05-19 | End: 2023-05-19 | Stop reason: HOSPADM

## 2023-05-19 RX ADMIN — ACETAMINOPHEN 1000 MG: 500 TABLET ORAL at 11:02

## 2023-05-19 RX ADMIN — FAMOTIDINE 20 MG: 20 TABLET, FILM COATED ORAL at 11:02

## 2023-05-19 RX ADMIN — SCOPOLAMINE 1 PATCH: 1.5 PATCH, EXTENDED RELEASE TRANSDERMAL at 11:02

## 2023-05-19 RX ADMIN — LIDOCAINE HYDROCHLORIDE 0.5 ML: 10 INJECTION, SOLUTION EPIDURAL; INFILTRATION; INTRACAUDAL; PERINEURAL at 10:48

## 2023-05-19 RX ADMIN — CEFAZOLIN SODIUM 2 G: 2 INJECTION, SOLUTION INTRAVENOUS at 13:02

## 2023-05-19 RX ADMIN — DEXAMETHASONE SODIUM PHOSPHATE 8 MG: 4 INJECTION, SOLUTION INTRAMUSCULAR; INTRAVENOUS at 13:09

## 2023-05-19 RX ADMIN — SODIUM CHLORIDE, POTASSIUM CHLORIDE, SODIUM LACTATE AND CALCIUM CHLORIDE 9 ML/HR: 600; 310; 30; 20 INJECTION, SOLUTION INTRAVENOUS at 10:48

## 2023-05-19 RX ADMIN — LIDOCAINE HYDROCHLORIDE 50 MG: 10 INJECTION, SOLUTION EPIDURAL; INFILTRATION; INTRACAUDAL; PERINEURAL at 13:05

## 2023-05-19 RX ADMIN — GABAPENTIN 600 MG: 300 CAPSULE ORAL at 11:02

## 2023-05-19 RX ADMIN — DEXMEDETOMIDINE HYDROCHLORIDE 20 MCG: 100 INJECTION, SOLUTION INTRAVENOUS at 13:10

## 2023-05-19 RX ADMIN — DEXMEDETOMIDINE HYDROCHLORIDE 20 MCG: 100 INJECTION, SOLUTION INTRAVENOUS at 13:17

## 2023-05-19 RX ADMIN — DEXMEDETOMIDINE HYDROCHLORIDE 20 MCG: 100 INJECTION, SOLUTION INTRAVENOUS at 13:05

## 2023-05-19 RX ADMIN — ONDANSETRON 4 MG: 2 INJECTION INTRAMUSCULAR; INTRAVENOUS at 13:16

## 2023-05-19 RX ADMIN — PROPOFOL 200 MG: 10 INJECTION, EMULSION INTRAVENOUS at 13:05

## 2023-05-19 NOTE — OP NOTE
CYSTOSCOPY RETROGRADE PYELOGRAM  Procedure Report    Patient Name:  Bernardo Dodd  YOB: 1980    Date of Surgery:  5/19/2023     Indications: 42-year-old female presenting today for definitive stone treatment, has previously undergone ureteral stent placement for obstructing 3 to 4 mm distal ureteral stone in the setting of infection.  She has been on antibiotic therapy.  She presents today for treatment, the risk benefits alternative discussed the patient she elects proceed    Pre-op Diagnosis:   Left ureteral stone [N20.1]       Post-Op Diagnosis Codes:     * Left ureteral stone [N20.1]          Procedure(s):  CYSTOSCOPY  LEFT URETEROSCOPY, LASER LITHOTRIPSY, STONE EXTRACTION  LEFT RETROGRADE PYELOGRAM  LEFT URETERAL STENT PLACEMENT    Staff:  Surgeon(s):  Micah Child MD         Anesthesia: General    Estimated Blood Loss: none    Implants:    Nothing was implanted during the procedure    Specimen:          None        Findings:   1.  Normal urinary bladder without evidence of tumor stone or foreign body.  Prior ureteral stent removed and  2.  Left ureteroscopy with identification of 34 mm distal obstructing stone.  Laser lithotripsy with dusting of stone, fragmenting of stone and extraction of all pieces, clearance of the ureter.  3.  Left retrograde pyelography with contrast seen filling normal caliber distal mid proximal ureter and renal collecting system  4.  Left ureteral stent placement    Complications: None immediate    Description of Procedure:     After informed consent, the patient was brought back to the operating suite and moved over to the operating table. General anesthesia was smoothly induced, IV antibiotics were administered, and the patient was placed in the dorsal lithotomy position with careful attention focused on padding all pressure points. The patient was prepped and draped in standard fashion. A timeout was performed to ensure the correct patient and procedure    A  22Fr cystoscope was used to cannulate the urethra. The urethra was of normal course and caliber.  Upon entering the bladder, pan-cystoscopy revealed no bladder abnormalities.  The bilateral ureteral orifices were visualized in their orthotopic positions. Attention was then turned to the left ureteral orifice which was cannulated with a sensor wire was advanced up the left ureter and into the collecting system on fluoroscopy to serve as a safety wire which was clamped to the surgical drapes.  Prior ureteral stent was removed intact    SEMIRIGID URETEROSCOPY  The semi-rigid ureteroscope was then advanced into the bladder. The left ureter was cannulated demonstrating a 4 mm distal obstructing stone.  A 200 µm laser fiber was inserted.  The stone was dusted and fragmented.  The stone fragments were removed.  The scope was readvanced to the distal mid and proximal ureter and culture and clearance.    RETROGRADE PYELOGRAM  Contrast was instilled to perform a retrograde pyelogram, contrast was seen filling normal caliber distal mid proximal ureter and renal collecting system    CYSTO PLACEMENT  We switched back to the rigid cystoscope which was reinserted over the existing guidewire. A 4.8F x 24cm double J ureteral stent was advanced up the left ureter under direct visualization which confirmed good curl in the bladder. Fluoroscopy confirmed good curl in the kidney. The bladder was drained and this concluded our procedure.      The patient was brought back to the PACU in stable condition. All scopes and instruments were in good working order at the end of the case. There were no complications.      Disposition:  Patient may discharge remaining appropriate PACU criteria.  The patient will follow-up in 5 to 7 days for office stent removal.  She was instructed that ureteral stents are not permanent and she must follow-up for removal.          Micah Child MD     Date: 5/19/2023  Time: 13:34 EDT

## 2023-05-19 NOTE — ANESTHESIA PREPROCEDURE EVALUATION
Anesthesia Evaluation     Patient summary reviewed and Nursing notes reviewed   no history of anesthetic complications:  NPO Solid Status: > 8 hours  NPO Liquid Status: > 8 hours           Airway   Mallampati: II  TM distance: >3 FB  Neck ROM: full  No difficulty expected  Dental      Pulmonary - normal exam   (+) asthma,shortness of breath,   Cardiovascular - normal exam    (+) hypertension, valvular problems/murmurs, CHF ,       Neuro/Psych  (+) seizures, headaches, numbness, psychiatric history,    GI/Hepatic/Renal/Endo    (+) morbid obesity, GERD,  renal disease,     Musculoskeletal     Abdominal    Substance History      OB/GYN          Other   arthritis,                      Anesthesia Plan    ASA 3     general     intravenous induction     Anesthetic plan, risks, benefits, and alternatives have been provided, discussed and informed consent has been obtained with: patient.    Plan discussed with CRNA.        CODE STATUS:

## 2023-05-19 NOTE — ANESTHESIA POSTPROCEDURE EVALUATION
Patient: Bernardo Dodd    Procedure Summary     Date: 05/19/23 Room / Location:  LOCO OR 07 /  LOCO OR    Anesthesia Start: 1258 Anesthesia Stop: 1336    Procedure: CYSTOSCOPY RETROGRADE PYELOGRAM (Left) Diagnosis:       Left ureteral stone      (Left ureteral stone [N20.1])    Surgeons: Micah Child MD Provider: Christian Dubois MD    Anesthesia Type: general ASA Status: 3          Anesthesia Type: general    Vitals  Vitals Value Taken Time   BP     Temp     Pulse 85 05/19/23 1335   Resp     SpO2 90 % 05/19/23 1335   Vitals shown include unvalidated device data.    BP 87/56  Post Anesthesia Care and Evaluation    Patient location during evaluation: PACU  Patient participation: complete - patient cannot participate  Level of consciousness: obtunded/minimal responses  Pain score: 0  Pain management: adequate    Airway patency: patent  Anesthetic complications: No anesthetic complications  PONV Status: none  Cardiovascular status: hemodynamically stable and acceptable  Respiratory status: nonlabored ventilation, acceptable and nasal cannula  Hydration status: acceptable

## 2023-05-19 NOTE — PERIOPERATIVE NURSING NOTE
Son here with patient in the facility, [facility] [phone]. Mother, Keya [phone], to come and pick them up after surgery. Instructed son to call for ride when MD talks to family after surgery to ensure they are here at discharge.  Stated family was near facility.

## 2023-05-19 NOTE — INTERVAL H&P NOTE
[facility] Pre-op    Full history and physical note from office is attached.    VS: /73  HR 75  RR 16  T 97.8  Sat 95%RA    Immunizations:  Influenza:  2022  Pneumococcal:  No  Tetanus:  UTD  Covid : x2      LAB Results:  Lab Results   Component Value Date    WBC 14.21 (H) 05/13/2023    HGB 10.1 (L) 05/13/2023    HCT 31.6 (L) 05/13/2023    MCV 78.8 (L) 05/13/2023     (H) 05/13/2023    NEUTROABS 8.75 (H) 05/13/2023    GLUCOSE 106 (H) 05/13/2023    BUN 19 05/13/2023    CREATININE 0.80 05/13/2023    EGFRIFAFRI 101 10/22/2021     05/13/2023    K 4.1 05/13/2023     05/13/2023    CO2 24.0 05/13/2023    MG 2.6 05/04/2023    PHOS 2.0 (L) 06/30/2019    CALCIUM 9.3 05/13/2023    ALBUMIN 3.8 05/13/2023    AST 14 05/13/2023    ALT 10 05/13/2023    BILITOT 0.2 05/13/2023    PTT 28 03/18/2016    INR 0.88 03/18/2016 5/13/23 CT abd:  Findings:  Unremarkable appearance of the lower thorax, liver, decompressed gallbladder, bile ducts, spleen, pancreas, and adrenal glands.     Unremarkable noncontrast appearance of the right kidney and right ureter. From prior CT there has been interval placement of a left-sided nephroureteral stent with the proximal loop in the superior pole collecting system and the distal loop within the   left bladder lumen. There is a 2-3 mm calcification within the distal left ureter adjacent to the stent compatible with ureteral stone (series 2 image 110 and series 900 image 52). There is questionable minimal left-sided hydronephrosis which is similar   or mildly improved compared to prior CT. Previously seen left perinephric fat stranding has essentially resolved. No perinephric fluid collection. Unremarkable appearance of the bladder.     Redemonstration of fibroid uterus. Unremarkable appearance of the adnexa. No evidence of bowel obstruction. No definite inflammatory change of the GI tract. No abdominopelvic free fluid. No conspicuous abdominopelvic fat stranding.  No evidence of   pneumoperitoneum. Normal noncontrast appearance of the vasculature. No lymphadenopathy. Unremarkable appearing tiny fat-containing umbilical hernia. No acute or suspicious bony findings.    Impression:  No clearly acute abdominopelvic findings. Interval placement of a left-sided nephroureteral stent which appears in satisfactory position, with note of 2-3 mm stone adjacent to the stent in the distal left ureter. There is questionable minimal left-sided   hydronephrosis which appears similar to mildly improved in appearance compared to prior CT status post stent placement. Previously seen left perinephric fat stranding has resolved. No perinephric fluid.    Cancer Staging (if applicable)  Cancer Patient: __ yes __no __unknown__N/A; If yes, clinical stage T:__ N:__M:__, stage group or __N/A      Impression: Left ureteral stone      Plan: CYSTOSCOPY RETROGRADE PYELOGRAM      Mony Kothari, APRN   5/19/2023   10:41 EDT

## 2023-05-19 NOTE — DISCHARGE INSTRUCTIONS
Ureteroscopy + Laser Lithotripsy Post-Operative Care/Expectations    Follow these guidelines after your procedure in order to assist with your recovery.    Anesthesia Precautions and Expectations  - Rest for 24 hours after receiving general anesthesia, make sure you have someone at home with you that can monitor you  - Do not operate a vehicle, drink alcohol, or make 'important decisions'/sign legal documentation during the immediate recovery period if you received sedation for your procedure  - You may experience a sore throat, jaw discomfort, or muscle aches related to anesthesia, these symptoms may last a few days    Activity  - You may resume your normal home activities immediately post-operatively; however, light activity is encouraged for 24 hours to prevent urinary bleeding  - Do not operate a vehicle or drink alcohol if you were prescribed narcotic pain medications     Bathing/Showering  - You may resume normal bathing and showering post-procedure    Pain/Urinary Symptoms  - You may experience burning urinary pain for a few days, and/or increased urinary urgency/frequency post-procedure for a few weeks which is expected (sometimes longer if a ureteral stent was left in place)   - A medication to prevent burning urinary pain (Phenazopyridine) may be prescribed by your doctor, take as directed  - A medication to prevent urinary urgency/frequency (sometimes referred to as “bladder spasms”) (Hyoscyamine, Oxybutynin, Mirabegron, Solifenacin, etc.) may be prescribed by your doctor for up to 1 month, take as directed     Urinary Bleeding (Hematuria)   - Some degree of light urinary bleeding (hematuria) is expected for up to 1-2 weeks (this may be as light as pink lemonade or somewhat darker like clear/pale red Gatorade); a good rule of thumb is that your urine should remain see-through    - If you experience heavy urinary bleeding (like the color and consistency of tomato juice, or red wine), large blood clots, or  you are unable to urinate for more than 8 hours you should contact your doctor and present to the nearest Emergency Department  - Drink plenty of water at home and stay hydrated, as this will help naturally flush out your bladder and urethra    Antibiotics  - Complete the antibiotic course (if) prescribed as directed to prevent urinary infection     When to call your doctor:   - Pain that is not controlled with oral medications  - Signs of significant infection: Fever 101F, shaking chills, profuse sweating, persistent nausea or vomiting, unable to tolerate food or drink   - Severe urinary bleeding or large blood clots in urine  - Inability to urinate for more than 8 hours post-surgery         STENT REMOVAL INSTRUCTIONS    A ureteral stent was left in place after your procedure, the ureteral stent is a flexible plastic tube roughly 9 to 10 inches in length which allows urine to drain from the kidney to the bladder while the inflammation in the ureter is settling down after surgery.  Without the stent in place, the ureter could be blocked due to this ureteral inflammation which could result in severe flank pain.    Return in 5-7 days for stent removal- stents are no permanent and you must follow-up for removal.

## 2023-05-20 ENCOUNTER — HOSPITAL ENCOUNTER (EMERGENCY)
Facility: HOSPITAL | Age: 43
Discharge: HOME | End: 2023-05-20
Attending: STUDENT IN AN ORGANIZED HEALTH CARE EDUCATION/TRAINING PROGRAM
Payer: MEDICAID

## 2023-05-20 ENCOUNTER — APPOINTMENT (OUTPATIENT)
Dept: GENERAL RADIOLOGY | Facility: HOSPITAL | Age: 43
End: 2023-05-20
Payer: MEDICAID

## 2023-05-20 VITALS
HEIGHT: 59 IN | BODY MASS INDEX: 33.26 KG/M2 | SYSTOLIC BLOOD PRESSURE: 128 MMHG | OXYGEN SATURATION: 98 % | DIASTOLIC BLOOD PRESSURE: 78 MMHG | TEMPERATURE: 98.6 F | HEART RATE: 80 BPM | WEIGHT: 165 LBS | RESPIRATION RATE: 16 BRPM

## 2023-05-20 DIAGNOSIS — E83.39 HYPOPHOSPHATEMIA: ICD-10-CM

## 2023-05-20 DIAGNOSIS — R31.9 HEMATURIA, UNSPECIFIED TYPE: ICD-10-CM

## 2023-05-20 DIAGNOSIS — Z96.0 URETERAL STENT PRESENT: ICD-10-CM

## 2023-05-20 DIAGNOSIS — E86.0 DEHYDRATION: ICD-10-CM

## 2023-05-20 DIAGNOSIS — R79.89 HIGH SERUM LACTATE: ICD-10-CM

## 2023-05-20 DIAGNOSIS — M79.89 LEG SWELLING: Primary | ICD-10-CM

## 2023-05-20 LAB
ALBUMIN SERPL-MCNC: 3.6 G/DL (ref 3.5–5.2)
ALBUMIN/GLOB SERPL: 1.3 G/DL
ALP SERPL-CCNC: 59 U/L (ref 39–117)
ALT SERPL W P-5'-P-CCNC: 11 U/L (ref 1–33)
ANION GAP SERPL CALCULATED.3IONS-SCNC: 9 MMOL/L (ref 5–15)
AST SERPL-CCNC: 17 U/L (ref 1–32)
B-HCG UR QL: NEGATIVE
BACTERIA UR QL AUTO: ABNORMAL /HPF
BASOPHILS # BLD AUTO: 0.01 10*3/MM3 (ref 0–0.2)
BASOPHILS NFR BLD AUTO: 0.1 % (ref 0–1.5)
BILIRUB SERPL-MCNC: <0.2 MG/DL (ref 0–1.2)
BILIRUB UR QL STRIP: NEGATIVE
BUN SERPL-MCNC: 24 MG/DL (ref 6–20)
BUN/CREAT SERPL: 28.9 (ref 7–25)
CALCIUM SPEC-SCNC: 8.9 MG/DL (ref 8.6–10.5)
CHLORIDE SERPL-SCNC: 102 MMOL/L (ref 98–107)
CLARITY UR: ABNORMAL
CO2 SERPL-SCNC: 25 MMOL/L (ref 22–29)
COLOR UR: ABNORMAL
CREAT SERPL-MCNC: 0.83 MG/DL (ref 0.57–1)
CRP SERPL-MCNC: <0.3 MG/DL (ref 0–0.5)
D DIMER PPP FEU-MCNC: 0.48 MCGFEU/ML (ref 0–0.5)
D-LACTATE SERPL-SCNC: 3.1 MMOL/L (ref 0.5–2)
DEPRECATED RDW RBC AUTO: 50.2 FL (ref 37–54)
EGFRCR SERPLBLD CKD-EPI 2021: 89.3 ML/MIN/1.73
EOSINOPHIL # BLD AUTO: 0 10*3/MM3 (ref 0–0.4)
EOSINOPHIL NFR BLD AUTO: 0 % (ref 0.3–6.2)
ERYTHROCYTE [DISTWIDTH] IN BLOOD BY AUTOMATED COUNT: 17.3 % (ref 12.3–15.4)
EXPIRATION DATE: NORMAL
GLOBULIN UR ELPH-MCNC: 2.8 GM/DL
GLUCOSE SERPL-MCNC: 183 MG/DL (ref 65–99)
GLUCOSE UR STRIP-MCNC: ABNORMAL MG/DL
HCT VFR BLD AUTO: 29.2 % (ref 34–46.6)
HGB BLD-MCNC: 9.1 G/DL (ref 12–15.9)
HGB UR QL STRIP.AUTO: ABNORMAL
HYALINE CASTS UR QL AUTO: ABNORMAL /LPF
IMM GRANULOCYTES # BLD AUTO: 0.04 10*3/MM3 (ref 0–0.05)
IMM GRANULOCYTES NFR BLD AUTO: 0.4 % (ref 0–0.5)
INTERNAL NEGATIVE CONTROL: NORMAL
INTERNAL POSITIVE CONTROL: NORMAL
KETONES UR QL STRIP: ABNORMAL
LEUKOCYTE ESTERASE UR QL STRIP.AUTO: ABNORMAL
LYMPHOCYTES # BLD AUTO: 0.86 10*3/MM3 (ref 0.7–3.1)
LYMPHOCYTES NFR BLD AUTO: 8.9 % (ref 19.6–45.3)
Lab: NORMAL
MAGNESIUM SERPL-MCNC: 2.1 MG/DL (ref 1.6–2.6)
MCH RBC QN AUTO: 25.1 PG (ref 26.6–33)
MCHC RBC AUTO-ENTMCNC: 31.2 G/DL (ref 31.5–35.7)
MCV RBC AUTO: 80.7 FL (ref 79–97)
MONOCYTES # BLD AUTO: 0.43 10*3/MM3 (ref 0.1–0.9)
MONOCYTES NFR BLD AUTO: 4.4 % (ref 5–12)
NEUTROPHILS NFR BLD AUTO: 8.34 10*3/MM3 (ref 1.7–7)
NEUTROPHILS NFR BLD AUTO: 86.2 % (ref 42.7–76)
NITRITE UR QL STRIP: POSITIVE
NRBC BLD AUTO-RTO: 0 /100 WBC (ref 0–0.2)
NT-PROBNP SERPL-MCNC: 52.7 PG/ML (ref 0–450)
PH UR STRIP.AUTO: 7 [PH] (ref 5–8)
PHOSPHATE SERPL-MCNC: 1.8 MG/DL (ref 2.5–4.5)
PLATELET # BLD AUTO: 421 10*3/MM3 (ref 140–450)
PMV BLD AUTO: 9.6 FL (ref 6–12)
POTASSIUM SERPL-SCNC: 4.5 MMOL/L (ref 3.5–5.2)
PROT SERPL-MCNC: 6.4 G/DL (ref 6–8.5)
PROT UR QL STRIP: ABNORMAL
QT INTERVAL: 374 MS
QTC INTERVAL: 469 MS
RBC # BLD AUTO: 3.62 10*6/MM3 (ref 3.77–5.28)
RBC # UR STRIP: ABNORMAL /HPF
REF LAB TEST METHOD: ABNORMAL
SODIUM SERPL-SCNC: 136 MMOL/L (ref 136–145)
SP GR UR STRIP: 1.02 (ref 1–1.03)
SQUAMOUS #/AREA URNS HPF: ABNORMAL /HPF
T4 FREE SERPL-MCNC: 0.83 NG/DL (ref 0.93–1.7)
TSH SERPL DL<=0.05 MIU/L-ACNC: 0.51 UIU/ML (ref 0.27–4.2)
UROBILINOGEN UR QL STRIP: ABNORMAL
WBC # UR STRIP: ABNORMAL /HPF
WBC NRBC COR # BLD: 9.68 10*3/MM3 (ref 3.4–10.8)

## 2023-05-20 PROCEDURE — 84439 ASSAY OF FREE THYROXINE: CPT | Performed by: STUDENT IN AN ORGANIZED HEALTH CARE EDUCATION/TRAINING PROGRAM

## 2023-05-20 PROCEDURE — 83735 ASSAY OF MAGNESIUM: CPT | Performed by: STUDENT IN AN ORGANIZED HEALTH CARE EDUCATION/TRAINING PROGRAM

## 2023-05-20 PROCEDURE — 99283 EMERGENCY DEPT VISIT LOW MDM: CPT

## 2023-05-20 PROCEDURE — 80050 GENERAL HEALTH PANEL: CPT | Performed by: STUDENT IN AN ORGANIZED HEALTH CARE EDUCATION/TRAINING PROGRAM

## 2023-05-20 PROCEDURE — 96360 HYDRATION IV INFUSION INIT: CPT

## 2023-05-20 PROCEDURE — 84100 ASSAY OF PHOSPHORUS: CPT | Performed by: STUDENT IN AN ORGANIZED HEALTH CARE EDUCATION/TRAINING PROGRAM

## 2023-05-20 PROCEDURE — 81025 URINE PREGNANCY TEST: CPT | Performed by: STUDENT IN AN ORGANIZED HEALTH CARE EDUCATION/TRAINING PROGRAM

## 2023-05-20 PROCEDURE — 83880 ASSAY OF NATRIURETIC PEPTIDE: CPT | Performed by: STUDENT IN AN ORGANIZED HEALTH CARE EDUCATION/TRAINING PROGRAM

## 2023-05-20 PROCEDURE — 93005 ELECTROCARDIOGRAM TRACING: CPT | Performed by: STUDENT IN AN ORGANIZED HEALTH CARE EDUCATION/TRAINING PROGRAM

## 2023-05-20 PROCEDURE — 86140 C-REACTIVE PROTEIN: CPT | Performed by: STUDENT IN AN ORGANIZED HEALTH CARE EDUCATION/TRAINING PROGRAM

## 2023-05-20 PROCEDURE — 85379 FIBRIN DEGRADATION QUANT: CPT | Performed by: STUDENT IN AN ORGANIZED HEALTH CARE EDUCATION/TRAINING PROGRAM

## 2023-05-20 PROCEDURE — 36415 COLL VENOUS BLD VENIPUNCTURE: CPT

## 2023-05-20 PROCEDURE — 81001 URINALYSIS AUTO W/SCOPE: CPT | Performed by: STUDENT IN AN ORGANIZED HEALTH CARE EDUCATION/TRAINING PROGRAM

## 2023-05-20 PROCEDURE — 87086 URINE CULTURE/COLONY COUNT: CPT | Performed by: STUDENT IN AN ORGANIZED HEALTH CARE EDUCATION/TRAINING PROGRAM

## 2023-05-20 PROCEDURE — 83605 ASSAY OF LACTIC ACID: CPT | Performed by: STUDENT IN AN ORGANIZED HEALTH CARE EDUCATION/TRAINING PROGRAM

## 2023-05-20 PROCEDURE — 71045 X-RAY EXAM CHEST 1 VIEW: CPT

## 2023-05-20 RX ADMIN — POTASSIUM & SODIUM PHOSPHATES POWDER PACK 280-160-250 MG 2 PACKET: 280-160-250 PACK at 06:57

## 2023-05-20 RX ADMIN — SODIUM CHLORIDE, POTASSIUM CHLORIDE, SODIUM LACTATE AND CALCIUM CHLORIDE 1000 ML: 600; 310; 30; 20 INJECTION, SOLUTION INTRAVENOUS at 06:57

## 2023-05-20 NOTE — DISCHARGE INSTRUCTIONS
Follow-up with urology.  Continue to maintain good oral hydration, if you develop fevers or any other concerning symptoms please return to the ED.  Continue taking the antibiotics that you were provided by urology.  While today's work-up was reassuring if your symptoms change or worsen please return to the ED or seek other medical care.

## 2023-05-21 LAB — BACTERIA SPEC AEROBE CULT: NO GROWTH

## 2023-05-21 NOTE — ED PROVIDER NOTES
EMERGENCY DEPARTMENT ENCOUNTER    Pt Name: Bernardo Dodd  MRN: [id number]  Pt :   1980  Room Number:    Date of encounter:  2023  PCP: Nelly Sotelo PA  ED Provider: Javi Santana MD    Historian: Patient      HPI:  Chief Complaint: Leg swelling, renal stent in place        Context: Bernardo Dodd is a 42-year-old woman with history of CHF, hypertension, recent hospitalization for sepsis from urinary tract infection with obstruction who presents today for evaluation of bilateral leg swelling.  She was seen by urology earlier today where she had her urethral stent replaced and then after going home started noticing that her legs were swollen bilaterally.  She is concerned for possible blood clot so presents the emergency department for evaluation.  She has not noticed any redness or fevers.  She has not noticed any asymmetric swelling.  Denies leg pain just the swelling.  She says she has significant history of hypertension but it is controlled with amlodipine.  No other complaints at this time.    PAST MEDICAL HISTORY  Past Medical History:   Diagnosis Date   • Allergic rhinitis    • Anemia    • Anxiety    • Arthritis    • Asthma    • Bartholin gland cyst    • Bipolar disorder 2014   • CHF (congestive heart failure)    • Chlamydia    • Depression    • Endometriosis    • GERD (gastroesophageal reflux disease)    • Gonorrhea    • Heart murmur     at birth   • Herpes simplex    • Hypertension    • Kidney stones    • Low back pain    • Migraine    • Obesity    • Ovarian cyst    • Preeclampsia    • Seizures     patient reports last seizure in early 30's   • Substance abuse    • Thigh shingles    • Trichomonas infection    • Urinary tract infection    • Varicella    • Wears contact lenses          PAST SURGICAL HISTORY  Past Surgical History:   Procedure Laterality Date   • BREAST BIOPSY Right    •  SECTION WITH TUBAL  2010   • COLONOSCOPY     • CYSTOSCOPY,  URETEROSCOPY, RETROGRADE PYELOGRAM, STONE EXTRACTION, STENT INSERTION Bilateral 2023    Procedure: CYSTOSCOPY, BILATERAL RETROGRADE PYELOGRAM, URETEROSCOPY, AND STENT PLACEMENT;  Surgeon: Micah Child MD;  [facility]  Service: Urology;  Laterality: Bilateral;   • DIAGNOSTIC LAPAROSCOPY     • ENDOSCOPY     • INCISION AND DRAINAGE ABSCESS      bartholin's   • ORIF ANKLE FRACTURE Right    • REDUCTION MAMMAPLASTY Bilateral    • TENSION FREE VAGINAL TAPING WITH MINI ARC SLING      Dr Doyle avery          FAMILY HISTORY  Family History   Problem Relation Age of Onset   • Pancreatic cancer Maternal Grandmother    • Cancer Maternal Grandmother    • Heart failure Paternal Grandmother    • MARCIE disease Paternal Aunt    • Arthritis Mother    • Cancer Mother    • Bipolar disorder Mother    • Arthritis Father    • Dementia Father    • Hypertension Father    • Pancreatic cancer Other    • Diabetes Other    • Heart disease Other    • Hypertension Other    • Other Other         RESPIRATORY DISEASE   • Diabetes Paternal Uncle    • Heart attack Neg Hx    • Hyperlipidemia Neg Hx    • Mental illness Neg Hx    • Obesity Neg Hx    • Stroke Neg Hx    • Breast cancer Neg Hx    • Ovarian cancer Neg Hx          SOCIAL HISTORY  Social History     Socioeconomic History   • Marital status: Single   • Number of children: 2   • Highest education level: Some college, no degree   Tobacco Use   • Smoking status: Former     Packs/day: 1.00     Years: 16.00     Pack years: 16.00     Types: Cigarettes     Start date:      Quit date: 2016     Years since quittin.3   • Smokeless tobacco: Never   Vaping Use   • Vaping Use: Some days   • Substances: THC, Flavoring   • Devices: Disposable   • Passive vaping exposure: Yes   Substance and Sexual Activity   • Alcohol use: No   • Drug use: Yes     Types: Marijuana     Comment: Marijuana once a week. Tried cocaine, meth, pain pills in the past   • Sexual activity: Defer      Partners: Male     Birth control/protection: Surgical         ALLERGIES  Naproxen and Sulfa antibiotics        REVIEW OF SYSTEMS  Review of Systems       All systems reviewed and negative except for those discussed in HPI.       PHYSICAL EXAM    I have reviewed the triage vital signs and nursing notes.    ED Triage Vitals [05/20/23 0139]   Temp Heart Rate Resp BP SpO2   98.7 °F (37.1 °C) 120 20 142/82 95 %      Temp src Heart Rate Source Patient Position BP Location FiO2 (%)   Oral Monitor Sitting Left arm --       Physical Exam  GENERAL:   Appears in no acute distress.   HENT: Nares patent.  Dry mucous membranes  EYES: No scleral icterus.  CV: Regular rhythm, regular rate.  RESPIRATORY: Normal effort.  No audible wheezes, rales or rhonchi.  ABDOMEN: Soft, nontender  MUSCULOSKELETAL: No deformities.  Mild, symmetric, nonpitting edema of the bilateral lower extremities, negative Homans' sign bilaterally, no erythema or warmth overlying  NEURO: Alert, moves all extremities, follows commands.  SKIN: Warm, dry, no rash visualized.      LAB RESULTS  Recent Results (from the past 24 hour(s))   Comprehensive Metabolic Panel    Collection Time: 05/20/23  4:25 AM    Specimen: Blood   Result Value Ref Range    Glucose 183 (H) 65 - 99 mg/dL    BUN 24 (H) 6 - 20 mg/dL    Creatinine 0.83 0.57 - 1.00 mg/dL    Sodium 136 136 - 145 mmol/L    Potassium 4.5 3.5 - 5.2 mmol/L    Chloride 102 98 - 107 mmol/L    CO2 25.0 22.0 - 29.0 mmol/L    Calcium 8.9 8.6 - 10.5 mg/dL    Total Protein 6.4 6.0 - 8.5 g/dL    Albumin 3.6 3.5 - 5.2 g/dL    ALT (SGPT) 11 1 - 33 U/L    AST (SGOT) 17 1 - 32 U/L    Alkaline Phosphatase 59 39 - 117 U/L    Total Bilirubin <0.2 0.0 - 1.2 mg/dL    Globulin 2.8 gm/dL    A/G Ratio 1.3 g/dL    BUN/Creatinine Ratio 28.9 (H) 7.0 - 25.0    Anion Gap 9.0 5.0 - 15.0 mmol/L    eGFR 89.3 >60.0 mL/min/1.73   BNP    Collection Time: 05/20/23  4:25 AM    Specimen: Blood   Result Value Ref Range    proBNP 52.7 0.0 - 450.0  pg/mL   D-dimer, Quantitative    Collection Time: 05/20/23  4:25 AM    Specimen: Blood   Result Value Ref Range    D-Dimer, Quantitative 0.48 0.00 - 0.50 MCGFEU/mL   Lactic Acid, Plasma    Collection Time: 05/20/23  4:25 AM    Specimen: Blood   Result Value Ref Range    Lactate 3.1 (C) 0.5 - 2.0 mmol/L   C-reactive Protein    Collection Time: 05/20/23  4:25 AM    Specimen: Blood   Result Value Ref Range    C-Reactive Protein <0.30 0.00 - 0.50 mg/dL   TSH    Collection Time: 05/20/23  4:25 AM    Specimen: Blood   Result Value Ref Range    TSH 0.508 0.270 - 4.200 uIU/mL   T4, Free    Collection Time: 05/20/23  4:25 AM    Specimen: Blood   Result Value Ref Range    Free T4 0.83 (L) 0.93 - 1.70 ng/dL   Magnesium    Collection Time: 05/20/23  4:25 AM    Specimen: Blood   Result Value Ref Range    Magnesium 2.1 1.6 - 2.6 mg/dL   Phosphorus    Collection Time: 05/20/23  4:25 AM    Specimen: Blood   Result Value Ref Range    Phosphorus 1.8 (C) 2.5 - 4.5 mg/dL   CBC Auto Differential    Collection Time: 05/20/23  4:25 AM    Specimen: Blood   Result Value Ref Range    WBC 9.68 3.40 - 10.80 10*3/mm3    RBC 3.62 (L) 3.77 - 5.28 10*6/mm3    Hemoglobin 9.1 (L) 12.0 - 15.9 g/dL    Hematocrit 29.2 (L) 34.0 - 46.6 %    MCV 80.7 79.0 - 97.0 fL    MCH 25.1 (L) 26.6 - 33.0 pg    MCHC 31.2 (L) 31.5 - 35.7 g/dL    RDW 17.3 (H) 12.3 - 15.4 %    RDW-SD 50.2 37.0 - 54.0 fl    MPV 9.6 6.0 - 12.0 fL    Platelets 421 140 - 450 10*3/mm3    Neutrophil % 86.2 (H) 42.7 - 76.0 %    Lymphocyte % 8.9 (L) 19.6 - 45.3 %    Monocyte % 4.4 (L) 5.0 - 12.0 %    Eosinophil % 0.0 (L) 0.3 - 6.2 %    Basophil % 0.1 0.0 - 1.5 %    Immature Grans % 0.4 0.0 - 0.5 %    Neutrophils, Absolute 8.34 (H) 1.70 - 7.00 10*3/mm3    Lymphocytes, Absolute 0.86 0.70 - 3.10 10*3/mm3    Monocytes, Absolute 0.43 0.10 - 0.90 10*3/mm3    Eosinophils, Absolute 0.00 0.00 - 0.40 10*3/mm3    Basophils, Absolute 0.01 0.00 - 0.20 10*3/mm3    Immature Grans, Absolute 0.04 0.00 - 0.05  10*3/mm3    nRBC 0.0 0.0 - 0.2 /100 WBC   ECG 12 Lead Electrolyte Imbalance    Collection Time: 05/20/23  4:28 AM   Result Value Ref Range    QT Interval 374 ms    QTC Interval 469 ms   Urinalysis With Microscopic If Indicated (No Culture) - Urine, Clean Catch    Collection Time: 05/20/23  5:39 AM    Specimen: Urine, Clean Catch   Result Value Ref Range    Color, UA Dark Yellow (A) Yellow, Straw    Appearance, UA Turbid (A) Clear    pH, UA 7.0 5.0 - 8.0    Specific Gravity, UA 1.024 1.001 - 1.030    Glucose,  mg/dL (1+) (A) Negative    Ketones, UA Trace (A) Negative    Bilirubin, UA Negative Negative    Blood, UA Large (3+) (A) Negative    Protein,  mg/dL (2+) (A) Negative    Leuk Esterase, UA Moderate (2+) (A) Negative    Nitrite, UA Positive (A) Negative    Urobilinogen, UA 1.0 E.U./dL 0.2 - 1.0 E.U./dL   Urinalysis, Microscopic Only - Urine, Clean Catch    Collection Time: 05/20/23  5:39 AM    Specimen: Urine, Clean Catch   Result Value Ref Range    RBC, UA Too Numerous to Count (A) None Seen, 0-2 /HPF    WBC, UA Too Numerous to Count (A) None Seen, 0-2 /HPF    Bacteria, UA None Seen None Seen, Trace /HPF    Squamous Epithelial Cells, UA 3-6 (A) None Seen, 0-2 /HPF    Hyaline Casts, UA 0-6 0 - 6 /LPF    Methodology Automated Microscopy    POC Urine Pregnancy    Collection Time: 05/20/23  5:42 AM    Specimen: Urine   Result Value Ref Range    HCG, Urine, QL Negative Negative    Lot Number [id number]     Internal Positive Control Passed Positive, Passed    Internal Negative Control Passed Negative, Passed    Expiration Date 2024-04-30        If labs were ordered, I independently reviewed the results and considered them in treating the patient.        RADIOLOGY  XR Chest 1 View    Result Date: 5/20/2023  Examination: AP view of the chest Indication: Shortness of breath, leg swelling Comparison: 5/5/2023 Findings: The cardiomediastinal silhouette is within normal size limits. There is no consolidative  airspace disease, pleural effusion or pulmonary edema. There is no pneumothorax. No acute osseous abnormality is identified.     Impression: No evidence of an acute pleural or pulmonary parenchymal abnormality. Electronically signed by:  Curry Hernandez M.D.  5/20/2023 2:55 AM Mountain Time      I ordered and independently reviewed the above noted radiographic studies.      I viewed images of chest x-ray which is clear per my evaluation I do not appreciate any cardiomegaly or other abnormalities    See radiologist's dictation for official interpretation.        PROCEDURES    Procedures    ECG 12 Lead Electrolyte Imbalance   Preliminary Result   Test Reason : Electrolyte Imbalance   Blood Pressure :   */*   mmHG   Vent. Rate :  95 BPM     Atrial Rate :  95 BPM      P-R Int : 138 ms          QRS Dur :  86 ms       QT Int : 374 ms       P-R-T Axes :  48  65  68 degrees      QTc Int : 469 ms      Normal sinus rhythm   Nonspecific T wave abnormality   Prolonged QT   Abnormal ECG   When compared with ECG of 13-MAY-2023 14:31, (Unconfirmed)   No significant change was found      Referred By: EDMD           Confirmed By:           MEDICATIONS GIVEN IN ER    Medications   lactated ringers bolus 1,000 mL (0 mL Intravenous Stopped 5/20/23 7264)   potassium & sodium phosphates (PHOS-NAK) oral packet (2 packets Oral Given 5/20/23 5287)         MEDICAL DECISION MAKING, PROGRESS, and CONSULTS    All labs have been independently reviewed by me.  All radiology studies have been reviewed by me and the radiologist dictating the report.  All EKG's have been independently viewed and interpreted by me.      Discussion below represents my analysis of pertinent findings related to patient's condition, differential diagnosis, treatment plan and final disposition.      Differential diagnosis:    Sepsis, urinary tract infection, acute kidney injury, liver failure, congestive heart failure, anemia, electrolyte abnormality      Additional  sources:    - Discussed/ obtained information from independent historians:      - External (non-ED) record review: Chart review from stent replacement and laser lithotripsy earlier yesterday with Dr. Child, hospital admission for obstructive urosepsis    - Chronic or social conditions impacting care:      - Shared decision making: Agreeable to discharge with close urology follow-up.      Orders placed during this visit:  Orders Placed This Encounter   Procedures   • Urine Culture - Urine,   • XR Chest 1 View   • Comprehensive Metabolic Panel   • Urinalysis With Microscopic If Indicated (No Culture) - Urine, Clean Catch   • BNP   • D-dimer, Quantitative   • Lactic Acid, Plasma   • C-reactive Protein   • TSH   • T4, Free   • Magnesium   • Phosphorus   • CBC Auto Differential   • Urinalysis, Microscopic Only - Urine, Clean Catch   • POC Urine Pregnancy   • ECG 12 Lead Electrolyte Imbalance   • CBC & Differential         Additional orders considered but not ordered:      ED Course:    Consultants:      ED Course as of 05/20/23 2026   Sat May 20, 2023   0351 This is a 42-year-old woman with history of CHF, hypertension, recent hospitalization for sepsis from urinary tract infection with obstruction who presents today for evaluation of bilateral leg swelling.  She was seen by urology earlier today where she had her urethral stent replaced and then after going home started noticing that her legs were swollen bilaterally.  She is concerned for possible blood clot so presents the emergency department for evaluation.  She has not noticed any redness or fevers.  She has not noticed any asymmetric swelling.  Denies leg pain just the swelling.  She says she has significant history of hypertension but it is controlled with amlodipine.  No other complaints at this time. [CC]   0400 She arrived awake and alert tachycardic otherwise vitals are within normal limits she is not hypertensive at this time.  Heart rate was 99 while I was  at the bedside.  She has nonpitting bilateral symmetric edema.  With her recent hospitalization she is at increased risk for blood clot though I think simultaneous bilateral DVTs is unlikely obtaining basic laboratory work-up as well as D-dimer, thyroid studies, magnesium, phosphorus, metabolic panel and renal function. [CC]   0626 CBC shows mild anemia but this is stable when compared with prior values.  CMP generally reassuring she does have hyperglycemia but no elevation in creatinine.  Normal liver function.  No elevation in D-dimer.  Serum lactate is elevated at 3.1 but without leukocytosis or fever.  Treating with IV fluids and repeating lactate.  No elevation in proBNP.  C-reactive protein is negative.  Normal TSH and very mildly low free T4 I assume is a stress response.  She does have significant hypophosphatemia phosphorus of 1.8 on repleting this orally.  Urinalysis has gross hematuria and pyuria which could be consistent with her recent procedure but is also nitrite positive.  Discussed with her she has been taking Pyridium which will make the nitrite a false positive.  She is already on nitrofurantoin per urology.  I have discussed with Dr. Small of the on-call urologist who is overall reassured by the work-up think she can safely discharge and will follow-up on them.  He recommends adding on a urine culture which I have done. [CC]      ED Course User Index  [CC] Javi Santana MD     After IV fluids her tachycardia has resolved heart rates gone from 120-80.  She is feeling better after the IV fluids we discussed the importance of good oral hydration which should help with both her urinary symptoms and the leg swelling.  She will be contacted with results of urine culture if it grows anything.  She will continue taking the antibiotics per urology for the time being.  Counseled strict return precautions verbally expressed understanding of these.             AS OF 20:26 EDT VITALS:    BP -  128/78  HR - 80  TEMP - 98.6 °F (37 °C) (Oral)  O2 SATS - 98%                  DIAGNOSIS  Final diagnoses:   Leg swelling   Dehydration   Ureteral stent present   Hematuria, unspecified type   Hypophosphatemia   High serum lactate         DISPOSITION  DISCHARGE    Patient discharged in stable condition.    Reviewed implications of results, diagnosis, meds, responsibility to follow up, warning signs and symptoms of possible worsening, potential complications and reasons to return to ER.    Patient/Family voiced understanding of above instructions.    Discussed plan for discharge, as there is no emergent indication for admission.  Pt/family is agreeable and understands need for follow up and possible repeat testing.  Pt/family is aware that discharge does not mean that nothing is wrong but that it indicates no emergency is currently present that requires admission and they must continue care with follow-up as given below or with a physician of their choice.     FOLLOW-UP  Nelly Sotelo PA  [address] [id number] [phone]    Call       Micah Child MD  [address] [place] [address]  [phone]    Call   For urology follow-up.  Let them know you are seen in the emergency department tonight         Medication List      Changed    erythromycin 5 MG/GM ophthalmic ointment  Commonly known as: ROMYCIN  Administer  to the right eye Every 4 (Four) Hours While Awake.  What changed:   · when to take this  · reasons to take this     OLANZapine 10 MG tablet  Commonly known as: ZyPREXA  Take 0.5 tablets by mouth Daily for 1 day, THEN 1 tablet Daily.  Start taking on: September 7, 2022  What changed: See the new instructions.                Please note that portions of this document were completed with voice recognition software.        Javi Santana MD  05/20/23 2029

## 2023-05-24 ENCOUNTER — READMISSION MANAGEMENT (OUTPATIENT)
Dept: CALL CENTER | Facility: HOSPITAL | Age: 43
End: 2023-05-24
Payer: MEDICAID

## 2023-05-24 NOTE — OUTREACH NOTE
Sepsis Week 2 Survey      Flowsheet Row Responses   [facility] patient discharged from? [place]   Does the patient have one of the following disease processes/diagnoses(primary or secondary)? Sepsis   Week 2 attempt successful? Yes   Call start time 0903   Call end time 0910   Meds reviewed with patient/caregiver? Yes   Comments regarding appointments Pt to see urologist on June 1, 2023.   Has the patient kept scheduled appointments due by today? Yes   What is the patient's perception of their health status since discharge? Improving   Week 2 call completed? Yes   Wrap up additional comments Pt reports improving but continues to feel discomfort at times. Pt has stent placed. Pt to see urology next week.            Mery MEIER - Registered Nurse

## 2023-05-29 LAB
QT INTERVAL: 408 MS
QTC INTERVAL: 473 MS

## 2023-05-31 ENCOUNTER — PROCEDURE VISIT (OUTPATIENT)
Dept: UROLOGY | Facility: CLINIC | Age: 43
End: 2023-05-31

## 2023-05-31 VITALS — BODY MASS INDEX: 33.26 KG/M2 | WEIGHT: 165 LBS | HEART RATE: 94 BPM | OXYGEN SATURATION: 98 % | HEIGHT: 59 IN

## 2023-05-31 DIAGNOSIS — N20.1 LEFT URETERAL STONE: ICD-10-CM

## 2023-05-31 NOTE — PROGRESS NOTES
Procedure: Flexible Cystoscopy with Stent Removal     Preoperative Diagnosis: Left ureteral stone    Postoperative Diagnosis: Left ureteral stone    Procedure performed: Cystoscopy with left ureteral stent removal     Surgeon: Micah Child MD    Anesthesia: 2% Lidocaine Jelly    Indications: Bernardo Dodd is a 42 y.o. year old female with a history of ureteral stone who underwent definitive stone treatment. A ureteral stent was left in place. The patient returns for planned ureteral stent removal today.     Procedure: Patient was taken to the urology procedure suite and prepped and draped in sterile fashion. A pre-procedural identification was performed. 10 cc of 2% lidocaine jelly was injected into the urethra. After adequate anesthesia, a lubricated flexible cystoscope was inserted into the urethra. The urethra appeared normal. The urinary sphincter was intact. The bladder was entered and 360 degree panendoscopy was performed revealing normal bladder anatomy, bilateral orthotopic ureteral orifices, and no concern for bladder stones, trabeculations, mucosal lesions/tumors, or divetrticuli. The left ureteral stent was in good position emanating from the ureteral orifice.      The left ureteral stent was grasped with a pair of grasping forceps and was removed without difficulty. The stent was removed intact.     PLAN    1) Discharge home    2) Follow up: 4 weeks with ultrasound

## 2023-06-06 ENCOUNTER — READMISSION MANAGEMENT (OUTPATIENT)
Dept: CALL CENTER | Facility: HOSPITAL | Age: 43
End: 2023-06-06
Payer: MEDICAID

## 2023-06-06 NOTE — OUTREACH NOTE
Sepsis Week 3 Survey      Flowsheet Row Responses   [facility] patient discharged from? [facility]   Does the patient have one of the following disease processes/diagnoses(primary or secondary)? Sepsis   Week 3 attempt successful? No   Unsuccessful attempts Attempt 1            Sarina CASE - Registered Nurse

## 2023-06-10 ENCOUNTER — HOSPITAL ENCOUNTER (OUTPATIENT)
Dept: CT IMAGING | Facility: HOSPITAL | Age: 43
Discharge: HOME | End: 2023-06-10
Payer: MEDICAID

## 2023-06-10 DIAGNOSIS — J32.9 RECURRENT SINUSITIS: ICD-10-CM

## 2023-06-10 PROCEDURE — 70486 CT MAXILLOFACIAL W/O DYE: CPT

## 2023-06-14 ENCOUNTER — HOSPITAL ENCOUNTER (OUTPATIENT)
Dept: ULTRASOUND IMAGING | Facility: HOSPITAL | Age: 43
Discharge: HOME | End: 2023-06-14
Admitting: UROLOGY
Payer: MEDICAID

## 2023-06-14 DIAGNOSIS — N20.1 LEFT URETERAL STONE: ICD-10-CM

## 2023-06-14 PROCEDURE — 76775 US EXAM ABDO BACK WALL LIM: CPT

## 2023-07-25 ENCOUNTER — OFFICE VISIT (OUTPATIENT)
Dept: UROLOGY | Facility: CLINIC | Age: 43
End: 2023-07-25
Payer: MEDICAID

## 2023-07-25 VITALS — BODY MASS INDEX: 33.26 KG/M2 | WEIGHT: 165 LBS | HEIGHT: 59 IN

## 2023-07-25 DIAGNOSIS — N20.1 LEFT URETERAL STONE: Primary | ICD-10-CM

## 2023-07-25 PROCEDURE — 99213 OFFICE O/P EST LOW 20 MIN: CPT | Performed by: UROLOGY

## 2023-07-25 PROCEDURE — 1160F RVW MEDS BY RX/DR IN RCRD: CPT | Performed by: UROLOGY

## 2023-07-25 PROCEDURE — 1159F MED LIST DOCD IN RCRD: CPT | Performed by: UROLOGY

## 2023-07-25 NOTE — PROGRESS NOTES
Follow Up Office Visit      Patient Name: Bernardo Dodd  : 1980   MRN: [id number]     Chief Complaint:    Chief Complaint   Patient presents with    Nephrolithiasis       History of Present Illness: Bernardo Dodd is a 42 y.o. female who presents today for 4-week follow-up after ureteroscopy and laser lithotripsy for ureteral stone, successful stent removal.  She is doing well in the postoperative setting.  She denies recurrence of flank pain or lower urinary tract symptoms.  Ultrasound has demonstrated resolution of hydronephrosis.    Subjective      Review of System: Review of Systems   Genitourinary:  Negative for decreased urine volume, difficulty urinating, dysuria, enuresis, flank pain, frequency, hematuria and urgency.    I have reviewed the ROS documented by my clinical staff, updated as appropriate and I agree. Micah Child MD    I have reviewed and the following portions of the patient's history were updated as appropriate: past family history, past medical history, past social history, past surgical history and problem list.    Medications:     Current Outpatient Medications:     albuterol sulfate  (90 Base) MCG/ACT inhaler, Inhale 2 puffs Every 4 (Four) Hours As Needed for Wheezing or Shortness of Air., Disp: 18 g, Rfl: 2    amLODIPine (NORVASC) 5 MG tablet, Take 1 tablet by mouth Daily., Disp: 90 tablet, Rfl: 2    celecoxib (CeleBREX) 200 MG capsule, Take 1 capsule by mouth Daily., Disp: 30 capsule, Rfl: 0    cetirizine (zyrTEC) 10 MG tablet, Take 1 tablet by mouth Daily., Disp: , Rfl:     ELDERBERRY PO, Take 1 capsule by mouth Daily., Disp: , Rfl:     erythromycin (ROMYCIN) 5 MG/GM ophthalmic ointment, Administer  to the right eye Every 4 (Four) Hours While Awake. (Patient taking differently: Administer  to the right eye Daily As Needed.), Disp: 3.5 g, Rfl: 0    fluticasone (Flonase) 50 MCG/ACT nasal spray, 2 sprays into the nostril(s) as directed by provider Daily., Disp:  "16 g, Rfl: 5    hyoscyamine (LEVSIN) 0.125 MG SL tablet, Take 1 tablet by mouth Every 4 (Four) Hours As Needed (spasm)., Disp: 20 tablet, Rfl: 0    lamoTRIgine (LaMICtal) 200 MG tablet, TAKE ONE TABLET BY MOUTH DAILY (Patient taking differently: Take 1 tablet by mouth Daily.), Disp: 60 tablet, Rfl: 2    montelukast (Singulair) 10 MG tablet, Take 1 tablet by mouth Every Night., Disp: 90 tablet, Rfl: 1    nitrofurantoin, macrocrystal-monohydrate, (Macrobid) 100 MG capsule, Take 1 capsule by mouth 2 (Two) Times a Day., Disp: 14 capsule, Rfl: 0    OLANZapine (ZyPREXA) 10 MG tablet, Take 0.5 tablets by mouth Daily for 1 day, THEN 1 tablet Daily. (Patient taking differently: One tab daily as needed for sleep- PT NEEDS REFILL 5/10/23), Disp: 30 tablet, Rfl: 2    ondansetron (Zofran) 4 MG tablet, Take 1 tablet by mouth Every 8 (Eight) Hours As Needed for Nausea or Vomiting., Disp: 30 tablet, Rfl: 0    phenazopyridine (Pyridium) 200 MG tablet, Take 1 tablet by mouth 3 (Three) Times a Day As Needed for Bladder Spasms., Disp: 20 tablet, Rfl: 0    potassium chloride (KLOR-CON) 20 MEQ packet, Take 20 mEq by mouth Daily., Disp: , Rfl:     traMADol (ULTRAM) 50 MG tablet, Take 1 tablet by mouth Every 6 (Six) Hours As Needed for Severe Pain., Disp: 12 tablet, Rfl: 0    vitamin C (ASCORBIC ACID) 250 MG tablet, Take 2 tablets by mouth Daily., Disp: , Rfl:     Allergies:   Allergies   Allergen Reactions    Naproxen Swelling    Sulfa Antibiotics Rash       Objective     Physical Exam:   Vital Signs:   Vitals:    07/25/23 1600   Weight: 74.8 kg (165 lb)   Height: 149.9 cm (59.02\")   PainSc: 0-No pain     Body mass index is 33.31 kg/m².     Physical Exam  Vitals and nursing note reviewed.   Constitutional:       Appearance: Normal appearance.   HENT:      Head: Normocephalic and atraumatic.   Cardiovascular:      Comments: Well perfused  Pulmonary:      Effort: Pulmonary effort is normal.   Abdominal:      General: Abdomen is flat.      " Palpations: Abdomen is soft.   Musculoskeletal:         General: Normal range of motion.   Skin:     General: Skin is warm and dry.   Neurological:      General: No focal deficit present.      Mental Status: She is alert and oriented to person, place, and time. Mental status is at baseline.   Psychiatric:         Mood and Affect: Mood normal.         Behavior: Behavior normal.         Thought Content: Thought content normal.         Judgment: Judgment normal.         Labs:   Brief Urine Lab Results  (Last result in the past 365 days)        Color   Clarity   Blood   Leuk Est   Nitrite   Protein   CREAT   Urine HCG        05/20/23 0542               Negative               Urine Culture          5/3/2023    10:31 5/20/2023    05:39   Urine Culture   Urine Culture >100,000 CFU/mL Escherichia coli  No growth         Lab Results   Component Value Date    GLUCOSE 183 (H) 05/20/2023    CALCIUM 8.9 05/20/2023     05/20/2023    K 4.5 05/20/2023    CO2 25.0 05/20/2023     05/20/2023    BUN 24 (H) 05/20/2023    CREATININE 0.83 05/20/2023    EGFRIFAFRI 101 10/22/2021    BCR 28.9 (H) 05/20/2023    ANIONGAP 9.0 05/20/2023       Lab Results   Component Value Date    WBC 9.68 05/20/2023    HGB 9.1 (L) 05/20/2023    HCT 29.2 (L) 05/20/2023    MCV 80.7 05/20/2023     05/20/2023       Images:   CT Abdomen Pelvis Without Contrast    Result Date: 5/13/2023  Impression: No clearly acute abdominopelvic findings. Interval placement of a left-sided nephroureteral stent which appears in satisfactory position, with note of 2-3 mm stone adjacent to the stent in the distal left ureter. There is questionable minimal left-sided hydronephrosis which appears similar to mildly improved in appearance compared to prior CT status post stent placement. Previously seen left perinephric fat stranding has resolved. No perinephric fluid. Electronically Signed: Alan Telles  5/13/2023 1:09 PM EDT  Workstation ID: [id number]    CT Abdomen  Pelvis Without Contrast    Result Date: 5/4/2023  1. Mild left-sided hydronephrosis and mild to moderate perinephric stranding with a 4.3 mm stone in the left ureterovesical junction.. 2. No bowel obstruction or appendicitis. 3. Uterine fibroid. Electronically signed by:  Jason Sheridan D.O.  5/4/2023 1:24 AM Mountain Time    XR Chest 2 View    Result Date: 5/3/2023  Impression: No acute cardiopulmonary findings. Electronically Signed: Alan Telles  5/3/2023 8:14 AM EDT  Workstation ID: [id number]    XR Chest 1 View    Result Date: 5/20/2023  Impression: No evidence of an acute pleural or pulmonary parenchymal abnormality. Electronically signed by:  Curry Hernandez M.D.  5/20/2023 2:55 AM Mountain Time    XR Chest 1 View    Result Date: 5/5/2023  No acute cardiopulmonary process. Electronically signed by:  Jason Sheridan D.O.  5/5/2023 12:55 AM Mountain Time    US Renal Bilateral    Result Date: 6/15/2023  1. Unremarkable ultrasound of the right kidney. 2. There appears to be a stent in place in the left kidney incompletely visualized. Minimal pelvocaliectasis noted. Otherwise unremarkable.. 3. Unremarkable ultrasound of the bladder. Electronically Signed: Niles Herring  6/15/2023 6:25 AM EDT  Workstation ID: [id number]    CT Sinus Without Contrast    Result Date: 6/11/2023  Impression: Moderate narrowing of the right maxillary sinus ostium, without further significant mucosal thickening or fluid opacification of the paranasal sinuses to suggest acute or chronic sinusitis. Electronically Signed: Darvin Renteria  6/11/2023 7:00 AM EDT  Workstation ID: [id number]    XR Pyelogram Retrograde    Result Date: 5/21/2023  Impression: Left ureteral stent placement. Electronically Signed: Salu Nickerson  5/21/2023 3:35 PM EDT  Workstation ID: [id number]    XR Pyelogram Retrograde    Result Date: 5/4/2023  Impression: 1. Successful deployment of left-sided ureteral stent. Electronically Signed: Brennen Hopkins  5/4/2023 8:20 AM EDT   Workstation ID: [id number]      Measures:   Tobacco:   Bernardo Dodd  reports that she quit smoking about 7 years ago. Her smoking use included cigarettes. She started smoking about 29 years ago. She has a 16.00 pack-year smoking history. She has been exposed to tobacco smoke. She has never used smokeless tobacco.. I have educated her on the risk of diseases from using tobacco products.     Assessment / Plan      Assessment/Plan:   42 y.o. female is seen today for follow up after ureteroscopy and laser lithotripsy for ureteral stone, successful stent removal.  She is doing well in the postoperative setting.  Ultrasound has revealed resolution of hydronephrosis.  She denies flank pain or recurrence of symptoms.  Today we have discussed Endep conservative strategies to reduce stone risk in the future including increasing fluid intake to greater than 2 to 2-1/2 L, decreasing sodium and animal protein within diet, increasing citrate intake.  This time she may follow-up on an as-needed basis..     Diagnoses and all orders for this visit:    1. Left ureteral stone (Primary)         Follow Up:   Return if symptoms worsen or fail to improve.     I spent approximately 20 minutes providing clinical care for this patient; including review of patient's chart and provider documentation, face to face time spent with patient in examination room (obtaining history, performing physical exam, discussing diagnosis and management options), placing orders, and completing patient documentation.     Micah Child MD  [facility]

## 2023-07-31 ENCOUNTER — OFFICE VISIT (OUTPATIENT)
Dept: BEHAVIORAL HEALTH | Facility: CLINIC | Age: 43
End: 2023-07-31
Payer: MEDICAID

## 2023-07-31 DIAGNOSIS — F39 UNSPECIFIED MOOD (AFFECTIVE) DISORDER: Primary | ICD-10-CM

## 2023-07-31 DIAGNOSIS — F19.10 SUBSTANCE ABUSE: ICD-10-CM

## 2023-07-31 PROCEDURE — 1160F RVW MEDS BY RX/DR IN RCRD: CPT | Performed by: COUNSELOR

## 2023-07-31 PROCEDURE — 1159F MED LIST DOCD IN RCRD: CPT | Performed by: COUNSELOR

## 2023-07-31 PROCEDURE — 90832 PSYTX W PT 30 MINUTES: CPT | Performed by: COUNSELOR

## 2023-08-09 ENCOUNTER — OFFICE VISIT (OUTPATIENT)
Dept: INTERNAL MEDICINE | Facility: CLINIC | Age: 43
End: 2023-08-09
Payer: MEDICAID

## 2023-08-09 VITALS
WEIGHT: 165 LBS | SYSTOLIC BLOOD PRESSURE: 118 MMHG | DIASTOLIC BLOOD PRESSURE: 74 MMHG | OXYGEN SATURATION: 97 % | BODY MASS INDEX: 33.26 KG/M2 | TEMPERATURE: 96.5 F | HEART RATE: 75 BPM | HEIGHT: 59 IN

## 2023-08-09 DIAGNOSIS — J02.9 SORE THROAT: ICD-10-CM

## 2023-08-09 DIAGNOSIS — J01.90 ACUTE SINUSITIS, RECURRENCE NOT SPECIFIED, UNSPECIFIED LOCATION: Primary | ICD-10-CM

## 2023-08-09 DIAGNOSIS — R05.1 ACUTE COUGH: ICD-10-CM

## 2023-08-09 DIAGNOSIS — M79.10 MYALGIA: ICD-10-CM

## 2023-08-09 DIAGNOSIS — R09.81 NASAL CONGESTION: ICD-10-CM

## 2023-08-09 DIAGNOSIS — H92.09 EARACHE: ICD-10-CM

## 2023-08-09 LAB
EXPIRATION DATE: NORMAL
EXPIRATION DATE: NORMAL
INTERNAL CONTROL: NORMAL
INTERNAL CONTROL: NORMAL
Lab: NORMAL
Lab: NORMAL
S PYO AG THROAT QL: NEGATIVE
SARS-COV-2 AG UPPER RESP QL IA.RAPID: NOT DETECTED

## 2023-08-09 RX ORDER — AZELASTINE 1 MG/ML
2 SPRAY, METERED NASAL 2 TIMES DAILY
Qty: 30 ML | Refills: 5 | Status: SHIPPED | OUTPATIENT
Start: 2023-08-09

## 2023-08-09 RX ORDER — CETIRIZINE HYDROCHLORIDE, PSEUDOEPHEDRINE HYDROCHLORIDE 5; 120 MG/1; MG/1
1 TABLET, FILM COATED, EXTENDED RELEASE ORAL DAILY
Qty: 30 TABLET | Refills: 5 | Status: SHIPPED | OUTPATIENT
Start: 2023-08-09

## 2023-08-09 RX ORDER — AMOXICILLIN AND CLAVULANATE POTASSIUM 875; 125 MG/1; MG/1
1 TABLET, FILM COATED ORAL 2 TIMES DAILY
Qty: 10 TABLET | Refills: 0 | Status: SHIPPED | OUTPATIENT
Start: 2023-08-09 | End: 2023-08-14

## 2023-08-09 NOTE — PROGRESS NOTES
Office Note     Name: Bernardo Dodd    : 1980     MRN: [id number]     Chief Complaint  Earache, Nasal Congestion, Sore Throat, and Cough    Subjective     History of Present Illness:  Bernardo Dodd is a 42 y.o. female who presents today for evaluation of sinus complaints.  Patient reports symptom onset began about 2 weeks ago.  Patient reports she normally has issues with allergies and she is allergic to pet dander.  She does have a cat at home.  She reports that last night her symptoms worsened.  She then developed a cough, sore throat, nasal congestion, and earache.  She also feels that she may have a bit of a sour stomach after eating a hot pocket.  She reports the nasal drainage is a brownish-yellow color.  She does report the cough is productive with a brownish-yellow thick mucus.  She has also been experiencing some bodyaches.  She denies any known fever.  She denies any known exposure to illness.  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  She has been taking daily Zyrtec and a multivitamin.  She denies further complaints or concerns at this time.  She normally follows with N[facility] for chronic conditions.      Past Medical History:   Diagnosis Date    Allergic rhinitis     Anemia     Anxiety     Arthritis     Asthma     Bartholin gland cyst     Bipolar disorder 2014    CHF (congestive heart failure)     Chlamydia     Depression     Endometriosis     GERD (gastroesophageal reflux disease)     Gonorrhea     Heart murmur     at birth    Herpes simplex     Hypertension     Kidney stones     Low back pain     Migraine     Obesity     Ovarian cyst     Preeclampsia     Seizures     patient reports last seizure in early     Substance abuse     Thigh shingles     Trichomonas infection     Urinary tract infection     Varicella     Wears contact lenses        Past Surgical History:   Procedure Laterality Date    BREAST BIOPSY Right       SECTION WITH TUBAL  2010    COLONOSCOPY      CYSTOSCOPY, URETEROSCOPY, RETROGRADE PYELOGRAM, STONE EXTRACTION, STENT INSERTION Bilateral 2023    Procedure: CYSTOSCOPY, BILATERAL RETROGRADE PYELOGRAM, URETEROSCOPY, AND STENT PLACEMENT;  Surgeon: Micah Child MD;  Location: [facility] OR;  Service: Urology;  Laterality: Bilateral;    CYSTOSCOPY, URETEROSCOPY, RETROGRADE PYELOGRAM, STONE EXTRACTION, STENT INSERTION Left 2023    Procedure: CYSTOSCOPY URETEROSCOPY RETROGRADE PYELOGRAM STONE EXTRACTION STENT INSERTION;  Surgeon: Micah Child MD;  Location: [facility] OR;  Service: Urology;  Laterality: Left;    DIAGNOSTIC LAPAROSCOPY      ENDOSCOPY      INCISION AND DRAINAGE ABSCESS      bartholin's    ORIF ANKLE FRACTURE Right     REDUCTION MAMMAPLASTY Bilateral     TENSION FREE VAGINAL TAPING WITH MINI ARC SLING      Dr Doyle avery        Social History     Socioeconomic History    Marital status: Single    Number of children: 2    Highest education level: Some college, no degree   Tobacco Use    Smoking status: Former     Packs/day: 1.00     Years: 16.00     Pack years: 16.00     Types: Cigarettes     Start date:      Quit date:      Years since quittin.6     Passive exposure: Past    Smokeless tobacco: Never   Vaping Use    Vaping Use: Some days    Substances: THC, Flavoring    Devices: Disposable    Passive vaping exposure: Yes   Substance and Sexual Activity    Alcohol use: No    Drug use: Yes     Types: Marijuana     Comment: Marijuana once a week. Tried cocaine, meth, pain pills in the past    Sexual activity: Defer     Partners: Male     Birth control/protection: Surgical         Current Outpatient Medications:     albuterol sulfate  (90 Base) MCG/ACT inhaler, Inhale 2 puffs Every 4 (Four) Hours As Needed for Wheezing or Shortness of Air., Disp: 18 g, Rfl: 2    amLODIPine (NORVASC) 5 MG tablet, Take 1 tablet by mouth Daily., Disp: 90 tablet, Rfl: 2    celecoxib  (CeleBREX) 200 MG capsule, Take 1 capsule by mouth Daily., Disp: 30 capsule, Rfl: 0    ELDERBERRY PO, Take 1 capsule by mouth Daily., Disp: , Rfl:     erythromycin (ROMYCIN) 5 MG/GM ophthalmic ointment, Administer  to the right eye Every 4 (Four) Hours While Awake. (Patient taking differently: Administer  to the right eye Daily As Needed.), Disp: 3.5 g, Rfl: 0    fluticasone (Flonase) 50 MCG/ACT nasal spray, 2 sprays into the nostril(s) as directed by provider Daily., Disp: 16 g, Rfl: 5    hyoscyamine (LEVSIN) 0.125 MG SL tablet, Take 1 tablet by mouth Every 4 (Four) Hours As Needed (spasm)., Disp: 20 tablet, Rfl: 0    lamoTRIgine (LaMICtal) 200 MG tablet, TAKE ONE TABLET BY MOUTH DAILY (Patient taking differently: Take 1 tablet by mouth Daily.), Disp: 60 tablet, Rfl: 2    montelukast (Singulair) 10 MG tablet, Take 1 tablet by mouth Every Night., Disp: 90 tablet, Rfl: 1    nitrofurantoin, macrocrystal-monohydrate, (Macrobid) 100 MG capsule, Take 1 capsule by mouth 2 (Two) Times a Day., Disp: 14 capsule, Rfl: 0    OLANZapine (ZyPREXA) 10 MG tablet, Take 0.5 tablets by mouth Daily for 1 day, THEN 1 tablet Daily. (Patient taking differently: One tab daily as needed for sleep- PT NEEDS REFILL 5/10/23), Disp: 30 tablet, Rfl: 2    ondansetron (Zofran) 4 MG tablet, Take 1 tablet by mouth Every 8 (Eight) Hours As Needed for Nausea or Vomiting., Disp: 30 tablet, Rfl: 0    phenazopyridine (Pyridium) 200 MG tablet, Take 1 tablet by mouth 3 (Three) Times a Day As Needed for Bladder Spasms., Disp: 20 tablet, Rfl: 0    potassium chloride (KLOR-CON) 20 MEQ packet, Take 20 mEq by mouth Daily., Disp: , Rfl:     traMADol (ULTRAM) 50 MG tablet, Take 1 tablet by mouth Every 6 (Six) Hours As Needed for Severe Pain., Disp: 12 tablet, Rfl: 0    vitamin C (ASCORBIC ACID) 250 MG tablet, Take 2 tablets by mouth Daily., Disp: , Rfl:     amoxicillin-clavulanate (AUGMENTIN) 875-125 MG per tablet, Take 1 tablet by mouth 2 (Two) Times a Day for 5  "days., Disp: 10 tablet, Rfl: 0    azelastine (ASTELIN) 0.1 % nasal spray, 2 sprays into the nostril(s) as directed by provider 2 (Two) Times a Day. Use in each nostril as directed, Disp: 30 mL, Rfl: 5    cetirizine-pseudoephedrine (ZyrTEC-D) 5-120 MG per 12 hr tablet, Take 1 tablet by mouth Daily., Disp: 30 tablet, Rfl: 5    Objective     Vital Signs  /74   Pulse 75   Temp 96.5 øF (35.8 øC)   Ht 149.9 cm (59.02\")   Wt 74.8 kg (165 lb)   SpO2 97%   BMI 33.30 kg/mý   Estimated body mass index is 33.3 kg/mý as calculated from the following:    Height as of this encounter: 149.9 cm (59.02\").    Weight as of this encounter: 74.8 kg (165 lb).           Physical Exam  Constitutional:       General: She is not in acute distress.     Appearance: Normal appearance. She is not ill-appearing.   HENT:      Head: Normocephalic and atraumatic.      Right Ear: Tympanic membrane, ear canal and external ear normal.      Left Ear: Tympanic membrane, ear canal and external ear normal.      Ears:      Comments: Clear effusion bilaterally     Nose: Nose normal.      Mouth/Throat:      Mouth: Mucous membranes are moist.      Pharynx: Oropharynx is clear. Posterior oropharyngeal erythema present. No oropharyngeal exudate.   Eyes:      Extraocular Movements: Extraocular movements intact.      Conjunctiva/sclera: Conjunctivae normal.      Pupils: Pupils are equal, round, and reactive to light.   Cardiovascular:      Rate and Rhythm: Normal rate and regular rhythm.      Heart sounds: Normal heart sounds.   Pulmonary:      Effort: Pulmonary effort is normal. No respiratory distress.      Breath sounds: Normal breath sounds. No wheezing or rhonchi.   Musculoskeletal:         General: Normal range of motion.      Cervical back: Neck supple.   Skin:     General: Skin is warm and dry.   Neurological:      General: No focal deficit present.      Mental Status: She is alert and oriented to person, place, and time. Mental status is at " baseline.   Psychiatric:         Mood and Affect: Mood normal.         Behavior: Behavior normal.         Thought Content: Thought content normal.         Judgment: Judgment normal.        Assessment and Plan     Diagnoses and all orders for this visit:    1. Acute sinusitis, recurrence not specified, unspecified location (Primary)  -     amoxicillin-clavulanate (AUGMENTIN) 875-125 MG per tablet; Take 1 tablet by mouth 2 (Two) Times a Day for 5 days.  Dispense: 10 tablet; Refill: 0    2. Sore throat  -     POCT SARS-CoV-2 Antigen PRADEEP  -     POCT rapid strep A    3. Nasal congestion  -     POCT SARS-CoV-2 Antigen PRADEEP  -     POCT rapid strep A  -     azelastine (ASTELIN) 0.1 % nasal spray; 2 sprays into the nostril(s) as directed by provider 2 (Two) Times a Day. Use in each nostril as directed  Dispense: 30 mL; Refill: 5  -     cetirizine-pseudoephedrine (ZyrTEC-D) 5-120 MG per 12 hr tablet; Take 1 tablet by mouth Daily.  Dispense: 30 tablet; Refill: 5    4. Acute cough  -     cetirizine-pseudoephedrine (ZyrTEC-D) 5-120 MG per 12 hr tablet; Take 1 tablet by mouth Daily.  Dispense: 30 tablet; Refill: 5    5. Myalgia    6. Earache    Plan:  Rapid strep swab in the office today negative.  COVID swab in the office today negative.  Will change regular Zyrtec to Zyrtec-D daily.  Patient agreeable to monitor blood pressure for any changes.  Blood pressure well-controlled in the office today.  Patient would like to change from Flonase to a different nasal spray.  Prescription for Astelin sent to the pharmacy on file.  Will treat for acute sinusitis.  Will give Augmentin twice daily for 5 days.  Continue with adequate oral hydration.  Return to clinic if symptoms worsen or fail to improve with current plan of care.  Keep scheduled follow-up appointment with [facility].    Follow Up  Return if symptoms worsen or fail to improve, for Follow up with [facility].    SHANE Ang    Part of this note may be an electronic  transcription/translation of spoken language to printed text using the Dragon Dictation System.

## 2023-08-10 NOTE — DISCHARGE INSTRUCTIONS
Addended by: Markell James on: 8/10/2023 10:19 AM     Modules accepted: Orders Follow up with primary care physician.      Your CT does reveal that you do have kidney stones within the left kidney however they are not in any position that would cause discomfort at this time.    CT shows a fat-containing umbilical hernia.  It shows uterine fibroids.

## 2023-08-21 ENCOUNTER — OFFICE VISIT (OUTPATIENT)
Dept: BEHAVIORAL HEALTH | Facility: CLINIC | Age: 43
End: 2023-08-21
Payer: MEDICAID

## 2023-08-21 ENCOUNTER — OFFICE VISIT (OUTPATIENT)
Dept: INTERNAL MEDICINE | Facility: CLINIC | Age: 43
End: 2023-08-21
Payer: MEDICAID

## 2023-08-21 VITALS
DIASTOLIC BLOOD PRESSURE: 68 MMHG | OXYGEN SATURATION: 96 % | SYSTOLIC BLOOD PRESSURE: 118 MMHG | HEIGHT: 59 IN | RESPIRATION RATE: 16 BRPM | WEIGHT: 166 LBS | HEART RATE: 64 BPM | BODY MASS INDEX: 33.47 KG/M2

## 2023-08-21 DIAGNOSIS — R05.1 ACUTE COUGH: Primary | ICD-10-CM

## 2023-08-21 DIAGNOSIS — F19.10 SUBSTANCE ABUSE: ICD-10-CM

## 2023-08-21 DIAGNOSIS — J01.90 ACUTE SINUSITIS, RECURRENCE NOT SPECIFIED, UNSPECIFIED LOCATION: ICD-10-CM

## 2023-08-21 DIAGNOSIS — F39 UNSPECIFIED MOOD (AFFECTIVE) DISORDER: Primary | ICD-10-CM

## 2023-08-21 DIAGNOSIS — J02.9 ACUTE SORE THROAT: ICD-10-CM

## 2023-08-21 DIAGNOSIS — H10.32 ACUTE BACTERIAL CONJUNCTIVITIS OF LEFT EYE: ICD-10-CM

## 2023-08-21 PROCEDURE — 87880 STREP A ASSAY W/OPTIC: CPT | Performed by: NURSE PRACTITIONER

## 2023-08-21 PROCEDURE — 87428 SARSCOV & INF VIR A&B AG IA: CPT | Performed by: NURSE PRACTITIONER

## 2023-08-21 PROCEDURE — 3074F SYST BP LT 130 MM HG: CPT | Performed by: NURSE PRACTITIONER

## 2023-08-21 PROCEDURE — 3078F DIAST BP <80 MM HG: CPT | Performed by: NURSE PRACTITIONER

## 2023-08-21 PROCEDURE — 99213 OFFICE O/P EST LOW 20 MIN: CPT | Performed by: NURSE PRACTITIONER

## 2023-08-21 RX ORDER — METHYLPREDNISOLONE 4 MG/1
TABLET ORAL
Qty: 21 TABLET | Refills: 0 | Status: SHIPPED | OUTPATIENT
Start: 2023-08-21

## 2023-08-21 RX ORDER — DOXYCYCLINE HYCLATE 100 MG/1
100 CAPSULE ORAL 2 TIMES DAILY
Qty: 14 CAPSULE | Refills: 0 | Status: SHIPPED | OUTPATIENT
Start: 2023-08-21

## 2023-08-21 RX ORDER — ERYTHROMYCIN 5 MG/G
OINTMENT OPHTHALMIC
Qty: 3.5 G | Refills: 0 | Status: SHIPPED | OUTPATIENT
Start: 2023-08-21

## 2023-08-21 NOTE — PROGRESS NOTES
[facility]                 Follow Up Adult      Follow Up Adult Note     Date:2023   Patient Name: Bernardo Dodd  : 1980   MRN: [id number]   Time IN: 9:00am    Time OUT: 9:36am     Referring Provider: Nelly Sotelo PA    Chief Complaint:      ICD-10-CM ICD-9-CM   1. Unspecified mood (affective) disorder  F39 296.90   2. Substance abuse  F19.10 305.90        History of Present Illness:   Bernardo Dodd is a 42 y.o. female who is being seen today for follow up counseling for  mood disorder and substance use       Patient's Support Network Includes:  mother    Functional Status: Moderate impairment     Progress Toward Goal: not at goal    Prognosis: fair      Medications:     Current Outpatient Medications:     albuterol sulfate  (90 Base) MCG/ACT inhaler, Inhale 2 puffs Every 4 (Four) Hours As Needed for Wheezing or Shortness of Air., Disp: 18 g, Rfl: 2    amLODIPine (NORVASC) 5 MG tablet, Take 1 tablet by mouth Daily., Disp: 90 tablet, Rfl: 2    azelastine (ASTELIN) 0.1 % nasal spray, 2 sprays into the nostril(s) as directed by provider 2 (Two) Times a Day. Use in each nostril as directed, Disp: 30 mL, Rfl: 5    celecoxib (CeleBREX) 200 MG capsule, Take 1 capsule by mouth Daily., Disp: 30 capsule, Rfl: 0    cetirizine-pseudoephedrine (ZyrTEC-D) 5-120 MG per 12 hr tablet, Take 1 tablet by mouth Daily., Disp: 30 tablet, Rfl: 5    doxycycline (VIBRAMYCIN) 100 MG capsule, Take 1 capsule by mouth 2 (Two) Times a Day., Disp: 14 capsule, Rfl: 0    ELDERBERRY PO, Take 1 capsule by mouth Daily., Disp: , Rfl:     erythromycin (ROMYCIN) 5 MG/GM ophthalmic ointment, Administer  to the right eye Every 4 (Four) Hours While Awake., Disp: 3.5 g, Rfl: 0    fluticasone (Flonase) 50 MCG/ACT nasal spray, 2 sprays into the nostril(s) as directed by provider Daily., Disp: 16 g, Rfl: 5    hyoscyamine (LEVSIN) 0.125 MG SL tablet, Take 1 tablet by mouth Every 4  (Four) Hours As Needed (spasm)., Disp: 20 tablet, Rfl: 0    lamoTRIgine (LaMICtal) 200 MG tablet, TAKE ONE TABLET BY MOUTH DAILY (Patient taking differently: Take 1 tablet by mouth Daily.), Disp: 60 tablet, Rfl: 2    methylPREDNISolone (MEDROL) 4 MG dose pack, Take as directed on package instructions., Disp: 21 tablet, Rfl: 0    montelukast (Singulair) 10 MG tablet, Take 1 tablet by mouth Every Night., Disp: 90 tablet, Rfl: 1    nitrofurantoin, macrocrystal-monohydrate, (Macrobid) 100 MG capsule, Take 1 capsule by mouth 2 (Two) Times a Day., Disp: 14 capsule, Rfl: 0    OLANZapine (ZyPREXA) 10 MG tablet, Take 0.5 tablets by mouth Daily for 1 day, THEN 1 tablet Daily. (Patient taking differently: One tab daily as needed for sleep- PT NEEDS REFILL 5/10/23), Disp: 30 tablet, Rfl: 2    ondansetron (Zofran) 4 MG tablet, Take 1 tablet by mouth Every 8 (Eight) Hours As Needed for Nausea or Vomiting., Disp: 30 tablet, Rfl: 0    phenazopyridine (Pyridium) 200 MG tablet, Take 1 tablet by mouth 3 (Three) Times a Day As Needed for Bladder Spasms., Disp: 20 tablet, Rfl: 0    potassium chloride (KLOR-CON) 20 MEQ packet, Take 20 mEq by mouth Daily., Disp: , Rfl:     traMADol (ULTRAM) 50 MG tablet, Take 1 tablet by mouth Every 6 (Six) Hours As Needed for Severe Pain., Disp: 12 tablet, Rfl: 0    vitamin C (ASCORBIC ACID) 250 MG tablet, Take 2 tablets by mouth Daily., Disp: , Rfl:     Allergies:   Allergies   Allergen Reactions    Naproxen Swelling    Sulfa Antibiotics Rash       Objective     Physical Exam:  Vital Signs: There were no vitals filed for this visit.  There is no height or weight on file to calculate BMI.     Mental Status Exam:   Hygiene:   good  Cooperation:  Cooperative  Eye Contact:  Good  Psychomotor Behavior:  Appropriate  Affect:  Full range  Mood: normal  Speech:  Normal  Thought Process:  Linear  Thought Content:  Normal  Suicidal:  None  Homicidal:  None  Hallucinations:  None  Delusion:  None  Memory:   Intact  Orientation:  Person, Place, Time, and Situation  Reliability:  good  Insight:  Good  Judgement:  Good  Impulse Control:  Good  Physical/Medical Issues:  No      Assessment / Plan      Visit Diagnosis/Orders Placed This Visit:    ICD-10-CM ICD-9-CM   1. Unspecified mood (affective) disorder  F39 296.90   2. Substance abuse  F19.10 305.90      Patient presents in office for follow-up.  Patient reports a great deal of stress related to bugs in the home.  Patient reports that she believes she has fleas as well as bedbugs.  Patient also reports not feeling well for the past few weeks.  Patient denied problem solved how to manage bug concerns.  Patient and I also discussed ways to stay on track including stopping cocaine use.    PLAN:  Safety: No acute safety concerns  Risk Assessment: Risk of self-harm acutely is low. Risk of self-harm chronically is also low, but could be further elevated in the event of treatment noncompliance and/or AODA.    Treatment Plan/Goals: Continue supportive psychotherapy efforts and medications as indicated. Treatment and medication options discussed during today's visit. Patient ackowledged and verbally consented to continue with current treatment plan and was educated on the importance of compliance with treatment and follow-up appointments. Patient seems reasonably able to adhere to treatment plan.      Assisted Patient in processing above session content; acknowledged and normalized patient's thoughts, feelings, and concerns.  Rationalized patient thought process regarding current symptoms and stressors.       Allowed Patient to freely discuss issues  without interruption or judgement with unconditional positive regard, active listening skills, and empathy. Therapist provided a safe, confidential environment to facilitate the development of a positive therapeutic relationship and encouraged open, honest communication. Assisted Patient in identifying risk factors which would  indicate the need for higher level of care including thoughts to harm self or others and/or self-harming behavior and encouraged Patient to contact this office, call 911, or present to the nearest emergency room should any of these events occur. Discussed crisis intervention services and means to access. Patient adamantly and convincingly denies current suicidal or homicidal ideation or perceptual disturbance. Assisted Patient in processing session content; acknowledged and normalized Patient's thoughts, feelings, and concerns by utilizing a person-centered approach in efforts to build appropriate rapport and a positive therapeutic relationship with open and honest communication. .     Quality Measures:     TOBACCO USE:  Former smoker    I advised Tyralita of the risks of tobacco use.     Follow Up:   No follow-ups on file.      Blank Garcia LCSW

## 2023-08-21 NOTE — PROGRESS NOTES
Office Note     Name: Bernardo Dodd    : 1980     MRN: [id number]     Chief Complaint  Nasal Congestion (Pt states sinus pressure, throat pain, chest congestion, body aches.)    Subjective     History of Present Illness:  Bernardo Dodd is a 42 y.o. female who presents today for acute concerns    Patient regularly sees Nelly for chronic conditions    Patient is here today with nasal congestion that started about 3 weeks ago.  She is also having some sinus pressure and sore throat.  She overall is feeling achy.  She was recently seen and placed on Augmentin with changes to her allergy medication but patient feels since that time it has only continued to get worse.    Patient also states that Friday night into Saturday she was having increase in drainage and feeling like something was in her left eye.  The drainage has been purulent.  She has had this in the past and been prescribed an antibiotic ointment.    It was notified to us that patient recently saw Blank for counseling.  Blank was notified that the patient does have bedbugs/fleas.  She did show blank the bites    Past Medical History:   Diagnosis Date    Allergic rhinitis     Anemia     Anxiety     Arthritis     Asthma     Bartholin gland cyst     Bipolar disorder 2014    CHF (congestive heart failure)     Chlamydia     Depression     Endometriosis     GERD (gastroesophageal reflux disease)     Gonorrhea     Heart murmur     at birth    Herpes simplex     Hypertension     Kidney stones     Low back pain     Migraine     Obesity     Ovarian cyst     Preeclampsia     Seizures     patient reports last seizure in early     Substance abuse     Thigh shingles     Trichomonas infection     Urinary tract infection     Varicella     Wears contact lenses        Past Surgical History:   Procedure Laterality Date    BREAST BIOPSY Right      SECTION WITH TUBAL  2010    COLONOSCOPY      CYSTOSCOPY, URETEROSCOPY, RETROGRADE  PYELOGRAM, STONE EXTRACTION, STENT INSERTION Bilateral 2023    Procedure: CYSTOSCOPY, BILATERAL RETROGRADE PYELOGRAM, URETEROSCOPY, AND STENT PLACEMENT;  Surgeon: Micah Child MD;  Location: [facility];  Service: Urology;  Laterality: Bilateral;    CYSTOSCOPY, URETEROSCOPY, RETROGRADE PYELOGRAM, STONE EXTRACTION, STENT INSERTION Left 2023    Procedure: CYSTOSCOPY URETEROSCOPY RETROGRADE PYELOGRAM STONE EXTRACTION STENT INSERTION;  Surgeon: Micah Child MD;  Location: [facility];  Service: Urology;  Laterality: Left;    DIAGNOSTIC LAPAROSCOPY      ENDOSCOPY      INCISION AND DRAINAGE ABSCESS      bartholin's    ORIF ANKLE FRACTURE Right     REDUCTION MAMMAPLASTY Bilateral     TENSION FREE VAGINAL TAPING WITH MINI ARC SLING      Dr Doyle avery        Social History     Socioeconomic History    Marital status: Single    Number of children: 2    Highest education level: Some college, no degree   Tobacco Use    Smoking status: Former     Packs/day: 1.00     Years: 16.00     Pack years: 16.00     Types: Cigarettes     Start date:      Quit date:      Years since quittin.6     Passive exposure: Past    Smokeless tobacco: Never   Vaping Use    Vaping Use: Some days    Substances: THC, Flavoring    Devices: Disposable    Passive vaping exposure: Yes   Substance and Sexual Activity    Alcohol use: No    Drug use: Yes     Types: Marijuana     Comment: Marijuana once a week. Tried cocaine, meth, pain pills in the past    Sexual activity: Defer     Partners: Male     Birth control/protection: Surgical         Current Outpatient Medications:     albuterol sulfate  (90 Base) MCG/ACT inhaler, Inhale 2 puffs Every 4 (Four) Hours As Needed for Wheezing or Shortness of Air., Disp: 18 g, Rfl: 2    amLODIPine (NORVASC) 5 MG tablet, Take 1 tablet by mouth Daily., Disp: 90 tablet, Rfl: 2    azelastine (ASTELIN) 0.1 % nasal spray, 2 sprays into the nostril(s) as directed by provider 2 (Two) Times  a Day. Use in each nostril as directed, Disp: 30 mL, Rfl: 5    celecoxib (CeleBREX) 200 MG capsule, Take 1 capsule by mouth Daily., Disp: 30 capsule, Rfl: 0    cetirizine-pseudoephedrine (ZyrTEC-D) 5-120 MG per 12 hr tablet, Take 1 tablet by mouth Daily., Disp: 30 tablet, Rfl: 5    ELDERBERRY PO, Take 1 capsule by mouth Daily., Disp: , Rfl:     erythromycin (ROMYCIN) 5 MG/GM ophthalmic ointment, Administer  to the right eye Every 4 (Four) Hours While Awake., Disp: 3.5 g, Rfl: 0    fluticasone (Flonase) 50 MCG/ACT nasal spray, 2 sprays into the nostril(s) as directed by provider Daily., Disp: 16 g, Rfl: 5    hyoscyamine (LEVSIN) 0.125 MG SL tablet, Take 1 tablet by mouth Every 4 (Four) Hours As Needed (spasm)., Disp: 20 tablet, Rfl: 0    lamoTRIgine (LaMICtal) 200 MG tablet, TAKE ONE TABLET BY MOUTH DAILY (Patient taking differently: Take 1 tablet by mouth Daily.), Disp: 60 tablet, Rfl: 2    montelukast (Singulair) 10 MG tablet, Take 1 tablet by mouth Every Night., Disp: 90 tablet, Rfl: 1    nitrofurantoin, macrocrystal-monohydrate, (Macrobid) 100 MG capsule, Take 1 capsule by mouth 2 (Two) Times a Day., Disp: 14 capsule, Rfl: 0    OLANZapine (ZyPREXA) 10 MG tablet, Take 0.5 tablets by mouth Daily for 1 day, THEN 1 tablet Daily. (Patient taking differently: One tab daily as needed for sleep- PT NEEDS REFILL 5/10/23), Disp: 30 tablet, Rfl: 2    ondansetron (Zofran) 4 MG tablet, Take 1 tablet by mouth Every 8 (Eight) Hours As Needed for Nausea or Vomiting., Disp: 30 tablet, Rfl: 0    phenazopyridine (Pyridium) 200 MG tablet, Take 1 tablet by mouth 3 (Three) Times a Day As Needed for Bladder Spasms., Disp: 20 tablet, Rfl: 0    potassium chloride (KLOR-CON) 20 MEQ packet, Take 20 mEq by mouth Daily., Disp: , Rfl:     traMADol (ULTRAM) 50 MG tablet, Take 1 tablet by mouth Every 6 (Six) Hours As Needed for Severe Pain., Disp: 12 tablet, Rfl: 0    vitamin C (ASCORBIC ACID) 250 MG tablet, Take 2 tablets by mouth Daily.,  "Disp: , Rfl:     doxycycline (VIBRAMYCIN) 100 MG capsule, Take 1 capsule by mouth 2 (Two) Times a Day., Disp: 14 capsule, Rfl: 0    methylPREDNISolone (MEDROL) 4 MG dose pack, Take as directed on package instructions., Disp: 21 tablet, Rfl: 0    Objective     Vital Signs  /68 (BP Location: Left arm, Patient Position: Sitting, Cuff Size: Adult)   Pulse 64   Resp 16   Ht 149.9 cm (59\")   Wt 75.3 kg (166 lb)   SpO2 96%   BMI 33.53 kg/mý   Estimated body mass index is 33.53 kg/mý as calculated from the following:    Height as of this encounter: 149.9 cm (59\").    Weight as of this encounter: 75.3 kg (166 lb).        Physical Exam  Vitals and nursing note reviewed.   Constitutional:       General: She is awake.      Appearance: Normal appearance.   HENT:      Head: Normocephalic and atraumatic.      Right Ear: Hearing, ear canal and external ear normal.      Left Ear: Hearing, ear canal and external ear normal.      Ears:      Comments: Clear effusion bilateral TM     Nose: Congestion present.      Right Sinus: Maxillary sinus tenderness and frontal sinus tenderness present.      Left Sinus: Maxillary sinus tenderness and frontal sinus tenderness present.      Mouth/Throat:      Lips: Pink.      Mouth: Mucous membranes are moist.      Pharynx: Oropharynx is clear.   Eyes:      Extraocular Movements: Extraocular movements intact.      Conjunctiva/sclera:      Left eye: Left conjunctiva is injected. Exudate present.      Pupils: Pupils are equal, round, and reactive to light.   Cardiovascular:      Rate and Rhythm: Normal rate and regular rhythm.      Pulses: Normal pulses.      Heart sounds: Normal heart sounds.   Pulmonary:      Effort: Pulmonary effort is normal.      Breath sounds: Normal breath sounds.   Musculoskeletal:         General: Normal range of motion.   Lymphadenopathy:      Cervical: No cervical adenopathy.   Skin:     General: Skin is warm and dry.   Neurological:      Mental Status: She is " alert and oriented to person, place, and time.   Psychiatric:         Mood and Affect: Mood normal.         Behavior: Behavior normal. Behavior is cooperative.        Lab Review:   Latest Reference Range & Units 08/21/23 09:11   SARS Antigen Not Detected, Presumptive Negative  Not Detected   Expiration Date  3-23-24   Lot Number  [id number]   Influenza A Antigen PRADEEP Not Detected  Not Detected   Influenza B Antigen PRADEEP Not Detected  Not Detected      Latest Reference Range & Units 08/21/23 11:07   Rapid Strep A Screen Negative, VALID, INVALID, Not Performed  Negative   Expiration Date  12/16/2025   Lot Number  [id number]          Assessment and Plan     Diagnoses and all orders for this visit:    1. Acute cough (Primary)  -     POCT SARS-CoV-2 Antigen PRADEEP + Flu    2. Acute sore throat  -     POCT rapid strep A    3. Acute sinusitis, recurrence not specified, unspecified location  -     methylPREDNISolone (MEDROL) 4 MG dose pack; Take as directed on package instructions.  Dispense: 21 tablet; Refill: 0  -     doxycycline (VIBRAMYCIN) 100 MG capsule; Take 1 capsule by mouth 2 (Two) Times a Day.  Dispense: 14 capsule; Refill: 0    4. Acute bacterial conjunctivitis of left eye  -     erythromycin (ROMYCIN) 5 MG/GM ophthalmic ointment; Administer  to the right eye Every 4 (Four) Hours While Awake.  Dispense: 3.5 g; Refill: 0    Plan  Discussed results of in office testing with patient today.  She is aware.  Patient will be placed on doxycycline twice a day for 7 days  She will also start a steroid pack.  Continue with nasal hygiene, continue to well-hydrated.  Continue current allergy medication regimen  Antibiotic ointment was sent in for the bacterial conjunctivitis of the left eye  Go to ER if any condition worsens or severe  Plan to follow-up with Nelly as scheduled    Follow Up  Return for As scheduled with Nelly.    SHANE Perez    Part of this note may be an electronic transcription/translation of spoken  language to printed text using the Dragon Dictation System.

## 2023-09-13 ENCOUNTER — TELEPHONE (OUTPATIENT)
Dept: INTERNAL MEDICINE | Facility: CLINIC | Age: 43
End: 2023-09-13
Payer: MEDICAID

## 2023-09-13 NOTE — TELEPHONE ENCOUNTER
Indu called patient to advise that Blank needs her to do her appointments virtually on 09/18.    KW left message advising this information

## 2023-09-20 ENCOUNTER — OFFICE VISIT (OUTPATIENT)
Dept: INTERNAL MEDICINE | Facility: CLINIC | Age: 43
End: 2023-09-20
Payer: MEDICAID

## 2023-09-20 VITALS
HEART RATE: 80 BPM | DIASTOLIC BLOOD PRESSURE: 80 MMHG | TEMPERATURE: 98 F | OXYGEN SATURATION: 97 % | WEIGHT: 166 LBS | HEIGHT: 59 IN | BODY MASS INDEX: 33.47 KG/M2 | SYSTOLIC BLOOD PRESSURE: 118 MMHG

## 2023-09-20 DIAGNOSIS — R09.81 NASAL CONGESTION: ICD-10-CM

## 2023-09-20 DIAGNOSIS — R05.9 COUGH IN ADULT: ICD-10-CM

## 2023-09-20 DIAGNOSIS — J06.9 VIRAL URI WITH COUGH: Primary | ICD-10-CM

## 2023-09-20 DIAGNOSIS — Z20.822 CLOSE EXPOSURE TO COVID-19 VIRUS: ICD-10-CM

## 2023-09-20 DIAGNOSIS — H10.32 ACUTE BACTERIAL CONJUNCTIVITIS OF LEFT EYE: ICD-10-CM

## 2023-09-20 DIAGNOSIS — J02.9 SORE THROAT: ICD-10-CM

## 2023-09-20 RX ORDER — LEVOCETIRIZINE DIHYDROCHLORIDE 5 MG/1
5 TABLET, FILM COATED ORAL EVERY EVENING
Qty: 30 TABLET | Refills: 5 | Status: SHIPPED | OUTPATIENT
Start: 2023-09-20

## 2023-09-20 RX ORDER — DEXAMETHASONE SODIUM PHOSPHATE 10 MG/ML
10 INJECTION INTRAMUSCULAR; INTRAVENOUS ONCE
Status: COMPLETED | OUTPATIENT
Start: 2023-09-20 | End: 2023-09-20

## 2023-09-20 RX ORDER — BROMPHENIRAMINE MALEATE, PSEUDOEPHEDRINE HYDROCHLORIDE, AND DEXTROMETHORPHAN HYDROBROMIDE 2; 30; 10 MG/5ML; MG/5ML; MG/5ML
5 SYRUP ORAL 3 TIMES DAILY PRN
Qty: 118 ML | Refills: 0 | Status: SHIPPED | OUTPATIENT
Start: 2023-09-20

## 2023-09-20 RX ORDER — ALBUTEROL SULFATE 90 UG/1
2 AEROSOL, METERED RESPIRATORY (INHALATION) EVERY 4 HOURS PRN
Qty: 18 G | Refills: 2 | Status: SHIPPED | OUTPATIENT
Start: 2023-09-20

## 2023-09-20 RX ORDER — UMECLIDINIUM BROMIDE AND VILANTEROL TRIFENATATE 62.5; 25 UG/1; UG/1
1 POWDER RESPIRATORY (INHALATION)
Qty: 1 EACH | Refills: 0 | COMMUNITY
Start: 2023-09-20

## 2023-09-20 RX ORDER — ERYTHROMYCIN 5 MG/G
OINTMENT OPHTHALMIC
Qty: 3.5 G | Refills: 0 | Status: SHIPPED | OUTPATIENT
Start: 2023-09-20

## 2023-09-20 RX ADMIN — DEXAMETHASONE SODIUM PHOSPHATE 10 MG: 10 INJECTION INTRAMUSCULAR; INTRAVENOUS at 17:00

## 2023-09-20 NOTE — PROGRESS NOTES
Office Note     Name: Bernardo Dodd    : 1980     MRN: [id number]     Chief Complaint  Cough, Sore Throat, and Nasal Congestion    Subjective     History of Present Illness:  Bernardo Dodd is a 43 y.o. female who presents today for complaints of cough, sore throat, and nasal congestion.  Patient reports onset of symptoms was earlier today.  Patient reports 2 of the residents had COVID and she was exposed directly about 1 and half weeks ago.  She describes her cough is nonproductive.  She denies any abnormal colored nasal drainage.  She has been taking DayQuil as needed for symptoms.  She follows with [facility] for chronic conditions.  She denies further complaints or concerns at this time.      Past Medical History:   Diagnosis Date    Allergic rhinitis     Anemia     Anxiety     Arthritis     Asthma     Bartholin gland cyst     Bipolar disorder 2014    CHF (congestive heart failure)     Chlamydia     Depression     Endometriosis     GERD (gastroesophageal reflux disease)     Gonorrhea     Heart murmur     at birth    Herpes simplex     Hypertension     Kidney stones     Low back pain     Migraine     Obesity     Ovarian cyst     Preeclampsia     Seizures     patient reports last seizure in early 's    Substance abuse     Thigh shingles     Trichomonas infection     Urinary tract infection     Varicella     Wears contact lenses        Past Surgical History:   Procedure Laterality Date    BREAST BIOPSY Right      SECTION WITH TUBAL  2010    COLONOSCOPY      CYSTOSCOPY, URETEROSCOPY, RETROGRADE PYELOGRAM, STONE EXTRACTION, STENT INSERTION Bilateral 2023    Procedure: CYSTOSCOPY, BILATERAL RETROGRADE PYELOGRAM, URETEROSCOPY, AND STENT PLACEMENT;  Surgeon: Micah Child MD;  Location: [facility];  Service: Urology;  Laterality: Bilateral;    CYSTOSCOPY, URETEROSCOPY, RETROGRADE PYELOGRAM, STONE EXTRACTION, STENT INSERTION Left 2023    Procedure: CYSTOSCOPY URETEROSCOPY  RETROGRADE PYELOGRAM STONE EXTRACTION STENT INSERTION;  Surgeon: Micah Child MD;  Location: [facility];  Service: Urology;  Laterality: Left;    DIAGNOSTIC LAPAROSCOPY      ENDOSCOPY      INCISION AND DRAINAGE ABSCESS      bartholin's    ORIF ANKLE FRACTURE Right     REDUCTION MAMMAPLASTY Bilateral     TENSION FREE VAGINAL TAPING WITH MINI ARC SLING      Dr Doyle avery        Social History     Socioeconomic History    Marital status: Single    Number of children: 2    Highest education level: Some college, no degree   Tobacco Use    Smoking status: Former     Packs/day: 1.00     Years: 16.00     Pack years: 16.00     Types: Cigarettes     Start date:      Quit date:      Years since quittin.7     Passive exposure: Past    Smokeless tobacco: Never   Vaping Use    Vaping Use: Some days    Substances: THC, Flavoring    Devices: Disposable    Passive vaping exposure: Yes   Substance and Sexual Activity    Alcohol use: No    Drug use: Yes     Types: Marijuana     Comment: Marijuana once a week. Tried cocaine, meth, pain pills in the past    Sexual activity: Defer     Partners: Male     Birth control/protection: Surgical         Current Outpatient Medications:     amLODIPine (NORVASC) 5 MG tablet, Take 1 tablet by mouth Daily., Disp: 90 tablet, Rfl: 2    azelastine (ASTELIN) 0.1 % nasal spray, 2 sprays into the nostril(s) as directed by provider 2 (Two) Times a Day. Use in each nostril as directed, Disp: 30 mL, Rfl: 5    celecoxib (CeleBREX) 200 MG capsule, Take 1 capsule by mouth Daily., Disp: 30 capsule, Rfl: 0    doxycycline (VIBRAMYCIN) 100 MG capsule, Take 1 capsule by mouth 2 (Two) Times a Day., Disp: 14 capsule, Rfl: 0    ELDERBERRY PO, Take 1 capsule by mouth Daily., Disp: , Rfl:     erythromycin (ROMYCIN) 5 MG/GM ophthalmic ointment, Administer  to the right eye Every 4 (Four) Hours While Awake., Disp: 3.5 g, Rfl: 0    fluticasone (Flonase) 50 MCG/ACT nasal spray, 2 sprays into the  nostril(s) as directed by provider Daily., Disp: 16 g, Rfl: 5    hyoscyamine (LEVSIN) 0.125 MG SL tablet, Take 1 tablet by mouth Every 4 (Four) Hours As Needed (spasm)., Disp: 20 tablet, Rfl: 0    lamoTRIgine (LaMICtal) 200 MG tablet, TAKE ONE TABLET BY MOUTH DAILY (Patient taking differently: Take 1 tablet by mouth Daily.), Disp: 60 tablet, Rfl: 2    montelukast (Singulair) 10 MG tablet, Take 1 tablet by mouth Every Night., Disp: 90 tablet, Rfl: 1    nitrofurantoin, macrocrystal-monohydrate, (Macrobid) 100 MG capsule, Take 1 capsule by mouth 2 (Two) Times a Day., Disp: 14 capsule, Rfl: 0    ondansetron (Zofran) 4 MG tablet, Take 1 tablet by mouth Every 8 (Eight) Hours As Needed for Nausea or Vomiting., Disp: 30 tablet, Rfl: 0    phenazopyridine (Pyridium) 200 MG tablet, Take 1 tablet by mouth 3 (Three) Times a Day As Needed for Bladder Spasms., Disp: 20 tablet, Rfl: 0    potassium chloride (KLOR-CON) 20 MEQ packet, Take 20 mEq by mouth Daily., Disp: , Rfl:     traMADol (ULTRAM) 50 MG tablet, Take 1 tablet by mouth Every 6 (Six) Hours As Needed for Severe Pain., Disp: 12 tablet, Rfl: 0    vitamin C (ASCORBIC ACID) 250 MG tablet, Take 2 tablets by mouth Daily., Disp: , Rfl:     albuterol sulfate  (90 Base) MCG/ACT inhaler, Inhale 2 puffs Every 4 (Four) Hours As Needed for Wheezing., Disp: 18 g, Rfl: 2    brompheniramine-pseudoephedrine-DM 30-2-10 MG/5ML syrup, Take 5 mL by mouth 3 (Three) Times a Day As Needed for Congestion or Cough., Disp: 118 mL, Rfl: 0    levocetirizine (XYZAL) 5 MG tablet, Take 1 tablet by mouth Every Evening., Disp: 30 tablet, Rfl: 5    OLANZapine (ZyPREXA) 10 MG tablet, Take 0.5 tablets by mouth Daily for 1 day, THEN 1 tablet Daily. (Patient taking differently: One tab daily as needed for sleep- PT NEEDS REFILL 5/10/23), Disp: 30 tablet, Rfl: 2    Umeclidinium-Vilanterol (Anoro Ellipta) 62.5-25 MCG/ACT aerosol powder  inhaler, Inhale 1 puff Daily., Disp: 1 each, Rfl: 0  No current  "facility-administered medications for this visit.    Objective     Vital Signs  /80   Pulse 80   Temp 98 °F (36.7 °C)   Ht 149.9 cm (59.02\")   Wt 75.3 kg (166 lb)   SpO2 97%   BMI 33.51 kg/m²   Estimated body mass index is 33.51 kg/m² as calculated from the following:    Height as of this encounter: 149.9 cm (59.02\").    Weight as of this encounter: 75.3 kg (166 lb).           Physical Exam  Constitutional:       General: She is not in acute distress.     Appearance: Normal appearance. She is not ill-appearing.   HENT:      Head: Normocephalic and atraumatic.      Right Ear: Tympanic membrane, ear canal and external ear normal.      Left Ear: Tympanic membrane, ear canal and external ear normal.      Nose: Nose normal.      Mouth/Throat:      Mouth: Mucous membranes are moist.      Pharynx: Oropharynx is clear.   Eyes:      Extraocular Movements: Extraocular movements intact.      Conjunctiva/sclera: Conjunctivae normal.      Pupils: Pupils are equal, round, and reactive to light.   Cardiovascular:      Rate and Rhythm: Normal rate and regular rhythm.      Heart sounds: Normal heart sounds.   Pulmonary:      Effort: Pulmonary effort is normal. No respiratory distress.      Breath sounds: Normal breath sounds. No wheezing or rhonchi.      Comments: Cough noted during exam  Musculoskeletal:         General: Normal range of motion.      Cervical back: Neck supple.   Skin:     General: Skin is warm and dry.   Neurological:      General: No focal deficit present.      Mental Status: She is alert and oriented to person, place, and time. Mental status is at baseline.   Psychiatric:         Mood and Affect: Mood normal.         Behavior: Behavior normal.         Thought Content: Thought content normal.         Judgment: Judgment normal.        Assessment and Plan     Diagnoses and all orders for this visit:    1. Viral URI with cough (Primary)    2. Cough in adult  -     POCT SARS-CoV-2 Antigen PRADEEP + Flu  -     " levocetirizine (XYZAL) 5 MG tablet; Take 1 tablet by mouth Every Evening.  Dispense: 30 tablet; Refill: 5  -     albuterol sulfate  (90 Base) MCG/ACT inhaler; Inhale 2 puffs Every 4 (Four) Hours As Needed for Wheezing.  Dispense: 18 g; Refill: 2  -     brompheniramine-pseudoephedrine-DM 30-2-10 MG/5ML syrup; Take 5 mL by mouth 3 (Three) Times a Day As Needed for Congestion or Cough.  Dispense: 118 mL; Refill: 0  -     dexAMETHasone (DECADRON) injection 10 mg  -     Umeclidinium-Vilanterol (Anoro Ellipta) 62.5-25 MCG/ACT aerosol powder  inhaler; Inhale 1 puff Daily.  Dispense: 1 each; Refill: 0    3. Sore throat  -     POCT rapid strep A  -     POCT SARS-CoV-2 Antigen PRADEEP + Flu  -     Umeclidinium-Vilanterol (Anoro Ellipta) 62.5-25 MCG/ACT aerosol powder  inhaler; Inhale 1 puff Daily.  Dispense: 1 each; Refill: 0    4. Nasal congestion  -     POCT SARS-CoV-2 Antigen PRADEEP + Flu  -     levocetirizine (XYZAL) 5 MG tablet; Take 1 tablet by mouth Every Evening.  Dispense: 30 tablet; Refill: 5  -     brompheniramine-pseudoephedrine-DM 30-2-10 MG/5ML syrup; Take 5 mL by mouth 3 (Three) Times a Day As Needed for Congestion or Cough.  Dispense: 118 mL; Refill: 0  -     Umeclidinium-Vilanterol (Anoro Ellipta) 62.5-25 MCG/ACT aerosol powder  inhaler; Inhale 1 puff Daily.  Dispense: 1 each; Refill: 0    5. Close exposure to COVID-19 virus  -     POCT rapid strep A  -     POCT SARS-CoV-2 Antigen PRADEEP + Flu  -     Umeclidinium-Vilanterol (Anoro Ellipta) 62.5-25 MCG/ACT aerosol powder  inhaler; Inhale 1 puff Daily.  Dispense: 1 each; Refill: 0    6. Acute bacterial conjunctivitis of left eye  -     erythromycin (ROMYCIN) 5 MG/GM ophthalmic ointment; Administer  to the right eye Every 4 (Four) Hours While Awake.  Dispense: 3.5 g; Refill: 0    Plan:  COVID and flu swab in the office today negative.  Strep swab in the office today negative.  Sample of Anoro Ellipta provided to use once daily for the next 7 days.  May use  albuterol inhaler as needed.  Patient is requesting a refill on erythromycin ointment.  Allergy medication changed to Xyzal once daily.  Prescription for Bromfed-DM sent to the pharmacy on file to use as needed for cough and congestion.  Dexamethasone 10 mg IM x1 in the office today.  Continue with adequate oral hydration and rest.  Return to clinic if symptoms worsen or fail to improve with current plan of care.    Go to ED for further evaluation if any symptom worsens or develops respiratory distress.    Follow Up  Return if symptoms worsen or fail to improve.    SHANE Ang    Part of this note may be an electronic transcription/translation of spoken language to printed text using the Dragon Dictation System.

## 2023-10-08 ENCOUNTER — HOSPITAL ENCOUNTER (EMERGENCY)
Facility: HOSPITAL | Age: 43
Discharge: HOME | End: 2023-10-08
Attending: EMERGENCY MEDICINE | Admitting: EMERGENCY MEDICINE
Payer: MEDICAID

## 2023-10-08 VITALS
HEIGHT: 59 IN | BODY MASS INDEX: 33.47 KG/M2 | HEART RATE: 71 BPM | WEIGHT: 166.01 LBS | RESPIRATION RATE: 16 BRPM | TEMPERATURE: 97.9 F | SYSTOLIC BLOOD PRESSURE: 129 MMHG | OXYGEN SATURATION: 98 % | DIASTOLIC BLOOD PRESSURE: 71 MMHG

## 2023-10-08 DIAGNOSIS — H10.9 CONJUNCTIVITIS OF BOTH EYES, UNSPECIFIED CONJUNCTIVITIS TYPE: ICD-10-CM

## 2023-10-08 DIAGNOSIS — H57.13 EYE PAIN, BILATERAL: Primary | ICD-10-CM

## 2023-10-08 LAB
ALBUMIN SERPL-MCNC: 4.1 G/DL (ref 3.5–5.2)
ALBUMIN/GLOB SERPL: 1.3 G/DL
ALP SERPL-CCNC: 70 U/L (ref 39–117)
ALT SERPL W P-5'-P-CCNC: 11 U/L (ref 1–33)
ANION GAP SERPL CALCULATED.3IONS-SCNC: 10 MMOL/L (ref 5–15)
AST SERPL-CCNC: 17 U/L (ref 1–32)
BASOPHILS # BLD AUTO: 0.02 10*3/MM3 (ref 0–0.2)
BASOPHILS NFR BLD AUTO: 0.3 % (ref 0–1.5)
BILIRUB SERPL-MCNC: 0.4 MG/DL (ref 0–1.2)
BILIRUB UR QL STRIP: NEGATIVE
BUN SERPL-MCNC: 6 MG/DL (ref 6–20)
BUN/CREAT SERPL: 9.5 (ref 7–25)
CALCIUM SPEC-SCNC: 8.8 MG/DL (ref 8.6–10.5)
CHLORIDE SERPL-SCNC: 105 MMOL/L (ref 98–107)
CLARITY UR: CLEAR
CO2 SERPL-SCNC: 27 MMOL/L (ref 22–29)
COLOR UR: YELLOW
CREAT SERPL-MCNC: 0.63 MG/DL (ref 0.57–1)
DEPRECATED RDW RBC AUTO: 51.2 FL (ref 37–54)
EGFRCR SERPLBLD CKD-EPI 2021: 111.6 ML/MIN/1.73
EOSINOPHIL # BLD AUTO: 0.09 10*3/MM3 (ref 0–0.4)
EOSINOPHIL NFR BLD AUTO: 1.4 % (ref 0.3–6.2)
ERYTHROCYTE [DISTWIDTH] IN BLOOD BY AUTOMATED COUNT: 18.3 % (ref 12.3–15.4)
GLOBULIN UR ELPH-MCNC: 3.1 GM/DL
GLUCOSE SERPL-MCNC: 92 MG/DL (ref 65–99)
GLUCOSE UR STRIP-MCNC: NEGATIVE MG/DL
HCT VFR BLD AUTO: 34.6 % (ref 34–46.6)
HGB BLD-MCNC: 10.7 G/DL (ref 12–15.9)
HGB UR QL STRIP.AUTO: NEGATIVE
IMM GRANULOCYTES # BLD AUTO: 0.01 10*3/MM3 (ref 0–0.05)
IMM GRANULOCYTES NFR BLD AUTO: 0.2 % (ref 0–0.5)
KETONES UR QL STRIP: ABNORMAL
LEUKOCYTE ESTERASE UR QL STRIP.AUTO: NEGATIVE
LYMPHOCYTES # BLD AUTO: 2.42 10*3/MM3 (ref 0.7–3.1)
LYMPHOCYTES NFR BLD AUTO: 37 % (ref 19.6–45.3)
MCH RBC QN AUTO: 24.1 PG (ref 26.6–33)
MCHC RBC AUTO-ENTMCNC: 30.9 G/DL (ref 31.5–35.7)
MCV RBC AUTO: 77.9 FL (ref 79–97)
MONOCYTES # BLD AUTO: 0.4 10*3/MM3 (ref 0.1–0.9)
MONOCYTES NFR BLD AUTO: 6.1 % (ref 5–12)
NEUTROPHILS NFR BLD AUTO: 3.6 10*3/MM3 (ref 1.7–7)
NEUTROPHILS NFR BLD AUTO: 55 % (ref 42.7–76)
NITRITE UR QL STRIP: NEGATIVE
NRBC BLD AUTO-RTO: 0 /100 WBC (ref 0–0.2)
PH UR STRIP.AUTO: 6 [PH] (ref 5–8)
PLATELET # BLD AUTO: 347 10*3/MM3 (ref 140–450)
PMV BLD AUTO: 9.7 FL (ref 6–12)
POTASSIUM SERPL-SCNC: 3.7 MMOL/L (ref 3.5–5.2)
PROT SERPL-MCNC: 7.2 G/DL (ref 6–8.5)
PROT UR QL STRIP: NEGATIVE
RBC # BLD AUTO: 4.44 10*6/MM3 (ref 3.77–5.28)
SODIUM SERPL-SCNC: 142 MMOL/L (ref 136–145)
SP GR UR STRIP: 1.02 (ref 1–1.03)
T4 FREE SERPL-MCNC: 0.94 NG/DL (ref 0.93–1.7)
TSH SERPL DL<=0.05 MIU/L-ACNC: 1.03 UIU/ML (ref 0.27–4.2)
UROBILINOGEN UR QL STRIP: ABNORMAL
WBC NRBC COR # BLD: 6.54 10*3/MM3 (ref 3.4–10.8)

## 2023-10-08 PROCEDURE — 81003 URINALYSIS AUTO W/O SCOPE: CPT

## 2023-10-08 PROCEDURE — 36415 COLL VENOUS BLD VENIPUNCTURE: CPT

## 2023-10-08 PROCEDURE — 84439 ASSAY OF FREE THYROXINE: CPT

## 2023-10-08 PROCEDURE — 99283 EMERGENCY DEPT VISIT LOW MDM: CPT

## 2023-10-08 PROCEDURE — 80050 GENERAL HEALTH PANEL: CPT

## 2023-10-08 RX ORDER — MOXIFLOXACIN 5 MG/ML
1 SOLUTION/ DROPS OPHTHALMIC ONCE
Status: COMPLETED | OUTPATIENT
Start: 2023-10-08 | End: 2023-10-08

## 2023-10-08 RX ORDER — TETRACAINE HYDROCHLORIDE 5 MG/ML
2 SOLUTION OPHTHALMIC ONCE
Status: COMPLETED | OUTPATIENT
Start: 2023-10-08 | End: 2023-10-08

## 2023-10-08 RX ORDER — MOXIFLOXACIN 5 MG/ML
1 SOLUTION/ DROPS OPHTHALMIC 3 TIMES DAILY
Qty: 2 ML | Refills: 0 | Status: SHIPPED | OUTPATIENT
Start: 2023-10-08 | End: 2023-10-15

## 2023-10-08 RX ADMIN — TETRACAINE HYDROCHLORIDE 2 DROP: 5 SOLUTION OPHTHALMIC at 18:49

## 2023-10-08 RX ADMIN — MOXIFLOXACIN 0.05 ML: 5 SOLUTION/ DROPS OPHTHALMIC at 20:21

## 2023-10-08 RX ADMIN — MINERAL OIL AND PETROLATUM: 150; 830 OINTMENT OPHTHALMIC at 21:05

## 2023-10-08 NOTE — Clinical Note
[facility] EMERGENCY DEPARTMENT  [address]  [facility] [address]  Phone: [phone]    Bernardo Dodd was seen and treated in our emergency department on 10/8/2023.  She may return to work on 10/10/2023.         Thank you for choosing [facility].    Arya Bates MD

## 2023-10-08 NOTE — ED PROVIDER NOTES
"Subjective   History of Present Illness  Bernardo Dodd is a otherwise healthy 43-year-old female who presents to the ER for evaluation of eye drainage.  Patient states over the last few weeks she has had worsening eye irritation and \"drainage\" from bilateral eyes.  Patient also notes that she had some irritation of her eyelashes last night for which she used tea tree oil with minimal relief.  Patient notes that her eye irritation seem to worsen after using the tea tree oil.  Patient does note that she usually wears contacts but has had them out for approximately a week now.  She states she has had previous eye infections and had erythromycin at home which has not helped with the pain/irritation.  Patient notes that she has had some intermittent blurred vision.  Patient states that she would like to be checked for thyroid disorders as well.  Patient denies headache, loss of vision, numbness, tingling, fevers, chills.      History provided by:  Patient   used: No        Review of Systems   Constitutional:  Negative for chills, fatigue and fever.   HENT:  Negative for congestion and sore throat.    Eyes:  Positive for pain, discharge, redness and itching.   Respiratory:  Negative for shortness of breath.    Cardiovascular:  Negative for chest pain.   Gastrointestinal:  Negative for abdominal pain.   Genitourinary:  Negative for dysuria.   Musculoskeletal:  Negative for back pain.   Skin:  Negative for rash.   Neurological:  Negative for headaches.   Psychiatric/Behavioral:  Negative for agitation and confusion.    All other systems reviewed and are negative.      Past Medical History:   Diagnosis Date    Allergic rhinitis     Anemia     Anxiety     Arthritis     Asthma     Bartholin gland cyst     Bipolar disorder October 2014    CHF (congestive heart failure)     Chlamydia     Depression     Endometriosis     GERD (gastroesophageal reflux disease)     Gonorrhea     Heart murmur     at birth    " Herpes simplex     Hypertension     Kidney stones     Low back pain     Migraine     Obesity     Ovarian cyst     Preeclampsia     Seizures     patient reports last seizure in early 30's    Substance abuse     Thigh shingles     Trichomonas infection     Urinary tract infection     Varicella     Wears contact lenses        Allergies   Allergen Reactions    Naproxen Swelling    Sulfa Antibiotics Rash       Past Surgical History:   Procedure Laterality Date    BREAST BIOPSY Right      SECTION WITH TUBAL  2010    COLONOSCOPY      CYSTOSCOPY, URETEROSCOPY, RETROGRADE PYELOGRAM, STONE EXTRACTION, STENT INSERTION Bilateral 2023    Procedure: CYSTOSCOPY, BILATERAL RETROGRADE PYELOGRAM, URETEROSCOPY, AND STENT PLACEMENT;  Surgeon: Micah Child MD;  Location: [facility] OR;  Service: Urology;  Laterality: Bilateral;    CYSTOSCOPY, URETEROSCOPY, RETROGRADE PYELOGRAM, STONE EXTRACTION, STENT INSERTION Left 2023    Procedure: CYSTOSCOPY URETEROSCOPY RETROGRADE PYELOGRAM STONE EXTRACTION STENT INSERTION;  Surgeon: Micah Child MD;  Location: [facility] OR;  Service: Urology;  Laterality: Left;    DIAGNOSTIC LAPAROSCOPY      ENDOSCOPY      INCISION AND DRAINAGE ABSCESS      bartholin's    ORIF ANKLE FRACTURE Right     REDUCTION MAMMAPLASTY Bilateral     TENSION FREE VAGINAL TAPING WITH MINI ARC SLING      Dr Doyle avery        Family History   Problem Relation Age of Onset    Pancreatic cancer Maternal Grandmother     Cancer Maternal Grandmother     Heart failure Paternal Grandmother     MARCIE disease Paternal Aunt     Arthritis Mother     Cancer Mother     Bipolar disorder Mother     Arthritis Father     Dementia Father     Hypertension Father     Pancreatic cancer Other     Diabetes Other     Heart disease Other     Hypertension Other     Other Other         RESPIRATORY DISEASE    Diabetes Paternal Uncle     Heart attack Neg Hx     Hyperlipidemia Neg Hx     Mental illness Neg Hx     Obesity Neg Hx      Stroke Neg Hx     Breast cancer Neg Hx     Ovarian cancer Neg Hx        Social History     Socioeconomic History    Marital status: Single    Number of children: 2    Highest education level: Some college, no degree   Tobacco Use    Smoking status: Former     Packs/day: 1.00     Years: 16.00     Additional pack years: 0.00     Total pack years: 16.00     Types: Cigarettes     Start date:      Quit date:      Years since quittin.7     Passive exposure: Past    Smokeless tobacco: Never   Vaping Use    Vaping Use: Some days    Substances: THC, Flavoring    Devices: Disposable    Passive vaping exposure: Yes   Substance and Sexual Activity    Alcohol use: No    Drug use: Yes     Types: Marijuana     Comment: Marijuana once a week. Tried cocaine, meth, pain pills in the past    Sexual activity: Defer     Partners: Male     Birth control/protection: Surgical           Objective   Physical Exam  Vitals and nursing note reviewed.   Constitutional:       Appearance: Normal appearance.   HENT:      Head: Normocephalic and atraumatic.      Mouth/Throat:      Mouth: Mucous membranes are moist.      Pharynx: Oropharynx is clear.   Eyes:      General: Lids are normal. Lids are everted, no foreign bodies appreciated.         Right eye: No foreign body, discharge or hordeolum.         Left eye: No foreign body, discharge or hordeolum.      Intraocular pressure: Right eye pressure is 11 mmHg. Left eye pressure is 11 mmHg. Measurements were taken using a handheld tonometer.     Extraocular Movements: Extraocular movements intact.      Conjunctiva/sclera:      Right eye: Right conjunctiva is injected. No exudate.     Left eye: Left conjunctiva is injected. No exudate.     Comments: Proptotic eyes which is patient's baseline, no pain with extraocular movements   Cardiovascular:      Rate and Rhythm: Normal rate and regular rhythm.      Pulses: Normal pulses.      Heart sounds: Normal heart sounds.   Pulmonary:       Effort: Pulmonary effort is normal.      Breath sounds: Normal breath sounds.   Abdominal:      Palpations: Abdomen is soft.      Tenderness: There is no abdominal tenderness.   Musculoskeletal:      Cervical back: Normal range of motion and neck supple.   Skin:     General: Skin is warm and dry.      Capillary Refill: Capillary refill takes less than 2 seconds.   Neurological:      General: No focal deficit present.      Mental Status: She is alert and oriented to person, place, and time.   Psychiatric:         Mood and Affect: Mood normal.         Behavior: Behavior normal.         Procedures           ED Course  ED Course as of 10/08/23 2110   Sun Oct 08, 2023   1758 CBC & Differential(!)  No leukocytosis, anemia consistent with baseline [MA]   1813 Comprehensive Metabolic Panel  unremarkable [MA]   1833 TSH Baseline: 1.030 [MA]   1833 Free T4: 0.94 [MA]   1926 Woods lamp exam performed at bedside was found to be negative, bilateral ocular pressures were 11, pH of patient's eyes was found to be 7 [MA]   1954 Patient did have improvement of her pain with the tetracaine.  Given patient's presentation there is concern for scleritis versus conjunctivitis. Less concern for uveitis given improvement with tetracaine.  [MA]   2006 Urinalysis With Microscopic If Indicated (No Culture) - Urine, Clean Catch(!)  negative [MA]   2032 20/20 bilaterally with glasses on  [MA]   2041 On reevaluation of the patient she had improvement of her eye pain with the medications given.  Patient was able to see with her glasses 20/20 bilaterally patient endorsed that she was no longer having any photophobia given exam, patient is appropriate for discharge at this time [MA]   2045 Spoke to  Mds regarding getting patient referred to Ophtho for re-evaluation in clinic.  [MA]   2055 Spoke to Dr. Mcdonald with [facility] regarding the patient's presentation and history as well as physical exam findings today evaluation done here in  the emergency department.  She agrees that the patient does not need emergent transfer at this time but will be seen in their clinic for reevaluation. [MA]      ED Course User Index  [MA] Bailee Dominguez PA-C                                           Medical Decision Making    Bernardo Dodd  is a 43 yrs old female  presents today for eye irritation.  Upon arrival to the ER patient was afebrile, nontoxic-appearing, hemodynamically stable.  Physical exam revealed bilateral conjunctival injection with no foreign body noted, no pain with extraocular movements, extraocular movements intact, bilateral eye pressures of 11.     Based on her history and physical exam, my differential diagnosis included several life threatening conditions including conjunctivitis, uveitis, scleritis, thyroid dysfunction, corneal abrasion, corneal ulcer. Ruling out the most morbid conditions drove my clinical assessment.     In order to fully explore differential these tests and treatments were ordered.    Orders Placed This Encounter      Comprehensive Metabolic Panel      TSH      T4, Free      CBC Auto Differential      Urinalysis With Microscopic If Indicated (No Culture) - Urine, Clean Catch      Supplies To Bedside - Notify MD When Ready- Eye Tray      CBC & Differential     Medications  tetracaine (ALTACAINE) 0.5 % ophthalmic solution 2 drop (2 drops Both Eyes Given by Other 10/8/23 1849)  moxifloxacin (VIGAMOX) 0.5 % ophthalmic solution 0.05 mL (0.05 mL Both Eyes Given 10/8/23 2021)  artificial tears ophthalmic ointment ( Both Eyes Given 10/8/23 2105)     Woods lamp exam was used to further evaluate the patient.  Patient does not have a corneal abrasion or ulcer noted on Woods lamp exam.  Patient has normal eye pressures bilaterally, pH was tested given that patient has been using tea tree oil in her eye and patient was found to have a normal pH.  Patient did have improvement of her pain with the tetracaine.  Patient has  significant improvement and with her glasses had 20/20 vision.  Spoke to Dr. Mcdonald with  ophthalmology regarding the patient's presentation and history as well as physical exam findings today evaluation done here in the emergency department.  She agrees that the patient does not need emergent transfer at this time but will be seen in their clinic for reevaluation.  Patient was given a prescription for refresh ointment as well as moxifloxacin and encouraged to use it as prescribed.  Advised patient to follow-up with ophthalmology for reevaluation and patient was given the information regarding the ophthalmology clinic.  Patient was agreeable to care plan and stable prior to discharge.    Ultimately, this patient was discharged.  DISCHARGE    Patient discharged in stable condition.    Reviewed implications of results, diagnosis, meds, responsibility to follow up, warning signs and symptoms of possible worsening, potential complications and reasons to return to ER.    Patient/Family voiced understanding of above instructions.    Discussed plan for discharge, as there is no emergent indication for admission.  Pt/family is agreeable and understands need for follow up and possible repeat testing.  Pt/family is aware that discharge does not mean that nothing is wrong but that it indicates no emergency is currently present that requires admission and they must continue care with follow-up as given below or with a physician of their choice.     FOLLOW-UP   [facility] [address] [phone] on 10/9/2023please go to clinic tomorrow at 1 PM       Medication List      New Prescriptions    moxifloxacin 0.5 % ophthalmic solution  Commonly known as: VIGAMOX  Administer 0.05 mL to both eyes 3 (Three) Times a Day for 7 days.        Changed    OLANZapine 10 MG tablet  Commonly known as: ZyPREXA  Take 0.5 tablets by mouth Daily for 1 day, THEN 1 tablet Daily.  Start taking on: September 7,  2022  What changed: See the new instructions.           Where to Get Your Medications      These medications were sent to [facility] [id number] - [place], [place] -   [address] - [phone]  - [phone] FX  [address] [place] [address]    Phone: [phone]   moxifloxacin 0.5 % ophthalmic solution             Problems Addressed:  Conjunctivitis of both eyes, unspecified conjunctivitis type: complicated acute illness or injury  Eye pain, bilateral: complicated acute illness or injury    Amount and/or Complexity of Data Reviewed  Labs: ordered. Decision-making details documented in ED Course.    Risk  Prescription drug management.        Final diagnoses:   Eye pain, bilateral   Conjunctivitis of both eyes, unspecified conjunctivitis type       ED Disposition  ED Disposition       ED Disposition   Discharge    Condition   Stable    Comment   --                [facility]  [address] [facility] [address] [place], [place]   [phone]  Go on 10/9/2023  please go to clinic tomorrow at 1 PM         Medication List        New Prescriptions      moxifloxacin 0.5 % ophthalmic solution  Commonly known as: VIGAMOX  Administer 0.05 mL to both eyes 3 (Three) Times a Day for 7 days.            Changed      OLANZapine 10 MG tablet  Commonly known as: ZyPREXA  Take 0.5 tablets by mouth Daily for 1 day, THEN 1 tablet Daily.  Start taking on: September 7, 2022  What changed: See the new instructions.               Where to Get Your Medications        These medications were sent to [facility] [id number] - [place], [place] - [address] - [phone]  - [phone] FX  [address] [place] [address]      Phone: [phone]   moxifloxacin 0.5 % ophthalmic solution            Bailee Dominguez PA-C  10/08/23 2110

## 2023-10-09 NOTE — DISCHARGE INSTRUCTIONS
Please use moxifloxacin as prescribed as well as artificial tears.  Please follow-up with ophthalmology tomorrow at 1 PM for reevaluation.  Return to the ER if you have worsening pain, any changes in vision, fevers, headache.

## 2023-10-13 DIAGNOSIS — G40.909 SEIZURE DISORDER: ICD-10-CM

## 2023-10-13 RX ORDER — LAMOTRIGINE 200 MG/1
TABLET ORAL
Qty: 90 TABLET | Refills: 0 | Status: SHIPPED | OUTPATIENT
Start: 2023-10-13

## 2023-10-18 ENCOUNTER — OFFICE VISIT (OUTPATIENT)
Dept: BEHAVIORAL HEALTH | Facility: CLINIC | Age: 43
End: 2023-10-18
Payer: MEDICAID

## 2023-10-18 DIAGNOSIS — F39 UNSPECIFIED MOOD (AFFECTIVE) DISORDER: Primary | ICD-10-CM

## 2023-10-18 DIAGNOSIS — F19.10 SUBSTANCE ABUSE: ICD-10-CM

## 2023-10-18 NOTE — PROGRESS NOTES
[facility]                 Follow Up Adult      Follow Up Adult Note     Date:10/18/2023   Patient Name: Bernardo Dodd  : 1980   MRN: [id number]   Time IN: 11:02am    Time OUT: 11:41am     Referring Provider: Provider, No Known    Chief Complaint:      ICD-10-CM ICD-9-CM   1. Unspecified mood (affective) disorder  F39 296.90   2. Substance abuse  F19.10 305.90        History of Present Illness:   Bernardo Dodd is a 43 y.o. female who is being seen today for follow up counseling for  mood disorder and substance use        Patient's Support Network Includes:  mother    Functional Status: Moderate impairment     Progress Toward Goal: not at goal    Prognosis: fair       Medications:     Current Outpatient Medications:     albuterol sulfate  (90 Base) MCG/ACT inhaler, Inhale 2 puffs Every 4 (Four) Hours As Needed for Wheezing., Disp: 18 g, Rfl: 2    amLODIPine (NORVASC) 5 MG tablet, Take 1 tablet by mouth Daily., Disp: 90 tablet, Rfl: 2    azelastine (ASTELIN) 0.1 % nasal spray, 2 sprays into the nostril(s) as directed by provider 2 (Two) Times a Day. Use in each nostril as directed, Disp: 30 mL, Rfl: 5    brompheniramine-pseudoephedrine-DM 30-2-10 MG/5ML syrup, Take 5 mL by mouth 3 (Three) Times a Day As Needed for Congestion or Cough., Disp: 118 mL, Rfl: 0    celecoxib (CeleBREX) 200 MG capsule, Take 1 capsule by mouth Daily., Disp: 30 capsule, Rfl: 0    doxycycline (VIBRAMYCIN) 100 MG capsule, Take 1 capsule by mouth 2 (Two) Times a Day., Disp: 14 capsule, Rfl: 0    ELDERBERRY PO, Take 1 capsule by mouth Daily., Disp: , Rfl:     erythromycin (ROMYCIN) 5 MG/GM ophthalmic ointment, Administer  to the right eye Every 4 (Four) Hours While Awake., Disp: 3.5 g, Rfl: 0    fluticasone (Flonase) 50 MCG/ACT nasal spray, 2 sprays into the nostril(s) as directed by provider Daily., Disp: 16 g, Rfl: 5    hyoscyamine (LEVSIN) 0.125 MG SL tablet, Take 1  tablet by mouth Every 4 (Four) Hours As Needed (spasm)., Disp: 20 tablet, Rfl: 0    lamoTRIgine (LaMICtal) 200 MG tablet, TAKE ONE TABLET BY MOUTH DAILY, Disp: 90 tablet, Rfl: 0    levocetirizine (XYZAL) 5 MG tablet, Take 1 tablet by mouth Every Evening., Disp: 30 tablet, Rfl: 5    montelukast (Singulair) 10 MG tablet, Take 1 tablet by mouth Every Night., Disp: 90 tablet, Rfl: 1    nitrofurantoin, macrocrystal-monohydrate, (Macrobid) 100 MG capsule, Take 1 capsule by mouth 2 (Two) Times a Day., Disp: 14 capsule, Rfl: 0    OLANZapine (ZyPREXA) 10 MG tablet, Take 0.5 tablets by mouth Daily for 1 day, THEN 1 tablet Daily. (Patient taking differently: One tab daily as needed for sleep- PT NEEDS REFILL 5/10/23), Disp: 30 tablet, Rfl: 2    ondansetron (Zofran) 4 MG tablet, Take 1 tablet by mouth Every 8 (Eight) Hours As Needed for Nausea or Vomiting., Disp: 30 tablet, Rfl: 0    phenazopyridine (Pyridium) 200 MG tablet, Take 1 tablet by mouth 3 (Three) Times a Day As Needed for Bladder Spasms., Disp: 20 tablet, Rfl: 0    potassium chloride (KLOR-CON) 20 MEQ packet, Take 20 mEq by mouth Daily., Disp: , Rfl:     traMADol (ULTRAM) 50 MG tablet, Take 1 tablet by mouth Every 6 (Six) Hours As Needed for Severe Pain., Disp: 12 tablet, Rfl: 0    Umeclidinium-Vilanterol (Anoro Ellipta) 62.5-25 MCG/ACT aerosol powder  inhaler, Inhale 1 puff Daily., Disp: 1 each, Rfl: 0    vitamin C (ASCORBIC ACID) 250 MG tablet, Take 2 tablets by mouth Daily., Disp: , Rfl:     Allergies:   Allergies   Allergen Reactions    Naproxen Swelling    Sulfa Antibiotics Rash       Objective     Physical Exam:  Vital Signs: There were no vitals filed for this visit.  There is no height or weight on file to calculate BMI.     Mental Status Exam:   Hygiene:   good  Cooperation:  Cooperative  Eye Contact:  Good  Psychomotor Behavior:  Appropriate  Affect:  Full range  Mood: normal  Speech:  Normal  Thought Process:  Goal directed  Thought Content:   Normal  Suicidal:  None  Homicidal:  None  Hallucinations:  None  Delusion:  None  Memory:  Intact  Orientation:  Person, Place, Time, and Situation  Reliability:  good  Insight:  Good  Judgement:  Good  Impulse Control:  Good  Physical/Medical Issues:  No      Assessment / Plan      Visit Diagnosis/Orders Placed This Visit:    ICD-10-CM ICD-9-CM   1. Unspecified mood (affective) disorder  F39 296.90   2. Substance abuse  F19.10 305.90      Patient reports in office for follow-up.  Patient reports that she has not used cocaine in approximately 1 month and reports that she is reducing marijuana use.  Patient reports that work has been going well and reports that she did receive a raise.  Patient and I discussed goals moving forward including money management continuing to remain off of substances.    PLAN:  Safety: No acute safety concerns  Risk Assessment: Risk of self-harm acutely is low. Risk of self-harm chronically is also low, but could be further elevated in the event of treatment noncompliance and/or AODA.    Treatment Plan/Goals: Continue supportive psychotherapy efforts and medications as indicated. Treatment and medication options discussed during today's visit. Patient ackowledged and verbally consented to continue with current treatment plan and was educated on the importance of compliance with treatment and follow-up appointments. Patient seems reasonably able to adhere to treatment plan.      Assisted Patient in processing above session content; acknowledged and normalized patient’s thoughts, feelings, and concerns.  Rationalized patient thought process regarding current symptoms and stressors.       Allowed Patient to freely discuss issues  without interruption or judgement with unconditional positive regard, active listening skills, and empathy. Therapist provided a safe, confidential environment to facilitate the development of a positive therapeutic relationship and encouraged open, honest  communication. Assisted Patient in identifying risk factors which would indicate the need for higher level of care including thoughts to harm self or others and/or self-harming behavior and encouraged Patient to contact this office, call 911, or present to the nearest emergency room should any of these events occur. Discussed crisis intervention services and means to access. Patient adamantly and convincingly denies current suicidal or homicidal ideation or perceptual disturbance. Assisted Patient in processing session content; acknowledged and normalized Patient’s thoughts, feelings, and concerns by utilizing a person-centered approach in efforts to build appropriate rapport and a positive therapeutic relationship with open and honest communication. .     Quality Measures:     TOBACCO USE:  Former smoker    I advised Tyralita of the risks of tobacco use.     Follow Up:   No follow-ups on file.      Blank Garcia LCSW

## 2023-10-20 DIAGNOSIS — I10 PRIMARY HYPERTENSION: ICD-10-CM

## 2023-10-20 RX ORDER — AMLODIPINE BESYLATE 5 MG/1
5 TABLET ORAL DAILY
Qty: 30 TABLET | Refills: 0 | Status: SHIPPED | OUTPATIENT
Start: 2023-10-20

## 2023-11-06 ENCOUNTER — OFFICE VISIT (OUTPATIENT)
Dept: BEHAVIORAL HEALTH | Facility: CLINIC | Age: 43
End: 2023-11-06
Payer: MEDICAID

## 2023-11-06 DIAGNOSIS — F39 UNSPECIFIED MOOD (AFFECTIVE) DISORDER: Primary | ICD-10-CM

## 2023-11-06 DIAGNOSIS — F19.10 SUBSTANCE ABUSE: ICD-10-CM

## 2023-11-06 NOTE — PROGRESS NOTES
[facility] [place]                 Follow Up Adult      Follow Up Adult Note     Date:2023   Patient Name: Bernardo Dodd  : 1980   MRN: [id number]   Time IN: 8:02am    Time OUT: 8:50am     Referring Provider: Provider, No Known    Chief Complaint:      ICD-10-CM ICD-9-CM   1. Unspecified mood (affective) disorder  F39 296.90   2. Substance abuse  F19.10 305.90        History of Present Illness:   Bernardo Dodd is a 43 y.o. female who is being seen today for follow up counseling for  mood disorder and substance use        Patient's Support Network Includes:  mother    Functional Status: Moderate impairment     Progress Toward Goal: not at goal    Prognosis: fair       Medications:     Current Outpatient Medications:     albuterol sulfate  (90 Base) MCG/ACT inhaler, Inhale 2 puffs Every 4 (Four) Hours As Needed for Wheezing., Disp: 18 g, Rfl: 2    amLODIPine (NORVASC) 5 MG tablet, TAKE ONE TABLET BY MOUTH DAILY, Disp: 30 tablet, Rfl: 0    azelastine (ASTELIN) 0.1 % nasal spray, 2 sprays into the nostril(s) as directed by provider 2 (Two) Times a Day. Use in each nostril as directed, Disp: 30 mL, Rfl: 5    brompheniramine-pseudoephedrine-DM 30-2-10 MG/5ML syrup, Take 5 mL by mouth 3 (Three) Times a Day As Needed for Congestion or Cough., Disp: 118 mL, Rfl: 0    celecoxib (CeleBREX) 200 MG capsule, Take 1 capsule by mouth Daily., Disp: 30 capsule, Rfl: 0    doxycycline (VIBRAMYCIN) 100 MG capsule, Take 1 capsule by mouth 2 (Two) Times a Day., Disp: 14 capsule, Rfl: 0    ELDERBERRY PO, Take 1 capsule by mouth Daily., Disp: , Rfl:     erythromycin (ROMYCIN) 5 MG/GM ophthalmic ointment, Administer  to the right eye Every 4 (Four) Hours While Awake., Disp: 3.5 g, Rfl: 0    fluticasone (Flonase) 50 MCG/ACT nasal spray, 2 sprays into the nostril(s) as directed by provider Daily., Disp: 16 g, Rfl: 5    hyoscyamine (LEVSIN) 0.125 MG SL tablet, Take 1 tablet  by mouth Every 4 (Four) Hours As Needed (spasm)., Disp: 20 tablet, Rfl: 0    lamoTRIgine (LaMICtal) 200 MG tablet, TAKE ONE TABLET BY MOUTH DAILY, Disp: 90 tablet, Rfl: 0    levocetirizine (XYZAL) 5 MG tablet, Take 1 tablet by mouth Every Evening., Disp: 30 tablet, Rfl: 5    montelukast (Singulair) 10 MG tablet, Take 1 tablet by mouth Every Night., Disp: 90 tablet, Rfl: 1    nitrofurantoin, macrocrystal-monohydrate, (Macrobid) 100 MG capsule, Take 1 capsule by mouth 2 (Two) Times a Day., Disp: 14 capsule, Rfl: 0    OLANZapine (ZyPREXA) 10 MG tablet, Take 0.5 tablets by mouth Daily for 1 day, THEN 1 tablet Daily. (Patient taking differently: One tab daily as needed for sleep- PT NEEDS REFILL 5/10/23), Disp: 30 tablet, Rfl: 2    ondansetron (Zofran) 4 MG tablet, Take 1 tablet by mouth Every 8 (Eight) Hours As Needed for Nausea or Vomiting., Disp: 30 tablet, Rfl: 0    phenazopyridine (Pyridium) 200 MG tablet, Take 1 tablet by mouth 3 (Three) Times a Day As Needed for Bladder Spasms., Disp: 20 tablet, Rfl: 0    potassium chloride (KLOR-CON) 20 MEQ packet, Take 20 mEq by mouth Daily., Disp: , Rfl:     traMADol (ULTRAM) 50 MG tablet, Take 1 tablet by mouth Every 6 (Six) Hours As Needed for Severe Pain., Disp: 12 tablet, Rfl: 0    Umeclidinium-Vilanterol (Anoro Ellipta) 62.5-25 MCG/ACT aerosol powder  inhaler, Inhale 1 puff Daily., Disp: 1 each, Rfl: 0    vitamin C (ASCORBIC ACID) 250 MG tablet, Take 2 tablets by mouth Daily., Disp: , Rfl:     Allergies:   Allergies   Allergen Reactions    Naproxen Swelling    Sulfa Antibiotics Rash       Objective     Physical Exam:  Vital Signs: There were no vitals filed for this visit.  There is no height or weight on file to calculate BMI.     Mental Status Exam:   Hygiene:   good  Cooperation:  Cooperative  Eye Contact:  Good  Psychomotor Behavior:  Appropriate  Affect:  Full range  Mood: normal  Speech:  Normal  Thought Process:  Linear  Thought Content:  Normal  Suicidal:   None  Homicidal:  None  Hallucinations:  None  Delusion:  None  Memory:  Intact  Orientation:  Person, Place, Time, and Situation  Reliability:  good  Insight:  Good  Judgement:  Good  Impulse Control:  Good  Physical/Medical Issues:  No      Assessment / Plan      Visit Diagnosis/Orders Placed This Visit:    ICD-10-CM ICD-9-CM   1. Unspecified mood (affective) disorder  F39 296.90   2. Substance abuse  F19.10 305.90      Patient presents in office for follow-up.  Patient reports stressors related to work.  I assisted patient in processing this and provided support and empathy.  Patient denied discussed ways to manage conflict in the work setting.  Patient and I also discussed the letter she received related to dismissal.  I reported to patient that I would be willing to allow 1 more no-show/same day cancellation.  But I voiced importance of compliance and if 1 more no-show and or same-day canceled does occur then patient will be discharged.  Patient voiced an understanding.    PLAN:  Safety: No acute safety concerns  Risk Assessment: Risk of self-harm acutely is low. Risk of self-harm chronically is also low, but could be further elevated in the event of treatment noncompliance and/or AODA.    Treatment Plan/Goals: Continue supportive psychotherapy efforts and medications as indicated. Treatment and medication options discussed during today's visit. Patient ackowledged and verbally consented to continue with current treatment plan and was educated on the importance of compliance with treatment and follow-up appointments. Patient seems reasonably able to adhere to treatment plan.      Assisted Patient in processing above session content; acknowledged and normalized patient’s thoughts, feelings, and concerns.  Rationalized patient thought process regarding current symptoms and stressors.       Allowed Patient to freely discuss issues  without interruption or judgement with unconditional positive regard, active  listening skills, and empathy. Therapist provided a safe, confidential environment to facilitate the development of a positive therapeutic relationship and encouraged open, honest communication. Assisted Patient in identifying risk factors which would indicate the need for higher level of care including thoughts to harm self or others and/or self-harming behavior and encouraged Patient to contact this office, call 911, or present to the nearest emergency room should any of these events occur. Discussed crisis intervention services and means to access. Patient adamantly and convincingly denies current suicidal or homicidal ideation or perceptual disturbance. Assisted Patient in processing session content; acknowledged and normalized Patient’s thoughts, feelings, and concerns by utilizing a person-centered approach in efforts to build appropriate rapport and a positive therapeutic relationship with open and honest communication. .     Quality Measures:     TOBACCO USE:  Former smoker    I advised Tyralita of the risks of tobacco use.     Follow Up:   No follow-ups on file.      Blank Garcia LCSW

## 2023-11-07 ENCOUNTER — OFFICE VISIT (OUTPATIENT)
Dept: INTERNAL MEDICINE | Facility: CLINIC | Age: 43
End: 2023-11-07
Payer: MEDICAID

## 2023-11-07 VITALS
HEIGHT: 59 IN | SYSTOLIC BLOOD PRESSURE: 120 MMHG | TEMPERATURE: 97.3 F | OXYGEN SATURATION: 99 % | WEIGHT: 170 LBS | DIASTOLIC BLOOD PRESSURE: 82 MMHG | HEART RATE: 77 BPM | BODY MASS INDEX: 34.27 KG/M2

## 2023-11-07 DIAGNOSIS — J45.21 MILD INTERMITTENT ASTHMA WITH ACUTE EXACERBATION: ICD-10-CM

## 2023-11-07 DIAGNOSIS — G40.909 SEIZURE DISORDER: ICD-10-CM

## 2023-11-07 DIAGNOSIS — Z00.00 ANNUAL PHYSICAL EXAM: Primary | ICD-10-CM

## 2023-11-07 DIAGNOSIS — I10 PRIMARY HYPERTENSION: ICD-10-CM

## 2023-11-07 DIAGNOSIS — Z23 NEED FOR INFLUENZA VACCINATION: ICD-10-CM

## 2023-11-07 DIAGNOSIS — Z23 NEED FOR VACCINATION: ICD-10-CM

## 2023-11-07 DIAGNOSIS — K21.9 GASTROESOPHAGEAL REFLUX DISEASE WITHOUT ESOPHAGITIS: ICD-10-CM

## 2023-11-07 DIAGNOSIS — F39 UNSPECIFIED MOOD (AFFECTIVE) DISORDER: ICD-10-CM

## 2023-11-07 DIAGNOSIS — D50.9 IRON DEFICIENCY ANEMIA, UNSPECIFIED IRON DEFICIENCY ANEMIA TYPE: ICD-10-CM

## 2023-11-07 DIAGNOSIS — M47.816 LUMBAR SPONDYLOSIS: ICD-10-CM

## 2023-11-07 PROCEDURE — 90686 IIV4 VACC NO PRSV 0.5 ML IM: CPT | Performed by: NURSE PRACTITIONER

## 2023-11-07 PROCEDURE — 90471 IMMUNIZATION ADMIN: CPT | Performed by: NURSE PRACTITIONER

## 2023-11-07 PROCEDURE — 90480 ADMN SARSCOV2 VAC 1/ONLY CMP: CPT | Performed by: NURSE PRACTITIONER

## 2023-11-07 PROCEDURE — 99396 PREV VISIT EST AGE 40-64: CPT | Performed by: NURSE PRACTITIONER

## 2023-11-07 PROCEDURE — 3079F DIAST BP 80-89 MM HG: CPT | Performed by: NURSE PRACTITIONER

## 2023-11-07 PROCEDURE — 91320 SARSCV2 VAC 30MCG TRS-SUC IM: CPT | Performed by: NURSE PRACTITIONER

## 2023-11-07 PROCEDURE — 3074F SYST BP LT 130 MM HG: CPT | Performed by: NURSE PRACTITIONER

## 2023-11-07 RX ORDER — CELECOXIB 100 MG/1
100 CAPSULE ORAL 2 TIMES DAILY
COMMUNITY
End: 2023-11-07 | Stop reason: SDUPTHER

## 2023-11-07 RX ORDER — CELECOXIB 100 MG/1
100 CAPSULE ORAL 2 TIMES DAILY
Qty: 60 CAPSULE | Refills: 5 | Status: SHIPPED | OUTPATIENT
Start: 2023-11-07

## 2023-11-07 NOTE — PROGRESS NOTES
Annual Physical     Name: Bernardo Dodd    : 1980     MRN: [id number]     Chief Complaint  Annual Exam    Subjective     History of Present Illness:  Bernardo Dodd is a 43 y.o. female who presents today for annual physical exam.  Patient is also establishing care with a new provider.  Patient was previously following with Nelly Sotelo within this practice.  Past medical and medication history reviewed with the patient.  Patient is agreeable to the flu vaccine and COVID booster today.  Patient does need a refill on her Celebrex.  Patient reports smoking 3 to 4 cigars on occasion.  She also admits to smoking marijuana occasionally for pain and anxiety.  She does use alcohol on special occasions.  She reports she was previously an alcoholic.  She does follow with behavioral health and saw Blank yesterday.  She denies current complaints or concerns today.    The patient is being seen for a health maintenance evaluation.    Past Medical History:   Diagnosis Date    Allergic rhinitis     Anemia     Anxiety     Arthritis     Asthma     Bartholin gland cyst     Bipolar disorder 2014    CHF (congestive heart failure)     Chlamydia     Depression     Endometriosis     GERD (gastroesophageal reflux disease)     Gonorrhea     Heart murmur     at birth    Herpes simplex     Hypertension     Kidney stones     Low back pain     Migraine     Obesity     Ovarian cyst     Preeclampsia     Seizures     patient reports last seizure in early     Substance abuse     Thigh shingles     Trichomonas infection     Urinary tract infection     Varicella     Wears contact lenses        Past Surgical History:   Procedure Laterality Date    BREAST BIOPSY Right      SECTION WITH TUBAL  2010    COLONOSCOPY      CYSTOSCOPY, URETEROSCOPY, RETROGRADE PYELOGRAM, STONE EXTRACTION, STENT INSERTION Bilateral 2023    Procedure: CYSTOSCOPY, BILATERAL RETROGRADE PYELOGRAM, URETEROSCOPY, AND STENT PLACEMENT;   Surgeon: Micah Child MD;  Location:  [facility];  Service: Urology;  Laterality: Bilateral;    CYSTOSCOPY, URETEROSCOPY, RETROGRADE PYELOGRAM, STONE EXTRACTION, STENT INSERTION Left 2023    Procedure: CYSTOSCOPY URETEROSCOPY RETROGRADE PYELOGRAM STONE EXTRACTION STENT INSERTION;  Surgeon: Micah Child MD;  Location:  [facility];  Service: Urology;  Laterality: Left;    DIAGNOSTIC LAPAROSCOPY      ENDOSCOPY      INCISION AND DRAINAGE ABSCESS      bartholin's    ORIF ANKLE FRACTURE Right     REDUCTION MAMMAPLASTY Bilateral     TENSION FREE VAGINAL TAPING WITH MINI ARC SLING      Dr Doyle avery        Social History     Socioeconomic History    Marital status: Single    Number of children: 2    Highest education level: Some college, no degree   Tobacco Use    Smoking status: Former     Packs/day: 1.00     Years: 16.00     Additional pack years: 0.00     Total pack years: 16.00     Types: Cigarettes     Start date:      Quit date:      Years since quittin.9     Passive exposure: Past    Smokeless tobacco: Never   Vaping Use    Vaping Use: Some days    Substances: THC, Flavoring    Devices: Disposable    Passive vaping exposure: Yes   Substance and Sexual Activity    Alcohol use: No    Drug use: Yes     Types: Marijuana     Comment: Marijuana once a week. Tried cocaine, meth, pain pills in the past    Sexual activity: Defer     Partners: Male     Birth control/protection: Surgical         Current Outpatient Medications:     amLODIPine (NORVASC) 5 MG tablet, TAKE ONE TABLET BY MOUTH DAILY, Disp: 30 tablet, Rfl: 0    celecoxib (CeleBREX) 100 MG capsule, Take 1 capsule by mouth 2 (Two) Times a Day., Disp: 60 capsule, Rfl: 5    ELDERBERRY PO, Take 1 capsule by mouth Daily., Disp: , Rfl:     erythromycin (ROMYCIN) 5 MG/GM ophthalmic ointment, Administer  to the right eye Every 4 (Four) Hours While Awake., Disp: 3.5 g, Rfl: 0    lamoTRIgine (LaMICtal) 200 MG tablet, TAKE ONE TABLET BY MOUTH DAILY,  Disp: 90 tablet, Rfl: 0    levocetirizine (XYZAL) 5 MG tablet, Take 1 tablet by mouth Every Evening., Disp: 30 tablet, Rfl: 5    albuterol sulfate  (90 Base) MCG/ACT inhaler, Inhale 2 puffs Every 4 (Four) Hours As Needed for Wheezing., Disp: 18 g, Rfl: 0    guaiFENesin (Mucinex) 600 MG 12 hr tablet, Take 2 tablets by mouth 2 (Two) Times a Day for 10 days., Disp: 40 tablet, Rfl: 0    promethazine-dextromethorphan (PROMETHAZINE-DM) 6.25-15 MG/5ML syrup, Take 5 mL by mouth 4 (Four) Times a Day As Needed for Cough., Disp: 118 mL, Rfl: 0    General History  Bernardo  does not have regular dental visits.  She does complain of vision problems. Last eye exam was Spring 2022. Wears contacts.  Immunizations are up to date. The patient needs the following immunizations: None at this time.    Lifestyle  Bernardo  consumes  sugar free, avoids GERD triggering foods, avoids fast food when possible. Drinks Ensure regularly. Eats a lot of fruit and veggies .  She exercises  She walks a lot .    Reproductive Health  Bernardo  is perimenopausal.  She reports periods are irregular.  She is sexually active. Her contraceptive plan is bilateral tubal ligation. One partner relationship.    Screening  Last pap was 2020. Agreeable to schedule with GYN.  Last Completed Pap Smear       This patient has no relevant Health Maintenance data.        . History of abnormal pap smear or family history of gyn cancer: No abnormal pap and no known FH.  Last mammogram was 01/19/2023.  Last Completed Mammogram       This patient has no relevant Health Maintenance data.        . Personal or family history of abnormal mammograms or breast cancer: Yes, had an abnormal in 1999, found multiple benign nodules.  Last colonoscopy was 01/31/2017.  Last Completed Colonoscopy            COLORECTAL CANCER SCREENING (COLONOSCOPY - Every 10 Years) Next due on 1/31/2027 01/31/2017  Colonoscopy                . Family history of colon cancer: No FH.  Last  DEXA was never.    Health Maintenance Summary            Overdue - PAP SMEAR (Every 3 Years) Overdue since 10/8/2023      10/08/2020  SCANNED - PAP SMEAR    08/14/2018  Done              BMI FOLLOWUP (Yearly) Next due on 5/16/2024 05/16/2023  SmartData: WORKFLOW - QUALITY MEASUREMENT - DOCUMENTED WEIGHT FOLLOW-UP PLAN    04/18/2023  SmartData: WORKFLOW - QUALITY MEASUREMENT - DOCUMENTED WEIGHT FOLLOW-UP PLAN    12/15/2022  SmartData: WORKFLOW - QUALITY MEASUREMENT - DOCUMENTED WEIGHT FOLLOW-UP PLAN              ANNUAL PHYSICAL (Yearly) Next due on 11/7/2024 11/07/2023  Done    10/26/2022  Done    10/21/2021  Done    09/28/2020  Done    09/13/2019  Done    Only the first 5 history entries have been loaded, but more history exists.              COLORECTAL CANCER SCREENING (COLONOSCOPY - Every 10 Years) Next due on 1/31/2027 01/31/2017  Colonoscopy              TDAP/TD VACCINES (3 - Td or Tdap) Next due on 5/7/2028 05/07/2018  Imm Admin: Tdap    08/25/2014  Imm Admin: Tdap              HEPATITIS C SCREENING  Completed      08/14/2018  Hepatitis Panel, Acute    06/01/2018  Hepatitis Panel, Acute              Pneumococcal Vaccine 0-64 (Series Information) Completed      10/26/2022  Imm Admin: Pneumococcal Conjugate 20-Valent (PCV20)    10/07/2022  Postponed until 10/7/2022 by Daphne Jeter MA (Pending event)    09/29/2017  Imm Admin: Pneumococcal Polysaccharide (PPSV23)              COVID-19 Vaccine (Series Information) Completed      11/07/2023  Imm Admin: COVID-19 F23 (PFIZER) 12YRS+ (COMIRNATY)    10/26/2022  Imm Admin: COVID-19 (PFIZER) BIVALENT 12+YRS    05/05/2021  Imm Admin: COVID-19 (THA)              INFLUENZA VACCINE  Completed      11/07/2023  Imm Admin: Fluzone (or Fluarix & Flulaval for VFC) >6mos    10/26/2022  Imm Admin: Fluzone (or Fluarix & Flulaval for VFC) >6mos    10/07/2022  Postponed until 3/31/2023 by Daphne Jeter MA (Pending event)    10/02/2021  Imm Admin:  "Fluzone (or Fluarix & Flulaval for VFC) >6mos    08/19/2020  Imm Admin: Fluzone (or Fluarix & Flulaval for VFC) >6mos    Only the first 5 history entries have been loaded, but more history exists.                  Immunization History   Administered Date(s) Administered    COVID-19 (THA) 05/05/2021    COVID-19 (PFIZER) BIVALENT 12+YRS 10/26/2022    COVID-19 F23 (PFIZER) 12YRS+ (COMIRNATY) 11/07/2023    Flu Vaccine Intradermal Quad 18-64YR 08/16/2020    Fluzone (or Fluarix & Flulaval for VFC) >6mos 08/19/2020, 10/02/2021, 10/26/2022, 11/07/2023    Hepatitis A 12/17/2018, 07/08/2019    Influenza, Unspecified 10/01/2018, 08/06/2019, 09/13/2019, 08/16/2020    PPD Test 01/09/2019, 01/08/2020, 05/05/2021, 05/10/2021    Pneumococcal Conjugate 20-Valent (PCV20) 10/26/2022    Pneumococcal Polysaccharide (PPSV23) 09/29/2017    Tdap 08/25/2014, 05/07/2018    flucelvax quad pfs =>4 YRS 09/13/2019       Review of Systems   Constitutional: Negative.    HENT: Negative.     Respiratory: Negative.     Cardiovascular: Negative.    Gastrointestinal: Negative.    Genitourinary: Negative.    Musculoskeletal: Negative.    Skin: Negative.        Objective     Vital Signs  /82   Pulse 77   Temp 97.3 °F (36.3 °C)   Ht 149.9 cm (59.02\")   Wt 77.1 kg (170 lb)   SpO2 99%   BMI 34.32 kg/m²   Estimated body mass index is 34.32 kg/m² as calculated from the following:    Height as of this encounter: 149.9 cm (59.02\").    Weight as of this encounter: 77.1 kg (170 lb).            Physical Exam  Constitutional:       General: She is awake. She is not in acute distress.     Appearance: Normal appearance. She is well-developed and well-groomed. She is not ill-appearing.   HENT:      Head: Normocephalic and atraumatic.      Right Ear: Tympanic membrane, ear canal and external ear normal.      Left Ear: Tympanic membrane, ear canal and external ear normal.      Nose: Nose normal.      Mouth/Throat:      Mouth: Mucous membranes are moist. "      Pharynx: Oropharynx is clear.   Eyes:      General: No scleral icterus.     Extraocular Movements: Extraocular movements intact.      Conjunctiva/sclera: Conjunctivae normal.      Pupils: Pupils are equal, round, and reactive to light.   Neck:      Trachea: Trachea normal.   Cardiovascular:      Rate and Rhythm: Normal rate and regular rhythm.      Pulses: Normal pulses.      Heart sounds: Normal heart sounds. No murmur heard.  Pulmonary:      Effort: Pulmonary effort is normal. No tachypnea, accessory muscle usage or respiratory distress.      Breath sounds: Normal breath sounds. No wheezing, rhonchi or rales.   Abdominal:      General: Bowel sounds are normal. There is no distension.      Palpations: Abdomen is soft.      Tenderness: There is no abdominal tenderness.   Musculoskeletal:         General: No swelling. Normal range of motion.      Cervical back: Normal range of motion and neck supple. No tenderness.      Right lower leg: No edema.      Left lower leg: No edema.   Skin:     General: Skin is warm and dry.      Capillary Refill: Capillary refill takes less than 2 seconds.      Coloration: Skin is not jaundiced or pale.      Findings: No lesion or rash.   Neurological:      General: No focal deficit present.      Mental Status: She is alert and oriented to person, place, and time. Mental status is at baseline.      Motor: No weakness.      Gait: Gait is intact.   Psychiatric:         Attention and Perception: Attention normal.         Mood and Affect: Mood normal.         Speech: Speech normal.         Behavior: Behavior normal. Behavior is cooperative.         Thought Content: Thought content normal.         Judgment: Judgment normal.          Assessment and Plan     Diagnoses and all orders for this visit:    1. Annual physical exam (Primary)    2. Mild intermittent asthma with acute exacerbation    3. Gastroesophageal reflux disease without esophagitis    4. Primary hypertension    5. Iron  deficiency anemia, unspecified iron deficiency anemia type    6. Lumbar spondylosis  -     celecoxib (CeleBREX) 100 MG capsule; Take 1 capsule by mouth 2 (Two) Times a Day.  Dispense: 60 capsule; Refill: 5    7. Seizure disorder    8. Unspecified mood (affective) disorder    9. Need for vaccination  -     COVID-19 F23 (Pfizer) 12yrs+ (COMIRNATY)    10. Need for influenza vaccination  -     Fluzone (or Fluarix & Flulaval for VFC) >6mos        Plan:  Establish care with a new provider.  Annual physical exam.  Patient is up-to-date on lab work.  Patient agreeable to COVID booster and flu vaccine today.  Refill Celebrex sent to the pharmacy on file.  Continue with up to date immunizations and health maintenance screenings.  Continue with healthy lifestyle choices such as healthy diet and eating habits and regular exercise routine.  Continue with adequate oral hydration and rest.  Plan for annual physical exam in 1 year.  Return to clinic sooner if needed.    Follow Up  Return in about 1 year (around 11/7/2024) for Annual.    SHANE Ang    Part of this note may be an electronic transcription/translation of spoken language to printed text using the Dragon Dictation System.

## 2023-11-07 NOTE — PROGRESS NOTES
Immunization  Immunization performed in left deltoid (covid) right deltoid (flu) by Tiara Fountain MA. Patient tolerated the procedure well without complications.  11/07/23   Tiara Fountain MA

## 2023-11-10 ENCOUNTER — OFFICE VISIT (OUTPATIENT)
Dept: INTERNAL MEDICINE | Facility: CLINIC | Age: 43
End: 2023-11-10
Payer: MEDICAID

## 2023-11-10 VITALS
HEART RATE: 92 BPM | WEIGHT: 170 LBS | OXYGEN SATURATION: 95 % | BODY MASS INDEX: 34.27 KG/M2 | TEMPERATURE: 98 F | DIASTOLIC BLOOD PRESSURE: 80 MMHG | SYSTOLIC BLOOD PRESSURE: 124 MMHG | HEIGHT: 59 IN | RESPIRATION RATE: 16 BRPM

## 2023-11-10 DIAGNOSIS — J02.9 SORE THROAT: ICD-10-CM

## 2023-11-10 DIAGNOSIS — H66.003 NON-RECURRENT ACUTE SUPPURATIVE OTITIS MEDIA OF BOTH EARS WITHOUT SPONTANEOUS RUPTURE OF TYMPANIC MEMBRANES: Primary | ICD-10-CM

## 2023-11-10 PROCEDURE — 1159F MED LIST DOCD IN RCRD: CPT | Performed by: NURSE PRACTITIONER

## 2023-11-10 PROCEDURE — 3079F DIAST BP 80-89 MM HG: CPT | Performed by: NURSE PRACTITIONER

## 2023-11-10 PROCEDURE — 3074F SYST BP LT 130 MM HG: CPT | Performed by: NURSE PRACTITIONER

## 2023-11-10 PROCEDURE — 87428 SARSCOV & INF VIR A&B AG IA: CPT | Performed by: NURSE PRACTITIONER

## 2023-11-10 PROCEDURE — 99213 OFFICE O/P EST LOW 20 MIN: CPT | Performed by: NURSE PRACTITIONER

## 2023-11-10 PROCEDURE — 87880 STREP A ASSAY W/OPTIC: CPT | Performed by: NURSE PRACTITIONER

## 2023-11-10 PROCEDURE — 1160F RVW MEDS BY RX/DR IN RCRD: CPT | Performed by: NURSE PRACTITIONER

## 2023-11-10 RX ORDER — DEXTROMETHORPHAN HYDROBROMIDE AND PROMETHAZINE HYDROCHLORIDE 15; 6.25 MG/5ML; MG/5ML
5 SYRUP ORAL 4 TIMES DAILY PRN
Qty: 118 ML | Refills: 0 | Status: SHIPPED | OUTPATIENT
Start: 2023-11-10

## 2023-11-10 RX ORDER — AMOXICILLIN AND CLAVULANATE POTASSIUM 875; 125 MG/1; MG/1
1 TABLET, FILM COATED ORAL 2 TIMES DAILY
Qty: 14 TABLET | Refills: 0 | Status: SHIPPED | OUTPATIENT
Start: 2023-11-10 | End: 2023-11-17

## 2023-11-10 NOTE — LETTER
November 10, 2023     Patient: Bernardo Dodd   YOB: 1980   Date of Visit: 11/10/2023       To Whom It May Concern:    It is my medical opinion that Bernardo Dodd may return to work on 11/13/23 .           Sincerely,        SHANE Perez    CC:   No Recipients

## 2023-11-10 NOTE — PROGRESS NOTES
"Chief Complaint  Cough and Sore Throat    Subjective      History of Present Illness  Bernardo is a 43 y.o. female who presents to the clinic today for for evaluation of symptoms of a URI. Symptoms include achiness, non productive cough, productive cough with  clear colored sputum, and sore throat. Onset of symptoms was 1 days ago, and has been gradually worsening since that time. Treatment to date:  Dayquil, not effective .    The following portions of the patient's history were reviewed and updated as appropriate: allergies, current medications, past family history, past medical history, past social history, past surgical history, and problem list.    Review of Systems  Pertinent items are noted in HPI.      Objective   Vital Signs:  /80   Pulse 92   Temp 98 °F (36.7 °C)   Resp 16   Ht 149.9 cm (59\")   Wt 77.1 kg (170 lb)   SpO2 95%   BMI 34.34 kg/m²   Estimated body mass index is 34.34 kg/m² as calculated from the following:    Height as of this encounter: 149.9 cm (59\").    Weight as of this encounter: 77.1 kg (170 lb).            Physical Exam  Vitals reviewed.   Constitutional:       General: She is not in acute distress.  HENT:      Head: Normocephalic and atraumatic.      Right Ear: Swelling and tenderness present. A middle ear effusion is present. Tympanic membrane is erythematous.      Left Ear: Swelling and tenderness present. A middle ear effusion is present. Tympanic membrane is erythematous.      Nose: Congestion present.      Mouth/Throat:      Mouth: Mucous membranes are moist.      Pharynx: Posterior oropharyngeal erythema present.   Eyes:      Conjunctiva/sclera: Conjunctivae normal.   Cardiovascular:      Rate and Rhythm: Normal rate and regular rhythm.      Pulses: Normal pulses.      Heart sounds: Normal heart sounds.   Pulmonary:      Effort: Pulmonary effort is normal.      Breath sounds: Normal breath sounds.   Musculoskeletal:      Cervical back: Normal range of motion and neck " supple.   Skin:     General: Skin is warm and dry.   Neurological:      General: No focal deficit present.      Mental Status: She is alert.   Psychiatric:         Mood and Affect: Mood normal.         Behavior: Behavior normal.         Thought Content: Thought content normal.         Judgment: Judgment normal.          Result Review                    Assessment and Plan  Diagnoses and all orders for this visit:    1. Non-recurrent acute suppurative otitis media of both ears without spontaneous rupture of tympanic membranes (Primary)  Assessment & Plan:  Treatment: Augmentin.  OTC analgesia as needed.  Fluids, rest, avoid carbonated/alcoholic and caffeinated beverages.   Follow up in 5 days if not improving.  Antihistamine/decongestant per orders.           Orders:  -     amoxicillin-clavulanate (AUGMENTIN) 875-125 MG per tablet; Take 1 tablet by mouth 2 (Two) Times a Day for 7 days.  Dispense: 14 tablet; Refill: 0  -     promethazine-dextromethorphan (PROMETHAZINE-DM) 6.25-15 MG/5ML syrup; Take 5 mL by mouth 4 (Four) Times a Day As Needed for Cough.  Dispense: 118 mL; Refill: 0    2. Sore throat  Assessment & Plan:  Results for orders placed or performed in visit on 11/10/23   POCT SARS-CoV-2 Antigen PRADEEP + Flu    Specimen: Swab   Result Value Ref Range    SARS Antigen Not Detected Not Detected, Presumptive Negative    Influenza A Antigen PRADEEP Not Detected Not Detected    Influenza B Antigen PRADEEP Not Detected Not Detected    Internal Control Passed Passed    Lot Number [id number]     Expiration Date 10/27/24    POCT rapid strep A    Specimen: Swab   Result Value Ref Range    Rapid Strep A Screen Negative Negative, VALID, INVALID, Not Performed    Internal Control Passed Passed    Lot Number [id number]     Expiration Date 2/5/26         Orders:  -     POCT SARS-CoV-2 Antigen PRADEEP + Flu  -     POCT rapid strep A               Follow Up  Return if symptoms worsen or fail to improve.  Patient was given instructions and  counseling regarding her condition or for health maintenance advice. Please see specific information pulled into the AVS if appropriate.    Part of this note may be an electronic transcription/translation of spoken language to printed text using the Dragon Dictation System.

## 2023-11-10 NOTE — ASSESSMENT & PLAN NOTE
Results for orders placed or performed in visit on 11/10/23   POCT SARS-CoV-2 Antigen PRADEEP + Flu    Specimen: Swab   Result Value Ref Range    SARS Antigen Not Detected Not Detected, Presumptive Negative    Influenza A Antigen PRADEEP Not Detected Not Detected    Influenza B Antigen PRADEEP Not Detected Not Detected    Internal Control Passed Passed    Lot Number [id number]     Expiration Date 10/27/24    POCT rapid strep A    Specimen: Swab   Result Value Ref Range    Rapid Strep A Screen Negative Negative, VALID, INVALID, Not Performed    Internal Control Passed Passed    Lot Number [id number]     Expiration Date 2/5/26

## 2023-11-10 NOTE — ASSESSMENT & PLAN NOTE
Treatment: Augmentin.  OTC analgesia as needed.  Fluids, rest, avoid carbonated/alcoholic and caffeinated beverages.   Follow up in 5 days if not improving.  Antihistamine/decongestant per orders.

## 2023-11-14 ENCOUNTER — HOSPITAL ENCOUNTER (EMERGENCY)
Facility: HOSPITAL | Age: 43
Discharge: HOME | End: 2023-11-14
Attending: EMERGENCY MEDICINE | Admitting: EMERGENCY MEDICINE
Payer: MEDICAID

## 2023-11-14 ENCOUNTER — APPOINTMENT (OUTPATIENT)
Dept: GENERAL RADIOLOGY | Facility: HOSPITAL | Age: 43
End: 2023-11-14
Payer: MEDICAID

## 2023-11-14 VITALS
WEIGHT: 170 LBS | BODY MASS INDEX: 34.27 KG/M2 | HEIGHT: 59 IN | TEMPERATURE: 98.3 F | OXYGEN SATURATION: 95 % | SYSTOLIC BLOOD PRESSURE: 144 MMHG | DIASTOLIC BLOOD PRESSURE: 98 MMHG | HEART RATE: 91 BPM | RESPIRATION RATE: 20 BRPM

## 2023-11-14 DIAGNOSIS — U07.1 ACUTE BRONCHITIS DUE TO COVID-19 VIRUS: Primary | ICD-10-CM

## 2023-11-14 DIAGNOSIS — R05.9 COUGH IN ADULT: ICD-10-CM

## 2023-11-14 DIAGNOSIS — J20.8 ACUTE BRONCHITIS DUE TO COVID-19 VIRUS: Primary | ICD-10-CM

## 2023-11-14 LAB
FLUAV RNA RESP QL NAA+PROBE: NOT DETECTED
FLUBV RNA RESP QL NAA+PROBE: NOT DETECTED
SARS-COV-2 RNA RESP QL NAA+PROBE: DETECTED

## 2023-11-14 PROCEDURE — 87636 SARSCOV2 & INF A&B AMP PRB: CPT | Performed by: PHYSICIAN ASSISTANT

## 2023-11-14 PROCEDURE — 71045 X-RAY EXAM CHEST 1 VIEW: CPT

## 2023-11-14 PROCEDURE — 99283 EMERGENCY DEPT VISIT LOW MDM: CPT

## 2023-11-14 RX ORDER — ALBUTEROL SULFATE 90 UG/1
2 AEROSOL, METERED RESPIRATORY (INHALATION) EVERY 4 HOURS PRN
Qty: 18 G | Refills: 0 | Status: SHIPPED | OUTPATIENT
Start: 2023-11-14

## 2023-11-14 NOTE — ED PROVIDER NOTES
Subjective   History of Present Illness  Pt is a 44 yo female presenting to ED with complaints of congesiton.PMHx significant for Asthma, CHF, Bipolar, Anxiety, GERD, Kidney stones, HTN, Seizures and Substance abuse. Pt reports developing congestion and ear pain last week and diagnosed with ear infection and has been taking Amoxicillin for the past 4 days. She reports she feels she is worsening with body aches and cough. She tested negative for Covid / Flu on 11-10-23. She hasn't taken any meds. She denies neck pain, CP, SOB, V/D or abdominal pain. She denies known fever. She uses marijuana but denies tobacco or ETOH use.     History provided by:  Patient and medical records      Review of Systems   Constitutional:  Positive for chills and fatigue. Negative for fever.   HENT:  Positive for congestion. Negative for ear pain, sore throat and trouble swallowing.    Eyes:  Negative for visual disturbance.   Respiratory:  Positive for cough. Negative for shortness of breath.    Cardiovascular:  Negative for chest pain and leg swelling.   Gastrointestinal:  Negative for abdominal pain, diarrhea, nausea and vomiting.   Genitourinary:  Negative for difficulty urinating, dysuria and flank pain.   Musculoskeletal:  Negative for arthralgias, back pain and neck pain.   Skin:  Negative for rash and wound.   Neurological:  Negative for dizziness, syncope, speech difficulty, weakness, numbness and headaches.   Psychiatric/Behavioral:  Negative for confusion.    All other systems reviewed and are negative.      Past Medical History:   Diagnosis Date    Allergic rhinitis     Anemia     Anxiety     Arthritis     Asthma     Bartholin gland cyst     Bipolar disorder October 2014    CHF (congestive heart failure)     Chlamydia     Depression     Endometriosis     GERD (gastroesophageal reflux disease)     Gonorrhea     Heart murmur     at birth    Herpes simplex     Hypertension     Kidney stones     Low back pain     Migraine      Obesity     Ovarian cyst     Preeclampsia     Seizures     patient reports last seizure in early 30's    Substance abuse     Thigh shingles     Trichomonas infection     Urinary tract infection     Varicella     Wears contact lenses        Allergies   Allergen Reactions    Naproxen Swelling    Sulfa Antibiotics Rash       Past Surgical History:   Procedure Laterality Date    BREAST BIOPSY Right      SECTION WITH TUBAL  2010    COLONOSCOPY      CYSTOSCOPY, URETEROSCOPY, RETROGRADE PYELOGRAM, STONE EXTRACTION, STENT INSERTION Bilateral 2023    Procedure: CYSTOSCOPY, BILATERAL RETROGRADE PYELOGRAM, URETEROSCOPY, AND STENT PLACEMENT;  Surgeon: Micah Child MD;  Location:  [facility];  Service: Urology;  Laterality: Bilateral;    CYSTOSCOPY, URETEROSCOPY, RETROGRADE PYELOGRAM, STONE EXTRACTION, STENT INSERTION Left 2023    Procedure: CYSTOSCOPY URETEROSCOPY RETROGRADE PYELOGRAM STONE EXTRACTION STENT INSERTION;  Surgeon: Micah Child MD;  Location:  [facility];  Service: Urology;  Laterality: Left;    DIAGNOSTIC LAPAROSCOPY      ENDOSCOPY      INCISION AND DRAINAGE ABSCESS      bartholin's    ORIF ANKLE FRACTURE Right     REDUCTION MAMMAPLASTY Bilateral     TENSION FREE VAGINAL TAPING WITH MINI ARC SLING      Dr Doyle avery        Family History   Problem Relation Age of Onset    Pancreatic cancer Maternal Grandmother     Cancer Maternal Grandmother     Heart failure Paternal Grandmother     MARCIE disease Paternal Aunt     Arthritis Mother     Cancer Mother     Bipolar disorder Mother     Arthritis Father     Dementia Father     Hypertension Father     Pancreatic cancer Other     Diabetes Other     Heart disease Other     Hypertension Other     Other Other         RESPIRATORY DISEASE    Diabetes Paternal Uncle     Heart attack Neg Hx     Hyperlipidemia Neg Hx     Mental illness Neg Hx     Obesity Neg Hx     Stroke Neg Hx     Breast cancer Neg Hx     Ovarian cancer Neg Hx        Social  History     Socioeconomic History    Marital status: Single    Number of children: 2    Highest education level: Some college, no degree   Tobacco Use    Smoking status: Former     Packs/day: 1.00     Years: 16.00     Additional pack years: 0.00     Total pack years: 16.00     Types: Cigarettes     Start date:      Quit date: 2016     Years since quittin.8     Passive exposure: Past    Smokeless tobacco: Never   Vaping Use    Vaping Use: Some days    Substances: THC, Flavoring    Devices: Disposable    Passive vaping exposure: Yes   Substance and Sexual Activity    Alcohol use: No    Drug use: Yes     Types: Marijuana     Comment: Marijuana once a week. Tried cocaine, meth, pain pills in the past    Sexual activity: Defer     Partners: Male     Birth control/protection: Surgical           Objective   Physical Exam  Vitals and nursing note reviewed.   Constitutional:       Appearance: She is well-developed.   HENT:      Head: Atraumatic.      Nose: Nose normal. Congestion present.   Eyes:      General: Lids are normal.      Conjunctiva/sclera: Conjunctivae normal.      Pupils: Pupils are equal, round, and reactive to light.   Cardiovascular:      Rate and Rhythm: Normal rate and regular rhythm.      Heart sounds: Normal heart sounds.   Pulmonary:      Effort: Pulmonary effort is normal.      Breath sounds: Normal breath sounds. No wheezing.   Abdominal:      General: There is no distension.      Palpations: Abdomen is soft.      Tenderness: There is no abdominal tenderness. There is no guarding or rebound.   Musculoskeletal:         General: No tenderness. Normal range of motion.      Cervical back: Normal range of motion and neck supple.   Skin:     General: Skin is warm and dry.      Findings: No erythema or rash.   Neurological:      Mental Status: She is alert and oriented to person, place, and time.      Sensory: No sensory deficit.   Psychiatric:         Speech: Speech normal.         Behavior: Behavior  "normal.         Procedures           ED Course  ED Course as of 11/14/23 2033   Tue Nov 14, 2023   1438 COVID19(!!): Detected [RT]      ED Course User Index  [RT] Amber Orozco PA           Recent Results (from the past 24 hour(s))   COVID-19 and FLU A/B PCR, 1 HR TAT - Swab, Nasopharynx    Collection Time: 11/14/23  1:30 PM    Specimen: Nasopharynx; Swab   Result Value Ref Range    COVID19 Detected (C) Not Detected - Ref. Range    Influenza A PCR Not Detected Not Detected    Influenza B PCR Not Detected Not Detected     Note: In addition to lab results from this visit, the labs listed above may include labs taken at another facility or during a different encounter within the last 24 hours. Please correlate lab times with ED admission and discharge times for further clarification of the services performed during this visit.    XR Chest 1 View   Final Result   Impression:   No acute process.         Electronically Signed: Rosa Jaimes MD     11/14/2023 1:40 PM EST     Workstation ID: [id number]        Vitals:    11/14/23 1303   BP: 144/98   Patient Position: Sitting   Pulse: 91   Resp: 20   Temp: 98.3 °F (36.8 °C)   TempSrc: Oral   SpO2: 95%   Weight: 77.1 kg (170 lb)   Height: 149.9 cm (59\")     Medications - No data to display  ECG/EMG Results (last 24 hours)       ** No results found for the last 24 hours. **          No orders to display                                       Medical Decision Making  Pt is a 44 yo female presenting to ED with complaints of congestion, body aches, fatigue and cough. Covid positive in ED and Flu negative. CXR no acute findings. Vitals stable. Discussed results and tx plan. Went over symptomatic tx.     DDx  Covid, Flu, Pneumonia, Sepsis, Resp failure    Problems Addressed:  Acute bronchitis due to COVID-19 virus: complicated acute illness or injury    Amount and/or Complexity of Data Reviewed  External Data Reviewed: notes.     Details: PCP, ED , Behavioral health   Labs: " ordered. Decision-making details documented in ED Course.  Radiology: ordered. Decision-making details documented in ED Course.    Risk  Prescription drug management.        Final diagnoses:   Acute bronchitis due to COVID-19 virus       ED Disposition  ED Disposition       ED Disposition   Discharge    Condition   Stable    Comment   --               Rebekah Fagan, APRN  [address] [id number] [phone]    Schedule an appointment as soon as possible for a visit       [facility] EMERGENCY DEPARTMENT  [address] [address] [phone]    If symptoms worsen         Medication List        New Prescriptions      Nirmatrelvir&Ritonavir 300/100 20 x 150 MG & 10 x 100MG tablet therapy pack tablet  Commonly known as: PAXLOVID  Take 3 tablets by mouth 2 (Two) Times a Day for 5 days.               Where to Get Your Medications        These medications were sent to [facility] [id number] - [place], [place] - [address] - [phone]  - [phone]   [address] [address]      Phone: [phone]   albuterol sulfate  (90 Base) MCG/ACT inhaler  Nirmatrelvir&Ritonavir 300/100 20 x 150 MG & 10 x 100MG tablet therapy pack tablet            Amber Orozco PA  11/14/23 2033

## 2023-11-14 NOTE — Clinical Note
[facility] EMERGENCY DEPARTMENT  [address]  [facility] [address]  Phone: [phone]    Bernardo Dodd was seen and treated in our emergency department on 11/14/2023.  She may return to work on 11/20/2023.         Thank you for choosing [facility].    Amber Orozco, PA

## 2023-11-22 ENCOUNTER — OFFICE VISIT (OUTPATIENT)
Dept: INTERNAL MEDICINE | Facility: CLINIC | Age: 43
End: 2023-11-22
Payer: MEDICAID

## 2023-11-22 VITALS
TEMPERATURE: 97.5 F | HEART RATE: 74 BPM | DIASTOLIC BLOOD PRESSURE: 72 MMHG | SYSTOLIC BLOOD PRESSURE: 140 MMHG | WEIGHT: 172 LBS | BODY MASS INDEX: 34.68 KG/M2 | OXYGEN SATURATION: 98 % | HEIGHT: 59 IN

## 2023-11-22 DIAGNOSIS — R05.2 SUBACUTE COUGH: ICD-10-CM

## 2023-11-22 DIAGNOSIS — Z09 HOSPITAL DISCHARGE FOLLOW-UP: Primary | ICD-10-CM

## 2023-11-22 DIAGNOSIS — H66.003 NON-RECURRENT ACUTE SUPPURATIVE OTITIS MEDIA OF BOTH EARS WITHOUT SPONTANEOUS RUPTURE OF TYMPANIC MEMBRANES: ICD-10-CM

## 2023-11-22 DIAGNOSIS — U07.1 COVID-19 VIRUS INFECTION: ICD-10-CM

## 2023-11-22 RX ORDER — GUAIFENESIN 600 MG/1
1200 TABLET, EXTENDED RELEASE ORAL 2 TIMES DAILY
Qty: 40 TABLET | Refills: 0 | Status: SHIPPED | OUTPATIENT
Start: 2023-11-22 | End: 2023-12-02

## 2023-11-22 RX ORDER — DEXTROMETHORPHAN HYDROBROMIDE AND PROMETHAZINE HYDROCHLORIDE 15; 6.25 MG/5ML; MG/5ML
5 SYRUP ORAL 4 TIMES DAILY PRN
Qty: 118 ML | Refills: 0 | Status: SHIPPED | OUTPATIENT
Start: 2023-11-22 | End: 2023-12-05

## 2023-11-22 NOTE — PROGRESS NOTES
Office Note     Name: Bernardo Dodd    : 1980     MRN: [id number]     Chief Complaint  Hospital Follow Up Visit and Cough (Coughing up clear phlegm )    Subjective     History of Present Illness:  Bernardo Dodd is a 43 y.o. female who presents today for a hospital follow-up visit.  Patient went to [facility] ED on 2023 and was diagnosed with COVID-19.  Patient was treated with Paxlovid at that time.  She was also given an albuterol inhaler to use as needed for cough and shortness of breath.  She does report feeling some better.  She is still having some nasal drainage as well as a congested cough.  She describes her phlegm is clear in color.  The body aches she was experiencing have since resolved.  She is denies any recent fever.  She has had some intermittent cold chills.  She reports her appetite is improving.  She presents today for a recheck of COVID-19 swab.  She needs a negative test result in order to return to work.  She denies further complaints or concerns at this time.  Pleasant visit with the patient today.    Past Medical History:   Diagnosis Date    Allergic rhinitis     Anemia     Anxiety     Arthritis     Asthma     Bartholin gland cyst     Bipolar disorder 2014    CHF (congestive heart failure)     Chlamydia     Depression     Endometriosis     GERD (gastroesophageal reflux disease)     Gonorrhea     Heart murmur     at birth    Herpes simplex     Hypertension     Kidney stones     Low back pain     Migraine     Obesity     Ovarian cyst     Preeclampsia     Seizures     patient reports last seizure in early     Substance abuse     Thigh shingles     Trichomonas infection     Urinary tract infection     Varicella     Wears contact lenses        Past Surgical History:   Procedure Laterality Date    BREAST BIOPSY Right      SECTION WITH TUBAL  2010    COLONOSCOPY      CYSTOSCOPY, URETEROSCOPY, RETROGRADE PYELOGRAM, STONE EXTRACTION,  STENT INSERTION Bilateral 2023    Procedure: CYSTOSCOPY, BILATERAL RETROGRADE PYELOGRAM, URETEROSCOPY, AND STENT PLACEMENT;  Surgeon: Micah Child MD;  Location: [facility] OR;  Service: Urology;  Laterality: Bilateral;    CYSTOSCOPY, URETEROSCOPY, RETROGRADE PYELOGRAM, STONE EXTRACTION, STENT INSERTION Left 2023    Procedure: CYSTOSCOPY URETEROSCOPY RETROGRADE PYELOGRAM STONE EXTRACTION STENT INSERTION;  Surgeon: Micah Child MD;  Location: [facility] OR;  Service: Urology;  Laterality: Left;    DIAGNOSTIC LAPAROSCOPY      ENDOSCOPY      INCISION AND DRAINAGE ABSCESS      bartholin's    ORIF ANKLE FRACTURE Right     REDUCTION MAMMAPLASTY Bilateral     TENSION FREE VAGINAL TAPING WITH MINI ARC SLING      Dr Doyle avery        Social History     Socioeconomic History    Marital status: Single    Number of children: 2    Highest education level: Some college, no degree   Tobacco Use    Smoking status: Former     Packs/day: 1.00     Years: 16.00     Additional pack years: 0.00     Total pack years: 16.00     Types: Cigarettes     Start date:      Quit date:      Years since quittin.9     Passive exposure: Past    Smokeless tobacco: Never   Vaping Use    Vaping Use: Some days    Substances: THC, Flavoring    Devices: Disposable    Passive vaping exposure: Yes   Substance and Sexual Activity    Alcohol use: No    Drug use: Yes     Types: Marijuana     Comment: Marijuana once a week. Tried cocaine, meth, pain pills in the past    Sexual activity: Defer     Partners: Male     Birth control/protection: Surgical         Current Outpatient Medications:     albuterol sulfate  (90 Base) MCG/ACT inhaler, Inhale 2 puffs Every 4 (Four) Hours As Needed for Wheezing., Disp: 18 g, Rfl: 0    amLODIPine (NORVASC) 5 MG tablet, TAKE ONE TABLET BY MOUTH DAILY, Disp: 30 tablet, Rfl: 0    celecoxib (CeleBREX) 100 MG capsule, Take 1 capsule by mouth 2 (Two) Times a Day., Disp: 60 capsule, Rfl: 5     "ELDERBERRY PO, Take 1 capsule by mouth Daily., Disp: , Rfl:     erythromycin (ROMYCIN) 5 MG/GM ophthalmic ointment, Administer  to the right eye Every 4 (Four) Hours While Awake., Disp: 3.5 g, Rfl: 0    lamoTRIgine (LaMICtal) 200 MG tablet, TAKE ONE TABLET BY MOUTH DAILY, Disp: 90 tablet, Rfl: 0    levocetirizine (XYZAL) 5 MG tablet, Take 1 tablet by mouth Every Evening., Disp: 30 tablet, Rfl: 5    guaiFENesin (MUCINEX) 600 MG 12 hr tablet, Take 2 tablets by mouth 2 (Two) Times a Day for 10 days., Disp: 40 tablet, Rfl: 0    Objective     Vital Signs  /72   Pulse 74   Temp 97.5 °F (36.4 °C)   Ht 149.9 cm (59.02\")   Wt 78 kg (172 lb)   SpO2 98%   BMI 34.72 kg/m²   Estimated body mass index is 34.72 kg/m² as calculated from the following:    Height as of this encounter: 149.9 cm (59.02\").    Weight as of this encounter: 78 kg (172 lb).           Physical Exam  Constitutional:       General: She is not in acute distress.     Appearance: Normal appearance. She is not ill-appearing.   HENT:      Head: Normocephalic and atraumatic.      Nose: Nose normal.   Eyes:      Extraocular Movements: Extraocular movements intact.      Conjunctiva/sclera: Conjunctivae normal.      Pupils: Pupils are equal, round, and reactive to light.   Cardiovascular:      Rate and Rhythm: Normal rate.   Pulmonary:      Effort: Pulmonary effort is normal. No respiratory distress.   Musculoskeletal:         General: Normal range of motion.      Cervical back: Neck supple.   Skin:     General: Skin is warm and dry.   Neurological:      General: No focal deficit present.      Mental Status: She is alert and oriented to person, place, and time. Mental status is at baseline.   Psychiatric:         Mood and Affect: Mood normal.         Behavior: Behavior normal.         Thought Content: Thought content normal.         Judgment: Judgment normal.          Assessment and Plan     Diagnoses and all orders for this visit:    1. Hospital discharge " follow-up (Primary)    2. Subacute cough  -     Discontinue: promethazine-dextromethorphan (PROMETHAZINE-DM) 6.25-15 MG/5ML syrup; Take 5 mL by mouth 4 (Four) Times a Day As Needed for Cough.  Dispense: 118 mL; Refill: 0  -     guaiFENesin (Mucinex) 600 MG 12 hr tablet; Take 2 tablets by mouth 2 (Two) Times a Day for 10 days.  Dispense: 40 tablet; Refill: 0  -     POCT SARS-CoV-2 Antigen PRADEEP + Flu    3. Non-recurrent acute suppurative otitis media of both ears without spontaneous rupture of tympanic membranes    4. COVID-19 virus infection        Follow Up  Return if symptoms worsen or fail to improve.    SHANE Ang    Part of this note may be an electronic transcription/translation of spoken language to printed text using the Dragon Dictation System.

## 2023-11-25 DIAGNOSIS — I10 PRIMARY HYPERTENSION: ICD-10-CM

## 2023-11-25 RX ORDER — AMLODIPINE BESYLATE 5 MG/1
5 TABLET ORAL DAILY
Qty: 30 TABLET | Refills: 0 | OUTPATIENT
Start: 2023-11-25

## 2023-11-29 ENCOUNTER — OFFICE VISIT (OUTPATIENT)
Dept: BEHAVIORAL HEALTH | Facility: CLINIC | Age: 43
End: 2023-11-29
Payer: MEDICAID

## 2023-11-29 DIAGNOSIS — F19.10 SUBSTANCE ABUSE: ICD-10-CM

## 2023-11-29 DIAGNOSIS — F39 UNSPECIFIED MOOD (AFFECTIVE) DISORDER: Primary | ICD-10-CM

## 2023-11-29 NOTE — PROGRESS NOTES
[facility] [place]                 Follow Up Adult      Follow Up Adult Note     Date:2023   Patient Name: Bernardo Dodd  : 1980   MRN: [id number]   Time IN: 4:05pm    Time OUT: 450pm     Referring Provider: Rebekah Fagan APRN    Chief Complaint:      ICD-10-CM ICD-9-CM   1. Unspecified mood (affective) disorder  F39 296.90   2. Substance abuse  F19.10 305.90        History of Present Illness:   Bernardo Dodd is a 43 y.o. female who is being seen today for follow up counseling for  mood disorder symptoms and substance use        Patient's Support Network Includes:  mother    Functional Status: Moderate impairment     Progress Toward Goal: not at goal    Prognosis: fair       Medications:     Current Outpatient Medications:     albuterol sulfate  (90 Base) MCG/ACT inhaler, Inhale 2 puffs Every 4 (Four) Hours As Needed for Wheezing., Disp: 18 g, Rfl: 0    amLODIPine (NORVASC) 5 MG tablet, TAKE ONE TABLET BY MOUTH DAILY, Disp: 30 tablet, Rfl: 0    celecoxib (CeleBREX) 100 MG capsule, Take 1 capsule by mouth 2 (Two) Times a Day., Disp: 60 capsule, Rfl: 5    ELDERBERRY PO, Take 1 capsule by mouth Daily., Disp: , Rfl:     erythromycin (ROMYCIN) 5 MG/GM ophthalmic ointment, Administer  to the right eye Every 4 (Four) Hours While Awake., Disp: 3.5 g, Rfl: 0    guaiFENesin (Mucinex) 600 MG 12 hr tablet, Take 2 tablets by mouth 2 (Two) Times a Day for 10 days., Disp: 40 tablet, Rfl: 0    lamoTRIgine (LaMICtal) 200 MG tablet, TAKE ONE TABLET BY MOUTH DAILY, Disp: 90 tablet, Rfl: 0    levocetirizine (XYZAL) 5 MG tablet, Take 1 tablet by mouth Every Evening., Disp: 30 tablet, Rfl: 5    promethazine-dextromethorphan (PROMETHAZINE-DM) 6.25-15 MG/5ML syrup, Take 5 mL by mouth 4 (Four) Times a Day As Needed for Cough., Disp: 118 mL, Rfl: 0    Allergies:   Allergies   Allergen Reactions    Naproxen Swelling    Sulfa Antibiotics Rash       Objective     Physical  Exam:  Vital Signs: There were no vitals filed for this visit.  There is no height or weight on file to calculate BMI.     Mental Status Exam:   Hygiene:   good  Cooperation:  Cooperative  Eye Contact:  Good  Psychomotor Behavior:  Appropriate  Affect:  Full range  Mood: normal  Speech:  Normal  Thought Process:  Linear  Thought Content:  Normal  Suicidal:  None  Homicidal:  None  Hallucinations:  None  Delusion:  None  Memory:  Intact  Orientation:  Person, Place, Time, and Situation  Reliability:  good  Insight:  Good  Judgement:  Good  Impulse Control:  Good  Physical/Medical Issues:  No      Assessment / Plan      Visit Diagnosis/Orders Placed This Visit:    ICD-10-CM ICD-9-CM   1. Unspecified mood (affective) disorder  F39 296.90   2. Substance abuse  F19.10 305.90     Patient presents in office for follow-up.  Patient reports an increase in difficult emotions due to stresses related to finances and not having a vehicle.  I assisted patient in processing this and provided support and empathy.  Patient and I also worked on budgeting.  Patient reports she would like to consider a medication change and she will schedule with psychiatric APRN.      PLAN:  Safety: No acute safety concerns  Risk Assessment: Risk of self-harm acutely is low. Risk of self-harm chronically is also low, but could be further elevated in the event of treatment noncompliance and/or AODA.    Treatment Plan/Goals: Continue supportive psychotherapy efforts and medications as indicated. Treatment and medication options discussed during today's visit. Patient ackowledged and verbally consented to continue with current treatment plan and was educated on the importance of compliance with treatment and follow-up appointments. Patient seems reasonably able to adhere to treatment plan.      Assisted Patient in processing above session content; acknowledged and normalized patient’s thoughts, feelings, and concerns.  Rationalized patient thought  process regarding current symptoms and stressors.       Allowed Patient to freely discuss issues  without interruption or judgement with unconditional positive regard, active listening skills, and empathy. Therapist provided a safe, confidential environment to facilitate the development of a positive therapeutic relationship and encouraged open, honest communication. Assisted Patient in identifying risk factors which would indicate the need for higher level of care including thoughts to harm self or others and/or self-harming behavior and encouraged Patient to contact this office, call 911, or present to the nearest emergency room should any of these events occur. Discussed crisis intervention services and means to access. Patient adamantly and convincingly denies current suicidal or homicidal ideation or perceptual disturbance. Assisted Patient in processing session content; acknowledged and normalized Patient’s thoughts, feelings, and concerns by utilizing a person-centered approach in efforts to build appropriate rapport and a positive therapeutic relationship with open and honest communication. .     Quality Measures:     TOBACCO USE:  Former smoker    I advised Tyralita of the risks of tobacco use.     Follow Up:   No follow-ups on file.      Blank Garcia LCSW

## 2023-12-05 ENCOUNTER — OFFICE VISIT (OUTPATIENT)
Dept: INTERNAL MEDICINE | Facility: CLINIC | Age: 43
End: 2023-12-05
Payer: MEDICAID

## 2023-12-05 VITALS
HEIGHT: 59 IN | TEMPERATURE: 97.1 F | HEART RATE: 81 BPM | DIASTOLIC BLOOD PRESSURE: 84 MMHG | SYSTOLIC BLOOD PRESSURE: 134 MMHG | WEIGHT: 177 LBS | OXYGEN SATURATION: 98 % | BODY MASS INDEX: 35.68 KG/M2

## 2023-12-05 DIAGNOSIS — J00 ACUTE NASOPHARYNGITIS: Primary | ICD-10-CM

## 2023-12-05 RX ORDER — GUAIFENESIN 600 MG/1
1200 TABLET, EXTENDED RELEASE ORAL 2 TIMES DAILY
Qty: 40 TABLET | Refills: 0 | Status: SHIPPED | OUTPATIENT
Start: 2023-12-05 | End: 2023-12-15

## 2023-12-05 RX ORDER — TRIAMCINOLONE ACETONIDE 40 MG/ML
40 INJECTION, SUSPENSION INTRA-ARTICULAR; INTRAMUSCULAR ONCE
Status: DISCONTINUED | OUTPATIENT
Start: 2023-12-05 | End: 2023-12-05

## 2023-12-05 RX ORDER — DEXAMETHASONE SODIUM PHOSPHATE 10 MG/ML
10 INJECTION INTRAMUSCULAR; INTRAVENOUS ONCE
Status: COMPLETED | OUTPATIENT
Start: 2023-12-05 | End: 2023-12-05

## 2023-12-05 RX ORDER — DEXAMETHASONE SODIUM PHOSPHATE 10 MG/ML
10 INJECTION INTRAMUSCULAR; INTRAVENOUS ONCE
Status: DISCONTINUED | OUTPATIENT
Start: 2023-12-05 | End: 2023-12-05

## 2023-12-05 RX ADMIN — DEXAMETHASONE SODIUM PHOSPHATE 10 MG: 10 INJECTION INTRAMUSCULAR; INTRAVENOUS at 12:04

## 2023-12-05 NOTE — PROGRESS NOTES
"Chief Complaint  Congestion (Chest congestion. Was COVID positive on 11/14/23)    Subjective      History of Present Illness  Bernardo is a 43 y.o. female who presents to the clinic today for evaluation of symptoms of a URI. Symptoms include bilateral ear pressure/pain, congestion, nausea without vomiting, and productive cough with  white colored sputum. Onset of symptoms was 2 days ago, and has been unchanged since that time. Treatment to date: none.    The following portions of the patient's history were reviewed and updated as appropriate: allergies, current medications, past family history, past medical history, past social history, past surgical history, and problem list.    Review of Systems  Pertinent items are noted in HPI.      Objective   Vital Signs:  /84   Pulse 81   Temp 97.1 °F (36.2 °C)   Ht 149.9 cm (59.02\")   Wt 80.3 kg (177 lb)   SpO2 98%   BMI 35.73 kg/m²   Estimated body mass index is 35.73 kg/m² as calculated from the following:    Height as of this encounter: 149.9 cm (59.02\").    Weight as of this encounter: 80.3 kg (177 lb).            Physical Exam  Vitals reviewed.   Constitutional:       General: She is not in acute distress.  HENT:      Head: Normocephalic and atraumatic.      Right Ear: Tympanic membrane, ear canal and external ear normal.      Left Ear: Tympanic membrane, ear canal and external ear normal.      Nose: Congestion present.      Mouth/Throat:      Mouth: Mucous membranes are moist.      Pharynx: Posterior oropharyngeal erythema (PND noted) present.   Eyes:      Conjunctiva/sclera: Conjunctivae normal.   Cardiovascular:      Rate and Rhythm: Normal rate and regular rhythm.      Pulses: Normal pulses.      Heart sounds: Normal heart sounds.   Pulmonary:      Effort: Pulmonary effort is normal.      Breath sounds: Normal breath sounds.   Musculoskeletal:      Cervical back: Normal range of motion and neck supple.   Skin:     General: Skin is warm and dry. "   Neurological:      General: No focal deficit present.      Mental Status: She is alert.   Psychiatric:         Mood and Affect: Mood normal.         Behavior: Behavior normal.         Thought Content: Thought content normal.         Judgment: Judgment normal.          Result Review                    Assessment and Plan  Diagnoses and all orders for this visit:    1. Acute nasopharyngitis (Primary)  Assessment & Plan:  Discussed diagnosis and treatment of URI.  Suggested symptomatic OTC remedies.  Nasal saline spray for congestion.  Follow up as needed.  Decadron injection in the office today.                 Orders:  -     Discontinue: triamcinolone acetonide (KENALOG-40) injection 40 mg  -     guaiFENesin (MUCINEX) 600 MG 12 hr tablet; Take 2 tablets by mouth 2 (Two) Times a Day for 10 days.  Dispense: 40 tablet; Refill: 0  -     Discontinue: dexAMETHasone (DECADRON) injection 10 mg  -     dexAMETHasone (DECADRON) injection 10 mg               Follow Up  Return if symptoms worsen or fail to improve.  Patient was given instructions and counseling regarding her condition or for health maintenance advice. Please see specific information pulled into the AVS if appropriate.    Part of this note may be an electronic transcription/translation of spoken language to printed text using the Dragon Dictation System.

## 2023-12-05 NOTE — ASSESSMENT & PLAN NOTE
Discussed diagnosis and treatment of URI.  Suggested symptomatic OTC remedies.  Nasal saline spray for congestion.  Follow up as needed.  Decadron injection in the office today.

## 2023-12-14 DIAGNOSIS — I10 PRIMARY HYPERTENSION: ICD-10-CM

## 2023-12-16 RX ORDER — AMLODIPINE BESYLATE 5 MG/1
5 TABLET ORAL DAILY
Qty: 90 TABLET | Refills: 1 | Status: SHIPPED | OUTPATIENT
Start: 2023-12-16

## 2024-01-07 DIAGNOSIS — G40.909 SEIZURE DISORDER: ICD-10-CM

## 2024-01-08 RX ORDER — LAMOTRIGINE 200 MG/1
200 TABLET ORAL DAILY
Qty: 90 TABLET | Refills: 0 | Status: SHIPPED | OUTPATIENT
Start: 2024-01-08

## 2024-01-11 DIAGNOSIS — G40.909 SEIZURE DISORDER: ICD-10-CM

## 2024-01-11 RX ORDER — LAMOTRIGINE 200 MG/1
200 TABLET ORAL DAILY
Qty: 90 TABLET | Refills: 0 | OUTPATIENT
Start: 2024-01-11

## 2024-01-30 ENCOUNTER — OFFICE VISIT (OUTPATIENT)
Dept: INTERNAL MEDICINE | Facility: CLINIC | Age: 44
End: 2024-01-30
Payer: MEDICAID

## 2024-01-30 VITALS
HEART RATE: 83 BPM | WEIGHT: 177 LBS | OXYGEN SATURATION: 99 % | DIASTOLIC BLOOD PRESSURE: 78 MMHG | SYSTOLIC BLOOD PRESSURE: 122 MMHG | BODY MASS INDEX: 35.68 KG/M2 | HEIGHT: 59 IN | TEMPERATURE: 97.5 F

## 2024-01-30 DIAGNOSIS — M79.601 RIGHT ARM PAIN: ICD-10-CM

## 2024-01-30 DIAGNOSIS — R10.84 GENERALIZED ABDOMINAL PAIN: Primary | ICD-10-CM

## 2024-01-30 DIAGNOSIS — N94.6 PAINFUL MENSTRUAL PERIODS: ICD-10-CM

## 2024-01-30 DIAGNOSIS — M54.2 NECK PAIN: ICD-10-CM

## 2024-01-30 DIAGNOSIS — D21.9 FIBROID: ICD-10-CM

## 2024-01-30 LAB
BILIRUB BLD-MCNC: NEGATIVE MG/DL
CLARITY, POC: CLEAR
COLOR UR: YELLOW
EXPIRATION DATE: ABNORMAL
GLUCOSE UR STRIP-MCNC: NEGATIVE MG/DL
KETONES UR QL: NEGATIVE
LEUKOCYTE EST, POC: NEGATIVE
Lab: ABNORMAL
NITRITE UR-MCNC: NEGATIVE MG/ML
PH UR: 6 [PH] (ref 5–8)
PROT UR STRIP-MCNC: ABNORMAL MG/DL
RBC # UR STRIP: NEGATIVE /UL
SP GR UR: 1.03 (ref 1–1.03)
UROBILINOGEN UR QL: NORMAL

## 2024-01-30 PROCEDURE — 87086 URINE CULTURE/COLONY COUNT: CPT | Performed by: NURSE PRACTITIONER

## 2024-01-31 LAB — BACTERIA SPEC AEROBE CULT: NO GROWTH

## 2024-02-02 ENCOUNTER — TELEPHONE (OUTPATIENT)
Dept: OBSTETRICS AND GYNECOLOGY | Facility: CLINIC | Age: 44
End: 2024-02-02

## 2024-02-02 NOTE — TELEPHONE ENCOUNTER
"  Caller: Bernardo Dodd \"Gricel\"    Relationship: Self    Best call back number: [phone]    What is the best time to reach you: ANYTIME    Who are you requesting to speak with (clinical staff, provider,  specific staff member): Francisco Otero     Do you know the name of the person who called: Francisco Otero     What was the call regarding: SCHEDULING FROM A REFERRAL    Is it okay if the provider responds through [employer]: PT STATED SHE CAN GET TEXT MESSAGES ON THE 6201# OR VM ON THE 0000#    PLEASE CALL PT BACK FOR SCHEDULING          "

## 2024-02-12 ENCOUNTER — HOSPITAL ENCOUNTER (OUTPATIENT)
Dept: ULTRASOUND IMAGING | Facility: HOSPITAL | Age: 44
Discharge: HOME | End: 2024-02-12
Admitting: NURSE PRACTITIONER
Payer: MEDICAID

## 2024-02-12 DIAGNOSIS — R10.84 GENERALIZED ABDOMINAL PAIN: ICD-10-CM

## 2024-02-12 PROCEDURE — 76700 US EXAM ABDOM COMPLETE: CPT

## 2024-03-12 ENCOUNTER — OFFICE VISIT (OUTPATIENT)
Dept: INTERNAL MEDICINE | Facility: CLINIC | Age: 44
End: 2024-03-12
Payer: MEDICAID

## 2024-03-12 VITALS
DIASTOLIC BLOOD PRESSURE: 80 MMHG | TEMPERATURE: 97.4 F | WEIGHT: 187 LBS | HEIGHT: 59 IN | BODY MASS INDEX: 37.7 KG/M2 | SYSTOLIC BLOOD PRESSURE: 112 MMHG | OXYGEN SATURATION: 97 % | HEART RATE: 76 BPM

## 2024-03-12 DIAGNOSIS — K21.9 GASTROESOPHAGEAL REFLUX DISEASE WITHOUT ESOPHAGITIS: ICD-10-CM

## 2024-03-12 DIAGNOSIS — M54.50 ACUTE BILATERAL LOW BACK PAIN, UNSPECIFIED WHETHER SCIATICA PRESENT: Primary | ICD-10-CM

## 2024-03-12 DIAGNOSIS — R09.81 NASAL CONGESTION: ICD-10-CM

## 2024-03-12 DIAGNOSIS — R05.9 COUGH IN ADULT: ICD-10-CM

## 2024-03-12 DIAGNOSIS — R10.13 DYSPEPSIA: ICD-10-CM

## 2024-03-12 DIAGNOSIS — M47.816 LUMBAR SPONDYLOSIS: ICD-10-CM

## 2024-03-12 NOTE — PROGRESS NOTES
Office Note     Name: Bernardo Dodd    : 1980     MRN: [id number]     Chief Complaint  Back Pain (Low back ) and Heartburn (Requesting referral to GI )    Subjective     History of Present Illness:  Bernardo Dodd is a 43 y.o. female who presents today for evaluation of back pain and heartburn.  Patient has been back pain chronically for a while now.  She reports the pain is mainly to her neck and upper back area as well as low back with some sciatic pain down the left lower extremity.  She was previously seen by pain management and was supposed to do an MRI in .  She has not had that done.  She would like a referral back to pain management for further evaluation.  She would like to try to avoid steroids due to weight gain.  She would also like a referral to GI.  She has been struggling with acid reflux and feels that it has been worsening.  She feels that it often takes her breath away and triggers her asthma.  She had an abdominal ultrasound which was normal.  She has been producing more clear phlegm lately.  She has also noted a metallic taste in her mouth.  She is having bowel movements but reports they are loose and watery recently.  She has been taking more multivitamins recently as everyone she works with the [employer].  She denies further complaints or concerns at this time.        Past Medical History:   Diagnosis Date   • Allergic rhinitis    • Anemia    • Anxiety    • Arthritis    • Asthma    • Bartholin gland cyst    • Bipolar disorder 2014   • CHF (congestive heart failure)    • Chlamydia    • Depression    • Endometriosis    • GERD (gastroesophageal reflux disease)    • Gonorrhea    • Heart murmur     at birth   • Herpes simplex    • Hypertension    • Kidney stones    • Low back pain    • Migraine    • Obesity    • Ovarian cyst    • Preeclampsia    • Seizures     patient reports last seizure in early 30's   • Substance abuse    • Thigh shingles    • Trichomonas infection    •  Urinary tract infection    • Varicella    • Wears contact lenses        Past Surgical History:   Procedure Laterality Date   • BREAST BIOPSY Right    •  SECTION WITH TUBAL  2010   • COLONOSCOPY     • CYSTOSCOPY, URETEROSCOPY, RETROGRADE PYELOGRAM, STONE EXTRACTION, STENT INSERTION Bilateral 2023    Procedure: CYSTOSCOPY, BILATERAL RETROGRADE PYELOGRAM, URETEROSCOPY, AND STENT PLACEMENT;  Surgeon: Micah Child MD;  Location:  [facility];  Service: Urology;  Laterality: Bilateral;   • CYSTOSCOPY, URETEROSCOPY, RETROGRADE PYELOGRAM, STONE EXTRACTION, STENT INSERTION Left 2023    Procedure: CYSTOSCOPY URETEROSCOPY RETROGRADE PYELOGRAM STONE EXTRACTION STENT INSERTION;  Surgeon: Micah Child MD;  Location:  [facility];  Service: Urology;  Laterality: Left;   • DIAGNOSTIC LAPAROSCOPY     • ENDOSCOPY     • INCISION AND DRAINAGE ABSCESS      bartholin's   • ORIF ANKLE FRACTURE Right    • REDUCTION MAMMAPLASTY Bilateral    • TENSION FREE VAGINAL TAPING WITH MINI ARC SLING      Dr Doyle avery        Social History     Socioeconomic History   • Marital status: Single   • Number of children: 2   • Highest education level: Some college, no degree   Tobacco Use   • Smoking status: Former     Current packs/day: 0.00     Average packs/day: 1 pack/day for 22.0 years (22.0 ttl pk-yrs)     Types: Cigarettes     Start date:      Quit date: 2016     Years since quittin.2     Passive exposure: Past   • Smokeless tobacco: Never   Vaping Use   • Vaping status: Some Days   • Substances: THC, Flavoring   • Devices: Disposable   • Passive vaping exposure: Yes   Substance and Sexual Activity   • Alcohol use: No   • Drug use: Yes     Types: Marijuana     Comment: Marijuana once a week. Tried cocaine, meth, pain pills in the past   • Sexual activity: Defer     Partners: Male     Birth control/protection: Surgical         Current Outpatient Medications:   •  albuterol sulfate  (90 Base) MCG/ACT  "inhaler, Inhale 2 puffs Every 4 (Four) Hours As Needed for Wheezing., Disp: 18 g, Rfl: 2  •  amLODIPine (NORVASC) 5 MG tablet, TAKE 1 TABLET BY MOUTH DAILY, Disp: 90 tablet, Rfl: 1  •  celecoxib (CeleBREX) 100 MG capsule, Take 1 capsule by mouth 2 (Two) Times a Day., Disp: 60 capsule, Rfl: 5  •  ELDERBERRY PO, Take 1 capsule by mouth Daily., Disp: , Rfl:   •  erythromycin (ROMYCIN) 5 MG/GM ophthalmic ointment, Administer  to the right eye Every 4 (Four) Hours While Awake., Disp: 3.5 g, Rfl: 0  •  lamoTRIgine (LaMICtal) 200 MG tablet, Take 1 tablet by mouth Daily., Disp: 90 tablet, Rfl: 0  •  levocetirizine (XYZAL) 5 MG tablet, Take 1 tablet by mouth Every Evening., Disp: 30 tablet, Rfl: 5  •  Multiple Vitamins-Minerals (CENTRUM ADULT PO), Take  by mouth., Disp: , Rfl:   •  famotidine (Pepcid) 20 MG tablet, Take 1 tablet by mouth 2 (Two) Times a Day., Disp: 60 tablet, Rfl: 2    Objective     Vital Signs  /80   Pulse 76   Temp 97.4 °F (36.3 °C)   Ht 149.9 cm (59.02\")   Wt 84.8 kg (187 lb)   SpO2 97%   BMI 37.75 kg/m²   Estimated body mass index is 37.75 kg/m² as calculated from the following:    Height as of this encounter: 149.9 cm (59.02\").    Weight as of this encounter: 84.8 kg (187 lb).           Physical Exam  Constitutional:       General: She is not in acute distress.     Appearance: Normal appearance. She is not ill-appearing.   HENT:      Head: Normocephalic and atraumatic.      Nose: Nose normal.   Eyes:      Extraocular Movements: Extraocular movements intact.      Conjunctiva/sclera: Conjunctivae normal.      Pupils: Pupils are equal, round, and reactive to light.   Cardiovascular:      Rate and Rhythm: Normal rate and regular rhythm.   Pulmonary:      Effort: Pulmonary effort is normal. No respiratory distress.      Breath sounds: Normal breath sounds.   Abdominal:      General: Bowel sounds are normal.      Palpations: Abdomen is soft.      Tenderness: There is no abdominal tenderness. There " is no guarding or rebound.   Musculoskeletal:         General: Normal range of motion.      Cervical back: Neck supple.   Skin:     General: Skin is warm and dry.   Neurological:      General: No focal deficit present.      Mental Status: She is alert and oriented to person, place, and time. Mental status is at baseline.   Psychiatric:         Mood and Affect: Mood normal.         Behavior: Behavior normal.         Thought Content: Thought content normal.         Judgment: Judgment normal.          Assessment and Plan     Diagnoses and all orders for this visit:    1. Acute bilateral low back pain, unspecified whether sciatica present (Primary)  -     celecoxib (CeleBREX) 100 MG capsule; Take 1 capsule by mouth 2 (Two) Times a Day.  Dispense: 60 capsule; Refill: 5  -     POCT urinalysis dipstick, automated  -     Ambulatory Referral to Pain Management    2. Cough in adult  -     albuterol sulfate  (90 Base) MCG/ACT inhaler; Inhale 2 puffs Every 4 (Four) Hours As Needed for Wheezing.  Dispense: 18 g; Refill: 2  -     levocetirizine (XYZAL) 5 MG tablet; Take 1 tablet by mouth Every Evening.  Dispense: 30 tablet; Refill: 5    3. Lumbar spondylosis  -     celecoxib (CeleBREX) 100 MG capsule; Take 1 capsule by mouth 2 (Two) Times a Day.  Dispense: 60 capsule; Refill: 5  -     Ambulatory Referral to Pain Management    4. Nasal congestion  -     levocetirizine (XYZAL) 5 MG tablet; Take 1 tablet by mouth Every Evening.  Dispense: 30 tablet; Refill: 5    5. Dyspepsia  -     Ambulatory Referral to Gastroenterology  -     famotidine (Pepcid) 20 MG tablet; Take 1 tablet by mouth 2 (Two) Times a Day.  Dispense: 60 tablet; Refill: 2    6. Gastroesophageal reflux disease without esophagitis  -     Ambulatory Referral to Gastroenterology  -     famotidine (Pepcid) 20 MG tablet; Take 1 tablet by mouth 2 (Two) Times a Day.  Dispense: 60 tablet; Refill: 2        Follow Up  Return if symptoms worsen or fail to improve, for Next  scheduled follow up.    SHANE Ang    Part of this note may be an electronic transcription/translation of spoken language to printed text using the Dragon Dictation System.

## 2024-03-26 ENCOUNTER — TELEPHONE (OUTPATIENT)
Dept: INTERNAL MEDICINE | Facility: CLINIC | Age: 44
End: 2024-03-26
Payer: MEDICAID

## 2024-03-26 DIAGNOSIS — Z11.1 SCREENING-PULMONARY TB: Primary | ICD-10-CM

## 2024-03-26 NOTE — TELEPHONE ENCOUNTER
Notified patient that she can stop in tomorrow for lab. She will also stop by and get swabbed for flu and covid. Approved per Rebekah to put on nurse schedule

## 2024-03-26 NOTE — TELEPHONE ENCOUNTER
"    Caller: Bernardo Dodd \"Gricel\"    Relationship to patient: Self    Best call back number: [phone]    Patient is needing: PATIENT STATED THAT SHE WOULD NEED TO COME ASAP TO GET TB TESTING DONE; DOES PATIENT WALK IN FOR THOSE OR NEED SCHEDULED    PLEASE ADVISE ASAP    "

## 2024-03-27 ENCOUNTER — CLINICAL SUPPORT (OUTPATIENT)
Dept: INTERNAL MEDICINE | Facility: CLINIC | Age: 44
End: 2024-03-27
Payer: MEDICAID

## 2024-03-27 ENCOUNTER — LAB (OUTPATIENT)
Dept: LAB | Facility: HOSPITAL | Age: 44
End: 2024-03-27
Payer: MEDICAID

## 2024-03-27 DIAGNOSIS — Z11.1 SCREENING-PULMONARY TB: ICD-10-CM

## 2024-03-27 DIAGNOSIS — Z11.52 ENCOUNTER FOR SCREENING FOR COVID-19: Primary | ICD-10-CM

## 2024-03-27 PROCEDURE — 36415 COLL VENOUS BLD VENIPUNCTURE: CPT

## 2024-03-27 PROCEDURE — 86480 TB TEST CELL IMMUN MEASURE: CPT

## 2024-03-27 RX ORDER — CELECOXIB 100 MG/1
100 CAPSULE ORAL 2 TIMES DAILY
Qty: 60 CAPSULE | Refills: 5 | Status: SHIPPED | OUTPATIENT
Start: 2024-03-27

## 2024-03-27 RX ORDER — LEVOCETIRIZINE DIHYDROCHLORIDE 5 MG/1
5 TABLET, FILM COATED ORAL EVERY EVENING
Qty: 30 TABLET | Refills: 5 | Status: SHIPPED | OUTPATIENT
Start: 2024-03-27

## 2024-03-27 RX ORDER — ALBUTEROL SULFATE 90 UG/1
2 AEROSOL, METERED RESPIRATORY (INHALATION) EVERY 4 HOURS PRN
Qty: 18 G | Refills: 2 | Status: SHIPPED | OUTPATIENT
Start: 2024-03-27

## 2024-03-27 RX ORDER — FAMOTIDINE 20 MG/1
20 TABLET, FILM COATED ORAL 2 TIMES DAILY
Qty: 60 TABLET | Refills: 2 | Status: SHIPPED | OUTPATIENT
Start: 2024-03-27

## 2024-03-27 NOTE — PROGRESS NOTES
Pt here today for COVID test because she is starting a new job. OK per Rebekah Fagan to have this done as a nurse visit. Test was negative.

## 2024-03-30 LAB
GAMMA INTERFERON BACKGROUND BLD IA-ACNC: 0.2 IU/ML
M TB IFN-G BLD-IMP: NEGATIVE
M TB IFN-G CD4+ BCKGRND COR BLD-ACNC: 0.2 IU/ML
M TB IFN-G CD4+CD8+ BCKGRND COR BLD-ACNC: 0.19 IU/ML
MITOGEN IGNF BCKGRD COR BLD-ACNC: >10 IU/ML
QUANTIFERON INCUBATION: NORMAL
SERVICE CMNT-IMP: NORMAL

## 2024-04-03 ENCOUNTER — TELEPHONE (OUTPATIENT)
Dept: GASTROENTEROLOGY | Facility: CLINIC | Age: 44
End: 2024-04-03

## 2024-04-12 DIAGNOSIS — G40.909 SEIZURE DISORDER: ICD-10-CM

## 2024-04-12 RX ORDER — LAMOTRIGINE 200 MG/1
200 TABLET ORAL DAILY
Qty: 90 TABLET | Refills: 0 | Status: SHIPPED | OUTPATIENT
Start: 2024-04-12

## 2024-04-15 ENCOUNTER — LAB (OUTPATIENT)
Dept: LAB | Facility: HOSPITAL | Age: 44
End: 2024-04-15
Payer: MEDICAID

## 2024-04-15 ENCOUNTER — OFFICE VISIT (OUTPATIENT)
Dept: INTERNAL MEDICINE | Facility: CLINIC | Age: 44
End: 2024-04-15
Payer: MEDICAID

## 2024-04-15 VITALS
OXYGEN SATURATION: 97 % | WEIGHT: 185 LBS | DIASTOLIC BLOOD PRESSURE: 88 MMHG | BODY MASS INDEX: 37.35 KG/M2 | SYSTOLIC BLOOD PRESSURE: 110 MMHG | HEART RATE: 85 BPM

## 2024-04-15 DIAGNOSIS — Z79.1 ENCOUNTER FOR MONITORING CHRONIC NSAID THERAPY: Primary | ICD-10-CM

## 2024-04-15 DIAGNOSIS — M54.42 CHRONIC BILATERAL LOW BACK PAIN WITH BILATERAL SCIATICA: ICD-10-CM

## 2024-04-15 DIAGNOSIS — Z51.81 ENCOUNTER FOR MONITORING CHRONIC NSAID THERAPY: Primary | ICD-10-CM

## 2024-04-15 DIAGNOSIS — G89.29 CHRONIC BILATERAL LOW BACK PAIN WITH BILATERAL SCIATICA: ICD-10-CM

## 2024-04-15 DIAGNOSIS — M54.41 CHRONIC BILATERAL LOW BACK PAIN WITH BILATERAL SCIATICA: ICD-10-CM

## 2024-04-15 DIAGNOSIS — M79.89 LEG SWELLING: ICD-10-CM

## 2024-04-15 LAB
BILIRUB UR QL STRIP: NEGATIVE
CLARITY UR: CLEAR
COLOR UR: YELLOW
GLUCOSE UR STRIP-MCNC: NEGATIVE MG/DL
HGB UR QL STRIP.AUTO: NEGATIVE
KETONES UR QL STRIP: NEGATIVE
LEUKOCYTE ESTERASE UR QL STRIP.AUTO: NEGATIVE
NITRITE UR QL STRIP: NEGATIVE
PH UR STRIP.AUTO: 6.5 [PH] (ref 5–8)
PROT UR QL STRIP: NEGATIVE
SP GR UR STRIP: 1.02 (ref 1–1.03)
UROBILINOGEN UR QL STRIP: NORMAL

## 2024-04-15 PROCEDURE — 80053 COMPREHEN METABOLIC PANEL: CPT | Performed by: NURSE PRACTITIONER

## 2024-04-15 PROCEDURE — 81001 URINALYSIS AUTO W/SCOPE: CPT | Performed by: NURSE PRACTITIONER

## 2024-04-15 PROCEDURE — 36415 COLL VENOUS BLD VENIPUNCTURE: CPT | Performed by: NURSE PRACTITIONER

## 2024-04-15 RX ORDER — DEXAMETHASONE SODIUM PHOSPHATE 10 MG/ML
10 INJECTION INTRAMUSCULAR; INTRAVENOUS ONCE
Status: COMPLETED | OUTPATIENT
Start: 2024-04-15 | End: 2024-04-15

## 2024-04-15 RX ADMIN — DEXAMETHASONE SODIUM PHOSPHATE 10 MG: 10 INJECTION INTRAMUSCULAR; INTRAVENOUS at 12:41

## 2024-04-15 NOTE — PROGRESS NOTES
Immunization  Immunization performed in left glut IM by Yady Alejo MA. Patient tolerated the procedure well without complications.  04/15/24   Yady Alejo MA

## 2024-04-15 NOTE — PROGRESS NOTES
Office Note     Name: Bernardo Dodd    : 1980     MRN: [id number]     Chief Complaint  Back Pain (Started since pt started new job.), Knee Pain, and Foot Pain    Subjective     History of Present Illness:  Bernardo Dodd is a 43 y.o. female who presents today for evaluation of back pain.  Patient reports her back pain has been exacerbated recently since she has gotten a new job.  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  She also has to walk about 20 minutes to work.  She has been using Celebrex regularly but feels it is not working.  She is not taking any over-the-counter medications in addition to her normal routine medications.  She has previously been seen by pain management.  She is also having some swelling to her lower extremities.  She reports they have been swollen intermittently for about 1 year.  She is unable to elevate when she is working.  She does wear compression stockings regularly.  She also uses a pink salt substitute.  No further complaints or concerns at this time.      Past Medical History:   Diagnosis Date    Allergic rhinitis     Anemia     Anxiety     Arthritis     Asthma     Bartholin gland cyst     Bipolar disorder 2014    CHF (congestive heart failure)     Chlamydia     Depression     Endometriosis     GERD (gastroesophageal reflux disease)     Gonorrhea     Heart murmur     at birth    Herpes simplex     Hypertension     Kidney stones     Low back pain     Migraine     Obesity     Ovarian cyst     Preeclampsia     Seizures     patient reports last seizure in early     Substance abuse     Thigh shingles     Trichomonas infection     Urinary tract infection     Varicella     Wears contact lenses        Past Surgical History:   Procedure Laterality Date    BREAST BIOPSY Right      SECTION WITH TUBAL  2010    COLONOSCOPY      CYSTOSCOPY, URETEROSCOPY, RETROGRADE  PYELOGRAM, STONE EXTRACTION, STENT INSERTION Bilateral 2023    Procedure: CYSTOSCOPY, BILATERAL RETROGRADE PYELOGRAM, URETEROSCOPY, AND STENT PLACEMENT;  Surgeon: Micah Child MD;  Location: [facility];  Service: Urology;  Laterality: Bilateral;    CYSTOSCOPY, URETEROSCOPY, RETROGRADE PYELOGRAM, STONE EXTRACTION, STENT INSERTION Left 2023    Procedure: CYSTOSCOPY URETEROSCOPY RETROGRADE PYELOGRAM STONE EXTRACTION STENT INSERTION;  Surgeon: Micah Cihld MD;  Location: [facility];  Service: Urology;  Laterality: Left;    DIAGNOSTIC LAPAROSCOPY      ENDOSCOPY      INCISION AND DRAINAGE ABSCESS      bartholin's    ORIF ANKLE FRACTURE Right     REDUCTION MAMMAPLASTY Bilateral     TENSION FREE VAGINAL TAPING WITH MINI ARC SLING      Dr Doyle avery        Social History     Socioeconomic History    Marital status: Single    Number of children: 2    Highest education level: Some college, no degree   Tobacco Use    Smoking status: Former     Current packs/day: 0.00     Average packs/day: 1 pack/day for 22.0 years (22.0 ttl pk-yrs)     Types: Cigarettes     Start date:      Quit date:      Years since quittin.3     Passive exposure: Past    Smokeless tobacco: Never   Vaping Use    Vaping status: Some Days    Substances: THC, Flavoring    Devices: Disposable    Passive vaping exposure: Yes   Substance and Sexual Activity    Alcohol use: No    Drug use: Yes     Types: Marijuana     Comment: Marijuana once a week. Tried cocaine, meth, pain pills in the past    Sexual activity: Defer     Partners: Male     Birth control/protection: Surgical         Current Outpatient Medications:     albuterol sulfate  (90 Base) MCG/ACT inhaler, Inhale 2 puffs Every 4 (Four) Hours As Needed for Wheezing., Disp: 18 g, Rfl: 2    amLODIPine (NORVASC) 5 MG tablet, TAKE 1 TABLET BY MOUTH DAILY, Disp: 90 tablet, Rfl: 1    erythromycin (ROMYCIN) 5 MG/GM ophthalmic ointment, Administer  to the right eye Every 4  "(Four) Hours While Awake., Disp: 3.5 g, Rfl: 0    famotidine (Pepcid) 20 MG tablet, Take 1 tablet by mouth 2 (Two) Times a Day., Disp: 60 tablet, Rfl: 2    lamoTRIgine (LaMICtal) 200 MG tablet, TAKE 1 TABLET BY MOUTH DAILY, Disp: 90 tablet, Rfl: 0    levocetirizine (XYZAL) 5 MG tablet, Take 1 tablet by mouth Every Evening., Disp: 30 tablet, Rfl: 5    Multiple Vitamins-Minerals (CENTRUM ADULT PO), Take  by mouth., Disp: , Rfl:     Azelastine HCl 137 MCG/SPRAY solution, 1 spray into the nostril(s) as directed by provider 2 (Two) Times a Day., Disp: 30 mL, Rfl: 1    diclofenac (VOLTAREN) 50 MG EC tablet, Take 1 tablet by mouth 2 (Two) Times a Day As Needed (back pain). Stop celebrex. Take this medication in place of celebrex., Disp: 60 tablet, Rfl: 2    ELDERBERRY PO, Take 1 capsule by mouth Daily., Disp: , Rfl:     Objective     Vital Signs  /88 (BP Location: Left arm, Patient Position: Sitting, Cuff Size: Adult)   Pulse 85   Wt 83.9 kg (185 lb)   SpO2 97%   BMI 37.35 kg/m²   Estimated body mass index is 37.35 kg/m² as calculated from the following:    Height as of 3/12/24: 149.9 cm (59.02\").    Weight as of this encounter: 83.9 kg (185 lb).           Physical Exam  Constitutional:       General: She is not in acute distress.     Appearance: Normal appearance. She is not ill-appearing.   HENT:      Head: Normocephalic and atraumatic.      Nose: Nose normal.   Eyes:      Extraocular Movements: Extraocular movements intact.      Conjunctiva/sclera: Conjunctivae normal.      Pupils: Pupils are equal, round, and reactive to light.   Cardiovascular:      Rate and Rhythm: Normal rate.   Pulmonary:      Effort: Pulmonary effort is normal. No respiratory distress.   Musculoskeletal:         General: Tenderness present. Normal range of motion.      Cervical back: Neck supple.      Right lower leg: Edema present.      Left lower leg: Edema present.   Skin:     General: Skin is warm and dry.   Neurological:      " General: No focal deficit present.      Mental Status: She is alert and oriented to person, place, and time. Mental status is at baseline.   Psychiatric:         Mood and Affect: Mood normal.         Behavior: Behavior normal.         Thought Content: Thought content normal.         Judgment: Judgment normal.          Assessment and Plan     Diagnoses and all orders for this visit:    1. Encounter for monitoring chronic NSAID therapy (Primary)  -     Comprehensive metabolic panel    2. Chronic bilateral low back pain with bilateral sciatica  -     dexAMETHasone (DECADRON) injection 10 mg  -     diclofenac (VOLTAREN) 50 MG EC tablet; Take 1 tablet by mouth 2 (Two) Times a Day As Needed (back pain). Stop celebrex. Take this medication in place of celebrex.  Dispense: 60 tablet; Refill: 2    3. Leg swelling  -     Urinalysis With Microscopic - Urine, Clean Catch        Plan:  Stop celebrex.  Steroid injection today.  Start diclofenac BID.   Return to pain mgmt.    Follow Up  No follow-ups on file.    SHANE Ang    Part of this note may be an electronic transcription/translation of spoken language to printed text using the Dragon Dictation System.

## 2024-04-16 ENCOUNTER — TELEPHONE (OUTPATIENT)
Dept: INTERNAL MEDICINE | Facility: CLINIC | Age: 44
End: 2024-04-16

## 2024-04-16 LAB
ALBUMIN SERPL-MCNC: 4 G/DL (ref 3.5–5.2)
ALBUMIN/GLOB SERPL: 1.3 G/DL
ALP SERPL-CCNC: 76 U/L (ref 39–117)
ALT SERPL W P-5'-P-CCNC: 11 U/L (ref 1–33)
ANION GAP SERPL CALCULATED.3IONS-SCNC: 11.9 MMOL/L (ref 5–15)
AST SERPL-CCNC: 18 U/L (ref 1–32)
BACTERIA UR QL AUTO: NORMAL /HPF
BILIRUB SERPL-MCNC: <0.2 MG/DL (ref 0–1.2)
BUN SERPL-MCNC: 16 MG/DL (ref 6–20)
BUN/CREAT SERPL: 21.1 (ref 7–25)
CALCIUM SPEC-SCNC: 9.3 MG/DL (ref 8.6–10.5)
CHLORIDE SERPL-SCNC: 102 MMOL/L (ref 98–107)
CO2 SERPL-SCNC: 26.1 MMOL/L (ref 22–29)
CREAT SERPL-MCNC: 0.76 MG/DL (ref 0.57–1)
EGFRCR SERPLBLD CKD-EPI 2021: 98.6 ML/MIN/1.73
GLOBULIN UR ELPH-MCNC: 3.1 GM/DL
GLUCOSE SERPL-MCNC: 101 MG/DL (ref 65–99)
HYALINE CASTS UR QL AUTO: NORMAL /LPF
POTASSIUM SERPL-SCNC: 3.6 MMOL/L (ref 3.5–5.2)
PROT SERPL-MCNC: 7.1 G/DL (ref 6–8.5)
RBC # UR STRIP: NORMAL /HPF
REF LAB TEST METHOD: NORMAL
SODIUM SERPL-SCNC: 140 MMOL/L (ref 136–145)
SQUAMOUS #/AREA URNS HPF: NORMAL /HPF
WBC # UR STRIP: NORMAL /HPF

## 2024-04-16 NOTE — TELEPHONE ENCOUNTER
HUB TO RELAY LVM for patient to return call. Patient was not seen for this issue so she will need to be evaluated at appointment tomorrow before anything can be called in.

## 2024-04-16 NOTE — TELEPHONE ENCOUNTER
"HUB TO RELAY LVM for patient to return call. Patient was not seen for this issue so she will need to be evaluated at appointment tomorrow before anything can be called in.         Name: Bernardo Dodd \"Gricel\"    Relationship: Self    Best Callback Number: [phone]     HUB PROVIDED THE RELAY MESSAGE FROM THE OFFICE   PATIENT VOICED UNDERSTANDING AND HAS NO FURTHER QUESTIONS AT THIS TIME    ADDITIONAL INFORMATION:    "

## 2024-04-16 NOTE — TELEPHONE ENCOUNTER
"Caller: Bernardo Dodd \"Gricel\"    Relationship: Self    Best call back number: [phone]     What medication are you requesting: SOMETHING TO HELP WITH THE PAIN (MOSTLY IN EARS)    What are your current symptoms: HEAD AND EARS HURTING REALLY BAD. WHOLE RIGHT SIDE OF HEAD WITH LEFT EAR ADDED. PRESSURE IN HEAD.     How long have you been experiencing symptoms: YESTERDAY BEFORE HER INJECTION BUT THEN WOKE UP WITH THIS WORSE    Have you had these symptoms before:    [] Yes  [x] No    Have you been treated for these symptoms before:   [] Yes  [x] No    If a prescription is needed, what is your preferred pharmacy and phone number:  [facility] [id number] - [facility] [address] - [phone] [facility] [phone] FX     Additional notes: SHE HAS BEEN TRYING TO CLEAN HER ERA OUT TO SEE IF THE PAIN WOULD GO AWAY BUT IT HAS GOTTEN WORSE. SHE HAS AN APPOINTMENT FOR TOMORROW SINCE SHE CANNOT COME BACK IN TODAY DUE TO WORK. PLEASE CALL HER ASAP TO LET HER KNOW.           "

## 2024-04-17 ENCOUNTER — OFFICE VISIT (OUTPATIENT)
Dept: INTERNAL MEDICINE | Facility: CLINIC | Age: 44
End: 2024-04-17
Payer: MEDICAID

## 2024-04-17 VITALS
WEIGHT: 186 LBS | HEART RATE: 69 BPM | DIASTOLIC BLOOD PRESSURE: 90 MMHG | TEMPERATURE: 98.4 F | BODY MASS INDEX: 37.55 KG/M2 | SYSTOLIC BLOOD PRESSURE: 132 MMHG | OXYGEN SATURATION: 95 %

## 2024-04-17 DIAGNOSIS — H92.03 OTALGIA OF BOTH EARS: ICD-10-CM

## 2024-04-17 DIAGNOSIS — R51.9 PRESSURE IN HEAD: ICD-10-CM

## 2024-04-17 DIAGNOSIS — R09.81 CONGESTED NOSE: Primary | ICD-10-CM

## 2024-04-17 LAB
EXPIRATION DATE: NORMAL
FLUAV AG NPH QL: NEGATIVE
FLUBV AG NPH QL: NEGATIVE
INTERNAL CONTROL: NORMAL
Lab: NORMAL
S PYO AG THROAT QL: NEGATIVE
SARS-COV-2 AG UPPER RESP QL IA.RAPID: NOT DETECTED

## 2024-04-17 RX ORDER — AZELASTINE HYDROCHLORIDE 137 UG/1
1 SPRAY, METERED NASAL 2 TIMES DAILY
Qty: 30 ML | Refills: 1 | Status: SHIPPED | OUTPATIENT
Start: 2024-04-17

## 2024-04-17 RX ORDER — GUAIFENESIN 600 MG/1
1200 TABLET, EXTENDED RELEASE ORAL 2 TIMES DAILY
Qty: 40 TABLET | Refills: 0 | Status: SHIPPED | OUTPATIENT
Start: 2024-04-17 | End: 2024-04-27

## 2024-04-17 NOTE — PROGRESS NOTES
Office Note     Name: Bernardo Dodd    : 1980     MRN: [id number]     Chief Complaint  Earache and Headache    Subjective     History of Present Illness:  Bernardo Dodd is a 43 y.o. female who presents today for evaluation of ear pain and headache.  Patient reports pain to both of her ears but the right is worse than the left.  She also felt some drainage from her right ear after using a nasal spray.  She denies recent fever.  She has been using over-the-counter Mucinex and Astelin spray.  She presents today for evaluation of the above acute complaints.  No further complaints or concerns at this time.        Past Medical History:   Diagnosis Date    Allergic rhinitis     Anemia     Anxiety     Arthritis     Asthma     Bartholin gland cyst     Bipolar disorder 2014    CHF (congestive heart failure)     Chlamydia     Depression     Endometriosis     GERD (gastroesophageal reflux disease)     Gonorrhea     Heart murmur     at birth    Herpes simplex     Hypertension     Kidney stones     Low back pain     Migraine     Obesity     Ovarian cyst     Preeclampsia     Seizures     patient reports last seizure in early 's    Substance abuse     Thigh shingles     Trichomonas infection     Urinary tract infection     Varicella     Wears contact lenses        Past Surgical History:   Procedure Laterality Date    BREAST BIOPSY Right      SECTION WITH TUBAL  2010    COLONOSCOPY      CYSTOSCOPY, URETEROSCOPY, RETROGRADE PYELOGRAM, STONE EXTRACTION, STENT INSERTION Bilateral 2023    Procedure: CYSTOSCOPY, BILATERAL RETROGRADE PYELOGRAM, URETEROSCOPY, AND STENT PLACEMENT;  Surgeon: Micah Child MD;  [facility]: [facility];  Service: Urology;  Laterality: Bilateral;    CYSTOSCOPY, URETEROSCOPY, RETROGRADE PYELOGRAM, STONE EXTRACTION, STENT INSERTION Left 2023    Procedure: CYSTOSCOPY URETEROSCOPY RETROGRADE PYELOGRAM STONE EXTRACTION STENT INSERTION;  Surgeon: Micah Child  MD;  Location: [facility];  Service: Urology;  Laterality: Left;    DIAGNOSTIC LAPAROSCOPY      ENDOSCOPY      INCISION AND DRAINAGE ABSCESS      bartholin's    ORIF ANKLE FRACTURE Right     REDUCTION MAMMAPLASTY Bilateral     TENSION FREE VAGINAL TAPING WITH MINI ARC SLING      Dr Doyle avery        Social History     Socioeconomic History    Marital status: Single    Number of children: 2    Highest education level: Some college, no degree   Tobacco Use    Smoking status: Former     Current packs/day: 0.00     Average packs/day: 1 pack/day for 22.0 years (22.0 ttl pk-yrs)     Types: Cigarettes     Start date:      Quit date:      Years since quittin.3     Passive exposure: Past    Smokeless tobacco: Never   Vaping Use    Vaping status: Some Days    Substances: THC, Flavoring    Devices: Disposable    Passive vaping exposure: Yes   Substance and Sexual Activity    Alcohol use: No    Drug use: Yes     Types: Marijuana     Comment: Marijuana once a week. Tried cocaine, meth, pain pills in the past    Sexual activity: Defer     Partners: Male     Birth control/protection: Surgical         Current Outpatient Medications:     albuterol sulfate  (90 Base) MCG/ACT inhaler, Inhale 2 puffs Every 4 (Four) Hours As Needed for Wheezing., Disp: 18 g, Rfl: 2    amLODIPine (NORVASC) 5 MG tablet, TAKE 1 TABLET BY MOUTH DAILY, Disp: 90 tablet, Rfl: 1    diclofenac (VOLTAREN) 50 MG EC tablet, Take 1 tablet by mouth 2 (Two) Times a Day As Needed (back pain). Stop celebrex. Take this medication in place of celebrex., Disp: 60 tablet, Rfl: 2    ELDERBERRY PO, Take 1 capsule by mouth Daily., Disp: , Rfl:     erythromycin (ROMYCIN) 5 MG/GM ophthalmic ointment, Administer  to the right eye Every 4 (Four) Hours While Awake., Disp: 3.5 g, Rfl: 0    famotidine (Pepcid) 20 MG tablet, Take 1 tablet by mouth 2 (Two) Times a Day., Disp: 60 tablet, Rfl: 2    lamoTRIgine (LaMICtal) 200 MG tablet, TAKE 1 TABLET BY MOUTH  "DAILY, Disp: 90 tablet, Rfl: 0    levocetirizine (XYZAL) 5 MG tablet, Take 1 tablet by mouth Every Evening., Disp: 30 tablet, Rfl: 5    Multiple Vitamins-Minerals (CENTRUM ADULT PO), Take  by mouth., Disp: , Rfl:     Azelastine HCl 137 MCG/SPRAY solution, 1 spray into the nostril(s) as directed by provider 2 (Two) Times a Day., Disp: 30 mL, Rfl: 1    methylPREDNISolone (MEDROL) 4 MG dose pack, Take as directed on package instructions., Disp: 21 tablet, Rfl: 0    Objective     Vital Signs  /90 (BP Location: Left arm, Patient Position: Sitting, Cuff Size: Adult)   Pulse 69   Temp 98.4 °F (36.9 °C) (Oral)   Wt 84.4 kg (186 lb)   SpO2 95%   BMI 37.55 kg/m²   Estimated body mass index is 37.55 kg/m² as calculated from the following:    Height as of 3/12/24: 149.9 cm (59.02\").    Weight as of this encounter: 84.4 kg (186 lb).           Physical Exam  Constitutional:       General: She is not in acute distress.     Appearance: Normal appearance. She is not ill-appearing.   HENT:      Head: Normocephalic and atraumatic.      Right Ear: Ear canal and external ear normal.      Left Ear: Tympanic membrane, ear canal and external ear normal.      Ears:      Comments: Clear effusion right TM     Nose: Nose normal.   Eyes:      Extraocular Movements: Extraocular movements intact.      Conjunctiva/sclera: Conjunctivae normal.      Pupils: Pupils are equal, round, and reactive to light.   Cardiovascular:      Rate and Rhythm: Normal rate.   Pulmonary:      Effort: Pulmonary effort is normal. No respiratory distress.   Musculoskeletal:         General: Normal range of motion.      Cervical back: Neck supple.   Skin:     General: Skin is warm and dry.   Neurological:      General: No focal deficit present.      Mental Status: She is alert and oriented to person, place, and time. Mental status is at baseline.   Psychiatric:         Mood and Affect: Mood normal.         Behavior: Behavior normal.         Thought Content: " Thought content normal.         Judgment: Judgment normal.          Assessment and Plan     Diagnoses and all orders for this visit:    1. Congested nose (Primary)  -     POCT JUDITH SARS-CoV-2 Antigen PRADEEP  -     POC Rapid Strep A  -     POC Influenza A / B  -     guaiFENesin (MUCINEX) 600 MG 12 hr tablet; Take 2 tablets by mouth 2 (Two) Times a Day for 10 days.  Dispense: 40 tablet; Refill: 0  -     Azelastine HCl 137 MCG/SPRAY solution; 1 spray into the nostril(s) as directed by provider 2 (Two) Times a Day.  Dispense: 30 mL; Refill: 1    2. Pressure in head  -     POCT JUDITH SARS-CoV-2 Antigen PRADEEP  -     POC Rapid Strep A  -     POC Influenza A / B  -     guaiFENesin (MUCINEX) 600 MG 12 hr tablet; Take 2 tablets by mouth 2 (Two) Times a Day for 10 days.  Dispense: 40 tablet; Refill: 0  -     Azelastine HCl 137 MCG/SPRAY solution; 1 spray into the nostril(s) as directed by provider 2 (Two) Times a Day.  Dispense: 30 mL; Refill: 1    3. Otalgia of both ears  -     methylPREDNISolone (MEDROL) 4 MG dose pack; Take as directed on package instructions.  Dispense: 21 tablet; Refill: 0        Follow Up  Return if symptoms worsen or fail to improve.    SHANE Ang    Part of this note may be an electronic transcription/translation of spoken language to printed text using the Dragon Dictation System.

## 2024-05-01 RX ORDER — METHYLPREDNISOLONE 4 MG/1
TABLET ORAL
Qty: 21 TABLET | Refills: 0 | Status: SHIPPED | OUTPATIENT
Start: 2024-05-01

## 2024-06-02 ENCOUNTER — APPOINTMENT (OUTPATIENT)
Dept: CT IMAGING | Facility: HOSPITAL | Age: 44
End: 2024-06-02
Payer: MEDICAID

## 2024-06-02 ENCOUNTER — HOSPITAL ENCOUNTER (EMERGENCY)
Facility: HOSPITAL | Age: 44
Discharge: HOME | End: 2024-06-02
Attending: EMERGENCY MEDICINE | Admitting: EMERGENCY MEDICINE
Payer: MEDICAID

## 2024-06-02 VITALS
BODY MASS INDEX: 35.91 KG/M2 | HEART RATE: 73 BPM | DIASTOLIC BLOOD PRESSURE: 104 MMHG | OXYGEN SATURATION: 94 % | SYSTOLIC BLOOD PRESSURE: 154 MMHG | HEIGHT: 59 IN | RESPIRATION RATE: 18 BRPM | WEIGHT: 178.13 LBS

## 2024-06-02 DIAGNOSIS — S39.012A ACUTE MYOFASCIAL STRAIN OF LUMBAR REGION, INITIAL ENCOUNTER: ICD-10-CM

## 2024-06-02 DIAGNOSIS — R10.9 RIGHT FLANK PAIN: ICD-10-CM

## 2024-06-02 DIAGNOSIS — B34.9 ACUTE VIRAL SYNDROME: Primary | ICD-10-CM

## 2024-06-02 LAB
ALBUMIN SERPL-MCNC: 4.2 G/DL (ref 3.5–5.2)
ALBUMIN/GLOB SERPL: 1.1 G/DL
ALP SERPL-CCNC: 73 U/L (ref 39–117)
ALT SERPL W P-5'-P-CCNC: 11 U/L (ref 1–33)
ANION GAP SERPL CALCULATED.3IONS-SCNC: 10 MMOL/L (ref 5–15)
AST SERPL-CCNC: 20 U/L (ref 1–32)
B-HCG UR QL: NEGATIVE
BACTERIA UR QL AUTO: NORMAL /HPF
BASOPHILS # BLD AUTO: 0.04 10*3/MM3 (ref 0–0.2)
BASOPHILS NFR BLD AUTO: 0.4 % (ref 0–1.5)
BILIRUB SERPL-MCNC: 0.2 MG/DL (ref 0–1.2)
BILIRUB UR QL STRIP: NEGATIVE
BUN SERPL-MCNC: 13 MG/DL (ref 6–20)
BUN/CREAT SERPL: 16.9 (ref 7–25)
CALCIUM SPEC-SCNC: 9.1 MG/DL (ref 8.6–10.5)
CHLORIDE SERPL-SCNC: 103 MMOL/L (ref 98–107)
CLARITY UR: CLEAR
CO2 SERPL-SCNC: 28 MMOL/L (ref 22–29)
COD CRY URNS QL: NORMAL /HPF
COLOR UR: YELLOW
CREAT SERPL-MCNC: 0.77 MG/DL (ref 0.57–1)
DEPRECATED RDW RBC AUTO: 45.4 FL (ref 37–54)
EGFRCR SERPLBLD CKD-EPI 2021: 97.1 ML/MIN/1.73
EOSINOPHIL # BLD AUTO: 0.05 10*3/MM3 (ref 0–0.4)
EOSINOPHIL NFR BLD AUTO: 0.5 % (ref 0.3–6.2)
ERYTHROCYTE [DISTWIDTH] IN BLOOD BY AUTOMATED COUNT: 15.9 % (ref 12.3–15.4)
FLUAV RNA RESP QL NAA+PROBE: NOT DETECTED
FLUBV RNA RESP QL NAA+PROBE: NOT DETECTED
GLOBULIN UR ELPH-MCNC: 3.8 GM/DL
GLUCOSE SERPL-MCNC: 114 MG/DL (ref 65–99)
GLUCOSE UR STRIP-MCNC: NEGATIVE MG/DL
HCT VFR BLD AUTO: 37.4 % (ref 34–46.6)
HGB BLD-MCNC: 12.1 G/DL (ref 12–15.9)
HGB UR QL STRIP.AUTO: ABNORMAL
HOLD SPECIMEN: NORMAL
HYALINE CASTS UR QL AUTO: NORMAL /LPF
IMM GRANULOCYTES # BLD AUTO: 0.04 10*3/MM3 (ref 0–0.05)
IMM GRANULOCYTES NFR BLD AUTO: 0.4 % (ref 0–0.5)
KETONES UR QL STRIP: NEGATIVE
LEUKOCYTE ESTERASE UR QL STRIP.AUTO: NEGATIVE
LIPASE SERPL-CCNC: 51 U/L (ref 13–60)
LYMPHOCYTES # BLD AUTO: 3.17 10*3/MM3 (ref 0.7–3.1)
LYMPHOCYTES NFR BLD AUTO: 34.5 % (ref 19.6–45.3)
MCH RBC QN AUTO: 25.8 PG (ref 26.6–33)
MCHC RBC AUTO-ENTMCNC: 32.4 G/DL (ref 31.5–35.7)
MCV RBC AUTO: 79.7 FL (ref 79–97)
MONOCYTES # BLD AUTO: 0.62 10*3/MM3 (ref 0.1–0.9)
MONOCYTES NFR BLD AUTO: 6.7 % (ref 5–12)
NEUTROPHILS NFR BLD AUTO: 5.27 10*3/MM3 (ref 1.7–7)
NEUTROPHILS NFR BLD AUTO: 57.5 % (ref 42.7–76)
NITRITE UR QL STRIP: NEGATIVE
NRBC BLD AUTO-RTO: 0 /100 WBC (ref 0–0.2)
PH UR STRIP.AUTO: <=5 [PH] (ref 5–8)
PLATELET # BLD AUTO: 350 10*3/MM3 (ref 140–450)
PMV BLD AUTO: 9.9 FL (ref 6–12)
POTASSIUM SERPL-SCNC: 3.9 MMOL/L (ref 3.5–5.2)
PROT SERPL-MCNC: 8 G/DL (ref 6–8.5)
PROT UR QL STRIP: NEGATIVE
RBC # BLD AUTO: 4.69 10*6/MM3 (ref 3.77–5.28)
RBC # UR STRIP: NORMAL /HPF
REF LAB TEST METHOD: NORMAL
SARS-COV-2 RNA RESP QL NAA+PROBE: NOT DETECTED
SODIUM SERPL-SCNC: 141 MMOL/L (ref 136–145)
SP GR UR STRIP: 1.03 (ref 1–1.03)
SQUAMOUS #/AREA URNS HPF: NORMAL /HPF
UROBILINOGEN UR QL STRIP: ABNORMAL
WBC # UR STRIP: NORMAL /HPF
WBC NRBC COR # BLD AUTO: 9.19 10*3/MM3 (ref 3.4–10.8)
WHOLE BLOOD HOLD COAG: NORMAL
WHOLE BLOOD HOLD SPECIMEN: NORMAL

## 2024-06-02 PROCEDURE — 96375 TX/PRO/DX INJ NEW DRUG ADDON: CPT

## 2024-06-02 PROCEDURE — 25010000002 ONDANSETRON PER 1 MG: Performed by: EMERGENCY MEDICINE

## 2024-06-02 PROCEDURE — 81001 URINALYSIS AUTO W/SCOPE: CPT

## 2024-06-02 PROCEDURE — 96374 THER/PROPH/DIAG INJ IV PUSH: CPT

## 2024-06-02 PROCEDURE — 87636 SARSCOV2 & INF A&B AMP PRB: CPT | Performed by: EMERGENCY MEDICINE

## 2024-06-02 PROCEDURE — 25010000002 MORPHINE PER 10 MG: Performed by: EMERGENCY MEDICINE

## 2024-06-02 PROCEDURE — 74176 CT ABD & PELVIS W/O CONTRAST: CPT

## 2024-06-02 PROCEDURE — 81025 URINE PREGNANCY TEST: CPT | Performed by: EMERGENCY MEDICINE

## 2024-06-02 PROCEDURE — 85025 COMPLETE CBC W/AUTO DIFF WBC: CPT

## 2024-06-02 PROCEDURE — 80053 COMPREHEN METABOLIC PANEL: CPT

## 2024-06-02 PROCEDURE — 36415 COLL VENOUS BLD VENIPUNCTURE: CPT

## 2024-06-02 PROCEDURE — 99284 EMERGENCY DEPT VISIT MOD MDM: CPT

## 2024-06-02 PROCEDURE — 83690 ASSAY OF LIPASE: CPT

## 2024-06-02 RX ORDER — CYCLOBENZAPRINE HCL 10 MG
10 TABLET ORAL 3 TIMES DAILY PRN
Qty: 12 TABLET | Refills: 0 | Status: SHIPPED | OUTPATIENT
Start: 2024-06-02

## 2024-06-02 RX ORDER — MORPHINE SULFATE 4 MG/ML
4 INJECTION, SOLUTION INTRAMUSCULAR; INTRAVENOUS ONCE
Status: COMPLETED | OUTPATIENT
Start: 2024-06-02 | End: 2024-06-02

## 2024-06-02 RX ORDER — SODIUM CHLORIDE 9 MG/ML
10 INJECTION, SOLUTION INTRAMUSCULAR; INTRAVENOUS; SUBCUTANEOUS AS NEEDED
Status: DISCONTINUED | OUTPATIENT
Start: 2024-06-02 | End: 2024-06-02 | Stop reason: HOSPADM

## 2024-06-02 RX ORDER — ONDANSETRON 2 MG/ML
4 INJECTION INTRAMUSCULAR; INTRAVENOUS ONCE
Status: COMPLETED | OUTPATIENT
Start: 2024-06-02 | End: 2024-06-02

## 2024-06-02 RX ADMIN — MORPHINE SULFATE 4 MG: 4 INJECTION, SOLUTION INTRAMUSCULAR; INTRAVENOUS at 09:05

## 2024-06-02 RX ADMIN — ONDANSETRON 4 MG: 2 INJECTION INTRAMUSCULAR; INTRAVENOUS at 09:04

## 2024-06-02 NOTE — ED PROVIDER NOTES
Subjective   History of Present Illness  43-year-old female presents emergency department today with 2 complaints first with right flank pain.  She has a history of kidney stones that she thinks maybe she is got a beginning of a kidney stone.  She been having a dull ache in the right side of her back does not really radiate sometimes it is worse with movement sometimes it is not.  She had no urinary symptoms including dysuria frequency or urgency.  Denies hematuria.  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  She denies any radiation of the pain down into her legs feet or toes no loss of bowel or bladder control.  No saddle anesthesia.    Second complaint is of flulike symptoms.  She had abrupt onset of cough runny nose general malaise body aches no documented fever.  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  She states that she needs a influenza and COVID swab for work.  She does not have any chronic lung disease.  She does not smoke.  Shortness of breath no chest pain.  Cough has been mild hacking nonproductive.    History provided by:  Patient   used: No    Flank Pain  Pain location:  R flank  Pain quality: aching and dull    Pain radiates to:  Does not radiate  Pain severity:  Severe  Onset quality:  Sudden  Duration:  1 day  Timing:  Constant  Progression:  Waxing and waning  Chronicity:  New  Context: not eating, not medication withdrawal, not recent illness, not recent travel, not sick contacts, not suspicious food intake and not trauma    Relieved by:  Nothing  Worsened by:  Movement  Ineffective treatments:  None tried  Associated symptoms: cough    Associated symptoms: no belching, no chills, no constipation, no fever, no hematemesis, no hematochezia, no hematuria, no nausea and no sore throat     Risk factors: obesity    Risk factors: no alcohol abuse, has not had multiple surgeries and no recent hospitalization    Flu Symptoms  Presenting symptoms: cough, myalgias and rhinorrhea    Presenting symptoms: no fever, no nausea and no sore throat    Severity:  Moderate  Onset quality:  Sudden  Progression:  Worsening  Chronicity:  New  Relieved by:  Nothing  Worsened by:  Nothing  Ineffective treatments:  None tried  Associated symptoms: nasal congestion    Associated symptoms: no chills, no decreased appetite and no mental status change        Review of Systems   Constitutional:  Negative for chills, decreased appetite and fever.   HENT:  Positive for congestion and rhinorrhea. Negative for sore throat.    Respiratory:  Positive for cough.    Gastrointestinal:  Negative for constipation, hematemesis, hematochezia and nausea.   Genitourinary:  Positive for flank pain. Negative for hematuria.   Musculoskeletal:  Positive for myalgias.       Past Medical History:   Diagnosis Date    Allergic rhinitis     Anemia     Anxiety     Arthritis     Asthma     Bartholin gland cyst     Bipolar disorder 2014    CHF (congestive heart failure)     Chlamydia     Depression     Endometriosis     GERD (gastroesophageal reflux disease)     Gonorrhea     Heart murmur     at birth    Herpes simplex     Hypertension     Kidney stones     Low back pain     Migraine     Obesity     Ovarian cyst     Preeclampsia     Seizures     patient reports last seizure in early     Substance abuse     Thigh shingles     Trichomonas infection     Urinary tract infection     Varicella     Wears contact lenses        Allergies   Allergen Reactions    Naproxen Swelling    Sulfa Antibiotics Rash       Past Surgical History:   Procedure Laterality Date    BREAST BIOPSY Right      SECTION WITH TUBAL  2010    COLONOSCOPY      CYSTOSCOPY, URETEROSCOPY, RETROGRADE PYELOGRAM, STONE EXTRACTION, STENT INSERTION Bilateral 2023     Procedure: CYSTOSCOPY, BILATERAL RETROGRADE PYELOGRAM, URETEROSCOPY, AND STENT PLACEMENT;  Surgeon: Micah Child MD;  Location:  [facility];  Service: Urology;  Laterality: Bilateral;    CYSTOSCOPY, URETEROSCOPY, RETROGRADE PYELOGRAM, STONE EXTRACTION, STENT INSERTION Left 2023    Procedure: CYSTOSCOPY URETEROSCOPY RETROGRADE PYELOGRAM STONE EXTRACTION STENT INSERTION;  Surgeon: Micah Child MD;  Location:  [facility];  Service: Urology;  Laterality: Left;    DIAGNOSTIC LAPAROSCOPY      ENDOSCOPY      INCISION AND DRAINAGE ABSCESS      bartholin's    ORIF ANKLE FRACTURE Right     REDUCTION MAMMAPLASTY Bilateral     TENSION FREE VAGINAL TAPING WITH MINI ARC SLING      Dr Doyle avery        Family History   Problem Relation Age of Onset    Pancreatic cancer Maternal Grandmother     Cancer Maternal Grandmother     Heart failure Paternal Grandmother     MARCIE disease Paternal Aunt     Arthritis Mother     Cancer Mother     Bipolar disorder Mother     Arthritis Father     Dementia Father     Hypertension Father     Pancreatic cancer Other     Diabetes Other     Heart disease Other     Hypertension Other     Other Other         RESPIRATORY DISEASE    Diabetes Paternal Uncle     Heart attack Neg Hx     Hyperlipidemia Neg Hx     Mental illness Neg Hx     Obesity Neg Hx     Stroke Neg Hx     Breast cancer Neg Hx     Ovarian cancer Neg Hx        Social History     Socioeconomic History    Marital status: Single    Number of children: 2    Highest education level: Some college, no degree   Tobacco Use    Smoking status: Former     Current packs/day: 0.00     Average packs/day: 1 pack/day for 22.0 years (22.0 ttl pk-yrs)     Types: Cigarettes     Start date:      Quit date: 2016     Years since quittin.4     Passive exposure: Past    Smokeless tobacco: Never   Vaping Use    Vaping status: Some Days    Substances: THC, Flavoring    Devices: Disposable    Passive vaping exposure: Yes   Substance and Sexual  Activity    Alcohol use: No    Drug use: Yes     Types: Marijuana     Comment: Marijuana once a week. Tried cocaine, meth, pain pills in the past    Sexual activity: Defer     Partners: Male     Birth control/protection: Surgical           Objective   Physical Exam  Vitals and nursing note reviewed.   Constitutional:       General: She is not in acute distress.     Appearance: She is well-developed. She is not diaphoretic.   HENT:      Head: Normocephalic and atraumatic.      Nose: Congestion and rhinorrhea present.   Eyes:      General: No scleral icterus.     Conjunctiva/sclera: Conjunctivae normal.   Cardiovascular:      Rate and Rhythm: Normal rate and regular rhythm.      Heart sounds: Normal heart sounds. No murmur heard.  Pulmonary:      Effort: Pulmonary effort is normal. No respiratory distress.      Breath sounds: Normal breath sounds.   Abdominal:      General: Bowel sounds are normal.      Palpations: Abdomen is soft.      Tenderness: There is no abdominal tenderness.   Musculoskeletal:         General: Normal range of motion.      Cervical back: Normal range of motion and neck supple.      Comments: Tenderness in the right flank area.  Also tenderness over the paraspinous muscles.  There is no rash no lesion.  She has no CVA tenderness.  No tenderness of the lumbar spine itself.  Negative straight leg raise bilaterally strength 5/5 bilateral lower extremities.  Deep to reflexes plus one of the knees and ankles.   Skin:     General: Skin is warm and dry.   Neurological:      Mental Status: She is alert and oriented to person, place, and time.   Psychiatric:         Behavior: Behavior normal.         Procedures           ED Course                                 Recent Results (from the past 24 hour(s))   Comprehensive Metabolic Panel    Collection Time: 06/02/24  8:03 AM    Specimen: Blood   Result Value Ref Range    Glucose 114 (H) 65 - 99 mg/dL    BUN 13 6 - 20 mg/dL    Creatinine 0.77 0.57 - 1.00  mg/dL    Sodium 141 136 - 145 mmol/L    Potassium 3.9 3.5 - 5.2 mmol/L    Chloride 103 98 - 107 mmol/L    CO2 28.0 22.0 - 29.0 mmol/L    Calcium 9.1 8.6 - 10.5 mg/dL    Total Protein 8.0 6.0 - 8.5 g/dL    Albumin 4.2 3.5 - 5.2 g/dL    ALT (SGPT) 11 1 - 33 U/L    AST (SGOT) 20 1 - 32 U/L    Alkaline Phosphatase 73 39 - 117 U/L    Total Bilirubin 0.2 0.0 - 1.2 mg/dL    Globulin 3.8 gm/dL    A/G Ratio 1.1 g/dL    BUN/Creatinine Ratio 16.9 7.0 - 25.0    Anion Gap 10.0 5.0 - 15.0 mmol/L    eGFR 97.1 >60.0 mL/min/1.73   Lipase    Collection Time: 06/02/24  8:03 AM    Specimen: Blood   Result Value Ref Range    Lipase 51 13 - 60 U/L   Green Top (Gel)    Collection Time: 06/02/24  8:03 AM   Result Value Ref Range    Extra Tube Hold for add-ons.    Lavender Top    Collection Time: 06/02/24  8:03 AM   Result Value Ref Range    Extra Tube hold for add-on    Gold Top - SST    Collection Time: 06/02/24  8:03 AM   Result Value Ref Range    Extra Tube Hold for add-ons.    Gray Top    Collection Time: 06/02/24  8:03 AM   Result Value Ref Range    Extra Tube Hold for add-ons.    Light Blue Top    Collection Time: 06/02/24  8:03 AM   Result Value Ref Range    Extra Tube Hold for add-ons.    CBC Auto Differential    Collection Time: 06/02/24  8:03 AM    Specimen: Blood   Result Value Ref Range    WBC 9.19 3.40 - 10.80 10*3/mm3    RBC 4.69 3.77 - 5.28 10*6/mm3    Hemoglobin 12.1 12.0 - 15.9 g/dL    Hematocrit 37.4 34.0 - 46.6 %    MCV 79.7 79.0 - 97.0 fL    MCH 25.8 (L) 26.6 - 33.0 pg    MCHC 32.4 31.5 - 35.7 g/dL    RDW 15.9 (H) 12.3 - 15.4 %    RDW-SD 45.4 37.0 - 54.0 fl    MPV 9.9 6.0 - 12.0 fL    Platelets 350 140 - 450 10*3/mm3    Neutrophil % 57.5 42.7 - 76.0 %    Lymphocyte % 34.5 19.6 - 45.3 %    Monocyte % 6.7 5.0 - 12.0 %    Eosinophil % 0.5 0.3 - 6.2 %    Basophil % 0.4 0.0 - 1.5 %    Immature Grans % 0.4 0.0 - 0.5 %    Neutrophils, Absolute 5.27 1.70 - 7.00 10*3/mm3    Lymphocytes, Absolute 3.17 (H) 0.70 - 3.10 10*3/mm3     Monocytes, Absolute 0.62 0.10 - 0.90 10*3/mm3    Eosinophils, Absolute 0.05 0.00 - 0.40 10*3/mm3    Basophils, Absolute 0.04 0.00 - 0.20 10*3/mm3    Immature Grans, Absolute 0.04 0.00 - 0.05 10*3/mm3    nRBC 0.0 0.0 - 0.2 /100 WBC   Urinalysis With Microscopic If Indicated (No Culture) - Urine, Clean Catch    Collection Time: 06/02/24  8:04 AM    Specimen: Urine, Clean Catch   Result Value Ref Range    Color, UA Yellow Yellow, Straw    Appearance, UA Clear Clear    pH, UA <=5.0 5.0 - 8.0    Specific Gravity, UA 1.028 1.001 - 1.030    Glucose, UA Negative Negative    Ketones, UA Negative Negative    Bilirubin, UA Negative Negative    Blood, UA Large (3+) (A) Negative    Protein, UA Negative Negative    Leuk Esterase, UA Negative Negative    Nitrite, UA Negative Negative    Urobilinogen, UA 0.2 E.U./dL 0.2 - 1.0 E.U./dL   Pregnancy, Urine - Urine, Clean Catch    Collection Time: 06/02/24  8:04 AM    Specimen: Urine, Clean Catch   Result Value Ref Range    HCG, Urine QL Negative Negative   Urinalysis, Microscopic Only - Urine, Clean Catch    Collection Time: 06/02/24  8:04 AM    Specimen: Urine, Clean Catch   Result Value Ref Range    RBC, UA 0-2 None Seen, 0-2 /HPF    WBC, UA 0-2 None Seen, 0-2 /HPF    Bacteria, UA None Seen None Seen, Trace /HPF    Squamous Epithelial Cells, UA 0-2 None Seen, 0-2 /HPF    Hyaline Casts, UA 0-6 0 - 6 /LPF    Calcium Oxalate Crystals, UA Large/3+ None Seen /HPF    Methodology Manual Light Microscopy    COVID-19 and FLU A/B PCR, 1 HR TAT - Swab, Nasopharynx    Collection Time: 06/02/24  8:05 AM    Specimen: Nasopharynx; Swab   Result Value Ref Range    COVID19 Not Detected Not Detected - Ref. Range    Influenza A PCR Not Detected Not Detected    Influenza B PCR Not Detected Not Detected     Note: In addition to lab results from this visit, the labs listed above may include labs taken at another facility or during a different encounter within the last 24 hours. Please correlate lab times  "with ED admission and discharge times for further clarification of the services performed during this visit.    CT Abdomen Pelvis Without Contrast   Final Result   1.No evidence for significant nephrolithiasis. There is no evidence for obstructive uropathy.   2.No evidence for acute abnormality throughout the abdomen or pelvis on this noncontrast exam.   3.A markedly enlarged fibroid uterus is again noted.                  Electronically Signed: Jay Johnson MD     6/2/2024 8:49 AM EDT     Workstation ID: [id number]        Vitals:    06/02/24 0731 06/02/24 0806 06/02/24 0830 06/02/24 0900   BP: 136/86 (!) 147/101 151/94 (!) 145/107   Pulse: 85  76 79   Resp: 18      SpO2: 99%  96% 96%   Weight: 80.8 kg (178 lb 2.1 oz)      Height: 149.9 cm (59\")        Medications   Sodium Chloride (PF) 0.9 % 10 mL (has no administration in time range)   Morphine sulfate (PF) injection 4 mg (4 mg Intravenous Given 6/2/24 0905)   ondansetron (ZOFRAN) injection 4 mg (4 mg Intravenous Given 6/2/24 0904)     ECG/EMG Results (last 24 hours)       ** No results found for the last 24 hours. **          No orders to display                   Medical Decision Making  Problems Addressed:  Acute myofascial strain of lumbar region, initial encounter: complicated acute illness or injury  Acute viral syndrome: complicated acute illness or injury  Right flank pain: complicated acute illness or injury    Amount and/or Complexity of Data Reviewed  Radiology: ordered.    Risk  Prescription drug management.        Final diagnoses:   Acute viral syndrome   Right flank pain   Acute myofascial strain of lumbar region, initial encounter       ED Disposition  ED Disposition       ED Disposition   Discharge    Condition   Stable    Comment   --               Rebekah Fagan, APRN  [address] [facility] [address] [phone]               Medication List        New Prescriptions      cyclobenzaprine 10 MG tablet  Commonly known as: FLEXERIL  Take 1 tablet " by mouth 3 (Three) Times a Day As Needed for Muscle Spasms.               Where to Get Your Medications        These medications were sent to [id number] - [place], [place] - [address] - [phone]  - [phone]   [address], [facility] [address]      Phone: [phone]   cyclobenzaprine 10 MG tablet            Van Gregg PA  06/03/24 8825

## 2024-06-02 NOTE — Clinical Note
[facility] EMERGENCY DEPARTMENT  [address]  [facility] [address]  Phone: [phone]    Bernardo Dodd was seen and treated in our emergency department on 6/2/2024.  She may return to work on 06/05/2024.         Thank you for choosing [facility].    Van Gregg PA

## 2024-06-04 ENCOUNTER — OFFICE VISIT (OUTPATIENT)
Dept: INTERNAL MEDICINE | Facility: CLINIC | Age: 44
End: 2024-06-04
Payer: MEDICAID

## 2024-06-04 VITALS
HEIGHT: 59 IN | SYSTOLIC BLOOD PRESSURE: 136 MMHG | BODY MASS INDEX: 35.64 KG/M2 | HEART RATE: 108 BPM | DIASTOLIC BLOOD PRESSURE: 84 MMHG | WEIGHT: 176.8 LBS | OXYGEN SATURATION: 96 % | TEMPERATURE: 98.7 F

## 2024-06-04 DIAGNOSIS — J06.9 VIRAL URI WITH COUGH: Primary | ICD-10-CM

## 2024-06-04 DIAGNOSIS — R51.9 ACUTE NONINTRACTABLE HEADACHE, UNSPECIFIED HEADACHE TYPE: ICD-10-CM

## 2024-06-04 DIAGNOSIS — R05.1 ACUTE COUGH: ICD-10-CM

## 2024-06-04 DIAGNOSIS — J02.9 SORE THROAT: ICD-10-CM

## 2024-06-04 PROCEDURE — 99214 OFFICE O/P EST MOD 30 MIN: CPT | Performed by: NURSE PRACTITIONER

## 2024-06-04 PROCEDURE — 96372 THER/PROPH/DIAG INJ SC/IM: CPT | Performed by: NURSE PRACTITIONER

## 2024-06-04 PROCEDURE — 87880 STREP A ASSAY W/OPTIC: CPT | Performed by: NURSE PRACTITIONER

## 2024-06-04 PROCEDURE — 3079F DIAST BP 80-89 MM HG: CPT | Performed by: NURSE PRACTITIONER

## 2024-06-04 PROCEDURE — 3075F SYST BP GE 130 - 139MM HG: CPT | Performed by: NURSE PRACTITIONER

## 2024-06-04 PROCEDURE — 87428 SARSCOV & INF VIR A&B AG IA: CPT | Performed by: NURSE PRACTITIONER

## 2024-06-04 PROCEDURE — 1125F AMNT PAIN NOTED PAIN PRSNT: CPT | Performed by: NURSE PRACTITIONER

## 2024-06-04 RX ORDER — DEXAMETHASONE SODIUM PHOSPHATE 10 MG/ML
10 INJECTION INTRAMUSCULAR; INTRAVENOUS ONCE
Status: COMPLETED | OUTPATIENT
Start: 2024-06-04 | End: 2024-06-04

## 2024-06-04 RX ORDER — GUAIFENESIN 600 MG/1
600 TABLET, EXTENDED RELEASE ORAL 2 TIMES DAILY
Qty: 14 TABLET | Refills: 0 | Status: SHIPPED | OUTPATIENT
Start: 2024-06-04 | End: 2024-06-11

## 2024-06-04 RX ORDER — LIDOCAINE HYDROCHLORIDE 20 MG/ML
10 SOLUTION OROPHARYNGEAL AS NEEDED
Qty: 100 ML | Refills: 1 | Status: SHIPPED | OUTPATIENT
Start: 2024-06-04

## 2024-06-04 RX ORDER — METHYLPREDNISOLONE 4 MG/1
TABLET ORAL
Qty: 21 TABLET | Refills: 0 | Status: SHIPPED | OUTPATIENT
Start: 2024-06-04

## 2024-06-04 RX ADMIN — DEXAMETHASONE SODIUM PHOSPHATE 10 MG: 10 INJECTION INTRAMUSCULAR; INTRAVENOUS at 12:26

## 2024-06-04 NOTE — PROGRESS NOTES
"    Office Note     Name: Bernardo Dodd    : 1980     MRN: [id number]     Chief Complaint  Headache, Sore Throat, Shortness of Breath, Generalized Body Aches, and Vomiting    Subjective     History of Present Illness:  Bernardo Dodd is a 43 y.o. female who presents today for evaluation of viral URI symptoms.  Patient went to [facility] ED on  for evaluation of URI symptoms.  She continues to feel unwell.  She reports \"everything hurts\".  She is experiencing a cough.  She describes the cough as productive with a loose, runny phlegm that is thicker at times and a light yellow to light brown in color.  Nasal drainage is clear.  She is experiencing bilateral ear pain.  She is also experiencing headache and sore throat.  She was sent home from the ED with Flexeril to assist with her flank pain.  They did not find any evidence of kidney stones.  She had a CT of the abdomen and pelvis without.  It did note a markedly enlarged fibroid uterus.  She has an appointment with gynecology on .  She reports testing done at the ED was negative and she was told it was viral.  She has been using Flonase and [place].  She presents today for follow-up of her evaluation of the above symptoms.        Past Medical History:   Diagnosis Date    Allergic rhinitis     Anemia     Anxiety     Arthritis     Asthma     Bartholin gland cyst     Bipolar disorder 2014    CHF (congestive heart failure)     Chlamydia     Depression     Endometriosis     GERD (gastroesophageal reflux disease)     Gonorrhea     Heart murmur     at birth    Herpes simplex     Hypertension     Kidney stones     Low back pain     Migraine     Obesity     Ovarian cyst     Preeclampsia     Seizures     patient reports last seizure in early     Substance abuse     Thigh shingles     Trichomonas infection     Urinary tract infection     Varicella     Wears contact lenses        Past Surgical History:   Procedure Laterality " Date    BREAST BIOPSY Right      SECTION WITH TUBAL  2010    COLONOSCOPY      CYSTOSCOPY, URETEROSCOPY, RETROGRADE PYELOGRAM, STONE EXTRACTION, STENT INSERTION Bilateral 2023    Procedure: CYSTOSCOPY, BILATERAL RETROGRADE PYELOGRAM, URETEROSCOPY, AND STENT PLACEMENT;  Surgeon: Micah Child MD;  Location: [facility] OR;  Service: Urology;  Laterality: Bilateral;    CYSTOSCOPY, URETEROSCOPY, RETROGRADE PYELOGRAM, STONE EXTRACTION, STENT INSERTION Left 2023    Procedure: CYSTOSCOPY URETEROSCOPY RETROGRADE PYELOGRAM STONE EXTRACTION STENT INSERTION;  Surgeon: Micah Child MD;  Location: [facility] OR;  Service: Urology;  Laterality: Left;    DIAGNOSTIC LAPAROSCOPY      ENDOSCOPY      INCISION AND DRAINAGE ABSCESS      bartholin's    ORIF ANKLE FRACTURE Right     REDUCTION MAMMAPLASTY Bilateral     TENSION FREE VAGINAL TAPING WITH MINI ARC SLING      Dr Doyle avery        Social History     Socioeconomic History    Marital status: Single    Number of children: 2    Highest education level: Some college, no degree   Tobacco Use    Smoking status: Former     Current packs/day: 0.00     Average packs/day: 1 pack/day for 22.0 years (22.0 ttl pk-yrs)     Types: Cigarettes     Start date:      Quit date: 2016     Years since quittin.4     Passive exposure: Past    Smokeless tobacco: Never   Vaping Use    Vaping status: Some Days    Substances: THC, Flavoring    Devices: Disposable    Passive vaping exposure: Yes   Substance and Sexual Activity    Alcohol use: No    Drug use: Yes     Types: Marijuana     Comment: Marijuana once a week. Tried cocaine, meth, pain pills in the past    Sexual activity: Defer     Partners: Male     Birth control/protection: Surgical         Current Outpatient Medications:     albuterol sulfate  (90 Base) MCG/ACT inhaler, Inhale 2 puffs Every 4 (Four) Hours As Needed for Wheezing., Disp: 18 g, Rfl: 2    amLODIPine (NORVASC) 5 MG tablet, TAKE 1 TABLET BY  "MOUTH DAILY, Disp: 90 tablet, Rfl: 1    Azelastine HCl 137 MCG/SPRAY solution, 1 spray into the nostril(s) as directed by provider 2 (Two) Times a Day., Disp: 30 mL, Rfl: 1    cyclobenzaprine (FLEXERIL) 10 MG tablet, Take 1 tablet by mouth 3 (Three) Times a Day As Needed for Muscle Spasms., Disp: 12 tablet, Rfl: 0    diclofenac (VOLTAREN) 50 MG EC tablet, Take 1 tablet by mouth 2 (Two) Times a Day As Needed (back pain). Stop celebrex. Take this medication in place of celebrex., Disp: 60 tablet, Rfl: 2    erythromycin (ROMYCIN) 5 MG/GM ophthalmic ointment, Administer  to the right eye Every 4 (Four) Hours While Awake., Disp: 3.5 g, Rfl: 0    famotidine (Pepcid) 20 MG tablet, Take 1 tablet by mouth 2 (Two) Times a Day., Disp: 60 tablet, Rfl: 2    lamoTRIgine (LaMICtal) 200 MG tablet, TAKE 1 TABLET BY MOUTH DAILY, Disp: 90 tablet, Rfl: 0    levocetirizine (XYZAL) 5 MG tablet, Take 1 tablet by mouth Every Evening., Disp: 30 tablet, Rfl: 5    Multiple Vitamins-Minerals (CENTRUM ADULT PO), Take  by mouth., Disp: , Rfl:     guaiFENesin (Mucinex) 600 MG 12 hr tablet, Take 1 tablet by mouth 2 (Two) Times a Day for 7 days., Disp: 14 tablet, Rfl: 0    Lidocaine Viscous HCl (XYLOCAINE) 2 % solution, Take 10 mL by mouth As Needed for Mild Pain or Moderate Pain., Disp: 100 mL, Rfl: 1    methylPREDNISolone (MEDROL) 4 MG dose pack, Take as directed on package instructions., Disp: 21 tablet, Rfl: 0  No current facility-administered medications for this visit.    Objective     Vital Signs  /84   Pulse 108   Temp 98.7 °F (37.1 °C)   Ht 149.9 cm (59\")   Wt 80.2 kg (176 lb 12.8 oz)   SpO2 96%   BMI 35.71 kg/m²   Estimated body mass index is 35.71 kg/m² as calculated from the following:    Height as of this encounter: 149.9 cm (59\").    Weight as of this encounter: 80.2 kg (176 lb 12.8 oz).    Class 2 Severe Obesity (BMI >=35 and <=39.9). Obesity-related health conditions include the following:  Not addressed at this visit . " Obesity is  not addressed at this visit . BMI is  not addressed at this visit . We discussed  not addressed at this visit .      Physical Exam  Constitutional:       General: She is not in acute distress.     Appearance: Normal appearance. She is not ill-appearing.   HENT:      Head: Normocephalic and atraumatic.      Right Ear: Tympanic membrane, ear canal and external ear normal.      Left Ear: Tympanic membrane, ear canal and external ear normal.      Ears:      Comments: Clear effusion bilaterally     Nose: Nose normal.      Mouth/Throat:      Mouth: Mucous membranes are moist.      Pharynx: Oropharynx is clear. Posterior oropharyngeal erythema present. No oropharyngeal exudate.   Eyes:      Extraocular Movements: Extraocular movements intact.      Conjunctiva/sclera: Conjunctivae normal.      Pupils: Pupils are equal, round, and reactive to light.   Cardiovascular:      Rate and Rhythm: Normal rate and regular rhythm.   Pulmonary:      Effort: Pulmonary effort is normal. No respiratory distress.      Breath sounds: Normal breath sounds.   Musculoskeletal:         General: Normal range of motion.      Cervical back: Neck supple.   Skin:     General: Skin is warm and dry.   Neurological:      General: No focal deficit present.      Mental Status: She is alert and oriented to person, place, and time. Mental status is at baseline.   Psychiatric:         Mood and Affect: Mood normal.         Behavior: Behavior normal.         Thought Content: Thought content normal.         Judgment: Judgment normal.          Assessment and Plan     Diagnoses and all orders for this visit:    1. Viral URI with cough (Primary)  -     methylPREDNISolone (MEDROL) 4 MG dose pack; Take as directed on package instructions.  Dispense: 21 tablet; Refill: 0  -     guaiFENesin (Mucinex) 600 MG 12 hr tablet; Take 1 tablet by mouth 2 (Two) Times a Day for 7 days.  Dispense: 14 tablet; Refill: 0  -     Lidocaine Viscous HCl (XYLOCAINE) 2 %  solution; Take 10 mL by mouth As Needed for Mild Pain or Moderate Pain.  Dispense: 100 mL; Refill: 1  -     dexAMETHasone (DECADRON) injection 10 mg    2. Sore throat  -     POCT rapid strep A  -     Lidocaine Viscous HCl (XYLOCAINE) 2 % solution; Take 10 mL by mouth As Needed for Mild Pain or Moderate Pain.  Dispense: 100 mL; Refill: 1    3. Acute cough  -     POCT SARS-CoV-2 Antigen PRADEEP + Flu  -     methylPREDNISolone (MEDROL) 4 MG dose pack; Take as directed on package instructions.  Dispense: 21 tablet; Refill: 0  -     guaiFENesin (Mucinex) 600 MG 12 hr tablet; Take 1 tablet by mouth 2 (Two) Times a Day for 7 days.  Dispense: 14 tablet; Refill: 0  -     dexAMETHasone (DECADRON) injection 10 mg    4. Acute nonintractable headache, unspecified headache type        Follow Up  Return if symptoms worsen or fail to improve, for Next scheduled follow up.    SHANE Ang    Part of this note may be an electronic transcription/translation of spoken language to printed text using the Dragon Dictation System.

## 2024-06-16 PROBLEM — N13.5 URETERAL OBSTRUCTION, LEFT: Status: RESOLVED | Noted: 2019-07-10 | Resolved: 2024-06-16

## 2024-06-16 PROBLEM — N39.0 UTI (URINARY TRACT INFECTION): Status: RESOLVED | Noted: 2023-05-03 | Resolved: 2024-06-16

## 2024-06-16 PROBLEM — A41.9 SEPSIS DUE TO URINARY TRACT INFECTION: Status: RESOLVED | Noted: 2019-06-29 | Resolved: 2024-06-16

## 2024-06-16 PROBLEM — N39.0 SEPSIS DUE TO URINARY TRACT INFECTION: Status: RESOLVED | Noted: 2019-06-29 | Resolved: 2024-06-16

## 2024-06-16 PROBLEM — J02.9 SORE THROAT: Status: RESOLVED | Noted: 2023-11-10 | Resolved: 2024-06-16

## 2024-06-16 PROBLEM — N92.0 MENORRHAGIA WITH REGULAR CYCLE: Status: RESOLVED | Noted: 2017-06-19 | Resolved: 2024-06-16

## 2024-06-16 PROBLEM — R50.9 ACUTE FEBRILE ILLNESS: Status: RESOLVED | Noted: 2023-05-05 | Resolved: 2024-06-16

## 2024-06-16 PROBLEM — J32.0 CHRONIC MAXILLARY SINUSITIS: Status: RESOLVED | Noted: 2019-02-21 | Resolved: 2024-06-16

## 2024-06-16 PROBLEM — Z00.00 HEALTH CARE MAINTENANCE: Status: RESOLVED | Noted: 2018-05-09 | Resolved: 2024-06-16

## 2024-06-16 PROBLEM — N20.1 LEFT URETERAL STONE: Status: RESOLVED | Noted: 2023-05-10 | Resolved: 2024-06-16

## 2024-06-16 PROBLEM — B49 FUNGAL INFECTION: Status: RESOLVED | Noted: 2021-09-20 | Resolved: 2024-06-16

## 2024-06-16 PROBLEM — R19.7 DIARRHEA: Status: RESOLVED | Noted: 2023-05-03 | Resolved: 2024-06-16

## 2024-06-16 PROBLEM — H66.003 NON-RECURRENT ACUTE SUPPURATIVE OTITIS MEDIA OF BOTH EARS WITHOUT SPONTANEOUS RUPTURE OF TYMPANIC MEMBRANES: Status: RESOLVED | Noted: 2023-11-10 | Resolved: 2024-06-16

## 2024-06-16 PROBLEM — J00 ACUTE NASOPHARYNGITIS: Status: RESOLVED | Noted: 2023-12-05 | Resolved: 2024-06-16

## 2024-06-16 PROBLEM — E87.6 HYPOKALEMIA: Status: RESOLVED | Noted: 2023-05-03 | Resolved: 2024-06-16

## 2024-06-18 ENCOUNTER — OFFICE VISIT (OUTPATIENT)
Dept: INTERNAL MEDICINE | Facility: CLINIC | Age: 44
End: 2024-06-18
Payer: MEDICAID

## 2024-06-18 VITALS
TEMPERATURE: 97.5 F | OXYGEN SATURATION: 97 % | HEIGHT: 59 IN | BODY MASS INDEX: 35.48 KG/M2 | WEIGHT: 176 LBS | SYSTOLIC BLOOD PRESSURE: 140 MMHG | DIASTOLIC BLOOD PRESSURE: 82 MMHG | HEART RATE: 95 BPM

## 2024-06-18 DIAGNOSIS — R05.2 SUBACUTE COUGH: Primary | ICD-10-CM

## 2024-06-18 DIAGNOSIS — R53.83 OTHER FATIGUE: ICD-10-CM

## 2024-06-18 DIAGNOSIS — J02.9 SORE THROAT: ICD-10-CM

## 2024-06-18 DIAGNOSIS — N42.1 CONGESTION AND HEMORRHAGE OF PROSTATE: ICD-10-CM

## 2024-06-18 DIAGNOSIS — R06.02 SHORTNESS OF BREATH: ICD-10-CM

## 2024-06-18 PROCEDURE — 3077F SYST BP >= 140 MM HG: CPT | Performed by: NURSE PRACTITIONER

## 2024-06-18 PROCEDURE — 3079F DIAST BP 80-89 MM HG: CPT | Performed by: NURSE PRACTITIONER

## 2024-06-18 PROCEDURE — 1125F AMNT PAIN NOTED PAIN PRSNT: CPT | Performed by: NURSE PRACTITIONER

## 2024-06-18 PROCEDURE — 87428 SARSCOV & INF VIR A&B AG IA: CPT | Performed by: NURSE PRACTITIONER

## 2024-06-18 PROCEDURE — 99213 OFFICE O/P EST LOW 20 MIN: CPT | Performed by: NURSE PRACTITIONER

## 2024-06-18 PROCEDURE — 87880 STREP A ASSAY W/OPTIC: CPT | Performed by: NURSE PRACTITIONER

## 2024-06-18 NOTE — PROGRESS NOTES
Office Note     Name: Bernardo Dodd    : 1980     MRN: [id number]     Chief Complaint  Cough (Patient reports having symptoms for around 2 weeks. Patient wants to be tested for COVID flu and strep. She has taken OTC meds with no relief. ), Shortness of Breath, Nasal Congestion, Fatigue, and Sore Throat    Subjective     History of Present Illness:  Bernardo Dodd is a 43 y.o. female who presents today for evaluation of acute complaints.  Patient reports symptom onset was about 2 weeks ago.  She has been experiencing a cough.  She describes the cough as productive but seems to be improving.  She initially reports that she was coughing up a brown-colored mucus.  She reports the mucus has now lightened to a yellow color.  She is also continue to experience chest tightness, nasal congestion, fatigue, intermittent shortness of breath, and sore throat.  Overall, she feels her symptoms are getting better.  She did miss 2 days of work last week due to feeling unwell.  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  She has been wearing a mask at work around this resident.  She has been taking Tylenol Cold and flu and Mucinex for symptoms.  She denies further complaints or concerns at this time.  Pleasant visit with the patient today.        Past Medical History:   Diagnosis Date    Anxiety     Arthritis     Asthma     Bipolar disorder 2014    CHF (congestive heart failure)     Chlamydia     Depression     Endometriosis     GERD (gastroesophageal reflux disease)     Gonorrhea     Herpes simplex     Hypertension     Kidney stones     Migraine     Obesity     Seizures     patient reports last seizure in early     Substance abuse     Thigh shingles     Trichomonas infection     Varicella        Past Surgical History:   Procedure Laterality Date    BREAST BIOPSY Right      SECTION WITH TUBAL  2010    COLONOSCOPY      CYSTOSCOPY,  URETEROSCOPY, RETROGRADE PYELOGRAM, STONE EXTRACTION, STENT INSERTION Bilateral 2023    Procedure: CYSTOSCOPY, BILATERAL RETROGRADE PYELOGRAM, URETEROSCOPY, AND STENT PLACEMENT;  Surgeon: Micah Child MD;  Location: [facility] OR;  Service: Urology;  Laterality: Bilateral;    CYSTOSCOPY, URETEROSCOPY, RETROGRADE PYELOGRAM, STONE EXTRACTION, STENT INSERTION Left 2023    Procedure: CYSTOSCOPY URETEROSCOPY RETROGRADE PYELOGRAM STONE EXTRACTION STENT INSERTION;  Surgeon: Micah Child MD;  Location: [facility] OR;  Service: Urology;  Laterality: Left;    DIAGNOSTIC LAPAROSCOPY      ENDOSCOPY      I & D / EXCISION MARSUPIALIZATION BARTHOLIN'S GLAND CYST / LYSIS LESIONS      ORIF ANKLE FRACTURE Right     REDUCTION MAMMAPLASTY Bilateral     TENSION FREE VAGINAL TAPING WITH MINI ARC SLING      Dr Doyle avery        Social History     Socioeconomic History    Marital status: Single    Number of children: 2    Highest education level: Some college, no degree   Tobacco Use    Smoking status: Former     Current packs/day: 0.00     Average packs/day: 1 pack/day for 22.0 years (22.0 ttl pk-yrs)     Types: Cigarettes     Start date:      Quit date: 2016     Years since quittin.4     Passive exposure: Past    Smokeless tobacco: Never   Vaping Use    Vaping status: Some Days    Substances: THC, Flavoring    Devices: Disposable    Passive vaping exposure: Yes   Substance and Sexual Activity    Alcohol use: No    Drug use: Yes     Types: Marijuana     Comment: Marijuana once a week. Tried cocaine, meth, pain pills in the past    Sexual activity: Defer     Partners: Male     Birth control/protection: Surgical         Current Outpatient Medications:     albuterol sulfate  (90 Base) MCG/ACT inhaler, Inhale 2 puffs Every 4 (Four) Hours As Needed for Wheezing., Disp: 18 g, Rfl: 2    amLODIPine (NORVASC) 5 MG tablet, TAKE 1 TABLET BY MOUTH DAILY, Disp: 90 tablet, Rfl: 1    Azelastine HCl 137 MCG/SPRAY  "solution, 1 spray into the nostril(s) as directed by provider 2 (Two) Times a Day., Disp: 30 mL, Rfl: 1    cyclobenzaprine (FLEXERIL) 10 MG tablet, Take 1 tablet by mouth 3 (Three) Times a Day As Needed for Muscle Spasms., Disp: 12 tablet, Rfl: 0    diclofenac (VOLTAREN) 50 MG EC tablet, Take 1 tablet by mouth 2 (Two) Times a Day As Needed (back pain). Stop celebrex. Take this medication in place of celebrex., Disp: 60 tablet, Rfl: 2    erythromycin (ROMYCIN) 5 MG/GM ophthalmic ointment, Administer  to the right eye Every 4 (Four) Hours While Awake., Disp: 3.5 g, Rfl: 0    famotidine (Pepcid) 20 MG tablet, Take 1 tablet by mouth 2 (Two) Times a Day., Disp: 60 tablet, Rfl: 2    lamoTRIgine (LaMICtal) 200 MG tablet, TAKE 1 TABLET BY MOUTH DAILY, Disp: 90 tablet, Rfl: 0    levocetirizine (XYZAL) 5 MG tablet, Take 1 tablet by mouth Every Evening., Disp: 30 tablet, Rfl: 5    Lidocaine Viscous HCl (XYLOCAINE) 2 % solution, Take 10 mL by mouth As Needed for Mild Pain or Moderate Pain., Disp: 100 mL, Rfl: 1    methylPREDNISolone (MEDROL) 4 MG dose pack, Take as directed on package instructions., Disp: 21 tablet, Rfl: 0    Multiple Vitamins-Minerals (CENTRUM ADULT PO), Take  by mouth., Disp: , Rfl:     Objective     Vital Signs  /82   Pulse 95   Temp 97.5 °F (36.4 °C)   Ht 149.9 cm (59\")   Wt 79.8 kg (176 lb)   SpO2 97%   BMI 35.55 kg/m²   Estimated body mass index is 35.55 kg/m² as calculated from the following:    Height as of this encounter: 149.9 cm (59\").    Weight as of this encounter: 79.8 kg (176 lb).           Physical Exam  Constitutional:       General: She is not in acute distress.     Appearance: Normal appearance. She is not ill-appearing.   HENT:      Head: Normocephalic and atraumatic.      Right Ear: Tympanic membrane, ear canal and external ear normal.      Left Ear: Tympanic membrane, ear canal and external ear normal.      Nose: Nose normal.      Mouth/Throat:      Mouth: Mucous membranes are " moist.      Pharynx: Oropharynx is clear. Posterior oropharyngeal erythema present. No oropharyngeal exudate.   Eyes:      Extraocular Movements: Extraocular movements intact.      Conjunctiva/sclera: Conjunctivae normal.      Pupils: Pupils are equal, round, and reactive to light.   Cardiovascular:      Rate and Rhythm: Normal rate and regular rhythm.   Pulmonary:      Effort: Pulmonary effort is normal. No respiratory distress.      Breath sounds: Normal breath sounds.   Musculoskeletal:         General: Normal range of motion.      Cervical back: Neck supple.   Skin:     General: Skin is warm and dry.   Neurological:      General: No focal deficit present.      Mental Status: She is alert and oriented to person, place, and time. Mental status is at baseline.   Psychiatric:         Mood and Affect: Mood normal.         Behavior: Behavior normal.         Thought Content: Thought content normal.         Judgment: Judgment normal.          Assessment and Plan     Diagnoses and all orders for this visit:    1. Subacute cough (Primary)  -     POCT SARS-CoV-2 Antigen PRADEEP + Flu  -     POCT rapid strep A    2. Other fatigue  -     POCT SARS-CoV-2 Antigen PRADEEP + Flu    3. Congestion and hemorrhage of prostate    4. Sore throat    5. Shortness of breath    Plan:  COVID and flu swab in the office today negative.  Rapid strep swab in the office today negative.  May continue with over-the-counter medications to assist with symptoms.  Will hold on further steroids as she was just prescribed steroids about 2 weeks ago.  Work note provided today.  Continue with adequate oral hydration and rest.  Return to clinic if symptoms worsen or fail to improve with current plan of care.    Follow Up  Return if symptoms worsen or fail to improve, for Next scheduled follow up.    SHANE Ang    Part of this note may be an electronic transcription/translation of spoken language to printed text using the Dragon Dictation System.

## 2024-06-18 NOTE — LETTER
June 18, 2024     Patient: Bernardo Dodd   YOB: 1980   Date of Visit: 6/18/2024       To Whom it May Concern:    Bernardo Dodd was seen in my clinic on 6/18/2024 for upper respiratory symptoms. Please excuse her from work on Thursday 6/13 and Friday 6/14. Please let me know if you have any questions.         Sincerely,          SHANE Ang

## 2024-06-21 ENCOUNTER — OFFICE VISIT (OUTPATIENT)
Dept: OBSTETRICS AND GYNECOLOGY | Facility: CLINIC | Age: 44
End: 2024-06-21
Payer: MEDICAID

## 2024-06-21 VITALS
SYSTOLIC BLOOD PRESSURE: 128 MMHG | WEIGHT: 177 LBS | DIASTOLIC BLOOD PRESSURE: 80 MMHG | BODY MASS INDEX: 35.75 KG/M2 | RESPIRATION RATE: 14 BRPM

## 2024-06-21 DIAGNOSIS — N92.0 MENORRHAGIA WITH REGULAR CYCLE: ICD-10-CM

## 2024-06-21 DIAGNOSIS — Z11.3 SCREENING FOR VENEREAL DISEASE: ICD-10-CM

## 2024-06-21 DIAGNOSIS — D25.9 UTERINE LEIOMYOMA, UNSPECIFIED LOCATION: Primary | ICD-10-CM

## 2024-06-21 DIAGNOSIS — Z12.4 SCREENING FOR CERVICAL CANCER: ICD-10-CM

## 2024-06-21 PROBLEM — G56.01 CARPAL TUNNEL SYNDROME, RIGHT: Status: RESOLVED | Noted: 2022-03-17 | Resolved: 2024-06-21

## 2024-06-21 PROBLEM — F39 UNSPECIFIED MOOD (AFFECTIVE) DISORDER: Status: RESOLVED | Noted: 2022-09-07 | Resolved: 2024-06-21

## 2024-06-21 PROBLEM — B00.9 HERPES SIMPLEX INFECTION: Status: RESOLVED | Noted: 2017-10-01 | Resolved: 2024-06-21

## 2024-06-21 PROBLEM — G40.909 SEIZURE DISORDER: Status: RESOLVED | Noted: 2019-07-10 | Resolved: 2024-06-21

## 2024-06-21 PROBLEM — F15.21: Status: RESOLVED | Noted: 2022-09-07 | Resolved: 2024-06-21

## 2024-06-21 NOTE — PROGRESS NOTES
Subjective   Chief Complaint   Patient presents with    Gynecologic Exam     Bernardo Dodd is a 43 y.o. year old .  Patient's last menstrual period was 2024.  She comes as a new patient referred to talk about issues related to uterine fibroid tumors.  She had a CT scan done through [facility] that apparently shows uterine fibroids.  Measurements were not taken.  She was in the emergency room that time for lower abdominal pain but she is uncertain what the pain was related to.  That was in early .    Periods are predictable and once a month.  Cycles are very heavy bleeding multiple pads per hour.  Hemoglobin checked in  was 12.  She has had a prior tubal ligation.    Has apparently had some kind of a mid urethral suspension procedure done by a urologist at [facility].  She does not think that working anymore because she does leak urine.  She has stress incontinence.  Incontinence is much less of a problem      The following portions of the patient's history were reviewed and updated as appropriate:current medications, allergies, past medical history, and past surgical history    Social History    Tobacco Use      Smoking status: Former        Packs/day: 0.00        Years: 1 pack/day for 22.0 years (22.0 ttl pk-yrs)        Types: Cigarettes        Start date:         Quit date: 2016        Years since quittin.4        Passive exposure: Past      Smokeless tobacco: Never    Review of Systems  Constitutional POS: nothing reported    NEG: anorexia or night sweats   Genitourinary POS: see HPI    NEG: dysuria or hematuria   Gastointestinal POS: nothing reported    NEG: bloating, change in bowel habits, melena, or reflux symptoms   Integument POS: nothing reported    NEG: moles that are changing in size, shape, color or rashes   Breast POS: nothing reported    NEG: persistent breast lump, skin dimpling, or nipple discharge         Objective   /80   Resp 14   Wt 80.3 kg (177 lb)    LMP 06/01/2024   BMI 35.75 kg/m²     General:  well developed; well nourished  no acute distress   Pelvis: Clinical staff was present for exam  External genitalia:  normal appearance of the external genitalia including Bartholin's and Hanston's glands.  :  urethral meatus normal;  Vaginal:  normal pink mucosa without prolapse or lesions.  Cervix:  normal appearance.  Uterus:  symmetrically enlarged, consisent with 16 weeks size;  Adnexa:  non palpable bilaterally.  Rectal:  digital rectal exam not performed; anus visually normal appearing.     Lab Review   No data reviewed    Imaging   CT of abdomen/pelvis report        Assessment   Uterine fibroids by outside scan.  Details of fibroid location and size are not available  Menorrhagia impacting activities of daily living.  She is not anemic  Mixed urinary incontinence with urge significantly worse than stress     Plan   Pap and STD testing was done today.  If she does not receive the results of the Pap within 2 weeks  time, she was instructed to call to find out the results.  I explained to Kylahshawn that the recommendations for Pap smear interval in a low risk patient's has lengthened to 3 years time.  I encouraged her to be seen yearly for a full physical exam including breast and pelvic exam even during the off years when PAP's will not be performed.  Ultrasound needs to be done after her next menses.   The following data needs to be obtained to update her medical records: operative report from her sling .  The importance of keeping all planned follow-up and taking all medications as prescribed was emphasized.  Follow up after ultrasound             This note was electronically signed.    Edmar Smith M.D.  June 21, 2024    Part of this note may be an electronic transcription/translation of spoken language to printed text using the Dragon Dictation System.

## 2024-06-27 LAB — REF LAB TEST METHOD: NORMAL

## 2024-07-11 DIAGNOSIS — G40.909 SEIZURE DISORDER: ICD-10-CM

## 2024-07-11 RX ORDER — LAMOTRIGINE 200 MG/1
200 TABLET ORAL DAILY
Qty: 90 TABLET | Refills: 1 | Status: SHIPPED | OUTPATIENT
Start: 2024-07-11

## 2024-07-12 ENCOUNTER — TELEPHONE (OUTPATIENT)
Dept: OBSTETRICS AND GYNECOLOGY | Facility: CLINIC | Age: 44
End: 2024-07-12
Payer: MEDICAID

## 2024-07-12 PROBLEM — D25.1 INTRAMURAL UTERINE FIBROID: Status: ACTIVE | Noted: 2024-07-12

## 2024-07-12 NOTE — TELEPHONE ENCOUNTER
"  Caller: Bernardo Dodd \"Gricel\"    Relationship: Self    Best call back number: [phone]    What is the best time to reach you: ANY    Who are you requesting to speak with (clinical staff, provider,  specific staff member):     Do you know the name of the person who called: UNK    What was the call regarding: RESULTS    Is it okay if the provider responds through MyChart: UNK      "

## 2024-07-12 NOTE — TELEPHONE ENCOUNTER
Please call patient and let her know that her ultrasound shows multiple uterine fibroids.  Several of them are reasonably large in size.  If she wants to talk about treatment options other than expectant management, lets make an appointment for her so we can discuss these choices.

## 2024-07-15 NOTE — TELEPHONE ENCOUNTER
Called attempting to reach patient; no answer, unable to LVM requesting call back due to no VM box offered.

## 2024-07-22 ENCOUNTER — OFFICE VISIT (OUTPATIENT)
Dept: OBSTETRICS AND GYNECOLOGY | Facility: CLINIC | Age: 44
End: 2024-07-22
Payer: MEDICAID

## 2024-07-22 VITALS — RESPIRATION RATE: 14 BRPM | WEIGHT: 178 LBS | BODY MASS INDEX: 35.95 KG/M2

## 2024-07-22 DIAGNOSIS — D50.9 IRON DEFICIENCY ANEMIA, UNSPECIFIED IRON DEFICIENCY ANEMIA TYPE: ICD-10-CM

## 2024-07-22 DIAGNOSIS — N39.46 MIXED STRESS AND URGE URINARY INCONTINENCE: ICD-10-CM

## 2024-07-22 DIAGNOSIS — D25.1 INTRAMURAL UTERINE FIBROID: Primary | ICD-10-CM

## 2024-07-22 PROCEDURE — 99214 OFFICE O/P EST MOD 30 MIN: CPT | Performed by: OBSTETRICS & GYNECOLOGY

## 2024-07-22 NOTE — PROGRESS NOTES
Subjective   Chief Complaint   Patient presents with    Follow-up       Bernardo Dodd is a 43 y.o. year old .  No LMP recorded. (Menstrual status: Tubal ligation).  She comes today having had an ultrasound recently to review and talk about treatment options.  She is accompanied by her mother.    The following portions of the patient's history were reviewed and updated as appropriate:current medications and allergies    Social History    Tobacco Use      Smoking status: Former        Packs/day: 0.00        Years: 1 pack/day for 22.0 years (22.0 ttl pk-yrs)        Types: Cigarettes        Start date:         Quit date:         Years since quittin.5        Passive exposure: Past      Smokeless tobacco: Never         Objective   Resp 14   Wt 80.7 kg (178 lb)   BMI 35.95 kg/m²     Lab Review   No data reviewed    Imaging   No data reviewed        Assessment   Uterine fibroids with heavy menses without anemia  Mixed urinary incontinence which may be partially contributed by the anterior large fibroid pushing on the base of the bladder     Plan   At this point I think she has choices including doing nothing with expectant management versus nonoperative management if she is interested in avoiding surgery or hysterectomy.  Given the coexisting both urinary and bleeding symptoms I think she would be best served by total laparoscopic hysterectomy with prophylactic bilateral salpingectomy.  I see no reason ovaries would need to be removed because she does not have a strong family history.  If she wants to think about options and talk about nonsurgical options, I briefly introduced the subject of Sonata and explained this would be a choice once it becomes available at our hospital.  If this is something to truly interested her she could be referred to [facility] for treatment  The importance of keeping all planned follow-up and taking all medications as prescribed was emphasized.  Follow up  PRN            This note was electronically signed.    Edmar Smith M.D.  July 22, 2024    Total time spent today with Bernardo  was 30 minutes (level 4) - pathophysiology of her presenting problem along with plans for any diagnositc work-up and treatment.  The time reported only includes face-to-face time and excludes any procedural based activities that occurred on the same date of service.    Part of this note may be an electronic transcription/translation of spoken language to printed text using the Dragon Dictation System.

## 2024-08-04 DIAGNOSIS — M54.41 CHRONIC BILATERAL LOW BACK PAIN WITH BILATERAL SCIATICA: ICD-10-CM

## 2024-08-04 DIAGNOSIS — G89.29 CHRONIC BILATERAL LOW BACK PAIN WITH BILATERAL SCIATICA: ICD-10-CM

## 2024-08-04 DIAGNOSIS — M54.42 CHRONIC BILATERAL LOW BACK PAIN WITH BILATERAL SCIATICA: ICD-10-CM

## 2024-08-15 DIAGNOSIS — R10.13 DYSPEPSIA: ICD-10-CM

## 2024-08-15 DIAGNOSIS — K21.9 GASTROESOPHAGEAL REFLUX DISEASE WITHOUT ESOPHAGITIS: ICD-10-CM

## 2024-08-15 RX ORDER — FAMOTIDINE 20 MG/1
20 TABLET, FILM COATED ORAL 2 TIMES DAILY
Qty: 60 TABLET | Refills: 2 | Status: SHIPPED | OUTPATIENT
Start: 2024-08-15

## 2024-08-15 NOTE — TELEPHONE ENCOUNTER
Last filled 3/27/24  famotidine (Pepcid) 20 MG  60 w/ 2 refills    LOV:  06/18/24  NOV: 11/11/24    Sent in   famotidine (Pepcid) 20 MG  60 w/ 2 refills  pm

## 2024-08-27 ENCOUNTER — OFFICE VISIT (OUTPATIENT)
Dept: INTERNAL MEDICINE | Facility: CLINIC | Age: 44
End: 2024-08-27
Payer: MEDICAID

## 2024-08-27 VITALS
BODY MASS INDEX: 36.81 KG/M2 | WEIGHT: 182.6 LBS | HEIGHT: 59 IN | OXYGEN SATURATION: 97 % | HEART RATE: 90 BPM | DIASTOLIC BLOOD PRESSURE: 88 MMHG | SYSTOLIC BLOOD PRESSURE: 138 MMHG

## 2024-08-27 DIAGNOSIS — H10.32 ACUTE BACTERIAL CONJUNCTIVITIS OF LEFT EYE: ICD-10-CM

## 2024-08-27 PROCEDURE — 3079F DIAST BP 80-89 MM HG: CPT | Performed by: NURSE PRACTITIONER

## 2024-08-27 PROCEDURE — 1125F AMNT PAIN NOTED PAIN PRSNT: CPT | Performed by: NURSE PRACTITIONER

## 2024-08-27 PROCEDURE — 3075F SYST BP GE 130 - 139MM HG: CPT | Performed by: NURSE PRACTITIONER

## 2024-08-27 PROCEDURE — 99213 OFFICE O/P EST LOW 20 MIN: CPT | Performed by: NURSE PRACTITIONER

## 2024-08-27 RX ORDER — ERYTHROMYCIN 5 MG/G
OINTMENT OPHTHALMIC EVERY 6 HOURS
Qty: 3.5 G | Refills: 0 | Status: SHIPPED | OUTPATIENT
Start: 2024-08-27

## 2024-08-27 NOTE — PROGRESS NOTES
"    Office Note     Name: Bernardo Dodd    : 1980     MRN: [id number]     Chief Complaint  Eye Pain (Patient reports on 24 she woke up with her left eye being super sore, having drainage, swollen and it burns.  Patient states this happened around 2 weeks ago as well. )    Subjective     History of Present Illness:  Bernardo Dodd is a 43 y.o. female who presents today for evaluation of eye pain and redness.  Patient reports onset of symptoms was .  She reports waking up with her left eye being super sore.  She then noted some swelling, discomfort, and drainage from her left eye.  She does report that her eye was crusted shut upon awakening.  She did have difficulty removing her contact and felt like it was glued to her eye.  She reports this happened recently about 2 weeks ago but ended up clearing on its own.  She reports this episode is more painful and worse than the previous.  She feels like there is dirt or something in her eye.  She does experience blurred vision but this is due to not wearing her contacts.  She reports her left eye feels \"raw\" and swollen.  She has been experiencing some light sensitivity.  She is wearing sunglasses today to assist with that.  She denies any known injury.  She is concerned as she has to go back to work on Thursday and is concerned she may be contagious.  No further complaints or concerns at this time.  Pleasant visit with the patient today.      Past Medical History:   Diagnosis Date    Anxiety     Arthritis     Asthma     Bipolar disorder     CHF (congestive heart failure)     Chlamydia 2000    GERD (gastroesophageal reflux disease)     GERD (gastroesophageal reflux disease) 2015    Gonorrhea 2000    Herpes simplex     History of methamphetamine abuse     Clean ~     Hypertension 2016    Kidney stones 2018    Migraine     Seizures 2012    Stopped ~ 2018    Shingles on back 2017    Substance abuse - DOC was cocaine 1998    Sober of " cocaine since ~     Trichomonas infection     Varicella        Past Surgical History:   Procedure Laterality Date    BARTHOLIN GLAND CYST EXCISION Left 2018     SECTION WITH TUBAL  2010    CYSTOSCOPY, URETEROSCOPY, RETROGRADE PYELOGRAM, STONE EXTRACTION, STENT INSERTION Bilateral 2023    Procedure: CYSTOSCOPY, BILATERAL RETROGRADE PYELOGRAM, URETEROSCOPY, AND STENT PLACEMENT;  Surgeon: Micah Child MD;  Location: [facility] OR;  Service: Urology;  Laterality: Bilateral;    CYSTOSCOPY, URETEROSCOPY, RETROGRADE PYELOGRAM, STONE EXTRACTION, STENT INSERTION Left 2023    Procedure: CYSTOSCOPY URETEROSCOPY RETROGRADE PYELOGRAM STONE EXTRACTION STENT INSERTION;  Surgeon: Micah Child MD;  Location: [facility] OR;  Service: Urology;  Laterality: Left;    DIAGNOSTIC LAPAROSCOPY      ENDOSCOPY      I & D / EXCISION MARSUPIALIZATION BARTHOLIN'S GLAND CYST / LYSIS LESIONS Left     ORIF ANKLE FRACTURE Right     REDUCTION MAMMAPLASTY Bilateral     TENSION FREE VAGINAL TAPING WITH MINI ARC SLING  2018    Dr Doyle avery        Social History     Socioeconomic History    Marital status: Single    Number of children: 2    Highest education level: Some college, no degree   Tobacco Use    Smoking status: Former     Current packs/day: 0.00     Average packs/day: 1 pack/day for 22.0 years (22.0 ttl pk-yrs)     Types: Cigarettes     Start date:      Quit date: 2016     Years since quittin.6     Passive exposure: Past    Smokeless tobacco: Never   Vaping Use    Vaping status: Some Days    Substances: THC, Flavoring    Devices: Disposable    Passive vaping exposure: Yes   Substance and Sexual Activity    Alcohol use: No    Drug use: Yes     Types: Marijuana     Comment: Marijuana once a week. Tried cocaine, meth, pain pills in the past    Sexual activity: Defer     Partners: Male     Birth control/protection: Surgical         Current Outpatient Medications:     albuterol sulfate HFA  "108 (90 Base) MCG/ACT inhaler, Inhale 2 puffs Every 4 (Four) Hours As Needed for Wheezing., Disp: 18 g, Rfl: 2    amLODIPine (NORVASC) 5 MG tablet, TAKE 1 TABLET BY MOUTH DAILY, Disp: 90 tablet, Rfl: 1    Azelastine HCl 137 MCG/SPRAY solution, 1 spray into the nostril(s) as directed by provider 2 (Two) Times a Day., Disp: 30 mL, Rfl: 1    cyclobenzaprine (FLEXERIL) 10 MG tablet, Take 1 tablet by mouth 3 (Three) Times a Day As Needed for Muscle Spasms., Disp: 12 tablet, Rfl: 0    diclofenac (VOLTAREN) 50 MG EC tablet, TAKE 1 TABLET BY MOUTH 2 TIMES A DAY AS NEEDED (BACK PAIN). STOP CELEBREX. TAKE THIS MEDICATION IN PLACE OF CELEBREX., Disp: 60 tablet, Rfl: 2    erythromycin (ROMYCIN) 5 MG/GM ophthalmic ointment, Administer  into the left eye Every 6 (Six) Hours., Disp: 3.5 g, Rfl: 0    famotidine (PEPCID) 20 MG tablet, TAKE 1 TABLET BY MOUTH 2 TIMES A DAY, Disp: 60 tablet, Rfl: 2    lamoTRIgine (LaMICtal) 200 MG tablet, TAKE 1 TABLET BY MOUTH DAILY, Disp: 90 tablet, Rfl: 1    levocetirizine (XYZAL) 5 MG tablet, Take 1 tablet by mouth Every Evening., Disp: 30 tablet, Rfl: 5    Lidocaine Viscous HCl (XYLOCAINE) 2 % solution, Take 10 mL by mouth As Needed for Mild Pain or Moderate Pain., Disp: 100 mL, Rfl: 1    Multiple Vitamins-Minerals (CENTRUM ADULT PO), Take  by mouth., Disp: , Rfl:     Eye Patches misc, Administer 1 patch into the left eye Daily As Needed (left eye pain)., Disp: 10 each, Rfl: 0    Objective     Vital Signs  /88   Pulse 90   Ht 149.9 cm (59\")   Wt 82.8 kg (182 lb 9.6 oz)   SpO2 97%   BMI 36.88 kg/m²   Estimated body mass index is 36.88 kg/m² as calculated from the following:    Height as of this encounter: 149.9 cm (59\").    Weight as of this encounter: 82.8 kg (182 lb 9.6 oz).           Physical Exam  Constitutional:       General: She is not in acute distress.     Appearance: Normal appearance. She is not ill-appearing.   HENT:      Head: Normocephalic and atraumatic.      Nose: Nose " normal.   Eyes:      General:         Left eye: Discharge present.     Extraocular Movements: Extraocular movements intact.      Conjunctiva/sclera: Conjunctivae normal.      Left eye: Left conjunctiva is injected.      Pupils: Pupils are equal, round, and reactive to light.      Comments: Redness, swelling, and purulent discharge noted from left eye.   Cardiovascular:      Rate and Rhythm: Normal rate.   Pulmonary:      Effort: Pulmonary effort is normal. No respiratory distress.   Musculoskeletal:         General: Normal range of motion.      Cervical back: Neck supple.   Skin:     General: Skin is warm and dry.   Neurological:      General: No focal deficit present.      Mental Status: She is alert and oriented to person, place, and time. Mental status is at baseline.   Psychiatric:         Mood and Affect: Mood normal.         Behavior: Behavior normal.         Thought Content: Thought content normal.         Judgment: Judgment normal.          Assessment and Plan     Diagnoses and all orders for this visit:    1. Acute bacterial conjunctivitis of left eye  -     erythromycin (ROMYCIN) 5 MG/GM ophthalmic ointment; Administer  into the left eye Every 6 (Six) Hours.  Dispense: 3.5 g; Refill: 0  -     Eye Patches misc; Administer 1 patch into the left eye Daily As Needed (left eye pain).  Dispense: 10 each; Refill: 0        Follow Up  Return if symptoms worsen or fail to improve, for Next scheduled follow up.    SHANE Ang    Part of this note may be an electronic transcription/translation of spoken language to printed text using the Dragon Dictation System.

## 2024-09-04 ENCOUNTER — TELEPHONE (OUTPATIENT)
Dept: OBSTETRICS AND GYNECOLOGY | Facility: CLINIC | Age: 44
End: 2024-09-04
Payer: MEDICAID

## 2024-09-04 NOTE — TELEPHONE ENCOUNTER
----- Message from Edmar Smith sent at 9/4/2024  8:26 AM EDT -----  Regarding: RE: SURGERY  If she talking about total laparoscopic hysterectomy with bilateral salpingo-oophorectomy?  Also, can you put this in as a telephone encounter so it becomes part of the permanent medical record?  Includes the phone call you received from her yesterday as well as confirmation of what she wants done  ----- Message -----  From: Lakeshia Hartley, Laron Rep  Sent: 9/4/2024   8:23 AM EDT  To: Edmar Smith MD  Subject: SURGERY                                          Pt called yesterday wanting to proceed with her surgery that was discussed at her April appointment. Ok to proceed? Will need order put in system.

## 2024-09-05 ENCOUNTER — PREP FOR SURGERY (OUTPATIENT)
Dept: OTHER | Facility: HOSPITAL | Age: 44
End: 2024-09-05
Payer: MEDICAID

## 2024-09-05 DIAGNOSIS — D25.1 INTRAMURAL UTERINE FIBROID: Primary | ICD-10-CM

## 2024-09-05 PROBLEM — D25.9 UTERINE FIBROID: Status: ACTIVE | Noted: 2024-09-05

## 2024-09-05 RX ORDER — SODIUM CHLORIDE 0.9 % (FLUSH) 0.9 %
3-10 SYRINGE (ML) INJECTION AS NEEDED
OUTPATIENT
Start: 2024-09-05

## 2024-09-05 RX ORDER — SODIUM CHLORIDE 9 MG/ML
40 INJECTION, SOLUTION INTRAVENOUS AS NEEDED
OUTPATIENT
Start: 2024-09-05

## 2024-09-05 RX ORDER — SODIUM CHLORIDE, SODIUM LACTATE, POTASSIUM CHLORIDE, CALCIUM CHLORIDE 600; 310; 30; 20 MG/100ML; MG/100ML; MG/100ML; MG/100ML
125 INJECTION, SOLUTION INTRAVENOUS CONTINUOUS
OUTPATIENT
Start: 2024-09-05

## 2024-09-05 RX ORDER — ENOXAPARIN SODIUM 100 MG/ML
40 INJECTION SUBCUTANEOUS ONCE
OUTPATIENT
Start: 2024-09-05 | End: 2024-09-05

## 2024-09-05 NOTE — TELEPHONE ENCOUNTER
Please in the case request now.  She will need to be seen preoperatively the week before the scheduled procedure

## 2024-09-14 DIAGNOSIS — R09.81 CONGESTED NOSE: ICD-10-CM

## 2024-09-14 DIAGNOSIS — R51.9 PRESSURE IN HEAD: ICD-10-CM

## 2024-09-16 ENCOUNTER — HOSPITAL ENCOUNTER (EMERGENCY)
Facility: HOSPITAL | Age: 44
Discharge: HOME | End: 2024-09-16
Attending: STUDENT IN AN ORGANIZED HEALTH CARE EDUCATION/TRAINING PROGRAM | Admitting: STUDENT IN AN ORGANIZED HEALTH CARE EDUCATION/TRAINING PROGRAM
Payer: MEDICAID

## 2024-09-16 ENCOUNTER — APPOINTMENT (OUTPATIENT)
Dept: GENERAL RADIOLOGY | Facility: HOSPITAL | Age: 44
End: 2024-09-16
Payer: MEDICAID

## 2024-09-16 VITALS
SYSTOLIC BLOOD PRESSURE: 135 MMHG | WEIGHT: 185 LBS | OXYGEN SATURATION: 100 % | HEART RATE: 64 BPM | BODY MASS INDEX: 37.29 KG/M2 | HEIGHT: 59 IN | RESPIRATION RATE: 18 BRPM | DIASTOLIC BLOOD PRESSURE: 90 MMHG | TEMPERATURE: 98.3 F

## 2024-09-16 DIAGNOSIS — B97.89 SORE THROAT (VIRAL): ICD-10-CM

## 2024-09-16 DIAGNOSIS — R07.9 CHEST PAIN, UNSPECIFIED TYPE: Primary | ICD-10-CM

## 2024-09-16 DIAGNOSIS — J02.8 SORE THROAT (VIRAL): ICD-10-CM

## 2024-09-16 DIAGNOSIS — N39.0 ACUTE UTI (URINARY TRACT INFECTION): ICD-10-CM

## 2024-09-16 DIAGNOSIS — J06.9 VIRAL URI: ICD-10-CM

## 2024-09-16 LAB
ALBUMIN SERPL-MCNC: 4.1 G/DL (ref 3.5–5.2)
ALBUMIN/GLOB SERPL: 1.4 G/DL
ALP SERPL-CCNC: 59 U/L (ref 39–117)
ALT SERPL W P-5'-P-CCNC: 8 U/L (ref 1–33)
ANION GAP SERPL CALCULATED.3IONS-SCNC: 8 MMOL/L (ref 5–15)
AST SERPL-CCNC: 14 U/L (ref 1–32)
BACTERIA UR QL AUTO: ABNORMAL /HPF
BASOPHILS # BLD AUTO: 0.03 10*3/MM3 (ref 0–0.2)
BASOPHILS NFR BLD AUTO: 0.4 % (ref 0–1.5)
BILIRUB SERPL-MCNC: <0.2 MG/DL (ref 0–1.2)
BILIRUB UR QL STRIP: NEGATIVE
BUN SERPL-MCNC: 13 MG/DL (ref 6–20)
BUN/CREAT SERPL: 17.3 (ref 7–25)
CALCIUM SPEC-SCNC: 9 MG/DL (ref 8.6–10.5)
CHLORIDE SERPL-SCNC: 100 MMOL/L (ref 98–107)
CLARITY UR: ABNORMAL
CO2 SERPL-SCNC: 29 MMOL/L (ref 22–29)
COD CRY URNS QL: ABNORMAL /HPF
COLOR UR: YELLOW
CREAT SERPL-MCNC: 0.75 MG/DL (ref 0.57–1)
D DIMER PPP FEU-MCNC: 0.38 MCGFEU/ML (ref 0–0.5)
DEPRECATED RDW RBC AUTO: 44.4 FL (ref 37–54)
EGFRCR SERPLBLD CKD-EPI 2021: 99.6 ML/MIN/1.73
EOSINOPHIL # BLD AUTO: 0.08 10*3/MM3 (ref 0–0.4)
EOSINOPHIL NFR BLD AUTO: 1 % (ref 0.3–6.2)
ERYTHROCYTE [DISTWIDTH] IN BLOOD BY AUTOMATED COUNT: 14.8 % (ref 12.3–15.4)
FLUAV RNA RESP QL NAA+PROBE: NOT DETECTED
FLUBV RNA RESP QL NAA+PROBE: NOT DETECTED
GEN 5 2HR TROPONIN T REFLEX: <6 NG/L
GLOBULIN UR ELPH-MCNC: 3 GM/DL
GLUCOSE SERPL-MCNC: 86 MG/DL (ref 65–99)
GLUCOSE UR STRIP-MCNC: NEGATIVE MG/DL
HCT VFR BLD AUTO: 35 % (ref 34–46.6)
HETEROPH AB SER QL LA: NEGATIVE
HGB BLD-MCNC: 11.3 G/DL (ref 12–15.9)
HGB UR QL STRIP.AUTO: NEGATIVE
HOLD SPECIMEN: NORMAL
HYALINE CASTS UR QL AUTO: ABNORMAL /LPF
IMM GRANULOCYTES # BLD AUTO: 0.02 10*3/MM3 (ref 0–0.05)
IMM GRANULOCYTES NFR BLD AUTO: 0.2 % (ref 0–0.5)
KETONES UR QL STRIP: ABNORMAL
LEUKOCYTE ESTERASE UR QL STRIP.AUTO: ABNORMAL
LIPASE SERPL-CCNC: 70 U/L (ref 13–60)
LYMPHOCYTES # BLD AUTO: 3.17 10*3/MM3 (ref 0.7–3.1)
LYMPHOCYTES NFR BLD AUTO: 38.6 % (ref 19.6–45.3)
MAGNESIUM SERPL-MCNC: 2.2 MG/DL (ref 1.6–2.6)
MCH RBC QN AUTO: 26.5 PG (ref 26.6–33)
MCHC RBC AUTO-ENTMCNC: 32.3 G/DL (ref 31.5–35.7)
MCV RBC AUTO: 82.2 FL (ref 79–97)
MONOCYTES # BLD AUTO: 0.66 10*3/MM3 (ref 0.1–0.9)
MONOCYTES NFR BLD AUTO: 8 % (ref 5–12)
NEUTROPHILS NFR BLD AUTO: 4.25 10*3/MM3 (ref 1.7–7)
NEUTROPHILS NFR BLD AUTO: 51.8 % (ref 42.7–76)
NITRITE UR QL STRIP: NEGATIVE
NRBC BLD AUTO-RTO: 0 /100 WBC (ref 0–0.2)
NT-PROBNP SERPL-MCNC: <36 PG/ML (ref 0–450)
PH UR STRIP.AUTO: 5.5 [PH] (ref 5–8)
PHOSPHATE SERPL-MCNC: 3.3 MG/DL (ref 2.5–4.5)
PLATELET # BLD AUTO: 309 10*3/MM3 (ref 140–450)
PMV BLD AUTO: 10 FL (ref 6–12)
POTASSIUM SERPL-SCNC: 3.9 MMOL/L (ref 3.5–5.2)
PROT SERPL-MCNC: 7.1 G/DL (ref 6–8.5)
PROT UR QL STRIP: NEGATIVE
RBC # BLD AUTO: 4.26 10*6/MM3 (ref 3.77–5.28)
RBC # UR STRIP: ABNORMAL /HPF
REF LAB TEST METHOD: ABNORMAL
RSV RNA RESP QL NAA+PROBE: NOT DETECTED
S PYO AG THROAT QL: NEGATIVE
SARS-COV-2 RNA RESP QL NAA+PROBE: NOT DETECTED
SODIUM SERPL-SCNC: 137 MMOL/L (ref 136–145)
SP GR UR STRIP: >=1.03 (ref 1–1.03)
SQUAMOUS #/AREA URNS HPF: ABNORMAL /HPF
TROPONIN T DELTA: NORMAL
TROPONIN T SERPL HS-MCNC: 9 NG/L
TSH SERPL DL<=0.05 MIU/L-ACNC: 1.07 UIU/ML (ref 0.27–4.2)
UROBILINOGEN UR QL STRIP: ABNORMAL
WBC # UR STRIP: ABNORMAL /HPF
WBC NRBC COR # BLD AUTO: 8.21 10*3/MM3 (ref 3.4–10.8)
WHOLE BLOOD HOLD COAG: NORMAL
WHOLE BLOOD HOLD SPECIMEN: NORMAL

## 2024-09-16 PROCEDURE — 87637 SARSCOV2&INF A&B&RSV AMP PRB: CPT | Performed by: STUDENT IN AN ORGANIZED HEALTH CARE EDUCATION/TRAINING PROGRAM

## 2024-09-16 PROCEDURE — 99284 EMERGENCY DEPT VISIT MOD MDM: CPT

## 2024-09-16 PROCEDURE — 84484 ASSAY OF TROPONIN QUANT: CPT | Performed by: STUDENT IN AN ORGANIZED HEALTH CARE EDUCATION/TRAINING PROGRAM

## 2024-09-16 PROCEDURE — 81001 URINALYSIS AUTO W/SCOPE: CPT | Performed by: STUDENT IN AN ORGANIZED HEALTH CARE EDUCATION/TRAINING PROGRAM

## 2024-09-16 PROCEDURE — 86308 HETEROPHILE ANTIBODY SCREEN: CPT | Performed by: STUDENT IN AN ORGANIZED HEALTH CARE EDUCATION/TRAINING PROGRAM

## 2024-09-16 PROCEDURE — 83690 ASSAY OF LIPASE: CPT | Performed by: STUDENT IN AN ORGANIZED HEALTH CARE EDUCATION/TRAINING PROGRAM

## 2024-09-16 PROCEDURE — 93005 ELECTROCARDIOGRAM TRACING: CPT | Performed by: STUDENT IN AN ORGANIZED HEALTH CARE EDUCATION/TRAINING PROGRAM

## 2024-09-16 PROCEDURE — 85379 FIBRIN DEGRADATION QUANT: CPT | Performed by: STUDENT IN AN ORGANIZED HEALTH CARE EDUCATION/TRAINING PROGRAM

## 2024-09-16 PROCEDURE — 80050 GENERAL HEALTH PANEL: CPT | Performed by: STUDENT IN AN ORGANIZED HEALTH CARE EDUCATION/TRAINING PROGRAM

## 2024-09-16 PROCEDURE — 87081 CULTURE SCREEN ONLY: CPT | Performed by: STUDENT IN AN ORGANIZED HEALTH CARE EDUCATION/TRAINING PROGRAM

## 2024-09-16 PROCEDURE — 94640 AIRWAY INHALATION TREATMENT: CPT

## 2024-09-16 PROCEDURE — 36415 COLL VENOUS BLD VENIPUNCTURE: CPT

## 2024-09-16 PROCEDURE — 84100 ASSAY OF PHOSPHORUS: CPT | Performed by: STUDENT IN AN ORGANIZED HEALTH CARE EDUCATION/TRAINING PROGRAM

## 2024-09-16 PROCEDURE — 83735 ASSAY OF MAGNESIUM: CPT | Performed by: STUDENT IN AN ORGANIZED HEALTH CARE EDUCATION/TRAINING PROGRAM

## 2024-09-16 PROCEDURE — 87880 STREP A ASSAY W/OPTIC: CPT | Performed by: STUDENT IN AN ORGANIZED HEALTH CARE EDUCATION/TRAINING PROGRAM

## 2024-09-16 PROCEDURE — 83880 ASSAY OF NATRIURETIC PEPTIDE: CPT | Performed by: STUDENT IN AN ORGANIZED HEALTH CARE EDUCATION/TRAINING PROGRAM

## 2024-09-16 PROCEDURE — 71045 X-RAY EXAM CHEST 1 VIEW: CPT

## 2024-09-16 RX ORDER — NITROFURANTOIN 25; 75 MG/1; MG/1
100 CAPSULE ORAL 2 TIMES DAILY
Qty: 14 CAPSULE | Refills: 0 | Status: SHIPPED | OUTPATIENT
Start: 2024-09-16

## 2024-09-16 RX ORDER — ASPIRIN 81 MG/1
324 TABLET, CHEWABLE ORAL ONCE
Status: COMPLETED | OUTPATIENT
Start: 2024-09-16 | End: 2024-09-16

## 2024-09-16 RX ORDER — SODIUM CHLORIDE 0.9 % (FLUSH) 0.9 %
10 SYRINGE (ML) INJECTION AS NEEDED
Status: DISCONTINUED | OUTPATIENT
Start: 2024-09-16 | End: 2024-09-16 | Stop reason: HOSPADM

## 2024-09-16 RX ORDER — AZELASTINE HYDROCHLORIDE 137 UG/1
SPRAY, METERED NASAL
Qty: 30 ML | Refills: 1 | Status: SHIPPED | OUTPATIENT
Start: 2024-09-16

## 2024-09-16 RX ORDER — IPRATROPIUM BROMIDE AND ALBUTEROL SULFATE 2.5; .5 MG/3ML; MG/3ML
3 SOLUTION RESPIRATORY (INHALATION) ONCE
Status: COMPLETED | OUTPATIENT
Start: 2024-09-16 | End: 2024-09-16

## 2024-09-16 RX ORDER — SUCRALFATE 1 G/1
1 TABLET ORAL 3 TIMES DAILY
Qty: 60 TABLET | Refills: 0 | Status: SHIPPED | OUTPATIENT
Start: 2024-09-16

## 2024-09-16 RX ORDER — ALUMINA, MAGNESIA, AND SIMETHICONE 2400; 2400; 240 MG/30ML; MG/30ML; MG/30ML
30 SUSPENSION ORAL ONCE
Status: COMPLETED | OUTPATIENT
Start: 2024-09-16 | End: 2024-09-16

## 2024-09-16 RX ORDER — ACETAMINOPHEN 500 MG
1000 TABLET ORAL ONCE
Status: COMPLETED | OUTPATIENT
Start: 2024-09-16 | End: 2024-09-16

## 2024-09-16 RX ORDER — ONDANSETRON 4 MG/1
4 TABLET, ORALLY DISINTEGRATING ORAL 4 TIMES DAILY PRN
Qty: 12 TABLET | Refills: 0 | Status: SHIPPED | OUTPATIENT
Start: 2024-09-16 | End: 2024-09-20

## 2024-09-16 RX ADMIN — ALUMINUM HYDROXIDE, MAGNESIUM HYDROXIDE, DIMETHICONE 30 ML: 400; 400; 40 SUSPENSION ORAL at 08:08

## 2024-09-16 RX ADMIN — ASPIRIN 81 MG 324 MG: 81 TABLET ORAL at 08:08

## 2024-09-16 RX ADMIN — ACETAMINOPHEN 1000 MG: 500 TABLET ORAL at 08:08

## 2024-09-16 RX ADMIN — IPRATROPIUM BROMIDE AND ALBUTEROL SULFATE 3 ML: 2.5; .5 SOLUTION RESPIRATORY (INHALATION) at 08:59

## 2024-09-18 LAB
BACTERIA SPEC AEROBE CULT: NORMAL
QT INTERVAL: 438 MS
QT INTERVAL: 480 MS
QTC INTERVAL: 451 MS
QTC INTERVAL: 483 MS

## 2024-09-20 ENCOUNTER — OFFICE VISIT (OUTPATIENT)
Dept: INTERNAL MEDICINE | Facility: CLINIC | Age: 44
End: 2024-09-20
Payer: MEDICAID

## 2024-09-20 VITALS
BODY MASS INDEX: 35.68 KG/M2 | TEMPERATURE: 98.4 F | DIASTOLIC BLOOD PRESSURE: 82 MMHG | WEIGHT: 177 LBS | RESPIRATION RATE: 18 BRPM | HEIGHT: 59 IN | HEART RATE: 89 BPM | SYSTOLIC BLOOD PRESSURE: 134 MMHG | OXYGEN SATURATION: 97 %

## 2024-09-20 DIAGNOSIS — Z09 HOSPITAL DISCHARGE FOLLOW-UP: Primary | ICD-10-CM

## 2024-09-20 DIAGNOSIS — N30.00 ACUTE CYSTITIS WITHOUT HEMATURIA: ICD-10-CM

## 2024-09-20 DIAGNOSIS — R11.2 NAUSEA AND VOMITING, UNSPECIFIED VOMITING TYPE: ICD-10-CM

## 2024-09-20 DIAGNOSIS — D50.8 OTHER IRON DEFICIENCY ANEMIA: ICD-10-CM

## 2024-09-20 PROCEDURE — 1159F MED LIST DOCD IN RCRD: CPT | Performed by: FAMILY MEDICINE

## 2024-09-20 PROCEDURE — 99213 OFFICE O/P EST LOW 20 MIN: CPT | Performed by: FAMILY MEDICINE

## 2024-09-20 PROCEDURE — 1160F RVW MEDS BY RX/DR IN RCRD: CPT | Performed by: FAMILY MEDICINE

## 2024-09-20 PROCEDURE — 1126F AMNT PAIN NOTED NONE PRSNT: CPT | Performed by: FAMILY MEDICINE

## 2024-09-20 PROCEDURE — 3075F SYST BP GE 130 - 139MM HG: CPT | Performed by: FAMILY MEDICINE

## 2024-09-20 PROCEDURE — 3079F DIAST BP 80-89 MM HG: CPT | Performed by: FAMILY MEDICINE

## 2024-09-20 RX ORDER — ONDANSETRON 4 MG/1
4 TABLET, ORALLY DISINTEGRATING ORAL EVERY 8 HOURS PRN
Qty: 20 TABLET | Refills: 0 | Status: SHIPPED | OUTPATIENT
Start: 2024-09-20

## 2024-09-28 NOTE — ANESTHESIA POSTPROCEDURE EVALUATION
Patient: Bernardo Dodd    Procedure Summary     Date: 05/04/23 Room / Location:  [facility]  /  [facility]    Anesthesia Start: 0619 Anesthesia Stop: 0658    Procedure: CYSTOSCOPY, LEFT RETROGRADE PYLEOGRAM, URETEROSCOPY, AND STENT PLACEMENT (Left: Bladder) Diagnosis:     Surgeons: Micah Child MD Provider: Sergei Garland MD    Anesthesia Type: general ASA Status: 2 - Emergent          Anesthesia Type: general    Vitals  Vitals Value Taken Time   /73 05/04/23 0655   Temp 97 °F (36.1 °C) 05/04/23 0655   Pulse 94 05/04/23 0657   Resp 16 05/04/23 0655   SpO2 94 % 05/04/23 0657   Vitals shown include unvalidated device data.        Post Anesthesia Care and Evaluation    Patient location during evaluation: PACU  Patient participation: complete - patient participated  Level of consciousness: awake  Pain score: 0  Pain management: adequate    Airway patency: patent  Anesthetic complications: No anesthetic complications  PONV Status: none  Cardiovascular status: hemodynamically stable and acceptable  Respiratory status: nonlabored ventilation, acceptable and nasal cannula  Hydration status: acceptable       Vital Signs Last 24 Hrs  T(C): 36.7 (28 Sep 2024 08:28), Max: 37.1 (28 Sep 2024 07:55)  T(F): 98.1 (28 Sep 2024 08:28), Max: 98.7 (28 Sep 2024 07:55)  HR: 88 (28 Sep 2024 08:28) (88 - 99)  BP: 101/62 (28 Sep 2024 08:28) (101/62 - 116/80)  BP(mean): --  RR: 16 (28 Sep 2024 08:28) (16 - 18)  SpO2: 99% (28 Sep 2024 08:28) (99% - 100%)    Parameters below as of 28 Sep 2024 08:28  Patient On (Oxygen Delivery Method): room air      General: Pt resting comfortably, NAD  Abd: Gravid, soft, non-tender  Ext: Soft, non-tender, no edema  SVE: C/L/P  SSE: normal external genitalia, no pooling, no vaginal bleeding, normal cervix. Increased vaginal discharge, no curds or odor noted  NST: reactive, baseline 140 , moderate variability, + accels, no decels  TOCO: irregular

## 2024-10-07 ENCOUNTER — OFFICE VISIT (OUTPATIENT)
Dept: INTERNAL MEDICINE | Facility: CLINIC | Age: 44
End: 2024-10-07
Payer: MEDICAID

## 2024-10-07 VITALS
WEIGHT: 179 LBS | TEMPERATURE: 97.3 F | HEART RATE: 66 BPM | DIASTOLIC BLOOD PRESSURE: 80 MMHG | HEIGHT: 59 IN | OXYGEN SATURATION: 96 % | SYSTOLIC BLOOD PRESSURE: 134 MMHG | RESPIRATION RATE: 18 BRPM | BODY MASS INDEX: 36.08 KG/M2

## 2024-10-07 DIAGNOSIS — J06.9 VIRAL URI WITH COUGH: Primary | ICD-10-CM

## 2024-10-07 DIAGNOSIS — I10 PRIMARY HYPERTENSION: ICD-10-CM

## 2024-10-07 DIAGNOSIS — H60.8X3 CHRONIC ECZEMATOUS OTITIS EXTERNA OF BOTH EARS: ICD-10-CM

## 2024-10-07 DIAGNOSIS — J02.9 SORE THROAT: ICD-10-CM

## 2024-10-07 DIAGNOSIS — R11.2 NAUSEA AND VOMITING, UNSPECIFIED VOMITING TYPE: ICD-10-CM

## 2024-10-07 DIAGNOSIS — N89.8 VAGINAL DISCHARGE: ICD-10-CM

## 2024-10-07 LAB
BILIRUB BLD-MCNC: NEGATIVE MG/DL
CLARITY, POC: ABNORMAL
COLOR UR: ABNORMAL
EXPIRATION DATE: ABNORMAL
EXPIRATION DATE: NORMAL
EXPIRATION DATE: NORMAL
FLUAV AG UPPER RESP QL IA.RAPID: NOT DETECTED
FLUBV AG UPPER RESP QL IA.RAPID: NOT DETECTED
GLUCOSE UR STRIP-MCNC: NEGATIVE MG/DL
INTERNAL CONTROL: NORMAL
INTERNAL CONTROL: NORMAL
KETONES UR QL: NEGATIVE
LEUKOCYTE EST, POC: NEGATIVE
Lab: ABNORMAL
Lab: NORMAL
Lab: NORMAL
NITRITE UR-MCNC: NEGATIVE MG/ML
PH UR: 5.5 [PH] (ref 5–8)
PROT UR STRIP-MCNC: NEGATIVE MG/DL
RBC # UR STRIP: NEGATIVE /UL
S PYO AG THROAT QL: NEGATIVE
SARS-COV-2 AG UPPER RESP QL IA.RAPID: NOT DETECTED
SP GR UR: 1.02 (ref 1–1.03)
UROBILINOGEN UR QL: ABNORMAL

## 2024-10-07 PROCEDURE — 1125F AMNT PAIN NOTED PAIN PRSNT: CPT

## 2024-10-07 PROCEDURE — 1159F MED LIST DOCD IN RCRD: CPT

## 2024-10-07 PROCEDURE — 1160F RVW MEDS BY RX/DR IN RCRD: CPT

## 2024-10-07 PROCEDURE — 3075F SYST BP GE 130 - 139MM HG: CPT

## 2024-10-07 PROCEDURE — 87798 DETECT AGENT NOS DNA AMP: CPT

## 2024-10-07 PROCEDURE — 3079F DIAST BP 80-89 MM HG: CPT

## 2024-10-07 PROCEDURE — 87491 CHLMYD TRACH DNA AMP PROBE: CPT

## 2024-10-07 PROCEDURE — 87591 N.GONORRHOEAE DNA AMP PROB: CPT

## 2024-10-07 PROCEDURE — 87661 TRICHOMONAS VAGINALIS AMPLIF: CPT

## 2024-10-07 PROCEDURE — 87801 DETECT AGNT MULT DNA AMPLI: CPT

## 2024-10-07 PROCEDURE — 87880 STREP A ASSAY W/OPTIC: CPT

## 2024-10-07 PROCEDURE — 99214 OFFICE O/P EST MOD 30 MIN: CPT

## 2024-10-07 PROCEDURE — 87428 SARSCOV & INF VIR A&B AG IA: CPT

## 2024-10-07 RX ORDER — NYSTATIN AND TRIAMCINOLONE ACETONIDE 100000; 1 [USP'U]/G; MG/G
1 OINTMENT TOPICAL 2 TIMES DAILY
Qty: 15 G | Refills: 3 | Status: SHIPPED | OUTPATIENT
Start: 2024-10-07

## 2024-10-07 RX ORDER — AMLODIPINE BESYLATE 5 MG/1
5 TABLET ORAL DAILY
Qty: 90 TABLET | Refills: 0 | Status: SHIPPED | OUTPATIENT
Start: 2024-10-07

## 2024-10-07 RX ORDER — ONDANSETRON 4 MG/1
4 TABLET, ORALLY DISINTEGRATING ORAL EVERY 8 HOURS PRN
Qty: 20 TABLET | Refills: 0 | Status: SHIPPED | OUTPATIENT
Start: 2024-10-07

## 2024-10-07 NOTE — PROGRESS NOTES
Follow Up Office Visit      Date: 10/07/2024  Patient Name: Bernardo Dodd  : 1980   MRN: [id number]     Chief Complaint:    Chief Complaint   Patient presents with    Sore Throat    Dizziness    Generalized Body Aches    Fever    Hoarse    Cough    Nausea     Sx for past week       History of Present Illness: Bernardo Dodd is a 44 y.o. female who is here today for follow up with concerns for COVID.  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  For the last 6 days patient has experienced sore throat, body aches, cough, nausea. Has not tried anything OTC for sx. Has been taking xyzal and azelastine nasal spray. Requesting zofran refill.      Pt also reports increase in vaginal discharge and odor.  Did experience some dysuria yesterday however this has since resolved.  No new sexual partners.    Subjective      Review of Systems:   Review of Systems   Constitutional:  Negative for chills and fever.   HENT:  Positive for congestion, ear pain and sore throat.    Respiratory:  Positive for cough. Negative for shortness of breath.    Gastrointestinal:  Positive for diarrhea and nausea. Negative for vomiting.   Genitourinary:  Positive for vaginal discharge. Negative for dysuria, flank pain and urgency.   Musculoskeletal:  Positive for myalgias.   Neurological:  Negative for light-headedness and headache.       Medications:     Current Outpatient Medications:     albuterol sulfate  (90 Base) MCG/ACT inhaler, Inhale 2 puffs Every 4 (Four) Hours As Needed for Wheezing., Disp: 18 g, Rfl: 2    amLODIPine (NORVASC) 5 MG tablet, Take 1 tablet by mouth Daily., Disp: 90 tablet, Rfl: 0    Azelastine HCl 137 MCG/SPRAY solution, SPRAY 1 SPRAY IN EACH NOSTRIL TWICE DAILY, Disp: 30 mL, Rfl: 1    erythromycin (ROMYCIN) 5 MG/GM ophthalmic ointment, Administer  into the left eye Every 6 (Six) Hours., Disp: 3.5 g, Rfl: 0    Eye Patches misc, Administer 1 patch into the  "left eye Daily As Needed (left eye pain)., Disp: 10 each, Rfl: 0    famotidine (PEPCID) 20 MG tablet, TAKE 1 TABLET BY MOUTH 2 TIMES A DAY, Disp: 60 tablet, Rfl: 2    lamoTRIgine (LaMICtal) 200 MG tablet, TAKE 1 TABLET BY MOUTH DAILY, Disp: 90 tablet, Rfl: 1    levocetirizine (XYZAL) 5 MG tablet, Take 1 tablet by mouth Every Evening., Disp: 30 tablet, Rfl: 5    Lidocaine Viscous HCl (XYLOCAINE) 2 % solution, Take 10 mL by mouth As Needed for Mild Pain or Moderate Pain., Disp: 100 mL, Rfl: 1    ondansetron ODT (ZOFRAN-ODT) 4 MG disintegrating tablet, Place 1 tablet on the tongue Every 8 (Eight) Hours As Needed for Nausea or Vomiting., Disp: 20 tablet, Rfl: 0    Multiple Vitamins-Minerals (CENTRUM ADULT PO), Take  by mouth. (Patient not taking: Reported on 10/7/2024), Disp: , Rfl:     nystatin-triamcinolone (MYCOLOG) 559974-7.1 UNIT/GM-% ointment, Apply 1 Application topically to the appropriate area as directed 2 (Two) Times a Day., Disp: 15 g, Rfl: 3    Allergies:   Allergies   Allergen Reactions    Naproxen Swelling    Sulfa Antibiotics Rash       Objective     Physical Exam:  Vital Signs:   Vitals:    10/07/24 0932   BP: 134/80   Pulse: 66   Resp: 18   Temp: 97.3 °F (36.3 °C)   TempSrc: Temporal   SpO2: 96%   Weight: 81.2 kg (179 lb)   Height: 149.9 cm (59.02\")   PainSc:   5   PainLoc: Generalized     Body mass index is 36.13 kg/m².   Facility age limit for growth %reuben is 20 years.       Physical Exam  Vitals and nursing note reviewed.   Constitutional:       General: She is not in acute distress.     Appearance: Normal appearance.   HENT:      Right Ear: Tympanic membrane normal.      Left Ear: Tympanic membrane normal.      Ears:      Comments: Eczematous changes to ear canal and external ear bilaterally  Eyes:      Extraocular Movements: Extraocular movements intact.      Conjunctiva/sclera: Conjunctivae normal.   Cardiovascular:      Rate and Rhythm: Normal rate and regular rhythm.      Pulses: Normal pulses. "   Pulmonary:      Effort: Pulmonary effort is normal. No respiratory distress.      Breath sounds: Normal breath sounds. No wheezing, rhonchi or rales.   Neurological:      General: No focal deficit present.      Mental Status: She is alert and oriented to person, place, and time. Mental status is at baseline.   Psychiatric:         Mood and Affect: Mood normal.         Behavior: Behavior normal.         POCT Results (if applicable):   Results for orders placed or performed in visit on 10/07/24   POCT SARS-CoV-2 Antigen PRADEEP + Flu    Specimen: Swab   Result Value Ref Range    SARS Antigen Not Detected Not Detected, Presumptive Negative    Influenza A Antigen PRADEEP Not Detected Not Detected    Influenza B Antigen PRADEEP Not Detected Not Detected    Internal Control Passed Passed    Lot Number [id number]     Expiration Date 09-04-25    POCT rapid strep A    Specimen: Swab   Result Value Ref Range    Rapid Strep A Screen Negative Negative, VALID, INVALID, Not Performed    Internal Control Passed Passed    Lot Number [id number]     Expiration Date 10-30-26          Assessment / Plan      Assessment/Plan:   Diagnoses and all orders for this visit:    1. Viral URI with cough (Primary)  -     POCT SARS-CoV-2 Antigen PRADEEP + Flu    2. Sore throat  -     POCT rapid strep A    3. Nausea and vomiting, unspecified vomiting type  -     ondansetron ODT (ZOFRAN-ODT) 4 MG disintegrating tablet; Place 1 tablet on the tongue Every 8 (Eight) Hours As Needed for Nausea or Vomiting.  Dispense: 20 tablet; Refill: 0  -     POCT SARS-CoV-2 Antigen PRADEEP + Flu    4. Vaginal discharge    5. Chronic eczematous otitis externa of both ears  -     nystatin-triamcinolone (MYCOLOG) 016592-6.1 UNIT/GM-% ointment; Apply 1 Application topically to the appropriate area as directed 2 (Two) Times a Day.  Dispense: 15 g; Refill: 3    6. Primary hypertension  Comments:  - Refilled amlodipine.  Orders:  -     amLODIPine (NORVASC) 5 MG tablet; Take 1 tablet by mouth  Daily.  Dispense: 90 tablet; Refill: 0       PLAN:  - POC covid/flu/strep negative  - It is important to treat your symptoms aggressively; this includes antihistamine (claritin, zyrtec, or allegra) for runny nose, flonase and nasal saline rinses for nasal congestion, mucinex DM for cough and chest congestion, tylenol as needed for fever or headaches, lots of rest, and lots of water.    - POC urinalysis negative, will send NuSwab  - Zofran and amlodipine prescription refilled per request  - Will trial Mycolog to external ears  - BP slightly elevated; instruct patient to monitor at home with goal <130/80  - Return to the office if symptoms worsen or fail to improve with current plan     Follow Up:   Return for Next scheduled follow up with [facility] [place]

## 2024-10-10 LAB
A VAGINAE DNA VAG QL NAA+PROBE: ABNORMAL SCORE
BVAB2 DNA VAG QL NAA+PROBE: ABNORMAL SCORE
C ALBICANS DNA VAG QL NAA+PROBE: NEGATIVE
C GLABRATA DNA VAG QL NAA+PROBE: NEGATIVE
C TRACH DNA SPEC QL NAA+PROBE: NEGATIVE
MEGA1 DNA VAG QL NAA+PROBE: ABNORMAL SCORE
N GONORRHOEA DNA VAG QL NAA+PROBE: NEGATIVE
T VAGINALIS DNA VAG QL NAA+PROBE: NEGATIVE

## 2024-10-11 ENCOUNTER — TELEPHONE (OUTPATIENT)
Dept: INTERNAL MEDICINE | Facility: CLINIC | Age: 44
End: 2024-10-11
Payer: MEDICAID

## 2024-10-11 DIAGNOSIS — N76.0 BACTERIAL VAGINOSIS: Primary | ICD-10-CM

## 2024-10-11 DIAGNOSIS — B96.89 BACTERIAL VAGINOSIS: Primary | ICD-10-CM

## 2024-10-11 RX ORDER — METRONIDAZOLE 500 MG/1
500 TABLET ORAL 2 TIMES DAILY
Qty: 14 TABLET | Refills: 0 | Status: SHIPPED | OUTPATIENT
Start: 2024-10-11 | End: 2024-10-18

## 2024-10-11 NOTE — TELEPHONE ENCOUNTER
Called and left VM for pt to return call to office.     ----- Message from Khalida Avalos sent at 10/11/2024 11:43 AM EDT -----  Nuswab with bacterial vaginosis. Will send in metronidazole. Please stress the importance of not drinking alcohol while taking this antibiotic. Thank you.

## 2024-10-22 DIAGNOSIS — R09.81 NASAL CONGESTION: ICD-10-CM

## 2024-10-22 DIAGNOSIS — R05.9 COUGH IN ADULT: ICD-10-CM

## 2024-10-22 RX ORDER — LEVOCETIRIZINE DIHYDROCHLORIDE 5 MG/1
5 TABLET, FILM COATED ORAL EVERY EVENING
Qty: 30 TABLET | Refills: 0 | Status: SHIPPED | OUTPATIENT
Start: 2024-10-22

## 2024-11-09 DIAGNOSIS — M54.41 CHRONIC BILATERAL LOW BACK PAIN WITH BILATERAL SCIATICA: ICD-10-CM

## 2024-11-09 DIAGNOSIS — M54.42 CHRONIC BILATERAL LOW BACK PAIN WITH BILATERAL SCIATICA: ICD-10-CM

## 2024-11-09 DIAGNOSIS — G89.29 CHRONIC BILATERAL LOW BACK PAIN WITH BILATERAL SCIATICA: ICD-10-CM

## 2024-11-11 ENCOUNTER — OFFICE VISIT (OUTPATIENT)
Dept: INTERNAL MEDICINE | Facility: CLINIC | Age: 44
End: 2024-11-11
Payer: MEDICAID

## 2024-11-11 VITALS
HEIGHT: 59 IN | DIASTOLIC BLOOD PRESSURE: 82 MMHG | HEART RATE: 86 BPM | WEIGHT: 178 LBS | SYSTOLIC BLOOD PRESSURE: 140 MMHG | BODY MASS INDEX: 35.88 KG/M2 | OXYGEN SATURATION: 98 %

## 2024-11-11 DIAGNOSIS — Z13.21 ENCOUNTER FOR VITAMIN DEFICIENCY SCREENING: ICD-10-CM

## 2024-11-11 DIAGNOSIS — M47.816 LUMBAR SPONDYLOSIS: ICD-10-CM

## 2024-11-11 DIAGNOSIS — G40.909 SEIZURE DISORDER: ICD-10-CM

## 2024-11-11 DIAGNOSIS — Z00.00 ANNUAL PHYSICAL EXAM: Primary | ICD-10-CM

## 2024-11-11 DIAGNOSIS — K21.9 GASTROESOPHAGEAL REFLUX DISEASE WITHOUT ESOPHAGITIS: ICD-10-CM

## 2024-11-11 DIAGNOSIS — I10 PRIMARY HYPERTENSION: ICD-10-CM

## 2024-11-11 DIAGNOSIS — D25.1 INTRAMURAL UTERINE FIBROID: ICD-10-CM

## 2024-11-11 DIAGNOSIS — Z13.29 SCREENING FOR HYPOTHYROIDISM: ICD-10-CM

## 2024-11-11 DIAGNOSIS — R05.9 COUGH IN ADULT: ICD-10-CM

## 2024-11-11 DIAGNOSIS — Z23 NEED FOR INFLUENZA VACCINATION: ICD-10-CM

## 2024-11-11 DIAGNOSIS — Z13.1 SCREENING FOR DIABETES MELLITUS (DM): ICD-10-CM

## 2024-11-11 DIAGNOSIS — R09.81 CONGESTED NOSE: ICD-10-CM

## 2024-11-11 DIAGNOSIS — R09.81 NASAL CONGESTION: ICD-10-CM

## 2024-11-11 DIAGNOSIS — R51.9 PRESSURE IN HEAD: ICD-10-CM

## 2024-11-11 DIAGNOSIS — M25.511 ACUTE PAIN OF RIGHT SHOULDER: ICD-10-CM

## 2024-11-11 DIAGNOSIS — Z13.220 SCREENING FOR HYPERLIPIDEMIA: ICD-10-CM

## 2024-11-11 DIAGNOSIS — R10.13 DYSPEPSIA: ICD-10-CM

## 2024-11-11 DIAGNOSIS — J45.21 MILD INTERMITTENT ASTHMA WITH ACUTE EXACERBATION: ICD-10-CM

## 2024-11-11 DIAGNOSIS — Z12.31 ENCOUNTER FOR SCREENING MAMMOGRAM FOR MALIGNANT NEOPLASM OF BREAST: ICD-10-CM

## 2024-11-11 PROCEDURE — 90471 IMMUNIZATION ADMIN: CPT | Performed by: NURSE PRACTITIONER

## 2024-11-11 PROCEDURE — 3077F SYST BP >= 140 MM HG: CPT | Performed by: NURSE PRACTITIONER

## 2024-11-11 PROCEDURE — 99396 PREV VISIT EST AGE 40-64: CPT | Performed by: NURSE PRACTITIONER

## 2024-11-11 PROCEDURE — 1125F AMNT PAIN NOTED PAIN PRSNT: CPT | Performed by: NURSE PRACTITIONER

## 2024-11-11 PROCEDURE — 90656 IIV3 VACC NO PRSV 0.5 ML IM: CPT | Performed by: NURSE PRACTITIONER

## 2024-11-11 PROCEDURE — 3079F DIAST BP 80-89 MM HG: CPT | Performed by: NURSE PRACTITIONER

## 2024-11-11 RX ORDER — LEVOCETIRIZINE DIHYDROCHLORIDE 5 MG/1
5 TABLET, FILM COATED ORAL EVERY EVENING
Qty: 30 TABLET | Refills: 11 | Status: SHIPPED | OUTPATIENT
Start: 2024-11-11

## 2024-11-11 RX ORDER — AZELASTINE HYDROCHLORIDE 137 UG/1
1 SPRAY, METERED NASAL 2 TIMES DAILY
Qty: 30 ML | Refills: 11 | Status: SHIPPED | OUTPATIENT
Start: 2024-11-11

## 2024-11-11 RX ORDER — LAMOTRIGINE 200 MG/1
200 TABLET ORAL DAILY
Qty: 30 TABLET | Refills: 11 | Status: SHIPPED | OUTPATIENT
Start: 2024-11-11

## 2024-11-11 RX ORDER — FAMOTIDINE 20 MG/1
20 TABLET, FILM COATED ORAL 2 TIMES DAILY
Qty: 60 TABLET | Refills: 11 | Status: SHIPPED | OUTPATIENT
Start: 2024-11-11

## 2024-11-11 RX ORDER — DICLOFENAC SODIUM 75 MG/1
75 TABLET, DELAYED RELEASE ORAL 2 TIMES DAILY
Qty: 60 TABLET | Refills: 2 | Status: SHIPPED | OUTPATIENT
Start: 2024-11-11

## 2024-11-11 NOTE — PROGRESS NOTES
Annual Physical     Name: Bernardo Dodd    : 1980     MRN: [id number]     Chief Complaint  Annual Exam (Physical )    Subjective     History of Present Illness:  Bernardo Dodd is a 44 y.o. female who presents today for annual physical exam.    Jules Bansal tomorrow for possible rhizotomy.  Patient reports her mother is driving her.    Patient is scheduled for hysterectomy per Dr. Smith on  secondary to uterine fibroid.    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 Ongoing for 1 week. Anterior right shoulder.      The patient is being seen for a health maintenance evaluation.    Past Medical History:   Diagnosis Date    Anxiety     Arthritis     Asthma     Bipolar disorder     CHF (congestive heart failure)     Chlamydia 2000    GERD (gastroesophageal reflux disease)     GERD (gastroesophageal reflux disease)     Gonorrhea     Herpes simplex     History of methamphetamine abuse     Clean ~     Hypertension 2016    Kidney stones 2018    Migraine     Seizures 2012    Stopped ~     Shingles on back 2017    Substance abuse - DOC was cocaine 1998    Sober of cocaine since ~     Trichomonas infection 2000    Varicella        Past Surgical History:   Procedure Laterality Date    BARTHOLIN GLAND CYST EXCISION Left 2018     SECTION WITH TUBAL  2010    CYSTOSCOPY, URETEROSCOPY, RETROGRADE PYELOGRAM, STONE EXTRACTION, STENT INSERTION Bilateral 2023    Procedure: CYSTOSCOPY, BILATERAL RETROGRADE PYELOGRAM, URETEROSCOPY, AND STENT PLACEMENT;  Surgeon: Micah Child MD;  Location: [facility] OR;  Service: Urology;  Laterality: Bilateral;    CYSTOSCOPY, URETEROSCOPY, RETROGRADE PYELOGRAM, STONE EXTRACTION, STENT INSERTION Left 2023    Procedure: CYSTOSCOPY URETEROSCOPY RETROGRADE PYELOGRAM STONE EXTRACTION STENT INSERTION;  Surgeon: Micah Child MD;  Location: [facility] OR;  Service: Urology;  Laterality: Left;    DIAGNOSTIC LAPAROSCOPY       ENDOSCOPY      I & D / EXCISION MARSUPIALIZATION BARTHOLIN'S GLAND CYST / LYSIS LESIONS Left     ORIF ANKLE FRACTURE Right 2005    REDUCTION MAMMAPLASTY Bilateral     TENSION FREE VAGINAL TAPING WITH MINI ARC SLING  2018    Dr Doyle avery        Social History     Socioeconomic History    Marital status: Single    Number of children: 2    Highest education level: Some college, no degree   Tobacco Use    Smoking status: Former     Current packs/day: 0.00     Average packs/day: 1 pack/day for 22.0 years (22.0 ttl pk-yrs)     Types: Cigarettes     Start date:      Quit date: 2016     Years since quittin.8     Passive exposure: Past    Smokeless tobacco: Never   Vaping Use    Vaping status: Some Days    Substances: THC, Flavoring    Devices: Disposable    Passive vaping exposure: Yes   Substance and Sexual Activity    Alcohol use: No    Drug use: Yes     Types: Marijuana     Comment: Marijuana once a week. Tried cocaine, meth, pain pills in the past    Sexual activity: Defer     Partners: Male     Birth control/protection: Surgical         Current Outpatient Medications:     albuterol sulfate  (90 Base) MCG/ACT inhaler, Inhale 2 puffs Every 4 (Four) Hours As Needed for Wheezing., Disp: 18 g, Rfl: 2    amLODIPine (NORVASC) 5 MG tablet, Take 1 tablet by mouth Daily., Disp: 90 tablet, Rfl: 0    Azelastine HCl 137 MCG/SPRAY solution, 1 spray by Each Nare route 2 (Two) Times a Day., Disp: 30 mL, Rfl: 11    famotidine (PEPCID) 20 MG tablet, Take 1 tablet by mouth 2 (Two) Times a Day., Disp: 60 tablet, Rfl: 11    lamoTRIgine (LaMICtal) 200 MG tablet, Take 1 tablet by mouth Daily., Disp: 30 tablet, Rfl: 11    levocetirizine (XYZAL) 5 MG tablet, Take 1 tablet by mouth Every Evening., Disp: 30 tablet, Rfl: 11    Multiple Vitamins-Minerals (CENTRUM ADULT PO), Take  by mouth., Disp: , Rfl:     nystatin-triamcinolone (MYCOLOG) 450154-8.1 UNIT/GM-% ointment, Apply 1 Application topically to the  appropriate area as directed 2 (Two) Times a Day., Disp: 15 g, Rfl: 3    ondansetron ODT (ZOFRAN-ODT) 4 MG disintegrating tablet, Place 1 tablet on the tongue Every 8 (Eight) Hours As Needed for Nausea or Vomiting., Disp: 20 tablet, Rfl: 0    diclofenac (VOLTAREN) 75 MG EC tablet, Take 1 tablet by mouth 2 (Two) Times a Day., Disp: 60 tablet, Rfl: 2    General History  Bernardo  does not have regular dental visits.  She does complain of vision problems. Last eye exam was June 2024.  Immunizations are not up to date. The patient needs the following immunizations: Covid booster.     Lifestyle  Bernardo  consumes  an average diet. Trying to eat more chicken and lean meats in place of red meat. Drinking more water .  She exercises  walking most days. Now using smart watch . Walking 6,000-12,000 steps daily.    Reproductive Health  Bernardo  is perimenopausal.  She reports periods are regular every 28-30 days.  She is sexually active. Her contraceptive plan is tubal ligation.    Screening  Last pap was June 2024.  Last Completed Pap Smear            PAP SMEAR (Every 3 Years) Next due on 6/21/2027 06/21/2024  LIQUID-BASED PAP SMEAR WITH HPV GENOTYPING IF ASCUS (DARRYL,COR,MAD)    10/08/2020  SCANNED - PAP SMEAR    08/14/2018  Done                . History of abnormal pap smear or family history of gyn cancer: No abnormal pap. No FH.  Last mammogram was January 2023.  Last Completed Mammogram       This patient has no relevant Health Maintenance data.        . Personal or family history of abnormal mammograms or breast cancer: No FH.  Last colonoscopy was at age [age].   Last Completed Colonoscopy            COLORECTAL CANCER SCREENING (COLONOSCOPY - Every 10 Years) Next due on 1/31/2027 01/31/2017  Colonoscopy                . Family history of colon cancer: No FH.  Last DEXA was never.    Health Maintenance Summary            Overdue - COVID-19 Vaccine (4 - 2024-25 season) Overdue since 9/1/2024 11/11/2024   Postponed until 11/13/2024 by Janice Augustin MA (Patient Refused)    11/07/2023  Imm Admin: COVID-19 F23 (PFIZER) 12YRS+ (COMIRNATY)    10/26/2022  Imm Admin: COVID-19 (PFIZER) BIVALENT 12+YRS    05/05/2021  Imm Admin: COVID-19 (THA)              Scheduled - MAMMOGRAM (Every 2 Years) Scheduled for 1/3/2025      11/11/2024  Postponed until 1/15/2025 by Janice Augustin MA (Pending event)    11/11/2024  Postponed until 1/15/2025 by Janice Augustin MA (Pending event)    11/11/2024  Postponed until 1/15/2025 by Janice Augustin MA (Pending event)    01/19/2023  Mammo Diagnostic Digital Tomosynthesis Bilateral With CAD    02/14/2022  Mammo Diagnostic Digital Tomosynthesis Left With CAD    Only the first 5 history entries have been loaded, but more history exists.              BMI FOLLOWUP (Yearly) Next due on 6/4/2025 06/04/2024  SmartData: WORKFLOW - QUALITY MEASUREMENT - DOCUMENTED WEIGHT FOLLOW-UP PLAN    05/16/2023  SmartData: WORKFLOW - QUALITY MEASUREMENT - DOCUMENTED WEIGHT FOLLOW-UP PLAN    04/18/2023  SmartData: WORKFLOW - QUALITY MEASUREMENT - DOCUMENTED WEIGHT FOLLOW-UP PLAN    12/15/2022  SmartData: WORKFLOW - QUALITY MEASUREMENT - DOCUMENTED WEIGHT FOLLOW-UP PLAN              ANNUAL PHYSICAL (Yearly) Next due on 11/11/2025 11/11/2024  Done    11/07/2023  Done    10/26/2022  Done    10/21/2021  Done    09/28/2020  Done    Only the first 5 history entries have been loaded, but more history exists.              COLORECTAL CANCER SCREENING (COLONOSCOPY - Every 10 Years) Next due on 1/31/2027 01/31/2017  Colonoscopy              PAP SMEAR (Every 3 Years) Next due on 6/21/2027 06/21/2024  LIQUID-BASED PAP SMEAR WITH HPV GENOTYPING IF ASCUS (DARRYL,COR,MAD)    10/08/2020  SCANNED - PAP SMEAR    08/14/2018  Done              TDAP/TD VACCINES (3 - Td or Tdap) Next due on 5/7/2028 05/07/2018  Imm Admin: Tdap    08/25/2014  Imm Admin: Tdap              HEPATITIS C SCREENING  Completed       "08/14/2018  Hepatitis Panel, Acute    06/01/2018  Hepatitis Panel, Acute              Pneumococcal Vaccine 0-64 (Series Information) Completed      10/26/2022  Imm Admin: Pneumococcal Conjugate 20-Valent (PCV20)    10/07/2022  Postponed until 10/7/2022 by Daphne Jeter MA (Pending event)    09/29/2017  Imm Admin: Pneumococcal Polysaccharide (PPSV23)              INFLUENZA VACCINE  Completed      11/11/2024  Imm Admin: Fluzone  >6mos    11/07/2023  Imm Admin: Fluzone (or Fluarix & Flulaval for VFC) >6mos    10/26/2022  Imm Admin: Fluzone (or Fluarix & Flulaval for VFC) >6mos    10/07/2022  Postponed until 3/31/2023 by Daphne Jeter MA (Pending event)    10/02/2021  Imm Admin: Fluzone (or Fluarix & Flulaval for VFC) >6mos    Only the first 5 history entries have been loaded, but more history exists.                  Immunization History   Administered Date(s) Administered    COVID-19 (THA) 05/05/2021    COVID-19 (PFIZER) 12YRS+ (COMIRNATY) 11/07/2023    COVID-19 (PFIZER) BIVALENT 12+YRS 10/26/2022    Flu Vaccine Intradermal Quad 18-64YR 08/16/2020    Fluzone  >6mos 11/11/2024    Fluzone (or Fluarix & Flulaval for VFC) >6mos 08/19/2020, 10/02/2021, 10/26/2022, 11/07/2023    Hepatitis A 12/17/2018, 07/08/2019    Influenza, Unspecified 10/01/2018, 08/06/2019, 09/13/2019, 08/16/2020    PPD Test 01/09/2019, 01/08/2020, 05/05/2021, 05/10/2021    Pneumococcal Conjugate 20-Valent (PCV20) 10/26/2022    Pneumococcal Polysaccharide (PPSV23) 09/29/2017    Tdap 08/25/2014, 05/07/2018    flucelvax quad pfs =>4 YRS 09/13/2019       Review of Systems   Musculoskeletal:  Positive for arthralgias.       Objective     Vital Signs  /82 (BP Location: Left arm, Patient Position: Sitting, Cuff Size: Adult)   Pulse 86   Ht 149.9 cm (59.02\")   Wt 80.7 kg (178 lb)   SpO2 98%   BMI 35.93 kg/m²   Estimated body mass index is 35.93 kg/m² as calculated from the following:    Height as of this encounter: 149.9 cm " "(59.02\").    Weight as of this encounter: 80.7 kg (178 lb).            Physical Exam  Constitutional:       General: She is awake. She is not in acute distress.     Appearance: Normal appearance. She is well-developed and well-groomed. She is not ill-appearing.   HENT:      Head: Normocephalic and atraumatic.      Right Ear: Tympanic membrane, ear canal and external ear normal.      Left Ear: Tympanic membrane, ear canal and external ear normal.      Nose: Nose normal.      Mouth/Throat:      Mouth: Mucous membranes are moist.      Pharynx: Oropharynx is clear.   Eyes:      General: No scleral icterus.     Extraocular Movements: Extraocular movements intact.      Conjunctiva/sclera: Conjunctivae normal.      Pupils: Pupils are equal, round, and reactive to light.   Neck:      Trachea: Trachea normal.   Cardiovascular:      Rate and Rhythm: Normal rate and regular rhythm.      Pulses: Normal pulses.      Heart sounds: Normal heart sounds. No murmur heard.  Pulmonary:      Effort: Pulmonary effort is normal. No tachypnea, accessory muscle usage or respiratory distress.      Breath sounds: Normal breath sounds. No wheezing, rhonchi or rales.   Abdominal:      General: Abdomen is flat. Bowel sounds are normal. There is no distension.      Palpations: Abdomen is soft.      Tenderness: There is no abdominal tenderness.   Genitourinary:     Comments: Deferred  Musculoskeletal:         General: No swelling. Normal range of motion.      Cervical back: Normal range of motion and neck supple. No tenderness.      Right lower leg: No edema.      Left lower leg: No edema.   Skin:     General: Skin is warm and dry.      Capillary Refill: Capillary refill takes less than 2 seconds.      Coloration: Skin is not jaundiced or pale.      Findings: No lesion or rash.   Neurological:      General: No focal deficit present.      Mental Status: She is alert and oriented to person, place, and time. Mental status is at baseline.      Motor: " No weakness.      Gait: Gait is intact.   Psychiatric:         Attention and Perception: Attention normal.         Mood and Affect: Mood normal.         Speech: Speech normal.         Behavior: Behavior normal. Behavior is cooperative.         Thought Content: Thought content normal.         Judgment: Judgment normal.          Assessment and Plan     Diagnoses and all orders for this visit:    1. Annual physical exam (Primary)  -     CBC (No Diff); Future  -     Comprehensive metabolic panel; Future  -     TSH Rfx On Abnormal To Free T4; Future  -     Urinalysis With Culture If Indicated -; Future  -     Lipid Panel; Future  -     Vitamin D,25-Hydroxy; Future  -     Hemoglobin A1c; Future    2. Congested nose  -     Azelastine HCl 137 MCG/SPRAY solution; 1 spray by Each Nare route 2 (Two) Times a Day.  Dispense: 30 mL; Refill: 11    3. Pressure in head  -     Azelastine HCl 137 MCG/SPRAY solution; 1 spray by Each Nare route 2 (Two) Times a Day.  Dispense: 30 mL; Refill: 11    4. Dyspepsia  -     famotidine (PEPCID) 20 MG tablet; Take 1 tablet by mouth 2 (Two) Times a Day.  Dispense: 60 tablet; Refill: 11    5. Gastroesophageal reflux disease without esophagitis  -     famotidine (PEPCID) 20 MG tablet; Take 1 tablet by mouth 2 (Two) Times a Day.  Dispense: 60 tablet; Refill: 11    6. Seizure disorder  -     lamoTRIgine (LaMICtal) 200 MG tablet; Take 1 tablet by mouth Daily.  Dispense: 30 tablet; Refill: 11    7. Cough in adult  -     levocetirizine (XYZAL) 5 MG tablet; Take 1 tablet by mouth Every Evening.  Dispense: 30 tablet; Refill: 11    8. Nasal congestion  -     levocetirizine (XYZAL) 5 MG tablet; Take 1 tablet by mouth Every Evening.  Dispense: 30 tablet; Refill: 11    9. Mild intermittent asthma with acute exacerbation    10. Primary hypertension  -     CBC (No Diff); Future  -     Comprehensive metabolic panel; Future    11. Intramural uterine fibroid    12. Lumbar spondylosis  -     Mammo screening digital  tomosynthesis bilateral w CAD; Future    13. Screening for diabetes mellitus (DM)  -     Comprehensive metabolic panel; Future  -     Urinalysis With Culture If Indicated -; Future  -     Hemoglobin A1c; Future    14. Screening for hypothyroidism  -     TSH Rfx On Abnormal To Free T4; Future    15. Screening for hyperlipidemia  -     Lipid Panel; Future    16. Encounter for vitamin deficiency screening  -     Vitamin D,25-Hydroxy; Future    17. Encounter for screening mammogram for malignant neoplasm of breast  -     Mammo screening digital tomosynthesis bilateral w CAD; Future    18. Need for influenza vaccination  -     Fluzone >6mos    19. Acute pain of right shoulder  -     diclofenac (VOLTAREN) 75 MG EC tablet; Take 1 tablet by mouth 2 (Two) Times a Day.  Dispense: 60 tablet; Refill: 2        Plan:  Annual physical exam.  Orders placed for fasting labs.  Will review lab results with patient once received and reviewed.  Further plan of care based on lab result findings.  Orders placed for a screening mammogram.  Up-to-date on Pap smear.  Patient received flu vaccine today.  Will try diclofenac twice daily as needed for management of right shoulder pain.  Colorectal cancer screenings in progress, last done in 2017.  Continue with up to date immunizations and health maintenance screenings.  Continue with healthy lifestyle choices such as healthy diet and eating habits and regular exercise routine.  Continue with adequate oral hydration and rest.  Annual physical exam in 1 year.  Return to clinic sooner if needed.  Medication refill sent to the pharmacy on file.    Follow Up  Return in about 1 year (around 11/11/2025), or if symptoms worsen or fail to improve, for Annual.    SHANE Ang    Part of this note may be an electronic transcription/translation of spoken language to printed text using the Dragon Dictation System.

## 2024-11-14 ENCOUNTER — LAB (OUTPATIENT)
Dept: LAB | Facility: HOSPITAL | Age: 44
End: 2024-11-14
Payer: MEDICAID

## 2024-11-14 DIAGNOSIS — Z13.29 SCREENING FOR HYPOTHYROIDISM: ICD-10-CM

## 2024-11-14 DIAGNOSIS — Z00.00 ANNUAL PHYSICAL EXAM: ICD-10-CM

## 2024-11-14 DIAGNOSIS — Z13.21 ENCOUNTER FOR VITAMIN DEFICIENCY SCREENING: ICD-10-CM

## 2024-11-14 DIAGNOSIS — Z13.220 SCREENING FOR HYPERLIPIDEMIA: ICD-10-CM

## 2024-11-14 DIAGNOSIS — I10 PRIMARY HYPERTENSION: ICD-10-CM

## 2024-11-14 DIAGNOSIS — Z13.1 SCREENING FOR DIABETES MELLITUS (DM): ICD-10-CM

## 2024-11-14 LAB
BILIRUB UR QL STRIP: NEGATIVE
CLARITY UR: CLEAR
COLOR UR: YELLOW
DEPRECATED RDW RBC AUTO: 44.8 FL (ref 37–54)
ERYTHROCYTE [DISTWIDTH] IN BLOOD BY AUTOMATED COUNT: 15.4 % (ref 12.3–15.4)
GLUCOSE UR STRIP-MCNC: NEGATIVE MG/DL
HCT VFR BLD AUTO: 36.8 % (ref 34–46.6)
HGB BLD-MCNC: 11.8 G/DL (ref 12–15.9)
HGB UR QL STRIP.AUTO: NEGATIVE
HOLD SPECIMEN: NORMAL
KETONES UR QL STRIP: NEGATIVE
LEUKOCYTE ESTERASE UR QL STRIP.AUTO: NEGATIVE
MCH RBC QN AUTO: 25.9 PG (ref 26.6–33)
MCHC RBC AUTO-ENTMCNC: 32.1 G/DL (ref 31.5–35.7)
MCV RBC AUTO: 80.9 FL (ref 79–97)
NITRITE UR QL STRIP: NEGATIVE
PH UR STRIP.AUTO: 7.5 [PH] (ref 5–8)
PLATELET # BLD AUTO: 330 10*3/MM3 (ref 140–450)
PMV BLD AUTO: 10.4 FL (ref 6–12)
PROT UR QL STRIP: NEGATIVE
RBC # BLD AUTO: 4.55 10*6/MM3 (ref 3.77–5.28)
SP GR UR STRIP: 1.02 (ref 1–1.03)
UROBILINOGEN UR QL STRIP: NORMAL
WBC NRBC COR # BLD AUTO: 9.11 10*3/MM3 (ref 3.4–10.8)

## 2024-11-14 PROCEDURE — 80050 GENERAL HEALTH PANEL: CPT

## 2024-11-14 PROCEDURE — 80061 LIPID PANEL: CPT

## 2024-11-14 PROCEDURE — 81003 URINALYSIS AUTO W/O SCOPE: CPT

## 2024-11-14 PROCEDURE — 82306 VITAMIN D 25 HYDROXY: CPT

## 2024-11-14 PROCEDURE — 36415 COLL VENOUS BLD VENIPUNCTURE: CPT

## 2024-11-14 PROCEDURE — 83036 HEMOGLOBIN GLYCOSYLATED A1C: CPT

## 2024-11-15 ENCOUNTER — TELEPHONE (OUTPATIENT)
Dept: OBSTETRICS AND GYNECOLOGY | Facility: CLINIC | Age: 44
End: 2024-11-15

## 2024-11-15 LAB
25(OH)D3 SERPL-MCNC: 27.8 NG/ML (ref 30–100)
ALBUMIN SERPL-MCNC: 4 G/DL (ref 3.5–5.2)
ALBUMIN/GLOB SERPL: 1.3 G/DL
ALP SERPL-CCNC: 59 U/L (ref 39–117)
ALT SERPL W P-5'-P-CCNC: 10 U/L (ref 1–33)
ANION GAP SERPL CALCULATED.3IONS-SCNC: 13.8 MMOL/L (ref 5–15)
AST SERPL-CCNC: 13 U/L (ref 1–32)
BILIRUB SERPL-MCNC: 0.2 MG/DL (ref 0–1.2)
BUN SERPL-MCNC: 11 MG/DL (ref 6–20)
BUN/CREAT SERPL: 16.9 (ref 7–25)
CALCIUM SPEC-SCNC: 8.8 MG/DL (ref 8.6–10.5)
CHLORIDE SERPL-SCNC: 103 MMOL/L (ref 98–107)
CHOLEST SERPL-MCNC: 194 MG/DL (ref 0–200)
CO2 SERPL-SCNC: 22.2 MMOL/L (ref 22–29)
CREAT SERPL-MCNC: 0.65 MG/DL (ref 0.57–1)
EGFRCR SERPLBLD CKD-EPI 2021: 110.1 ML/MIN/1.73
GLOBULIN UR ELPH-MCNC: 3.1 GM/DL
GLUCOSE SERPL-MCNC: 76 MG/DL (ref 65–99)
HBA1C MFR BLD: 5.8 % (ref 4.8–5.6)
HDLC SERPL-MCNC: 75 MG/DL (ref 40–60)
LDLC SERPL CALC-MCNC: 106 MG/DL (ref 0–100)
LDLC/HDLC SERPL: 1.4 {RATIO}
POTASSIUM SERPL-SCNC: 4.1 MMOL/L (ref 3.5–5.2)
PROT SERPL-MCNC: 7.1 G/DL (ref 6–8.5)
SODIUM SERPL-SCNC: 139 MMOL/L (ref 136–145)
TRIGL SERPL-MCNC: 69 MG/DL (ref 0–150)
TSH SERPL DL<=0.05 MIU/L-ACNC: 3.1 UIU/ML (ref 0.27–4.2)
VLDLC SERPL-MCNC: 13 MG/DL (ref 5–40)

## 2024-11-15 NOTE — TELEPHONE ENCOUNTER
"  Caller: Bernardo Dodd \"Gricel\"    Relationship to patient: Self    Best call back number: [phone]    Chief complaint: PT JUST CHANGED HER 11-22 APPT TO 11-19 WITH JOSY BUT NOW WANTS THE ORIGINAL TIME    Type of visit: PRE-OP    Requested date: 11-22     If rescheduling, when is the original appointment: 11-22     Additional notes:NOT ABLE TO REACH JOSY NOW           "

## 2024-11-22 ENCOUNTER — OFFICE VISIT (OUTPATIENT)
Dept: OBSTETRICS AND GYNECOLOGY | Facility: CLINIC | Age: 44
End: 2024-11-22
Payer: MEDICAID

## 2024-11-22 VITALS — WEIGHT: 178 LBS | BODY MASS INDEX: 35.93 KG/M2 | RESPIRATION RATE: 14 BRPM

## 2024-11-22 DIAGNOSIS — D25.9 UTERINE LEIOMYOMA, UNSPECIFIED LOCATION: ICD-10-CM

## 2024-11-22 DIAGNOSIS — Z01.818 PREOP EXAMINATION: Primary | ICD-10-CM

## 2024-11-22 NOTE — PROGRESS NOTES
Britta Dodd is a 44 y.o. year old  who is scheduled  for surgery due to heavy menses with uterine fibroids.  She is up-to-date on cervical cancer screening.  Ultrasound as a relates to the adnexa is unremarkable.  We previously of talked about removal of the ovaries at the time and she has elected to keep the ovaries.    Past Medical History:   Diagnosis Date    Bipolar disorder     Substance abuse - DOC was cocaine     Sober of cocaine since ~     Antisocial personality disorder     Herpes simplex     Chlamydia     Trichomonas infection     Gonorrhea     Seizures 2012    Stopped ~     GERD (gastroesophageal reflux disease) 2015    Hypertension 2016    Shingles on back 2017    Kidney stones 2018    History of methamphetamine abuse     Clean ~      Past Surgical History:   Procedure Laterality Date    I & D / EXCISION MARSUPIALIZATION BARTHOLIN'S GLAND CYST / LYSIS LESIONS Left     REDUCTION MAMMAPLASTY Bilateral     ORIF ANKLE FRACTURE Right 2005     SECTION WITH TUBAL  2010    BARTHOLIN GLAND CYST EXCISION Left     TENSION FREE VAGINAL TAPING WITH MINI ARC SLING  2018    Dr Doyle avery     CYSTOSCOPY, URETEROSCOPY, RETROGRADE PYELOGRAM, STONE EXTRACTION, STENT INSERTION Bilateral 2023    Procedure: CYSTOSCOPY, BILATERAL RETROGRADE PYELOGRAM, URETEROSCOPY, AND STENT PLACEMENT;  Surgeon: Micah Child MD;  Location:  [facility] OR;  Service: Urology;  Laterality: Bilateral;    CYSTOSCOPY, URETEROSCOPY, RETROGRADE PYELOGRAM, STONE EXTRACTION, STENT INSERTION Left 2023    Procedure: CYSTOSCOPY URETEROSCOPY RETROGRADE PYELOGRAM STONE EXTRACTION STENT INSERTION;  Surgeon: Micah Child MD;  Location:  [facility] OR;  Service: Urology;  Laterality: Left;    DIAGNOSTIC LAPAROSCOPY      ENDOSCOPY       OB History    Para Term  AB Living   2 2 2 0 0 2   SAB IAB Ectopic Molar Multiple Live Births   0 0 0 0  0 2      # Outcome Date GA Lbr Farhad/2nd Weight Sex Type Anes PTL Lv   2 Term 2010    M CS-Unspec   [facility]      Name: Shayne   1 Term 2002    M Vag-Spont   [facility]      Name: Justyn     Social History     Tobacco Use   Smoking Status Former    Current packs/day: 0.00    Average packs/day: 1 pack/day for 22.0 years (22.0 ttl pk-yrs)    Types: Cigarettes    Start date:     Quit date: 2016    Years since quittin.8    Passive exposure: Past   Smokeless Tobacco Never     Social History     Substance and Sexual Activity   Alcohol Use No     Social History     Substance and Sexual Activity   Drug Use Yes    Types: Marijuana    Comment: Marijuana once a week. Tried cocaine, meth, pain pills in the past     Prior to Admission medications    Medication Sig Start Date End Date Taking? Authorizing Provider   albuterol sulfate  (90 Base) MCG/ACT inhaler Inhale 2 puffs Every 4 (Four) Hours As Needed for Wheezing. 3/27/24  Yes Rebekah Fagan APRN   amLODIPine (NORVASC) 5 MG tablet Take 1 tablet by mouth Daily. 10/7/24  Yes Khalida Avalos PA-C   Azelastine HCl 137 MCG/SPRAY solution 1 spray by Each Nare route 2 (Two) Times a Day. 24  Yes Rebekah Fagan APRN   diclofenac (VOLTAREN) 75 MG EC tablet Take 1 tablet by mouth 2 (Two) Times a Day. 24  Yes Rebekah Fagan APRMANUEL   famotidine (PEPCID) 20 MG tablet Take 1 tablet by mouth 2 (Two) Times a Day. 24  Yes Rebekah Fagan APRN   lamoTRIgine (LaMICtal) 200 MG tablet Take 1 tablet by mouth Daily. 24  Yes Rebekah Fagan APRN   levocetirizine (XYZAL) 5 MG tablet Take 1 tablet by mouth Every Evening. 24  Yes Rebekah Fagan APRN   Multiple Vitamins-Minerals (CENTRUM ADULT PO) Take  by mouth.   Yes Provider, MD Armin   nystatin-triamcinolone (MYCOLOG) 216683-4.1 UNIT/GM-% ointment Apply 1 Application topically to the appropriate area as directed 2 (Two) Times a Day. 10/7/24  Yes Eberling, Khalida, PA-C   ondansetron ODT (ZOFRAN-ODT) 4 MG disintegrating  tablet Place 1 tablet on the tongue Every 8 (Eight) Hours As Needed for Nausea or Vomiting. 10/7/24  Yes Khalida Avalos PA-C     Allergies   Allergen Reactions    Naproxen Swelling    Sulfa Antibiotics Rash       Review of Systems   Constitutional:  Negative for chills, fever and unexpected weight change.   HENT:  Negative for ear pain, facial swelling, sinus pressure, sneezing and sore throat.    Respiratory:  Negative for cough, shortness of breath and wheezing.    Cardiovascular:  Negative for chest pain and palpitations.   Hematological:  Does not bruise/bleed easily.         Objective   Resp 14   Wt 80.7 kg (178 lb)   BMI 35.93 kg/m²   General: well developed; well nourished  no acute distress  mentation appropriate   Heart: Not performed.   Lungs: breathing is unlabored   Abdomen: soft, non-tender; no masses  no umbilical or inguinal hernias are present  no hepato-splenomegaly   Pelvis: Not performed.        Assessment   Symptomatic uterine fibroids with anemia, stable overall  Prior  section x 1  Comorbidities to include hypertension, acid reflux and mood disorder     Plan   Robotic total laparoscopic hysterectomy with bilateral salpingectomy  Antibiotic and DVT prophylaxis  Extended recovery after surgery discussed  Would hold off on diclofenac from this point  I have previously discussed with Bernardo the risks of her surgical procedure. Risks including intraoperative bleeding, infection at the site of surgery, damage to the adjacent surrounding organs, catheter induced urinary tract infections and the small risk for deep vein thrombosis have all been explained. Additionally, the small risk for reoperation in the event of unanticipated bleeding or surgical injury have been discussed.          Edmar Smith M.D.  2024       (Pt's PCP is Rebekah Fagan APRN)

## 2024-11-22 NOTE — H&P
Britta Dodd is a 44 y.o. year old  who is scheduled  for surgery due to heavy menses with uterine fibroids.  She is up-to-date on cervical cancer screening.  Ultrasound as a relates to the adnexa is unremarkable.  We previously of talked about removal of the ovaries at the time and she has elected to keep the ovaries.    Past Medical History:   Diagnosis Date    Bipolar disorder     Substance abuse - DOC was cocaine     Sober of cocaine since ~     Antisocial personality disorder     Herpes simplex     Chlamydia     Trichomonas infection     Gonorrhea     Seizures 2012    Stopped ~     GERD (gastroesophageal reflux disease) 2015    Hypertension 2016    Shingles on back 2017    Kidney stones 2018    History of methamphetamine abuse     Clean ~      Past Surgical History:   Procedure Laterality Date    I & D / EXCISION MARSUPIALIZATION BARTHOLIN'S GLAND CYST / LYSIS LESIONS Left     REDUCTION MAMMAPLASTY Bilateral     ORIF ANKLE FRACTURE Right 2005     SECTION WITH TUBAL  2010    BARTHOLIN GLAND CYST EXCISION Left     TENSION FREE VAGINAL TAPING WITH MINI ARC SLING  2018    Dr Doyle avery     CYSTOSCOPY, URETEROSCOPY, RETROGRADE PYELOGRAM, STONE EXTRACTION, STENT INSERTION Bilateral 2023    Procedure: CYSTOSCOPY, BILATERAL RETROGRADE PYELOGRAM, URETEROSCOPY, AND STENT PLACEMENT;  Surgeon: Micah Child MD;  Location:  [facility];  Service: Urology;  Laterality: Bilateral;    CYSTOSCOPY, URETEROSCOPY, RETROGRADE PYELOGRAM, STONE EXTRACTION, STENT INSERTION Left 2023    Procedure: CYSTOSCOPY URETEROSCOPY RETROGRADE PYELOGRAM STONE EXTRACTION STENT INSERTION;  Surgeon: Micah Child MD;  Location:  [facility];  Service: Urology;  Laterality: Left;    DIAGNOSTIC LAPAROSCOPY      ENDOSCOPY       OB History    Para Term  AB Living   2 2 2 0 0 2   SAB IAB Ectopic Molar Multiple Live Births   0 0 0 0 0  2      # Outcome Date GA Lbr Farhad/2nd Weight Sex Type Anes PTL Lv   2 Term 2010    M CS-Unspec   CECELIA      Name: Shayne   1 Term 2002    M Vag-Spont   CECELIA      Name: Justyn     Social History     Tobacco Use   Smoking Status Former    Current packs/day: 0.00    Average packs/day: 1 pack/day for 22.0 years (22.0 ttl pk-yrs)    Types: Cigarettes    Start date:     Quit date: 2016    Years since quittin.8    Passive exposure: Past   Smokeless Tobacco Never     Social History     Substance and Sexual Activity   Alcohol Use No     Social History     Substance and Sexual Activity   Drug Use Yes    Types: Marijuana    Comment: Marijuana once a week. Tried cocaine, meth, pain pills in the past     Prior to Admission medications    Medication Sig Start Date End Date Taking? Authorizing Provider   albuterol sulfate  (90 Base) MCG/ACT inhaler Inhale 2 puffs Every 4 (Four) Hours As Needed for Wheezing. 3/27/24  Yes Rebekah Fagan APRN   amLODIPine (NORVASC) 5 MG tablet Take 1 tablet by mouth Daily. 10/7/24  Yes Khalida Avalos PA-C   Azelastine HCl 137 MCG/SPRAY solution 1 spray by Each Nare route 2 (Two) Times a Day. 24  Yes Rebekah Fagan APRN   diclofenac (VOLTAREN) 75 MG EC tablet Take 1 tablet by mouth 2 (Two) Times a Day. 24  Yes Rebekah Fagan APRMANUEL   famotidine (PEPCID) 20 MG tablet Take 1 tablet by mouth 2 (Two) Times a Day. 24  Yes Rebekah Fagan APRN   lamoTRIgine (LaMICtal) 200 MG tablet Take 1 tablet by mouth Daily. 24  Yes Rebekah Fagan APRN   levocetirizine (XYZAL) 5 MG tablet Take 1 tablet by mouth Every Evening. 24  Yes Rebekah Fagan APRN   Multiple Vitamins-Minerals (CENTRUM ADULT PO) Take  by mouth.   Yes Provider, MD Armin   nystatin-triamcinolone (MYCOLOG) 607874-4.1 UNIT/GM-% ointment Apply 1 Application topically to the appropriate area as directed 2 (Two) Times a Day. 10/7/24  Yes Eberling, Khalida, PA-C   ondansetron ODT (ZOFRAN-ODT) 4 MG disintegrating  tablet Place 1 tablet on the tongue Every 8 (Eight) Hours As Needed for Nausea or Vomiting. 10/7/24  Yes Khalida Avalos PA-C     Allergies   Allergen Reactions    Naproxen Swelling    Sulfa Antibiotics Rash       Review of Systems   Constitutional:  Negative for chills, fever and unexpected weight change.   HENT:  Negative for ear pain, facial swelling, sinus pressure, sneezing and sore throat.    Respiratory:  Negative for cough, shortness of breath and wheezing.    Cardiovascular:  Negative for chest pain and palpitations.   Hematological:  Does not bruise/bleed easily.         Objective  Resp 14   Wt 80.7 kg (178 lb)   BMI 35.93 kg/m²   General: well developed; well nourished  no acute distress  mentation appropriate   Heart: Not performed.   Lungs: breathing is unlabored   Abdomen: soft, non-tender; no masses  no umbilical or inguinal hernias are present  no hepato-splenomegaly   Pelvis: Not performed.        Assessment  Symptomatic uterine fibroids with anemia, stable overall  Prior  section x 1  Comorbidities to include hypertension, acid reflux and mood disorder     Plan  Robotic total laparoscopic hysterectomy with bilateral salpingectomy  Antibiotic and DVT prophylaxis  Extended recovery after surgery discussed  Would hold off on diclofenac from this point  I have previously discussed with Bernardo the risks of her surgical procedure. Risks including intraoperative bleeding, infection at the site of surgery, damage to the adjacent surrounding organs, catheter induced urinary tract infections and the small risk for deep vein thrombosis have all been explained. Additionally, the small risk for reoperation in the event of unanticipated bleeding or surgical injury have been discussed.          Edmar Smith M.D.  2024       (Pt's PCP is Rebekah Fagan APRN)

## 2024-11-22 NOTE — H&P (VIEW-ONLY)
Britta Dodd is a 44 y.o. year old  who is scheduled  for surgery due to heavy menses with uterine fibroids.  She is up-to-date on cervical cancer screening.  Ultrasound as a relates to the adnexa is unremarkable.  We previously of talked about removal of the ovaries at the time and she has elected to keep the ovaries.    Past Medical History:   Diagnosis Date    Bipolar disorder     Substance abuse - DOC was cocaine     Sober of cocaine since ~     Antisocial personality disorder     Herpes simplex     Chlamydia     Trichomonas infection     Gonorrhea     Seizures 2012    Stopped ~     GERD (gastroesophageal reflux disease) 2015    Hypertension 2016    Shingles on back 2017    Kidney stones 2018    History of methamphetamine abuse     Clean ~      Past Surgical History:   Procedure Laterality Date    I & D / EXCISION MARSUPIALIZATION BARTHOLIN'S GLAND CYST / LYSIS LESIONS Left     REDUCTION MAMMAPLASTY Bilateral     ORIF ANKLE FRACTURE Right 2005     SECTION WITH TUBAL  2010    BARTHOLIN GLAND CYST EXCISION Left     TENSION FREE VAGINAL TAPING WITH MINI ARC SLING  2018    Dr Doyle avery     CYSTOSCOPY, URETEROSCOPY, RETROGRADE PYELOGRAM, STONE EXTRACTION, STENT INSERTION Bilateral 2023    Procedure: CYSTOSCOPY, BILATERAL RETROGRADE PYELOGRAM, URETEROSCOPY, AND STENT PLACEMENT;  Surgeon: Micah Child MD;  Location:  [facility];  Service: Urology;  Laterality: Bilateral;    CYSTOSCOPY, URETEROSCOPY, RETROGRADE PYELOGRAM, STONE EXTRACTION, STENT INSERTION Left 2023    Procedure: CYSTOSCOPY URETEROSCOPY RETROGRADE PYELOGRAM STONE EXTRACTION STENT INSERTION;  Surgeon: Micah Child MD;  Location:  [facility];  Service: Urology;  Laterality: Left;    DIAGNOSTIC LAPAROSCOPY      ENDOSCOPY       OB History    Para Term  AB Living   2 2 2 0 0 2   SAB IAB Ectopic Molar Multiple Live Births   0 0 0 0  0 2      # Outcome Date GA Lbr Farhad/2nd Weight Sex Type Anes PTL Lv   2 Term 2010    M CS-Unspec   CECELIA      Name: Shayne   1 Term 2002    M Vag-Spont   CECELIA      Name: Justyn     Social History     Tobacco Use   Smoking Status Former    Current packs/day: 0.00    Average packs/day: 1 pack/day for 22.0 years (22.0 ttl pk-yrs)    Types: Cigarettes    Start date:     Quit date: 2016    Years since quittin.8    Passive exposure: Past   Smokeless Tobacco Never     Social History     Substance and Sexual Activity   Alcohol Use No     Social History     Substance and Sexual Activity   Drug Use Yes    Types: Marijuana    Comment: Marijuana once a week. Tried cocaine, meth, pain pills in the past     Prior to Admission medications    Medication Sig Start Date End Date Taking? Authorizing Provider   albuterol sulfate  (90 Base) MCG/ACT inhaler Inhale 2 puffs Every 4 (Four) Hours As Needed for Wheezing. 3/27/24  Yes Rebekah Fagan APRN   amLODIPine (NORVASC) 5 MG tablet Take 1 tablet by mouth Daily. 10/7/24  Yes Khalida Avalos PA-C   Azelastine HCl 137 MCG/SPRAY solution 1 spray by Each Nare route 2 (Two) Times a Day. 24  Yes Rebekah Fagan APRN   diclofenac (VOLTAREN) 75 MG EC tablet Take 1 tablet by mouth 2 (Two) Times a Day. 24  Yes Rebekah Fagan APRMANUEL   famotidine (PEPCID) 20 MG tablet Take 1 tablet by mouth 2 (Two) Times a Day. 24  Yes Rebekah Fagan APRN   lamoTRIgine (LaMICtal) 200 MG tablet Take 1 tablet by mouth Daily. 24  Yes Rebekah Fagan APRN   levocetirizine (XYZAL) 5 MG tablet Take 1 tablet by mouth Every Evening. 24  Yes Rebekah Fagan APRN   Multiple Vitamins-Minerals (CENTRUM ADULT PO) Take  by mouth.   Yes Provider, MD Armin   nystatin-triamcinolone (MYCOLOG) 519891-4.1 UNIT/GM-% ointment Apply 1 Application topically to the appropriate area as directed 2 (Two) Times a Day. 10/7/24  Yes Eberling, Khalida, PA-C   ondansetron ODT (ZOFRAN-ODT) 4 MG disintegrating  tablet Place 1 tablet on the tongue Every 8 (Eight) Hours As Needed for Nausea or Vomiting. 10/7/24  Yes Khalida Avalos PA-C     Allergies   Allergen Reactions    Naproxen Swelling    Sulfa Antibiotics Rash       Review of Systems   Constitutional:  Negative for chills, fever and unexpected weight change.   HENT:  Negative for ear pain, facial swelling, sinus pressure, sneezing and sore throat.    Respiratory:  Negative for cough, shortness of breath and wheezing.    Cardiovascular:  Negative for chest pain and palpitations.   Hematological:  Does not bruise/bleed easily.         Objective   Resp 14   Wt 80.7 kg (178 lb)   BMI 35.93 kg/m²   General: well developed; well nourished  no acute distress  mentation appropriate   Heart: Not performed.   Lungs: breathing is unlabored   Abdomen: soft, non-tender; no masses  no umbilical or inguinal hernias are present  no hepato-splenomegaly   Pelvis: Not performed.        Assessment   Symptomatic uterine fibroids with anemia, stable overall  Prior  section x 1  Comorbidities to include hypertension, acid reflux and mood disorder     Plan   Robotic total laparoscopic hysterectomy with bilateral salpingectomy  Antibiotic and DVT prophylaxis  Extended recovery after surgery discussed  Would hold off on diclofenac from this point  I have previously discussed with Bernardo the risks of her surgical procedure. Risks including intraoperative bleeding, infection at the site of surgery, damage to the adjacent surrounding organs, catheter induced urinary tract infections and the small risk for deep vein thrombosis have all been explained. Additionally, the small risk for reoperation in the event of unanticipated bleeding or surgical injury have been discussed.          Edmar Smith M.D.  2024       (Pt's PCP is Rebekah Fagan APRN)

## 2024-11-26 ENCOUNTER — ANESTHESIA EVENT (OUTPATIENT)
Dept: PERIOP | Facility: HOSPITAL | Age: 44
End: 2024-11-26
Payer: MEDICAID

## 2024-11-26 RX ORDER — FAMOTIDINE 10 MG/ML
20 INJECTION, SOLUTION INTRAVENOUS ONCE
Status: CANCELLED | OUTPATIENT
Start: 2024-11-26 | End: 2024-11-26

## 2024-11-27 ENCOUNTER — ANESTHESIA (OUTPATIENT)
Dept: PERIOP | Facility: HOSPITAL | Age: 44
End: 2024-11-27
Payer: MEDICAID

## 2024-11-27 ENCOUNTER — HOSPITAL ENCOUNTER (OUTPATIENT)
Facility: HOSPITAL | Age: 44
Discharge: HOME | End: 2024-11-28
Attending: OBSTETRICS & GYNECOLOGY | Admitting: OBSTETRICS & GYNECOLOGY
Payer: MEDICAID

## 2024-11-27 DIAGNOSIS — Z98.890 POST-OPERATIVE STATE: Primary | ICD-10-CM

## 2024-11-27 DIAGNOSIS — D25.1 INTRAMURAL UTERINE FIBROID: ICD-10-CM

## 2024-11-27 PROBLEM — D25.9 UTERINE FIBROID: Status: RESOLVED | Noted: 2024-09-05 | Resolved: 2024-11-27

## 2024-11-27 PROBLEM — D50.9 IRON DEFICIENCY ANEMIA: Status: RESOLVED | Noted: 2019-03-24 | Resolved: 2024-11-27

## 2024-11-27 LAB
ABO GROUP BLD: NORMAL
ABO GROUP BLD: NORMAL
B-HCG UR QL: NEGATIVE
BLD GP AB SCN SERPL QL: NEGATIVE
EXPIRATION DATE: NORMAL
GLUCOSE BLDC GLUCOMTR-MCNC: 98 MG/DL (ref 70–130)
INTERNAL NEGATIVE CONTROL: NEGATIVE
INTERNAL POSITIVE CONTROL: POSITIVE
Lab: NORMAL
POTASSIUM SERPL-SCNC: 3.6 MMOL/L (ref 3.5–5.2)
RH BLD: POSITIVE
RH BLD: POSITIVE
T&S EXPIRATION DATE: NORMAL

## 2024-11-27 PROCEDURE — 25810000003 LACTATED RINGERS PER 1000 ML: Performed by: OBSTETRICS & GYNECOLOGY

## 2024-11-27 PROCEDURE — 82948 REAGENT STRIP/BLOOD GLUCOSE: CPT

## 2024-11-27 PROCEDURE — G0378 HOSPITAL OBSERVATION PER HR: HCPCS

## 2024-11-27 PROCEDURE — 25010000002 ONDANSETRON PER 1 MG

## 2024-11-27 PROCEDURE — 25010000002 SUGAMMADEX 200 MG/2ML SOLUTION

## 2024-11-27 PROCEDURE — 88341 IMHCHEM/IMCYTCHM EA ADD ANTB: CPT | Performed by: OBSTETRICS & GYNECOLOGY

## 2024-11-27 PROCEDURE — 63710000001 PROMETHAZINE PER 12.5 MG: Performed by: OBSTETRICS & GYNECOLOGY

## 2024-11-27 PROCEDURE — 25810000003 LACTATED RINGERS PER 1000 ML: Performed by: ANESTHESIOLOGY

## 2024-11-27 PROCEDURE — 84132 ASSAY OF SERUM POTASSIUM: CPT | Performed by: OBSTETRICS & GYNECOLOGY

## 2024-11-27 PROCEDURE — 58573 TLH W/T/O UTERUS OVER 250 G: CPT | Performed by: OBSTETRICS & GYNECOLOGY

## 2024-11-27 PROCEDURE — 86901 BLOOD TYPING SEROLOGIC RH(D): CPT | Performed by: OBSTETRICS & GYNECOLOGY

## 2024-11-27 PROCEDURE — 88307 TISSUE EXAM BY PATHOLOGIST: CPT | Performed by: OBSTETRICS & GYNECOLOGY

## 2024-11-27 PROCEDURE — 58573 TLH W/T/O UTERUS OVER 250 G: CPT | Performed by: STUDENT IN AN ORGANIZED HEALTH CARE EDUCATION/TRAINING PROGRAM

## 2024-11-27 PROCEDURE — 81025 URINE PREGNANCY TEST: CPT | Performed by: ANESTHESIOLOGY

## 2024-11-27 PROCEDURE — 86850 RBC ANTIBODY SCREEN: CPT | Performed by: OBSTETRICS & GYNECOLOGY

## 2024-11-27 PROCEDURE — 25010000002 PROPOFOL 10 MG/ML EMULSION

## 2024-11-27 PROCEDURE — 25010000002 CEFAZOLIN PER 500 MG: Performed by: OBSTETRICS & GYNECOLOGY

## 2024-11-27 PROCEDURE — 25010000002 FENTANYL CITRATE (PF) 50 MCG/ML SOLUTION

## 2024-11-27 PROCEDURE — 86901 BLOOD TYPING SEROLOGIC RH(D): CPT

## 2024-11-27 PROCEDURE — 25010000002 LIDOCAINE PF 1% 1 % SOLUTION: Performed by: ANESTHESIOLOGY

## 2024-11-27 PROCEDURE — 25010000002 LIDOCAINE PF 1% 1 % SOLUTION

## 2024-11-27 PROCEDURE — 25010000002 HYDROMORPHONE PER 4 MG: Performed by: OBSTETRICS & GYNECOLOGY

## 2024-11-27 PROCEDURE — 86900 BLOOD TYPING SEROLOGIC ABO: CPT

## 2024-11-27 PROCEDURE — 86900 BLOOD TYPING SEROLOGIC ABO: CPT | Performed by: OBSTETRICS & GYNECOLOGY

## 2024-11-27 PROCEDURE — 25010000002 DEXAMETHASONE PER 1 MG

## 2024-11-27 PROCEDURE — 25010000002 ENOXAPARIN PER 10 MG: Performed by: OBSTETRICS & GYNECOLOGY

## 2024-11-27 PROCEDURE — 63710000001 ONDANSETRON ODT 4 MG TABLET DISPERSIBLE: Performed by: OBSTETRICS & GYNECOLOGY

## 2024-11-27 PROCEDURE — 25010000002 HYDROMORPHONE 1 MG/ML SOLUTION

## 2024-11-27 PROCEDURE — 25010000002 FENTANYL CITRATE (PF) 100 MCG/2ML SOLUTION

## 2024-11-27 RX ORDER — DEXAMETHASONE SODIUM PHOSPHATE 4 MG/ML
INJECTION, SOLUTION INTRA-ARTICULAR; INTRALESIONAL; INTRAMUSCULAR; INTRAVENOUS; SOFT TISSUE AS NEEDED
Status: DISCONTINUED | OUTPATIENT
Start: 2024-11-27 | End: 2024-11-27 | Stop reason: SURG

## 2024-11-27 RX ORDER — ENOXAPARIN SODIUM 100 MG/ML
INJECTION SUBCUTANEOUS AS NEEDED
Status: DISCONTINUED | OUTPATIENT
Start: 2024-11-27 | End: 2024-11-27 | Stop reason: HOSPADM

## 2024-11-27 RX ORDER — NALOXONE HCL 0.4 MG/ML
0.1 VIAL (ML) INJECTION
Status: DISCONTINUED | OUTPATIENT
Start: 2024-11-27 | End: 2024-11-28 | Stop reason: HOSPADM

## 2024-11-27 RX ORDER — HYDROCODONE BITARTRATE AND ACETAMINOPHEN 5; 325 MG/1; MG/1
1 TABLET ORAL ONCE AS NEEDED
Status: DISCONTINUED | OUTPATIENT
Start: 2024-11-27 | End: 2024-11-27 | Stop reason: HOSPADM

## 2024-11-27 RX ORDER — SODIUM CHLORIDE 0.9 % (FLUSH) 0.9 %
10 SYRINGE (ML) INJECTION AS NEEDED
Status: DISCONTINUED | OUTPATIENT
Start: 2024-11-27 | End: 2024-11-27 | Stop reason: HOSPADM

## 2024-11-27 RX ORDER — HYDROMORPHONE HYDROCHLORIDE 1 MG/ML
0.5 INJECTION, SOLUTION INTRAMUSCULAR; INTRAVENOUS; SUBCUTANEOUS
Status: DISCONTINUED | OUTPATIENT
Start: 2024-11-27 | End: 2024-11-28 | Stop reason: HOSPADM

## 2024-11-27 RX ORDER — LAMOTRIGINE 100 MG/1
200 TABLET ORAL DAILY
Status: DISCONTINUED | OUTPATIENT
Start: 2024-11-27 | End: 2024-11-28 | Stop reason: HOSPADM

## 2024-11-27 RX ORDER — SODIUM CHLORIDE 0.9 % (FLUSH) 0.9 %
3-10 SYRINGE (ML) INJECTION AS NEEDED
Status: DISCONTINUED | OUTPATIENT
Start: 2024-11-27 | End: 2024-11-27 | Stop reason: HOSPADM

## 2024-11-27 RX ORDER — PROPOFOL 10 MG/ML
VIAL (ML) INTRAVENOUS AS NEEDED
Status: DISCONTINUED | OUTPATIENT
Start: 2024-11-27 | End: 2024-11-27 | Stop reason: SURG

## 2024-11-27 RX ORDER — DROPERIDOL 2.5 MG/ML
0.62 INJECTION, SOLUTION INTRAMUSCULAR; INTRAVENOUS ONCE AS NEEDED
Status: DISCONTINUED | OUTPATIENT
Start: 2024-11-27 | End: 2024-11-27 | Stop reason: HOSPADM

## 2024-11-27 RX ORDER — NALOXONE HCL 0.4 MG/ML
0.4 VIAL (ML) INJECTION AS NEEDED
Status: DISCONTINUED | OUTPATIENT
Start: 2024-11-27 | End: 2024-11-27 | Stop reason: HOSPADM

## 2024-11-27 RX ORDER — HYDROMORPHONE HYDROCHLORIDE 1 MG/ML
0.5 INJECTION, SOLUTION INTRAMUSCULAR; INTRAVENOUS; SUBCUTANEOUS
Status: DISCONTINUED | OUTPATIENT
Start: 2024-11-27 | End: 2024-11-27 | Stop reason: HOSPADM

## 2024-11-27 RX ORDER — FAMOTIDINE 20 MG/1
20 TABLET, FILM COATED ORAL ONCE
Status: COMPLETED | OUTPATIENT
Start: 2024-11-27 | End: 2024-11-27

## 2024-11-27 RX ORDER — ONDANSETRON 4 MG/1
4 TABLET, ORALLY DISINTEGRATING ORAL EVERY 6 HOURS PRN
Status: DISCONTINUED | OUTPATIENT
Start: 2024-11-27 | End: 2024-11-28 | Stop reason: HOSPADM

## 2024-11-27 RX ORDER — AMLODIPINE BESYLATE 5 MG/1
5 TABLET ORAL DAILY
Status: DISCONTINUED | OUTPATIENT
Start: 2024-11-28 | End: 2024-11-28 | Stop reason: HOSPADM

## 2024-11-27 RX ORDER — SODIUM CHLORIDE 0.9 % (FLUSH) 0.9 %
3 SYRINGE (ML) INJECTION EVERY 12 HOURS SCHEDULED
Status: DISCONTINUED | OUTPATIENT
Start: 2024-11-27 | End: 2024-11-27 | Stop reason: HOSPADM

## 2024-11-27 RX ORDER — FENTANYL CITRATE 50 UG/ML
50 INJECTION, SOLUTION INTRAMUSCULAR; INTRAVENOUS
Status: DISCONTINUED | OUTPATIENT
Start: 2024-11-27 | End: 2024-11-27 | Stop reason: HOSPADM

## 2024-11-27 RX ORDER — PROMETHAZINE HYDROCHLORIDE 25 MG/1
25 TABLET ORAL ONCE AS NEEDED
Status: DISCONTINUED | OUTPATIENT
Start: 2024-11-27 | End: 2024-11-27 | Stop reason: HOSPADM

## 2024-11-27 RX ORDER — PROMETHAZINE HYDROCHLORIDE 12.5 MG/1
12.5 TABLET ORAL EVERY 6 HOURS PRN
Status: DISCONTINUED | OUTPATIENT
Start: 2024-11-27 | End: 2024-11-28 | Stop reason: HOSPADM

## 2024-11-27 RX ORDER — SODIUM CHLORIDE, SODIUM LACTATE, POTASSIUM CHLORIDE, CALCIUM CHLORIDE 600; 310; 30; 20 MG/100ML; MG/100ML; MG/100ML; MG/100ML
100 INJECTION, SOLUTION INTRAVENOUS CONTINUOUS
Status: DISCONTINUED | OUTPATIENT
Start: 2024-11-27 | End: 2024-11-28

## 2024-11-27 RX ORDER — IPRATROPIUM BROMIDE AND ALBUTEROL SULFATE 2.5; .5 MG/3ML; MG/3ML
3 SOLUTION RESPIRATORY (INHALATION) ONCE AS NEEDED
Status: DISCONTINUED | OUTPATIENT
Start: 2024-11-27 | End: 2024-11-27 | Stop reason: HOSPADM

## 2024-11-27 RX ORDER — SODIUM CHLORIDE, SODIUM LACTATE, POTASSIUM CHLORIDE, CALCIUM CHLORIDE 600; 310; 30; 20 MG/100ML; MG/100ML; MG/100ML; MG/100ML
9 INJECTION, SOLUTION INTRAVENOUS CONTINUOUS
Status: DISCONTINUED | OUTPATIENT
Start: 2024-11-28 | End: 2024-11-27

## 2024-11-27 RX ORDER — LABETALOL HYDROCHLORIDE 5 MG/ML
5 INJECTION, SOLUTION INTRAVENOUS
Status: DISCONTINUED | OUTPATIENT
Start: 2024-11-27 | End: 2024-11-27 | Stop reason: HOSPADM

## 2024-11-27 RX ORDER — IBUPROFEN 600 MG/1
600 TABLET, FILM COATED ORAL EVERY 6 HOURS PRN
Status: DISCONTINUED | OUTPATIENT
Start: 2024-11-27 | End: 2024-11-28

## 2024-11-27 RX ORDER — FENTANYL CITRATE 50 UG/ML
INJECTION, SOLUTION INTRAMUSCULAR; INTRAVENOUS AS NEEDED
Status: DISCONTINUED | OUTPATIENT
Start: 2024-11-27 | End: 2024-11-27 | Stop reason: SDUPTHER

## 2024-11-27 RX ORDER — SODIUM CHLORIDE 0.9 % (FLUSH) 0.9 %
10 SYRINGE (ML) INJECTION EVERY 12 HOURS SCHEDULED
Status: DISCONTINUED | OUTPATIENT
Start: 2024-11-27 | End: 2024-11-27 | Stop reason: HOSPADM

## 2024-11-27 RX ORDER — SODIUM CHLORIDE, SODIUM LACTATE, POTASSIUM CHLORIDE, CALCIUM CHLORIDE 600; 310; 30; 20 MG/100ML; MG/100ML; MG/100ML; MG/100ML
9 INJECTION, SOLUTION INTRAVENOUS CONTINUOUS
Status: DISCONTINUED | OUTPATIENT
Start: 2024-11-27 | End: 2024-11-27

## 2024-11-27 RX ORDER — ONDANSETRON 2 MG/ML
4 INJECTION INTRAMUSCULAR; INTRAVENOUS ONCE AS NEEDED
Status: DISCONTINUED | OUTPATIENT
Start: 2024-11-27 | End: 2024-11-27 | Stop reason: HOSPADM

## 2024-11-27 RX ORDER — MAGNESIUM HYDROXIDE 1200 MG/15ML
LIQUID ORAL AS NEEDED
Status: DISCONTINUED | OUTPATIENT
Start: 2024-11-27 | End: 2024-11-27 | Stop reason: HOSPADM

## 2024-11-27 RX ORDER — PROMETHAZINE HYDROCHLORIDE 12.5 MG/1
12.5 SUPPOSITORY RECTAL EVERY 6 HOURS PRN
Status: DISCONTINUED | OUTPATIENT
Start: 2024-11-27 | End: 2024-11-28 | Stop reason: HOSPADM

## 2024-11-27 RX ORDER — ONDANSETRON 2 MG/ML
4 INJECTION INTRAMUSCULAR; INTRAVENOUS EVERY 6 HOURS PRN
Status: DISCONTINUED | OUTPATIENT
Start: 2024-11-27 | End: 2024-11-28 | Stop reason: HOSPADM

## 2024-11-27 RX ORDER — OXYCODONE AND ACETAMINOPHEN 5; 325 MG/1; MG/1
2 TABLET ORAL EVERY 4 HOURS PRN
Status: DISCONTINUED | OUTPATIENT
Start: 2024-11-27 | End: 2024-11-28 | Stop reason: HOSPADM

## 2024-11-27 RX ORDER — ENOXAPARIN SODIUM 100 MG/ML
40 INJECTION SUBCUTANEOUS ONCE
Status: DISCONTINUED | OUTPATIENT
Start: 2024-11-27 | End: 2024-11-27 | Stop reason: HOSPADM

## 2024-11-27 RX ORDER — ROCURONIUM BROMIDE 10 MG/ML
INJECTION, SOLUTION INTRAVENOUS AS NEEDED
Status: DISCONTINUED | OUTPATIENT
Start: 2024-11-27 | End: 2024-11-27 | Stop reason: SURG

## 2024-11-27 RX ORDER — ENOXAPARIN SODIUM 100 MG/ML
40 INJECTION SUBCUTANEOUS DAILY
Status: DISCONTINUED | OUTPATIENT
Start: 2024-11-28 | End: 2024-11-28 | Stop reason: HOSPADM

## 2024-11-27 RX ORDER — HYDRALAZINE HYDROCHLORIDE 20 MG/ML
5 INJECTION INTRAMUSCULAR; INTRAVENOUS
Status: DISCONTINUED | OUTPATIENT
Start: 2024-11-27 | End: 2024-11-27 | Stop reason: HOSPADM

## 2024-11-27 RX ORDER — LIDOCAINE HYDROCHLORIDE 10 MG/ML
0.5 INJECTION, SOLUTION EPIDURAL; INFILTRATION; INTRACAUDAL; PERINEURAL ONCE AS NEEDED
Status: COMPLETED | OUTPATIENT
Start: 2024-11-27 | End: 2024-11-27

## 2024-11-27 RX ORDER — ONDANSETRON 2 MG/ML
INJECTION INTRAMUSCULAR; INTRAVENOUS AS NEEDED
Status: DISCONTINUED | OUTPATIENT
Start: 2024-11-27 | End: 2024-11-27 | Stop reason: SDUPTHER

## 2024-11-27 RX ORDER — SODIUM CHLORIDE 9 MG/ML
9 INJECTION, SOLUTION INTRAVENOUS AS NEEDED
Status: DISCONTINUED | OUTPATIENT
Start: 2024-11-27 | End: 2024-11-27 | Stop reason: HOSPADM

## 2024-11-27 RX ORDER — PROMETHAZINE HYDROCHLORIDE 25 MG/1
25 SUPPOSITORY RECTAL ONCE AS NEEDED
Status: DISCONTINUED | OUTPATIENT
Start: 2024-11-27 | End: 2024-11-27 | Stop reason: HOSPADM

## 2024-11-27 RX ORDER — FENTANYL CITRATE 50 UG/ML
INJECTION, SOLUTION INTRAMUSCULAR; INTRAVENOUS
Status: COMPLETED
Start: 2024-11-27 | End: 2024-11-27

## 2024-11-27 RX ORDER — LIDOCAINE HYDROCHLORIDE 10 MG/ML
INJECTION, SOLUTION EPIDURAL; INFILTRATION; INTRACAUDAL; PERINEURAL AS NEEDED
Status: DISCONTINUED | OUTPATIENT
Start: 2024-11-27 | End: 2024-11-27 | Stop reason: SURG

## 2024-11-27 RX ORDER — DEXMEDETOMIDINE HYDROCHLORIDE 100 UG/ML
INJECTION, SOLUTION INTRAVENOUS AS NEEDED
Status: DISCONTINUED | OUTPATIENT
Start: 2024-11-27 | End: 2024-11-27 | Stop reason: SURG

## 2024-11-27 RX ORDER — KETOROLAC TROMETHAMINE 15 MG/ML
15 INJECTION, SOLUTION INTRAMUSCULAR; INTRAVENOUS EVERY 6 HOURS PRN
Status: DISCONTINUED | OUTPATIENT
Start: 2024-11-27 | End: 2024-11-28

## 2024-11-27 RX ORDER — BUPIVACAINE HYDROCHLORIDE AND EPINEPHRINE 5; 5 MG/ML; UG/ML
INJECTION, SOLUTION PERINEURAL AS NEEDED
Status: DISCONTINUED | OUTPATIENT
Start: 2024-11-27 | End: 2024-11-27 | Stop reason: HOSPADM

## 2024-11-27 RX ORDER — DROPERIDOL 2.5 MG/ML
0.62 INJECTION, SOLUTION INTRAMUSCULAR; INTRAVENOUS
Status: DISCONTINUED | OUTPATIENT
Start: 2024-11-27 | End: 2024-11-27 | Stop reason: HOSPADM

## 2024-11-27 RX ORDER — MIDAZOLAM HYDROCHLORIDE 1 MG/ML
1 INJECTION, SOLUTION INTRAMUSCULAR; INTRAVENOUS
Status: DISCONTINUED | OUTPATIENT
Start: 2024-11-27 | End: 2024-11-27 | Stop reason: HOSPADM

## 2024-11-27 RX ORDER — OXYCODONE AND ACETAMINOPHEN 5; 325 MG/1; MG/1
1 TABLET ORAL EVERY 4 HOURS PRN
Status: DISCONTINUED | OUTPATIENT
Start: 2024-11-27 | End: 2024-11-28 | Stop reason: HOSPADM

## 2024-11-27 RX ADMIN — DEXAMETHASONE SODIUM PHOSPHATE 8 MG: 4 INJECTION INTRA-ARTICULAR; INTRALESIONAL; INTRAMUSCULAR; INTRAVENOUS; SOFT TISSUE at 11:05

## 2024-11-27 RX ADMIN — LIDOCAINE HYDROCHLORIDE 0.5 ML: 10 INJECTION, SOLUTION EPIDURAL; INFILTRATION; INTRACAUDAL; PERINEURAL at 09:41

## 2024-11-27 RX ADMIN — SODIUM CHLORIDE 2000 MG: 900 INJECTION INTRAVENOUS at 11:05

## 2024-11-27 RX ADMIN — PROMETHAZINE HYDROCHLORIDE 12.5 MG: 12.5 TABLET ORAL at 23:28

## 2024-11-27 RX ADMIN — OXYCODONE HYDROCHLORIDE AND ACETAMINOPHEN 2 TABLET: 5; 325 TABLET ORAL at 22:23

## 2024-11-27 RX ADMIN — FENTANYL CITRATE 50 MCG: 50 INJECTION, SOLUTION INTRAMUSCULAR; INTRAVENOUS at 11:24

## 2024-11-27 RX ADMIN — FAMOTIDINE 20 MG: 20 TABLET, FILM COATED ORAL at 09:41

## 2024-11-27 RX ADMIN — ROCURONIUM BROMIDE 10 MG: 10 INJECTION INTRAVENOUS at 12:26

## 2024-11-27 RX ADMIN — ROCURONIUM BROMIDE 10 MG: 10 INJECTION INTRAVENOUS at 13:39

## 2024-11-27 RX ADMIN — ONDANSETRON 4 MG: 4 TABLET, ORALLY DISINTEGRATING ORAL at 22:23

## 2024-11-27 RX ADMIN — FENTANYL CITRATE 50 MCG: 50 INJECTION, SOLUTION INTRAMUSCULAR; INTRAVENOUS at 14:12

## 2024-11-27 RX ADMIN — LIDOCAINE HYDROCHLORIDE 50 MG: 10 INJECTION, SOLUTION EPIDURAL; INFILTRATION; INTRACAUDAL; PERINEURAL at 11:00

## 2024-11-27 RX ADMIN — ROCURONIUM BROMIDE 50 MG: 10 INJECTION INTRAVENOUS at 11:00

## 2024-11-27 RX ADMIN — PROPOFOL INJECTABLE EMULSION 25 MCG/KG/MIN: 10 INJECTION, EMULSION INTRAVENOUS at 11:09

## 2024-11-27 RX ADMIN — HYDROMORPHONE HYDROCHLORIDE 0.5 MG: 1 INJECTION, SOLUTION INTRAMUSCULAR; INTRAVENOUS; SUBCUTANEOUS at 15:20

## 2024-11-27 RX ADMIN — OXYCODONE HYDROCHLORIDE AND ACETAMINOPHEN 1 TABLET: 5; 325 TABLET ORAL at 18:24

## 2024-11-27 RX ADMIN — DEXMEDETOMIDINE HYDROCHLORIDE 4 MCG: 100 INJECTION, SOLUTION INTRAVENOUS at 12:22

## 2024-11-27 RX ADMIN — DEXMEDETOMIDINE HYDROCHLORIDE 4 MCG: 100 INJECTION, SOLUTION INTRAVENOUS at 12:09

## 2024-11-27 RX ADMIN — ONDANSETRON 4 MG: 2 INJECTION INTRAMUSCULAR; INTRAVENOUS at 10:58

## 2024-11-27 RX ADMIN — SODIUM CHLORIDE, SODIUM LACTATE, POTASSIUM CHLORIDE, CALCIUM CHLORIDE: 20; 30; 600; 310 INJECTION, SOLUTION INTRAVENOUS at 13:58

## 2024-11-27 RX ADMIN — ROCURONIUM BROMIDE 20 MG: 10 INJECTION INTRAVENOUS at 11:52

## 2024-11-27 RX ADMIN — FENTANYL CITRATE 50 MCG: 50 INJECTION, SOLUTION INTRAMUSCULAR; INTRAVENOUS at 11:00

## 2024-11-27 RX ADMIN — HYDROMORPHONE HYDROCHLORIDE 0.5 MG: 1 INJECTION, SOLUTION INTRAMUSCULAR; INTRAVENOUS; SUBCUTANEOUS at 23:28

## 2024-11-27 RX ADMIN — DEXMEDETOMIDINE HYDROCHLORIDE 4 MCG: 100 INJECTION, SOLUTION INTRAVENOUS at 12:07

## 2024-11-27 RX ADMIN — SODIUM CHLORIDE, POTASSIUM CHLORIDE, SODIUM LACTATE AND CALCIUM CHLORIDE 100 ML/HR: 600; 310; 30; 20 INJECTION, SOLUTION INTRAVENOUS at 16:38

## 2024-11-27 RX ADMIN — ROCURONIUM BROMIDE 10 MG: 10 INJECTION INTRAVENOUS at 13:04

## 2024-11-27 RX ADMIN — DEXMEDETOMIDINE HYDROCHLORIDE 4 MCG: 100 INJECTION, SOLUTION INTRAVENOUS at 12:08

## 2024-11-27 RX ADMIN — PROPOFOL INJECTABLE EMULSION 200 MG: 10 INJECTION, EMULSION INTRAVENOUS at 11:00

## 2024-11-27 RX ADMIN — DEXMEDETOMIDINE HYDROCHLORIDE 4 MCG: 100 INJECTION, SOLUTION INTRAVENOUS at 12:21

## 2024-11-27 RX ADMIN — SODIUM CHLORIDE, SODIUM LACTATE, POTASSIUM CHLORIDE, CALCIUM CHLORIDE 9 ML/HR: 20; 30; 600; 310 INJECTION, SOLUTION INTRAVENOUS at 09:51

## 2024-11-27 RX ADMIN — SUGAMMADEX 200 MG: 100 INJECTION, SOLUTION INTRAVENOUS at 14:12

## 2024-11-27 RX ADMIN — FENTANYL CITRATE 50 MCG: 50 INJECTION, SOLUTION INTRAMUSCULAR; INTRAVENOUS at 15:13

## 2024-11-27 NOTE — ANESTHESIA PREPROCEDURE EVALUATION
Anesthesia Evaluation     Patient summary reviewed and Nursing notes reviewed                Airway   Mallampati: II  TM distance: >3 FB  Neck ROM: full  No difficulty expected  Dental - normal exam   (+) poor dentition        Pulmonary - normal exam   (+) a smoker (1/16) Former, asthma,  Cardiovascular - normal exam    (+) hypertension    ROS comment:   Stress/echo 9/22: normal EF, wnl    Neuro/Psych  (+) seizures  GI/Hepatic/Renal/Endo    (+) GERD, renal disease-    Musculoskeletal (-) negative ROS    Abdominal  - normal exam    Bowel sounds: normal.   Substance History   (+) drug use (h/o cocaine/methamphetamine abuse; THC use)     OB/GYN negative ob/gyn ROS         Other                      Anesthesia Plan    ASA 3     general     intravenous induction     Anesthetic plan, risks, benefits, and alternatives have been provided, discussed and informed consent has been obtained with: patient.    Plan discussed with CRNA.    CODE STATUS:

## 2024-11-27 NOTE — ANESTHESIA POSTPROCEDURE EVALUATION
Patient: Bernardo Dodd    Procedure Summary       Date: 11/27/24 Room / [facility]    Anesthesia Start: 1053 Anesthesia Stop: 1428    Procedure: TOTAL LAPAROSCOPIC HYSTERECTOMY BILATERAL SALPINGOOPHORECTOMY WITH DAVINCI ROBOT (Bilateral) Diagnosis:       Intramural uterine fibroid      Anemia due to chronic blood loss      Metrorrhagia      (Intramural uterine fibroid [D25.1])    Surgeons: Edmar Smith MD Provider: Alejandro Baird MD    Anesthesia Type: general ASA Status: 3            Anesthesia Type: general    Vitals  Vitals Value Taken Time   /71 11/27/24 1425   Temp 97.4 °F (36.3 °C) 11/27/24 1422   Pulse 94 11/27/24 1426   Resp 16 11/27/24 1422   SpO2 92 % 11/27/24 1426   Vitals shown include unfiled device data.        Post Anesthesia Care and Evaluation    Patient location during evaluation: PACU  Patient participation: waiting for patient participation  Level of consciousness: sleepy but conscious  Pain score: 0  Pain management: adequate    Airway patency: patent  Anesthetic complications: No anesthetic complications  PONV Status: none  Cardiovascular status: hemodynamically stable and acceptable  Respiratory status: nonlabored ventilation, acceptable, nasal cannula and oral airway  Hydration status: acceptable    Comments: Report to RN- care accepted

## 2024-11-27 NOTE — OP NOTE
OPERATIVE NOTE     Bri Dodd  : 1980  MRN: [id number]  CSN: [id number]  Date: 2024      Pre-op Diagnosis:  Symptomatic uterine fibroids   Post-op Diagnosis:  Same   Procedure: Total laparoscopic hysterectomy with bilateral salpingo-oophorectomy robotically assisted   Surgeon: Edmar Smith M.D.   Assist: Assistant: Luc Estrada PA along with Beckie Montana MD was responsible for performing the following activities: Retraction, Suction, Irrigation, and Closing and their skilled assistance was necessary for the success of this case.   Anesthesia: General   Estimated Blood Loss: < 100 mls   Fluids: 1000 mls   UOP: 250 mls of clear yellow urine   ABx: cefazolin 2 gms   Specimens:  Morcellated uterus along with the fallopian tubes and ovaries bilaterally   Findings: Markedly enlarged uterus (greater than 250 g) with multiple fibroids  Normal appearance of ovaries bilaterally   Complications: None       Description of Procedure   After patient was adequately anesthetized she sterilely prepped and draped in the dorsolithotomy position in [facility].  Omalley was placed into the bladder.    Cervix was grasped at the 12 o'clock position.  It was sutured.  Cervix was dilated and an 8 cm stylette along with a 3-1/2 cm Koh ring were used and secured.  Pneumo-occluder balloon was placed.    An area approximately 10 cm above the umbilicus was injected with Marcaine and a transverse incision was made.  A bladed robotic 8 mm port was placed.  Left and right lower quadrant ports were placed lateral to the umbilicus in similar fashion and an assistant port, 8 mm in size was placed into the right upper quadrant.  Due to the large nature of the uterus and the anticipation of difficult visualization decision was made to place a third arm.  Was triangulated into the left upper quadrant.  Using hot fortunato in the right hand and a fenestrated bipolar in the left along with a single-tooth tenaculum  and the third port, robot was docked and I retired to the surgical console.    Began on the left side by coagulating and transecting the  round ligaments bilaterally.  Peritoneum anteriorly was developed.  Infundibulopelvic ligaments were coagulated and transected.  Ureters were identified well below the intended surgical margins.  Uterine vessels were skeletonized.  Bladder was advanced inferiorly.  The Vane ring could be seen anteriorly.  Anterior colpotomy was created sharply.  Uterine vessels were coagulated and transected.  Cervix was freed from the vaginal attachments and all 4 quadrants with bleeding being scant.    Uterus was about 20 weeks in size.  It needed to be morcellated.  With tedious work, the uterus was morcellated into 11 independent pieces.  They were brought out independently from the vagina.  Pelvis was copiously irrigated with scant bleeding seen.  Pneumoperitoneum was reduced and again bleeding was scant.  Vaginal closure was accomplished with Vicryl suture.  Figure-of-eight's were placed in the corner and then a running locking suture was used to close the remainder of the cuff.  All vascular pedicles were once again inspected with no bleeding seen.    Incisions were closed with Vicryl subcuticularly.  Skin glue was applied.  Vaginal instrumentation pneumo-occluder balloon and Omalley were removed at the completion of the case.    She tolerated seizure well was taken recovery in stable condition.  All counts were correct and there was no complications.    Edmar Smith MD   11/27/2024  14:25 EST

## 2024-11-27 NOTE — ANESTHESIA PROCEDURE NOTES
Airway  Urgency: elective    Date/Time: 11/27/2024 11:03 AM  Airway not difficult    General Information and Staff    Patient location during procedure: OR    Indications and Patient Condition  Indications for airway management: airway protection    Preoxygenated: yes  MILS not maintained throughout  Mask difficulty assessment: 2 - vent by mask + OA or adjuvant +/- NMBA    Final Airway Details  Final airway type: endotracheal airway      Successful airway: ETT  Cuffed: yes   Successful intubation technique: direct laryngoscopy and video laryngoscopy  Facilitating devices/methods: intubating stylet  Endotracheal tube insertion site: oral  Blade: Muñiz  Blade size: 3  ETT size (mm): 7.0  Cormack-Lehane Classification: grade I - full view of glottis  Placement verified by: chest auscultation and capnometry   Cuff volume (mL): 6  Measured from: lips  ETT/EBT  to lips (cm): 20  Number of attempts at approach: 1  Assessment: lips, teeth, and gum same as pre-op and atraumatic intubation    Additional Comments  Negative epigastric sounds, Breath sound equal bilaterally with symmetric chest rise and fall. Atraumatic, dentition same as preop, +BBS +etco2

## 2024-11-27 NOTE — INTERVAL H&P NOTE
H&P updated. The patient was examined and she has now asked to have both ovaries removed at the time of the procedure.

## 2024-11-28 VITALS
BODY MASS INDEX: 35.88 KG/M2 | RESPIRATION RATE: 18 BRPM | WEIGHT: 178 LBS | SYSTOLIC BLOOD PRESSURE: 148 MMHG | HEART RATE: 101 BPM | HEIGHT: 59 IN | OXYGEN SATURATION: 95 % | DIASTOLIC BLOOD PRESSURE: 94 MMHG | TEMPERATURE: 98 F

## 2024-11-28 LAB
DEPRECATED RDW RBC AUTO: 45.9 FL (ref 37–54)
ERYTHROCYTE [DISTWIDTH] IN BLOOD BY AUTOMATED COUNT: 16 % (ref 12.3–15.4)
HCT VFR BLD AUTO: 42 % (ref 34–46.6)
HGB BLD-MCNC: 13.5 G/DL (ref 12–15.9)
MCH RBC QN AUTO: 25.6 PG (ref 26.6–33)
MCHC RBC AUTO-ENTMCNC: 32.1 G/DL (ref 31.5–35.7)
MCV RBC AUTO: 79.7 FL (ref 79–97)
PLATELET # BLD AUTO: 407 10*3/MM3 (ref 140–450)
PMV BLD AUTO: 9.5 FL (ref 6–12)
RBC # BLD AUTO: 5.27 10*6/MM3 (ref 3.77–5.28)
WBC NRBC COR # BLD AUTO: 17.14 10*3/MM3 (ref 3.4–10.8)

## 2024-11-28 PROCEDURE — 25010000002 METOCLOPRAMIDE PER 10 MG: Performed by: STUDENT IN AN ORGANIZED HEALTH CARE EDUCATION/TRAINING PROGRAM

## 2024-11-28 PROCEDURE — 63710000001 ONDANSETRON ODT 4 MG TABLET DISPERSIBLE: Performed by: OBSTETRICS & GYNECOLOGY

## 2024-11-28 PROCEDURE — 85027 COMPLETE CBC AUTOMATED: CPT | Performed by: OBSTETRICS & GYNECOLOGY

## 2024-11-28 PROCEDURE — 99024 POSTOP FOLLOW-UP VISIT: CPT | Performed by: OBSTETRICS & GYNECOLOGY

## 2024-11-28 PROCEDURE — 25010000002 ENOXAPARIN PER 10 MG: Performed by: OBSTETRICS & GYNECOLOGY

## 2024-11-28 RX ORDER — SIMETHICONE 80 MG
80 TABLET,CHEWABLE ORAL 4 TIMES DAILY PRN
Status: DISCONTINUED | OUTPATIENT
Start: 2024-11-28 | End: 2024-11-28 | Stop reason: HOSPADM

## 2024-11-28 RX ORDER — TRAMADOL HYDROCHLORIDE 50 MG/1
50 TABLET ORAL EVERY 6 HOURS PRN
Qty: 14 TABLET | Refills: 0 | Status: SHIPPED | OUTPATIENT
Start: 2024-11-28 | End: 2024-12-03

## 2024-11-28 RX ORDER — TRAMADOL HYDROCHLORIDE 50 MG/1
50 TABLET ORAL EVERY 4 HOURS PRN
Status: DISCONTINUED | OUTPATIENT
Start: 2024-11-28 | End: 2024-11-28 | Stop reason: HOSPADM

## 2024-11-28 RX ORDER — FAMOTIDINE 10 MG/ML
20 INJECTION, SOLUTION INTRAVENOUS ONCE
Status: COMPLETED | OUTPATIENT
Start: 2024-11-28 | End: 2024-11-28

## 2024-11-28 RX ORDER — METOCLOPRAMIDE HYDROCHLORIDE 5 MG/ML
10 INJECTION INTRAMUSCULAR; INTRAVENOUS EVERY 6 HOURS PRN
Status: DISCONTINUED | OUTPATIENT
Start: 2024-11-28 | End: 2024-11-28 | Stop reason: HOSPADM

## 2024-11-28 RX ORDER — OXYCODONE AND ACETAMINOPHEN 5; 325 MG/1; MG/1
1 TABLET ORAL EVERY 6 HOURS PRN
Qty: 16 TABLET | Refills: 0 | Status: SHIPPED | OUTPATIENT
Start: 2024-11-28 | End: 2024-12-02 | Stop reason: SDUPTHER

## 2024-11-28 RX ADMIN — AMLODIPINE BESYLATE 5 MG: 5 TABLET ORAL at 08:27

## 2024-11-28 RX ADMIN — SIMETHICONE 80 MG: 80 TABLET, CHEWABLE ORAL at 05:39

## 2024-11-28 RX ADMIN — LAMOTRIGINE 200 MG: 100 TABLET ORAL at 08:27

## 2024-11-28 RX ADMIN — OXYCODONE HYDROCHLORIDE AND ACETAMINOPHEN 2 TABLET: 5; 325 TABLET ORAL at 02:17

## 2024-11-28 RX ADMIN — FAMOTIDINE 20 MG: 10 INJECTION, SOLUTION INTRAVENOUS at 05:39

## 2024-11-28 RX ADMIN — OXYCODONE HYDROCHLORIDE AND ACETAMINOPHEN 2 TABLET: 5; 325 TABLET ORAL at 06:32

## 2024-11-28 RX ADMIN — OXYCODONE HYDROCHLORIDE AND ACETAMINOPHEN 2 TABLET: 5; 325 TABLET ORAL at 10:36

## 2024-11-28 RX ADMIN — ONDANSETRON 4 MG: 4 TABLET, ORALLY DISINTEGRATING ORAL at 04:15

## 2024-11-28 RX ADMIN — TRAMADOL HYDROCHLORIDE 50 MG: 50 TABLET, COATED ORAL at 05:39

## 2024-11-28 RX ADMIN — METOCLOPRAMIDE 10 MG: 5 INJECTION, SOLUTION INTRAMUSCULAR; INTRAVENOUS at 06:36

## 2024-11-28 RX ADMIN — ENOXAPARIN SODIUM 40 MG: 100 INJECTION SUBCUTANEOUS at 08:26

## 2024-11-28 NOTE — PLAN OF CARE
Goal Outcome Evaluation:  Plan of Care Reviewed With: patient        Progress: improving  Outcome Evaluation: Patient drowsy and responsive to voice/pain, disorientation to situation. Patient oxygen saturation reading 83% on 2L NC upon arrival to the floor. Oxygen increased to 4L NC with oxygen saturation reading 94%. Lap sites X5, skin glued and open to air. One incision with skin glue peeling back mildly, no drainage noted. Patient continues to wake up with pain and almost immediately fall back asleep. Patient ambulated X2 with walker to the bathroom, unable to keep eyes open while walking. Patient oxygen saturation reading 96-98% and decreased to 3L NC. Patient instructed on IS use. Patient refused Lamictal. Patient later called out asking for pain medication, Percocet X1 administered with increased alertness. Toradol and ibuprofen added to signed and held orders, held per MD due to NSAIDs allergy (swelling of ankles). Patient continues with intermittent drowsiness and requesting pain medication. Plan is to wean oxygen and possibly home tomorrow.

## 2024-11-28 NOTE — PLAN OF CARE
Goal Outcome Evaluation:  Plan of Care Reviewed With: patient        Progress: improving  Outcome Evaluation: VSS. Weaned to RA. Significantly drowsy at the beginning of the shift. PRN percocet given for pain when sufficiently awake. Pain not relieved. PRN dilaudid given with patient's starting POSS at 0-alert and sitting up in bed. Significant drowsiness noted with dilaudid administration. Provider notified of unrelieved pain and previous reaction to dilaudid. PRNs adjusted. Tramadol given x1 with IV Pepcid for nausea. PRN simethicone given for gas pain. Pt ambulating with standby assist to the bathroom. PRN Zofran given x2 and phenergan x1 for nausea with mild relief. Frequent requests for medication. frequently seeks out staff. lap sites x5 C/D/I and soft. Adequate UOP. Sleeping briefly between care.

## 2024-11-28 NOTE — DISCHARGE SUMMARY
Bri JUDD Yousif  : 1980  MRN: [id number]  CSN: [id number]    Discharge Summary    A formal discharge summary was not needed because this admission was for an observation visit.    Discharge Date: 24   Discharge Dx:    Uterine fibroids   Disposition: home   Condition at discharge: stable   Follow up: 2 weeks         This note has been electronically signed.    Edmar Smith MD

## 2024-11-28 NOTE — PROGRESS NOTES
[facility] [place]  Bernardo Dodd  : 1980  MRN: [id number]  CSN: [id number]    Hospital Day: 2    CC: hospital follow-up    Post-operative Day #1  Subjective   Her pain is well controlled. She is passing gas.     Objective     Min/max vitals past 24 hours:   Temp  Min: 96.7 °F (35.9 °C)  Max: 98 °F (36.7 °C)  BP  Min: 115/75  Max: 172/95  Pulse  Min: 75  Max: 112  Resp  Min: 14  Max: 22        I/O last 3 completed shifts:  In: 1000 [I.V.:1000]  Out: 650 [Urine:550; Blood:100]    General: well developed; well nourished  no acute distress   Abdomen: soft, non-tender; no masses  no umbilical or inguinal hernias are present  no hepato-splenomegaly  incisions are clean, dry, intact, and without drainage   Pelvic: Not performed   Ext: Calves NT     Last 3 values   Results from last 7 days   Lab Units 24  0903   WBC 10*3/mm3 17.14*   HEMOGLOBIN g/dL 13.5   HEMATOCRIT % 42.0   PLATELETS 10*3/mm3 407          Assessment   Post-op Day #1 S/P [id number]     Plan   Discharge to home  Follow-up 2 weeks time  Discharge instructions reviewed    Edmar Smith MD  2024  09:49 EST

## 2024-12-02 ENCOUNTER — TELEPHONE (OUTPATIENT)
Dept: OBSTETRICS AND GYNECOLOGY | Facility: CLINIC | Age: 44
End: 2024-12-02
Payer: MEDICAID

## 2024-12-02 DIAGNOSIS — Z98.890 POST-OPERATIVE STATE: ICD-10-CM

## 2024-12-02 RX ORDER — OXYCODONE AND ACETAMINOPHEN 5; 325 MG/1; MG/1
1 TABLET ORAL EVERY 6 HOURS PRN
Qty: 14 TABLET | Refills: 0 | Status: SHIPPED | OUTPATIENT
Start: 2024-12-02

## 2024-12-02 NOTE — TELEPHONE ENCOUNTER
Just sent a prescription for this medicine to her pharmacy.  As a relates to a cushion she should just be able to find a soft pillow and sit on that.    New Medications Ordered This Visit   Medications    oxyCODONE-acetaminophen (PERCOCET) 5-325 MG per tablet     Sig: Take 1 tablet by mouth Every 6 (Six) Hours As Needed for Severe Pain.     Dispense:  14 tablet     Refill:  0

## 2024-12-02 NOTE — TELEPHONE ENCOUNTER
Luis    Pt called stating that when she got discharged from the hospital she was not given Percocet because they were out of stock. Patient is wondering if that medication can be sent to [facility] on [address]. Pt also states that she can't sit down because she is sore. When she uses the bathroom is hurts and she is still having a burning sensation when she urinates. She is wanting to know if a cushion can be called in or if she has to get one on her own. Please advise.

## 2024-12-05 LAB
CYTO UR: NORMAL
LAB AP CASE REPORT: NORMAL
LAB AP CLINICAL INFORMATION: NORMAL
LAB AP DIAGNOSIS COMMENT: NORMAL
LAB AP SPECIAL STAINS: NORMAL
PATH REPORT.FINAL DX SPEC: NORMAL
PATH REPORT.GROSS SPEC: NORMAL

## 2024-12-07 NOTE — PROGRESS NOTES
Subjective   Chief Complaint   Patient presents with    Post-op     Ind[place]armen Dodd is a 44 y.o. year old  presenting to be seen for her post-operative visit.  Currently she reports no problems with eating, bowel movements, voiding, or wound drainage and pain is well controlled.    The pathology results from her procedure are in Bernardo's record and are benign.      The following portions of the patient's history were reviewed and updated as appropriate:current medications and allergies       Objective   /82   Resp 14   Wt 80.7 kg (178 lb)   BMI 35.95 kg/m²     General:  well developed; well nourished  no acute distress  mentation appropriate   Abdomen: soft, non-tender; no masses  no umbilical or inguinal hernias are present  no hepato-splenomegaly  incisions are healing, clean, dry, intact, and without drainage   Pelvis: Not performed.          Assessment   S/P robotic TLH BSO 2024     Plan   OK to drive  OK to lift  Nothing per vagina still until 6 weeks after her procedure  The importance of keeping all planned follow-up and taking all medications as prescribed was emphasized.  Follow up at 6 weeks for postop check.           This note was electronically signed.    Edmar Smith M.D.  2024    Part of this note may be an electronic transcription/translation of spoken language to printed text using the Dragon Dictation System.

## 2024-12-12 ENCOUNTER — OFFICE VISIT (OUTPATIENT)
Dept: OBSTETRICS AND GYNECOLOGY | Facility: CLINIC | Age: 44
End: 2024-12-12
Payer: MEDICAID

## 2024-12-12 VITALS
SYSTOLIC BLOOD PRESSURE: 138 MMHG | BODY MASS INDEX: 35.95 KG/M2 | DIASTOLIC BLOOD PRESSURE: 82 MMHG | RESPIRATION RATE: 14 BRPM | WEIGHT: 178 LBS

## 2024-12-12 DIAGNOSIS — Z98.890 POST-OPERATIVE STATE: Primary | ICD-10-CM

## 2024-12-12 NOTE — LETTER
December 12, 2024     Patient: Bernardo Dodd   YOB: 1980   Date of Visit: 12/12/2024       To Whom It May Concern:    It is my medical opinion that Bernardo Dodd may return to full duty immediately with no restrictions.           Sincerely,        Edmar Smith MD

## 2024-12-15 DIAGNOSIS — K21.9 GASTROESOPHAGEAL REFLUX DISEASE WITHOUT ESOPHAGITIS: ICD-10-CM

## 2024-12-15 DIAGNOSIS — R10.13 DYSPEPSIA: ICD-10-CM

## 2024-12-15 RX ORDER — FAMOTIDINE 20 MG/1
20 TABLET, FILM COATED ORAL 2 TIMES DAILY
Qty: 60 TABLET | Refills: 11 | OUTPATIENT
Start: 2024-12-15

## 2024-12-16 NOTE — TELEPHONE ENCOUNTER
Medication sent on 11/11/2024 quantity of 60 with 11 refills. Patient should contact pharmacy for refills

## 2024-12-27 LAB
NCCN CRITERIA FLAG: NORMAL
TYRER CUZICK SCORE: 10

## 2025-01-07 DIAGNOSIS — I10 PRIMARY HYPERTENSION: ICD-10-CM

## 2025-01-07 RX ORDER — AMLODIPINE BESYLATE 5 MG/1
5 TABLET ORAL DAILY
Qty: 90 TABLET | Refills: 0 | Status: SHIPPED | OUTPATIENT
Start: 2025-01-07

## 2025-01-10 DIAGNOSIS — R10.13 DYSPEPSIA: ICD-10-CM

## 2025-01-10 DIAGNOSIS — K21.9 GASTROESOPHAGEAL REFLUX DISEASE WITHOUT ESOPHAGITIS: ICD-10-CM

## 2025-01-10 DIAGNOSIS — R05.9 COUGH IN ADULT: ICD-10-CM

## 2025-01-10 DIAGNOSIS — R09.81 NASAL CONGESTION: ICD-10-CM

## 2025-01-10 RX ORDER — FAMOTIDINE 20 MG/1
20 TABLET, FILM COATED ORAL 2 TIMES DAILY
Qty: 60 TABLET | Refills: 11 | OUTPATIENT
Start: 2025-01-10

## 2025-01-10 RX ORDER — LEVOCETIRIZINE DIHYDROCHLORIDE 5 MG/1
5 TABLET, FILM COATED ORAL EVERY EVENING
Qty: 30 TABLET | Refills: 11 | OUTPATIENT
Start: 2025-01-10

## 2025-01-21 ENCOUNTER — HOSPITAL ENCOUNTER (OUTPATIENT)
Dept: MAMMOGRAPHY | Facility: HOSPITAL | Age: 45
Discharge: HOME | End: 2025-01-21
Admitting: NURSE PRACTITIONER
Payer: MEDICAID

## 2025-01-21 DIAGNOSIS — Z12.31 ENCOUNTER FOR SCREENING MAMMOGRAM FOR MALIGNANT NEOPLASM OF BREAST: ICD-10-CM

## 2025-01-21 DIAGNOSIS — M47.816 LUMBAR SPONDYLOSIS: ICD-10-CM

## 2025-01-21 PROCEDURE — 77067 SCR MAMMO BI INCL CAD: CPT

## 2025-01-21 PROCEDURE — 77063 BREAST TOMOSYNTHESIS BI: CPT

## 2025-01-22 ENCOUNTER — APPOINTMENT (OUTPATIENT)
Dept: CT IMAGING | Facility: HOSPITAL | Age: 45
End: 2025-01-22
Payer: MEDICAID

## 2025-01-22 ENCOUNTER — HOSPITAL ENCOUNTER (OUTPATIENT)
Facility: HOSPITAL | Age: 45
Setting detail: OBSERVATION
Discharge: HOME | End: 2025-01-24
Attending: EMERGENCY MEDICINE | Admitting: INTERNAL MEDICINE
Payer: MEDICAID

## 2025-01-22 ENCOUNTER — TELEPHONE (OUTPATIENT)
Dept: OBSTETRICS AND GYNECOLOGY | Facility: CLINIC | Age: 45
End: 2025-01-22
Payer: MEDICAID

## 2025-01-22 ENCOUNTER — NURSE TRIAGE (OUTPATIENT)
Dept: CALL CENTER | Facility: HOSPITAL | Age: 45
End: 2025-01-22
Payer: MEDICAID

## 2025-01-22 ENCOUNTER — TELEPHONE (OUTPATIENT)
Dept: OBSTETRICS AND GYNECOLOGY | Facility: CLINIC | Age: 45
End: 2025-01-22

## 2025-01-22 DIAGNOSIS — R55 SYNCOPE, UNSPECIFIED SYNCOPE TYPE: ICD-10-CM

## 2025-01-22 DIAGNOSIS — N39.0 ACUTE UTI: ICD-10-CM

## 2025-01-22 DIAGNOSIS — S39.93XA INJURY OF VAGINA, INITIAL ENCOUNTER: ICD-10-CM

## 2025-01-22 DIAGNOSIS — R10.84 GENERALIZED ABDOMINAL PAIN: Primary | ICD-10-CM

## 2025-01-22 DIAGNOSIS — R00.0 TACHYCARDIA: ICD-10-CM

## 2025-01-22 PROBLEM — Z72.0 TOBACCO USE: Status: ACTIVE | Noted: 2025-01-22

## 2025-01-22 PROBLEM — A41.9 SEPSIS: Status: ACTIVE | Noted: 2025-01-22

## 2025-01-22 LAB
ALBUMIN SERPL-MCNC: 3.8 G/DL (ref 3.5–5.2)
ALBUMIN/GLOB SERPL: 1.2 G/DL
ALP SERPL-CCNC: 77 U/L (ref 39–117)
ALT SERPL W P-5'-P-CCNC: 10 U/L (ref 1–33)
AMPHET+METHAMPHET UR QL: NEGATIVE
AMPHETAMINES UR QL: NEGATIVE
ANION GAP SERPL CALCULATED.3IONS-SCNC: 9 MMOL/L (ref 5–15)
AST SERPL-CCNC: 13 U/L (ref 1–32)
B PARAPERT DNA SPEC QL NAA+PROBE: NOT DETECTED
B PERT DNA SPEC QL NAA+PROBE: NOT DETECTED
BACTERIA UR QL AUTO: ABNORMAL /HPF
BARBITURATES UR QL SCN: NEGATIVE
BASOPHILS # BLD AUTO: 0.02 10*3/MM3 (ref 0–0.2)
BASOPHILS NFR BLD AUTO: 0.2 % (ref 0–1.5)
BENZODIAZ UR QL SCN: NEGATIVE
BILIRUB SERPL-MCNC: 0.4 MG/DL (ref 0–1.2)
BILIRUB UR QL STRIP: NEGATIVE
BUN SERPL-MCNC: 17 MG/DL (ref 6–20)
BUN/CREAT SERPL: 27 (ref 7–25)
BUPRENORPHINE SERPL-MCNC: NEGATIVE NG/ML
C PNEUM DNA NPH QL NAA+NON-PROBE: NOT DETECTED
CALCIUM SPEC-SCNC: 9 MG/DL (ref 8.6–10.5)
CANNABINOIDS SERPL QL: NEGATIVE
CHLORIDE SERPL-SCNC: 102 MMOL/L (ref 98–107)
CLARITY UR: ABNORMAL
CLUE CELLS SPEC QL WET PREP: ABNORMAL
CO2 SERPL-SCNC: 25 MMOL/L (ref 22–29)
COCAINE UR QL: NEGATIVE
COLOR UR: YELLOW
CREAT SERPL-MCNC: 0.63 MG/DL (ref 0.57–1)
D DIMER PPP FEU-MCNC: 1.15 MCGFEU/ML (ref 0–0.5)
D-LACTATE SERPL-SCNC: 3 MMOL/L (ref 0.5–2)
DEPRECATED RDW RBC AUTO: 48.4 FL (ref 37–54)
EGFRCR SERPLBLD CKD-EPI 2021: 111 ML/MIN/1.73
EOSINOPHIL # BLD AUTO: 0.08 10*3/MM3 (ref 0–0.4)
EOSINOPHIL NFR BLD AUTO: 0.7 % (ref 0.3–6.2)
ERYTHROCYTE [DISTWIDTH] IN BLOOD BY AUTOMATED COUNT: 16.1 % (ref 12.3–15.4)
FLUAV SUBTYP SPEC NAA+PROBE: NOT DETECTED
FLUBV RNA ISLT QL NAA+PROBE: NOT DETECTED
GLOBULIN UR ELPH-MCNC: 3.1 GM/DL
GLUCOSE SERPL-MCNC: 94 MG/DL (ref 65–99)
GLUCOSE UR STRIP-MCNC: NEGATIVE MG/DL
HADV DNA SPEC NAA+PROBE: NOT DETECTED
HCOV 229E RNA SPEC QL NAA+PROBE: NOT DETECTED
HCOV HKU1 RNA SPEC QL NAA+PROBE: NOT DETECTED
HCOV NL63 RNA SPEC QL NAA+PROBE: NOT DETECTED
HCOV OC43 RNA SPEC QL NAA+PROBE: NOT DETECTED
HCT VFR BLD AUTO: 36.8 % (ref 34–46.6)
HGB BLD-MCNC: 11.4 G/DL (ref 12–15.9)
HGB UR QL STRIP.AUTO: ABNORMAL
HMPV RNA NPH QL NAA+NON-PROBE: NOT DETECTED
HOLD SPECIMEN: NORMAL
HPIV1 RNA ISLT QL NAA+PROBE: NOT DETECTED
HPIV2 RNA SPEC QL NAA+PROBE: NOT DETECTED
HPIV3 RNA NPH QL NAA+PROBE: NOT DETECTED
HPIV4 P GENE NPH QL NAA+PROBE: NOT DETECTED
HYALINE CASTS UR QL AUTO: ABNORMAL /LPF
HYDATID CYST SPEC WET PREP: ABNORMAL
IMM GRANULOCYTES # BLD AUTO: 0.03 10*3/MM3 (ref 0–0.05)
IMM GRANULOCYTES NFR BLD AUTO: 0.3 % (ref 0–0.5)
KETONES UR QL STRIP: ABNORMAL
KOH PREP NAIL: NORMAL
LEUKOCYTE ESTERASE UR QL STRIP.AUTO: ABNORMAL
LIPASE SERPL-CCNC: 35 U/L (ref 13–60)
LYMPHOCYTES # BLD AUTO: 2.13 10*3/MM3 (ref 0.7–3.1)
LYMPHOCYTES NFR BLD AUTO: 18.6 % (ref 19.6–45.3)
M PNEUMO IGG SER IA-ACNC: NOT DETECTED
MCH RBC QN AUTO: 25.3 PG (ref 26.6–33)
MCHC RBC AUTO-ENTMCNC: 31 G/DL (ref 31.5–35.7)
MCV RBC AUTO: 81.8 FL (ref 79–97)
METHADONE UR QL SCN: NEGATIVE
MONOCYTES # BLD AUTO: 0.55 10*3/MM3 (ref 0.1–0.9)
MONOCYTES NFR BLD AUTO: 4.8 % (ref 5–12)
MUCOUS THREADS URNS QL MICRO: ABNORMAL /HPF
NEUTROPHILS NFR BLD AUTO: 75.4 % (ref 42.7–76)
NEUTROPHILS NFR BLD AUTO: 8.65 10*3/MM3 (ref 1.7–7)
NITRITE UR QL STRIP: NEGATIVE
NRBC BLD AUTO-RTO: 0 /100 WBC (ref 0–0.2)
OPIATES UR QL: NEGATIVE
OXYCODONE UR QL SCN: NEGATIVE
PCP UR QL SCN: NEGATIVE
PH UR STRIP.AUTO: 5.5 [PH] (ref 5–8)
PLATELET # BLD AUTO: 294 10*3/MM3 (ref 140–450)
PMV BLD AUTO: 10.2 FL (ref 6–12)
POTASSIUM SERPL-SCNC: 3.6 MMOL/L (ref 3.5–5.2)
PROCALCITONIN SERPL-MCNC: 1.14 NG/ML (ref 0–0.25)
PROT SERPL-MCNC: 6.9 G/DL (ref 6–8.5)
PROT UR QL STRIP: ABNORMAL
RBC # BLD AUTO: 4.5 10*6/MM3 (ref 3.77–5.28)
RBC # UR STRIP: ABNORMAL /HPF
REF LAB TEST METHOD: ABNORMAL
RHINOVIRUS RNA SPEC NAA+PROBE: NOT DETECTED
RSV RNA NPH QL NAA+NON-PROBE: NOT DETECTED
SARS-COV-2 RNA NPH QL NAA+NON-PROBE: NOT DETECTED
SODIUM SERPL-SCNC: 136 MMOL/L (ref 136–145)
SP GR UR STRIP: 1.04 (ref 1–1.03)
SQUAMOUS #/AREA URNS HPF: ABNORMAL /HPF
T VAGINALIS SPEC QL WET PREP: ABNORMAL
TRICYCLICS UR QL SCN: NEGATIVE
UROBILINOGEN UR QL STRIP: ABNORMAL
WBC # UR STRIP: ABNORMAL /HPF
WBC NRBC COR # BLD AUTO: 11.46 10*3/MM3 (ref 3.4–10.8)
WBC SPEC QL WET PREP: ABNORMAL
WHOLE BLOOD HOLD COAG: NORMAL
WHOLE BLOOD HOLD SPECIMEN: NORMAL
YEAST GENITAL QL WET PREP: ABNORMAL

## 2025-01-22 PROCEDURE — 85379 FIBRIN DEGRADATION QUANT: CPT | Performed by: STUDENT IN AN ORGANIZED HEALTH CARE EDUCATION/TRAINING PROGRAM

## 2025-01-22 PROCEDURE — 80307 DRUG TEST PRSMV CHEM ANLYZR: CPT | Performed by: EMERGENCY MEDICINE

## 2025-01-22 PROCEDURE — 25810000003 SODIUM CHLORIDE 0.9 % SOLUTION: Performed by: NURSE PRACTITIONER

## 2025-01-22 PROCEDURE — 87491 CHLMYD TRACH DNA AMP PROBE: CPT | Performed by: EMERGENCY MEDICINE

## 2025-01-22 PROCEDURE — 85025 COMPLETE CBC W/AUTO DIFF WBC: CPT

## 2025-01-22 PROCEDURE — 83605 ASSAY OF LACTIC ACID: CPT | Performed by: STUDENT IN AN ORGANIZED HEALTH CARE EDUCATION/TRAINING PROGRAM

## 2025-01-22 PROCEDURE — 25010000002 HYDROMORPHONE PER 4 MG: Performed by: EMERGENCY MEDICINE

## 2025-01-22 PROCEDURE — 74177 CT ABD & PELVIS W/CONTRAST: CPT

## 2025-01-22 PROCEDURE — 99285 EMERGENCY DEPT VISIT HI MDM: CPT

## 2025-01-22 PROCEDURE — G0378 HOSPITAL OBSERVATION PER HR: HCPCS

## 2025-01-22 PROCEDURE — 25010000002 CEFTRIAXONE PER 250 MG: Performed by: EMERGENCY MEDICINE

## 2025-01-22 PROCEDURE — 25010000002 ONDANSETRON PER 1 MG: Performed by: EMERGENCY MEDICINE

## 2025-01-22 PROCEDURE — 36415 COLL VENOUS BLD VENIPUNCTURE: CPT

## 2025-01-22 PROCEDURE — 93005 ELECTROCARDIOGRAM TRACING: CPT | Performed by: EMERGENCY MEDICINE

## 2025-01-22 PROCEDURE — 96376 TX/PRO/DX INJ SAME DRUG ADON: CPT

## 2025-01-22 PROCEDURE — 25810000003 SODIUM CHLORIDE 0.9 % SOLUTION: Performed by: EMERGENCY MEDICINE

## 2025-01-22 PROCEDURE — 96375 TX/PRO/DX INJ NEW DRUG ADDON: CPT

## 2025-01-22 PROCEDURE — 80053 COMPREHEN METABOLIC PANEL: CPT

## 2025-01-22 PROCEDURE — 0202U NFCT DS 22 TRGT SARS-COV-2: CPT | Performed by: EMERGENCY MEDICINE

## 2025-01-22 PROCEDURE — 83690 ASSAY OF LIPASE: CPT

## 2025-01-22 PROCEDURE — 87220 TISSUE EXAM FOR FUNGI: CPT | Performed by: EMERGENCY MEDICINE

## 2025-01-22 PROCEDURE — 81001 URINALYSIS AUTO W/SCOPE: CPT

## 2025-01-22 PROCEDURE — 25510000001 IOPAMIDOL 61 % SOLUTION: Performed by: EMERGENCY MEDICINE

## 2025-01-22 PROCEDURE — 87591 N.GONORRHOEAE DNA AMP PROB: CPT | Performed by: EMERGENCY MEDICINE

## 2025-01-22 PROCEDURE — 99222 1ST HOSP IP/OBS MODERATE 55: CPT | Performed by: STUDENT IN AN ORGANIZED HEALTH CARE EDUCATION/TRAINING PROGRAM

## 2025-01-22 PROCEDURE — 87210 SMEAR WET MOUNT SALINE/INK: CPT | Performed by: EMERGENCY MEDICINE

## 2025-01-22 PROCEDURE — 96365 THER/PROPH/DIAG IV INF INIT: CPT

## 2025-01-22 PROCEDURE — 84145 PROCALCITONIN (PCT): CPT | Performed by: STUDENT IN AN ORGANIZED HEALTH CARE EDUCATION/TRAINING PROGRAM

## 2025-01-22 RX ORDER — ALBUTEROL SULFATE 0.83 MG/ML
2.5 SOLUTION RESPIRATORY (INHALATION) EVERY 6 HOURS PRN
Status: DISCONTINUED | OUTPATIENT
Start: 2025-01-22 | End: 2025-01-24 | Stop reason: HOSPADM

## 2025-01-22 RX ORDER — ACETAMINOPHEN 160 MG/5ML
650 SOLUTION ORAL EVERY 4 HOURS PRN
Status: DISCONTINUED | OUTPATIENT
Start: 2025-01-22 | End: 2025-01-24 | Stop reason: HOSPADM

## 2025-01-22 RX ORDER — ONDANSETRON 4 MG/1
4 TABLET, ORALLY DISINTEGRATING ORAL ONCE
Status: DISCONTINUED | OUTPATIENT
Start: 2025-01-22 | End: 2025-01-22

## 2025-01-22 RX ORDER — IOPAMIDOL 612 MG/ML
85 INJECTION, SOLUTION INTRAVASCULAR
Status: COMPLETED | OUTPATIENT
Start: 2025-01-22 | End: 2025-01-22

## 2025-01-22 RX ORDER — LAMOTRIGINE 100 MG/1
200 TABLET ORAL DAILY
Status: DISCONTINUED | OUTPATIENT
Start: 2025-01-23 | End: 2025-01-24 | Stop reason: HOSPADM

## 2025-01-22 RX ORDER — FAMOTIDINE 20 MG/1
20 TABLET, FILM COATED ORAL 2 TIMES DAILY
Status: DISCONTINUED | OUTPATIENT
Start: 2025-01-22 | End: 2025-01-24 | Stop reason: HOSPADM

## 2025-01-22 RX ORDER — ACETAMINOPHEN 325 MG/1
650 TABLET ORAL EVERY 4 HOURS PRN
Status: DISCONTINUED | OUTPATIENT
Start: 2025-01-22 | End: 2025-01-24 | Stop reason: HOSPADM

## 2025-01-22 RX ORDER — AMOXICILLIN 250 MG
2 CAPSULE ORAL 2 TIMES DAILY PRN
Status: DISCONTINUED | OUTPATIENT
Start: 2025-01-22 | End: 2025-01-24 | Stop reason: HOSPADM

## 2025-01-22 RX ORDER — SODIUM CHLORIDE 9 MG/ML
75 INJECTION, SOLUTION INTRAVENOUS CONTINUOUS
Status: ACTIVE | OUTPATIENT
Start: 2025-01-22 | End: 2025-01-23

## 2025-01-22 RX ORDER — AMLODIPINE BESYLATE 5 MG/1
5 TABLET ORAL DAILY
Status: DISCONTINUED | OUTPATIENT
Start: 2025-01-23 | End: 2025-01-24 | Stop reason: HOSPADM

## 2025-01-22 RX ORDER — POLYETHYLENE GLYCOL 3350 17 G/17G
17 POWDER, FOR SOLUTION ORAL DAILY PRN
Status: DISCONTINUED | OUTPATIENT
Start: 2025-01-22 | End: 2025-01-24 | Stop reason: HOSPADM

## 2025-01-22 RX ORDER — SODIUM CHLORIDE 9 MG/ML
10 INJECTION, SOLUTION INTRAMUSCULAR; INTRAVENOUS; SUBCUTANEOUS AS NEEDED
Status: DISCONTINUED | OUTPATIENT
Start: 2025-01-22 | End: 2025-01-24 | Stop reason: HOSPADM

## 2025-01-22 RX ORDER — ACETAMINOPHEN 650 MG/1
650 SUPPOSITORY RECTAL EVERY 4 HOURS PRN
Status: DISCONTINUED | OUTPATIENT
Start: 2025-01-22 | End: 2025-01-24 | Stop reason: HOSPADM

## 2025-01-22 RX ORDER — OXYCODONE AND ACETAMINOPHEN 5; 325 MG/1; MG/1
1 TABLET ORAL EVERY 6 HOURS PRN
Status: DISCONTINUED | OUTPATIENT
Start: 2025-01-22 | End: 2025-01-24 | Stop reason: HOSPADM

## 2025-01-22 RX ORDER — BISACODYL 5 MG/1
5 TABLET, DELAYED RELEASE ORAL DAILY PRN
Status: DISCONTINUED | OUTPATIENT
Start: 2025-01-22 | End: 2025-01-24 | Stop reason: HOSPADM

## 2025-01-22 RX ORDER — METRONIDAZOLE 500 MG/100ML
500 INJECTION, SOLUTION INTRAVENOUS EVERY 8 HOURS
Status: DISCONTINUED | OUTPATIENT
Start: 2025-01-23 | End: 2025-01-24

## 2025-01-22 RX ORDER — NITROGLYCERIN 0.4 MG/1
0.4 TABLET SUBLINGUAL
Status: DISCONTINUED | OUTPATIENT
Start: 2025-01-22 | End: 2025-01-24 | Stop reason: HOSPADM

## 2025-01-22 RX ORDER — ONDANSETRON 2 MG/ML
4 INJECTION INTRAMUSCULAR; INTRAVENOUS ONCE
Status: COMPLETED | OUTPATIENT
Start: 2025-01-22 | End: 2025-01-22

## 2025-01-22 RX ORDER — SODIUM CHLORIDE 0.9 % (FLUSH) 0.9 %
10 SYRINGE (ML) INJECTION EVERY 12 HOURS SCHEDULED
Status: DISCONTINUED | OUTPATIENT
Start: 2025-01-22 | End: 2025-01-24 | Stop reason: HOSPADM

## 2025-01-22 RX ORDER — AZELASTINE HYDROCHLORIDE 137 UG/1
1 SPRAY, METERED NASAL 2 TIMES DAILY
Status: DISCONTINUED | OUTPATIENT
Start: 2025-01-23 | End: 2025-01-24 | Stop reason: HOSPADM

## 2025-01-22 RX ORDER — CETIRIZINE HYDROCHLORIDE 10 MG/1
10 TABLET ORAL DAILY
Status: DISCONTINUED | OUTPATIENT
Start: 2025-01-23 | End: 2025-01-24 | Stop reason: HOSPADM

## 2025-01-22 RX ORDER — BISACODYL 10 MG
10 SUPPOSITORY, RECTAL RECTAL DAILY PRN
Status: DISCONTINUED | OUTPATIENT
Start: 2025-01-22 | End: 2025-01-24 | Stop reason: HOSPADM

## 2025-01-22 RX ORDER — HYDROMORPHONE HYDROCHLORIDE 1 MG/ML
0.5 INJECTION, SOLUTION INTRAMUSCULAR; INTRAVENOUS; SUBCUTANEOUS
Status: COMPLETED | OUTPATIENT
Start: 2025-01-22 | End: 2025-01-22

## 2025-01-22 RX ORDER — ACETAMINOPHEN 325 MG/1
650 TABLET ORAL ONCE
Status: COMPLETED | OUTPATIENT
Start: 2025-01-22 | End: 2025-01-22

## 2025-01-22 RX ADMIN — SODIUM CHLORIDE 75 ML/HR: 9 INJECTION, SOLUTION INTRAVENOUS at 23:05

## 2025-01-22 RX ADMIN — IOPAMIDOL 85 ML: 612 INJECTION, SOLUTION INTRAVENOUS at 15:55

## 2025-01-22 RX ADMIN — HYDROMORPHONE HYDROCHLORIDE 0.5 MG: 1 INJECTION, SOLUTION INTRAMUSCULAR; INTRAVENOUS; SUBCUTANEOUS at 15:34

## 2025-01-22 RX ADMIN — SODIUM CHLORIDE 2000 MG: 900 INJECTION INTRAVENOUS at 18:55

## 2025-01-22 RX ADMIN — SODIUM CHLORIDE 1000 ML: 9 INJECTION, SOLUTION INTRAVENOUS at 18:54

## 2025-01-22 RX ADMIN — ONDANSETRON 4 MG: 2 INJECTION INTRAMUSCULAR; INTRAVENOUS at 15:33

## 2025-01-22 RX ADMIN — HYDROMORPHONE HYDROCHLORIDE 0.5 MG: 1 INJECTION, SOLUTION INTRAMUSCULAR; INTRAVENOUS; SUBCUTANEOUS at 21:04

## 2025-01-22 RX ADMIN — SODIUM CHLORIDE 500 ML: 9 INJECTION, SOLUTION INTRAVENOUS at 15:33

## 2025-01-22 RX ADMIN — ACETAMINOPHEN 650 MG: 325 TABLET ORAL at 20:19

## 2025-01-22 NOTE — TELEPHONE ENCOUNTER
ON CALL    Patient called last night around 9:30 PM  Had pain and episode of bleeding after masturbating with dildo. She had a RA-TLH-BS on 11/27/2024 for a 20-week sized uterus.  No active bleeding  She voiced the opinion that she had been cleared for sexual activity. However I reminded her that in fact she had not been seen for her post-operative pelvic exam. She was scheduled on 1/13/2025 but the appointment was canceled and not rescheduled to my knowledge.  I indicated to her that should her bleeding resume or pain increase she would need to be seen on an emergent basis. Otherwise, she was advise to not eat or drink after MN and contact the office at 0800 anticipating her needing evaluation and the possibility of her needed a surgical intervention to address the cause of this bleeding episode.     BBB

## 2025-01-22 NOTE — TELEPHONE ENCOUNTER
Pain scale this morning is 9/10. She reports that she is still spotting, lightly, pink/ brown colored blood. She took 2,000mg at 9p, and 2,000mg two to three hours later. She is trying to not take hydrocodone. Pain is located in her upper abdomen in between her breast where her tumor was where the top incision is above her belly button as well as lower abdomen as well as her sides. She is having burning with urination on her exterior skin at her vaginal canal entrance. Reports vomiting upon waking up, nauseous at this time but it is improving. Pain is throbbing. She states that bleeding is tapering off.

## 2025-01-22 NOTE — TELEPHONE ENCOUNTER
Patient has been advised to go to ED due to severity of pain, not relieved by medication, and continued bleeding. She demonstrates understanding, agreement and appreciation

## 2025-01-22 NOTE — ED PROVIDER NOTES
Subjective   History of Present Illness  Patient is a pleasant 44-year-old female who presents to the emergency department with multiple complaints, primarily syncopal episode and pelvic pain.  She states that last night around 7 PM she was using her dildo vibrator on the high setting.  This is the last thing that she remembers at that time.  She was unconscious for approximately 2 hours and woke up at 9:00 PM with significant pelvic pain.  Pain persisted through the night and prompts her visit to the emergency department today.  She denies any significant trauma related to the event last night.  Admits to chills but denies fever.  Denies chest pain or shortness of breath.  Mitts to significant abdominal pain, generalized.  Denies vomiting or diarrhea.        Review of Systems   All other systems reviewed and are negative.      Past Medical History:   Diagnosis Date    Antisocial personality disorder     Bipolar disorder     Chlamydia     GERD (gastroesophageal reflux disease)     Gonorrhea     Herpes simplex     History of methamphetamine abuse     Clean ~     Hypertension 2016    Kidney stones 2018    Seizures     Stopped ~     Shingles on back     Substance abuse - DOC was cocaine 1998    Sober of cocaine since ~     Trichomonas infection 2000       Allergies   Allergen Reactions    Naproxen Swelling    Sulfa Antibiotics Rash       Past Surgical History:   Procedure Laterality Date    BARTHOLIN GLAND CYST EXCISION Left 2018     SECTION WITH TUBAL  2010    CYSTOSCOPY, URETEROSCOPY, RETROGRADE PYELOGRAM, STONE EXTRACTION, STENT INSERTION Bilateral 2023    Procedure: CYSTOSCOPY, BILATERAL RETROGRADE PYELOGRAM, URETEROSCOPY, AND STENT PLACEMENT;  Surgeon: Micah Child MD;  Location: [facility];  Service: Urology;  Laterality: Bilateral;    CYSTOSCOPY, URETEROSCOPY, RETROGRADE PYELOGRAM, STONE EXTRACTION, STENT INSERTION Left 2023     Procedure: CYSTOSCOPY URETEROSCOPY RETROGRADE PYELOGRAM STONE EXTRACTION STENT INSERTION;  Surgeon: Micah Child MD;  Location: [facility];  Service: Urology;  Laterality: Left;    DIAGNOSTIC LAPAROSCOPY      ENDOSCOPY      I & D / EXCISION MARSUPIALIZATION BARTHOLIN'S GLAND CYST / LYSIS LESIONS Left     ORIF ANKLE FRACTURE Right 2005    REDUCTION MAMMAPLASTY Bilateral     TENSION FREE VAGINAL TAPING WITH MINI ARC SLING  2018    Dr Doyle avery     TOTAL LAPAROSCOPIC HYSTERECTOMY SALPINGO OOPHORECTOMY Bilateral 2024    PATH - benign; Surgeon: Edmar Smith MD;       Family History   Problem Relation Age of Onset    Arthritis Father     Dementia Father     Hypertension Father     Arthritis Mother     Bipolar disorder Mother     Heart failure Paternal Grandmother     Pancreatic cancer Maternal Grandmother     MARCIE disease Paternal Aunt     Diabetes Paternal Uncle     Heart attack Neg Hx     Hyperlipidemia Neg Hx     Mental illness Neg Hx     Obesity Neg Hx     Stroke Neg Hx     Breast cancer Neg Hx     Ovarian cancer Neg Hx        Social History     Socioeconomic History    Marital status: Single    Number of children: 2    Highest education level: Some college, no degree   Tobacco Use    Smoking status: Former     Current packs/day: 0.00     Average packs/day: 1 pack/day for 22.0 years (22.0 ttl pk-yrs)     Types: Cigarettes     Start date:      Quit date: 2016     Years since quittin.0     Passive exposure: Past    Smokeless tobacco: Never   Vaping Use    Vaping status: Some Days    Substances: THC, Flavoring    Devices: Disposable    Passive vaping exposure: Yes   Substance and Sexual Activity    Alcohol use: No    Drug use: Yes     Types: Marijuana     Comment: Marijuana once a week. Tried cocaine, meth, pain pills in the past    Sexual activity: Defer     Partners: Male     Birth control/protection: Surgical           Objective   Physical Exam  Vitals and nursing note reviewed.    Constitutional:       Appearance: She is well-developed.   HENT:      Head: Normocephalic.      Mouth/Throat:      Mouth: Mucous membranes are moist.   Eyes:      Extraocular Movements: Extraocular movements intact.      Pupils: Pupils are equal, round, and reactive to light.   Cardiovascular:      Rate and Rhythm: Normal rate and regular rhythm.   Pulmonary:      Effort: Pulmonary effort is normal. No respiratory distress.      Breath sounds: Normal breath sounds. No wheezing or rhonchi.   Abdominal:      Palpations: Abdomen is rigid.      Tenderness: There is generalized abdominal tenderness.      Hernia: No hernia is present.   Genitourinary:     Vagina: Vaginal discharge present.   Skin:     General: Skin is warm and dry.      Capillary Refill: Capillary refill takes less than 2 seconds.   Neurological:      General: No focal deficit present.      Mental Status: She is alert and oriented to person, place, and time.   Psychiatric:         Mood and Affect: Mood normal.         Behavior: Behavior normal.         Procedures           ED Course  ED Course as of 01/22/25 1851 Wed Jan 22, 2025 1835 Patient's workup feels possible UTI.  CT of the abdomen pelvis is unremarkable pelvic exam does not show any trauma associated with the events yesterday. [CP]   1835 Patient is somewhat reluctant to be discharged.  She states that she still is having pain when she gets up and walks and just generally feels unwell.  She does live alone, but given the reassuring workup or admission at this time as likely not justified.  Will perform a respiratory panel and give her another liter of fluid and reassess. [CP]      ED Course User Index  [CP] Jonh Olmedo DO      Recent Results (from the past 24 hours)   Comprehensive Metabolic Panel    Collection Time: 01/22/25 11:47 AM    Specimen: Blood   Result Value Ref Range    Glucose 94 65 - 99 mg/dL    BUN 17 6 - 20 mg/dL    Creatinine 0.63 0.57 - 1.00 mg/dL    Sodium 136 136 - 145  mmol/L    Potassium 3.6 3.5 - 5.2 mmol/L    Chloride 102 98 - 107 mmol/L    CO2 25.0 22.0 - 29.0 mmol/L    Calcium 9.0 8.6 - 10.5 mg/dL    Total Protein 6.9 6.0 - 8.5 g/dL    Albumin 3.8 3.5 - 5.2 g/dL    ALT (SGPT) 10 1 - 33 U/L    AST (SGOT) 13 1 - 32 U/L    Alkaline Phosphatase 77 39 - 117 U/L    Total Bilirubin 0.4 0.0 - 1.2 mg/dL    Globulin 3.1 gm/dL    A/G Ratio 1.2 g/dL    BUN/Creatinine Ratio 27.0 (H) 7.0 - 25.0    Anion Gap 9.0 5.0 - 15.0 mmol/L    eGFR 111.0 >60.0 mL/min/1.73   Lipase    Collection Time: 01/22/25 11:47 AM    Specimen: Blood   Result Value Ref Range    Lipase 35 13 - 60 U/L   Green Top (Gel)    Collection Time: 01/22/25 11:47 AM   Result Value Ref Range    Extra Tube Hold for add-ons.    Lavender Top    Collection Time: 01/22/25 11:47 AM   Result Value Ref Range    Extra Tube hold for add-on    Gold Top - SST    Collection Time: 01/22/25 11:47 AM   Result Value Ref Range    Extra Tube Hold for add-ons.    Gray Top    Collection Time: 01/22/25 11:47 AM   Result Value Ref Range    Extra Tube Hold for add-ons.    Light Blue Top    Collection Time: 01/22/25 11:47 AM   Result Value Ref Range    Extra Tube Hold for add-ons.    CBC Auto Differential    Collection Time: 01/22/25 11:47 AM    Specimen: Blood   Result Value Ref Range    WBC 11.46 (H) 3.40 - 10.80 10*3/mm3    RBC 4.50 3.77 - 5.28 10*6/mm3    Hemoglobin 11.4 (L) 12.0 - 15.9 g/dL    Hematocrit 36.8 34.0 - 46.6 %    MCV 81.8 79.0 - 97.0 fL    MCH 25.3 (L) 26.6 - 33.0 pg    MCHC 31.0 (L) 31.5 - 35.7 g/dL    RDW 16.1 (H) 12.3 - 15.4 %    RDW-SD 48.4 37.0 - 54.0 fl    MPV 10.2 6.0 - 12.0 fL    Platelets 294 140 - 450 10*3/mm3    Neutrophil % 75.4 42.7 - 76.0 %    Lymphocyte % 18.6 (L) 19.6 - 45.3 %    Monocyte % 4.8 (L) 5.0 - 12.0 %    Eosinophil % 0.7 0.3 - 6.2 %    Basophil % 0.2 0.0 - 1.5 %    Immature Grans % 0.3 0.0 - 0.5 %    Neutrophils, Absolute 8.65 (H) 1.70 - 7.00 10*3/mm3    Lymphocytes, Absolute 2.13 0.70 - 3.10 10*3/mm3     Monocytes, Absolute 0.55 0.10 - 0.90 10*3/mm3    Eosinophils, Absolute 0.08 0.00 - 0.40 10*3/mm3    Basophils, Absolute 0.02 0.00 - 0.20 10*3/mm3    Immature Grans, Absolute 0.03 0.00 - 0.05 10*3/mm3    nRBC 0.0 0.0 - 0.2 /100 WBC   Urinalysis With Microscopic If Indicated (No Culture) - Urine, Clean Catch    Collection Time: 01/22/25 11:53 AM    Specimen: Urine, Clean Catch   Result Value Ref Range    Color, UA Yellow Yellow, Straw    Appearance, UA Cloudy (A) Clear    pH, UA 5.5 5.0 - 8.0    Specific Gravity, UA 1.040 (H) 1.001 - 1.030    Glucose, UA Negative Negative    Ketones, UA Trace (A) Negative    Bilirubin, UA Negative Negative    Blood, UA Moderate (2+) (A) Negative    Protein,  mg/dL (2+) (A) Negative    Leuk Esterase, UA Small (1+) (A) Negative    Nitrite, UA Negative Negative    Urobilinogen, UA 0.2 E.U./dL 0.2 - 1.0 E.U./dL   Urinalysis, Microscopic Only - Urine, Clean Catch    Collection Time: 01/22/25 11:53 AM    Specimen: Urine, Clean Catch   Result Value Ref Range    RBC, UA 21-50 (A) None Seen, 0-2 /HPF    WBC, UA Too Numerous to Count (A) None Seen, 0-2 /HPF    Bacteria, UA 1+ (A) None Seen, Trace /HPF    Squamous Epithelial Cells, UA 3-6 (A) None Seen, 0-2 /HPF    Hyaline Casts, UA None Seen 0 - 6 /LPF    Mucus, UA Large/3+ (A) None Seen, Trace /HPF    Methodology Manual Light Microscopy    Urine Drug Screen - Urine, Clean Catch    Collection Time: 01/22/25 11:53 AM    Specimen: Urine, Clean Catch   Result Value Ref Range    THC, Screen, Urine Negative Negative    Phencyclidine (PCP), Urine Negative Negative    Cocaine Screen, Urine Negative Negative    Methamphetamine, Ur Negative Negative    Opiate Screen Negative Negative    Amphetamine Screen, Urine Negative Negative    Benzodiazepine Screen, Urine Negative Negative    Tricyclic Antidepressants Screen Negative Negative    Methadone Screen, Urine Negative Negative    Barbiturates Screen, Urine Negative Negative    Oxycodone Screen,  Urine Negative Negative    Buprenorphine, Screen, Urine Negative Negative   ECG 12 Lead Tachycardia    Collection Time: 01/22/25  6:15 PM   Result Value Ref Range    QT Interval 326 ms    QTC Interval 468 ms   Respiratory Panel PCR w/COVID-19(SARS-CoV-2) [facility] In-House, NP Swab in UTM/VTM, 2 HR TAT - Swab, Nasopharynx    Collection Time: 01/22/25  6:43 PM    Specimen: Nasopharynx; Swab   Result Value Ref Range    ADENOVIRUS, PCR Not Detected Not Detected    Coronavirus 229E Not Detected Not Detected    Coronavirus HKU1 Not Detected Not Detected    Coronavirus NL63 Not Detected Not Detected    Coronavirus OC43 Not Detected Not Detected    COVID19 Not Detected Not Detected - Ref. Range    Human Metapneumovirus Not Detected Not Detected    Human Rhinovirus/Enterovirus Not Detected Not Detected    Influenza A PCR Not Detected Not Detected    Influenza B PCR Not Detected Not Detected    Parainfluenza Virus 1 Not Detected Not Detected    Parainfluenza Virus 2 Not Detected Not Detected    Parainfluenza Virus 3 Not Detected Not Detected    Parainfluenza Virus 4 Not Detected Not Detected    RSV, PCR Not Detected Not Detected    Bordetella pertussis pcr Not Detected Not Detected    Bordetella parapertussis PCR Not Detected Not Detected    Chlamydophila pneumoniae PCR Not Detected Not Detected    Mycoplasma pneumo by PCR Not Detected Not Detected     Note: In addition to lab results from this visit, the labs listed above may include labs taken at another facility or during a different encounter within the last 24 hours. Please correlate lab times with ED admission and discharge times for further clarification of the services performed during this visit.    CT Abdomen Pelvis With Contrast   Final Result   Impression:      1. Small amount of free fluid in the pelvis which is nonspecific.   2. Interval hysterectomy          Electronically Signed: Brennen Hopkins MD     1/22/2025 4:15 PM EST     Workstation ID:  [id number]        Vitals:    01/22/25 1900 01/22/25 1901 01/22/25 1931 01/22/25 1958   BP: 131/88  138/100 142/95   BP Location:       Patient Position:       Pulse: (!) 126  (!) 122 (!) 126   Resp:       Temp:  (!) 101.2 °F (38.4 °C)     TempSrc:  Oral     SpO2: 95%  95% 96%   Weight:       Height:         Medications   Sodium Chloride (PF) 0.9 % 10 mL (has no administration in time range)   HYDROmorphone (DILAUDID) injection 0.5 mg (0.5 mg Intravenous Given 1/22/25 1534)   ondansetron ODT (ZOFRAN-ODT) disintegrating tablet 4 mg (0 mg Oral Hold 1/22/25 1547)   acetaminophen (TYLENOL) tablet 650 mg (has no administration in time range)   ondansetron (ZOFRAN) injection 4 mg (4 mg Intravenous Given 1/22/25 1533)   sodium chloride 0.9 % bolus 500 mL (0 mL Intravenous Stopped 1/22/25 1631)   iopamidol (ISOVUE-300) 61 % injection 85 mL (85 mL Intravenous Given 1/22/25 1555)   cefTRIAXone (ROCEPHIN) 2,000 mg in sodium chloride 0.9 % 100 mL MBP (0 mg Intravenous Stopped 1/22/25 1949)   sodium chloride 0.9 % bolus 1,000 mL (1,000 mL Intravenous New Bag 1/22/25 1854)     ECG/EMG Results (last 24 hours)       Procedure Component Value Units Date/Time    ECG 12 Lead Tachycardia [[id number]] Collected: 01/22/25 1815     Updated: 01/22/25 1815     QT Interval 326 ms      QTC Interval 468 ms     Narrative:      Test Reason : Tachycardia  Blood Pressure :   */*   mmHG  Vent. Rate : 124 BPM     Atrial Rate : 124 BPM     P-R Int : 138 ms          QRS Dur :  90 ms      QT Int : 326 ms       P-R-T Axes :  14  -1 160 degrees    QTcB Int : 468 ms    Sinus tachycardia  Cannot rule out Anterior infarct , age undetermined  T wave abnormality, consider lateral ischemia  Abnormal ECG  When compared with ECG of 16-Sep-2024 10:00,  Vent. rate has increased by  63 bpm  Minimal criteria for Anterior infarct are now present  Inverted T waves have replaced nonspecific T wave abnormality in Lateral  leads    Referred By: EDMD           Confirmed By:            ECG 12 Lead Tachycardia   Preliminary Result   Test Reason : Tachycardia   Blood Pressure :   */*   mmHG   Vent. Rate : 124 BPM     Atrial Rate : 124 BPM      P-R Int : 138 ms          QRS Dur :  90 ms       QT Int : 326 ms       P-R-T Axes :  14  -1 160 degrees     QTcB Int : 468 ms      Sinus tachycardia   Cannot rule out Anterior infarct , age undetermined   T wave abnormality, consider lateral ischemia   Abnormal ECG   When compared with ECG of 16-Sep-2024 10:00,   Vent. rate has increased by  63 bpm   Minimal criteria for Anterior infarct are now present   Inverted T waves have replaced nonspecific T wave abnormality in Lateral   leads      Referred By: EDMD           Confirmed By:                                                            Medical Decision Making  Problems Addressed:  Acute UTI: complicated acute illness or injury  Generalized abdominal pain: complicated acute illness or injury  Injury of vagina, initial encounter: complicated acute illness or injury  Syncope, unspecified syncope type: complicated acute illness or injury  Tachycardia: complicated acute illness or injury    Amount and/or Complexity of Data Reviewed  External Data Reviewed: notes.  Labs: ordered. Decision-making details documented in ED Course.  Radiology: ordered and independent interpretation performed. Decision-making details documented in ED Course.  ECG/medicine tests: ordered and independent interpretation performed. Decision-making details documented in ED Course.    Risk  OTC drugs.  Prescription drug management.  Decision regarding hospitalization.        Final diagnoses:   Generalized abdominal pain   Acute UTI   Injury of vagina, initial encounter       ED Disposition  ED Disposition       ED Disposition   Discharge    Condition   Stable    Comment   --             DISCHARGE    Patient discharged in stable condition.    Reviewed implications of results, diagnosis, meds, responsibility to follow up, warning  signs and symptoms of possible worsening, potential complications and reasons to return to ER.    Patient/Family voiced understanding of above instructions.    Discussed plan for discharge, as there is no emergent indication for admission.  Pt/family is agreeable and understands need for follow up and possible repeat testing.  Pt/family is aware that discharge does not mean that nothing is wrong but that it indicates no emergency is currently present that requires admission and they must continue care with follow-up as given below or with a physician of their choice.     FOLLOW-UP  Rebekah Fagan, APRN  [address] [id number] [phone]               Medication List        New Prescriptions      ciprofloxacin 500 MG tablet  Commonly known as: Cipro  Take 1 tablet by mouth 2 (Two) Times a Day for 7 days.     metroNIDAZOLE 500 MG tablet  Commonly known as: FLAGYL  Take 1 tablet by mouth Every 8 (Eight) Hours for 7 days.            Changed      albuterol sulfate  (90 Base) MCG/ACT inhaler  Commonly known as: PROVENTIL HFA;VENTOLIN HFA;PROAIR HFA  Inhale 2 puffs Every 4 (Four) Hours As Needed for Wheezing.  What changed: additional instructions     ondansetron ODT 4 MG disintegrating tablet  Commonly known as: ZOFRAN-ODT  Place 1 tablet on the tongue Every 8 (Eight) Hours As Needed for Nausea or Vomiting.  What changed: additional instructions               Where to Get Your Medications        These medications were sent to [facility] [place] [id number]      Hours: Monday to Friday 7 AM to 5:30 PM, Saturday & Sunday 8 AM to 4:30 PM Phone: [phone]   ciprofloxacin 500 MG tablet  metroNIDAZOLE 500 MG tablet            Jonh Olmedo DO  01/26/25 0594

## 2025-01-22 NOTE — TELEPHONE ENCOUNTER
"Provider: DR. LOPEZ    Caller: Bernardo Dodd \"Gricel\"    Relationship to Patient: Self    Pharmacy:     Phone Number: [phone]    Reason for Call: PT IN EXTREME STOMACH PAIN AFTER INSERTING DILDO INTO VAGINA, HAD WENT TO SLEEP AFTER THAT AND WOKE UP IN A POOL OF BLOOD. SHE CALLED THE OFFICE AND SPOKE W/DR. LOPEZ WHO ADV TO CALL THE OFFICE IN AM.STATES SHE IS IN EXTREME PAIN. NA AT OFFICE WANTED ME TO SEND TO TRIAGE NURSE. SHE HAD LAPAROSCOPIC SURGERY 11-27.    When was the patient last seen:       "

## 2025-01-23 ENCOUNTER — APPOINTMENT (OUTPATIENT)
Dept: CT IMAGING | Facility: HOSPITAL | Age: 45
End: 2025-01-23
Payer: MEDICAID

## 2025-01-23 PROBLEM — N76.0 VAGINAL CUFF CELLULITIS: Status: ACTIVE | Noted: 2025-01-22

## 2025-01-23 LAB
ABO GROUP BLD: NORMAL
ANION GAP SERPL CALCULATED.3IONS-SCNC: 11 MMOL/L (ref 5–15)
BASOPHILS # BLD AUTO: 0.02 10*3/MM3 (ref 0–0.2)
BASOPHILS NFR BLD AUTO: 0.1 % (ref 0–1.5)
BLD GP AB SCN SERPL QL: NEGATIVE
BUN SERPL-MCNC: 8 MG/DL (ref 6–20)
BUN/CREAT SERPL: 12.5 (ref 7–25)
CALCIUM SPEC-SCNC: 8.7 MG/DL (ref 8.6–10.5)
CHLORIDE SERPL-SCNC: 103 MMOL/L (ref 98–107)
CO2 SERPL-SCNC: 25 MMOL/L (ref 22–29)
CREAT SERPL-MCNC: 0.64 MG/DL (ref 0.57–1)
D-LACTATE SERPL-SCNC: 2 MMOL/L (ref 0.5–2)
DEPRECATED RDW RBC AUTO: 47.5 FL (ref 37–54)
EGFRCR SERPLBLD CKD-EPI 2021: 110.5 ML/MIN/1.73
EOSINOPHIL # BLD AUTO: 0.09 10*3/MM3 (ref 0–0.4)
EOSINOPHIL NFR BLD AUTO: 0.6 % (ref 0.3–6.2)
ERYTHROCYTE [DISTWIDTH] IN BLOOD BY AUTOMATED COUNT: 16.5 % (ref 12.3–15.4)
FENTANYL UR-MCNC: NEGATIVE NG/ML
GLUCOSE SERPL-MCNC: 106 MG/DL (ref 65–99)
HCT VFR BLD AUTO: 31.4 % (ref 34–46.6)
HCT VFR BLD AUTO: 32.2 % (ref 34–46.6)
HCT VFR BLD AUTO: 32.6 % (ref 34–46.6)
HCT VFR BLD AUTO: 33.1 % (ref 34–46.6)
HCT VFR BLD AUTO: 33.1 % (ref 34–46.6)
HCT VFR BLD AUTO: 34.3 % (ref 34–46.6)
HCT VFR BLD AUTO: 34.3 % (ref 34–46.6)
HGB BLD-MCNC: 10 G/DL (ref 12–15.9)
HGB BLD-MCNC: 10.1 G/DL (ref 12–15.9)
HGB BLD-MCNC: 10.2 G/DL (ref 12–15.9)
HGB BLD-MCNC: 10.3 G/DL (ref 12–15.9)
HGB BLD-MCNC: 10.3 G/DL (ref 12–15.9)
HGB BLD-MCNC: 10.8 G/DL (ref 12–15.9)
HGB BLD-MCNC: 10.8 G/DL (ref 12–15.9)
IMM GRANULOCYTES # BLD AUTO: 0.07 10*3/MM3 (ref 0–0.05)
IMM GRANULOCYTES NFR BLD AUTO: 0.5 % (ref 0–0.5)
LYMPHOCYTES # BLD AUTO: 2.23 10*3/MM3 (ref 0.7–3.1)
LYMPHOCYTES NFR BLD AUTO: 14.5 % (ref 19.6–45.3)
MCH RBC QN AUTO: 25.3 PG (ref 26.6–33)
MCHC RBC AUTO-ENTMCNC: 31.5 G/DL (ref 31.5–35.7)
MCV RBC AUTO: 80.3 FL (ref 79–97)
MONOCYTES # BLD AUTO: 0.96 10*3/MM3 (ref 0.1–0.9)
MONOCYTES NFR BLD AUTO: 6.2 % (ref 5–12)
NEUTROPHILS NFR BLD AUTO: 12.02 10*3/MM3 (ref 1.7–7)
NEUTROPHILS NFR BLD AUTO: 78.1 % (ref 42.7–76)
NRBC BLD AUTO-RTO: 0 /100 WBC (ref 0–0.2)
PLATELET # BLD AUTO: 290 10*3/MM3 (ref 140–450)
PMV BLD AUTO: 10.4 FL (ref 6–12)
POTASSIUM SERPL-SCNC: 3.4 MMOL/L (ref 3.5–5.2)
QT INTERVAL: 326 MS
QTC INTERVAL: 468 MS
RBC # BLD AUTO: 4.27 10*6/MM3 (ref 3.77–5.28)
RH BLD: POSITIVE
SODIUM SERPL-SCNC: 139 MMOL/L (ref 136–145)
T&S EXPIRATION DATE: NORMAL
WBC NRBC COR # BLD AUTO: 15.39 10*3/MM3 (ref 3.4–10.8)

## 2025-01-23 PROCEDURE — 85014 HEMATOCRIT: CPT | Performed by: NURSE PRACTITIONER

## 2025-01-23 PROCEDURE — 25010000002 METRONIDAZOLE 500 MG/100ML SOLUTION: Performed by: NURSE PRACTITIONER

## 2025-01-23 PROCEDURE — 85025 COMPLETE CBC W/AUTO DIFF WBC: CPT | Performed by: NURSE PRACTITIONER

## 2025-01-23 PROCEDURE — 83605 ASSAY OF LACTIC ACID: CPT | Performed by: STUDENT IN AN ORGANIZED HEALTH CARE EDUCATION/TRAINING PROGRAM

## 2025-01-23 PROCEDURE — 25510000001 IOPAMIDOL PER 1 ML: Performed by: INTERNAL MEDICINE

## 2025-01-23 PROCEDURE — 80048 BASIC METABOLIC PNL TOTAL CA: CPT | Performed by: NURSE PRACTITIONER

## 2025-01-23 PROCEDURE — G0378 HOSPITAL OBSERVATION PER HR: HCPCS

## 2025-01-23 PROCEDURE — 25010000002 CEFTRIAXONE PER 250 MG: Performed by: NURSE PRACTITIONER

## 2025-01-23 PROCEDURE — 85018 HEMOGLOBIN: CPT | Performed by: NURSE PRACTITIONER

## 2025-01-23 PROCEDURE — 86901 BLOOD TYPING SEROLOGIC RH(D): CPT | Performed by: NURSE PRACTITIONER

## 2025-01-23 PROCEDURE — 99232 SBSQ HOSP IP/OBS MODERATE 35: CPT | Performed by: INTERNAL MEDICINE

## 2025-01-23 PROCEDURE — 71275 CT ANGIOGRAPHY CHEST: CPT

## 2025-01-23 PROCEDURE — 86900 BLOOD TYPING SEROLOGIC ABO: CPT | Performed by: NURSE PRACTITIONER

## 2025-01-23 PROCEDURE — 86850 RBC ANTIBODY SCREEN: CPT | Performed by: NURSE PRACTITIONER

## 2025-01-23 RX ORDER — ONDANSETRON 2 MG/ML
4 INJECTION INTRAMUSCULAR; INTRAVENOUS EVERY 6 HOURS PRN
Status: DISCONTINUED | OUTPATIENT
Start: 2025-01-23 | End: 2025-01-24 | Stop reason: HOSPADM

## 2025-01-23 RX ORDER — IOPAMIDOL 755 MG/ML
90 INJECTION, SOLUTION INTRAVASCULAR
Status: COMPLETED | OUTPATIENT
Start: 2025-01-23 | End: 2025-01-23

## 2025-01-23 RX ADMIN — OXYCODONE HYDROCHLORIDE AND ACETAMINOPHEN 1 TABLET: 5; 325 TABLET ORAL at 03:21

## 2025-01-23 RX ADMIN — LAMOTRIGINE 200 MG: 100 TABLET ORAL at 08:27

## 2025-01-23 RX ADMIN — AZELASTINE HYDROCHLORIDE 137 MCG: 137 SPRAY, METERED NASAL at 08:27

## 2025-01-23 RX ADMIN — FAMOTIDINE 20 MG: 20 TABLET, FILM COATED ORAL at 21:00

## 2025-01-23 RX ADMIN — METRONIDAZOLE 500 MG: 500 INJECTION, SOLUTION INTRAVENOUS at 03:14

## 2025-01-23 RX ADMIN — FAMOTIDINE 20 MG: 20 TABLET, FILM COATED ORAL at 08:27

## 2025-01-23 RX ADMIN — DOXYCYCLINE 100 MG: 100 INJECTION, POWDER, LYOPHILIZED, FOR SOLUTION INTRAVENOUS at 21:43

## 2025-01-23 RX ADMIN — DOXYCYCLINE 100 MG: 100 INJECTION, POWDER, LYOPHILIZED, FOR SOLUTION INTRAVENOUS at 08:26

## 2025-01-23 RX ADMIN — SODIUM CHLORIDE 2000 MG: 900 INJECTION INTRAVENOUS at 09:53

## 2025-01-23 RX ADMIN — IOPAMIDOL 90 ML: 755 INJECTION, SOLUTION INTRAVENOUS at 10:40

## 2025-01-23 RX ADMIN — OXYCODONE HYDROCHLORIDE AND ACETAMINOPHEN 1 TABLET: 5; 325 TABLET ORAL at 17:56

## 2025-01-23 RX ADMIN — METRONIDAZOLE 500 MG: 500 INJECTION, SOLUTION INTRAVENOUS at 21:00

## 2025-01-23 RX ADMIN — DOXYCYCLINE 100 MG: 100 INJECTION, POWDER, LYOPHILIZED, FOR SOLUTION INTRAVENOUS at 00:32

## 2025-01-23 RX ADMIN — Medication 10 ML: at 08:27

## 2025-01-23 RX ADMIN — CETIRIZINE HYDROCHLORIDE 10 MG: 10 TABLET, FILM COATED ORAL at 08:26

## 2025-01-23 RX ADMIN — AMLODIPINE BESYLATE 5 MG: 5 TABLET ORAL at 08:26

## 2025-01-23 RX ADMIN — METRONIDAZOLE 500 MG: 500 INJECTION, SOLUTION INTRAVENOUS at 11:55

## 2025-01-23 NOTE — CASE MANAGEMENT/SOCIAL WORK
Discharge Planning Assessment  [facility]     Patient Name: Bernardo Dodd  MRN: [id number]  Today's Date: 1/23/2025    Admit Date: 1/22/2025    Plan: Home at DC   Discharge Needs Assessment       Row Name 01/23/25 1450       Living Environment    People in Home alone    Current Living Arrangements home    Living Arrangement Comments Has support from family and friends       Discharge Needs Assessment    Equipment Currently Used at Home none    Equipment Needed After Discharge none    Discharge Coordination/Progress Denies current use of DME, HH or outpt services.                   Discharge Plan       Row Name 01/23/25 1451       Plan    Plan Home at DC    Patient/Family in Agreement with Plan yes    Plan Comments I spoke with the pt. She denies any DC needs at this time. States she has a ride home at DC.    Final Discharge Disposition Code 01 - home or self-care                  Continued Care and Services - Admitted Since 1/22/2025    No active coordination exists for this encounter.       Expected Discharge Date and Time       Expected Discharge Date Expected Discharge Time    Jan 25, 2025            Demographic Summary    No documentation.                  Functional Status       Row Name 01/23/25 1449       Functional Status    Usual Activity Tolerance good       Functional Status, IADL    Medications independent    Meal Preparation independent    Housekeeping independent    Laundry independent    Shopping independent                   Psychosocial    No documentation.                  Abuse/Neglect    No documentation.                  Legal    No documentation.                  Substance Abuse    No documentation.                  Patient Forms    No documentation.                     Jessica Waldron RN

## 2025-01-23 NOTE — PROGRESS NOTES
[facility] Medicine Services  PROGRESS NOTE    Patient Name: Bernardo Dodd  : 1980  MRN: [id number]    Date of Admission: 2025  Primary Care Physician: Rebekah Fagan APRN    Subjective   Subjective     CC: Follow-up syncope    HPI: No acute events overnight, patient denies any fevers or chills, continues to endorse some abdominal discomfort, denies any ongoing bleeding    Objective   Objective     Vital Signs:   Temp:  [97.8 °F (36.6 °C)-101.2 °F (38.4 °C)] 98.3 °F (36.8 °C)  Heart Rate:  [] 99  Resp:  [16-20] 20  BP: (116-172)/() 128/89     Physical Exam:  Constitutional: No acute distress, awake, alert  HENT: NCAT, mucous membranes moist  Respiratory: Clear to auscultation bilaterally, respiratory effort normal   Cardiovascular: RRR, no murmurs, rubs, or gallops  Gastrointestinal: Positive bowel sounds, soft, nontender, nondistended  Musculoskeletal: No bilateral ankle edema  Psychiatric: Appropriate affect, cooperative  Neurologic: Oriented x 3, nonfocal    Results Reviewed:  LAB RESULTS:      Lab 25  0334 25  0042 254 25  1147   WBC 15.39*  --   --  11.46*   HEMOGLOBIN 10.8*  10.8* 10.2*  --  11.4*   HEMATOCRIT 34.3  34.3 32.6*  --  36.8   PLATELETS 290  --   --  294   NEUTROS ABS 12.02*  --   --  8.65*   IMMATURE GRANS (ABS) 0.07*  --   --  0.03   LYMPHS ABS 2.23  --   --  2.13   MONOS ABS 0.96*  --   --  0.55   EOS ABS 0.09  --   --  0.08   MCV 80.3  --   --  81.8   PROCALCITONIN  --   --   --  1.14*   LACTATE  --  2.0 3.0*  --    D DIMER QUANT  --   --  1.15*  --          Lab 25  0334 25  1147   SODIUM 139 136   POTASSIUM 3.4* 3.6   CHLORIDE 103 102   CO2 25.0 25.0   ANION GAP 11.0 9.0   BUN 8 17   CREATININE 0.64 0.63   EGFR 110.5 111.0   GLUCOSE 106* 94   CALCIUM 8.7 9.0         Lab 25  1147   TOTAL PROTEIN 6.9   ALBUMIN 3.8   GLOBULIN 3.1   ALT (SGPT) 10   AST (SGOT) 13   BILIRUBIN 0.4   ALK PHOS 77    LIPASE 35                 Lab 01/23/25  0042   ABO TYPING A   RH TYPING Positive   ANTIBODY SCREEN Negative         Brief Urine Lab Results  (Last result in the past 365 days)        Color   Clarity   Blood   Leuk Est   Nitrite   Protein   CREAT   Urine HCG        01/22/25 1153 Yellow   Cloudy   Moderate (2+)   Small (1+)   Negative   100 mg/dL (2+)                   Microbiology Results Abnormal       Procedure Component Value - Date/Time    Wet Prep, Genital - Swab, Vagina [[id number]]  (Abnormal) Collected: 01/22/25 1955    Lab Status: Final result Specimen: Swab from Vagina Updated: 01/22/25 2019     YEAST No yeast seen     HYPHAL ELEMENTS No Hyphal elements seen     WBC'S 3+ WBC's seen     Clue Cells, Wet Prep No Clue cells seen     Trichomonas, Wet Prep No Trichomonas seen            CT Abdomen Pelvis With Contrast    Result Date: 1/22/2025  CT ABDOMEN PELVIS W CONTRAST Date of Exam: 1/22/2025 3:48 PM EST Indication: abd pain, possible vaginal trauma during intercourse yesterday. Comparison: 6/2/2024 Technique: Axial CT images were obtained of the abdomen and pelvis following the uneventful intravenous administration of Isovue-300 . Reconstructed coronal and sagittal images were also obtained. Automated exposure control and iterative construction methods were used. Findings: Visualized lung bases are clear. Liver, pancreas, and spleen are within normal limits. Bilateral adrenal glands appear normal. Kidneys are within normal limits. Gallbladder appears normal. No biliary tract obstruction. No abdominal or retroperitoneal adenopathy. The upper GI tract is within normal limits. No free intraperitoneal fluid or air identified. Pelvis: Urinary bladder is within normal limits. Patient is canal status post hysterectomy. There is small amount of free fluid in the pelvis which is nonspecific. The GI tract appears within normal limits. The appendix is not visualized. No pelvic or inguinal adenopathy. No lytic or  sclerotic bony lesions are seen.     Impression: Impression: 1. Small amount of free fluid in the pelvis which is nonspecific. 2. Interval hysterectomy Electronically Signed: Brennen Hopkins MD  1/22/2025 4:15 PM EST  Workstation ID: [id number]     Results for orders placed during the hospital encounter of 09/13/22    Adult Stress Echo W/ Cont or Stress Agent if Necessary Per Protocol    Interpretation Summary  · The patient completed 7: 50 minutes of treadmill exercise on standard Randy protocol achieving 9.8 METs of workload. The test was stopped due to fatigue after achieving the target heart rate. She reported mild chest tightness and heaviness at peak exercise which resolved in recovery.  · Expected exercise duration 8:45, actual 7:50; AVIS (+11).  · Resting heart rate 82, blood pressure 120/88. Peak heart rate 151, blood pressure 168/100. 85% of age-predicted maximal heart rate achieved.  · Average exercise capacity, completed for this protocol.  · Resting ECG showed sinus rhythm with nonspecific ST segment changes. Stress ECG is nondiagnostic due to baseline abnormalities however no significant ST abnormalities were noted. No exercise-induced arrhythmias were seen.  · Average exercise capacity with appropriate hemodynamic response to exercise.  · Negative treadmill stress test for ischemic ST segment abnormalities or exercise-induced arrhythmias. Chest tightness and heaviness was reported which resolved in recovery.  · Resting echocardiogram showed normal left ventricular systolic function with estimated ejection fraction 65%. Trace mitral regurgitation, trace tricuspid regurgitation and trace to mild pulmonic insufficiency was noted.  · Normal stress echo with no significant echocardiographic evidence for myocardial ischemia. Post-rest ejection fraction 75%.      Current medications:  Scheduled Meds:amLODIPine, 5 mg, Oral, Daily  Azelastine HCl, 1 spray, Each Nare, BID  cefTRIAXone, 2,000 mg, Intravenous,  "Q24H  cetirizine, 10 mg, Oral, Daily  doxycycline, 100 mg, Intravenous, Q12H  famotidine, 20 mg, Oral, BID  lamoTRIgine, 200 mg, Oral, Daily  metroNIDAZOLE, 500 mg, Intravenous, Q8H  sodium chloride, 10 mL, Intravenous, Q12H      Continuous Infusions:sodium chloride, 75 mL/hr, Last Rate: 75 mL/hr (01/22/25 2305)      PRN Meds:.  acetaminophen **OR** acetaminophen **OR** acetaminophen    albuterol    senna-docusate sodium **AND** polyethylene glycol **AND** bisacodyl **AND** bisacodyl    nitroglycerin    oxyCODONE-acetaminophen    Sodium Chloride (PF)    Assessment & Plan   Assessment & Plan     Active Hospital Problems    Diagnosis  POA    **Sepsis [A41.9]  Yes    Tobacco use [Z72.0]  Yes    S/P robotic TLHBSO 11/27/2024 [Z98.890]  Not Applicable    Marijuana abuse, continuous [F12.10]  Yes    Hypertension [I10]  Yes    GERD (gastroesophageal reflux disease) [K21.9]  Yes      Resolved Hospital Problems   No resolved problems to display.        Brief Hospital Course to date:  Bernardo Dodd is a 44 y.o. female past medical history significant for bipolar disorder, GERD and essential hypertension, recent HEIDE on 11/27/2024, heart f/u 1/13/1025, however this was rescheduled until February.  Patient reports last night  1/22, around 7 PM she was using her dildo vibrator and woke up in a \"pool of blood\" approximately 2 hours later with significant pelvic pain.  She presents to the ED with syncope.  Of note patient underwent total abdominal hysterectomy on 11/27/2024.  She was scheduled for her first follow-up on 1/13/2025 however had to reschedule until February.  She had not been cleared for sexual activity.  He was also found to be febrile in the ED    This patient's problems and plans were partially entered by my partner and updated as appropriate by me 01/23/25.     Sepsis  - Lactic acid 3, WBC 11.46, heart rate 126, temp 101.2, source likely UTI  - CT abdomen pelvis with contrast shows small amount of free fluid in " the pelvis which is nonspecific.  Interval hysterectomy  - Follow-up blood and urine cultures, currently NGTD, vaginal swabs with 3+ WBCs, PCR pending  --Continue broad-spectrum antibiotics with Rocephin, Flagyl and doxycycline  - Blood cultures x 2 pending  -- Continue IV fluid    Recent TLH-BSO  Vaginal bleed, traumatic, resolved  Syncope  --H&H low but stable  -GYN Consulted  --Will get orthostatic VS     Hypertension  -BP stable, on Norvasc     Bipolar  - On Lamictal     Tobacco/marijuana use   - Patient reports smoking 1 to 2 cigars a month  -Cessation education provided     Expected Discharge Location and Transportation: TBD  Expected Discharge   Expected discharge date/ time has not been documented.     VTE Prophylaxis:  Mechanical VTE prophylaxis orders are present.         AM-PAC 6 Clicks Score (PT): 24 (01/22/25 9324)    CODE STATUS:   Code Status and Medical Interventions: CPR (Attempt to Resuscitate); Full Support   Ordered at: 01/22/25 8472     Level Of Support Discussed With:    Patient     Code Status (Patient has no pulse and is not breathing):    CPR (Attempt to Resuscitate)     Medical Interventions (Patient has pulse or is breathing):    Full Support       Shad Gayle MD  01/23/25
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
## 2025-01-23 NOTE — PLAN OF CARE
Goal Outcome Evaluation:   Pt progressing well. Calm, Cooperative and accepting of plan of care.
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
## 2025-01-23 NOTE — ED NOTES
Bernardo Dodd    Nursing Report ED to Floor:  Mental status: A&O x4  Ambulatory status: Assist x1  Oxygen Therapy:  RA  Cardiac Rhythm: Sinus Tach  Admitted from: ED/Home  Safety Concerns:  NA  Social Issues: NA  ED Room #:  33    ED Nurse Phone Extension - [phone] or may call [phone].      HPI:   Chief Complaint   Patient presents with    Abdominal Pain       Past Medical History:  Past Medical History:   Diagnosis Date    Antisocial personality disorder     Bipolar disorder     Chlamydia     GERD (gastroesophageal reflux disease)     Gonorrhea     Herpes simplex     History of methamphetamine abuse     Clean ~     Hypertension     Kidney stones     Seizures 2012    Stopped ~     Shingles on back     Substance abuse - DOC was cocaine     Sober of cocaine since ~     Trichomonas infection         Past Surgical History:  Past Surgical History:   Procedure Laterality Date    BARTHOLIN GLAND CYST EXCISION Left      SECTION WITH TUBAL  2010    CYSTOSCOPY, URETEROSCOPY, RETROGRADE PYELOGRAM, STONE EXTRACTION, STENT INSERTION Bilateral 2023    Procedure: CYSTOSCOPY, BILATERAL RETROGRADE PYELOGRAM, URETEROSCOPY, AND STENT PLACEMENT;  Surgeon: Micah Child MD;  Location: [facility] OR;  Service: Urology;  Laterality: Bilateral;    CYSTOSCOPY, URETEROSCOPY, RETROGRADE PYELOGRAM, STONE EXTRACTION, STENT INSERTION Left 2023    Procedure: CYSTOSCOPY URETEROSCOPY RETROGRADE PYELOGRAM STONE EXTRACTION STENT INSERTION;  Surgeon: Micah Child MD;  Location: [facility] OR;  Service: Urology;  Laterality: Left;    DIAGNOSTIC LAPAROSCOPY      ENDOSCOPY      I & D / EXCISION MARSUPIALIZATION BARTHOLIN'S GLAND CYST / LYSIS LESIONS Left     ORIF ANKLE FRACTURE Right     REDUCTION MAMMAPLASTY Bilateral     TENSION FREE VAGINAL TAPING WITH MINI ARC SLING  2018    Dr Doyle avery     TOTAL LAPAROSCOPIC HYSTERECTOMY SALPINGO OOPHORECTOMY  Bilateral 11/27/2024    PATH - benign; Surgeon: Edmar Smith MD;        Admitting Doctor:   Van Jennings MD    Consulting Provider(s):  Consults       No orders found from 12/24/2024 to 1/23/2025.             Admitting Diagnosis:   The primary encounter diagnosis was Generalized abdominal pain. Diagnoses of Acute UTI, Injury of vagina, initial encounter, Tachycardia, and Syncope, unspecified syncope type were also pertinent to this visit.    Most Recent Vitals:   Vitals:    01/22/25 1900 01/22/25 1901 01/22/25 1931 01/22/25 1958   BP: 131/88  138/100 142/95   BP Location:       Patient Position:       Pulse: (!) 126  (!) 122 (!) 126   Resp:       Temp:  (!) 101.2 °F (38.4 °C)     TempSrc:  Oral     SpO2: 95%  95% 96%   Weight:       Height:           Active LDAs/IV Access:   Lines, Drains & Airways       Active LDAs       Name Placement date Placement time Site Days    Peripheral IV 01/22/25 1505 Left Antecubital 01/22/25  1505  Antecubital  less than 1    Peripheral IV 01/22/25 2057 Right Forearm 01/22/25 2057  Forearm  less than 1    Ureteral Drain/Stent Left ureter 4.8 Fr. 05/04/23  0633  Left ureter  STENT PLACEMENT  629                    Labs (abnormal labs have a star):   Labs Reviewed   WET PREP, GENITAL - Abnormal; Notable for the following components:       Result Value    WBC'S 3+ WBC's seen (*)     All other components within normal limits   COMPREHENSIVE METABOLIC PANEL - Abnormal; Notable for the following components:    BUN/Creatinine Ratio 27.0 (*)     All other components within normal limits    Narrative:     GFR Categories in Chronic Kidney Disease (CKD)      GFR Category          GFR (mL/min/1.73)    Interpretation  G1                     90 or greater         Normal or high (1)  G2                      60-89                Mild decrease (1)  G3a                   45-59                Mild to moderate decrease  G3b                   30-44                Moderate to severe  decrease  G4                    15-29                Severe decrease  G5                    14 or less           Kidney failure          (1)In the absence of evidence of kidney disease, neither GFR category G1 or G2 fulfill the criteria for CKD.    eGFR calculation 2021 CKD-EPI creatinine equation, which does not include race as a factor   URINALYSIS W/ MICROSCOPIC IF INDICATED (NO CULTURE) - Abnormal; Notable for the following components:    Appearance, UA Cloudy (*)     Specific Gravity, UA 1.040 (*)     Ketones, UA Trace (*)     Blood, UA Moderate (2+) (*)     Protein,  mg/dL (2+) (*)     Leuk Esterase, UA Small (1+) (*)     All other components within normal limits   CBC WITH AUTO DIFFERENTIAL - Abnormal; Notable for the following components:    WBC 11.46 (*)     Hemoglobin 11.4 (*)     MCH 25.3 (*)     MCHC 31.0 (*)     RDW 16.1 (*)     Lymphocyte % 18.6 (*)     Monocyte % 4.8 (*)     Neutrophils, Absolute 8.65 (*)     All other components within normal limits   URINALYSIS, MICROSCOPIC ONLY - Abnormal; Notable for the following components:    RBC, UA 21-50 (*)     WBC, UA Too Numerous to Count (*)     Bacteria, UA 1+ (*)     Squamous Epithelial Cells, UA 3-6 (*)     Mucus, UA Large/3+ (*)     All other components within normal limits   RESPIRATORY PANEL PCR W/ COVID-19 (SARS-COV-2), NP SWAB IN UTM/VTP, 2 HR TAT - Normal    Narrative:     In the setting of a positive respiratory panel with a viral infection PLUS a negative procalcitonin without other underlying concern for bacterial infection, consider observing off antibiotics or discontinuation of antibiotics and continue supportive care. If the respiratory panel is positive for atypical bacterial infection (Bordetella pertussis, Chlamydophila pneumoniae, or Mycoplasma pneumoniae), consider antibiotic de-escalation to target atypical bacterial infection.   KOH PREP - Normal   LIPASE - Normal   URINE DRUG SCREEN - Normal    Narrative:     Cutoff For Drugs  Screened:    Amphetamines               500 ng/ml  Barbiturates               200 ng/ml  Benzodiazepines            150 ng/ml  Cocaine                    150 ng/ml  Methadone                  200 ng/ml  Opiates                    100 ng/ml  Phencyclidine               25 ng/ml  THC                         50 ng/ml  Methamphetamine            500 ng/ml  Tricyclic Antidepressants  300 ng/ml  Oxycodone                  100 ng/ml  Buprenorphine               10 ng/ml    The normal value for all drugs tested is negative. This report includes unconfirmed screening results, with the cutoff values listed, to be used for medical treatment purposes only.  Unconfirmed results must not be used for non-medical purposes such as employment or legal testing.  Clinical consideration should be applied to any drug of abuse test, particularly when unconfirmed results are used.     COVID PRE-OP / PRE-PROCEDURE SCREENING ORDER (NO ISOLATION)    Narrative:     The following orders were created for panel order COVID PRE-OP / PRE-PROCEDURE SCREENING ORDER (NO ISOLATION) - Swab, Nasopharynx.  Procedure                               Abnormality         Status                     ---------                               -----------         ------                     Respiratory Panel PCR w/...[[id number]]  Normal              Final result                 Please view results for these tests on the individual orders.   CHLAMYDIA TRACHOMATIS, NEISSERIA GONORRHOEAE, PCR   RAINBOW DRAW    Narrative:     The following orders were created for panel order [place] Draw.  Procedure                               Abnormality         Status                     ---------                               -----------         ------                     Green Top (Gel)[[id number]]                                  Final result               Lavender Top[[id number]]                                     Final result               Gold Top - [facility][[id number]]                                    Final result               [place] Top[[id number]]                                         Final result               Light Blue Top[[id number]]                                   Final result                 Please view results for these tests on the individual orders.   FENTANYL, URINE   LACTIC ACID, PLASMA   D-DIMER, QUANTITATIVE   PROCALCITONIN   CBC AND DIFFERENTIAL    Narrative:     The following orders were created for panel order CBC & Differential.  Procedure                               Abnormality         Status                     ---------                               -----------         ------                     CBC Auto Differential[[id number]]        Abnormal            Final result                 Please view results for these tests on the individual orders.   GREEN TOP   LAVENDER TOP   GOLD TOP - SST   GRAY TOP   LIGHT BLUE TOP       Meds Given in ED:   Medications   Sodium Chloride (PF) 0.9 % 10 mL (has no administration in time range)   HYDROmorphone (DILAUDID) injection 0.5 mg (0.5 mg Intravenous Given 1/22/25 1534)   ondansetron ODT (ZOFRAN-ODT) disintegrating tablet 4 mg (0 mg Oral Hold 1/22/25 1547)   ondansetron (ZOFRAN) injection 4 mg (4 mg Intravenous Given 1/22/25 1533)   sodium chloride 0.9 % bolus 500 mL (0 mL Intravenous Stopped 1/22/25 1631)   iopamidol (ISOVUE-300) 61 % injection 85 mL (85 mL Intravenous Given 1/22/25 1555)   cefTRIAXone (ROCEPHIN) 2,000 mg in sodium chloride 0.9 % 100 mL MBP (0 mg Intravenous Stopped 1/22/25 1949)   sodium chloride 0.9 % bolus 1,000 mL (1,000 mL Intravenous New Bag 1/22/25 1854)   acetaminophen (TYLENOL) tablet 650 mg (650 mg Oral Given 1/22/25 2019)           Last NIH score:                                                          Dysphagia screening results:        Angelo Coma Scale:  No data recorded     CIWA:        Restraint Type:            Isolation Status:  No active isolations

## 2025-01-23 NOTE — CONSULTS
Bri Dodd  : 1980  MRN: [id number]  CSN: [id number]    Consult Requested By: Hospital medicine   Consulting Service: Gynecology   Reason for Consultation: Post-op infection   Date of consultation: 2025       Britta Dodd is a 44 y.o. year old  who was admitted recently after complaints of vaginal bleeding and pelvic pain that started shortly after using a vibrator.  She had a recent total laparoscopic hysterectomy.  She had not yet been cleared for intercourse or vaginal use.  She was admitted within the last 24 hours and has been started on parenteral antibiotics.  She has had a recent CT of the chest done related to elevated D-dimers.  That recently was read and is normal.  Overall as compared to when she was admitted she is beginning to feel better.  She has had no more vaginal bleeding or discharge.  She is still having some pain in the upper abdomen but she feels better overall as compared to on admission.    Past Medical History:   Diagnosis Date    Bipolar disorder     Substance abuse - DOC was cocaine     Sober of cocaine since ~     Antisocial personality disorder     Herpes simplex     Chlamydia     Trichomonas infection     Gonorrhea     Seizures 2012    Stopped ~     GERD (gastroesophageal reflux disease) 2015    Hypertension 2016    Shingles on back 2017    Kidney stones 2018    History of methamphetamine abuse 2020    Clean ~      Past Surgical History:   Procedure Laterality Date    I & D / EXCISION MARSUPIALIZATION BARTHOLIN'S GLAND CYST / LYSIS LESIONS Left     REDUCTION MAMMAPLASTY Bilateral     ORIF ANKLE FRACTURE Right 2005     SECTION WITH TUBAL  2010    BARTHOLIN GLAND CYST EXCISION Left 2018    TENSION FREE VAGINAL TAPING WITH MINI ARC SLING  2018    Dr Doyle avery     CYSTOSCOPY, URETEROSCOPY, RETROGRADE PYELOGRAM, STONE EXTRACTION, STENT INSERTION Bilateral  2023    Procedure: CYSTOSCOPY, BILATERAL RETROGRADE PYELOGRAM, URETEROSCOPY, AND STENT PLACEMENT;  Surgeon: Micah Child MD;  Location:  [facility] OR;  Service: Urology;  Laterality: Bilateral;    CYSTOSCOPY, URETEROSCOPY, RETROGRADE PYELOGRAM, STONE EXTRACTION, STENT INSERTION Left 2023    Procedure: CYSTOSCOPY URETEROSCOPY RETROGRADE PYELOGRAM STONE EXTRACTION STENT INSERTION;  Surgeon: Micah Child MD;  Location:  [facility] OR;  Service: Urology;  Laterality: Left;    TOTAL LAPAROSCOPIC HYSTERECTOMY SALPINGO OOPHORECTOMY Bilateral 2024    PATH - benign; Surgeon: Edmar Smith MD;    DIAGNOSTIC LAPAROSCOPY      ENDOSCOPY       OB History    Para Term  AB Living   2 2 2 0 0 2   SAB IAB Ectopic Molar Multiple Live Births   0 0 0 0 0 2      # Outcome Date GA Lbr Farhad/2nd Weight Sex Type Anes PTL Lv   2 Term 2010    M CS-Unspec   [facility]      Name: Shyane   1 Term 2002    M Vag-Spont   [facility]      Name: Justyn     Social History    Tobacco Use      Smoking status: Former        Packs/day: 0.00        Years: 1 pack/day for 22.0 years (22.0 ttl pk-yrs)        Types: Cigarettes        Start date:         Quit date: 2016        Years since quittin.0        Passive exposure: Past      Smokeless tobacco: Never    Medications Prior to Admission   Medication Sig Dispense Refill Last Dose/Taking    albuterol sulfate  (90 Base) MCG/ACT inhaler Inhale 2 puffs Every 4 (Four) Hours As Needed for Wheezing. (Patient taking differently: Inhale 2 puffs Every 4 (Four) Hours As Needed for Wheezing. PRN) 18 g 2 Patient Taking Differently    amLODIPine (NORVASC) 5 MG tablet Take 1 tablet by mouth Daily. 90 tablet 0 2025    Azelastine HCl 137 MCG/SPRAY solution 1 spray by Each Nare route 2 (Two) Times a Day. 30 mL 11 2025    diclofenac (VOLTAREN) 75 MG EC tablet Take 1 tablet by mouth 2 (Two) Times a Day. 60 tablet 2 2025    famotidine (PEPCID) 20 MG tablet Take 1 tablet by  mouth 2 (Two) Times a Day. 60 tablet 11 1/21/2025    lamoTRIgine (LaMICtal) 200 MG tablet Take 1 tablet by mouth Daily. 30 tablet 11 1/21/2025    levocetirizine (XYZAL) 5 MG tablet Take 1 tablet by mouth Every Evening. 30 tablet 11 1/21/2025    Multiple Vitamins-Minerals (CENTRUM ADULT PO) Take  by mouth.   1/21/2025    ondansetron ODT (ZOFRAN-ODT) 4 MG disintegrating tablet Place 1 tablet on the tongue Every 8 (Eight) Hours As Needed for Nausea or Vomiting. (Patient taking differently: Place 1 tablet on the tongue Every 8 (Eight) Hours As Needed for Nausea or Vomiting. PRN) 20 tablet 0 Patient Taking Differently    nystatin-triamcinolone (MYCOLOG) 405013-0.1 UNIT/GM-% ointment Apply 1 Application topically to the appropriate area as directed 2 (Two) Times a Day. 15 g 3     oxyCODONE-acetaminophen (PERCOCET) 5-325 MG per tablet Take 1 tablet by mouth Every 6 (Six) Hours As Needed for Severe Pain. (Patient not taking: Reported on 1/22/2025) 14 tablet 0 Not Taking       Current Facility-Administered Medications:     albuterol (PROVENTIL) nebulizer solution 0.083% 2.5 mg/3mL, 2.5 mg, Nebulization, Q6H PRN, Ning, Maggie, APRN    amLODIPine (NORVASC) tablet 5 mg, 5 mg, Oral, Daily, Ning, Maggie, APRN, 5 mg at 01/23/25 0826    sennosides-docusate (PERICOLACE) 8.6-50 MG per tablet 2 tablet, 2 tablet, Oral, BID PRN **AND** polyethylene glycol (MIRALAX) packet 17 g, 17 g, Oral, Daily PRN **AND** bisacodyl (DULCOLAX) EC tablet 5 mg, 5 mg, Oral, Daily PRN **AND** bisacodyl (DULCOLAX) suppository 10 mg, 10 mg, Rectal, Daily PRN, Ning, Maggie, APRN    cefTRIAXone (ROCEPHIN) 2,000 mg in sodium chloride 0.9 % 100 mL MBP, 2,000 mg, Intravenous, Q24H, Ning, Maggie, APRN, Last Rate: 200 mL/hr at 01/23/25 0953, 2,000 mg at 01/23/25 0953    cetirizine (zyrTEC) tablet 10 mg, 10 mg, Oral, Daily, Ning, Maggie, APRN, 10 mg at 01/23/25 0826    doxycycline (VIBRAMYCIN) 100 mg in sodium chloride 0.9 % 100 mL MBP, 100 mg,  "Intravenous, Q12H, Ning, Maggie, APRN, 100 mg at 01/23/25 0826    famotidine (PEPCID) tablet 20 mg, 20 mg, Oral, BID, Ning, Maggie, APRN, 20 mg at 01/23/25 0827    lamoTRIgine (LaMICtal) tablet 200 mg, 200 mg, Oral, Daily, Ning, Maggie, APRN, 200 mg at 01/23/25 0827    metroNIDAZOLE (FLAGYL) IVPB 500 mg, 500 mg, Intravenous, Q8H, Ning, Maggie, APRN, Last Rate: 200 mL/hr at 01/23/25 1155, 500 mg at 01/23/25 1155    nitroglycerin (NITROSTAT) SL tablet 0.4 mg, 0.4 mg, Sublingual, Q5 Min PRN, Ning, Maggie, APRN    oxyCODONE-acetaminophen (PERCOCET) 5-325 MG per tablet 1 tablet, 1 tablet, Oral, Q6H PRN, Ning, Maggie, APRN, 1 tablet at 01/23/25 0321      Allergies   Allergen Reactions    Naproxen Swelling    Sulfa Antibiotics Rash       Review of Systems   Constitutional:  Negative for chills, fever and unexpected weight change.   HENT:  Negative for ear pain, facial swelling, sinus pressure, sneezing and sore throat.    Respiratory:  Negative for cough, shortness of breath and wheezing.    Cardiovascular:  Negative for chest pain and palpitations.   Hematological:  Does not bruise/bleed easily.         Objective   /84 (BP Location: Left arm, Patient Position: Lying)   Pulse 100   Temp 99.1 °F (37.3 °C) (Oral)   Resp 18   Ht 149.9 cm (59\")   Wt 82.2 kg (181 lb 3.2 oz)   LMP 09/10/2024 (Approximate)   SpO2 97%   BMI 36.60 kg/m²   General: well developed; well nourished  no acute distress  mentation appropriate   Heart: Not performed.   Lungs: breathing is unlabored   Abdomen: soft, non-tender; no masses  no umbilical or inguinal hernias are present  no hepato-splenomegaly   Pelvis: Not performed.   Labs  CBC w/ diff:   Lab Results   Component Value Date    WBC 15.39 (H) 01/23/2025    NEUTRORELPCT 78.1 (H) 01/23/2025    AUTOIGPER 0.5 01/23/2025    LYMPHORELPCT 14.5 (L) 01/23/2025    MONORELPCT 6.2 01/23/2025    EOSRELPCT 0.6 01/23/2025    BASORELPCT 0.1 01/23/2025    HGB 10.3 (L) " 01/23/2025    HCT 33.1 (L) 01/23/2025    MCV 80.3 01/23/2025    RDW 16.5 (H) 01/23/2025     01/23/2025     CMP:   Lab Results   Component Value Date     01/23/2025    K 3.4 (L) 01/23/2025     01/23/2025    CO2 25.0 01/23/2025    BUN 8 01/23/2025    CREATININE 0.64 01/23/2025    GLUCOSE 106 (H) 01/23/2025    ALBUMIN 3.8 01/22/2025    CALCIUM 8.7 01/23/2025    AST 13 01/22/2025    ALT 10 01/22/2025    BILITOT 0.4 01/22/2025     UA:    Lab Results   Component Value Date    SQUAMEPIUA 3-6 (A) 01/22/2025    SPECGRAVUR 1.040 (H) 01/22/2025    KETONESU Trace (A) 01/22/2025    BLOODU Moderate (2+) (A) 01/22/2025    LEUKOCYTESUR Small (1+) (A) 01/22/2025    NITRITEU Negative 01/22/2025    RBCUA 21-50 (A) 01/22/2025    WBCUA Too Numerous to Count (A) 01/22/2025    BACTERIA 1+ (A) 01/22/2025       Imaging Reviewed  CT of abdomen and pelvis       Assessment   Postoperative pain with vaginal bleeding along with leukocytosis and low-grade fever all presumed from either urinary tract infection or more probable cuff infection from recent vaginal penetration from vibrator use.  She is beginning to improve clinically and her fever appears to be improving     Plan   Would continue with broad-spectrum parenteral antibiotics for 24 more hours and if she continues to do well, discharged to home on broad-spectrum antibiotics to complete a minimum of 7 additional days use        Edmar Smith MD  1/23/2025  13:13 EST

## 2025-01-23 NOTE — NURSING NOTE
Patient remained free from falls, had no signs of skin breakdown, and did not develop any symptoms of any hospital-acquired illnesses today. Patient progressed towards their goals today by seeing gynecology. Plan is to receive IV antibiotic overnight, then should DC tomorrow on oral antibiotic and pelvic rest

## 2025-01-24 ENCOUNTER — READMISSION MANAGEMENT (OUTPATIENT)
Dept: CALL CENTER | Facility: HOSPITAL | Age: 45
End: 2025-01-24
Payer: MEDICAID

## 2025-01-24 VITALS
HEART RATE: 86 BPM | OXYGEN SATURATION: 96 % | HEIGHT: 59 IN | WEIGHT: 181.2 LBS | BODY MASS INDEX: 36.53 KG/M2 | SYSTOLIC BLOOD PRESSURE: 126 MMHG | DIASTOLIC BLOOD PRESSURE: 86 MMHG | TEMPERATURE: 97.6 F | RESPIRATION RATE: 18 BRPM

## 2025-01-24 LAB
ANION GAP SERPL CALCULATED.3IONS-SCNC: 8 MMOL/L (ref 5–15)
BUN SERPL-MCNC: 9 MG/DL (ref 6–20)
BUN/CREAT SERPL: 14.1 (ref 7–25)
CALCIUM SPEC-SCNC: 8.8 MG/DL (ref 8.6–10.5)
CHLORIDE SERPL-SCNC: 102 MMOL/L (ref 98–107)
CO2 SERPL-SCNC: 29 MMOL/L (ref 22–29)
CREAT SERPL-MCNC: 0.64 MG/DL (ref 0.57–1)
DEPRECATED RDW RBC AUTO: 47.8 FL (ref 37–54)
EGFRCR SERPLBLD CKD-EPI 2021: 110.5 ML/MIN/1.73
ERYTHROCYTE [DISTWIDTH] IN BLOOD BY AUTOMATED COUNT: 16.3 % (ref 12.3–15.4)
GLUCOSE SERPL-MCNC: 87 MG/DL (ref 65–99)
HCT VFR BLD AUTO: 31.9 % (ref 34–46.6)
HCT VFR BLD AUTO: 32 % (ref 34–46.6)
HCT VFR BLD AUTO: 32.3 % (ref 34–46.6)
HGB BLD-MCNC: 10.1 G/DL (ref 12–15.9)
HGB BLD-MCNC: 10.2 G/DL (ref 12–15.9)
HGB BLD-MCNC: 9.8 G/DL (ref 12–15.9)
MCH RBC QN AUTO: 25.5 PG (ref 26.6–33)
MCHC RBC AUTO-ENTMCNC: 31.7 G/DL (ref 31.5–35.7)
MCV RBC AUTO: 80.6 FL (ref 79–97)
PLATELET # BLD AUTO: 271 10*3/MM3 (ref 140–450)
PMV BLD AUTO: 10.7 FL (ref 6–12)
POTASSIUM SERPL-SCNC: 3.4 MMOL/L (ref 3.5–5.2)
RBC # BLD AUTO: 3.96 10*6/MM3 (ref 3.77–5.28)
SODIUM SERPL-SCNC: 139 MMOL/L (ref 136–145)
WBC NRBC COR # BLD AUTO: 9.83 10*3/MM3 (ref 3.4–10.8)

## 2025-01-24 PROCEDURE — 25010000002 CEFTRIAXONE PER 250 MG: Performed by: NURSE PRACTITIONER

## 2025-01-24 PROCEDURE — 85018 HEMOGLOBIN: CPT | Performed by: NURSE PRACTITIONER

## 2025-01-24 PROCEDURE — 25010000002 METRONIDAZOLE 500 MG/100ML SOLUTION: Performed by: NURSE PRACTITIONER

## 2025-01-24 PROCEDURE — G0378 HOSPITAL OBSERVATION PER HR: HCPCS

## 2025-01-24 PROCEDURE — 80048 BASIC METABOLIC PNL TOTAL CA: CPT | Performed by: INTERNAL MEDICINE

## 2025-01-24 PROCEDURE — 85027 COMPLETE CBC AUTOMATED: CPT | Performed by: INTERNAL MEDICINE

## 2025-01-24 PROCEDURE — 85014 HEMATOCRIT: CPT | Performed by: NURSE PRACTITIONER

## 2025-01-24 PROCEDURE — 97161 PT EVAL LOW COMPLEX 20 MIN: CPT

## 2025-01-24 PROCEDURE — 96367 TX/PROPH/DG ADDL SEQ IV INF: CPT

## 2025-01-24 RX ORDER — METRONIDAZOLE 500 MG/1
500 TABLET ORAL EVERY 8 HOURS SCHEDULED
Qty: 12 TABLET | Refills: 0 | Status: SHIPPED | OUTPATIENT
Start: 2025-01-24 | End: 2025-01-24

## 2025-01-24 RX ORDER — METRONIDAZOLE 500 MG/1
500 TABLET ORAL EVERY 8 HOURS SCHEDULED
Status: DISCONTINUED | OUTPATIENT
Start: 2025-01-24 | End: 2025-01-24 | Stop reason: HOSPADM

## 2025-01-24 RX ORDER — POTASSIUM CHLORIDE 1500 MG/1
40 TABLET, EXTENDED RELEASE ORAL EVERY 4 HOURS
Status: COMPLETED | OUTPATIENT
Start: 2025-01-24 | End: 2025-01-24

## 2025-01-24 RX ORDER — METRONIDAZOLE 500 MG/1
500 TABLET ORAL EVERY 8 HOURS SCHEDULED
Qty: 21 TABLET | Refills: 0 | Status: SHIPPED | OUTPATIENT
Start: 2025-01-24 | End: 2025-01-31

## 2025-01-24 RX ORDER — CIPROFLOXACIN 500 MG/1
500 TABLET, FILM COATED ORAL 2 TIMES DAILY
Qty: 8 TABLET | Refills: 0 | Status: SHIPPED | OUTPATIENT
Start: 2025-01-24 | End: 2025-01-24

## 2025-01-24 RX ORDER — CIPROFLOXACIN 500 MG/1
500 TABLET, FILM COATED ORAL 2 TIMES DAILY
Qty: 14 TABLET | Refills: 0 | Status: SHIPPED | OUTPATIENT
Start: 2025-01-24 | End: 2025-01-31

## 2025-01-24 RX ADMIN — POTASSIUM CHLORIDE 40 MEQ: 1500 TABLET, EXTENDED RELEASE ORAL at 07:20

## 2025-01-24 RX ADMIN — SODIUM CHLORIDE 2000 MG: 900 INJECTION INTRAVENOUS at 07:17

## 2025-01-24 RX ADMIN — DOXYCYCLINE 100 MG: 100 INJECTION, POWDER, LYOPHILIZED, FOR SOLUTION INTRAVENOUS at 09:38

## 2025-01-24 RX ADMIN — OXYCODONE HYDROCHLORIDE AND ACETAMINOPHEN 1 TABLET: 5; 325 TABLET ORAL at 07:16

## 2025-01-24 RX ADMIN — CETIRIZINE HYDROCHLORIDE 10 MG: 10 TABLET, FILM COATED ORAL at 08:21

## 2025-01-24 RX ADMIN — METRONIDAZOLE 500 MG: 500 TABLET ORAL at 12:42

## 2025-01-24 RX ADMIN — AMLODIPINE BESYLATE 5 MG: 5 TABLET ORAL at 08:20

## 2025-01-24 RX ADMIN — AZELASTINE HYDROCHLORIDE 137 MCG: 137 SPRAY, METERED NASAL at 08:24

## 2025-01-24 RX ADMIN — FAMOTIDINE 20 MG: 20 TABLET, FILM COATED ORAL at 08:21

## 2025-01-24 RX ADMIN — ACETAMINOPHEN 650 MG: 325 TABLET ORAL at 09:38

## 2025-01-24 RX ADMIN — POTASSIUM CHLORIDE 40 MEQ: 1500 TABLET, EXTENDED RELEASE ORAL at 02:57

## 2025-01-24 RX ADMIN — Medication 10 ML: at 08:24

## 2025-01-24 RX ADMIN — LAMOTRIGINE 200 MG: 100 TABLET ORAL at 08:21

## 2025-01-24 RX ADMIN — ACETAMINOPHEN 650 MG: 325 TABLET ORAL at 03:17

## 2025-01-24 RX ADMIN — METRONIDAZOLE 500 MG: 500 INJECTION, SOLUTION INTRAVENOUS at 02:57

## 2025-01-24 RX ADMIN — SODIUM CHLORIDE 2000 MG: 900 INJECTION INTRAVENOUS at 08:23

## 2025-01-24 NOTE — THERAPY EVALUATION
Patient Name: Bernardo Dodd  : 1980    MRN: [id number]                              Today's Date: 2025       Admit Date: 2025    Visit Dx:     ICD-10-CM ICD-9-CM   1. Generalized abdominal pain  R10.84 789.07   2. Acute UTI  N39.0 599.0   3. Injury of vagina, initial encounter  S39.93XA 959.14   4. Tachycardia  R00.0 785.0   5. Syncope, unspecified syncope type  R55 780.2     Patient Active Problem List   Diagnosis    Asthma    GERD (gastroesophageal reflux disease)    Dyspnea    Allergic rhinitis    Hypertension    Lumbar spondylosis    Marijuana abuse, continuous    Annual GYN exam    Antisocial personality disorder    S/P robotic TLHBSO 2024    Vaginal cuff cellulitis    Tobacco use     Past Medical History:   Diagnosis Date    Antisocial personality disorder     Bipolar disorder     Chlamydia     GERD (gastroesophageal reflux disease)     Gonorrhea     Herpes simplex     History of methamphetamine abuse 2020    Clean ~     Hypertension 2016    Kidney stones 2018    Seizures 2012    Stopped ~     Shingles on back 2017    Substance abuse - DOC was cocaine     Sober of cocaine since ~     Trichomonas infection      Past Surgical History:   Procedure Laterality Date    BARTHOLIN GLAND CYST EXCISION Left 2018     SECTION WITH TUBAL  2010    CYSTOSCOPY, URETEROSCOPY, RETROGRADE PYELOGRAM, STONE EXTRACTION, STENT INSERTION Bilateral 2023    Procedure: CYSTOSCOPY, BILATERAL RETROGRADE PYELOGRAM, URETEROSCOPY, AND STENT PLACEMENT;  Surgeon: Micah Child MD;  Location: [facility] OR;  Service: Urology;  Laterality: Bilateral;    CYSTOSCOPY, URETEROSCOPY, RETROGRADE PYELOGRAM, STONE EXTRACTION, STENT INSERTION Left 2023    Procedure: CYSTOSCOPY URETEROSCOPY RETROGRADE PYELOGRAM STONE EXTRACTION STENT INSERTION;  Surgeon: Micah Child MD;  Location:  [facility];  Service: Urology;  Laterality: Left;    DIAGNOSTIC LAPAROSCOPY       ENDOSCOPY      I & D / EXCISION MARSUPIALIZATION BARTHOLIN'S GLAND CYST / LYSIS LESIONS Left 1994    ORIF ANKLE FRACTURE Right 2005    REDUCTION MAMMAPLASTY Bilateral 1999    TENSION FREE VAGINAL TAPING WITH MINI ARC SLING  02/2018    Dr Doyle avery     TOTAL LAPAROSCOPIC HYSTERECTOMY SALPINGO OOPHORECTOMY Bilateral 11/27/2024    PATH - benign; Surgeon: Edmar Smith MD;      General Information       Row Name 01/24/25 1409          Physical Therapy Time and Intention    Document Type discharge evaluation/summary  -     Mode of Treatment physical therapy  -       Row Name 01/24/25 1409          General Information    Patient Profile Reviewed yes  -     Prior Level of Function independent:;all household mobility;community mobility;gait;transfer;bed mobility;ADL's  -     Existing Precautions/Restrictions no known precautions/restrictions  -     Barriers to Rehab none identified  -       Row Name 01/24/25 1409          Living Environment    People in Home alone  -AC       Row Name 01/24/25 1409          Home Main Entrance    Number of Stairs, Main Entrance none  -AC       Row Name 01/24/25 1409          Stairs Within Home, Primary    Number of Stairs, Within Home, Primary other (see comments)  14  -AC     Stair Railings, Within Home, Primary railings safe and in good condition  -AC       Row Name 01/24/25 1409          Cognition    Orientation Status (Cognition) oriented x 4  -AC       Row Name 01/24/25 1409          Safety Issues/Impairments Affecting Functional Mobility    Impairments Affecting Function (Mobility) endurance/activity tolerance;pain  -AC               User Key  (r) = Recorded By, (t) = Taken By, (c) = Cosigned By      Initials Name Provider Type    AC Radha Jeffries, PT Physical Therapist                   Mobility       Row Name 01/24/25 1410          Bed Mobility    Bed Mobility supine-sit;sit-supine  -AC     Supine-Sit [place] (Bed Mobility) modified independence  -AC      Sit-Supine [place] (Bed Mobility) modified independence  -AC     Assistive Device (Bed Mobility) bed rails;head of bed elevated  -AC     Comment, (Bed Mobility) Pt required extended time to transition to sitting EOB. Pt educated on log roll for pain management  -AC       Row Name 01/24/25 1410          Sit-Stand Transfer    Sit-Stand [place] (Transfers) contact guard;1 person assist  -AC     Assistive Device (Sit-Stand Transfers) other (see comments)  no AD  -AC       Row Name 01/24/25 1410          Gait/Stairs (Locomotion)    [place] Level (Gait) contact guard;standby assist;1 person assist  -AC     Assistive Device (Gait) other (see comments)  no AD  -AC     Patient was able to Ambulate yes  -AC     Distance in Feet (Gait) 250  -AC     Deviations/Abnormal Patterns (Gait) leonila decreased;gait speed decreased;base of support, wide  -AC     [place] Level (Stairs) contact guard;1 person assist;verbal cues;nonverbal cues (demo/gesture)  -AC     Handrail Location (Stairs) right side (ascending);right side (descending)  -AC     Number of Steps (Stairs) 14  -AC     Ascending Technique (Stairs) step-over-step  -AC     Descending Technique (Stairs) step-to-step  -AC     Comment, (Gait/Stairs) Pt ambulated OOR w/ CGAx1 which progress to SBA and no AD. Pt demonstrated increased trunk sway, slowed gait speed, and wide base of support. Pt then ambulated up/down 14 steps w/ VC for safety and CGAx1 for balance. No LOB or buckling noted  -               User Key  (r) = Recorded By, (t) = Taken By, (c) = Cosigned By      Initials Name Provider Type    AC Radha Jeffries PT Physical Therapist                   Obj/Interventions       Row Name 01/24/25 1414          Range of Motion Comprehensive    General Range of Motion bilateral lower extremity ROM WNL  -AC       Row Name 01/24/25 1414          Strength Comprehensive (MMT)    General Manual Muscle Testing (MMT) Assessment lower extremity strength deficits  identified  -AC     Comment, General Manual Muscle Testing (MMT) Assessment BLE's grossly WFL  -       Row Name 01/24/25 1414          Balance    Balance Assessment sitting static balance;sitting dynamic balance;standing static balance;standing dynamic balance  -     Static Sitting Balance independent  -     Dynamic Sitting Balance independent  -AC     Position, Sitting Balance unsupported;sitting edge of bed  -     Static Standing Balance standby assist  -     Dynamic Standing Balance standby assist  -     Position/Device Used, Standing Balance unsupported  -     Balance Interventions sitting;standing;sit to stand;supported;static;dynamic  -       Row Name 01/24/25 1414          Sensory Assessment (Somatosensory)    Sensory Assessment (Somatosensory) LE sensation intact  -               User Key  (r) = Recorded By, (t) = Taken By, (c) = Cosigned By      Initials Name Provider Type    AC Radha Jeffries, PT Physical Therapist                   Goals/Plan    No documentation.                  Clinical Impression       Row Name 01/24/25 1415          Pain    Pretreatment Pain Rating 2/10  -     Posttreatment Pain Rating 2/10  -     Pain Location abdomen  -     Pain Side/Orientation generalized  -     Pain Management Interventions activity modification encouraged;exercise or physical activity utilized;positioning techniques utilized  -     Response to Pain Interventions activity participation with tolerable pain  -       Row Name 01/24/25 1415          Plan of Care Review    Plan of Care Reviewed With patient  -AC     Progress no change  -     Outcome Evaluation PT initial evaluation complete. Pt presents at baseline functional mobility and is primarily limited by pain this date. Pt ambulated OOR w/ SBA and no AD in addition to ambulating up/down 14 steps w. CGAx1 and unilateral HR. D/c rec is home when medically ready for discharge.  -       Row Name 01/24/25 1411          Therapy  Assessment/Plan (PT)    Criteria for Skilled Interventions Met (PT) no;no problems identified which require skilled intervention  -AC       Row Name 01/24/25 1415          Vital Signs    O2 Delivery Pre Treatment room air  -AC     O2 Delivery Intra Treatment room air  -AC     O2 Delivery Post Treatment room air  -AC     Pre Patient Position Supine  -AC     Intra Patient Position Standing  -AC     Post Patient Position Supine  -AC       Row Name 01/24/25 1415          Positioning and Restraints    Pre-Treatment Position in bed  -AC     Post Treatment Position bed  -AC     In Bed notified nsg;sitting;call light within reach;encouraged to call for assist;side rails up x2  alarm unchanged  -AC               User Key  (r) = Recorded By, (t) = Taken By, (c) = Cosigned By      Initials Name Provider Type    Radha Grace, PT Physical Therapist                   Outcome Measures       Row Name 01/24/25 1418 01/24/25 0800       How much help from another person do you currently need...    Turning from your back to your side while in flat bed without using bedrails? 4  -AC 4  -DM    Moving from lying on back to sitting on the side of a flat bed without bedrails? 4  -AC 4  -DM    Moving to and from a bed to a chair (including a wheelchair)? 4  -AC 4  -DM    Standing up from a chair using your arms (e.g., wheelchair, bedside chair)? 4  -AC 4  -DM    Climbing 3-5 steps with a railing? 3  -AC 4  -DM    To walk in hospital room? 4  -AC 4  -DM    AM-PAC 6 Clicks Score (PT) 23  -AC 24  -DM    Highest Level of Mobility Goal 7 --> Walk 25 feet or more  -AC 8 --> Walked 250 feet or more  -DM      Row Name 01/24/25 1418          Functional Assessment    Outcome Measure Options AM-PAC 6 Clicks Basic Mobility (PT)  -AC               User Key  (r) = Recorded By, (t) = Taken By, (c) = Cosigned By      Initials Name Provider Type    Perla Riley, RN Registered Nurse    Radha Grace, PT Physical Therapist                                  Physical Therapy Education       Title: PT OT SLP Therapies (Not Started)       Topic: Physical Therapy (Not Started)       Point: Mobility training (Not Started)       Learner Progress:  Not documented in this visit.              Point: Home exercise program (Not Started)       Learner Progress:  Not documented in this visit.              Point: Body mechanics (Not Started)       Learner Progress:  Not documented in this visit.              Point: Precautions (Not Started)       Learner Progress:  Not documented in this visit.                                  PT Recommendation and Plan     Progress: no change  Outcome Evaluation: PT initial evaluation complete. Pt presents at baseline functional mobility and is primarily limited by pain this date. Pt ambulated OOR w/ SBA and no AD in addition to ambulating up/down 14 steps w. CGAx1 and unilateral HR. D/c rec is home when medically ready for discharge.     Time Calculation:   PT Evaluation Complexity  History, PT Evaluation Complexity: 1-2 personal factors and/or comorbidities  Examination of Body Systems (PT Eval Complexity): 1-2 elements  Clinical Presentation (PT Evaluation Complexity): stable  Clinical Decision Making (PT Evaluation Complexity): low complexity  Overall Complexity (PT Evaluation Complexity): low complexity     PT Charges       Row Name 01/24/25 1419             Time Calculation    Start Time 1304  -AC      PT Received On 01/24/25  -AC         Untimed Charges    PT Eval/Re-eval Minutes 49  -AC         Total Minutes    Untimed Charges Total Minutes 49  -AC       Total Minutes 49  -AC                User Key  (r) = Recorded By, (t) = Taken By, (c) = Cosigned By      Initials Name Provider Type    AC Radha Jeffries, PT Physical Therapist                  Therapy Charges for Today       Code Description Service Date Service Provider Modifiers Qty    [id number] HC PT EVAL LOW COMPLEXITY 4 1/24/2025 Radha Jeffries, PT GP 1            PT G-Codes  Outcome  Measure Options: AM-PAC 6 Clicks Basic Mobility (PT)  AM-PAC 6 Clicks Score (PT): 23  PT Discharge Summary  Anticipated Discharge Disposition (PT): home with assist    Radha Jeffries, PT  1/24/2025

## 2025-01-24 NOTE — PROGRESS NOTES
RHETT Chang BINTA Dodd  : 1980  MRN: [id number]  CSN: [id number]    Hospital Day: 3     CC: hospital follow-up for     Subjective   Feels significantly better.  Eating food.  Pain is less.  Only having a little bit of pink vaginal drainage.  Does feel able to go home today..     Objective     Min/max vitals past 24 hours:   Temp  Min: 97.6 °F (36.4 °C)  Max: 99 °F (37.2 °C)  BP  Min: 118/72  Max: 146/92  Pulse  Min: 65  Max: 99  Resp  Min: 18  Max: 18        I/O last 3 completed shifts:  In: 2305.7 [P.O.:140; I.V.:1065.7; IV Piggyback:1100]  Out: 4600 [Urine:4600]    General: well developed; well nourished  no acute distress   Abdomen: soft, non-tender; no masses  no umbilical or inguinal hernias are present  no hepato-splenomegaly   Pelvic: Not performed   Ext: Calves NT     CBC w/ diff:   Lab Results   Component Value Date    WBC 9.83 2025    NEUTRORELPCT 78.1 (H) 2025    AUTOIGPER 0.5 2025    LYMPHORELPCT 14.5 (L) 2025    MONORELPCT 6.2 2025    EOSRELPCT 0.6 2025    BASORELPCT 0.1 2025    HGB 10.1 (L) 2025    HCT 31.9 (L) 2025    MCV 80.6 2025    RDW 16.3 (H) 2025     2025     CMP:   Lab Results   Component Value Date     2025    K 3.4 (L) 2025     2025    CO2 29.0 2025    BUN 9 2025    CREATININE 0.64 2025    GLUCOSE 87 2025    ALBUMIN 3.8 2025    CALCIUM 8.8 2025    AST 13 2025    ALT 10 2025    BILITOT 0.4 2025          Assessment   Cuff cellulitis improving with broad-spectrum antibiotic.  Meeting discharge criteria.     Plan   At this point I do think she can be discharged to home with broad-spectrum antibiotic coverage such as ciprofloxacin/Flagyl  Follow-up appointment set with me already for   Reemphasized vaginal rest until such time as we can reexamine the vaginal cuff in the clinic.    Edmar Smith,  MD  1/24/2025  12:18 EST

## 2025-01-24 NOTE — PLAN OF CARE
Goal Outcome Evaluation:  Plan of Care Reviewed With: patient        Progress: no change  Outcome Evaluation: PT initial evaluation complete. Pt presents at baseline functional mobility and is primarily limited by pain this date. Pt ambulated OOR w/ SBA and no AD in addition to ambulating up/down 14 steps w. CGAx1 and unilateral HR. D/c rec is home when medically ready for discharge.    Anticipated Discharge Disposition (PT): home with assist

## 2025-01-24 NOTE — NURSING NOTE
Discharge instructions give. Pt expressed thru teach back method what meds to take, what side effects to watch for and when to follow up with her physicians.

## 2025-01-24 NOTE — DISCHARGE SUMMARY
"    [facility] Medicine Services  DISCHARGE SUMMARY    Patient Name: Bernardo Dodd  : 1980  MRN: [id number]    Date of Admission: 2025  3:03 PM  Date of Discharge:  2025  Primary Care Physician: Rebekah Fagan APRN    Consults       Date and Time Order Name Status Description    2025 10:46 PM Inpatient Obstetrics / Gynecology Consult Completed             Hospital Course     Presenting Problem: Syncope    Active Hospital Problems    Diagnosis  POA    **Vaginal cuff cellulitis [N76.0]  Yes    Tobacco use [Z72.0]  Yes    S/P robotic TLHBSO 2024 [Z98.890]  Not Applicable    Marijuana abuse, continuous [F12.10]  Yes    Hypertension [I10]  Yes    GERD (gastroesophageal reflux disease) [K21.9]  Yes      Resolved Hospital Problems   No resolved problems to display.          Hospital Course:  Bernardo Dodd is a 44 y.o. female past medical history significant for bipolar disorder, GERD and essential hypertension, recent HEIDE on 2024, f/u 1025, however this was rescheduled until February.  Patient reports last night  , around 7 PM she was using her dildo vibrator and woke up in a \"pool of blood\" approximately 2 hours later with significant pelvic pain.  She presents to the ED with syncope.  Of note patient underwent total abdominal hysterectomy on 2024.  She was scheduled for her first follow-up on 2025 however had to reschedule until February.  She had not been cleared for sexual activity.  She was also found to be febrile in the ED     Sepsis 2/2 vaginal cuff cellulitis  - Lactic acid 3, WBC 11.46, heart rate 126, temp 101.2 on arrival  - CT abdomen pelvis with contrast shows small amount of free fluid in the pelvis which is nonspecific.  Interval hysterectomy  - KOH and wet prep from vaginal swab negative with only WBCs seen  -Patient received empiric Rocephin, Doxy, Flagyl with improvement in her symptoms  -White count 9.83 on day of " discharge  -Will discharge on Cipro plus Flagyl to complete 7-day course       Recent TLH-BSO  Vaginal bleed, traumatic, resolved  Syncope  -H&H low but stable throughout admission  -GYN Consulted and recommended 7-day course of broad-spectrum antibiotics  -Patient counseled on refraining from vaginal penetration until cleared by GYN     Hypertension  -BP stable, on Norvasc     Bipolar  - On Lamictal     Tobacco/marijuana use   - Patient reports smoking 1 to 2 cigars a month  -Cessation education provided    Discharge Follow Up Recommendations for outpatient labs/diagnostics:  GYN 2/14/2025    Day of Discharge     HPI:   Patient evaluated sitting up in bed.  She complains of headache but is otherwise doing well.  She denies any major bleeding.  She is tolerating p.o. intake.    Vital Signs:   Temp:  [97.6 °F (36.4 °C)-99 °F (37.2 °C)] 97.6 °F (36.4 °C)  Heart Rate:  [65-99] 65  Resp:  [18] 18  BP: (118-146)/(71-94) 126/86      Physical Exam:  Constitutional: No acute distress, awake, alert  HENT: NCAT, mucous membranes moist  Respiratory: Clear to auscultation bilaterally, respiratory effort normal   Cardiovascular: RRR, no murmurs, rubs, or gallops  Gastrointestinal: Positive bowel sounds, soft, nontender, nondistended  Musculoskeletal: No bilateral ankle edema  Psychiatric: Appropriate affect, cooperative  Neurologic: Oriented x 3, strength symmetric in all extremities, Cranial Nerves grossly intact to confrontation, speech clear  Skin: No rashes      Pertinent  and/or Most Recent Results     LAB RESULTS:      Lab 01/24/25  0642 01/24/25  0503 01/24/25  0123 01/23/25  2100 01/23/25  1546 01/23/25  0539 01/23/25  0334 01/23/25  0042 01/22/25  2104 01/22/25  1147   WBC 9.83  --   --   --   --   --  15.39*  --   --  11.46*   HEMOGLOBIN 10.1* 10.2* 9.8* 10.3* 10.1*   < > 10.8*  10.8* 10.2*  --  11.4*   HEMATOCRIT 31.9* 32.0* 32.3* 33.1* 31.4*   < > 34.3  34.3 32.6*  --  36.8   PLATELETS 271  --   --   --   --   --   290  --   --  294   NEUTROS ABS  --   --   --   --   --   --  12.02*  --   --  8.65*   IMMATURE GRANS (ABS)  --   --   --   --   --   --  0.07*  --   --  0.03   LYMPHS ABS  --   --   --   --   --   --  2.23  --   --  2.13   MONOS ABS  --   --   --   --   --   --  0.96*  --   --  0.55   EOS ABS  --   --   --   --   --   --  0.09  --   --  0.08   MCV 80.6  --   --   --   --   --  80.3  --   --  81.8   PROCALCITONIN  --   --   --   --   --   --   --   --   --  1.14*   LACTATE  --   --   --   --   --   --   --  2.0 3.0*  --    D DIMER QUANT  --   --   --   --   --   --   --   --  1.15*  --     < > = values in this interval not displayed.         Lab 01/24/25  0123 01/23/25  0334 01/22/25  1147   SODIUM 139 139 136   POTASSIUM 3.4* 3.4* 3.6   CHLORIDE 102 103 102   CO2 29.0 25.0 25.0   ANION GAP 8.0 11.0 9.0   BUN 9 8 17   CREATININE 0.64 0.64 0.63   EGFR 110.5 110.5 111.0   GLUCOSE 87 106* 94   CALCIUM 8.8 8.7 9.0         Lab 01/22/25  1147   TOTAL PROTEIN 6.9   ALBUMIN 3.8   GLOBULIN 3.1   ALT (SGPT) 10   AST (SGOT) 13   BILIRUBIN 0.4   ALK PHOS 77   LIPASE 35                 Lab 01/23/25  0042   ABO TYPING A   RH TYPING Positive   ANTIBODY SCREEN Negative         Brief Urine Lab Results  (Last result in the past 365 days)        Color   Clarity   Blood   Leuk Est   Nitrite   Protein   CREAT   Urine HCG        01/22/25 1153 Yellow   Cloudy   Moderate (2+)   Small (1+)   Negative   100 mg/dL (2+)                 Microbiology Results (last 10 days)       Procedure Component Value - Date/Time    MATEO Prep - Swab, Vagina [[id number]]  (Normal) Collected: 01/22/25 1955    Lab Status: Final result Specimen: Swab from Vagina Updated: 01/22/25 2019     KOH Prep No yeast or hyphal elements seen    Wet Prep, Genital - Swab, Vagina [[id number]]  (Abnormal) Collected: 01/22/25 1955    Lab Status: Final result Specimen: Swab from Vagina Updated: 01/22/25 2019     YEAST No yeast seen     HYPHAL ELEMENTS No Hyphal elements seen      WBC'S 3+ WBC's seen     Clue Cells, Wet Prep No Clue cells seen     Trichomonas, Wet Prep No Trichomonas seen    COVID PRE-OP / PRE-PROCEDURE SCREENING ORDER (NO ISOLATION) - Swab, Nasopharynx [[id number]]  (Normal) Collected: 01/22/25 1843    Lab Status: Final result Specimen: Swab from Nasopharynx Updated: 01/22/25 1957    Narrative:      The following orders were created for panel order COVID PRE-OP / PRE-PROCEDURE SCREENING ORDER (NO ISOLATION) - Swab, Nasopharynx.  Procedure                               Abnormality         Status                     ---------                               -----------         ------                     Respiratory Panel PCR w/...[[id number]]  Normal              Final result                 Please view results for these tests on the individual orders.    Respiratory Panel PCR w/COVID-19(SARS-CoV-2) [facility]/D[facility]YL In-House, NP Swab in UTM/VTM, 2 HR TAT - Swab, Nasopharynx [[id number]]  (Normal) Collected: 01/22/25 1843    Lab Status: Final result Specimen: Swab from Nasopharynx Updated: 01/22/25 1957     ADENOVIRUS, PCR Not Detected     Coronavirus 229E Not Detected     Coronavirus HKU1 Not Detected     Coronavirus NL63 Not Detected     Coronavirus OC43 Not Detected     COVID19 Not Detected     Human Metapneumovirus Not Detected     Human Rhinovirus/Enterovirus Not Detected     Influenza A PCR Not Detected     Influenza B PCR Not Detected     Parainfluenza Virus 1 Not Detected     Parainfluenza Virus 2 Not Detected     Parainfluenza Virus 3 Not Detected     Parainfluenza Virus 4 Not Detected     RSV, PCR Not Detected     Bordetella pertussis pcr Not Detected     Bordetella parapertussis PCR Not Detected     Chlamydophila pneumoniae PCR Not Detected     Mycoplasma pneumo by PCR Not Detected    Narrative:      In the setting of a positive respiratory panel with a viral infection PLUS a negative procalcitonin without other underlying concern for bacterial infection, consider  observing off antibiotics or discontinuation of antibiotics and continue supportive care. If the respiratory panel is positive for atypical bacterial infection (Bordetella pertussis, Chlamydophila pneumoniae, or Mycoplasma pneumoniae), consider antibiotic de-escalation to target atypical bacterial infection.            Mammo Screening Digital Tomosynthesis Bilateral With CAD    Result Date: 1/24/2025  DIGITAL SCREENING MAMMOGRAM WITH TOMOSYNTHESIS  HISTORY: Routine screening. History of bilateral reduction mammoplasty.  IMAGE COMPARISON: 1/19/2023, 2/14/2022, 1/3/2022, 11/20/2019.  TECHNIQUE: Low dose full field digital breast tomosynthesis imaging was performed with 2D and 3D acquisitions consisting of bilateral CC and MLO views.  FINDINGS: There are scattered areas of fibroglandular density. The fibroglandular pattern is stable. There are stable post reduction changes. There is stable nodularity. There is no mass, worrisome microcalcifications, or architectural distortion to suggest development of malignancy.      Benign screening mammogram.  No findings suspicious for malignancy.   ACR BI-RADS CATEGORY:  2, BENIGN  RECOMMENDATION: Yearly mammogram, yearly clinical breast exam, and encourage self breast awareness.  CAD was used.  The standard false negative rate of mammography is between 10% and 25%. Complex patterns or increased breast density will markedly elevate the false negative rate of mammography.  A letter, in lay terminology, with the results of this exam will be mailed to the patient.   At our facility, a triangular marker is positioned over a palpable area of concern indicated by the patient. A [place] marker is placed over a visible skin lesion. A linear marker indicates a scar.  If there is a palpable area of concern, biopsy should be considered regardless of imaging findings.    This report was finalized on 1/24/2025 10:48 AM by Dr. Xiomara Viera MD.      CT Angiogram Chest    Result Date:  1/23/2025  CT ANGIOGRAM CHEST Date of Exam: 1/23/2025 10:38 AM EST Indication: PE suspected, elevated d dimer. Comparison: Chest radiograph from September 16, 2024 and CT chest from December 27, 2020 Technique: CTA of the chest was performed after the uneventful intravenous administration of 90 mL Isovue-370. Reconstructed coronal and sagittal images were also obtained. In addition, a 3-D volume rendered image was created for interpretation. Automated exposure control and iterative reconstruction methods were used. Findings: The central tracheobronchial tree is clear. The lungs are clear. There is no pleural effusion. The heart size appears normal. The great vessels are normal in caliber. There is no evidence of pulmonary embolus. No abnormally enlarged lymph nodes are identified. Prominent residual thymic tissue is noted. Partial evaluation of the upper abdomen is unremarkable. No aggressive osseous lesions are identified.     Impression: 1.Negative for pulmonary embolus. 2.No acute cardiopulmonary process. Electronically Signed: Madi Santos MD  1/23/2025 10:53 AM EST  Workstation ID: [id number]    CT Abdomen Pelvis With Contrast    Result Date: 1/22/2025  CT ABDOMEN PELVIS W CONTRAST Date of Exam: 1/22/2025 3:48 PM EST Indication: abd pain, possible vaginal trauma during intercourse yesterday. Comparison: 6/2/2024 Technique: Axial CT images were obtained of the abdomen and pelvis following the uneventful intravenous administration of Isovue-300 . Reconstructed coronal and sagittal images were also obtained. Automated exposure control and iterative construction methods were used. Findings: Visualized lung bases are clear. Liver, pancreas, and spleen are within normal limits. Bilateral adrenal glands appear normal. Kidneys are within normal limits. Gallbladder appears normal. No biliary tract obstruction. No abdominal or retroperitoneal adenopathy. The upper GI tract is within normal limits. No free  intraperitoneal fluid or air identified. Pelvis: Urinary bladder is within normal limits. Patient is canal status post hysterectomy. There is small amount of free fluid in the pelvis which is nonspecific. The GI tract appears within normal limits. The appendix is not visualized. No pelvic or inguinal adenopathy. No lytic or sclerotic bony lesions are seen.     Impression: 1. Small amount of free fluid in the pelvis which is nonspecific. 2. Interval hysterectomy Electronically Signed: Brennen Hopkins MD  1/22/2025 4:15 PM EST  Workstation ID: [id number]     Results for orders placed during the hospital encounter of 12/05/22    Duplex Venous Lower Extremity - Left CAR    Interpretation Summary    Normal left lower extremity venous duplex scan.      Results for orders placed during the hospital encounter of 12/05/22    Duplex Venous Lower Extremity - Left CAR    Interpretation Summary    Normal left lower extremity venous duplex scan.      Results for orders placed during the hospital encounter of 09/13/22    Adult Stress Echo W/ Cont or Stress Agent if Necessary Per Protocol    Interpretation Summary  · The patient completed 7: 50 minutes of treadmill exercise on standard Randy protocol achieving 9.8 METs of workload. The test was stopped due to fatigue after achieving the target heart rate. She reported mild chest tightness and heaviness at peak exercise which resolved in recovery.  · Expected exercise duration 8:45, actual 7:50; AVIS (+11).  · Resting heart rate 82, blood pressure 120/88. Peak heart rate 151, blood pressure 168/100. 85% of age-predicted maximal heart rate achieved.  · Average exercise capacity, completed for this protocol.  · Resting ECG showed sinus rhythm with nonspecific ST segment changes. Stress ECG is nondiagnostic due to baseline abnormalities however no significant ST abnormalities were noted. No exercise-induced arrhythmias were seen.  · Average exercise capacity with appropriate hemodynamic  response to exercise.  · Negative treadmill stress test for ischemic ST segment abnormalities or exercise-induced arrhythmias. Chest tightness and heaviness was reported which resolved in recovery.  · Resting echocardiogram showed normal left ventricular systolic function with estimated ejection fraction 65%. Trace mitral regurgitation, trace tricuspid regurgitation and trace to mild pulmonic insufficiency was noted.  · Normal stress echo with no significant echocardiographic evidence for myocardial ischemia. Post-rest ejection fraction 75%.      Plan for Follow-up of Pending Labs/Results:   Pending Labs       Order Current Status    Chlamydia trachomatis, Neisseria gonorrhoeae, PCR - Swab, Vagina In process          Discharge Details        Discharge Medications        ASK your doctor about these medications        Instructions Start Date   albuterol sulfate  (90 Base) MCG/ACT inhaler  Commonly known as: PROVENTIL HFA;VENTOLIN HFA;PROAIR HFA   2 puffs, Inhalation, Every 4 Hours PRN      amLODIPine 5 MG tablet  Commonly known as: NORVASC   5 mg, Oral, Daily      Azelastine HCl 137 MCG/SPRAY solution   137 mcg, Each Nare, 2 Times Daily      CENTRUM ADULT PO   Take  by mouth.      diclofenac 75 MG EC tablet  Commonly known as: VOLTAREN   75 mg, Oral, 2 Times Daily      famotidine 20 MG tablet  Commonly known as: PEPCID   20 mg, Oral, 2 Times Daily      lamoTRIgine 200 MG tablet  Commonly known as: LaMICtal   200 mg, Oral, Daily      levocetirizine 5 MG tablet  Commonly known as: XYZAL   5 mg, Oral, Every Evening      nystatin-triamcinolone 058379-8.1 UNIT/GM-% ointment  Commonly known as: MYCOLOG   1 Application, Topical, 2 Times Daily      ondansetron ODT 4 MG disintegrating tablet  Commonly known as: ZOFRAN-ODT   4 mg, Translingual, Every 8 Hours PRN      oxyCODONE-acetaminophen 5-325 MG per tablet  Commonly known as: PERCOCET   1 tablet, Oral, Every 6 Hours PRN               Allergies   Allergen Reactions     Naproxen Swelling    Sulfa Antibiotics Rash         Discharge Disposition: Home      Diet:  Hospital:  Diet Order   Procedures    Diet: Cardiac; Healthy Heart (2-3 Na+); Fluid Consistency: Thin (IDDSI 0)            Activity:  As tolerated    Restrictions or Other Recommendations:  Seek medical attention if you develop new or worsening symptoms.  No vaginal penetration until cleared by GYN.       CODE STATUS:    Code Status and Medical Interventions: CPR (Attempt to Resuscitate); Full Support   Ordered at: 01/22/25 5994     Level Of Support Discussed With:    Patient     Code Status (Patient has no pulse and is not breathing):    CPR (Attempt to Resuscitate)     Medical Interventions (Patient has pulse or is breathing):    Full Support       Future Appointments   Date Time Provider Department Center   2/14/2025  8:40 AM Edmar Smith MD [facility] LOCO   11/17/2025  4:00 PM Rebekah Fagan APRN [facility] LOCO           Mony Ingram MD  01/24/25      Time Spent on Discharge:  I spent  45  minutes on this discharge activity which included: face-to-face encounter with the patient, reviewing the data in the system, coordination of the care with the nursing staff as well as consultants, documentation, and entering orders.

## 2025-01-24 NOTE — OUTREACH NOTE
Prep Survey      Flowsheet Row Responses   [facility] patient discharged from? [place]   Is LACE score < 7 ? Yes   Eligibility [facility]   Date of Admission 01/22/25   Date of Discharge 01/24/25   Discharge diagnosis Vaginal cuff cellulitis   Does the patient have one of the following disease processes/diagnoses(primary or secondary)? Other   Prep survey completed? Yes            Mery LEIVA - Registered Nurse

## 2025-01-25 LAB
C TRACH RRNA SPEC QL NAA+PROBE: NEGATIVE
N GONORRHOEA RRNA SPEC QL NAA+PROBE: NEGATIVE

## 2025-01-27 ENCOUNTER — TRANSITIONAL CARE MANAGEMENT TELEPHONE ENCOUNTER (OUTPATIENT)
Dept: CALL CENTER | Facility: HOSPITAL | Age: 45
End: 2025-01-27
Payer: MEDICAID

## 2025-01-27 ENCOUNTER — TELEPHONE (OUTPATIENT)
Dept: INTERNAL MEDICINE | Facility: CLINIC | Age: 45
End: 2025-01-27

## 2025-01-27 NOTE — TELEPHONE ENCOUNTER
HUB TO RELAY:   SYLVIA, SENT TEXT AND MYCHART MESSAGE ADVISING PATIENT THAT JOHANNA ELLIS WILL NOT BE IN OFFICE TODAY, 1/27 AND WE WILL RESCHEDULED HER APPOINTMENT FOR WEDNESDAY, JAN 29 AT 1130. IF THIS APPOINTMENT DOESN'T WORK FOR HER SCHEDULE, PLEASE CHANGE TO A DAY AND TIME THAT WORKS FOR HER.

## 2025-01-27 NOTE — OUTREACH NOTE
Call Center TCM Note      Flowsheet Row Responses   [facility] patient discharged from? [place]   Does the patient have one of the following disease processes/diagnoses(primary or secondary)? Other   TCM attempt successful? Yes   Call start time 0955   Call end time 0957   Discharge diagnosis Vaginal cuff cellulitis   Person spoke with today (if not patient) and relationship patient   Meds reviewed with patient/caregiver? Yes   Is the patient having any side effects they believe may be caused by any medication additions or changes? No   Does the patient have all medications ordered at discharge? Yes   Is the patient taking all medications as directed (includes completed medication regime)? Yes   Comments Patient has a hospital followup scheduled on 1/29/2025 with PCP Rebekah Fagan and GYN on 2/14   Does the patient have an appointment with their PCP within 7-14 days of discharge? Yes   Psychosocial issues? No   Did the patient receive a copy of their discharge instructions? Yes   Nursing interventions Reviewed instructions with patient   What is the patient's perception of their health status since discharge? Improving   Is the patient/caregiver able to teach back signs and symptoms related to disease process for when to call PCP? Yes   Is the patient/caregiver able to teach back signs and symptoms related to disease process for when to call 911? Yes   Is the patient/caregiver able to teach back the hierarchy of who to call/visit for symptoms/problems? PCP, Specialist, Home health nurse, Urgent Care, ED, 911 Yes   If the patient is a current smoker, are they able to teach back resources for cessation? Not a smoker   TCM call completed? Yes   Wrap up additional comments Patient is still very sore but improving.   Call end time 0957   Would this patient benefit from a Referral to Amb Social Work? No   Is the patient interested in additional calls from an ambulatory ? No            Jules Haywood,  RN    1/27/2025, 10:00 EST

## 2025-01-29 ENCOUNTER — OFFICE VISIT (OUTPATIENT)
Dept: INTERNAL MEDICINE | Facility: CLINIC | Age: 45
End: 2025-01-29
Payer: MEDICAID

## 2025-01-29 VITALS
BODY MASS INDEX: 36.49 KG/M2 | HEART RATE: 92 BPM | WEIGHT: 181 LBS | HEIGHT: 59 IN | SYSTOLIC BLOOD PRESSURE: 140 MMHG | DIASTOLIC BLOOD PRESSURE: 88 MMHG | OXYGEN SATURATION: 96 %

## 2025-01-29 DIAGNOSIS — N76.0 VAGINAL CUFF CELLULITIS: ICD-10-CM

## 2025-01-29 DIAGNOSIS — Z09 HOSPITAL DISCHARGE FOLLOW-UP: Primary | ICD-10-CM

## 2025-01-29 DIAGNOSIS — G89.29 CHRONIC RIGHT SHOULDER PAIN: ICD-10-CM

## 2025-01-29 DIAGNOSIS — M25.511 CHRONIC RIGHT SHOULDER PAIN: ICD-10-CM

## 2025-01-29 DIAGNOSIS — Z23 NEED FOR COVID-19 VACCINE: ICD-10-CM

## 2025-01-29 DIAGNOSIS — Z98.890 POST-OPERATIVE STATE: ICD-10-CM

## 2025-01-29 PROCEDURE — 3079F DIAST BP 80-89 MM HG: CPT | Performed by: NURSE PRACTITIONER

## 2025-01-29 PROCEDURE — 1125F AMNT PAIN NOTED PAIN PRSNT: CPT | Performed by: NURSE PRACTITIONER

## 2025-01-29 PROCEDURE — 3077F SYST BP >= 140 MM HG: CPT | Performed by: NURSE PRACTITIONER

## 2025-01-29 PROCEDURE — 99213 OFFICE O/P EST LOW 20 MIN: CPT | Performed by: NURSE PRACTITIONER

## 2025-01-29 PROCEDURE — 90480 ADMN SARSCOV2 VAC 1/ONLY CMP: CPT | Performed by: NURSE PRACTITIONER

## 2025-01-29 PROCEDURE — 91320 SARSCV2 VAC 30MCG TRS-SUC IM: CPT | Performed by: NURSE PRACTITIONER

## 2025-01-29 NOTE — PROGRESS NOTES
Office Note     Name: Bernardo Dodd    : 1980     MRN: [id number]     Chief Complaint  Hospital Follow Up Visit (Patient reports today for a hospital follow up. Patient states she was admitted for self inflicted vaginal trauma post hysterectomy causing excessive bleeding on . Patient states she was in the hospital for 3 days and is no longer having any vaginal and abdominal pain unless she bends overs for along time and no bleeding. )    Subjective     History of Present Illness:  Bernardo Dodd is a 44 y.o. female who presents today for a hospital discharge follow-up visit.  Patient presented to [facility] ED on  for complaints of abdominal pain and vaginal bleeding.    Patient was admitted from  -  and treated for vaginal cuff cellulitis.  Patient had a total abdominal hysterectomy by Dr. Smith on .  She was due to follow-up on  but was unable to make that appointment and had to cancel.  She was under the impression that she had been released from any restrictions and able to return to normal sexual activity.      She subsequently developed vaginal cuff cellulitis due to masturbation with a dildo.  She was treated with Rocephin, doxycycline, and Flagyl.  She reports improvement of her abdominal pain.  She was discharged home on oral Cipro and Flagyl.  She is currently still taking these antibiotics.  She has an upcoming follow-up with Dr. Smith on .  Her hemoglobin was 10.2 on the day of discharge.  She denies any drainage or vaginal discharge.  She has been keeping track of her symptoms.    She has also been seen by Christian Montero with pain management on Monday.    She denies further complaints or concerns at this time.  Pleasant visit with the patient today.      Past Medical History:   Diagnosis Date    Antisocial personality disorder     Bipolar disorder     Chlamydia     GERD (gastroesophageal reflux disease)     Gonorrhea  2000    Herpes simplex 2000    History of methamphetamine abuse 2020    Clean ~     Hypertension 2016    Kidney stones 2018    Seizures 2012    Stopped ~ 2018    Shingles on back 2017    Substance abuse - DOC was cocaine 1998    Sober of cocaine since ~     Trichomonas infection 2000       Past Surgical History:   Procedure Laterality Date    BARTHOLIN GLAND CYST EXCISION Left 2018     SECTION WITH TUBAL  2010    CYSTOSCOPY, URETEROSCOPY, RETROGRADE PYELOGRAM, STONE EXTRACTION, STENT INSERTION Bilateral 2023    Procedure: CYSTOSCOPY, BILATERAL RETROGRADE PYELOGRAM, URETEROSCOPY, AND STENT PLACEMENT;  Surgeon: Micah Child MD;  Location: [facility] OR;  Service: Urology;  Laterality: Bilateral;    CYSTOSCOPY, URETEROSCOPY, RETROGRADE PYELOGRAM, STONE EXTRACTION, STENT INSERTION Left 2023    Procedure: CYSTOSCOPY URETEROSCOPY RETROGRADE PYELOGRAM STONE EXTRACTION STENT INSERTION;  Surgeon: Micah Child MD;  Location: [facility] OR;  Service: Urology;  Laterality: Left;    DIAGNOSTIC LAPAROSCOPY      ENDOSCOPY      I & D / EXCISION MARSUPIALIZATION BARTHOLIN'S GLAND CYST / LYSIS LESIONS Left     ORIF ANKLE FRACTURE Right     REDUCTION MAMMAPLASTY Bilateral     TENSION FREE VAGINAL TAPING WITH MINI ARC SLING  2018    Dr Doyle avery     TOTAL LAPAROSCOPIC HYSTERECTOMY SALPINGO OOPHORECTOMY Bilateral 2024    PATH - benign; Surgeon: Edmar Smith MD;       Social History     Socioeconomic History    Marital status: Single    Number of children: 2    Highest education level: Some college, no degree   Tobacco Use    Smoking status: Former     Current packs/day: 0.00     Average packs/day: 1 pack/day for 22.0 years (22.0 ttl pk-yrs)     Types: Cigarettes     Start date:      Quit date: 2016     Years since quittin.1     Passive exposure: Past    Smokeless tobacco: Never   Vaping Use    Vaping status: Some Days    Substances: THC, Flavoring    Devices:  "Disposable    Passive vaping exposure: Yes   Substance and Sexual Activity    Alcohol use: No    Drug use: Yes     Types: Marijuana     Comment: Marijuana once a week. Tried cocaine, meth, pain pills in the past    Sexual activity: Defer     Partners: Male     Birth control/protection: Surgical         Current Outpatient Medications:     albuterol sulfate  (90 Base) MCG/ACT inhaler, Inhale 2 puffs Every 4 (Four) Hours As Needed for Wheezing., Disp: 18 g, Rfl: 2    amLODIPine (NORVASC) 5 MG tablet, Take 1 tablet by mouth Daily., Disp: 90 tablet, Rfl: 0    Azelastine HCl 137 MCG/SPRAY solution, 1 spray by Each Nare route 2 (Two) Times a Day., Disp: 30 mL, Rfl: 11    diclofenac (VOLTAREN) 75 MG EC tablet, Take 1 tablet by mouth 2 (Two) Times a Day., Disp: 60 tablet, Rfl: 2    famotidine (PEPCID) 20 MG tablet, Take 1 tablet by mouth 2 (Two) Times a Day., Disp: 60 tablet, Rfl: 11    lamoTRIgine (LaMICtal) 200 MG tablet, Take 1 tablet by mouth Daily., Disp: 30 tablet, Rfl: 11    levocetirizine (XYZAL) 5 MG tablet, Take 1 tablet by mouth Every Evening., Disp: 30 tablet, Rfl: 11    Multiple Vitamins-Minerals (CENTRUM ADULT PO), Take  by mouth., Disp: , Rfl:     nystatin-triamcinolone (MYCOLOG) 079605-0.1 UNIT/GM-% ointment, Apply 1 Application topically to the appropriate area as directed 2 (Two) Times a Day., Disp: 15 g, Rfl: 3    ondansetron ODT (ZOFRAN-ODT) 4 MG disintegrating tablet, Place 1 tablet on the tongue Every 8 (Eight) Hours As Needed for Nausea or Vomiting., Disp: 20 tablet, Rfl: 0    oxyCODONE-acetaminophen (PERCOCET) 5-325 MG per tablet, Take 1 tablet by mouth Every 6 (Six) Hours As Needed for Severe Pain., Disp: 14 tablet, Rfl: 0    Objective     Vital Signs  /88   Pulse 92   Ht 149.9 cm (59\")   Wt 82.1 kg (181 lb)   SpO2 96%   BMI 36.56 kg/m²   Estimated body mass index is 36.56 kg/m² as calculated from the following:    Height as of this encounter: 149.9 cm (59\").    Weight as of this " encounter: 82.1 kg (181 lb).           Physical Exam  Constitutional:       General: She is not in acute distress.     Appearance: Normal appearance. She is not ill-appearing.   HENT:      Head: Normocephalic and atraumatic.      Nose: Nose normal.   Eyes:      Extraocular Movements: Extraocular movements intact.      Conjunctiva/sclera: Conjunctivae normal.      Pupils: Pupils are equal, round, and reactive to light.   Cardiovascular:      Rate and Rhythm: Normal rate and regular rhythm.   Pulmonary:      Effort: Pulmonary effort is normal. No respiratory distress.      Breath sounds: Normal breath sounds.   Abdominal:      General: Bowel sounds are normal. There is no distension.      Palpations: Abdomen is soft.      Tenderness: There is no abdominal tenderness.   Musculoskeletal:         General: Normal range of motion.      Cervical back: Neck supple.   Skin:     General: Skin is warm and dry.   Neurological:      General: No focal deficit present.      Mental Status: She is alert and oriented to person, place, and time. Mental status is at baseline.   Psychiatric:         Mood and Affect: Mood normal.         Behavior: Behavior normal.         Thought Content: Thought content normal.         Judgment: Judgment normal.          Assessment and Plan     Diagnoses and all orders for this visit:    1. Hospital discharge follow-up (Primary)    2. Vaginal cuff cellulitis    3. S/P robotic TLHBSO 11/27/2024    4. Chronic right shoulder pain  -     diclofenac (VOLTAREN) 75 MG EC tablet; Take 1 tablet by mouth 2 (Two) Times a Day.  Dispense: 60 tablet; Refill: 2    5. Need for COVID-19 vaccine  -     COVID-19 (Pfizer) 12yrs+ (COMIRNATY)        Follow Up  Return if symptoms worsen or fail to improve, for Next scheduled follow up.    SHANE Ang    Part of this note may be an electronic transcription/translation of spoken language to printed text using the Dragon Dictation System.

## 2025-02-03 LAB
QT INTERVAL: 326 MS
QTC INTERVAL: 468 MS

## 2025-02-11 RX ORDER — DICLOFENAC SODIUM 75 MG/1
75 TABLET, DELAYED RELEASE ORAL 2 TIMES DAILY
Qty: 60 TABLET | Refills: 2 | Status: SHIPPED | OUTPATIENT
Start: 2025-02-11

## 2025-02-14 ENCOUNTER — OFFICE VISIT (OUTPATIENT)
Dept: OBSTETRICS AND GYNECOLOGY | Facility: CLINIC | Age: 45
End: 2025-02-14
Payer: MEDICAID

## 2025-02-14 VITALS
WEIGHT: 182 LBS | RESPIRATION RATE: 14 BRPM | SYSTOLIC BLOOD PRESSURE: 136 MMHG | BODY MASS INDEX: 36.76 KG/M2 | DIASTOLIC BLOOD PRESSURE: 82 MMHG

## 2025-02-14 DIAGNOSIS — Z98.890 POST-OPERATIVE STATE: Primary | ICD-10-CM

## 2025-02-14 PROBLEM — N76.0 VAGINAL CUFF CELLULITIS: Status: RESOLVED | Noted: 2025-01-22 | Resolved: 2025-02-14

## 2025-02-14 NOTE — PROGRESS NOTES
Subjective   Chief Complaint   Patient presents with    Follow-up     Bernardo Dodd is a 44 y.o. year old  presenting to be seen for her post-operative visit.  Currently she reports to be feeling better as compared to when she was hospitalized for her cuff cellulitis.  She is having some pelvic pressure.  Has had some issues with constipation at times but it is slowly improving.  Is having some symptoms of hot flashes and night sweats but is not bad enough yet to talk about medication.    The pathology results from her procedure are in Bernardo's record and are benign.      The following portions of the patient's history were reviewed and updated as appropriate:current medications and allergies       Objective   /82   Resp 14   Wt 82.6 kg (182 lb)   LMP 09/10/2024 (Approximate)   BMI 36.76 kg/m²     General:  well developed; well nourished  no acute distress  mentation appropriate   Abdomen: soft, non-tender; no masses  no umbilical or inguinal hernias are present  no hepato-splenomegaly   Pelvis: Clinical staff was present for exam  External genitalia:  normal appearance of the external genitalia including Bartholin's and [place]'s glands.  :  urethral meatus normal; urethra normal:  Vaginal:  Cuff is fully healed and epithelialized.  There is some tenderness in the right corner          Assessment   S/P total laparoscopic hysterectomy with bilateral salpingo-oophorectomy  History of cuff cellulitis which has fully resolved  Vasomotor symptoms which ARE NOT impacting her activities of daily living     Plan   May return to full activity with no restrictions  The importance of keeping all planned follow-up and taking all medications as prescribed was emphasized.  Follow up in 6 months to reassess symptoms.           This note was electronically signed.    Edmar Smith M.D.  2025    Part of this note may be an electronic transcription/translation of spoken language to printed  text using the Dragon Dictation System.

## 2025-03-05 ENCOUNTER — OFFICE VISIT (OUTPATIENT)
Dept: INTERNAL MEDICINE | Facility: CLINIC | Age: 45
End: 2025-03-05
Payer: MEDICAID

## 2025-03-05 VITALS
TEMPERATURE: 97.6 F | OXYGEN SATURATION: 96 % | WEIGHT: 185 LBS | BODY MASS INDEX: 37.29 KG/M2 | HEART RATE: 84 BPM | HEIGHT: 59 IN | SYSTOLIC BLOOD PRESSURE: 128 MMHG | DIASTOLIC BLOOD PRESSURE: 74 MMHG

## 2025-03-05 DIAGNOSIS — M47.812 CERVICAL SPONDYLOSIS: ICD-10-CM

## 2025-03-05 DIAGNOSIS — G89.29 CHRONIC MIDLINE LOW BACK PAIN WITHOUT SCIATICA: ICD-10-CM

## 2025-03-05 DIAGNOSIS — J01.00 ACUTE NON-RECURRENT MAXILLARY SINUSITIS: Primary | ICD-10-CM

## 2025-03-05 DIAGNOSIS — M47.816 LUMBAR SPONDYLOSIS: ICD-10-CM

## 2025-03-05 DIAGNOSIS — R05.2 SUBACUTE COUGH: ICD-10-CM

## 2025-03-05 DIAGNOSIS — M54.50 CHRONIC MIDLINE LOW BACK PAIN WITHOUT SCIATICA: ICD-10-CM

## 2025-03-05 DIAGNOSIS — J02.9 SORE THROAT: ICD-10-CM

## 2025-03-05 DIAGNOSIS — R53.83 OTHER FATIGUE: ICD-10-CM

## 2025-03-05 RX ORDER — DEXTROMETHORPHAN HYDROBROMIDE AND PROMETHAZINE HYDROCHLORIDE 15; 6.25 MG/5ML; MG/5ML
5 SYRUP ORAL 4 TIMES DAILY PRN
Qty: 200 ML | Refills: 0 | Status: SHIPPED | OUTPATIENT
Start: 2025-03-05

## 2025-03-05 NOTE — PROGRESS NOTES
Office Note     Name: Bernardo Dodd    : 1980     MRN: [id number]     Chief Complaint  Cough (Patient reports today for a cough, sore throat, body aches, fatigue and nasal congestion that started on 3/3 patient states that she has used cold/ flu medication along with vix vapor rub. ), Sore Throat, and Fatigue    Subjective     History of Present Illness:  Bernardo Dodd is a 44 y.o. female who presents today for acute complaint of cough, nasal congestion, itchy throat and nose, bilateral ear pressure, and body aches. Symptoms started 6 days ago with onset of nasal congestion. Coughing started 3 days ago. Dry cough, more like clearing throat. Denies fever, chills, headache, rhinorrhea, n/v/d. Has taken prescribed diclofenac for body aches with no relief. Has taken tylenol, pamprin, and [facility] brand cold and flu nighttime medicine with minimal relief. Pt denies anx use within past month.    Patient also presents today to discuss potential pain management referral.  Patient does have chronic low back pain, lumbar and cervical spondylosis.  Previous cervical MRI in  showed posterior disc osteophyte complex with mild canal stenosis at C4-5 and posterior disc osteophyte complex with mild canal stenosis at C5-6.  Moderate right foraminal narrowing at that location.  Patient has been following with Christian Montero with pain management at [facility].  Patient does not feel her pain is appropriately being addressed.  She would like to be referred to a different pain management group for a second opinion.  Patient has received radiofrequency ablations from this provider in the past bilaterally at L3-5 level.  She does not feel it has been overly helpful and benefits were short-lived.  She would like to discuss further options for pain management.    No further complaints or concerns at this time.  Pleasant visit with the patient today.      Past Medical History:   Diagnosis Date     Antisocial personality disorder     Bipolar disorder     Chlamydia     GERD (gastroesophageal reflux disease) 2015    Gonorrhea     Herpes simplex     History of methamphetamine abuse     Clean ~     Hypertension 2016    Kidney stones 2018    Seizures 2012    Stopped ~     Shingles on back 2017    Substance abuse - DOC was cocaine 1998    Sober of cocaine since ~     Trichomonas infection        Past Surgical History:   Procedure Laterality Date    BARTHOLIN GLAND CYST EXCISION Left 2018     SECTION WITH TUBAL  2010    CYSTOSCOPY, URETEROSCOPY, RETROGRADE PYELOGRAM, STONE EXTRACTION, STENT INSERTION Bilateral 2023    Procedure: CYSTOSCOPY, BILATERAL RETROGRADE PYELOGRAM, URETEROSCOPY, AND STENT PLACEMENT;  Surgeon: Micah Child MD;  Location: [facility] OR;  Service: Urology;  Laterality: Bilateral;    CYSTOSCOPY, URETEROSCOPY, RETROGRADE PYELOGRAM, STONE EXTRACTION, STENT INSERTION Left 2023    Procedure: CYSTOSCOPY URETEROSCOPY RETROGRADE PYELOGRAM STONE EXTRACTION STENT INSERTION;  Surgeon: Micah Child MD;  Location: [facility] OR;  Service: Urology;  Laterality: Left;    I & D / EXCISION MARSUPIALIZATION BARTHOLIN'S GLAND CYST / LYSIS LESIONS Left     ORIF ANKLE FRACTURE Right     REDUCTION MAMMAPLASTY Bilateral     TENSION FREE VAGINAL TAPING WITH MINI ARC SLING  2018    Dr Doyle avery     TOTAL LAPAROSCOPIC HYSTERECTOMY SALPINGO OOPHORECTOMY Bilateral 2024    PATH - benign; Surgeon: Edmar Smith MD;       Social History     Socioeconomic History    Marital status: Single    Number of children: 2    Highest education level: Some college, no degree   Tobacco Use    Smoking status: Former     Current packs/day: 0.00     Average packs/day: 1 pack/day for 22.0 years (22.0 ttl pk-yrs)     Types: Cigarettes     Start date:      Quit date: 2016     Years since quittin.1     Passive exposure: Past    Smokeless  tobacco: Never   Vaping Use    Vaping status: Some Days    Substances: THC, Flavoring    Devices: Disposable    Passive vaping exposure: Yes   Substance and Sexual Activity    Alcohol use: No    Drug use: Yes     Types: Marijuana     Comment: Marijuana once a week. Tried cocaine, meth, pain pills in the past    Sexual activity: Defer     Partners: Male     Birth control/protection: Surgical         Current Outpatient Medications:     albuterol sulfate  (90 Base) MCG/ACT inhaler, Inhale 2 puffs Every 4 (Four) Hours As Needed for Wheezing., Disp: 18 g, Rfl: 2    amLODIPine (NORVASC) 5 MG tablet, Take 1 tablet by mouth Daily., Disp: 90 tablet, Rfl: 0    Azelastine HCl 137 MCG/SPRAY solution, 1 spray by Each Nare route 2 (Two) Times a Day., Disp: 30 mL, Rfl: 11    diclofenac (VOLTAREN) 75 MG EC tablet, Take 1 tablet by mouth 2 (Two) Times a Day., Disp: 60 tablet, Rfl: 2    famotidine (PEPCID) 20 MG tablet, Take 1 tablet by mouth 2 (Two) Times a Day., Disp: 60 tablet, Rfl: 11    lamoTRIgine (LaMICtal) 200 MG tablet, Take 1 tablet by mouth Daily., Disp: 30 tablet, Rfl: 11    levocetirizine (XYZAL) 5 MG tablet, Take 1 tablet by mouth Every Evening., Disp: 30 tablet, Rfl: 11    Multiple Vitamins-Minerals (CENTRUM ADULT PO), Take  by mouth., Disp: , Rfl:     nystatin-triamcinolone (MYCOLOG) 675596-8.1 UNIT/GM-% ointment, Apply 1 Application topically to the appropriate area as directed 2 (Two) Times a Day., Disp: 15 g, Rfl: 3    ondansetron ODT (ZOFRAN-ODT) 4 MG disintegrating tablet, Place 1 tablet on the tongue Every 8 (Eight) Hours As Needed for Nausea or Vomiting., Disp: 20 tablet, Rfl: 0    oxyCODONE-acetaminophen (PERCOCET) 5-325 MG per tablet, Take 1 tablet by mouth Every 6 (Six) Hours As Needed for Severe Pain., Disp: 14 tablet, Rfl: 0    amoxicillin-clavulanate (AUGMENTIN) 875-125 MG per tablet, Take 1 tablet by mouth 2 (Two) Times a Day for 5 days., Disp: 10 tablet, Rfl: 0    promethazine-dextromethorphan  "(PROMETHAZINE-DM) 6.25-15 MG/5ML syrup, Take 5 mL by mouth 4 (Four) Times a Day As Needed for Cough., Disp: 200 mL, Rfl: 0    Objective     Vital Signs  /74   Pulse 84   Temp 97.6 °F (36.4 °C)   Ht 149.9 cm (59\")   Wt 83.9 kg (185 lb)   SpO2 96%   BMI 37.37 kg/m²   Estimated body mass index is 37.37 kg/m² as calculated from the following:    Height as of this encounter: 149.9 cm (59\").    Weight as of this encounter: 83.9 kg (185 lb).           Physical Exam  Vitals and nursing note reviewed.   Constitutional:       General: She is not in acute distress.     Appearance: Normal appearance. She is well-developed.   HENT:      Right Ear: Hearing, ear canal and external ear normal. A middle ear effusion is present.      Left Ear: Hearing, ear canal and external ear normal. A middle ear effusion is present. Tympanic membrane is erythematous.      Nose: Rhinorrhea present. Rhinorrhea is clear.      Right Sinus: Maxillary sinus tenderness present.      Left Sinus: Maxillary sinus tenderness present.      Mouth/Throat:      Mouth: Mucous membranes are moist. No oral lesions.      Tongue: No lesions.      Palate: No lesions.      Pharynx: Posterior oropharyngeal erythema and postnasal drip present.      Tonsils: No tonsillar exudate.   Cardiovascular:      Rate and Rhythm: Normal rate and regular rhythm.      Heart sounds: Normal heart sounds.   Pulmonary:      Effort: Pulmonary effort is normal.      Breath sounds: Normal breath sounds.   Musculoskeletal:      Cervical back: Tenderness present. Pain with movement present.      Lumbar back: Tenderness present. Decreased range of motion.   Lymphadenopathy:      Cervical: No cervical adenopathy.   Skin:     General: Skin is warm and dry.   Neurological:      Mental Status: She is alert and oriented to person, place, and time.   Psychiatric:         Mood and Affect: Mood and affect normal.         Behavior: Behavior is cooperative.          Assessment and Plan "     Diagnoses and all orders for this visit:    1. Acute non-recurrent maxillary sinusitis (Primary)  -     amoxicillin-clavulanate (AUGMENTIN) 875-125 MG per tablet; Take 1 tablet by mouth 2 (Two) Times a Day for 5 days.  Dispense: 10 tablet; Refill: 0    2. Subacute cough  -     POCT SARS-CoV-2 Antigen PRADEEP + Flu  -     promethazine-dextromethorphan (PROMETHAZINE-DM) 6.25-15 MG/5ML syrup; Take 5 mL by mouth 4 (Four) Times a Day As Needed for Cough.  Dispense: 200 mL; Refill: 0    3. Sore throat  -     POCT SARS-CoV-2 Antigen PRADEEP + Flu  -     POCT rapid strep A    4. Other fatigue  -     POCT SARS-CoV-2 Antigen PRADEEP + Flu  -     POCT rapid strep A    5. Lumbar spondylosis  -     Ambulatory Referral to Pain Management    6. Chronic midline low back pain without sciatica  -     Ambulatory Referral to Pain Management    7. Cervical spondylosis  -     Ambulatory Referral to Pain Management        Follow Up  Return if symptoms worsen or fail to improve, for Next scheduled follow up.    SHANE Ang    Agree with assessment, plan of care, and documentation provided by SHANE Tavares student.    Part of this note may be an electronic transcription/translation of spoken language to printed text using the Dragon Dictation System.

## 2025-03-05 NOTE — PROGRESS NOTES
Onset - 6 days ago - stuffiness  Coughing started 3 days ago      Body aches, - pain med not working - diclofenac    -fevers, chills, headache, rhino, n/v/d  + ear pressure, nasal farhan, itchy throat (not sore),    Non-p cough - more like clearing throat    Pamprin and tylenol - some help - takes edge off  [facility] cold and flu night time    Fluid brent. Ear, left ear some redness  Maxillary sinus tenderness  White nasal discharge  No abx in past month allergic to sulfa

## 2025-03-07 PROBLEM — M47.812 CERVICAL SPONDYLOSIS: Status: ACTIVE | Noted: 2025-03-07

## 2025-03-11 ENCOUNTER — TELEPHONE (OUTPATIENT)
Dept: PAIN MEDICINE | Facility: CLINIC | Age: 45
End: 2025-03-11

## 2025-03-11 NOTE — TELEPHONE ENCOUNTER
Caller: SOPHIAALFREDO WILLIS     Phone Number: [phone] (home)     Reason for Call:   PATIENT RETURNING [facility]'S CALL.   UNSURE IF OKAY TO BOOK.   AGREED TO DR. SHAFER PHILOSOPHY  WARM TRANSFER FAILED  SENT ENCOUNTER AFTER PHONE CALL HAD CONCLUDED.

## 2025-03-17 ENCOUNTER — APPOINTMENT (OUTPATIENT)
Dept: CT IMAGING | Facility: HOSPITAL | Age: 45
End: 2025-03-17
Payer: MEDICAID

## 2025-03-17 ENCOUNTER — HOSPITAL ENCOUNTER (EMERGENCY)
Facility: HOSPITAL | Age: 45
Discharge: HOME | End: 2025-03-17
Attending: EMERGENCY MEDICINE | Admitting: EMERGENCY MEDICINE
Payer: MEDICAID

## 2025-03-17 VITALS
OXYGEN SATURATION: 95 % | WEIGHT: 185 LBS | HEIGHT: 59 IN | DIASTOLIC BLOOD PRESSURE: 96 MMHG | HEART RATE: 68 BPM | SYSTOLIC BLOOD PRESSURE: 123 MMHG | TEMPERATURE: 97.9 F | BODY MASS INDEX: 37.29 KG/M2 | RESPIRATION RATE: 18 BRPM

## 2025-03-17 DIAGNOSIS — R10.9 RIGHT FLANK PAIN: Primary | ICD-10-CM

## 2025-03-17 LAB
ALBUMIN SERPL-MCNC: 4 G/DL (ref 3.5–5.2)
ALBUMIN/GLOB SERPL: 1.4 G/DL
ALP SERPL-CCNC: 74 U/L (ref 39–117)
ALT SERPL W P-5'-P-CCNC: 11 U/L (ref 1–33)
ANION GAP SERPL CALCULATED.3IONS-SCNC: 12 MMOL/L (ref 5–15)
AST SERPL-CCNC: 15 U/L (ref 1–32)
BACTERIA UR QL AUTO: ABNORMAL /HPF
BASOPHILS # BLD AUTO: 0.04 10*3/MM3 (ref 0–0.2)
BASOPHILS NFR BLD AUTO: 0.6 % (ref 0–1.5)
BILIRUB SERPL-MCNC: 0.3 MG/DL (ref 0–1.2)
BILIRUB UR QL STRIP: NEGATIVE
BUN SERPL-MCNC: 11 MG/DL (ref 6–20)
BUN/CREAT SERPL: 17.7 (ref 7–25)
CALCIUM SPEC-SCNC: 8.9 MG/DL (ref 8.6–10.5)
CHLORIDE SERPL-SCNC: 102 MMOL/L (ref 98–107)
CLARITY UR: ABNORMAL
CO2 SERPL-SCNC: 25 MMOL/L (ref 22–29)
COLOR UR: ABNORMAL
CREAT SERPL-MCNC: 0.62 MG/DL (ref 0.57–1)
DEPRECATED RDW RBC AUTO: 47.7 FL (ref 37–54)
EGFRCR SERPLBLD CKD-EPI 2021: 111.4 ML/MIN/1.73
EOSINOPHIL # BLD AUTO: 0.13 10*3/MM3 (ref 0–0.4)
EOSINOPHIL NFR BLD AUTO: 1.8 % (ref 0.3–6.2)
ERYTHROCYTE [DISTWIDTH] IN BLOOD BY AUTOMATED COUNT: 16.3 % (ref 12.3–15.4)
GLOBULIN UR ELPH-MCNC: 2.9 GM/DL
GLUCOSE SERPL-MCNC: 96 MG/DL (ref 65–99)
GLUCOSE UR STRIP-MCNC: NEGATIVE MG/DL
HCT VFR BLD AUTO: 35.9 % (ref 34–46.6)
HGB BLD-MCNC: 11.3 G/DL (ref 12–15.9)
HGB UR QL STRIP.AUTO: NEGATIVE
HYALINE CASTS UR QL AUTO: ABNORMAL /LPF
IMM GRANULOCYTES # BLD AUTO: 0.02 10*3/MM3 (ref 0–0.05)
IMM GRANULOCYTES NFR BLD AUTO: 0.3 % (ref 0–0.5)
KETONES UR QL STRIP: NEGATIVE
LEUKOCYTE ESTERASE UR QL STRIP.AUTO: ABNORMAL
LIPASE SERPL-CCNC: 167 U/L (ref 13–60)
LYMPHOCYTES # BLD AUTO: 3.5 10*3/MM3 (ref 0.7–3.1)
LYMPHOCYTES NFR BLD AUTO: 49.5 % (ref 19.6–45.3)
MCH RBC QN AUTO: 25.5 PG (ref 26.6–33)
MCHC RBC AUTO-ENTMCNC: 31.5 G/DL (ref 31.5–35.7)
MCV RBC AUTO: 80.9 FL (ref 79–97)
MONOCYTES # BLD AUTO: 0.44 10*3/MM3 (ref 0.1–0.9)
MONOCYTES NFR BLD AUTO: 6.2 % (ref 5–12)
NEUTROPHILS NFR BLD AUTO: 2.94 10*3/MM3 (ref 1.7–7)
NEUTROPHILS NFR BLD AUTO: 41.6 % (ref 42.7–76)
NITRITE UR QL STRIP: POSITIVE
NRBC BLD AUTO-RTO: 0 /100 WBC (ref 0–0.2)
PH UR STRIP.AUTO: 5.5 [PH] (ref 5–8)
PLATELET # BLD AUTO: 275 10*3/MM3 (ref 140–450)
PMV BLD AUTO: 10.4 FL (ref 6–12)
POTASSIUM SERPL-SCNC: 3.9 MMOL/L (ref 3.5–5.2)
PROT SERPL-MCNC: 6.9 G/DL (ref 6–8.5)
PROT UR QL STRIP: NEGATIVE
RBC # BLD AUTO: 4.44 10*6/MM3 (ref 3.77–5.28)
RBC # UR STRIP: ABNORMAL /HPF
REF LAB TEST METHOD: ABNORMAL
SODIUM SERPL-SCNC: 139 MMOL/L (ref 136–145)
SP GR UR STRIP: 1.02 (ref 1–1.03)
SQUAMOUS #/AREA URNS HPF: ABNORMAL /HPF
UROBILINOGEN UR QL STRIP: ABNORMAL
WBC # UR STRIP: ABNORMAL /HPF
WBC NRBC COR # BLD AUTO: 7.07 10*3/MM3 (ref 3.4–10.8)

## 2025-03-17 PROCEDURE — 83690 ASSAY OF LIPASE: CPT | Performed by: EMERGENCY MEDICINE

## 2025-03-17 PROCEDURE — 81001 URINALYSIS AUTO W/SCOPE: CPT | Performed by: EMERGENCY MEDICINE

## 2025-03-17 PROCEDURE — 96374 THER/PROPH/DIAG INJ IV PUSH: CPT

## 2025-03-17 PROCEDURE — 85025 COMPLETE CBC W/AUTO DIFF WBC: CPT | Performed by: EMERGENCY MEDICINE

## 2025-03-17 PROCEDURE — 96375 TX/PRO/DX INJ NEW DRUG ADDON: CPT

## 2025-03-17 PROCEDURE — 80053 COMPREHEN METABOLIC PANEL: CPT | Performed by: EMERGENCY MEDICINE

## 2025-03-17 PROCEDURE — 25010000002 ONDANSETRON PER 1 MG: Performed by: EMERGENCY MEDICINE

## 2025-03-17 PROCEDURE — 25010000002 KETOROLAC TROMETHAMINE PER 15 MG: Performed by: EMERGENCY MEDICINE

## 2025-03-17 PROCEDURE — 99284 EMERGENCY DEPT VISIT MOD MDM: CPT

## 2025-03-17 PROCEDURE — 36415 COLL VENOUS BLD VENIPUNCTURE: CPT

## 2025-03-17 PROCEDURE — 74176 CT ABD & PELVIS W/O CONTRAST: CPT

## 2025-03-17 RX ORDER — ONDANSETRON 2 MG/ML
4 INJECTION INTRAMUSCULAR; INTRAVENOUS EVERY 6 HOURS PRN
Status: DISCONTINUED | OUTPATIENT
Start: 2025-03-17 | End: 2025-03-17 | Stop reason: HOSPADM

## 2025-03-17 RX ORDER — ACETAMINOPHEN 500 MG
500 TABLET ORAL EVERY 4 HOURS PRN
Qty: 20 TABLET | Refills: 0 | Status: SHIPPED | OUTPATIENT
Start: 2025-03-17

## 2025-03-17 RX ORDER — KETOROLAC TROMETHAMINE 15 MG/ML
15 INJECTION, SOLUTION INTRAMUSCULAR; INTRAVENOUS ONCE
Status: COMPLETED | OUTPATIENT
Start: 2025-03-17 | End: 2025-03-17

## 2025-03-17 RX ORDER — ONDANSETRON 4 MG/1
4 TABLET, ORALLY DISINTEGRATING ORAL 4 TIMES DAILY PRN
Qty: 15 TABLET | Refills: 0 | Status: SHIPPED | OUTPATIENT
Start: 2025-03-17

## 2025-03-17 RX ORDER — MORPHINE SULFATE 4 MG/ML
4 INJECTION, SOLUTION INTRAMUSCULAR; INTRAVENOUS
Status: DISCONTINUED | OUTPATIENT
Start: 2025-03-17 | End: 2025-03-17 | Stop reason: HOSPADM

## 2025-03-17 RX ADMIN — ONDANSETRON 4 MG: 2 INJECTION INTRAMUSCULAR; INTRAVENOUS at 07:35

## 2025-03-17 RX ADMIN — KETOROLAC TROMETHAMINE 15 MG: 15 INJECTION, SOLUTION INTRAMUSCULAR; INTRAVENOUS at 07:38

## 2025-03-17 NOTE — ED PROVIDER NOTES
[place]    EMERGENCY DEPARTMENT ENCOUNTER      Pt Name: Bernardo Dodd  MRN: [id number]  YOB: 1980  Date of evaluation: 3/17/2025  Provider: Evens Whitman DO    CHIEF COMPLAINT       Chief Complaint   Patient presents with    Flank Pain       HPI  Stated Reason for Visit: pt presents to ED for eval of R side flank pain. pt states has been going on since saturday. pt states buring when urinating. pt denies any other complaints.History Obtained From: patient       HISTORY OF PRESENT ILLNESS  (Location/Symptom, Timing/Onset, Context/Setting, Quality, Duration, Modifying Factors, Severity.)   Bernardo Dodd is a 44 y.o. female who presents to the emergency department for evaluation of right-sided flank pain which has been going on for last 3 to 4 days.  She notes it feels similar to her prior kidney stone which she has had in the past.  She has previously followed with Dr. Child with urology, has had stent placements.  She does have some mild dysuria, no gross hematuria.  She denies any falls, injury or trauma, no fever or chills, no bowel complaints.  She been taking over-the-counter medications with limited relief.  Patient denies any chest pain difficulty breathing, she has no other acute systemic complaints at this time.      Nursing notes were reviewed.      PAST MEDICAL HISTORY     Past Medical History:   Diagnosis Date    Antisocial personality disorder     Bipolar disorder     Chlamydia     GERD (gastroesophageal reflux disease)     Gonorrhea 2000    Herpes simplex     History of methamphetamine abuse 2020    Clean ~     Hypertension 2016    Kidney stones 2018    Seizures 2012    Stopped ~ 2018    Shingles on back 2017    Substance abuse - DOC was cocaine 1998    Sober of cocaine since ~     Trichomonas infection 2000         SURGICAL HISTORY       Past Surgical History:   Procedure Laterality Date    BARTHOLIN GLAND CYST EXCISION Left 2018      SECTION WITH TUBAL  08/28/2010    CYSTOSCOPY, URETEROSCOPY, RETROGRADE PYELOGRAM, STONE EXTRACTION, STENT INSERTION Bilateral 05/04/2023    Procedure: CYSTOSCOPY, BILATERAL RETROGRADE PYELOGRAM, URETEROSCOPY, AND STENT PLACEMENT;  Surgeon: Micah Chlid MD;  Location: [facility] OR;  Service: Urology;  Laterality: Bilateral;    CYSTOSCOPY, URETEROSCOPY, RETROGRADE PYELOGRAM, STONE EXTRACTION, STENT INSERTION Left 05/19/2023    Procedure: CYSTOSCOPY URETEROSCOPY RETROGRADE PYELOGRAM STONE EXTRACTION STENT INSERTION;  Surgeon: Micah Child MD;  Location: [facility] OR;  Service: Urology;  Laterality: Left;    I & D / EXCISION MARSUPIALIZATION BARTHOLIN'S GLAND CYST / LYSIS LESIONS Left 1994    ORIF ANKLE FRACTURE Right 2005    REDUCTION MAMMAPLASTY Bilateral 1999    TENSION FREE VAGINAL TAPING WITH MINI ARC SLING  02/2018    Dr Doyle avery     TOTAL LAPAROSCOPIC HYSTERECTOMY SALPINGO OOPHORECTOMY Bilateral 11/27/2024    PATH - benign; Surgeon: Edmar Smith MD;         CURRENT MEDICATIONS       Current Facility-Administered Medications:     Morphine sulfate (PF) injection 4 mg, 4 mg, Intravenous, Q30 Min PRN, Evens Whitman DO    ondansetron (ZOFRAN) injection 4 mg, 4 mg, Intravenous, Q6H PRN, Evens Whitman DO, 4 mg at 03/17/25 0735    Current Outpatient Medications:     acetaminophen (TYLENOL) 500 MG tablet, Take 1 tablet by mouth Every 4 (Four) Hours As Needed for Mild Pain or Fever., Disp: 20 tablet, Rfl: 0    albuterol sulfate  (90 Base) MCG/ACT inhaler, Inhale 2 puffs Every 4 (Four) Hours As Needed for Wheezing., Disp: 18 g, Rfl: 2    amLODIPine (NORVASC) 5 MG tablet, Take 1 tablet by mouth Daily., Disp: 90 tablet, Rfl: 0    Azelastine HCl 137 MCG/SPRAY solution, 1 spray by Each Nare route 2 (Two) Times a Day., Disp: 30 mL, Rfl: 11    diclofenac (VOLTAREN) 75 MG EC tablet, Take 1 tablet by mouth 2 (Two) Times a Day., Disp: 60 tablet, Rfl: 2    famotidine (PEPCID) 20 MG tablet, Take 1  tablet by mouth 2 (Two) Times a Day., Disp: 60 tablet, Rfl: 11    lamoTRIgine (LaMICtal) 200 MG tablet, Take 1 tablet by mouth Daily., Disp: 30 tablet, Rfl: 11    levocetirizine (XYZAL) 5 MG tablet, Take 1 tablet by mouth Every Evening., Disp: 30 tablet, Rfl: 11    Multiple Vitamins-Minerals (CENTRUM ADULT PO), Take  by mouth., Disp: , Rfl:     nystatin-triamcinolone (MYCOLOG) 534463-4.1 UNIT/GM-% ointment, Apply 1 Application topically to the appropriate area as directed 2 (Two) Times a Day., Disp: 15 g, Rfl: 3    ondansetron ODT (ZOFRAN-ODT) 4 MG disintegrating tablet, Take 1 tablet by mouth 4 (Four) Times a Day As Needed for Nausea or Vomiting., Disp: 15 tablet, Rfl: 0    oxyCODONE-acetaminophen (PERCOCET) 5-325 MG per tablet, Take 1 tablet by mouth Every 6 (Six) Hours As Needed for Severe Pain., Disp: 14 tablet, Rfl: 0    promethazine-dextromethorphan (PROMETHAZINE-DM) 6.25-15 MG/5ML syrup, Take 5 mL by mouth 4 (Four) Times a Day As Needed for Cough., Disp: 200 mL, Rfl: 0    ALLERGIES     Naproxen and Sulfa antibiotics    FAMILY HISTORY       Family History   Problem Relation Age of Onset    Arthritis Father     Dementia Father     Hypertension Father     Arthritis Mother     Bipolar disorder Mother     Heart failure Paternal Grandmother     Pancreatic cancer Maternal Grandmother     MARCIE disease Paternal Aunt     Diabetes Paternal Uncle     Heart attack Neg Hx     Hyperlipidemia Neg Hx     Mental illness Neg Hx     Obesity Neg Hx     Stroke Neg Hx     Breast cancer Neg Hx     Ovarian cancer Neg Hx           SOCIAL HISTORY       Social History     Socioeconomic History    Marital status: Single    Number of children: 2    Highest education level: Some college, no degree   Tobacco Use    Smoking status: Former     Current packs/day: 0.00     Average packs/day: 1 pack/day for 22.0 years (22.0 ttl pk-yrs)     Types: Cigarettes     Start date:      Quit date:      Years since quittin.2     Passive  exposure: Past    Smokeless tobacco: Never   Vaping Use    Vaping status: Some Days    Substances: THC, Flavoring    Devices: Disposable    Passive vaping exposure: Yes   Substance and Sexual Activity    Alcohol use: No    Drug use: Yes     Types: Marijuana     Comment: Marijuana once a week. Tried cocaine, meth, pain pills in the past    Sexual activity: Defer     Partners: Male     Birth control/protection: Surgical         PHYSICAL EXAM       Vitals:    03/17/25 0701 03/17/25 0713 03/17/25 0730 03/17/25 0830   BP:  140/94 127/93 123/96   BP Location:       Patient Position:       Pulse: 65 59 70 68   Resp:       Temp:       TempSrc:       SpO2: 95% 98% 95% 95%   Weight:       Height:           Physical Exam  General : Patient is awake, alert, oriented, in no acute distress, nontoxic appearing  HEENT: Pupils are equally round, EOMI, conjunctivae clear  Neck: Neck is supple, full range of motion, trachea midline  Cardiac: Heart regular rate, rhythm, no murmurs, rubs, or gallops  Lungs: Lungs are clear to auscultation, there is no wheezing, rhonchi, or rales. There is no use of accessory muscles  Abdomen: Abdomen is soft, nondistended, there is tenderness along the right CVA/flank region without peritoneal signs, no mid or anterior abdominal discomfort, there are no firm or pulsatile masses, no rebound rigidity or guarding  Musculoskeletal: No focal muscle deficits are appreciated  Dermatology: Skin is warm and dry  Psych: Mentation is grossly normal, cognition is grossly normal. Affect is appropriate      DIAGNOSTIC RESULTS     EKG:  All EKGs are interpreted by the Emergency Department Physician who either signs or Co-signs this chart in the absence of a cardiologist.    No orders to display       RADIOLOGY:     [x] Radiologist's Report Reviewed:  CT Abdomen Pelvis Without Contrast   Final Result   Impression:   1.No acute abdominal or pelvic abnormality.   2.Punctate nonobstructing left-sided kidney stone.    3.Hepatomegaly.                        Electronically Signed: Saul Nickerson MD     3/17/2025 6:52 AM EDT     Workstation ID: [id number]          I ordered and independently reviewed the above noted radiographic studies.      I viewed images of CT abdomen/pelvis which showed no acute process or obstructive uropathy per my independent interpretation.    See radiologist's dictation for official interpretation.        LABS:    I have reviewed and interpreted all of the currently available lab results from this visit (if applicable):  Results for orders placed or performed during the hospital encounter of 03/17/25   Urinalysis With Microscopic If Indicated (No Culture) - Urine, Clean Catch    Collection Time: 03/17/25  7:10 AM    Specimen: Urine, Clean Catch   Result Value Ref Range    Color, UA Dark Yellow (A) Yellow, Straw    Appearance, UA Cloudy (A) Clear    pH, UA 5.5 5.0 - 8.0    Specific Gravity, UA 1.016 1.001 - 1.030    Glucose, UA Negative Negative    Ketones, UA Negative Negative    Bilirubin, UA Negative Negative    Blood, UA Negative Negative    Protein, UA Negative Negative    Leuk Esterase, UA Trace (A) Negative    Nitrite, UA Positive (A) Negative    Urobilinogen, UA 1.0 E.U./dL 0.2 - 1.0 E.U./dL   Urinalysis, Microscopic Only - Urine, Clean Catch    Collection Time: 03/17/25  7:10 AM    Specimen: Urine, Clean Catch   Result Value Ref Range    RBC, UA 0-2 None Seen, 0-2 /HPF    WBC, UA 0-2 None Seen, 0-2 /HPF    Bacteria, UA None Seen None Seen, Trace /HPF    Squamous Epithelial Cells, UA 3-6 (A) None Seen, 0-2 /HPF    Hyaline Casts, UA 0-6 0 - 6 /LPF    Methodology Automated Microscopy    CBC Auto Differential    Collection Time: 03/17/25  7:26 AM    Specimen: Blood   Result Value Ref Range    WBC 7.07 3.40 - 10.80 10*3/mm3    RBC 4.44 3.77 - 5.28 10*6/mm3    Hemoglobin 11.3 (L) 12.0 - 15.9 g/dL    Hematocrit 35.9 34.0 - 46.6 %    MCV 80.9 79.0 - 97.0 fL    MCH 25.5 (L) 26.6 - 33.0 pg    MCHC 31.5 31.5  - 35.7 g/dL    RDW 16.3 (H) 12.3 - 15.4 %    RDW-SD 47.7 37.0 - 54.0 fl    MPV 10.4 6.0 - 12.0 fL    Platelets 275 140 - 450 10*3/mm3    Neutrophil % 41.6 (L) 42.7 - 76.0 %    Lymphocyte % 49.5 (H) 19.6 - 45.3 %    Monocyte % 6.2 5.0 - 12.0 %    Eosinophil % 1.8 0.3 - 6.2 %    Basophil % 0.6 0.0 - 1.5 %    Immature Grans % 0.3 0.0 - 0.5 %    Neutrophils, Absolute 2.94 1.70 - 7.00 10*3/mm3    Lymphocytes, Absolute 3.50 (H) 0.70 - 3.10 10*3/mm3    Monocytes, Absolute 0.44 0.10 - 0.90 10*3/mm3    Eosinophils, Absolute 0.13 0.00 - 0.40 10*3/mm3    Basophils, Absolute 0.04 0.00 - 0.20 10*3/mm3    Immature Grans, Absolute 0.02 0.00 - 0.05 10*3/mm3    nRBC 0.0 0.0 - 0.2 /100 WBC   Comprehensive Metabolic Panel    Collection Time: 03/17/25  8:29 AM    Specimen: Blood   Result Value Ref Range    Glucose 96 65 - 99 mg/dL    BUN 11 6 - 20 mg/dL    Creatinine 0.62 0.57 - 1.00 mg/dL    Sodium 139 136 - 145 mmol/L    Potassium 3.9 3.5 - 5.2 mmol/L    Chloride 102 98 - 107 mmol/L    CO2 25.0 22.0 - 29.0 mmol/L    Calcium 8.9 8.6 - 10.5 mg/dL    Total Protein 6.9 6.0 - 8.5 g/dL    Albumin 4.0 3.5 - 5.2 g/dL    ALT (SGPT) 11 1 - 33 U/L    AST (SGOT) 15 1 - 32 U/L    Alkaline Phosphatase 74 39 - 117 U/L    Total Bilirubin 0.3 0.0 - 1.2 mg/dL    Globulin 2.9 gm/dL    A/G Ratio 1.4 g/dL    BUN/Creatinine Ratio 17.7 7.0 - 25.0    Anion Gap 12.0 5.0 - 15.0 mmol/L    eGFR 111.4 >60.0 mL/min/1.73   Lipase    Collection Time: 03/17/25  8:29 AM    Specimen: Blood   Result Value Ref Range    Lipase 167 (H) 13 - 60 U/L        If labs were ordered, I independently reviewed the results and considered them in treating the patient.      EMERGENCY DEPARTMENT COURSE and DIFFERENTIAL DIAGNOSIS/MDM:   Vitals:  AS OF 10:30 EDT    BP - 123/96  HR - 68  TEMP - 97.9 °F (36.6 °C) (Oral)  O2 SATS - 95%      Orders placed during this visit:  Orders Placed This Encounter   Procedures    CT Abdomen Pelvis Without Contrast    Comprehensive Metabolic Panel     Lipase    Urinalysis With Microscopic If Indicated (No Culture) - Urine, Clean Catch    CBC Auto Differential    Urinalysis, Microscopic Only - Urine, Clean Catch    CBC & Differential       All labs have been independently reviewed by me.  All radiology studies have been reviewed by me and the radiologist dictating the report.  All EKG's have been independently viewed and interpreted by me.      Discussion below represents my analysis of pertinent findings related to patient's condition, differential diagnosis, treatment plan and final disposition.    Differential diagnosis:  The differential diagnosis associated with the patient's presentation includes: Renal colic, urinary tract infection, colitis, gallstone disease, MSK strain    Additional sources  Discussed/ obtained information from independent historians:   [] Spouse  [] Parent  [] Family member  [] Friend  [] EMS   [] Other:    External (non-ED) record review:   [] Inpatient record:   [] Office record:   [] Outpatient record:   [] Prior Outpatient labs:   [] Prior Outpatient radiology:   [] Primary Care record:   [] Outside ED record:   [] Other:     Patient's care impacted by:   [] Diabetes  [] Hypertension  [] CHF  [] Hyperlipidemia  [] Coronary Artery Disease   [] COPD   [] Cancer   [] Tobacco Abuse   [] Substance Abuse    [x] Other: Kidney stone    Care significantly affected by Social Determinants of Health (housing and economic circumstances, unemployment)    [] Yes     [x] No   If yes, Patient's care significantly limited by Social Determinants of Health including:   [] Inadequate housing   [] Low income   [] Alcoholism and drug addiction in family   [] Problems related to primary support group   [] Unemployment   [] Problems related to employment   [] Other Social Determinants of Health:       MEDICATIONS ADMINISTERED IN ED:  Medications   Morphine sulfate (PF) injection 4 mg (4 mg Intravenous Not Given 3/17/25 0734)   ondansetron (ZOFRAN) injection  4 mg (4 mg Intravenous Given 3/17/25 0735)   ketorolac (TORADOL) injection 15 mg (15 mg Intravenous Given 3/17/25 8591)              This is a pleasant 44-year-old female with right-sided flank pain with similar symptoms from prior renal colic.  She is nontoxic-appearing, no peritoneal signs exam, she stable vital signs on initial assessment.  We did proceed forward with blood work labs imaging, patient given symptomatic control.  Results as above.  CBC white count of 7.0, hemoglobin 11.3, urinalysis without acute infectious etiology.  CT scan with no acute intra-abdominal pathology, no signs of urethral or ureteral obstruction.  Kidney function liver function are normal, lipase slightly elevated at 168.  No acute abnormalities on CT scan imaging.  We will plan on continuing symptomatic treatments, good fluid hydration, symptomatic medications and anti-inflammatories over the next few days because she may have passed a small kidney stone, or have a mild pancreatic flare, no other acute processes appreciated.  We discussed close follow-up with her PCP for couple days, return to the ED with any worsening symptoms or further concerns.    I had a discussion with the patient/family regarding diagnosis, diagnostic results, treatment plan, and medications.  The patient/family indicated understanding of these instructions.  I spent adequate time at the bedside preceding discharge necessary to personally discuss the aftercare instructions, giving patient education, providing explanations of the results of our evaluations/findings, and my decision making to assure that the patient/family understand the plan of care.  Time was allotted to answer questions at that time and throughout the ED course.  Emphasis was placed on timely follow-up after discharge.  I also discussed the potential for the development of an acute emergent condition requiring further evaluation, admission, or even surgical intervention. I discussed that we  found nothing during the visit today indicating the need for further workup, admission, or the presence of an unstable medical condition.  I encouraged the patient to return to the emergency department immediately for ANY concerns, worsening, new complaints, or if symptoms persist and unable to seek follow-up in a timely fashion.  The patient/family expressed understanding and agreement with this plan.  The patient will follow-up with their PCP in 1-2 days for reevaluation.     PROCEDURES:  Procedures    CRITICAL CARE TIME    Total Critical Care time was 0 minutes, excluding separately reportable procedures.   There was a high probability of clinically significant/life threatening deterioration in the patient's condition which required my urgent intervention.      FINAL IMPRESSION      1. Right flank pain          DISPOSITION/PLAN     ED Disposition       ED Disposition   Discharge    Condition   Stable    Comment   --               PATIENT REFERRED TO:  Rebekah Fagan, APRN  [address] [facility] [id number] [phone]    In 2 days      [facility] EMERGENCY DEPART[facility]  [address] [address] [phone]    If symptoms worsen      DISCHARGE MEDICATIONS:     Medication List        START taking these medications      acetaminophen 500 MG tablet  Commonly known as: TYLENOL  Take 1 tablet by mouth Every 4 (Four) Hours As Needed for Mild Pain or Fever.            CHANGE how you take these medications      ondansetron ODT 4 MG disintegrating tablet  Commonly known as: ZOFRAN-ODT  Take 1 tablet by mouth 4 (Four) Times a Day As Needed for Nausea or Vomiting.  What changed:   how to take this  when to take this            CONTINUE taking these medications      albuterol sulfate  (90 Base) MCG/ACT inhaler  Commonly known as: PROVENTIL HFA;VENTOLIN HFA;PROAIR HFA  Inhale 2 puffs Every 4 (Four) Hours As Needed for Wheezing.     amLODIPine 5 MG tablet  Commonly known as:  NORVASC  Take 1 tablet by mouth Daily.     Azelastine HCl 137 MCG/SPRAY solution  1 spray by Each Nare route 2 (Two) Times a Day.     CENTRUM ADULT PO     diclofenac 75 MG EC tablet  Commonly known as: VOLTAREN  Take 1 tablet by mouth 2 (Two) Times a Day.     famotidine 20 MG tablet  Commonly known as: PEPCID  Take 1 tablet by mouth 2 (Two) Times a Day.     lamoTRIgine 200 MG tablet  Commonly known as: LaMICtal  Take 1 tablet by mouth Daily.     levocetirizine 5 MG tablet  Commonly known as: XYZAL  Take 1 tablet by mouth Every Evening.     nystatin-triamcinolone 162809-5.1 UNIT/GM-% ointment  Commonly known as: MYCOLOG  Apply 1 Application topically to the appropriate area as directed 2 (Two) Times a Day.     oxyCODONE-acetaminophen 5-325 MG per tablet  Commonly known as: PERCOCET  Take 1 tablet by mouth Every 6 (Six) Hours As Needed for Severe Pain.     promethazine-dextromethorphan 6.25-15 MG/5ML syrup  Commonly known as: PROMETHAZINE-DM  Take 5 mL by mouth 4 (Four) Times a Day As Needed for Cough.               Where to Get Your Medications        These medications were sent to [facility] [id number] - [facility] [address] - [phone]  - [phone]   [address] [facility] [address]      Phone: [phone]   acetaminophen 500 MG tablet  ondansetron ODT 4 MG disintegrating tablet             Comment: Please note this report has been produced using speech recognition software.      Evens Whitman DO  Attending Emergency Physician         Evens Whitman DO  03/17/25 1033

## 2025-03-24 ENCOUNTER — HOSPITAL ENCOUNTER (EMERGENCY)
Facility: HOSPITAL | Age: 45
Discharge: HOME | End: 2025-03-24
Attending: STUDENT IN AN ORGANIZED HEALTH CARE EDUCATION/TRAINING PROGRAM | Admitting: STUDENT IN AN ORGANIZED HEALTH CARE EDUCATION/TRAINING PROGRAM
Payer: MEDICAID

## 2025-03-24 ENCOUNTER — APPOINTMENT (OUTPATIENT)
Facility: HOSPITAL | Age: 45
End: 2025-03-24
Payer: MEDICAID

## 2025-03-24 VITALS
TEMPERATURE: 98 F | HEIGHT: 59 IN | WEIGHT: 185.19 LBS | RESPIRATION RATE: 23 BRPM | DIASTOLIC BLOOD PRESSURE: 95 MMHG | BODY MASS INDEX: 37.33 KG/M2 | HEART RATE: 95 BPM | OXYGEN SATURATION: 96 % | SYSTOLIC BLOOD PRESSURE: 138 MMHG

## 2025-03-24 DIAGNOSIS — K42.9 UMBILICAL HERNIA WITHOUT OBSTRUCTION AND WITHOUT GANGRENE: Primary | ICD-10-CM

## 2025-03-24 LAB
ALBUMIN SERPL-MCNC: 4.2 G/DL (ref 3.5–5.2)
ALBUMIN/GLOB SERPL: 1.2 G/DL
ALP SERPL-CCNC: 84 U/L (ref 39–117)
ALT SERPL W P-5'-P-CCNC: 15 U/L (ref 1–33)
ANION GAP SERPL CALCULATED.3IONS-SCNC: 13.7 MMOL/L (ref 5–15)
AST SERPL-CCNC: 24 U/L (ref 1–32)
BASOPHILS # BLD AUTO: 0.01 10*3/MM3 (ref 0–0.2)
BASOPHILS NFR BLD AUTO: 0.1 % (ref 0–1.5)
BILIRUB SERPL-MCNC: 0.2 MG/DL (ref 0–1.2)
BILIRUB UR QL STRIP: NEGATIVE
BUN SERPL-MCNC: 16 MG/DL (ref 6–20)
BUN/CREAT SERPL: 21.9 (ref 7–25)
CALCIUM SPEC-SCNC: 9.8 MG/DL (ref 8.6–10.5)
CHLORIDE SERPL-SCNC: 101 MMOL/L (ref 98–107)
CLARITY UR: CLEAR
CO2 SERPL-SCNC: 29.3 MMOL/L (ref 22–29)
COLOR UR: YELLOW
CREAT SERPL-MCNC: 0.73 MG/DL (ref 0.57–1)
D-LACTATE SERPL-SCNC: 1.7 MMOL/L (ref 0.5–2)
DEPRECATED RDW RBC AUTO: 46.9 FL (ref 37–54)
EGFRCR SERPLBLD CKD-EPI 2021: 102.9 ML/MIN/1.73
EOSINOPHIL # BLD AUTO: 0.08 10*3/MM3 (ref 0–0.4)
EOSINOPHIL NFR BLD AUTO: 1.2 % (ref 0.3–6.2)
ERYTHROCYTE [DISTWIDTH] IN BLOOD BY AUTOMATED COUNT: 16.3 % (ref 12.3–15.4)
GLOBULIN UR ELPH-MCNC: 3.6 GM/DL
GLUCOSE SERPL-MCNC: 120 MG/DL (ref 65–99)
GLUCOSE UR STRIP-MCNC: NEGATIVE MG/DL
HCT VFR BLD AUTO: 39 % (ref 34–46.6)
HGB BLD-MCNC: 12.5 G/DL (ref 12–15.9)
HGB UR QL STRIP.AUTO: NEGATIVE
HOLD SPECIMEN: NORMAL
IMM GRANULOCYTES # BLD AUTO: 0.01 10*3/MM3 (ref 0–0.05)
IMM GRANULOCYTES NFR BLD AUTO: 0.1 % (ref 0–0.5)
KETONES UR QL STRIP: NEGATIVE
LEUKOCYTE ESTERASE UR QL STRIP.AUTO: NEGATIVE
LIPASE SERPL-CCNC: 68 U/L (ref 13–60)
LYMPHOCYTES # BLD AUTO: 3.5 10*3/MM3 (ref 0.7–3.1)
LYMPHOCYTES NFR BLD AUTO: 51.2 % (ref 19.6–45.3)
MCH RBC QN AUTO: 25.2 PG (ref 26.6–33)
MCHC RBC AUTO-ENTMCNC: 32.1 G/DL (ref 31.5–35.7)
MCV RBC AUTO: 78.5 FL (ref 79–97)
MONOCYTES # BLD AUTO: 0.3 10*3/MM3 (ref 0.1–0.9)
MONOCYTES NFR BLD AUTO: 4.4 % (ref 5–12)
NEUTROPHILS NFR BLD AUTO: 2.93 10*3/MM3 (ref 1.7–7)
NEUTROPHILS NFR BLD AUTO: 43 % (ref 42.7–76)
NITRITE UR QL STRIP: NEGATIVE
PH UR STRIP.AUTO: 6 [PH] (ref 5–8)
PLATELET # BLD AUTO: 309 10*3/MM3 (ref 140–450)
PMV BLD AUTO: 10.5 FL (ref 6–12)
POTASSIUM SERPL-SCNC: 3.6 MMOL/L (ref 3.5–5.2)
PROT SERPL-MCNC: 7.8 G/DL (ref 6–8.5)
PROT UR QL STRIP: NEGATIVE
RBC # BLD AUTO: 4.97 10*6/MM3 (ref 3.77–5.28)
SODIUM SERPL-SCNC: 144 MMOL/L (ref 136–145)
SP GR UR STRIP: 1.02 (ref 1–1.03)
UROBILINOGEN UR QL STRIP: NORMAL
WBC NRBC COR # BLD AUTO: 6.83 10*3/MM3 (ref 3.4–10.8)
WHOLE BLOOD HOLD COAG: NORMAL
WHOLE BLOOD HOLD SPECIMEN: NORMAL

## 2025-03-24 PROCEDURE — 25010000002 MORPHINE PER 10 MG: Performed by: STUDENT IN AN ORGANIZED HEALTH CARE EDUCATION/TRAINING PROGRAM

## 2025-03-24 PROCEDURE — 96374 THER/PROPH/DIAG INJ IV PUSH: CPT

## 2025-03-24 PROCEDURE — 74177 CT ABD & PELVIS W/CONTRAST: CPT

## 2025-03-24 PROCEDURE — 36415 COLL VENOUS BLD VENIPUNCTURE: CPT

## 2025-03-24 PROCEDURE — 25010000002 ONDANSETRON PER 1 MG: Performed by: STUDENT IN AN ORGANIZED HEALTH CARE EDUCATION/TRAINING PROGRAM

## 2025-03-24 PROCEDURE — 81003 URINALYSIS AUTO W/O SCOPE: CPT | Performed by: STUDENT IN AN ORGANIZED HEALTH CARE EDUCATION/TRAINING PROGRAM

## 2025-03-24 PROCEDURE — 99285 EMERGENCY DEPT VISIT HI MDM: CPT

## 2025-03-24 PROCEDURE — 85025 COMPLETE CBC W/AUTO DIFF WBC: CPT | Performed by: STUDENT IN AN ORGANIZED HEALTH CARE EDUCATION/TRAINING PROGRAM

## 2025-03-24 PROCEDURE — 96376 TX/PRO/DX INJ SAME DRUG ADON: CPT

## 2025-03-24 PROCEDURE — 83690 ASSAY OF LIPASE: CPT | Performed by: STUDENT IN AN ORGANIZED HEALTH CARE EDUCATION/TRAINING PROGRAM

## 2025-03-24 PROCEDURE — 80053 COMPREHEN METABOLIC PANEL: CPT | Performed by: STUDENT IN AN ORGANIZED HEALTH CARE EDUCATION/TRAINING PROGRAM

## 2025-03-24 PROCEDURE — 96375 TX/PRO/DX INJ NEW DRUG ADDON: CPT

## 2025-03-24 PROCEDURE — 83605 ASSAY OF LACTIC ACID: CPT | Performed by: PHYSICIAN ASSISTANT

## 2025-03-24 PROCEDURE — 25510000001 IOPAMIDOL 61 % SOLUTION: Performed by: STUDENT IN AN ORGANIZED HEALTH CARE EDUCATION/TRAINING PROGRAM

## 2025-03-24 RX ORDER — SODIUM CHLORIDE 9 MG/ML
10 INJECTION, SOLUTION INTRAMUSCULAR; INTRAVENOUS; SUBCUTANEOUS AS NEEDED
Status: DISCONTINUED | OUTPATIENT
Start: 2025-03-24 | End: 2025-03-24 | Stop reason: HOSPADM

## 2025-03-24 RX ORDER — HYDROCODONE BITARTRATE AND ACETAMINOPHEN 5; 325 MG/1; MG/1
1 TABLET ORAL EVERY 6 HOURS PRN
Qty: 12 TABLET | Refills: 0 | Status: SHIPPED | OUTPATIENT
Start: 2025-03-24 | End: 2025-03-27

## 2025-03-24 RX ORDER — IOPAMIDOL 612 MG/ML
100 INJECTION, SOLUTION INTRAVASCULAR
Status: COMPLETED | OUTPATIENT
Start: 2025-03-24 | End: 2025-03-24

## 2025-03-24 RX ORDER — FENTANYL CITRATE 50 UG/ML
25 INJECTION, SOLUTION INTRAMUSCULAR; INTRAVENOUS ONCE
Refills: 0 | Status: DISCONTINUED | OUTPATIENT
Start: 2025-03-24 | End: 2025-03-24

## 2025-03-24 RX ORDER — ONDANSETRON 2 MG/ML
4 INJECTION INTRAMUSCULAR; INTRAVENOUS ONCE
Status: COMPLETED | OUTPATIENT
Start: 2025-03-24 | End: 2025-03-24

## 2025-03-24 RX ADMIN — IOPAMIDOL 85 ML: 612 INJECTION, SOLUTION INTRAVENOUS at 12:00

## 2025-03-24 RX ADMIN — MORPHINE SULFATE 4 MG: 4 INJECTION, SOLUTION INTRAMUSCULAR; INTRAVENOUS at 10:11

## 2025-03-24 RX ADMIN — MORPHINE SULFATE 4 MG: 4 INJECTION, SOLUTION INTRAMUSCULAR; INTRAVENOUS at 13:11

## 2025-03-24 RX ADMIN — ONDANSETRON 4 MG: 2 INJECTION INTRAMUSCULAR; INTRAVENOUS at 10:11

## 2025-03-24 NOTE — ANESTHESIA PROCEDURE NOTES
Airway  Urgency: elective    Date/Time: 5/19/2023 1:05 PM  Airway not difficult    General Information and Staff    Patient location during procedure: OR  CRNA/CAA: Ivelisse Roblero CRNA    Indications and Patient Condition  Indications for airway management: airway protection    Preoxygenated: yes  MILS maintained throughout  Mask difficulty assessment: 1 - vent by mask    Final Airway Details  Final airway type: supraglottic airway      Successful airway: I-gel  Size 4     Number of attempts at approach: 1  Assessment: lips, teeth, and gum same as pre-op and atraumatic intubation    Additional Comments  Pt to OR  7. Pt moved self to OR table. ASA monitors applied. Pre-O2 with 100%. SIVI. LMA placed atraumatic. +ETCO2. +BBS.         Subjective   Patient ID: Delmar is a 38 year old male.  Referring provider is Juan Diego Hunter MD.    Chief Complaint   Patient presents with    Follow-up     Follow up lumbar  Review of lumbar MRI 12/13/2024  LOV, request for previous MRI done in [place] for comparison      Office Visit     Continues to have LBP with right leg sciatica. Describes back as stiff  Notes new intermittent pain to L lateral ankle 4 weeks ago. + pain and numbness upon waking, resolves as days goes on  Denies any numbness, tingling or weakness to BLE  Continues HEP and yoga  Rates pain 6/10  Denies use of pain medications              Patient presents for follow-up on his back pain he was able to obtain images from his prior MRIs done [facility] done in [place].  This was done in April 2019.  He brought the images on old fashion films.  I reviewed these images with him showing herniated disc paracentral to the right at the L4-5 level causing right L5 nerve root compression.  I compared this to the most recent MRI with him.    The patient is now complaining of back pain and left ankle pain.  He was seen in an urgent care clinic where he had x-rays of his ankle and was told that he had possible inflammation of his ankle.  His physical therapist told him that they thought that the ankle pain may be related to his back.  He denies any radiating pain from his back into his leg or foot he denies any numbness tingling or weakness associated with the ankle pain.  I explained to him in my opinion the ankle pain is not related to his back condition.        Patient's medications, allergies, past medical, surgical, social and family histories were reviewed and updated as appropriate.    Review of Systems   All other systems reviewed and are negative.      Objective   Physical Exam  Vitals and nursing note reviewed. Exam conducted with a chaperone present.   Constitutional:       General: He is awake.      Appearance: Normal appearance.   HENT:      Neck: Full  passive range of motion without pain.   Musculoskeletal:      Lumbar back: No tenderness. Decreased range of motion. Negative right straight leg raise test and negative left straight leg raise test.      Left ankle: Tenderness present.   Neurological:      Mental Status: He is alert and oriented to person, place, and time.      Sensory: Sensation is intact.      Motor: Motor function is intact.      Coordination: Coordination is intact.      Gait: Gait is intact.      Deep Tendon Reflexes:      Reflex Scores:       Patellar reflexes are 2+ on the right side and 2+ on the left side.       Achilles reflexes are 1+ on the right side and 1+ on the left side.      Neurological Exam  Mental Status  Awake and alert. Oriented to person, place, and time.    Sensory  Normal sensation.    Reflexes                                            Right                      Left  Patellar                                2+                         2+  Achilles                                1+                         1+    Coordination    Finger-to-nose, rapid alternating movements and heel-to-shin normal bilaterally without dysmetria.    Gait   Normal gait.      Imaging/Labs  The MRI study he had done recently shows disc degeneration with very mild disc bulging L4-5 and L5-S1 there is mild degree of neuroforaminal stenosis on the right at the L4-5 level there is no significant neuroforaminal stenosis on the left at either level.  There is no significant neuroforaminal stenosis or central stenosis at the L5-S1 level.    Assessment   Problem List Items Addressed This Visit          Musculoskeletal and Injuries    Chronic right-sided low back pain with right-sided sciatica     Chronic back pain without current radiculopathy  I do not recommend surgery  F/u prn          Other Visit Diagnoses       Chronic low back pain with sciatica, sciatica laterality unspecified, unspecified back pain laterality    -  Primary    Relevant Orders     SERVICE TO PHYSICAL THERAPY          .

## 2025-03-24 NOTE — [facility] PROVIDER NOTE
Subjective  History of Present Illness:    This is a 44 year old female who presents with complaints of periumbilical pain that started after sexual intercourse.  Patient states that she had this happen in the past that resolved.  She has a past medical history of bipolar disorder, hypertension, acid reflux.  Patient denies any vomiting but has had nausea.  No diarrhea.  Patient has been having regular bowel movements.  Past abdominal surgical history includes hysterectomy.    Nurses Notes reviewed and agree, including vitals, allergies, social history and prior medical history.     REVIEW OF SYSTEMS: All systems reviewed and not pertinent unless noted.  Review of Systems    Past Medical History:   Diagnosis Date    Antisocial personality disorder     Bipolar disorder     Chlamydia     GERD (gastroesophageal reflux disease)     Gonorrhea     Herpes simplex     History of methamphetamine abuse     Clean ~     Hypertension 2016    Kidney stones     Seizures     Stopped ~     Shingles on back 2017    Substance abuse - DOC was cocaine     Sober of cocaine since 2022    Trichomonas infection        Allergies:    Naproxen and Sulfa antibiotics      Past Surgical History:   Procedure Laterality Date    BARTHOLIN GLAND CYST EXCISION Left 2018     SECTION WITH TUBAL  2010    CYSTOSCOPY, URETEROSCOPY, RETROGRADE PYELOGRAM, STONE EXTRACTION, STENT INSERTION Bilateral 2023    Procedure: CYSTOSCOPY, BILATERAL RETROGRADE PYELOGRAM, URETEROSCOPY, AND STENT PLACEMENT;  Surgeon: Micah Child MD;  Location:  [facility];  Service: Urology;  Laterality: Bilateral;    CYSTOSCOPY, URETEROSCOPY, RETROGRADE PYELOGRAM, STONE EXTRACTION, STENT INSERTION Left 2023    Procedure: CYSTOSCOPY URETEROSCOPY RETROGRADE PYELOGRAM STONE EXTRACTION STENT INSERTION;  Surgeon: Micah Child MD;  Location:  [facility];  Service: Urology;  Laterality: Left;    I & D / EXCISION  "MARSUPIALIZATION BARTHOLIN'S GLAND CYST / LYSIS LESIONS Left     ORIF ANKLE FRACTURE Right 2005    REDUCTION MAMMAPLASTY Bilateral     TENSION FREE VAGINAL TAPING WITH MINI ARC SLING  2018    Dr Doyle avery     TOTAL LAPAROSCOPIC HYSTERECTOMY SALPINGO OOPHORECTOMY Bilateral 2024    [facility] - benign; Surgeon: Edmar Smith MD;         Social History     Socioeconomic History    Marital status: Single    Number of children: 2    Highest education level: Some college, no degree   Tobacco Use    Smoking status: Former     Current packs/day: 0.00     Average packs/day: 1 pack/day for 22.0 years (22.0 ttl pk-yrs)     Types: Cigarettes     Start date:      Quit date:      Years since quittin.2     Passive exposure: Past    Smokeless tobacco: Never   Vaping Use    Vaping status: Some Days    Substances: THC, Flavoring    Devices: Disposable    Passive vaping exposure: Yes   Substance and Sexual Activity    Alcohol use: No    Drug use: Yes     Types: Marijuana     Comment: Marijuana once a week. Tried cocaine, meth, pain pills in the past    Sexual activity: Defer     Partners: Male     Birth control/protection: Surgical         Family History   Problem Relation Age of Onset    Arthritis Father     Dementia Father     Hypertension Father     Arthritis Mother     Bipolar disorder Mother     Heart failure Paternal Grandmother     Pancreatic cancer Maternal Grandmother     MARCIE disease Paternal Aunt     Diabetes Paternal Uncle     Heart attack Neg Hx     Hyperlipidemia Neg Hx     Mental illness Neg Hx     Obesity Neg Hx     Stroke Neg Hx     Breast cancer Neg Hx     Ovarian cancer Neg Hx        Objective  Physical Exam:  /95   Pulse 95   Temp 98 °F (36.7 °C)   Resp 23   Ht 149.9 cm (59\")   Wt 84 kg (185 lb 3 oz)   LMP 09/10/2024 (Approximate)   SpO2 96%   BMI 37.40 kg/m²      Physical Exam  Vitals and nursing note reviewed.   Constitutional:       Appearance: She is " well-developed.   Cardiovascular:      Rate and Rhythm: Normal rate and regular rhythm.   Pulmonary:      Effort: Pulmonary effort is normal.      Breath sounds: Normal breath sounds.   Abdominal:      General: Abdomen is flat. Bowel sounds are normal.      Palpations: Abdomen is soft.      Tenderness: There is abdominal tenderness in the periumbilical area.      Hernia: Hernia is present in the umbilical area (small).   Genitourinary:     Vagina: Normal.      Cervix: Normal.      Uterus: Normal.    Neurological:      Mental Status: She is alert.         Procedures    ED Course:         Lab Results (last 24 hours)       Procedure Component Value Units Date/Time    CBC & Differential [[id number]]  (Abnormal) Collected: 03/24/25 0955    Specimen: Blood Updated: 03/24/25 1002    Narrative:      The following orders were created for panel order CBC & Differential.  Procedure                               Abnormality         Status                     ---------                               -----------         ------                     CBC Auto Differential[[id number]]        Abnormal            Final result                 Please view results for these tests on the individual orders.    Comprehensive Metabolic Panel [[id number]]  (Abnormal) Collected: 03/24/25 0955    Specimen: Blood Updated: 03/24/25 1035     Glucose 120 mg/dL      BUN 16 mg/dL      Creatinine 0.73 mg/dL      Sodium 144 mmol/L      Potassium 3.6 mmol/L      Comment: Specimen hemolyzed.  Result may be falsely elevated.        Chloride 101 mmol/L      CO2 29.3 mmol/L      Calcium 9.8 mg/dL      Total Protein 7.8 g/dL      Albumin 4.2 g/dL      ALT (SGPT) 15 U/L      AST (SGOT) 24 U/L      Comment: Specimen hemolyzed.  Result may be falsely elevated.        Alkaline Phosphatase 84 U/L      Total Bilirubin 0.2 mg/dL      Globulin 3.6 gm/dL      A/G Ratio 1.2 g/dL      BUN/Creatinine Ratio 21.9     Anion Gap 13.7 mmol/L      eGFR 102.9 mL/min/1.73      Narrative:      GFR Categories in Chronic Kidney Disease (CKD)      GFR Category          GFR (mL/min/1.73)    Interpretation  G1                     90 or greater         Normal or high (1)  G2                      60-89                Mild decrease (1)  G3a                   45-59                Mild to moderate decrease  G3b                   30-44                Moderate to severe decrease  G4                    15-29                Severe decrease  G5                    14 or less           Kidney failure          (1)In the absence of evidence of kidney disease, neither GFR category G1 or G2 fulfill the criteria for CKD.    eGFR calculation 2021 CKD-EPI creatinine equation, which does not include race as a factor    Lipase [[id number]]  (Abnormal) Collected: 03/24/25 0955    Specimen: Blood Updated: 03/24/25 1024     Lipase 68 U/L     CBC Auto Differential [[id number]]  (Abnormal) Collected: 03/24/25 0955    Specimen: Blood Updated: 03/24/25 1002     WBC 6.83 10*3/mm3      RBC 4.97 10*6/mm3      Hemoglobin 12.5 g/dL      Hematocrit 39.0 %      MCV 78.5 fL      MCH 25.2 pg      MCHC 32.1 g/dL      RDW 16.3 %      RDW-SD 46.9 fl      MPV 10.5 fL      Platelets 309 10*3/mm3      Neutrophil % 43.0 %      Lymphocyte % 51.2 %      Monocyte % 4.4 %      Eosinophil % 1.2 %      Basophil % 0.1 %      Immature Grans % 0.1 %      Neutrophils, Absolute 2.93 10*3/mm3      Lymphocytes, Absolute 3.50 10*3/mm3      Monocytes, Absolute 0.30 10*3/mm3      Eosinophils, Absolute 0.08 10*3/mm3      Basophils, Absolute 0.01 10*3/mm3      Immature Grans, Absolute 0.01 10*3/mm3     Lactic Acid, Plasma [[id number]]  (Normal) Collected: 03/24/25 1311    Specimen: Blood Updated: 03/24/25 1336     Lactate 1.7 mmol/L     Urinalysis With Microscopic If Indicated (No Culture) - Urine, Clean Catch [[id number]] Collected: 03/24/25 1329    Specimen: Urine, Clean Catch Updated: 03/24/25 1333             CT Abdomen Pelvis With Contrast  Result Date:  3/24/2025  CT ABDOMEN PELVIS W CONTRAST Date of Exam: 3/24/2025 11:53 AM EDT Indication: periumbilical pain, hx of hysterectomy. Comparison: 3/17/2025 Technique: Axial CT images were obtained of the abdomen and pelvis following the uneventful intravenous administration of 85 cc Isovue-300. Reconstructed coronal and sagittal images were also obtained. Automated exposure control and iterative construction methods were used. Findings: LUNG BASES:  Unremarkable without mass or infiltrate. LIVER:  Unremarkable parenchyma without focal lesion. BILIARY/GALLBLADDER:  Unremarkable SPLEEN:  Unremarkable PANCREAS:  Unremarkable ADRENAL:  Unremarkable KIDNEYS:  Unremarkable parenchyma with no solid mass identified. No obstruction.  No calculus identified. GASTROINTESTINAL/MESENTERY:  No evidence of obstruction nor inflammation.  The appendix is normal. MESENTERIC VESSELS:  Patent. AORTA/IVC:  Normal caliber. RETROPERITONEUM/LYMPH NODES:  Unremarkable REPRODUCTIVE: The uterus is surgically absent. There is free fluid in the pelvis, frequent normal finding in females which may be related to recent ovarian follicle/cyst rupture if ovaries are still present. BLADDER:  Unremarkable OSSEUS STRUCTURES:  Typical for age with no acute process identified. There is a fat-containing umbilical hernia with defect in the abdominal wall measuring 2 cm in diameter. There is minimal increased density of the herniated fat which may represent fat infarction/inflammation.     Impression: Impression: 1.Nonspecific free fluid in the pelvis, a frequent normal finding in females which may be related recent ovarian follicle/cyst rupture. No specific etiology identified. 2.Fat-containing umbilical hernia with minimal increased density of the herniated fat can be seen with fat infarction/inflammation. Electronically Signed: Bob Lemus MD  3/24/2025 12:22 PM EDT  Workstation ID: [id number]         [facility]     Amount and/or Complexity of Data  Reviewed  Clinical lab tests: reviewed        Initial impression of presenting illness: This is a 44 year old female who presents with complaints of periumbilical pain.  Patient found to have a fat-containing hernia with surrounding fat stranding consistent with possible incarceration of the fat.  Patient had normal white blood cell count.  She had pain managed well with morphine.  I was able to reduce this hernia.  Patient will be discharged home with instructions to follow-up with general surgery.    DDX: includes but is not limited to: Incarcerated hernia, periumbilical hernia, appendicitis.    [place]: [id number]    Medications   Sodium Chloride (PF) 0.9 % 10 mL (has no administration in time range)   morphine injection 4 mg (4 mg Intravenous Given 3/24/25 1011)   ondansetron (ZOFRAN) injection 4 mg (4 mg Intravenous Given 3/24/25 1011)   iopamidol (ISOVUE-300) 61 % injection 100 mL (85 mL Intravenous Given 3/24/25 1200)   morphine injection 4 mg (4 mg Intravenous Given 3/24/25 1311)     Data interpreted: Nursing notes reviewed, vital signs reviewed.  Labs independently interpreted by me (CBC, CMP, lipase, UA, troponin, ABG, lactic acid, procalcitonin).  Imaging independently interpreted by me (x-ray, CT scan).  EKG independently interpreted by me.  O2 saturation: 95%    Counseling: Discussed the results above with the patient regarding need for admission or discharge.  Patient understands and agrees plan of care.      -----  ED Disposition       ED Disposition   Discharge    Condition   Stable    Comment   --             Final diagnoses:   Umbilical hernia without obstruction and without gangrene      Your Follow-Up Providers       Otilio aGrcia MD.    Specialty: General Surgery  [address]  [facility] [address]  [phone]               Rebekah Fagan, APRN.    Specialty: Internal Medicine  Follow up details: As needed, If symptoms worsen  [address] [facility]  [address]  [phone]               Go to  [facility] EMERG[facility] DEPART[facility] [place].    Specialty: Emergency Medicine  Follow up details: As needed, If symptoms worsen  [address]  [facility] [address] [phone]                     Contact information for after-discharge care    Follow-up information has not been specified.                    Your medication list        START taking these medications        Instructions Last Dose Given Next Dose Due   HYDROcodone-acetaminophen 5-325 MG per tablet  Commonly known as: NORCO      Take 1 tablet by mouth Every 6 (Six) Hours As Needed for Moderate Pain or Severe Pain for up to 3 days.       naloxone 4 MG/0.1ML nasal spray  Commonly known as: NARCAN      Call 911. Don't prime. [place] in 1 nostril for overdose. Repeat in 2-3 minutes in other nostril if no or minimal breathing/responsiveness.              CONTINUE taking these medications        Instructions Last Dose Given Next Dose Due   acetaminophen 500 MG tablet  Commonly known as: TYLENOL      Take 1 tablet by mouth Every 4 (Four) Hours As Needed for Mild Pain or Fever.       albuterol sulfate  (90 Base) MCG/ACT inhaler  Commonly known as: PROVENTIL HFA;VENTOLIN HFA;PROAIR HFA      Inhale 2 puffs Every 4 (Four) Hours As Needed for Wheezing.       amLODIPine 5 MG tablet  Commonly known as: NORVASC      Take 1 tablet by mouth Daily.       Azelastine HCl 137 MCG/SPRAY solution      1 spray by Each Nare route 2 (Two) Times a Day.       CENTRUM ADULT PO      Take  by mouth.       diclofenac 75 MG EC tablet  Commonly known as: VOLTAREN      Take 1 tablet by mouth 2 (Two) Times a Day.       famotidine 20 MG tablet  Commonly known as: PEPCID      Take 1 tablet by mouth 2 (Two) Times a Day.       lamoTRIgine 200 MG tablet  Commonly known as: LaMICtal      Take 1 tablet by mouth Daily.       levocetirizine 5 MG tablet  Commonly known as: XYZAL      Take 1 tablet by mouth Every  Evening.       nystatin-triamcinolone 778763-7.1 UNIT/GM-% ointment  Commonly known as: MYCOLOG      Apply 1 Application topically to the appropriate area as directed 2 (Two) Times a Day.       ondansetron ODT 4 MG disintegrating tablet  Commonly known as: ZOFRAN-ODT      Take 1 tablet by mouth 4 (Four) Times a Day As Needed for Nausea or Vomiting.       oxyCODONE-acetaminophen 5-325 MG per tablet  Commonly known as: PERCOCET      Take 1 tablet by mouth Every 6 (Six) Hours As Needed for Severe Pain.       promethazine-dextromethorphan 6.25-15 MG/5ML syrup  Commonly known as: PROMETHAZINE-DM      Take 5 mL by mouth 4 (Four) Times a Day As Needed for Cough.                 Where to Get Your Medications        These medications were sent to [facility] [id number] - [place], [place] - [address] - [phone]  - [phone]   [address], [facility] [address]      Phone: [phone]   HYDROcodone-acetaminophen 5-325 MG per tablet  naloxone 4 MG/0.1ML nasal spray

## 2025-03-24 NOTE — DISCHARGE INSTRUCTIONS
Being discharged home with 1 medication.  Take this medication as prescribed.  Return to the emergency department for any worsening symptoms.  Please follow-up with general surgery in the outpatient setting

## 2025-03-26 ENCOUNTER — OFFICE VISIT (OUTPATIENT)
Dept: INTERNAL MEDICINE | Facility: CLINIC | Age: 45
End: 2025-03-26
Payer: MEDICAID

## 2025-03-26 VITALS
SYSTOLIC BLOOD PRESSURE: 136 MMHG | WEIGHT: 182 LBS | BODY MASS INDEX: 36.76 KG/M2 | OXYGEN SATURATION: 97 % | HEART RATE: 94 BPM | TEMPERATURE: 97.4 F | DIASTOLIC BLOOD PRESSURE: 84 MMHG

## 2025-03-26 DIAGNOSIS — R10.2 VAGINAL PAIN: ICD-10-CM

## 2025-03-26 DIAGNOSIS — G89.29 CHRONIC RIGHT-SIDED LOW BACK PAIN WITHOUT SCIATICA: ICD-10-CM

## 2025-03-26 DIAGNOSIS — M54.50 CHRONIC RIGHT-SIDED LOW BACK PAIN WITHOUT SCIATICA: ICD-10-CM

## 2025-03-26 DIAGNOSIS — Z09 HOSPITAL DISCHARGE FOLLOW-UP: Primary | ICD-10-CM

## 2025-03-26 LAB
BILIRUB BLD-MCNC: NEGATIVE MG/DL
CLARITY, POC: ABNORMAL
COLOR UR: ABNORMAL
EXPIRATION DATE: ABNORMAL
GLUCOSE UR STRIP-MCNC: NEGATIVE MG/DL
KETONES UR QL: NEGATIVE
LEUKOCYTE EST, POC: NEGATIVE
Lab: ABNORMAL
NITRITE UR-MCNC: NEGATIVE MG/ML
PH UR: 6 [PH] (ref 5–8)
PROT UR STRIP-MCNC: ABNORMAL MG/DL
RBC # UR STRIP: NEGATIVE /UL
SP GR UR: 1.03 (ref 1–1.03)
UROBILINOGEN UR QL: ABNORMAL

## 2025-03-26 PROCEDURE — 87801 DETECT AGNT MULT DNA AMPLI: CPT | Performed by: NURSE PRACTITIONER

## 2025-03-26 PROCEDURE — 87086 URINE CULTURE/COLONY COUNT: CPT | Performed by: NURSE PRACTITIONER

## 2025-03-26 PROCEDURE — 87798 DETECT AGENT NOS DNA AMP: CPT | Performed by: NURSE PRACTITIONER

## 2025-03-26 PROCEDURE — 87661 TRICHOMONAS VAGINALIS AMPLIF: CPT | Performed by: NURSE PRACTITIONER

## 2025-03-26 PROCEDURE — 87491 CHLMYD TRACH DNA AMP PROBE: CPT | Performed by: NURSE PRACTITIONER

## 2025-03-26 PROCEDURE — 87591 N.GONORRHOEAE DNA AMP PROB: CPT | Performed by: NURSE PRACTITIONER

## 2025-03-26 RX ORDER — DEXAMETHASONE SODIUM PHOSPHATE 10 MG/ML
10 INJECTION, SOLUTION INTRA-ARTICULAR; INTRALESIONAL; INTRAMUSCULAR; INTRAVENOUS; SOFT TISSUE ONCE
Status: COMPLETED | OUTPATIENT
Start: 2025-03-26 | End: 2025-03-26

## 2025-03-26 RX ADMIN — DEXAMETHASONE SODIUM PHOSPHATE 10 MG: 10 INJECTION, SOLUTION INTRA-ARTICULAR; INTRALESIONAL; INTRAMUSCULAR; INTRAVENOUS; SOFT TISSUE at 15:22

## 2025-03-26 NOTE — PROGRESS NOTES
Office Note     Name: Bernardo Dodd    : 1980     MRN: [id number]     Chief Complaint  Hospital Follow Up Visit (Patient reports today for a hospital follow up. Patient states that she is still having pain in her vagina and belly button area. she rated her pain at an 8 today. )    Subjective     History of Present Illness:  Bernardo Dodd is a 44 y.o. female who presents today for a hospital discharge follow-up.    History of Present Illness  The patient is a 44-year-old female who presents today for a hospital discharge follow-up.    She sought emergency care at [facility] ER on 2025 due to right lower back pain, which has since shown slight improvement. A CT scan of the abdomen and pelvis revealed a nonobstructing kidney stone on the left side, which is not the cause of her pain. She also reports a history of sepsis in 2018, which was attributed to an obstructing kidney stone. She was hospitalized for 2 weeks in 2018 due to 2 kidney stones and E. coli infection.    She returned to the ER on 2025, where a repeat CT scan with contrast was performed. 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 She has been referred to a general surgeon but has not yet received any communication from them. She reports no unusual discharge, drainage, fevers, chills, or bleeding. She does report brown vaginal discharge on the pad. She was cleared by Dr. Smith to resume normal sexual activities as of 2025. She is scheduled to see him again in 2025 unless she experiences hot flashes.  Did recently have sexual intercourse with her partner.    No further complaints or concerns at this time.    SOCIAL HISTORY  She admits to smoking.      Past Medical History:   Diagnosis Date    Antisocial personality disorder     Bipolar disorder     Chlamydia     GERD (gastroesophageal  reflux disease) 2015    Gonorrhea 2000    Herpes simplex     History of methamphetamine abuse 2020    Clean ~     Hypertension 2016    Kidney stones 2018    Seizures 2012    Stopped ~     Shingles on back 2017    Substance abuse - DOC was cocaine 1998    Sober of cocaine since ~     Trichomonas infection        Past Surgical History:   Procedure Laterality Date    BARTHOLIN GLAND CYST EXCISION Left 2018     SECTION WITH TUBAL  2010    CYSTOSCOPY, URETEROSCOPY, RETROGRADE PYELOGRAM, STONE EXTRACTION, STENT INSERTION Bilateral 2023    Procedure: CYSTOSCOPY, BILATERAL RETROGRADE PYELOGRAM, URETEROSCOPY, AND STENT PLACEMENT;  Surgeon: Micah Child MD;  Location:  [facility];  Service: Urology;  Laterality: Bilateral;    CYSTOSCOPY, URETEROSCOPY, RETROGRADE PYELOGRAM, STONE EXTRACTION, STENT INSERTION Left 2023    Procedure: CYSTOSCOPY URETEROSCOPY RETROGRADE PYELOGRAM STONE EXTRACTION STENT INSERTION;  Surgeon: Micah Child MD;  Location:  [facility];  Service: Urology;  Laterality: Left;    I & D / EXCISION MARSUPIALIZATION BARTHOLIN'S GLAND CYST / LYSIS LESIONS Left     ORIF ANKLE FRACTURE Right     REDUCTION MAMMAPLASTY Bilateral     TENSION FREE VAGINAL TAPING WITH MINI ARC SLING  2018    Dr Doyle avery     TOTAL LAPAROSCOPIC HYSTERECTOMY SALPINGO OOPHORECTOMY Bilateral 2024    [facility] - benign; Surgeon: Edmar Smith MD;       Social History     Socioeconomic History    Marital status: Single    Number of children: 2    Highest education level: Some college, no degree   Tobacco Use    Smoking status: Former     Current packs/day: 0.00     Average packs/day: 1 pack/day for 22.0 years (22.0 ttl pk-yrs)     Types: Cigarettes     Start date:      Quit date: 2016     Years since quittin.2     Passive exposure: Past    Smokeless tobacco: Never   Vaping Use    Vaping status: Some Days    Substances: THC, Flavoring    Devices: Disposable     Passive vaping exposure: Yes   Substance and Sexual Activity    Alcohol use: No    Drug use: Yes     Types: Marijuana     Comment: Marijuana once a week. Tried cocaine, meth, pain pills in the past    Sexual activity: Defer     Partners: Male     Birth control/protection: Surgical         Current Outpatient Medications:     acetaminophen (TYLENOL) 500 MG tablet, Take 1 tablet by mouth Every 4 (Four) Hours As Needed for Mild Pain or Fever., Disp: 20 tablet, Rfl: 0    albuterol sulfate  (90 Base) MCG/ACT inhaler, Inhale 2 puffs Every 4 (Four) Hours As Needed for Wheezing., Disp: 18 g, Rfl: 2    amLODIPine (NORVASC) 5 MG tablet, Take 1 tablet by mouth Daily., Disp: 90 tablet, Rfl: 0    Azelastine HCl 137 MCG/SPRAY solution, 1 spray by Each Nare route 2 (Two) Times a Day., Disp: 30 mL, Rfl: 11    diclofenac (VOLTAREN) 75 MG EC tablet, Take 1 tablet by mouth 2 (Two) Times a Day., Disp: 60 tablet, Rfl: 2    famotidine (PEPCID) 20 MG tablet, Take 1 tablet by mouth 2 (Two) Times a Day., Disp: 60 tablet, Rfl: 11    lamoTRIgine (LaMICtal) 200 MG tablet, Take 1 tablet by mouth Daily., Disp: 30 tablet, Rfl: 11    levocetirizine (XYZAL) 5 MG tablet, Take 1 tablet by mouth Every Evening., Disp: 30 tablet, Rfl: 11    Multiple Vitamins-Minerals (CENTRUM ADULT PO), Take  by mouth., Disp: , Rfl:     naloxone (NARCAN) 4 MG/0.1ML nasal spray, Call 911. Don't prime. [place] in 1 nostril for overdose. Repeat in 2-3 minutes in other nostril if no or minimal breathing/responsiveness., Disp: 2 each, Rfl: 0    nystatin-triamcinolone (MYCOLOG) 159903-0.1 UNIT/GM-% ointment, Apply 1 Application topically to the appropriate area as directed 2 (Two) Times a Day., Disp: 15 g, Rfl: 3    ondansetron ODT (ZOFRAN-ODT) 4 MG disintegrating tablet, Take 1 tablet by mouth 4 (Four) Times a Day As Needed for Nausea or Vomiting., Disp: 15 tablet, Rfl: 0    promethazine-dextromethorphan (PROMETHAZINE-DM) 6.25-15 MG/5ML syrup, Take 5 mL by mouth 4 (Four)  "Times a Day As Needed for Cough., Disp: 200 mL, Rfl: 0    fluconazole (Diflucan) 150 MG tablet, Take 1 tablet by mouth Every 72 (Seventy-Two) Hours., Disp: 2 tablet, Rfl: 0    Objective     Vital Signs  /84   Pulse 94   Temp 97.4 °F (36.3 °C)   Wt 82.6 kg (182 lb)   SpO2 97%   BMI 36.76 kg/m²   Estimated body mass index is 36.76 kg/m² as calculated from the following:    Height as of 3/24/25: 149.9 cm (59\").    Weight as of this encounter: 82.6 kg (182 lb).           Physical Exam  Constitutional:       General: She is not in acute distress.     Appearance: Normal appearance. She is not ill-appearing.   HENT:      Head: Normocephalic and atraumatic.      Nose: Nose normal.   Eyes:      Extraocular Movements: Extraocular movements intact.      Conjunctiva/sclera: Conjunctivae normal.      Pupils: Pupils are equal, round, and reactive to light.   Cardiovascular:      Rate and Rhythm: Normal rate and regular rhythm.      Heart sounds: Normal heart sounds.   Pulmonary:      Effort: Pulmonary effort is normal. No respiratory distress.      Breath sounds: Normal breath sounds.   Abdominal:      General: Bowel sounds are normal. There is no distension.      Palpations: Abdomen is soft. There is no mass.      Tenderness: There is abdominal tenderness. There is no right CVA tenderness, left CVA tenderness, guarding or rebound.      Hernia: No hernia is present.   Musculoskeletal:         General: Normal range of motion.      Cervical back: Neck supple.   Skin:     General: Skin is warm and dry.   Neurological:      General: No focal deficit present.      Mental Status: She is alert and oriented to person, place, and time. Mental status is at baseline.   Psychiatric:         Mood and Affect: Mood normal.         Behavior: Behavior normal.         Thought Content: Thought content normal.         Judgment: Judgment normal.          Assessment and Plan     Diagnoses and all orders for this visit:    1. Hospital " discharge follow-up (Primary)    2. Vaginal pain  -     POCT urinalysis dipstick, automated  -     NuSwab VG+ - Swab, Vagina; Future  -     Urine Culture - Urine, Urine, Clean Catch; Future  -     Urine Culture - Urine, Urine, Clean Catch  -     NuSwab VG+ - Swab, Vagina  -     dexAMETHasone (DECADRON) injection 10 mg    3. Chronic right-sided low back pain without sciatica  -     dexAMETHasone (DECADRON) injection 10 mg        Assessment & Plan  1. Post-hospitalization follow-up.  Her condition necessitates further evaluation. A urine test and a vaginal swab will be conducted today. An appointment with Dr. Smith will be scheduled for a follow-up assessment.    2. Right lower back pain.  The pain has shown some improvement since the initial ER visit on 03/17/2025. The CT scan revealed moderate disk degeneration at L5-S1, which could be contributing to her symptoms. She missed her pain clinic appointment due to the ER visit. She is advised to reschedule her pain clinic appointment for further management.    3. Left kidney stone.  A nonobstructing kidney stone on the left side was identified, but it is not considered the cause of her pain. No immediate intervention is required for the kidney stone.    4. Small fat-containing umbilical hernia.  The hernia has been present since at least 2017 and remains classified as small, approximately 2 cm. Given its stability over the past 7-8 years, immediate surgical intervention is not deemed necessary. She is advised to monitor for any changes in size or symptoms and avoid activities that may exacerbate the hernia. If symptoms worsen, a referral to a general surgeon will be considered.      Follow Up  No follow-ups on file.    Patient or patient representative verbalized consent for the use of Ambient Listening during the visit with  SHANE Ang for chart documentation. 4/7/2025  15:12 EDT      SHANE Ang    Part of this note may be an electronic  transcription/translation of spoken language to printed text using the Dragon Dictation System.

## 2025-03-27 ENCOUNTER — TELEPHONE (OUTPATIENT)
Dept: OBSTETRICS AND GYNECOLOGY | Facility: CLINIC | Age: 45
End: 2025-03-27

## 2025-03-27 NOTE — TELEPHONE ENCOUNTER
"Provider: DR GRIFFITHS    Caller: Bernardo Dodd \"Gricel\"    Relationship to Patient: Self    Phone Number: [phone]    Reason for Call: PT WENT TO  ED ON 03/24 FOR UMBILICAL HERNIA; PT HAD FU APPT TODAY WITH HER PCP; THEY SUGGESTED FOR PT TO BE SEEN BY DR GRIFFITHS SINCE PT IS HAVING VAGINAL PAIN.    PLEASE CALL PT TO SCHEDULE OR ADVISE. PT STATED TO [employer] OR [employer] MESSAGE AND SHE WILL GET BACK TO US.  "

## 2025-03-27 NOTE — TELEPHONE ENCOUNTER
Pt reports that she has had vaginal pain since having intercourse Monday and it started hurting immediately since then. She had pain in her mid abdomen to her pelvis. She is no longer having this pain. She was given an injection and an inflammatory shot. She is not taking the hydrocodone she was prescribed and is only taking tylenol at this time. Had pink discharge immediately after intercourse.     Ok to schedule an appointment for pt?

## 2025-03-28 LAB — BACTERIA SPEC AEROBE CULT: NO GROWTH

## 2025-03-28 NOTE — TELEPHONE ENCOUNTER
Happy to see her if she is continuing to have pain.  If the pain is resolved I do not think we need to see her specifically for this.

## 2025-03-29 LAB
A VAGINAE DNA VAG QL NAA+PROBE: ABNORMAL SCORE
BVAB2 DNA VAG QL NAA+PROBE: ABNORMAL SCORE
C ALBICANS DNA VAG QL NAA+PROBE: NEGATIVE
C GLABRATA DNA VAG QL NAA+PROBE: POSITIVE
C TRACH DNA SPEC QL NAA+PROBE: NEGATIVE
MEGA1 DNA VAG QL NAA+PROBE: ABNORMAL SCORE
N GONORRHOEA DNA VAG QL NAA+PROBE: NEGATIVE
T VAGINALIS DNA VAG QL NAA+PROBE: NEGATIVE

## 2025-03-31 ENCOUNTER — OFFICE VISIT (OUTPATIENT)
Dept: OBSTETRICS AND GYNECOLOGY | Facility: CLINIC | Age: 45
End: 2025-03-31
Payer: MEDICAID

## 2025-03-31 VITALS
BODY MASS INDEX: 34.34 KG/M2 | WEIGHT: 170 LBS | RESPIRATION RATE: 14 BRPM | DIASTOLIC BLOOD PRESSURE: 80 MMHG | SYSTOLIC BLOOD PRESSURE: 136 MMHG

## 2025-03-31 DIAGNOSIS — N94.10 FEMALE DYSPAREUNIA: Primary | ICD-10-CM

## 2025-03-31 PROCEDURE — 99213 OFFICE O/P EST LOW 20 MIN: CPT | Performed by: OBSTETRICS & GYNECOLOGY

## 2025-03-31 PROCEDURE — 3079F DIAST BP 80-89 MM HG: CPT | Performed by: OBSTETRICS & GYNECOLOGY

## 2025-03-31 PROCEDURE — 3075F SYST BP GE 130 - 139MM HG: CPT | Performed by: OBSTETRICS & GYNECOLOGY

## 2025-03-31 NOTE — PROGRESS NOTES
Subjective   Chief Complaint   Patient presents with    Vaginal Pain       Bernardo Dodd is a 44 y.o. year old .  Patient's last menstrual period was 09/10/2024 (approximate).  She comes today to have things checked out.  About a week ago she was having intercourse and had significant pain.  There was no bleeding.  Pain was pretty intense.  She feels better now.    Has noticed more symptoms of hot and cold but it is still bearable.    The following portions of the patient's history were reviewed and updated as appropriate:current medications and allergies    Social History    Tobacco Use      Smoking status: Former        Packs/day: 0.00        Years: 1 pack/day for 22.0 years (22.0 ttl pk-yrs)        Types: Cigarettes        Start date:         Quit date:         Years since quittin.2        Passive exposure: Past      Smokeless tobacco: Never         Objective   /80   Resp 14   Wt 77.1 kg (170 lb)   LMP 09/10/2024 (Approximate)   BMI 34.34 kg/m²     Lab Review   No data reviewed    Imaging   No data reviewed        Assessment   History of dyspareunia, resolved.  Exam is otherwise normal.   Vasomotor symptoms not yet impacting day-to-day function.     Plan   Unless pain persist I would not necessarily think anything further needs to be done in terms of imaging  I have reviewed techniques of sensate focus  Also anticipate a little additional vaginal lubricants would help  The importance of keeping all planned follow-up and taking all medications as prescribed was emphasized.  Follow up PRN            This note was electronically signed.    Edmar Smith M.D.  2025        Part of this note may be an electronic transcription/translation of spoken language to printed text using the Dragon Dictation System.

## 2025-04-02 DIAGNOSIS — R10.2 VAGINAL PAIN: Primary | ICD-10-CM

## 2025-04-02 DIAGNOSIS — B37.31 VAGINAL CANDIDIASIS: ICD-10-CM

## 2025-04-02 RX ORDER — FLUCONAZOLE 150 MG/1
150 TABLET ORAL
Qty: 2 TABLET | Refills: 0 | Status: SHIPPED | OUTPATIENT
Start: 2025-04-02

## 2025-04-09 ENCOUNTER — OFFICE VISIT (OUTPATIENT)
Dept: INTERNAL MEDICINE | Facility: CLINIC | Age: 45
End: 2025-04-09
Payer: MEDICAID

## 2025-04-09 VITALS
DIASTOLIC BLOOD PRESSURE: 74 MMHG | OXYGEN SATURATION: 94 % | WEIGHT: 179.8 LBS | HEIGHT: 59 IN | HEART RATE: 78 BPM | BODY MASS INDEX: 36.25 KG/M2 | SYSTOLIC BLOOD PRESSURE: 132 MMHG | TEMPERATURE: 97.3 F

## 2025-04-09 DIAGNOSIS — G89.29 CHRONIC MIDLINE LOW BACK PAIN WITHOUT SCIATICA: ICD-10-CM

## 2025-04-09 DIAGNOSIS — R05.2 SUBACUTE COUGH: Primary | ICD-10-CM

## 2025-04-09 DIAGNOSIS — M54.50 CHRONIC MIDLINE LOW BACK PAIN WITHOUT SCIATICA: ICD-10-CM

## 2025-04-09 DIAGNOSIS — R10.13 DYSPEPSIA: ICD-10-CM

## 2025-04-09 DIAGNOSIS — K21.9 GASTROESOPHAGEAL REFLUX DISEASE WITHOUT ESOPHAGITIS: ICD-10-CM

## 2025-04-09 DIAGNOSIS — J02.9 SORE THROAT: ICD-10-CM

## 2025-04-09 RX ORDER — DEXAMETHASONE SODIUM PHOSPHATE 10 MG/ML
10 INJECTION, SOLUTION INTRA-ARTICULAR; INTRALESIONAL; INTRAMUSCULAR; INTRAVENOUS; SOFT TISSUE ONCE
Status: COMPLETED | OUTPATIENT
Start: 2025-04-09 | End: 2025-04-09

## 2025-04-09 RX ORDER — DEXTROMETHORPHAN HYDROBROMIDE AND PROMETHAZINE HYDROCHLORIDE 15; 6.25 MG/5ML; MG/5ML
5 SYRUP ORAL 4 TIMES DAILY PRN
Qty: 200 ML | Refills: 0 | Status: SHIPPED | OUTPATIENT
Start: 2025-04-09

## 2025-04-09 RX ORDER — PREDNISONE 20 MG/1
20 TABLET ORAL DAILY
Qty: 5 TABLET | Refills: 0 | Status: SHIPPED | OUTPATIENT
Start: 2025-04-09 | End: 2025-04-14

## 2025-04-09 RX ORDER — FAMOTIDINE 20 MG/1
20 TABLET, FILM COATED ORAL 2 TIMES DAILY
Qty: 60 TABLET | Refills: 11 | Status: SHIPPED | OUTPATIENT
Start: 2025-04-09

## 2025-04-09 RX ADMIN — DEXAMETHASONE SODIUM PHOSPHATE 10 MG: 10 INJECTION, SOLUTION INTRA-ARTICULAR; INTRALESIONAL; INTRAMUSCULAR; INTRAVENOUS; SOFT TISSUE at 13:01

## 2025-04-09 NOTE — PROGRESS NOTES
Office Note     Name: Bernardo Dodd    : 1980     MRN: [id number]     Chief Complaint  Sore Throat, Generalized Body Aches (Started Friday), Fatigue, and Cough    Subjective     History of Present Illness:  Bernardo Dodd is a 44 y.o. female who presents today for an acute visit with complaints of body aches and sore throat for five days. Patient also reports a dry cough, headache, nasal congestion, diaphoresis, and left ear pressure. Patient denies fever, rhinorrhea, and GI symptoms. 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 She also reports diaphoresis could be a result of recent hysterectomy. She has taken hydrocodone and tylenol for pain with some relief. She is taking promethazine DM at night for cough and says it does help her sleep. She is also taking Xyzal and using antihistamine nasal spray everyday.       Past Medical History:   Diagnosis Date    Antisocial personality disorder     Bipolar disorder     Chlamydia     GERD (gastroesophageal reflux disease)     Gonorrhea     Herpes simplex     History of methamphetamine abuse 2020    Clean ~     Hypertension 2016    Kidney stones 2018    Seizures 2012    Stopped ~     Shingles on back 2017    Substance abuse - DOC was cocaine     Sober of cocaine since ~     Trichomonas infection        Past Surgical History:   Procedure Laterality Date    BARTHOLIN GLAND CYST EXCISION Left 2018     SECTION WITH TUBAL  2010    CYSTOSCOPY, URETEROSCOPY, RETROGRADE PYELOGRAM, STONE EXTRACTION, STENT INSERTION Bilateral 2023    Procedure: CYSTOSCOPY, BILATERAL RETROGRADE PYELOGRAM, URETEROSCOPY, AND STENT PLACEMENT;  Surgeon: Micah Child MD;  Location: [facility];  Service: Urology;  Laterality: Bilateral;    CYSTOSCOPY,  URETEROSCOPY, RETROGRADE PYELOGRAM, STONE EXTRACTION, STENT INSERTION Left 2023    Procedure: CYSTOSCOPY URETEROSCOPY RETROGRADE PYELOGRAM STONE EXTRACTION STENT INSERTION;  Surgeon: Micah Child MD;  Location: [facility];  Service: Urology;  Laterality: Left;    I & D / EXCISION MARSUPIALIZATION BARTHOLIN'S GLAND CYST / LYSIS LESIONS Left     ORIF ANKLE FRACTURE Right 2005    REDUCTION MAMMAPLASTY Bilateral     TENSION FREE VAGINAL TAPING WITH MINI ARC SLING  2018    Dr Doyle avery     TOTAL LAPAROSCOPIC HYSTERECTOMY SALPINGO OOPHORECTOMY Bilateral 2024    [facility] - benign; Surgeon: Edmar Smith MD;       Social History     Socioeconomic History    Marital status: Single    Number of children: 2    Highest education level: Some college, no degree   Tobacco Use    Smoking status: Former     Current packs/day: 0.00     Average packs/day: 1 pack/day for 22.0 years (22.0 ttl pk-yrs)     Types: Cigarettes     Start date:      Quit date:      Years since quittin.2     Passive exposure: Past    Smokeless tobacco: Never   Vaping Use    Vaping status: Some Days    Substances: THC, Flavoring    Devices: Disposable    Passive vaping exposure: Yes   Substance and Sexual Activity    Alcohol use: No    Drug use: Yes     Types: Marijuana     Comment: Marijuana once a week. Tried cocaine, meth, pain pills in the past    Sexual activity: Defer     Partners: Male     Birth control/protection: Surgical         Current Outpatient Medications:     acetaminophen (TYLENOL) 500 MG tablet, Take 1 tablet by mouth Every 4 (Four) Hours As Needed for Mild Pain or Fever., Disp: 20 tablet, Rfl: 0    albuterol sulfate  (90 Base) MCG/ACT inhaler, Inhale 2 puffs Every 4 (Four) Hours As Needed for Wheezing., Disp: 18 g, Rfl: 2    amLODIPine (NORVASC) 5 MG tablet, Take 1 tablet by mouth Daily., Disp: 90 tablet, Rfl: 0    Azelastine HCl 137 MCG/SPRAY solution, 1 spray by Each Nare route 2 (Two) Times  "a Day., Disp: 30 mL, Rfl: 11    famotidine (PEPCID) 20 MG tablet, Take 1 tablet by mouth 2 (Two) Times a Day., Disp: 60 tablet, Rfl: 11    lamoTRIgine (LaMICtal) 200 MG tablet, Take 1 tablet by mouth Daily., Disp: 30 tablet, Rfl: 11    levocetirizine (XYZAL) 5 MG tablet, Take 1 tablet by mouth Every Evening., Disp: 30 tablet, Rfl: 11    nystatin-triamcinolone (MYCOLOG) 983040-4.1 UNIT/GM-% ointment, Apply 1 Application topically to the appropriate area as directed 2 (Two) Times a Day., Disp: 15 g, Rfl: 3    ondansetron ODT (ZOFRAN-ODT) 4 MG disintegrating tablet, Take 1 tablet by mouth 4 (Four) Times a Day As Needed for Nausea or Vomiting., Disp: 15 tablet, Rfl: 0    promethazine-dextromethorphan (PROMETHAZINE-DM) 6.25-15 MG/5ML syrup, Take 5 mL by mouth 4 (Four) Times a Day As Needed for Cough., Disp: 200 mL, Rfl: 0    diclofenac (VOLTAREN) 75 MG EC tablet, Take 1 tablet by mouth 2 (Two) Times a Day. (Patient not taking: Reported on 4/9/2025), Disp: 60 tablet, Rfl: 2    fluconazole (Diflucan) 150 MG tablet, Take 1 tablet by mouth Every 72 (Seventy-Two) Hours. (Patient not taking: Reported on 4/9/2025), Disp: 2 tablet, Rfl: 0    Multiple Vitamins-Minerals (CENTRUM ADULT PO), Take  by mouth. (Patient not taking: Reported on 4/9/2025), Disp: , Rfl:     naloxone (NARCAN) 4 MG/0.1ML nasal spray, Call 911. Don't prime. [place] in 1 nostril for overdose. Repeat in 2-3 minutes in other nostril if no or minimal breathing/responsiveness. (Patient not taking: Reported on 4/9/2025), Disp: 2 each, Rfl: 0    Objective     Vital Signs  /74   Pulse 78   Temp 97.3 °F (36.3 °C) (Temporal)   Ht 149.9 cm (59.02\")   Wt 81.6 kg (179 lb 12.8 oz)   SpO2 94%   BMI 36.30 kg/m²   Estimated body mass index is 36.3 kg/m² as calculated from the following:    Height as of this encounter: 149.9 cm (59.02\").    Weight as of this encounter: 81.6 kg (179 lb 12.8 oz).           Physical Exam  Vitals and nursing note reviewed. "   Constitutional:       General: She is not in acute distress.     Appearance: Normal appearance. She is well-developed and well-groomed. She is not ill-appearing.   HENT:      Head: Normocephalic.      Right Ear: Hearing, ear canal and external ear normal. A middle ear effusion is present. Tympanic membrane is injected. Tympanic membrane is not erythematous or bulging.      Left Ear: Hearing, ear canal and external ear normal. A middle ear effusion is present. Tympanic membrane is injected. Tympanic membrane is not erythematous or bulging.      Nose: Nose normal.      Right Sinus: No maxillary sinus tenderness or frontal sinus tenderness.      Left Sinus: No maxillary sinus tenderness or frontal sinus tenderness.      Mouth/Throat:      Lips: Pink. No lesions.      Mouth: Mucous membranes are moist. No oral lesions.      Dentition: No gum lesions.      Tongue: No lesions.      Palate: No mass.      Pharynx: Posterior oropharyngeal erythema present.      Tonsils: No tonsillar exudate.   Cardiovascular:      Rate and Rhythm: Normal rate and regular rhythm.      Heart sounds: Normal heart sounds.   Pulmonary:      Effort: Pulmonary effort is normal.      Breath sounds: Normal breath sounds.   Lymphadenopathy:      Cervical: No cervical adenopathy.   Skin:     General: Skin is warm and dry.   Neurological:      General: No focal deficit present.      Mental Status: She is alert and oriented to person, place, and time.      Gait: Gait is intact.   Psychiatric:         Attention and Perception: Attention and perception normal.         Mood and Affect: Mood and affect normal.         Speech: Speech normal.         Behavior: Behavior normal. Behavior is cooperative.         Thought Content: Thought content normal.         Judgment: Judgment normal.          Assessment and Plan     Diagnoses and all orders for this visit:    1. Subacute cough (Primary)  -     predniSONE (DELTASONE) 20 MG tablet; Take 1 tablet by mouth Daily  for 5 days.  Dispense: 5 tablet; Refill: 0  -     promethazine-dextromethorphan (PROMETHAZINE-DM) 6.25-15 MG/5ML syrup; Take 5 mL by mouth 4 (Four) Times a Day As Needed for Cough.  Dispense: 200 mL; Refill: 0    2. Sore throat  -     POCT SARS-CoV-2 Antigen PRADEEP + Flu  -     POCT rapid strep A  -     dexAMETHasone (DECADRON) injection 10 mg    3. Chronic midline low back pain without sciatica  -     dexAMETHasone (DECADRON) injection 10 mg  -     predniSONE (DELTASONE) 20 MG tablet; Take 1 tablet by mouth Daily for 5 days.  Dispense: 5 tablet; Refill: 0    4. Dyspepsia  -     famotidine (PEPCID) 20 MG tablet; Take 1 tablet by mouth 2 (Two) Times a Day.  Dispense: 60 tablet; Refill: 11    5. Gastroesophageal reflux disease without esophagitis  -     famotidine (PEPCID) 20 MG tablet; Take 1 tablet by mouth 2 (Two) Times a Day.  Dispense: 60 tablet; Refill: 11    Plan:  Continue taking OTC symptom management medications as needed.  Take steroid prescription as directed to help with inflammation and discomfort.    Follow Up  Return if symptoms worsen or fail to improve.      SHANE Ang    Agree with assessment, documentation, and plan of care formulated by SHANE Tavares student.    Part of this note may be an electronic transcription/translation of spoken language to printed text using the Dragon Dictation System.

## 2025-04-16 DIAGNOSIS — I10 PRIMARY HYPERTENSION: ICD-10-CM

## 2025-04-16 RX ORDER — AMLODIPINE BESYLATE 5 MG/1
5 TABLET ORAL DAILY
Qty: 90 TABLET | Refills: 0 | Status: SHIPPED | OUTPATIENT
Start: 2025-04-16

## 2025-05-05 ENCOUNTER — OFFICE VISIT (OUTPATIENT)
Dept: ORTHOPEDIC SURGERY | Facility: CLINIC | Age: 45
End: 2025-05-05
Payer: MEDICAID

## 2025-05-05 VITALS
HEIGHT: 59 IN | WEIGHT: 179.9 LBS | DIASTOLIC BLOOD PRESSURE: 86 MMHG | SYSTOLIC BLOOD PRESSURE: 126 MMHG | BODY MASS INDEX: 36.27 KG/M2

## 2025-05-05 DIAGNOSIS — M25.561 RIGHT KNEE PAIN, UNSPECIFIED CHRONICITY: ICD-10-CM

## 2025-05-05 DIAGNOSIS — M25.561 PAIN IN BOTH KNEES, UNSPECIFIED CHRONICITY: Primary | ICD-10-CM

## 2025-05-05 DIAGNOSIS — M25.562 PAIN IN BOTH KNEES, UNSPECIFIED CHRONICITY: Primary | ICD-10-CM

## 2025-05-05 DIAGNOSIS — M76.51 PATELLAR TENDONITIS OF RIGHT KNEE: ICD-10-CM

## 2025-05-05 PROCEDURE — 3074F SYST BP LT 130 MM HG: CPT | Performed by: ORTHOPAEDIC SURGERY

## 2025-05-05 PROCEDURE — 99213 OFFICE O/P EST LOW 20 MIN: CPT | Performed by: ORTHOPAEDIC SURGERY

## 2025-05-05 PROCEDURE — 3079F DIAST BP 80-89 MM HG: CPT | Performed by: ORTHOPAEDIC SURGERY

## 2025-05-05 NOTE — PROGRESS NOTES
[facility] Note        Subjective     Pain and Initial Evaluation of the Right Knee and Pain and Initial Evaluation of the Left Knee      HPI    Bernardo Dodd is a 44 y.o. female.  She is new patient.  Right knee pain for a few years.  Worse over the past month.  No recent trauma or injury.  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  She has tried diclofenac.  She was seen in  for the left knee.  Her left knee does not hurt.    Past Medical History:   Diagnosis Date    Antisocial personality disorder     Anxiety     Asthma, extrinsic 1990    Bipolar disorder     CHF (congestive heart failure)     Chlamydia 2000    Endometriosis     GERD (gastroesophageal reflux disease)     Gonorrhea 2000    Herpes simplex     History of methamphetamine abuse     Clean ~     Hypertension 2016    Injury of back     Kidney stones     Lactose intolerance     I was little when my parents found out    Migraine     Ovarian cyst     Pituitary adenoma 2014    PMS (premenstrual syndrome)     Preeclampsia     Rheumatoid arthritis 1999    Seizures 2012    Stopped 2018    Shingles on back 2017    Shortness of breath [id number]    Born with asthma    Substance abuse - DOC was cocaine     Sober of cocaine since ~     Trauma     Trichomonas infection 2000    Urogenital trichomoniasis     Varicella     Vitamin D deficiency 2014      Past Surgical History:   Procedure Laterality Date    ABDOMINAL SURGERY      8/28/10    BARTHOLIN GLAND CYST EXCISION Left 2018    BREAST BIOPSY      BREAST CYST ASPIRATION  2017    Removed barfolin gland     SECTION  2010     SECTION WITH TUBAL  2010    COLONOSCOPY      CYSTOSCOPY, URETEROSCOPY, RETROGRADE PYELOGRAM, STONE EXTRACTION, STENT INSERTION Bilateral 2023    Procedure: CYSTOSCOPY, BILATERAL RETROGRADE PYELOGRAM, URETEROSCOPY, AND STENT PLACEMENT;  Surgeon:  Micah Child MD;  Location:  [facility];  Service: Urology;  Laterality: Bilateral;    CYSTOSCOPY, URETEROSCOPY, RETROGRADE PYELOGRAM, STONE EXTRACTION, STENT INSERTION Left 2023    Procedure: CYSTOSCOPY URETEROSCOPY RETROGRADE PYELOGRAM STONE EXTRACTION STENT INSERTION;  Surgeon: Micah Child MD;  Location:  [facility];  Service: Urology;  Laterality: Left;    I & D / EXCISION MARSUPIALIZATION BARTHOLIN'S GLAND CYST / LYSIS LESIONS Left     ORIF ANKLE FRACTURE Right     REDUCTION MAMMAPLASTY Bilateral     TENSION FREE VAGINAL TAPING WITH MINI ARC SLING  2018    Dr Doyle avery     TOTAL LAPAROSCOPIC HYSTERECTOMY SALPINGO OOPHORECTOMY Bilateral 2024    PATH - benign; Surgeon: Edmar Smith MD;      Family History   Problem Relation Age of Onset    Arthritis Father     Dementia Father     Hypertension Father     Arthritis Mother     Bipolar disorder Mother     Obesity Mother     Cancer Mother     Alcohol abuse Mother         She's recovered    Heart failure Paternal Grandmother     Pancreatic cancer Maternal Grandmother     Cancer Maternal Grandmother     Pancreatitis Maternal Grandmother          of cancer    MARCIE disease Paternal Aunt     Diabetes Paternal Uncle     Asthma Maternal Grandfather     Obesity Brother     Heart attack Neg Hx     Hyperlipidemia Neg Hx     Mental illness Neg Hx     Stroke Neg Hx     Breast cancer Neg Hx     Ovarian cancer Neg Hx      Social History     Socioeconomic History    Marital status: Single    Number of children: 2    Highest education level: Some college, no degree   Tobacco Use    Smoking status: Former     Current packs/day: 0.00     Average packs/day: 1 pack/day for 22.0 years (22.0 ttl pk-yrs)     Types: Cigarettes, Cigars     Start date:      Quit date: 2016     Years since quittin.3     Passive exposure: Past    Smokeless tobacco: Never    Tobacco comments:     I quit every so often. Then i start again.   Vaping Use    Vaping  status: Some Days    Substances: THC, Flavoring    Devices: Disposable    Passive vaping exposure: Yes   Substance and Sexual Activity    Alcohol use: No    Drug use: Not Currently     Types: Marijuana     Comment: Marijuana once a week. Tried cocaine, meth, pain pills in the past    Sexual activity: Yes     Partners: Male     Birth control/protection: Tubal ligation      Current Outpatient Medications on File Prior to Visit   Medication Sig Dispense Refill    acetaminophen (TYLENOL) 500 MG tablet Take 1 tablet by mouth Every 4 (Four) Hours As Needed for Mild Pain or Fever. 20 tablet 0    albuterol sulfate  (90 Base) MCG/ACT inhaler Inhale 2 puffs Every 4 (Four) Hours As Needed for Wheezing. 18 g 2    amLODIPine (NORVASC) 5 MG tablet TAKE 1 TABLET BY MOUTH DAILY 90 tablet 0    Azelastine HCl 137 MCG/SPRAY solution 1 spray by Each Nare route 2 (Two) Times a Day. 30 mL 11    diclofenac (VOLTAREN) 75 MG EC tablet Take 1 tablet by mouth 2 (Two) Times a Day. 60 tablet 2    famotidine (PEPCID) 20 MG tablet Take 1 tablet by mouth 2 (Two) Times a Day. 60 tablet 11    fluconazole (Diflucan) 150 MG tablet Take 1 tablet by mouth Every 72 (Seventy-Two) Hours. 2 tablet 0    lamoTRIgine (LaMICtal) 200 MG tablet Take 1 tablet by mouth Daily. 30 tablet 11    levocetirizine (XYZAL) 5 MG tablet Take 1 tablet by mouth Every Evening. 30 tablet 11    Multiple Vitamins-Minerals (CENTRUM ADULT PO) Take  by mouth.      naloxone (NARCAN) 4 MG/0.1ML nasal spray Call 911. Don't prime. [place] in 1 nostril for overdose. Repeat in 2-3 minutes in other nostril if no or minimal breathing/responsiveness. 2 each 0    nystatin-triamcinolone (MYCOLOG) 535604-5.1 UNIT/GM-% ointment Apply 1 Application topically to the appropriate area as directed 2 (Two) Times a Day. 15 g 3    ondansetron ODT (ZOFRAN-ODT) 4 MG disintegrating tablet Take 1 tablet by mouth 4 (Four) Times a Day As Needed for Nausea or Vomiting. 15 tablet 0     promethazine-dextromethorphan (PROMETHAZINE-DM) 6.25-15 MG/5ML syrup Take 5 mL by mouth 4 (Four) Times a Day As Needed for Cough. 200 mL 0     No current facility-administered medications on file prior to visit.      Allergies   Allergen Reactions    Naproxen Swelling    Sulfa Antibiotics Rash          Review of Systems   Constitutional:  Negative for activity change, appetite change, chills, diaphoresis, fatigue, fever and unexpected weight change.   HENT:  Negative for congestion, dental problem, drooling, ear discharge, ear pain, facial swelling, hearing loss, mouth sores, nosebleeds, postnasal drip, rhinorrhea, sinus pressure, sneezing, sore throat, tinnitus, trouble swallowing and voice change.    Eyes:  Negative for photophobia, pain, discharge, redness, itching and visual disturbance.   Respiratory:  Negative for apnea, cough, choking, chest tightness, shortness of breath, wheezing and stridor.    Cardiovascular:  Negative for chest pain, palpitations and leg swelling.   Gastrointestinal:  Negative for abdominal distention, abdominal pain, anal bleeding, blood in stool, constipation, diarrhea, nausea, rectal pain and vomiting.   Endocrine: Negative for cold intolerance, heat intolerance, polydipsia, polyphagia and polyuria.   Genitourinary:  Negative for decreased urine volume, difficulty urinating, dysuria, enuresis, flank pain, frequency, genital sores, hematuria and urgency.   Musculoskeletal:  Positive for arthralgias. Negative for back pain, gait problem, joint swelling, myalgias, neck pain and neck stiffness.   Skin:  Negative for color change, pallor, rash and wound.   Allergic/Immunologic: Negative for environmental allergies, food allergies and immunocompromised state.   Neurological:  Negative for dizziness, tremors, seizures, syncope, facial asymmetry, speech difficulty, weakness, light-headedness, numbness and headaches.   Hematological:  Negative for adenopathy. Does not bruise/bleed easily.  "  Psychiatric/Behavioral:  Negative for agitation, behavioral problems, confusion, decreased concentration, dysphoric mood, hallucinations, self-injury, sleep disturbance and suicidal ideas. The patient is not nervous/anxious and is not hyperactive.         I reviewed the patient's chief complaint, history of present illness, review of systems, past medical history, surgical history, family history, social history, medications and allergy list.        Objective      Physical Exam  /86   Ht 149.9 cm (59.02\")   Wt 81.6 kg (179 lb 14.3 oz)   LMP 09/10/2024 (Approximate)   BMI 36.31 kg/m²     Body mass index is 36.31 kg/m².    General  Mental Status - alert  General Appearance - cooperative, well groomed, not in acute distress  Orientation - Oriented X3  Build & Nutrition - well developed and well nourished  Posture - normal posture  Gait - normal gait       Ortho Exam  Right knee is tender on the patella tendon.  Full motion stable ligaments negative straight leg raise.  No swelling no effusion.  Left knee has normal exam with no tenderness    Imaging/Studies Reviewed and Interpreted:  Imaging Results (Last 24 Hours)       Procedure Component Value Units Date/Time    XR Knee 4+ View Bilateral [[id number]] Resulted: 05/05/25 1458     Updated: 05/05/25 1458    Narrative:      Bilateral Knee X-Rays  Indication: Pain    Upright AP, Lateral, skiers and Sunrise views of bilateral knees     Findings:  No fracture  No bony lesion  Normal soft tissues  Mild medial joint space narrowing in both knees consistent with mild   osteoarthritis    No prior studies were available for comparison.              Assessment    Assessment:  1. Pain in both knees, unspecified chronicity    2. Right knee pain, unspecified chronicity    3. Patellar tendonitis of right knee        Plan:  Continue over-the-counter medication as needed for discomfort  I have ordered physical therapy.  I gave her a list of places that take her insurance.  I " recommend she take the diclofenac and ordered Voltaren gel for her to use for her knee.  She will follow-up in 4 weeks.  She is working full duty        Micah Wilcox MD  05/05/25  15:09 EDT      Dictated Utilizing Dragon Dictation.

## 2025-05-08 ENCOUNTER — TELEPHONE (OUTPATIENT)
Dept: INTERNAL MEDICINE | Facility: CLINIC | Age: 45
End: 2025-05-08
Payer: MEDICAID

## 2025-05-09 ENCOUNTER — OFFICE VISIT (OUTPATIENT)
Dept: INTERNAL MEDICINE | Facility: CLINIC | Age: 45
End: 2025-05-09
Payer: MEDICAID

## 2025-05-09 ENCOUNTER — LAB (OUTPATIENT)
Dept: LAB | Facility: HOSPITAL | Age: 45
End: 2025-05-09
Payer: MEDICAID

## 2025-05-09 VITALS
WEIGHT: 179.8 LBS | BODY MASS INDEX: 36.25 KG/M2 | HEART RATE: 77 BPM | SYSTOLIC BLOOD PRESSURE: 132 MMHG | OXYGEN SATURATION: 94 % | DIASTOLIC BLOOD PRESSURE: 86 MMHG | HEIGHT: 59 IN

## 2025-05-09 DIAGNOSIS — Z11.1 SCREENING-PULMONARY TB: Primary | ICD-10-CM

## 2025-05-09 PROCEDURE — 86480 TB TEST CELL IMMUN MEASURE: CPT | Performed by: NURSE PRACTITIONER

## 2025-05-09 PROCEDURE — 36415 COLL VENOUS BLD VENIPUNCTURE: CPT | Performed by: NURSE PRACTITIONER

## 2025-05-09 NOTE — PROGRESS NOTES
"    Office Note     Name: Bernardo Dodd    : 1980     MRN: [id number]     Chief Complaint  TB Test    Subjective     History of Present Illness:  Bernardo Dodd is a 44 y.o. female who presents today for a TB test for employment.  Patient is in the process of starting a new job and this is part of the preemployment requirements.    He also mentions during today's visit that she has been seen by Ortho.  She is still experiencing bilateral knee pain.  She has noted a clicking to her left knee.  She thinks it is related to hitting her knee on the bed rail.  She was also told that her right knee is \"bone-on-bone\".  She is in the process of getting established with physical therapy.  Ortho ALSO recommended that she take diclofenac and use Voltaren gel as needed.    Otherwise, no further complaints or concerns at this time.  Pleasant visit with the patient today.        Past Medical History:   Diagnosis Date    Antisocial personality disorder     Anxiety     Asthma, extrinsic 1990    Bipolar disorder     CHF (congestive heart failure)     Chlamydia     Endometriosis     GERD (gastroesophageal reflux disease)     Gonorrhea 2000    Herpes simplex 2000    History of methamphetamine abuse 2020    Clean ~     Hypertension 2016    Injury of back     Kidney stones     Lactose intolerance     I was little when my parents found out    Migraine     Ovarian cyst     Pituitary adenoma 2014    PMS (premenstrual syndrome)     Preeclampsia     Rheumatoid arthritis 1999    Seizures 2012    Stopped ~     Shingles on back 2017    Shortness of breath [id number]    Born with asthma    Substance abuse - DOC was cocaine     Sober of cocaine since 2022    Trauma     Trichomonas infection 2000    Urogenital trichomoniasis     Varicella     Vitamin D deficiency 2014       Past Surgical History:   Procedure Laterality Date    ABDOMINAL SURGERY      8/28/10 "    BARTHOLIN GLAND CYST EXCISION Left 2018    BREAST BIOPSY      BREAST CYST ASPIRATION  2017    Removed barfolin gland     SECTION  2010     SECTION WITH TUBAL  2010    COLONOSCOPY      CYSTOSCOPY, URETEROSCOPY, RETROGRADE PYELOGRAM, STONE EXTRACTION, STENT INSERTION Bilateral 2023    Procedure: CYSTOSCOPY, BILATERAL RETROGRADE PYELOGRAM, URETEROSCOPY, AND STENT PLACEMENT;  Surgeon: Micah Child MD;  Location: [facility] OR;  Service: Urology;  Laterality: Bilateral;    CYSTOSCOPY, URETEROSCOPY, RETROGRADE PYELOGRAM, STONE EXTRACTION, STENT INSERTION Left 2023    Procedure: CYSTOSCOPY URETEROSCOPY RETROGRADE PYELOGRAM STONE EXTRACTION STENT INSERTION;  Surgeon: Micah Child MD;  Location:  [facility];  Service: Urology;  Laterality: Left;    I & D / EXCISION MARSUPIALIZATION BARTHOLIN'S GLAND CYST / LYSIS LESIONS Left     ORIF ANKLE FRACTURE Right 2005    REDUCTION MAMMAPLASTY Bilateral     TENSION FREE VAGINAL TAPING WITH MINI ARC SLING  2018    Dr Doyle avery     TOTAL LAPAROSCOPIC HYSTERECTOMY SALPINGO OOPHORECTOMY Bilateral 2024    [facility] - benign; Surgeon: Edmar Smith MD;       Social History     Socioeconomic History    Marital status: Single    Number of children: 2    Highest education level: Some college, no degree   Tobacco Use    Smoking status: Former     Current packs/day: 0.00     Average packs/day: 1 pack/day for 22.0 years (22.0 ttl pk-yrs)     Types: Cigarettes, Cigars     Start date:      Quit date: 2016     Years since quittin.3     Passive exposure: Past    Smokeless tobacco: Never    Tobacco comments:     I quit every so often. Then i start again.   Vaping Use    Vaping status: Some Days    Substances: THC, Flavoring    Devices: Disposable    Passive vaping exposure: Yes   Substance and Sexual Activity    Alcohol use: No    Drug use: Not Currently     Types: Marijuana     Comment: Marijuana once a week. Tried  cocaine, meth, pain pills in the past    Sexual activity: Yes     Partners: Male     Birth control/protection: Tubal ligation         Current Outpatient Medications:     acetaminophen (TYLENOL) 500 MG tablet, Take 1 tablet by mouth Every 4 (Four) Hours As Needed for Mild Pain or Fever., Disp: 20 tablet, Rfl: 0    albuterol sulfate  (90 Base) MCG/ACT inhaler, Inhale 2 puffs Every 4 (Four) Hours As Needed for Wheezing., Disp: 18 g, Rfl: 2    amLODIPine (NORVASC) 5 MG tablet, TAKE 1 TABLET BY MOUTH DAILY, Disp: 90 tablet, Rfl: 0    Azelastine HCl 137 MCG/SPRAY solution, 1 spray by Each Nare route 2 (Two) Times a Day., Disp: 30 mL, Rfl: 11    Diclofenac Sodium (VOLTAREN) 1 % gel gel, Apply 4 g topically to the appropriate area as directed 2 (Two) Times a Day., Disp: 100 g, Rfl: 1    famotidine (PEPCID) 20 MG tablet, Take 1 tablet by mouth 2 (Two) Times a Day., Disp: 60 tablet, Rfl: 11    lamoTRIgine (LaMICtal) 200 MG tablet, Take 1 tablet by mouth Daily., Disp: 30 tablet, Rfl: 11    levocetirizine (XYZAL) 5 MG tablet, Take 1 tablet by mouth Every Evening., Disp: 30 tablet, Rfl: 11    Multiple Vitamins-Minerals (CENTRUM ADULT PO), Take  by mouth., Disp: , Rfl:     nystatin-triamcinolone (MYCOLOG) 150373-1.1 UNIT/GM-% ointment, Apply 1 Application topically to the appropriate area as directed 2 (Two) Times a Day., Disp: 15 g, Rfl: 3    fluconazole (Diflucan) 150 MG tablet, Take 1 tablet by mouth Every 72 (Seventy-Two) Hours. (Patient not taking: Reported on 5/9/2025), Disp: 2 tablet, Rfl: 0    naloxone (NARCAN) 4 MG/0.1ML nasal spray, Call 911. Don't prime. [place] in 1 nostril for overdose. Repeat in 2-3 minutes in other nostril if no or minimal breathing/responsiveness. (Patient not taking: Reported on 5/9/2025), Disp: 2 each, Rfl: 0    ondansetron ODT (ZOFRAN-ODT) 4 MG disintegrating tablet, Take 1 tablet by mouth 4 (Four) Times a Day As Needed for Nausea or Vomiting. (Patient not taking: Reported on 5/9/2025),  "Disp: 15 tablet, Rfl: 0    promethazine-dextromethorphan (PROMETHAZINE-DM) 6.25-15 MG/5ML syrup, Take 5 mL by mouth 4 (Four) Times a Day As Needed for Cough. (Patient not taking: Reported on 5/9/2025), Disp: 200 mL, Rfl: 0    Objective     Vital Signs  /86   Pulse 77   Ht 149.9 cm (59.02\")   Wt 81.6 kg (179 lb 12.8 oz)   SpO2 94%   BMI 36.30 kg/m²   Estimated body mass index is 36.3 kg/m² as calculated from the following:    Height as of this encounter: 149.9 cm (59.02\").    Weight as of this encounter: 81.6 kg (179 lb 12.8 oz).           Physical Exam  Constitutional:       General: She is not in acute distress.     Appearance: Normal appearance. She is not ill-appearing.   HENT:      Head: Normocephalic and atraumatic.      Nose: Nose normal.   Eyes:      Extraocular Movements: Extraocular movements intact.      Conjunctiva/sclera: Conjunctivae normal.      Pupils: Pupils are equal, round, and reactive to light.   Cardiovascular:      Rate and Rhythm: Normal rate.   Pulmonary:      Effort: Pulmonary effort is normal. No respiratory distress.   Musculoskeletal:         General: Normal range of motion.      Cervical back: Neck supple.   Skin:     General: Skin is warm and dry.   Neurological:      General: No focal deficit present.      Mental Status: She is alert and oriented to person, place, and time. Mental status is at baseline.   Psychiatric:         Mood and Affect: Mood normal.         Behavior: Behavior normal.         Thought Content: Thought content normal.         Judgment: Judgment normal.          Assessment and Plan     Diagnoses and all orders for this visit:    1. Screening-pulmonary TB (Primary)  -     QuantiFERON-TB Gold Plus  -     QuantiFERON-TB Gold Plus        Follow Up  Return if symptoms worsen or fail to improve, for Next scheduled follow up.      SHANE Ang    Part of this note may be an electronic transcription/translation of spoken language to printed text using the " [facility] Dictation System.

## 2025-05-13 LAB
GAMMA INTERFERON BACKGROUND BLD IA-ACNC: 0.28 IU/ML
M TB IFN-G BLD-IMP: NEGATIVE
M TB IFN-G CD4+ BCKGRND COR BLD-ACNC: 0.23 IU/ML
M TB IFN-G CD4+CD8+ BCKGRND COR BLD-ACNC: 0.21 IU/ML
MITOGEN IGNF BCKGRD COR BLD-ACNC: >10 IU/ML
QUANTIFERON INCUBATION: NORMAL
SERVICE CMNT-IMP: NORMAL

## 2025-07-26 DIAGNOSIS — I10 PRIMARY HYPERTENSION: ICD-10-CM

## 2025-07-28 RX ORDER — AMLODIPINE BESYLATE 5 MG/1
5 TABLET ORAL DAILY
Qty: 90 TABLET | Refills: 1 | Status: SHIPPED | OUTPATIENT
Start: 2025-07-28

## 2025-07-29 ENCOUNTER — OFFICE VISIT (OUTPATIENT)
Dept: INTERNAL MEDICINE | Facility: CLINIC | Age: 45
End: 2025-07-29
Payer: MEDICAID

## 2025-07-29 VITALS
HEIGHT: 59 IN | OXYGEN SATURATION: 96 % | WEIGHT: 183 LBS | DIASTOLIC BLOOD PRESSURE: 82 MMHG | BODY MASS INDEX: 36.89 KG/M2 | HEART RATE: 77 BPM | SYSTOLIC BLOOD PRESSURE: 128 MMHG | TEMPERATURE: 97.4 F

## 2025-07-29 DIAGNOSIS — J02.9 SORE THROAT: ICD-10-CM

## 2025-07-29 DIAGNOSIS — J06.9 UPPER RESPIRATORY TRACT INFECTION, UNSPECIFIED TYPE: Primary | ICD-10-CM

## 2025-07-29 DIAGNOSIS — R05.1 ACUTE COUGH: ICD-10-CM

## 2025-07-29 RX ORDER — CEFDINIR 300 MG/1
300 CAPSULE ORAL 2 TIMES DAILY
Qty: 10 CAPSULE | Refills: 0 | Status: SHIPPED | OUTPATIENT
Start: 2025-07-29 | End: 2025-08-03

## 2025-07-29 RX ORDER — DEXAMETHASONE SODIUM PHOSPHATE 10 MG/ML
10 INJECTION, SOLUTION INTRA-ARTICULAR; INTRALESIONAL; INTRAMUSCULAR; INTRAVENOUS; SOFT TISSUE ONCE
Status: COMPLETED | OUTPATIENT
Start: 2025-07-29 | End: 2025-07-29

## 2025-07-29 RX ORDER — METHYLPREDNISOLONE 4 MG/1
TABLET ORAL
Qty: 21 TABLET | Refills: 0 | Status: SHIPPED | OUTPATIENT
Start: 2025-07-29

## 2025-07-29 RX ORDER — DEXTROMETHORPHAN HYDROBROMIDE AND PROMETHAZINE HYDROCHLORIDE 15; 6.25 MG/5ML; MG/5ML
5 SYRUP ORAL 4 TIMES DAILY PRN
Qty: 200 ML | Refills: 0 | Status: SHIPPED | OUTPATIENT
Start: 2025-07-29

## 2025-07-29 RX ADMIN — DEXAMETHASONE SODIUM PHOSPHATE 10 MG: 10 INJECTION, SOLUTION INTRA-ARTICULAR; INTRALESIONAL; INTRAMUSCULAR; INTRAVENOUS; SOFT TISSUE at 15:01

## 2025-08-12 ENCOUNTER — OFFICE VISIT (OUTPATIENT)
Dept: INTERNAL MEDICINE | Facility: CLINIC | Age: 45
End: 2025-08-12
Payer: COMMERCIAL

## 2025-08-12 ENCOUNTER — TELEPHONE (OUTPATIENT)
Dept: INTERNAL MEDICINE | Facility: CLINIC | Age: 45
End: 2025-08-12

## 2025-08-12 VITALS
SYSTOLIC BLOOD PRESSURE: 122 MMHG | DIASTOLIC BLOOD PRESSURE: 78 MMHG | OXYGEN SATURATION: 94 % | WEIGHT: 182.8 LBS | TEMPERATURE: 96.1 F | HEART RATE: 92 BPM | BODY MASS INDEX: 36.85 KG/M2 | HEIGHT: 59 IN

## 2025-08-12 DIAGNOSIS — J02.9 SORE THROAT: ICD-10-CM

## 2025-08-12 DIAGNOSIS — R05.2 SUBACUTE COUGH: ICD-10-CM

## 2025-08-12 DIAGNOSIS — H60.8X3 CHRONIC ECZEMATOUS OTITIS EXTERNA OF BOTH EARS: ICD-10-CM

## 2025-08-12 DIAGNOSIS — J06.9 ACUTE URI: Primary | ICD-10-CM

## 2025-08-12 RX ORDER — NYSTATIN AND TRIAMCINOLONE ACETONIDE 100000; 1 [USP'U]/G; MG/G
1 OINTMENT TOPICAL 2 TIMES DAILY
Qty: 15 G | Refills: 3 | Status: SHIPPED | OUTPATIENT
Start: 2025-08-12

## 2025-08-12 RX ORDER — GUAIFENESIN 600 MG/1
600 TABLET, EXTENDED RELEASE ORAL 2 TIMES DAILY
Qty: 14 TABLET | Refills: 0 | Status: SHIPPED | OUTPATIENT
Start: 2025-08-12 | End: 2025-08-19

## 2025-08-12 RX ORDER — DEXAMETHASONE SODIUM PHOSPHATE 10 MG/ML
10 INJECTION, SOLUTION INTRA-ARTICULAR; INTRALESIONAL; INTRAMUSCULAR; INTRAVENOUS; SOFT TISSUE ONCE
Status: COMPLETED | OUTPATIENT
Start: 2025-08-12 | End: 2025-08-12

## 2025-08-12 RX ADMIN — DEXAMETHASONE SODIUM PHOSPHATE 10 MG: 10 INJECTION, SOLUTION INTRA-ARTICULAR; INTRALESIONAL; INTRAMUSCULAR; INTRAVENOUS; SOFT TISSUE at 15:00

## (undated) DEVICE — ANTIBACTERIAL UNDYED BRAIDED (POLYGLACTIN 910), SYNTHETIC ABSORBABLE SUTURE: Brand: COATED VICRYL

## (undated) DEVICE — FIBR LASR SOLTIVE BALL/TP 200MICRON 1P/U

## (undated) DEVICE — GLV SURG BIOGEL LTX PF 7 1/2

## (undated) DEVICE — SNAP KOVER: Brand: UNBRANDED

## (undated) DEVICE — GLV SURG BIOGEL LTX PF 8

## (undated) DEVICE — BLANKT WARM UPPR/BDY ARM/OUT 57X196CM

## (undated) DEVICE — PK CYSTO-TUR BASIC 10

## (undated) DEVICE — TROC BLADLES ANCHORPORT/OPTI LP 8X120MM 1P/U

## (undated) DEVICE — GLV SURG SENSICARE PI MIC PF SZ7.5 LF STRL

## (undated) DEVICE — LAPAROVUE VISIBILITY SYSTEM LAPAROSCOPIC SOLUTIONS: Brand: LAPAROVUE

## (undated) DEVICE — BLADELESS OBTURATOR: Brand: WECK VISTA

## (undated) DEVICE — MANIP UTER RUMI TP 6.7MMX8CM BLU

## (undated) DEVICE — COLUMN DRAPE

## (undated) DEVICE — CATH URETRL FLXITP POLLACK STD 5F 70CM

## (undated) DEVICE — ARM DRAPE

## (undated) DEVICE — TIP COVER ACCESSORY

## (undated) DEVICE — ST TBG AIRSEAL FLTR TRI LUM

## (undated) DEVICE — SEAL

## (undated) DEVICE — ANTIBACTERIAL UNDYED BRAIDED (POLYGLACTIN 910), SYNTHETIC ABSORBABLE SURGICAL SUTURE: Brand: COATED VICRYL

## (undated) DEVICE — PK MAJ GYN DAVINCI 10

## (undated) DEVICE — NITINOL WIRE WITH HYDROPHILIC TIP: Brand: SENSOR

## (undated) DEVICE — ADHS SKIN PREMIERPRO EXOFIN TOPICAL HI/VISC .5ML

## (undated) DEVICE — MANIP UTER RUMI 2 KOH EFFICIENT 3.5CM BL

## (undated) DEVICE — PATIENT RETURN ELECTRODE, SINGLE-USE, CONTACT QUALITY MONITORING, ADULT, WITH 9FT CORD, FOR PATIENTS WEIGING OVER 33LBS. (15KG): Brand: MEGADYNE

## (undated) DEVICE — SCRB SURG BACTOSHIELD CHG 4PCT 4OZ